# Patient Record
Sex: FEMALE | Race: WHITE | NOT HISPANIC OR LATINO | Employment: OTHER | URBAN - METROPOLITAN AREA
[De-identification: names, ages, dates, MRNs, and addresses within clinical notes are randomized per-mention and may not be internally consistent; named-entity substitution may affect disease eponyms.]

---

## 2018-12-08 ENCOUNTER — TRANSFERRED RECORDS (OUTPATIENT)
Dept: HEALTH INFORMATION MANAGEMENT | Facility: CLINIC | Age: 58
End: 2018-12-08

## 2019-06-21 LAB
ALT SERPL-CCNC: 23 U/L (ref 13–56)
AST SERPL-CCNC: 16 U/L (ref 15–37)
CREAT SERPL-MCNC: 0.82 MG/DL (ref 0.55–1.02)
GLUCOSE SERPL-MCNC: 73 MG/DL (ref 70–99)
POTASSIUM SERPL-SCNC: 3.7 MEQ/L (ref 3.6–5)

## 2019-10-25 LAB
ALT SERPL-CCNC: 23 U/L (ref 13–56)
AST SERPL-CCNC: 14 U/L (ref 15–37)
CREAT SERPL-MCNC: 0.72 MG/DL (ref 0.55–1.02)
GFR SERPL CREATININE-BSD FRML MDRD: >60 ML/MIN/1.73M2
GLUCOSE SERPL-MCNC: 86 MG/DL (ref 70–99)
POTASSIUM SERPL-SCNC: 4 MEQ/L (ref 3.6–5)

## 2019-12-16 ENCOUNTER — TRANSFERRED RECORDS (OUTPATIENT)
Dept: HEALTH INFORMATION MANAGEMENT | Facility: CLINIC | Age: 59
End: 2019-12-16

## 2019-12-16 LAB — EJECTION FRACTION: >70 %

## 2020-03-04 ENCOUNTER — TRANSFERRED RECORDS (OUTPATIENT)
Dept: HEALTH INFORMATION MANAGEMENT | Facility: CLINIC | Age: 60
End: 2020-03-04

## 2020-03-04 LAB
HPV ABSTRACT: ABNORMAL
PAP SMEAR - HIM PATIENT REPORTED: POSITIVE

## 2020-03-06 ENCOUNTER — TRANSFERRED RECORDS (OUTPATIENT)
Dept: HEALTH INFORMATION MANAGEMENT | Facility: CLINIC | Age: 60
End: 2020-03-06

## 2020-03-06 LAB
ALT SERPL-CCNC: 24 U/L (ref 13–56)
AST SERPL-CCNC: 16 U/L (ref 15–37)
CREAT SERPL-MCNC: 0.67 MG/DL (ref 0.55–1.02)
GFR SERPL CREATININE-BSD FRML MDRD: >60 ML/MIN/1.73M2
GLUCOSE SERPL-MCNC: 72 MG/DL (ref 70–99)
POTASSIUM SERPL-SCNC: 3.9 MEQ/L (ref 3.6–5)

## 2020-05-15 ENCOUNTER — TRANSFERRED RECORDS (OUTPATIENT)
Dept: HEALTH INFORMATION MANAGEMENT | Facility: CLINIC | Age: 60
End: 2020-05-15

## 2020-06-01 LAB
ALT SERPL-CCNC: 15 U/L (ref 6–29)
AST SERPL-CCNC: 18 U/L (ref 10–35)
CREAT SERPL-MCNC: 0.69 MG/DL (ref 0.5–0.99)
GFR SERPL CREATININE-BSD FRML MDRD: 95 ML/MIN/1.73M2
GLUCOSE SERPL-MCNC: 83 MG/DL (ref 65–99)
POTASSIUM SERPL-SCNC: 4 MMOL/L (ref 3.5–5.3)

## 2020-06-08 ENCOUNTER — TRANSFERRED RECORDS (OUTPATIENT)
Dept: HEALTH INFORMATION MANAGEMENT | Facility: CLINIC | Age: 60
End: 2020-06-08

## 2020-06-09 ENCOUNTER — TRANSCRIBE ORDERS (OUTPATIENT)
Dept: OTHER | Age: 60
End: 2020-06-09

## 2020-06-09 ENCOUNTER — TRANSFERRED RECORDS (OUTPATIENT)
Dept: HEALTH INFORMATION MANAGEMENT | Facility: CLINIC | Age: 60
End: 2020-06-09

## 2020-06-09 DIAGNOSIS — Z94.0 S/P KIDNEY TRANSPLANT: Primary | ICD-10-CM

## 2020-06-09 LAB
ALT SERPL-CCNC: 25 U/L (ref 13–56)
AST SERPL-CCNC: 15 U/L (ref 15–37)
CREAT SERPL-MCNC: 0.89 MG/DL (ref 0.55–1.02)
GFR SERPL CREATININE-BSD FRML MDRD: >60 ML/MIN/1.73M2
GLUCOSE SERPL-MCNC: 83 MG/DL (ref 70–99)
POTASSIUM SERPL-SCNC: 4 MEQ/L (ref 3.6–5)

## 2020-06-10 ENCOUNTER — TELEPHONE (OUTPATIENT)
Dept: NEPHROLOGY | Facility: CLINIC | Age: 60
End: 2020-06-10

## 2020-06-10 NOTE — TELEPHONE ENCOUNTER
[facility] Call [facility]    Phone Message    May a detailed message be left on voicemail: yes     Reason for Call: Appointment Intake    Referring Provider Name: Nicolas Thomas MD from [facility] referring -     Diagnosis and/or Symptoms: Would like to transfer post kidney transplant care to [place].  Pt. moving to [place]      Action Taken: Message routed to:  [facility] ([facility] Nephrology    Travel Screening: Not Applicable

## 2020-06-12 ENCOUNTER — TRANSFERRED RECORDS (OUTPATIENT)
Dept: HEALTH INFORMATION MANAGEMENT | Facility: CLINIC | Age: 60
End: 2020-06-12

## 2020-06-21 ENCOUNTER — TRANSFERRED RECORDS (OUTPATIENT)
Dept: HEALTH INFORMATION MANAGEMENT | Facility: CLINIC | Age: 60
End: 2020-06-21

## 2020-07-08 NOTE — TELEPHONE ENCOUNTER
Patient has moved to [place] and transferring her care to [facility]  Transplant History  Liver Kidney Transplant 10/10/2010 [facility] [place]  2nd Liver Transplant - 3/4/2011  [facility] [place]  2nd Kidney Transplant- 2/4/2016  [facility] [place]   Follows with Dr Jose Thomas from [facility]- - [place]- [phone]    Insurance- Medicare Primary  ID- [id number]  Part A- 2010 and Part D- 4/1/2012  [facility]-  Card  Oleg Sharma(8/31/1969)  [phone]

## 2020-07-10 ENCOUNTER — TELEPHONE (OUTPATIENT)
Dept: TRANSPLANT | Facility: CLINIC | Age: 60
End: 2020-07-10

## 2020-07-10 NOTE — TELEPHONE ENCOUNTER
Spoke with Belen on the phone regarding a transfer of care to [facility] since her move to [place] from [place] recently. Belen received a kidney/liver transplant in [place] 10/2010. 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 Second liver transplant 3/2011 (per pt) at [facility]. 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 She received her second kidney transplant 2/2016 at [facility]. Both organs are functioning well at this time.    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 She would like to see Oncology at [facility] as well.     Belen is also experiencing vaginal prolapse and was referred to Gynecology in [place] and will also need a referral to Gynecology at [facility].     Belen has an incisional hernia that she would like repaired. She currently wears a support band.     I recommended Belen establish with an internal medicine PCP as soon as possible and appt request sent today. Appt request also sent for hepatology and nephrology.

## 2020-07-13 ENCOUNTER — TELEPHONE (OUTPATIENT)
Dept: TRANSPLANT | Facility: CLINIC | Age: 60
End: 2020-07-13

## 2020-07-13 ENCOUNTER — VIRTUAL VISIT (OUTPATIENT)
Dept: GASTROENTEROLOGY | Facility: CLINIC | Age: 60
End: 2020-07-13
Attending: INTERNAL MEDICINE
Payer: MEDICARE

## 2020-07-13 DIAGNOSIS — Z90.81 S/P SPLENECTOMY: ICD-10-CM

## 2020-07-13 DIAGNOSIS — B97.7 HPV (HUMAN PAPILLOMA VIRUS) INFECTION: ICD-10-CM

## 2020-07-13 DIAGNOSIS — N80.9 ENDOMETRIOSIS: ICD-10-CM

## 2020-07-13 DIAGNOSIS — Z94.0 S/P KIDNEY TRANSPLANT: ICD-10-CM

## 2020-07-13 DIAGNOSIS — Q61.3 POLYCYSTIC KIDNEY DISEASE: ICD-10-CM

## 2020-07-13 DIAGNOSIS — D84.9 IMMUNOSUPPRESSION (H): ICD-10-CM

## 2020-07-13 DIAGNOSIS — N81.10 VAGINAL PROLAPSE: Primary | ICD-10-CM

## 2020-07-13 DIAGNOSIS — Z94.4 S/P LIVER TRANSPLANT (H): ICD-10-CM

## 2020-07-13 RX ORDER — SULFAMETHOXAZOLE AND TRIMETHOPRIM 400; 80 MG/1; MG/1
TABLET ORAL
COMMUNITY
Start: 2020-06-08 | End: 2020-12-15

## 2020-07-13 RX ORDER — PREDNISONE 5 MG/1
5 TABLET ORAL EVERY MORNING
COMMUNITY
Start: 2020-01-02 | End: 2021-06-08

## 2020-07-13 RX ORDER — NITROGLYCERIN 0.4 MG/1
0.4 TABLET SUBLINGUAL PRN
COMMUNITY
End: 2021-11-19

## 2020-07-13 RX ORDER — ACETAMINOPHEN 325 MG/1
325-650 TABLET ORAL PRN
COMMUNITY
End: 2020-09-23

## 2020-07-13 RX ORDER — TACROLIMUS 1 MG/1
1 CAPSULE ORAL 2 TIMES DAILY
COMMUNITY
Start: 2019-11-15 | End: 2020-11-27

## 2020-07-13 RX ORDER — LORATADINE 10 MG/1
10 TABLET ORAL EVERY EVENING
Status: ON HOLD | COMMUNITY
End: 2022-10-05

## 2020-07-13 RX ORDER — ASPIRIN 81 MG/1
81 TABLET, CHEWABLE ORAL EVERY EVENING
COMMUNITY

## 2020-07-13 RX ORDER — DOCUSATE SODIUM 100 MG/1
200 CAPSULE, LIQUID FILLED ORAL EVERY MORNING
COMMUNITY
End: 2022-04-28

## 2020-07-13 RX ORDER — ONDANSETRON 4 MG/1
TABLET, FILM COATED ORAL PRN
COMMUNITY
End: 2022-04-28

## 2020-07-13 RX ORDER — ALPRAZOLAM 0.25 MG
0.25 TABLET ORAL PRN
COMMUNITY
End: 2021-01-27

## 2020-07-13 RX ORDER — TEMAZEPAM 15 MG/1
15 CAPSULE ORAL PRN
COMMUNITY
End: 2021-01-27

## 2020-07-13 RX ORDER — BIOTIN 1 MG
3000 TABLET ORAL EVERY EVENING
COMMUNITY

## 2020-07-13 RX ORDER — MYCOPHENOLIC ACID 360 MG/1
TABLET, DELAYED RELEASE ORAL 2 TIMES DAILY
COMMUNITY
End: 2020-11-27

## 2020-07-13 ASSESSMENT — PAIN SCALES - GENERAL: PAINLEVEL: NO PAIN (0)

## 2020-07-13 NOTE — TELEPHONE ENCOUNTER
Patient Call: Voicemail  Date/Time: 7/10/2020  8075  Reason for call: Pt calling  Just talked with schedulers and unable to see Neph until Sept  Needs to be seen in July per pt with her results of her tests (elevated HPD)  That could easily turn into cancer

## 2020-07-13 NOTE — PROGRESS NOTES
"Belen Sharma is a 60 year old female who is being evaluated via a billable video visit.      The patient has been notified of following:     \"This video visit will be conducted via a call between you and your physician/provider. We have found that certain health care needs can be provided without the need for an in-person physical exam.  This service lets us provide the care you need with a video conversation.  If a prescription is necessary we can send it directly to your pharmacy.  If lab work is needed we can place an order for that and you can then stop by our lab to have the test done at a later time.    Video visits are billed at different rates depending on your insurance coverage.  Please reach out to your insurance provider with any questions.    If during the course of the call the physician/provider feels a video visit is not appropriate, you will not be charged for this service.\"    Patient has given verbal consent for Video visit? Yes    How would you like to obtain your AVS? MyChart    Will anyone else be joining your video visit? No    Date of Service: 2020     Subjective:          Belen Sharma is a 60 year old female presenting for follow up with history of liver transplantation    History of Present Illness   Belen Sharma is a 60 year old female with past medical history of congenital hepatic fibrosis and polycystic kidney disease who status post SLK in 2010.  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  Her kidney transplant was removed in 2012.  She underwent a splenectomy in 2013.  Ultimately she underwent retransplant with  donor kidney in 2016 after desensitization protocol.  She " recently moved to the [place] area and is establishing care.    Her liver transplant history is as above.  She has been following with hepatologist Dr. Zach Ray with the [facility] in [place].  She is not had any significant complications of her second liver transplant and appears to have excellent allograft function at this time.    In regard to her immunosuppression, she is currently on a regimen of tacrolimus 2 mg every morning 1 mg every afternoon, prednisone 7.5 mg daily,  mg every morning 3 and 60 mg every afternoon.  This immunosuppression has been felt necessary given her read transplant for her kidney and her history of significant antibody sensitization.    Other health issues that she is currently dealing with: She reports a longstanding history of HPV.  Prior to transferring him to the [facility] she was informed that she did need to establish care with a gynecologist to screen for malignancy.  She also has issues with vaginal prolapse and is very interested in seeing a specialist as far as treatment of this condition.      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 Course complicated by multiple severe infections (including CMV pneumonia). 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 Explanted . Re-transplant after de-sensitization 2016     S/P liver transplant (H)      10/9/2010 - HAT, ischemic biopathy. Re-transplant 3/3/2011     S/P splenectomy        Past Surgical History:  Past Surgical History:   Procedure Laterality Date     HERNIA REPAIR, INCISIONAL  2013     SPLENECTOMY       TRANSPLANT KIDNEY RECIPIENT  DONOR  2016    re-transplant after [facility]; 6 months de-sensitization prior AND 6 months after transplant     TRANSPLANT LIVER RECIPIENT  DONOR  2011     TRANSPLANT LIVER, KIDNEY RECIPIENT  DONOR, COMBINED  10/09/2010    HAT - re-Tx liver 3/3/2011; Kidney (left iliac) lost to [facility]/fibrosis - explant       Past Social History:  Social History     Tobacco Use     Smoking status: Never Smoker     Smokeless tobacco: Never Used   Substance Use Topics     Alcohol use: None     Drug use: Not Currently        Family History:  Family History   Problem Relation Age of Onset     Uterine Cancer Maternal Grandmother      Polycystic Kidney Diease Brother      Polycystic Kidney Diease Brother      Polycystic Kidney Diease Brother      Polycystic Kidney Diease Brother      Cervical Cancer Maternal Aunt        Review of Systems  A complete 10 point review of systems was asked and answered in the negative unless specifically commented upon in the HPI    Current Outpatient Medications   Medication     acetaminophen (TYLENOL) 325 MG tablet     ALPRAZolam (XANAX) 0.25 MG tablet     aspirin (ASA) 81 MG chewable tablet     biotin 1000 MCG TABS tablet     docusate sodium (COLACE) 100 MG capsule     loratadine (CLARITIN) 10 MG tablet     Multiple Vitamins-Minerals (WOMENS 50+ MULTI VITAMIN/MIN) TABS     MYFORTIC (BRAND) 360 MG EC tablet     nitroGLYcerin (NITROSTAT) 0.4 MG sublingual tablet     ondansetron (ZOFRAN) 4 MG tablet     predniSONE (DELTASONE) 5 MG tablet     Probiotic Product (PROBIOTIC PO)     Psyllium (METAMUCIL PO)     sulfamethoxazole-trimethoprim (BACTRIM) 400-80 MG tablet     tacrolimus (GENERIC EQUIVALENT) 1 MG capsule     temazepam (RESTORIL) 15 MG  capsule     No current facility-administered medications for this visit.        Objective:         There were no vitals filed for this visit.  There is no height or weight on file to calculate BMI.     Labs: reviewed from Care Everywhere    Imaging: reviewed from Care Everywhere    Assessment and Plan:    S/P Liver Transplantation:   - s/p SLK 10/2010.  Complicated by HAT with resultant ischemic biliopathy leading to graft failure  - Re-Transplant 3/3/2011  - No allograft dysfunction: no history of rejection nor biliary strictures  - Will follow labs per routine    Immunosuppression:   - Given she had a kidney after liver transplant, her immunosuppression will be directed by the kidney transplant service.  - The bare minimum she would need from a liver transplant perspective is tacrolimus BID with a goal trough of ~ 3; anything greater than this is for purposes of maintaining kidney function  - Will plan to check immunosuppression trough levels every 1-3 months to assess for adequate target dosing and will assess CBC and kidney function for toxicity of medications    Infectious Prophylaxis:   - on Bactrim; per transplant nephrology    Kidney Health:   - Referral in place to be seen by transplant nephrology    History of HPV, vaginal prolapse:   - Will place a referral to be seen by OB/Gyn at the [facility]    Nutrition/Weight:    It is very common for patients to put on significant weight after liver transplantation, as they become conditioned to eating as much as possible to maintain a healthy weight pre-transplant.  There is a very significant risk of developing fatty liver and non-alcoholic steatohepatitis in post-transplant patients, even in those who were not transplanted for TIWARI cirrhosis.  - Please work on eating a diet that is rice in fresh fruits and vegetables, moderate amounts of lean protein and dairy, and modest amounts of carbohydrates and fats.  - An exercise plan/regimen is an essential part of remaining  healthy in the post-transplant setting and we recommend 30-35 minutes of exercise at least 4 days a week    Routine Health Care:  - You need to establish and maintain care with a primary care physician to manage: hypertension, high cholesterol, abnormal blood sugars - as these are all potential complications of your health after liver transplantation and will need a local physician to manage these issues.    - We will place a primary care consult  - All patients with liver diseaes (even post transplant) are at an increased risk for osteoporosis.  We strongly recommend screening for Vitamin D deficiency at least twice yearly with aggressive supplementation/replacement as indicated.    - We also recommend a screening DEXA scan to evaluate for osteoporosis.  If present, should treat with calcium, Vitamin D supplementation, and recommend consideration of bisphosphonate therapy.  Also recommend follow up DEXA scans to evaluate for improvement of bone density on therapy.  - Recommend yearly skin exams with dermatology, and if skin cancers are found, recommend twice yearly exams  - Recommend you stay up to date for cancer screening: mammograms/PAP for women and prostate cancer screening for men, and colon cancer screening for all.  - Practice good hygiene with washing of hands and maintaining a clean living space as this will decrease your chances of developing an infection in the post-transplantation setting  - Eat a health diet: rich in fresh fruits and vegetables, lean proteins, and minimize carbohydrate and fat intake.       Thank you very much for the opportunity to participate in the care of this patient.  If you have any further questions, please don't hesitate to contact our office.    __________________________________  Thomas M. Leventhal, M.D.   of Medicine  Advanced & Transplant Hepatology  [facility]      Video-Visit Details    Phone Call Duration: 24 minutes    Approximately 55 minutes of non face-to-face time were spent in review of the patient's medical record to date.  This included review of previous: clinic visits, hospital records, lab results, imaging studies, and procedural documentation.  The findings from this review are summarized in the above note.

## 2020-07-13 NOTE — LETTER
"    7/13/2020         RE: Belen Sharma  [address] [address]        Dear Colleague,    Thank you for referring your patient, Belen Sharma, to the [facility] HEPATOLOGY. Please see a copy of my visit note below.    Belen Sharma is a 60 year old female who is being evaluated via a billable video visit.      The patient has been notified of following:     \"This video visit will be conducted via a call between you and your physician/provider. We have found that certain health care needs can be provided without the need for an in-person physical exam.  This service lets us provide the care you need with a video conversation.  If a prescription is necessary we can send it directly to your pharmacy.  If lab work is needed we can place an order for that and you can then stop by our lab to have the test done at a later time.    Video visits are billed at different rates depending on your insurance coverage.  Please reach out to your insurance provider with any questions.    If during the course of the call the physician/provider feels a video visit is not appropriate, you will not be charged for this service.\"    Patient has given verbal consent for Video visit? Yes    How would you like to obtain your AVS? MyChart    Will anyone else be joining your video visit? No    Date of Service: 7/13/2020     Subjective:          Belen Sharma is a 60 year old female presenting for follow up with history of liver transplantation    History of Present Illness   Belen Sharma is a 60 year old female with past medical history of congenital hepatic fibrosis and polycystic kidney disease who status post SLK in October 2010.  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  Her kidney transplant was removed in 2012.  She underwent a splenectomy in 2013.  Ultimately she underwent retransplant with  donor kidney in 2016 after desensitization protocol.  She recently moved to the [place] area and is establishing care.    Her liver transplant history is as above.  She has been following with hepatologist Dr. Zach Ray with the [facility] in [place].  She is not had any significant complications of her second liver transplant and appears to have excellent allograft function at this time.    In regard to her immunosuppression, she is currently on a regimen of tacrolimus 2 mg every morning 1 mg every afternoon, prednisone 7.5 mg daily,  mg every morning 3 and 60 mg every afternoon.  This immunosuppression has been felt necessary given her read transplant for her kidney and her history of significant antibody sensitization.    Other health issues that she is currently dealing with: She reports a longstanding history of HPV.  Prior to transferring him to the [facility] she was informed that she did need to establish care with a gynecologist to screen for malignancy.  She also has issues with vaginal prolapse and is very interested in seeing a specialist as far as treatment of this condition.      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 Course complicated by multiple severe infections (including CMV pneumonia). Re-listed for transplant    - DDLT (#2 liver): 3/3/2011  - Technique: orthotopic, duct to duct  - Complications: none    Past Medical History:  Past Medical History:   Diagnosis Date     BK viremia [id number]     CMV pneumonia (H)      Congenital hepatic fibrosis      Endometriosis      HPV (human papilloma  virus) infection      Immunosuppression (H)      Polycystic kidney disease      S/P kidney transplant     SLK 10/9/2010 - kidney failure 2/2 [facility]. Explanted . Re-transplant after de-sensitization      S/P liver transplant (H)     10/9/2010 - HAT, ischemic biopathy. Re-transplant 3/3/2011     S/P splenectomy        Past Surgical History:  Past Surgical History:   Procedure Laterality Date     HERNIA REPAIR, INCISIONAL  2013     SPLENECTOMY       TRANSPLANT KIDNEY RECIPIENT  DONOR  2016    re-transplant after [facility]; 6 months de-sensitization prior AND 6 months after transplant     TRANSPLANT LIVER RECIPIENT  DONOR  2011     TRANSPLANT LIVER, KIDNEY RECIPIENT  DONOR, COMBINED  10/09/2010    HAT - re-Tx liver 3/3/2011; Kidney (left iliac) lost to [facility]/fibrosis - explant       Past Social History:  Social History     Tobacco Use     Smoking status: Never Smoker     Smokeless tobacco: Never Used   Substance Use Topics     Alcohol use: None     Drug use: Not Currently        Family History:  Family History   Problem Relation Age of Onset     Uterine Cancer Maternal Grandmother      Polycystic Kidney Diease Brother      Polycystic Kidney Diease Brother      Polycystic Kidney Diease Brother      Polycystic Kidney Diease Brother      Cervical Cancer Maternal Aunt        Review of Systems  A complete 10 point review of systems was asked and answered in the negative unless specifically commented upon in the HPI    Current Outpatient Medications   Medication     acetaminophen (TYLENOL) 325 MG tablet     ALPRAZolam (XANAX) 0.25 MG tablet     aspirin (ASA) 81 MG chewable tablet     biotin 1000 MCG TABS tablet     docusate sodium (COLACE) 100 MG capsule     loratadine (CLARITIN) 10 MG tablet     Multiple Vitamins-Minerals (WOMENS 50+ MULTI VITAMIN/MIN) TABS     MYFORTIC (BRAND) 360 MG EC tablet     nitroGLYcerin (NITROSTAT) 0.4 MG sublingual tablet     ondansetron (ZOFRAN) 4 MG tablet      predniSONE (DELTASONE) 5 MG tablet     Probiotic Product (PROBIOTIC PO)     Psyllium (METAMUCIL PO)     sulfamethoxazole-trimethoprim (BACTRIM) 400-80 MG tablet     tacrolimus (GENERIC EQUIVALENT) 1 MG capsule     temazepam (RESTORIL) 15 MG capsule     No current facility-administered medications for this visit.        Objective:         There were no vitals filed for this visit.  There is no height or weight on file to calculate BMI.     Labs: reviewed from Care Everywhere    Imaging: reviewed from Care Everywhere    Assessment and Plan:    S/P Liver Transplantation:   - s/p SLK 10/2010.  Complicated by HAT with resultant ischemic biliopathy leading to graft failure  - Re-Transplant 3/3/2011  - No allograft dysfunction: no history of rejection nor biliary strictures  - Will follow labs per routine    Immunosuppression:   - Given she had a kidney after liver transplant, her immunosuppression will be directed by the kidney transplant service.  - The bare minimum she would need from a liver transplant perspective is tacrolimus BID with a goal trough of ~ 3; anything greater than this is for purposes of maintaining kidney function  - Will plan to check immunosuppression trough levels every 1-3 months to assess for adequate target dosing and will assess CBC and kidney function for toxicity of medications    Infectious Prophylaxis:   - on Bactrim; per transplant nephrology    Kidney Health:   - Referral in place to be seen by transplant nephrology    History of HPV, vaginal prolapse:   - Will place a referral to be seen by OB[facility]Gyn at the [facility]    Nutrition/Weight:    It is very common for patients to put on significant weight after liver transplantation, as they become conditioned to eating as much as possible to maintain a healthy weight pre-transplant.  There is a very significant risk of developing fatty liver and non-alcoholic steatohepatitis in post-transplant patients, even in those who were not transplanted for TIWARI  cirrhosis.  - Please work on eating a diet that is rice in fresh fruits and vegetables, moderate amounts of lean protein and dairy, and modest amounts of carbohydrates and fats.  - An exercise plan/regimen is an essential part of remaining healthy in the post-transplant setting and we recommend 30-35 minutes of exercise at least 4 days a week    Routine Health Care:  - You need to establish and maintain care with a primary care physician to manage: hypertension, high cholesterol, abnormal blood sugars - as these are all potential complications of your health after liver transplantation and will need a local physician to manage these issues.    - We will place a primary care consult  - All patients with liver diseaes (even post transplant) are at an increased risk for osteoporosis.  We strongly recommend screening for Vitamin D deficiency at least twice yearly with aggressive supplementation/replacement as indicated.    - We also recommend a screening DEXA scan to evaluate for osteoporosis.  If present, should treat with calcium, Vitamin D supplementation, and recommend consideration of bisphosphonate therapy.  Also recommend follow up DEXA scans to evaluate for improvement of bone density on therapy.  - Recommend yearly skin exams with dermatology, and if skin cancers are found, recommend twice yearly exams  - Recommend you stay up to date for cancer screening: mammograms/PAP for women and prostate cancer screening for men, and colon cancer screening for all.  - Practice good hygiene with washing of hands and maintaining a clean living space as this will decrease your chances of developing an infection in the post-transplantation setting  - Eat a health diet: rich in fresh fruits and vegetables, lean proteins, and minimize carbohydrate and fat intake.       Thank you very much for the opportunity to participate in the care of this patient.  If you have any further questions, please don't hesitate to contact our  office.    __________________________________  Thomas M. Leventhal, M.D.   of Medicine  Advanced & Transplant Hepatology  [facility]      Video-Visit Details    Phone Call Duration: 24 minutes   Approximately 55 minutes of non face-to-face time were spent in review of the patient's medical record to date.  This included review of previous: clinic visits, hospital records, lab results, imaging studies, and procedural documentation.  The findings from this review are summarized in the above note.          Again, thank you for allowing me to participate in the care of your patient.        Sincerely,        Thomas M. Leventhal, MD

## 2020-07-13 NOTE — PATIENT INSTRUCTIONS
- We have placed orders for a primary care provider close to [place]    - We have placed orders for an OB/Gyn visit    - We will get you enrolled in our lab protocol    - You have a visit with Transplant Nephrology in ~ 2 months

## 2020-07-13 NOTE — Clinical Note
I ordered PCP and ObGyn referrals.  She will need routine labs, whatever kidney wants.  Follow up 1 year

## 2020-07-13 NOTE — LETTER
Date:July 27, 2020      Patient was self referred, no letter generated. Do not send.        [facility] Health Information

## 2020-07-14 ENCOUNTER — TELEPHONE (OUTPATIENT)
Dept: TRANSPLANT | Facility: CLINIC | Age: 60
End: 2020-07-14

## 2020-07-14 DIAGNOSIS — Z94.4 LIVER REPLACED BY TRANSPLANT (H): ICD-10-CM

## 2020-07-14 DIAGNOSIS — Z13.220 LIPID SCREENING: ICD-10-CM

## 2020-07-15 ENCOUNTER — DOCUMENTATION ONLY (OUTPATIENT)
Dept: CARE COORDINATION | Facility: CLINIC | Age: 60
End: 2020-07-15

## 2020-07-22 ENCOUNTER — DOCUMENTATION ONLY (OUTPATIENT)
Dept: TRANSPLANT | Facility: CLINIC | Age: 60
End: 2020-07-22

## 2020-07-22 ENCOUNTER — TELEPHONE (OUTPATIENT)
Dept: TRANSPLANT | Facility: CLINIC | Age: 60
End: 2020-07-22

## 2020-07-22 ENCOUNTER — VIRTUAL VISIT (OUTPATIENT)
Dept: INTERNAL MEDICINE | Facility: CLINIC | Age: 60
End: 2020-07-22
Payer: MEDICARE

## 2020-07-22 DIAGNOSIS — R07.89 ATYPICAL CHEST PAIN: ICD-10-CM

## 2020-07-22 DIAGNOSIS — Z12.83 SKIN CANCER SCREENING: Primary | ICD-10-CM

## 2020-07-22 NOTE — NURSING NOTE
Chief Complaint   Patient presents with     Establish Care     Pt is looking for a new PCP.     Video Visit Technology for this patient: No video technology available to patient, please call patient over the phone     Larisa Márquez LPN at 12:42 PM on 7/22/2020.

## 2020-07-22 NOTE — PROGRESS NOTES
Telephone visit     Ms. Sharma agrees to telephone visit     Ms. Sharma has h/o polycystic renal disease and strong family history (two older brothers and uncle). She had a visit on 7/13/2020 with Dr. 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 She has just moved to the [facility] (7/1/2020). She previously had gotten care at [facility] in [place]. She also had a splenectomy 2013. Will have to update her vaccination data. She is interested in getting the Shingrex vaccination series. She states up to date on colonoscopy. She sates she gets mammograms every six months and would be due later in the fall for the next. She states she has some post transplant ventral hernias. She has a concerning (about 3 months but worse last month or so) vaginal prolapse. She states she was seen in [place] by both GYN and Oncology (because of her HPV status and her family h/o of endometrial cancer). There was a GYN referral placed on 713/2020. She also states she sees Dermatology  Yearly and placed a referral toay. Her last labs were stable. She has concerns regarding COVID. She has a son and daughter (that to the best of her knowledge) do not have polycystic renal disease. She has a renal transplant appt. Scheduled for 9/15/2020. She takes PRN Xanax for anxiety/panic attacks (rarely) and PRN Restoril for sleep. She rarely takes PRN nitroglycerine for chest pain; however, she has been evaluated by Cardiology in the past and no significant intervention. She would like to establish care with Cardiology here. Placed cardiology referral today.    I will try to see her in person at some future visit    YARELIS De La Garza MD    Total time today 22 minutes and 49 seconds

## 2020-07-23 ENCOUNTER — TELEPHONE (OUTPATIENT)
Dept: INTERNAL MEDICINE | Facility: CLINIC | Age: 60
End: 2020-07-23

## 2020-07-23 ENCOUNTER — TELEPHONE (OUTPATIENT)
Dept: TRANSPLANT | Facility: CLINIC | Age: 60
End: 2020-07-23

## 2020-07-23 NOTE — TELEPHONE ENCOUNTER
----- Message from Елена Dinh RN sent at 7/14/2020  1:21 PM CDT -----  Regarding: Lab orders  JeimyBelen amanda transferred care here from [place]. She was seen by Dr. Leventhal today via virtual visit. Can you please enter in routine transplant labs for her? I will find out who her kidney coordinator is and ask them to enter kidney labs. She is a liver and kidney transplant.    Just call me if you have questions. This is my first transfer of care patient so I'm kind of learning as I go with this one.     Thank you,   Елена

## 2020-07-23 NOTE — TELEPHONE ENCOUNTER
Spoke to pt and discussed clinic options for lab draws. Gave pt the [facility] clinic number to schedule labs in September. Informed pot that she has standing lab orders in her chart where any [facility] clinic can review. Let her know to call with any questions.

## 2020-07-30 ENCOUNTER — TELEPHONE (OUTPATIENT)
Dept: OBGYN | Facility: CLINIC | Age: 60
End: 2020-07-30

## 2020-07-30 ENCOUNTER — PRE VISIT (OUTPATIENT)
Dept: ONCOLOGY | Facility: CLINIC | Age: 60
End: 2020-07-30

## 2020-07-30 NOTE — TELEPHONE ENCOUNTER
Spoke with Belen today regarding her upcoming appointment. Patient has complicated history - transferring care to [place] from [place] - kidney and liver transplant patient (on chart review from recent visit to gastroenterology in July 2020. At that visit, referral to GYN was placed. It appears patient has longstanding history of HPV and also has problem of vaginal prolapse.     Patient tells Rn that she was also seen by Gyn/Onc in [place] but that she does not have cancer, was just seen for screening. She also states she would like to consult regarding hysterectomy.    Patient's records from [place] are available in [facility].     Routing to Dr. Krishnan for review and direction on appropriate clinic for scheduling this patient. If deemed appropriate for [facility], patient would like to reschedule her visit with an OB/Gyn versus resident.

## 2020-07-30 NOTE — TELEPHONE ENCOUNTER
ONCOLOGY INTAKE: Records Information      APPT INFORMATION:  Referring provider:  self referred  Referring provider s clinic:  [facility]  Reason for visit/diagnosis:  family history of cancer  Has patient been notified of appointment date and time?: Yes    RECORDS INFORMATION:  Were the records received with the referral (via [employer])? No    Has patient been seen for any external appt for this diagnosis? No    If yes, where? n/a    Has patient had any imaging or procedures outside of [place]  [facility] for this condition? no      If Yes, where? n/a    ADDITIONAL INFORMATION:  none

## 2020-07-31 ENCOUNTER — TELEPHONE (OUTPATIENT)
Dept: ONCOLOGY | Facility: CLINIC | Age: 60
End: 2020-07-31

## 2020-07-31 NOTE — TELEPHONE ENCOUNTER
Action    Action Taken 7/31/2020 2:33pm     I called pt Belen - we talked for about 20 mins and she provided me with her email address: [email or web address]  I sent her an email so she could send any additional records to [facility] before her appt.     RN Care Coordinator: Елена Dinh     I sent the following in-basket message:   Ranjit Huang and Елена,     I spoke to pt Belen for about 20mins this afternoon and she has a lot of questions about her upcoming genetics appointment.     It sounds like she has a lot of family cancer records and is at risk for some cancers. She would really appreciate receiving a call to discuss what is needed for her upcoming genetics appt on August 13th.     I gave her my email address and she will be sending over some additional records. I explained that we have quite a few records in Epic and  already.     Елена: Pt Belen mentioned that you would have some records from a [facility]. I already see the [facility] records in her chart. Please let me know if you have any additional records and if so, please fax them to HIM.     Thank you,   Kimberly     8/3/2020 8:40am   I received an ib-message from DIANNE Harris,     Thanks for the call. I just talked with Belen now and went over the appointment and what to expect as well as what I would need for the appointment. I gave her my number ([phone]) and explained that I will be on furlough next week so she can leave a voicemail if needed. I will be back on Monday, August 10.     Otherwise, we made plans to reconnect on the 13th for her appointment.     Thank you!   Reina

## 2020-07-31 NOTE — TELEPHONE ENCOUNTER
I spoke with Belen Sharma today regarding the upcoming appointment with me on August 13, 2020 because of a family history of several cancers.    We discussed today that at the appointment I will be reviewing her personal and family medical history, focusing on cancer. Belen had questions about cost for testing and we discussed that genetic testing may not be covered by Medicare if a person does not have a personal history of cancer, however, we can do a pre-verification if she has supplemental insurance so that she will have an estimate of the cost.     Belen also wanted to know about the process of testing, and I relayed that [employer], the company we use in [place], can send a saliva kit to her house to minimize exposure to COVID-19. Once [employer] receives a saliva sample, the process takes about 4-5 weeks. Belen verbalized understanding of this.     I encouraged Belen to contact me with any further questions at [phone] and that I look forward to talking with her on August 13 to go into more detail about genetic testing.     Reina Choi MS, [facility]  Licensed, certified genetic counselor  [phone]

## 2020-08-05 ENCOUNTER — OFFICE VISIT (OUTPATIENT)
Dept: UROLOGY | Facility: CLINIC | Age: 60
End: 2020-08-05
Attending: OBSTETRICS & GYNECOLOGY
Payer: MEDICARE

## 2020-08-05 VITALS
SYSTOLIC BLOOD PRESSURE: 135 MMHG | HEIGHT: 70 IN | WEIGHT: 180 LBS | DIASTOLIC BLOOD PRESSURE: 76 MMHG | HEART RATE: 97 BPM | BODY MASS INDEX: 25.77 KG/M2

## 2020-08-05 DIAGNOSIS — N81.6 RECTOCELE: ICD-10-CM

## 2020-08-05 DIAGNOSIS — N81.11 CYSTOCELE, MIDLINE: Primary | ICD-10-CM

## 2020-08-05 RX ORDER — LOPERAMIDE HYDROCHLORIDE 2 MG/1
1 TABLET ORAL PRN
COMMUNITY
End: 2021-06-08

## 2020-08-05 RX ORDER — PANTOPRAZOLE SODIUM 40 MG/1
1 TABLET, DELAYED RELEASE ORAL PRN
Status: ON HOLD | COMMUNITY
Start: 2020-02-04 | End: 2022-09-15

## 2020-08-05 RX ORDER — FAMOTIDINE 20 MG
1000 TABLET ORAL
COMMUNITY
End: 2022-02-15

## 2020-08-05 RX ORDER — DIPHENHYDRAMINE HCL 25 MG
25 TABLET ORAL EVERY 8 HOURS PRN
COMMUNITY

## 2020-08-05 ASSESSMENT — ENCOUNTER SYMPTOMS
SEIZURES: 0
FATIGUE: 0
LIGHT-HEADEDNESS: 0
FEVER: 0
HEARTBURN: 0
SNORES LOUDLY: 0
TREMORS: 0
JAUNDICE: 0
NUMBNESS: 0
DOUBLE VISION: 0
ABDOMINAL PAIN: 0
HALLUCINATIONS: 0
SINUS CONGESTION: 0
SLEEP DISTURBANCES DUE TO BREATHING: 0
INCREASED ENERGY: 0
WEIGHT LOSS: 0
DECREASED CONCENTRATION: 0
BLOOD IN STOOL: 0
DECREASED APPETITE: 0
LEG PAIN: 0
WEAKNESS: 0
LOSS OF CONSCIOUSNESS: 0
EYE PAIN: 0
HEMOPTYSIS: 0
NIGHT SWEATS: 0
EXERCISE INTOLERANCE: 0
SWOLLEN GLANDS: 0
DYSPNEA ON EXERTION: 0
SINUS PAIN: 0
EYE IRRITATION: 0
NERVOUS/ANXIOUS: 0
HEADACHES: 0
RESPIRATORY PAIN: 0
WHEEZING: 0
MYALGIAS: 0
MEMORY LOSS: 0
POLYPHAGIA: 0
BREAST MASS: 0
MUSCLE WEAKNESS: 0
POOR WOUND HEALING: 0
SHORTNESS OF BREATH: 0
BRUISES/BLEEDS EASILY: 1
POLYDIPSIA: 0
BLOATING: 0
DIZZINESS: 0
EYE WATERING: 0
TROUBLE SWALLOWING: 0
HOT FLASHES: 0
HYPOTENSION: 0
JOINT SWELLING: 0
RECTAL PAIN: 0
ALTERED TEMPERATURE REGULATION: 0
SYNCOPE: 0
SORE THROAT: 0
NECK MASS: 0
PANIC: 0
VOMITING: 0
TASTE DISTURBANCE: 0
ORTHOPNEA: 0
STIFFNESS: 0
SPUTUM PRODUCTION: 0
EYE REDNESS: 0
COUGH: 0
HOARSE VOICE: 0
TINGLING: 0
DISTURBANCES IN COORDINATION: 0
PARALYSIS: 0
DEPRESSION: 0
EXTREMITY NUMBNESS: 0
HEMATURIA: 0
INSOMNIA: 0
CHILLS: 0
BREAST PAIN: 0
WEIGHT GAIN: 0
NAUSEA: 0
DIFFICULTY URINATING: 1
DECREASED LIBIDO: 1
SPEECH CHANGE: 0
SMELL DISTURBANCE: 0
HYPERTENSION: 0
CONSTIPATION: 1
MUSCLE CRAMPS: 0
SKIN CHANGES: 0
COUGH DISTURBING SLEEP: 0
PALPITATIONS: 0
ARTHRALGIAS: 0
NAIL CHANGES: 0
BACK PAIN: 0
DYSURIA: 0
FLANK PAIN: 0
POSTURAL DYSPNEA: 0
NECK PAIN: 0
BOWEL INCONTINENCE: 0
DIARRHEA: 0

## 2020-08-05 ASSESSMENT — MIFFLIN-ST. JEOR: SCORE: 1466.72

## 2020-08-05 ASSESSMENT — PAIN SCALES - GENERAL: PAINLEVEL: NO PAIN (0)

## 2020-08-05 NOTE — PROGRESS NOTES
August 5, 2020    Referring Provider: Referred Self, MD  No address on file    Primary Care Provider: Vincent De La Garza    CC: prolapse    HPI:  Belen Sharma is a 60 year old female who presents for evaluation of her pelvic floor symptoms.  She has a h/o prolapse that she says is on the outside and will require her to push it back in at least 1-2/day. C/O pressure and bulge in the vagina and occ difficulty emptying her bladder. Pt also with h/o failed renal/liver transplant and HSIL, HPV pos on recent pap. Was seen by a Gyn Onc in [place] on 6/13 who did colpo that he said was c/w LSIL and asked her to f/u her in [place].  Prolapse:  Do you feel a vaginal bulge? yes                                      Pressure? yes   Do you have to place your fingers in the vagina or in the rectum to have a bowel movement? yes  Impact to quality of life? severe     Stress Incontinence:  Do you leak urine with cough, sneeze, exercise? no  How often do you leak with cough, sneeze, exercise?  -  How much do you usually leak? -   Do you wear a pad? - If so; -  Impact to quality of life? -    Urge Incontinence:  Do you often get sudden urges to urinate? no  How often do have urges? -  If so, do you leak with these urges? -  How much do you usually leak? -  Impact to quality of life? -    Voids/day:7  Nocturia: 3-4  Fluid intake: 10  Caffeine: 1    Urinating:  Difficulty starting urination or strain to void? yes  Weak or intermittent stream? yes  Incomplete emptying or dribbling? no  Pain or burning with urination? no  Any blood in your urine? no    GI:  Constipation? yes  Frequency stools daily    Straining for stools yes  Stool consistency vaires     Ever leak stool (Accidental Bowel Leakage)? no      If so, how often?               -      If so, do you leak?                   -      Soiling without sensation? -  History of irritable bowel or Crohn's? no    Sexual/Pain:  Are you currently having sex?. No due to prolapse  Pain with sex?   -   Sexual  Partner: -  Do any of these symptoms interfere with sex? -  Impact to quality of life? -    Prior therapy:  Ever done pelvic floor physical therapy? yes  Trial of medication? no  Have you ever tried a pessary? no    Medical History:  Do you have?   High Cholesterol? no     Diabetes? no  High Blood pressure? no     Recurrent UTIs? no  Sleep Apnea? no  Other medical problems: s/p liver and renal transplant X2    Surgical History:    Hysterectomy? no   Bladder Surgery? no   Other? See HPI     OB/Gyn History:  Pregnancies? 2  Deliveries? 2  Vaginal 2  Section -  Current birth control? -  Periods? NA  When was the first day of your last period? -  Last Pap smear?  Any abnormal? Yes, see HPI  Last mammogram? 2019  Last colonoscopy? 2019    Medications/Vitamins/Supplements: reviewed    Drug Allergies: reviewed    Latex Allergy: no  Iodine Allergy no    Family History: (list relationship and age at diagnosis)  Breast cancer? yes  Ovarian cancer? no   Colon cancer? no  Other? Uterine ca    Social History:  Marital status:   Do you/ have you ever smoke(d)  cigarettes? no  Drink more than 1 alcoholic beverage a day?  no  Occupation? -    In the past 3 months have you regularly experienced:  Chest pain w/ walking/exercise? no                   Unusual headaches? no  Leg pain w/ walking/exercise? no                       Easy bruising? no  Difficulty breathing w/ walking/exercise? no  Problems with vision? no  Dizziness, falls, or fainting? no  Excessive bleeding from cuts, gums, surgery? 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 Explanted . Re-transplant after de-sensitization 2016     S/P liver transplant (H)     10/9/2010 - HAT, ischemic biopathy. Re-transplant 3/3/2011     S/P splenectomy      Spider veins        Past Surgical History:   Procedure Laterality Date     bunectomy  2018    right foot     bunionectomy  2019    left     HERNIA REPAIR, INCISIONAL  2013     SPLENECTOMY       TRANSPLANT KIDNEY RECIPIENT  DONOR  2016    re-transplant after [facility]; 6 months de-sensitization prior AND 6 months after transplant     TRANSPLANT LIVER RECIPIENT  DONOR  2011     TRANSPLANT LIVER, KIDNEY RECIPIENT  DONOR, COMBINED  10/09/2010    HAT - re-Tx liver 3/3/2011; Kidney (left iliac) lost to [facility]/fibrosis - explant       Social History     Socioeconomic History     Marital status:      Spouse name: Not on file     Number of children: Not on file     Years of education: Not on file     Highest education level: Not on file   Occupational History     Not on file   Social Needs     Financial resource strain: Not on file     Food insecurity     Worry: Not on file     Inability: Not on file     Transportation needs     Medical: Not on file     Non-medical: Not on file   Tobacco Use     Smoking status: Never Smoker     Smokeless tobacco: Never Used   Substance and Sexual Activity     Alcohol use: Not Currently     Drug use: Not Currently     Sexual activity: Not Currently   Lifestyle     Physical activity     Days per week: Not on file     Minutes per session: Not on file     Stress: Not on file   Relationships     Social connections     Talks on phone: Not on file     Gets together: Not on file     Attends Worship service: Not on file     Active member of club or organization: Not on file     Attends meetings of clubs or organizations: Not on file     Relationship status: Not on file     Intimate partner violence      Fear of current or ex partner: Not on file     Emotionally abused: Not on file     Physically abused: Not on file     Forced sexual activity: Not on file   Other Topics Concern     Parent/sibling w/ CABG, MI or angioplasty before 65F 55M? Not Asked   Social History Narrative     Not on file       Family History   Problem Relation Age of Onset     Uterine Cancer Maternal Grandmother      Polycystic Kidney Diease Brother      Polycystic Kidney Diease Brother      Polycystic Kidney Diease Brother      Polycystic Kidney Diease Brother      Cervical Cancer Maternal Aunt        Review of Systems     Constitutional:  Negative for fever, chills, weight loss, weight gain, fatigue, decreased appetite, night sweats, recent stressors, height gain, height loss, post-operative complications, incisional pain, hallucinations, increased energy, hyperactivity and confused.   HENT:  Negative for ear pain, hearing loss, tinnitus, nosebleeds, trouble swallowing, hoarse voice, mouth sores, sore throat, ear discharge, tooth pain, gum tenderness, taste disturbance, smell disturbance, hearing aid, bleeding gums, dry mouth, sinus pain, sinus congestion and neck mass.    Eyes:  Negative for double vision, pain, redness, eye pain, decreased vision, eye watering, eye bulging, eye dryness, flashing lights, spots, floaters, strabismus, tunnel vision, jaundice and eye irritation.   Respiratory:   Negative for cough, hemoptysis, sputum production, shortness of breath, wheezing, sleep disturbances due to breathing, snores loudly, respiratory pain, dyspnea on exertion, cough disturbing sleep and postural dyspnea.    Cardiovascular:  Positive for chest pain and few scattered varicosities. Negative for dyspnea on exertion, palpitations, orthopnea, fingers/toes turn blue, hypertension, hypotension, syncope, history of heart murmur, pacemaker, leg pain, sleep disturbances due to breathing, light-headedness, exercise intolerance and edema.    Gastrointestinal:  Positive for constipation. Negative for heartburn, nausea, vomiting, abdominal pain, diarrhea, blood in stool, melena, rectal pain, bloating, bowel incontinence, jaundice, coffee ground emesis and change in stool.   Genitourinary:  Positive for bladder incontinence, urgency, vaginal discharge, difficulty urinating, decreased libido and vaginal dryness. Negative for dysuria, hematuria, flank pain, genital sores, dyspareunia, nocturia, voiding less frequently, arousal difficulty, abnormal vaginal bleeding, excessive menstruation, menstrual changes, hot flashes and postmenopausal bleeding.   Musculoskeletal:  Negative for myalgias, back pain, joint swelling, arthralgias, stiffness, muscle cramps, neck pain, bone pain, muscle weakness and fracture.   Skin:  Negative for nail changes, itching, poor wound healing, rash, hair changes, skin changes, acne, warts, poor wound healing, scarring, flaky skin, Raynaud's phenomenon, sensitivity to sunlight and skin thickening.   Neurological:  Negative for dizziness, tingling, tremors, speech change, seizures, loss of consciousness, weakness, light-headedness, numbness, headaches, disturbances in coordination, extremity numbness, memory loss, difficulty walking and paralysis.   Endo/Heme:  Positive for bruises/bleeds easily. Negative for anemia and swollen glands.   Psychiatric/Behavioral:  Negative for depression, hallucinations, memory loss, decreased concentration, mood swings and panic attacks.    Breast:  Negative for breast discharge, breast mass, breast pain and nipple retraction.   Endocrine:  Negative for altered temperature regulation, polyphagia, polydipsia, unwanted hair growth and change in facial hair.      Allergies   Allergen Reactions     Ibuprofen        Current Outpatient Medications   Medication     aspirin (ASA) 81 MG chewable tablet     biotin 1000 MCG TABS tablet     docusate sodium (COLACE) 100 MG capsule     loperamide (IMODIUM A-D) 2  "MG tablet     loratadine (CLARITIN) 10 MG tablet     Multiple Vitamins-Minerals (VITAMIN D3 COMPLETE PO)     MYFORTIC (BRAND) 360 MG EC tablet     predniSONE (DELTASONE) 5 MG tablet     Probiotic Product (PROBIOTIC PO)     sulfamethoxazole-trimethoprim (BACTRIM) 400-80 MG tablet     tacrolimus (GENERIC EQUIVALENT) 1 MG capsule     temazepam (RESTORIL) 15 MG capsule     Vitamin D, Cholecalciferol, 25 MCG (1000 UT) CAPS     acetaminophen (TYLENOL) 325 MG tablet     ALPRAZolam (XANAX) 0.25 MG tablet     diphenhydrAMINE (BENADRYL) 25 MG tablet     nitroGLYcerin (NITROSTAT) 0.4 MG sublingual tablet     ondansetron (ZOFRAN) 4 MG tablet     pantoprazole (PROTONIX) 40 MG EC tablet     Psyllium (METAMUCIL PO)     No current facility-administered medications for this visit.        /76   Pulse 97   Ht 1.778 m (5' 10\")   Wt 81.6 kg (180 lb)   BMI 25.83 kg/m   No LMP recorded. Body mass index is 25.83 kg/m .  Ms. Sharma is alert, comfortable in no acute distress, non-labored breathing.   Abdomen is soft, non-tender, non-distended, no CVAT.    Normal external female genitalia. The urethra was normal appearing without masses, NT.    She has a grade 1 cystocele and rectocele on supine strain. No change to the exam on standing or after having patient attempt to use bathroom  Speculum and bimanual exam are remarkable for normal appearing vagina and cervix.      3/5 kegels.    Rectal exam with normal tone, no masses or tenderness.    POPQ  Aa -2   Ba -2  C -8  D -9  GH 3  PB 3  TVL 10  Ap -2  Bp -2    A/P: Belen Sharma is a 60 year old F with prolapse, unable to demonstrate on exam today. Will refer for defecography to better delineate her prolapse. Will refer to Gyn Onc for f/u of her HSIL in the setting on immunosuppression.     Pt to f/u with me after defecography to review results and treatment options. If unable to get defecography, will obtain dynamic MRI of pelvis.    A total of 60 minutes were spent with the patient today, " > 50% in counseling and coordination of care    Zach Francis MD  Professor, OB/GYN  Urogynecologist  CC  Patient Care Team:  Vincent De La Garza MD as PCP - General (Internal Medicine)  Christy Mohamud MD as MD (Cardiology)  SELF, REFERRED

## 2020-08-05 NOTE — LETTER
8/5/2020       RE: Belen Sharma  [address] [facility] [address]     Dear Colleague,    Thank you for referring your patient, Belen Sharma, to the [facility] at [facility]. Please see a copy of my visit note below.    August 5, 2020    Referring Provider: Referred Self, MD  No address on file    Primary Care Provider: Vincent De La Garza    CC: prolapse    HPI:  Belen Sharma is a 60 year old female who presents for evaluation of her pelvic floor symptoms.  She has a h/o prolapse that she says is on the outside and will require her to push it back in at least 1-2/day. C/O pressure and bulge in the vagina and occ difficulty emptying her bladder. Pt also with h/o failed renal/liver transplant and HSIL, HPV pos on recent pap. Was seen by a Gyn Onc in [place] on 6/13 who did colpo that he said was c/w LSIL and asked her to f/u her in [place].  Prolapse:  Do you feel a vaginal bulge? yes                                      Pressure? yes   Do you have to place your fingers in the vagina or in the rectum to have a bowel movement? yes  Impact to quality of life? severe     Stress Incontinence:  Do you leak urine with cough, sneeze, exercise? no  How often do you leak with cough, sneeze, exercise?  -  How much do you usually leak? -   Do you wear a pad? - If so; -  Impact to quality of life? -    Urge Incontinence:  Do you often get sudden urges to urinate? no  How often do have urges? -  If so, do you leak with these urges? -  How much do you usually leak? -  Impact to quality of life? -    Voids/day:7  Nocturia: 3-4  Fluid intake: 10  Caffeine: 1    Urinating:  Difficulty starting urination or strain to void? yes  Weak or intermittent stream? yes  Incomplete emptying or dribbling? no  Pain or burning with urination? no  Any blood in your urine? no    GI:  Constipation? yes  Frequency stools daily    Straining for stools yes  Stool consistency vaires     Ever leak stool  (Accidental Bowel Leakage)? no      If so, how often?               -      If so, do you leak?                   -      Soiling without sensation? -  History of irritable bowel or Crohn's? no    Sexual/Pain:  Are you currently having sex?. No due to prolapse  Pain with sex?   -   Sexual Partner: -  Do any of these symptoms interfere with sex? -  Impact to quality of life? -    Prior therapy:  Ever done pelvic floor physical therapy? yes  Trial of medication? no  Have you ever tried a pessary? no    Medical History:  Do you have?   High Cholesterol? no     Diabetes? no  High Blood pressure? no     Recurrent UTIs? no  Sleep Apnea? no  Other medical problems: s/p liver and renal transplant X2    Surgical History:    Hysterectomy? no   Bladder Surgery? no   Other? See HPI     OB/Gyn History:  Pregnancies? 2  Deliveries? 2  Vaginal 2  Section -  Current birth control? -  Periods? NA  When was the first day of your last period? -  Last Pap smear? 2019 Any abnormal? Yes, see HPI  Last mammogram? 2019  Last colonoscopy? 2019    Medications/Vitamins/Supplements: reviewed    Drug Allergies: reviewed    Latex Allergy: no  Iodine Allergy no    Family History: (list relationship and age at diagnosis)  Breast cancer? yes  Ovarian cancer? no   Colon cancer? no  Other? Uterine ca    Social History:  Marital status:   Do you/ have you ever smoke(d)  cigarettes? no  Drink more than 1 alcoholic beverage a day?  no  Occupation? -    In the past 3 months have you regularly experienced:  Chest pain w/ walking/exercise? no                   Unusual headaches? no  Leg pain w/ walking/exercise? no                       Easy bruising? no  Difficulty breathing w/ walking/exercise? no  Problems with vision? no  Dizziness, falls, or fainting? no  Excessive bleeding from cuts, gums, surgery? 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 Explanted . Re-transplant after de-sensitization      S/P liver transplant (H)     10/9/2010 - [facility], ischemic biopathy. Re-transplant 3/3/2011     S/P splenectomy      Spider veins        Past Surgical History:   Procedure Laterality Date     bunectomy  2018    right foot     bunionectomy  2019    left     HERNIA REPAIR, INCISIONAL  2013     SPLENECTOMY       TRANSPLANT KIDNEY RECIPIENT  DONOR      re-transplant after [facility]; 6 months de-sensitization prior AND 6 months after transplant     TRANSPLANT LIVER RECIPIENT  DONOR  2011     TRANSPLANT LIVER, KIDNEY RECIPIENT  DONOR, COMBINED  10/09/2010    [facility] - re-Tx liver 3/3/2011; Kidney (left iliac) lost to [facility]/fibrosis - explant       Social History     Socioeconomic History     Marital status:      Spouse name: Not on file     Number of children: Not on file     Years of education: Not on file     Highest education level: Not on file   Occupational History     Not on file   Social Needs     Financial resource strain: Not on file     Food insecurity     Worry: Not on file     Inability: Not on file     Transportation needs     Medical: Not on file     Non-medical: Not on file   Tobacco Use     Smoking status: Never Smoker     Smokeless tobacco: Never Used   Substance and Sexual Activity     Alcohol use: Not Currently     Drug use: Not Currently     Sexual activity: Not Currently   Lifestyle     Physical activity     Days per week: Not on file     Minutes per session: Not on file     Stress: Not on file   Relationships     Social connections      Talks on phone: Not on file     Gets together: Not on file     Attends [employer] service: Not on file     Active member of club or organization: Not on file     Attends meetings of clubs or organizations: Not on file     Relationship status: Not on file     Intimate partner violence     Fear of current or ex partner: Not on file     Emotionally abused: Not on file     Physically abused: Not on file     Forced sexual activity: Not on file   Other Topics Concern     Parent/sibling w/ CABG, MI or angioplasty before 65F 55M? Not Asked   Social History Narrative     Not on file       Family History   Problem Relation Age of Onset     Uterine Cancer Maternal Grandmother      Polycystic Kidney Diease Brother      Polycystic Kidney Diease Brother      Polycystic Kidney Diease Brother      Polycystic Kidney Diease Brother      Cervical Cancer Maternal Aunt        Review of Systems     Constitutional:  Negative for fever, chills, weight loss, weight gain, fatigue, decreased appetite, night sweats, recent stressors, height gain, height loss, post-operative complications, incisional pain, hallucinations, increased energy, hyperactivity and confused.   HENT:  Negative for ear pain, hearing loss, tinnitus, nosebleeds, trouble swallowing, hoarse voice, mouth sores, sore throat, ear discharge, tooth pain, gum tenderness, taste disturbance, smell disturbance, hearing aid, bleeding gums, dry mouth, sinus pain, sinus congestion and neck mass.    Eyes:  Negative for double vision, pain, redness, eye pain, decreased vision, eye watering, eye bulging, eye dryness, flashing lights, spots, floaters, strabismus, tunnel vision, jaundice and eye irritation.   Respiratory:   Negative for cough, hemoptysis, sputum production, shortness of breath, wheezing, sleep disturbances due to breathing, snores loudly, respiratory pain, dyspnea on exertion, cough disturbing sleep and postural dyspnea.    Cardiovascular:  Positive for chest pain and few  scattered varicosities. Negative for dyspnea on exertion, palpitations, orthopnea, fingers/toes turn blue, hypertension, hypotension, syncope, history of heart murmur, pacemaker, leg pain, sleep disturbances due to breathing, light-headedness, exercise intolerance and edema.   Gastrointestinal:  Positive for constipation. Negative for heartburn, nausea, vomiting, abdominal pain, diarrhea, blood in stool, melena, rectal pain, bloating, bowel incontinence, jaundice, coffee ground emesis and change in stool.   Genitourinary:  Positive for bladder incontinence, urgency, vaginal discharge, difficulty urinating, decreased libido and vaginal dryness. Negative for dysuria, hematuria, flank pain, genital sores, dyspareunia, nocturia, voiding less frequently, arousal difficulty, abnormal vaginal bleeding, excessive menstruation, menstrual changes, hot flashes and postmenopausal bleeding.   Musculoskeletal:  Negative for myalgias, back pain, joint swelling, arthralgias, stiffness, muscle cramps, neck pain, bone pain, muscle weakness and fracture.   Skin:  Negative for nail changes, itching, poor wound healing, rash, hair changes, skin changes, acne, warts, poor wound healing, scarring, flaky skin, Raynaud's phenomenon, sensitivity to sunlight and skin thickening.   Neurological:  Negative for dizziness, tingling, tremors, speech change, seizures, loss of consciousness, weakness, light-headedness, numbness, headaches, disturbances in coordination, extremity numbness, memory loss, difficulty walking and paralysis.   Endo/Heme:  Positive for bruises/bleeds easily. Negative for anemia and swollen glands.   Psychiatric/Behavioral:  Negative for depression, hallucinations, memory loss, decreased concentration, mood swings and panic attacks.    Breast:  Negative for breast discharge, breast mass, breast pain and nipple retraction.   Endocrine:  Negative for altered temperature regulation, polyphagia, polydipsia, unwanted hair growth  "and change in facial hair.      Allergies   Allergen Reactions     Ibuprofen        Current Outpatient Medications   Medication     aspirin (ASA) 81 MG chewable tablet     biotin 1000 MCG TABS tablet     docusate sodium (COLACE) 100 MG capsule     loperamide (IMODIUM A-D) 2 MG tablet     loratadine (CLARITIN) 10 MG tablet     Multiple Vitamins-Minerals (VITAMIN D3 COMPLETE PO)     MYFORTIC (BRAND) 360 MG EC tablet     predniSONE (DELTASONE) 5 MG tablet     Probiotic Product (PROBIOTIC PO)     sulfamethoxazole-trimethoprim (BACTRIM) 400-80 MG tablet     tacrolimus (GENERIC EQUIVALENT) 1 MG capsule     temazepam (RESTORIL) 15 MG capsule     Vitamin D, Cholecalciferol, 25 MCG (1000 UT) CAPS     acetaminophen (TYLENOL) 325 MG tablet     ALPRAZolam (XANAX) 0.25 MG tablet     diphenhydrAMINE (BENADRYL) 25 MG tablet     nitroGLYcerin (NITROSTAT) 0.4 MG sublingual tablet     ondansetron (ZOFRAN) 4 MG tablet     pantoprazole (PROTONIX) 40 MG EC tablet     Psyllium (METAMUCIL PO)     No current facility-administered medications for this visit.        /76   Pulse 97   Ht 1.778 m (5' 10\")   Wt 81.6 kg (180 lb)   BMI 25.83 kg/m   No LMP recorded. Body mass index is 25.83 kg/m .  Ms. Sharma is alert, comfortable in no acute distress, non-labored breathing.   Abdomen is soft, non-tender, non-distended, no CVAT.    Normal external female genitalia. The urethra was normal appearing without masses, NT.    She has a grade 1 cystocele and rectocele on supine strain. No change to the exam on standing or after having patient attempt to use bathroom  Speculum and bimanual exam are remarkable for normal appearing vagina and cervix.      3/5 kegels.    Rectal exam with normal tone, no masses or tenderness.    POPQ  Aa -2   Ba -2  C -8  D -9  GH 3  PB 3  TVL 10  Ap -2  Bp -2    A/P: Belen Sharma is a 60 year old F with prolapse, unable to demonstrate on exam today. Will refer for defecography to better delineate her prolapse. Will refer " to Gyn Onc for f/u of her HSIL in the setting on immunosuppression.     Pt to f/u with me after defecography to review results and treatment options. If unable to get defecography, will obtain dynamic MRI of pelvis.    A total of 60 minutes were spent with the patient today, > 50% in counseling and coordination of care    Zach Francis MD  Professor, OB/GYN  Urogynecologist  CC  Patient Care Team:  Vincent De La Garza MD as PCP - General (Internal Medicine)  Christy Mohamud MD as MD (Cardiology)

## 2020-08-07 NOTE — TELEPHONE ENCOUNTER
RECORDS RECEIVED FROM: Internal   DATE RECEIVED: 8-26   NOTES STATUS DETAILS   OFFICE NOTE from referring provider    Internal Dr. De La Garza 7-22-20   OFFICE NOTE from other cardiologist    N/A    DISCHARGE SUMMARY from hospital    N/A    DISCHARGE REPORT from the ER   N/A    OPERATIVE REPORT    N/A    MEDICATION LIST   Internal    LABS     BMP   N/A    CBC   Care Everywhere 6-1-20   CMP   Care Everywhere 6-9-20   Lipids   N/A    TSH   N/A    DIAGNOSTIC PROCEDURES     EKG   N/A    Monitor Reports   N/A    IMAGING (DISC & REPORT)      Echo   N/A    Stress Tests   N/A    Cath   N/A    MRI/MRA   N/A    CT/CTA   N/A

## 2020-08-12 NOTE — PROGRESS NOTES
"8/13/2020    Referring Provider: Self-referred    Belen Sharma is a 60 year old female who is being evaluated via a telephone video visit.      The patient has been notified of following:   \"This telephone visit will be conducted via a call between you and your provider. We have found that certain health care needs can be provided without the need for an in-person physical exam.  This service lets us provide the care you need with a telephone conversation. If lab work is needed we can place an order for that and you can then stop by our lab to have the test done at a later time.    Telephone visits are billed at different rates depending on your insurance coverage.  Please reach out to your insurance provider with any questions. If during the course of the call the provider feels a telephone visit is not appropriate, you will not be charged for this service.\"    Patient has given verbal consent for telephone visit? YES  Will anyone else be joining your telephone visit? No    Telephone-Visit Details  Type of service:  Telephone Visit  Telephone Start Time: 11:15  Telephone End Time (time Telephone stopped): 12:20  Originating Location (pt. Location): Home  Distant Location (provider location):  [facility]  Mode of Communication:  Telephone     Presenting Information:   I spoke with Belen Sharma today for a telephone telehealth visit for genetic counseling to discuss her family history of prostate, breast and uterine cancer as well as a paraganglioma. We reviewed her personal and family history, discussed cancer screening recommendations, and went over available genetic testing options.    Personal History:  Belen is a 60 year old female. She does not have any personal history of cancer. 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 Her liver had a cyst that was being watched before its removal. She also has a " history of benign polyps removed from her throat in .       She had her first menstrual period at age 13, her first child at age 25, and is postmenopausal (since about ).  Belen has her ovaries, fallopian tubes and uterus in place, and she has not had ovarian cancer screening to date. She has a history of endometriosis as well as a history of one abnormal PAP smear and a history of HPV. She reports about 1-2 years of oral contraceptive use and she has not had hormone replacement therapy.      She has had clinical breast exams and mammograms; she reported her most recent mammogram on 20 was benign, her breast tissue was categorized as heterogeneously dense. She reported a history of benign breast calcifications that had been followed.     She reported her most recent colonoscopy in 2019 was normal and follow-up was recommended in about three years. She has a history of colon polyps (about 3-5). She does not regularly do any other cancer screening at this time.     Family History: (Please see scanned pedigree for detailed family history information)  Siblings    One sister  at birth due to prematurity and one brother  young due to reported birth defects. Belen has four living brothers:    One brother is 49 with no history of cancer.     Another brother is 57 and has no history of cancer but has PKD.     A third brother is 61 and has had skin cancer. He also has PKD and has had a kidney transplant. His daughter, Belen's niece, has PKD and her son has juvenile PKD.    Her oldest brother is 63 and has PKD. He has also had a kidney transplant. He has no history of cancer.   Paternal    Her father had no history of cancer and had PKD. He passed away at 63.     He had one brother who has PKD. This brother had prostate cancer at 59; he is currently 78. He recently had polyps removed from his throat. He also has a history of skin cancer.     One paternal aunt had PKD and passed away at 63.     Another  paternal aunt had breast cancer in her 70's. One of her daughters (Belen's first cousins) had a benign paraganglioma; she is currently 66. Another daughter had breast cancer in her late 30's which recurred multiple times; she passed away at 60.     Her paternal grandfather had PKD and passed away from an unknown cancer (possibly kidney) at 46.   Maternal    Her mother had lung cancer and passed away at 74. It was reported that she had smoking history. She also had a history of skin cancer.     Her maternal aunt had cervical cancer and passed away at 63.     Her maternal grandfather passed away from an unknown cancer at 62.     Her maternal great-grandmother through her maternal grandmother passed away from uterine cancer at 43.       Her maternal ethnicity is [place]/. Her paternal ethnicity is Nigerian, English and .  There is no known Ashkenazi Islam ancestry on either side of her family. There is no reported consanguinity.    Discussion:    We reviewed the features of sporadic, familial, and hereditary cancers. In looking at Belen's family history, it is possible that a cancer susceptibility gene is present due to the prostate cancer in her uncle, the paraganglioma in her paternal first cousin, the young breast cancer in her other paternal first cousin, possibly her paternal aunt's breast cancer and her maternal great-grandmother's uterine cancer.    We discussed that lung cancer, cervical cancer and skin cancer typically are sporadic and are usually influenced by environmental factors and less by single gene hereditary syndromes. We also reviewed that her and her uncle's throat polyps are not typically seen in a hereditary cancer syndrome--polyps in the colon and stomach can be more related to hereditary cancer syndromes.     Because of her cousin's early breast cancer, we reviewed the natural history and genetics of Hereditary Breast and Ovarian Cancer (HBOC), which is caused by a mutation in the  BRCA1 or BRCA2 gene.  HBOC typically presents with multiple family members diagnosed with breast cancer before age 50 and/or ovarian cancer. Other cancer risks associated with HBOC include male breast cancer, prostate cancer, pancreatic cancer, and melanoma. Based on her personal and family history, Belen meets current National Comprehensive Cancer Network (NCCN) criteria for genetic testing of BRCA1/2.      While her great-grandmother is a third-degree relative, her uterine cancer may have had a hereditary component. As such, we also discussed Wade syndrome, which can be caused by a mutation in one of five genes:  MLH1, MSH2, MSH6, PMS2, and EPCAM. A single mutation in one of the Wade Syndrome genes increases the risk for several cancers, such as colon, endometrial, ovarian, and stomach.  Other cancers that can be seen in some families with Wade Syndrome include pancreatic, bladder, biliary tract, urothelial, small bowel, prostate, breast and brain cancers.  Paragangliomas (PGCs) are endocrine tumors that arise from the nerves and tissues associated with the autonomic nervous system, which controls involuntary processes in our body such as heart rate, breathing, and digestion. PGLs arise in areas such as the head, neck, chest, and abdomen.   Paragangliomas are thought to have a hereditary component in at least one third of cases. One syndrome is Hereditary Paraganglioma and Pheochromocytoma syndrome (SDHA, SDHAF2, SDHB, SDHC, SDHD).        A detailed handout regarding HBOC, Wade syndrome, and the information we discussed will be provided to Belen in the mail and can be found in the after visit summary. Topics included: inheritance pattern, cancer risks, cancer screening recommendations, and also risks, benefits and limitations of testing.    We discussed that there are additional genes that could cause increased risk for breast, prostate, and uterine cancer as well as paragangliomas. As many of these genes  present with overlapping features in a family and accurate cancer risk cannot always be established based upon the pedigree analysis alone, it would be reasonable for Belen to consider panel genetic testing to analyze multiple genes at once. Belen expressed a desire to learn as much information as possible from genetic testing. She opted for an expanded panel.   The [facility] Multi-Cancer Panel analyzes 84 genes associated with an increased risk for cancer: AIP, ALK, APC, HAYLEY, AXIN2, BAP1, BARD1, BLM, BMPR1A, BRCA1, BRCA2, BRIP1, CASR,CDC73, CDH1, CDK4, CDKN1B, CDKN1C, CDKN2A, CEBPA, CHEK2, CTNNA1, DICER1,DIS3L2, EGFR, EPCAM, FH, FLCN, GATA2, GPC3, GREM1, HOXB13, HRAS, KIT, MAX,MEN1, MET, MITF, MLH1, MSH2, MSH3, MSH6, MUTYH, NBN, NF1, NF2, NTHL1,PALB2, PDGFRA, PHOX2B, PMS2, POLD1, POLE, POT1, TZBJT8V, PTCH1, PTEN, RAD50,RAD51C, RAD51D, RB1, RECQL4, RET, RUNX1, SDHA, SDHAF2, SDHB, SDHC, SDHD,SMAD4, SMARCA4, SMARCB1, SMARCE1, STK11, SUFU, TERC, TERT, SARE344, TP53,TSC1, TSC2, VHL, WRN, WT1.  This panel includes genes associated with hereditary cancers across multiple major organ systems, including:  breast and gynecologic (breast, ovarian, uterine)  gastrointestinal (colorectal, gastric, pancreatic)  endocrine (thyroid, paraganglioma/pheochromocytoma, parathyroid, pituitary)  genitourinary (renal/urinary tract, prostate)  skin (melanoma, basal cell carcinoma)  brain/nervous system   sarcoma  hematologic (myelodysplastic syndrome/leukemia)  Individuals who are found to carry a pathogenic variant in one of these genes have an increased risk of developing certain cancers/tumors.  Identifying those at elevated risk may guide implementation of additional screening, surveillance and interventions.    Belen gave verbal consent over the phone due to COVID-CRAiLAR restrictions for genetic testing via [facility]. Turnaround time: 4-6 weeks.    Due to COVID-19 restrictions, we now can send saliva kits from [facility] to patients. They will  receive a kit directly to their home with directions on how to collect a saliva sample. We discussed that the success of the testing depends on how well Belen follows the directions and that there is a small chance for sample failure. Belen verbalized understanding of this. Once the sample is collected, she will send it to [facility] using the return envelope and prepaid shipping label.     Medical Management: For Belen, we reviewed that the information from genetic testing may determine:    additional cancer screening for which Belen may qualify (i.e., mammogram and breast MRI, more frequent colonoscopies, more frequent dermatologic exams, etc.),    options for risk reducing surgeries Belen could consider (i.e., bilateral mastectomy, surgery to remove her ovaries and/or uterus, etc.),      and targeted chemotherapies if she were to develop certain cancers in the future (i.e. immunotherapy for individuals with Wade syndrome, PARP inhibitors, etc.).     These recommendations and possible targeted chemotherapies will be discussed in detail once genetic testing is completed.     Plan:  1) Today Belen elected to proceed with genetic testing using the Multi-Cancer panel offered by [facility].  2) A copy of the consent form and after visit summary will be sent in the mail.   3) Once the saliva sample is received by [facility], results should be available in 4-6 weeks.   4) We will discuss Belen's results by telephone call due to COVID-19 restrictions.  5) Of note, due to the strong family history of PKD, I recommended that Belen consider seeing a genetic counselor in general genetics to discuss the genetics and inheritance of PKD. If she would like to see a genetic counselor in general genetics, the scheduling number is [phone].    Time spent on the phone visit: 70 minutes    Reina Choi MS, [facility]  Licensed, certified genetic counselor  [phone]

## 2020-08-13 ENCOUNTER — VIRTUAL VISIT (OUTPATIENT)
Dept: ONCOLOGY | Facility: CLINIC | Age: 60
End: 2020-08-13
Attending: GENETIC COUNSELOR, MS
Payer: MEDICARE

## 2020-08-13 ENCOUNTER — PRE VISIT (OUTPATIENT)
Dept: ONCOLOGY | Facility: CLINIC | Age: 60
End: 2020-08-13

## 2020-08-13 ENCOUNTER — TELEPHONE (OUTPATIENT)
Dept: OBGYN | Facility: CLINIC | Age: 60
End: 2020-08-13

## 2020-08-13 DIAGNOSIS — Z80.3 FAMILY HISTORY OF MALIGNANT NEOPLASM OF BREAST: Primary | ICD-10-CM

## 2020-08-13 DIAGNOSIS — Z84.89 FAMILY HISTORY OF FAMILIAL PARAGANGLIOMA: ICD-10-CM

## 2020-08-13 DIAGNOSIS — Z80.49 FAMILY HISTORY OF UTERINE CANCER: ICD-10-CM

## 2020-08-13 DIAGNOSIS — Z80.42 FAMILY HISTORY OF PROSTATE CANCER: ICD-10-CM

## 2020-08-13 PROCEDURE — 96040 ZZH GENETIC COUNSELING, EACH 30 MINUTES: CPT | Mod: PN | Performed by: GENETIC COUNSELOR, MS

## 2020-08-13 NOTE — TELEPHONE ENCOUNTER
Called [place] [facility] and received colposcopy result-  Had Dr. Krishnan review all records.  Patient is to come into clinic for consult to discuss colposcopy result from [place]-  All records are in the media tab- and possible cone procedure.    Message left for patient to call and schedule this

## 2020-08-13 NOTE — LETTER
"    8/13/2020         RE: Belen Sharma  [address] [facility] [address]        Dear Colleague,    Thank you for referring your patient, Belen Sharma, to the CANCER RISK MANAGEMENT PROGRAM. Please see a copy of my visit note below.    8/13/2020    Referring Provider: Self-referred    Belen Sharma is a 60 year old female who is being evaluated via a telephone video visit.      The patient has been notified of following:   \"This telephone visit will be conducted via a call between you and your provider. We have found that certain health care needs can be provided without the need for an in-person physical exam.  This service lets us provide the care you need with a telephone conversation. If lab work is needed we can place an order for that and you can then stop by our lab to have the test done at a later time.    Telephone visits are billed at different rates depending on your insurance coverage.  Please reach out to your insurance provider with any questions. If during the course of the call the provider feels a telephone visit is not appropriate, you will not be charged for this service.\"    Patient has given verbal consent for telephone visit? YES  Will anyone else be joining your telephone visit? No    Telephone-Visit Details  Type of service:  Telephone Visit  Telephone Start Time: 11:15  Telephone End Time (time Telephone stopped): 12:20  Originating Location (pt. Location): Home  Distant Location (provider location):  [facility]  Mode of Communication:  Telephone     Presenting Information:   I spoke with Belen Sharma today for a telephone telehealth visit for genetic counseling to discuss her family history of prostate, breast and uterine cancer as well as a paraganglioma. We reviewed her personal and family history, discussed cancer screening recommendations, and went over available genetic testing options.    Personal History:  Belen is a 60 year old female. She does not have any " personal history of cancer. 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 Her liver had a cyst that was being watched before its removal. She also has a history of benign polyps removed from her throat in .       She had her first menstrual period at age 13, her first child at age 25, and is postmenopausal (since about ).  Belen has her ovaries, fallopian tubes and uterus in place, and she has not had ovarian cancer screening to date. She has a history of endometriosis as well as a history of one abnormal PAP smear and a history of HPV. She reports about 1-2 years of oral contraceptive use and she has not had hormone replacement therapy.      She has had clinical breast exams and mammograms; she reported her most recent mammogram on 20 was benign, her breast tissue was categorized as heterogeneously dense. She reported a history of benign breast calcifications that had been followed.     She reported her most recent colonoscopy in 2019 was normal and follow-up was recommended in about three years. She has a history of colon polyps (about 3-5). She does not regularly do any other cancer screening at this time.     Family History: (Please see scanned pedigree for detailed family history information)  Siblings    One sister  at birth due to prematurity and one brother  young due to reported birth defects. Belen has four living brothers:    One brother is 49 with no history of cancer.     Another brother is 57 and has no history of cancer but has PKD.     A third brother is 61 and has had skin cancer. He also has PKD and has had a kidney transplant. His daughter, Belen's niece, has PKD and her son has juvenile PKD.    Her oldest brother is 63 and has PKD. He has also had a kidney transplant. He has no history of cancer.   Paternal    Her father had no history of cancer and had PKD. He passed away at 63.     He had  one brother who has PKD. This brother had prostate cancer at 59; he is currently 78. He recently had polyps removed from his throat. He also has a history of skin cancer.     One paternal aunt had PKD and passed away at 63.     Another paternal aunt had breast cancer in her 70's. One of her daughters (Belen's first cousins) had a benign paraganglioma; she is currently 66. Another daughter had breast cancer in her late 30's which recurred multiple times; she passed away at 60.     Her paternal grandfather had PKD and passed away from an unknown cancer (possibly kidney) at 46.   Maternal    Her mother had lung cancer and passed away at 74. It was reported that she had smoking history. She also had a history of skin cancer.     Her maternal aunt had cervical cancer and passed away at 63.     Her maternal grandfather passed away from an unknown cancer at 62.     Her maternal great-grandmother through her maternal grandmother passed away from uterine cancer at 43.       Her maternal ethnicity is Romanian/. Her paternal ethnicity is Wolof, English and .  There is no known Ashkenazi Jew ancestry on either side of her family. There is no reported consanguinity.    Discussion:    We reviewed the features of sporadic, familial, and hereditary cancers. In looking at Belen's family history, it is possible that a cancer susceptibility gene is present due to the prostate cancer in her uncle, the paraganglioma in her paternal first cousin, the young breast cancer in her other paternal first cousin, possibly her paternal aunt's breast cancer and her maternal great-grandmother's uterine cancer.    We discussed that lung cancer, cervical cancer and skin cancer typically are sporadic and are usually influenced by environmental factors and less by single gene hereditary syndromes. We also reviewed that her and her uncle's throat polyps are not typically seen in a hereditary cancer syndrome--polyps in the colon and  stomach can be more related to hereditary cancer syndromes.     Because of her cousin's early breast cancer, we reviewed the natural history and genetics of Hereditary Breast and Ovarian Cancer (HBOC), which is caused by a mutation in the BRCA1 or BRCA2 gene.  HBOC typically presents with multiple family members diagnosed with breast cancer before age 50 and/or ovarian cancer. Other cancer risks associated with HBOC include male breast cancer, prostate cancer, pancreatic cancer, and melanoma. Based on her personal and family history, Belen meets current National Comprehensive Cancer Network (NCCN) criteria for genetic testing of BRCA1/2.      While her great-grandmother is a third-degree relative, her uterine cancer may have had a hereditary component. As such, we also discussed Wade syndrome, which can be caused by a mutation in one of five genes:  MLH1, MSH2, MSH6, PMS2, and EPCAM. A single mutation in one of the Wade Syndrome genes increases the risk for several cancers, such as colon, endometrial, ovarian, and stomach.  Other cancers that can be seen in some families with Wade Syndrome include pancreatic, bladder, biliary tract, urothelial, small bowel, prostate, breast and brain cancers.  Paragangliomas (PGCs) are endocrine tumors that arise from the nerves and tissues associated with the autonomic nervous system, which controls involuntary processes in our body such as heart rate, breathing, and digestion. PGLs arise in areas such as the head, neck, chest, and abdomen.   Paragangliomas are thought to have a hereditary component in at least one third of cases. One syndrome is Hereditary Paraganglioma and Pheochromocytoma syndrome (SDHA, SDHAF2, SDHB, SDHC, SDHD).        A detailed handout regarding HBOC, Wade syndrome, and the information we discussed will be provided to Belen in the mail and can be found in the after visit summary. Topics included: inheritance pattern, cancer risks, cancer screening  recommendations, and also risks, benefits and limitations of testing.    We discussed that there are additional genes that could cause increased risk for breast, prostate, and uterine cancer as well as paragangliomas. As many of these genes present with overlapping features in a family and accurate cancer risk cannot always be established based upon the pedigree analysis alone, it would be reasonable for Belen to consider panel genetic testing to analyze multiple genes at once. Belen expressed a desire to learn as much information as possible from genetic testing. She opted for an expanded panel.   The [facility] Multi-Cancer Panel analyzes 84 genes associated with an increased risk for cancer: AIP, ALK, APC, HAYLEY, AXIN2, BAP1, BARD1, BLM, BMPR1A, BRCA1, BRCA2, BRIP1, CASR,CDC73, CDH1, CDK4, CDKN1B, CDKN1C, CDKN2A, CEBPA, CHEK2, CTNNA1, DICER1,DIS3L2, EGFR, EPCAM, FH, FLCN, GATA2, GPC3, GREM1, HOXB13, HRAS, KIT, MAX,MEN1, MET, MITF, MLH1, MSH2, MSH3, MSH6, MUTYH, NBN, NF1, NF2, NTHL1,PALB2, PDGFRA, PHOX2B, PMS2, POLD1, POLE, POT1, DVZXU5U, PTCH1, PTEN, RAD50,RAD51C, RAD51D, RB1, RECQL4, RET, RUNX1, SDHA, SDHAF2, SDHB, SDHC, SDHD,SMAD4, SMARCA4, SMARCB1, SMARCE1, STK11, SUFU, TERC, TERT, WTVL780, TP53,TSC1, TSC2, VHL, WRN, WT1.  This panel includes genes associated with hereditary cancers across multiple major organ systems, including:  breast and gynecologic (breast, ovarian, uterine)  gastrointestinal (colorectal, gastric, pancreatic)  endocrine (thyroid, paraganglioma/pheochromocytoma, parathyroid, pituitary)  genitourinary (renal/urinary tract, prostate)  skin (melanoma, basal cell carcinoma)  brain/nervous system   sarcoma  hematologic (myelodysplastic syndrome/leukemia)  Individuals who are found to carry a pathogenic variant in one of these genes have an increased risk of developing certain cancers/tumors.  Identifying those at elevated risk may guide implementation of additional screening, surveillance and  interventions.    Belen gave verbal consent over the phone due to COVID-19 restrictions for genetic testing via [employer]. Turnaround time: 4-6 weeks.    Due to COVID-19 restrictions, we now can send saliva kits from [employer] to patients. They will receive a kit directly to their home with directions on how to collect a saliva sample. We discussed that the success of the testing depends on how well Belen follows the directions and that there is a small chance for sample failure. Belen verbalized understanding of this. Once the sample is collected, she will send it to [facility] using the return envelope and prepaid shipping label.     Medical Management: For Belen, we reviewed that the information from genetic testing may determine:    additional cancer screening for which Belen may qualify (i.e., mammogram and breast MRI, more frequent colonoscopies, more frequent dermatologic exams, etc.),    options for risk reducing surgeries Belen could consider (i.e., bilateral mastectomy, surgery to remove her ovaries and/or uterus, etc.),      and targeted chemotherapies if she were to develop certain cancers in the future (i.e. immunotherapy for individuals with Wade syndrome, PARP inhibitors, etc.).     These recommendations and possible targeted chemotherapies will be discussed in detail once genetic testing is completed.     Plan:  1) Today Belen elected to proceed with genetic testing using the Multi-Cancer panel offered by [facility].  2) A copy of the consent form and after visit summary will be sent in the mail.   3) Once the saliva sample is received by [employer], results should be available in 4-6 weeks.   4) We will discuss Belen's results by telephone call due to COVID-19 restrictions.  5) Of note, due to the strong family history of PKD, I recommended that Belen consider seeing a genetic counselor in general genetics to discuss the genetics and inheritance of PKD. If she would like to see a genetic counselor in general  genetics, the scheduling number is [phone].    Time spent on the phone visit: 70 minutes    Reina Choi MS, [facility] [place], [place]  Licensed, certified genetic counselor  [phone]        Again, thank you for allowing me to participate in the care of your patient.        Sincerely,        RIC Choi GC

## 2020-08-13 NOTE — LETTER
Cancer Risk Management  Program Locations    [facility]  [facility]  Mailing Address  Cancer Risk Management Program  [facility]  [address]  [place] [address]    New patient appointments  [phone]  August 13, 2020    Belen Sharma  [address] [address]      Dear Belen,    It was a pleasure speaking with you on the phone on 8-. Here is a copy of the progress note from our discussion. If you have any additional questions, please feel free to call.    Referring Provider: Self-referred    Telephone-Visit Details  Type of service:  Telephone Visit  Telephone Start Time: 11:15  Telephone End Time (time Telephone stopped): 12:20  Originating Location (pt. Location): Home  Distant Location (provider location):  [facility]  Mode of Communication:  Telephone     Presenting Information:   I spoke with Belen Sharma today for a telephone telehealth visit for genetic counseling to discuss her family history of prostate, breast and uterine cancer as well as a paraganglioma. We reviewed her personal and family history, discussed cancer screening recommendations, and went over available genetic testing options.    Personal History:  Belen is a 60 year old female. She does not have any personal history of cancer. 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 Her liver had a cyst that was being watched before its removal. She also has a history of benign polyps removed from her throat in 2010.       She had her first menstrual period at age 13, her first child at age 25, and is postmenopausal (since about 2000).  Belen has her ovaries, fallopian tubes and uterus in place, and she has not had  ovarian cancer screening to date. She has a history of endometriosis as well as a history of one abnormal PAP smear and a history of HPV. She reports about 1-2 years of oral contraceptive use and she has not had hormone replacement therapy.      She has had clinical breast exams and mammograms; she reported her most recent mammogram on 20 was benign, her breast tissue was categorized as heterogeneously dense. She reported a history of benign breast calcifications that had been followed.     She reported her most recent colonoscopy in 2019 was normal and follow-up was recommended in about three years. She has a history of colon polyps (about 3-5). She does not regularly do any other cancer screening at this time.     Family History: (Please see scanned pedigree for detailed family history information)  Siblings    One sister  at birth due to prematurity and one brother  young due to reported birth defects. Belen has four living brothers:    One brother is 49 with no history of cancer.     Another brother is 57 and has no history of cancer but has PKD.     A third brother is 61 and has had skin cancer. He also has PKD and has had a kidney transplant. His daughter, Belen's niece, has PKD and her son has juvenile PKD.    Her oldest brother is 63 and has PKD. He has also had a kidney transplant. He has no history of cancer.   Paternal    Her father had no history of cancer and had PKD. He passed away at 63.     He had one brother who has PKD. This brother had prostate cancer at 59; he is currently 78. He recently had polyps removed from his throat. He also has a history of skin cancer.     One paternal aunt had PKD and passed away at 63.     Another paternal aunt had breast cancer in her 70's. One of her daughters (Belen's first cousins) had a benign paraganglioma; she is currently 66. Another daughter had breast cancer in her late 30's which recurred multiple times; she passed away at 60.     Her  paternal grandfather had PKD and passed away from an unknown cancer (possibly kidney) at 46.   Maternal    Her mother had lung cancer and passed away at 74. It was reported that she had smoking history. She also had a history of skin cancer.     Her maternal aunt had cervical cancer and passed away at 63.     Her maternal grandfather passed away from an unknown cancer at 62.     Her maternal great-grandmother through her maternal grandmother passed away from uterine cancer at 43.       Her maternal ethnicity is [place]ian/. Her paternal ethnicity is German, English and .  There is no known Ashkenazi Worship ancestry on either side of her family. There is no reported consanguinity.    Discussion:    We reviewed the features of sporadic, familial, and hereditary cancers. In looking at Belen's family history, it is possible that a cancer susceptibility gene is present due to the prostate cancer in her uncle, the paraganglioma in her paternal first cousin, the young breast cancer in her other paternal first cousin, possibly her paternal aunt's breast cancer and her maternal great-grandmother's uterine cancer.    We discussed that lung cancer, cervical cancer and skin cancer typically are sporadic and are usually influenced by environmental factors and less by single gene hereditary syndromes. We also reviewed that her and her uncle's throat polyps are not typically seen in a hereditary cancer syndrome--polyps in the colon and stomach can be more related to hereditary cancer syndromes.     Because of her cousin's early breast cancer, we reviewed the natural history and genetics of Hereditary Breast and Ovarian Cancer (HBOC), which is caused by a mutation in the BRCA1 or BRCA2 gene.  HBOC typically presents with multiple family members diagnosed with breast cancer before age 50 and/or ovarian cancer. Other cancer risks associated with HBOC include male breast cancer, prostate cancer, pancreatic cancer, and  melanoma. Based on her personal and family history, Belen meets current National Comprehensive Cancer Network (NCCN) criteria for genetic testing of BRCA1/2.      While her great-grandmother is a third-degree relative, her uterine cancer may have had a hereditary component. As such, we also discussed Wade syndrome, which can be caused by a mutation in one of five genes:  MLH1, MSH2, MSH6, PMS2, and EPCAM. A single mutation in one of the Wade Syndrome genes increases the risk for several cancers, such as colon, endometrial, ovarian, and stomach.  Other cancers that can be seen in some families with Wade Syndrome include pancreatic, bladder, biliary tract, urothelial, small bowel, prostate, breast and brain cancers.  Paragangliomas (PGCs) are endocrine tumors that arise from the nerves and tissues associated with the autonomic nervous system, which controls involuntary processes in our body such as heart rate, breathing, and digestion. PGLs arise in areas such as the head, neck, chest, and abdomen.   Paragangliomas are thought to have a hereditary component in at least one third of cases. One syndrome is Hereditary Paraganglioma and Pheochromocytoma syndrome (SDHA, SDHAF2, SDHB, SDHC, SDHD).        A detailed handout regarding HBOC, Wade syndrome, and the information we discussed will be provided to Belen in the mail and can be found in the after visit summary. Topics included: inheritance pattern, cancer risks, cancer screening recommendations, and also risks, benefits and limitations of testing.    We discussed that there are additional genes that could cause increased risk for breast, prostate, and uterine cancer as well as paragangliomas. As many of these genes present with overlapping features in a family and accurate cancer risk cannot always be established based upon the pedigree analysis alone, it would be reasonable for Belen to consider panel genetic testing to analyze multiple genes at once. Belen  expressed a desire to learn as much information as possible from genetic testing. She opted for an expanded panel.   The [facility] Multi-Cancer Panel analyzes 84 genes associated with an increased risk for cancer: AIP, ALK, APC, HAYLEY, AXIN2, BAP1, BARD1, BLM, BMPR1A, BRCA1, BRCA2, BRIP1, CASR,CDC73, CDH1, CDK4, CDKN1B, CDKN1C, CDKN2A, CEBPA, CHEK2, CTNNA1, DICER1,DIS3L2, EGFR, EPCAM, FH, FLCN, GATA2, GPC3, GREM1, HOXB13, HRAS, KIT, MAX,MEN1, MET, MITF, MLH1, MSH2, MSH3, MSH6, MUTYH, NBN, NF1, NF2, NTHL1,PALB2, PDGFRA, PHOX2B, PMS2, POLD1, POLE, POT1, MXFMW2H, PTCH1, PTEN, RAD50,RAD51C, RAD51D, RB1, RECQL4, RET, RUNX1, SDHA, SDHAF2, SDHB, SDHC, SDHD,SMAD4, SMARCA4, SMARCB1, SMARCE1, STK11, SUFU, TERC, TERT, [id number], TP53,TSC1, TSC2, VHL, WRN, WT1.  This panel includes genes associated with hereditary cancers across multiple major organ systems, including:  breast and gynecologic (breast, ovarian, uterine)  gastrointestinal (colorectal, gastric, pancreatic)  endocrine (thyroid, paraganglioma/pheochromocytoma, parathyroid, pituitary)  genitourinary (renal/urinary tract, prostate)  skin (melanoma, basal cell carcinoma)  brain/nervous system   sarcoma  hematologic (myelodysplastic syndrome/leukemia)  Individuals who are found to carry a pathogenic variant in one of these genes have an increased risk of developing certain cancers/tumors.  Identifying those at elevated risk may guide implementation of additional screening, surveillance and interventions.    Belen gave verbal consent over the phone due to COVID-19 restrictions for genetic testing via [facility]. Turnaround time: 4-6 weeks.    Due to COVID-19 restrictions, we now can send saliva kits from [facility] to patients. They will receive a kit directly to their home with directions on how to collect a saliva sample. We discussed that the success of the testing depends on how well Belen follows the directions and that there is a small chance for sample failure. Belen  verbalized understanding of this. Once the sample is collected, she will send it to [facility] using the return envelope and prepaid shipping label.     Medical Management: For Belen, we reviewed that the information from genetic testing may determine:    additional cancer screening for which Belen may qualify (i.e., mammogram and breast MRI, more frequent colonoscopies, more frequent dermatologic exams, etc.),    options for risk reducing surgeries Belen could consider (i.e., bilateral mastectomy, surgery to remove her ovaries and/or uterus, etc.),      and targeted chemotherapies if she were to develop certain cancers in the future (i.e. immunotherapy for individuals with Wade syndrome, PARP inhibitors, etc.).     These recommendations and possible targeted chemotherapies will be discussed in detail once genetic testing is completed.     Plan:  1) Today Belen elected to proceed with genetic testing using the Multi-Cancer panel offered by [facility].  2) A copy of the consent form and after visit summary will be sent in the mail.   3) Once the saliva sample is received by [facility], results should be available in 4-6 weeks.   4) We will discuss Belen's results by telephone call due to COVID-[facility] labs restrictions.  5) Of note, due to the strong family history of PKD, I recommended that Belen consider seeing a genetic counselor in general genetics to discuss the genetics and inheritance of PKD. If she would like to see a genetic counselor in general genetics, the scheduling number is [phone].    Reina Choi MS, [facility]  Licensed, certified genetic counselor  [phone]                    Assessing Cancer Risk  Only about 5-10% of cancers are thought to be due to an inherited cancer susceptibility gene.    These families often have:    Several people with the same or related types of cancer    Cancers diagnosed at a young age (before age 50)    Individuals with more than one primary cancer    Multiple generations of the family  affected with cancer    Some people may be candidates for genetic testing of more than one gene.  For these families, genetic testing using a cancer panel may be offered.  These panels will test different genes known to increase the risk for breast, ovarian, uterine, and/or other cancers. All of the genes discussed below have published clinical management guidelines for individuals who are found to carry a mutation. The purpose of this handout is to serve as a brief summary of the genes analyzed by the panels used to inquire about hereditary breast and gynecologic cancer:  HAYLEY, BRCA1, BRCA2, BRIP1, CDH1, CHEK2, MLH1, MSH2, MSH6, PMS2, EPCAM, PTEN, PALB2, RAD51C, RAD51D, and TP53.  ______________________________________________________________________________  Hereditary Breast and Ovarian Cancer Syndrome   (BRCA1 and BRCA2)  A single mutation in one of the copies of BRCA1 or BRCA2 increases the risk for breast and ovarian cancer, among others.  The risk for pancreatic cancer and melanoma may also be slightly increased in some families.  The chart below shows the chance that someone with a BRCA mutation would develop cancer in his or her lifetime1,2,3,4.        A person s ethnic background is also important to consider, as individuals of Ashkenazi Orthodox ancestry have a higher chance of having a BRCA gene mutation.  There are three BRCA mutations that occur more frequently in this population.    Wade Syndrome   (MLH1, MSH2, MSH6, PMS2, and EPCAM)  Currently five genes are known to cause Wade Syndrome: MLH1, MSH2, MSH6, PMS2, and EPCAM.  A single mutation in one of the Wade Syndrome genes increases the risk for colon, endometrial, ovarian, and stomach cancers.  Other cancers that occur less commonly in Wade Syndrome include urinary tract, skin, and brain cancers.  The chart below shows the chance that a person with Wade syndrome would develop cancer in his or her lifetime5.      *Cancer risk varies depending on  Wade syndrome gene found    Cowden Syndrome   (PTEN)  Cowden syndrome is a hereditary condition that increases the risk for breast, thyroid, endometrial, colon, and kidney cancer.  Cowden syndrome is caused by a mutation in the PTEN gene.  A single mutation in one of the copies of PTEN causes Cowden syndrome and increases cancer risk.  The chart below shows the chance that someone with a PTEN mutation would develop cancer in their lifetime6,7.  Other benign features seen in some individuals with Cowden syndrome include benign skin lesions (facial papules, keratoses, lipomas), learning disability, autism, thyroid nodules, colon polyps, and larger head size.      *One recent study found breast cancer risk to be increased to 85%    Li-Fraumeni Syndrome   (TP53)  Li-Fraumeni Syndrome (LFS) is a cancer predisposition syndrome caused by a mutation in the TP53 gene. A single mutation in one of the copies of TP53 increases the risk for multiple cancers. Individuals with LFS are at an increased risk for developing cancer at a young age. The lifetime risk for development of a LFS-associated cancer is 50% by age 30 and 90% by age 60.   Core Cancers: Sarcomas, Breast, Brain, Lung, Leukemias/Lymphomas, Adrenocortical carcinomas  Other Cancers: Gastrointestinal, Thyroid, Skin, Genitourinary    Hereditary Diffuse Gastric Cancer   (CDH1)  Currently, one gene is known to cause hereditary diffuse gastric cancer (HDGC): CDH1.  Individuals with HDGC are at increased risk for diffuse gastric cancer and lobular breast cancer. Of people diagnosed with HDGC, 30-50% have a mutation in the CDH1 gene.  This suggests there are likely other genes that may cause HDGC that have not been identified yet.      Lifetime Cancer Risks    General Population HDGC    Diffuse Gastric  <1% ~80%   Breast 12% 39-52%         Additional Genes  HAYLEY  HAYLEY is a moderate-risk breast cancer gene. Women who have a mutation in HAYLEY can have between a 2-4 fold increased  risk for breast cancer compared to the general population8. HAYLEY mutations have also been associated with increased risk for pancreatic cancer, however an estimate of this cancer risk is not well understood9. Individuals who inherit two HAYLEY mutations have a condition called ataxia-telangiectasia (AT).  This rare autosomal recessive condition affects the nervous system and immune system, and is associated with progressive cerebellar ataxia beginning in childhood.  Individuals with ataxia-telangiectasia often have a weakened immune system and have an increased risk for childhood cancers.    PALB2  Mutations in PALB2 have been shown to increase the risk of breast cancer up to 33-58% in some families; where individuals fall within this risk range is dependent upon family [id number]. PALB2 mutations have also been associated with increased risk for pancreatic cancer, although this risk has not been quantified yet.  Individuals who inherit two PALB2 mutations--one from their mother and one from their father--have a condition called Fanconi Anemia.  This rare autosomal recessive condition is associated with short stature, developmental delay, bone marrow failure, and increased risk for childhood cancers.    CHEK2   CHEK2 is a moderate-risk breast cancer gene.  Women who have a mutation in CHEK2 have around a 2-fold increased risk for breast cancer compared to the general population, and this risk may be higher depending upon family history.11,12,13 Mutations in CHEK2 have also been shown to increase the risk of a number of other cancers, including colon and prostate, however these cancer risks are currently not well understood.    BRIP1, RAD51C and RAD51D  Mutations in BRIP1, RAD51C, and RAD51D have been shown to increase the risk of ovarian cancer and possibly female breast cancer as well14,15 .       Lifetime Cancer Risk    General Population BRIP1 RAD51C RAD51D   Ovarian 1-2% ~5-8% ~5-9% ~7-15%           Inheritance  All  of the cancer syndromes reviewed above are inherited in an autosomal dominant pattern.  This means that if a parent has a mutation, each of his or her children will have a 50% chance of inheriting that same mutation.  Therefore, each child--male or female--would have a 50% chance of being at increased risk for developing cancer.      Image obtained from Genetics Home Reference, 2013     Mutations in some genes can occur de allie, which means that a person s mutation occurred for the first time in them and was not inherited from a parent.  Now that they have the mutation, however, it can be passed on to future generations.    Genetic Testing  Genetic testing involves a blood test and will look at the genetic information in the HAYLEY, BRCA1, BRCA2, BRIP1, CDH1, CHEK2, MLH1, MSH2, MSH6, PMS2, EPCAM, PTEN, PALB2, RAD51C, RAD51D, and TP53 genes for any harmful mutations that are associated with increased cancer risk.  If possible, it is recommended that the person(s) who has had cancer be tested before other family members.  That person will give us the most useful information about whether or not a specific gene is associated with the cancer in the family.    Results  There are three possible results of genetic testing:    Positive--a harmful mutation was identified in one or more of the genes    Negative--no mutation was identified in any of the genes on this panel    Variant of unknown significance--a variation in one of the genes was identified, but it is unclear how this impacts cancer risk in the family    Advantages and Disadvantages   There are advantages and disadvantages to genetic testing.    Advantages    May clarify your cancer risk    Can help you make medical decisions    May explain the cancers in your family    May give useful information to your family members (if you share your results)    Disadvantages    Possible negative emotional impact of learning about inherited cancer risk    Uncertainty in  interpreting a negative test result in some situations    Possible genetic discrimination concerns (see below)    Genetic Information Nondiscrimination Act (ANGELIA)  ANGELIA is a federal law that protects individuals from health insurance or employment discrimination based on a genetic test result alone.  Although rare, there are currently no legal discrimination protections in terms of life insurance, long term care, or disability insurances.  Visit the [facility] [employer] [place] website to learn more.    Reducing Cancer Risk  All of the genes described above have nationally recognized cancer screening guidelines that would be recommended for individuals who test positive.  In addition to increased cancer screening, surgeries may be offered or recommended to reduce cancer risk.  Recommendations are based upon an individual s genetic test result as well as their personal and family history of cancer.    Questions to Think About Regarding Genetic Testing:    What effect will the test result have on me and my relationship with my family members if I have an inherited gene mutation?  If I don t have a gene mutation?    Should I share my test results, and how will my family react to this news, which may also affect them?    Are my children ready to learn new information that may one day affect their own health?    Hereditary Cancer Resources    FORCE: Facing Our Risk of Cancer Empowered facingourrisk.org   Bright Pink bebrightpink.org   Li-Fraumeni Syndrome Association lfsassociation.org   PTEN World [employer]kt[employer]wNeventum.[email or web address]   No stomach for cancer, Inc. nostomachforcancer.org   Stomach cancer relief network Scrnet.org   Collaborative Group of the Americas on Inherited Colorectal Cancer (CGA) cgaicc.com    Cancer Care cancercare.org   American Cancer Society (ACS) cancer.org   National Cancer [place] (NCI) cancer.gov     Please call us if you have any questions or concerns.   Cancer Risk Management  Program [phone]-[facility] ([phone])  ? Tj John, MS, [facility] [phone]  ? Laura Bynum, MS, [facility]  [phone]  ? Lexie Maddox, MS, [facility]  [phone]  ? Elizabeth Scott, MS, [facility] [phone]  ? Jeimy Benavides, MS, [facility] [phone]  ? Reina Choi, MS, [facility]  [phone]  ? Denisse Martines, MS, [facility]  [phone]    References  1. Cookie A, Smith PDP, Chyna S, Risch PEDRAZA, Niki JE, Porfirio JL, Akua N, Amilcar H, Jonathan O, Kamini A, Katherin B, Elizabet P, Manaissatou S, Sarthak DM, Krishna N, Toby E, Bette H, Fabiano E, Karthikeyan J, Katie J, Munira B, Liseth H, Thorlacius S, Eerola H, Nevvivianna H, Ryan K, Sunny OP. Average risks of breast and ovarian cancer associated with BRCA1 or BRCA2 mutations detected in case series unselected for family history: a combined analysis of 222 studies. Am J Hum Krystin. 2003;72:1117-30.  2. Hair N, Colleen M, Kwame G.  BRCA1 and BRCA2 Hereditary Breast and Ovarian Cancer. Gene Reviews online. 2013.  3. Abhijeet YC, Mariam S, Mellissa G, Gaitan S. Breast cancer risk among male BRCA1 and BRCA2 mutation carriers. J Natl Cancer Inst. 2007;99:1811-4.  4. Max DG, Ayden I, Jacob J, Saumya E, Pavel ER, Linda F. Risk of breast cancer in male BRCA2 carriers. J Med Krystin. 2010;47:710-1.  5. National Comprehensive Cancer Network. Clinical practice guidelines in oncology, colorectal cancer screening. Available online (registration required). 2015.  6. Tc MH, Jesi J, Carter J, Karen HERNANDES, Ling ASHRAF, Eng C. Lifetime cancer risks in individuals with germline PTEN mutations. Clin Cancer Res. 2012;18:400-7.  7. Caitlyn DE. Cowden Syndrome: A Critical Review of the Clinical Literature. J Krytsin . 2009:18:13-27.  8. Joel MCKEON, Joseph D, Regan S, Indu P, Kavon T, Davon M, Ryan B, Anika H, Teresa R, Vikki K, Alma L, Max DG, Sarthak D, Vicente DF, Lynn MR, The Breast Cancer Susceptibility Collaboration (UK) & Nargis ESCUDERO. HAYLEY mutations that cause  ataxia-telangiectasia are breast cancer susceptibility alleles. Nature Genetics. 2006;38:873-875  9. Jake N , Mini Y, April J, Maile L, Sugar GM , Fuentes ML, Hans S, Leung AG, Syngal S, Levi ML, Rayshawn J , Екатерина R, Katie SZ, Kathi JR, Stas VE, Ray M, Vogelstein B, Merly N, Belkys RH, Graham KW, and Adi AP. HAYLEY mutations in patients with hereditary pancreatic cancer. Cancer Discover. 2012;2:41-46  10. Cookie RAPHAEL, et al. Breast-Cancer Risk in Families with Mutations in PALB2. NEJM. 2014; 371(6):497-506.  11. CHEK2 Breast Cancer Case-Control Consortium. CHEK2*1100delC and susceptibility to breast cancer: A collaborative analysis involving 10,860 breast cancer cases and 9,065 controls from 10 studies. Am J Hum Krystin, 74 (2004), pp. 6100-6005  12. Roddy T, Charisse S, Nancy K, et al. Spectrum of Mutations in BRCA1, BRCA2, CHEK2, and TP53 in Families at High Risk of Breast Cancer. WIN. 2006;295(12):7069-2802.   13. Homer C, Jovani D, Tre MCKEON, et al. Risk of breast cancer in women with a CHEK2 mutation with and without a family history of breast cancer. J Clin Oncol. 2011;29:4406-6022.  14. Rex H, Yady E, Ram SJ, et al. Contribution of germline mutations in the RAD51B, RAD51C, and RAD51D genes to ovarian cancer in the population. J Clin Oncol. 2015;33(26):3009-5734. Doi:10.1200/JCO.2015.61.2408.  15. Yina MCFADDEN, Ilan DF, Christiano P, et al. Mutations in BRIP1 confer high risk of ovarian cancer. Leslye Krystin. 2011;43(11):5749-9436. doi:10.1038/ng.955.                                          Assessing Cancer Risk  Only about 5-10% of cancers are thought to be due to an inherited cancer susceptibility gene.    These families often have:    Several people with the same or related types of cancer    Cancers diagnosed at a young age (before age 50)    Individuals with more than one primary cancer    Multiple generations of the family affected with cancer    Genetic  Testing for Pheochromocytoma and Paragangliomas  Pheochromocytomas (PCC) and paragangliomas (PGCs) are tumors that arise from the nerves and tissues associated with the autonomic nervous system, which controls involuntary processes in our body such as heart rate, breathing, and digestion.  PCCs form on the adrenal gland, on top of the kidney. PGLs arise in other locations of the body, such as the head, neck, chest, and abdomen. PCCs typically secrete hormones, while PGLs usually do not.    There may be a genetic reason for someone to develop pheochromocytomas or paragangliomas. We each inherit two copies of every gene in our bodies: one from our mother, and one from our father.  Each gene has a specific job to do.  When a gene has a mistake or  mutation  in it, it does not work like it should.  Mutations in certain genes can lead to the development of PGLs and PCCs. Inheriting a mutation does not mean a person will develop a tumor, but it does significantly increase his or her risk above the general population risk.    This handout will review common hereditary syndromes associated with the development of pheochromocytomas and paragangliomas.  The genes that will be discussed in this handout are: SDHA, SDHAF2, SDHB, SDHC, SDHD, VHL, NF1, FH, RET, MAX, and FVYL020.      Hereditary PGL-PCC syndrome   Hereditary PGL-PCC syndrome is cause by a mutation in one of several genes: SDHA, SDHAF2, SDHB, SDHC and SDHD.  Individuals with hereditary PGL-PCC syndrome are at risk to develop cancerous and/or non-cancerous PGL/PCCs. Risk of tumor development varies by gene. For example, individuals with mutations in the SDHB gene are more likely to develop cancerous tumors.   Von Hippel-Lindau Disease (VHL)  A single mutation in the VHL gene increases the risk for kidney cysts and cancer, pheochromocytoma, and neuroendocrine tumors of the pancreas.  Renal cell carcinoma (RCC) of the kidneys is cancerous and occurs in 70% of people  with VHL by age 60. A main feature of VHL is hemangioblastomas--tumor-like knots of the blood vessels--in the brain, spine, and retina of the eye.  Men will often have benign cysts of the epididymis.    Neurofibromatosis type 1  Neurofibromatosis type 1 (NF1) is caused by mutation in the NF1 gene. This condition is characterized by café-au-lait spots (light brown spots on the skin), neurofibromas (benign tumors of the nerves), freckling of the underarms and/or groin, Lisch nodules (spots of the iris), and development of certain tumors and/or cancers.  Women with neurofibromatosis are also at an increased risk for breast cancer. Other features of NF1 can include large head size, skeletal differences, and learning disability. Some individuals with NF1 develop pheochromcytomas.    Hereditary leiomyomatosis and renal cell cancer (HLRCC)  HLRCC is caused by mutations in the FH gene. Individuals with HLRCC typically develop tumors of the smooth muscle and skin, called leiomyomas. About 76% of individuals with HLRCC with develop these skin findings. Women can also develop leiomyomas of the uterus. Mutations in the FH gene also lead to higher risk to develop kidney cancer, typically papillary renal cancer type 2. Some data suggests that FH gene mutations may also lead to risk for PGLs/PCCs.    Multiple endocrine neoplasia type 2  Multiple endocrine neoplasia type 2 (MEN2) is a genetic condition that affects the endocrine system, which made up of glands in the body that secrete hormones. Individuals with MEN2 tend to develop growths/tumors (either cancerous or non-cancerous) of these glands. MEN2 is split into subtypes: MEN2A, FMTC (familial medullary thyroid carcinoma), and MEN2B. Individuals with MEN2A are at high risk to develop medullary thyroid cancer. Features of MEN2B include neuromas of the lips and tongue and ganglioneruomatosis of the gastrointestinal tract. Individuals with MEN2B tend to have a distinctive  appearance. Individuals with MEN2 are at higher risk to develop PGLs/PCCs. These are often adrenal, and bilateral.     Additional genes  There are other genes that can lead to the development of PGLs/PCCs, such as the MAX and [id number] genes.    Genetic Testing  Genetic testing involves a simple blood test and will look at the genetic information in select genes for any harmful mutations that are associated with increased cancer risk.  If possible, it is recommended that the person(s) who has had cancer be tested before other family members.  That person will give us the most useful information about whether or not a specific gene is associated with the cancer in the family.     Results  There are three possible results of genetic testing:    Positive--a harmful mutation was identified    Negative--no mutation was identified    Variant of unknown significance--a variation in one of the genes was identified, but it is unclear how this impacts cancer risk in the family    Advantages and Disadvantages  There are advantages and disadvantages to genetic testing of these genes.    Advantages    May clarify your cancer risk    Can help you make medical decisions    May explain the cancers in your family    May give useful information to your family members (if you share your results)    Disadvantages    Possible negative emotional impact of learning about inherited cancer risk    Uncertainty in interpreting a negative test result in some situations    Possible genetic discrimination concerns (see below)    Inheritance   Mutations in most PGL/PCC risk genes are inherited in an autosomal dominant pattern.  This means that if a parent has a mutation, each of his or her children will have a 50% chance of inheriting that same mutation.  Therefore, each child--male or female--would have a 50% chance of being at increased risk for developing PGL/PCCs.                                              Image obtained from [employer]  Reference, 2018     Genetic Information Nondiscrimination Act (ANGELIA)  ANGELIA is a federal law that protects individuals from health insurance or employment discrimination based on a genetic test result alone.  Although rare, there are currently no legal protections in terms of life insurance, long term care, or disability insurances.  Visit the National [employer] [place] at Genome.gov/90545528 to learn more.    Reducing Cancer Risk  If a harmful mutation is found in a PGL/PCC risk gene, there may be certain screens or preventative surgeries that can be offered. For example, regular imaging with MRIs and/or ultrasounds, as well as frequent blood work, may be recommended. This information will be discussed after genetic testing is completed. If no mutations are found on genetic testing, screening is then recommended based on personal and/or family history of cancer.     Questions to Think About Regarding Genetic Testing    What effect will the test result have on me and my relationship with my family members if I have an inherited gene mutation?  If I don t have a gene mutation?    Should I share my test results, and how will my family react to this news, which may also affect them?    Are my children ready to learn new information that may one day affect their own health?    Resources  Pheo Para Troopers http://www.pheoparatroopers.org/   Genetics Home Reference Hereditary paraganglioma-pheochromocytoma   http://ghr.nlm.nih.gov/condition/ijwwkrlwja-rpktlangosfzq-zdgmosryujxvnpbm    Multiple endocrine neoplasia:   http://ghr.nlm.nih.gov/condition/multiple-endocrine-neoplasia    Von Hippel-Lindau syndrome:  http://ghr.nlm.nih.gov/condition/von-hippel-lindau-syndrome    Hereditary leiomyomatosis and renal cell cancer:  http://ghr.nlm.nih.gov/condition/hereditary-leiomyomatosis-and-renal-cell-cancer    Neurofibromatosis type 1  http://ghr.nlm.nih.gov/condition/neurofibromatosis-type-1     VHL Family [place]  "vhl.org   Kidney Cancer Association kidneycancer.org   American Cancer Society (ACS) cancer.org   National Cancer [place] (NCI) cancer.gov     Please call us if you have any questions or concerns.   Cancer Risk Management Program [phone]-CANCER ([phone])  ? Tj John, MS, [facility]  [phone]  ? Laura Bynum, MS, [facility]  [phone]  ? Lexie Maddox, MS, [facility]  [phone]  ? Elizabeth Scott, MS, [facility]  [phone]  ? Jeimy Benavides, MS, [facility]  [phone]  ? Reina Choi, MS, [facility]  [phone]  ? Denisse Martines, MS, [facility]  [phone]    References:  16. Nicolette C, Obinna TD, Links TP, et al. Von Hippel-Lindau Syndrome. 2000 May 17 [Updated 2015 Aug 6]. In: Saran ARAUJO, Brittny VAUGHN, Donnell RAMIREZ, et al., editors. [employer]  [Internet]. [place] ([place]): [facility]; 8937-7635. Available from: https://www.ncbi.nlm.nih.gov/books/BIM6817/  17. Regi CALHOUN. Neurofibromatosis 1. 1998 Oct 2 [Updated 2018 May 17]. In: Brittny Noonan MP, Donnell RAMIREZ, et al., editors. [employer]  [Internet]. [facility]): [facility]; 2874-4703. Available from: https://www.ncbi.nlm.nih.gov/books/WHL5035/  18. Dori A, Marilia DD, Lul F, Jessica M, Aaliyah H, Tiburcio EH, Romulo P, Charles G, Yuniel M, Katia AA, Jarrod AA, Danelle I. Coexistence of gastrointestinal stromal tumors (GISTs) and pheochromocytoma in three cases of neurofibromatosis type 1 (NF1) with a review of the literature. Intern Med. 2014;53:1783-9  19. Radames Blount JR. Hereditary Leiomyomatosis and Renal Cell Cancer. 2006 Jul 31 [Updated 2015 Aug 6]. In: Saran MP, Brittny HH, Donnell RA, et al., editors. [employer]  [Internet]. [place] ([place]): [facility] [place]; 1064-7333. Available from: https://www.ncbi.nlm.nih.gov/books/QLA3512/  20. Landon Carreno et al. \"Germline mutations in FH confer predisposition to malignant pheochromocytomas and paragangliomas.\" Human molecular genetics 23.9 (2013): " "1941-0859.  21. Nikolay Han. Multiple Endocrine Neoplasia Type 2. 1999 Sep 27 [Updated 2015 Jun 25]. In: Saran ARAUJO, Brittny VAUGHN, Donnell RAMIREZ, et al., editors. [employer]  [Internet]. [facility]): [facility]; 7800-1825. Available from: https://www.ncbi.nlm.nih.gov/books/VDD9143/  22. Ericka SBrando. Hereditary Paraganglioma-Pheochromocytoma Syndromes. 2008 May 21 [Updated 2014 Nov 6]. In: Brittny Noonan MP, Donnell RAMIREZ, et al., editors. [employer]  [Internet]. [place] ([place]): [facility]; 1587-3197. Available from: https://www.ncbi.nlm.nih.gov/books/NTN9900/  23. ERIC Allan, and BRISEYDA Schroeder. \"Pheochromocytoma and paraganglioma syndromes: genetics and management update.\" Current Oncology 21.1 (2014): e8.          "

## 2020-08-13 NOTE — LETTER
"    8/13/2020         RE: Belen Sharma  [address] [facility] [address]        Dear Colleague,    Thank you for referring your patient, Belen Sharma, to the CANCER RISK MANAGEMENT PROGRAM. Please see a copy of my visit note below.    8/13/2020    Referring Provider: Self-referred    Belen Sharma is a 60 year old female who is being evaluated via a telephone video visit.      The patient has been notified of following:   \"This telephone visit will be conducted via a call between you and your provider. We have found that certain health care needs can be provided without the need for an in-person physical exam.  This service lets us provide the care you need with a telephone conversation. If lab work is needed we can place an order for that and you can then stop by our lab to have the test done at a later time.    Telephone visits are billed at different rates depending on your insurance coverage.  Please reach out to your insurance provider with any questions. If during the course of the call the provider feels a telephone visit is not appropriate, you will not be charged for this service.\"    Patient has given verbal consent for telephone visit? YES  Will anyone else be joining your telephone visit? No    Telephone-Visit Details  Type of service:  Telephone Visit  Telephone Start Time: 11:15  Telephone End Time (time Telephone stopped): 12:20  Originating Location (pt. Location): Home  Distant Location (provider location):  [facility]  Mode of Communication:  Telephone     Presenting Information:   I spoke with Belen Sharma today for a telephone telehealth visit for genetic counseling to discuss her family history of prostate, breast and uterine cancer as well as a paraganglioma. We reviewed her personal and family history, discussed cancer screening recommendations, and went over available genetic testing options.    Personal History:  Belen is a 60 year old female. She does not have any " personal history of cancer. 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 Her liver had a cyst that was being watched before its removal. She also has a history of benign polyps removed from her throat in .       She had her first menstrual period at age 13, her first child at age 25, and is postmenopausal (since about ).  Belen has her ovaries, fallopian tubes and uterus in place, and she has not had ovarian cancer screening to date. She has a history of endometriosis as well as a history of one abnormal PAP smear and a history of HPV. She reports about 1-2 years of oral contraceptive use and she has not had hormone replacement therapy.      She has had clinical breast exams and mammograms; she reported her most recent mammogram on 20 was benign, her breast tissue was categorized as heterogeneously dense. She reported a history of benign breast calcifications that had been followed.     She reported her most recent colonoscopy in 2019 was normal and follow-up was recommended in about three years. She has a history of colon polyps (about 3-5). She does not regularly do any other cancer screening at this time.     Family History: (Please see scanned pedigree for detailed family history information)  Siblings    One sister  at birth due to prematurity and one brother  young due to reported birth defects. Belen has four living brothers:    One brother is 49 with no history of cancer.     Another brother is 57 and has no history of cancer but has PKD.     A third brother is 61 and has had skin cancer. He also has PKD and has had a kidney transplant. His daughter, Belen's niece, has PKD and her son has juvenile PKD.    Her oldest brother is 63 and has PKD. He has also had a kidney transplant. He has no history of cancer.   Paternal    Her father had no history of cancer and had PKD. He passed away at 63.     He had  one brother who has PKD. This brother had prostate cancer at 59; he is currently 78. He recently had polyps removed from his throat. He also has a history of skin cancer.     One paternal aunt had PKD and passed away at 63.     Another paternal aunt had breast cancer in her 70's. One of her daughters (Belen's first cousins) had a benign paraganglioma; she is currently 66. Another daughter had breast cancer in her late 30's which recurred multiple times; she passed away at 60.     Her paternal grandfather had PKD and passed away from an unknown cancer (possibly kidney) at 46.   Maternal    Her mother had lung cancer and passed away at 74. It was reported that she had smoking history. She also had a history of skin cancer.     Her maternal aunt had cervical cancer and passed away at 63.     Her maternal grandfather passed away from an unknown cancer at 62.     Her maternal great-grandmother through her maternal grandmother passed away from uterine cancer at 43.       Her maternal ethnicity is Upper sorbian/. Her paternal ethnicity is Macedonian, English and .  There is no known Ashkenazi Episcopalian ancestry on either side of her family. There is no reported consanguinity.    Discussion:    We reviewed the features of sporadic, familial, and hereditary cancers. In looking at Belen's family history, it is possible that a cancer susceptibility gene is present due to the prostate cancer in her uncle, the paraganglioma in her paternal first cousin, the young breast cancer in her other paternal first cousin, possibly her paternal aunt's breast cancer and her maternal great-grandmother's uterine cancer.    We discussed that lung cancer, cervical cancer and skin cancer typically are sporadic and are usually influenced by environmental factors and less by single gene hereditary syndromes. We also reviewed that her and her uncle's throat polyps are not typically seen in a hereditary cancer syndrome--polyps in the colon and  stomach can be more related to hereditary cancer syndromes.     Because of her cousin's early breast cancer, we reviewed the natural history and genetics of Hereditary Breast and Ovarian Cancer (HBOC), which is caused by a mutation in the BRCA1 or BRCA2 gene.  HBOC typically presents with multiple family members diagnosed with breast cancer before age 50 and/or ovarian cancer. Other cancer risks associated with HBOC include male breast cancer, prostate cancer, pancreatic cancer, and melanoma. Based on her personal and family history, Belen meets current National Comprehensive Cancer Network (NCCN) criteria for genetic testing of BRCA1/2.      While her great-grandmother is a third-degree relative, her uterine cancer may have had a hereditary component. As such, we also discussed Wade syndrome, which can be caused by a mutation in one of five genes:  MLH1, MSH2, MSH6, PMS2, and EPCAM. A single mutation in one of the Wade Syndrome genes increases the risk for several cancers, such as colon, endometrial, ovarian, and stomach.  Other cancers that can be seen in some families with Wade Syndrome include pancreatic, bladder, biliary tract, urothelial, small bowel, prostate, breast and brain cancers.  Paragangliomas (PGCs) are endocrine tumors that arise from the nerves and tissues associated with the autonomic nervous system, which controls involuntary processes in our body such as heart rate, breathing, and digestion. PGLs arise in areas such as the head, neck, chest, and abdomen.   Paragangliomas are thought to have a hereditary component in at least one third of cases. One syndrome is Hereditary Paraganglioma and Pheochromocytoma syndrome (SDHA, SDHAF2, SDHB, SDHC, SDHD).        A detailed handout regarding HBOC, Wade syndrome, and the information we discussed will be provided to Belen in the mail and can be found in the after visit summary. Topics included: inheritance pattern, cancer risks, cancer screening  recommendations, and also risks, benefits and limitations of testing.    We discussed that there are additional genes that could cause increased risk for breast, prostate, and uterine cancer as well as paragangliomas. As many of these genes present with overlapping features in a family and accurate cancer risk cannot always be established based upon the pedigree analysis alone, it would be reasonable for Belen to consider panel genetic testing to analyze multiple genes at once. Belen expressed a desire to learn as much information as possible from genetic testing. She opted for an expanded panel.   The [employer] Multi-Cancer Panel analyzes 84 genes associated with an increased risk for cancer: AIP, ALK, APC, HAYLEY, AXIN2, BAP1, BARD1, BLM, BMPR1A, BRCA1, BRCA2, BRIP1, CASR,CDC73, CDH1, CDK4, CDKN1B, CDKN1C, CDKN2A, CEBPA, CHEK2, CTNNA1, DICER1,DIS3L2, EGFR, EPCAM, FH, FLCN, GATA2, GPC3, GREM1, HOXB13, HRAS, KIT, MAX,MEN1, MET, MITF, MLH1, MSH2, MSH3, MSH6, MUTYH, NBN, NF1, NF2, NTHL1,PALB2, PDGFRA, PHOX2B, PMS2, POLD1, POLE, POT1, NVZMZ4S, PTCH1, PTEN, RAD50,RAD51C, RAD51D, RB1, RECQL4, RET, RUNX1, SDHA, SDHAF2, SDHB, SDHC, SDHD,SMAD4, SMARCA4, SMARCB1, SMARCE1, STK11, SUFU, TERC, TERT, JDGD742, TP53,TSC1, TSC2, VHL, WRN, WT1.  This panel includes genes associated with hereditary cancers across multiple major organ systems, including:  breast and gynecologic (breast, ovarian, uterine)  gastrointestinal (colorectal, gastric, pancreatic)  endocrine (thyroid, paraganglioma/pheochromocytoma, parathyroid, pituitary)  genitourinary (renal/urinary tract, prostate)  skin (melanoma, basal cell carcinoma)  brain/nervous system   sarcoma  hematologic (myelodysplastic syndrome/leukemia)  Individuals who are found to carry a pathogenic variant in one of these genes have an increased risk of developing certain cancers/tumors.  Identifying those at elevated risk may guide implementation of additional screening, surveillance and  interventions.    Belen gave verbal consent over the phone due to COVID-19 restrictions for genetic testing via [facility]. Turnaround time: 4-6 weeks.    Due to COVID-19 restrictions, we now can send saliva kits from [employer] to patients. They will receive a kit directly to their home with directions on how to collect a saliva sample. We discussed that the success of the testing depends on how well Belen follows the directions and that there is a small chance for sample failure. Belen verbalized understanding of this. Once the sample is collected, she will send it to [employer] using the return envelope and prepaid shipping label.     Medical Management: For Belen, we reviewed that the information from genetic testing may determine:    additional cancer screening for which Belen may qualify (i.e., mammogram and breast MRI, more frequent colonoscopies, more frequent dermatologic exams, etc.),    options for risk reducing surgeries Belen could consider (i.e., bilateral mastectomy, surgery to remove her ovaries and/or uterus, etc.),      and targeted chemotherapies if she were to develop certain cancers in the future (i.e. immunotherapy for individuals with Wade syndrome, PARP inhibitors, etc.).     These recommendations and possible targeted chemotherapies will be discussed in detail once genetic testing is completed.     Plan:  1) Today Belen elected to proceed with genetic testing using the Multi-Cancer panel offered by [employer].  2) A copy of the consent form and after visit summary will be sent in the mail.   3) Once the saliva sample is received by [employer], results should be available in 4-6 weeks.   4) We will discuss Belen's results by telephone call due to COVID-19 restrictions.  5) Of note, due to the strong family history of PKD, I recommended that Belen consider seeing a genetic counselor in general genetics to discuss the genetics and inheritance of PKD. If she would like to see a genetic counselor in general  genetics, the scheduling number is [phone].    Time spent on the phone visit: 70 minutes    Reina Choi MS, [facility]  Licensed, certified genetic counselor  [phone]        Again, thank you for allowing me to participate in the care of your patient.        Sincerely,        RIC Choi GC

## 2020-08-14 ENCOUNTER — TELEPHONE (OUTPATIENT)
Dept: OBGYN | Facility: CLINIC | Age: 60
End: 2020-08-14

## 2020-08-14 NOTE — TELEPHONE ENCOUNTER
Reviewed with Belen that Dr. Krishnan reviewed her notes sent by physician in [place] from colp/bx and she advises a consult to discuss follow up/next steps    Belen has an annual visit scheduled with Dr. Perez for 09/03/2020    Per Belen, due to health history she wishes to make her consult a telephone consult instead of in person visit.    She has anxiety due health hx  And covid she states and is worried about  what will be advised at this appointment. Reassured she is in good hands  For care and we will walk through recommendations at her consult .  She voices reassurance and will keep appts as scheduled    She has a phone consult with Dr. Perez scheduled for 09/03/2020 115 pm to talk about next steps from colp/bx [place]    [place] [facility]  are in [place] file drawer (noted on appt note)

## 2020-08-18 ENCOUNTER — TELEPHONE (OUTPATIENT)
Dept: TRANSPLANT | Facility: CLINIC | Age: 60
End: 2020-08-18

## 2020-08-18 ENCOUNTER — TELEPHONE (OUTPATIENT)
Dept: CARDIOLOGY | Facility: CLINIC | Age: 60
End: 2020-08-18

## 2020-08-18 ENCOUNTER — TELEPHONE (OUTPATIENT)
Dept: ONCOLOGY | Facility: CLINIC | Age: 60
End: 2020-08-18

## 2020-08-18 DIAGNOSIS — R07.89 ATYPICAL CHEST PAIN: Primary | ICD-10-CM

## 2020-08-18 DIAGNOSIS — Z94.0 KIDNEY TRANSPLANTED: Primary | ICD-10-CM

## 2020-08-18 NOTE — TELEPHONE ENCOUNTER
I received a voicemail from Belen stating that she is declining to schedule a colpo appt at this time. She will call back at a later date to schedule if needed.

## 2020-08-18 NOTE — TELEPHONE ENCOUNTER
Patient Call: Voicemail  Date/Time: 8/15/20 /   Reason for call: Patient is scheduled for a new patient appointment on Sept. 15 at 4:20 and has a questions about the appt., labs prior to the appt. And is this a virtual appt.?

## 2020-08-18 NOTE — TELEPHONE ENCOUNTER
[facility] Call Center    Phone Message    May a detailed message be left on voicemail: yes     Reason for Call: pt noticed that ECG and echo were scheduled for her on 8/31/20 and she did not know about these appts. Pt is requesting a call back to have a conversation about these two appts. Pt wants to understand why these were ordered and who ordered these tests. Thank you.    Action Taken: Message routed to:  [facility] ([facility]  Cardiology    Travel Screening: Not Applicable

## 2020-08-19 NOTE — TELEPHONE ENCOUNTER
[facility] returned call. Let her know appt will likely be virtual. Liver labs ordered. RN[facility] added urine protein, Belen v/u to complete liver and kidney labs before virtual appt. Belen also questioning medication for upcoming procedure - per [employer] message:     Dr Francis ordered a procedure for me, to check on prolapse of my rectum and uterine areas, using a barium cream with 60% sulfate to be used during the procedure, by a Dr María Fregoso, [facility].  Just for my own piece of mind, is it safe for my liver and kidney transplants to have that barium used, without risk of losing my transplanted organs?    Sent to pharmacy to review.

## 2020-08-20 NOTE — TELEPHONE ENCOUNTER
Romeo Hernandez, [facility]  Divya Chávez, RN    Caller: Unspecified (2 days ago,  3:09 PM)               I've tried to find information on Barium, there isn't a ton, but doesn't look like it mentions CKD/AREN anywhere. I would have patient discuss kidney issues with the provider who is using it. I am sure they have more practical knowledge.     [email or web address] message sent.

## 2020-08-26 ENCOUNTER — PRE VISIT (OUTPATIENT)
Dept: CARDIOLOGY | Facility: CLINIC | Age: 60
End: 2020-08-26

## 2020-08-26 ENCOUNTER — THERAPY VISIT (OUTPATIENT)
Dept: PHYSICAL THERAPY | Facility: CLINIC | Age: 60
End: 2020-08-26
Attending: OBSTETRICS & GYNECOLOGY
Payer: MEDICARE

## 2020-08-26 ENCOUNTER — VIRTUAL VISIT (OUTPATIENT)
Dept: CARDIOLOGY | Facility: CLINIC | Age: 60
End: 2020-08-26
Attending: INTERNAL MEDICINE
Payer: MEDICARE

## 2020-08-26 DIAGNOSIS — Z94.0 S/P KIDNEY TRANSPLANT: ICD-10-CM

## 2020-08-26 DIAGNOSIS — N81.11 CYSTOCELE, MIDLINE: ICD-10-CM

## 2020-08-26 DIAGNOSIS — I25.10 ATHEROSCLEROSIS OF NATIVE CORONARY ARTERY OF NATIVE HEART WITHOUT ANGINA PECTORIS: Primary | ICD-10-CM

## 2020-08-26 DIAGNOSIS — Z94.4 S/P LIVER TRANSPLANT (H): ICD-10-CM

## 2020-08-26 DIAGNOSIS — I20.89 STABLE ANGINA (H): ICD-10-CM

## 2020-08-26 PROCEDURE — 99442 ZZC PHYSICIAN TELEPHONE EVALUATION 11-20 MIN: CPT | Mod: 95 | Performed by: INTERNAL MEDICINE

## 2020-08-26 PROCEDURE — 98968 PH1 ASSMT&MGMT NQHP 21-30: CPT | Mod: 95 | Performed by: PHYSICAL THERAPIST

## 2020-08-26 NOTE — PROGRESS NOTES
"Belen Sharma is a 60 year old female who is being evaluated via a billable telephone visit.       The patient has been notified of following:      \"This telephone visit will be conducted via a call between you and your physician/provider. We have found that certain health care needs can be provided without the need for a physical exam.  This service lets us provide the care you need with a short phone conversation.  If a prescription is necessary we can send it directly to your pharmacy.  If lab work is needed we can place an order for that and you can then stop by our lab to have the test done at a later time.     Telephone visits are billed at different rates depending on your insurance coverage. During this emergency period, for some insurers they may be billed the same as an in-person visit.  Please reach out to your insurance provider with any questions.     If during the course of the call the physician/provider feels a telephone visit is not appropriate, you will not be charged for this service.\"     Patient has given verbal consent for Telephone visit?  Yes     What phone number would you like to be contacted at? [phone]     How would you like to obtain your AVS? MyChart    History:  60 year old female with microvascular angina.   New patient - wants to establish care, moved to  from [place], [place] to be with family    Kidney transplant x 2, 2010, 2014  Liver transplant x 2, 2010, 2011  Non obstructive CAD    No orthopnea or PND, chest pain at rest, syncope  She has stable angina, relieved with sl NTG 1 x month, reports dyspnea on exertion  No recent hospitalization for HF/MI/stroke      Current Outpatient Medications   Medication Sig Dispense Refill     acetaminophen (TYLENOL) 325 MG tablet Take 325-650 mg by mouth as needed for mild pain       ALPRAZolam (XANAX) 0.25 MG tablet Take 0.25 mg by mouth as needed for anxiety       aspirin (ASA) 81 MG chewable tablet Take 81 mg by mouth daily       biotin " 1000 MCG TABS tablet Take 3,000 mcg by mouth daily       diphenhydrAMINE (BENADRYL) 25 MG tablet Take 50 mg by mouth as needed       docusate sodium (COLACE) 100 MG capsule Take 200 mg by mouth daily       loperamide (IMODIUM A-D) 2 MG tablet Take 1 tablet by mouth as needed       loratadine (CLARITIN) 10 MG tablet Take 10 mg by mouth daily       Multiple Vitamins-Minerals (VITAMIN D3 COMPLETE PO) Take 2,000 Int'l Units by mouth daily       MYFORTIC (BRAND) 360 MG EC tablet Take by mouth 2 times daily       nitroGLYcerin (NITROSTAT) 0.4 MG sublingual tablet Place 0.4 mg under the tongue as needed for chest pain For chest pain place 1 tablet under the tongue every 5 minutes for 3 doses. If symptoms persist 5 minutes after 1st dose call 911.       ondansetron (ZOFRAN) 4 MG tablet Take by mouth as needed for nausea       pantoprazole (PROTONIX) 40 MG EC tablet Take 1 tablet by mouth as needed       predniSONE (DELTASONE) 5 MG tablet 7.5 mg daily       Probiotic Product (PROBIOTIC PO) Take 2 tablets by mouth daily       Psyllium (METAMUCIL PO) Take by mouth as needed       sulfamethoxazole-trimethoprim (BACTRIM) 400-80 MG tablet        tacrolimus (GENERIC EQUIVALENT) 1 MG capsule        temazepam (RESTORIL) 15 MG capsule Take 15 mg by mouth as needed for sleep       Vitamin D, Cholecalciferol, 25 MCG (1000 UT) CAPS Take 1,000 Units by mouth         Allergies - reviewed     Allergies   Allergen Reactions     Ibuprofen      Past history -reviewed  Active Ambulatory Problems     Diagnosis Date Noted     S/P splenectomy      Congenital hepatic fibrosis      S/P kidney transplant      Polycystic kidney disease      Immunosuppression (H)      S/P liver transplant (H)      Endometriosis      HPV (human papilloma virus) infection      Resolved Ambulatory Problems     Diagnosis Date Noted     No Resolved Ambulatory Problems     Past Medical History:   Diagnosis Date     Adolescent scoliosis      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     CMV  pneumonia (H) 2010     Dialysis patient (H)      Hernia, abdominal 05/2020     History of hypothyroidism      Spider veins 2019      Social history - reviewed  Social History     Socioeconomic History     Marital status:      Spouse name: Not on file     Number of children: Not on file     Years of education: Not on file     Highest education level: Not on file   Occupational History     Not on file   Social Needs     Financial resource strain: Not on file     Food insecurity     Worry: Not on file     Inability: Not on file     Transportation needs     Medical: Not on file     Non-medical: Not on file   Tobacco Use     Smoking status: Never Smoker     Smokeless tobacco: Never Used   Substance and Sexual Activity     Alcohol use: Not Currently     Drug use: Not Currently     Sexual activity: Not Currently   Lifestyle     Physical activity     Days per week: Not on file     Minutes per session: Not on file     Stress: Not on file   Relationships     Social connections     Talks on phone: Not on file     Gets together: Not on file     Attends [employer] service: Not on file     Active member of club or organization: Not on file     Attends meetings of clubs or organizations: Not on file     Relationship status: Not on file     Intimate partner violence     Fear of current or ex partner: Not on file     Emotionally abused: Not on file     Physically abused: Not on file     Forced sexual activity: Not on file   Other Topics Concern     Parent/sibling w/ CABG, MI or angioplasty before 65F 55M? Not Asked   Social History Narrative     Not on file     Family history -reviewed  Family History   Problem Relation Age of Onset     Uterine Cancer Maternal Grandmother      Polycystic Kidney Diease Brother      Polycystic Kidney Diease Brother      Polycystic Kidney Diease Brother      Polycystic Kidney Diease Brother      Cervical Cancer Maternal Aunt      ROS: non contributory on the 10-point review of system    Exam:    In general, the patient is in no apparent distress.    There were no vitals taken for this visit.  Neurologic: Alert and oriented to person/place/time, normal speech and affect    Data:  Recent cardiac investigations - reviewed    [facility]   CT coronary angiogram 1-3-2019  CALCIUM SCORE : 19. This placed the patient in the 70th percentile rank, meaning that approximately 30% of females at ages from 56-60 will have a higher calcium score that the patient.  CORONARY CT ANGIOGRAPHY: The examination is diagnostic in quality. There is right heart dominance.   LEFT MAIN CORONARY ARTERY: Widely patent  LEFT ANTERIOR DESCENDING; Mild focal calcified plaque in the proxima-mid LAD without significant stenosis. The remainder of the LAD is widely patent.  FIRST DIAGONAL: Widely patent  SECOND DIAGONAL: Widely patent  PROXIMAL LEFT CIRCUMFLEX: Widely patent  MID CIRCUMFLEX: Widely patent  DISTAL CIRCUMFLEX: Widely patent  Labs - reviewed    Chemistry panel:   Recent Labs   Lab Test 06/09/20 06/01/20   POTASSIUM 4.0 4.0   GLC 83 83   CR 0.89 0.69   GFRESTIMATED >60 95   AST 15 18   ALT 25 15       Assessment and Plan:  60 year old female with    Non obstructive CAD  Microvascular angina, stable  Post kidney and liver transplantation    Belen Sharma reports doing well. I have reviewed patient's previous CTA which showed non obstructive CAD in the proximal LAD for which she takes aspirin and NTG as needed for stable angina. She needs a lipid panel and likely statin therapy. She will undergo some baseline assessment with lipid, ECG and echo to assess cardiac function later this week.      Recommendations:   ECG  Echo  Lipid panel  Regular exercise  Healthy diet - low fat and low carb    Phone call duration: 15 minutes    Christy Mohamud MD, MS  Professor of Medicine  Cardiovascular division

## 2020-08-26 NOTE — PROGRESS NOTES
History:  60 year old female with microvascular angina.   New patient - wants to establish care      No orthopnea or PND, chest pain at rest, syncope  Stable dyspnea on exertion  No recent hospitalization for HF/MI/stroke      Current Outpatient Medications   Medication Sig Dispense Refill     acetaminophen (TYLENOL) 325 MG tablet Take 325-650 mg by mouth as needed for mild pain       ALPRAZolam (XANAX) 0.25 MG tablet Take 0.25 mg by mouth as needed for anxiety       aspirin (ASA) 81 MG chewable tablet Take 81 mg by mouth daily       biotin 1000 MCG TABS tablet Take 3,000 mcg by mouth daily       diphenhydrAMINE (BENADRYL) 25 MG tablet Take 50 mg by mouth as needed       docusate sodium (COLACE) 100 MG capsule Take 200 mg by mouth daily       loperamide (IMODIUM A-D) 2 MG tablet Take 1 tablet by mouth as needed       loratadine (CLARITIN) 10 MG tablet Take 10 mg by mouth daily       Multiple Vitamins-Minerals (VITAMIN D3 COMPLETE PO) Take 2,000 Int'l Units by mouth daily       MYFORTIC (BRAND) 360 MG EC tablet Take by mouth 2 times daily       nitroGLYcerin (NITROSTAT) 0.4 MG sublingual tablet Place 0.4 mg under the tongue as needed for chest pain For chest pain place 1 tablet under the tongue every 5 minutes for 3 doses. If symptoms persist 5 minutes after 1st dose call 911.       ondansetron (ZOFRAN) 4 MG tablet Take by mouth as needed for nausea       pantoprazole (PROTONIX) 40 MG EC tablet Take 1 tablet by mouth as needed       predniSONE (DELTASONE) 5 MG tablet 7.5 mg daily       Probiotic Product (PROBIOTIC PO) Take 2 tablets by mouth daily       Psyllium (METAMUCIL PO) Take by mouth as needed       sulfamethoxazole-trimethoprim (BACTRIM) 400-80 MG tablet        tacrolimus (GENERIC EQUIVALENT) 1 MG capsule        temazepam (RESTORIL) 15 MG capsule Take 15 mg by mouth as needed for sleep       Vitamin D, Cholecalciferol, 25 MCG (1000 UT) CAPS Take 1,000 Units by mouth         Allergies - reviewed     Allergies    Allergen Reactions     Ibuprofen      Past history -reviewed  Active Ambulatory Problems     Diagnosis Date Noted     S/P splenectomy      Congenital hepatic fibrosis      S/P kidney transplant      Polycystic kidney disease      Immunosuppression (H)      S/P liver transplant (H)      Endometriosis      HPV (human papilloma virus) infection      Resolved Ambulatory Problems     Diagnosis Date Noted     No Resolved Ambulatory Problems     Past Medical History:   Diagnosis Date     Adolescent scoliosis      BK viremia [id number]     CMV pneumonia (H) 2010     Dialysis patient (H)      Hernia, abdominal 05/2020     History of hypothyroidism      Spider veins 2019      Social history - reviewed  Social History     Socioeconomic History     Marital status:      Spouse name: Not on file     Number of children: Not on file     Years of education: Not on file     Highest education level: Not on file   Occupational History     Not on file   Social Needs     Financial resource strain: Not on file     Food insecurity     Worry: Not on file     Inability: Not on file     Transportation needs     Medical: Not on file     Non-medical: Not on file   Tobacco Use     Smoking status: Never Smoker     Smokeless tobacco: Never Used   Substance and Sexual Activity     Alcohol use: Not Currently     Drug use: Not Currently     Sexual activity: Not Currently   Lifestyle     Physical activity     Days per week: Not on file     Minutes per session: Not on file     Stress: Not on file   Relationships     Social connections     Talks on phone: Not on file     Gets together: Not on file     Attends Tenriism service: Not on file     Active member of club or organization: Not on file     Attends meetings of clubs or organizations: Not on file     Relationship status: Not on file     Intimate partner violence     Fear of current or ex partner: Not on file     Emotionally abused: Not on file     Physically abused: Not on file     Forced  sexual activity: Not on file   Other Topics Concern     Parent/sibling w/ CABG, MI or angioplasty before 65F 55M? Not Asked   Social History Narrative     Not on file     Family history -reviewed  Family History   Problem Relation Age of Onset     Uterine Cancer Maternal Grandmother      Polycystic Kidney Diease Brother      Polycystic Kidney Diease Brother      Polycystic Kidney Diease Brother      Polycystic Kidney Diease Brother      Cervical Cancer Maternal Aunt      ROS: non contributory on the 10-point review of system    Exam:   In general, the patient is in no apparent distress.    There were no vitals taken for this visit.  HEENT: NC/AT.  PERRLA.  EOMI.  Sclerae white, not injected.  Pharynx without erythema or exudate.  Dentition intact.    Neck: No jugular venous distension.    Extremities: No edema.   Neurologic: Alert and oriented to person/place/time, normal speech and affect  Skin: No rash.      Data:  Recent cardiac investigations - reviewed    [facility]   CT coronary angiogram 1-3-2019  CALCIUM SCORE : 19. This placed the patient in the 70th percentile rank, meaning that approximately 30% of females at ages from 56-60 will have a higher calcium score that the patient.  CORONARY CT ANGIOGRAPHY: The examination is diagnostic in quality. There is right heart dominance.   LEFT MAIN CORONARY ARTERY: Widely patent  LEFT ANTERIOR DESCENDING; Mild focal calcified plaque in the proxima-mid LAD without significant stenosis. The remainder of the LAD is widely patent.  FIRST DIAGONAL: Widely patent  SECOND DIAGONAL: Widely patent  PROXIMAL LEFT CIRCUMFLEX: Widely patent  MID CIRCUMFLEX: Widely patent  DISTAL CIRCUMFLEX: Widely patent  Labs - reviewed    Chemistry panel:   Recent Labs   Lab Test 06/09/20 06/01/20   POTASSIUM 4.0 4.0   GLC 83 83   CR 0.89 0.69   GFRESTIMATED >60 95   AST 15 18   ALT 25 15       CBC:   No results for input(s): WBC, RBC, HGB, HCT, MCV, MCH, MCHC, RDW, PLT in the last 90815  hours.    Lipid Panel:  No results for input(s): CHOL, HDL, LDL, TRIG, CHOLHDLRATIO in the last 16624 hours.    Thyroid:   No results found for: TSH  No results found for: T4  No results found for: A1C  No results found for: INR    Assessment and Plan:  60 year old female with    Microvascular angina, stable  Dyslipidemia  Essential hypertension  Paroxysmal atrial fibrillation  Normal biventricular function   Paroxysmal atrial fibrillation    Heart failure with preserved ejection fraction, Stage C HF  Heart failure with reduced ejection fraction, Stage C HF  AHA/ACC Stage A/B Heart failure  Post PBSCT for B-Cell ALL in 2018  Triple negative breast CA s/p R mastectomy and chemotherapy with TAC in 2016     Prolongation of the QT interval    Belen Sharma is doing well and is without any active cardiac issues. I have reviewed patient's recent cardiac diagnostics. Biventricular function is normal and there is no significant valvular disease or pulmonary hypertension. Patient is clinically stable and I do not recommend any changes to the cardiac medical regimen at this point. We discussed about continuing annual surveillance of lipids and cardiac function.     Recommendations:   Continue current cardiac medications  Encouraged to adhere to regular exercise (moderate intensity exercise of 150 minutes/week) and diet low in carbohydrates and saturated fat.  Advised compliance with medications     COVID 19 precautions reinforced  Follow up with me 1 year    Based on this telephone assessment, patient advised to see us as needed and may proceed with the remainder of the transplant workup.     Phone call and chart review duration: 30 minutes    Video-Visit Details    Type of service:  Video Visit    Video Start Time: 8.35 am  Video End Time: 8.50 am    Originating Location (pt. Location):Home    Distant Location (provider location):  [facility]     Platform used for Video Visit: Maddison Mohamud MD,  MS  Professor of Medicine  Cardiovascular division

## 2020-08-26 NOTE — LETTER
DEPARTMENT OF HEALTH AND HUMAN SERVICES  CENTERS FOR MEDICARE & MEDICAID SERVICES    PLAN/UPDATED PLAN OF PROGRESS FOR OUTPATIENT REHABILITATION@  Virtual Visit Reasoning: The virtual visit will provide the care the patient needs. We reviewed the patient's chart, and PTRx prescription to determine the following telemedicine visit is appropriate and effective for the patient's care.    PATIENTS NAME:  Belen Sharma     : 1960    PROVIDER NUMBER:    [id number]    HICN: [id number]     PROVIDER NAME: [facility]    MEDICAL RECORD NUMBER: [id number]     START OF CARE DATE:  SOC Date: 20   TYPE:  PT    PRIMARY/TREATMENT DIAGNOSIS: (Pertinent Medical Diagnosis)  Cystocele, midline    VISITS FROM START OF CARE:  Rxs Used: 1     [place] [facility] Initial Evaluation    History of current episode:    Onset date/MD order: 2020.    CC/Present symptoms: Prolapse with insidious onset May of this year.  DOes not recall any changes in lifting or straining.  Notes prolpase that will extend out of the vaginal opening within 10 minutes of walking and need to push it back in; difficulty with leakage and urination.   HPI/SMHx/Childbirth hx::2 vaginal deliveries without issues significant for co-morbidities: kidney, liver transplants  Pain rating (0-10): 0  Conditioning is improving/unchanging/worsening: worsening    Hx of or present sexually transmitted disease:no  Current occupation: disability  Current activity: caring for diabetic   Goals: reduce prolapse  Red flags:no    Urination:  Do you leak on the way to the bathroom or with a strong urge to void? yes   Do you leak with cough,sneeze, jumping, running? yes  Any other activities that cause leaking?  no  Do you have triggers that make you feel you can't wait to go to the bathroom?  What are they? no.  Type of pad and number used per day? 0  When you leak what is the amount? small  How long can you delay the need to  urinate? Not sure.   Zachary, Belen       Do you feel excessive pressure in pelvic floor:yes.  When? After standing or walking >10 min  Frequency of daytime urination:not sure  Frequency of nighttime urination:1  Can you stop the flow of urine when on the toilet? no  Is the volume of urine passed usually:  (8sec rule= 250ml with average bladder storing 400-600ml) avg  Do you strain to pass urine? yes  Do you have a slow or hesitant urinary stream? yes  Do you have difficulty initiating the urine stream? no  Is urination painful? no  How many bladder infections have you had in last 12 months?0  Fluid intake(one glass is 8oz or one cup) 4 glasses/day, 0caffinated glasses/day  0 alcohol glasses/day.  Bowel habits:  Frequency of bowel movements? 7 times a week  Consistancy of stool?  [place] Stool Scale 4  Do you ignore the urge to defecate? no  Do you strain to pass stool? no  Pelvic Pain:  Do you have any pelvic pain with intercourse, exams, use of tampons? Not asked  Is initial penetration during intercourse painful? Not asked  Is deeper penetration painful? Not asked  Do you use lubricant?  Not asked  Are you sexually active? Not asked  Have you ever been worried for your physical safety? no  Have you practiced the PF(kegel) exercises for 4 or more weeks?yes  Marinoff Scale:Level not asked  (Level 3: Abstinence from intercourse because of severe pain. Level 2: Painful intercourse which limites frequency of activity. Level 1: Painful intercourse not severe enough to prevent activity.)    Treatment/Education provided this session: please see flow sheet for details    ASSESSMENT/PLAN:    Patient is a 60 year old female with pelvic complaints.    Patient has the following significant findings with corresponding treatment plan.                Diagnosis 1:  Prolapse  Pain -  manual therapy, self management, education, directional preference exercise and home program  Decreased ROM/flexibility - manual therapy and therapeutic  "exercise  Decreased joint mobility - manual therapy and therapeutic exercise  Decreased strength - therapeutic exercise and therapeutic activities  Decreased proprioception - neuro re-education and therapeutic activities  Impaired gait - gait training  Impaired muscle performance - neuro re-education  Decreased function - therapeutic activities  Impaired posture - neuro re-education        Belen Sharma       Previous and current functional limitations:  (See Goal Flow Sheet for this information)    Short term and Long term goals: (See Goal Flow Sheet for this information)     Communication ability:  Patient appears to be able to clearly communicate and understand verbal and written communication and follow directions correctly.  Treatment Explanation - The following has been discussed with the patient:   RX ordered/plan of care  Anticipated outcomes  Possible risks and side effects  This patient would benefit from PT intervention to resume normal activities.   Rehab potential is good.    Frequency:  1X week, once daily every 3 weeks  Duration:  for 12 weeks  Discharge Plan:  Achieve all LTG.  Independent in home treatment program.  Reach maximal therapeutic benefit.    Please refer to the daily flowsheet for treatment today, total treatment time and time spent performing 1:1 timed codes.                     Caregiver Signature/Credentials _____________________________ Date ________       Treating Provider: Mita Hurtado, PT   I have reviewed and certified the need for these services and plan of treatment while under my care.        PHYSICIAN'S SIGNATURE:   ____________________________________  Date___________     Zach Francis MD    Certification period:  Beginning of Cert date period: 08/26/20 to  End of Cert period date: 11/23/20     Functional Level Progress Report: Please see attached \"Goal Flow sheet for Functional level.\"    ____X____ Continue Services or       ________ DC Services                Service " dates: From  SOC Date: 08/26/20 date to present

## 2020-08-26 NOTE — LETTER
8/26/2020      RE: Belen Sharma  [address] [address]       Dear Colleague,    Thank you for the opportunity to participate in the care of your patient, Belen Sharma, at the [facility] HEART [facility]. Please see a copy of my visit note below.      Belen Sharma is a 60 year old female who is being evaluated via a billable telephone visit.         History:  60 year old female with microvascular angina.   New patient - wants to establish care, moved to  from [place], [place] to be with family    Kidney transplant x 2, 2010, 2014  Liver transplant x 2, 2010, 2011  Non obstructive CAD    No orthopnea or PND, chest pain at rest, syncope  She has stable angina, relieved with sl NTG 1 x month, reports dyspnea on exertion  No recent hospitalization for HF/MI/stroke      Current Outpatient Medications   Medication Sig Dispense Refill     acetaminophen (TYLENOL) 325 MG tablet Take 325-650 mg by mouth as needed for mild pain       ALPRAZolam (XANAX) 0.25 MG tablet Take 0.25 mg by mouth as needed for anxiety       aspirin (ASA) 81 MG chewable tablet Take 81 mg by mouth daily       biotin 1000 MCG TABS tablet Take 3,000 mcg by mouth daily       diphenhydrAMINE (BENADRYL) 25 MG tablet Take 50 mg by mouth as needed       docusate sodium (COLACE) 100 MG capsule Take 200 mg by mouth daily       loperamide (IMODIUM A-D) 2 MG tablet Take 1 tablet by mouth as needed       loratadine (CLARITIN) 10 MG tablet Take 10 mg by mouth daily       Multiple Vitamins-Minerals (VITAMIN D3 COMPLETE PO) Take 2,000 Int'l Units by mouth daily       MYFORTIC (BRAND) 360 MG EC tablet Take by mouth 2 times daily       nitroGLYcerin (NITROSTAT) 0.4 MG sublingual tablet Place 0.4 mg under the tongue as needed for chest pain For chest pain place 1 tablet under the tongue every 5 minutes for 3 doses. If symptoms persist 5 minutes after 1st dose call 911.       ondansetron (ZOFRAN) 4 MG tablet Take by  mouth as needed for nausea       pantoprazole (PROTONIX) 40 MG EC tablet Take 1 tablet by mouth as needed       predniSONE (DELTASONE) 5 MG tablet 7.5 mg daily       Probiotic Product (PROBIOTIC PO) Take 2 tablets by mouth daily       Psyllium (METAMUCIL PO) Take by mouth as needed       sulfamethoxazole-trimethoprim (BACTRIM) 400-80 MG tablet        tacrolimus (GENERIC EQUIVALENT) 1 MG capsule        temazepam (RESTORIL) 15 MG capsule Take 15 mg by mouth as needed for sleep       Vitamin D, Cholecalciferol, 25 MCG (1000 UT) CAPS Take 1,000 Units by mouth         Allergies - reviewed     Allergies   Allergen Reactions     Ibuprofen      Past history -reviewed  Active Ambulatory Problems     Diagnosis Date Noted     S/P splenectomy      Congenital hepatic fibrosis      S/P kidney transplant      Polycystic kidney disease      Immunosuppression (H)      S/P liver transplant (H)      Endometriosis      HPV (human papilloma virus) infection      Resolved Ambulatory Problems     Diagnosis Date Noted     No Resolved Ambulatory Problems     Past Medical History:   Diagnosis Date     Adolescent scoliosis      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     CMV pneumonia (H) 2010     Dialysis patient (H)      Hernia, abdominal 05/2020     History of hypothyroidism      Spider veins 2019      Social history - reviewed  Social History     Socioeconomic History     Marital status:      Spouse name: Not on file     Number of children: Not on file     Years of education: Not on file     Highest education level: Not on file   Occupational History     Not on file   Social Needs     Financial resource strain: Not on file     Food insecurity     Worry: Not on file     Inability: Not on file     Transportation needs     Medical: Not on file     Non-medical: Not on file   Tobacco Use     Smoking status: Never Smoker     Smokeless tobacco: Never Used   Substance and Sexual Activity     Alcohol use: Not Currently     Drug use: Not Currently     Sexual  activity: Not Currently   Lifestyle     Physical activity     Days per week: Not on file     Minutes per session: Not on file     Stress: Not on file   Relationships     Social connections     Talks on phone: Not on file     Gets together: Not on file     Attends Sabianist service: Not on file     Active member of club or organization: Not on file     Attends meetings of clubs or organizations: Not on file     Relationship status: Not on file     Intimate partner violence     Fear of current or ex partner: Not on file     Emotionally abused: Not on file     Physically abused: Not on file     Forced sexual activity: Not on file   Other Topics Concern     Parent/sibling w/ CABG, MI or angioplasty before 65F 55M? Not Asked   Social History Narrative     Not on file     Family history -reviewed  Family History   Problem Relation Age of Onset     Uterine Cancer Maternal Grandmother      Polycystic Kidney Diease Brother      Polycystic Kidney Diease Brother      Polycystic Kidney Diease Brother      Polycystic Kidney Diease Brother      Cervical Cancer Maternal Aunt      ROS: non contributory on the 10-point review of system    Exam:   In general, the patient is in no apparent distress.    There were no vitals taken for this visit.  Neurologic: Alert and oriented to person/place/time, normal speech and affect    Data:  Recent cardiac investigations - reviewed    [facility]   CT coronary angiogram 1-3-2019  CALCIUM SCORE : 19. This placed the patient in the 70th percentile rank, meaning that approximately 30% of females at ages from 56-60 will have a higher calcium score that the patient.  CORONARY CT ANGIOGRAPHY: The examination is diagnostic in quality. There is right heart dominance.   LEFT MAIN CORONARY ARTERY: Widely patent  LEFT ANTERIOR DESCENDING; Mild focal calcified plaque in the proxima-mid LAD without significant stenosis. The remainder of the LAD is widely patent.  FIRST DIAGONAL: Widely patent  SECOND  DIAGONAL: Widely patent  PROXIMAL LEFT CIRCUMFLEX: Widely patent  MID CIRCUMFLEX: Widely patent  DISTAL CIRCUMFLEX: Widely patent  Labs - reviewed    Chemistry panel:   Recent Labs   Lab Test 06/09/20 06/01/20   POTASSIUM 4.0 4.0   GLC 83 83   CR 0.89 0.69   GFRESTIMATED >60 95   AST 15 18   ALT 25 15       Assessment and Plan:  60 year old female with    Non obstructive CAD  Microvascular angina, stable  Post kidney and liver transplantation    Belen Sharma reports doing well. I have reviewed patient's previous CTA which showed non obstructive CAD in the proximal LAD for which she takes aspirin and NTG as needed for stable angina. She needs a lipid panel and likely statin therapy. She will undergo some baseline assessment with lipid, ECG and echo to assess cardiac function later this week.      Recommendations:   ECG  Echo  Lipid panel  Regular exercise  Healthy diet - low fat and low carb    Phone call duration: 15 minutes    Christy Mohamud MD, MS  Professor of Medicine  Cardiovascular division    Please do not hesitate to contact me if you have any questions/concerns.     Sincerely,     Christy Mohamud MD

## 2020-08-26 NOTE — PROGRESS NOTES
[facility] Initial Evaluation  Subjective:  The history is provided by the patient. No  was used.                       Objective:  System    Physical Exam    General     ROS   History of current episode:    Onset date/MD order: 8/5/2020.    CC/Present symptoms: Prolapse with insidious onset May of this year.  DOes not recall any changes in lifting or straining.  Notes prolpase that will extend out of the vaginal opening within 10 minutes of walking and need to push it back in; difficulty with leakage and urination.   HPI/SMHx/Childbirth hx::2 vaginal deliveries without issues significant for co-morbidities: kidney, liver transplants  Pain rating (0-10): 0  Conditioning is improving/unchanging/worsening: worsening    Hx of or present sexually transmitted disease:no  Current occupation: disability  Current activity: caring for diabetic   Goals: reduce prolapse  Red flags:no    Urination:  Do you leak on the way to the bathroom or with a strong urge to void? yes   Do you leak with cough,sneeze, jumping, running? yes  Any other activities that cause leaking?  no  Do you have triggers that make you feel you can't wait to go to the bathroom?  What are they? no.  Type of pad and number used per day? 0  When you leak what is the amount? small  How long can you delay the need to urinate? Not sure.   Do you feel excessive pressure in pelvic floor:yes.  When? After standing or walking >10 min  Frequency of daytime urination:not sure  Frequency of nighttime urination:1  Can you stop the flow of urine when on the toilet? no  Is the volume of urine passed usually:  (8sec rule= 250ml with average bladder storing 400-600ml) avg  Do you strain to pass urine? yes  Do you have a slow or hesitant urinary stream? yes  Do you have difficulty initiating the urine stream? no  Is urination painful? no  How many bladder infections have you had in last 12 months?0  Fluid intake(one glass is 8oz  or one cup) 4 glasses/day, 0caffinated glasses/day  0 alcohol glasses/day.  Bowel habits:  Frequency of bowel movements? 7 times a week  Consistancy of stool?  [place] Stool Scale 4  Do you ignore the urge to defecate? no  Do you strain to pass stool? no  Pelvic Pain:  Do you have any pelvic pain with intercourse, exams, use of tampons? Not asked  Is initial penetration during intercourse painful? Not asked  Is deeper penetration painful? Not asked  Do you use lubricant?  Not asked  Are you sexually active? Not asked  Have you ever been worried for your physical safety? no  Have you practiced the PF(kegel) exercises for 4 or more weeks?yes  Marinoff Scale:Level not asked  (Level 3: Abstinence from intercourse because of severe pain. Level 2: Painful intercourse which limites frequency of activity. Level 1: Painful intercourse not severe enough to prevent activity.)    Treatment/Education provided this session: please see flow sheet for details    ASSESSMENT/PLAN:    Patient is a 60 year old female with pelvic complaints.    Patient has the following significant findings with corresponding treatment plan.                Diagnosis 1:  Prolapse  Pain -  manual therapy, self management, education, directional preference exercise and home program  Decreased ROM/flexibility - manual therapy and therapeutic exercise  Decreased joint mobility - manual therapy and therapeutic exercise  Decreased strength - therapeutic exercise and therapeutic activities  Decreased proprioception - neuro re-education and therapeutic activities  Impaired gait - gait training  Impaired muscle performance - neuro re-education  Decreased function - therapeutic activities  Impaired posture - neuro re-education    Previous and current functional limitations:  (See Goal Flow Sheet for this information)    Short term and Long term goals: (See Goal Flow Sheet for this information)     Communication ability:  Patient appears to be able to clearly  communicate and understand verbal and written communication and follow directions correctly.  Treatment Explanation - The following has been discussed with the patient:   RX ordered/plan of care  Anticipated outcomes  Possible risks and side effects  This patient would benefit from PT intervention to resume normal activities.   Rehab potential is good.    Frequency:  1X week, once daily every 3 weeks  Duration:  for 12 weeks  Discharge Plan:  Achieve all LTG.  Independent in home treatment program.  Reach maximal therapeutic benefit.    Please refer to the daily flowsheet for treatment today, total treatment time and time spent performing 1:1 timed codes.

## 2020-08-30 DIAGNOSIS — Z80.42 FAMILY HISTORY OF PROSTATE CANCER: ICD-10-CM

## 2020-08-30 DIAGNOSIS — Z80.3 FAMILY HISTORY OF MALIGNANT NEOPLASM OF BREAST: ICD-10-CM

## 2020-08-30 DIAGNOSIS — Z84.89 FAMILY HISTORY OF FAMILIAL PARAGANGLIOMA: ICD-10-CM

## 2020-08-30 DIAGNOSIS — Z80.49 FAMILY HISTORY OF UTERINE CANCER: ICD-10-CM

## 2020-08-31 ENCOUNTER — ANCILLARY PROCEDURE (OUTPATIENT)
Dept: CARDIOLOGY | Facility: CLINIC | Age: 60
End: 2020-08-31
Attending: INTERNAL MEDICINE
Payer: MEDICARE

## 2020-08-31 DIAGNOSIS — R07.89 ATYPICAL CHEST PAIN: ICD-10-CM

## 2020-09-01 LAB — INTERPRETATION ECG - MUSE: NORMAL

## 2020-09-03 ENCOUNTER — VIRTUAL VISIT (OUTPATIENT)
Dept: OBGYN | Facility: CLINIC | Age: 60
End: 2020-09-03
Attending: OBSTETRICS & GYNECOLOGY
Payer: MEDICARE

## 2020-09-03 DIAGNOSIS — R87.613 HIGH GRADE SQUAMOUS INTRAEPITHELIAL LESION OF CERVIX: Primary | ICD-10-CM

## 2020-09-03 NOTE — LETTER
"9/3/2020       RE: Belen Sharma  [address] [facility] [address]     Dear Colleague,    Thank you for referring your patient, Belen Sharma, to the [facility] at [facility]. Please see a copy of my visit note below.    Belen Sharma is a 60 year old female who is being evaluated via a billable telephone visit.      The patient has been notified of following:     \"This telephone visit will be conducted via a call between you and your physician/provider. We have found that certain health care needs can be provided without the need for a physical exam.  This service lets us provide the care you need with a short phone conversation.  If a prescription is necessary we can send it directly to your pharmacy.  If lab work is needed we can place an order for that and you can then stop by our lab to have the test done at a later time.    Telephone visits are billed at different rates depending on your insurance coverage. During this emergency period, for some insurers they may be billed the same as an in-person visit.  Please reach out to your insurance provider with any questions.    If during the course of the call the physician/provider feels a telephone visit is not appropriate, you will not be charged for this service.\"    Patient has given verbal consent for Telephone visit?  Yes    What phone number would you like to be contacted at?     How would you like to obtain your AVS? [email or web address]     Patient is a 60 year old  consult for treatment of cervical dysplasia. Has history significant for multiple transplants. No history of HPV or abnormal pap until recently. Had pap ASCUS +HR HPV followed by colposcopy with ECC concerning for likely HSIL per pathology report. Is immunosuppressed due to transplant history.    OBHx:  x2  GYNHx: Recent abnormal pap. None prior per her report. Postmenopausal, no bleeding.   Past Medical History:   Diagnosis Date     Adolescent " scoliosis      BK viremia [id number]     CMV pneumonia (H)      Congenital hepatic fibrosis      Dialysis patient (H)     port left side do not do blood pressure left side     Endometriosis      Hernia, abdominal 2020    incisional      History of hypothyroidism      HPV (human papilloma virus) infection      Immunosuppression (H)      Polycystic kidney disease      S/P kidney transplant     SLK 10/9/2010 - kidney failure 2/2 [facility]. Explanted . Re-transplant after de-sensitization      S/P liver transplant (H)     10/9/2010 - HAT, ischemic biopathy. Re-transplant 3/3/2011     S/P splenectomy      Spider veins      Past Surgical History:   Procedure Laterality Date     bunectomy  2018    right foot     bunionectomy  2019    left     HERNIA REPAIR, INCISIONAL  2013     SPLENECTOMY       TRANSPLANT KIDNEY RECIPIENT  DONOR      re-transplant after [facility]; 6 months de-sensitization prior AND 6 months after transplant     TRANSPLANT LIVER RECIPIENT  DONOR  2011     TRANSPLANT LIVER, KIDNEY RECIPIENT  DONOR, COMBINED  10/09/2010    HAT - re-Tx liver 3/3/2011; Kidney (left iliac) lost to [facility]/fibrosis - explant     Family History   Problem Relation Age of Onset     Uterine Cancer Maternal Grandmother      Polycystic Kidney Diease Brother      Polycystic Kidney Diease Brother      Polycystic Kidney Diease Brother      Polycystic Kidney Diease Brother      Cervical Cancer Maternal Aunt      Social History     Socioeconomic History     Marital status:      Spouse name: Not on file     Number of children: Not on file     Years of education: Not on file     Highest education level: Not on file   Occupational History     Not on file   Social Needs     Financial resource strain: Not on file     Food insecurity     Worry: Not on file     Inability: Not on file     Transportation needs     Medical: Not on file     Non-medical: Not on file   Tobacco Use     Smoking  status: Never Smoker     Smokeless tobacco: Never Used   Substance and Sexual Activity     Alcohol use: Not Currently     Drug use: Not Currently     Sexual activity: Not Currently     Reviewed pap smear and colposcopy findings. Due to concern about possible high grade dysplasia on ECC would recommend CKC for diagnostic and therapeutic purposes. Reviewed procedure in depth with her and further reviewed cervical dysplasia. All questions answered. Surgery orders placed. Recommend PAC visit for preop exam.          Phone call duration: 30 minutes    Daysi Perez MD

## 2020-09-08 NOTE — PROGRESS NOTES
"Belen Sharma is a 60 year old female who is being evaluated via a billable telephone visit.      The patient has been notified of following:     \"This telephone visit will be conducted via a call between you and your physician/provider. We have found that certain health care needs can be provided without the need for a physical exam.  This service lets us provide the care you need with a short phone conversation.  If a prescription is necessary we can send it directly to your pharmacy.  If lab work is needed we can place an order for that and you can then stop by our lab to have the test done at a later time.    Telephone visits are billed at different rates depending on your insurance coverage. During this emergency period, for some insurers they may be billed the same as an in-person visit.  Please reach out to your insurance provider with any questions.    If during the course of the call the physician/provider feels a telephone visit is not appropriate, you will not be charged for this service.\"    Patient has given verbal consent for Telephone visit?  Yes    What phone number would you like to be contacted at?     How would you like to obtain your AVS? Elena     Patient is a 60 year old  consult for treatment of cervical dysplasia. Has history significant for multiple transplants. No history of HPV or abnormal pap until recently. Had pap ASCUS +HR HPV followed by colposcopy with ECC concerning for likely HSIL per pathology report. Is immunosuppressed due to transplant history.    OBHx:  x2  GYNHx: Recent abnormal pap. None prior per her report. Postmenopausal, no bleeding.   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 Explanted . Re-transplant after de-sensitization      S/P liver transplant (H)     10/9/2010 - HAT, ischemic biopathy. Re-transplant 3/3/2011     S/P splenectomy      Spider veins      Past Surgical History:   Procedure Laterality Date     bunectomy  2018    right foot     bunionectomy  2019    left     HERNIA REPAIR, INCISIONAL  2013     SPLENECTOMY       TRANSPLANT KIDNEY RECIPIENT  DONOR  2016    re-transplant after [facility]; 6 months de-sensitization prior AND 6 months after transplant     TRANSPLANT LIVER RECIPIENT  DONOR  2011     TRANSPLANT LIVER, KIDNEY RECIPIENT  DONOR, COMBINED  10/09/2010    HAT - re-Tx liver 3/3/2011; Kidney (left iliac) lost to [facility]/fibrosis - explant     Family History   Problem Relation Age of Onset     Uterine Cancer Maternal Grandmother      Polycystic Kidney Diease Brother      Polycystic Kidney Diease Brother      Polycystic Kidney Diease Brother      Polycystic Kidney Diease Brother      Cervical Cancer Maternal Aunt      Social History     Socioeconomic History     Marital status:      Spouse name: Not on file     Number of children: Not on file     Years of education: Not on file     Highest education level: Not on file   Occupational History     Not on file   Social Needs     Financial resource strain: Not on file     Food insecurity     Worry: Not on file     Inability: Not on file     Transportation needs     Medical: Not on file     Non-medical: Not on file   Tobacco Use     Smoking status: Never Smoker     Smokeless tobacco: Never Used   Substance and Sexual Activity     Alcohol use: Not Currently     Drug use: Not Currently     Sexual activity: Not Currently     Reviewed pap smear and colposcopy findings. Due to concern about possible high grade dysplasia on  ECC would recommend CKC for diagnostic and therapeutic purposes. Reviewed procedure in depth with her and further reviewed cervical dysplasia. All questions answered. Surgery orders placed. Recommend PAC visit for preop exam.          Phone call duration: 30 minutes    Daysi Perez MD

## 2020-09-10 DIAGNOSIS — R07.89 ATYPICAL CHEST PAIN: ICD-10-CM

## 2020-09-10 DIAGNOSIS — Z94.4 LIVER REPLACED BY TRANSPLANT (H): ICD-10-CM

## 2020-09-10 DIAGNOSIS — Z94.0 KIDNEY TRANSPLANTED: ICD-10-CM

## 2020-09-10 DIAGNOSIS — Z13.220 LIPID SCREENING: ICD-10-CM

## 2020-09-10 LAB
ERYTHROCYTE [DISTWIDTH] IN BLOOD BY AUTOMATED COUNT: 14.9 % (ref 10–15)
HCT VFR BLD AUTO: 55 % (ref 35–47)
HGB BLD-MCNC: 17.8 G/DL (ref 11.7–15.7)
MCH RBC QN AUTO: 30.5 PG (ref 26.5–33)
MCHC RBC AUTO-ENTMCNC: 32.4 G/DL (ref 31.5–36.5)
MCV RBC AUTO: 94 FL (ref 78–100)
PLATELET # BLD AUTO: 192 10E9/L (ref 150–450)
PROT UR-MCNC: 0.05 G/L
PROT/CREAT 24H UR: 0.16 G/G CR (ref 0–0.2)
RBC # BLD AUTO: 5.83 10E12/L (ref 3.8–5.2)
WBC # BLD AUTO: 6.9 10E9/L (ref 4–11)

## 2020-09-10 PROCEDURE — 80076 HEPATIC FUNCTION PANEL: CPT | Performed by: INTERNAL MEDICINE

## 2020-09-10 PROCEDURE — 85027 COMPLETE CBC AUTOMATED: CPT | Performed by: INTERNAL MEDICINE

## 2020-09-10 PROCEDURE — 80048 BASIC METABOLIC PNL TOTAL CA: CPT | Performed by: INTERNAL MEDICINE

## 2020-09-10 PROCEDURE — 36415 COLL VENOUS BLD VENIPUNCTURE: CPT | Performed by: INTERNAL MEDICINE

## 2020-09-10 PROCEDURE — 80061 LIPID PANEL: CPT | Performed by: INTERNAL MEDICINE

## 2020-09-10 PROCEDURE — 80197 ASSAY OF TACROLIMUS: CPT | Performed by: INTERNAL MEDICINE

## 2020-09-10 PROCEDURE — 84156 ASSAY OF PROTEIN URINE: CPT | Performed by: INTERNAL MEDICINE

## 2020-09-11 ENCOUNTER — TELEPHONE (OUTPATIENT)
Dept: OBGYN | Facility: CLINIC | Age: 60
End: 2020-09-11

## 2020-09-11 PROBLEM — R87.613 HIGH GRADE SQUAMOUS INTRAEPITHELIAL LESION OF CERVIX: Status: ACTIVE | Noted: 2020-09-11

## 2020-09-11 LAB
ALBUMIN SERPL-MCNC: 3.5 G/DL (ref 3.4–5)
ALP SERPL-CCNC: 66 U/L (ref 40–150)
ALT SERPL W P-5'-P-CCNC: 21 U/L (ref 0–50)
ANION GAP SERPL CALCULATED.3IONS-SCNC: 6 MMOL/L (ref 3–14)
AST SERPL W P-5'-P-CCNC: 13 U/L (ref 0–45)
BILIRUB DIRECT SERPL-MCNC: 0.2 MG/DL (ref 0–0.2)
BILIRUB SERPL-MCNC: 0.6 MG/DL (ref 0.2–1.3)
BUN SERPL-MCNC: 13 MG/DL (ref 7–30)
CALCIUM SERPL-MCNC: 9.7 MG/DL (ref 8.5–10.1)
CHLORIDE SERPL-SCNC: 104 MMOL/L (ref 94–109)
CHOLEST SERPL-MCNC: 196 MG/DL
CO2 SERPL-SCNC: 26 MMOL/L (ref 20–32)
CREAT SERPL-MCNC: 0.84 MG/DL (ref 0.52–1.04)
GFR SERPL CREATININE-BSD FRML MDRD: 76 ML/MIN/{1.73_M2}
GLUCOSE SERPL-MCNC: 83 MG/DL (ref 70–99)
HDLC SERPL-MCNC: 53 MG/DL
LDLC SERPL CALC-MCNC: 117 MG/DL
NONHDLC SERPL-MCNC: 143 MG/DL
POTASSIUM SERPL-SCNC: 3.5 MMOL/L (ref 3.4–5.3)
PROT SERPL-MCNC: 7.2 G/DL (ref 6.8–8.8)
SODIUM SERPL-SCNC: 136 MMOL/L (ref 133–144)
TACROLIMUS BLD-MCNC: 5.8 UG/L (ref 5–15)
TME LAST DOSE: 2200 H
TRIGL SERPL-MCNC: 131 MG/DL

## 2020-09-12 DIAGNOSIS — Z11.59 ENCOUNTER FOR SCREENING FOR OTHER VIRAL DISEASES: Primary | ICD-10-CM

## 2020-09-14 ENCOUNTER — TELEPHONE (OUTPATIENT)
Dept: OBGYN | Facility: CLINIC | Age: 60
End: 2020-09-14

## 2020-09-14 ENCOUNTER — TELEPHONE (OUTPATIENT)
Dept: TRANSPLANT | Facility: CLINIC | Age: 60
End: 2020-09-14

## 2020-09-14 NOTE — TELEPHONE ENCOUNTER
FUTURE VISIT INFORMATION      SURGERY INFORMATION:    Date: 20    Location: ur or    Surgeon:  Daysi Perez MD     Anesthesia Type:  choice    Procedure: CONE BIOPSY, CERVIX     Consult: virtual visit 9/3    RECORDS REQUESTED FROM:       Primary Care Provider: Vincent De La Garza MD- [facility]    Most recent EKG+ Tracin20    Most recent ECHO: 20    Most recent Cardiac Stress Test: 2007- [facility]

## 2020-09-14 NOTE — TELEPHONE ENCOUNTER
ISSUE:   Tacrolimus IR level 5.8 on 9/10/2020, goal 3, dose 2 mg every AM and 1 mg every PM.      PLAN:   Please call patient and confirm this was an accurate 12-hour trough. Verify Tacrolimus IR dose 2 mg every AM and 1 mg every PM. Confirm no new medications or illness. Confirm no missed doses. If accurate trough and accurate dose, decrease Tacrolimus IR dose to 1 mg BID and repeat TAC level only in 2 weeks.     Елена Dinh RN      OUTCOME:   Belen is not comfortable decreasing her Tacrolimus dose and would like her goal clarified with provider. Message sent to Dr. Leventhal to clarify goal and at this time Belen will continue current dose of Tacrolimus which is 2 mg every morning and 1 mg every evening.

## 2020-09-14 NOTE — TELEPHONE ENCOUNTER
Confirmed surgery date, time and location, 9/30/20 with arrival time at 7:30a.m with nothing to eat eight hours before scheduled surgery time, clear liquids up to two hours before, COVID testing required 72 hours prior to surgery, PAC appointment scheduled for 9/23/20, map and letter will be mailed out.     to complete the following fields:            CHECKLIST     Google Calendar : Yes     Resident notified: Not Applicable     Clinic schedule blocked: Not Applicable    Patient notified:Yes      Pre op information sent: Yes     Given to patient over the phone.Yes    Comments:

## 2020-09-15 ENCOUNTER — VIRTUAL VISIT (OUTPATIENT)
Dept: NEPHROLOGY | Facility: CLINIC | Age: 60
End: 2020-09-15
Attending: INTERNAL MEDICINE
Payer: MEDICARE

## 2020-09-15 ENCOUNTER — TELEPHONE (OUTPATIENT)
Dept: TRANSPLANT | Facility: CLINIC | Age: 60
End: 2020-09-15

## 2020-09-15 VITALS — WEIGHT: 153 LBS | BODY MASS INDEX: 21.95 KG/M2

## 2020-09-15 DIAGNOSIS — Z94.0 S/P KIDNEY TRANSPLANT: Primary | ICD-10-CM

## 2020-09-15 DIAGNOSIS — D75.1 ERYTHROCYTOSIS: ICD-10-CM

## 2020-09-15 DIAGNOSIS — D84.9 IMMUNOSUPPRESSION (H): ICD-10-CM

## 2020-09-15 DIAGNOSIS — R87.613 HSIL (HIGH GRADE SQUAMOUS INTRAEPITHELIAL LESION) ON PAP SMEAR OF CERVIX: ICD-10-CM

## 2020-09-15 ASSESSMENT — PAIN SCALES - GENERAL: PAINLEVEL: NO PAIN (0)

## 2020-09-15 NOTE — LETTER
"9/15/2020       RE: Belen Sharma  [address] [facility] [address]     Dear Colleague,    Thank you for referring your patient, Belen Sharma, to the [facility] at [facility]. Please see a copy of my visit note below.    Belen Sharma is a 60 year old female who is being evaluated via a billable video visit.      The patient has been notified of following:     \"This video visit will be conducted via a call between you and your physician/provider. We have found that certain health care needs can be provided without the need for an in-person physical exam.  This service lets us provide the care you need with a video conversation.  If a prescription is necessary we can send it directly to your pharmacy.  If lab work is needed we can place an order for that and you can then stop by our lab to have the test done at a later time.    Video visits are billed at different rates depending on your insurance coverage.  Please reach out to your insurance provider with any questions.    If during the course of the call the physician/provider feels a video visit is not appropriate, you will not be charged for this service.\"    Patient has given verbal consent for Video visit? Yes  How would you like to obtain your AVS? Mail a copy  If you are dropped from the video visit, the video invite should be resent to: Send to e-mail at: [email or web address]  Will anyone else be joining your video visit? No        Video-Visit Details    Type of service:  Video Visit    Video Start Time: 4;37pm  Video End Time: 5:02 PM    Originating Location (pt. Location): Home    Distant Location (provider location):  [facility]     Platform used for Video Visit: Maddison Angulo MD      TRANSPLANT NEPHROLOGY CONSULT NOTE    Assessment & Plan   # DDKT: Stable   - Baseline Cr ~ 0.7-.0.9   - Proteinuria: Normal (<0.2 grams)   - Date DSA Last Checked: Not Known      Latest DSA: unknown. Will need to " obtain. History of ABMR of her first kidney transplant and then desensitization prior to second transplant   - BK Viremia: Not checked recently due to time from transplant. Check with next labs   - Kidney Tx Biopsy: No    # Liver transplant:   -Good allograft function. Follows with transplant hepatology who notes that her level of immunosuppression is higher than necessary for liver.    # Immunosuppression: Tacrolimus immediate release (goal 2mg in AM and 1mg in PM, unclear of goal), Mycophenolic acid (dose 720mg in AM and 360mg in PM) and Prednisone (dose 7.5 mg daily)   - Changes: Not at this time. I recommend goal tac 5-8, MPA level with next labs. Based on cone biopsy findings, cPRA, could consider decreasing prednisone to 5mg daily.     # Infection Prophylaxis:   - PJP: Sulfa/TMP (Bactrim)       # History of CMV viremia:   -Last checked in 2018. Recheck CMV quant with next labs      # Blood Pressure: Controlled;  Goal BP: < 130/80   - Volume status: Euvolemic    - Changes: Not at this time. Her BP is actually on the low end of normal so cannot start ACEi/ARB for post transplant erythrocytosis      # Post-Transplant Erythrocytosis: Hgb: Increased;  On ACEI/ARB: No   Imaging: No, need to obtain native renal U/S and kidney transplant U/S    -If hematocrit>56 needs therapeutic phlebotomy    # Mineral Bone Disorder:   - Secondary renal hyperparathyroidism; PTH level: Not checked recently        On treatment: None  - Calcium; level: Normal        On supplement: No  - Phosphorus; level: Not checked recently        On supplement: No    # HSIL: plan for cold knife biopsy on 9/30/20. Follow up results to help determine if change needs to be made to IS.     # Skin Cancer Risk:    - Discussed sun protection and recommend regular follow up with Dermatology.    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    Return visit: No follow-ups on file. 3 month follow up. Every 2 month labs    Saran Angulo MD      Reason for Consult   Belen Sharma was seen in consultation at the request of Dr. Leventhal for kidney transplant and immunosuppression management.    Chief Complaint    Ms. Sharma is a 60 year old here for kidney transplant, immunosuppression management and transfer of care.    History of Present Illness   Ms. 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  The patient saw Dr. Leventhal with transplant hepatology on 2020.    The patient is taking Myfortic 720 mg in the morning and 360 mg in the evening, tacrolimus 2 mg in the morning and 1 mg in the evening, prednisone 7.5 mg daily for immunosuppression.  The patient states that this is for her history of high sensitization.  I cannot find any PRA or DSA in her records.    The patient is a history of HSIL and she is planned  to have a cold knife cone biopsy on 9/30/2020.  The patient is very concerned about changing her immunosuppression and would only want to do it if she has a malignancy.    The patient also has rectal prolapse for which she has seen OB GYN who recommends getting her HSIL dealt with first.  The patient currently feels well.  She denies any chest pain, nausea, vomiting, diarrhea.  She states that this is actually the most stable that she has been from a kidney transplant perspective in the last few years.  Her labs that were performed on 9/10/2020 were notable for a creatinine of 0.84, normal LFTs, erythrocytosis with a hemoglobin of 17.8, tacrolimus level of 5.8, urine protein/creatinine of 0.16 g/g.      Recent Hospitalizations:  [x] No [] Yes    New Medical Issues: [] No [x] Yes HSIL, planned for cold knife cone biopsy on 9/30/20   Decreased energy: [x] No [] Yes    Chest pain or SOB with exertion:  [x] No [] Yes    Appetite change or weight change: [x] No [] Yes    Nausea, vomiting or diarrhea:  [x] No [] Yes    Fever, sweats or chills: [x] No [] Yes    Leg swelling: [x] No [] Yes      Home BP: 100-120/60-70    Review of Systems   A comprehensive review of systems was obtained and negative, except as noted in the HPI or PMH.    Problem List   Patient Active Problem List   Diagnosis     S/P splenectomy     Congenital hepatic fibrosis     S/P kidney transplant     Polycystic kidney disease     Immunosuppression (H)     S/P liver transplant (H)     Endometriosis     HPV (human papilloma virus) infection     High grade squamous intraepithelial lesion of cervix       Past Medical History    Past Medical History:   Diagnosis Date     Adolescent scoliosis      BK viremia [id number]     CMV pneumonia (H) 2010     Congenital hepatic fibrosis      Dialysis patient (H)     port left side do not do blood pressure left side     Endometriosis      Hernia, abdominal 05/2020    incisional      History of hypothyroidism      HPV  (human papilloma virus) infection      Immunosuppression (H)      Polycystic kidney disease      S/P kidney transplant     SLK 10/9/2010 - kidney failure 2/2 [facility]. Explanted . Re-transplant after de-sensitization      S/P liver transplant (H)     10/9/2010 - HAT, ischemic biopathy. Re-transplant 3/3/2011     S/P splenectomy      Spider veins        Past Surgical History   Past Surgical History:   Procedure Laterality Date     bunectomy  2018    right foot     bunionectomy  2019    left     HERNIA REPAIR, INCISIONAL  2013     SPLENECTOMY       TRANSPLANT KIDNEY RECIPIENT  DONOR  2016    re-transplant after [facility]; 6 months de-sensitization prior AND 6 months after transplant     TRANSPLANT LIVER RECIPIENT  DONOR  2011     TRANSPLANT LIVER, KIDNEY RECIPIENT  DONOR, COMBINED  10/09/2010    HAT - re-Tx liver 3/3/2011; Kidney (left iliac) lost to [facility]/fibrosis - explant       Family History   Family History   Problem Relation Age of Onset     Uterine Cancer Maternal Grandmother      Polycystic Kidney Diease Brother      Polycystic Kidney Diease Brother      Polycystic Kidney Diease Brother      Polycystic Kidney Diease Brother      Cervical Cancer Maternal Aunt        Social History   Social History     Tobacco Use     Smoking status: Never Smoker     Smokeless tobacco: Never Used   Substance Use Topics     Alcohol use: Not Currently     Drug use: Not Currently       Allergies   Allergies   Allergen Reactions     Ibuprofen        Medications   Current Outpatient Medications   Medication Sig     acetaminophen (TYLENOL) 325 MG tablet Take 325-650 mg by mouth as needed for mild pain     ALPRAZolam (XANAX) 0.25 MG tablet Take 0.25 mg by mouth as needed for anxiety     aspirin (ASA) 81 MG chewable tablet Take 81 mg by mouth daily     biotin 1000 MCG TABS tablet Take 3,000 mcg by mouth daily     diphenhydrAMINE (BENADRYL) 25 MG tablet Take 50 mg by mouth as needed     docusate  sodium (COLACE) 100 MG capsule Take 200 mg by mouth daily     loperamide (IMODIUM A-D) 2 MG tablet Take 1 tablet by mouth as needed     loratadine (CLARITIN) 10 MG tablet Take 10 mg by mouth daily     Multiple Vitamins-Minerals (VITAMIN D3 COMPLETE PO) Take 2,000 Int'l Units by mouth daily     MYFORTIC (BRAND) 360 MG EC tablet Take by mouth 2 times daily     nitroGLYcerin (NITROSTAT) 0.4 MG sublingual tablet Place 0.4 mg under the tongue as needed for chest pain For chest pain place 1 tablet under the tongue every 5 minutes for 3 doses. If symptoms persist 5 minutes after 1st dose call 911.     ondansetron (ZOFRAN) 4 MG tablet Take by mouth as needed for nausea     pantoprazole (PROTONIX) 40 MG EC tablet Take 1 tablet by mouth as needed     predniSONE (DELTASONE) 5 MG tablet 7.5 mg daily     Probiotic Product (PROBIOTIC PO) Take 2 tablets by mouth daily     Psyllium (METAMUCIL PO) Take by mouth as needed     sulfamethoxazole-trimethoprim (BACTRIM) 400-80 MG tablet      tacrolimus (GENERIC EQUIVALENT) 1 MG capsule      temazepam (RESTORIL) 15 MG capsule Take 15 mg by mouth as needed for sleep     Vitamin D, Cholecalciferol, 25 MCG (1000 UT) CAPS Take 1,000 Units by mouth     No current facility-administered medications for this visit.      There are no discontinued medications.    Physical Exam   Vital Signs: There were no vitals taken for this visit.    GENERAL APPEARANCE: alert and no distress  HENT: no obvious abnormalities on appearance  RESP: breathing appears unremarkable with normal rate, no audible wheezing or cough and no apparent shortness of breath with conversation  MS: extremities normal - no gross deformities noted  SKIN: no apparent rash and normal skin tone  NEURO: speech is clear with no obvious neurological deficits  PSYCH: mentation appears normal and affect normal      Data     Renal Latest Ref Rng & Units 9/10/2020 6/9/2020 6/1/2020   Na 133 - 144 mmol/L 136 - -   K 3.4 - 5.3 mmol/L 3.5 4.0 4.0    Cl 94 - 109 mmol/L 104 - -   CO2 20 - 32 mmol/L 26 - -   BUN 7 - 30 mg/dL 13 - -   Cr 0.52 - 1.04 mg/dL 0.84 0.89 0.69   Glucose 70 - 99 mg/dL 83 83 83   Ca  8.5 - 10.1 mg/dL 9.7 - -        Heme Latest Ref Rng & Units 9/10/2020   WBC 4.0 - 11.0 10e9/L 6.9   Hgb 11.7 - 15.7 g/dL 17.8(H)   Plt 150 - 450 10e9/L 192     Liver Latest Ref Rng & Units 9/10/2020 6/9/2020 6/1/2020   AP 40 - 150 U/L 66 - -   TBili 0.2 - 1.3 mg/dL 0.6 - -   DBili 0.0 - 0.2 mg/dL 0.2 - -   ALT 0 - 50 U/L 21 25 15   AST 0 - 45 U/L 13 15 18   Tot Protein 6.8 - 8.8 g/dL 7.2 - -   Albumin 3.4 - 5.0 g/dL 3.5 - -                 Recent Labs   Lab Test 09/10/20  1038   DOSTAC 2,200   TACROL 5.8             Again, thank you for allowing me to participate in the care of your patient.      Sincerely,    Kidney/Pancreas Recipient

## 2020-09-15 NOTE — PATIENT INSTRUCTIONS
Plan for kidney transplant and native kidney ultrasound  DSA/cPRA with next labs, PTH, phos with next labs, CMV PCR with next labs

## 2020-09-15 NOTE — PROGRESS NOTES
"Belen Sharma is a 60 year old female who is being evaluated via a billable video visit.      The patient has been notified of following:     \"This video visit will be conducted via a call between you and your physician/provider. We have found that certain health care needs can be provided without the need for an in-person physical exam.  This service lets us provide the care you need with a video conversation.  If a prescription is necessary we can send it directly to your pharmacy.  If lab work is needed we can place an order for that and you can then stop by our lab to have the test done at a later time.    Video visits are billed at different rates depending on your insurance coverage.  Please reach out to your insurance provider with any questions.    If during the course of the call the physician/provider feels a video visit is not appropriate, you will not be charged for this service.\"    Patient has given verbal consent for Video visit? Yes  How would you like to obtain your AVS? Mail a copy  If you are dropped from the video visit, the video invite should be resent to: Send to e-mail at: [email or web address]  Will anyone else be joining your video visit? No        Video-Visit Details    Type of service:  Video Visit    Video Start Time: 4;37pm  Video End Time: 5:02 PM    Originating Location (pt. Location): Home    Distant Location (provider location):  [facility]     Platform used for Video Visit: Maddison Angulo MD      TRANSPLANT NEPHROLOGY CONSULT NOTE    Assessment & Plan   # DDKT: Stable   - Baseline Cr ~ 0.7-.0.9   - Proteinuria: Normal (<0.2 grams)   - Date DSA Last Checked: Not Known      Latest DSA: unknown. Will need to obtain. History of ABMR of her first kidney transplant and then desensitization prior to second transplant   - BK Viremia: Not checked recently due to time from transplant. Check with next labs   - Kidney Tx Biopsy: No    # Liver transplant:   -Good allograft function. " Follows with transplant hepatology who notes that her level of immunosuppression is higher than necessary for liver.    # Immunosuppression: Tacrolimus immediate release (goal 2mg in AM and 1mg in PM, unclear of goal), Mycophenolic acid (dose 720mg in AM and 360mg in PM) and Prednisone (dose 7.5 mg daily)   - Changes: Not at this time. I recommend goal tac 5-8, MPA level with next labs. Based on cone biopsy findings, cPRA, could consider decreasing prednisone to 5mg daily.     # Infection Prophylaxis:   - PJP: Sulfa/TMP (Bactrim)       # History of CMV viremia:   -Last checked in 2018. Recheck CMV quant with next labs      # Blood Pressure: Controlled;  Goal BP: < 130/80   - Volume status: Euvolemic    - Changes: Not at this time. Her BP is actually on the low end of normal so cannot start ACEi/ARB for post transplant erythrocytosis      # Post-Transplant Erythrocytosis: Hgb: Increased;  On ACEI/ARB: No   Imaging: No, need to obtain native renal U/S and kidney transplant U/S    -If hematocrit>56 needs therapeutic phlebotomy    # Mineral Bone Disorder:   - Secondary renal hyperparathyroidism; PTH level: Not checked recently        On treatment: None  - Calcium; level: Normal        On supplement: No  - Phosphorus; level: Not checked recently        On supplement: No    # HSIL: plan for cold knife biopsy on 9/30/20. Follow up results to help determine if change needs to be made to IS.     # Skin Cancer Risk:    - Discussed sun protection and recommend regular follow up with Dermatology.    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    Return visit: No follow-ups on file. 3 month follow up. Every 2 month labs    Saran Angulo MD      Reason for Consult   Belen Sharma was seen in consultation at the request of Dr. Leventhal for kidney transplant and immunosuppression management.    Chief Complaint    Ms. Sharma is a 60 year old here for kidney transplant, immunosuppression management and transfer of care.    History of Present Illness   Ms. 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  The patient saw Dr. Leventhal with transplant hepatology on 2020.    The patient is taking Myfortic 720 mg in the morning and 360 mg in the evening, tacrolimus 2 mg in the morning and 1 mg in the evening, prednisone 7.5 mg daily for immunosuppression.  The patient states that this is for her history of high sensitization.  I cannot find any PRA or DSA in her records.    The patient is a history of HSIL and she is planned to have a cold knife cone biopsy on 2020.  The patient is very concerned about changing her immunosuppression and would only want to do it if she has a malignancy.    The patient also has rectal prolapse for which she has seen OB GYN who recommends getting her HSIL  dealt with first.  The patient currently feels well.  She denies any chest pain, nausea, vomiting, diarrhea.  She states that this is actually the most stable that she has been from a kidney transplant perspective in the last few years.  Her labs that were performed on 9/10/2020 were notable for a creatinine of 0.84, normal LFTs, erythrocytosis with a hemoglobin of 17.8, tacrolimus level of 5.8, urine protein/creatinine of 0.16 g/g.      Recent Hospitalizations:  [x] No [] Yes    New Medical Issues: [] No [x] Yes HSIL, planned for cold knife cone biopsy on 9/30/20   Decreased energy: [x] No [] Yes    Chest pain or SOB with exertion:  [x] No [] Yes    Appetite change or weight change: [x] No [] Yes    Nausea, vomiting or diarrhea:  [x] No [] Yes    Fever, sweats or chills: [x] No [] Yes    Leg swelling: [x] No [] Yes      Home BP: 100-120/60-70    Review of Systems   A comprehensive review of systems was obtained and negative, except as noted in the HPI or PMH.    Problem List   Patient Active Problem List   Diagnosis     S/P splenectomy     Congenital hepatic fibrosis     S/P kidney transplant     Polycystic kidney disease     Immunosuppression (H)     S/P liver transplant (H)     Endometriosis     HPV (human papilloma virus) infection     High grade squamous intraepithelial lesion of cervix       Past Medical History    Past Medical History:   Diagnosis Date     Adolescent scoliosis      BK viremia [id number]     CMV pneumonia (H) 2010     Congenital hepatic fibrosis      Dialysis patient (H)     port left side do not do blood pressure left side     Endometriosis      Hernia, abdominal 05/2020    incisional      History of hypothyroidism      HPV (human papilloma virus) infection      Immunosuppression (H)      Polycystic kidney disease      S/P kidney transplant     SLK 10/9/2010 - kidney failure 2/2 [facility]. Explanted 2012. Re-transplant after de-sensitization 2016     S/P liver transplant (H)     10/9/2010 - HAT,  ischemic biopathy. Re-transplant 3/3/2011     S/P splenectomy      Spider veins        Past Surgical History   Past Surgical History:   Procedure Laterality Date     bunectomy  2018    right foot     bunionectomy  2019    left     HERNIA REPAIR, INCISIONAL  2013     SPLENECTOMY       TRANSPLANT KIDNEY RECIPIENT  DONOR  2016    re-transplant after [facility]; 6 months de-sensitization prior AND 6 months after transplant     TRANSPLANT LIVER RECIPIENT  DONOR  2011     TRANSPLANT LIVER, KIDNEY RECIPIENT  DONOR, COMBINED  10/09/2010    HAT - re-Tx liver 3/3/2011; Kidney (left iliac) lost to [facility]/fibrosis - explant       Family History   Family History   Problem Relation Age of Onset     Uterine Cancer Maternal Grandmother      Polycystic Kidney Diease Brother      Polycystic Kidney Diease Brother      Polycystic Kidney Diease Brother      Polycystic Kidney Diease Brother      Cervical Cancer Maternal Aunt        Social History   Social History     Tobacco Use     Smoking status: Never Smoker     Smokeless tobacco: Never Used   Substance Use Topics     Alcohol use: Not Currently     Drug use: Not Currently       Allergies   Allergies   Allergen Reactions     Ibuprofen        Medications   Current Outpatient Medications   Medication Sig     acetaminophen (TYLENOL) 325 MG tablet Take 325-650 mg by mouth as needed for mild pain     ALPRAZolam (XANAX) 0.25 MG tablet Take 0.25 mg by mouth as needed for anxiety     aspirin (ASA) 81 MG chewable tablet Take 81 mg by mouth daily     biotin 1000 MCG TABS tablet Take 3,000 mcg by mouth daily     diphenhydrAMINE (BENADRYL) 25 MG tablet Take 50 mg by mouth as needed     docusate sodium (COLACE) 100 MG capsule Take 200 mg by mouth daily     loperamide (IMODIUM A-D) 2 MG tablet Take 1 tablet by mouth as needed     loratadine (CLARITIN) 10 MG tablet Take 10 mg by mouth daily     Multiple Vitamins-Minerals (VITAMIN D3 COMPLETE PO) Take 2,000 Int'l  Units by mouth daily     MYFORTIC (BRAND) 360 MG EC tablet Take by mouth 2 times daily     nitroGLYcerin (NITROSTAT) 0.4 MG sublingual tablet Place 0.4 mg under the tongue as needed for chest pain For chest pain place 1 tablet under the tongue every 5 minutes for 3 doses. If symptoms persist 5 minutes after 1st dose call 911.     ondansetron (ZOFRAN) 4 MG tablet Take by mouth as needed for nausea     pantoprazole (PROTONIX) 40 MG EC tablet Take 1 tablet by mouth as needed     predniSONE (DELTASONE) 5 MG tablet 7.5 mg daily     Probiotic Product (PROBIOTIC PO) Take 2 tablets by mouth daily     Psyllium (METAMUCIL PO) Take by mouth as needed     sulfamethoxazole-trimethoprim (BACTRIM) 400-80 MG tablet      tacrolimus (GENERIC EQUIVALENT) 1 MG capsule      temazepam (RESTORIL) 15 MG capsule Take 15 mg by mouth as needed for sleep     Vitamin D, Cholecalciferol, 25 MCG (1000 UT) CAPS Take 1,000 Units by mouth     No current facility-administered medications for this visit.      There are no discontinued medications.    Physical Exam   Vital Signs: There were no vitals taken for this visit.    GENERAL APPEARANCE: alert and no distress  HENT: no obvious abnormalities on appearance  RESP: breathing appears unremarkable with normal rate, no audible wheezing or cough and no apparent shortness of breath with conversation  MS: extremities normal - no gross deformities noted  SKIN: no apparent rash and normal skin tone  NEURO: speech is clear with no obvious neurological deficits  PSYCH: mentation appears normal and affect normal      Data     Renal Latest Ref Rng & Units 9/10/2020 6/9/2020 6/1/2020   Na 133 - 144 mmol/L 136 - -   K 3.4 - 5.3 mmol/L 3.5 4.0 4.0   Cl 94 - 109 mmol/L 104 - -   CO2 20 - 32 mmol/L 26 - -   BUN 7 - 30 mg/dL 13 - -   Cr 0.52 - 1.04 mg/dL 0.84 0.89 0.69   Glucose 70 - 99 mg/dL 83 83 83   Ca  8.5 - 10.1 mg/dL 9.7 - -        Heme Latest Ref Rng & Units 9/10/2020   WBC 4.0 - 11.0 10e9/L 6.9   Hgb 11.7 -  15.7 g/dL 17.8(H)   Plt 150 - 450 10e9/L 192     Liver Latest Ref Rng & Units 9/10/2020 6/9/2020 6/1/2020   AP 40 - 150 U/L 66 - -   TBili 0.2 - 1.3 mg/dL 0.6 - -   DBili 0.0 - 0.2 mg/dL 0.2 - -   ALT 0 - 50 U/L 21 25 15   AST 0 - 45 U/L 13 15 18   Tot Protein 6.8 - 8.8 g/dL 7.2 - -   Albumin 3.4 - 5.0 g/dL 3.5 - -                 Recent Labs   Lab Test 09/10/20  1038   DOSTAC 2,200   TACROL 5.8

## 2020-09-15 NOTE — TELEPHONE ENCOUNTER
Called Belen to discuss her recent TAC level and goal. Current TAC level is 5.8 and per Dr. Leventhal's most recent visit note, TAC goal is ~3.  Belen is not comfortable decreasing her Tacrolimus dose as she has had 2 liver and 2 kidney transplant and has had episodes of rejection in the past as well as antibody issues. Belen has a visit with nephrology today and will discuss goal with them as well. Message sent to Dr. Leventhal to clarify goal and at this time Belen will continue her current Tacrolimus dose of 2 mg every AM and 1 mg every PM.     Liver function tests and kidney function are stable at this time.     Belen is scheduled for cone biopsy of cervix 9/30/2020 and wants to verify there are no medication adjustments that need to be made and that she has clearance from her transplant team.     Encounter forwarded to Dr. Leventhal for his review.

## 2020-09-16 ENCOUNTER — TELEPHONE (OUTPATIENT)
Dept: TRANSPLANT | Facility: CLINIC | Age: 60
End: 2020-09-16

## 2020-09-16 DIAGNOSIS — D58.2 ELEVATED HEMOGLOBIN (H): ICD-10-CM

## 2020-09-16 DIAGNOSIS — Z48.298 AFTERCARE FOLLOWING ORGAN TRANSPLANT: ICD-10-CM

## 2020-09-16 DIAGNOSIS — Z94.0 KIDNEY TRANSPLANTED: Primary | ICD-10-CM

## 2020-09-16 NOTE — TELEPHONE ENCOUNTER
Call placed to howie Holguin v/u of follow up plan. US ordered, will try to schedule next week. Orders placed for bloodwork. Will need to call for DSA information.             ANNALISA Don ([facility]) - [phone]  ANNALISA Grande or Willy at [place] [facility] [place] [phone]

## 2020-09-16 NOTE — TELEPHONE ENCOUNTER
----- Message from Saran Angulo MD sent at 9/15/2020  5:28 PM CDT -----  For this patient. She was a transfer of care. Please obtain kidney transplant and native kidney ultrasound to screen for RCC due to erythrocytosis.   Please get DSA/cPRA with next labs, PTH, phos with next labs, CMV PCR with next labs, BK PCR.     Labs every 2 months per transplant hepatology. Return to clinic in 3 months.     Thanks,  Saran

## 2020-09-18 ENCOUNTER — TELEPHONE (OUTPATIENT)
Dept: TRANSPLANT | Facility: CLINIC | Age: 60
End: 2020-09-18

## 2020-09-18 NOTE — TELEPHONE ENCOUNTER
Attempted to call Belen per request of post txp coordinator, Divya. Phone kept ringing and no one picked up. No option to LVM. Will try again next week.

## 2020-09-21 ENCOUNTER — TELEPHONE (OUTPATIENT)
Dept: TRANSPLANT | Facility: CLINIC | Age: 60
End: 2020-09-21

## 2020-09-21 PROBLEM — M20.12 HALLUX VALGUS, ACQUIRED, BILATERAL: Status: ACTIVE | Noted: 2017-07-25

## 2020-09-21 PROBLEM — M20.41 ACQUIRED BILATERAL HAMMER TOES: Status: ACTIVE | Noted: 2017-07-25

## 2020-09-21 PROBLEM — Z12.39 ENCOUNTER FOR SCREENING FOR MALIGNANT NEOPLASM OF BREAST: Status: ACTIVE | Noted: 2020-09-21

## 2020-09-21 PROBLEM — N91.1 SECONDARY PHYSIOLOGIC AMENORRHEA: Status: ACTIVE | Noted: 2020-09-21

## 2020-09-21 PROBLEM — N25.0 RENAL BONE DISEASE: Status: ACTIVE | Noted: 2019-11-02

## 2020-09-21 PROBLEM — M20.42 ACQUIRED BILATERAL HAMMER TOES: Status: ACTIVE | Noted: 2017-07-25

## 2020-09-21 PROBLEM — R82.90 ABNORMAL URINE: Status: ACTIVE | Noted: 2020-06-08

## 2020-09-21 PROBLEM — N30.90 CYSTITIS: Status: ACTIVE | Noted: 2020-09-21

## 2020-09-21 PROBLEM — B35.1 DERMATOPHYTOSIS OF NAIL: Status: ACTIVE | Noted: 2017-07-25

## 2020-09-21 PROBLEM — Z76.0 ENCOUNTER FOR MEDICATION REFILL: Status: ACTIVE | Noted: 2020-09-21

## 2020-09-21 PROBLEM — R87.619 ABNORMAL PAP SMEAR OF CERVIX: Status: ACTIVE | Noted: 2020-09-21

## 2020-09-21 PROBLEM — Z94.9 INCISIONAL HERNIA FOLLOWING TRANSPLANT: Status: ACTIVE | Noted: 2020-05-15

## 2020-09-21 PROBLEM — N05.8 BK VIRUS NEPHROPATHY: Status: ACTIVE | Noted: 2019-11-02

## 2020-09-21 PROBLEM — J32.9 SINUSITIS: Status: ACTIVE | Noted: 2020-09-21

## 2020-09-21 PROBLEM — B37.31 CANDIDIASIS OF VAGINA: Status: ACTIVE | Noted: 2020-09-21

## 2020-09-21 PROBLEM — I20.89 MICROVASCULAR ANGINA (H): Status: ACTIVE | Noted: 2018-01-01

## 2020-09-21 PROBLEM — T86.11 KIDNEY TRANSPLANT REJECTION: Status: ACTIVE | Noted: 2020-09-21

## 2020-09-21 PROBLEM — Z71.9 ENCOUNTER FOR COUNSELING: Status: ACTIVE | Noted: 2020-09-21

## 2020-09-21 PROBLEM — I12.9 RENAL HYPERTENSION: Status: ACTIVE | Noted: 2019-11-02

## 2020-09-21 PROBLEM — Z76.82 PATIENT ON WAITING LIST FOR ORGAN TRANSPLANT: Status: ACTIVE | Noted: 2020-09-21

## 2020-09-21 PROBLEM — M20.11 HALLUX VALGUS, ACQUIRED, BILATERAL: Status: ACTIVE | Noted: 2017-07-25

## 2020-09-21 PROBLEM — Z71.82 EXERCISE COUNSELING: Status: ACTIVE | Noted: 2020-09-21

## 2020-09-21 PROBLEM — B33.8 BK VIRUS NEPHROPATHY: Status: ACTIVE | Noted: 2019-11-02

## 2020-09-21 PROBLEM — C50.919 MALIGNANT NEOPLASM OF BREAST (H): Status: ACTIVE | Noted: 2020-09-21

## 2020-09-21 PROBLEM — K43.2 INCISIONAL HERNIA FOLLOWING TRANSPLANT: Status: ACTIVE | Noted: 2020-05-15

## 2020-09-21 PROBLEM — R87.810 CERVICAL HIGH RISK HUMAN PAPILLOMAVIRUS (HPV) DNA TEST POSITIVE: Status: ACTIVE | Noted: 2020-09-21

## 2020-09-21 PROBLEM — M41.9 SCOLIOSIS DEFORMITY OF SPINE: Status: ACTIVE | Noted: 2020-09-21

## 2020-09-21 PROBLEM — N81.4 UTERINE PROLAPSE: Status: ACTIVE | Noted: 2020-05-15

## 2020-09-21 PROBLEM — M25.476 SWELLING OF FIRST METATARSOPHALANGEAL (MTP) JOINT: Status: ACTIVE | Noted: 2020-09-21

## 2020-09-21 PROBLEM — D75.1 POLYCYTHEMIA: Status: ACTIVE | Noted: 2020-06-09

## 2020-09-21 PROBLEM — E87.8 ELECTROLYTE ABNORMALITY: Status: ACTIVE | Noted: 2019-11-02

## 2020-09-21 PROBLEM — M25.569 KNEE PAIN: Status: ACTIVE | Noted: 2020-09-21

## 2020-09-21 NOTE — TELEPHONE ENCOUNTER
Called Belen. She has several questions about food safety and drinking teas after attending a seminar for transplant patients (unsure the details about this seminar). Answered patient's questions and emailed her the [employer] Food Safety booklet.

## 2020-09-23 ENCOUNTER — ANCILLARY PROCEDURE (OUTPATIENT)
Dept: ULTRASOUND IMAGING | Facility: CLINIC | Age: 60
End: 2020-09-23
Attending: INTERNAL MEDICINE
Payer: MEDICARE

## 2020-09-23 ENCOUNTER — ANESTHESIA EVENT (OUTPATIENT)
Dept: SURGERY | Facility: CLINIC | Age: 60
End: 2020-09-23
Payer: MEDICARE

## 2020-09-23 ENCOUNTER — OFFICE VISIT (OUTPATIENT)
Dept: SURGERY | Facility: CLINIC | Age: 60
End: 2020-09-23
Payer: MEDICARE

## 2020-09-23 ENCOUNTER — PRE VISIT (OUTPATIENT)
Dept: SURGERY | Facility: CLINIC | Age: 60
End: 2020-09-23

## 2020-09-23 VITALS
SYSTOLIC BLOOD PRESSURE: 104 MMHG | RESPIRATION RATE: 15 BRPM | HEIGHT: 70 IN | WEIGHT: 153 LBS | OXYGEN SATURATION: 97 % | BODY MASS INDEX: 21.9 KG/M2 | DIASTOLIC BLOOD PRESSURE: 65 MMHG | HEART RATE: 84 BPM | TEMPERATURE: 99.4 F

## 2020-09-23 DIAGNOSIS — Z01.818 PREOP EXAMINATION: Primary | ICD-10-CM

## 2020-09-23 DIAGNOSIS — Z94.0 KIDNEY TRANSPLANTED: ICD-10-CM

## 2020-09-23 DIAGNOSIS — Z48.298 AFTERCARE FOLLOWING ORGAN TRANSPLANT: ICD-10-CM

## 2020-09-23 DIAGNOSIS — D58.2 ELEVATED HEMOGLOBIN (H): ICD-10-CM

## 2020-09-23 LAB
PHOSPHATE SERPL-MCNC: 3.4 MG/DL (ref 2.5–4.5)
PTH-INTACT SERPL-MCNC: 45 PG/ML (ref 18–80)

## 2020-09-23 PROCEDURE — 87799 DETECT AGENT NOS DNA QUANT: CPT | Performed by: INTERNAL MEDICINE

## 2020-09-23 PROCEDURE — 83970 ASSAY OF PARATHORMONE: CPT | Performed by: INTERNAL MEDICINE

## 2020-09-23 ASSESSMENT — LIFESTYLE VARIABLES: TOBACCO_USE: 0

## 2020-09-23 ASSESSMENT — PAIN SCALES - GENERAL: PAINLEVEL: NO PAIN (1)

## 2020-09-23 ASSESSMENT — MIFFLIN-ST. JEOR: SCORE: 1344.25

## 2020-09-23 NOTE — PATIENT INSTRUCTIONS
Preparing for Your Surgery      Name:  Belen Sharma   MRN:  [id number]   :  1960   Today's Date:  2020       Arriving for surgery:  Surgery date:  20  Arrival time:  07:30 am    Restrictions due to COVID 19:  Patients are allowed one visitor in the pre-op period  All visitors must wear a mask  No visitors under 18  No ill visitors   parking is not available     Please come to:   [facility] [address]  [address] [place], [place] [address]. [place]  [address]    -Come in the front of [facility]. Park your car in the Green Lot.    -Proceed to the 3rd floor, check in at the Adult Surgery Waiting Lounge. [phone]    If an escort is needed stop at the Information Desk in the lobby. Inform the information person that you are here for surgery. An escort to the Adult Surgery Waiting Lounge will be provided.        What can I eat or drink?  -  You may eat and drink normally for up to 8 hours before your surgery.  -  You may have clear liquids until 2 hours before surgery.   Examples of clear liquids:  Water  Clear broth  Juices (apple, white grape, white cranberry  and cider) without pulp  Noncarbonated, powder based beverages  (lemonade and Pardeep-Aid)  Sodas (Sprite, 7-Up, ginger ale and seltzer)  Coffee or tea (without milk or cream)  Gatorade    -  No Alcohol for at least 24 hours before surgery     Which medicines can I take?    Hold Aspirin for 7 days before surgery.   Hold Multivitamins for 7 days before surgery.  Hold Supplements for 7 days before surgery.  Hold Ibuprofen (Advil, Motrin) for 1 day before surgery--unless otherwise directed by surgeon.  Hold Naproxen (Aleve) for 4 days before surgery.  -  PLEASE TAKE these medications the day of surgery:  Tylenol if needed; take all scheduled morning medications.    How do I prepare myself?  - Please take 2 showers before surgery using Scrubcare or Hibiclens soap.    Use this soap only from the neck to your toes.      Leave the soap on your skin for one minute--then rinse thoroughly.      You may use your own shampoo and conditioner; no other hair products.   - Please remove all jewelry and body piercings.  - No lotions, deodorants or fragrance.  - No makeup or fingernail polish.   - Bring your ID and insurance card.    - All patients are required to have a Covid-19 test within 4 days of surgery/procedure.      -Patients will be contacted by the [facility] scheduling team within 1 week of surgery to make an appointment.      - Patients may call the Scheduling team at [phone] if they have not been scheduled within 4 days of  surgery.      ALL PATIENTS GOING HOME THE SAME DAY OF SURGERY ARE REQUIRED TO HAVE A RESPONSIBLE ADULT TO DRIVE AND BE IN ATTENDANCE WITH THEM FOR 24 HOURS FOLLOWING SURGERY     Questions or Concerns:    - For any questions regarding the day of surgery or your hospital stay, please contact the Pre Admission Nursing Office at [phone].       - If you have health changes between today and your surgery please call your surgeon.       For questions after surgery please call your surgeons office.

## 2020-09-23 NOTE — H&P
Pre-Operative H & P     CC:  Preoperative exam to assess for increased cardiopulmonary risk while undergoing surgery and anesthesia.    Date of Encounter: 9/23/2020  Primary Care Physician:  Vincent De La Garza  Reason for visit: High grade squamous intraepithelial lesion of cervix [R87.613]    GERONIMO Sharma is a 60 year old female who presents for pre-operative H & P in preparation for CONE BIOPSY, CERVIX with Dr. Perez on 9/30/20 at [facility]. History is obtained from the patient and medical records.     Patient who has been recently evaluated by Dr. Perez after pap ASCUS +HR HPV followed by colposcopy with ECC concerning for likely HSIL per pathology report. She is immunosuppressed due to multiple transplant history (Kidney transplant x 2, 2010, 2014, liver transplant X 2010, 2011) due to congenital hepatic fibrosis and polycystic kidney disease (PCKD). She was counseled for above procedure.     She continues to be followed by the transplant teams, last by Nephrology 9/15/20, stable, and Hepatology, last 7/13/20 with good allograft function.     She was also recently evaluated by Dr. Christy Mohamud for her history of microvascular angina. She has stable angina, relieved with sl NTG 1 x month, and reports some dyspnea on exertion. She has non obstructive CAD. An echo was performed showing EF 60-65% and normal left and right ventricular function. EKG SR possible LAE. Risk factor management continues.     Today patient denies fever, cough, shortness of breath, chest pain, irregular HR, or ankle edema.     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 Explanted . Re-transplant after de-sensitization      S/P liver transplant (H)     10/9/2010 - HAT, ischemic biopathy. Re-transplant 3/3/2011     S/P splenectomy      Spider veins        Past Surgical History  Past Surgical History:   Procedure Laterality Date     bunectomy  2018    right foot     bunionectomy  2019    left     HERNIA REPAIR, INCISIONAL  2013     SPLENECTOMY       TRANSPLANT KIDNEY RECIPIENT  DONOR  2016    re-transplant after [facility]; 6 months de-sensitization prior AND 6 months after transplant     TRANSPLANT LIVER RECIPIENT  DONOR  2011     TRANSPLANT LIVER, KIDNEY RECIPIENT  DONOR, COMBINED  10/09/2010    HAT - re-Tx liver 3/3/2011; Kidney (left iliac) lost to [facility]/fibrosis - explant       Hx of Blood transfusions/reactions: Yes, no known reactions.     Hx of abnormal bleeding or anti-platelet use: ASA 81 mg daily.     Menstrual history: No LMP recorded. Patient is postmenopausal.    Steroid use in the last year: Chronic Prednisone use.     Personal or FH with difficulty with Anesthesia:  Denies.     Prior to Admission Medications  Current Outpatient Medications   Medication Sig Dispense Refill     aspirin (ASA) 81 MG chewable tablet Take 81 mg by mouth every evening        biotin 1000 MCG TABS tablet Take 3,000 mcg by mouth daily       docusate sodium (COLACE) 100 MG capsule Take 200 mg by mouth every morning        loratadine (CLARITIN) 10 MG tablet Take 10 mg by mouth every evening        Multiple Vitamins-Minerals (VITAMIN D3 COMPLETE PO) Take 2,000 Int'l Units by mouth every morning        MYFORTIC (BRAND) 360 MG EC tablet Take by mouth 2 times daily       predniSONE (DELTASONE) 5 MG tablet Take 7.5 mg by mouth every morning         Probiotic Product (PROBIOTIC PO) Take 2 tablets by mouth every morning        Psyllium (METAMUCIL PO) Take by mouth every morning        sulfamethoxazole-trimethoprim (BACTRIM) 400-80 MG tablet        tacrolimus (GENERIC EQUIVALENT) 1 MG capsule Take 1 mg by mouth 2 times daily        Vitamin D, Cholecalciferol, 25 MCG (1000 UT) CAPS Take 1,000 Units by mouth       ALPRAZolam (XANAX) 0.25 MG tablet Take 0.25 mg by mouth as needed for anxiety       diphenhydrAMINE (BENADRYL) 25 MG tablet Take 50 mg by mouth as needed       loperamide (IMODIUM A-D) 2 MG tablet Take 1 tablet by mouth as needed       nitroGLYcerin (NITROSTAT) 0.4 MG sublingual tablet Place 0.4 mg under the tongue as needed for chest pain For chest pain place 1 tablet under the tongue every 5 minutes for 3 doses. If symptoms persist 5 minutes after 1st dose call 911.       ondansetron (ZOFRAN) 4 MG tablet Take by mouth as needed for nausea       pantoprazole (PROTONIX) 40 MG EC tablet Take 1 tablet by mouth as needed       temazepam (RESTORIL) 15 MG capsule Take 15 mg by mouth as needed for sleep         Allergies  Allergies   Allergen Reactions     Ibuprofen        Social History  Social History     Socioeconomic History     Marital status:      Spouse name: Not on file     Number of children: Not on file     Years of education: Not on file     Highest education level: Not on file   Occupational History     Not on file   Social Needs     Financial resource strain: Not on file     Food insecurity     Worry: Not on file     Inability: Not on file     Transportation needs     Medical: Not on file     Non-medical: Not on file   Tobacco Use     Smoking status: Never Smoker     Smokeless tobacco: Never Used   Substance and Sexual Activity     Alcohol use: Not Currently     Drug use: Not Currently     Sexual activity: Not Currently   Lifestyle     Physical activity     Days per week: Not on file     Minutes per session: Not on file     Stress: Not on  file   Relationships     Social connections     Talks on phone: Not on file     Gets together: Not on file     Attends Yazidi service: Not on file     Active member of club or organization: Not on file     Attends meetings of clubs or organizations: Not on file     Relationship status: Not on file     Intimate partner violence     Fear of current or ex partner: Not on file     Emotionally abused: Not on file     Physically abused: Not on file     Forced sexual activity: Not on file   Other Topics Concern     Parent/sibling w/ CABG, MI or angioplasty before 65F 55M? Not Asked   Social History Narrative     Not on file       Family History  Family History   Problem Relation Age of Onset     Uterine Cancer Maternal Grandmother      Polycystic Kidney Diease Brother      Polycystic Kidney Diease Brother      Polycystic Kidney Diease Brother      Polycystic Kidney Diease Brother      Cervical Cancer Maternal Aunt        Personally reviewed      LABS:  CBC:   Lab Results   Component Value Date    WBC 6.9 09/10/2020    HGB 17.8 (H) 09/10/2020    HCT 55.0 (H) 09/10/2020     09/10/2020     BMP:   Lab Results   Component Value Date     09/10/2020    POTASSIUM 3.5 09/10/2020    POTASSIUM 4.0 06/09/2020    CHLORIDE 104 09/10/2020    CO2 26 09/10/2020    BUN 13 09/10/2020    CR 0.84 09/10/2020    CR 0.89 06/09/2020    GLC 83 09/10/2020    GLC 83 06/09/2020     COAGS: No results found for: PTT, INR, FIBR  POC: No results found for: BGM, HCG, HCGS  OTHER:   Lab Results   Component Value Date    EDSON 9.7 09/10/2020    ALBUMIN 3.5 09/10/2020    PROTTOTAL 7.2 09/10/2020    ALT 21 09/10/2020    AST 13 09/10/2020    ALKPHOS 66 09/10/2020    BILITOTAL 0.6 09/10/2020        Preop Vitals    BP Readings from Last 3 Encounters:   08/05/20 135/76    Pulse Readings from Last 3 Encounters:   08/05/20 97      Resp Readings from Last 3 Encounters:   No data found for Resp    SpO2 Readings from Last 3 Encounters:   No data found for  "SpO2      Temp Readings from Last 1 Encounters:   No data found for Temp    Ht Readings from Last 1 Encounters:   08/05/20 1.778 m (5' 10\")      Wt Readings from Last 1 Encounters:   09/15/20 69.4 kg (153 lb)    Estimated body mass index is 21.95 kg/m  as calculated from the following:    Height as of 8/5/20: 1.778 m (5' 10\").    Weight as of 9/15/20: 69.4 kg (153 lb).     ROS/MED HX  The complete review of systems is negative other than noted in the HPI or here.     ENT/Pulmonary:      (-) tobacco use   Neurologic:  - neg neurologic ROS     Cardiovascular:     (+) ----. Taking blood thinners Pt has received instructions: . . . :. . Previous cardiac testing date:results:date: results:ECG reviewed date:8/31/20 results:SR, poss LAE date: results:          METS/Exercise Tolerance:  4 - Raking leaves, gardening   Hematologic:     (+) History of Transfusion no previous transfusion reaction -      Musculoskeletal:  - neg musculoskeletal ROS       GI/Hepatic: Comment: History liver transplant for congenital hepatic fibrosis    (+) GERD Asymptomatic on medication, liver disease,       Renal/Genitourinary:     (+) chronic renal disease, type: CRI, Pt does not require dialysis, Pt has history of transplant, date: multiple, Other Renal/ Genitourinary, PCKD, HPV      Endo:     (+) thyroid problem hypothyroidism, Chronic steroid usage for Post Transplant Immunosuppression .      Psychiatric:     (+) psychiatric history anxiety      Infectious Disease:  - neg infectious disease ROS       Malignancy:   (+)   High grade squamous intraepithelial lesion of cervix         Other:    (+) C-spine cleared: N/A, no H/O Chronic Pain,no other significant disability        Temp: 99.4  F (37.4  C) Temp src: Oral BP: 104/65 Pulse: 84   Resp: 15 SpO2: 97 %         153 lbs 0 oz  5' 10\"   Body mass index is 21.95 kg/m .       Physical Exam  Constitutional: Awake, alert, cooperative, no apparent distress, and appears stated age. Thin.  Eyes: Pupils " equal, round and reactive to light, extra ocular muscles intact, sclera clear, conjunctiva normal.  HENT: Normocephalic, declined removing her mask for oral/airway exam. No goiter appreciated.   Respiratory: Clear to auscultation bilaterally, no crackles or wheezing. No cough or obvious dyspnea.  Cardiovascular: Regular rate and rhythm, normal S1 and S2, and no murmur noted.  Carotids +2, no bruits. No edema. Palpable pulses to radial  DP and PT arteries.   GI: Normal bowel sounds. Patient wears a support belt/binder due to scoliosis and her multiple abdominal incisions.   Lymph/Hematologic: No cervical lymphadenopathy and no supraclavicular lymphadenopathy.  Genitourinary: Deferred.  Skin: Warm and dry.  Left upper arm AVF, thrill present.  Musculoskeletal: Full ROM of neck. There is no redness, warmth, or swelling of the joints. Gross motor strength is normal. Scoliosis.  Neurologic: Awake, alert, oriented to name, place and time. Cranial nerves II-XII are grossly intact. Gait is normal.   Neuropsychiatric: Mildly anxious, cooperative. Normal affect.     EKG: Personally reviewed 8/31/20 Sinus rhythm, possible left atrial enlargement  Cardiac echo: 8/31/20   Interpretation Summary  Left ventricular function, chamber size, wall motion, and wall thickness are normal.The EF is 60-65%.  Right ventricular function, chamber size, wall motion, and thickness are normal.  IVC diameter <2.1 cm collapsing >50% with sniff suggests a normal RA pressure of 3 mmHg.  No pericardial effusion is present.  Stress test: 2007 DSE    Interpretation Summary    1. Dobutamine stress echocardiogram negative for inducible    ischemia. 2. Normal left ventricular size and function.  Left    ventricular ejection fraction 62%.    [facility]  CT coronary angiogram 1-3-2019  CALCIUM SCORE : 19. This placed the patient in the 70th percentile rank, meaning that approximately 30% of females at ages from 56-60 will have a higher calcium score  that the patient.  CORONARY CT ANGIOGRAPHY: The examination is diagnostic in quality. There is right heart dominance.  LEFT MAIN CORONARY ARTERY: Widely patent  LEFT ANTERIOR DESCENDING; Mild focal calcified plaque in the proxima-mid LAD without significant stenosis. The remainder of the LAD is widely patent.  FIRST DIAGONAL: Widely patent  SECOND DIAGONAL: Widely patent  PROXIMAL LEFT CIRCUMFLEX: Widely patent  MID CIRCUMFLEX: Widely patent  DISTAL CIRCUMFLEX: Widely patent    Cardiac testing reviewed by this provider    Outside records reviewed from: Care Everywhere    ASSESSMENT and PLAN  Belen Sharma is a 60 year old female scheduled to undergo  CONE BIOPSY, CERVIX with Dr. Perez on 9/30/20. She has the following specific operative considerations:   - RCRI : No serious cardiac risks.   - Anesthesia considerations:  Refer to PAC assessment in anesthesia records  - VTE risk: 0.5-3%  - KARINA # of risks 1/8 = Low risk  - Risk of PONV score = 3.  If > 2, anti-emetic intervention recommended. If 3 or > anti emetic intervention recommended with two or more meds    --High grade squamous intraepithelial lesion of cervix with above procedure planned.     --History of complex transplant history as above due to congenital hepatic fibrosis and polycystic kidney disease (PCKD) with immunosuppression. Will take Myfortic, Prednisone, tacrolimus and Bactrim on DOS. Transplants stable at this time. Cr 0.84.    --Chronic steroid use. Final decisions regarding stress dose steroids by Anesthesia on DOS.    --Followed by Cardiology for microvascular angina with some use of NTG. Nonobstructive CAD on angiogram. EF 60-65% on echocardiogram. No other cardiac history or symptoms. All work up above. ASA 81 mg daily with planned 7 day hold for surgery.     --Nonsmoker. Denies pulmonary symptoms.     --GERD. Protonix prn with rare use.    --Mildly anxious. PRN Xanax with rare use.     --Left arm cannot be used for BPs, or IVs due to  AVF.    Arrival time, NPO, shower and medication instructions provided by nursing staff today. Preparing For Your Surgery handout given.        Patient was discussed with Dr Carrasco.    ANA Naylor CNS  Preoperative Assessment Center  [facility]  Phone: [phone]  Fax: [phone]

## 2020-09-23 NOTE — ANESTHESIA PREPROCEDURE EVALUATION
"Anesthesia Pre-Procedure Evaluation    Patient: Belen Sharma   MRN:     [id number] Gender:   female   Age:    60 year old :      1960        Preoperative Diagnosis: High grade squamous intraepithelial lesion of cervix [R87.613]   Procedure(s):  CONE BIOPSY, CERVIX     LABS:  CBC:   Lab Results   Component Value Date    WBC 6.9 09/10/2020    HGB 17.8 (H) 09/10/2020    HCT 55.0 (H) 09/10/2020     09/10/2020     BMP:   Lab Results   Component Value Date     09/10/2020    POTASSIUM 3.5 09/10/2020    POTASSIUM 4.0 2020    CHLORIDE 104 09/10/2020    CO2 26 09/10/2020    BUN 13 09/10/2020    CR 0.84 09/10/2020    CR 0.89 2020    GLC 83 09/10/2020    GLC 83 2020     COAGS: No results found for: PTT, INR, FIBR  POC: No results found for: BGM, HCG, HCGS  OTHER:   Lab Results   Component Value Date    EDSON 9.7 09/10/2020    ALBUMIN 3.5 09/10/2020    PROTTOTAL 7.2 09/10/2020    ALT 21 09/10/2020    AST 13 09/10/2020    ALKPHOS 66 09/10/2020    BILITOTAL 0.6 09/10/2020        Preop Vitals    BP Readings from Last 3 Encounters:   20 135/76    Pulse Readings from Last 3 Encounters:   20 97      Resp Readings from Last 3 Encounters:   No data found for Resp    SpO2 Readings from Last 3 Encounters:   No data found for SpO2      Temp Readings from Last 1 Encounters:   No data found for Temp    Ht Readings from Last 1 Encounters:   20 1.778 m (5' 10\")      Wt Readings from Last 1 Encounters:   09/15/20 69.4 kg (153 lb)    Estimated body mass index is 21.95 kg/m  as calculated from the following:    Height as of 20: 1.778 m (5' 10\").    Weight as of 9/15/20: 69.4 kg (153 lb).     LDA:        Past Medical History:   Diagnosis Date     Adolescent scoliosis      BK viremia [id number]     CMV pneumonia (H)      Congenital hepatic fibrosis      Dialysis patient (H)     port left side do not do blood pressure left side     Endometriosis      Hernia, abdominal 2020    incisional  "     High grade squamous intraepithelial lesion of cervix 2020     History of hypothyroidism      HPV (human papilloma virus) infection      Immunosuppression (H)      Polycystic kidney disease      S/P kidney transplant     SLK 10/9/2010 - kidney failure 2/2 [facility]. Explanted . Re-transplant after de-sensitization 2016     S/P liver transplant (H)     10/9/2010 - HAT, ischemic biopathy. Re-transplant 3/3/2011     S/P splenectomy      Spider veins       Past Surgical History:   Procedure Laterality Date     bunectomy  2018    right foot     bunionectomy  2019    left     HERNIA REPAIR, INCISIONAL  2013     SPLENECTOMY       TRANSPLANT KIDNEY RECIPIENT  DONOR  2016    re-transplant after [facility]; 6 months de-sensitization prior AND 6 months after transplant     TRANSPLANT LIVER RECIPIENT  DONOR  2011     TRANSPLANT LIVER, KIDNEY RECIPIENT  DONOR, COMBINED  10/09/2010    HAT - re-Tx liver 3/3/2011; Kidney (left iliac) lost to [facility]/fibrosis - explant      Allergies   Allergen Reactions     Ibuprofen         Anesthesia Evaluation     . Pt has had prior anesthetic. Type: General and MAC    No history of anesthetic complications          ROS/MED HX    ENT/Pulmonary:      (-) tobacco use   Neurologic:  - neg neurologic ROS     Cardiovascular: Comment: Nonobstructive CAD. Microvascular angina with occ use of NTG, recently evaluated by Dr. Mohamud.     (+) --CAD, angina-at rest, -. Taking blood thinners Pt has received instructions: Instructions Given to patient: Planned 7 day hold for surgery. . . :. . Previous cardiac testing Echodate:20results:Stress Testdate: results:ECG reviewed date:20 results:SR, poss LAECath date:  results:          METS/Exercise Tolerance:  >4 METS   Hematologic:     (+) History of Transfusion no previous transfusion reaction -      Musculoskeletal:  - neg musculoskeletal ROS       GI/Hepatic: Comment: History liver transplant for  congenital hepatic fibrosis    (+) GERD Asymptomatic on medication, liver disease,       Renal/Genitourinary:     (+) chronic renal disease, type: CRI, Pt does not require dialysis, Pt has history of transplant, date: multiple, Other Renal/ Genitourinary, PCKD, HPV      Endo:     (+) thyroid problem hypothyroidism, Chronic steroid usage for Post Transplant Immunosuppression Date most recently used: 9/23/20,.      Psychiatric:     (+) psychiatric history anxiety      Infectious Disease:  - neg infectious disease ROS       Malignancy:   (+)   High grade squamous intraepithelial lesion of cervix         Other:    (+) C-spine cleared: N/A, no H/O Chronic Pain,no other significant disability                        PHYSICAL EXAM:   Mental Status/Neuro: A/A/O; Age Appropriate   Airway: Facies: Feasible  Mallampati: II  Mouth/Opening: Full  TM distance: > 6 cm  Neck ROM: Full   Respiratory: Auscultation: CTAB     Resp. Rate: Normal     Resp. Effort: Normal      CV: Rhythm: Regular  Rate: Age appropriate  Heart: Normal Sounds  Edema: None   Comments: Patient declined taking off mask for oral airway exam.     Dental: Normal Dentition                Assessment:   ASA SCORE: 3            Plan:   Anes. Type:  MAC   Pre-Medication: None   Induction:  IV (Standard)   Airway: ETT; Oral   Access/Monitoring: PIV   Maintenance: Balanced     Postop Plan:   Postop Pain: Opioids  Postop Sedation/Airway: Not planned     PONV Management:   Adult Risk Factors: Female, H/o PONV or Motion Sickness, Postop Opioids     CONSENT: Direct conversation   Plan and risks discussed with: Patient                   PAC Discussion and Assessment    ASA Classification: 3  Case is suitable for: [place]  Anesthetic techniques and relevant risks discussed: GA and MAC with GA as backup  Invasive monitoring and risk discussed: No  Types:   Possibility and Risk of blood transfusion discussed: No  NPO instructions given:   Additional anesthetic preparation and  risks discussed:   Needs early admission to pre-op area:   Other:     PAC Resident/NP Anesthesia Assessment:  Belen Sharma is a 60 year old female scheduled to undergo  CONE BIOPSY, CERVIX with Dr. Perez on 9/30/20. She has the following specific operative considerations:   - RCRI : No serious cardiac risks.   - VTE risk: 0.5-3%  - KARINA # of risks 1/8 = Low risk  - Risk of PONV score = 3.  If > 2, anti-emetic intervention recommended. If 3 or > anti emetic intervention recommended with two or more meds    --High grade squamous intraepithelial lesion of cervix with above procedure planned.     --History of complex transplant history as above due to congenital hepatic fibrosis and polycystic kidney disease (PCKD) with immunosuppression. Will take Myfortic, Prednisone, tacrolimus and Bactrim on DOS. Transplants stable at this time. Cr 0.84.    --Chronic steroid use. Final decisions regarding stress dose steroids by Anesthesia on DOS.    --Followed by Cardiology for microvascular angina with some use of NTG. Nonobstructive CAD on angiogram. EF 60-65% on echocardiogram. No other cardiac history or symptoms. All work up above. ASA 81 mg daily with planned 7 day hold for surgery.     --Nonsmoker. Denies pulmonary symptoms.     --GERD. Protonix prn with rare use.    --Mildly anxious. PRN Xanax with rare use.     --Left arm cannot be used for BPs, or IVs due to AVF.          Patient was discussed with Dr Carrasco.      Reviewed and Signed by PAC Mid-Level Provider/Resident  Mid-Level Provider/Resident: ANA Hernandez, [email or web address]  Date: 9/23/20  Time: 3:34pm    Attending Anesthesiologist Anesthesia Assessment:  I have reviewed the medical record and discussed the patient with the ALLY.  The patient is seen in the PAC for evaluation prior to cone biopsy  The patient has a history of 2 liver transplants for hepatic fibrosis.  She is followed closely by liver transplant team and is currently stable.  Patient has a history of renal  transplant.  Again she is followed closely by the transplant team and is currently stable.  Patient has a history of chest pain attributed to microvascular cardiovascular disease.  A CT angiogram from 2019 revealed no obstructive coronary artery disease.  She is followed closely by cardiology here and managed with optimal medical management.  Her symptoms are very stable she has not used nitroglycerin in over 2 months.  No further testing is necessary for this low risk procedure at this time.  Final plan per attending anesthesiologist the day of surgery.      Reviewed and Signed by PAC Anesthesiologist  Anesthesiologist: Santhosh Carrasco MD  Date: 9/23/2020  Time:   Pass/Fail:   Disposition:     PAC Pharmacist Assessment:        Pharmacist:   Date:   Time:    ANA Naylor CNS

## 2020-09-24 ENCOUNTER — TELEPHONE (OUTPATIENT)
Dept: OBGYN | Facility: CLINIC | Age: 60
End: 2020-09-24

## 2020-09-24 LAB
CMV DNA SPEC NAA+PROBE-ACNC: NORMAL [IU]/ML
CMV DNA SPEC NAA+PROBE-LOG#: NORMAL {LOG_IU}/ML
SPECIMEN SOURCE: NORMAL

## 2020-09-24 NOTE — TELEPHONE ENCOUNTER
Pt informed of new arrival time for 9/30/20, pt should arrive at 6:00a.m for 8:00a.m surgery, nothing to eat eight hours before scheduled surgery time.

## 2020-09-26 DIAGNOSIS — Z11.59 ENCOUNTER FOR SCREENING FOR OTHER VIRAL DISEASES: ICD-10-CM

## 2020-09-26 PROCEDURE — U0003 INFECTIOUS AGENT DETECTION BY NUCLEIC ACID (DNA OR RNA); SEVERE ACUTE RESPIRATORY SYNDROME CORONAVIRUS 2 (SARS-COV-2) (CORONAVIRUS DISEASE [COVID-19]), AMPLIFIED PROBE TECHNIQUE, MAKING USE OF HIGH THROUGHPUT TECHNOLOGIES AS DESCRIBED BY CMS-2020-01-R: HCPCS | Performed by: OBSTETRICS & GYNECOLOGY

## 2020-09-27 LAB
SARS-COV-2 RNA SPEC QL NAA+PROBE: NOT DETECTED
SPECIMEN SOURCE: NORMAL

## 2020-09-28 ENCOUNTER — TELEPHONE (OUTPATIENT)
Dept: OBGYN | Facility: CLINIC | Age: 60
End: 2020-09-28

## 2020-09-28 LAB
BKV DNA # SPEC NAA+PROBE: NORMAL COPIES/ML
BKV DNA SPEC NAA+PROBE-LOG#: NORMAL LOG COPIES/ML
SPECIMEN SOURCE: NORMAL

## 2020-09-28 NOTE — TELEPHONE ENCOUNTER
Pt calling as she has a cone biopsy (HSIL) scheduled for 09/30/2020 and concern with rectocele and cystocele  that this will be a problem with her surgery.      SHe is concerned about COVID with her transplant history     She is worried since she has never had an In clinic appt her appointment has been by telephone for consult    Sending now to MD to review and asks for a call back for reassurance.  Thank you!

## 2020-09-30 ENCOUNTER — HOSPITAL ENCOUNTER (OUTPATIENT)
Facility: CLINIC | Age: 60
Discharge: HOME | End: 2020-09-30
Attending: OBSTETRICS & GYNECOLOGY | Admitting: OBSTETRICS & GYNECOLOGY
Payer: MEDICARE

## 2020-09-30 ENCOUNTER — ANESTHESIA (OUTPATIENT)
Dept: SURGERY | Facility: CLINIC | Age: 60
End: 2020-09-30
Payer: MEDICARE

## 2020-09-30 VITALS
RESPIRATION RATE: 18 BRPM | WEIGHT: 152.12 LBS | HEIGHT: 70 IN | OXYGEN SATURATION: 96 % | DIASTOLIC BLOOD PRESSURE: 62 MMHG | HEART RATE: 73 BPM | BODY MASS INDEX: 21.78 KG/M2 | TEMPERATURE: 98.7 F | SYSTOLIC BLOOD PRESSURE: 105 MMHG

## 2020-09-30 DIAGNOSIS — R87.613 HIGH GRADE SQUAMOUS INTRAEPITHELIAL LESION OF CERVIX: ICD-10-CM

## 2020-09-30 DIAGNOSIS — Z98.890 HISTORY OF CONIZATION OF CERVIX: Primary | ICD-10-CM

## 2020-09-30 LAB
ANION GAP SERPL CALCULATED.3IONS-SCNC: 6 MMOL/L (ref 3–14)
BUN SERPL-MCNC: 14 MG/DL (ref 7–30)
CALCIUM SERPL-MCNC: 9.3 MG/DL (ref 8.5–10.1)
CHLORIDE SERPL-SCNC: 107 MMOL/L (ref 94–109)
CO2 SERPL-SCNC: 22 MMOL/L (ref 20–32)
CREAT SERPL-MCNC: 0.6 MG/DL (ref 0.52–1.04)
GFR SERPL CREATININE-BSD FRML MDRD: >90 ML/MIN/{1.73_M2}
GLUCOSE BLDC GLUCOMTR-MCNC: 102 MG/DL (ref 70–99)
GLUCOSE SERPL-MCNC: 95 MG/DL (ref 70–99)
HGB BLD-MCNC: 17.9 G/DL (ref 11.7–15.7)
POTASSIUM SERPL-SCNC: 3.9 MMOL/L (ref 3.4–5.3)
SODIUM SERPL-SCNC: 135 MMOL/L (ref 133–144)

## 2020-09-30 PROCEDURE — 37000009 ZZH ANESTHESIA TECHNICAL FEE, EACH ADDTL 15 MIN: Performed by: OBSTETRICS & GYNECOLOGY

## 2020-09-30 PROCEDURE — 80048 BASIC METABOLIC PNL TOTAL CA: CPT | Performed by: OBSTETRICS & GYNECOLOGY

## 2020-09-30 PROCEDURE — 88341 IMHCHEM/IMCYTCHM EA ADD ANTB: CPT | Performed by: OBSTETRICS & GYNECOLOGY

## 2020-09-30 PROCEDURE — 36415 COLL VENOUS BLD VENIPUNCTURE: CPT | Performed by: OBSTETRICS & GYNECOLOGY

## 2020-09-30 PROCEDURE — 37000008 ZZH ANESTHESIA TECHNICAL FEE, 1ST 30 MIN: Performed by: OBSTETRICS & GYNECOLOGY

## 2020-09-30 PROCEDURE — 88305 TISSUE EXAM BY PATHOLOGIST: CPT | Performed by: OBSTETRICS & GYNECOLOGY

## 2020-09-30 PROCEDURE — 36000053 ZZH SURGERY LEVEL 2 EA 15 ADDTL MIN - UMMC: Performed by: OBSTETRICS & GYNECOLOGY

## 2020-09-30 PROCEDURE — 88307 TISSUE EXAM BY PATHOLOGIST: CPT | Performed by: OBSTETRICS & GYNECOLOGY

## 2020-09-30 PROCEDURE — 25000125 ZZHC RX 250: Performed by: OBSTETRICS & GYNECOLOGY

## 2020-09-30 PROCEDURE — 40000171 ZZH STATISTIC PRE-PROCEDURE ASSESSMENT III: Performed by: OBSTETRICS & GYNECOLOGY

## 2020-09-30 PROCEDURE — 25800030 ZZH RX IP 258 OP 636: Performed by: REGISTERED NURSE

## 2020-09-30 PROCEDURE — 25000128 H RX IP 250 OP 636: Performed by: REGISTERED NURSE

## 2020-09-30 PROCEDURE — 36000051 ZZH SURGERY LEVEL 2 1ST 30 MIN - UMMC: Performed by: OBSTETRICS & GYNECOLOGY

## 2020-09-30 PROCEDURE — 71000027 ZZH RECOVERY PHASE 2 EACH 15 MINS: Performed by: OBSTETRICS & GYNECOLOGY

## 2020-09-30 PROCEDURE — 27210794 ZZH OR GENERAL SUPPLY STERILE: Performed by: OBSTETRICS & GYNECOLOGY

## 2020-09-30 PROCEDURE — 82962 GLUCOSE BLOOD TEST: CPT

## 2020-09-30 PROCEDURE — 85018 HEMOGLOBIN: CPT | Performed by: OBSTETRICS & GYNECOLOGY

## 2020-09-30 PROCEDURE — 88342 IMHCHEM/IMCYTCHM 1ST ANTB: CPT | Performed by: OBSTETRICS & GYNECOLOGY

## 2020-09-30 RX ORDER — ONDANSETRON 4 MG/1
4 TABLET, ORALLY DISINTEGRATING ORAL EVERY 30 MIN PRN
Status: DISCONTINUED | OUTPATIENT
Start: 2020-09-30 | End: 2020-09-30 | Stop reason: HOSPADM

## 2020-09-30 RX ORDER — ACETAMINOPHEN 325 MG/1
650 TABLET ORAL EVERY 4 HOURS PRN
Qty: 50 TABLET | Refills: 0 | Status: ON HOLD | OUTPATIENT
Start: 2020-09-30 | End: 2020-12-19

## 2020-09-30 RX ORDER — LIDOCAINE HYDROCHLORIDE AND EPINEPHRINE 10; 10 MG/ML; UG/ML
INJECTION, SOLUTION INFILTRATION; PERINEURAL PRN
Status: DISCONTINUED | OUTPATIENT
Start: 2020-09-30 | End: 2020-09-30 | Stop reason: HOSPADM

## 2020-09-30 RX ORDER — FERRIC SUBSULFATE 0.21 G/G
LIQUID TOPICAL PRN
Status: DISCONTINUED | OUTPATIENT
Start: 2020-09-30 | End: 2020-09-30 | Stop reason: HOSPADM

## 2020-09-30 RX ORDER — PROPOFOL 10 MG/ML
INJECTION, EMULSION INTRAVENOUS CONTINUOUS PRN
Status: DISCONTINUED | OUTPATIENT
Start: 2020-09-30 | End: 2020-09-30

## 2020-09-30 RX ORDER — METOPROLOL TARTRATE 1 MG/ML
1-2 INJECTION, SOLUTION INTRAVENOUS EVERY 5 MIN PRN
Status: DISCONTINUED | OUTPATIENT
Start: 2020-09-30 | End: 2020-09-30 | Stop reason: HOSPADM

## 2020-09-30 RX ORDER — SODIUM CHLORIDE, SODIUM LACTATE, POTASSIUM CHLORIDE, CALCIUM CHLORIDE 600; 310; 30; 20 MG/100ML; MG/100ML; MG/100ML; MG/100ML
INJECTION, SOLUTION INTRAVENOUS CONTINUOUS
Status: DISCONTINUED | OUTPATIENT
Start: 2020-09-30 | End: 2020-09-30 | Stop reason: HOSPADM

## 2020-09-30 RX ORDER — SODIUM CHLORIDE, SODIUM LACTATE, POTASSIUM CHLORIDE, CALCIUM CHLORIDE 600; 310; 30; 20 MG/100ML; MG/100ML; MG/100ML; MG/100ML
INJECTION, SOLUTION INTRAVENOUS CONTINUOUS PRN
Status: DISCONTINUED | OUTPATIENT
Start: 2020-09-30 | End: 2020-09-30

## 2020-09-30 RX ORDER — FENTANYL CITRATE 50 UG/ML
25-50 INJECTION, SOLUTION INTRAMUSCULAR; INTRAVENOUS
Status: DISCONTINUED | OUTPATIENT
Start: 2020-09-30 | End: 2020-09-30 | Stop reason: HOSPADM

## 2020-09-30 RX ORDER — FENTANYL CITRATE 50 UG/ML
INJECTION, SOLUTION INTRAMUSCULAR; INTRAVENOUS PRN
Status: DISCONTINUED | OUTPATIENT
Start: 2020-09-30 | End: 2020-09-30

## 2020-09-30 RX ORDER — PROPOFOL 10 MG/ML
INJECTION, EMULSION INTRAVENOUS PRN
Status: DISCONTINUED | OUTPATIENT
Start: 2020-09-30 | End: 2020-09-30

## 2020-09-30 RX ORDER — OXYCODONE HYDROCHLORIDE 5 MG/1
5 TABLET ORAL EVERY 6 HOURS PRN
Qty: 6 TABLET | Refills: 0 | Status: SHIPPED | OUTPATIENT
Start: 2020-09-30 | End: 2020-10-03

## 2020-09-30 RX ORDER — NALOXONE HYDROCHLORIDE 0.4 MG/ML
.1-.4 INJECTION, SOLUTION INTRAMUSCULAR; INTRAVENOUS; SUBCUTANEOUS
Status: DISCONTINUED | OUTPATIENT
Start: 2020-09-30 | End: 2020-09-30 | Stop reason: HOSPADM

## 2020-09-30 RX ORDER — ONDANSETRON 2 MG/ML
INJECTION INTRAMUSCULAR; INTRAVENOUS PRN
Status: DISCONTINUED | OUTPATIENT
Start: 2020-09-30 | End: 2020-09-30

## 2020-09-30 RX ORDER — ONDANSETRON 2 MG/ML
4 INJECTION INTRAMUSCULAR; INTRAVENOUS EVERY 30 MIN PRN
Status: DISCONTINUED | OUTPATIENT
Start: 2020-09-30 | End: 2020-09-30 | Stop reason: HOSPADM

## 2020-09-30 RX ORDER — IODINE AND POTASSIUM IODIDE 50; 100 MG/ML; MG/ML
LIQUID ORAL PRN
Status: DISCONTINUED | OUTPATIENT
Start: 2020-09-30 | End: 2020-09-30 | Stop reason: HOSPADM

## 2020-09-30 RX ADMIN — PROPOFOL 100 MCG/KG/MIN: 10 INJECTION, EMULSION INTRAVENOUS at 08:03

## 2020-09-30 RX ADMIN — PROPOFOL 20 MG: 10 INJECTION, EMULSION INTRAVENOUS at 08:16

## 2020-09-30 RX ADMIN — PROPOFOL 50 MG: 10 INJECTION, EMULSION INTRAVENOUS at 08:03

## 2020-09-30 RX ADMIN — ONDANSETRON 4 MG: 2 INJECTION INTRAMUSCULAR; INTRAVENOUS at 08:48

## 2020-09-30 RX ADMIN — HYDROMORPHONE HYDROCHLORIDE 0.25 MG: 1 INJECTION, SOLUTION INTRAMUSCULAR; INTRAVENOUS; SUBCUTANEOUS at 08:50

## 2020-09-30 RX ADMIN — FENTANYL CITRATE 25 MCG: 50 INJECTION, SOLUTION INTRAMUSCULAR; INTRAVENOUS at 08:11

## 2020-09-30 RX ADMIN — HYDROMORPHONE HYDROCHLORIDE 0.25 MG: 1 INJECTION, SOLUTION INTRAMUSCULAR; INTRAVENOUS; SUBCUTANEOUS at 08:36

## 2020-09-30 RX ADMIN — FENTANYL CITRATE 50 MCG: 50 INJECTION, SOLUTION INTRAMUSCULAR; INTRAVENOUS at 08:03

## 2020-09-30 RX ADMIN — MIDAZOLAM 2 MG: 1 INJECTION INTRAMUSCULAR; INTRAVENOUS at 07:57

## 2020-09-30 RX ADMIN — SODIUM CHLORIDE, POTASSIUM CHLORIDE, SODIUM LACTATE AND CALCIUM CHLORIDE: 600; 310; 30; 20 INJECTION, SOLUTION INTRAVENOUS at 07:57

## 2020-09-30 RX ADMIN — FENTANYL CITRATE 25 MCG: 50 INJECTION, SOLUTION INTRAMUSCULAR; INTRAVENOUS at 08:07

## 2020-09-30 ASSESSMENT — MIFFLIN-ST. JEOR: SCORE: 1340.25

## 2020-09-30 NOTE — BRIEF OP NOTE
Gynecology Brief Op Note    Belen Sharma  [id number]    Date of Surgery: 9/30/2020    Surgeon: Daysi Perez MD     Assistants: Genie Pineda MD PGY-1    Pre-operative Diagnoses: Cervical dysplasia (Pap w/ ASCUS, + HR HPV, ECC w/ HSIL), patient with immunocompromised status.     Post-operative Diagnoses: Same as above    Procedure: Cold-Knife Conization     Anesthesia: MAC with intracervical block    EBL: 15cc  IVF: 400cc  UOP: void prior to procdure    Complications: None apparent   Findings:   EUA revealed normal appearing external genitalia, rectocele noted with placement of weighted speculum. Visual examination of the cervix after placement of Lugol without lesions. Transformation zone clearly demarcated with use of aforementioned dye. 3 o'clock and 9 o'clock positions marked with see-suture using 0-vicryl. Examination of excised specimen revealed a conical shaped segment of the cervix - 12 o'clock position marked with suture. Conization site itself hemostatic after use of rollerball cautery, packing agent and topical Monsel's. Tenaculum and all other surgical sites hemostatic at end of case.     Specimens: Cervical cone - 12 o'clock marked with suture tie     Genie Pineda MD   Ob/Gyn PGY-1  09/30/20 7:36 AM      I was present and scrubbed for entire procedure.   Daysi Perez MD

## 2020-09-30 NOTE — ANESTHESIA CARE TRANSFER NOTE
Patient: Belen I [place]    Procedure(s):  CONE BIOPSY, CERVIX    Diagnosis: High grade squamous intraepithelial lesion of cervix [R87.613]  Diagnosis Additional Information: No value filed.    Anesthesia Type:   MAC     Note:  Airway :Room Air  Patient transferred to:Phase II  Handoff Report: Identifed the Patient, Identified the Reponsible Provider, Reviewed the pertinent medical history, Discussed the surgical course, Reviewed Intra-OP anesthesia mangement and issues during anesthesia, Set expectations for post-procedure period and Allowed opportunity for questions and acknowledgement of understanding      Vitals: (Last set prior to Anesthesia Care Transfer)    CRNA VITALS  9/30/2020 0822 - 9/30/2020 0901      9/30/2020             Pulse:  80    SpO2:  97 %                Electronically Signed By: ANA Chavez CRNA  September 30, 2020  9:01 AM

## 2020-09-30 NOTE — OP NOTE
[facility]  Operative Note    Date of service: 2020    Preoperative diagnosis:  Cervical dysplasia (Pap w/ ASCUS, + HR HPV, ECC w/ HSIL), patient with immunocompromised status.      Postoperative diagnosis:  Same    Procedure:  Cold Knife Conization    Surgeon:  Daysi Perez MD    Assistant:  Genie Pineda MD PGY-1    Anesthesia:  MAC with intracervical block  EBL:  15mL  IVF:  400mL  UOP:  Voided prior to procedure    Specimens: Cervical cone - 12 o'clock marked with suture tie     Complications: None apparent     Findings:    EUA revealed normal appearing external genitalia, rectocele noted with placement of weighted speculum. Visual examination of the cervix after placement of Lugol without lesions. Transformation zone clearly demarcated with use of aforementioned dye. 3 o'clock and 9 o'clock positions marked with see-suture using 0-vicryl. Examination of excised specimen revealed a conical shaped segment of the cervix - 12 o'clock position marked with suture. Conization site itself hemostatic after use of rollerball cautery, packing agent and topical Monsel's. Tenaculum and all other surgical sites hemostatic at end of case.     Indications:  Belen Sharma is a 60 year old  female here for surgical biopsy in the setting of known cervical dysplasia (Pap w/ ASCUS, + HR HPV, ECC w/ HSIL). 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 Risks, benefits, and alternatives to the procedure were discussed. Complications involving the uterine cavity (cervical lacerations, uterine perforation), damage to adjacent organs (bowel, bladder, vessels, nerves), excess bleeding and infection were discussed - as was the rarity with which these outcomes occur. The patient opted to proceed with the procedure as planned. Surgical procedure and blood transfusion consent forms were signed.     Procedure:   The patient was taken to the operating room where  she underwent MAC anesthesia without difficulty.  She was placed in a dorsal lithotomy position using stirrups. After initiation of anesthetic, the patient was positioned and draped in the usual sterile fashion. A weighted speculum was inserted into the vagina and a retractor placed anteriorly to move away the bladder. Intracervical block with a total of 10cc 1% lidocaine with epinephrine was injected into the cervix at each quadrant. Lugol's solution was applied to the cervix, which revealed no non-staining areas. The transformation zone was also clearly demarcated. At this time, two lateral stay sutures of 0-vicryl were placed at 3 and 9 o'clock.  The anterior lip of the cervix was then grasped with a single tooth tenaculum well outside the transformation zone, and moved in accordance to location of excision in a clockwise fashion. A long-handled scalpel with a #11 blade was used to circumferentially incise the cervix just lateral to the outer limit of the transformation zone. An Allis clamp was then used to gently grasp and manipulate the partially released specimen. The specimen was removed by using a scalpel at the remaining base. The remaining cervical defect was approximately 2cm wide and 1.5cm deep. A roller ball electrocautery was then used to cauterize the cervical bed. The residual endometrial canal was subsequently curetted. Further hemostasis was assured by applying pressure, with an additional layer of Monsel's topically. Oxidized cellulose packing was then carefully packed into the cone bed and secured by tying the two lateral cervical sutures across the midline over the surgical pack. All instruments were then removed.  The patient was repositioned to the supine position.  She tolerated the procedure well and was taken to the recovery room in stable condition.  Dr. Perez was scrubbed and present for the entire procedure.    Genie Pineda MD  OB/GYN PGY-1  09/30/20 7:48 AM    I was present and  scrubbed for entire procedure.   Daysi Perez MD

## 2020-09-30 NOTE — DISCHARGE INSTRUCTIONS
Same-Day Surgery   Adult Discharge Orders & Instructions     For 24 hours after surgery:  1. Get plenty of rest.  A responsible adult must stay with you for at least 24 hours after you leave the hospital.   2. Pain medication can slow your reflexes. Do not drive or use heavy equipment.  If you have weakness or tingling, don't drive or use heavy equipment until this feeling goes away.  3. Mixing alcohol and pain medication can cause dizziness and slow your breathing. It can even be fatal. Do not drink alcohol while taking pain medication.  4. Avoid strenuous or risky activities.  Ask for help when climbing stairs.   5. You may feel lightheaded.  If so, sit for a few minutes before standing.  Have someone help you get up.   6. If you have nausea (feel sick to your stomach), drink only clear liquids such as apple juice, ginger ale, broth or 7-Up.  Rest may also help.  Be sure to drink enough fluids.  Move to a regular diet as you feel able. Take pain medications with a small amount of solid food, such as toast or crackers, to avoid nausea.   7. A slight fever is normal. Call the doctor if your fever is over 100 F (37.7 C) (taken under the tongue) or lasts longer than 24 hours.  8. You may have a dry mouth, muscle aches, trouble sleeping or a sore throat.  These symptoms should go away after 24 hours.  9. Do not make important or legal decisions.   Pain Management:      1. Take pain medication (if prescribed) for pain as directed by your physician.        2. WARNING: If the pain medication you have been prescribed contains Tylenol  (acetaminophen), DO NOT take additional doses of Tylenol (acetaminophen).     Call your doctor for any of the followin.  Signs of infection (fever, growing tenderness at the surgery site, severe pain, a large amount of drainage or bleeding, foul-smelling drainage, redness, swelling).    2.  It has been over 8 to 10 hours since surgery and you are still not able to urinate (pee).    3.   Headache for over 24 hours.    4.  Numbness, tingling or weakness the day after surgery (if you had spinal anesthesia).  To contact a doctor, call _____________________________________ or:      [phone] and ask for the Resident On Call for:          ____________OBGYN________________________ (answered 24 hours a day)      Emergency Department:  [place] [place] [facility]: [phone]  [place] [facility]: [phone]               Rev. 10/2014

## 2020-09-30 NOTE — ANESTHESIA POSTPROCEDURE EVALUATION
Anesthesia POST Procedure Evaluation    Patient: Belen Sharma   MRN:     [id number] Gender:   female   Age:    60 year old :      1960        Preoperative Diagnosis: High grade squamous intraepithelial lesion of cervix [R87.613]   Procedure(s):  CONE BIOPSY, CERVIX   Postop Comments: No value filed.     Anesthesia Type: MAC          Postop Pain Control: Uneventful            Sign Out: Well controlled pain   PONV: No   Neuro/Psych: Uneventful            Sign Out: Acceptable/Baseline neuro status   Airway/Respiratory: Uneventful            Sign Out: Acceptable/Baseline resp. status   CV/Hemodynamics: Uneventful            Sign Out: Acceptable CV status   Other NRE: NONE   DID A NON-ROUTINE EVENT OCCUR? No         Last Anesthesia Record Vitals:  CRNA VITALS  2020 0822 - 2020 0922      2020             Pulse:  80    SpO2:  97 %          Last PACU Vitals:  Vitals Value Taken Time   BP 99/59 2020  8:55 AM   Temp     Pulse 78 2020  8:55 AM   Resp     SpO2 98 % 2020  8:56 AM   Temp src     NIBP 102/58 2020  8:53 AM   Pulse 80 2020  8:54 AM   SpO2 97 % 2020  8:54 AM   Resp     Temp 35.8  C (96.4  F) 2020  8:51 AM   Ht Rate 77 2020  8:53 AM   Temp 2     Vitals shown include unvalidated device data.      Electronically Signed By: Addy Norwood DO, 2020, 9:53 AM

## 2020-10-01 NOTE — PROGRESS NOTES
"10/5/2020    Referring Provider: Self-referred    Presenting Information:  I spoke to Belen by video today to discuss her genetic testing results. She submitted a saliva kit and the [facility] Multi-Cancer Panel test was ordered from [facility]. This testing was done because of Belen's family history of prostate, breast and uterine cancer as well as a paraganglioma and throat and colon polyps.    Genetic Testing Result: NEGATIVE  Belen is negative for mutations in the AIP, ALK, APC, HAYLEY, AXIN2, BAP1, BARD1, BLM, BMPR1A, BRCA1, BRCA2, BRIP1, CASR, CDC73, CDH1, CDK4, CDKN1B, CDKN1C, CDKN2A, CEBPA, CHEK2, CTNNA1, DICER1, DIS3L2, EGFR, EPCAM, FH, FLCN, GATA2, GPC3, GREM1, HOXB13, HRAS, KIT, MAX, MEN1, MET, MITF, MLH1, MSH2, MSH3, MSH6, MUTYH, NBN, NF1, NF2, NTHL1, PALB2, PDGFRA, PHOX2B, PMS2, POLD1, POLE, POT1, GMUGK8A, PTCH1, PTEN, RAD50, RAD51C, RAD51D, RB1, RECQL4, RET, RUNX1, SDHA, SDHAF2, SDHB, SDHC, SDHD, SMAD4, SMARCA4, SMARCB1, SMARCE1, STK11, SUFU, TERC, TERT, NRXB378, TP53, TSC1, TSC2, VHL, WRN, and WT1 genes.      No mutations were found in any of the 84 genes analyzed. This test involved sequencing and deletion/duplication analysis of all genes with the exceptions of EPCAM and GREM1 (deletions/duplications only), MITF (only the status of the c.952G>A (p.E318K) alteration is analyzed and reported), NTHL1 (sequencing only), EGFR (only the status of three alterations are analyzed and reported), HOXB13 (only the c.251G>A variant is reported).      A copy of the test report can be found in the Laboratory tab, dated 8/30/2020, and named \"SEND OUTS MISC TEST\". The report is scanned in as a linked document.    Interpretation:  We discussed several different interpretations of this negative test result.    1. One explanation may be that there is a different gene or combination of genes and environment that are associated with the cancers in this family.  2. It is possible that her paternal or maternal relatives " did have a mutation in a cancer susceptibility gene and she did not inherit it.  3. The cancers in her family could also have been random and/or sporadic.   4. There is also a small possibility that there is a mutation in one of these genes, and the testing laboratory could not find it with their current testing methods.       Screening:  Based on this negative test result, it is important for Belen and her relatives to refer back to the family history for appropriate cancer screening.      Based on her personal and family history, Belen has a 8.0% lifetime risk of developing breast cancer based on the THIEN 8 model.  Therefore, Belen does not meet current National Comprehensive Cancer Network (NCCN) guidelines for high risk breast screening, which is offered to women with a 20% lifetime risk or higher. However, it is still important for Belen to continue with routine breast screening under the care of her physicians. Breast cancer screening is generally recommended to begin approximately 10 years younger than the earliest age of breast cancer diagnosis in the family, or at age 40, whichever comes first.  In this family, screening may begin in the late 20's based on her paternal cousin who had breast cancer in her 30's.  Belen is encouraged to discuss breast screening with her physicians as the timing of the screening may be adjusted due to her cousin being a third-degree relative.     Continue care for PKD. As discussed in her clinic note, due to the strong family history of PKD, I recommended that Belen strongly consider seeing a genetic counselor in general genetics to discuss the genetics and inheritance of PKD. If she would like to see a genetic counselor in general genetics, the scheduling number is [phone].    Due to Belen's family history of uterine cancer, Belen and other close female relatives of the family member who had uterine cancer remain at slightly increased risk for uterine cancer. We discussed  available uterine cancer screening (pelvic exams, endometrial sampling, transvaginal ultrasounds) as well as the significant limitations of this screening. As such, this screening is not typically recommended. That being said, women in this family should discuss their family history, this screening, and the signs and symptoms of uterine cancer with their primary OB/GYN provider, as they may have individualized recommendations.    Other population cancer screening options, such as those recommended by the American Cancer Society and the National Comprehensive Cancer Network (NCCN), are also appropriate for Belen and her family. These screening recommendations may change if there are changes to Belen's personal and/or family history of cancer. Final screening recommendations should be made by each individual's managing physician.     Inheritance:  We reviewed the autosomal dominant inheritance of these 84 genes. We discussed that Belen cannot/did not pass on an identifiable mutation in these genes to her children based on this test result.  Mutations in these genes do not skip generations.      Additional Testing Considerations:  Although Belen's genetic testing result was negative, other relatives may still carry a gene mutation associated with prostate, breast and uterine cancer as well as a paraganglioma and colon and throat polyps. Genetic counseling is recommended for the paternal relatives or children of those relatives to discuss genetic testing options. If any of these relatives do pursue genetic testing, Belen is encouraged to contact me so that we may review the impact of their test results on her.    Summary:  We do not have an explanation for Belen's family history of cancer. While no genetic changes were identified, Belen may still be at risk for certain cancers due to family history, environmental factors, or other genetic causes not identified by this test.  Because of that, it is important that she  continue with cancer screening based on her personal and family history as discussed above.    Genetic testing is rapidly advancing, and new cancer susceptibility genes will most likely be identified in the future.  Therefore, I encouraged Belen to contact me annually or if there are changes in her personal or family history.  This may change how we assess her cancer risk, screening, and the testing we would offer.    Plan:  1. A copy of the test results will be mailed to Belen.  2. She plans to follow-up with her medical providers.  3. She should contact me annually, or sooner if her family history changes.  4.  If she would like to see a genetic counselor in general genetics for a PKD genetic testing discussion, the scheduling number is [phone].    If Belen has any further questions, I encouraged her to contact me at [phone].    Time spent on the video visit: 15 minutes.    Reina Choi MS, [facility]  Licensed, certified genetic counselor  [facility]  Cancer Risk Management Program  [phone]                    Negative Genetic Test Result    Genetic Testing  You had a blood test that looked at the genetic information in one or more genes associated with increased cancer risk.  The testing looked for any harmful changes that would stop this particular gene from working like it should. If an individual does not have any harmful changes or variants of unknown significance found from their blood test, their genetic test result is reported as negative.       Results  The genetic test did not identify any pathogenic (harmful) changes in the genes that were tested. There are several possible explanations for a negative test result. Without knowing the gene mutation in your family, the cause of the cancer in you or your relatives is still unknown. Your genetic counselor can help interpret the result for you and your relatives. In this case, there are several reasons that may explain the negative test  result:    There may be a gene mutation in the family that you did not inherit.     You may have a gene mutation in a different gene that was not included in the test, or has not yet been discovered.     The cancers in you or your family may be due to a combination of genetic factors and environment (multifactorial/familial).    The cancers in you or your family may be sporadic/random cancers.    There is very small chance that a mutation was not found by current testing methods.  As testing technology evolves over time, it may still be possible to identify a mutation in a gene that was not found on this test.    It is important to note which genes were included in your test. A list of these genes can be found on your test result.    Screening Recommendations  Due to this negative test result, cancer screening recommendations should be based on your personal and family history. This may include increased cancer screening for you and/or your family members. Your genetic counselor and health care provider can help make appropriate recommendations.      Please call us if you have any questions or concerns.   Cancer Risk Management Program [phone] ([phone])  ? Tj John, MS, [facility] [phone]  ? Laura Bynum, MS, [facility]  [phone]  ? Lexie Maddox, MS, [facility]  [phone]  ? Reina Choi, MS, [facility] [phone]  ? Elizabeth Scott, MS, [facility] [phone]  ? Jeimy Benavides, MS, [facility] [phone]  ? Denisse Martines, MS, [facility]  [phone]

## 2020-10-02 LAB — COPATH REPORT: NORMAL

## 2020-10-05 ENCOUNTER — VIRTUAL VISIT (OUTPATIENT)
Dept: ONCOLOGY | Facility: CLINIC | Age: 60
End: 2020-10-05
Attending: GENETIC COUNSELOR, MS
Payer: MEDICARE

## 2020-10-05 DIAGNOSIS — Z80.49 FAMILY HISTORY OF UTERINE CANCER: ICD-10-CM

## 2020-10-05 DIAGNOSIS — Z84.89 FAMILY HISTORY OF FAMILIAL PARAGANGLIOMA: ICD-10-CM

## 2020-10-05 DIAGNOSIS — Z80.3 FAMILY HISTORY OF MALIGNANT NEOPLASM OF BREAST: Primary | ICD-10-CM

## 2020-10-05 DIAGNOSIS — Z80.42 FAMILY HISTORY OF PROSTATE CANCER: ICD-10-CM

## 2020-10-05 LAB — LAB SCANNED RESULT: NORMAL

## 2020-10-05 NOTE — Clinical Note
Please send a copy of this letter to the patient and include a copy of their genetic testing results: Order: [id number].     Thank you,  Reina Choi MS, [facility] [place]  Licensed, certified genetic counselor

## 2020-10-05 NOTE — LETTER
Cancer Risk Management  Program [facility]  [facility]  [facility]  [facility]  Mailing Address  Cancer Risk Management Program  [facility]  [address]  [place] [address]    New patient appointments  [phone]  October 5, 2020    Belen Sharma  [address]  [place] [address]      Dear Belen,  It was a pleasure speaking with you via video visit on 10/05/2020. Here is a copy of the progress note from our discussion. If you have any additional questions, please feel free to call.    Referring Provider: Self-referred    Presenting Information:  I spoke to Belen by video today to discuss her genetic testing results. She submitted a saliva kit and the Invitae Multi-Cancer Panel test was ordered from [employer]. This testing was done because of Belen's family history of prostate, breast and uterine cancer as well as a paraganglioma and throat and colon polyps.    Genetic Testing Result: NEGATIVE  Belen is negative for mutations in the AIP, ALK, APC, HAYLEY, AXIN2, BAP1, BARD1, BLM, BMPR1A, BRCA1, BRCA2, BRIP1, CASR, CDC73, CDH1, CDK4, CDKN1B, CDKN1C, CDKN2A, CEBPA, CHEK2, CTNNA1, DICER1, DIS3L2, EGFR, EPCAM, FH, FLCN, GATA2, GPC3, GREM1, HOXB13, HRAS, KIT, MAX, MEN1, MET, MITF, MLH1, MSH2, MSH3, MSH6, MUTYH, NBN, NF1, NF2, NTHL1, PALB2, PDGFRA, PHOX2B, PMS2, POLD1, POLE, POT1, FCBCT5V, PTCH1, PTEN, RAD50, RAD51C, RAD51D, RB1, RECQL4, RET, RUNX1, SDHA, SDHAF2, SDHB, SDHC, SDHD, SMAD4, SMARCA4, SMARCB1, SMARCE1, STK11, SUFU, TERC, TERT, DLCL604, TP53, TSC1, TSC2, VHL, WRN, and WT1 genes.      No mutations were found in any of the 84 genes analyzed. This test involved sequencing and deletion/duplication analysis of all genes with the exceptions of EPCAM and GREM1 (deletions/duplications only), MITF (only the status of  the c.952G>A (p.E318K) alteration is analyzed and reported), NTHL1 (sequencing only), EGFR (only the status of three alterations are analyzed and reported), HOXB13 (only the c.251G>A variant is reported).      Interpretation:  We discussed several different interpretations of this negative test result.    1. One explanation may be that there is a different gene or combination of genes and environment that are associated with the cancers in this family.  2. It is possible that her paternal or maternal relatives did have a mutation in a cancer susceptibility gene and she did not inherit it.  3. The cancers in her family could also have been random and/or sporadic.   4. There is also a small possibility that there is a mutation in one of these genes, and the testing laboratory could not find it with their current testing methods.       Screening:  Based on this negative test result, it is important for Belen and her relatives to refer back to the family history for appropriate cancer screening.      Based on her personal and family history, Belen has a 8.0% lifetime risk of developing breast cancer based on the THIEN 8 model.  Therefore, Belen does not meet current National Comprehensive Cancer Network (NCCN) guidelines for high risk breast screening, which is offered to women with a 20% lifetime risk or higher. However, it is still important for Belen to continue with routine breast screening under the care of her physicians. Breast cancer screening is generally recommended to begin approximately 10 years younger than the earliest age of breast cancer diagnosis in the family, or at age 40, whichever comes first.  In this family, screening may begin in the late 20's based on her paternal cousin who had breast cancer in her 30's.  Belen is encouraged to discuss breast screening with her physicians as the timing of the screening may be adjusted due to her cousin being a third-degree relative.     Continue care for PKD. As  discussed in her clinic note, due to the strong family history of PKD, I recommended that Belen strongly consider seeing a genetic counselor in general genetics to discuss the genetics and inheritance of PKD. If she would like to see a genetic counselor in general genetics, the scheduling number is [phone].    Due to Belen's family history of uterine cancer, Belen and other close female relatives of the family member who had uterine cancer remain at slightly increased risk for uterine cancer. We discussed available uterine cancer screening (pelvic exams, endometrial sampling, transvaginal ultrasounds) as well as the significant limitations of this screening. As such, this screening is not typically recommended. That being said, women in this family should discuss their family history, this screening, and the signs and symptoms of uterine cancer with their primary OB/GYN provider, as they may have individualized recommendations.    Other population cancer screening options, such as those recommended by the American Cancer Society and the National Comprehensive Cancer Network (NCCN), are also appropriate for Belen and her family. These screening recommendations may change if there are changes to Belen's personal and/or family history of cancer. Final screening recommendations should be made by each individual's managing physician.     Inheritance:  We reviewed the autosomal dominant inheritance of these 84 genes. We discussed that Belen cannot/did not pass on an identifiable mutation in these genes to her children based on this test result.  Mutations in these genes do not skip generations.      Additional Testing Considerations:  Although Belen's genetic testing result was negative, other relatives may still carry a gene mutation associated with prostate, breast and uterine cancer as well as a paraganglioma and colon and throat polyps. Genetic counseling is recommended for the paternal relatives or children of  those relatives to discuss genetic testing options. If any of these relatives do pursue genetic testing, Belen is encouraged to contact me so that we may review the impact of their test results on her.    Summary:  We do not have an explanation for Belen's family history of cancer. While no genetic changes were identified, Belen may still be at risk for certain cancers due to family history, environmental factors, or other genetic causes not identified by this test.  Because of that, it is important that she continue with cancer screening based on her personal and family history as discussed above.    Genetic testing is rapidly advancing, and new cancer susceptibility genes will most likely be identified in the future.  Therefore, I encouraged Belen to contact me annually or if there are changes in her personal or family history.  This may change how we assess her cancer risk, screening, and the testing we would offer.    Plan:  1. A copy of the test results will be mailed to Belen.  2. She plans to follow-up with her medical providers.  3. She should contact me annually, or sooner if her family history changes.  4.  If she would like to see a genetic counselor in general genetics for a PKD genetic testing discussion, the scheduling number is [phone].    If Belen has any further questions, I encouraged her to contact me at [phone].    Reina Choi MS, [facility]  Licensed, certified genetic counselor  [facility]  Cancer Risk Management Program  [phone]                                                        Negative Genetic Test Result    Genetic Testing  You had a blood test that looked at the genetic information in one or more genes associated with increased cancer risk.  The testing looked for any harmful changes that would stop this particular gene from working like it should. If an individual does not have any harmful changes or variants of unknown significance found from their blood test, their  genetic test result is reported as negative.       Results  The genetic test did not identify any pathogenic (harmful) changes in the genes that were tested. There are several possible explanations for a negative test result. Without knowing the gene mutation in your family, the cause of the cancer in you or your relatives is still unknown. Your genetic counselor can help interpret the result for you and your relatives. In this case, there are several reasons that may explain the negative test result:    There may be a gene mutation in the family that you did not inherit.     You may have a gene mutation in a different gene that was not included in the test, or has not yet been discovered.     The cancers in you or your family may be due to a combination of genetic factors and environment (multifactorial/familial).    The cancers in you or your family may be sporadic/random cancers.    There is very small chance that a mutation was not found by current testing methods.  As testing technology evolves over time, it may still be possible to identify a mutation in a gene that was not found on this test.    It is important to note which genes were included in your test. A list of these genes can be found on your test result.    Screening Recommendations  Due to this negative test result, cancer screening recommendations should be based on your personal and family history. This may include increased cancer screening for you and/or your family members. Your genetic counselor and health care provider can help make appropriate recommendations.      Please call us if you have any questions or concerns.   Cancer Risk Management Program [phone] ([phone])  ? Tj John, MS, [facility] [phone]  ? Laura Bynum, MS, [facility]  [phone]  ? Lexie Maddox, MS, [facility]  [phone]  ? Reina Choi, MS, [facility] [phone]  ? Elizabeth Scott, MS, [facility] [phone]  ? Jeimy Benavides, MS, [facility] [phone]  ? Denisse Martines, MS,  [facility]  [phone]

## 2020-10-05 NOTE — LETTER
"    10/5/2020         RE: Belen Sharma  [address] [address]        Dear Colleague,    Thank you for referring your patient, Belen Sharma, to the CANCER RISK MANAGEMENT PROGRAM. Please see a copy of my visit note below.    10/5/2020    Referring Provider: Self-referred    Presenting Information:  I spoke to Belen by video today to discuss her genetic testing results. She submitted a saliva kit and the Invitae Multi-Cancer Panel test was ordered from [employer]. This testing was done because of Belen's family history of prostate, breast and uterine cancer as well as a paraganglioma and throat and colon polyps.    Genetic Testing Result: NEGATIVE  Belen is negative for mutations in the AIP, ALK, APC, HAYLEY, AXIN2, BAP1, BARD1, BLM, BMPR1A, BRCA1, BRCA2, BRIP1, CASR, CDC73, CDH1, CDK4, CDKN1B, CDKN1C, CDKN2A, CEBPA, CHEK2, CTNNA1, DICER1, DIS3L2, EGFR, EPCAM, FH, FLCN, GATA2, GPC3, GREM1, HOXB13, HRAS, KIT, MAX, MEN1, MET, MITF, MLH1, MSH2, MSH3, MSH6, MUTYH, NBN, NF1, NF2, NTHL1, PALB2, PDGFRA, PHOX2B, PMS2, POLD1, POLE, POT1, SHHAP0D, PTCH1, PTEN, RAD50, RAD51C, RAD51D, RB1, RECQL4, RET, RUNX1, SDHA, SDHAF2, SDHB, SDHC, SDHD, SMAD4, SMARCA4, SMARCB1, SMARCE1, STK11, SUFU, TERC, TERT, LFLX556, TP53, TSC1, TSC2, VHL, WRN, and WT1 genes.      No mutations were found in any of the 84 genes analyzed. This test involved sequencing and deletion/duplication analysis of all genes with the exceptions of EPCAM and GREM1 (deletions/duplications only), MITF (only the status of the c.952G>A (p.E318K) alteration is analyzed and reported), NTHL1 (sequencing only), EGFR (only the status of three alterations are analyzed and reported), HOXB13 (only the c.251G>A variant is reported).      A copy of the test report can be found in the Laboratory tab, dated 8/30/2020, and named \"SEND OUTS MISC TEST\". The report is scanned in as a linked document.    Interpretation:  We discussed several different interpretations of " this negative test result.    1. One explanation may be that there is a different gene or combination of genes and environment that are associated with the cancers in this family.  2. It is possible that her paternal or maternal relatives did have a mutation in a cancer susceptibility gene and she did not inherit it.  3. The cancers in her family could also have been random and/or sporadic.   4. There is also a small possibility that there is a mutation in one of these genes, and the testing laboratory could not find it with their current testing methods.       Screening:  Based on this negative test result, it is important for Belen and her relatives to refer back to the family history for appropriate cancer screening.      Based on her personal and family history, Belen has a 8.0% lifetime risk of developing breast cancer based on the THIEN 8 model.  Therefore, Belen does not meet current National Comprehensive Cancer Network (NCCN) guidelines for high risk breast screening, which is offered to women with a 20% lifetime risk or higher. However, it is still important for Belen to continue with routine breast screening under the care of her physicians. Breast cancer screening is generally recommended to begin approximately 10 years younger than the earliest age of breast cancer diagnosis in the family, or at age 40, whichever comes first.  In this family, screening may begin in the late 20's based on her paternal cousin who had breast cancer in her 30's.  Belen is encouraged to discuss breast screening with her physicians as the timing of the screening may be adjusted due to her cousin being a third-degree relative.     Continue care for PKD. As discussed in her clinic note, due to the strong family history of PKD, I recommended that Belen strongly consider seeing a genetic counselor in general genetics to discuss the genetics and inheritance of PKD. If she would like to see a genetic counselor in general genetics,  the scheduling number is [phone].    Due to Belen's family history of uterine cancer, Belen and other close female relatives of the family member who had uterine cancer remain at slightly increased risk for uterine cancer. We discussed available uterine cancer screening (pelvic exams, endometrial sampling, transvaginal ultrasounds) as well as the significant limitations of this screening. As such, this screening is not typically recommended. That being said, women in this family should discuss their family history, this screening, and the signs and symptoms of uterine cancer with their primary OB/GYN provider, as they may have individualized recommendations.    Other population cancer screening options, such as those recommended by the American Cancer Society and the National Comprehensive Cancer Network (NCCN), are also appropriate for Belen and her family. These screening recommendations may change if there are changes to Belen's personal and/or family history of cancer. Final screening recommendations should be made by each individual's managing physician.     Inheritance:  We reviewed the autosomal dominant inheritance of these 84 genes. We discussed that Belen cannot/did not pass on an identifiable mutation in these genes to her children based on this test result.  Mutations in these genes do not skip generations.      Additional Testing Considerations:  Although Belen's genetic testing result was negative, other relatives may still carry a gene mutation associated with prostate, breast and uterine cancer as well as a paraganglioma and colon and throat polyps. Genetic counseling is recommended for the paternal relatives or children of those relatives to discuss genetic testing options. If any of these relatives do pursue genetic testing, Belen is encouraged to contact me so that we may review the impact of their test results on her.    Summary:  We do not have an explanation for Belen's family history of  cancer. While no genetic changes were identified, Belen may still be at risk for certain cancers due to family history, environmental factors, or other genetic causes not identified by this test.  Because of that, it is important that she continue with cancer screening based on her personal and family history as discussed above.    Genetic testing is rapidly advancing, and new cancer susceptibility genes will most likely be identified in the future.  Therefore, I encouraged Belen to contact me annually or if there are changes in her personal or family history.  This may change how we assess her cancer risk, screening, and the testing we would offer.    Plan:  1. A copy of the test results will be mailed to Belen.  2. She plans to follow-up with her medical providers.  3. She should contact me annually, or sooner if her family history changes.  4.  If she would like to see a genetic counselor in general genetics for a PKD genetic testing discussion, the scheduling number is [phone].    If Belen has any further questions, I encouraged her to contact me at [phone].    Time spent on the video visit: 15 minutes.    Reina Choi MS, [facility]  Licensed, certified genetic counselor  [facility]  Cancer Risk Management Program  [phone]                    Negative Genetic Test Result    Genetic Testing  You had a blood test that looked at the genetic information in one or more genes associated with increased cancer risk.  The testing looked for any harmful changes that would stop this particular gene from working like it should. If an individual does not have any harmful changes or variants of unknown significance found from their blood test, their genetic test result is reported as negative.       Results  The genetic test did not identify any pathogenic (harmful) changes in the genes that were tested. There are several possible explanations for a negative test result. Without knowing the gene mutation in  your family, the cause of the cancer in you or your relatives is still unknown. Your genetic counselor can help interpret the result for you and your relatives. In this case, there are several reasons that may explain the negative test result:    There may be a gene mutation in the family that you did not inherit.     You may have a gene mutation in a different gene that was not included in the test, or has not yet been discovered.     The cancers in you or your family may be due to a combination of genetic factors and environment (multifactorial/familial).    The cancers in you or your family may be sporadic/random cancers.    There is very small chance that a mutation was not found by current testing methods.  As testing technology evolves over time, it may still be possible to identify a mutation in a gene that was not found on this test.    It is important to note which genes were included in your test. A list of these genes can be found on your test result.    Screening Recommendations  Due to this negative test result, cancer screening recommendations should be based on your personal and family history. This may include increased cancer screening for you and/or your family members. Your genetic counselor and health care provider can help make appropriate recommendations.      Please call us if you have any questions or concerns.   Cancer Risk Management Program [phone]-[phone] ([phone])  ? Tj John, MS, [facility] [phone]  ? Laura Bynum, MS, [facility]  [phone]  ? Lexie Maddox, MS, [facility]  [phone]  ? Reina Choi, MS, [facility] [phone]  ? Elizabeth Scott, MS, [facility] [phone]  ? Jeimy Benavides, MS, [facility] [phone]  ? Denisse Martines, MS, [facility]  [phone]              Again, thank you for allowing me to participate in the care of your patient.        Sincerely,        RIC Choi GC

## 2020-10-05 NOTE — LETTER
"    10/5/2020         RE: Belen Sharma  [address] [address]        Dear Colleague,    Thank you for referring your patient, Belen Sharma, to the CANCER RISK MANAGEMENT PROGRAM. Please see a copy of my visit note below.    10/5/2020    Referring Provider: Self-referred    Presenting Information:  I spoke to Belen by video today to discuss her genetic testing results. She submitted a saliva kit and the Inv[facility]ae Multi-Cancer Panel test was ordered from [employer]. This testing was done because of Belen's family history of prostate, breast and uterine cancer as well as a paraganglioma and throat and colon polyps.    Genetic Testing Result: NEGATIVE  Belen is negative for mutations in the AIP, ALK, APC, HAYLEY, AXIN2, BAP1, BARD1, BLM, BMPR1A, BRCA1, BRCA2, BRIP1, CASR, CDC73, CDH1, CDK4, CDKN1B, CDKN1C, CDKN2A, CEBPA, CHEK2, CTNNA1, DICER1, DIS3L2, EGFR, EPCAM, FH, FLCN, GATA2, GPC3, GREM1, HOXB13, HRAS, KIT, MAX, MEN1, MET, MITF, MLH1, MSH2, MSH3, MSH6, MUTYH, NBN, NF1, NF2, NTHL1, PALB2, PDGFRA, PHOX2B, PMS2, POLD1, POLE, POT1, YIKYA0Q, PTCH1, PTEN, RAD50, RAD51C, RAD51D, RB1, RECQL4, RET, RUNX1, SDHA, SDHAF2, SDHB, SDHC, SDHD, SMAD4, SMARCA4, SMARCB1, SMARCE1, STK11, SUFU, TERC, TERT, PFWV830, TP53, TSC1, TSC2, VHL, WRN, and WT1 genes.      No mutations were found in any of the 84 genes analyzed. This test involved sequencing and deletion/duplication analysis of all genes with the exceptions of EPCAM and GREM1 (deletions/duplications only), MITF (only the status of the c.952G>A (p.E318K) alteration is analyzed and reported), NTHL1 (sequencing only), EGFR (only the status of three alterations are analyzed and reported), HOXB13 (only the c.251G>A variant is reported).      A copy of the test report can be found in the Laboratory tab, dated 8/30/2020, and named \"SEND OUTS MISC TEST\". The report is scanned in as a linked document.    Interpretation:  We discussed several different interpretations of " this negative test result.    1. One explanation may be that there is a different gene or combination of genes and environment that are associated with the cancers in this family.  2. It is possible that her paternal or maternal relatives did have a mutation in a cancer susceptibility gene and she did not inherit it.  3. The cancers in her family could also have been random and/or sporadic.   4. There is also a small possibility that there is a mutation in one of these genes, and the testing laboratory could not find it with their current testing methods.       Screening:  Based on this negative test result, it is important for Belen and her relatives to refer back to the family history for appropriate cancer screening.      Based on her personal and family history, Belen has a 8.0% lifetime risk of developing breast cancer based on the THIEN 8 model.  Therefore, Belen does not meet current National Comprehensive Cancer Network (NCCN) guidelines for high risk breast screening, which is offered to women with a 20% lifetime risk or higher. However, it is still important for Belen to continue with routine breast screening under the care of her physicians. Breast cancer screening is generally recommended to begin approximately 10 years younger than the earliest age of breast cancer diagnosis in the family, or at age 40, whichever comes first.  In this family, screening may begin in the late 20's based on her paternal cousin who had breast cancer in her 30's.  Belen is encouraged to discuss breast screening with her physicians as the timing of the screening may be adjusted due to her cousin being a third-degree relative.     Continue care for PKD. As discussed in her clinic note, due to the strong family history of PKD, I recommended that Belen strongly consider seeing a genetic counselor in general genetics to discuss the genetics and inheritance of PKD. If she would like to see a genetic counselor in general genetics,  the scheduling number is [phone].    Due to Belen's family history of uterine cancer, Belen and other close female relatives of the family member who had uterine cancer remain at slightly increased risk for uterine cancer. We discussed available uterine cancer screening (pelvic exams, endometrial sampling, transvaginal ultrasounds) as well as the significant limitations of this screening. As such, this screening is not typically recommended. That being said, women in this family should discuss their family history, this screening, and the signs and symptoms of uterine cancer with their primary OB/GYN provider, as they may have individualized recommendations.    Other population cancer screening options, such as those recommended by the American Cancer Society and the National Comprehensive Cancer Network (NCCN), are also appropriate for Belen and her family. These screening recommendations may change if there are changes to Belen's personal and/or family history of cancer. Final screening recommendations should be made by each individual's managing physician.     Inheritance:  We reviewed the autosomal dominant inheritance of these 84 genes. We discussed that Belen cannot/did not pass on an identifiable mutation in these genes to her children based on this test result.  Mutations in these genes do not skip generations.      Additional Testing Considerations:  Although Belen's genetic testing result was negative, other relatives may still carry a gene mutation associated with prostate, breast and uterine cancer as well as a paraganglioma and colon and throat polyps. Genetic counseling is recommended for the paternal relatives or children of those relatives to discuss genetic testing options. If any of these relatives do pursue genetic testing, Belen is encouraged to contact me so that we may review the impact of their test results on her.    Summary:  We do not have an explanation for Belen's family history of  cancer. While no genetic changes were identified, Belen may still be at risk for certain cancers due to family history, environmental factors, or other genetic causes not identified by this test.  Because of that, it is important that she continue with cancer screening based on her personal and family history as discussed above.    Genetic testing is rapidly advancing, and new cancer susceptibility genes will most likely be identified in the future.  Therefore, I encouraged Belen to contact me annually or if there are changes in her personal or family history.  This may change how we assess her cancer risk, screening, and the testing we would offer.    Plan:  1. A copy of the test results will be mailed to Belen.  2. She plans to follow-up with her medical providers.  3. She should contact me annually, or sooner if her family history changes.  4.  If she would like to see a genetic counselor in general genetics for a PKD genetic testing discussion, the scheduling number is [phone].    If Belen has any further questions, I encouraged her to contact me at [phone].    Time spent on the video visit: 15 minutes.    Reina Choi MS, [facility]  Licensed, certified genetic counselor  [facility]  Cancer Risk Management Program  [phone]                    Negative Genetic Test Result    Genetic Testing  You had a blood test that looked at the genetic information in one or more genes associated with increased cancer risk.  The testing looked for any harmful changes that would stop this particular gene from working like it should. If an individual does not have any harmful changes or variants of unknown significance found from their blood test, their genetic test result is reported as negative.       Results  The genetic test did not identify any pathogenic (harmful) changes in the genes that were tested. There are several possible explanations for a negative test result. Without knowing the gene mutation in  your family, the cause of the cancer in you or your relatives is still unknown. Your genetic counselor can help interpret the result for you and your relatives. In this case, there are several reasons that may explain the negative test result:    There may be a gene mutation in the family that you did not inherit.     You may have a gene mutation in a different gene that was not included in the test, or has not yet been discovered.     The cancers in you or your family may be due to a combination of genetic factors and environment (multifactorial/familial).    The cancers in you or your family may be sporadic/random cancers.    There is very small chance that a mutation was not found by current testing methods.  As testing technology evolves over time, it may still be possible to identify a mutation in a gene that was not found on this test.    It is important to note which genes were included in your test. A list of these genes can be found on your test result.    Screening Recommendations  Due to this negative test result, cancer screening recommendations should be based on your personal and family history. This may include increased cancer screening for you and/or your family members. Your genetic counselor and health care provider can help make appropriate recommendations.      Please call us if you have any questions or concerns.   Cancer Risk Management Program [phone] ([phone])  ? Tj John, MS, [facility] [phone]  ? Laura Bynum, MS, [facility]  [phone]  ? Lexie Maddox, MS, [facility]  [phone]  ? Reina Choi, MS, [facility] [phone]  ? Elizabeth Scott, MS, [facility] [phone]  ? Jeimy Benavides, MS, [facility] [phone]  ? Denisse Martines, MS, [facility]  [phone]              Again, thank you for allowing me to participate in the care of your patient.        Sincerely,        RIC Choi GC

## 2020-10-08 ENCOUNTER — TELEPHONE (OUTPATIENT)
Dept: OBGYN | Facility: CLINIC | Age: 60
End: 2020-10-08

## 2020-10-08 DIAGNOSIS — T86.19 RENAL CYST OF KIDNEY TRANSPLANT: ICD-10-CM

## 2020-10-08 DIAGNOSIS — Z94.0 KIDNEY TRANSPLANTED: Primary | ICD-10-CM

## 2020-10-08 DIAGNOSIS — N28.1 RENAL CYST OF KIDNEY TRANSPLANT: ICD-10-CM

## 2020-10-08 NOTE — TELEPHONE ENCOUNTER
Belen calling after having Leep 09/30/2020 that she had brown bleeding noted after voiding 1 am and 4 am now no spotting or bleeding today.  She sent a [employer] message to discuss with Dr. Perez    She denies fever, no vaginal odor, no pain .  Reviewed if bleeding becomes bright red or bleeding where it increases should call and discuss with MD and if Reviewed if bleeding a pad (thick pad) and saturates a pad in one hour x 1-2 hours, OR pain OR large clots OR shortness of breath OR deferred pain OR urgent concerns all require immed eval in ER.      She is concerned since the bleeding she should now take an antibiotic due to history of being a transplant patient.  She will contact transplant team in regards to recommendations on antibiotics  now    She has changed appt to in person for 10/13/2020, given after hours number [phone] to call if questions     She is also taking 81 mg ASA daily since 10/02/2020- she is wondering  If  this is ok??  Advised to continue, will discuss with Dr. Perez

## 2020-10-08 NOTE — TELEPHONE ENCOUNTER
Ana, MD Marie Greco Theresa A, RN   Caller: Unspecified (Today, 11:31 AM)             She does not need an antibiotic and can continue ASA.   Daysi Perez MD      Reviewed with Neno Gabriel RN

## 2020-10-09 ENCOUNTER — TELEPHONE (OUTPATIENT)
Dept: OBGYN | Facility: CLINIC | Age: 60
End: 2020-10-09

## 2020-10-10 LAB — MISCELLANEOUS TEST: NORMAL

## 2020-10-10 NOTE — TELEPHONE ENCOUNTER
Telephone call.    Conization of cervix on 9/30/2020.  Noted bleeding today. Some bright red bleeding.  Soaked one pad and has had discharge on and off since when she uses the BR.  She has no fever. No pain.      Bleeding is not free flowing and she has not soaked more pads.      Advised if bleeding becomes heavy, (>2 pads per hr x 2 hrs) or if she notes free flowing into toilet, she should report to ED for evaluation.     Pt agreeable.    Awilda Lester MD, FACOG  (she/her/hers)    Department of Ob/Gyn/Women's Health  [facility]  [facility]  [address] [place] [address]. [place] [address]  [email or web address]  p. [phone]  f. [phone]  10/9/2020  9:20 PM

## 2020-10-12 ENCOUNTER — TELEPHONE (OUTPATIENT)
Dept: OBGYN | Facility: CLINIC | Age: 60
End: 2020-10-12

## 2020-10-12 NOTE — TELEPHONE ENCOUNTER
Pt called after she had another bleeding episode on Friday pooja after cone bx 09/30/2020    She reports now today she has no bleeding    She has a telephone follow up visit on 10/13/2020- wants to avoid any in clinic visits.  Explained if bleeding should come in for appt as will need in person evaluation   She voices understanding and will update if bleeding returns    Will also route to Dr. Perez for review

## 2020-10-13 ENCOUNTER — MYC MEDICAL ADVICE (OUTPATIENT)
Dept: OBGYN | Facility: CLINIC | Age: 60
End: 2020-10-13

## 2020-10-13 ENCOUNTER — VIRTUAL VISIT (OUTPATIENT)
Dept: OBGYN | Facility: CLINIC | Age: 60
End: 2020-10-13
Attending: OBSTETRICS & GYNECOLOGY
Payer: MEDICARE

## 2020-10-13 DIAGNOSIS — Z98.890 S/P CONE BIOPSY OF CERVIX: Primary | ICD-10-CM

## 2020-10-13 PROCEDURE — 99024 POSTOP FOLLOW-UP VISIT: CPT | Mod: 95 | Performed by: OBSTETRICS & GYNECOLOGY

## 2020-10-13 NOTE — LETTER
"10/13/2020       RE: Belen Sharma  [address] [address]     Dear Colleague,    Thank you for referring your patient, Belen Sharma, to the [facility] [place] at [facility]. Please see a copy of my visit note below.    Belen Sharma is a 60 year old female who is being evaluated via a billable telephone visit.      The patient has been notified of following:     \"This telephone visit will be conducted via a call between you and your physician/provider. We have found that certain health care needs can be provided without the need for a physical exam.  This service lets us provide the care you need with a short phone conversation.  If a prescription is necessary we can send it directly to your pharmacy.  If lab work is needed we can place an order for that and you can then stop by our lab to have the test done at a later time.    Telephone visits are billed at different rates depending on your insurance coverage. During this emergency period, for some insurers they may be billed the same as an in-person visit.  Please reach out to your insurance provider with any questions.    If during the course of the call the physician/provider feels a telephone visit is not appropriate, you will not be charged for this service.\"    Patient has given verbal consent for Telephone visit?  Yes    Calling patient for postop visit. Patient underwent CKC for JANNETTE 3 on 9/30. Has noted intermittent bleeding since procedure, first noted when replacing prolapse. No clots, currently no bleeding. Is anxious about possible complications as she has complex medical history and is at risk for infection due to transplant. Denies fever, chills or abnormal discharge. Previoiusly reviewed pathology- JANNETTE 3 with negative margins. Follow up per [facility]P is 12 months co-testing. She is anxious about waiting that long and prefers and exam at 4-6 months.     She has many questions " about her uterine prolapse. Is interfering with her daily activities. Previously saw Dr. Francis who recommended a defocography vs dynamic MRI. She didn't feel comfortable with the defecography so did not schedule. Reviewed that it would be reasonable to try a pessary to see if that helped her symptoms and if not, she could pursue additional evaluation. We talked at length about management of prolapse including expectant, pessary and surgical management. Due to her complex history and pelvic kidney, would recommend first conservative trial of pessary. Again reviewed that while prolapse is bothersome, it is not harmful. She will follow up in clinic for an exam to evaluate cervix bleeding, followed by pessary fitting. All questions answered.     Phone call duration: 18 minutes    Daysi Perez MD

## 2020-10-19 NOTE — PROGRESS NOTES
"Belen Sharma is a 60 year old female who is being evaluated via a billable telephone visit.      The patient has been notified of following:     \"This telephone visit will be conducted via a call between you and your physician/provider. We have found that certain health care needs can be provided without the need for a physical exam.  This service lets us provide the care you need with a short phone conversation.  If a prescription is necessary we can send it directly to your pharmacy.  If lab work is needed we can place an order for that and you can then stop by our lab to have the test done at a later time.    Telephone visits are billed at different rates depending on your insurance coverage. During this emergency period, for some insurers they may be billed the same as an in-person visit.  Please reach out to your insurance provider with any questions.    If during the course of the call the physician/provider feels a telephone visit is not appropriate, you will not be charged for this service.\"    Patient has given verbal consent for Telephone visit?  Yes    Calling patient for postop visit. Patient underwent CKC for JANNETTE 3 on 9/30. Has noted intermittent bleeding since procedure, first noted when replacing prolapse. No clots, currently no bleeding. Is anxious about possible complications as she has complex medical history and is at risk for infection due to transplant. Denies fever, chills or abnormal discharge. Previoiusly reviewed pathology- JANNETTE 3 with negative margins. Follow up per [facility]CCP is 12 months co-testing. She is anxious about waiting that long and prefers and exam at 4-6 months.     She has many questions about her uterine prolapse. Is interfering with her daily activities. Previously saw Dr. Francis who recommended a defocography vs dynamic MRI. She didn't feel comfortable with the defecography so did not schedule. Reviewed that it would be reasonable to try a pessary to see if that helped her " symptoms and if not, she could pursue additional evaluation. We talked at length about management of prolapse including expectant, pessary and surgical management. Due to her complex history and pelvic kidney, would recommend first conservative trial of pessary. Again reviewed that while prolapse is bothersome, it is not harmful. She will follow up in clinic for an exam to evaluate cervix bleeding, followed by pessary fitting. All questions answered.     Phone call duration: 18 minutes    Daysi Perez MD

## 2020-10-23 ENCOUNTER — TELEPHONE (OUTPATIENT)
Dept: ONCOLOGY | Facility: CLINIC | Age: 60
End: 2020-10-23

## 2020-10-23 NOTE — TELEPHONE ENCOUNTER
I returned a call Belen Sharma today regarding insurance updates. I had a virtual genetic counseling appointment with Belen on 10/05/2020 because of a family history of prostate, breast and uterine cancer as well as a paraganglioma and throat and colon polyps.    Belen had questions earlier in the week regarding cost for her genetic test. I sent her a [employer] message today that I had called [employer] and confirmed that the cost will be $0.     I left a voicemail today that the Medicare denial is common if a person doesn't have a personal history of cancer and is considered an estimate of benefits, not an invoice. As I had written in [employer] today, if Belen receives an invoice from [employer], she should contact me and I can help her resolve her billing. Otherwise, the cost should be $0.    I encouraged Belen to contact me with any further questions at [phone].    Reina Choi MS, [facility]  Licensed, certified genetic counselor  [phone]

## 2020-10-29 DIAGNOSIS — Z94.0 KIDNEY REPLACED BY TRANSPLANT: Primary | ICD-10-CM

## 2020-10-30 ENCOUNTER — MYC MEDICAL ADVICE (OUTPATIENT)
Dept: INTERNAL MEDICINE | Facility: CLINIC | Age: 60
End: 2020-10-30

## 2020-10-30 DIAGNOSIS — F32.89 OTHER DEPRESSION: Primary | ICD-10-CM

## 2020-11-06 ENCOUNTER — MYC MEDICAL ADVICE (OUTPATIENT)
Dept: INTERNAL MEDICINE | Facility: CLINIC | Age: 60
End: 2020-11-06

## 2020-11-06 DIAGNOSIS — F32.89 OTHER DEPRESSION: Primary | ICD-10-CM

## 2020-11-11 ENCOUNTER — OFFICE VISIT (OUTPATIENT)
Dept: UROLOGY | Facility: CLINIC | Age: 60
End: 2020-11-11
Attending: OBSTETRICS & GYNECOLOGY
Payer: MEDICARE

## 2020-11-11 ENCOUNTER — OFFICE VISIT (OUTPATIENT)
Dept: OBGYN | Facility: CLINIC | Age: 60
End: 2020-11-11
Attending: OBSTETRICS & GYNECOLOGY
Payer: MEDICARE

## 2020-11-11 VITALS — DIASTOLIC BLOOD PRESSURE: 58 MMHG | SYSTOLIC BLOOD PRESSURE: 117 MMHG | BODY MASS INDEX: 21.81 KG/M2 | WEIGHT: 152 LBS

## 2020-11-11 VITALS
WEIGHT: 152 LBS | DIASTOLIC BLOOD PRESSURE: 71 MMHG | BODY MASS INDEX: 21.81 KG/M2 | HEART RATE: 89 BPM | SYSTOLIC BLOOD PRESSURE: 118 MMHG

## 2020-11-11 DIAGNOSIS — N81.11 CYSTOCELE, MIDLINE: Primary | ICD-10-CM

## 2020-11-11 DIAGNOSIS — D06.9 CIN III (CERVICAL INTRAEPITHELIAL NEOPLASIA GRADE III) WITH SEVERE DYSPLASIA: Primary | ICD-10-CM

## 2020-11-11 PROCEDURE — G0463 HOSPITAL OUTPT CLINIC VISIT: HCPCS | Mod: 25

## 2020-11-11 PROCEDURE — G0463 HOSPITAL OUTPT CLINIC VISIT: HCPCS

## 2020-11-11 PROCEDURE — 57160 INSERT PESSARY/OTHER DEVICE: CPT | Performed by: OBSTETRICS & GYNECOLOGY

## 2020-11-11 PROCEDURE — 99024 POSTOP FOLLOW-UP VISIT: CPT | Performed by: OBSTETRICS & GYNECOLOGY

## 2020-11-11 ASSESSMENT — PAIN SCALES - GENERAL: PAINLEVEL: NO PAIN (0)

## 2020-11-11 NOTE — LETTER
11/11/2020       RE: Belen Sharma  [address] [address]     Dear Colleague,    Thank you for referring your patient, Belen Sharma, to the [facility]OMEN[facility] [place] at [facility]. Please see a copy of my visit note below.    November 11, 2020    Referring Provider: No referring provider defined for this encounter.    Primary Care Provider: Vincent De La Garza    Pt returns for pessary fitting. Found to have a cystocele on previous exam.    Pt fit with a # 4, 5 and then a 7 ring with support. The #7 was able to support her prolpase adequately. Demonstrated ability to insert and remove without difficulty. Pt counseled to insert and remove pessary and clean with warm water and soap on daily basis. F/U in 2 weeks for a pessary check or sooner for vaginal bleeding or discharge.    Zach Francis MD  Professor, OB/GYN  Urogynecologist  CC  Patient Care Team:  Vincent De La Garza MD as PCP - General (Internal Medicine)  Vincent De La Garza MD as Assigned PCP  Christy Mohamud MD as MD (Cardiology)  Divya Chávez RN as Registered Nurse (Transplant)  Елена Dinh RN as Registered Nurse  Daysi Perez MD as Assigned OBGYN Provider  Leventhal, Thomas Michael, MD as Assigned Surgical Provider  Christy Mohamud MD as Assigned Heart and Vascular Provider

## 2020-11-11 NOTE — LETTER
11/11/2020     RE: Belen Sharma  [address] [address]     Dear Colleague,    Thank you for referring your patient, Belen Sharma, to the [facility]OMEN[facility] CL[facility] [place] at [facility]. Please see a copy of my visit note below.    [facility] Visit    CC: Follow up cervical conization    S: 60 year old here to follow up recent cervical conization for JANNETTE 3 of endocervix. Had on and off bleeding following the procedure which has pretty much resolved. Is nervous about recurrence of cervical dyplasia in setting of being a transplant patient.     Is mostly concerned today about ongoing issues with prolapse. Does have appointment following this with Dr. Francis for pessary fitting.     O: /71   Pulse 89   Wt 68.9 kg (152 lb)   Breastfeeding No   BMI 21.81 kg/m    General: No distress  Abdomen: Soft, non-tender, non-distended, no masses  Pelvic Exam:  Vulva: No external lesions, normal hair distribution, normal architecture  Vagina: Atrophic, pink, no abnormal discharge, well rugated, no lesions  Cervix: smooth, pink, well healing area from conization with small amount of bleeding on exam- expected from healing    A:60 year old s/p CKC for JANNETTE 3    P: Reviewed pathology- all margins negative. She would like more conservative follow up due to transplant, so will plan repeat pap/HPV at 6 months from [facility].   Follow up with Dr. Francis as scheduled for prolapse    Daysi Perez MD FACOG

## 2020-11-11 NOTE — PROGRESS NOTES
November 11, 2020    Referring Provider: No referring provider defined for this encounter.    Primary Care Provider: Vincent De La Garza    Pt returns for pessary fitting. Found to have a cystocele on previous exam.    Pt fit with a # 4, 5 and then a 7 ring with support. The #7 was able to support her prolpase adequately. Demonstrated ability to insert and remove without difficulty. Pt counseled to insert and remove pessary and clean with warm water and soap on daily basis. F/U in 2 weeks for a pessary check or sooner for vaginal bleeding or discharge.    Zach Francis MD  Professor, OB/GYN  Urogynecologist  CC  Patient Care Team:  Vincent De La Garza MD as PCP - General (Internal Medicine)  Vincent De La Garza MD as Assigned PCP  Christy Mohamud MD as MD (Cardiology)  Divya Chávez, RN as Registered Nurse (Transplant)  Елена Dinh, RN as Registered Nurse  Daysi Perez MD as Assigned OBGYN Provider  Leventhal, Thomas Michael, MD as Assigned Surgical Provider  Christy Mohamud MD as Assigned Heart and Vascular Provider  Leventhal, Thomas Michael, MD as Assigned Gastroenterology Provider

## 2020-11-18 NOTE — PROGRESS NOTES
[facility] Visit    CC: Follow up cervical conization    S: 60 year old here to follow up recent cervical conization for JANNETTE 3 of endocervix. Had on and off bleeding following the procedure which has pretty much resolved. Is nervous about recurrence of cervical dyplasia in setting of being a transplant patient.     Is mostly concerned today about ongoing issues with prolapse. Does have appointment following this with Dr. Francis for pessary fitting.     O: /71   Pulse 89   Wt 68.9 kg (152 lb)   Breastfeeding No   BMI 21.81 kg/m    General: No distress  Abdomen: Soft, non-tender, non-distended, no masses  Pelvic Exam:  Vulva: No external lesions, normal hair distribution, normal architecture  Vagina: Atrophic, pink, no abnormal discharge, well rugated, no lesions  Cervix: smooth, pink, well healing area from conization with small amount of bleeding on exam- expected from healing      A:60 year old s/p CKC for JANNETTE 3    P: Reviewed pathology- all margins negative. She would like more conservative follow up due to transplant, so will plan repeat pap/HPV at 6 months from [facility].   Follow up with Dr. Francis as scheduled for prolapse      Daysi Perez MD FACOG

## 2020-11-27 DIAGNOSIS — Z94.0 KIDNEY TRANSPLANTED: Primary | ICD-10-CM

## 2020-11-30 DIAGNOSIS — Z94.0 KIDNEY TRANSPLANTED: ICD-10-CM

## 2020-11-30 RX ORDER — MYCOPHENOLIC ACID 360 MG/1
360 TABLET, DELAYED RELEASE ORAL
Qty: 12 EACH | Refills: 11 | Status: SHIPPED | OUTPATIENT
Start: 2020-11-30 | End: 2020-12-15

## 2020-11-30 RX ORDER — TACROLIMUS 1 MG/1
CAPSULE ORAL
Qty: 270 CAPSULE | Refills: 3 | Status: SHIPPED | OUTPATIENT
Start: 2020-11-30 | End: 2020-11-30

## 2020-11-30 RX ORDER — MYCOPHENOLIC ACID 360 MG/1
360 TABLET, DELAYED RELEASE ORAL
Qty: 12 EACH | Refills: 11 | Status: SHIPPED | OUTPATIENT
Start: 2020-11-30 | End: 2020-11-30

## 2020-11-30 RX ORDER — TACROLIMUS 1 MG/1
CAPSULE ORAL
Qty: 270 CAPSULE | Refills: 3 | Status: SHIPPED | OUTPATIENT
Start: 2020-11-30 | End: 2020-12-11

## 2020-12-02 ENCOUNTER — VIRTUAL VISIT (OUTPATIENT)
Dept: UROLOGY | Facility: CLINIC | Age: 60
End: 2020-12-02
Attending: OBSTETRICS & GYNECOLOGY
Payer: MEDICARE

## 2020-12-02 DIAGNOSIS — N81.11 CYSTOCELE, MIDLINE: ICD-10-CM

## 2020-12-02 DIAGNOSIS — N95.2 VAGINAL ATROPHY: Primary | ICD-10-CM

## 2020-12-02 PROCEDURE — 99442 PR PHYSICIAN TELEPHONE EVALUATION 11-20 MIN: CPT | Mod: 95 | Performed by: OBSTETRICS & GYNECOLOGY

## 2020-12-02 RX ORDER — ESTRADIOL 0.1 MG/G
1 CREAM VAGINAL
Qty: 42.5 G | Refills: 3 | Status: SHIPPED | OUTPATIENT
Start: 2020-12-03 | End: 2021-08-06

## 2020-12-02 NOTE — PROGRESS NOTES
"Belen Sharma is a 60 year old female who is being evaluated via a billable telephone visit.      The patient has been notified of following:     \"This telephone visit will be conducted via a call between you and your physician/provider. We have found that certain health care needs can be provided without the need for a physical exam.  This service lets us provide the care you need with a short phone conversation.  If a prescription is necessary we can send it directly to your pharmacy.  If lab work is needed we can place an order for that and you can then stop by our lab to have the test done at a later time.    Telephone visits are billed at different rates depending on your insurance coverage. During this emergency period, for some insurers they may be billed the same as an in-person visit.  Please reach out to your insurance provider with any questions.    If during the course of the call the physician/provider feels a telephone visit is not appropriate, you will not be charged for this service.\"    Patient has given verbal consent for Telephone visit?  Yes    What phone number would you like to be contacted at? [phone]     How would you like to obtain your AVS? Elena     CC: prolapse    Pt here for pessary check. Pt inserting and removing pessary daily. Pt happy with pessary. She denies bleeding, spotting or discharge.    There were no vitals taken for this visit. No LMP recorded. Patient is postmenopausal. There is no height or weight on file to calculate BMI.  Ms. Sharma is alert, comfortable in no acute distress, non-labored breathing.     A/P: Belen Sharma is a 60 year old F with a cystocele    Continue pessary. F/U in 6-12 months    A total of 15 minutes were spent with the patient today, > 50% in counseling and coordination of care    Zach Francis MD  Professor, OB/GYN  Urogynecologist  CC  Patient Care Team:  Vincent De La Garza MD as PCP - General (Internal Medicine)  Vincent De La Garza MD as " Assigned PCP  Christy Mohamud MD as MD (Cardiology)  Divya Chávez RN as Registered Nurse (Transplant)  Елена Dinh RN as Registered Nurse  Ana, Daysi Gaston MD as Assigned OBGYN Provider  Leventhal, Thomas Michael, MD as Assigned Surgical Provider  Leventhal, Thomas Michael, MD as Assigned Gastroenterology Provider  Christy Mohamud MD as Assigned Heart and Vascular Provider        Phone call duration: 15 minutes    Zach Francis MD

## 2020-12-02 NOTE — PROGRESS NOTES
"Belen Sharma is a 60 year old female who is being evaluated via a billable telephone visit.      The patient has been notified of following:     \"This telephone visit will be conducted via a call between you and your physician/provider. We have found that certain health care needs can be provided without the need for a physical exam.  This service lets us provide the care you need with a short phone conversation.  If a prescription is necessary we can send it directly to your pharmacy.  If lab work is needed we can place an order for that and you can then stop by our lab to have the test done at a later time.    Telephone visits are billed at different rates depending on your insurance coverage. During this emergency period, for some insurers they may be billed the same as an in-person visit.  Please reach out to your insurance provider with any questions.    If during the course of the call the physician/provider feels a telephone visit is not appropriate, you will not be charged for this service.\"    Patient has given verbal consent for Telephone visit?  Yes    What phone number would you like to be contacted at? [phone]     How would you like to obtain your AVS? Elena     CC: prolapse    Pt here for pessary check. Pt inserting and removing pessary daily. Pt happy with pessary. She denies bleeding, spotting or discharge.    There were no vitals taken for this visit. No LMP recorded. Patient is postmenopausal. There is no height or weight on file to calculate BMI.  Ms. Sharma is alert, comfortable in no acute distress, non-labored breathing.     A/P: Belen Sharma is a 60 year old F with cystocele    Continue pessary. F/U in 6-12 months    A total of 15 minutes were spent with the patient today, > 50% in counseling and coordination of care    Zach Francis MD  Professor, OB/GYN  Urogynecologist  CC  Patient Care Team:  Vincent De La Garza MD as PCP - General (Internal Medicine)  Vincent De La Garza MD as Assigned " PCP  Christy Mohamud MD as MD (Cardiology)  Paul Diaz, Divya MATA RN as Registered Nurse (Transplant)  Елена Dinh RN as Registered Nurse  Ana, Daysi Gaston MD as Assigned OBGYN Provider  Leventhal, Thomas Michael, MD as Assigned Surgical Provider  Leventhal, Thomas Michael, MD as Assigned Gastroenterology Provider  Christy Mohamud MD as Assigned Heart and Vascular Provider        Phone call duration: 15 minutes    Zach Francis MD

## 2020-12-02 NOTE — LETTER
"12/2/2020       RE: Belen Sharma  [address] [address]     Dear Colleague,    Thank you for referring your patient, Belen Sharma, to the [facility] [place] at [facility]. Please see a copy of my visit note below.    Belen Sharma is a 60 year old female who is being evaluated via a billable telephone visit.      The patient has been notified of following:     \"This telephone visit will be conducted via a call between you and your physician/provider. We have found that certain health care needs can be provided without the need for a physical exam.  This service lets us provide the care you need with a short phone conversation.  If a prescription is necessary we can send it directly to your pharmacy.  If lab work is needed we can place an order for that and you can then stop by our lab to have the test done at a later time.    Telephone visits are billed at different rates depending on your insurance coverage. During this emergency period, for some insurers they may be billed the same as an in-person visit.  Please reach out to your insurance provider with any questions.    If during the course of the call the physician/provider feels a telephone visit is not appropriate, you will not be charged for this service.\"    Patient has given verbal consent for Telephone visit?  Yes    What phone number would you like to be contacted at? [phone]     How would you like to obtain your AVS? Elena     CC: prolapse    Pt here for pessary check. Pt inserting and removing pessary daily. Pt happy with pessary. She denies bleeding, spotting or discharge.    There were no vitals taken for this visit. No LMP recorded. Patient is postmenopausal. There is no height or weight on file to calculate BMI.  Ms. Sharma is alert, comfortable in no acute distress, non-labored breathing.     A/P: Belen Sharma is a 60 year old F with a cystocele    Continue pessary. " "F/U in 6-12 months    A total of 15 minutes were spent with the patient today, > 50% in counseling and coordination of care    Zach Francis MD  Professor, OB/GYN  Urogynecologist  CC  Patient Care Team:  Vincent De La Garza MD as PCP - General (Internal Medicine)  Vincent De La Garza MD as Assigned PCP  Christy Mohamud MD as MD (Cardiology)  Divya Chávez RN as Registered Nurse (Transplant)  Елена Dinh RN as Registered Nurse  Daysi Perez MD as Assigned OBGYN Provider  Leventhal, Thomas Michael, MD as Assigned Surgical Provider  Leventhal, Thomas Michael, MD as Assigned Gastroenterology Provider  Christy Mohamud MD as Assigned Heart and Vascular Provider        Phone call duration: 15 minutes    Zach Francis MD        Belen Sharma is a 60 year old female who is being evaluated via a billable telephone visit.      The patient has been notified of following:     \"This telephone visit will be conducted via a call between you and your physician/provider. We have found that certain health care needs can be provided without the need for a physical exam.  This service lets us provide the care you need with a short phone conversation.  If a prescription is necessary we can send it directly to your pharmacy.  If lab work is needed we can place an order for that and you can then stop by our lab to have the test done at a later time.    Telephone visits are billed at different rates depending on your insurance coverage. During this emergency period, for some insurers they may be billed the same as an in-person visit.  Please reach out to your insurance provider with any questions.    If during the course of the call the physician/provider feels a telephone visit is not appropriate, you will not be charged for this service.\"    Patient has given verbal consent for Telephone visit?  Yes    What phone number would you like to be contacted at? [phone]     How would you like to obtain your " AVS? MyChart     CC: prolapse    Pt here for pessary check. Pt inserting and removing pessary daily. Pt happy with pessary. She denies bleeding, spotting or discharge.    There were no vitals taken for this visit. No LMP recorded. Patient is postmenopausal. There is no height or weight on file to calculate BMI.  Ms. Sharma is alert, comfortable in no acute distress, non-labored breathing.     A/P: Belenmichael Sharma is a 60 year old F with cystocele    Continue pessary. F/U in 6-12 months    A total of 15 minutes were spent with the patient today, > 50% in counseling and coordination of care    Zach Francis MD  Professor, OB/GYN  Urogynecologist  CC  Patient Care Team:  Vincent De La Garza MD as PCP - General (Internal Medicine)  Vincent De La Garza MD as Assigned PCP  Christy Mohamud MD as MD (Cardiology)  Divya Chávez RN as Registered Nurse (Transplant)  Елена Dinh RN as Registered Nurse  Daysi Perez MD as Assigned OBGYN Provider  Leventhal, Thomas Michael, MD as Assigned Surgical Provider  Leventhal, Thomas Michael, MD as Assigned Gastroenterology Provider  Christy Mohamud MD as Assigned Heart and Vascular Provider        Phone call duration: 15 minutes    Zach Francis MD

## 2020-12-03 ENCOUNTER — OFFICE VISIT (OUTPATIENT)
Dept: OBGYN | Facility: CLINIC | Age: 60
End: 2020-12-03
Attending: OBSTETRICS & GYNECOLOGY
Payer: MEDICARE

## 2020-12-03 ENCOUNTER — TELEPHONE (OUTPATIENT)
Dept: TRANSPLANT | Facility: CLINIC | Age: 60
End: 2020-12-03

## 2020-12-03 VITALS
BODY MASS INDEX: 22.2 KG/M2 | WEIGHT: 155.1 LBS | HEART RATE: 102 BPM | TEMPERATURE: 98.7 F | HEIGHT: 70 IN | SYSTOLIC BLOOD PRESSURE: 118 MMHG | DIASTOLIC BLOOD PRESSURE: 63 MMHG

## 2020-12-03 DIAGNOSIS — N30.00 ACUTE CYSTITIS WITHOUT HEMATURIA: ICD-10-CM

## 2020-12-03 DIAGNOSIS — R30.0 DYSURIA: Primary | ICD-10-CM

## 2020-12-03 LAB
ALBUMIN UR-MCNC: 100 MG/DL
ALBUMIN UR-MCNC: 30 MG/DL
APPEARANCE UR: ABNORMAL
APPEARANCE UR: ABNORMAL
BACTERIA #/AREA URNS HPF: ABNORMAL /HPF
BILIRUB UR QL STRIP: NEGATIVE
BILIRUB UR QL STRIP: NEGATIVE
COLOR UR AUTO: YELLOW
COLOR UR AUTO: YELLOW
GLUCOSE UR STRIP-MCNC: NEGATIVE MG/DL
GLUCOSE UR STRIP-MCNC: NEGATIVE MG/DL
HGB UR QL STRIP: ABNORMAL
HGB UR QL STRIP: ABNORMAL
KETONES UR STRIP-MCNC: NEGATIVE MG/DL
KETONES UR STRIP-MCNC: NEGATIVE MG/DL
LEUKOCYTE ESTERASE UR QL STRIP: ABNORMAL
LEUKOCYTE ESTERASE UR QL STRIP: ABNORMAL
MUCOUS THREADS #/AREA URNS LPF: PRESENT /LPF
NITRATE UR QL: NEGATIVE
NITRATE UR QL: POSITIVE
PH UR STRIP: 7 PH (ref 5–7)
PH UR STRIP: 7 PH (ref 5–7)
RBC #/AREA URNS AUTO: 72 /HPF (ref 0–2)
SOURCE: ABNORMAL
SP GR UR STRIP: 1.01 (ref 1–1.03)
SP GR UR STRIP: 1.02 (ref 1–1.03)
SQUAMOUS #/AREA URNS AUTO: 6 /HPF (ref 0–1)
TRANS CELLS #/AREA URNS HPF: 13 /HPF (ref 0–1)
UROBILINOGEN UR STRIP-ACNC: 0.2 EU/DL (ref 0.2–1)
UROBILINOGEN UR STRIP-MCNC: NORMAL MG/DL (ref 0–2)
WBC #/AREA URNS AUTO: >182 /HPF (ref 0–5)

## 2020-12-03 PROCEDURE — 87086 URINE CULTURE/COLONY COUNT: CPT | Performed by: OBSTETRICS & GYNECOLOGY

## 2020-12-03 PROCEDURE — 81001 URINALYSIS AUTO W/SCOPE: CPT | Performed by: OBSTETRICS & GYNECOLOGY

## 2020-12-03 PROCEDURE — 81003 URINALYSIS AUTO W/O SCOPE: CPT

## 2020-12-03 PROCEDURE — 99213 OFFICE O/P EST LOW 20 MIN: CPT | Performed by: OBSTETRICS & GYNECOLOGY

## 2020-12-03 PROCEDURE — G0463 HOSPITAL OUTPT CLINIC VISIT: HCPCS

## 2020-12-03 RX ORDER — NITROFURANTOIN 25; 75 MG/1; MG/1
100 CAPSULE ORAL 2 TIMES DAILY
Qty: 14 CAPSULE | Refills: 0 | Status: SHIPPED | OUTPATIENT
Start: 2020-12-03 | End: 2020-12-10

## 2020-12-03 ASSESSMENT — MIFFLIN-ST. JEOR: SCORE: 1353.78

## 2020-12-03 NOTE — LETTER
"12/3/2020       RE: Belen Sharma  [address] [address]     Dear Colleague,    Thank you for referring your patient, Belen Sharma, to the [facility] [place] at [facility]. Please see a copy of my visit note below.    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    Fever to 100.1 this am, no chills, headaches, chest pain, SOB.  No known Covid exposure, is being very careful with Covid precautions.  No flank pain or gross hematuria.     OBGYN Hx  LMP: Postmenopausal -      O: /63 (BP Location: Right arm, Patient Position: Chair)   Pulse 102   Temp 98.7  F (37.1  C) (Oral)   Ht 1.778 m (5' 10\")   Wt 70.4 kg (155 lb 1.6 oz)   BMI 22.25 kg/m      Exam:  Gen: Well appearing, no distress.     Color Urine  Yellow      Appearance Urine  Cloudy     Glucose Urine NEG^Negative mg/dL Negative     Bilirubin Urine NEG^Negative Negative     Ketones Urine NEG^Negative mg/dL Negative     Specific Gravity Urine 1.003 - 1.035 1.020     Blood Urine NEG^Negative ModerateAbnormal      pH Urine 5.0 - 7.0 pH 7.0     Protein Albumin Urine NEG^Negative mg/dL 100Abnormal      Urobilinogen Urine 0.2 - 1.0 EU/dL 0.2     Nitrite Urine NEG^Negative Negative     Leukocyte Esterase Urine NEG^Negative LargeAbnormal         Assessment/Plan: 60 year old year old  here for UTI type symptoms, UA concerning for UTI    UTI   - Per clinical symptoms, verified with UA performed today in clinic. Sample sent for ucx.   - Patient " given Macrobid 100mg BID for 7 days.  Will notify her of the results when available/modify abx if needed based on sensitivities.  Reviewed when to present to the ED for worsening symptoms.     S/p CKC (9/2020)   - No longer experiencing spotting vaginal bleeding.  - To follow up 4-6 months after procedure for repeat cytology.       Samantha Smalls MD, FACOG

## 2020-12-03 NOTE — TELEPHONE ENCOUNTER
Belen paged the triage line with concerns about a vaginal itching, mild bleeding and a low grade temp of 100.3. She has recently been using a pessary daily to support her vaginal prolapse. She denies any other COVID symptoms besides low grade fever. Does not think she has been exposed. This writer encouraged her to contact either an after hours line for her Urogynecologist or his clinic when normal hours open so she can get their recs and possibly leave a urine sample, etc. Pt verbalized understanding, all questions answered.

## 2020-12-03 NOTE — PROGRESS NOTES
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    Fever to 100.1 this am, no chills, headaches, chest pain, SOB.  No known Covid exposure, is being very careful with Covid precautions.  No flank pain or gross hematuria.     OBGYN Hx  LMP: Postmenopausal -      O: /63 (BP Location: Right arm, Patient Position: Chair)   Pulse 102   Temp 98.7  F (37.1  C) (Oral)   Ht 1.778 m (5' 10\")   Wt 70.4 kg (155 lb 1.6 oz)   BMI 22.25 kg/m      Exam:  Gen: Well appearing, no distress.     Color Urine  Yellow      Appearance Urine  Cloudy     Glucose Urine NEG^Negative mg/dL Negative     Bilirubin Urine NEG^Negative Negative     Ketones Urine NEG^Negative mg/dL Negative     Specific Gravity Urine 1.003 - 1.035 1.020     Blood Urine NEG^Negative ModerateAbnormal      pH Urine 5.0 - 7.0 pH 7.0     Protein Albumin Urine NEG^Negative mg/dL 100Abnormal      Urobilinogen Urine 0.2 - 1.0 EU/dL 0.2     Nitrite Urine NEG^Negative Negative     Leukocyte Esterase Urine NEG^Negative LargeAbnormal         Assessment/Plan: 60 year old year old  here for UTI type symptoms, UA concerning for UTI    UTI   - Per clinical symptoms, verified with UA performed today in clinic. Sample sent for ucx.   - Patient given Macrobid 100mg BID for 7 days.  Will notify her of the results when available/modify abx if needed based on sensitivities.  Reviewed when to present to the ED for worsening symptoms.     S/p CKC (2020)   - No longer experiencing spotting vaginal bleeding.  - To follow up 4-6 months after " procedure for repeat cytology.       Samantha Smalls MD, FACOG

## 2020-12-04 LAB
BACTERIA SPEC CULT: NORMAL
SPECIMEN SOURCE: NORMAL

## 2020-12-07 DIAGNOSIS — Z94.4 LIVER REPLACED BY TRANSPLANT (H): ICD-10-CM

## 2020-12-07 DIAGNOSIS — Z13.220 LIPID SCREENING: ICD-10-CM

## 2020-12-10 ENCOUNTER — TELEPHONE (OUTPATIENT)
Dept: TRANSPLANT | Facility: CLINIC | Age: 60
End: 2020-12-10

## 2020-12-10 DIAGNOSIS — Z48.298 AFTERCARE FOLLOWING ORGAN TRANSPLANT: ICD-10-CM

## 2020-12-10 DIAGNOSIS — Z13.220 LIPID SCREENING: ICD-10-CM

## 2020-12-10 DIAGNOSIS — Z94.4 LIVER REPLACED BY TRANSPLANT (H): ICD-10-CM

## 2020-12-10 DIAGNOSIS — Z94.0 KIDNEY REPLACED BY TRANSPLANT: Primary | ICD-10-CM

## 2020-12-10 DIAGNOSIS — Z79.899 ENCOUNTER FOR LONG-TERM CURRENT USE OF MEDICATION: ICD-10-CM

## 2020-12-10 LAB
ALBUMIN SERPL-MCNC: 3.8 G/DL (ref 3.4–5)
ALP SERPL-CCNC: 70 U/L (ref 40–150)
ALT SERPL W P-5'-P-CCNC: 31 U/L (ref 0–50)
ANION GAP SERPL CALCULATED.3IONS-SCNC: 5 MMOL/L (ref 3–14)
AST SERPL W P-5'-P-CCNC: 22 U/L (ref 0–45)
BILIRUB DIRECT SERPL-MCNC: 0.2 MG/DL (ref 0–0.2)
BILIRUB SERPL-MCNC: 0.6 MG/DL (ref 0.2–1.3)
BUN SERPL-MCNC: 16 MG/DL (ref 7–30)
CALCIUM SERPL-MCNC: 10.2 MG/DL (ref 8.5–10.1)
CHLORIDE SERPL-SCNC: 105 MMOL/L (ref 94–109)
CO2 SERPL-SCNC: 27 MMOL/L (ref 20–32)
CREAT SERPL-MCNC: 0.78 MG/DL (ref 0.52–1.04)
ERYTHROCYTE [DISTWIDTH] IN BLOOD BY AUTOMATED COUNT: 14.9 % (ref 10–15)
GFR SERPL CREATININE-BSD FRML MDRD: 83 ML/MIN/{1.73_M2}
GLUCOSE SERPL-MCNC: 92 MG/DL (ref 70–99)
HCT VFR BLD AUTO: 51.1 % (ref 35–47)
HGB BLD-MCNC: 16.8 G/DL (ref 11.7–15.7)
MCH RBC QN AUTO: 31.1 PG (ref 26.5–33)
MCHC RBC AUTO-ENTMCNC: 32.9 G/DL (ref 31.5–36.5)
MCV RBC AUTO: 95 FL (ref 78–100)
PLATELET # BLD AUTO: 249 10E9/L (ref 150–450)
POTASSIUM SERPL-SCNC: 3.3 MMOL/L (ref 3.4–5.3)
PROT SERPL-MCNC: 7.5 G/DL (ref 6.8–8.8)
RBC # BLD AUTO: 5.41 10E12/L (ref 3.8–5.2)
SODIUM SERPL-SCNC: 137 MMOL/L (ref 133–144)
TACROLIMUS BLD-MCNC: 4.4 UG/L (ref 5–15)
TME LAST DOSE: ABNORMAL H
WBC # BLD AUTO: 6.1 10E9/L (ref 4–11)

## 2020-12-10 PROCEDURE — 85027 COMPLETE CBC AUTOMATED: CPT | Performed by: INTERNAL MEDICINE

## 2020-12-10 PROCEDURE — 80197 ASSAY OF TACROLIMUS: CPT | Performed by: INTERNAL MEDICINE

## 2020-12-10 PROCEDURE — 80076 HEPATIC FUNCTION PANEL: CPT | Performed by: INTERNAL MEDICINE

## 2020-12-10 PROCEDURE — 36415 COLL VENOUS BLD VENIPUNCTURE: CPT | Performed by: INTERNAL MEDICINE

## 2020-12-10 PROCEDURE — 80048 BASIC METABOLIC PNL TOTAL CA: CPT | Performed by: INTERNAL MEDICINE

## 2020-12-10 NOTE — TELEPHONE ENCOUNTER
Patient Call: Transplant Lab/Orders  Route to LPN  Post Transplant Days: 3570 (Liver), 1773 (Kidney)  When patient is less than 60 days post-transplant, route high priority    Reason for Call: updated lab order in [employer] for [facility]   Callback needed? Patient would like a return call that orders were sent.  Urine has been collected.

## 2020-12-11 DIAGNOSIS — Z94.0 KIDNEY TRANSPLANTED: ICD-10-CM

## 2020-12-11 DIAGNOSIS — Z94.4 LIVER REPLACED BY TRANSPLANT (H): Primary | ICD-10-CM

## 2020-12-11 RX ORDER — TACROLIMUS 1 MG/1
2 CAPSULE ORAL EVERY 12 HOURS
Qty: 360 CAPSULE | Refills: 3 | Status: ON HOLD | OUTPATIENT
Start: 2020-12-11 | End: 2020-12-19

## 2020-12-11 NOTE — TELEPHONE ENCOUNTER
ISSUE:   Tacrolimus IR level 4.4 on 12/10/2020, goal 5-8, dose 2 mg every AM and 1 mg every PM.     PLAN:   Appears to be a 10 hour trough. Verify Tacrolimus IR dose 2 mg every AM and 1 mg every PM. Confirm no new medications or illness. Confirm no missed doses. If accurate trough and accurate dose, increase Tacrolimus IR dose to 2 mg BID and repeat TAC level only in 1-2 weeks to re assess dose.     Елена Dinh RN      OUTCOME:   Spoke with patient, they confirm accurate trough level and current dose 2 mg am, 1 mg. Patient confirmed dose change to 2 mg BID and to repeat labs in 1-2 weeks. Orders sent to preferred pharmacy for dose change and lab for repeat labs. Patient voiced understanding of plan.     Jeimy Lindquist LPN

## 2020-12-15 ENCOUNTER — VIRTUAL VISIT (OUTPATIENT)
Dept: NEPHROLOGY | Facility: CLINIC | Age: 60
End: 2020-12-15
Attending: INTERNAL MEDICINE
Payer: MEDICARE

## 2020-12-15 VITALS
BODY MASS INDEX: 21.67 KG/M2 | TEMPERATURE: 97.1 F | SYSTOLIC BLOOD PRESSURE: 123 MMHG | DIASTOLIC BLOOD PRESSURE: 58 MMHG | HEART RATE: 94 BPM | WEIGHT: 151 LBS

## 2020-12-15 DIAGNOSIS — Z94.0 KIDNEY TRANSPLANTED: ICD-10-CM

## 2020-12-15 PROCEDURE — 99214 OFFICE O/P EST MOD 30 MIN: CPT | Mod: 95

## 2020-12-15 RX ORDER — MYCOPHENOLIC ACID 180 MG/1
540 TABLET, DELAYED RELEASE ORAL 2 TIMES DAILY
Qty: 540 TABLET | Refills: 3 | Status: ON HOLD | OUTPATIENT
Start: 2020-12-15 | End: 2020-12-19

## 2020-12-15 RX ORDER — SULFAMETHOXAZOLE AND TRIMETHOPRIM 400; 80 MG/1; MG/1
1 TABLET ORAL
Qty: 45 TABLET | Refills: 3 | Status: SHIPPED | OUTPATIENT
Start: 2020-12-16 | End: 2021-06-08

## 2020-12-15 ASSESSMENT — PAIN SCALES - GENERAL: PAINLEVEL: NO PAIN (0)

## 2020-12-15 NOTE — PROGRESS NOTES
"Belen Sharma is a 60 year old female who is being evaluated via a billable video visit.      The patient has been notified of following:     \"This video visit will be conducted via a call between you and your physician/provider. We have found that certain health care needs can be provided without the need for an in-person physical exam.  This service lets us provide the care you need with a video conversation.  If a prescription is necessary we can send it directly to your pharmacy.  If lab work is needed we can place an order for that and you can then stop by our lab to have the test done at a later time.    Video visits are billed at different rates depending on your insurance coverage.  Please reach out to your insurance provider with any questions.    If during the course of the call the physician/provider feels a video visit is not appropriate, you will not be charged for this service.\"    Patient has given verbal consent for Video visit? Yes  How would you like to obtain your AVS? Mail a copy  If you are dropped from the video visit, the video invite should be resent to: Send to e-mail at: [email or web address]  Will anyone else be joining your video visit? No        Video-Visit Details    Type of service:  Video Visit    Originating Location (pt. Location): Home    Distant Location (provider location):  [facility] [place]     Platform used for Video Visit: Vikram Johnson MD        "

## 2020-12-15 NOTE — PROGRESS NOTES
"TRANSPLANT NEPHROLOGY TELEMEDICINE VISIT    Belen Sharma is a 60 year old female who is being evaluated via a billable telemedicine (video/telephone) visit on December 15, 2020.     The patient has been notified of following:     \"This telemedicine (video/telephone) visit will be conducted via a video/phone call between you and your physician. We have found that certain health care needs can be provided without the need for a physical exam.  This service lets us provide the care you need with a short video/phone conversation.  If a prescription is necessary, we can send it directly to your pharmacy.  If lab work is needed, we can place an order for that and you can then stop by our lab to have the test done at a later time.    If during the course of this video/phone call the physician feels a telemedicine (video/telephone) visit is not appropriate, you will not be charged for this service and an in clinic visit will be arranged at a later date.\"     Assessment & Plan   # DDKT: Stable   - Baseline Cr ~ 0.7-.0.9   - Proteinuria: Normal (<0.2 grams)   - Date DSA Last Checked: Not Known      Latest DSA: unknown. Will need to obtain. History of ABMR of her first kidney transplant and then desensitization prior to second transplant   - BK Viremia: Not checked recently due to time from transplant. Check with next labs   - Kidney Tx Biopsy: No    # Liver transplant:   -Good allograft function. Follows with transplant hepatology who notes that her level of immunosuppression is higher than necessary for liver.    # Immunosuppression: tacrolimus goal 4-6, MPA dose changed from 720/360 to 540 mg po bid. Prednisone 7.5 mg daily we discussed lowering to 5 mg daily.       # Recurrent UTI: will leave patient with as needed UA checks with reflex culture to help identify UTI in timely fashion until the underlying condition is resolved     # Infection Prophylaxis:   - PJP: Sulfa/TMP (Bactrim)       # History of CMV viremia:   -Last " checked in 2018. Recheck CMV quant with next labs      # Blood Pressure: Controlled;  Goal BP: < 130/80   - Volume status: Euvolemic    - Changes: Not at this time.      # Post-Transplant Erythrocytosis: Hgb: slightly increased  On ACEI/ARB: No, will monitor closely .     # Mineral Bone Disorder:   - Secondary renal hyperparathyroidism; PTH level: Not checked recently        On treatment: None  - Calcium; level: Normal        On supplement: No  - Phosphorus; level: Not checked recently        On supplement: No    # HSIL: plan for cold knife biopsy on 9/30/20. She will benefit from the lower end of acceptable Tac exposure level which is around 4 micrograms/L     # Skin Cancer Risk:    - Discussed sun protection and recommend regular follow up with Dermatology.    # Medical Compliance: Yes      # COVID-19 Virus Review: Discussed COVID-19 virus and the potential medical risks.  Reviewed preventative health recommendations, which includes washing hands for 20 seconds, avoid touching your face, and social distancing.  Asked patient to inform the transplant center if they are exposed or diagnosed with this virus.    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    Return visit: No follow-ups on file. 3 month follow up. Monthly (per pt request)      Belenmichael Sharma has a clinical telephone visit for immunosuppression management.    History of Present Illness   Patient feels ok, has been dealing with UTIs likely due to the pessary system and difficulties with bladder emptying.     Recent Hospitalizations:  [x] No [] Yes    New  Medical Issues: [x] No [] Yes Had possible UTI but was not identified by her PCP due to contamination    Decreased energy: [x] No [] Yes    Chest pain or SOB with exertion:  [x] No [] Yes    Appetite change or weight change: [x] No [] Yes    Nausea, vomiting or diarrhea:  [x] No [] Yes    Fever, sweats or chills: [x] No [] Yes    Leg swelling: [x] No [] Yes      Home BP: 123/58 HR 94     Review of Systems   A comprehensive review of systems was obtained and negative, except as noted in the HPI or PMH.    I have reviewed and updated the patient's Past Medical History, Social History, and Medication List.    Active Medical Problems  Patient Active Problem List   Diagnosis     S/P splenectomy     Congenital hepatic fibrosis     S/P kidney transplant     Polycystic kidney disease     Immunosuppression (H)     S/P liver transplant (H)     Endometriosis     HPV (human papilloma virus) infection     High grade squamous intraepithelial lesion of cervix     Abnormal Pap smear of cervix     Abnormal urine     Acquired bilateral hammer toes     Anemia     BK virus nephropathy     Candidiasis of vagina     Cervical high risk human papillomavirus (HPV) DNA test positive     Chicken pox     Chronic renal insufficiency     Congenital polycystic kidney     Cystitis     Depression     Depressive psychosis (H)     Electrolyte abnormality     Exercise counseling     Dermatophytosis of nail     Reason for consultation     Encounter for counseling     Encounter for screening for malignant neoplasm of breast     Encounter for medication refill     End stage renal disease (H)     Hallux valgus, acquired, bilateral     Hypertension     Hyperthyroidism     Incisional hernia following transplant     Kidney transplant rejection     Knee pain     Malignant neoplasm of breast (H)     Microvascular angina (H)     Patient on waiting list for organ transplant     Polycythemia     Postmenopausal state     Prophylactic antibiotic     Renal bone  disease     Renal hypertension     Scarlet fever     Scoliosis deformity of spine     Secondary physiologic amenorrhea     Sinusitis     Swelling of first metatarsophalangeal (MTP) joint     Thrombocytopenia (H)     Uterine prolapse     Xerosis of skin     Encounter for aftercare following kidney transplant     History of liver transplant (H)     History of kidney transplant     Allergies  Ibuprofen    Medications  Current Outpatient Medications   Medication Sig     acetaminophen (TYLENOL) 325 MG tablet Take 2 tablets (650 mg) by mouth every 4 hours as needed for mild pain     ALPRAZolam (XANAX) 0.25 MG tablet Take 0.25 mg by mouth as needed for anxiety     aspirin (ASA) 81 MG chewable tablet Take 81 mg by mouth every evening Stopped 7 days preop     biotin 1000 MCG TABS tablet Take 3,000 mcg by mouth daily     diphenhydrAMINE (BENADRYL) 25 MG tablet Take 50 mg by mouth as needed     docusate sodium (COLACE) 100 MG capsule Take 200 mg by mouth every morning      estradiol (ESTRACE) 0.1 MG/GM vaginal cream Place 1 g vaginally twice a week     loperamide (IMODIUM A-D) 2 MG tablet Take 1 tablet by mouth as needed     loratadine (CLARITIN) 10 MG tablet Take 10 mg by mouth every evening      Multiple Vitamins-Minerals (VITAMIN D3 COMPLETE PO) Take 2,000 Int'l Units by mouth every morning      MYFORTIC (BRAND) 360 MG EC tablet Take 1 tablet (360 mg) by mouth Every Mon, Wed, Fri Morning     nitroGLYcerin (NITROSTAT) 0.4 MG sublingual tablet Place 0.4 mg under the tongue as needed for chest pain (Hasn't used for a while) For chest pain place 1 tablet under the tongue every 5 minutes for 3 doses. If symptoms persist 5 minutes after 1st dose call 911.      ondansetron (ZOFRAN) 4 MG tablet Take by mouth as needed for nausea     pantoprazole (PROTONIX) 40 MG EC tablet Take 1 tablet by mouth as needed     predniSONE (DELTASONE) 5 MG tablet Take 7.5 mg by mouth every morning      Probiotic Product (PROBIOTIC PO) Take 2 tablets by  mouth every morning      Psyllium (METAMUCIL PO) Take by mouth every morning Hold 9-30-20     sulfamethoxazole-trimethoprim (BACTRIM) 400-80 MG tablet      tacrolimus (GENERIC EQUIVALENT) 1 MG capsule Take 2 capsules (2 mg) by mouth every 12 hours     temazepam (RESTORIL) 15 MG capsule Take 15 mg by mouth as needed for sleep (twice a year)      Vitamin D, Cholecalciferol, 25 MCG (1000 UT) CAPS Take 1,000 Units by mouth D3     No current facility-administered medications for this visit.        Video call contact time:  30 min     Dallas Johnson MD

## 2020-12-15 NOTE — LETTER
"12/15/2020       RE: Belen Sharma  [address] [facility] [address]     Dear Colleague,    Thank you for referring your patient, Belen Sharma, to the [facility] [place] [facility]. Please see a copy of my visit note below.    Belen Sharma is a 60 year old female who is being evaluated via a billable video visit.      The patient has been notified of following:     \"This video visit will be conducted via a call between you and your physician/provider. We have found that certain health care needs can be provided without the need for an in-person physical exam.  This service lets us provide the care you need with a video conversation.  If a prescription is necessary we can send it directly to your pharmacy.  If lab work is needed we can place an order for that and you can then stop by our lab to have the test done at a later time.    Video visits are billed at different rates depending on your insurance coverage.  Please reach out to your insurance provider with any questions.    If during the course of the call the physician/provider feels a video visit is not appropriate, you will not be charged for this service.\"    Patient has given verbal consent for Video visit? Yes  How would you like to obtain your AVS? Mail a copy  If you are dropped from the video visit, the video invite should be resent to: Send to e-mail at: [email or web address]  Will anyone else be joining your video visit? No        Video-Visit Details    Type of service:  Video Visit    Originating Location (pt. Location): Home    Distant Location (provider location):  [facility] [place]     Platform used for Video Visit: Vikram Johnson MD          TRANSPLANT NEPHROLOGY TELEMEDICINE VISIT    Belen Sharma is a 60 year old female who is being evaluated via a billable telemedicine (video/telephone) visit on December 15, 2020.     The patient has " "been notified of following:     \"This telemedicine (video/telephone) visit will be conducted via a video/phone call between you and your physician. We have found that certain health care needs can be provided without the need for a physical exam.  This service lets us provide the care you need with a short video/phone conversation.  If a prescription is necessary, we can send it directly to your pharmacy.  If lab work is needed, we can place an order for that and you can then stop by our lab to have the test done at a later time.    If during the course of this video/phone call the physician feels a telemedicine (video/telephone) visit is not appropriate, you will not be charged for this service and an in clinic visit will be arranged at a later date.\"     Assessment & Plan   # DDKT: Stable   - Baseline Cr ~ 0.7-.0.9   - Proteinuria: Normal (<0.2 grams)   - Date DSA Last Checked: Not Known      Latest DSA: unknown. Will need to obtain. History of ABMR of her first kidney transplant and then desensitization prior to second transplant   - BK Viremia: Not checked recently due to time from transplant. Check with next labs   - Kidney Tx Biopsy: No    # Liver transplant:   -Good allograft function. Follows with transplant hepatology who notes that her level of immunosuppression is higher than necessary for liver.    # Immunosuppression: tacrolimus goal 4-6, MPA dose changed from 720/360 to 540 mg po bid. Prednisone 7.5 mg daily we discussed lowering to 5 mg daily.       # Recurrent UTI: will leave patient with as needed UA checks with reflex culture to help identify UTI in timely fashion until the underlying condition is resolved     # Infection Prophylaxis:   - PJP: Sulfa/TMP (Bactrim)       # History of CMV viremia:   -Last checked in 2018. Recheck CMV quant with next labs      # Blood Pressure: Controlled;  Goal BP: < 130/80   - Volume status: Euvolemic    - Changes: Not at this time.      # Post-Transplant " Erythrocytosis: Hgb: slightly increased  On ACEI/ARB: No, will monitor closely .     # Mineral Bone Disorder:   - Secondary renal hyperparathyroidism; PTH level: Not checked recently        On treatment: None  - Calcium; level: Normal        On supplement: No  - Phosphorus; level: Not checked recently        On supplement: No    # HSIL: plan for cold knife biopsy on 9/30/20. She will benefit from the lower end of acceptable Tac exposure level which is around 4 micrograms/L     # Skin Cancer Risk:    - Discussed sun protection and recommend regular follow up with Dermatology.    # Medical Compliance: Yes      # COVID-19 Virus Review: Discussed COVID-19 virus and the potential medical risks.  Reviewed preventative health recommendations, which includes washing hands for 20 seconds, avoid touching your face, and social distancing.  Asked patient to inform the transplant center if they are exposed or diagnosed with this virus.    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    Return visit: No follow-ups on file. 3 month follow up. Monthly (per pt request)      Belen Sharma has a clinical telephone visit for immunosuppression management.    History of Present Illness   Patient feels ok, has been dealing with UTIs likely due to the pessary system and difficulties with bladder emptying.     Recent Hospitalizations:  [x] No [] Yes    New Medical Issues: [x] No [] Yes Had possible UTI but was not identified by her PCP due to contamination    Decreased energy: [x] No [] Yes    Chest pain or SOB with exertion:  [x] No [] Yes     Appetite change or weight change: [x] No [] Yes    Nausea, vomiting or diarrhea:  [x] No [] Yes    Fever, sweats or chills: [x] No [] Yes    Leg swelling: [x] No [] Yes      Home BP: 123/58 HR 94     Review of Systems   A comprehensive review of systems was obtained and negative, except as noted in the HPI or PMH.    I have reviewed and updated the patient's Past Medical History, Social History, and Medication List.    Active Medical Problems  Patient Active Problem List   Diagnosis     S/P splenectomy     Congenital hepatic fibrosis     S/P kidney transplant     Polycystic kidney disease     Immunosuppression (H)     S/P liver transplant (H)     Endometriosis     HPV (human papilloma virus) infection     High grade squamous intraepithelial lesion of cervix     Abnormal Pap smear of cervix     Abnormal urine     Acquired bilateral hammer toes     Anemia     BK virus nephropathy     Candidiasis of vagina     Cervical high risk human papillomavirus (HPV) DNA test positive     Chicken pox     Chronic renal insufficiency     Congenital polycystic kidney     Cystitis     Depression     Depressive psychosis (H)     Electrolyte abnormality     Exercise counseling     Dermatophytosis of nail     Reason for consultation     Encounter for counseling     Encounter for screening for malignant neoplasm of breast     Encounter for medication refill     End stage renal disease (H)     Hallux valgus, acquired, bilateral     Hypertension     Hyperthyroidism     Incisional hernia following transplant     Kidney transplant rejection     Knee pain     Malignant neoplasm of breast (H)     Microvascular angina (H)     Patient on waiting list for organ transplant     Polycythemia     Postmenopausal state     Prophylactic antibiotic     Renal bone disease     Renal hypertension     Scarlet fever     Scoliosis deformity of spine     Secondary physiologic amenorrhea     Sinusitis     Swelling of first metatarsophalangeal (MTP) joint      Thrombocytopenia (H)     Uterine prolapse     Xerosis of skin     Encounter for aftercare following kidney transplant     History of liver transplant (H)     History of kidney transplant     Allergies  Ibuprofen    Medications  Current Outpatient Medications   Medication Sig     acetaminophen (TYLENOL) 325 MG tablet Take 2 tablets (650 mg) by mouth every 4 hours as needed for mild pain     ALPRAZolam (XANAX) 0.25 MG tablet Take 0.25 mg by mouth as needed for anxiety     aspirin (ASA) 81 MG chewable tablet Take 81 mg by mouth every evening Stopped 7 days preop     biotin 1000 MCG TABS tablet Take 3,000 mcg by mouth daily     diphenhydrAMINE (BENADRYL) 25 MG tablet Take 50 mg by mouth as needed     docusate sodium (COLACE) 100 MG capsule Take 200 mg by mouth every morning      estradiol (ESTRACE) 0.1 MG/GM vaginal cream Place 1 g vaginally twice a week     loperamide (IMODIUM A-D) 2 MG tablet Take 1 tablet by mouth as needed     loratadine (CLARITIN) 10 MG tablet Take 10 mg by mouth every evening      Multiple Vitamins-Minerals (VITAMIN D3 COMPLETE PO) Take 2,000 Int'l Units by mouth every morning      MYFORTIC (BRAND) 360 MG EC tablet Take 1 tablet (360 mg) by mouth Every Mon, Wed, Fri Morning     nitroGLYcerin (NITROSTAT) 0.4 MG sublingual tablet Place 0.4 mg under the tongue as needed for chest pain (Hasn't used for a while) For chest pain place 1 tablet under the tongue every 5 minutes for 3 doses. If symptoms persist 5 minutes after 1st dose call 911.      ondansetron (ZOFRAN) 4 MG tablet Take by mouth as needed for nausea     pantoprazole (PROTONIX) 40 MG EC tablet Take 1 tablet by mouth as needed     predniSONE (DELTASONE) 5 MG tablet Take 7.5 mg by mouth every morning      Probiotic Product (PROBIOTIC PO) Take 2 tablets by mouth every morning      Psyllium (METAMUCIL PO) Take by mouth every morning Hold 9-30-20     sulfamethoxazole-trimethoprim (BACTRIM) 400-80 MG tablet      tacrolimus (GENERIC EQUIVALENT) 1  MG capsule Take 2 capsules (2 mg) by mouth every 12 hours     temazepam (RESTORIL) 15 MG capsule Take 15 mg by mouth as needed for sleep (twice a year)      Vitamin D, Cholecalciferol, 25 MCG (1000 UT) CAPS Take 1,000 Units by mouth D3     No current facility-administered medications for this visit.        Video call contact time:  30 min     Dallas Johnson MD         Again, thank you for allowing me to participate in the care of your patient.      Sincerely,    Kidney/Pancreas Recipient

## 2020-12-16 ENCOUNTER — HOSPITAL ENCOUNTER (INPATIENT)
Facility: CLINIC | Age: 60
LOS: 3 days | Discharge: HOME HEALTH | DRG: 872 | End: 2020-12-19
Attending: EMERGENCY MEDICINE | Admitting: STUDENT IN AN ORGANIZED HEALTH CARE EDUCATION/TRAINING PROGRAM
Payer: MEDICARE

## 2020-12-16 ENCOUNTER — TELEPHONE (OUTPATIENT)
Dept: TRANSPLANT | Facility: CLINIC | Age: 60
End: 2020-12-16

## 2020-12-16 DIAGNOSIS — Z94.0 KIDNEY REPLACED BY TRANSPLANT: ICD-10-CM

## 2020-12-16 DIAGNOSIS — Z94.4 S/P LIVER TRANSPLANT (H): ICD-10-CM

## 2020-12-16 DIAGNOSIS — Z16.12 ESBL (EXTENDED SPECTRUM BETA-LACTAMASE) PRODUCING BACTERIA INFECTION: Primary | ICD-10-CM

## 2020-12-16 DIAGNOSIS — M81.0 AGE-RELATED OSTEOPOROSIS WITHOUT CURRENT PATHOLOGICAL FRACTURE: ICD-10-CM

## 2020-12-16 DIAGNOSIS — Z20.828 EXPOSURE TO SARS-ASSOCIATED CORONAVIRUS: ICD-10-CM

## 2020-12-16 DIAGNOSIS — N39.0 COMPLICATED URINARY TRACT INFECTION: ICD-10-CM

## 2020-12-16 DIAGNOSIS — D84.9 IMMUNOSUPPRESSED STATUS (H): ICD-10-CM

## 2020-12-16 DIAGNOSIS — A49.9 ESBL (EXTENDED SPECTRUM BETA-LACTAMASE) PRODUCING BACTERIA INFECTION: Primary | ICD-10-CM

## 2020-12-16 DIAGNOSIS — D89.9 DISEASE OF IMMUNE SYSTEM (H): ICD-10-CM

## 2020-12-16 LAB
ALBUMIN SERPL-MCNC: 3.6 G/DL (ref 3.4–5)
ALBUMIN UR-MCNC: 10 MG/DL
ALP SERPL-CCNC: 77 U/L (ref 40–150)
ALT SERPL W P-5'-P-CCNC: 23 U/L (ref 0–50)
ANION GAP SERPL CALCULATED.3IONS-SCNC: 5 MMOL/L (ref 3–14)
APPEARANCE UR: ABNORMAL
AST SERPL W P-5'-P-CCNC: 15 U/L (ref 0–45)
BACTERIA #/AREA URNS HPF: ABNORMAL /HPF
BASOPHILS # BLD AUTO: 0 10E9/L (ref 0–0.2)
BASOPHILS NFR BLD AUTO: 0.2 %
BILIRUB SERPL-MCNC: 0.7 MG/DL (ref 0.2–1.3)
BILIRUB UR QL STRIP: NEGATIVE
BUN SERPL-MCNC: 17 MG/DL (ref 7–30)
CALCIUM SERPL-MCNC: 9.9 MG/DL (ref 8.5–10.1)
CHLORIDE SERPL-SCNC: 106 MMOL/L (ref 94–109)
CO2 SERPL-SCNC: 26 MMOL/L (ref 20–32)
COLOR UR AUTO: ABNORMAL
CREAT SERPL-MCNC: 0.87 MG/DL (ref 0.52–1.04)
DIFFERENTIAL METHOD BLD: ABNORMAL
EOSINOPHIL # BLD AUTO: 0 10E9/L (ref 0–0.7)
EOSINOPHIL NFR BLD AUTO: 0.1 %
ERYTHROCYTE [DISTWIDTH] IN BLOOD BY AUTOMATED COUNT: 14.6 % (ref 10–15)
FLUAV+FLUBV RNA SPEC QL NAA+PROBE: NEGATIVE
FLUAV+FLUBV RNA SPEC QL NAA+PROBE: NEGATIVE
GFR SERPL CREATININE-BSD FRML MDRD: 72 ML/MIN/{1.73_M2}
GLUCOSE SERPL-MCNC: 89 MG/DL (ref 70–99)
GLUCOSE UR STRIP-MCNC: NEGATIVE MG/DL
HCT VFR BLD AUTO: 54.5 % (ref 35–47)
HGB BLD-MCNC: 16.9 G/DL (ref 11.7–15.7)
HGB UR QL STRIP: ABNORMAL
IMM GRANULOCYTES # BLD: 0.1 10E9/L (ref 0–0.4)
IMM GRANULOCYTES NFR BLD: 0.4 %
KETONES UR STRIP-MCNC: NEGATIVE MG/DL
LABORATORY COMMENT REPORT: NORMAL
LACTATE BLD-SCNC: 0.8 MMOL/L (ref 0.7–2)
LEUKOCYTE ESTERASE UR QL STRIP: ABNORMAL
LYMPHOCYTES # BLD AUTO: 1 10E9/L (ref 0.8–5.3)
LYMPHOCYTES NFR BLD AUTO: 9 %
MCH RBC QN AUTO: 29.6 PG (ref 26.5–33)
MCHC RBC AUTO-ENTMCNC: 31 G/DL (ref 31.5–36.5)
MCV RBC AUTO: 96 FL (ref 78–100)
MONOCYTES # BLD AUTO: 1 10E9/L (ref 0–1.3)
MONOCYTES NFR BLD AUTO: 8.4 %
MUCOUS THREADS #/AREA URNS LPF: PRESENT /LPF
NEUTROPHILS # BLD AUTO: 9.3 10E9/L (ref 1.6–8.3)
NEUTROPHILS NFR BLD AUTO: 81.9 %
NITRATE UR QL: NEGATIVE
NRBC # BLD AUTO: 0 10*3/UL
NRBC BLD AUTO-RTO: 0 /100
PH UR STRIP: 7.5 PH (ref 5–7)
PLATELET # BLD AUTO: 260 10E9/L (ref 150–450)
POTASSIUM SERPL-SCNC: 4.2 MMOL/L (ref 3.4–5.3)
PROT SERPL-MCNC: 7.4 G/DL (ref 6.8–8.8)
RBC # BLD AUTO: 5.7 10E12/L (ref 3.8–5.2)
RBC #/AREA URNS AUTO: 2 /HPF (ref 0–2)
RSV RNA SPEC QL NAA+PROBE: NEGATIVE
SARS-COV-2 RNA SPEC QL NAA+PROBE: NEGATIVE
SODIUM SERPL-SCNC: 137 MMOL/L (ref 133–144)
SOURCE: ABNORMAL
SP GR UR STRIP: 1.01 (ref 1–1.03)
SPECIMEN SOURCE: NORMAL
SQUAMOUS #/AREA URNS AUTO: <1 /HPF (ref 0–1)
UROBILINOGEN UR STRIP-MCNC: NORMAL MG/DL (ref 0–2)
WBC # BLD AUTO: 11.4 10E9/L (ref 4–11)
WBC #/AREA URNS AUTO: 466 /HPF (ref 0–5)
WBC CLUMPS #/AREA URNS HPF: PRESENT /HPF

## 2020-12-16 PROCEDURE — 80053 COMPREHEN METABOLIC PANEL: CPT | Performed by: EMERGENCY MEDICINE

## 2020-12-16 PROCEDURE — 99285 EMERGENCY DEPT VISIT HI MDM: CPT | Mod: 25 | Performed by: EMERGENCY MEDICINE

## 2020-12-16 PROCEDURE — 87181 SC STD AGAR DILUTION PER AGT: CPT | Performed by: EMERGENCY MEDICINE

## 2020-12-16 PROCEDURE — 87186 SC STD MICRODIL/AGAR DIL: CPT | Performed by: EMERGENCY MEDICINE

## 2020-12-16 PROCEDURE — 96365 THER/PROPH/DIAG IV INF INIT: CPT | Performed by: EMERGENCY MEDICINE

## 2020-12-16 PROCEDURE — 87040 BLOOD CULTURE FOR BACTERIA: CPT | Performed by: EMERGENCY MEDICINE

## 2020-12-16 PROCEDURE — 87636 SARSCOV2 & INF A&B AMP PRB: CPT | Performed by: EMERGENCY MEDICINE

## 2020-12-16 PROCEDURE — C9803 HOPD COVID-19 SPEC COLLECT: HCPCS | Performed by: EMERGENCY MEDICINE

## 2020-12-16 PROCEDURE — 99285 EMERGENCY DEPT VISIT HI MDM: CPT | Performed by: EMERGENCY MEDICINE

## 2020-12-16 PROCEDURE — 83605 ASSAY OF LACTIC ACID: CPT | Performed by: EMERGENCY MEDICINE

## 2020-12-16 PROCEDURE — 87088 URINE BACTERIA CULTURE: CPT | Performed by: EMERGENCY MEDICINE

## 2020-12-16 PROCEDURE — 87086 URINE CULTURE/COLONY COUNT: CPT | Performed by: EMERGENCY MEDICINE

## 2020-12-16 PROCEDURE — 258N000003 HC RX IP 258 OP 636: Performed by: EMERGENCY MEDICINE

## 2020-12-16 PROCEDURE — 81001 URINALYSIS AUTO W/SCOPE: CPT | Performed by: EMERGENCY MEDICINE

## 2020-12-16 PROCEDURE — 120N000011 HC R&B TRANSPLANT UMMC

## 2020-12-16 PROCEDURE — 85025 COMPLETE CBC W/AUTO DIFF WBC: CPT | Performed by: EMERGENCY MEDICINE

## 2020-12-16 PROCEDURE — 87800 DETECT AGNT MULT DNA DIREC: CPT | Performed by: EMERGENCY MEDICINE

## 2020-12-16 PROCEDURE — 87077 CULTURE AEROBIC IDENTIFY: CPT | Performed by: EMERGENCY MEDICINE

## 2020-12-16 PROCEDURE — 250N000011 HC RX IP 250 OP 636: Performed by: EMERGENCY MEDICINE

## 2020-12-16 RX ORDER — CEFTRIAXONE 1 G/1
1 INJECTION, POWDER, FOR SOLUTION INTRAMUSCULAR; INTRAVENOUS ONCE
Status: COMPLETED | OUTPATIENT
Start: 2020-12-16 | End: 2020-12-17

## 2020-12-16 RX ADMIN — SODIUM CHLORIDE 500 ML: 9 INJECTION, SOLUTION INTRAVENOUS at 23:14

## 2020-12-16 RX ADMIN — CEFTRIAXONE 1 G: 1 INJECTION, POWDER, FOR SOLUTION INTRAMUSCULAR; INTRAVENOUS at 23:52

## 2020-12-16 ASSESSMENT — ENCOUNTER SYMPTOMS
HEADACHES: 1
VOMITING: 0
NAUSEA: 0
COUGH: 0
SHORTNESS OF BREATH: 0
DYSURIA: 1
DIARRHEA: 0
ABDOMINAL PAIN: 0
CHILLS: 1
MYALGIAS: 1
SORE THROAT: 0

## 2020-12-17 ENCOUNTER — TELEPHONE (OUTPATIENT)
Dept: TRANSPLANT | Facility: CLINIC | Age: 60
End: 2020-12-17

## 2020-12-17 ENCOUNTER — APPOINTMENT (OUTPATIENT)
Dept: CT IMAGING | Facility: CLINIC | Age: 60
DRG: 872 | End: 2020-12-17
Payer: MEDICARE

## 2020-12-17 ENCOUNTER — APPOINTMENT (OUTPATIENT)
Dept: ULTRASOUND IMAGING | Facility: CLINIC | Age: 60
DRG: 872 | End: 2020-12-17
Payer: MEDICARE

## 2020-12-17 DIAGNOSIS — Z94.4 S/P LIVER TRANSPLANT (H): ICD-10-CM

## 2020-12-17 DIAGNOSIS — Z94.0 KIDNEY TRANSPLANTED: Primary | ICD-10-CM

## 2020-12-17 LAB
LACTATE BLD-SCNC: 0.6 MMOL/L (ref 0.7–2)
TACROLIMUS BLD-MCNC: 4.4 UG/L (ref 5–15)
TME LAST DOSE: ABNORMAL H

## 2020-12-17 PROCEDURE — 250N000011 HC RX IP 250 OP 636: Performed by: STUDENT IN AN ORGANIZED HEALTH CARE EDUCATION/TRAINING PROGRAM

## 2020-12-17 PROCEDURE — 258N000003 HC RX IP 258 OP 636: Performed by: STUDENT IN AN ORGANIZED HEALTH CARE EDUCATION/TRAINING PROGRAM

## 2020-12-17 PROCEDURE — 76776 US EXAM K TRANSPL W/DOPPLER: CPT | Mod: 26 | Performed by: RADIOLOGY

## 2020-12-17 PROCEDURE — 120N000011 HC R&B TRANSPLANT UMMC

## 2020-12-17 PROCEDURE — 250N000012 HC RX MED GY IP 250 OP 636 PS 637: Performed by: STUDENT IN AN ORGANIZED HEALTH CARE EDUCATION/TRAINING PROGRAM

## 2020-12-17 PROCEDURE — 99221 1ST HOSP IP/OBS SF/LOW 40: CPT | Mod: AI | Performed by: STUDENT IN AN ORGANIZED HEALTH CARE EDUCATION/TRAINING PROGRAM

## 2020-12-17 PROCEDURE — 36415 COLL VENOUS BLD VENIPUNCTURE: CPT | Performed by: STUDENT IN AN ORGANIZED HEALTH CARE EDUCATION/TRAINING PROGRAM

## 2020-12-17 PROCEDURE — 80197 ASSAY OF TACROLIMUS: CPT | Performed by: STUDENT IN AN ORGANIZED HEALTH CARE EDUCATION/TRAINING PROGRAM

## 2020-12-17 PROCEDURE — 250N000013 HC RX MED GY IP 250 OP 250 PS 637: Performed by: STUDENT IN AN ORGANIZED HEALTH CARE EDUCATION/TRAINING PROGRAM

## 2020-12-17 PROCEDURE — 74177 CT ABD & PELVIS W/CONTRAST: CPT | Mod: 26 | Performed by: RADIOLOGY

## 2020-12-17 PROCEDURE — 87040 BLOOD CULTURE FOR BACTERIA: CPT | Performed by: STUDENT IN AN ORGANIZED HEALTH CARE EDUCATION/TRAINING PROGRAM

## 2020-12-17 PROCEDURE — 76776 US EXAM K TRANSPL W/DOPPLER: CPT

## 2020-12-17 PROCEDURE — 250N000011 HC RX IP 250 OP 636: Performed by: HOSPITALIST

## 2020-12-17 PROCEDURE — 83605 ASSAY OF LACTIC ACID: CPT | Performed by: STUDENT IN AN ORGANIZED HEALTH CARE EDUCATION/TRAINING PROGRAM

## 2020-12-17 PROCEDURE — 99223 1ST HOSP IP/OBS HIGH 75: CPT | Mod: GC | Performed by: INTERNAL MEDICINE

## 2020-12-17 PROCEDURE — 99207 PR DOWN CODE DUE TO INITIAL EXAM: CPT | Performed by: STUDENT IN AN ORGANIZED HEALTH CARE EDUCATION/TRAINING PROGRAM

## 2020-12-17 PROCEDURE — 96361 HYDRATE IV INFUSION ADD-ON: CPT | Performed by: EMERGENCY MEDICINE

## 2020-12-17 PROCEDURE — 74177 CT ABD & PELVIS W/CONTRAST: CPT

## 2020-12-17 PROCEDURE — 99221 1ST HOSP IP/OBS SF/LOW 40: CPT | Performed by: INTERNAL MEDICINE

## 2020-12-17 RX ORDER — TACROLIMUS 1 MG/1
2 CAPSULE ORAL EVERY 12 HOURS
Status: DISCONTINUED | OUTPATIENT
Start: 2020-12-17 | End: 2020-12-17

## 2020-12-17 RX ORDER — LIDOCAINE 40 MG/G
CREAM TOPICAL
Status: DISCONTINUED | OUTPATIENT
Start: 2020-12-17 | End: 2020-12-20 | Stop reason: HOSPADM

## 2020-12-17 RX ORDER — DOCUSATE SODIUM 100 MG/1
200 CAPSULE, LIQUID FILLED ORAL EVERY MORNING
Status: DISCONTINUED | OUTPATIENT
Start: 2020-12-17 | End: 2020-12-20 | Stop reason: HOSPADM

## 2020-12-17 RX ORDER — FAMOTIDINE 20 MG
1000 TABLET ORAL
Status: DISCONTINUED | OUTPATIENT
Start: 2020-12-17 | End: 2020-12-17 | Stop reason: CLARIF

## 2020-12-17 RX ORDER — MEROPENEM 1 G/1
1 INJECTION, POWDER, FOR SOLUTION INTRAVENOUS EVERY 8 HOURS
Status: DISCONTINUED | OUTPATIENT
Start: 2020-12-17 | End: 2020-12-18

## 2020-12-17 RX ORDER — TACROLIMUS 1 MG/1
2 CAPSULE ORAL 2 TIMES DAILY
Status: DISCONTINUED | OUTPATIENT
Start: 2020-12-17 | End: 2020-12-20 | Stop reason: HOSPADM

## 2020-12-17 RX ORDER — ONDANSETRON 2 MG/ML
4 INJECTION INTRAMUSCULAR; INTRAVENOUS ONCE
Status: COMPLETED | OUTPATIENT
Start: 2020-12-17 | End: 2020-12-17

## 2020-12-17 RX ORDER — ASPIRIN 81 MG/1
81 TABLET, CHEWABLE ORAL EVERY EVENING
Status: DISCONTINUED | OUTPATIENT
Start: 2020-12-17 | End: 2020-12-20 | Stop reason: HOSPADM

## 2020-12-17 RX ORDER — MYCOPHENOLIC ACID 360 MG/1
180 TABLET, DELAYED RELEASE ORAL EVERY EVENING
Status: ON HOLD | COMMUNITY
End: 2020-12-19

## 2020-12-17 RX ORDER — VITAMIN B COMPLEX
25 TABLET ORAL EVERY MORNING
Status: DISCONTINUED | OUTPATIENT
Start: 2020-12-17 | End: 2020-12-20 | Stop reason: HOSPADM

## 2020-12-17 RX ORDER — ACETAMINOPHEN 325 MG/1
325 TABLET ORAL EVERY 4 HOURS PRN
Status: DISCONTINUED | OUTPATIENT
Start: 2020-12-17 | End: 2020-12-20 | Stop reason: HOSPADM

## 2020-12-17 RX ORDER — MYCOPHENOLIC ACID 360 MG/1
720 TABLET, DELAYED RELEASE ORAL
COMMUNITY
End: 2020-12-20

## 2020-12-17 RX ORDER — IOPAMIDOL 755 MG/ML
92 INJECTION, SOLUTION INTRAVASCULAR ONCE
Status: COMPLETED | OUTPATIENT
Start: 2020-12-17 | End: 2020-12-17

## 2020-12-17 RX ORDER — BIOTIN 1 MG
3000 TABLET ORAL DAILY
Status: DISCONTINUED | OUTPATIENT
Start: 2020-12-17 | End: 2020-12-20 | Stop reason: HOSPADM

## 2020-12-17 RX ORDER — SULFAMETHOXAZOLE AND TRIMETHOPRIM 400; 80 MG/1; MG/1
1 TABLET ORAL
Status: DISCONTINUED | OUTPATIENT
Start: 2020-12-18 | End: 2020-12-20 | Stop reason: HOSPADM

## 2020-12-17 RX ORDER — MYCOPHENOLIC ACID 360 MG/1
720 TABLET, DELAYED RELEASE ORAL EVERY MORNING
Status: ON HOLD | COMMUNITY
End: 2020-12-17

## 2020-12-17 RX ORDER — MYCOPHENOLIC ACID 360 MG/1
360 TABLET, DELAYED RELEASE ORAL EVERY EVENING
Status: ON HOLD | COMMUNITY
End: 2020-12-17

## 2020-12-17 RX ADMIN — ASPIRIN 81 MG CHEWABLE TABLET 81 MG: 81 TABLET CHEWABLE at 20:51

## 2020-12-17 RX ADMIN — MEROPENEM 1 G: 1 INJECTION, POWDER, FOR SOLUTION INTRAVENOUS at 12:05

## 2020-12-17 RX ADMIN — Medication 3000 MCG: at 08:37

## 2020-12-17 RX ADMIN — PREDNISONE 7.5 MG: 2.5 TABLET ORAL at 08:37

## 2020-12-17 RX ADMIN — SODIUM CHLORIDE, POTASSIUM CHLORIDE, SODIUM LACTATE AND CALCIUM CHLORIDE 500 ML: 600; 310; 30; 20 INJECTION, SOLUTION INTRAVENOUS at 03:21

## 2020-12-17 RX ADMIN — TACROLIMUS 2 MG: 1 CAPSULE ORAL at 20:46

## 2020-12-17 RX ADMIN — Medication 25 MCG: at 08:37

## 2020-12-17 RX ADMIN — TACROLIMUS 2 MG: 1 CAPSULE ORAL at 08:38

## 2020-12-17 RX ADMIN — MEROPENEM 1 G: 1 INJECTION, POWDER, FOR SOLUTION INTRAVENOUS at 20:51

## 2020-12-17 RX ADMIN — ONDANSETRON 4 MG: 2 INJECTION INTRAMUSCULAR; INTRAVENOUS at 01:30

## 2020-12-17 RX ADMIN — ENOXAPARIN SODIUM 40 MG: 40 INJECTION SUBCUTANEOUS at 08:41

## 2020-12-17 RX ADMIN — IOPAMIDOL 92 ML: 755 INJECTION, SOLUTION INTRAVENOUS at 14:28

## 2020-12-17 RX ADMIN — SODIUM CHLORIDE, POTASSIUM CHLORIDE, SODIUM LACTATE AND CALCIUM CHLORIDE 500 ML: 600; 310; 30; 20 INJECTION, SOLUTION INTRAVENOUS at 01:30

## 2020-12-17 RX ADMIN — MYCOPHENOLIC ACID 540 MG: 360 TABLET, DELAYED RELEASE ORAL at 08:38

## 2020-12-17 RX ADMIN — ACETAMINOPHEN 325 MG: 325 TABLET, FILM COATED ORAL at 22:02

## 2020-12-17 RX ADMIN — DOCUSATE SODIUM 200 MG: 100 CAPSULE, LIQUID FILLED ORAL at 08:37

## 2020-12-17 RX ADMIN — MYCOPHENOLIC ACID 540 MG: 360 TABLET, DELAYED RELEASE ORAL at 20:51

## 2020-12-17 RX ADMIN — ACETAMINOPHEN 325 MG: 325 TABLET, FILM COATED ORAL at 01:51

## 2020-12-17 ASSESSMENT — ACTIVITIES OF DAILY LIVING (ADL)
CONCENTRATING,_REMEMBERING_OR_MAKING_DECISIONS_DIFFICULTY: NO
FALL_HISTORY_WITHIN_LAST_SIX_MONTHS: NO
WEAR_GLASSES_OR_BLIND: YES
ADLS_ACUITY_SCORE: 19
PATIENT_/_FAMILY_COMMUNICATION_STYLE: SPOKEN LANGUAGE (ENGLISH OR BILINGUAL)
DOING_ERRANDS_INDEPENDENTLY_DIFFICULTY: NO
HEARING_DIFFICULTY_OR_DEAF: NO
DRESSING/BATHING_DIFFICULTY: NO
TOILETING_ISSUES: NO
ADLS_ACUITY_SCORE: 19
DIFFICULTY_COMMUNICATING: NO
ADLS_ACUITY_SCORE: 19
VISION_MANAGEMENT: GLASSES
WALKING_OR_CLIMBING_STAIRS_DIFFICULTY: NO
DIFFICULTY_EATING/SWALLOWING: NO

## 2020-12-17 NOTE — TELEPHONE ENCOUNTER
----- Message from Dallas Johnson MD sent at 12/15/2020  4:22 PM CST -----  Please schedule labs q 2 months   Also leave UAs x 6 for prn use for symptoms   Check CMVIgG, EBVIgG  Please request DSA data and HLA type from the originating center

## 2020-12-17 NOTE — PROGRESS NOTES
Admitted/transferred from: ED  Time of arrival on unit 1000  2 RN full  skin assessment completed by Edie DE  Skin assessment finding: skin intact, no problems   Interventions/actions: other no intervention required     Will continue to monitor.

## 2020-12-17 NOTE — H&P
[facility]     History and Physical - [place] Service        Date of Admission:  12/16/2020    Assessment & Plan   Belen Sharma is a 60 year old female with history of congenital hepatic fibrosis s/p liver transplant (2010, 2011) and PCKD s/p renal transplant (2010, 2016) on chronic immunosuppression, uterine prolapse, and recurrent UTI who presents with fever, increased urinary frequency, and generalized body pain over the past month concerning for sepsis secondary to UTI.     Sepsis  Urinary Tract Infection  Patient presents with urinary frequency, chills, generalized body pain. Also found to have fever, tachycardia, leukocytosis on admission. Patient has had history of recurrent UTIs, recently empirically treated for UTI with nitrofurantoin (12/3-12/10). No flank or abdomen pain, less likely for pyelonephritis. Received  ml x2 and started on ceftriaxone in the ED. Patient notably has history of ESBL in 2018 and is immunosuppresed, will have low threshold to consult ID and consider broadening if she remains febrile after being on ceftriaxone >24 hours. Cr stable 0.87 on admission.  - continue ceftriaxone for now  - continued evaluation for need for IVF  - obtain renal US  - consult transplant nephrology  - follow up blood cultures    S/p DDKT  S/p SLK in 10/2010 and DDKT in 2/2016. Most recently evaluated by transplant nephrology 12/15. Cr at baseline 0.87 on admission (baseline 0.7-0.9).  - work up and transplant nephrology consult as above    S/p DDLT  S/p SLK in 10/2010 and DDLT in 3/2011. Most recently evaluated by transplant hepatology 7/13. Stable without acute concerns.    Immunosuppression  Infection Prophylaxis  Tacrolimus recently increased from 2mg/1mg qAM/qPM to 2mg BID.   - continue PTA tacrolimus 2 mg BID  - continue PTA mycophenolic acid 540 mg BID  - continue prednisone 7.5 qAM  - continue TMP--80 mg MWF  - check tacrolimus level  "prior to AM dose (goal 5-8)    Uterine Prolapse  Cystocele  Managed with pessary since 10/2020. Follows with ob/gyn outpatient, last seen via telemedicine visit 12/2.  - continue pessary     Diet: Regular Diet Adult  Fluids: 500 ml LR  DVT Prophylaxis: enoxaparin  [place] Catheter: not present  Code Status: Full Code     Disposition Plan   Expected discharge: 2 - 3 days, recommended to prior living arrangement once antibiotic plan established.  Entered: Jenny Meza MD 12/17/2020, 1:53 AM       The patient will be formally staffed in the AM.    Jenny Meza MD   PGY-1, Internal Medicine  [facility]   Contact information available via [facility] Paging/Directory  Please see sign in/sign out for up to date coverage information    ______________________________________________________________________    Chief Complaint   Increased urinary urge    History is obtained from the patient    History of Present Illness   Belen Sharma is a 60 year old female with history of congenital hepatic fibrosis s/p liver transplant (2010, 2011) and PCKD s/p renal transplant (2010, 2016) on chronic immunosuppression, uterine prolapse, and recurrent UTI who presents with fever, increased urinary frequency, and generalized body pain over the past month.     She reports she first developed increased urinary urgency with low grade fevers in the beginning of the month, and where she was empirically treated with nitrofurantoin x 7 days.     Her symptoms subsequently resolved for 3-4 days after the completion of her antibiotic course but she then again developed increased itching, increased urinary frequency and generalized body pain which she describes as \"achyness.\" No hematuria, dysuria, change in urine.    She started having low grade fevers 99-100F this morning, with temperatures increasing to 101.5-102.9 this evening, prompting her to present to the ED for assessment. She has " associated chills and HA. Otherwise denies cough, SOB, CP, abdominal pain, back or flank pain, diarrhea, skin changes/rash, open sores, or joint pain.     Patient reports she has had frequent UTIs. Previously followed by ID in 2018 for ESBL.     Review of Systems    The 10 point Review of Systems is negative other than noted in the HPI or here.     Past Medical History    I have reviewed this patient's medical history and updated it with pertinent information if needed.   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 Both, very large, native kidneys reamain.     PONV (postoperative nausea and vomiting)     Need to start with ice chips and apple juice, no soda     S/P kidney transplant     SLK 10/9/2010 - kidney failure 2/2 [facility]. Explanted . Re-transplant after de-sensitization      S/P liver transplant (H)     10/9/2010 - [facility], ischemic biopathy. Re-transplant 3/3/2011     S/P splenectomy      Spider veins      Thyroid disease     Hyperthyroid treated with single dose iodine        Past Surgical History   I have reviewed this patient's surgical history and updated it with pertinent information if needed.  Past Surgical History:   Procedure Laterality Date     bunectomy  2018    right foot     bunionectomy  2019    left     CHOLECYSTECTOMY       CONIZATION N/A 2020    Procedure: CONE BIOPSY, CERVIX;  Surgeon: Daysi Perez MD;  Location: [facility] OR     HERNIA REPAIR, INCISIONAL  2013     SPLENECTOMY      Splenectomy     TRANSPLANT KIDNEY RECIPIENT  DONOR  2016    re-transplant after [facility]; 6 months  de-sensitization prior AND 6 months after transplant     TRANSPLANT LIVER RECIPIENT  DONOR  2011     TRANSPLANT LIVER, KIDNEY RECIPIENT  DONOR, COMBINED  10/09/2010    HAT - re-Tx liver 3/3/2011; Kidney (left iliac) lost to [facility]/fibrosis - explant     VASCULAR SURGERY      Vascular access left UE. Not used for 4 years since 2nd kidney tx 2016 [place]        Social History   I have reviewed this patient's social history and updated it with pertinent information if needed. Belen Sharma  reports that she has never smoked. She has never used smokeless tobacco. She reports previous alcohol use. She reports previous drug use.    Family History   I have reviewed this patient's family history and updated it with pertinent information if needed.  Family History   Problem Relation Age of Onset     Uterine Cancer Maternal Grandmother      Polycystic Kidney Diease Brother      Polycystic Kidney Diease Brother      Polycystic Kidney Diease Brother      Polycystic Kidney Diease Brother      Cervical Cancer Maternal Aunt        Prior to Admission Medications   Prior to Admission Medications   Prescriptions Last Dose Informant Patient Reported? Taking?   ALPRAZolam (XANAX) 0.25 MG tablet More than a month at Unknown time  Yes No   Sig: Take 0.25 mg by mouth as needed for anxiety   MYFORTIC (BRAND) 180 MG EC tablet 12/15/2020 at Unknown time  No Yes   Sig: Take 3 tablets (540 mg) by mouth 2 times daily Take   Multiple Vitamins-Minerals (VITAMIN D3 COMPLETE PO) 2020 at Unknown time  Yes Yes   Sig: Take 2,000 Int'l Units by mouth every morning    Probiotic Product (PROBIOTIC PO) 2020 at Unknown time  Yes Yes   Sig: Take 2 tablets by mouth every morning    Psyllium (METAMUCIL PO) 2020 at Unknown time  Yes Yes   Sig: Take by mouth every morning Hold 9-30-20   Vitamin D, Cholecalciferol, 25 MCG (1000 UT) CAPS 2020 at Unknown time  Yes Yes   Sig: Take 1,000 Units by mouth D3    acetaminophen (TYLENOL) 325 MG tablet Past Week at Unknown time  No Yes   Sig: Take 2 tablets (650 mg) by mouth every 4 hours as needed for mild pain   aspirin (ASA) 81 MG chewable tablet 12/16/2020 at Unknown time  Yes Yes   Sig: Take 81 mg by mouth every evening Stopped 7 days preop   biotin 1000 MCG TABS tablet 12/16/2020 at Unknown time  Yes Yes   Sig: Take 3,000 mcg by mouth daily   diphenhydrAMINE (BENADRYL) 25 MG tablet Past Month at Unknown time  Yes Yes   Sig: Take 50 mg by mouth as needed   docusate sodium (COLACE) 100 MG capsule 12/16/2020 at Unknown time  Yes Yes   Sig: Take 200 mg by mouth every morning    estradiol (ESTRACE) 0.1 MG/GM vaginal cream   No No   Sig: Place 1 g vaginally twice a week   loperamide (IMODIUM A-D) 2 MG tablet   Yes No   Sig: Take 1 tablet by mouth as needed   loratadine (CLARITIN) 10 MG tablet 12/16/2020 at Unknown time  Yes Yes   Sig: Take 10 mg by mouth every evening    nitroGLYcerin (NITROSTAT) 0.4 MG sublingual tablet More than a month at Unknown time  Yes No   Sig: Place 0.4 mg under the tongue as needed for chest pain (Hasn't used for a while) For chest pain place 1 tablet under the tongue every 5 minutes for 3 doses. If symptoms persist 5 minutes after 1st dose call 911.    ondansetron (ZOFRAN) 4 MG tablet More than a month at Unknown time  Yes No   Sig: Take by mouth as needed for nausea   pantoprazole (PROTONIX) 40 MG EC tablet 12/16/2020 at Unknown time  Yes Yes   Sig: Take 1 tablet by mouth as needed   predniSONE (DELTASONE) 5 MG tablet 12/16/2020 at Unknown time  Yes Yes   Sig: Take 7.5 mg by mouth every morning    sulfamethoxazole-trimethoprim (BACTRIM) 400-80 MG tablet 12/16/2020 at Unknown time  No Yes   Sig: Take 1 tablet by mouth Every Mon, Wed, Fri Morning   tacrolimus (GENERIC EQUIVALENT) 1 MG capsule 12/16/2020 at Unknown time  No Yes   Sig: Take 2 capsules (2 mg) by mouth every 12 hours   temazepam (RESTORIL) 15 MG capsule More than a month at Unknown  time  Yes No   Sig: Take 15 mg by mouth as needed for sleep (twice a year)       Facility-Administered Medications: None     Allergies   Allergies   Allergen Reactions     Ibuprofen        Physical Exam   Vital Signs: Temp: 100.5  F (38.1  C) Temp src: Oral BP: 132/67 Pulse: 112   Resp: 20 SpO2: 97 % O2 Device: None (Room air)    Weight: 151 lbs 0 oz    Gen: well-appearing, sitting in bed, in NAD  CV: RRR, normal S1 and S2  Pulm: CTAB, no increased WOB on RA  GI: soft, NT, ND  : no suprapubic tenderness, no flank pain or pain over R transplant site  Neuro: alert, conversant, responds to questions appropriately  Ext: no peripheral extremity edema   Psych: normal affect    Data   Data reviewed today: I reviewed all medications, new labs and imaging results over the last 24 hours.     Recent Labs   Lab 12/16/20  2236 12/10/20  0839   WBC 11.4* 6.1   HGB 16.9* 16.8*   MCV 96 95    249    137   POTASSIUM 4.2 3.3*   CHLORIDE 106 105   CO2 26 27   BUN 17 16   CR 0.87 0.78   ANIONGAP 5 5   EDSON 9.9 10.2*   GLC 89 92   ALBUMIN 3.6 3.8   PROTTOTAL 7.4 7.5   BILITOTAL 0.7 0.6   ALKPHOS 77 70   ALT 23 31   AST 15 22

## 2020-12-17 NOTE — PROGRESS NOTES
[facility]  UU ED/ Transfer Triage Note    Date of call: 12/16/20  Time of call: 11:52 PM    Is pandemic COVID-19 a concern? No    Diagnosis: Fever, suspected UTI    Expected Time of Arrival for Transfer: 0-8 hours    Arrival Location:  [place] [address] Phone: [phone]    Recommendations for Management and Stabilization: Given    Additional Comments:    60F  PMH: Liver and Kidney Transplant    CC: Fever and UTI   12/3: Rx for UTI, Macrobid x 7days  UC: Negative    Now, dysuria returned    Temp 102F today  Afebrile in ED, s/p Tylenol at home  HR 120s, improved with IVFs  Mild WBC elevated 11.4, L shift.  Cx are pending.  LA normal  UA: active for UTI    COVID pending, Asymptomatic for admission.    Based on prior OSH UC results:  Abx: Ceftriaxone.    Med/Surg  Admission        Estuardo Moore MD

## 2020-12-17 NOTE — TELEPHONE ENCOUNTER
Burning when voiding, voiding frequently.  Tmax 102.5, currently 101.8 (after Tylenol), aches all over.    Instructed that she needs to be evaluated.  Will go to ED for evaluation.  Report called to Kimberly.

## 2020-12-17 NOTE — PROGRESS NOTES
Resident/Fellow Attestation   I, Terry Cohen, was present with the medical student who participated in the service and in the documentation of the note.  I have verified the history and personally performed the physical exam and medical decision making.  I agree with the assessment and plan of care as documented in the note.      Most notably - GN bacteremia with unknown source, CT A/P ordered, Abd US was unrevealing for source. BS Abx w/ meropenem. Transplant services consulted for assistance. Will require optimization of tacrolimus and mycophenolate, continuing previous doses at present.     Terry Cohen MD, PhD  PGY-1 Internal Medcine  [phone]    Date of Service (when I saw the patient): 12/17/20    [facility]     Progress Note - [place] 1 Service        Date of Admission:  12/16/2020    Assessment & Plan       Belen Sharma is a 60 year old female admitted on 12/16/2020. H/o congenital hepatic fibrosis s/p liver transplant (2010, 2011) and PCKD s/p renal transplant (2010, 2016) chronic immunosuppression. Also with h/o uterine prolapse, cone biopsy (9/2020) and recurrent UTI who presented to ED 12/17 with fever, increased urinary frequency, and generalized body aches. UA and history concerning for sepsis secondary to UTI.     # Sepsis secondary to ESBL bacteremia  Suspect urinary source. History of UTIs  (12/3-12/10). No flank or abdomen pain, less likely for pyelonephritis. US of transplanted kidney without pyelonephritis. Received  ml x2 and started on ceftriaxone in the ED. History of ESBL UTI on 5/3/2018 culture at [facility] (see CareEverywhere). BC grew GNR on 12/17 given ESBL history and immunocompromise transitioned abx to meropenem and consulted transplant ID. UC on admission without any growth. + BC and recent cone bx warrants concern for uterine or abdominal source of infection. Will get CT a/p.  - Start Meropenem  - CT abdomen/pelvis  -  Repeat Blood cultures  - Transplant nephrology consult  - Transplant ID consult    Uterine Prolapse  Cystocele  Recent Cone biopsy 9/2020 due to abnormal bleeding. Warrants concern for possible infectious source. Managed with pessary since 10/2020. Follows with ob/gyn outpatient, last seen via telemedicine visit 12/2.   - continue pessary   - CT abdomen/pelvis as above    S/p DDKT  S/p SLK in 10/2010 and DDKT in 2/2016. Most recently evaluated by transplant nephrology 12/15. Cr at baseline 0.87 on admission (baseline 0.7-0.9).  - Transplant nephrology consulted     S/p DDLT  S/p SLK in 10/2010 and DDLT in 3/2011. Most recently evaluated by transplant hepatology 7/13. Stable without acute concerns.     Immunosuppression  Infection Prophylaxis  Tacrolimus recently increased from 2mg/1mg qAM/qPM to 2mg BID. Admission tacro level 4.4, goal 5-8.  - continue PTA tacrolimus 2 mg BID  - continue PTA mycophenolic acid 540 mg BID  - continue prednisone 7.5 qAM  - continue TMP--80 mg MWF          Diet: Regular Diet Adult    Fluids: PO Fluid  Lines: pIV  DVT Prophylaxis: Enoxaparin (Lovenox) SQ  T[place] Catheter: not present  Code Status: Full Code           Disposition Plan   Expected discharge: 2 - 3 days, recommended to prior living arrangement once antibiotic plan established.  Entered: Steve Wade 12/17/2020, 2:07 PM       The patient's care was discussed with the Attending Physician, Dr. Santillan .    Steve Wade  Medical Student  [address]   Please see sign in/sign out for up to date coverage information  ______________________________________________________________________    Interval History   NAEO. Patient feeling better after receiving IVF and abx. Has had urinary frequency but no pain with urination. Denies fevers or chills. Denies any abdominal or flank pain.    Data reviewed today: I reviewed all medications, new labs and imaging results over  the last 24 hours.    Physical Exam   Vital Signs: Temp: 98.7  F (37.1  C) Temp src: Oral BP: 110/57 Pulse: 98   Resp: 20 SpO2: 100 % O2 Device: None (Room air)    Weight: 151 lbs 0 oz  Gen: well-appearing, sitting in bed, in NAD  CV: RRR, normal S1 and S2  Pulm: CTAB, no increased WOB on RA  GI: soft, NT, ND  : no suprapubic tenderness, no flank pain or pain over R transplant site  Neuro: alert, conversant, responds to questions appropriately  Ext: no peripheral extremity edema   Psych: normal affect    Data   Recent Labs   Lab 12/16/20  2236   WBC 11.4*   HGB 16.9*   MCV 96         POTASSIUM 4.2   CHLORIDE 106   CO2 26   BUN 17   CR 0.87   ANIONGAP 5   EDSON 9.9   GLC 89   ALBUMIN 3.6   PROTTOTAL 7.4   BILITOTAL 0.7   ALKPHOS 77   ALT 23   AST 15

## 2020-12-17 NOTE — ED NOTES
Bed: IN01  Expected date: 12/16/20  Expected time:   Means of arrival:   Comments:  Belen Sharma (MRN [id number])  Being referred by transplant coordinator to evaluation of fever (Tmax 102.6) and dysuria

## 2020-12-17 NOTE — PROVIDER NOTIFICATION
Provider notified of positive Blood cultures from 12/16 @ 6940 - gram negative rods from R arm. Will await new orders and continue with current plan of care.

## 2020-12-17 NOTE — PLAN OF CARE
Patient admitted to unit 7A from ED with recent history of fever, urinary frequency and generalized pain c/w UTI. Patient also has + Blood culture resulted today which was drawn 12/16 at 2316; received 1x Rocephin IV + now on Merrem Q8H. Has been afebrile since arrival to unit. Otherwise vitally stable on RA. C/o HA, but declined intervention. Has PRN Tylenol ordered but does not want to mask fever. Voiding adequate amounts. LBM 12/16. Abdominal/pelvic CT ordered this afternoon to assess for source of infection. Up independently in room. Will continue to monitor and treat per plan of care.

## 2020-12-17 NOTE — ED PROVIDER NOTES
[place] EMERGENCY DEPARTMENT ([facility])  12/16/20  History     Chief Complaint   Patient presents with     UTI     History was provided by the patient and medical records.          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  She is currently immunosuppressed on prednisone 7.5 mg p.o. daily, tacrolimus 2 mg p.o. twice daily, and Myfortic 540 mg p.o. twice daily.  Patient reports that she developed some dysuria at the beginning of this month, she had a visit with her gynecologist on December 3.  Of note, she does have a vaginal pessary due to cyctocele.  She's had a pessary for the past 6 months.  When she presented to the clinic on December 3 she was noticing low-grade fever, as well as some dysuria.  She was treated empirically for UTI with Macrobid twice daily x7 days.  Patient reports that initially her symptoms improved however, after she finished her 1 week of Macrobid, she started having increasing dysuria, as well as itching and fever tonight up to 102.4.  She has been taking Tylenol but still has fevers.  She is endorsing chills, myalgias, and headache.  She denies any nasal congestion, change in taste or smell, sore throat, cough, shortness of breath, chest pain, abdominal pain, nausea, vomiting, or diarrhea.      This part of the medical record was transcribed by Mikael Zarate Medical Scribe, from a dictation done by Genie John MD.     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 Both, very large, native kidneys reamain.     PONV (postoperative nausea and vomiting)     Need to start with ice chips and apple juice, no soda     S/P kidney transplant     SLK 10/9/2010 - kidney failure 2/2 [facility]. Explanted . Re-transplant after de-sensitization      S/P liver transplant (H)     10/9/2010 - [facility], ischemic biopathy. Re-transplant 3/3/2011     S/P splenectomy      Spider veins      Thyroid disease     Hyperthyroid treated with single dose iodine       Past Surgical History:   Procedure Laterality Date     bunectomy  2018    right foot     bunionectomy  2019    left     CHOLECYSTECTOMY       CONIZATION N/A 2020    Procedure: CONE BIOPSY, CERVIX;  Surgeon: Daysi Perez MD;  Location: [facility] OR     HERNIA REPAIR, INCISIONAL  2013     SPLENECTOMY      Splenectomy     TRANSPLANT KIDNEY RECIPIENT  DONOR      re-transplant after [facility]; 6 months de-sensitization prior AND 6 months after transplant     TRANSPLANT LIVER RECIPIENT  DONOR  2011     TRANSPLANT LIVER, KIDNEY RECIPIENT  DONOR, COMBINED  10/09/2010    HAT - re-Tx liver 3/3/2011; Kidney (left iliac) lost to [facility]/fibrosis - explant     VASCULAR SURGERY      Vascular access left UE. Not used for 4 years since 2nd kidney tx 2016 [place]       Family History   Problem Relation Age of Onset     Uterine Cancer Maternal Grandmother      Polycystic Kidney Diease Brother      Polycystic Kidney Diease Brother      Polycystic Kidney Diease Brother      Polycystic Kidney Diease Brother      Cervical Cancer Maternal Aunt        Social History     Tobacco Use     Smoking status: Never Smoker     Smokeless tobacco: Never Used   Substance Use Topics     Alcohol use: Not Currently     Current Facility-Administered Medications   Medication     0.9% sodium chloride BOLUS     cefTRIAXone (ROCEPHIN) 1 g vial to attach to  mL bag for ADULTS or NS 50  mL bag for PEDS     Current Outpatient Medications   Medication     acetaminophen (TYLENOL) 325 MG tablet     aspirin (ASA) 81 MG chewable tablet     biotin 1000 MCG TABS tablet     diphenhydrAMINE (BENADRYL) 25 MG tablet     docusate sodium (COLACE) 100 MG capsule     loratadine (CLARITIN) 10 MG tablet     Multiple Vitamins-Minerals (VITAMIN D3 COMPLETE PO)     MYFORTIC (BRAND) 180 MG EC tablet     pantoprazole (PROTONIX) 40 MG EC tablet     predniSONE (DELTASONE) 5 MG tablet     Probiotic Product (PROBIOTIC PO)     Psyllium (METAMUCIL PO)     sulfamethoxazole-trimethoprim (BACTRIM) 400-80 MG tablet     tacrolimus (GENERIC EQUIVALENT) 1 MG capsule     Vitamin D, Cholecalciferol, 25 MCG (1000 UT) CAPS     ALPRAZolam (XANAX) 0.25 MG tablet     estradiol (ESTRACE) 0.1 MG/GM vaginal cream     loperamide (IMODIUM A-D) 2 MG tablet     nitroGLYcerin (NITROSTAT) 0.4 MG sublingual tablet     ondansetron (ZOFRAN) 4 MG tablet     temazepam (RESTORIL) 15 MG capsule        Allergies   Allergen Reactions     Ibuprofen          Review of Systems   Constitutional: Positive for chills.   HENT: Negative for congestion and sore throat.         Negative for change in taste or smell   Respiratory: Negative for cough and shortness of breath.    Cardiovascular: Negative for chest pain.   Gastrointestinal: Negative for abdominal pain, diarrhea, nausea and vomiting.   Genitourinary: Positive for dysuria.        Positive for vaginal itching   Musculoskeletal: Positive for myalgias.   Neurological: Positive for headaches.     A complete review of systems was performed with pertinent positives and negatives noted in the HPI, and all other systems negative.    Physical Exam   BP: 104/53  Pulse: 124  Temp: 98.1  F (36.7  C)  Resp: 20  Weight: 68.5 kg (151 lb)  SpO2: 95 %  Physical Exam  Vitals signs and nursing note reviewed.   Constitutional:       General: She is not in acute distress.     Appearance: She is well-developed. She is not  diaphoretic.      Comments: Alert, cooperative, appears to feel unwell but is not toxic   HENT:      Head: Normocephalic and atraumatic.   Eyes:      Conjunctiva/sclera: Conjunctivae normal.      Pupils: Pupils are equal, round, and reactive to light.   Cardiovascular:      Rate and Rhythm: Regular rhythm. Tachycardia present.      Heart sounds: Normal heart sounds.   Pulmonary:      Effort: Pulmonary effort is normal. No respiratory distress.      Breath sounds: Normal breath sounds. No wheezing or rales.   Abdominal:      General: Bowel sounds are normal. There is no distension.      Palpations: Abdomen is soft.      Tenderness: There is no abdominal tenderness.      Comments: No TTP over transplant site in R low abdomen  Abd non TTP, no guarding or rebound   Skin:     General: Skin is warm and dry.      Comments: Hot to touch   Neurological:      Mental Status: She is alert and oriented to person, place, and time.          ED Course      Procedures                         Results for orders placed or performed during the hospital encounter of 12/16/20   CBC with platelets differential     Status: Abnormal   Result Value Ref Range    WBC 11.4 (H) 4.0 - 11.0 10e9/L    RBC Count 5.70 (H) 3.8 - 5.2 10e12/L    Hemoglobin 16.9 (H) 11.7 - 15.7 g/dL    Hematocrit 54.5 (H) 35.0 - 47.0 %    MCV 96 78 - 100 fl    MCH 29.6 26.5 - 33.0 pg    MCHC 31.0 (L) 31.5 - 36.5 g/dL    RDW 14.6 10.0 - 15.0 %    Platelet Count 260 150 - 450 10e9/L    Diff Method Automated Method     % Neutrophils 81.9 %    % Lymphocytes 9.0 %    % Monocytes 8.4 %    % Eosinophils 0.1 %    % Basophils 0.2 %    % Immature Granulocytes 0.4 %    Nucleated RBCs 0 0 /100    Absolute Neutrophil 9.3 (H) 1.6 - 8.3 10e9/L    Absolute Lymphocytes 1.0 0.8 - 5.3 10e9/L    Absolute Monocytes 1.0 0.0 - 1.3 10e9/L    Absolute Eosinophils 0.0 0.0 - 0.7 10e9/L    Absolute Basophils 0.0 0.0 - 0.2 10e9/L    Abs Immature Granulocytes 0.1 0 - 0.4 10e9/L    Absolute Nucleated  RBC 0.0    Comprehensive metabolic panel     Status: None   Result Value Ref Range    Sodium 137 133 - 144 mmol/L    Potassium 4.2 3.4 - 5.3 mmol/L    Chloride 106 94 - 109 mmol/L    Carbon Dioxide 26 20 - 32 mmol/L    Anion Gap 5 3 - 14 mmol/L    Glucose 89 70 - 99 mg/dL    Urea Nitrogen 17 7 - 30 mg/dL    Creatinine 0.87 0.52 - 1.04 mg/dL    GFR Estimate 72 >60 mL/min/[1.73_m2]    GFR Estimate If Black 83 >60 mL/min/[1.73_m2]    Calcium 9.9 8.5 - 10.1 mg/dL    Bilirubin Total 0.7 0.2 - 1.3 mg/dL    Albumin 3.6 3.4 - 5.0 g/dL    Protein Total 7.4 6.8 - 8.8 g/dL    Alkaline Phosphatase 77 40 - 150 U/L    ALT 23 0 - 50 U/L    AST 15 0 - 45 U/L   Lactic acid     Status: None   Result Value Ref Range    Lactic Acid 0.8 0.7 - 2.0 mmol/L   UA with Microscopic reflex to Culture     Status: Abnormal    Specimen: Urine clean catch; Midstream Urine   Result Value Ref Range    Color Urine Light Yellow     Appearance Urine Slightly Cloudy     Glucose Urine Negative NEG^Negative mg/dL    Bilirubin Urine Negative NEG^Negative    Ketones Urine Negative NEG^Negative mg/dL    Specific Gravity Urine 1.007 1.003 - 1.035    Blood Urine Small (A) NEG^Negative    pH Urine 7.5 (H) 5.0 - 7.0 pH    Protein Albumin Urine 10 (A) NEG^Negative mg/dL    Urobilinogen mg/dL Normal 0.0 - 2.0 mg/dL    Nitrite Urine Negative NEG^Negative    Leukocyte Esterase Urine Large (A) NEG^Negative    Source Midstream Urine     WBC Urine 466 (H) 0 - 5 /HPF    RBC Urine 2 0 - 2 /HPF    WBC Clumps Present (A) NEG^Negative /HPF    Bacteria Urine Few (A) NEG^Negative /HPF    Squamous Epithelial /HPF Urine <1 0 - 1 /HPF    Mucous Urine Present (A) NEG^Negative /LPF   Symptomatic Influenza A/B & SARS-CoV2 (COVID-19) Virus PCR Multiplex     Status: None    Specimen: Nasopharyngeal   Result Value Ref Range    Flu A/B & SARS-COV-2 PCR Source Nasopharyngeal     SARS-CoV-2 PCR Result NEGATIVE     Influenza A PCR Negative NEG^Negative    Influenza B PCR Negative  NEG^Negative    Respiratory Syncytial Virus PCR Negative NEG^Negative    Flu A/B & SARS-CoV-2 PCR Comment (Note)      Medications   0.9% sodium chloride BOLUS (500 mLs Intravenous New Bag 12/16/20 9999)   cefTRIAXone (ROCEPHIN) 1 g vial to attach to  mL bag for ADULTS or NS 50 mL bag for PEDS (1 g Intravenous New Bag 12/16/20 6515)        Assessments & Plan (with Medical Decision Making)   Patient presents for the above complaints.  On my evaluation she is alert, cooperative, does not appear to be in any acute distress.  Blood pressure 104/53, she is tachycardic with heart rate of 124, temperature 98.1, she did take Tylenol prior to arrival.  She did not have any tenderness to palpation in the right lower quadrant kidney transplant site.  Records were reviewed, she had a UA on December 3 which was suspicious for UTI, however urine culture grew out mixed urogenital keisha.  Still, given her symptoms, need to be concerned about possibility of urinary tract infection and developing sepsis in an immunosuppressive patient with transplanted kidney and liver.  We did establish IV access made to draw blood for laboratory analysis.  CBC with leukocytosis with WBC of 11.4 with L shift, hgb is 16.9, CMP WNL, lactate WNL.  Blood culture x2 was obtained.  UA shows e/o infection with large LE, 466 WBC, 2 RBC with WBC clumps.  This will be cultured.  SARS-CoV-2 PCR swab has been collected and is negative. Patient was given IV fluids here in the ED as well.    Record review through Care Everywhere shows a urine culture from [facility] from May 2018 which grew out E coli ESBL.  Discussed with ED pharmacist and at this point, given no subsequent cultures that show this, plan to just start with ceftriaxone and follow patient clinically and follow cultures, adjust antibiotics as indicated.  Patient is in agreement with plan. Discussed with triage hospitalist.      This part of the medical record was transcribed by Mikael Zarate  Medical Scribe, from a dictation done by Genie John MD.     I have reviewed the nursing notes. I have reviewed the findings, diagnosis, plan and need for follow up with the patient.    New Prescriptions    No medications on file       Final diagnoses:   Complicated urinary tract infection   Kidney replaced by transplant   Immunosuppressed status (H)     I, Mikael Zarate, am serving as a trained medical scribe to document services personally performed by Genie John MD, based on the provider's statements to me.      I, Genie John MD, was physically present and have reviewed and verified the accuracy of this note documented by Mikael Zarate.   --  Genie John MD  [facility] EMERGENCY DEPARTMENT  12/16/2020     Genie John MD  12/17/20 0006

## 2020-12-17 NOTE — ED TRIAGE NOTES
Pt reports to ED with c/o of UTI and fevers along with incontinence. Pt states she has had fevers of 102. Pt has hx of kidney and liver transplant.

## 2020-12-17 NOTE — TELEPHONE ENCOUNTER
ANNALISA Don ([facility]) - [phone]  ANNALISA Grande or Willy at [place] [facility] [phone]    Call placed to [facility] - left VM asking for call back.  Call placed to [facility] - spoke to Mal, they will review request and fax HLA information for Kidney tx, unavailable from liver tx.     Belen current inpatient, will follow up with Belen at discharge.

## 2020-12-17 NOTE — PHARMACY-ADMISSION MEDICATION HISTORY
Admission medication history interview status for the 12/16/2020 admission is complete. See Epic admission navigator for allergy information, pharmacy, prior to admission medications and immunization status.     Medication history interview sources:  patient, dispense report    Changes made to PTA medication list (reason)  Added: Myfortic 360 mg tabs, 2 tabs (720 mg) qAM, 1 tab (360 mg) qPM - see below  Deleted: none  Changed: none    Additional medication history information (including reliability of information, actions taken by pharmacist):  -pt was excellent historian of her meds, manages them herself, and was able to articulate recent changes to her regimen, as well as indications for most meds  -regarding Myfortic, pt appears to have recently established care with [facility] (previously was in [place]), who had recommended changing from the above Myfortic regimen to 540 mg PO BID (180 mg tabs x3 for each dose). Pt confirmed that she has not yet implemented this change, and is currently taking Myfortic 720 mg qAM and 360 mg qPM.  -Allergies were reviewed, pt has been instructed to avoid NSAIDs (excluding low-dose aspirin) due to liver transplanted status, so this information was added to the ibuprofen entry      Prior to Admission medications    Medication Sig Last Dose Taking? Auth Provider   acetaminophen (TYLENOL) 325 MG tablet Take 2 tablets (650 mg) by mouth every 4 hours as needed for mild pain Past Week at Unknown time Yes Daysi Perez MD   aspirin (ASA) 81 MG chewable tablet Take 81 mg by mouth every evening Stopped 7 days preop 12/16/2020 at Unknown time Yes Reported, Patient   biotin 1000 MCG TABS tablet Take 3,000 mcg by mouth daily 12/16/2020 at Unknown time Yes Reported, Patient   diphenhydrAMINE (BENADRYL) 25 MG tablet Take 50 mg by mouth as needed for allergies  Past Month at Unknown time Yes Reported, Patient   docusate sodium (COLACE) 100 MG capsule Take 200 mg by  mouth every morning  12/16/2020 at Unknown time Yes Reported, Patient   loperamide (IMODIUM A-D) 2 MG tablet Take 1 tablet by mouth as needed for diarrhea  Past Month at Unknown time Yes Reported, Patient   loratadine (CLARITIN) 10 MG tablet Take 10 mg by mouth every evening  12/16/2020 at Unknown time Yes Reported, Patient   Multiple Vitamins-Minerals (WOMENS DAILY FORMULA PO) Take 1 tablet by mouth daily 12/16/2020 at Unknown time Yes Unknown, Entered By History   MYFORTIC (BRAND) 360 MG EC tablet Take 720 mg by mouth every morning 12/16/2020 at AM Yes Unknown, Entered By History   MYFORTIC (BRAND) 360 MG EC tablet Take 360 mg by mouth every evening 12/16/2020 at PM Yes Unknown, Entered By History   predniSONE (DELTASONE) 5 MG tablet Take 7.5 mg by mouth every morning  12/16/2020 at Unknown time Yes Reported, Patient   Probiotic Product (PROBIOTIC PO) Take 2 tablets by mouth every morning  12/16/2020 at Unknown time Yes Reported, Patient   Psyllium (METAMUCIL PO) Take by mouth every morning Hold 9-30-20 12/16/2020 at Unknown time Yes Reported, Patient   sulfamethoxazole-trimethoprim (BACTRIM) 400-80 MG tablet Take 1 tablet by mouth Every Mon, Wed, Fri Morning 12/16/2020 at Unknown time Yes Dallas Johnson MD   tacrolimus (GENERIC EQUIVALENT) 1 MG capsule Take 2 capsules (2 mg) by mouth every 12 hours 12/16/2020 at Unknown time Yes Leventhal, Thomas Michael, MD   Vitamin D, Cholecalciferol, 25 MCG (1000 UT) CAPS Take 1,000 Units by mouth D3 12/16/2020 at Unknown time Yes Reported, Patient   ALPRAZolam (XANAX) 0.25 MG tablet Take 0.25 mg by mouth as needed for anxiety More than a month at Unknown time  Reported, Patient   estradiol (ESTRACE) 0.1 MG/GM vaginal cream Place 1 g vaginally twice a week  at not started  Zach Francis MD   MYFORTIC (BRAND) 180 MG EC tablet Take 3 tablets (540 mg) by mouth 2 times daily Take   Dallas Johnson MD   nitroGLYcerin (NITROSTAT) 0.4 MG sublingual tablet Place 0.4 mg under  the tongue as needed for chest pain (Hasn't used for a while) For chest pain place 1 tablet under the tongue every 5 minutes for 3 doses. If symptoms persist 5 minutes after 1st dose call 911.  More than a month at Unknown time  Reported, Patient   ondansetron (ZOFRAN) 4 MG tablet Take by mouth as needed for nausea More than a month at Unknown time  Reported, Patient   pantoprazole (PROTONIX) 40 MG EC tablet Take 1 tablet by mouth as needed (reflux)  More than a month at Unknown time  Reported, Patient   temazepam (RESTORIL) 15 MG capsule Take 15 mg by mouth as needed for sleep (twice a year)  More than a month at Unknown time  Reported, Patient       Medication history completed by:   Edgard Anne, JannD, BCPS

## 2020-12-17 NOTE — CONSULTS
[facility]   Transplant Nephrology Consult  Date of Admission:  12/16/2020  Today's Date: 12/17/2020  Requesting physician: Estuardo Moore MD    Recommendations:  - Continue PTA immunosuppression  - Agree with IV antibiotics    Assessment & Plan   # DDKT: Stable              - Baseline Cr ~ 0.7-.0.9              - Proteinuria: Normal (<0.2 grams)              - Date DSA Last Checked: Not Known      Latest DSA: unknown. Will need to obtain. History of ABMR of her first kidney transplant and then desensitization prior to second transplant              - BK Viremia: Not checked recently due to time from transplant. Check with next labs              - Kidney Tx Biopsy: No     # Liver transplant:              - Good allograft function. Follows with transplant hepatology who notes that her level of immunosuppression is higher than necessary for liver.    # Recurrent UTI: Last episode was in June of this year. Not on any prophylaxis PTA and does not think she has seen urology for this. Currently on ceftriaxone. Cultures pending    # Immunosuppression: Tacrolimus immediate release (goal 2mg in AM and 1mg in PM, goal 5-8), Mycophenolic acid (dose 720mg in AM and 360mg in PM) and Prednisone (dose 7.5 mg daily)              - Changes: No, tacrolimus recently increased to 2 mg BID per patient     # Infection Prophylaxis:   - PJP: Sulfa/TMP (Bactrim)       # History of CMV viremia:              - PCR neg 9/2020     # Blood Pressure: Controlled;          Goal BP: < 130/80              - Volume status: Euvolemic                  - Changes: Not at this time. Her BP is actually on the low end of normal so cannot start ACEi/ARB for post transplant erythrocytosis     # Post-Transplant Erythrocytosis: Hgb: Increased;           On ACEI/ARB: No             Imaging: No, need to obtain native renal U/S and kidney transplant U/S    - If hematocrit>56 would need therapeutic phlebotomy     #  Mineral Bone Disorder:   - Secondary renal hyperparathyroidism; PTH level: Not checked recently        On treatment: None  - Calcium; level: Normal        On supplement: No  - Phosphorus; level: Not checked recently        On supplement: No     # HSIL: per cone biopsy on 9/30/20. No evidence of dysplasia. 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    Seen and discussed with Dr. Berta Rodríguez MD   Pager: [phone]    Physician Attestation   I, Getachew Hampton MD, saw this patient with the resident and agree with the resident/fellow's findings and plan of care as documented in the note.      I personally reviewed vital signs, medications, labs and imaging.    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 UA +pyuria, UCx pending, CT a/p with abscess/pyelo. Continue IVF, empiric Abx, f/up urine Cx. Continue current immunosuppression FK (goal 4-6),  am/360 pm, pred 7.5. 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   Patient notes she first developed symptoms at the start of the month and saw her gynecologist who had prescribed macrobid for 7 days that she completed. Her symptoms subsequently returned and she developed worsening fatigue as well as recurrent fevers up to 102F. Denies abdominal pain or pain at transplant sites, no SOB, chest pain, n/v/d and urine output remains good. No hematuria.     Review of Systems    The 10 point Review of Systems is negative other than noted in the HPI    Past Medical History    I have reviewed this patient's medical history and updated it with pertinent information if needed.   Past Medical History:   Diagnosis Date     Adolescent scoliosis     protruding     BK viremia [id number]     CMV pneumonia (H)      Congenital hepatic fibrosis      Endometriosis      High grade squamous intraepithelial lesion of cervix 2020     History of angina      HPV (human papilloma virus) infection      Immunosuppression (H)      Polycystic kidney disease     Polycystic kidneys, tx kidney on the right. Both, very large, native kidneys reamain.     PONV (postoperative nausea and vomiting)     Need to start with ice chips and apple juice, no soda     S/P kidney transplant     SLK 10/9/2010 - kidney failure 2/2 AMR. Explanted . Re-transplant after de-sensitization      S/P liver transplant (H)     10/9/2010 - HAT, ischemic biopathy. Re-transplant 3/3/2011     S/P splenectomy      Spider veins      Thyroid disease     Hyperthyroid treated with single dose iodine       Past Surgical History   I have reviewed  this patient's surgical history and updated it with pertinent information if needed.  Past Surgical History:   Procedure Laterality Date     bunectomy  2018    right foot     bunionectomy  2019    left     CHOLECYSTECTOMY       CONIZATION N/A 2020    Procedure: CONE BIOPSY, CERVIX;  Surgeon: Daysi Perez MD;  Location: [facility]     HERNIA REPAIR, INCISIONAL  2013     SPLENECTOMY      Splenectomy     TRANSPLANT KIDNEY RECIPIENT  DONOR  2016    re-transplant after AMR; 6 months de-sensitization prior AND 6 months after transplant     TRANSPLANT LIVER RECIPIENT  DONOR  2011     TRANSPLANT LIVER, KIDNEY RECIPIENT  DONOR, COMBINED  10/09/2010    HAT - re-Tx liver 3/3/2011; Kidney (left iliac) lost to AMR/fibrosis - explant     VASCULAR SURGERY      Vascular access left UE. Not used for 4 years since 2nd kidney tx 2016 [place]       Family History   I have reviewed this patient's family history and updated it with pertinent information if needed.   Family History   Problem Relation Age of Onset     Uterine Cancer Maternal Grandmother      Polycystic Kidney Diease Brother      Polycystic Kidney Diease Brother      Polycystic Kidney Diease Brother      Polycystic Kidney Diease Brother      Cervical Cancer Maternal Aunt        Social History   I have reviewed this patient's social history and updated it with pertinent information if needed. eBlen Sharma  reports that she has never smoked. She has never used smokeless tobacco. She reports previous alcohol use. She reports previous drug use.    Allergies   Allergies   Allergen Reactions     Ibuprofen      Due to liver transplant     Prior to Admission Medications     aspirin  81 mg Oral QPM     biotin  3,000 mcg Oral Daily     docusate sodium  200 mg Oral QAM     enoxaparin ANTICOAGULANT  40 mg Subcutaneous Q24H     meropenem  1 g Intravenous Q8H     mycophenolic acid  540 mg Oral BID     predniSONE  7.5 mg Oral QAM      sodium chloride (PF)  3 mL Intracatheter Q8H     [START ON 2020] sulfamethoxazole-trimethoprim  1 tablet Oral Q  AM     tacrolimus  2 mg Oral BID     Vitamin D3  25 mcg Oral QAM       Physical Exam   Temp  Av.6  F (37  C)  Min: 97.1  F (36.2  C)  Max: 100.5  F (38.1  C)      Pulse  Av.3  Min: 83  Max: 124 Resp  Av  Min: 20  Max: 20  SpO2  Av.2 %  Min: 93 %  Max: 100 %     /57 (BP Location: Right arm)   Pulse 98   Temp 98.7  F (37.1  C) (Oral)   Resp 20   Wt 68.5 kg (151 lb)   SpO2 100%   BMI 21.67 kg/m     Date 20 - 20 0659   Shift 5081-8576 7368-7233 6871-1303 24 Hour Total   INTAKE   Shift Total(mL/kg)       OUTPUT   Urine 200   200   Shift Total(mL/kg) 200(2.92)   200(2.92)   Weight (kg) 68.49 68.49 68.49 68.49      Admit Weight: 68.5 kg (151 lb)     GENERAL APPEARANCE: alert and no distress  HENT: mouth without ulcers or lesions  RESP: lungs clear to auscultation - no rales, rhonchi or wheezes  CV: regular rhythm, normal rate, no rub, no murmur  EDEMA: no LE edema bilaterally  ABDOMEN: soft, nondistended, nontender, bowel sounds normal  MS: extremities normal - no gross deformities noted  SKIN: no rash on exposed surfaces    Data   CMP  Recent Labs   Lab 20      POTASSIUM 4.2   CHLORIDE 106   CO2 26   ANIONGAP 5   GLC 89   BUN 17   CR 0.87   GFRESTIMATED 72   GFRESTBLACK 83   EDSON 9.9   PROTTOTAL 7.4   ALBUMIN 3.6   BILITOTAL 0.7   ALKPHOS 77   AST 15   ALT 23     CBC  Recent Labs   Lab 20   HGB 16.9*   WBC 11.4*   RBC 5.70*   HCT 54.5*   MCV 96   MCH 29.6   MCHC 31.0*   RDW 14.6        INRNo lab results found in last 7 days.  ABGNo lab results found in last 7 days.   Urine Studies  Recent Labs   Lab Test 20/20  0927 20  0915   COLOR Light Yellow Yellow Yellow   APPEARANCE Slightly Cloudy Cloudy Slightly Cloudy   URINEGLC Negative Negative Negative   URINEBILI Negative Negative  Negative   URINEKETONE Negative Negative Negative   SG 1.007 1.020 1.015   UBLD Small* Moderate* Moderate*   URINEPH 7.5* 7.0 7.0   PROTEIN 10* 100* 30*   UROBILINOGEN  --  0.2  --    NITRITE Negative Negative Positive*   LEUKEST Large* Large* Large*   RBCU 2  --  72*   WBCU 466*  --  >182*     Recent Labs   Lab Test 09/10/20  1037   UTPG 0.16     PTH  Recent Labs   Lab Test 09/23/20  1653   PTHI 45     Iron Studies  No lab results found.    IMAGING:  All imaging studies reviewed by me.

## 2020-12-18 LAB
ANION GAP SERPL CALCULATED.3IONS-SCNC: 3 MMOL/L (ref 3–14)
BUN SERPL-MCNC: 12 MG/DL (ref 7–30)
CALCIUM SERPL-MCNC: 9.4 MG/DL (ref 8.5–10.1)
CHLORIDE SERPL-SCNC: 110 MMOL/L (ref 94–109)
CO2 SERPL-SCNC: 24 MMOL/L (ref 20–32)
CREAT SERPL-MCNC: 0.7 MG/DL (ref 0.52–1.04)
ERYTHROCYTE [DISTWIDTH] IN BLOOD BY AUTOMATED COUNT: 14.6 % (ref 10–15)
GFR SERPL CREATININE-BSD FRML MDRD: >90 ML/MIN/{1.73_M2}
GLUCOSE SERPL-MCNC: 98 MG/DL (ref 70–99)
HCT VFR BLD AUTO: 47.3 % (ref 35–47)
HGB BLD-MCNC: 14.9 G/DL (ref 11.7–15.7)
MCH RBC QN AUTO: 30.2 PG (ref 26.5–33)
MCHC RBC AUTO-ENTMCNC: 31.5 G/DL (ref 31.5–36.5)
MCV RBC AUTO: 96 FL (ref 78–100)
MYCOPHENOLATE SERPL LC/MS/MS-MCNC: 1.32 MG/L (ref 1–3.5)
MYCOPHENOLATE-G SERPL LC/MS/MS-MCNC: 23.7 MG/L (ref 30–95)
PLATELET # BLD AUTO: 222 10E9/L (ref 150–450)
POTASSIUM SERPL-SCNC: 3.8 MMOL/L (ref 3.4–5.3)
RBC # BLD AUTO: 4.94 10E12/L (ref 3.8–5.2)
SODIUM SERPL-SCNC: 138 MMOL/L (ref 133–144)
TME LAST DOSE: ABNORMAL H
WBC # BLD AUTO: 11.5 10E9/L (ref 4–11)

## 2020-12-18 PROCEDURE — 258N000003 HC RX IP 258 OP 636

## 2020-12-18 PROCEDURE — 99233 SBSQ HOSP IP/OBS HIGH 50: CPT | Mod: GC | Performed by: INTERNAL MEDICINE

## 2020-12-18 PROCEDURE — 250N000011 HC RX IP 250 OP 636: Performed by: STUDENT IN AN ORGANIZED HEALTH CARE EDUCATION/TRAINING PROGRAM

## 2020-12-18 PROCEDURE — 250N000012 HC RX MED GY IP 250 OP 636 PS 637: Performed by: STUDENT IN AN ORGANIZED HEALTH CARE EDUCATION/TRAINING PROGRAM

## 2020-12-18 PROCEDURE — 36415 COLL VENOUS BLD VENIPUNCTURE: CPT | Performed by: STUDENT IN AN ORGANIZED HEALTH CARE EDUCATION/TRAINING PROGRAM

## 2020-12-18 PROCEDURE — 99207 PR CDG-MDM COMPONENT: MEETS LOW - DOWN CODED: CPT | Performed by: STUDENT IN AN ORGANIZED HEALTH CARE EDUCATION/TRAINING PROGRAM

## 2020-12-18 PROCEDURE — 99232 SBSQ HOSP IP/OBS MODERATE 35: CPT | Mod: GC | Performed by: STUDENT IN AN ORGANIZED HEALTH CARE EDUCATION/TRAINING PROGRAM

## 2020-12-18 PROCEDURE — 250N000013 HC RX MED GY IP 250 OP 250 PS 637: Performed by: STUDENT IN AN ORGANIZED HEALTH CARE EDUCATION/TRAINING PROGRAM

## 2020-12-18 PROCEDURE — 80180 DRUG SCRN QUAN MYCOPHENOLATE: CPT | Performed by: INTERNAL MEDICINE

## 2020-12-18 PROCEDURE — 250N000012 HC RX MED GY IP 250 OP 636 PS 637: Performed by: INTERNAL MEDICINE

## 2020-12-18 PROCEDURE — 80048 BASIC METABOLIC PNL TOTAL CA: CPT | Performed by: STUDENT IN AN ORGANIZED HEALTH CARE EDUCATION/TRAINING PROGRAM

## 2020-12-18 PROCEDURE — 120N000011 HC R&B TRANSPLANT UMMC

## 2020-12-18 PROCEDURE — 36415 COLL VENOUS BLD VENIPUNCTURE: CPT | Performed by: INTERNAL MEDICINE

## 2020-12-18 PROCEDURE — 99231 SBSQ HOSP IP/OBS SF/LOW 25: CPT | Performed by: INTERNAL MEDICINE

## 2020-12-18 PROCEDURE — 85027 COMPLETE CBC AUTOMATED: CPT | Performed by: STUDENT IN AN ORGANIZED HEALTH CARE EDUCATION/TRAINING PROGRAM

## 2020-12-18 RX ORDER — LIDOCAINE 40 MG/G
CREAM TOPICAL
Status: DISCONTINUED | OUTPATIENT
Start: 2020-12-18 | End: 2020-12-20 | Stop reason: HOSPADM

## 2020-12-18 RX ORDER — HEPARIN SODIUM,PORCINE 10 UNIT/ML
2-5 VIAL (ML) INTRAVENOUS
Status: DISCONTINUED | OUTPATIENT
Start: 2020-12-18 | End: 2020-12-20 | Stop reason: HOSPADM

## 2020-12-18 RX ORDER — SODIUM CHLORIDE 9 MG/ML
INJECTION, SOLUTION INTRAVENOUS
Status: COMPLETED
Start: 2020-12-18 | End: 2020-12-18

## 2020-12-18 RX ORDER — MYCOPHENOLIC ACID 360 MG/1
360 TABLET, DELAYED RELEASE ORAL 2 TIMES DAILY
Status: DISCONTINUED | OUTPATIENT
Start: 2020-12-18 | End: 2020-12-19

## 2020-12-18 RX ORDER — ERTAPENEM 1 G/1
1 INJECTION, POWDER, LYOPHILIZED, FOR SOLUTION INTRAMUSCULAR; INTRAVENOUS EVERY 24 HOURS
Status: DISCONTINUED | OUTPATIENT
Start: 2020-12-18 | End: 2020-12-19

## 2020-12-18 RX ADMIN — ERTAPENEM SODIUM 1 G: 1 INJECTION, POWDER, LYOPHILIZED, FOR SOLUTION INTRAMUSCULAR; INTRAVENOUS at 21:03

## 2020-12-18 RX ADMIN — Medication 25 MCG: at 07:42

## 2020-12-18 RX ADMIN — MEROPENEM 1 G: 1 INJECTION, POWDER, FOR SOLUTION INTRAVENOUS at 11:51

## 2020-12-18 RX ADMIN — MYCOPHENOLIC ACID 360 MG: 360 TABLET, DELAYED RELEASE ORAL at 21:03

## 2020-12-18 RX ADMIN — DOCUSATE SODIUM 200 MG: 100 CAPSULE, LIQUID FILLED ORAL at 07:42

## 2020-12-18 RX ADMIN — Medication 3000 MCG: at 07:42

## 2020-12-18 RX ADMIN — SULFAMETHOXAZOLE AND TRIMETHOPRIM 1 TABLET: 400; 80 TABLET ORAL at 07:41

## 2020-12-18 RX ADMIN — PREDNISONE 7.5 MG: 2.5 TABLET ORAL at 07:41

## 2020-12-18 RX ADMIN — TACROLIMUS 2 MG: 1 CAPSULE ORAL at 07:41

## 2020-12-18 RX ADMIN — ENOXAPARIN SODIUM 40 MG: 40 INJECTION SUBCUTANEOUS at 07:41

## 2020-12-18 RX ADMIN — MEROPENEM 1 G: 1 INJECTION, POWDER, FOR SOLUTION INTRAVENOUS at 04:00

## 2020-12-18 RX ADMIN — ASPIRIN 81 MG CHEWABLE TABLET 81 MG: 81 TABLET CHEWABLE at 21:03

## 2020-12-18 RX ADMIN — SODIUM CHLORIDE: 9 INJECTION, SOLUTION INTRAVENOUS at 04:33

## 2020-12-18 RX ADMIN — MYCOPHENOLIC ACID 540 MG: 360 TABLET, DELAYED RELEASE ORAL at 07:42

## 2020-12-18 RX ADMIN — TACROLIMUS 2 MG: 1 CAPSULE ORAL at 21:03

## 2020-12-18 ASSESSMENT — ACTIVITIES OF DAILY LIVING (ADL)
ADLS_ACUITY_SCORE: 15

## 2020-12-18 NOTE — CONSULTS
[facility]    Transplant Infectious Diseases Inpatient Consultation      Belen Sharma MRN# [id number]   YOB: 1960 Age: 60 year old   Date of Admission and time: 12/16/2020  9:27 PM     Reason for consult: I was asked by Dr. Moore to evaluate this patient for bacteremia and UTI.             Recommendations:   1. Agree with meropenem pending susceptibilities.         Summary of Presentation:   Transplants:  2/2/2016 (Kidney), 3/3/2011 (Liver), 10/9/2010 (Kidney / Liver), Postoperative day:  3577 (Liver), 1780 (Kidney)     This patient is a 60 year old female with PCKD and congenital hepatic fibrosis s/p kidney/liver in 2010 both failed and required retransplant. Currently on TAC/mycophenolic acid/prednisone.   Admitted with fever and was found to have UTI and bacteremia.         Active Problems and Infectious Diseases Issues:   1. Sepsis.   2. UTI and probable pyelonephritis of the transplanted kidney.   3. Bacteremia due to 2 with ESBL E coli     It looks like the patient has  tract anatomical abnormality that would put her at higher risk for UTI including uterine prolapse and the use of pessary.   In the past she did not have frequent UTIs, approximately 1/year.     Agree with meropenem for now pending more data.         Old Problems and Infectious Diseases Issues:   1. UTI with E coli.  2. CDI    Other Infectious Disease issues include:  - PCP prophylaxis: bactrim  - Serostatus: ?       Thank you very much Dr. Moore for involving me in the care of Mrs. Belen Sharma. Please do not hesitate to call me for any question.     Attestation:  I interviewed the patient and obtained history from the patient, and by reviewing the patient's chart including outside records, microbiological data, and radiological data. All data are summarized in this notes.  Catrachita Gonzales MD  [facility]   Contact information available via [facility]  Paging/Directory    12/17/2020             History of Present Illness:   Transplants:  2/2/2016 (Kidney), 3/3/2011 (Liver), 10/9/2010 (Kidney / Liver), Postoperative day:  3577 (Liver), 1780 (Kidney)     This patient is a 60 year old female with history of congenital hepatic fibrosis and PCKD s/p liver and kidney transplant in [place] in 2010, they both failed, first the liver apparently due to anatomic abnormalities and required liver re-transplant within few months in 2011, then the kidney failed and needed HD through LUE AVF for 4 years before she received kidney re-transplant in [place] in 2016.   The patient stated she usually gets UTI once a year with symptoms of dysuria and sometimes fever.     She moved to [place] in 7/2020. Live with her  in [place].     She underwent colposcopic biopsy in 9/2020. She was also diagnosed with uterine prolapse and was treated with pessary ring.   Since these therapies the patient has been feeling well, but more recently in earyl 12/2020 she developed symptoms of dysuria and was treated with nitrofurantoin, her UA was c/w UTI but Ucx with only  keisha. She recovered but then had recurring symptoms associated with fever so she presented to ED.  Blood cx grew GNB, ID consulted.     The patient is anxious. No fever. No other complaints.               Review of Systems:      As mentioned in the HPI otherwise negative by reviewing constitutional symptoms, central and peripheral neurological systems, respiratory system, cardiac system, GI system,  system, musculoskeletal, skin, allergy, and lymphatics.                  Past Medical History:     Past Medical History:   Diagnosis Date     Adolescent scoliosis     protruding     BK viremia [id number]     CMV pneumonia (H) 2010     Congenital hepatic fibrosis      Endometriosis      High grade squamous intraepithelial lesion of cervix 09/11/2020     History of angina      HPV (human papilloma virus) infection      Immunosuppression (H)       Polycystic kidney disease     Polycystic kidneys, tx kidney on the right. Both, very large, native kidneys reamain.     PONV (postoperative nausea and vomiting)     Need to start with ice chips and apple juice, no soda     S/P kidney transplant     SLK 10/9/2010 - kidney failure 2/2 [facility]R. Explanted . Re-transplant after de-sensitization      S/P liver transplant (H)     10/9/2010 - HAT, ischemic biopathy. Re-transplant 3/3/2011     S/P splenectomy      Spider veins      Thyroid disease     Hyperthyroid treated with single dose iodine            Past Surgical History:     Past Surgical History:   Procedure Laterality Date     bunectomy  2018    right foot     bunionectomy  2019    left     CHOLECYSTECTOMY       CONIZATION N/A 2020    Procedure: CONE BIOPSY, CERVIX;  Surgeon: Daysi Perez MD;  [facility]     HERNIA REPAIR, INCISIONAL  2013     SPLENECTOMY      Splenectomy     TRANSPLANT KIDNEY RECIPIENT  DONOR      re-transplant after AMR; 6 months de-sensitization prior AND 6 months after transplant     TRANSPLANT LIVER RECIPIENT  DONOR  2011     TRANSPLANT LIVER, KIDNEY RECIPIENT  DONOR, COMBINED  10/09/2010    HAT - re-Tx liver 3/3/2011; Kidney (left iliac) lost to AMR/fibrosis - explant     VASCULAR SURGERY      Vascular access left UE. Not used for 4 years since 2nd kidney tx 2016 [place]            Social History:     Social History     Tobacco Use     Smoking status: Never Smoker     Smokeless tobacco: Never Used   Substance Use Topics     Alcohol use: Not Currently            Family History:   I have reviewed this patient's family history  Family History   Problem Relation Age of Onset     Uterine Cancer Maternal Grandmother      Polycystic Kidney Diease Brother      Polycystic Kidney Diease Brother      Polycystic Kidney Diease Brother      Polycystic Kidney Diease Brother      Cervical Cancer Maternal Aunt              Immunizations:     Immunization History   Administered Date(s) Administered     DT (PEDS <7y) 03/19/2001     FLU 6-35 months 11/15/2002, 11/18/2003, 10/26/2004, 10/24/2005, 11/16/2006     Flu, Unspecified 10/15/2020     HepB, Unspecified 06/28/2001     Influenza,INJ,MDCK,PF,Quad >4yrs 10/15/2020     Pneumococcal 23 valent 09/27/2002     Zoster vaccine recombinant adjuvanted (SHINGRIX) 10/15/2020            Allergies:     Allergies   Allergen Reactions     Ibuprofen      Due to liver transplant             Medications:   Medications that Require Transfusion:     Scheduled Medications:     aspirin  81 mg Oral QPM     biotin  3,000 mcg Oral Daily     docusate sodium  200 mg Oral QAM     enoxaparin ANTICOAGULANT  40 mg Subcutaneous Q24H     meropenem  1 g Intravenous Q8H     mycophenolic acid  540 mg Oral BID     predniSONE  7.5 mg Oral QAM     sodium chloride (PF)  3 mL Intracatheter Q8H     [START ON 12/18/2020] sulfamethoxazole-trimethoprim  1 tablet Oral Q Mon Wed Fri AM     tacrolimus  2 mg Oral BID     Vitamin D3  25 mcg Oral QAM               Physical Exam:   Temp: 98.1  F (36.7  C) Temp src: Oral BP: 104/56 Pulse: 89   Resp: 18 SpO2: 95 % O2 Device: None (Room air)      Wt Readings from Last 4 Encounters:   12/16/20 68.5 kg (151 lb)   12/15/20 68.5 kg (151 lb)   12/03/20 70.4 kg (155 lb 1.6 oz)   11/11/20 68.9 kg (152 lb)     Constitutional: awake, alert, cooperative, no apparent distress and appears at stated age, well nourished.   Head, ENT, Eyes, and Neck: Normocephalic, sinuses non-tender to palpation, external ears without lesions, moist buccal mucosa without oral thrush or ulcers, tonsils without swelling, erythema, or exudate, no tenderness palpating teeth, good dentition, gums without necrosis or abscesses.   PERRL, EOMI, pink conjunctivae, non-icteric sclera.   Neck supple without rigidity, no cervical/axillary/inguinal LA bilaterally.    Neurologic: Patient is moving all extremities without focal  deficit, no focal sensory loss.   Lungs: CTA bilaterally, no accessory muscle use, no dullness to percussion and no abnormal tactile fremitus.   CVS: RRR, normal S1/S2, no murmur, PMI was not displaced.   Abdomen: non-tender, non-distended, no masses, no bruit, no shifting dullness, normal BS.   Extremities: no pitting edema of bilateral lower extremities, no ulcers, normal ROM of all joints, no swelling or erythema of any of joints and no tenderness to palpation.   Skin: no induration, fluctuation or discharge at the AVF site, and no rash            Data:   No results found for: ACD4    Inflammatory Markers    No lab results found.    Immune Globulin Studies   No lab results found.    Metabolic Studies       Recent Labs   Lab Test 12/16/20  2236 12/10/20  0839 09/30/20  0721 09/23/20  1653 09/10/20  1037 06/09/20 06/01/20    137 135  --  136  --   --    POTASSIUM 4.2 3.3* 3.9  --  3.5 4.0 4.0   CHLORIDE 106 105 107  --  104  --   --    CO2 26 27 22  --  26  --   --    ANIONGAP 5 5 6  --  6  --   --    BUN 17 16 14  --  13  --   --    CR 0.87 0.78 0.60  --  0.84 0.89 0.69   GFRESTIMATED 72 83 >90  --  76 >60 95   GLC 89 92 95  --  83 83 83   EDSON 9.9 10.2* 9.3  --  9.7  --   --    PHOS  --   --   --  3.4  --   --   --    LACT 0.8  --   --   --   --   --   --        Hepatic Studies    Recent Labs   Lab Test 12/16/20  2236 12/10/20  0839 09/10/20  1037 06/09/20 06/01/20 03/06/20   BILITOTAL 0.7 0.6 0.6  --   --   --    ALKPHOS 77 70 66  --   --   --    ALBUMIN 3.6 3.8 3.5  --   --   --    AST 15 22 13 15 18 16   ALT 23 31 21 25 15 24       Pancreatitis testing    Recent Labs   Lab Test 09/10/20  1037   TRIG 131       Hematology Studies      Recent Labs   Lab Test 12/16/20 2236 12/10/20  0839 09/30/20  0721 09/10/20  1037   WBC 11.4* 6.1  --  6.9   ANEU 9.3*  --   --   --    ALYM 1.0  --   --   --    PABLO 1.0  --   --   --    AEOS 0.0  --   --   --    HGB 16.9* 16.8* 17.9* 17.8*   HCT 54.5* 51.1*  --  55.0*   PLT  260 249  --  192       Clotting Studies    No lab results found.    Arterial Blood Gas Testing  No lab results found.     Urine Studies     Recent Labs   Lab Test 12/16/20  2236 12/03/20  0927 12/03/20  0915   URINEPH 7.5* 7.0 7.0   NITRITE Negative Negative Positive*   LEUKEST Large* Large* Large*   WBCU 466*  --  >182*       Tacrolimus levels    Invalid input(s): TACROLIMUS, TAC, TACR  Transplant Immunosuppression Labs Latest Ref Rng & Units 12/17/2020 12/16/2020 12/10/2020 9/30/2020 9/10/2020   Tacro Level 5.0 - 15.0 ug/L 4.4(L) - 4.4(L) - 5.8   Tacro Level - Not Provided - [id number] 2200 - 2,200   Creat 0.52 - 1.04 mg/dL - 0.87 0.78 0.60 0.84   BUN 7 - 30 mg/dL - 17 16 14 13   WBC 4.0 - 11.0 10e9/L - 11.4(H) 6.1 - 6.9   Neutrophil % - 81.9 - - -   ANEU 1.6 - 8.3 10e9/L - 9.3(H) - - -       Microbiology:  Blood cx E coli, ESBL  Last check of C difficile  No results found for: CDBPCT    Virology:  CMV viral loads  No results found for: 21125, 58745, 19307, 81742, CMVQAL  CMV viral loads    Recent Labs   Lab Test 09/23/20  1653   CSPEC Plasma   CMVLOG Not Calculated       CMV viral loads    Log IU/mL of CMVQNT   Date Value Ref Range Status   09/23/2020 Not Calculated <2.1 [Log_IU]/mL Final       Imaging:  CT abdomen and pelvis 12/17/2020   IMPRESSION:  1. No definite source of infection is identified. Consider PET scan to  look for source of infection.  2. Pessary in place.  3. Right renal transplant urothelial enhancement in the transplant  ureter inflammation/infection. Small amount of mass effect by right  pelvic cyst on renal collecting system without hydronephrosis.  4. Urothelial enhancement of the bladder may suggest infection.  5. Surgical changes of the liver transplant, patent vasculature no  intrahepatic biliary dilatation.  5. Paraesophageal varices  6. Bilateral polycystic kidneys measure approximately 20 cm 8 cubic.  7. Status post splenectomy with large splenule remaining.      Catrachita Gonzales MD  M  [facility]   Contact information available via [facility] Paging/Directory     12/17/2020

## 2020-12-18 NOTE — PROGRESS NOTES
Care Management Initial Consult    General Information  Assessment completed with:  Sent [facility] a referral and asked them to check for home IVAB coverage.  Tx ID, needs more information to decide what the plan will be.         Primary Care Provider verified and updated as needed:     Readmission within the last 30 days:        Reason for Consult: other (see comments)(possible IVAB at discharge)  Advance Care Planning:            Communication Assessment  Patient's communication style: spoken language (English or Bilingual)    Hearing Difficulty or Deaf: no   Wear Glasses or Blind: yes    Cognitive  Cognitive/Neuro/Behavioral: WDL                      Living Environment:   People in home:       Current living Arrangements:        Able to return to prior arrangements:         Family/Social Support:  Care provided by:    Provides care for:                  Description of Support System:           Current Resources:   Skilled Home Care Services:    Community Resources:    Equipment currently used at home: none  Supplies currently used at home:      Employment/Financial:  Employment Status:          Financial Concerns:             Lifestyle & Psychosocial Needs:        Socioeconomic History     Marital status:      Spouse name: Not on file     Number of children: Not on file     Years of education: Not on file     Highest education level: Not on file     Tobacco Use     Smoking status: Never Smoker     Smokeless tobacco: Never Used   Substance and Sexual Activity     Alcohol use: Not Currently     Drug use: Not Currently     Sexual activity: Not Currently       Functional Status:  Prior to admission patient needed assistance:              Mental Health Status:          Chemical Dependency Status:                Values/Beliefs:  Spiritual, Cultural Beliefs, Holiness Practices, Values that affect care:                 Additional Information:  Pt has Medicare and ---wait for [facility] to determine if she has  coverage for home, IF she will need IVAB--will need to schedule PLC. And talk with pt.    Lyudmila Bowden RN

## 2020-12-18 NOTE — PROGRESS NOTES
Resident/Fellow Attestation   I, Margie Ma, was present with the medical student who participated in the service and in the documentation of the note.  I have verified the history and personally performed the physical exam and medical decision making.  I agree with the assessment and plan of care as documented in the note.        Margie Ma MD  PGY-2  [phone]  Date of Service (when I saw the patient): 12/18/20    [facility]     Progress Note - Maroon 1 Service        Date of Admission:  12/16/2020    Assessment & Plan       Belen Sharma is a 60 year old female admitted on 12/16/2020. H/o congenital hepatic fibrosis s/p liver transplant (2010, 2011) and PCKD s/p renal transplant (2010, 2016) chronic immunosuppression. Also with h/o uterine prolapse, cone biopsy (9/2020) and recurrent UTI who presented to ED 12/17 with fever, increased urinary frequency, and generalized body aches. UA and history concerning for sepsis secondary to UTI. UC and BC growing E.coli, sensitivities pending. Transplant nephrology and ID following. Pt started on meropenem 12/17.      # Sepsis secondary to ESBL bacteremia  Suspect urinary source. History of UTIs  (12/3-12/10). No flank or abdomen pain, less likely for pyelonephritis. US of transplanted kidney without pyelonephritis. Received  ml x2 and started on ceftriaxone in the ED. History of ESBL UTI on 5/3/2018 culture at [facility] (see CareEverywhere). BC grew Ecoli on 12/17 pt started on meropenem. CT a/p 12//17 with no definite sign of infection, but enhancement suggestive inflammation/infection in bladder and transplanted kidney. UC grew Ecoli as well. Transplant ID agreed with continued ESBL coverage until culture sensitivities return. Repeat BC with ngtd.  - Continue Meropenem, transitioned to ertapenem 1 g daily for 2 weeks per Transplant ID  - Transplant nephrology following      Uterine Prolapse  Cystocele  Recent [facility]  biopsy 9/2020 due to abnormal bleeding. Warrants concern for possible infectious source. CT a/p without sign of gynecologic infection. Managed with pessary since 10/2020. Follows with ob/gyn outpatient, last seen via telemedicine visit 12/2.   - continue pessary     S/p DDKT  Post-Transplant Erythrocytosis  S/p SLK in 10/2010 and DDKT in 2/2016. Most recently evaluated by transplant nephrology 12/15. Cr at baseline 0.87 on admission (baseline 0.7-0.9)                               - Transplant nephrology following  - If hematocrit>56 needs therapeutic phlebotomy     S/p DDLT  S/p SLK in 10/2010 and DDLT in 3/2011. Most recently evaluated by transplant hepatology 7/13. Stable without acute concerns.     Immunosuppression  Infection Prophylaxis  Tacrolimus recently increased from 2mg/1mg qAM/qPM to 2mg BID. Admission tacro level 4.4, goal 5-8.  - continue PTA tacrolimus 2 mg BID  - continue PTA mycophenolic acid 540 mg BID  - continue prednisone 7.5 qAM  - continue TMP--80 mg MWF        Diet: Regular Diet Adult    Fluids: PO Fluid  Lines: pIV  DVT Prophylaxis: Enoxaparin (Lovenox) SQ  [place] Catheter: not present  Code Status: Full Code           Disposition Plan   Expected discharge: 2 - 3 days, recommended to prior living arrangement once antibiotic plan established.  Entered: Steve Wade 12/18/2020, 12:03 PM       The patient's care was discussed with the Attending Physician, Dr. Santillan .    Steve Wade  Medical Student  [address]   Please see sign in/sign out for up to date coverage information  ______________________________________________________________________    Interval History   NAEO. Patient feeling well this morning, but a bit concerned about the overall plan of what abx to be on and what follow up will be like. She has had some urinary frequency still, but denies any pain, fever, chills, sweats, or N/V.    Data reviewed today: I  reviewed all medications, new labs and imaging results over the last 24 hours.    Physical Exam   Vital Signs: Temp: 99.8  F (37.7  C) Temp src: Oral BP: 100/53 Pulse: 84   Resp: 18 SpO2: 94 % O2 Device: None (Room air)    Weight: 151 lbs 0 oz  Gen: well-appearing, laying comfortably in bed, in NAD  CV: RRR, normal S1 and S2  Pulm: CTAB, no increased WOB on RA  GI: soft, NT, ND  : no suprapubic tenderness, no flank pain or pain over R transplant site  Neuro: alert, conversant, responds to questions appropriately  Ext: no peripheral extremity edema   Psych: normal affect    Data   Recent Labs   Lab 12/18/20  0611 12/16/20  2236   WBC 11.5* 11.4*   HGB 14.9 16.9*   MCV 96 96    260    137   POTASSIUM 3.8 4.2   CHLORIDE 110* 106   CO2 24 26   BUN 12 17   CR 0.70 0.87   ANIONGAP 3 5   EDSON 9.4 9.9   GLC 98 89   ALBUMIN  --  3.6   PROTTOTAL  --  7.4   BILITOTAL  --  0.7   ALKPHOS  --  77   ALT  --  23   AST  --  15

## 2020-12-18 NOTE — PLAN OF CARE
Vitals: Vitals stable, on room air, temp high 99.8 orally.  Endocrine: n/a  Labs: Stable labs, no replacements.  Pain: Patient denies pain.  PRN's: n/a  Diet: Regular diet, good appetite, denies nausea.  LDA: R PIV, new one placed, L arm graft.  GI: BM 12/19, Senna this morning.  : Cloudy urine, some urgency but improving.  Skin: Skin is intact.  Neuro: Pleasant, alert and oriented.  Mobility: Up ad marcela in the room.  Education: na/  Plan: Continue with IV antibiotics, sensitivities pending, unclear if discharging home on IV or po antibiotics.

## 2020-12-18 NOTE — PROGRESS NOTES
[facility]  Transplant Nephrology Progress Note  Date of Admission: 12/16/2020  Today's Date: 12/18/2020    Recommendations:  - Will reduce myfortic to 360 mg BID  - Agree with IV antibiotics  - CMV PCR ordered  - MPA level ordered    Assessment & Plan   # DDKT: Stable              - Baseline Cr ~ 0.7-0.9              - Proteinuria: Normal (<0.2 grams)              - Date DSA Last Checked: Not Known      Latest DSA: unknown. Will need to obtain. History of ABMR of her first kidney transplant and then desensitization prior to second transplant              - BK Viremia: Not checked recently due to time from transplant. Check with next labs              - Kidney Tx Biopsy: No    # Liver transplant:              - Good allograft function. Follows with transplant hepatology who notes that her level of immunosuppression is higher than necessary for liver.    # Recurrent UTI and E coli bacteremia: Last episode was in June of this year. Not on any prophylaxis PTA and does not think she has seen urology for this. Likely uterine prolapse and use of pessary contributing. Currently on meropenem. Sensitivities pending    # Immunosuppression: Tacrolimus immediate release (goal 2mg in AM and 1mg in PM, decrease goal to 4-6), Mycophenolic acid (dose 720mg in AM and 360mg in PM) and Prednisone (dose 7.5 mg daily)              - Changes: Yes, Decrease MPA to 360 mg BID as do not think patient needs higher PTA doses to begin with and given bacteremia currently    # Infection Prophylaxis:  - PJP: Sulfa/TMP (Bactrim)    # History of CMV viremia:              - PCR neg 9/2020   - Recheck ordered    # Blood Pressure: Controlled;          Goal BP: < 130/80              - Volume status: Euvolemic              - Changes: Not at this time. Her BP is actually on the low end of normal so cannot start ACEi/ARB for post transplant erythrocytosis    # Post-Transplant Erythrocytosis: Hgb: Increased;            On ACEI/ARB: No             Imaging: No, need to obtain native renal U/S and kidney transplant U/S               - If hematocrit>56 would need therapeutic phlebotomy    # Mineral Bone Disorder:  - Secondary renal hyperparathyroidism; PTH level: Not checked recently        On treatment: None  - Calcium; level: Normal        On supplement: No  - Phosphorus; level: Not checked recently        On supplement: No    # HSIL: per cone biopsy on 9/30/20. No evidence of dysplasia. 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    Seen and discussed with Dr. Berta Rodríguez MD  Pager: [phone]    Physician Attestation   I, Getachew Hampton MD, saw this patient with the resident and agree with the resident/fellow's findings and plan of care as documented in the note.      I personally reviewed vital signs, medications, labs and imaging.    Key findings: afebrile, feeling better, hemodynamically stable, blood and urine Cx Ecoli -sensitivities pending. Switched to meropenem, stable renal and hepatic allograft function. Reduce MPA to 360 mg po bid; continue tacrolimus (goal trough 4-6)& prednisone 7.5 mg qd. F/up CMV PCR. 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    Interval History   More energy today and feels better. Denies dysuria but notes some abdominal discomfort in RLQ. Denies any new concerns.    Review of Systems   4 point ROS was obtained and negative except as noted in the Interval History.    MEDICATIONS:    aspirin  81 mg Oral QPM     biotin  3,000 mcg Oral Daily     docusate sodium  200 mg Oral QAM     enoxaparin ANTICOAGULANT  40 mg Subcutaneous Q24H     meropenem  1 g Intravenous Q8H     mycophenolic acid  360 mg Oral BID     predniSONE  7.5 mg Oral QAM     sodium chloride (PF)  3 mL Intracatheter Q8H     sulfamethoxazole-trimethoprim  1 tablet Oral Q Mon Wed Fri AM     tacrolimus  2 mg Oral BID     Vitamin D3  25 mcg Oral QAM       Physical Exam   Temp  Av.8  F (37.1  C)  Min: 97.1  F (36.2  C)  Max: 101.6  F (38.7  C)  Pulse  Av.8  Min: 83  Max: 124 Resp  Av.9  Min: 18  Max: 20  SpO2  Av.5 %  Min: 93 %  Max: 100 %   /53 (BP Location: Right arm)   Pulse 84   Temp 99.8  F (37.7  C) (Oral)   Resp 18   Wt 68.5 kg (151 lb)   SpO2 94%   BMI 21.67 kg/m    Date 20 07 - 20 0659   Shift 0761-9452 7435-8589 3556-1083 24 Hour Total   INTAKE   P.O. 460   460   Shift Total(mL/kg) 460(6.72)   460(6.72)   OUTPUT   Urine 900   900   Shift Total(mL/kg) 900(13.14)   900(13.14)   Weight (kg) 68.49 68.49 68.49 68.49     Admit Weight: 68.5 kg (151 lb)    GENERAL APPEARANCE: alert and no distress  HENT: mouth without ulcers or lesions  RESP: breathing non-labored  CV: regular rhythm, normal rate  EDEMA: no LE edema bilaterally  ABDOMEN:  soft, nondistended, nontender, bowel sounds normal  MS: extremities normal - no gross deformities noted  SKIN: no rash on exposed surfaces    Data   All labs reviewed by me.  CMP  Recent Labs   Lab 12/18/20 0611 12/16/20 2236    137   POTASSIUM 3.8 4.2   CHLORIDE 110* 106   CO2 24 26   ANIONGAP 3 5   GLC 98 89   BUN 12 17   CR 0.70 0.87   GFRESTIMATED >90 72   GFRESTBLACK >90 83   EDSON 9.4 9.9   PROTTOTAL  --  7.4   ALBUMIN  --  3.6   BILITOTAL  --  0.7   ALKPHOS  --  77   AST  --  15   ALT  --  23     CBC  Recent Labs   Lab 12/18/20 0611 12/16/20 2236   HGB 14.9 16.9*   WBC 11.5* 11.4*   RBC 4.94 5.70*   HCT 47.3* 54.5*   MCV 96 96   MCH 30.2 29.6   MCHC 31.5 31.0*   RDW 14.6 14.6    260     INRNo lab results found in last 7 days.  ABGNo lab results found in last 7 days.   Urine Studies  Recent Labs   Lab Test 12/16/20 2236 12/03/20  0927 12/03/20  0915   COLOR Light Yellow Yellow Yellow   APPEARANCE Slightly Cloudy Cloudy Slightly Cloudy   URINEGLC Negative Negative Negative   URINEBILI Negative Negative Negative   URINEKETONE Negative Negative Negative   SG 1.007 1.020 1.015   UBLD Small* Moderate* Moderate*   URINEPH 7.5* 7.0 7.0   PROTEIN 10* 100* 30*   UROBILINOGEN  --  0.2  --    NITRITE Negative Negative Positive*   LEUKEST Large* Large* Large*   RBCU 2  --  72*   WBCU 466*  --  >182*     Recent Labs   Lab Test 09/10/20  1037   UTPG 0.16     PTH  Recent Labs   Lab Test 09/23/20  1653   PTHI 45     Iron Studies  No lab results found.    IMAGING:  All imaging studies reviewed by me.

## 2020-12-18 NOTE — PLAN OF CARE
Belen appears to be sleeping soundly between cares. BP on the softer side, but consistent with previous pressures. At start of shift she expressed that she was very tired and conveyed that she wanted to sleep. So far she has voided at least twice, voiding cloudy straw colored urine. When asked she denies discomfort. Antibiotics were rescheduled so as to allow her longer time to sleep. Will continue to monitor and report any significant changes.

## 2020-12-18 NOTE — PLAN OF CARE
VS: /53 (BP Location: Right arm)   Pulse 98   Temp 99.9  F (37.7  C)   Resp 18   Wt 68.5 kg (151 lb)   SpO2 95%   BMI 21.67 kg/m      [id number]    Current condition: Stable on RA. Tmax 101.6.  Neuro: WDL, anxious.   Cardio: WDL  Respiratory: WDL  GI/: Voiding spontaneously, with urgency. No BM during shift.   Skin: WDL  Diet:   Regular   Labs: Lactic 0.6  LDA:  PIV saline locked.   Mobility:  UAL, using commode due to urinary urgency.   Pain: Denies.   PRN medications: Tylenol as needed.    Changes: Pt triggered sepsis protocol lactic acid was 0.6  Plan of Care: Will continue to monitor and assess for any changes

## 2020-12-18 NOTE — PROGRESS NOTES
[facility]    Transplant Infectious Diseases Inpatient Progress Note      Belen Sharma MRN# [id number]   YOB: 1960 Age: 60 year old   Date of Admission and time: 12/16/2020  9:27 PM             Recommendations:   1. PICC line placement please.   2. Ertapenem 1 gram daily till 12/31/2020 (hard stop and remove the PICC line after last dose on 12/31/2020).   3. I added fosfomycin susceptibility to the ESBL E coli growing in the urine for future reference in the case of recurrence as an additional oral option to nitrofurantoin.   4. CMP weekly while on ertapenem. Please fax the results to Dr. Gonzales [phone]    Dr. Reyes is available over the weekend for questions. I will resume the patient's care on Monday if she's still inpatient. The patient maybe discharged from ID stand of point if she remains afebrile for 24 hr.         Summary of Presentation:   Transplants:  2/2/2016 (Kidney), 3/3/2011 (Liver), 10/9/2010 (Kidney / Liver), Postoperative day:  3578 (Liver), 1781 (Kidney)     This patient is a 60 year old female with PCKD and congenital hepatic fibrosis s/p kidney/liver in 2010 both failed and required retransplant. Currently on TAC/mycophenolic acid/prednisone.   Admitted with fever and was found to have UTI and bacteremia.         Active Problems and Infectious Diseases Issues:   1. Sepsis.   2. UTI and probable pyelonephritis of the transplanted kidney.   3. Bacteremia with ESBL E coli due to 2.     Will treat with ertapenem for total of 14 days.   Will need PICC line.     It looks like the patient has  tract anatomical abnormality that would put her at higher risk for UTI including uterine prolapse and the use of pessary.   In the past she did not have frequent UTIs, approximately 1/year.     I added fosfomycin susceptibility to the ESBL E coli growing in the urine for future reference in the case of recurrence as an additional oral option to nitrofurantoin.          Old Problems and Infectious Diseases Issues:   1. UTI with E coli.  2. CDI    Other Infectious Disease issues include:  - PCP prophylaxis: bactrim  - Serostatus: ?       Attestation:  I interviewed the patient and obtained history from the patient, and by reviewing the patient's chart including outside records, microbiological data, and radiological data. All data are summarized in this notes.  Catrachita Gonzales MD  [facility]   Contact information available via [facility] Paging/Directory    12/18/2020           Interim History:   Feels better today. Dysuria resolved. No other complaints.          History of Present Illness:   Transplants:  2/2/2016 (Kidney), 3/3/2011 (Liver), 10/9/2010 (Kidney / Liver), Postoperative day:  3578 (Liver), 1781 (Kidney)     This patient is a 60 year old female with history of congenital hepatic fibrosis and PCKD s/p liver and kidney transplant in [place] in 2010, they both failed, first the liver apparently due to anatomic abnormalities and required liver re-transplant within few months in 2011, then the kidney failed and needed HD through [facility] AV[facility] for 4 years before she received kidney re-transplant in [place] in 2016.   The patient stated she usually gets UTI once a year with symptoms of dysuria and sometimes fever.     She moved to [place] in 7/2020. Live with her  in [place].     She underwent colposcopic biopsy in 9/2020. She was also diagnosed with uterine prolapse and was treated with pessary ring.   Since these therapies the patient has been feeling well, but more recently in earyl 12/2020 she developed symptoms of dysuria and was treated with nitrofurantoin, her UA was c/w UTI but Ucx with only  keisha. She recovered but then had recurring symptoms associated with fever so she presented to ED.  Blood cx grew GNB, ID consulted.     The patient is anxious. No fever. No other complaints.               Review of Systems:      As mentioned in  the HPI otherwise negative by reviewing constitutional symptoms, central and peripheral neurological systems, respiratory system, cardiac system, GI system,  system, musculoskeletal, skin, allergy, and lymphatics.                    Allergies:     Allergies   Allergen Reactions     Ibuprofen      Due to liver transplant             Medications:   Medications that Require Transfusion:     Scheduled Medications:     aspirin  81 mg Oral QPM     biotin  3,000 mcg Oral Daily     docusate sodium  200 mg Oral QAM     enoxaparin ANTICOAGULANT  40 mg Subcutaneous Q24H     meropenem  1 g Intravenous Q8H     mycophenolic acid  360 mg Oral BID     predniSONE  7.5 mg Oral QAM     sodium chloride (PF)  3 mL Intracatheter Q8H     sulfamethoxazole-trimethoprim  1 tablet Oral Q Mon Wed Fri AM     tacrolimus  2 mg Oral BID     Vitamin D3  25 mcg Oral QAM            Physical Exam:   Temp: 99.3  F (37.4  C) Temp src: Oral BP: 96/53 Pulse: 86   Resp: 18 SpO2: 95 % O2 Device: None (Room air)      Wt Readings from Last 4 Encounters:   12/16/20 68.5 kg (151 lb)   12/15/20 68.5 kg (151 lb)   12/03/20 70.4 kg (155 lb 1.6 oz)   11/11/20 68.9 kg (152 lb)     Constitutional: awake, alert, cooperative, no apparent distress and appears at stated age, well nourished.            Data:   No results found for: ACD4    Inflammatory Markers    No lab results found.    Immune Globulin Studies   No lab results found.    Metabolic Studies       Recent Labs   Lab Test 12/18/20  0611 12/16/20  2236 12/10/20  0839 09/30/20  0721 09/23/20  1653 09/10/20  1037 06/09/20    137 137 135  --  136  --    POTASSIUM 3.8 4.2 3.3* 3.9  --  3.5 4.0   CHLORIDE 110* 106 105 107  --  104  --    CO2 24 26 27 22  --  26  --    ANIONGAP 3 5 5 6  --  6  --    BUN 12 17 16 14  --  13  --    CR 0.70 0.87 0.78 0.60  --  0.84 0.89   GFRESTIMATED >90 72 83 >90  --  76 >60   GLC 98 89 92 95  --  83 83   EDSON 9.4 9.9 10.2* 9.3  --  9.7  --    PHOS  --   --   --   --  3.4  --    --    LACT  --  0.8  --   --   --   --   --        Hepatic Studies    Recent Labs   Lab Test 12/16/20  2236 12/10/20  0839 09/10/20  1037 06/09/20 06/01/20 03/06/20   BILITOTAL 0.7 0.6 0.6  --   --   --    ALKPHOS 77 70 66  --   --   --    ALBUMIN 3.6 3.8 3.5  --   --   --    AST 15 22 13 15 18 16   ALT 23 31 21 25 15 24       Pancreatitis testing    Recent Labs   Lab Test 09/10/20  1037   TRIG 131       Hematology Studies      Recent Labs   Lab Test 12/18/20 0611 12/16/20  2236 12/10/20  0839 09/30/20  0721 09/10/20  1037   WBC 11.5* 11.4* 6.1  --  6.9   ANEU  --  9.3*  --   --   --    ALYM  --  1.0  --   --   --    PABLO  --  1.0  --   --   --    AEOS  --  0.0  --   --   --    HGB 14.9 16.9* 16.8* 17.9* 17.8*   HCT 47.3* 54.5* 51.1*  --  55.0*    260 249  --  192       Clotting Studies    No lab results found.    Arterial Blood Gas Testing  No lab results found.     Urine Studies     Recent Labs   Lab Test 12/16/20 2236 12/03/20  0927 12/03/20  0915   URINEPH 7.5* 7.0 7.0   NITRITE Negative Negative Positive*   LEUKEST Large* Large* Large*   WBCU 466*  --  >182*       Tacrolimus levels    Invalid input(s): TACROLIMUS, TAC, TACR  Transplant Immunosuppression Labs Latest Ref Rng & Units 12/18/2020 12/17/2020 12/16/2020 12/10/2020 9/30/2020   Tacro Level 5.0 - 15.0 ug/L - 4.4(L) - 4.4(L) -   Tacro Level - - Not Provided - [id number] 2200 -   Creat 0.52 - 1.04 mg/dL 0.70 - 0.87 0.78 0.60   BUN 7 - 30 mg/dL 12 - 17 16 14   WBC 4.0 - 11.0 10e9/L 11.5(H) - 11.4(H) 6.1 -   Neutrophil % - - 81.9 - -   ANEU 1.6 - 8.3 10e9/L - - 9.3(H) - -       Microbiology:  Blood cx E coli, ESBL  Last check of C difficile  No results found for: CDBPCT    Virology:  CMV viral loads  No results found for: 49152, 05098, 94800, 93364, CMVQAL  CMV viral loads    Recent Labs   Lab Test 09/23/20  1653   CSPEC Plasma   CMVLOG Not Calculated       CMV viral loads    Log IU/mL of CMVQNT   Date Value Ref Range Status   09/23/2020 Not  Calculated <2.1 [Log_IU]/mL Final       Imaging:  CT abdomen and pelvis 12/17/2020   IMPRESSION:  1. No definite source of infection is identified. Consider PET scan to  look for source of infection.  2. Pessary in place.  3. Right renal transplant urothelial enhancement in the transplant  ureter inflammation/infection. Small amount of mass effect by right  pelvic cyst on renal collecting system without hydronephrosis.  4. Urothelial enhancement of the bladder may suggest infection.  5. Surgical changes of the liver transplant, patent vasculature no  intrahepatic biliary dilatation.  5. Paraesophageal varices  6. Bilateral polycystic kidneys measure approximately 20 cm 8 cubic.  7. Status post splenectomy with large splenule remaining.      Catrachita Gonzales MD  [facility]   Contact information available via [facility] Paging/Directory     12/18/2020

## 2020-12-18 NOTE — PLAN OF CARE
/56 (BP Location: Right arm)   Pulse 89   Temp 98.1  F (36.7  C) (Oral)   Resp 18   Wt 68.5 kg (151 lb)   SpO2 95%   BMI 21.67 kg/m      Assumed care from 0972-1396. Patient with soft BPs,, AOVSS on RA, afebrile. Denies pain. Tolerating regular diet with good appetite. PIV-SL. BM yesterday. Voiding adequate amounts. Up ad marcela. IV abx tonight and antirejections meds.   Will continue with POC and notify MD with changes or concerns.

## 2020-12-19 ENCOUNTER — APPOINTMENT (OUTPATIENT)
Dept: INTERVENTIONAL RADIOLOGY/VASCULAR | Facility: CLINIC | Age: 60
DRG: 872 | End: 2020-12-19
Payer: MEDICARE

## 2020-12-19 ENCOUNTER — APPOINTMENT (OUTPATIENT)
Dept: GENERAL RADIOLOGY | Facility: CLINIC | Age: 60
DRG: 872 | End: 2020-12-19
Payer: MEDICARE

## 2020-12-19 ENCOUNTER — HOME INFUSION (PRE-WILLOW HOME INFUSION) (OUTPATIENT)
Dept: PHARMACY | Facility: CLINIC | Age: 60
End: 2020-12-19

## 2020-12-19 VITALS
DIASTOLIC BLOOD PRESSURE: 63 MMHG | SYSTOLIC BLOOD PRESSURE: 107 MMHG | BODY MASS INDEX: 21.67 KG/M2 | OXYGEN SATURATION: 92 % | WEIGHT: 151 LBS | HEART RATE: 81 BPM | RESPIRATION RATE: 25 BRPM | TEMPERATURE: 98.1 F

## 2020-12-19 LAB
ALBUMIN SERPL-MCNC: 3 G/DL (ref 3.4–5)
ALP SERPL-CCNC: 62 U/L (ref 40–150)
ALT SERPL W P-5'-P-CCNC: 17 U/L (ref 0–50)
ANION GAP SERPL CALCULATED.3IONS-SCNC: <1 MMOL/L (ref 3–14)
AST SERPL W P-5'-P-CCNC: 12 U/L (ref 0–45)
BACTERIA SPEC CULT: ABNORMAL
BILIRUB SERPL-MCNC: 0.5 MG/DL (ref 0.2–1.3)
BUN SERPL-MCNC: 14 MG/DL (ref 7–30)
CALCIUM SERPL-MCNC: 10.1 MG/DL (ref 8.5–10.1)
CHLORIDE SERPL-SCNC: 107 MMOL/L (ref 94–109)
CMV DNA SPEC NAA+PROBE-ACNC: NORMAL [IU]/ML
CMV DNA SPEC NAA+PROBE-LOG#: NORMAL {LOG_IU}/ML
CO2 SERPL-SCNC: 32 MMOL/L (ref 20–32)
CREAT SERPL-MCNC: 0.64 MG/DL (ref 0.52–1.04)
GFR SERPL CREATININE-BSD FRML MDRD: >90 ML/MIN/{1.73_M2}
GLUCOSE SERPL-MCNC: 86 MG/DL (ref 70–99)
POTASSIUM SERPL-SCNC: 4.1 MMOL/L (ref 3.4–5.3)
PROT SERPL-MCNC: 6.9 G/DL (ref 6.8–8.8)
SODIUM SERPL-SCNC: 140 MMOL/L (ref 133–144)
SPECIMEN SOURCE: ABNORMAL
SPECIMEN SOURCE: NORMAL
TACROLIMUS BLD-MCNC: 5.2 UG/L (ref 5–15)
TME LAST DOSE: NORMAL H

## 2020-12-19 PROCEDURE — 999N000044 HC STATISTIC CVC DRESSING CHANGE

## 2020-12-19 PROCEDURE — 99239 HOSP IP/OBS DSCHRG MGMT >30: CPT | Mod: GC | Performed by: STUDENT IN AN ORGANIZED HEALTH CARE EDUCATION/TRAINING PROGRAM

## 2020-12-19 PROCEDURE — 36569 INSJ PICC 5 YR+ W/O IMAGING: CPT | Mod: 52

## 2020-12-19 PROCEDURE — 250N000013 HC RX MED GY IP 250 OP 250 PS 637: Performed by: STUDENT IN AN ORGANIZED HEALTH CARE EDUCATION/TRAINING PROGRAM

## 2020-12-19 PROCEDURE — 999N000065 XR CHEST PORT 1 VW

## 2020-12-19 PROCEDURE — 36573 INSJ PICC RS&I 5 YR+: CPT | Performed by: RADIOLOGY

## 2020-12-19 PROCEDURE — 250N000009 HC RX 250: Performed by: STUDENT IN AN ORGANIZED HEALTH CARE EDUCATION/TRAINING PROGRAM

## 2020-12-19 PROCEDURE — 250N000009 HC RX 250: Performed by: RADIOLOGY

## 2020-12-19 PROCEDURE — 250N000011 HC RX IP 250 OP 636: Performed by: STUDENT IN AN ORGANIZED HEALTH CARE EDUCATION/TRAINING PROGRAM

## 2020-12-19 PROCEDURE — 36573 INSJ PICC RS&I 5 YR+: CPT

## 2020-12-19 PROCEDURE — 71045 X-RAY EXAM CHEST 1 VIEW: CPT | Mod: 26 | Performed by: RADIOLOGY

## 2020-12-19 PROCEDURE — C1769 GUIDE WIRE: HCPCS

## 2020-12-19 PROCEDURE — C1725 CATH, TRANSLUMIN NON-LASER: HCPCS

## 2020-12-19 PROCEDURE — 255N000002 HC RX 255 OP 636: Performed by: STUDENT IN AN ORGANIZED HEALTH CARE EDUCATION/TRAINING PROGRAM

## 2020-12-19 PROCEDURE — C1751 CATH, INF, PER/CENT/MIDLINE: HCPCS

## 2020-12-19 PROCEDURE — 36415 COLL VENOUS BLD VENIPUNCTURE: CPT | Performed by: INTERNAL MEDICINE

## 2020-12-19 PROCEDURE — 36907 BALO ANGIOP CTR DIALYSIS SEG: CPT | Performed by: RADIOLOGY

## 2020-12-19 PROCEDURE — 37248 TRLUML BALO ANGIOP 1ST VEIN: CPT

## 2020-12-19 PROCEDURE — 93010 ELECTROCARDIOGRAM REPORT: CPT | Performed by: INTERNAL MEDICINE

## 2020-12-19 PROCEDURE — 250N000012 HC RX MED GY IP 250 OP 636 PS 637: Performed by: STUDENT IN AN ORGANIZED HEALTH CARE EDUCATION/TRAINING PROGRAM

## 2020-12-19 PROCEDURE — 272N000504 HC NEEDLE CR4

## 2020-12-19 PROCEDURE — 80197 ASSAY OF TACROLIMUS: CPT | Performed by: INTERNAL MEDICINE

## 2020-12-19 PROCEDURE — 272N000302 HC DEVICE INFLATION CR5

## 2020-12-19 PROCEDURE — 93005 ELECTROCARDIOGRAM TRACING: CPT

## 2020-12-19 PROCEDURE — 272N000566 HC SHEATH CR3

## 2020-12-19 PROCEDURE — 80053 COMPREHEN METABOLIC PANEL: CPT | Performed by: STUDENT IN AN ORGANIZED HEALTH CARE EDUCATION/TRAINING PROGRAM

## 2020-12-19 PROCEDURE — 36415 COLL VENOUS BLD VENIPUNCTURE: CPT | Performed by: STUDENT IN AN ORGANIZED HEALTH CARE EDUCATION/TRAINING PROGRAM

## 2020-12-19 PROCEDURE — 250N000012 HC RX MED GY IP 250 OP 636 PS 637: Performed by: INTERNAL MEDICINE

## 2020-12-19 PROCEDURE — 250N000011 HC RX IP 250 OP 636: Performed by: RADIOLOGY

## 2020-12-19 RX ORDER — HEPARIN SODIUM,PORCINE 10 UNIT/ML
5 VIAL (ML) INTRAVENOUS
Status: COMPLETED | OUTPATIENT
Start: 2020-12-19 | End: 2020-12-19

## 2020-12-19 RX ORDER — POLYETHYLENE GLYCOL 3350 17 G/17G
17 POWDER, FOR SOLUTION ORAL DAILY PRN
Status: DISCONTINUED | OUTPATIENT
Start: 2020-12-19 | End: 2020-12-20 | Stop reason: HOSPADM

## 2020-12-19 RX ORDER — IODIXANOL 320 MG/ML
50 INJECTION, SOLUTION INTRAVASCULAR ONCE
Status: COMPLETED | OUTPATIENT
Start: 2020-12-19 | End: 2020-12-19

## 2020-12-19 RX ORDER — LIDOCAINE 40 MG/G
CREAM TOPICAL
Status: DISCONTINUED | OUTPATIENT
Start: 2020-12-19 | End: 2020-12-20 | Stop reason: HOSPADM

## 2020-12-19 RX ORDER — ERTAPENEM 1 G/1
1 INJECTION, POWDER, LYOPHILIZED, FOR SOLUTION INTRAMUSCULAR; INTRAVENOUS EVERY 24 HOURS
Status: DISCONTINUED | OUTPATIENT
Start: 2020-12-19 | End: 2020-12-19

## 2020-12-19 RX ORDER — ALPRAZOLAM 0.25 MG
0.25 TABLET ORAL
Status: COMPLETED | OUTPATIENT
Start: 2020-12-19 | End: 2020-12-19

## 2020-12-19 RX ORDER — MYCOPHENOLIC ACID 360 MG/1
360 TABLET, DELAYED RELEASE ORAL EVERY EVENING
Qty: 30 TABLET | Refills: 4 | Status: SHIPPED | OUTPATIENT
Start: 2020-12-19 | End: 2020-12-20

## 2020-12-19 RX ORDER — ACETAMINOPHEN 325 MG/1
325 TABLET ORAL EVERY 4 HOURS PRN
Qty: 120 TABLET | Refills: 1 | Status: SHIPPED | OUTPATIENT
Start: 2020-12-19

## 2020-12-19 RX ORDER — LIDOCAINE HYDROCHLORIDE 10 MG/ML
1-30 INJECTION, SOLUTION EPIDURAL; INFILTRATION; INTRACAUDAL; PERINEURAL
Status: COMPLETED | OUTPATIENT
Start: 2020-12-19 | End: 2020-12-19

## 2020-12-19 RX ORDER — NITROGLYCERIN 0.4 MG/1
0.4 TABLET SUBLINGUAL EVERY 5 MIN PRN
Status: DISCONTINUED | OUTPATIENT
Start: 2020-12-19 | End: 2020-12-20 | Stop reason: HOSPADM

## 2020-12-19 RX ORDER — ERTAPENEM 1 G/1
1 INJECTION, POWDER, LYOPHILIZED, FOR SOLUTION INTRAMUSCULAR; INTRAVENOUS EVERY 24 HOURS
Status: DISCONTINUED | OUTPATIENT
Start: 2020-12-19 | End: 2020-12-20 | Stop reason: HOSPADM

## 2020-12-19 RX ORDER — PREDNISONE 5 MG/1
5 TABLET ORAL EVERY MORNING
Status: DISCONTINUED | OUTPATIENT
Start: 2020-12-20 | End: 2020-12-20 | Stop reason: HOSPADM

## 2020-12-19 RX ORDER — VITAMIN B COMPLEX
25 TABLET ORAL EVERY MORNING
Qty: 30 TABLET | Refills: 3 | Status: SHIPPED | OUTPATIENT
Start: 2020-12-20 | End: 2021-06-08

## 2020-12-19 RX ORDER — ERTAPENEM 1 G/1
1 INJECTION, POWDER, LYOPHILIZED, FOR SOLUTION INTRAMUSCULAR; INTRAVENOUS EVERY 24 HOURS
Qty: 150 ML | Refills: 0 | Status: SHIPPED | OUTPATIENT
Start: 2020-12-19 | End: 2021-01-03

## 2020-12-19 RX ORDER — TACROLIMUS 1 MG/1
2 CAPSULE ORAL 2 TIMES DAILY
Qty: 60 CAPSULE | Refills: 3 | Status: SHIPPED | OUTPATIENT
Start: 2020-12-19 | End: 2020-12-28

## 2020-12-19 RX ORDER — MYCOPHENOLIC ACID 360 MG/1
360 TABLET, DELAYED RELEASE ORAL 2 TIMES DAILY
Qty: 60 TABLET | Refills: 3 | Status: SHIPPED | OUTPATIENT
Start: 2020-12-19 | End: 2020-12-20

## 2020-12-19 RX ORDER — HEPARIN SODIUM,PORCINE 10 UNIT/ML
2-5 VIAL (ML) INTRAVENOUS
Status: DISCONTINUED | OUTPATIENT
Start: 2020-12-19 | End: 2020-12-20 | Stop reason: HOSPADM

## 2020-12-19 RX ORDER — ALPRAZOLAM 0.25 MG
0.25 TABLET ORAL ONCE
Status: COMPLETED | OUTPATIENT
Start: 2020-12-19 | End: 2020-12-19

## 2020-12-19 RX ADMIN — LIDOCAINE HYDROCHLORIDE 2 ML: 10 INJECTION, SOLUTION EPIDURAL; INFILTRATION; INTRACAUDAL; PERINEURAL at 14:49

## 2020-12-19 RX ADMIN — IODIXANOL 15 ML: 320 INJECTION, SOLUTION INTRAVASCULAR at 18:00

## 2020-12-19 RX ADMIN — Medication 5 ML: at 17:52

## 2020-12-19 RX ADMIN — MYCOPHENOLIC ACID 540 MG: 360 TABLET, DELAYED RELEASE ORAL at 20:19

## 2020-12-19 RX ADMIN — ERTAPENEM SODIUM 1 G: 1 INJECTION, POWDER, LYOPHILIZED, FOR SOLUTION INTRAMUSCULAR; INTRAVENOUS at 19:32

## 2020-12-19 RX ADMIN — MYCOPHENOLIC ACID 360 MG: 360 TABLET, DELAYED RELEASE ORAL at 07:51

## 2020-12-19 RX ADMIN — LIDOCAINE HYDROCHLORIDE 2 ML: 10 INJECTION, SOLUTION EPIDURAL; INFILTRATION; INTRACAUDAL; PERINEURAL at 17:47

## 2020-12-19 RX ADMIN — TACROLIMUS 2 MG: 1 CAPSULE ORAL at 20:19

## 2020-12-19 RX ADMIN — DOCUSATE SODIUM 200 MG: 100 CAPSULE, LIQUID FILLED ORAL at 07:51

## 2020-12-19 RX ADMIN — ASPIRIN 81 MG CHEWABLE TABLET 81 MG: 81 TABLET CHEWABLE at 20:19

## 2020-12-19 RX ADMIN — Medication 25 MCG: at 07:51

## 2020-12-19 RX ADMIN — ALPRAZOLAM 0.25 MG: 0.25 TABLET ORAL at 16:29

## 2020-12-19 RX ADMIN — ENOXAPARIN SODIUM 40 MG: 40 INJECTION SUBCUTANEOUS at 07:52

## 2020-12-19 RX ADMIN — NITROGLYCERIN 0.4 MG: 0.4 TABLET SUBLINGUAL at 08:25

## 2020-12-19 RX ADMIN — ALPRAZOLAM 0.25 MG: 0.25 TABLET ORAL at 13:15

## 2020-12-19 RX ADMIN — PREDNISONE 7.5 MG: 2.5 TABLET ORAL at 07:51

## 2020-12-19 RX ADMIN — TACROLIMUS 2 MG: 1 CAPSULE ORAL at 07:51

## 2020-12-19 RX ADMIN — Medication 3000 MCG: at 07:51

## 2020-12-19 ASSESSMENT — ACTIVITIES OF DAILY LIVING (ADL)
ADLS_ACUITY_SCORE: 16
ADLS_ACUITY_SCORE: 15
ADLS_ACUITY_SCORE: 15
ADLS_ACUITY_SCORE: 16

## 2020-12-19 NOTE — CONSULTS
12/20-Patient was able to get PICC placed in IR and was able to discharge last evening. Spoke with [facility] and they confirmed they had everything coordinated, and delivery and teach would be completed at pt's home prior to evening dose.     Radha Traore, RN   Care Coordinator        Care Coordinator - Discharge Planning    Admission Date/Time:  12/16/2020  Attending MD:  Bhakti Santillan MD     Data  Date of initial CC assessment:  12/18  Chart reviewed, discussed with interdisciplinary team.   Patient was admitted for:   1. ESBL (extended spectrum beta-lactamase) producing bacteria infection    2. Complicated urinary tract infection    3. Kidney replaced by transplant    4. Immunosuppressed status (H)    5. Disease of immune system (H)    6. Exposure to SARS-associated coronavirus    7. Age-related osteoporosis without current pathological fracture    8. S/P liver transplant (H)         Assessment   Full assessment completed in previous note    Coordination of Care and Referrals: Provided patient/family with options for Home Infusion.    Received page that patient is ready for discharge today after PICC placement, and will go home with daily IV Ertapenem. Referral had been sent to [facility]; spoke with Loraine with [facility] and she informed that they are not able to provide nursing coverage for patient and they are having trouble finding a regional agency due to staffing/overload of home nursing requests.     I spoke with patient to discuss other options to prevent delay in discharge. Explained options of outpatient infusion clinic for daily infusions, patient verbalized she was not able to come to the [facility] as she will have to drive herself (her  is unavailable during the day), and she expressed concerns about parking/walking in the cold. She was open to [facility] infusion clinic. I spoke with scheduling there, and unfortunately they did not have daily openings.     Multiple  conversations took place with patient and bedside RN about other plans to try and safely educate patient and  for home infusion. [facility] ended up being able to find regional nursing agency to staff patient. Plan made for [facility] nurse to provide initial nurse visit and teach for patient at home on 12/20. This visit will be coordinated directly with patient, prior to evening ertapenem dose. Continued nursing visits for lab draws, dressing changes, and assessments will be completed by [facility].     Patient and physician updated of this plan. Patient safe to discharge 12/19 evening after ertapenem dose. Patient notified that supplies and antibiotic will be delivered to home on 12/19 and that medication will need to be refrigerated.     Orders update to reflect the above plan.     I am available for continued Care Coordination needs.     Update 1500:   Vascular access unable to place PICC or Midline. [facility] unable to take patient with peripheral IV for home infusion.     Plan for patient to remain admitted for the time being and to continue to work on access plan.       Plan  Anticipated Discharge Date:  12/19  Anticipated Discharge Plan:  Home with infusion needs through [facility]     Radha Traore, RN   Care Coordinator

## 2020-12-19 NOTE — CONSULTS
INTERVENTIONAL RADIOLOGY CONSULTATION    Name: Belen Sharma  Age: 60 year old   Referring Physician: Dr. 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  Multiple attempts to place a PICC line on the floor.  IR was consulted for image guided PICC line placement.    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 Both, very large, native kidneys reamain.     PONV (postoperative nausea and vomiting)     Need to start with ice chips and apple juice, no soda     S/P kidney transplant     SLK 10/9/2010 - kidney failure 2/2 [facility]. Explanted . Re-transplant after de-sensitization      S/P liver transplant (H)     10/9/2010 - HAT, ischemic biopathy. Re-transplant 3/3/2011     S/P splenectomy      Spider veins      Thyroid disease     Hyperthyroid treated with single dose iodine       PAST SURGICAL HISTORY:   Past Surgical History:   Procedure Laterality Date      bunectomy  2018    right foot     bunionectomy  2019    left     CHOLECYSTECTOMY       CONIZATION N/A 2020    Procedure: CONE BIOPSY, CERVIX;  Surgeon: Daysi Perez MD;  Location: [facility] OR     FAILED PICC - RIGHT ARM Right 2020    Has a LUE AV fistula.     HERNIA REPAIR, INCISIONAL  2013     SPLENECTOMY      Splenectomy     TRANSPLANT KIDNEY RECIPIENT  DONOR      re-transplant after [facility]; 6 months de-sensitization prior AND 6 months after transplant     TRANSPLANT LIVER RECIPIENT  DONOR  2011     TRANSPLANT LIVER, KIDNEY RECIPIENT  DONOR, COMBINED  10/09/2010    HAT - re-Tx liver 3/3/2011; Kidney (left iliac) lost to [facility]/fibrosis - explant     VASCULAR SURGERY      Vascular access left UE. Not used for 4 years since 2nd kidney tx 2016 [place]     Physical Examination:   VITALS:   /67 (BP Location: Right arm)   Pulse 79   Temp 98.1  F (36.7  C) (Oral)   Resp 18   Wt 68.5 kg (151 lb)   SpO2 97%   BMI 21.67 kg/m      Labs:    BMP RESULTS:  Lab Results   Component Value Date     2020    POTASSIUM 4.1 2020    CHLORIDE 107 2020    CO2 32 2020    ANIONGAP <1 (L) 2020    GLC 86 2020    BUN 14 2020    CR 0.64 2020    GFRESTIMATED >90 2020    GFRESTBLACK >90 2020    EDSON 10.1 2020        CBC RESULTS:  Lab Results   Component Value Date    WBC 11.5 (H) 2020    RBC 4.94 2020    HGB 14.9 2020    HCT 47.3 (H) 2020    MCV 96 2020    MCH 30.2 2020    MCHC 31.5 2020    RDW 14.6 2020     2020       INR/PTT:  No results found for: INR, PTT    Diagnostic studies:   No pertinent imaging.    Assessment/Plan:  Request for PICC line for antibiotics.  Multiple failed attempts to place PICC line on the floor.  IR will plan for image guided double-lumen 5 Italian PICC line placement.    CC  Patient Care Team:  Vincent De La Garza  MD JAREK as PCP - General (Internal Medicine)  Vincent De La Garza MD as Assigned PCP  Christy Mohamud MD as MD (Cardiology)  Divya Chávez RN as Registered Nurse (Transplant)  Елена Dinh RN as Registered Nurse  Ana, Daysi Gaston MD as Assigned OBGYN Provider  Leventhal, Kwesi Hoffmann MD as Assigned Surgical Provider  Leventhal, Kwesi Hoffmann MD as Assigned Gastroenterology Provider  Christy Mohamud MD as Assigned Heart and Vascular Provider    I, Dr Roland Ratliff, discussed the case with the resident and agree with the findings as documented in the assessment and plan.    Roland Ratliff MD    Vascular and Interventional Radiolgy  [facility]

## 2020-12-19 NOTE — PROGRESS NOTES
Patient very upset this evening with lack of communication from MDs.  Wants to take Prograf dose per home times.  Not understanding protocol here for 0800/1800 times.  Questioning why her Myfortic dose was decreased without her being told.  Not told of the need for PICC line.  PICC line deferred per patient request until tomorrow AM.  Will page on-call with update.

## 2020-12-19 NOTE — PROGRESS NOTES
Vascular Access Services Notes:    Unsuccessful PICC placement x2 in the right upper arm via basilic & lateral brachial vein. Catheter was unable to advance past the shoulder or subclavian vein in both attempts. Patient has a left upper extremity AV fistula & a history of failed right arm PICC attempt in 2018. Recommendation given to Dr. Margie Soto for Interventional Radiology placement of PICC. Noy (7A RN) aware.    Time spent with patient - 45 minutes          LANA Moore, RN [facility]

## 2020-12-19 NOTE — PROGRESS NOTES
[facility]  Transplant Nephrology Progress Note  Date of Admission: 12/16/2020  Today's Date: 12/19/2020    Recommendations:  - Will reduce prednisone to 5 mg daily.   - Agree with IV antibiotics      Assessment & Plan   # DDKT: Stable              - Baseline Cr ~ 0.7-0.9              - Proteinuria: Normal (<0.2 grams)              - Date DSA Last Checked: Not Known      Latest DSA: unknown. Will need to obtain. History of ABMR of her first kidney transplant and then desensitization prior to second transplant              - BK Viremia: Not checked recently due to time from transplant. Check with next labs              - Kidney Tx Biopsy: No    # Liver transplant:              - Good allograft function. Follows with transplant hepatology who notes that her level of immunosuppression is higher than necessary for liver.    # Recurrent UTI and E coli bacteremia: Last episode was in June of this year. Not on any prophylaxis PTA and does not think she has seen urology for this. Likely uterine prolapse and use of pessary contributing. Currently on meropenem. Sensitivities pending    # Immunosuppression: Tacrolimus immediate release (goal 2mg in AM and 1mg in PM, decrease goal to 4-6), Mycophenolic acid (dose 720mg in AM and 360mg in PM) and Prednisone (dose 7.5 mg daily)              - Changes: Yes, will decrease prednisone to 5 mg daily.     # Infection Prophylaxis:  - PJP: Sulfa/TMP (Bactrim)    # History of CMV viremia:              - PCR neg 9/2020   - Recheck ordered    # Blood Pressure: Controlled;          Goal BP: < 130/80              - Volume status: Euvolemic              - Changes: Not at this time. Her BP is actually on the low end of normal so cannot start ACEi/ARB for post transplant erythrocytosis    # Post-Transplant Erythrocytosis: Hgb: Increased;           On ACEI/ARB: No             Imaging: No, need to obtain native renal U/S and kidney transplant U/S        "        - If hematocrit>56 would need therapeutic phlebotomy    # Mineral Bone Disorder:  - Secondary renal hyperparathyroidism; PTH level: Not checked recently        On treatment: None  - Calcium; level: Normal        On supplement: No  - Phosphorus; level: Not checked recently        On supplement: No    # HSIL: per cone biopsy on 20. No evidence of dysplasia. 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    Discussed with Dr. 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    Interval History   She complains of new chest pain this morning that radiates to her upper back. No SOB and no chest pressure, like she has had before like when \"an elephant is sitting on her chest\" EKG pending. Denies dysuria but notes some abdominal discomfort in RLQ.     Review of Systems   4 point ROS was obtained and negative except as " noted in the Interval History.    MEDICATIONS:    aspirin  81 mg Oral QPM     biotin  3,000 mcg Oral Daily     docusate sodium  200 mg Oral QAM     enoxaparin ANTICOAGULANT  40 mg Subcutaneous Q24H     ertapenem (INVanz) IV  1 g Intravenous Q24H     mycophenolic acid  360 mg Oral BID     predniSONE  7.5 mg Oral QAM     sodium chloride (PF)  3 mL Intracatheter Q8H     sulfamethoxazole-trimethoprim  1 tablet Oral Q Mon Wed Fri AM     tacrolimus  2 mg Oral BID     Vitamin D3  25 mcg Oral QAM       Physical Exam   Temp  Av.8  F (37.1  C)  Min: 97.1  F (36.2  C)  Max: 101.6  F (38.7  C)  Pulse  Av.8  Min: 83  Max: 124 Resp  Av.9  Min: 18  Max: 20  SpO2  Av.5 %  Min: 93 %  Max: 100 %   /56   Pulse 73   Temp 98.4  F (36.9  C)   Resp 18   Wt 68.5 kg (151 lb)   SpO2 96%   BMI 21.67 kg/m    Date 20 07 - 20 0659   Shift 0880-1487 0687-8860 5043-5431 24 Hour Total   INTAKE   P.O. 460   460   Shift Total(mL/kg) 460(6.72)   460(6.72)   OUTPUT   Urine 900   900   Shift Total(mL/kg) 900(13.14)   900(13.14)   Weight (kg) 68.49 68.49 68.49 68.49     Admit Weight: 68.5 kg (151 lb)    GENERAL APPEARANCE: alert and no distress  HENT: mouth without ulcers or lesions  RESP: breathing non-labored  CV: regular rhythm, normal rate  EDEMA: no LE edema bilaterally  ABDOMEN: soft, nondistended, nontender, bowel sounds normal  MS: extremities normal - no gross deformities noted  SKIN: no rash on exposed surfaces    Data   All labs reviewed by me.  CMP  Recent Labs   Lab 20  0611 20  2236    137   POTASSIUM 3.8 4.2   CHLORIDE 110* 106   CO2 24 26   ANIONGAP 3 5   GLC 98 89   BUN 12 17   CR 0.70 0.87   GFRESTIMATED >90 72   GFRESTBLACK >90 83   EDSON 9.4 9.9   PROTTOTAL  --  7.4   ALBUMIN  --  3.6   BILITOTAL  --  0.7   ALKPHOS  --  77   AST  --  15   ALT  --  23     CBC  Recent Labs   Lab 20  0611 20  2236   HGB 14.9 16.9*   WBC 11.5* 11.4*   RBC 4.94 5.70*   HCT 47.3*  54.5*   MCV 96 96   MCH 30.2 29.6   MCHC 31.5 31.0*   RDW 14.6 14.6    260     INRNo lab results found in last 7 days.  ABGNo lab results found in last 7 days.   Urine Studies  Recent Labs   Lab Test 12/16/20  2236 12/03/20  0927 12/03/20  0915   COLOR Light Yellow Yellow Yellow   APPEARANCE Slightly Cloudy Cloudy Slightly Cloudy   URINEGLC Negative Negative Negative   URINEBILI Negative Negative Negative   URINEKETONE Negative Negative Negative   SG 1.007 1.020 1.015   UBLD Small* Moderate* Moderate*   URINEPH 7.5* 7.0 7.0   PROTEIN 10* 100* 30*   UROBILINOGEN  --  0.2  --    NITRITE Negative Negative Positive*   LEUKEST Large* Large* Large*   RBCU 2  --  72*   WBCU 466*  --  >182*     Recent Labs   Lab Test 09/10/20  1037   UTPG 0.16     PTH  Recent Labs   Lab Test 09/23/20  1653   PTHI 45     Iron Studies  No lab results found.    IMAGING:  All imaging studies reviewed by me.

## 2020-12-19 NOTE — IR NOTE
Patient Name: Belen Sharma  Medical Record Number: [id number]  Today's Date: 12/19/2020    Procedure: PICC placement  Proceduralist: Dr. Jeff Miller    Procedure Start: 1705  Procedure end: 1752  Sedation medications administered: NA    Report given to: Monica FANG  : RENATO    Other Notes: Pt arrived to IR room 4 from . Consent reviewed. Pt denies any questions or concerns regarding procedure. Pt positioned supine and monitored per protocol. Pt tolerated procedure without any noted complications. Pt transferred back to .

## 2020-12-19 NOTE — PROVIDER NOTIFICATION
Patient was anxious this morning, expressing frustration with plan of care, PICC line, medications, etc. She then reported chest pain, stating she often has to take Nitroglycerin or Xanax for similar episodes. Vitals 86/51, 105/56, HR 83, 99% on room air. Dr Ma notified, patient was assessed.   Nitroglycerin 0.4 SL given X1 with relief. Continue to monitor symptoms of chest pain/anxiety.

## 2020-12-19 NOTE — PLAN OF CARE
Vitals: Vitals stable, on room air, see provider notification note regarding chest pain.  Endocrine: n/a  Labs: Stable labs, no replacements given.  Pain: Mild chest pain this morning, resolved after Nitroglycerin.  PRN's: Nitroglycerin 0.4 mg SL X1.  Diet: Regular diet, good appetite.  LDA: R PIV saline locked.  GI: BM 12/19.   : Good urine output, cloudy.  Skin: Skin is intact, bruising from Lovenox.   Neuro: Oriented, anxious at times.   Mobility: Up ad marcela.  Education: n/a  Plan: PICC line placement this afternoon, Xanax dose given before procedure per patient request. Plan is to get IV antibiotic dose tonight here, home care to deliver supplies to her house tonight and a nurse will come tomorrow for education.

## 2020-12-19 NOTE — PLAN OF CARE
[id number]     Neuro: A&O x 4.  Vitals: Vitally stable on RA.   Cardio: AP regular. BP stable.   Respiratory: LS clear bilaterally. SpO2 stable on RA.   BG: N/A   Pain and Nausea: Denied pain and nausea this shift.   Outstanding Labs: Last WBC 11.5.   Diet: Regular.  LDA: R PIV Sl'd & L functional fistula.    Skin: Intact.   GI/: Voiding well, saves in hat. LBM 12/16, needs more laxative/ Stool softener.   Mobility: Independent in room.      Plan of Care: Possible PICC placement today. Follow POC and update team with changes.

## 2020-12-20 ENCOUNTER — PATIENT OUTREACH (OUTPATIENT)
Dept: CARE COORDINATION | Facility: CLINIC | Age: 60
End: 2020-12-20

## 2020-12-20 ENCOUNTER — HOME INFUSION (PRE-WILLOW HOME INFUSION) (OUTPATIENT)
Dept: PHARMACY | Facility: CLINIC | Age: 60
End: 2020-12-20

## 2020-12-20 RX ORDER — MYCOPHENOLIC ACID 180 MG/1
540 TABLET, DELAYED RELEASE ORAL 2 TIMES DAILY
Qty: 540 TABLET | Refills: 3 | Status: SHIPPED | OUTPATIENT
Start: 2020-12-20 | End: 2020-12-28

## 2020-12-20 RX ORDER — MYCOPHENOLIC ACID 180 MG/1
540 TABLET, DELAYED RELEASE ORAL 2 TIMES DAILY
Qty: 180 TABLET | Refills: 0 | Status: SHIPPED | OUTPATIENT
Start: 2020-12-20 | End: 2020-12-28

## 2020-12-20 NOTE — DISCHARGE SUMMARY
[facility]   Cardiology II Service / Advanced Heart Failure  Discharge Summary     Belen Sharma MRN# [id number]   YOB: 1960 Age: 60 year old     DATE OF ADMISSION: 12/16/2020  DATE OF DISCHARGE: 12/29/2020  ADMITTING PROVIDER: No admitting provider for patient encounter.  DISCHARGE PROVIDER: Bhakti Santillan MD  PRIMARY PROVIDER: Vincent De La Garza      DISCHARGE DIAGNOSES:   Sepsis secondary to ESBL bacteremia  Uterine Prolapse  Cystocele  ADPCKD S/p DDKT S/p DDLT      ITEMS FOR OUTPATIENT FOLLOW UP:   - Follow up with PCP on hospital stay in 7-10 days   - Weekly CMP for 2 weeks   - Follow up with Tx nephrology     HPI: Please see the detailed H & P from 12/16/2020. Briefly, Belen Sharma is a 60 year old female admitted on 12/16/2020. H/o congenital hepatic fibrosis s/p liver transplant (2010, 2011) and PCKD s/p renal transplant (2010, 2016) chronic immunosuppression. Also with h/o uterine prolapse, cone biopsy (9/2020) and recurrent UTI who presented to ED 12/17 with fever, increased urinary frequency, and generalized body aches. UA and history concerning for sepsis secondary to UTI. UC and BC growing E.coli, sensitivities pending. Transplant nephrology and ID following. Pt started on meropenem 12/17 then switched to Ertapenem 1 g daily for 2 weeks duration per Tx ID. Had a PICC line on 12/19 by IR    HOSPITAL COURSE BY PROBLEM:     Sepsis secondary to ESBL bacteremia  Suspect urinary source. History of UTIs  (12/3-12/10). No flank or abdomen pain, less likely for pyelonephritis. US of transplanted kidney without pyelonephritis. Received  ml x2 and started on ceftriaxone in the ED. History of ESBL UTI on 5/3/2018 culture at [facility] (see CareEverywhere). BC grew Ecoli ESBL on 12/17 pt started on meropenem. CT a/p 12//17 with no definite sign of infection, but enhancement suggestive inflammation/infection in bladder and transplanted kidney. UC grew Ecoli ESBL as well. Transplant ID  consulted. Repeat BC with ngtd.  - Continue Meropenem, transitioned to ertapenem 1 g daily for 2 weeks per Transplant ID (End date on 12/31)  - Had difficult anatomy, failed PICC line placement by PICC RN x2. IR was consulted for PICC placement on 12/19 with success   - PICC should be removed 12/31 after last dose of antibiotics  - CMP weekly while on ertapenem    Uterine Prolapse  Cystocele  Recent Cone biopsy 9/2020 due to abnormal bleeding. Warrants concern for possible infectious source. CT a/p without sign of gynecologic infection. Managed with pessary since 10/2020. Follows with ob/gyn outpatient, last seen via telemedicine visit 12/2.   - continue pessary     Immunosuppression  Tacrolimus recently increased from 2mg/1mg qAM/qPM to 2mg BID. Admission tacro level 4.4, goal 5-8. Of note, there have been different notes about the  Appropriate dose of mycophenolic acid. On 12/20, day after discharge, Dr. Dallas Johnson confirmed with medicine team that patient should be taking mycophenolic acid 540mg BID and will call the patient to confirm this dose as the Nephrology note on day of discharge and discharge orders did not reflect this.   - continue PTA tacrolimus 2 mg BID  - continue PTA mycophenolic acid 540 mg BID ( Confirmed with Dr. Johnson from Transplant Nephrology who will call patient 12/20 to ensure she knows this dose)  - decreased  prednisone 7.5 qAM to 5 mg qAM  - continue TMP--80 mg MWF    S/p DDKT  Post-Transplant Erythrocytosis  S/p SLK in 10/2010 and DDKT in 2/2016. Most recently evaluated by transplant nephrology 12/15. Cr at baseline 0.87 on admission (baseline 0.7-0.9)                               - Transplant nephrology following   - If hematocrit>56 needs therapeutic phlebotomy     S/p DDLT  S/p SLK in 10/2010 and DDLT in 3/2011. Most recently evaluated by transplant hepatology 7/13. Stable without acute concerns.       PHYSICAL EXAM:  Blood pressure 107/63, pulse 81, temperature 98.1  F (36.7   C), temperature source Oral, resp. rate 25, weight 68.5 kg (151 lb), SpO2 92 %, not currently breastfeeding.  GENERAL: alert and oriented times three.   HEENT: Eye symmetrical and EOMI. Mucous membranes moist and without lesions.  NECK: Supple and without lymphadenopathy. JVP normal.   CV: RRR, normal S1 and S2  Pulm: CTAB, no increased WOB on RA  GI: soft, NT, ND  : no suprapubic tenderness, no flank pain or pain over R transplant site  Neuro: alert, conversant, responds to questions appropriately, ambulating  Ext: no peripheral extremity edema   Psych: normal affect  SKIN: No jaundice. No rashes or lesions.     LABS:   Last CBC:   Recent Labs   Lab Test 12/18/20  0611   WBC 11.5*   RBC 4.94   HGB 14.9   HCT 47.3*   MCV 96   MCH 30.2   MCHC 31.5   RDW 14.6          Last CMP:  Recent Labs   Lab Test 12/19/20  1052      POTASSIUM 4.1   CHLORIDE 107   EDSON 10.1   CO2 32   BUN 14   CR 0.64   GLC 86   AST 12   ALT 17   BILITOTAL 0.5   ALBUMIN 3.0*   PROTTOTAL 6.9   ALKPHOS 62       IMAGING:  Results for orders placed or performed during the hospital encounter of 12/16/20   US Renal Transplant    Narrative    EXAM: US Renal Transplant,  12/17/2020 3:02 AM.    COMPARISON: Renal ultrasound 9/23/2020.    HISTORY:  60 yof with history of liver/kidney transplant with sepsis  2/2 UTI.     TECHNIQUE:  Grey-scale, color Doppler and spectral flow analysis.    FINDINGS:  The transplant kidney is located right lower quadrant, and measures  14.5 cm.Parenchyma is of normal thickness and echogenicity. Simple  anechoic cyst medial to the transplanted kidney measuring 4.4 x 3.8 x  4.5 cm, previously measuring 3.7 x 3.9 x 4.3 cm. Mild pelviectasis,  unchanged compared to prior exam 9/23/2020. No perinephric fluid  collection. Visualized adjacent cystic lesions arising from the native  kidney.    Renal artery flow:   377 cm/sec peak systolic at hilum.  383 peak systolic at anastomosis.  Arcuate artery resistive indices  (lower to upper): 0.78, 0.69, 0.71    Renal Vein Flow:  42 at hilum.   37 at anastomosis.    Iliac artery flow:  201 peak systolic above anastomosis.  185 peak systolic below anastomosis.    Iliac vein flow:  Patent above and below the anastomosis.    Bladder: Incompletely distended and normal in appearance.      Impression    IMPRESSION:   1. Elevated peak systolic velocity(PSV) measuring up to 383 cm/s  within the right lower quadrant renal allograft artery anastomosis and  at the hilum. Given that the Iliac artery: renal artery velocity ratio  is less than 2, and the intrarenal waveforms are normal, the increased  PSV is favored to be due to vessel tortuosity rather than stenosis.  Recommend follow up.  2. Stable, mild pelviectasis.   3. Grossly unchanged simple appearing cyst medial to the transplanted  kidney measuring up to 4.5 cm, previously 4.3 cm which could be a cyst  from the native polycystic right kidney.    I have personally reviewed the examination and initial interpretation  and I agree with the findings.    KAT ALBARRAN MD   CT Abdomen Pelvis w Contrast    Narrative    EXAMINATION: CT ABDOMEN PELVIS W CONTRAST, 12/17/2020 2:38 PM    TECHNIQUE:  Helical CT images from the lung bases through the  symphysis pubis were obtained  with IV contrast. Contrast dose: 92 cc  of isovue 370    COMPARISON: Ultrasound 12/17/2020    HISTORY: patient with ESBL E.Coli bacteremia and unclear source. Renal  vs GYN vs intraabdominal abscess    FINDINGS:  The lung bases are clear.    Abdomen/pelvis: Surgical changes of a liver transplant.   Prominence of the common bile duct. Surgical change along the  pancreatic tail. Splenectomy with large splenule remaining. Grossly  enlarged polycystic kidneys  measuring just under 20 cm bilaterally in  craniocaudal dimension. The adrenal glands are unremarkable. Portal  vein common hepatic artery, hepatic veins are patent. Celiac access,  SMA are patent. Paraesophageal varices.  There is no abdominal or  pelvic lymphadenopathy by size criteria. Surgical clips in left  nephrectomy pelvic site from previous renal transplant.  Right renal  transplant is without hydronephrosis and demonstrates widely patent  renal arteries and veins. The bladder is unremarkable. The colon is  nondistended with stool throughout. Small bowel is unremarkable.  Pessary present. A 3.9 cm cyst adjacent to the right ovary,  benign-appearing, likely a lymphocele. Less likely an ovarian cysts,  does not look like an abscess. It does have some mass effect on the  ureter. The ureter demonstrates enhancement suggesting inflammation or  infection.    Bones/soft tissues: Scoliotic curvature of the spine, apex left at L1.      Impression    IMPRESSION:  1. No definite source of infection is identified. Consider PET scan to  look for source of infection.  2. Pessary in place.  3. Right renal transplant urothelial enhancement in the transplant  ureter inflammation/infection. Small amount of mass effect by right  pelvic cyst on renal collecting system without hydronephrosis.  4. Urothelial enhancement of the bladder may suggest infection.  5. Surgical changes of the liver transplant, patent vasculature no  intrahepatic biliary dilatation.    5. Paraesophageal varices  6. Bilateral polycystic kidneys measure approximately 20 cm 8 cubic.  7. Status post splenectomy with large splenule remaining.    RACHAEL SANCHEZ MD   XR Chest Port 1 View    Narrative    EXAM: XR CHEST PORT 1 VW  12/19/2020 9:16 PM     HISTORY:  PICC LINE PLACEMENT       COMPARISON:  None    TECHNIQUE: Single frontal radiograph of the chest    FINDINGS:   Right upper extremity PICC tip projects in the low SVC.    Midline trachea. Well-defined cardiomediastinal silhouette. Distinct  pulmonary vasculature. No consolidation, pleural effusion or  appreciable pneumothorax.      Impression    IMPRESSION: Right upper extremity PICC tip projects in the low SVC.     I  have personally reviewed the examination and initial interpretation  and I agree with the findings.    YADIRA KIM, DO       PROCEDURES: PICC line     CONSULTATIONS: Transplant Nephrology, Transplant ID, IR    PENDING RESULTS: None   DISCHARGE MEDICATIONS:  Discharge Medication List as of 12/19/2020  8:08 PM      START taking these medications    Details   ertapenem (INVANZ) 1 GM vial Inject 1 g into the vein every 24 hours for 15 days, Disp-150 mL, R-0, E-Prescribe      Vitamin D3 (CHOLECALCIFEROL) 25 mcg (1000 units) tablet Take 1 tablet (25 mcg) by mouth every morning, Disp-30 tablet, R-3, E-Prescribe         CONTINUE these medications which have CHANGED    Details   acetaminophen (TYLENOL) 325 MG tablet Take 1 tablet (325 mg) by mouth every 4 hours as needed for mild pain or fever, Disp-120 tablet, R-1, E-Prescribe      tacrolimus (GENERIC EQUIVALENT) 1 MG capsule Take 2 capsules (2 mg) by mouth 2 times daily, Disp-60 capsule, R-3, E-Prescribe      !! mycophenolic acid (GENERIC EQUIVALENT) 360 MG EC tablet Take 1 tablet (360 mg) by mouth every evening, Disp-30 tablet, R-4, E-Prescribe      MYFORTIC (BRAND) 360 MG EC tablet Take 1 tablet (360 mg) by mouth 2 times daily, Disp-60 tablet, R-3, E-Prescribe       !! - Potential duplicate medications found. Please discuss with provider.      CONTINUE these medications which have NOT CHANGED    Details   ALPRAZolam (XANAX) 0.25 MG tablet Take 0.25 mg by mouth as needed for anxiety, Historical      aspirin (ASA) 81 MG chewable tablet Take 81 mg by mouth every evening Stopped 7 days preop, Historical      biotin 1000 MCG TABS tablet Take 3,000 mcg by mouth daily, Historical      diphenhydrAMINE (BENADRYL) 25 MG tablet Take 50 mg by mouth as needed for allergies , Historical      docusate sodium (COLACE) 100 MG capsule Take 200 mg by mouth every morning , Historical      estradiol (ESTRACE) 0.1 MG/GM vaginal cream Place 1 g vaginally twice a weekDisp-42.5 g, R-3Local  Print      loperamide (IMODIUM A-D) 2 MG tablet Take 1 tablet by mouth as needed for diarrhea , Historical      loratadine (CLARITIN) 10 MG tablet Take 10 mg by mouth every evening , Historical      Multiple Vitamins-Minerals (WOMENS DAILY FORMULA PO) Take 1 tablet by mouth daily, Historical      nitroGLYcerin (NITROSTAT) 0.4 MG sublingual tablet Place 0.4 mg under the tongue as needed for chest pain (Hasn't used for a while) For chest pain place 1 tablet under the tongue every 5 minutes for 3 doses. If symptoms persist 5 minutes after 1st dose call 911. , Historical      ondansetron (ZOFRAN) 4 MG tablet Take by mouth as needed for nausea, Historical      pantoprazole (PROTONIX) 40 MG EC tablet Take 1 tablet by mouth as needed (reflux) , Historical      predniSONE (DELTASONE) 5 MG tablet Take 7.5 mg by mouth every morning , Historical      Probiotic Product (PROBIOTIC PO) Take 2 tablets by mouth every morning , Historical      Psyllium (METAMUCIL PO) Take by mouth every morning Hold 9-30-20, Historical      sulfamethoxazole-trimethoprim (BACTRIM) 400-80 MG tablet Take 1 tablet by mouth Every Mon, Wed, Fri Morning, Disp-45 tablet, R-3, Local Print      temazepam (RESTORIL) 15 MG capsule Take 15 mg by mouth as needed for sleep (twice a year) , Historical      Vitamin D, Cholecalciferol, 25 MCG (1000 UT) CAPS Take 1,000 Units by mouth D3, Historical      !! mycophenolic acid (GENERIC EQUIVALENT) 360 MG EC tablet Take 720 mg by mouth daily before breakfast, Historical       !! - Potential duplicate medications found. Please discuss with provider.      STOP taking these medications       MYFORTIC (BRAND) 180 MG EC tablet Comments:   Reason for Stopping:               DISCHARGE DISPOSITION: Belen Sharma will discharge to home in stable condition.     DISCHARGE INSTRUCTIONS:  Discharge Procedure Orders   Comprehensive metabolic panel   Standing Status: Standing Number of Occurrences: 7 Standing Exp. Date: 12/19/21     Home  infusion referral   Referral Priority: Routine   Number of Visits Requested: 1     Reason for your hospital stay   Order Comments: Your were admitted to the hospital for a urinary tract infection and was found to have ESBL E.Coli bacteria in urine and blood as well. Transplant infectious disease team was consulted and recommended Ertapenem(antibiotics) IV once daily for 2 weeks with weekly blood test. Transplant nephrology team did reduce the dose of Myfortic and prednisone. Please follow up with them as outpatient.     Adult [facility] Follow-up and recommended labs and tests   Order Comments: Follow up with primary care provider, Vincent De La Garza, within 7 days for hospital follow- up.  The following labs/tests are recommended: CMP q 1 week for 2 weeks    Follow up with transplant nephrology  In 2-4 weeks .      Appointments on [place] and/or [facility] (with [facility] or [facility] provider or service). Call [phone] if you haven't heard regarding these appointments within 7 days of discharge.     Activity   Order Comments: Your activity upon discharge: activity as tolerated     Order Specific Question Answer Comments   Is discharge order? Yes      Full Code     Order Specific Question Answer Comments   Code status determined by: Discussion with patient/ legal decision maker      Diet   Order Comments: Follow this diet upon discharge: Orders Placed This Encounter      Regular Diet Adult     Order Specific Question Answer Comments   Is discharge order? Yes        60 minutes spent in discharge, including >50% in counseling and coordination of care, medication review and plan of care recommended on follow up. Questions were answered.     It was our pleasure to care for Belen Sharma during this hospitalization. Please do not hesitate to contact me should there be questions regarding the hospital course or discharge plan.      Patient was seen and discussed with Dr. Jacque Ma MD   PGY-2, Internal  [facility]  [phone]  12/20/2020    Physician Attestation   I, Bhakti Santillan, saw and evaluated this patient prior to discharge.  I discussed the patient with the resident/fellow and agree with plan of care as documented in the note.      I personally reviewed vital signs, medications and labs.    I personally spent 35 minutes on discharge activities.    Bhakti Santillan MD  Date of Service (when I saw the patient): 12/19/20

## 2020-12-20 NOTE — PLAN OF CARE
"  /63   Pulse 81   Temp 98.1  F (36.7  C) (Oral)   Resp 25   Wt 68.5 kg (151 lb)   SpO2 92%   BMI 21.67 kg/m      1500--2125    DISCHARGE:  D: Patient with orders to discharge to home with [facility] following; pt to have IV abx therapy x2 weeks via PICC. [facility] to send medication to pt's home tomorrow morning.    I: Discharge instructions, medications, & follow ups reviewed with Belen. Copy of discharge summary given to pt as well as reviewed PICC care and education with pt (PICC education paperwork also given to pt; pt has had PICC in the past and stated \"this is all a refresher\" and verbalized understanding of education). PIV removed. All belongings packed & sent with patient. Medications filled & down in discharge pharmacy, however pt declining to wait till tomorrow to  the meds; pt states she has all prescriptions at home already. Dari Ma paged to clarify Myfortic orders--pt discharging on 720mg in a.m. and 360mg in p.m. for now; pt to clarify dosage with transplant nephrology tomorrow. Paperwork faxed to [facility].     A: Patient in stable condition. AVSS. Cxray completed this evening and per radiology, PICC tip in low SVC; MD put in order for okay to use. Slight bleeding at PICC insertion site--quick clot biopatch placed and dressing reinforced by vascular access; pt instructed on PICC precautions/care and home care RN to reassess tomorrow. Belen had no further questions regarding discharge instructions and medications. Patient transferred out by wheelchair & left with her .     P: Plan for [facility] to meet pt tomorrow in home to complete PICC teaching and assist pt with abx infusions.       "

## 2020-12-21 ENCOUNTER — MEDICAL CORRESPONDENCE (OUTPATIENT)
Dept: HEALTH INFORMATION MANAGEMENT | Facility: CLINIC | Age: 60
End: 2020-12-21

## 2020-12-21 LAB
ALBUMIN SERPL-MCNC: 3.4 G/DL (ref 3.4–5)
ALP SERPL-CCNC: 68 U/L (ref 40–150)
ALT SERPL W P-5'-P-CCNC: 47 U/L (ref 0–50)
ANION GAP SERPL CALCULATED.3IONS-SCNC: 9 MMOL/L (ref 3–14)
AST SERPL W P-5'-P-CCNC: 53 U/L (ref 0–45)
BACTERIA SPEC CULT: ABNORMAL
BACTERIA SPEC CULT: ABNORMAL
BASOPHILS # BLD AUTO: 0 10E9/L (ref 0–0.2)
BASOPHILS NFR BLD AUTO: 0.3 %
BILIRUB SERPL-MCNC: 0.5 MG/DL (ref 0.2–1.3)
BUN SERPL-MCNC: 16 MG/DL (ref 7–30)
CALCIUM SERPL-MCNC: 9.9 MG/DL (ref 8.5–10.1)
CHLORIDE SERPL-SCNC: 103 MMOL/L (ref 94–109)
CO2 SERPL-SCNC: 24 MMOL/L (ref 20–32)
CREAT SERPL-MCNC: 0.7 MG/DL (ref 0.52–1.04)
CRP SERPL-MCNC: 18 MG/L (ref 0–8)
DIFFERENTIAL METHOD BLD: ABNORMAL
EOSINOPHIL # BLD AUTO: 0 10E9/L (ref 0–0.7)
EOSINOPHIL NFR BLD AUTO: 0.2 %
ERYTHROCYTE [DISTWIDTH] IN BLOOD BY AUTOMATED COUNT: 14 % (ref 10–15)
ERYTHROCYTE [SEDIMENTATION RATE] IN BLOOD BY WESTERGREN METHOD: 10 MM/H (ref 0–30)
GFR SERPL CREATININE-BSD FRML MDRD: >90 ML/MIN/{1.73_M2}
GLUCOSE SERPL-MCNC: 104 MG/DL (ref 70–99)
HCT VFR BLD AUTO: 49.2 % (ref 35–47)
HGB BLD-MCNC: 16.2 G/DL (ref 11.7–15.7)
IMM GRANULOCYTES # BLD: 0 10E9/L (ref 0–0.4)
IMM GRANULOCYTES NFR BLD: 0.2 %
INTERPRETATION ECG - MUSE: NORMAL
LYMPHOCYTES # BLD AUTO: 1.7 10E9/L (ref 0.8–5.3)
LYMPHOCYTES NFR BLD AUTO: 26.6 %
Lab: ABNORMAL
MCH RBC QN AUTO: 30.6 PG (ref 26.5–33)
MCHC RBC AUTO-ENTMCNC: 32.9 G/DL (ref 31.5–36.5)
MCV RBC AUTO: 93 FL (ref 78–100)
MONOCYTES # BLD AUTO: 0.6 10E9/L (ref 0–1.3)
MONOCYTES NFR BLD AUTO: 10.3 %
NEUTROPHILS # BLD AUTO: 3.9 10E9/L (ref 1.6–8.3)
NEUTROPHILS NFR BLD AUTO: 62.4 %
NRBC # BLD AUTO: 0 10*3/UL
NRBC BLD AUTO-RTO: 0 /100
PLATELET # BLD AUTO: 261 10E9/L (ref 150–450)
POTASSIUM SERPL-SCNC: 4.5 MMOL/L (ref 3.4–5.3)
PROT SERPL-MCNC: 7.4 G/DL (ref 6.8–8.8)
RBC # BLD AUTO: 5.3 10E12/L (ref 3.8–5.2)
SODIUM SERPL-SCNC: 136 MMOL/L (ref 133–144)
SPECIMEN SOURCE: ABNORMAL
WBC # BLD AUTO: 6.2 10E9/L (ref 4–11)

## 2020-12-21 PROCEDURE — 85652 RBC SED RATE AUTOMATED: CPT | Performed by: INTERNAL MEDICINE

## 2020-12-21 PROCEDURE — 86140 C-REACTIVE PROTEIN: CPT | Performed by: INTERNAL MEDICINE

## 2020-12-21 PROCEDURE — 80053 COMPREHEN METABOLIC PANEL: CPT | Performed by: INTERNAL MEDICINE

## 2020-12-21 PROCEDURE — 85025 COMPLETE CBC W/AUTO DIFF WBC: CPT | Performed by: INTERNAL MEDICINE

## 2020-12-21 NOTE — PROGRESS NOTES
This is a recent snapshot of the patient's [facility] medical record.  For current drug dose and complete information and questions, call [phone] or In Basket pool, fv home infusion ([id number])  CSN Number:  [id number]

## 2020-12-21 NOTE — PROGRESS NOTES
[facility]: Post-Discharge Note  SITUATION                                                      Admission:    Admission Date: 12/16/20   Reason for Admission: Sepsis secondary to ESBL bacteremia  Discharge:   Discharge Date: 12/19/20  Discharge Diagnosis: Sepsis secondary to ESBL bacteremia  Discharge Service: Cardiology    BACKGROUND                                                      Belen Sharma is a 60 year old female admitted on 12/16/2020. H/o congenital hepatic fibrosis s/p liver transplant (2010, 2011) and PCKD s/p renal transplant (2010, 2016) chronic immunosuppression. Also with h/o uterine prolapse, cone biopsy (9/2020) and recurrent UTI who presented to ED 12/17 with fever, increased urinary frequency, and generalized body aches. UA and history concerning for sepsis secondary to UTI. UC and BC growing E.coli, sensitivities pending. Transplant nephrology and ID following. Pt started on meropenem 12/17 then switched to Ertapenem 1 g daily for 2 weeks duration per Tx ID. Had a PICC line on 12/19 by IR    ASSESSMENT      Discharge Assessment  Patient reports symptoms are: Unchanged  Does the patient have all of their medications?: Yes  Does patient know what their new medications are for?: Yes  Does patient have a follow-up appointment scheduled?: Yes  Does patient have any other questions or concerns?: No    Post-op  Did the patient have surgery or a procedure: No  Fever: No  Chills: No  Eating & Drinking: eating and drinking without complaints/concerns  PO Intake: regular diet  Bowel Function: normal  Urinary Status: voiding without complaint/concerns    PLAN                                                      Outpatient Plan:      Follow up with primary care provider, Vincent De La Garza, within 7 days for hospital follow- up.  The following labs/tests are recommended: CMP q 1 week for 2 weeks.     Follow up with transplant nephrology  In 2-4 weeks.     Future Appointments   Date Time  Provider Department [place]   12/24/2020  9:00 AM CS LAB [facility] CS   12/29/2020  1:00 PM Lashaun Thapa APRN [facility]   1/27/2021  9:45 AM Nguyen Gallardo [facility]   1/27/2021 10:15 AM Anna Gamble MD [facility]   3/16/2021  2:05 PM 2,  Kidney/Pancreas Recipient [facility]           Lucia Brooke, [facility]

## 2020-12-21 NOTE — PHARMACY
[facility]  Parenteral ANtibiotic Review at Departure from Acute [facility]     Antimicrobial Stewardship Program - A joint venture between [facility] and  Physicians to optimize antibiotic management.  NOT a formal consult - Restricted Antimicrobial Review     Patient: Belen Sharma  MRN: [id number]  Allergies: Ibuprofen    Brief Summary: Belen Sharma is a 60 year old female with PMHx significant for congenital hepatic fibrosis and PCKD s/p liver and kidney transplant (in [place], 2010) c/b allograft failure requiring liver transplant (2011) and kidney transplant (in [place], 2016) who was admitted on 12/16/2020 with recurring dysuria and associated fever s/p recent treatment of UTI with nitrofurantoin in early December. She was found to have pyelonephritis of allograft complicated by ESBL E.coli bacteremia. She received a one-time ceftriaxone dose on 12/16, then was transitioned to meropenen. She was narrowed to ertapenem on 12/18. Plans in place to complete ertapenem therapy as an outpatient.     Antimicrobial Dose/Route/Frequency Duration/Indication Start Date End Date   Ertapenem 1 g/IV/every 24 hours 14 days/bacteremia, pyelonephritis(ESBL E.coli) 12/17/2020 (date of first negative BCx) 12/31/2020     Laboratory Tests: CMP   Lab Monitoring Frequency: 1x week   Therapeutic Drug Monitoring: none    Therapeutic Drug Monitoring Frequency: none     Miscellaneous Drug Monitoring: none      Line Type: PICC(Double lumen, placed 12/19/2020)    Reassess Line/Pull Line Date: 12/31/2020 after last ertapenem dose    First Dose Received in Controlled Setting: Yes    Designated Provider: Catrachita Gonzales MD    Follow-up: Patient does not have ID follow-up - not warranted at this time.    Recommendations/Additional Information:   Please fax laboratory values to [facility] ([phone]), attn: Dr. Christian Madrigal, PharmD, BCIDP  Pager:  [phone]    Vital Signs/Clinical Features:  Vitals         12/19 0700  -  12/20 0659 12/20 0700  -  12/21 0659 12/21 0700  -  12/21 1306   Most Recent    Temp ( F) 98.1 -  98.4             Pulse 73 -  82             Resp 16 -  27             BP 86/51 -  136/52             SpO2 (%) 92 -  100                     Labs  Estimated Creatinine Clearance: 101.1 mL/min (based on SCr of 0.64 mg/dL).  Recent Labs   Lab Test 09/10/20  1037 09/30/20  0721 12/10/20  0839 12/16/20 2236 12/18/20 0611 12/19/20  1052   CR 0.84 0.60 0.78 0.87 0.70 0.64       Recent Labs   Lab Test 09/10/20  1037 09/30/20  0721 12/10/20  0839 12/16/20 2236 12/18/20  0611   WBC 6.9  --  6.1 11.4* 11.5*   ANEU  --   --   --  9.3*  --    ALYM  --   --   --  1.0  --    PABLO  --   --   --  1.0  --    AEOS  --   --   --  0.0  --    HGB 17.8* 17.9* 16.8* 16.9* 14.9   HCT 55.0*  --  51.1* 54.5* 47.3*   MCV 94  --  95 96 96     --  249 260 222       Recent Labs   Lab Test 06/01/20 06/09/20 09/10/20  1037 12/10/20  0839 12/16/20 2236 12/19/20  1052   BILITOTAL  --   --  0.6 0.6 0.7 0.5   ALKPHOS  --   --  66 70 77 62   ALBUMIN  --   --  3.5 3.8 3.6 3.0*   AST 18 15 13 22 15 12   ALT 15 25 21 31 23 17       Recent Labs   Lab Test 12/16/20  2236   LACT 0.8       Recent Labs   Lab Test 12/18/20  0611 12/19/20  0707   TACROL  --  5.2   MPACID 1.32  --    MPAG 23.7*  --        Culture Results:  7-Day Micro Results       Procedure Component Value Units Date/Time    Blood culture [[id number]] Collected: 12/17/20 1151    Order Status: Completed Lab Status: Preliminary result Updated: 12/21/20 0233    Specimen: Blood      Specimen Description Blood     Culture Micro No growth after 4 days    Blood culture [[id number]]  (Abnormal)  (Susceptibility) Collected: 12/16/20 2316    Order Status: Completed Lab Status: Final result Updated: 12/19/20 0752    Specimen: Blood from Arm, Right      Specimen Description Blood Right Arm     Culture Micro Cultured on the 1st day  of incubation:  Escherichia coli ESBL  ESBL (extended beta lactamase) producing organisms require contact precautions.        Critical Value/Significant Value, preliminary result only, called to and read back by  RALPH BOLAÑOS RN 12.17.2020 1056 BC        (Note)  POSITIVE for E.COLI by Verigene multiplex nucleic acid test. Final  identification and antimicrobial susceptibility testing will be  verified by standard methods. Verigene test will not distinguish  E.coli from Shigella species including S.dysenteriae, S.flexneri,  S.boydii, and S.sonnei. Specimens containing Shigella species or  E.coli will be reported as Positive for E.coli.    POSITIVE for CTX-M Class A Extended Spectrum beta-lactamase (ESBL)  resistance marker by Verigene multiplex nucleic acid test. CTX-M  confers resistance to penicillins, cephalosporins and variable  resistance to beta-lactam beta-lactamase inhibitor combinations  (clavulanic acid and tazobactam). Best empiric antibiotic choice is  meropenem. Specific susceptibility testing will be performed.    Specimen tested with Verigene multiplex, gram-negative blood culture  nucleic acid test for the following targets: Acinetobacter sp.,  Citrobacter sp., Enterobacter sp., Proteus sp., E. coli, K.  pneumoniae/oxyto  ca, P. aeruginosa, and the following resistance  markers: CTXM, KPC, NDM, VIM, IMP and OXA.    Critical Value/Significant Value called to and read back by RALPH BOLAÑOS RN 12/17/20 1312 EH.        Susceptibility       Escherichia coli esbl (1)       Antibiotic Interpretation Sensitivity Method Status    AMPICILLIN Resistant >=32 ug/mL ZAHIRA Final    AMPICILLIN/SULBACTAM Sensitive 8 ug/mL ZAHIRA Final    CIPROFLOXACIN Resistant >=4 ug/mL ZAHIRA Final    GENTAMICIN Sensitive <=1 ug/mL ZAHIRA Final    LEVOFLOXACIN Resistant >=8 ug/mL ZAHIRA Final    Piperacillin/Tazo Sensitive <=4 ug/mL ZAHIRA Final    TOBRAMYCIN Sensitive <=1 ug/mL ZAHIRA Final    Trimethoprim/Sulfa Resistant >=16/304 ug/mL ZAHIRA Final     MEROPENEM Sensitive <=0.25 ug/mL ZAHIRA Final     Enterobacteriaceae that are susceptible to meropenem are usually susceptible   to ertapenem.    AMIKACIN [*]  Sensitive <=2.0 ug/mL ZAHIRA Final    CEFAZOLIN [*]  Resistant >64.0 ug/mL ZAHIRA Final    CEFEPIME Resistant   ZAHIRA Final    CEFTAZIDIME Resistant   ZAHIRA Final    CEFTRIAXONE Resistant >64.0 ug/mL ZAHIRA Final               [*]   Suppressed Antibiotic                   Blood culture [[id number]] Collected: 12/16/20 2236    Order Status: Completed Lab Status: Preliminary result Updated: 12/21/20 0233    Specimen: Blood from Arm, Right      Specimen Description Blood Right Arm     Culture Micro No growth after 5 days    Urine Culture [[id number]]  (Abnormal)  (Susceptibility) Collected: 12/16/20 2236    Order Status: Completed Lab Status: Edited Result - FINAL Updated: 12/21/20 0628    Specimen: Unspecified Urine from Urine clean catch      Specimen Description Unspecified Urine     Special Requests Specimen received in preservative     Culture Micro >100,000 colonies/mL  Escherichia coli ESBL  ESBL (extended beta lactamase) producing organisms require contact precautions.        Susceptibility testing requested by  Dr. Gonzales, Pager [phone]. Fosfomycin requested. @ [phone]. 12.18.20. BS.       Susceptibility       Escherichia coli esbl (1)       Antibiotic Interpretation Sensitivity Method Status    AMPICILLIN Resistant >=32 ug/mL ZAHIRA Preliminary    CEFAZOLIN Resistant >=64 ug/mL ZAHIRA Preliminary     Cefazolin ZAHIRA breakpoints are for the treatment of uncomplicated urinary tract   infections.  For the treatment of systemic infections, please contact the   laboratory for additional testing.    CEFOXITIN Sensitive 8 ug/mL ZAHIRA Preliminary    CIPROFLOXACIN Resistant >=4 ug/mL ZAHIRA Preliminary    GENTAMICIN Sensitive <=1 ug/mL ZAHIRA Preliminary    LEVOFLOXACIN Resistant >=8 ug/mL ZAHIRA Preliminary    NITROFURANTOIN Sensitive <=16 ug/mL ZAHIRA Preliminary    TOBRAMYCIN Sensitive <=1 ug/mL ZAHIRA  Preliminary    Trimethoprim/Sulfa Resistant >=16/304 ug/mL ZAHIRA Preliminary    AMPICILLIN/SULBACTAM Sensitive 8 ug/mL ZAHIRA Preliminary    Piperacillin/Tazo Sensitive <=4 ug/mL ZAHIRA Preliminary    MEROPENEM Sensitive <=0.25 ug/mL ZAHIRA Preliminary     Enterobacteriaceae that are susceptible to meropenem are usually susceptible   to ertapenem.    CEFTAZIDIME Resistant 16.0 ug/mL ZAHIRA Preliminary    CEFTRIAXONE Resistant >64.0 ug/mL ZAHIRA Preliminary    AMIKACIN [*]  Sensitive <=2.0 ug/mL ZAHIRA Preliminary    CEFEPIME Resistant   ZAHIRA Preliminary               [*]   Suppressed Antibiotic                           Recent Labs   Lab Test 12/03/20  0915 12/03/20  0927 12/16/20  2236   URINEPH 7.0 7.0 7.5*   NITRITE Positive* Negative Negative   LEUKEST Large* Large* Large*   WBCU >182*  --  466*                         Imaging: Us Renal Transplant    Result Date: 12/17/2020  EXAM: US Renal Transplant,  12/17/2020 3:02 AM. COMPARISON: Renal ultrasound 9/23/2020. HISTORY:  60 yof with history of liver/kidney transplant with sepsis 2/2 UTI. TECHNIQUE:  Grey-scale, color Doppler and spectral flow analysis. FINDINGS: The transplant kidney is located right lower quadrant, and measures 14.5 cm.Parenchyma is of normal thickness and echogenicity. Simple anechoic cyst medial to the transplanted kidney measuring 4.4 x 3.8 x 4.5 cm, previously measuring 3.7 x 3.9 x 4.3 cm. Mild pelviectasis, unchanged compared to prior exam 9/23/2020. No perinephric fluid collection. Visualized adjacent cystic lesions arising from the native kidney. Renal artery flow: 377 cm/sec peak systolic at hilum. 383 peak systolic at anastomosis. Arcuate artery resistive indices (lower to upper): 0.78, 0.69, 0.71 Renal Vein Flow: 42 at hilum. 37 at anastomosis. Iliac artery flow: 201 peak systolic above anastomosis. 185 peak systolic below anastomosis. Iliac vein flow: Patent above and below the anastomosis. Bladder: Incompletely distended and normal in appearance.      IMPRESSION: 1. Elevated peak systolic velocity(PSV) measuring up to 383 cm/s within the right lower quadrant renal allograft artery anastomosis and at the hilum. Given that the Iliac artery: renal artery velocity ratio is less than 2, and the intrarenal waveforms are normal, the increased PSV is favored to be due to vessel tortuosity rather than stenosis. Recommend follow up. 2. Stable, mild pelviectasis. 3. Grossly unchanged simple appearing cyst medial to the transplanted kidney measuring up to 4.5 cm, previously 4.3 cm which could be a cyst from the native polycystic right kidney. I have personally reviewed the examination and initial interpretation and I agree with the findings. KAT ALBARRAN MD    Xr Chest Port 1 View    Result Date: 12/19/2020  EXAM: XR CHEST PORT 1 VW  12/19/2020 9:16 PM HISTORY:  PICC LINE PLACEMENT   COMPARISON:  None TECHNIQUE: Single frontal radiograph of the chest FINDINGS: Right upper extremity PICC tip projects in the low SVC. Midline trachea. Well-defined cardiomediastinal silhouette. Distinct pulmonary vasculature. No consolidation, pleural effusion or appreciable pneumothorax.     IMPRESSION: Right upper extremity PICC tip projects in the low SVC. I have personally reviewed the examination and initial interpretation and I agree with the findings. YADIRA KIM, DO    Ct Abdomen Pelvis W Contrast    Result Date: 12/17/2020  EXAMINATION: CT ABDOMEN PELVIS W CONTRAST, 12/17/2020 2:38 PM TECHNIQUE:  Helical CT images from the lung bases through the symphysis pubis were obtained  with IV contrast. Contrast dose: 92 cc of isovue 370 COMPARISON: Ultrasound 12/17/2020 HISTORY: patient with ESBL E.Coli bacteremia and unclear source. Renal vs GYN vs intraabdominal abscess FINDINGS: The lung bases are clear. Abdomen/pelvis: Surgical changes of a liver transplant. Prominence of the common bile duct. Surgical change along the pancreatic tail. Splenectomy with large splenule remaining.  Grossly enlarged polycystic kidneys  measuring just under 20 cm bilaterally in craniocaudal dimension. The adrenal glands are unremarkable. Portal vein common hepatic artery, hepatic veins are patent. Celiac access, SMA are patent. Paraesophageal varices. There is no abdominal or pelvic lymphadenopathy by size criteria. Surgical clips in left nephrectomy pelvic site from previous renal transplant.  Right renal transplant is without hydronephrosis and demonstrates widely patent renal arteries and veins. The bladder is unremarkable. The colon is nondistended with stool throughout. Small bowel is unremarkable. Pessary present. A 3.9 cm cyst adjacent to the right ovary, benign-appearing, likely a lymphocele. Less likely an ovarian cysts, does not look like an abscess. It does have some mass effect on the ureter. The ureter demonstrates enhancement suggesting inflammation or infection. Bones/soft tissues: Scoliotic curvature of the spine, apex left at L1.     IMPRESSION: 1. No definite source of infection is identified. Consider PET scan to look for source of infection. 2. Pessary in place. 3. Right renal transplant urothelial enhancement in the transplant ureter inflammation/infection. Small amount of mass effect by right pelvic cyst on renal collecting system without hydronephrosis. 4. Urothelial enhancement of the bladder may suggest infection. 5. Surgical changes of the liver transplant, patent vasculature no intrahepatic biliary dilatation. 5. Paraesophageal varices 6. Bilateral polycystic kidneys measure approximately 20 cm 8 cubic. 7. Status post splenectomy with large splenule remaining. RACHAEL SANCHEZ MD

## 2020-12-21 NOTE — PROGRESS NOTES
This is a recent snapshot of the patient's [facility] medical record.  For current drug dose and complete information and questions, call [phone] or In Basket pool, fv home infusion ([id number])  CSN Number:  [id number]

## 2020-12-22 ENCOUNTER — TELEPHONE (OUTPATIENT)
Dept: TRANSPLANT | Facility: CLINIC | Age: 60
End: 2020-12-22

## 2020-12-22 ENCOUNTER — HOME INFUSION (PRE-WILLOW HOME INFUSION) (OUTPATIENT)
Dept: PHARMACY | Facility: CLINIC | Age: 60
End: 2020-12-22

## 2020-12-22 DIAGNOSIS — Z94.4 LIVER REPLACED BY TRANSPLANT (H): Primary | ICD-10-CM

## 2020-12-22 LAB
BACTERIA SPEC CULT: NO GROWTH
SPECIMEN SOURCE: NORMAL

## 2020-12-22 NOTE — TELEPHONE ENCOUNTER
Reviewed Belen's 12/21/2020 lab results. AST slightly up and Belen has been recently on antibiotics for a UTI. Will ask her to repeat her hepatic panel in 1-2 weeks to re assess.     Attempted to reach Belen by phone, no answer, left a message to call me back and main office number provided.

## 2020-12-23 ENCOUNTER — TELEPHONE (OUTPATIENT)
Dept: TRANSPLANT | Facility: CLINIC | Age: 60
End: 2020-12-23

## 2020-12-23 DIAGNOSIS — Z94.4 LIVER REPLACED BY TRANSPLANT (H): Primary | ICD-10-CM

## 2020-12-23 LAB
BACTERIA SPEC CULT: NO GROWTH
SPECIMEN SOURCE: NORMAL

## 2020-12-23 NOTE — TELEPHONE ENCOUNTER
Pt calls to state that she was just discharged from the hospital this past Wednesday and was scheduled for labs tomorrow. Pt does not feel comfortable going out in the blizzard, and the way that she feels right now and would like to know if the labs can be postponed until next week. Pt is receiving IV antibiotics while at home and will be done by next week. Please advise.

## 2020-12-28 ENCOUNTER — MEDICAL CORRESPONDENCE (OUTPATIENT)
Dept: HEALTH INFORMATION MANAGEMENT | Facility: CLINIC | Age: 60
End: 2020-12-28

## 2020-12-28 DIAGNOSIS — Z94.4 LIVER REPLACED BY TRANSPLANT (H): ICD-10-CM

## 2020-12-28 LAB
ALBUMIN SERPL-MCNC: 3.4 G/DL (ref 3.4–5)
ALBUMIN SERPL-MCNC: 3.5 G/DL (ref 3.4–5)
ALP SERPL-CCNC: 77 U/L (ref 40–150)
ALP SERPL-CCNC: 85 U/L (ref 40–150)
ALT SERPL W P-5'-P-CCNC: 34 U/L (ref 0–50)
ALT SERPL W P-5'-P-CCNC: 37 U/L (ref 0–50)
ANION GAP SERPL CALCULATED.3IONS-SCNC: 6 MMOL/L (ref 3–14)
ANION GAP SERPL CALCULATED.3IONS-SCNC: 7 MMOL/L (ref 3–14)
AST SERPL W P-5'-P-CCNC: 15 U/L (ref 0–45)
AST SERPL W P-5'-P-CCNC: 35 U/L (ref 0–45)
BASOPHILS # BLD AUTO: 0 10E9/L (ref 0–0.2)
BASOPHILS NFR BLD AUTO: 0.2 %
BILIRUB DIRECT SERPL-MCNC: 0.1 MG/DL (ref 0–0.2)
BILIRUB SERPL-MCNC: 0.6 MG/DL (ref 0.2–1.3)
BILIRUB SERPL-MCNC: 0.7 MG/DL (ref 0.2–1.3)
BUN SERPL-MCNC: 16 MG/DL (ref 7–30)
BUN SERPL-MCNC: 18 MG/DL (ref 7–30)
CALCIUM SERPL-MCNC: 10.1 MG/DL (ref 8.5–10.1)
CALCIUM SERPL-MCNC: 10.2 MG/DL (ref 8.5–10.1)
CHLORIDE SERPL-SCNC: 104 MMOL/L (ref 94–109)
CHLORIDE SERPL-SCNC: 107 MMOL/L (ref 94–109)
CO2 SERPL-SCNC: 25 MMOL/L (ref 20–32)
CO2 SERPL-SCNC: 27 MMOL/L (ref 20–32)
CREAT SERPL-MCNC: 0.59 MG/DL (ref 0.52–1.04)
CREAT SERPL-MCNC: 0.63 MG/DL (ref 0.52–1.04)
CRP SERPL-MCNC: 13 MG/L (ref 0–8)
DIFFERENTIAL METHOD BLD: ABNORMAL
EOSINOPHIL # BLD AUTO: 0 10E9/L (ref 0–0.7)
EOSINOPHIL NFR BLD AUTO: 0.2 %
ERYTHROCYTE [DISTWIDTH] IN BLOOD BY AUTOMATED COUNT: 14.1 % (ref 10–15)
ERYTHROCYTE [DISTWIDTH] IN BLOOD BY AUTOMATED COUNT: 15.4 % (ref 10–15)
ERYTHROCYTE [SEDIMENTATION RATE] IN BLOOD BY WESTERGREN METHOD: 8 MM/H (ref 0–30)
GFR SERPL CREATININE-BSD FRML MDRD: >90 ML/MIN/{1.73_M2}
GFR SERPL CREATININE-BSD FRML MDRD: >90 ML/MIN/{1.73_M2}
GLUCOSE SERPL-MCNC: 105 MG/DL (ref 70–99)
GLUCOSE SERPL-MCNC: 90 MG/DL (ref 70–99)
HCT VFR BLD AUTO: 50.8 % (ref 35–47)
HCT VFR BLD AUTO: 53.6 % (ref 35–47)
HGB BLD-MCNC: 16.6 G/DL (ref 11.7–15.7)
HGB BLD-MCNC: 17.4 G/DL (ref 11.7–15.7)
IMM GRANULOCYTES # BLD: 0 10E9/L (ref 0–0.4)
IMM GRANULOCYTES NFR BLD: 0.2 %
LYMPHOCYTES # BLD AUTO: 2.1 10E9/L (ref 0.8–5.3)
LYMPHOCYTES NFR BLD AUTO: 23 %
MCH RBC QN AUTO: 29.9 PG (ref 26.5–33)
MCH RBC QN AUTO: 30.5 PG (ref 26.5–33)
MCHC RBC AUTO-ENTMCNC: 32.5 G/DL (ref 31.5–36.5)
MCHC RBC AUTO-ENTMCNC: 32.7 G/DL (ref 31.5–36.5)
MCV RBC AUTO: 92 FL (ref 78–100)
MCV RBC AUTO: 94 FL (ref 78–100)
MONOCYTES # BLD AUTO: 0.6 10E9/L (ref 0–1.3)
MONOCYTES NFR BLD AUTO: 7 %
NEUTROPHILS # BLD AUTO: 6.4 10E9/L (ref 1.6–8.3)
NEUTROPHILS NFR BLD AUTO: 69.4 %
NRBC # BLD AUTO: 0 10*3/UL
NRBC BLD AUTO-RTO: 0 /100
PLATELET # BLD AUTO: 247 10E9/L (ref 150–450)
PLATELET # BLD AUTO: 308 10E9/L (ref 150–450)
POTASSIUM SERPL-SCNC: 4.2 MMOL/L (ref 3.4–5.3)
POTASSIUM SERPL-SCNC: 4.6 MMOL/L (ref 3.4–5.3)
PROT SERPL-MCNC: 7.4 G/DL (ref 6.8–8.8)
PROT SERPL-MCNC: 7.7 G/DL (ref 6.8–8.8)
RBC # BLD AUTO: 5.55 10E12/L (ref 3.8–5.2)
RBC # BLD AUTO: 5.71 10E12/L (ref 3.8–5.2)
SODIUM SERPL-SCNC: 137 MMOL/L (ref 133–144)
SODIUM SERPL-SCNC: 139 MMOL/L (ref 133–144)
TACROLIMUS BLD-MCNC: 4.8 UG/L (ref 5–15)
TME LAST DOSE: ABNORMAL H
WBC # BLD AUTO: 8.8 10E9/L (ref 4–11)
WBC # BLD AUTO: 9.2 10E9/L (ref 4–11)

## 2020-12-28 PROCEDURE — 85025 COMPLETE CBC W/AUTO DIFF WBC: CPT | Performed by: INTERNAL MEDICINE

## 2020-12-28 PROCEDURE — 80197 ASSAY OF TACROLIMUS: CPT | Performed by: INTERNAL MEDICINE

## 2020-12-28 PROCEDURE — 80076 HEPATIC FUNCTION PANEL: CPT | Performed by: INTERNAL MEDICINE

## 2020-12-28 PROCEDURE — 86140 C-REACTIVE PROTEIN: CPT | Performed by: INTERNAL MEDICINE

## 2020-12-28 PROCEDURE — 80048 BASIC METABOLIC PNL TOTAL CA: CPT | Performed by: INTERNAL MEDICINE

## 2020-12-28 PROCEDURE — 85652 RBC SED RATE AUTOMATED: CPT | Performed by: INTERNAL MEDICINE

## 2020-12-28 PROCEDURE — 36415 COLL VENOUS BLD VENIPUNCTURE: CPT | Performed by: INTERNAL MEDICINE

## 2020-12-28 PROCEDURE — 85027 COMPLETE CBC AUTOMATED: CPT | Performed by: INTERNAL MEDICINE

## 2020-12-28 PROCEDURE — 80053 COMPREHEN METABOLIC PANEL: CPT | Performed by: INTERNAL MEDICINE

## 2020-12-28 RX ORDER — TACROLIMUS 1 MG/1
2 CAPSULE ORAL 2 TIMES DAILY
Qty: 60 CAPSULE | Refills: 3 | Status: SHIPPED | OUTPATIENT
Start: 2020-12-28 | End: 2020-12-31

## 2020-12-28 RX ORDER — MYCOPHENOLIC ACID 180 MG/1
540 TABLET, DELAYED RELEASE ORAL 2 TIMES DAILY
Qty: 540 TABLET | Refills: 3 | Status: SHIPPED | OUTPATIENT
Start: 2020-12-28 | End: 2021-06-08

## 2020-12-28 NOTE — TELEPHONE ENCOUNTER
Call placed to follow with Belen Sharma on IS and lab recommendations after discharge. Reviewed current IS of tac and, pred 5 mg Myfortic 540 mg bid. Prescriptions updated to [facility].     LPN task:    UAs x 6 for prn use for symptoms   Check CMVIgG, EBVIgG

## 2020-12-29 ENCOUNTER — VIRTUAL VISIT (OUTPATIENT)
Dept: INTERNAL MEDICINE | Facility: CLINIC | Age: 60
End: 2020-12-29
Payer: MEDICARE

## 2020-12-29 ENCOUNTER — HOME INFUSION (PRE-WILLOW HOME INFUSION) (OUTPATIENT)
Dept: PHARMACY | Facility: CLINIC | Age: 60
End: 2020-12-29

## 2020-12-29 DIAGNOSIS — Z86.19 HX OF SEPSIS: Primary | ICD-10-CM

## 2020-12-29 DIAGNOSIS — N81.10 BLADDER PROLAPSE, FEMALE, ACQUIRED: ICD-10-CM

## 2020-12-29 PROCEDURE — 99214 OFFICE O/P EST MOD 30 MIN: CPT | Mod: 95 | Performed by: NURSE PRACTITIONER

## 2020-12-29 ASSESSMENT — PATIENT HEALTH QUESTIONNAIRE - PHQ9: SUM OF ALL RESPONSES TO PHQ QUESTIONS 1-9: 0

## 2020-12-29 NOTE — PROGRESS NOTES
"Belen Sharma is a 60 year old female who is being evaluated via a billable video visit.      The patient has been notified of following:     \"This video visit will be conducted via a call between you and your physician/provider. We have found that certain health care needs can be provided without the need for an in-person physical exam.  This service lets us provide the care you need with a video conversation.  If a prescription is necessary we can send it directly to your pharmacy.  If lab work is needed we can place an order for that and you can then stop by our lab to have the test done at a later time.    Video visits are billed at different rates depending on your insurance coverage.  Please reach out to your insurance provider with any questions.    If during the course of the call the physician/provider feels a video visit is not appropriate, you will not be charged for this service.\"    Patient has given verbal consent for Video visit? Yes  How would you like to obtain your AVS? MyChart  If you are dropped from the video visit, the video invite should be resent to: Send to e-mail at: [email or web address]  Will anyone else be joining your video visit? No      Subjective     Belen Sharma is a 60 year old female who presents today via video visit for the following health issues:    HPI   Pt was hospitalized 12/16/20 -12/19/20 for sepsis from UTI with E. Coli.  She was discharged on IV antibiotics and is due to have her PICC  line removed after her last IV antibiotic infusion 12/31/20.  She is very concerned that her pessary had some role in her UTI. She states she has dark mucus from the vagina.  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  In the past she was followed by Infectious Disease while in [place].  She moved to [place] in July 2020. She would like to have a post hospital follow-up visit with Dr. Gonzales who saw her while in hospital.  She was discharged on TMP 3 days a week. She is very concerned " about re-developing sepsis and having to be re-hospitalized.      On the evening of 12/26/20 she went to sit in a chair and misjudged the distance and ended up sitting on the floor and striking the back of her head on the floor.  She denies loss of consciousness, headache, blurred vu=ision, N or V.  Her neck is slightly still.  She is asking if she needs any further imaging as she has a family hx of aneurysms.      She had a CMP performed this morning. She has a f/u scheduled with transplant nephrology.    Current Outpatient Medications   Medication     acetaminophen (TYLENOL) 325 MG tablet     ALPRAZolam (XANAX) 0.25 MG tablet     aspirin (ASA) 81 MG chewable tablet     biotin 1000 MCG TABS tablet     diphenhydrAMINE (BENADRYL) 25 MG tablet     docusate sodium (COLACE) 100 MG capsule     ertapenem (INVANZ) 1 GM vial     estradiol (ESTRACE) 0.1 MG/GM vaginal cream     loperamide (IMODIUM A-D) 2 MG tablet     loratadine (CLARITIN) 10 MG tablet     Multiple Vitamins-Minerals (WOMENS DAILY FORMULA PO)     MYFORTIC (BRAND) 180 MG EC tablet     nitroGLYcerin (NITROSTAT) 0.4 MG sublingual tablet     ondansetron (ZOFRAN) 4 MG tablet     pantoprazole (PROTONIX) 40 MG EC tablet     predniSONE (DELTASONE) 5 MG tablet     Probiotic Product (PROBIOTIC PO)     Psyllium (METAMUCIL PO)     sulfamethoxazole-trimethoprim (BACTRIM) 400-80 MG tablet     tacrolimus (GENERIC EQUIVALENT) 1 MG capsule     temazepam (RESTORIL) 15 MG capsule     Vitamin D, Cholecalciferol, 25 MCG (1000 UT) CAPS     Vitamin D3 (CHOLECALCIFEROL) 25 mcg (1000 units) tablet     No current facility-administered medications for this visit.         Allergies   Allergen Reactions     Ibuprofen      Due to liver transplant       Video Start Time: 1:00      Review of Systems   Constitutional, HEENT, cardiovascular, pulmonary, gi and gu systems are negative, except as otherwise noted.      Objective       Vitals: No vitals were obtained today due to virtual  visit.    Physical Exam     GENERAL: Healthy, alert and no distress  EYES: Eyes grossly normal to inspection.  No discharge or erythema, or obvious scleral/conjunctival abnormalities.  RESP: No audible wheeze, cough, or visible cyanosis.  No visible retractions or increased work of breathing.    SKIN: Visible skin clear. No significant rash, abnormal pigmentation or lesions.  NEURO:  Mentation and speech appropriate for age.  PSYCH: Mentation appears normal, affect normal/bright, judgement and insight intact, normal speech and appearance well-groomed.    Results for PAGE MERINO (MRN [id number]) as of 12/30/2020 19:32   Ref. Range 12/28/2020 15:15   Sodium Latest Ref Range: 133 - 144 mmol/L 137   Potassium Latest Ref Range: 3.4 - 5.3 mmol/L 4.2   Chloride Latest Ref Range: 94 - 109 mmol/L 104   Carbon Dioxide Latest Ref Range: 20 - 32 mmol/L 27   Urea Nitrogen Latest Ref Range: 7 - 30 mg/dL 18   Creatinine Latest Ref Range: 0.52 - 1.04 mg/dL 0.59   GFR Estimate Latest Ref Range: >60 mL/min/1.73_m2 >90   GFR Estimate If Black Latest Ref Range: >60 mL/min/1.73_m2 >90   Calcium Latest Ref Range: 8.5 - 10.1 mg/dL 10.2 (H)   Anion Gap Latest Ref Range: 3 - 14 mmol/L 6   Albumin Latest Ref Range: 3.4 - 5.0 g/dL 3.4   Protein Total Latest Ref Range: 6.8 - 8.8 g/dL 7.4   Bilirubin Total Latest Ref Range: 0.2 - 1.3 mg/dL 0.6   Alkaline Phosphatase Latest Ref Range: 40 - 150 U/L 77   ALT Latest Ref Range: 0 - 50 U/L 34   AST Latest Ref Range: 0 - 45 U/L 15   CRP Inflammation Latest Ref Range: 0.0 - 8.0 mg/L 13.0 (H)   Glucose Latest Ref Range: 70 - 99 mg/dL 105 (H)   WBC Latest Ref Range: 4.0 - 11.0 10e9/L 9.2   Hemoglobin Latest Ref Range: 11.7 - 15.7 g/dL 16.6 (H)   Hematocrit Latest Ref Range: 35.0 - 47.0 % 50.8 (H)   Platelet Count Latest Ref Range: 150 - 450 10e9/L 308   RBC Count Latest Ref Range: 3.8 - 5.2 10e12/L 5.55 (H)   MCV Latest Ref Range: 78 - 100 fl 92   MCH Latest Ref Range: 26.5 - 33.0 pg 29.9   MCHC Latest  Ref Range: 31.5 - 36.5 g/dL 32.7   RDW Latest Ref Range: 10.0 - 15.0 % 14.1   Diff Method Unknown Automated Method   % Neutrophils Latest Units: % 69.4   % Lymphocytes Latest Units: % 23.0   % Monocytes Latest Units: % 7.0   % Eosinophils Latest Units: % 0.2   % Basophils Latest Units: % 0.2   % Immature Granulocytes Latest Units: % 0.2   Nucleated RBCs Latest Ref Range: 0 /100 0   Absolute Neutrophil Latest Ref Range: 1.6 - 8.3 10e9/L 6.4   Absolute Lymphocytes Latest Ref Range: 0.8 - 5.3 10e9/L 2.1   Absolute Monocytes Latest Ref Range: 0.0 - 1.3 10e9/L 0.6   Absolute Eosinophils Latest Ref Range: 0.0 - 0.7 10e9/L 0.0   Absolute Basophils Latest Ref Range: 0.0 - 0.2 10e9/L 0.0   Abs Immature Granulocytes Latest Ref Range: 0 - 0.4 10e9/L 0.0   Absolute Nucleated RBC Unknown 0.0   Sed Rate Latest Ref Range: 0 - 30 mm/h 8     Assessment  Hospital discharge follow-up.  She continues with dischatge instructions.  She would like to have a consult with Urology regarding the pessary.  She would like to avoid future surgery for prolapsed bladder if possible.     Mammo UTD: 6/20/20  Colonoscopy fall 2019      Video-Visit Details    Type of service:  Video Visit    Video End Time:1:46    Originating Location (pt. Location): Home    Distant Location (provider location):  [facility] INTERNAL MEDICINE [place]     Platform used for Video Visit: [employer]

## 2020-12-29 NOTE — NURSING NOTE
Chief Complaint   Patient presents with     Hospital F/U     pt would like to follow up form hospital visit on 12/16       Linda Mendez CMA, EMT at 11:50 AM on 12/29/2020.

## 2020-12-29 NOTE — PROGRESS NOTES
This is a recent snapshot of the patient's [facility] medical record.  For current drug dose and complete information and questions, call [phone] or In Basket pool, fv home infusion ([id number])  CSN Number:  [id number]

## 2020-12-30 ENCOUNTER — TELEPHONE (OUTPATIENT)
Dept: OBGYN | Facility: CLINIC | Age: 60
End: 2020-12-30

## 2020-12-30 NOTE — TELEPHONE ENCOUNTER
----- Message from Divya Hanson sent at 12/29/2020  4:27 PM CST -----  Regarding: Please Call Patient  Hello!   Patient was contacted off of referral from Lashaun Thapa NP in primary care clinic and scheduled with Dr. Francis on 1/6/21. Patient has seen Dr. Francis in the past and also requested to speak to a nurse.  Please call patient at [phone] to discuss symptoms.    Thanks!  [facility] Referral Department

## 2020-12-30 NOTE — TELEPHONE ENCOUNTER
Has appointment next week.  Had some brown discharge on her pessary.  Internal medicine advised her to leave it out for now.  She is wondering if she can still use estradiol cream.  She is advised ok to use, and instructed in how to insert and measure.

## 2020-12-31 ENCOUNTER — TELEPHONE (OUTPATIENT)
Dept: TRANSPLANT | Facility: CLINIC | Age: 60
End: 2020-12-31

## 2020-12-31 DIAGNOSIS — Z94.4 S/P LIVER TRANSPLANT (H): Primary | ICD-10-CM

## 2020-12-31 RX ORDER — TACROLIMUS 1 MG/1
2 CAPSULE ORAL 2 TIMES DAILY
Qty: 360 CAPSULE | Refills: 3 | Status: SHIPPED | OUTPATIENT
Start: 2020-12-31 | End: 2021-06-08

## 2020-12-31 NOTE — PROGRESS NOTES
This is a recent snapshot of the patient's [facility] medical record.  For current drug dose and complete information and questions, call [phone] or In Basket pool, fv home infusion ([id number])  CSN Number:  [id number]

## 2020-12-31 NOTE — TELEPHONE ENCOUNTER
Call placed to Belen Sharma to confirm she will get UA/UC next week. She completed abx today. She will get labs and UA/UC done Monday. Belen v/u she does not need nephrology appt now, will continue to monitor labs.     We still have received HLA information from [facility]. RNCC left  for their team asking for call back.     LPN task:  Please enter weekly UA/UC's x 6.    No encounters of telephone calls in chart, Dr. Cyndi Sen did not call the patient.

## 2020-12-31 NOTE — TELEPHONE ENCOUNTER
Dallas Johnson MD Lavelle Peterson, Meghan M RN             Only most recent kidney HLA is needed   No need for a sooner visit with me   Needs to follow up with uroGYN on pessary   UA needs to be checked after the abx course   She also need a few on demand UA and UC incase of symptoms

## 2021-01-01 ENCOUNTER — APPOINTMENT (OUTPATIENT)
Dept: LAB | Facility: CLINIC | Age: 61
End: 2021-01-01
Attending: INTERNAL MEDICINE
Payer: MEDICARE

## 2021-01-03 ENCOUNTER — HOME INFUSION (PRE-WILLOW HOME INFUSION) (OUTPATIENT)
Dept: PHARMACY | Facility: CLINIC | Age: 61
End: 2021-01-03

## 2021-01-04 ENCOUNTER — HOME INFUSION (PRE-WILLOW HOME INFUSION) (OUTPATIENT)
Dept: PHARMACY | Facility: CLINIC | Age: 61
End: 2021-01-04

## 2021-01-04 ENCOUNTER — HEALTH MAINTENANCE LETTER (OUTPATIENT)
Age: 61
End: 2021-01-04

## 2021-01-04 DIAGNOSIS — Z79.899 ENCOUNTER FOR LONG-TERM CURRENT USE OF MEDICATION: ICD-10-CM

## 2021-01-04 DIAGNOSIS — Z94.4 S/P LIVER TRANSPLANT (H): ICD-10-CM

## 2021-01-04 DIAGNOSIS — Z94.4 LIVER REPLACED BY TRANSPLANT (H): ICD-10-CM

## 2021-01-04 DIAGNOSIS — Z13.220 LIPID SCREENING: ICD-10-CM

## 2021-01-04 DIAGNOSIS — Z48.298 AFTERCARE FOLLOWING ORGAN TRANSPLANT: ICD-10-CM

## 2021-01-04 DIAGNOSIS — Z94.0 KIDNEY TRANSPLANTED: ICD-10-CM

## 2021-01-04 DIAGNOSIS — N39.0 COMPLICATED URINARY TRACT INFECTION: ICD-10-CM

## 2021-01-04 DIAGNOSIS — Z94.0 KIDNEY REPLACED BY TRANSPLANT: ICD-10-CM

## 2021-01-04 LAB
ALBUMIN SERPL-MCNC: 3.2 G/DL (ref 3.4–5)
ALBUMIN SERPL-MCNC: 3.3 G/DL (ref 3.4–5)
ALBUMIN UR-MCNC: NEGATIVE MG/DL
ALP SERPL-CCNC: 77 U/L (ref 40–150)
ALP SERPL-CCNC: 82 U/L (ref 40–150)
ALT SERPL W P-5'-P-CCNC: 24 U/L (ref 0–50)
ALT SERPL W P-5'-P-CCNC: 24 U/L (ref 0–50)
ANION GAP SERPL CALCULATED.3IONS-SCNC: 5 MMOL/L (ref 3–14)
APPEARANCE UR: CLEAR
AST SERPL W P-5'-P-CCNC: 20 U/L (ref 0–45)
AST SERPL W P-5'-P-CCNC: 23 U/L (ref 0–45)
BILIRUB DIRECT SERPL-MCNC: 0.2 MG/DL (ref 0–0.2)
BILIRUB SERPL-MCNC: 0.7 MG/DL (ref 0.2–1.3)
BILIRUB SERPL-MCNC: 0.7 MG/DL (ref 0.2–1.3)
BILIRUB UR QL STRIP: NEGATIVE
BUN SERPL-MCNC: 15 MG/DL (ref 7–30)
CALCIUM SERPL-MCNC: 9.6 MG/DL (ref 8.5–10.1)
CHLORIDE SERPL-SCNC: 106 MMOL/L (ref 94–109)
CO2 SERPL-SCNC: 27 MMOL/L (ref 20–32)
COLOR UR AUTO: YELLOW
CREAT SERPL-MCNC: 0.7 MG/DL (ref 0.52–1.04)
GFR SERPL CREATININE-BSD FRML MDRD: >90 ML/MIN/{1.73_M2}
GLUCOSE SERPL-MCNC: 79 MG/DL (ref 70–99)
GLUCOSE UR STRIP-MCNC: NEGATIVE MG/DL
HGB UR QL STRIP: NEGATIVE
KETONES UR STRIP-MCNC: NEGATIVE MG/DL
LEUKOCYTE ESTERASE UR QL STRIP: NEGATIVE
NITRATE UR QL: NEGATIVE
PH UR STRIP: 7 PH (ref 5–7)
POTASSIUM SERPL-SCNC: 3.5 MMOL/L (ref 3.4–5.3)
PROT SERPL-MCNC: 6.7 G/DL (ref 6.8–8.8)
PROT SERPL-MCNC: 6.8 G/DL (ref 6.8–8.8)
SODIUM SERPL-SCNC: 138 MMOL/L (ref 133–144)
SOURCE: NORMAL
SP GR UR STRIP: 1.01 (ref 1–1.03)
TACROLIMUS BLD-MCNC: 4.3 UG/L (ref 5–15)
TME LAST DOSE: 2100 H
UROBILINOGEN UR STRIP-ACNC: 0.2 EU/DL (ref 0.2–1)

## 2021-01-04 PROCEDURE — 86644 CMV ANTIBODY: CPT | Performed by: INTERNAL MEDICINE

## 2021-01-04 PROCEDURE — 81003 URINALYSIS AUTO W/O SCOPE: CPT | Performed by: INTERNAL MEDICINE

## 2021-01-04 PROCEDURE — 80197 ASSAY OF TACROLIMUS: CPT | Performed by: INTERNAL MEDICINE

## 2021-01-04 PROCEDURE — 36415 COLL VENOUS BLD VENIPUNCTURE: CPT | Performed by: INTERNAL MEDICINE

## 2021-01-04 PROCEDURE — 86665 EPSTEIN-BARR CAPSID VCA: CPT | Performed by: INTERNAL MEDICINE

## 2021-01-04 PROCEDURE — 80076 HEPATIC FUNCTION PANEL: CPT | Performed by: INTERNAL MEDICINE

## 2021-01-04 PROCEDURE — 80053 COMPREHEN METABOLIC PANEL: CPT | Performed by: STUDENT IN AN ORGANIZED HEALTH CARE EDUCATION/TRAINING PROGRAM

## 2021-01-05 ENCOUNTER — VIRTUAL VISIT (OUTPATIENT)
Dept: INFECTIOUS DISEASES | Facility: CLINIC | Age: 61
End: 2021-01-05
Attending: INTERNAL MEDICINE
Payer: MEDICARE

## 2021-01-05 DIAGNOSIS — Z71.9 ENCOUNTER FOR COUNSELING: ICD-10-CM

## 2021-01-05 DIAGNOSIS — Z71.89 COUNSELED ABOUT COVID-19 VIRUS INFECTION: ICD-10-CM

## 2021-01-05 DIAGNOSIS — N12 PYELONEPHRITIS OF TRANSPLANTED KIDNEY: ICD-10-CM

## 2021-01-05 DIAGNOSIS — Z94.4 LIVER REPLACED BY TRANSPLANT (H): ICD-10-CM

## 2021-01-05 DIAGNOSIS — T86.19 PYELONEPHRITIS OF TRANSPLANTED KIDNEY: ICD-10-CM

## 2021-01-05 DIAGNOSIS — N10 ACUTE PYELONEPHRITIS: Primary | ICD-10-CM

## 2021-01-05 LAB
CMV IGG SERPL QL IA: >8 AI (ref 0–0.8)
EBV VCA IGG SER QL IA: >8 AI (ref 0–0.8)

## 2021-01-05 PROCEDURE — 99215 OFFICE O/P EST HI 40 MIN: CPT | Mod: 95 | Performed by: INTERNAL MEDICINE

## 2021-01-05 ASSESSMENT — PAIN SCALES - GENERAL: PAINLEVEL: NO PAIN (0)

## 2021-01-05 NOTE — PROGRESS NOTES
[facility]    Transplant Infectious Diseases Outpatient Progress note      Patient:  Belen Sharma, Date of birth 1960, Medical record number [id number]  Date of Visit:  01/05/2021         Recommendations:   1. UA with reflex to Ucx when symptomatic.   2. Tdap in 8 years.   3. Get the second dose of the shingrix vaccine.   4. COVID-19 vaccine with either Moderna or Pfizer whenever either one is available.   4. Other questions answered to the best of my ability.     RTC: as needed.         Summary of Presentation:   Transplants:  2/2/2016 (Kidney), 3/3/2011 (Liver), 10/9/2010 (Kidney / Liver), Postoperative day:  3596 (Liver), 1799 (Kidney)     This patient is a 60 year old female PCKD and congenital hepatic fibrosis s/p kidney/liver in 2010 both failed and required retransplant. Currently on TAC/mycophenolic acid/prednisone.   Recent ESBL E coli BSI in 12/2020 and UTI/pyelonephritis of kidney. Finished total therapy of 14 days on 12/31/2020.         Active Problems and Infectious Diseases Issues:   1. Recent ESBL E coli BSI with UTI/pyelonephritis of allograft   Finished total of 2 weeks of ertapenem on 12/31/2020.   The ESBL E coli is also susceptible to fosfomycin.  At this point there is no indications for further antimicrobial therapy.   I will place a standing order in the chart for UA with reflex to Ucx when symptomatic.   The patient was counseled about her increased risk for UTI given gender, history of kidney transplant, Uterine prolapse, etc.... I recommended against suppressive ABx therapy at this point.     2. Counseling.   Answered questions about COVID-19 vaccines, vitamin D supplement and probiotics.         Old Problems and Infectious Diseases Issues:   1. UTI with E coli.  2. CDI     Other Infectious Disease issues include:  - PCP prophylaxis: bactrim  - Serostatus: ?   -  Immunization status: Tdap in 8 years. Get the second dose of the shingrix vaccine, and obtain  COVID-19 vaccine with either Moderna or Pfizer whenever either one is available.       Attestation:  I interviewed the patient and obtained history from the patient, and by reviewing the patient's chart including outside records, microbiological data, and radiological data. All data are summarized in this notes.  Catrachita Gonzales MD  Contact information available via [facility] Paging/Directory     01/05/2021         Interim History:   The patient finished IV ertapenem on 12/31/2020 as planned and the PICC line was removed.   So far no fever, dysuria, urgency, or abdominal pain, but it's too soon.   The patient had many questions regarding COVID-19 vaccines, recurrent UTI, vitamin D and probiotics.   No other complaints.         HPI:       Transplants:  2/2/2016 (Kidney), 3/3/2011 (Liver), 10/9/2010 (Kidney / Liver); Postoperative day:  3596 (Liver), 1799 (Kidney)    This patient is a 60 year old female with history of congenital hepatic fibrosis and PCKD s/p liver and kidney transplant in [place] in 2010, they both failed, first the liver apparently due to anatomic abnormalities and required liver re-transplant within few months in 2011, then the kidney failed and needed HD through LUE AVF for 4 years before she received kidney re-transplant in [place] in 2016.   The patient stated she usually gets UTI once a year with symptoms of dysuria and sometimes fever.      She moved to [place] in 7/2020. Lives with her  in [place], [place].      She underwent colposcopic biopsy in 9/2020. She was also diagnosed with uterine prolapse and was treated with pessary ring.   She was admitted 12/16/2020 with ESBL E coli bacteremia and UTI/pyelonephritis of the transplanted kidney. Treated with ertapenem for total of 14 days, last dose 12/31/2020.   The patient had been treated with nitrofurantoin with improvement of dysuria, one to two weeks prior to admission. Symptoms however, recurred few few days after last dose nitrofurantoin.               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 Both, very large, native kidneys reamain.     PONV (postoperative nausea and vomiting)     Need to start with ice chips and apple juice, no soda     S/P kidney transplant     SLK 10/9/2010 - kidney failure 2/2 [facility]. Explanted . Re-transplant after de-sensitization 2016     S/P liver transplant (H)     10/9/2010 - [facility], ischemic biopathy. Re-transplant 3/3/2011     S/P splenectomy      Spider veins      Thyroid disease     Hyperthyroid treated with single dose iodine             Past Surgical History:     Past Surgical History:   Procedure Laterality Date     bunectomy  2018    right foot     bunionectomy  2019    left     CHOLECYSTECTOMY       CONIZATION N/A 2020    Procedure: CONE BIOPSY, CERVIX;  Surgeon: Daysi Perez MD;  Location: [facility] OR     FAILED PICC - RIGHT ARM Right 2020    Has a LUE AV fistula.     HERNIA REPAIR, INCISIONAL  2013     IR DIALYSIS PTA CENTRAL SEG  2020     IR PICC PLACEMENT > 5 YRS OF AGE  2020     SPLENECTOMY      Splenectomy     TRANSPLANT KIDNEY RECIPIENT  DONOR      re-transplant after [facility]; 6 months de-sensitization prior AND 6 months after transplant     TRANSPLANT LIVER RECIPIENT  DONOR  2011     TRANSPLANT LIVER, KIDNEY RECIPIENT  DONOR, COMBINED  10/09/2010    [facility]AT - re-Tx liver 3/3/2011; Kidney (left iliac) lost to [facility]/fibrosis - explant     VASCULAR SURGERY      Vascular access left UE. Not used for 4 years since 2nd kidney tx - [facility] [place]                Social History:     Social History     Tobacco Use     Smoking status: Never Smoker     Smokeless tobacco: Never Used   Substance Use Topics     Alcohol use: Not Currently             Family History:     Family History   Problem Relation Age of Onset     Uterine Cancer Maternal Grandmother      Polycystic Kidney Diease Brother      Polycystic Kidney Diease Brother      Polycystic Kidney Diease Brother      Polycystic Kidney Diease Brother      Cervical Cancer Maternal Aunt              Immunizations:     Immunization History   Administered Date(s) Administered     DT (PEDS <7y) 03/19/2001     FLU 6-35 months 11/15/2002, 11/18/2003, 10/26/2004, 10/24/2005, 11/16/2006     Flu, Unspecified 10/15/2020     HepB, Unspecified 06/28/2001     Influenza,INJ,MDCK,PF,Quad >4yrs 10/15/2020     Pneumococcal 23 valent 09/27/2002     Zoster vaccine recombinant adjuvanted (SHINGRIX) 10/15/2020             Allergies:     Allergies   Allergen Reactions     Ibuprofen      Due to liver transplant             Medications:     Current Outpatient Medications   Medication Sig     acetaminophen (TYLENOL) 325 MG tablet Take 1 tablet (325 mg) by mouth every 4 hours as needed for mild pain or fever     ALPRAZolam (XANAX) 0.25 MG tablet Take 0.25 mg by mouth as needed for anxiety     aspirin (ASA) 81 MG chewable tablet Take 81 mg by mouth every evening Stopped 7 days preop     biotin 1000 MCG TABS tablet Take 3,000 mcg by mouth daily     diphenhydrAMINE (BENADRYL) 25 MG tablet Take 50 mg by mouth as needed for allergies      docusate sodium (COLACE) 100 MG capsule Take 200 mg by mouth every morning      estradiol (ESTRACE) 0.1 MG/GM vaginal cream Place 1 g vaginally twice a week     loperamide (IMODIUM A-D) 2 MG tablet Take 1 tablet by mouth as needed for diarrhea      loratadine (CLARITIN) 10 MG tablet Take 10 mg by mouth every evening      Multiple Vitamins-Minerals (WOMENS DAILY FORMULA PO) Take 1 tablet by mouth daily     MYFORTIC (BRAND)  180 MG EC tablet Take 3 tablets (540 mg) by mouth 2 times daily     nitroGLYcerin (NITROSTAT) 0.4 MG sublingual tablet Place 0.4 mg under the tongue as needed for chest pain (Hasn't used for a while) For chest pain place 1 tablet under the tongue every 5 minutes for 3 doses. If symptoms persist 5 minutes after 1st dose call 911.      ondansetron (ZOFRAN) 4 MG tablet Take by mouth as needed for nausea     pantoprazole (PROTONIX) 40 MG EC tablet Take 1 tablet by mouth as needed (reflux)      predniSONE (DELTASONE) 5 MG tablet Take 7.5 mg by mouth every morning      Probiotic Product (PROBIOTIC PO) Take 2 tablets by mouth every morning      Psyllium (METAMUCIL PO) Take by mouth every morning Hold 9-30-20     sulfamethoxazole-trimethoprim (BACTRIM) 400-80 MG tablet Take 1 tablet by mouth Every Mon, Wed, Fri Morning     tacrolimus (GENERIC EQUIVALENT) 1 MG capsule Take 2 capsules (2 mg) by mouth 2 times daily     temazepam (RESTORIL) 15 MG capsule Take 15 mg by mouth as needed for sleep (twice a year)      Vitamin D, Cholecalciferol, 25 MCG (1000 UT) CAPS Take 1,000 Units by mouth D3     Vitamin D3 (CHOLECALCIFEROL) 25 mcg (1000 units) tablet Take 1 tablet (25 mcg) by mouth every morning     No current facility-administered medications for this visit.             Review of Systems:     As mentioned in the interim history otherwise negative by reviewing constitutional symptoms, central and peripheral neurological systems, respiratory system, cardiac system, GI system,  system, musculoskeletal, skin, allergy, and lymphatics.                  Physical Exam:   No vitals are available during this virtual visit.   Constitutional: awake, alert, cooperative, no apparent distress and appears at stated age, well nourished. Did not look pail or short of breath, speaking in complete sentences.            Laboratory Data:     No results found for: ACD4    Inflammatory Markers    Recent Labs   Lab Test 12/28/20  1515 12/21/20  1548    SED 8 10   CRP 13.0* 18.0*       Immune Globulin Studies    No lab results found.    Metabolic Studies    Recent Labs   Lab Test 01/04/21  0941 12/28/20  1515 12/28/20  0855 12/21/20  1545 12/19/20  1052 12/18/20  0611 12/16/20 2236 09/23/20  1653 09/23/20  1653    137 139 136 140 138 137   < >  --    POTASSIUM 3.5 4.2 4.6 4.5 4.1 3.8 4.2   < >  --    CHLORIDE 106 104 107 103 107 110* 106   < >  --    CO2 27 27 25 24 32 24 26   < >  --    ANIONGAP 5 6 7 9 <1* 3 5   < >  --    BUN 15 18 16 16 14 12 17   < >  --    CR 0.70 0.59 0.63 0.70 0.64 0.70 0.87   < >  --    GFRESTIMATED >90 >90 >90 >90 >90 >90 72   < >  --    GLC 79 105* 90 104* 86 98 89   < >  --    EDSON 9.6 10.2* 10.1 9.9 10.1 9.4 9.9   < >  --    PHOS  --   --   --   --   --   --   --   --  3.4   LACT  --   --   --   --   --   --  0.8  --   --     < > = values in this interval not displayed.       Hepatic Studies    Recent Labs   Lab Test 01/04/21  0941 01/04/21  0932 12/28/20  1515 12/28/20  0855 12/21/20  1545 12/19/20  1052   BILITOTAL 0.7 0.7 0.6 0.7 0.5 0.5   ALKPHOS 77 82 77 85 68 62   PROTTOTAL 6.8 6.7* 7.4 7.7 7.4 6.9   ALBUMIN 3.3* 3.2* 3.4 3.5 3.4 3.0*   AST 20 23 15 35 53* 12   ALT 24 24 34 37 47 17       Hematology Studies     Recent Labs   Lab Test 12/28/20  1515 12/28/20  0855 12/21/20  1545 12/18/20  0611 12/16/20  2236 12/10/20  0839   WBC 9.2 8.8 6.2 11.5* 11.4* 6.1   ANEU 6.4  --  3.9  --  9.3*  --    ALYM 2.1  --  1.7  --  1.0  --    PABLO 0.6  --  0.6  --  1.0  --    AEOS 0.0  --  0.0  --  0.0  --    HGB 16.6* 17.4* 16.2* 14.9 16.9* 16.8*   HCT 50.8* 53.6* 49.2* 47.3* 54.5* 51.1*    247 261 222 260 249       Clotting Studies  No lab results found.    Urine Studies    Recent Labs   Lab Test 01/04/21  0932 12/16/20 2236 12/03/20  0927 12/03/20  0915   URINEPH 7.0 7.5* 7.0 7.0   NITRITE Negative Negative Negative Positive*   LEUKEST Negative Large* Large* Large*   WBCU  --  466*  --  >182*         Microbiology:  Last 6  Culture results with specimen source  Culture Micro   Date Value Ref Range Status   12/17/2020 No growth  Final   12/16/2020 (A)  Final    Cultured on the 1st day of incubation:  Escherichia coli ESBL  ESBL (extended beta lactamase) producing organisms require contact precautions.     12/16/2020   Final    Critical Value/Significant Value, preliminary result only, called to and read back by  RALPH BOLAÑOS RN 12.17.2020 1056 BC     12/16/2020   Final    (Note)  POSITIVE for E.COLI by Verigene multiplex nucleic acid test. Final  identification and antimicrobial susceptibility testing will be  verified by standard methods. Verigene test will not distinguish  E.coli from Shigella species including S.dysenteriae, S.flexneri,  S.boydii, and S.sonnei. Specimens containing Shigella species or  E.coli will be reported as Positive for E.coli.    POSITIVE for CTX-M Class A Extended Spectrum beta-lactamase (ESBL)  resistance marker by Verigene multiplex nucleic acid test. CTX-M  confers resistance to penicillins, cephalosporins and variable  resistance to beta-lactam beta-lactamase inhibitor combinations  (clavulanic acid and tazobactam). Best empiric antibiotic choice is  meropenem. Specific susceptibility testing will be performed.    Specimen tested with Verigene multiplex, gram-negative blood culture  nucleic acid test for the following targets: Acinetobacter sp.,  Citrobacter sp., Enterobacter sp., Proteus sp., E. coli, K.  pneumoniae/oxytoca, P. aeruginosa, and the following resistance  markers: CTXM, KPC, NDM, VIM, IMP and OXA.    Critical Value/Significant Value called to and read back by RALPH BOLAÑOS RN 12/17/20 1312 EH.       12/16/2020 No growth  Final   12/16/2020 (A)  Preliminary    >100,000 colonies/mL  Escherichia coli ESBL  ESBL (extended beta lactamase) producing organisms require contact precautions.     12/16/2020   Preliminary    Susceptibility testing requested by  Dr. Gonzales, Pager [phone]. Fosfomycin  requested. @ 1637. 12.18.20. BS.       Specimen Description   Date Value Ref Range Status   12/17/2020 Blood  Final   12/16/2020 Blood Right Arm  Final   12/16/2020 Blood Right Arm  Final   12/16/2020 Unspecified Urine  Final   12/03/2020 Midstream Urine  Final        Last check of C difficile  No results found for: CDBPCT      Virology:  CMV viral loads    CMV viral loads  No results found for: 43311, 58248, 96768, 14165, CMVQAL  CMV viral loads    Recent Labs   Lab Test 12/18/20  0611 09/23/20  1653   CSPEC Plasma Plasma   CMVLOG Not Calculated Not Calculated       CMV viral loads    Log IU/mL of CMVQNT   Date Value Ref Range Status   12/18/2020 Not Calculated <2.1 [Log_IU]/mL Final   09/23/2020 Not Calculated <2.1 [Log_IU]/mL Final       CMV resistance testing  No lab results found.  No results found for: CMVCID, CMVFOS, CMVGAN     EBV viral loads   No lab results found.  No results found for: EBVDN, EBRES, EBVDN, EBVSP, EBVPC, EBVPCR    Human Herpes Virus 6 viral loads    No results found for: H6RES No results found for: H6SPEC    CMV Antibody IgG   Date Value Ref Range Status   01/04/2021 >8.0 (H) 0.0 - 0.8 AI Final     Comment:     Positive  Antibody index (AI) values reflect qualitative changes in antibody   concentration that cannot be directly associated with clinical condition or   disease state.       No results found for: EBIG2, EBIGM, EBVIGG, EBIGG, EBVAGN, NW5322, TOXG    Imaging:  No new imaging.     Belen Sharma is a 60 year old female who is being evaluated via a billable video visit.      How would you like to obtain your AVS? Mail a copy  If the video visit is dropped, the invitation should be resent by: Text to cell phone: [phone]  Will anyone else be joining your video visit? No      Video Start Time: 3:00  Assessment & Plan   Problem List Items Addressed This Visit     None      Visit Diagnoses     Acute pyelonephritis    -  Primary    Relevant Orders    UA with Microscopic reflex to  Culture           Review of the result(s) of each unique test - UA and urine cultures    Independent interpretation of a test performed by another physician/other qualified health care professional (not separately reported) - UA.     Discussion of management or test interpretation with external physician/other qualified healthcare professional/appropriate source - communicated through this note.       60 minutes spent on the date of the encounter doing chart review, history and exam, documentation and further activities as noted above           See Patient Instructions    No follow-ups on file.    Catrachita Gonzales MD  [facility]  See separate note.         Video-Visit Details    Type of service:  Video Visit    Video End Time:3:29    Originating Location (pt. Location): Home    Distant Location (provider location):  [facility]     Platform used for Video Visit: Calm

## 2021-01-05 NOTE — LETTER
1/5/2021       RE: Belen Sharma  [address] [address]     Dear Colleague,    Thank you for referring your patient, Belen Sharma, to the [facility] INFECTIOUS DISEASE CLINIC [place] at [facility]. Please see a copy of my visit note below.    [facility]    Transplant Infectious Diseases Outpatient Progress note      Patient:  Belen Sharma, Date of birth 1960, Medical record number [id number]  Date of Visit:  01/05/2021         Recommendations:   1. UA with reflex to Ucx when symptomatic.   2. Tdap in 8 years.   3. Get the second dose of the shingrix vaccine.   4. COVID-19 vaccine with either Moderna or Pfizer whenever either one is available.   4. Other questions answered to the best of my ability.     RTC: as needed.         Summary of Presentation:   Transplants:  2/2/2016 (Kidney), 3/3/2011 (Liver), 10/9/2010 (Kidney / Liver), Postoperative day:  3596 (Liver), 1799 (Kidney)     This patient is a 60 year old female PCKD and congenital hepatic fibrosis s/p kidney/liver in 2010 both failed and required retransplant. Currently on TAC/mycophenolic acid/prednisone.   Recent ESBL E coli BSI in 12/2020 and UTI/pyelonephritis of kidney. Finished total therapy of 14 days on 12/31/2020.         Active Problems and Infectious Diseases Issues:   1. Recent ESBL E coli BSI with UTI/pyelonephritis of allograft   Finished total of 2 weeks of ertapenem on 12/31/2020.   The ESBL E coli is also susceptible to fosfomycin.  At this point there is no indications for further antimicrobial therapy.   I will place a standing order in the chart for UA with reflex to Ucx when symptomatic.   The patient was counseled about her increased risk for UTI given gender, history of kidney transplant, Uterine prolapse, etc.... I recommended against suppressive ABx therapy at this point.     2. Counseling.   Answered questions about COVID-19 vaccines,  vitamin D supplement and probiotics.         Old Problems and Infectious Diseases Issues:   1. UTI with E coli.  2. CDI     Other Infectious Disease issues include:  - PCP prophylaxis: bactrim  - Serostatus: ?   -  Immunization status: Tdap in 8 years. Get the second dose of the shingrix vaccine, and obtain COVID-19 vaccine with either Moderna or Pfizer whenever either one is available.       Attestation:  I interviewed the patient and obtained history from the patient, and by reviewing the patient's chart including outside records, microbiological data, and radiological data. All data are summarized in this notes.  Catrachita Gonzales MD  Contact information available via [facility] Paging/Directory     01/05/2021         Interim History:   The patient finished IV ertapenem on 12/31/2020 as planned and the PICC line was removed.   So far no fever, dysuria, urgency, or abdominal pain, but it's too soon.   The patient had many questions regarding COVID-19 vaccines, recurrent UTI, vitamin D and probiotics.   No other complaints.         HPI:       Transplants:  2/2/2016 (Kidney), 3/3/2011 (Liver), 10/9/2010 (Kidney / Liver); Postoperative day:  3596 (Liver), 1799 (Kidney)    This patient is a 60 year old female with history of congenital hepatic fibrosis and PCKD s/p liver and kidney transplant in [place] in 2010, they both failed, first the liver apparently due to anatomic abnormalities and required liver re-transplant within few months in 2011, then the kidney failed and needed HD through LUE AVF for 4 years before she received kidney re-transplant in [place] in 2016.   The patient stated she usually gets UTI once a year with symptoms of dysuria and sometimes fever.      She moved to [place] in 7/2020. Lives with her  in [place], [place].      She underwent colposcopic biopsy in 9/2020. She was also diagnosed with uterine prolapse and was treated with pessary ring.   She was admitted 12/16/2020 with ESBL E coli bacteremia and  UTI/pyelonephritis of the transplanted kidney. Treated with ertapenem for total of 14 days, last dose 2020.   The patient had been treated with nitrofurantoin with improvement of dysuria, one to two weeks prior to admission. Symptoms however, recurred few few days after last dose nitrofurantoin.              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 Both, very large, native kidneys reamain.     PONV (postoperative nausea and vomiting)     Need to start with ice chips and apple juice, no soda     S/P kidney transplant     SLK 10/9/2010 - kidney failure 2/2 [facility]. Explanted . Re-transplant after de-sensitization 2016     S/P liver transplant (H)     10/9/2010 - [facility], ischemic biopathy. Re-transplant 3/3/2011     S/P splenectomy      Spider veins      Thyroid disease     Hyperthyroid treated with single dose iodine             Past Surgical History:     Past Surgical History:   Procedure Laterality Date     bunectomy  2018    right foot     bunionectomy  2019    left     CHOLECYSTECTOMY       CONIZATION N/A 2020    Procedure: CONE BIOPSY, CERVIX;  Surgeon: Daysi Perez MD;  Location: [facility] OR     FAILED PICC - RIGHT ARM Right 2020    Has a LUE AV fistula.     HERNIA REPAIR, INCISIONAL  2013     IR DIALYSIS PTA CENTRAL SEG  2020     IR PICC PLACEMENT > 5 YRS OF AGE  2020     SPLENECTOMY      Splenectomy     TRANSPLANT KIDNEY RECIPIENT  DONOR      re-transplant after [facility]; 6 months de-sensitization prior AND 6 months after transplant     TRANSPLANT LIVER  RECIPIENT  DONOR  2011     TRANSPLANT LIVER, KIDNEY RECIPIENT  DONOR, COMBINED  10/09/2010    [facility] - re-Tx liver 3/3/2011; Kidney (left iliac) lost to [facility]/fibrosis - explant     VASCULAR SURGERY      Vascular access left UE. Not used for 4 years since 2nd kidney tx 2016 [place]               Social History:     Social History     Tobacco Use     Smoking status: Never Smoker     Smokeless tobacco: Never Used   Substance Use Topics     Alcohol use: Not Currently             Family History:     Family History   Problem Relation Age of Onset     Uterine Cancer Maternal Grandmother      Polycystic Kidney Diease Brother      Polycystic Kidney Diease Brother      Polycystic Kidney Diease Brother      Polycystic Kidney Diease Brother      Cervical Cancer Maternal Aunt              Immunizations:     Immunization History   Administered Date(s) Administered     DT (PEDS <7y) 2001     FLU 6-35 months 11/15/2002, 2003, 10/26/2004, 10/24/2005, 2006     Flu, Unspecified 10/15/2020     HepB, Unspecified 2001     Influenza,INJ,MDCK,PF,Quad >4yrs 10/15/2020     Pneumococcal 23 valent 2002     Zoster vaccine recombinant adjuvanted (SHINGRIX) 10/15/2020             Allergies:     Allergies   Allergen Reactions     Ibuprofen      Due to liver transplant             Medications:     Current Outpatient Medications   Medication Sig     acetaminophen (TYLENOL) 325 MG tablet Take 1 tablet (325 mg) by mouth every 4 hours as needed for mild pain or fever     ALPRAZolam (XANAX) 0.25 MG tablet Take 0.25 mg by mouth as needed for anxiety     aspirin (ASA) 81 MG chewable tablet Take 81 mg by mouth every evening Stopped 7 days preop     biotin 1000 MCG TABS tablet Take 3,000 mcg by mouth daily     diphenhydrAMINE (BENADRYL) 25 MG tablet Take 50 mg by mouth as needed for allergies      docusate sodium (COLACE) 100 MG capsule Take 200 mg by mouth every morning      estradiol (ESTRACE) 0.1  MG/GM vaginal cream Place 1 g vaginally twice a week     loperamide (IMODIUM A-D) 2 MG tablet Take 1 tablet by mouth as needed for diarrhea      loratadine (CLARITIN) 10 MG tablet Take 10 mg by mouth every evening      Multiple Vitamins-Minerals (WOMENS DAILY FORMULA PO) Take 1 tablet by mouth daily     MYFORTIC (BRAND) 180 MG EC tablet Take 3 tablets (540 mg) by mouth 2 times daily     nitroGLYcerin (NITROSTAT) 0.4 MG sublingual tablet Place 0.4 mg under the tongue as needed for chest pain (Hasn't used for a while) For chest pain place 1 tablet under the tongue every 5 minutes for 3 doses. If symptoms persist 5 minutes after 1st dose call 911.      ondansetron (ZOFRAN) 4 MG tablet Take by mouth as needed for nausea     pantoprazole (PROTONIX) 40 MG EC tablet Take 1 tablet by mouth as needed (reflux)      predniSONE (DELTASONE) 5 MG tablet Take 7.5 mg by mouth every morning      Probiotic Product (PROBIOTIC PO) Take 2 tablets by mouth every morning      Psyllium (METAMUCIL PO) Take by mouth every morning Hold 9-30-20     sulfamethoxazole-trimethoprim (BACTRIM) 400-80 MG tablet Take 1 tablet by mouth Every Mon, Wed, Fri Morning     tacrolimus (GENERIC EQUIVALENT) 1 MG capsule Take 2 capsules (2 mg) by mouth 2 times daily     temazepam (RESTORIL) 15 MG capsule Take 15 mg by mouth as needed for sleep (twice a year)      Vitamin D, Cholecalciferol, 25 MCG (1000 UT) CAPS Take 1,000 Units by mouth D3     Vitamin D3 (CHOLECALCIFEROL) 25 mcg (1000 units) tablet Take 1 tablet (25 mcg) by mouth every morning     No current facility-administered medications for this visit.             Review of Systems:     As mentioned in the interim history otherwise negative by reviewing constitutional symptoms, central and peripheral neurological systems, respiratory system, cardiac system, GI system,  system, musculoskeletal, skin, allergy, and lymphatics.                  Physical Exam:   No vitals are available during this virtual  visit.   Constitutional: awake, alert, cooperative, no apparent distress and appears at stated age, well nourished. Did not look pail or short of breath, speaking in complete sentences.            Laboratory Data:     No results found for: ACD4    Inflammatory Markers    Recent Labs   Lab Test 12/28/20  1515 12/21/20  1545   SED 8 10   CRP 13.0* 18.0*       Immune Globulin Studies    No lab results found.    Metabolic Studies    Recent Labs   Lab Test 01/04/21  0941 12/28/20  1515 12/28/20  0855 12/21/20  1545 12/19/20  1052 12/18/20  0611 12/16/20  2236 09/23/20  1653 09/23/20  1653    137 139 136 140 138 137   < >  --    POTASSIUM 3.5 4.2 4.6 4.5 4.1 3.8 4.2   < >  --    CHLORIDE 106 104 107 103 107 110* 106   < >  --    CO2 27 27 25 24 32 24 26   < >  --    ANIONGAP 5 6 7 9 <1* 3 5   < >  --    BUN 15 18 16 16 14 12 17   < >  --    CR 0.70 0.59 0.63 0.70 0.64 0.70 0.87   < >  --    GFRESTIMATED >90 >90 >90 >90 >90 >90 72   < >  --    GLC 79 105* 90 104* 86 98 89   < >  --    EDSON 9.6 10.2* 10.1 9.9 10.1 9.4 9.9   < >  --    PHOS  --   --   --   --   --   --   --   --  3.4   LACT  --   --   --   --   --   --  0.8  --   --     < > = values in this interval not displayed.       Hepatic Studies    Recent Labs   Lab Test 01/04/21  0941 01/04/21  0932 12/28/20  1515 12/28/20  0855 12/21/20  1545 12/19/20  1052   BILITOTAL 0.7 0.7 0.6 0.7 0.5 0.5   ALKPHOS 77 82 77 85 68 62   PROTTOTAL 6.8 6.7* 7.4 7.7 7.4 6.9   ALBUMIN 3.3* 3.2* 3.4 3.5 3.4 3.0*   AST 20 23 15 35 53* 12   ALT 24 24 34 37 47 17       Hematology Studies     Recent Labs   Lab Test 12/28/20  1515 12/28/20  0855 12/21/20  1545 12/18/20  0611 12/16/20  2236 12/10/20  0839   WBC 9.2 8.8 6.2 11.5* 11.4* 6.1   ANEU 6.4  --  3.9  --  9.3*  --    ALYM 2.1  --  1.7  --  1.0  --    PABLO 0.6  --  0.6  --  1.0  --    AEOS 0.0  --  0.0  --  0.0  --    HGB 16.6* 17.4* 16.2* 14.9 16.9* 16.8*   HCT 50.8* 53.6* 49.2* 47.3* 54.5* 51.1*    247 261 222 260 249        Clotting Studies  No lab results found.    Urine Studies    Recent Labs   Lab Test 01/04/21  0932 12/16/20  2236 12/03/20  0927 12/03/20  0915   URINEPH 7.0 7.5* 7.0 7.0   NITRITE Negative Negative Negative Positive*   LEUKEST Negative Large* Large* Large*   WBCU  --  466*  --  >182*         Microbiology:  Last 6 Culture results with specimen source  Culture Micro   Date Value Ref Range Status   12/17/2020 No growth  Final   12/16/2020 (A)  Final    Cultured on the 1st day of incubation:  Escherichia coli ESBL  ESBL (extended beta lactamase) producing organisms require contact precautions.     12/16/2020   Final    Critical Value/Significant Value, preliminary result only, called to and read back by  RALPH BOLAÑOS RN 12.17.2020 1056 BC     12/16/2020   Final    (Note)  POSITIVE for E.COLI by Verigene multiplex nucleic acid test. Final  identification and antimicrobial susceptibility testing will be  verified by standard methods. Verigene test will not distinguish  E.coli from Shigella species including S.dysenteriae, S.flexneri,  S.boydii, and S.sonnei. Specimens containing Shigella species or  E.coli will be reported as Positive for E.coli.    POSITIVE for CTX-M Class A Extended Spectrum beta-lactamase (ESBL)  resistance marker by Verigene multiplex nucleic acid test. CTX-M  confers resistance to penicillins, cephalosporins and variable  resistance to beta-lactam beta-lactamase inhibitor combinations  (clavulanic acid and tazobactam). Best empiric antibiotic choice is  meropenem. Specific susceptibility testing will be performed.    Specimen tested with Verigene multiplex, gram-negative blood culture  nucleic acid test for the following targets: Acinetobacter sp.,  Citrobacter sp., Enterobacter sp., Proteus sp., E. coli, K.  pneumoniae/oxytoca, P. aeruginosa, and the following resistance  markers: CTXM, KPC, NDM, VIM, IMP and OXA.    Critical Value/Significant Value called to and read back by RALPH BOLAÑOS RN  12/17/20 1312 EH.       12/16/2020 No growth  Final   12/16/2020 (A)  Preliminary    >100,000 colonies/mL  Escherichia coli ESBL  ESBL (extended beta lactamase) producing organisms require contact precautions.     12/16/2020   Preliminary    Susceptibility testing requested by  Dr. Gonzales, Pager [phone]. Fosfomycin requested. @ 1637. 12.18.20. BS.       Specimen Description   Date Value Ref Range Status   12/17/2020 Blood  Final   12/16/2020 Blood Right Arm  Final   12/16/2020 Blood Right Arm  Final   12/16/2020 Unspecified Urine  Final   12/03/2020 Midstream Urine  Final        Last check of C difficile  No results found for: CDBPCT      Virology:  CMV viral loads    CMV viral loads  No results found for: 01414, 54763, 06342, 13140, CMVQAL  CMV viral loads    Recent Labs   Lab Test 12/18/20  0611 09/23/20  1653   CSPEC Plasma Plasma   CMVLOG Not Calculated Not Calculated       CMV viral loads    Log IU/mL of CMVQNT   Date Value Ref Range Status   12/18/2020 Not Calculated <2.1 [Log_IU]/mL Final   09/23/2020 Not Calculated <2.1 [Log_IU]/mL Final       CMV resistance testing  No lab results found.  No results found for: CMVCID, CMVFOS, CMVGAN     EBV viral loads   No lab results found.  No results found for: EBVDN, EBRES, EBVDN, EBVSP, EBVPC, EBVPCR    Human Herpes Virus 6 viral loads    No results found for: H6RES No results found for: H6SPEC    CMV Antibody IgG   Date Value Ref Range Status   01/04/2021 >8.0 (H) 0.0 - 0.8 AI Final     Comment:     Positive  Antibody index (AI) values reflect qualitative changes in antibody   concentration that cannot be directly associated with clinical condition or   disease state.       No results found for: EBIG2, EBIGM, EBVIGG, EBIGG, EBVAGN, DW8853, TOXG    Imaging:  No new imaging.     Belen Sharma is a 60 year old female who is being evaluated via a billable video visit.      How would you like to obtain your AVS? Mail a copy  If the video visit is dropped, the invitation  should be resent by: Text to cell phone: [phone]  Will anyone else be joining your video visit? No      Video Start Time: 3:00  Assessment & Plan   Problem List Items Addressed This Visit     None      Visit Diagnoses     Acute pyelonephritis    -  Primary    Relevant Orders    UA with Microscopic reflex to Culture           Review of the result(s) of each unique test - UA and urine cultures    Independent interpretation of a test performed by another physician/other qualified health care professional (not separately reported) - UA.     Discussion of management or test interpretation with external physician/other qualified healthcare professional/appropriate source - communicated through this note.       60 minutes spent on the date of the encounter doing chart review, history and exam, documentation and further activities as noted above           See Patient Instructions    No follow-ups on file.    Catrachita Gonzales MD  [facility] INFECTIOUS DISEASE [facility]  See separate note.         Video-Visit Details    Type of service:  Video Visit    Video End Time:3:29    Originating Location (pt. Location): Home    Distant Location (provider location):  [facility] INFECTIOUS DISEASE [facility]     Platform used for Video Visit: Maddison      Again, thank you for allowing me to participate in the care of your patient.      Sincerely,    Catrachita Gonzales MD

## 2021-01-06 ENCOUNTER — OFFICE VISIT (OUTPATIENT)
Dept: UROLOGY | Facility: CLINIC | Age: 61
End: 2021-01-06
Attending: OBSTETRICS & GYNECOLOGY
Payer: MEDICARE

## 2021-01-06 VITALS
SYSTOLIC BLOOD PRESSURE: 114 MMHG | BODY MASS INDEX: 21.52 KG/M2 | DIASTOLIC BLOOD PRESSURE: 64 MMHG | WEIGHT: 150 LBS | HEART RATE: 89 BPM

## 2021-01-06 DIAGNOSIS — N95.2 VAGINAL ATROPHY: ICD-10-CM

## 2021-01-06 DIAGNOSIS — N81.6 RECTOCELE: ICD-10-CM

## 2021-01-06 DIAGNOSIS — Z13.220 LIPID SCREENING: ICD-10-CM

## 2021-01-06 DIAGNOSIS — N81.11 CYSTOCELE, MIDLINE: Primary | ICD-10-CM

## 2021-01-06 DIAGNOSIS — Z94.4 LIVER REPLACED BY TRANSPLANT (H): ICD-10-CM

## 2021-01-06 PROCEDURE — 99213 OFFICE O/P EST LOW 20 MIN: CPT | Performed by: OBSTETRICS & GYNECOLOGY

## 2021-01-06 ASSESSMENT — PAIN SCALES - GENERAL: PAINLEVEL: NO PAIN (0)

## 2021-01-06 NOTE — PROGRESS NOTES
This is a recent snapshot of the patient's [facility] medical record.  For current drug dose and complete information and questions, call [phone] or In Basket pool, fv home infusion ([id number])  CSN Number:  [id number]

## 2021-01-06 NOTE — PROGRESS NOTES
January 6, 2021    Return visit    Patient returns today for f/u of tannish vaginal discharge. She recently had E.coli sepsis. Oerall she feels that the pessary is working for her prolapse, but she is concerned about the discharge.     /64   Pulse 89   Wt 68 kg (150 lb)   BMI 21.52 kg/m    She is comfortable, in no distress, non-labored breathing.  Abdomen is soft, non-tender, non-distended.  Normal external female genitalia.  Speculum and bimanual exam are remarkable for normal appearing vaginal mucosa with minimal white discharge.    A/P: 60 year old F with cysotcele    Continue pessary with use of estrogen cream    A total of 20 minutes were spent with the patient today, > 50% in counseling and coordination of care    Zach Francis MD  Professor, OB/GYN  Urogynecologist    CC  Patient Care Team:  Vincent De La Garza MD as PCP - General (Internal Medicine)  Vincent De La Garza MD as Assigned PCP  Christy Mohamud MD as MD (Cardiology)  Divya Chávez RN as Registered Nurse (Transplant)  Елена Dinh RN as Registered Nurse  Leventhal, Thomas Michael, MD as Assigned Surgical Provider  Leventhal, Thomas Michael, MD as Assigned Gastroenterology Provider  Zach Francis MD as Assigned OBGYN Provider  ARISTIDES AMIN

## 2021-01-06 NOTE — LETTER
1/6/2021       RE: Belen Sharma  [address] [address]     Dear Colleague,    Thank you for referring your patient, Belen Sharma, to the [facility] WOMEN'S CLINIC [place] at [facility]. Please see a copy of my visit note below.    January 6, 2021    Return visit    Patient returns today for f/u of tannish vaginal discharge. She recently had E.coli sepsis. Oerall she feels that the pessary is working for her prolapse, but she is concerned about the discharge.     /64   Pulse 89   Wt 68 kg (150 lb)   BMI 21.52 kg/m    She is comfortable, in no distress, non-labored breathing.  Abdomen is soft, non-tender, non-distended.  Normal external female genitalia.  Speculum and bimanual exam are remarkable for normal appearing vaginal mucosa with minimal white discharge.    A/P: 60 year old F with cysotcele    Continue pessary with use of estrogen cream    A total of 20 minutes were spent with the patient today, > 50% in counseling and coordination of care    Zach Francis MD  Professor, OB/GYN  Urogynecologist    CC  Patient Care Team:  Vincent De La Garza MD as PCP - General (Internal Medicine)  Vincent De La Garza MD as Assigned PCP  Christy Mohamud MD as MD (Cardiology)  Divya Chávez, RN as Registered Nurse (Transplant)  Елена Dinh RN as Registered Nurse  Leventhal, Thomas Michael, MD as Assigned Surgical Provider  Leventhal, Thomas Michael, MD as Assigned Gastroenterology Provider  Zach Francis MD as Assigned OBGYN Provider  ARISTIDES AMIN

## 2021-01-06 NOTE — PROGRESS NOTES
This is a recent snapshot of the patient's [facility] medical record.  For current drug dose and complete information and questions, call [phone] or In Basket pool, fv home infusion ([id number])  CSN Number:  [id number]

## 2021-01-11 ENCOUNTER — MYC MEDICAL ADVICE (OUTPATIENT)
Dept: INTERNAL MEDICINE | Facility: CLINIC | Age: 61
End: 2021-01-11

## 2021-01-12 NOTE — PROGRESS NOTES
Discharge Note    Progress reporting period is from initial eval to Aug 26, 2020.     Belen failed to return for next follow up visit and current status is unknown.  Please see information below for last relevant information on current status.  Patient seen for 1 visits.  SUBJECTIVE  Subjective changes noted by patient:  See eval  .  Current pain level is 3/10.     Previous pain level was   .   Changes in function:  Yes (See Goal flowsheet attached for changes in current functional level)  Adverse reaction to treatment or activity: None    OBJECTIVE  Changes noted in objective findings: see eval     ASSESSMENT/PLAN      DIAGP:  The encounter diagnosis was Cystocele, midline.  Updated problem list and treatment plan:   Decreased function - HEP  STG/LTGs have been met or progress has been made towards goals:  Yes, please see goal flowsheet for most current information  Assessment of Progress: current status is unknown.    Last current status:     Self Management Plans:  HEP  I have re-evaluated this patient and find that the nature, scope, duration and intensity of the therapy is appropriate for the medical condition of the patient.  Belen continues to require the following intervention to meet STG and LTG's:  HEP.    Recommendations:  Discharge with current home program.  Patient to follow up with MD as needed.    Please refer to the daily flowsheet for treatment today, total treatment time and time spent performing 1:1 timed codes.

## 2021-01-21 ENCOUNTER — TELEPHONE (OUTPATIENT)
Dept: INTERNAL MEDICINE | Facility: CLINIC | Age: 61
End: 2021-01-21

## 2021-01-21 NOTE — TELEPHONE ENCOUNTER
[facility] Call Center    Phone Message    May a detailed message be left on voicemail: yes     Reason for Call: Other: Requesting Face to Face notes. Four request was faxed to the clinic but no response. First one was faxed on 12/31/2020 and the last one was faxed today. Please call Sunita if there's any questions. Fax: [phone]      Patient was discharged already but Home Care can't bill patient until they receive these notes.     Action Taken: Message routed to:  [facility] P[facility]    Travel Screening: Not Applicable

## 2021-01-22 NOTE — TELEPHONE ENCOUNTER
Home infusion referral order placed by resident Dr. Ma on 12/16/20 when pt was at the hospital.     Jose Eduardo Berg CMA ([facility]) at 9:53 AM on 1/22/2021

## 2021-01-22 NOTE — CONFIDENTIAL NOTE
Spoke to Sunita at Home care and advised her that Dr. De La Garza nor Lashaun Thapa have seen Belen for a face to face visit.  She was a hospital follow up with no mention of home care services at all.  Dr. De La Garza has not ordered any home care either, so we are not able to sign any face to face form from Home care until she is seen and then it would be only for future home care only, nothing in the past.    Suggested she try Nephrology, number given to Sunita.

## 2021-01-27 ENCOUNTER — VIRTUAL VISIT (OUTPATIENT)
Dept: PSYCHOLOGY | Facility: CLINIC | Age: 61
End: 2021-01-27
Payer: MEDICARE

## 2021-01-27 ENCOUNTER — VIRTUAL VISIT (OUTPATIENT)
Dept: PSYCHOLOGY | Facility: CLINIC | Age: 61
End: 2021-01-27
Attending: INTERNAL MEDICINE
Payer: MEDICARE

## 2021-01-27 DIAGNOSIS — F41.9 ANXIETY DISORDER, UNSPECIFIED TYPE: Primary | ICD-10-CM

## 2021-01-27 DIAGNOSIS — R45.89 ANXIETY ABOUT HEALTH: Primary | ICD-10-CM

## 2021-01-27 PROCEDURE — 90791 PSYCH DIAGNOSTIC EVALUATION: CPT | Mod: 52 | Performed by: SOCIAL WORKER

## 2021-01-27 PROCEDURE — 99207 PR NO BILLABLE SERVICE THIS VISIT: CPT | Mod: TEL | Performed by: PSYCHIATRY & NEUROLOGY

## 2021-01-27 RX ORDER — ALPRAZOLAM 0.25 MG
0.25 TABLET ORAL PRN
Qty: 10 TABLET | Refills: 0 | Status: SHIPPED | OUTPATIENT
Start: 2021-01-27 | End: 2022-04-28

## 2021-01-27 RX ORDER — TEMAZEPAM 15 MG/1
15 CAPSULE ORAL PRN
Qty: 15 CAPSULE | Refills: 0 | Status: SHIPPED | OUTPATIENT
Start: 2021-01-27 | End: 2024-05-01

## 2021-01-27 ASSESSMENT — ANXIETY QUESTIONNAIRES
2. NOT BEING ABLE TO STOP OR CONTROL WORRYING: SEVERAL DAYS
6. BECOMING EASILY ANNOYED OR IRRITABLE: NOT AT ALL
3. WORRYING TOO MUCH ABOUT DIFFERENT THINGS: SEVERAL DAYS
5. BEING SO RESTLESS THAT IT IS HARD TO SIT STILL: NOT AT ALL
7. FEELING AFRAID AS IF SOMETHING AWFUL MIGHT HAPPEN: SEVERAL DAYS
GAD7 TOTAL SCORE: 5
1. FEELING NERVOUS, ANXIOUS, OR ON EDGE: SEVERAL DAYS
IF YOU CHECKED OFF ANY PROBLEMS ON THIS QUESTIONNAIRE, HOW DIFFICULT HAVE THESE PROBLEMS MADE IT FOR YOU TO DO YOUR WORK, TAKE CARE OF THINGS AT HOME, OR GET ALONG WITH OTHER PEOPLE: SOMEWHAT DIFFICULT

## 2021-01-27 ASSESSMENT — PATIENT HEALTH QUESTIONNAIRE - PHQ9: 5. POOR APPETITE OR OVEREATING: SEVERAL DAYS

## 2021-01-27 NOTE — PROGRESS NOTES
"Belen is a 60 year old who is being evaluated via a billable telephone visit.      What phone number would you like to be contacted at? [phone]  How would you like to obtain your AVS? Elena                                                             Outpatient Psychiatric Evaluation- Standard  Adult    Name:  Belen Sharma  : 1960    Source of Referral:  Primary Care Provider: Vincent De La Garza MD   Current Psychotherapist: [place] [facility] [place]    Identifying Data:  Patient is a 60 year old  female  who presents for initial visit.  Patient attended the session alone. Patient prefers to be called Belen.    My Practice Policy was reviewed.     Chief Complaint:  \"I need prescriptions for alprazolam and temazepam.\"    HPI:Per DA by Nguyen Gallardo, [facility]:  Pt is new to [place] from [place] since July of last year.  Wanted to be close to daughter and son-in-law and other family that lives in the [place].  Pt has been on medication for anxiety and sleep PRN over the years.  Had a prescription for xanex but that  and for restoril for sleep as needed.  Mental health stems primarily from lots of medical issues over the years (kidney/liver transplants  and in 2016). Has had a foot surgery before leaving [place] and has dealt with a prolapse as well.  Has struggled with COVID in general due to her medical issues she cannot go out and do things.  Feels very isolated and worries often about getting COVID if she were to go out and with her  working out of the home.     works 2nd shift for the [employer].  One son lives with them as well and is helpful.     Does the client have any condition that is currently presenting as a potential to harm themselves or others (severe withdrawal, serious medical condition, severe emotional/behavioral problem)? No.  Proceed with assessment.     Review of Symptoms per patient report:  Depression:     No symptoms  Julieta:             No " Symptoms  Psychosis:       No Symptoms  Anxiety:           Excessive worry and not being able to go out and do anything.  Panic:              No symptoms  Post Traumatic Stress Disorder:  No Symptoms   Eating Disorder:          No Symptoms  ADD / ADHD:              No symptoms  Conduct Disorder:       No symptoms  Autism Spectrum Disorder:     No symptoms  Obsessive Compulsive Disorder:       No Symptoms     Sleep:  Varies with how she is doing medically and with concerns with COVID.     Caffeine:  Tobacco:none     Current alcohol use: none  Current drug use: none    Based on the information gathered by MELANY Walton, I do not feel Belen is in need of a psychiatric evaluation.  She reports that she has significant anxiety around medical situations, specifically Covid.  She moved here from [place] in July last year.  In [place], she was told that the psychiatrist needed to prescribe her alprazolam and temazepam.  She uses them very infrequently, she estimates that she uses less than 10 alprazolam per year, generally for medical procedures.  She also uses temazepam approximately once a month if she is having difficulty with sleep.    She denies having any history of psychiatric care.  She is interested in seeing a therapist due to her anxiety around Covid.    We did spend some time researching whether or not she is eligible for Covid vaccine at this point.  It does not appear that she is, but she will continue to monitor the [facility] website.    We agreed that she will return to her primary care provider for ongoing care.    She will not be charged for this visit.

## 2021-01-27 NOTE — Clinical Note
Vincent Church Cindy reports no significant psychiatric concerns.  She requested a referral, as her understanding was that psychiatrists were the only specialty able to prescribe benzodiazepines.  She is using very minimal amounts of alprazolam and temazepam, and seems to use them appropriately.  I would recommend continuing her current regimen.  She will be discharged from the [facility] Psychiatry service back to your care.    Please feel free to contact me with questions or concerns.  Thank you for the opportunity to be involved in the care of this patient.    Regards,  Mita Gamble MD  Collaborative Care Psychiatry  [facility]

## 2021-01-27 NOTE — PROGRESS NOTES
"PATIENT'S NAME: Belen Sharma  PREFERRED NAME: Belen  PREFERRED PRONOUNS:    MRN:   [id number]  :   1960   ACCT. NUMBER: [id number]  DATE OF SERVICE: 21  START TIME: 10:13a  END TIME: 10:41a    BRIEF ADULT DIAGNOSTIC ASSESSMENT    Belen Sharma is a 60 year old female who is being evaluated via a telephone visit.      The patient has been notified of the following:     \"We have found that certain health care needs can be provided without the need for a face to face visit.  This service lets us provide the care you need with a short phone conversation.      I will have full access to your [place] medical record during this entire phone call.   I will be taking notes for your medical record.     Since this is like an office visit, we will bill your insurance company for this service.  Please check with your medical insurance if this type of telephone visit/virtual care is covered.  You may be responsible for the cost of this service if insurance coverage is denied.      There are potential benefits and risks of telephone visits (e.g. limits to patient confidentiality) that differ from in-person visits.?  Confidentiality still applies for telephone services, and nobody will record the visit.  It is important to be in a quiet, private space that is free of distractions (including cell phone or other devices) during the visit.??     If during the course of the call I believe a telephone visit is not appropriate, you will not be charged for this service\"    Consent has been obtained for this service by care team member: yes.    Identifying Information:  Patient is a 60 year old, .  The pronoun use throughout this assessment reflects the patient's chosen pronoun.  Patient was referred for an assessment by primary care provider.  Patient attended the session alone.     Chief Complaint:   Pt is new to [place] from [place] since July of last year.  Wanted to be close to daughter and son-in-law and other [place]  Pt has been on medication for anxiety and sleep PRN over the years.  Had a prescription for xanex but that  and for restoril for sleep as needed.  Mental health stems primarily from lots of medical issues over the years (kidney/liver transplants  and in 2016). Has had a foot surgery before leaving [place] and has dealt with a prolapse as well.  Has struggled with COVID in general due to her medical issues she cannot go out and do things.  Feels very isolated and worries often about getting COVID if she were to go out and with her  working out of the home.     works 2nd shift for the [employer].  One son lives with them as well and is helpful.    Does the client have any condition that is currently presenting as a potential to harm themselves or others (severe withdrawal, serious medical condition, severe emotional/behavioral problem)? No.  Proceed with assessment.    Review of Symptoms per patient report:  Depression: No symptoms  Julieta:  No Symptoms  Psychosis: No Symptoms  Anxiety: Excessive worry and not being able to go out and do anything.  Panic:  No symptoms  Post Traumatic Stress Disorder:  No Symptoms   Eating Disorder: No Symptoms  ADD / ADHD:  No symptoms  Conduct Disorder: No symptoms  Autism Spectrum Disorder: No symptoms  Obsessive Compulsive Disorder: No Symptoms    Sleep:  Varies with how she is doing medically and with concerns with COVID.    Caffeine:  Tobacco:none    Current alcohol use: none  Current drug use: none    Rating Scales:  PHQ-9:   [facility] Follow-up to PHQ 2020   PHQ-9 9. Suicide Ideation past 2 weeks Not at all Not at all      GAD7:    YUE-7 SCORE 2021   Total Score 5     CGI:  First:  No data recorded  Most recent:  No data recorded    WHODAS:   WHODAS 2.0 Total Score 2021   Total Score 23   Total Score MyChart 23        CAGE:  No flowsheet data found.    CAGE-AID score  > 1 is a positive screen, suggesting further  discussion is needed to determine if evaluation for alcohol or substance abuse is appropriate.  A score > 2 is considered clinically significant, suggesting further evaluation of alcohol or substance-related problems is indicated.      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 Both, very large, native kidneys reamain.     PONV (postoperative nausea and vomiting)     Need to start with ice chips and apple juice, no soda     S/P kidney transplant     SLK 10/9/2010 - kidney failure 2/2 [facility]. Explanted 2012. Re-transplant after de-sensitization 2016     S/P liver transplant (H)     10/9/2010 - HAT, ischemic biopathy. Re-transplant 3/3/2011     S/P splenectomy      Spider veins 2019     Thyroid disease     Hyperthyroid treated with single dose iodine       Patient has received mental health services in the past: psychiatry with for transplants. .  Psychiatric Hospitalizations: None.  Patient denies a history of civil commitment. Currently, patient is not receiving other mental health services.  These include none.     Patient does not report a history of head injury / trauma / cognitive impairment / seizures.      Current Medications:  Current Outpatient Medications   Medication Sig Dispense Refill     acetaminophen (TYLENOL) 325 MG tablet Take 1 tablet (325 mg) by mouth every 4 hours as needed for mild pain or fever 120 tablet 1     ALPRAZolam (XANAX) 0.25 MG tablet Take 0.25 mg by mouth as needed for anxiety       aspirin (ASA) 81 MG chewable tablet Take 81 mg by mouth every evening Stopped 7 days preop       biotin 1000  MCG TABS tablet Take 3,000 mcg by mouth daily       diphenhydrAMINE (BENADRYL) 25 MG tablet Take 50 mg by mouth as needed for allergies        docusate sodium (COLACE) 100 MG capsule Take 200 mg by mouth every morning        estradiol (ESTRACE) 0.1 MG/GM vaginal cream Place 1 g vaginally twice a week 42.5 g 3     loperamide (IMODIUM A-D) 2 MG tablet Take 1 tablet by mouth as needed for diarrhea        loratadine (CLARITIN) 10 MG tablet Take 10 mg by mouth every evening        Multiple Vitamins-Minerals (WOMENS DAILY FORMULA PO) Take 1 tablet by mouth daily       MYFORTIC (BRAND) 180 MG EC tablet Take 3 tablets (540 mg) by mouth 2 times daily 540 tablet 3     nitroGLYcerin (NITROSTAT) 0.4 MG sublingual tablet Place 0.4 mg under the tongue as needed for chest pain (Hasn't used for a while) For chest pain place 1 tablet under the tongue every 5 minutes for 3 doses. If symptoms persist 5 minutes after 1st dose call 911.        ondansetron (ZOFRAN) 4 MG tablet Take by mouth as needed for nausea       pantoprazole (PROTONIX) 40 MG EC tablet Take 1 tablet by mouth as needed (reflux)        predniSONE (DELTASONE) 5 MG tablet Take 5 mg by mouth every morning        Probiotic Product (PROBIOTIC PO) Take 2 tablets by mouth every morning        Psyllium (METAMUCIL PO) Take by mouth every morning Hold 9-30-20       sulfamethoxazole-trimethoprim (BACTRIM) 400-80 MG tablet Take 1 tablet by mouth Every Mon, Wed, Fri Morning 45 tablet 3     tacrolimus (GENERIC EQUIVALENT) 1 MG capsule Take 2 capsules (2 mg) by mouth 2 times daily 360 capsule 3     temazepam (RESTORIL) 15 MG capsule Take 15 mg by mouth as needed for sleep (twice a year)        Vitamin D, Cholecalciferol, 25 MCG (1000 UT) CAPS Take 1,000 Units by mouth D3       Vitamin D3 (CHOLECALCIFEROL) 25 mcg (1000 units) tablet Take 1 tablet (25 mcg) by mouth every morning (Patient taking differently: Take 50 mcg by mouth every morning ) 30 tablet 3        Allergies:  Allergies  "  Allergen Reactions     Ibuprofen      Due to liver transplant       Family Psychiatric History:  Patient did report a family history of mental health concerns.     Family History     Problem (# of Occurrences) Relation (Name,Age of Onset)    Anxiety Disorder (2) Mother, Father    Cervical Cancer (1) Maternal Aunt    Depression (2) Mother, Father    Polycystic Kidney Diease (4) Brother, Brother, Brother, Brother    Uterine Cancer (1) Maternal Grandmother          Social/Family History:  Patient reported they grew up in [place], [place].  They were raised by biological parents. Patient reported that   childhood was \"good\".  Patient denies experiencing childhood abuse/neglect. Patient described their current relationships with family of origin as good.  Has 4 living siblings and gets along with them well.      The patient has been  2 times and has 2 children and  has 2 daughters from previous marriage.   described the relationship with   spouse as, \"good.\" Patient reported having few good friends.     Cultural influences and impact on patient's life structure, values, norms, and healthcare: denies. Patient identified their preferred language to be English. Patient reported they does not need the assistance of an  or other support involved in treatment.       Educational/Occupational History:  Patient reported   highest education level was some college. The patient did not serve in the .  Patient is currently disabled. Is on SSDI.  Doesn't get a whole lot due to not having a long work history.       Social History     Socioeconomic History     Marital status:      Spouse name: Not on file     Number of children: Not on file     Years of education: Not on file     Highest education level: Not on file   Occupational History     Not on file   Social Needs     Financial resource strain: Not on file     Food insecurity     Worry: Not on file     Inability: Not on file     Transportation " needs     Medical: Not on file     Non-medical: Not on file   Tobacco Use     Smoking status: Never Smoker     Smokeless tobacco: Never Used   Substance and Sexual Activity     Alcohol use: Not Currently     Drug use: Not Currently     Sexual activity: Not Currently   Lifestyle     Physical activity     Days per week: Not on file     Minutes per session: Not on file     Stress: Not on file   Relationships     Social connections     Talks on phone: Not on file     Gets together: Not on file     Attends Sikhism service: Not on file     Active member of club or organization: Not on file     Attends meetings of clubs or organizations: Not on file     Relationship status: Not on file     Intimate partner violence     Fear of current or ex partner: Not on file     Emotionally abused: Not on file     Physically abused: Not on file     Forced sexual activity: Not on file   Other Topics Concern     Parent/sibling w/ CABG, MI or angioplasty before 65F 55M? Not Asked   Social History Narrative     Not on file       Patient reported that they have not been involved with the legal system.   Patient denies being on probation / parole / under the jurisdiction of the court.    Current Mental Status Exam:   Appearance:   phone visit    Eye Contact:   phone visit   Psychomotor:   phone visit   Attitude / Demeanor:  Cooperative   Speech      Rate / Production:  Normal/ Responsive      Volume:   Normal  volume      Language:   intact  Mood:    Normal  Affect:    Appropriate    Thought Content:  Clear   Thought Process:  Coherent       Associations:  No loosening of associations  Insight:    Good   Judgment:   Intact   Orientation:   All  Attention/concentration: Good      Safety Assessment:   Current Safety Concerns:  [place] Suicide Severity Rating Scale (Short Version)  [place] Suicide Severity Rating (Short Version) 9/30/2020 12/16/2020 1/27/2021 1/27/2021   Over the past 2 weeks have you felt down, depressed, or hopeless? no  yes no yes   Over the past 2 weeks have you had thoughts of killing yourself? no no no no   Have you ever attempted to kill yourself? no no no no     Patient denies current homicidal ideation and behaviors.  Patient denies current self-injurious ideation and behaviors.    Patient denied risk behaviors associated with substance use.  Patient denies any high risk behaviors associated with mental health symptoms.  Patient reports the following current concerns for their personal safety: None.  Patient reports there no firearms in the house. n/a.     History of Safety Concerns:  Patient denied a history of homicidal ideation.     Patient denied a history of personal safety concerns.    Patient denied a history of assaultive behaviors.    Patient denied a history of sexual assault behaviors.     Patient denied a history of risk behaviors associated with substance use.  Patient denies any history of high risk behaviors associated with mental health symptoms.  Patient reports the following protective factors: positive relationships positive family connections, dedication to family/friends, safe and stable environment, adherence with prescribed medication and living with other people    Risk Plan:  See Preliminary Treatment Plan for Safety and Risk Management Plan    Diagnosis:  Diagnostic Criteria:   Mixed anxiety-depressive disorder: clinically significant symptoms of anxiety and depression, but the criteria are not met for either a specific Mood Disorder or a specific Anxiety Disorder.  Anxiety disorder is present, but at this time therapist is unable to determine whether it is primary.  Further assessment needed.  Client reports the following symptoms of anxiety:   - Excessive anxiety and worry about a number of events or activities (such as work or school performance).    - The person finds it difficult to control the worry.   - Sleep disturbance (difficulty falling or staying asleep, or restless unsatisfying sleep).     - The disturbance is not due to the direct physiological effects of a substance (e.g., a drug of abuse, a medication) or a general medical condition (e.g., hyperthyroidism) and does not occur exclusively during a Mood Disorder, a Psychotic Disorder, or a Pervasive Developmental Disorder.    (F41.9) Anxiety disorder, unspecified type  (primary encounter diagnosis)    Patient's Strengths and Limitations:  Patient identified the following strengths or resources that will help them succeed in treatment: commitment to health and well being, family support, insight and motivation. Things that may interfere with the patient's success in treatment include: physical health concerns.     Recommendations:     1. Plan for Safety and Risk Management:Recommended that patient call 911 or go to the local ED should there be a change in any of these risk factors..  Report to child / adult protection services was n/a.      2. Resources/Service Plan:       services are not indicated.     Modifications to assist communication are not indicated.     Additional disability accommodations are not indicated.      3. Collaboration:  Collaboration / coordination of treatment will be initiated with the following support professionals: psychiatry.      4.  Referrals:   The following referral(s) will be initiated: [facility].       Staff Name/Credentials:  Nguyen Gallardo on 1/27/2021 at 11:20 AM    January 27, 2021

## 2021-01-28 ASSESSMENT — ANXIETY QUESTIONNAIRES: GAD7 TOTAL SCORE: 5

## 2021-02-01 ENCOUNTER — TELEPHONE (OUTPATIENT)
Dept: TRANSPLANT | Facility: CLINIC | Age: 61
End: 2021-02-01

## 2021-02-01 NOTE — TELEPHONE ENCOUNTER
RN[facility] did receive VM from [facility] [place] coordinator, unable to local HLA information. RNCC called again today ([phone]), left VM for Alanis. RNCC asked for HLA information to be faxed to our clinic asked for call back to confirm message.

## 2021-02-16 ENCOUNTER — DOCUMENTATION ONLY (OUTPATIENT)
Dept: CARE COORDINATION | Facility: CLINIC | Age: 61
End: 2021-02-16

## 2021-03-11 DIAGNOSIS — Z94.4 LIVER REPLACED BY TRANSPLANT (H): ICD-10-CM

## 2021-03-11 DIAGNOSIS — N10 ACUTE PYELONEPHRITIS: ICD-10-CM

## 2021-03-11 DIAGNOSIS — Z13.220 LIPID SCREENING: ICD-10-CM

## 2021-03-11 LAB
ALBUMIN UR-MCNC: NEGATIVE MG/DL
APPEARANCE UR: CLEAR
BACTERIA #/AREA URNS HPF: ABNORMAL /HPF
BILIRUB UR QL STRIP: NEGATIVE
COLOR UR AUTO: YELLOW
ERYTHROCYTE [DISTWIDTH] IN BLOOD BY AUTOMATED COUNT: 15.8 % (ref 10–15)
GLUCOSE UR STRIP-MCNC: NEGATIVE MG/DL
HCT VFR BLD AUTO: 51.3 % (ref 35–47)
HGB BLD-MCNC: 16.9 G/DL (ref 11.7–15.7)
HGB UR QL STRIP: NEGATIVE
KETONES UR STRIP-MCNC: NEGATIVE MG/DL
LEUKOCYTE ESTERASE UR QL STRIP: NEGATIVE
MCH RBC QN AUTO: 30.4 PG (ref 26.5–33)
MCHC RBC AUTO-ENTMCNC: 32.9 G/DL (ref 31.5–36.5)
MCV RBC AUTO: 92 FL (ref 78–100)
NITRATE UR QL: NEGATIVE
NON-SQ EPI CELLS #/AREA URNS LPF: ABNORMAL /LPF
PH UR STRIP: 7 PH (ref 5–7)
PLATELET # BLD AUTO: 179 10E9/L (ref 150–450)
RBC # BLD AUTO: 5.56 10E12/L (ref 3.8–5.2)
RBC #/AREA URNS AUTO: ABNORMAL /HPF
SOURCE: ABNORMAL
SP GR UR STRIP: 1.01 (ref 1–1.03)
TACROLIMUS BLD-MCNC: 5.9 UG/L (ref 5–15)
TME LAST DOSE: NORMAL H
UROBILINOGEN UR STRIP-ACNC: 0.2 EU/DL (ref 0.2–1)
WBC # BLD AUTO: 6.7 10E9/L (ref 4–11)
WBC #/AREA URNS AUTO: ABNORMAL /HPF

## 2021-03-11 PROCEDURE — 80197 ASSAY OF TACROLIMUS: CPT | Performed by: INTERNAL MEDICINE

## 2021-03-11 PROCEDURE — 85027 COMPLETE CBC AUTOMATED: CPT | Performed by: INTERNAL MEDICINE

## 2021-03-11 PROCEDURE — 80076 HEPATIC FUNCTION PANEL: CPT | Performed by: INTERNAL MEDICINE

## 2021-03-11 PROCEDURE — 36415 COLL VENOUS BLD VENIPUNCTURE: CPT | Performed by: INTERNAL MEDICINE

## 2021-03-11 PROCEDURE — 81001 URINALYSIS AUTO W/SCOPE: CPT | Performed by: INTERNAL MEDICINE

## 2021-03-11 PROCEDURE — 80048 BASIC METABOLIC PNL TOTAL CA: CPT | Performed by: INTERNAL MEDICINE

## 2021-03-12 LAB
ALBUMIN SERPL-MCNC: 3.8 G/DL (ref 3.4–5)
ALP SERPL-CCNC: 72 U/L (ref 40–150)
ALT SERPL W P-5'-P-CCNC: 24 U/L (ref 0–50)
ANION GAP SERPL CALCULATED.3IONS-SCNC: <1 MMOL/L (ref 3–14)
AST SERPL W P-5'-P-CCNC: 15 U/L (ref 0–45)
BILIRUB DIRECT SERPL-MCNC: 0.2 MG/DL (ref 0–0.2)
BILIRUB SERPL-MCNC: 0.8 MG/DL (ref 0.2–1.3)
BUN SERPL-MCNC: 12 MG/DL (ref 7–30)
CALCIUM SERPL-MCNC: 9.9 MG/DL (ref 8.5–10.1)
CHLORIDE SERPL-SCNC: 107 MMOL/L (ref 94–109)
CO2 SERPL-SCNC: 29 MMOL/L (ref 20–32)
CREAT SERPL-MCNC: 0.74 MG/DL (ref 0.52–1.04)
GFR SERPL CREATININE-BSD FRML MDRD: 88 ML/MIN/{1.73_M2}
GLUCOSE SERPL-MCNC: 88 MG/DL (ref 70–99)
POTASSIUM SERPL-SCNC: 3.7 MMOL/L (ref 3.4–5.3)
PROT SERPL-MCNC: 7.2 G/DL (ref 6.8–8.8)
SODIUM SERPL-SCNC: 136 MMOL/L (ref 133–144)

## 2021-03-15 ENCOUNTER — TELEPHONE (OUTPATIENT)
Dept: TRANSPLANT | Facility: CLINIC | Age: 61
End: 2021-03-15

## 2021-03-15 DIAGNOSIS — Z94.0 KIDNEY TRANSPLANTED: Primary | ICD-10-CM

## 2021-03-15 NOTE — TELEPHONE ENCOUNTER
Spoke with the patient and confirmed neprology appointments on 6/8/21.  Informed patient an itinerary can be accessed on [address].

## 2021-03-15 NOTE — TELEPHONE ENCOUNTER
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 Would add lisinopril 2.5mg daily and have her check Bps to make sure it doesn't drop, repeat BMP 2 weeks after starting.     Call placed to Belen Sharma, she reports she occasional low BP and some dizziness. She would prefer to hold off on starting on lisinopril. Dr. Angulo updated.

## 2021-03-29 ENCOUNTER — IMMUNIZATION (OUTPATIENT)
Dept: NURSING | Facility: CLINIC | Age: 61
End: 2021-03-29
Payer: MEDICARE

## 2021-03-29 PROCEDURE — 0001A PR COVID VAC PFIZER DIL RECON 30 MCG/0.3 ML IM: CPT

## 2021-03-29 PROCEDURE — 91300 PR COVID VAC PFIZER DIL RECON 30 MCG/0.3 ML IM: CPT

## 2021-04-19 ENCOUNTER — IMMUNIZATION (OUTPATIENT)
Dept: NURSING | Facility: CLINIC | Age: 61
End: 2021-04-19
Attending: INTERNAL MEDICINE
Payer: MEDICARE

## 2021-04-19 PROCEDURE — 0002A PR COVID VAC PFIZER DIL RECON 30 MCG/0.3 ML IM: CPT

## 2021-04-19 PROCEDURE — 91300 PR COVID VAC PFIZER DIL RECON 30 MCG/0.3 ML IM: CPT

## 2021-04-21 ENCOUNTER — TELEPHONE (OUTPATIENT)
Dept: OBGYN | Facility: CLINIC | Age: 61
End: 2021-04-21

## 2021-04-21 NOTE — TELEPHONE ENCOUNTER
----- Message from Noemi Beal sent at 4/21/2021 10:01 AM CDT -----  Regarding: Requesting a call back from a nurse  José Miguel!    Belen calling to request a call back from the nurses. Please give her a call back when possible.     Thank you!  Noemi AREVALO    Please DO NOT send this message and/or reply back to sender. Call center representatives DO NOT respond to messages.

## 2021-04-21 NOTE — TELEPHONE ENCOUNTER
Pt called to make sure that her appointment was scheduled correctly and to see if she needs to wear a mask if she has had her vaccine.  This writer stated that the precautions are the same, mask and hand hygiene.  The appointment was also confirmed with the pt.

## 2021-04-28 ENCOUNTER — OFFICE VISIT (OUTPATIENT)
Dept: UROLOGY | Facility: CLINIC | Age: 61
End: 2021-04-28
Attending: OBSTETRICS & GYNECOLOGY
Payer: MEDICARE

## 2021-04-28 VITALS — DIASTOLIC BLOOD PRESSURE: 57 MMHG | HEART RATE: 89 BPM | SYSTOLIC BLOOD PRESSURE: 94 MMHG

## 2021-04-28 DIAGNOSIS — N81.11 CYSTOCELE, MIDLINE: Primary | ICD-10-CM

## 2021-04-28 DIAGNOSIS — N81.6 RECTOCELE: ICD-10-CM

## 2021-04-28 PROCEDURE — 57160 INSERT PESSARY/OTHER DEVICE: CPT | Performed by: OBSTETRICS & GYNECOLOGY

## 2021-04-28 PROCEDURE — G0463 HOSPITAL OUTPT CLINIC VISIT: HCPCS

## 2021-04-28 PROCEDURE — 99213 OFFICE O/P EST LOW 20 MIN: CPT | Mod: 25 | Performed by: OBSTETRICS & GYNECOLOGY

## 2021-04-28 ASSESSMENT — PAIN SCALES - GENERAL: PAINLEVEL: NO PAIN (0)

## 2021-04-28 NOTE — PROGRESS NOTES
CC: prolapse    Pt here for pessary check. Pt inserting and removing pessary daily. Pt happy with pessary, but would like to get a bigger pessary as this one will occ fall out.. Denies bleeding, spotting or discharge.    BP 94/57   Pulse 89  No LMP recorded. Patient is postmenopausal. There is no height or weight on file to calculate BMI.  Ms. Sharma is alert, comfortable in no acute distress, non-labored breathing.   Vagina: normal mucosa without discharge, bleeding or evidence of vaginal ulcer.    Fit with a #8 ring with support. Pt comfortable with new size    A/P: Belen Sharma is a 61 year old F with cystocele    Continue pessary use. F/U in 6 months or PRN,    Zach Francis MD  Professor, OB/GYN  Urogynecologist  CC  Patient Care Team:  Vincent De La Garza MD as PCP - General (Internal Medicine)  Chritsy Mohamud MD as MD (Cardiology)  Divya Chávez, RN as Registered Nurse (Transplant)  Елена Dinh RN as Registered Nurse  Christy Mohamud MD as Assigned Heart and Vascular Provider  Leventhal, Thomas Michael, MD as Assigned Gastroenterology Provider  Zach Francis MD as Assigned OBGYN Provider  Catrachita Gonzales MD as Assigned Infectious Disease Provider  Anna Gamble MD as Assigned Behavioral Health Provider  Vincent De La Garza MD as Assigned PCP  SELF, REFERRED

## 2021-04-28 NOTE — LETTER
4/28/2021       RE: Belen Sharma  [address] [address]     Dear Colleague,    Thank you for referring your patient, Belen Sharma, to the [facility] [place] at [facility]. Please see a copy of my visit note below.    CC: prolapse    Pt here for pessary check. Pt inserting and removing pessary daily. Pt happy with pessary, but would like to get a bigger pessary as this one will occ fall out.. Denies bleeding, spotting or discharge.    BP 94/57   Pulse 89  No LMP recorded. Patient is postmenopausal. There is no height or weight on file to calculate BMI.  Ms. Sharma is alert, comfortable in no acute distress, non-labored breathing.   Vagina: normal mucosa without discharge, bleeding or evidence of vaginal ulcer.    Fit with a #8 ring with support. Pt comfortable with new size    A/P: Belen Sharma is a 61 year old F with cystocele    Continue pessary use. F/U in 6 months or PRN,    Zach Francis MD  Professor, OB/GYN  Urogynecologist  CC  Patient Care Team:  Vincent De La Garza MD as PCP - General (Internal Medicine)  Christy Mohamud MD as MD (Cardiology)  Divya Chávez, RN as Registered Nurse (Transplant)  Елена Dinh, RN as Registered Nurse  Christy Mohamud MD as Assigned Heart and Vascular Provider  Leventhal, Thomas Michael, MD as Assigned Gastroenterology Provider  Zach Francis MD as Assigned OBGYN Provider  Catrachita Gonzales MD as Assigned Infectious Disease Provider  Anna Gamble MD as Assigned Behavioral Health Provider  Vincent De La Garza MD as Assigned PCP  SELF, REFERRED

## 2021-06-04 DIAGNOSIS — N39.0 COMPLICATED URINARY TRACT INFECTION: ICD-10-CM

## 2021-06-04 DIAGNOSIS — Z13.220 LIPID SCREENING: ICD-10-CM

## 2021-06-04 DIAGNOSIS — Z94.4 LIVER REPLACED BY TRANSPLANT (H): ICD-10-CM

## 2021-06-04 DIAGNOSIS — Z94.0 KIDNEY TRANSPLANTED: ICD-10-CM

## 2021-06-04 DIAGNOSIS — N10 ACUTE PYELONEPHRITIS: ICD-10-CM

## 2021-06-04 DIAGNOSIS — R82.90 NONSPECIFIC FINDING ON EXAMINATION OF URINE: Primary | ICD-10-CM

## 2021-06-04 LAB
ALBUMIN SERPL-MCNC: 3.4 G/DL (ref 3.4–5)
ALBUMIN UR-MCNC: NEGATIVE MG/DL
ALP SERPL-CCNC: 61 U/L (ref 40–150)
ALT SERPL W P-5'-P-CCNC: 22 U/L (ref 0–50)
ANION GAP SERPL CALCULATED.3IONS-SCNC: 5 MMOL/L (ref 3–14)
APPEARANCE UR: CLEAR
AST SERPL W P-5'-P-CCNC: 17 U/L (ref 0–45)
BACTERIA #/AREA URNS HPF: ABNORMAL /HPF
BILIRUB DIRECT SERPL-MCNC: 0.2 MG/DL (ref 0–0.2)
BILIRUB SERPL-MCNC: 1 MG/DL (ref 0.2–1.3)
BILIRUB UR QL STRIP: NEGATIVE
BUN SERPL-MCNC: 14 MG/DL (ref 7–30)
CALCIUM SERPL-MCNC: 9.2 MG/DL (ref 8.5–10.1)
CHLORIDE SERPL-SCNC: 103 MMOL/L (ref 94–109)
CO2 SERPL-SCNC: 26 MMOL/L (ref 20–32)
COLOR UR AUTO: YELLOW
CREAT SERPL-MCNC: 0.75 MG/DL (ref 0.52–1.04)
ERYTHROCYTE [DISTWIDTH] IN BLOOD BY AUTOMATED COUNT: 15.1 % (ref 10–15)
GFR SERPL CREATININE-BSD FRML MDRD: 86 ML/MIN/{1.73_M2}
GLUCOSE SERPL-MCNC: 79 MG/DL (ref 70–99)
GLUCOSE UR STRIP-MCNC: NEGATIVE MG/DL
HCT VFR BLD AUTO: 43.7 % (ref 35–47)
HGB BLD-MCNC: 14.5 G/DL (ref 11.7–15.7)
HGB UR QL STRIP: ABNORMAL
KETONES UR STRIP-MCNC: NEGATIVE MG/DL
LEUKOCYTE ESTERASE UR QL STRIP: ABNORMAL
MCH RBC QN AUTO: 30.9 PG (ref 26.5–33)
MCHC RBC AUTO-ENTMCNC: 33.2 G/DL (ref 31.5–36.5)
MCV RBC AUTO: 93 FL (ref 78–100)
NITRATE UR QL: NEGATIVE
PH UR STRIP: 6.5 PH (ref 5–7)
PLATELET # BLD AUTO: 190 10E9/L (ref 150–450)
POTASSIUM SERPL-SCNC: 3.6 MMOL/L (ref 3.4–5.3)
PROT SERPL-MCNC: 6.6 G/DL (ref 6.8–8.8)
PROT UR-MCNC: 0.09 G/L
PROT/CREAT 24H UR: 0.34 G/G CR (ref 0–0.2)
RBC # BLD AUTO: 4.69 10E12/L (ref 3.8–5.2)
RBC #/AREA URNS AUTO: ABNORMAL /HPF
SODIUM SERPL-SCNC: 134 MMOL/L (ref 133–144)
SOURCE: ABNORMAL
SP GR UR STRIP: 1.01 (ref 1–1.03)
TACROLIMUS BLD-MCNC: 4.6 UG/L (ref 5–15)
TME LAST DOSE: 2200 H
UROBILINOGEN UR STRIP-ACNC: 0.2 EU/DL (ref 0.2–1)
WBC # BLD AUTO: 6.2 10E9/L (ref 4–11)
WBC #/AREA URNS AUTO: ABNORMAL /HPF

## 2021-06-04 PROCEDURE — 87186 SC STD MICRODIL/AGAR DIL: CPT | Performed by: INTERNAL MEDICINE

## 2021-06-04 PROCEDURE — 80197 ASSAY OF TACROLIMUS: CPT | Performed by: INTERNAL MEDICINE

## 2021-06-04 PROCEDURE — 87086 URINE CULTURE/COLONY COUNT: CPT | Performed by: INTERNAL MEDICINE

## 2021-06-04 PROCEDURE — 80053 COMPREHEN METABOLIC PANEL: CPT | Performed by: INTERNAL MEDICINE

## 2021-06-04 PROCEDURE — 87088 URINE BACTERIA CULTURE: CPT | Performed by: INTERNAL MEDICINE

## 2021-06-04 PROCEDURE — 36415 COLL VENOUS BLD VENIPUNCTURE: CPT | Performed by: INTERNAL MEDICINE

## 2021-06-04 PROCEDURE — 81001 URINALYSIS AUTO W/SCOPE: CPT | Performed by: INTERNAL MEDICINE

## 2021-06-04 PROCEDURE — 82248 BILIRUBIN DIRECT: CPT | Performed by: INTERNAL MEDICINE

## 2021-06-04 PROCEDURE — 84156 ASSAY OF PROTEIN URINE: CPT | Performed by: INTERNAL MEDICINE

## 2021-06-04 PROCEDURE — 85027 COMPLETE CBC AUTOMATED: CPT | Performed by: INTERNAL MEDICINE

## 2021-06-06 LAB
BACTERIA SPEC CULT: ABNORMAL
Lab: ABNORMAL
SPECIMEN SOURCE: ABNORMAL

## 2021-06-07 DIAGNOSIS — N30.01 ACUTE CYSTITIS WITH HEMATURIA: Primary | ICD-10-CM

## 2021-06-07 RX ORDER — AMOXICILLIN 500 MG/1
500 CAPSULE ORAL 2 TIMES DAILY
Qty: 14 CAPSULE | Refills: 0 | Status: SHIPPED | OUTPATIENT
Start: 2021-06-07 | End: 2021-06-14

## 2021-06-08 ENCOUNTER — VIRTUAL VISIT (OUTPATIENT)
Dept: NEPHROLOGY | Facility: CLINIC | Age: 61
End: 2021-06-08
Attending: INTERNAL MEDICINE
Payer: MEDICARE

## 2021-06-08 DIAGNOSIS — M81.0 AGE-RELATED OSTEOPOROSIS WITHOUT CURRENT PATHOLOGICAL FRACTURE: ICD-10-CM

## 2021-06-08 DIAGNOSIS — B25.9 CYTOMEGALOVIRUS (CMV) VIREMIA (H): ICD-10-CM

## 2021-06-08 DIAGNOSIS — Z94.4 S/P LIVER TRANSPLANT (H): ICD-10-CM

## 2021-06-08 DIAGNOSIS — N39.0 RECURRENT UTI: ICD-10-CM

## 2021-06-08 DIAGNOSIS — Z94.0 KIDNEY TRANSPLANTED: Primary | ICD-10-CM

## 2021-06-08 DIAGNOSIS — D84.9 IMMUNOSUPPRESSION (H): ICD-10-CM

## 2021-06-08 PROCEDURE — 99215 OFFICE O/P EST HI 40 MIN: CPT | Mod: 95 | Performed by: INTERNAL MEDICINE

## 2021-06-08 RX ORDER — SULFAMETHOXAZOLE AND TRIMETHOPRIM 400; 80 MG/1; MG/1
1 TABLET ORAL
Qty: 45 TABLET | Refills: 3 | Status: CANCELLED | OUTPATIENT
Start: 2021-06-09

## 2021-06-08 RX ORDER — TACROLIMUS 1 MG/1
2 CAPSULE ORAL 2 TIMES DAILY
Qty: 360 CAPSULE | Refills: 3 | COMMUNITY
Start: 2021-06-08 | End: 2021-10-12

## 2021-06-08 RX ORDER — PREDNISONE 5 MG/1
5 TABLET ORAL EVERY MORNING
Qty: 90 TABLET | Refills: 3 | Status: SHIPPED | OUTPATIENT
Start: 2021-06-08 | End: 2021-09-06

## 2021-06-08 RX ORDER — PREDNISONE 5 MG/1
5 TABLET ORAL EVERY MORNING
Qty: 30 TABLET | Refills: 1 | Status: CANCELLED | OUTPATIENT
Start: 2021-06-08

## 2021-06-08 RX ORDER — MYCOPHENOLIC ACID 180 MG/1
540 TABLET, DELAYED RELEASE ORAL 2 TIMES DAILY
Qty: 540 TABLET | Refills: 3 | COMMUNITY
Start: 2021-06-08 | End: 2021-10-12

## 2021-06-08 RX ORDER — SULFAMETHOXAZOLE AND TRIMETHOPRIM 400; 80 MG/1; MG/1
1 TABLET ORAL
Qty: 45 TABLET | Refills: 3 | Status: SHIPPED | OUTPATIENT
Start: 2021-06-09 | End: 2022-03-29

## 2021-06-08 RX ORDER — VITAMIN B COMPLEX
50 TABLET ORAL EVERY MORNING
COMMUNITY
Start: 2021-06-08 | End: 2022-02-15

## 2021-06-08 NOTE — LETTER
6/8/2021       RE: Belen Sharma  [address] [facility] [address]     Dear Colleague,    Thank you for referring your patient, Belen Sharma, to the [facility] [place] at [facility]. Please see a copy of my visit note below.    Belen is a 61 year old who is being evaluated via a billable video visit.      How would you like to obtain your AVS? MyChart and mail a copy  If the video visit is dropped, the invitation should be resent by: Text to cell phone: [phone]  Will anyone else be joining your video visit? No      Video Start Time: 3:25 PM  Video-Visit Details    Type of service:  Video Visit    Video End Time:4:16 PM    Originating Location (pt. Location): Home    Distant Location (provider location):  [facility] [place]     Platform used for Video Visit: Maddison Angulo    CHRONIC TRANSPLANT NEPHROLOGY VISIT    Assessment & Plan      # DDKT: Stable              - Baseline Cr ~ 0.7-0.9              - Proteinuria: Normal (<0.2 grams)              - Date DSA Last Checked: Not Known      Latest DSA: unknown.  History of ABMR of her first kidney transplant and then desensitization prior to second transplant              - BK Viremia: Not checked recently due to time from transplant. Check with next labs              - Kidney Tx Biopsy: No     # Liver transplant:              - Good allograft function. Follows with transplant hepatology.     # Recurrent UTI : have had ESBL in June and Dec 2020 s/p meropenum therapy. Not on prophylaxis. Her urine culture on 6/4/2021 growing E. Fecalis. Pt is asymptomatic. Will check with ID if she needed to be treated. Found to be pansensitive. Per Dr. Gonzales she should receive 7d of treatment.     # Immunosuppression: Tacrolimus immediate release (goal is 4-6), Mycophenolic acid (dose 540 mg BID) and Prednisone (dose 5 mg daily)   -Continue with intensive  monitoring of immunosuppression for efficacy and toxicity              - Changes: No     # Cystocele:   -Has pessary. Follows with OB GYN     # Infection Prophylaxis:  - PJP: Sulfa/TMP (Bactrim)     # History of CMV viremia:              - PCR neg in dec 2020     # Blood Pressure: Controlled;          Goal BP: < 130/80              - Volume status: Euvolemic              - Changes: No     # Post-Transplant Erythrocytosis: Hb was around ~17 in dec, later it improved to ~14 and stayed stable as of now. Not on ACEi/ARB due to low BP. Imaging: Yes, negative 12/2020        # Mineral Bone Disorder:  - Secondary renal hyperparathyroidism; PTH level: Normal        On treatment: None  - Calcium; level: Normal        On supplement: No  - Phosphorus; level: Normal        On supplement: No     # HSIL: per cone biopsy on 9/30/20. No evidence of dysplasia. Follows with Ob/gyn and plan for repeat cytology in 4-6 months after procedure. Dr. 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    Return visit: Return in about 6 months (around 12/8/2021).    Anusha Vieira MD     Physician Attestation   I, Saran Angulo MD, personally examined and evaluated this patient.  I discussed the patient with the resident/fellow and care team, and agree with the assessment and plan of care as documented in the note on 06/08/21 .      I personally reviewed vital signs, medications, labs and imaging.    Saran Angulo MD  Date of Service (when I saw the patient): 06/08/21          Chief Complaint    Ms. Sharma is a 61 year old here for kidney transplant and immunosuppression management.    History of Present Illness     Pt endorsed doing well since her last hospitalization. No new symptoms today. Denied chest pain, SOB, fever/chills, nausea/vomting, diarrhea (chronically have 3 BM per day semi-formed), abdominal pain. Up on questioning denied dysuria, urgency or frequency of urination, also denied urinary obstructive symptoms.    She stated that her brother was recently diagnosed with a blood clot. She has never had a blood clot before.     Problem List   Patient Active Problem List   Diagnosis     S/P splenectomy     Congenital hepatic fibrosis     S/P kidney transplant     Polycystic kidney disease     Immunosuppression (H)     S/P liver transplant (H)     Endometriosis     HPV (human papilloma virus) infection     High grade squamous intraepithelial lesion of cervix     Abnormal Pap smear of cervix     Abnormal urine     Acquired bilateral hammer toes     Anemia     BK virus nephropathy     Candidiasis of vagina     Cervical high risk human papillomavirus (HPV) DNA test positive     Chicken pox     Chronic renal insufficiency     Congenital polycystic kidney     Cystitis     Depression     Depressive psychosis (H)     Electrolyte abnormality     Exercise counseling     Dermatophytosis of nail     Reason for consultation     Encounter for counseling     Encounter for screening for malignant neoplasm of breast     Encounter for medication refill     End stage renal disease (H)     Hallux valgus, acquired, bilateral     Hypertension     Hyperthyroidism     Incisional hernia following transplant     Kidney transplant rejection     Knee pain     Malignant neoplasm of breast (H)     Microvascular angina (H)     Patient on waiting list for organ transplant     Polycythemia     Postmenopausal state     Prophylactic antibiotic     Renal bone disease     Renal hypertension     Scarlet fever     Scoliosis deformity of  spine     Secondary physiologic amenorrhea     Sinusitis     Swelling of first metatarsophalangeal (MTP) joint     Thrombocytopenia (H)     Uterine prolapse     Xerosis of skin     Encounter for aftercare following kidney transplant     History of liver transplant (H)     History of kidney transplant     Kidney replaced by transplant     Complicated urinary tract infection     Immunosuppressed status (H)       Allergies   Allergies   Allergen Reactions     Ibuprofen      Due to liver transplant       Medications   Current Outpatient Medications   Medication Sig     acetaminophen (TYLENOL) 325 MG tablet Take 1 tablet (325 mg) by mouth every 4 hours as needed for mild pain or fever     ALPRAZolam (XANAX) 0.25 MG tablet Take 1 tablet (0.25 mg) by mouth as needed for anxiety     amoxicillin (AMOXIL) 500 MG capsule Take 1 capsule (500 mg) by mouth 2 times daily for 7 days     aspirin (ASA) 81 MG chewable tablet Take 81 mg by mouth every evening Stopped 7 days preop     biotin 1000 MCG TABS tablet Take 3,000 mcg by mouth daily     diphenhydrAMINE (BENADRYL) 25 MG tablet Take 50 mg by mouth as needed for allergies      docusate sodium (COLACE) 100 MG capsule Take 200 mg by mouth every morning      estradiol (ESTRACE) 0.1 MG/GM vaginal cream Place 1 g vaginally twice a week     loperamide (IMODIUM A-D) 2 MG tablet Take 1 tablet by mouth as needed for diarrhea      loratadine (CLARITIN) 10 MG tablet Take 10 mg by mouth every evening      Multiple Vitamins-Minerals (WOMENS DAILY FORMULA PO) Take 1 tablet by mouth daily     MYFORTIC (BRAND) 180 MG EC tablet Take 3 tablets (540 mg) by mouth 2 times daily (Patient taking differently: Take 360 mg by mouth 2 times daily Takes 1.5 tablets in am    And one tablet in pm)     nitroGLYcerin (NITROSTAT) 0.4 MG sublingual tablet Place 0.4 mg under the tongue as needed for chest pain (Hasn't used for a while) For chest pain place 1 tablet under the tongue every 5 minutes for 3 doses. If  symptoms persist 5 minutes after 1st dose call 911.      ondansetron (ZOFRAN) 4 MG tablet Take by mouth as needed for nausea     pantoprazole (PROTONIX) 40 MG EC tablet Take 1 tablet by mouth as needed (reflux)      predniSONE (DELTASONE) 5 MG tablet Take 5 mg by mouth every morning      Probiotic Product (PROBIOTIC PO) Take 2 tablets by mouth every morning      Psyllium (METAMUCIL PO) Take by mouth every morning Hold 9-30-20     sulfamethoxazole-trimethoprim (BACTRIM) 400-80 MG tablet Take 1 tablet by mouth Every Mon, Wed, Fri Morning     tacrolimus (GENERIC EQUIVALENT) 1 MG capsule Take 2 capsules (2 mg) by mouth 2 times daily (Patient taking differently: Take 2 mg by mouth 2 times daily One tablet in the am  Andtablet  one at night)     temazepam (RESTORIL) 15 MG capsule Take 1 capsule (15 mg) by mouth as needed for sleep (twice a year)     Vitamin D, Cholecalciferol, 25 MCG (1000 UT) CAPS Take 1,000 Units by mouth D3     Vitamin D3 (CHOLECALCIFEROL) 25 mcg (1000 units) tablet Take 1 tablet (25 mcg) by mouth every morning (Patient taking differently: Take 50 mcg by mouth every morning )     No current facility-administered medications for this visit.      There are no discontinued medications.    Physical Exam   Vital Signs: There were no vitals taken for this visit.    GENERAL APPEARANCE: alert and no distress  HENT: no obvious abnormalities on appearance  RESP: breathing appears unremarkable with normal rate, no audible wheezing or cough and no apparent shortness of breath with conversation  MS: extremities normal - no gross deformities noted  SKIN: no apparent rash and normal skin tone  NEURO: speech is clear with no obvious neurological deficits  PSYCH: mentation appears normal and affect normal    Data     Renal Latest Ref Rng & Units 6/4/2021 3/11/2021 1/4/2021   Na 133 - 144 mmol/L 134 136 138   K 3.4 - 5.3 mmol/L 3.6 3.7 3.5   Cl 94 - 109 mmol/L 103 107 106   CO2 20 - 32 mmol/L 26 29 27   BUN 7 - 30 mg/dL  14 12 15   Cr 0.52 - 1.04 mg/dL 0.75 0.74 0.70   Glucose 70 - 99 mg/dL 79 88 79   Ca  8.5 - 10.1 mg/dL 9.2 9.9 9.6     Bone Health Latest Ref Rng & Units 9/23/2020   Phos 2.5 - 4.5 mg/dL 3.4   PTHi 18 - 80 pg/mL 45     Heme Latest Ref Rng & Units 6/4/2021 3/11/2021 12/28/2020   WBC 4.0 - 11.0 10e9/L 6.2 6.7 9.2   Hgb 11.7 - 15.7 g/dL 14.5 16.9(H) 16.6(H)   Plt 150 - 450 10e9/L 190 179 308   ABSOLUTE NEUTROPHIL 1.6 - 8.3 10e9/L - - 6.4   ABSOLUTE LYMPHOCYTES 0.8 - 5.3 10e9/L - - 2.1   ABSOLUTE MONOCYTES 0.0 - 1.3 10e9/L - - 0.6   ABSOLUTE EOSINOPHILS 0.0 - 0.7 10e9/L - - 0.0   ABSOLUTE BASOPHILS 0.0 - 0.2 10e9/L - - 0.0   ABS IMMATURE GRANULOCYTES 0 - 0.4 10e9/L - - 0.0   ABSOLUTE NUCLEATED RBC - - - 0.0     Liver Latest Ref Rng & Units 6/4/2021 3/11/2021 1/4/2021   AP 40 - 150 U/L 61 72 77   TBili 0.2 - 1.3 mg/dL 1.0 0.8 0.7   DBili 0.0 - 0.2 mg/dL 0.2 0.2 -   ALT 0 - 50 U/L 22 24 24   AST 0 - 45 U/L 17 15 20   Tot Protein 6.8 - 8.8 g/dL 6.6(L) 7.2 6.8   Albumin 3.4 - 5.0 g/dL 3.4 3.8 3.3(L)           [facility] Txp Virology Latest Ref Rng & Units 1/4/2021 12/18/2020 9/23/2020   CVM DNA Quant - - Plasma Plasma   CMV QUANT IU/ML CMVND:CMV DNA Not Detected [IU]/mL - CMV DNA Not Detected CMV DNA Not Detected   LOG IU/ML OF CMVQNT <2.1 [Log:IU]/mL - Not Calculated Not Calculated   BK Spec - - - Plasma   BK Res BKNEG:BK Virus DNA Not Detected copies/mL - - BK Virus DNA Not Detected   BK Log <2.7 Log copies/mL - - Not Calculated   EBV CAPSID ANTIBODY IGG 0.0 - 0.8 AI >8.0(H) - -        Recent Labs   Lab Test 01/04/21  0933 03/11/21  1006 06/04/21  1018   DOSTAC 2,100 2200 [id number] 2,200   TACROL 4.3* 5.9 4.6*     Recent Labs   Lab Test 12/18/20  0611   DOSMPA 12/17 2051   MPACID 1.32   MPAG 23.7*       Again, thank you for allowing me to participate in the care of your patient.      Sincerely,    Saran Angulo MD

## 2021-06-08 NOTE — PROGRESS NOTES
Belen is a 61 year old who is being evaluated via a billable video visit.      How would you like to obtain your AVS? MyChart and mail a copy  If the video visit is dropped, the invitation should be resent by: Text to cell phone: [phone]  Will anyone else be joining your video visit? No      Video Start Time: 3:25 PM  Video-Visit Details    Type of service:  Video Visit    Video End Time:4:16 PM    Originating Location (pt. Location): Home    Distant Location (provider location):  [facility] [place]     Platform used for Video Visit: Maddison Agnulo

## 2021-06-08 NOTE — PROGRESS NOTES
CHRONIC TRANSPLANT NEPHROLOGY VISIT    Assessment & Plan      # DDKT: Stable              - Baseline Cr ~ 0.7-0.9              - Proteinuria: Normal (<0.2 grams)              - Date DSA Last Checked: Not Known      Latest DSA: unknown.  History of ABMR of her first kidney transplant and then desensitization prior to second transplant              - BK Viremia: Not checked recently due to time from transplant. Check with next labs              - Kidney Tx Biopsy: No     # Liver transplant:              - Good allograft function. Follows with transplant hepatology.     # Recurrent UTI : have had ESBL in June and Dec 2020 s/p meropenum therapy. Not on prophylaxis. Her urine culture on 6/4/2021 growing E. Fecalis. Pt is asymptomatic. Will check with ID if she needed to be treated. Found to be pansensitive. Per Dr. Gonzales she should receive 7d of treatment.     # Immunosuppression: Tacrolimus immediate release (goal is 4-6), Mycophenolic acid (dose 540 mg BID) and Prednisone (dose 5 mg daily)   -Continue with intensive monitoring of immunosuppression for efficacy and toxicity              - Changes: No     # Cystocele:   -Has pessary. Follows with OB GYN     # Infection Prophylaxis:  - PJP: Sulfa/TMP (Bactrim)     # History of CMV viremia:              - PCR neg in dec 2020     # Blood Pressure: Controlled;          Goal BP: < 130/80              - Volume status: Euvolemic              - Changes: No     # Post-Transplant Erythrocytosis: Hb was around ~17 in dec, later it improved to ~14 and stayed stable as of now. Not on ACEi/ARB due to low BP. Imaging: Yes, negative 12/2020        # Mineral Bone Disorder:  - Secondary renal hyperparathyroidism; PTH level: Normal        On treatment: None  - Calcium; level: Normal        On supplement: No  - Phosphorus; level: Normal        On supplement: No     # HSIL: per cone biopsy on 9/30/20. No evidence of dysplasia. Follows with Ob/gyn and plan for repeat cytology in 4-6 months  after procedure. Dr. 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    Return visit: Return in about 6 months (around 12/8/2021).    Anusha Vieira MD     Physician Attestation   I, Saran Angulo MD, personally examined and evaluated this patient.  I discussed the patient with the resident/fellow and care team, and agree with the assessment and plan of care as documented in the note on 06/08/21 .      I personally reviewed vital signs, medications, labs and imaging.    Saran Angulo MD  Date of Service (when I saw the patient): 06/08/21          Chief Complaint   Ms. Sharma is a 61 year old here for kidney transplant and immunosuppression management.    History of Present Illness     Pt endorsed doing well since her last hospitalization. No new symptoms today. Denied chest pain, SOB, fever/chills, nausea/vomting, diarrhea (chronically have 3 BM per day semi-formed), abdominal pain. Up on questioning denied dysuria, urgency or frequency of urination, also denied urinary obstructive symptoms.    She stated that her brother was recently diagnosed with a blood clot. She has never had a blood clot before.     Problem List   Patient Active Problem List   Diagnosis     S/P splenectomy     Congenital hepatic fibrosis     S/P kidney transplant     Polycystic kidney disease     Immunosuppression (H)     S/P liver transplant (H)     Endometriosis     HPV (human papilloma virus) infection     High grade squamous intraepithelial lesion of cervix     Abnormal Pap smear of cervix      Abnormal urine     Acquired bilateral hammer toes     Anemia     BK virus nephropathy     Candidiasis of vagina     Cervical high risk human papillomavirus (HPV) DNA test positive     Chicken pox     Chronic renal insufficiency     Congenital polycystic kidney     Cystitis     Depression     Depressive psychosis (H)     Electrolyte abnormality     Exercise counseling     Dermatophytosis of nail     Reason for consultation     Encounter for counseling     Encounter for screening for malignant neoplasm of breast     Encounter for medication refill     End stage renal disease (H)     Hallux valgus, acquired, bilateral     Hypertension     Hyperthyroidism     Incisional hernia following transplant     Kidney transplant rejection     Knee pain     Malignant neoplasm of breast (H)     Microvascular angina (H)     Patient on waiting list for organ transplant     Polycythemia     Postmenopausal state     Prophylactic antibiotic     Renal bone disease     Renal hypertension     Scarlet fever     Scoliosis deformity of spine     Secondary physiologic amenorrhea     Sinusitis     Swelling of first metatarsophalangeal (MTP) joint     Thrombocytopenia (H)     Uterine prolapse     Xerosis of skin     Encounter for aftercare following kidney transplant     History of liver transplant (H)     History of kidney transplant     Kidney replaced by transplant     Complicated urinary tract infection     Immunosuppressed status (H)       Allergies   Allergies   Allergen Reactions     Ibuprofen      Due to liver transplant       Medications   Current Outpatient Medications   Medication Sig     acetaminophen (TYLENOL) 325 MG tablet Take 1 tablet (325 mg) by mouth every 4 hours as needed for mild pain or fever     ALPRAZolam (XANAX) 0.25 MG tablet Take 1 tablet (0.25 mg) by mouth as needed for anxiety     amoxicillin (AMOXIL) 500 MG capsule Take 1 capsule (500 mg) by mouth 2 times daily for 7 days     aspirin (ASA) 81 MG chewable tablet Take  81 mg by mouth every evening Stopped 7 days preop     biotin 1000 MCG TABS tablet Take 3,000 mcg by mouth daily     diphenhydrAMINE (BENADRYL) 25 MG tablet Take 50 mg by mouth as needed for allergies      docusate sodium (COLACE) 100 MG capsule Take 200 mg by mouth every morning      estradiol (ESTRACE) 0.1 MG/GM vaginal cream Place 1 g vaginally twice a week     loperamide (IMODIUM A-D) 2 MG tablet Take 1 tablet by mouth as needed for diarrhea      loratadine (CLARITIN) 10 MG tablet Take 10 mg by mouth every evening      Multiple Vitamins-Minerals (WOMENS DAILY FORMULA PO) Take 1 tablet by mouth daily     MYFORTIC (BRAND) 180 MG EC tablet Take 3 tablets (540 mg) by mouth 2 times daily (Patient taking differently: Take 360 mg by mouth 2 times daily Takes 1.5 tablets in am    And one tablet in pm)     nitroGLYcerin (NITROSTAT) 0.4 MG sublingual tablet Place 0.4 mg under the tongue as needed for chest pain (Hasn't used for a while) For chest pain place 1 tablet under the tongue every 5 minutes for 3 doses. If symptoms persist 5 minutes after 1st dose call 911.      ondansetron (ZOFRAN) 4 MG tablet Take by mouth as needed for nausea     pantoprazole (PROTONIX) 40 MG EC tablet Take 1 tablet by mouth as needed (reflux)      predniSONE (DELTASONE) 5 MG tablet Take 5 mg by mouth every morning      Probiotic Product (PROBIOTIC PO) Take 2 tablets by mouth every morning      Psyllium (METAMUCIL PO) Take by mouth every morning Hold 9-30-20     sulfamethoxazole-trimethoprim (BACTRIM) 400-80 MG tablet Take 1 tablet by mouth Every Mon, Wed, Fri Morning     tacrolimus (GENERIC EQUIVALENT) 1 MG capsule Take 2 capsules (2 mg) by mouth 2 times daily (Patient taking differently: Take 2 mg by mouth 2 times daily One tablet in the am  Andtablet  one at night)     temazepam (RESTORIL) 15 MG capsule Take 1 capsule (15 mg) by mouth as needed for sleep (twice a year)     Vitamin D, Cholecalciferol, 25 MCG (1000 UT) CAPS Take 1,000 Units by  mouth D3     Vitamin D3 (CHOLECALCIFEROL) 25 mcg (1000 units) tablet Take 1 tablet (25 mcg) by mouth every morning (Patient taking differently: Take 50 mcg by mouth every morning )     No current facility-administered medications for this visit.      There are no discontinued medications.    Physical Exam   Vital Signs: There were no vitals taken for this visit.    GENERAL APPEARANCE: alert and no distress  HENT: no obvious abnormalities on appearance  RESP: breathing appears unremarkable with normal rate, no audible wheezing or cough and no apparent shortness of breath with conversation  MS: extremities normal - no gross deformities noted  SKIN: no apparent rash and normal skin tone  NEURO: speech is clear with no obvious neurological deficits  PSYCH: mentation appears normal and affect normal    Data     Renal Latest Ref Rng & Units 6/4/2021 3/11/2021 1/4/2021   Na 133 - 144 mmol/L 134 136 138   K 3.4 - 5.3 mmol/L 3.6 3.7 3.5   Cl 94 - 109 mmol/L 103 107 106   CO2 20 - 32 mmol/L 26 29 27   BUN 7 - 30 mg/dL 14 12 15   Cr 0.52 - 1.04 mg/dL 0.75 0.74 0.70   Glucose 70 - 99 mg/dL 79 88 79   Ca  8.5 - 10.1 mg/dL 9.2 9.9 9.6     Bone Health Latest Ref Rng & Units 9/23/2020   Phos 2.5 - 4.5 mg/dL 3.4   PTHi 18 - 80 pg/mL 45     Heme Latest Ref Rng & Units 6/4/2021 3/11/2021 12/28/2020   WBC 4.0 - 11.0 10e9/L 6.2 6.7 9.2   Hgb 11.7 - 15.7 g/dL 14.5 16.9(H) 16.6(H)   Plt 150 - 450 10e9/L 190 179 308   ABSOLUTE NEUTROPHIL 1.6 - 8.3 10e9/L - - 6.4   ABSOLUTE LYMPHOCYTES 0.8 - 5.3 10e9/L - - 2.1   ABSOLUTE MONOCYTES 0.0 - 1.3 10e9/L - - 0.6   ABSOLUTE EOSINOPHILS 0.0 - 0.7 10e9/L - - 0.0   ABSOLUTE BASOPHILS 0.0 - 0.2 10e9/L - - 0.0   ABS IMMATURE GRANULOCYTES 0 - 0.4 10e9/L - - 0.0   ABSOLUTE NUCLEATED RBC - - - 0.0     Liver Latest Ref Rng & Units 6/4/2021 3/11/2021 1/4/2021   AP 40 - 150 U/L 61 72 77   TBili 0.2 - 1.3 mg/dL 1.0 0.8 0.7   DBili 0.0 - 0.2 mg/dL 0.2 0.2 -   ALT 0 - 50 U/L 22 24 24   AST 0 - 45 U/L 17 15 20    Tot Protein 6.8 - 8.8 g/dL 6.6(L) 7.2 6.8   Albumin 3.4 - 5.0 g/dL 3.4 3.8 3.3(L)           [facility] Txp Virology Latest Ref Rng & Units 1/4/2021 12/18/2020 9/23/2020   CVM DNA Quant - - Plasma Plasma   CMV QUANT IU/ML CMVND:CMV DNA Not Detected [IU]/mL - CMV DNA Not Detected CMV DNA Not Detected   LOG IU/ML OF CMVQNT <2.1 [Log:IU]/mL - Not Calculated Not Calculated   BK Spec - - - Plasma   BK Res BKNEG:BK Virus DNA Not Detected copies/mL - - BK Virus DNA Not Detected   BK Log <2.7 Log copies/mL - - Not Calculated   EBV CAPSID ANTIBODY IGG 0.0 - 0.8 AI >8.0(H) - -        Recent Labs   Lab Test 01/04/21  0933 03/11/21  1006 06/04/21  1018   DOSTAC 2,100 2200 [id number] 2,200   TACROL 4.3* 5.9 4.6*     Recent Labs   Lab Test 12/18/20  0611   DOSMPA 12/17 2051   MPACID 1.32   MPAG 23.7*

## 2021-06-09 ENCOUNTER — TELEPHONE (OUTPATIENT)
Dept: TRANSPLANT | Facility: CLINIC | Age: 61
End: 2021-06-09

## 2021-06-09 NOTE — TELEPHONE ENCOUNTER
Received message from Dr Angulo that pt was not aware she should start amoxicillin for UTI. rx prescribed by Dr Gonzales    Spoke w/ pt. She reports she has sx of vaginal itching and also reports she started having diarrhea this morning. Denies urinary frequency, urgency or burning. Just picked up amoxicillin.     Will review w/ Dr Gonzales again.

## 2021-06-09 NOTE — TELEPHONE ENCOUNTER
Catrachita Gonzales MD Ylitalo, Kim Michelle, ANNALISA               If she's asymptomatic, there would be no need for ABx.      OUTCOME:   Call placed to pt. Let her know she does not need amoxicillin antibiotic since she is not having symptoms of UTI. Recommended she f/u w/ pcp regarding vaginal discomfort. Pt v/u.

## 2021-06-10 ENCOUNTER — TELEPHONE (OUTPATIENT)
Dept: NEPHROLOGY | Facility: CLINIC | Age: 61
End: 2021-06-10

## 2021-06-28 ENCOUNTER — TELEPHONE (OUTPATIENT)
Dept: OBGYN | Facility: CLINIC | Age: 61
End: 2021-06-28

## 2021-06-28 NOTE — TELEPHONE ENCOUNTER
Belen states that pessary has been slipping down, and she thinks she needs next size up.    Discussed with Jessica, and she will order next sizes today, and will call Belen when the new pessary arrives.  Until then, will keep appointment as scheduled, and cancel if needed    Pt indicated understanding and agreed with plan.

## 2021-06-28 NOTE — TELEPHONE ENCOUNTER
----- Message from Van Dallas sent at 6/28/2021  8:44 AM CDT -----  Regarding: Questions about upcoming appt  Good morning,   Pt calling because she reports that  was going to order her something that they said would take 2 weeks, and she has an upcoming appt on 7/7 and is wondering if that needs to be rescheduled so she wouldn't have to come in twice for appts. She would like a call back to discuss. Thanks!

## 2021-06-29 ENCOUNTER — TELEPHONE (OUTPATIENT)
Dept: CARDIOLOGY | Facility: CLINIC | Age: 61
End: 2021-06-29

## 2021-06-29 NOTE — TELEPHONE ENCOUNTER
[facility] Call Center    Phone Message    May a detailed message be left on voicemail: no     Reason for Call: Other: Pt, Belen calling to get ECG/ EKG/ Echo- she just moved from [place], now in [place] and would like to get these done- Call to schedule: [phone]     Action Taken: Message routed to:  [facility] ([facility] Cardiology    Travel Screening: Not Applicable

## 2021-06-30 NOTE — TELEPHONE ENCOUNTER
Spoke with pt via telephone. Pt stating she has been having some episodes of dizziness and lightheadedness. A few months ago she was having some lower blood pressures, the lowest BP she has documented was 93/51 on May 24, 2021.     She also states she was having some significant chest pain about 2 weeks ago while shopping at [employer]. Her chest pain was severe enough that she had to sit down and take a nitroglycerin tablet. She states her chest pain was relieved after that.     Overall pt has been feeling very stressed d/t her recent move. She has also been feeling stressed because her brother was recently hospitalized d/t clotting issues. During that hospitalization he developed PE's and went into cardiac arrest requiring CPR. She also wanted to note that her aunt has also had clotting issues resulting in an embolic stroke.     Currently she is only taking an ASA and she is hesitant to start another blood thinner. She is requesting her baseline annual testing she used to have done in [place]. She states she usually would get an EKG and an Echo every year in [place] and she would like that done here as well. Esspecially now that she has been having dizzy spells and her recent episode of chest pain.     She is scheduled for a F2F appt with Dr. Mohamud in November but wants testing done sooner. I will reach out to Dr. Mohamud with this information to determine is she wants any of this testing done.     I instructed pt to let us know if she has any more dizzy spells, low BP's or chest pain episodes. She reports understanding. I told her I would contact Dr. Mohamud and reach out again with her recommendations.     Mino Putnam RN   Cardiology Nurse Coordinator

## 2021-07-02 ENCOUNTER — TELEPHONE (OUTPATIENT)
Dept: OBGYN | Facility: CLINIC | Age: 61
End: 2021-07-02

## 2021-07-02 ENCOUNTER — TELEPHONE (OUTPATIENT)
Dept: INTERNAL MEDICINE | Facility: CLINIC | Age: 61
End: 2021-07-02

## 2021-07-02 ENCOUNTER — TELEPHONE (OUTPATIENT)
Dept: TRANSPLANT | Facility: CLINIC | Age: 61
End: 2021-07-02

## 2021-07-02 DIAGNOSIS — Z12.31 ENCOUNTER FOR SCREENING MAMMOGRAM FOR BREAST CANCER: Primary | ICD-10-CM

## 2021-07-02 NOTE — TELEPHONE ENCOUNTER
Patient thinking she may need a larger pessary  Ordered two other sized which came today.  Patient aware and will be ready for clinic visit 7-7-21

## 2021-07-02 NOTE — TELEPHONE ENCOUNTER
[facility] Call Center    Phone Message    May a detailed message be left on voicemail: yes     Reason for Call: Order(s): Other:   Reason for requested: Referral for 3D mammogram   Date needed: asap  Provider name: Frederic      Patient states she was due last month and needs referral for 3D mammogram asap. Thank you!     Action Taken: Message routed to:  [facility] pcc    Travel Screening: Not Applicable

## 2021-07-02 NOTE — TELEPHONE ENCOUNTER
July 2, 2021 2:35 PM -  AIVERSE1: [facility]

## 2021-07-05 NOTE — TELEPHONE ENCOUNTER
Called patient.  3D Mammogram screening order placed.  Transferred patient to breast center to schedule mammogram screening.      Jose Eduardo Berg CMA ([facility]) at 10:07 AM on 7/5/2021

## 2021-07-07 ENCOUNTER — DOCUMENTATION ONLY (OUTPATIENT)
Dept: CARDIOLOGY | Facility: CLINIC | Age: 61
End: 2021-07-07

## 2021-07-07 ENCOUNTER — OFFICE VISIT (OUTPATIENT)
Dept: UROLOGY | Facility: CLINIC | Age: 61
End: 2021-07-07
Attending: OBSTETRICS & GYNECOLOGY
Payer: MEDICARE

## 2021-07-07 VITALS
DIASTOLIC BLOOD PRESSURE: 65 MMHG | BODY MASS INDEX: 22.1 KG/M2 | SYSTOLIC BLOOD PRESSURE: 115 MMHG | HEART RATE: 80 BPM | WEIGHT: 154 LBS

## 2021-07-07 DIAGNOSIS — N81.6 RECTOCELE: ICD-10-CM

## 2021-07-07 DIAGNOSIS — N81.11 CYSTOCELE, MIDLINE: Primary | ICD-10-CM

## 2021-07-07 PROCEDURE — G0463 HOSPITAL OUTPT CLINIC VISIT: HCPCS

## 2021-07-07 PROCEDURE — 57160 INSERT PESSARY/OTHER DEVICE: CPT | Performed by: OBSTETRICS & GYNECOLOGY

## 2021-07-07 ASSESSMENT — PAIN SCALES - GENERAL: PAINLEVEL: NO PAIN (0)

## 2021-07-07 NOTE — LETTER
7/7/2021       RE: Belen Sharma  [address] [place] [address]     Dear Colleague,    Thank you for referring your patient, Belen Sharma, to the [facility] [place] at [facility]. Please see a copy of my visit note below.    July 7, 2021    Referring Provider: Referred Self, MD  No address on file    Primary Care Provider: Vincent De La Garza    Pt returns for pessary resizing and refitting. Found to have prolapse on previous exam.    Pt fit with a # 9 ring with support. Demonstrated ability to insert and remove without difficulty. Pt counseled to insert and remove pessary and clean with warm water and soap on daily basis. F/U in 2 weeks for a pessary check or sooner for vaginal bleeding or discharge.    Gris Amador, MS4 07/07/21 2:59 PM    I attest that I was present for the history and personally performed the physical exam, pessary fitting and medical decision making and that I agree with the findings and treatment plan outlined above.     Zach Francis MD  Professor, OB/GYN  Urogynecologist        CC  Patient Care Team:  Vincent De La Garza MD as PCP - General (Internal Medicine)  Christy Mohamud MD as MD (Cardiology)  Divya Chávez, RN as Registered Nurse (Transplant)  Елена Dinh, RN as Registered Nurse  Christy Mohamud MD as Assigned Heart and Vascular Provider  Leventhal, Thomas Michael, MD as Assigned Gastroenterology Provider  Zach Francis MD as Assigned OBGYN Provider  Catrachita Gonzales MD as Assigned Infectious Disease Provider  Anna Gamble MD as Assigned Behavioral Health Provider  Vincent De La Garza MD as Assigned PCP  Saran Angulo MD as Assigned Nephrology Provider  SELF, REFERRED

## 2021-07-07 NOTE — PROGRESS NOTES
July 7, 2021    Referring Provider: Referred Self, MD  No address on file    Primary Care Provider: Vincent De La Garza    Pt returns for pessary resizing and refitting. Found to have prolapse on previous exam.    Pt fit with a # 9 ring with support. Demonstrated ability to insert and remove without difficulty. Pt counseled to insert and remove pessary and clean with warm water and soap on daily basis. F/U in 2 weeks for a pessary check or sooner for vaginal bleeding or discharge.    Gris Amador, MS4 07/07/21 2:59 PM    I attest that I was present for the history and personally performed the physical exam, pessary fitting and medical decision making and that I agree with the findings and treatment plan outlined above.     Zach Francis MD  Professor, OB/GYN  Urogynecologist        CC  Patient Care Team:  Vincent De La Garza MD as PCP - General (Internal Medicine)  Christy Mohamud MD as MD (Cardiology)  Divya Chávez, RN as Registered Nurse (Transplant)  Елена Dinh RN as Registered Nurse  Christy Mohamud MD as Assigned Heart and Vascular Provider  Leventhal, Thomas Michael, MD as Assigned Gastroenterology Provider  Zach Francis MD as Assigned OBGYN Provider  Catrachita Gonzales MD as Assigned Infectious Disease Provider  Anna Gamble MD as Assigned Behavioral Health Provider  Vincent De La Garza MD as Assigned PCP  Saran Angulo MD as Assigned Nephrology Provider  SELF, REFERRED

## 2021-07-08 NOTE — PROGRESS NOTES
The 10-year ASCVD risk score (Jose MENJIVAR Jr., et al., 2013) is: 3%    Values used to calculate the score:      Age: 61 years      Sex: Female      Is Non- : No      Diabetic: No      Tobacco smoker: No      Systolic Blood Pressure: 115 mmHg      Is BP treated: No      HDL Cholesterol: 53 mg/dL      Total Cholesterol: 196 mg/dL    Normal echo 8/13/20    ECG 12/21/2020 - normal sinus rhythm    CAC in 2019 was 19 (70th percentile)    Plan -   No indication for statin therapy  Aspiring 81 mg/d  Lipids and ECG at followup in Sept 2021    Christy Mohamud MD

## 2021-07-12 DIAGNOSIS — I20.89 STABLE ANGINA (H): ICD-10-CM

## 2021-07-12 DIAGNOSIS — R07.89 ATYPICAL CHEST PAIN: Primary | ICD-10-CM

## 2021-07-12 NOTE — PROGRESS NOTES
Pt had sent [employer] message requesting cardiac testing in august. Reported this information to Dr. Mohamud. She is recommending pt come in for EKG and lipid panel but does not believe we need repeat echo at this time.     Will update pt via [facility] message.     Mino Putnam, RN   Cardiology Nurse Coordinator

## 2021-07-13 ENCOUNTER — OFFICE VISIT (OUTPATIENT)
Dept: OBGYN | Facility: CLINIC | Age: 61
End: 2021-07-13
Attending: OBSTETRICS & GYNECOLOGY
Payer: MEDICARE

## 2021-07-13 ENCOUNTER — TELEPHONE (OUTPATIENT)
Dept: TRANSPLANT | Facility: CLINIC | Age: 61
End: 2021-07-13

## 2021-07-13 VITALS
BODY MASS INDEX: 22.42 KG/M2 | HEIGHT: 70 IN | WEIGHT: 156.6 LBS | HEART RATE: 84 BPM | DIASTOLIC BLOOD PRESSURE: 58 MMHG | SYSTOLIC BLOOD PRESSURE: 103 MMHG

## 2021-07-13 DIAGNOSIS — D06.9 CIN III WITH SEVERE DYSPLASIA: Primary | ICD-10-CM

## 2021-07-13 PROCEDURE — 99213 OFFICE O/P EST LOW 20 MIN: CPT | Performed by: OBSTETRICS & GYNECOLOGY

## 2021-07-13 PROCEDURE — 88175 CYTOPATH C/V AUTO FLUID REDO: CPT | Performed by: OBSTETRICS & GYNECOLOGY

## 2021-07-13 PROCEDURE — 87624 HPV HI-RISK TYP POOLED RSLT: CPT | Performed by: OBSTETRICS & GYNECOLOGY

## 2021-07-13 PROCEDURE — G0463 HOSPITAL OUTPT CLINIC VISIT: HCPCS

## 2021-07-13 ASSESSMENT — MIFFLIN-ST. JEOR: SCORE: 1355.58

## 2021-07-13 ASSESSMENT — PATIENT HEALTH QUESTIONNAIRE - PHQ9
5. POOR APPETITE OR OVEREATING: NOT AT ALL
SUM OF ALL RESPONSES TO PHQ QUESTIONS 1-9: 0

## 2021-07-13 ASSESSMENT — ANXIETY QUESTIONNAIRES
6. BECOMING EASILY ANNOYED OR IRRITABLE: NOT AT ALL
3. WORRYING TOO MUCH ABOUT DIFFERENT THINGS: NOT AT ALL
2. NOT BEING ABLE TO STOP OR CONTROL WORRYING: NOT AT ALL
5. BEING SO RESTLESS THAT IT IS HARD TO SIT STILL: NOT AT ALL
7. FEELING AFRAID AS IF SOMETHING AWFUL MIGHT HAPPEN: NOT AT ALL
1. FEELING NERVOUS, ANXIOUS, OR ON EDGE: NOT AT ALL
GAD7 TOTAL SCORE: 0

## 2021-07-13 NOTE — PROGRESS NOTES
Progress Note    SUBJECTIVE:  Belen Sharma is an 61 year old  , who requests a pelvic exam. 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 She is here for repeat pap smear today. Has immunosuppression from renal transplant. She has no other concerns.    HISTORY:    Allergies   Allergen Reactions     Erythromycin      Ibuprofen      Due to liver transplant     Immunization History   Administered Date(s) Administered     COVID-19,PF,Pfizer 2021, 2021     DT (PEDS <7y) 2001     FLU 6-35 months 11/15/2002, 2003, 10/26/2004, 10/24/2005, 2006     K7l4-77 Novel Flu 2009     HepB, Unspecified 2001     Influenza Vaccine IM Ages 6-35 Months 4 Valent (PF) 10/07/2009, 2011     Influenza,INJ,MDCK,PF,Quad >4yrs 10/15/2020     Meningococcal (Menactra ) 2013     Pneumococcal 23 valent 2002, 2013     TDAP Vaccine (Boostrix) 2013     Zoster vaccine recombinant adjuvanted (SHINGRIX) 10/15/2020, 2021       OB History    Para Term  AB Living   2 0 0 0 0 2   SAB TAB Ectopic Multiple Live Births   0 0 0 0 0     Past Medical History:   Diagnosis Date     Adolescent scoliosis     protruding     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 Both, very large, native kidneys reamain.     PONV (postoperative nausea and vomiting)     Need to start with ice chips and apple juice, no soda     S/P kidney transplant     SLK 10/9/2010 - kidney failure 2/2 [facility]. Explanted . Re-transplant after de-sensitization      S/P liver transplant (H)     10/9/2010 - [facility], ischemic biopathy. Re-transplant 3/3/2011     S/P splenectomy       Spider veins      Thyroid disease     Hyperthyroid treated with single dose iodine     Past Surgical History:   Procedure Laterality Date     bunectomy  2018    right foot     bunionectomy  2019    left     CHOLECYSTECTOMY       CONIZATION N/A 2020    Procedure: CONE BIOPSY, CERVIX;  Surgeon: Daysi Perez MD;  [facility]: [facility]     FAILED PICC - RIGHT ARM Right 2020    Has a LUE AV fistula.     HERNIA REPAIR, INCISIONAL  2013     IR DIALYSIS PTA CENTRAL SEG  2020     IR PICC PLACEMENT > 5 YRS OF AGE  2020     SPLENECTOMY      Splenectomy     TRANSPLANT KIDNEY RECIPIENT  DONOR      re-transplant after [facility]; 6 months de-sensitization prior AND 6 months after transplant     TRANSPLANT LIVER RECIPIENT  DONOR  2011     TRANSPLANT LIVER, KIDNEY RECIPIENT  DONOR, COMBINED  10/09/2010    HAT - re-Tx liver 3/3/2011; Kidney (left iliac) lost to [facility]/fibrosis - explant     VASCULAR SURGERY      Vascular access left UE. Not used for 4 years since 2nd kidney tx 2016 [place]     Family History   Problem Relation Age of Onset     Uterine Cancer Maternal Grandmother      Polycystic Kidney Diease Brother      Polycystic Kidney Diease Brother      Polycystic Kidney Diease Brother      Polycystic Kidney Diease Brother      Cervical Cancer Maternal Aunt      Depression Mother      Anxiety Disorder Mother      Depression Father      Anxiety Disorder Father      Social History     Socioeconomic History     Marital status:      Spouse name: None     Number of children: None     Years of education: None     Highest education level: None   Occupational History     None   Tobacco Use     Smoking status: Never Smoker     Smokeless tobacco: Never Used   Substance and Sexual Activity     Alcohol use: Not Currently     Drug use: Not Currently     Sexual activity: Not Currently   Other Topics Concern     Parent/sibling w/ CABG, MI or angioplasty  "before 65F 55M? Not Asked          EXAM:  Blood pressure 103/58, pulse 84, height 1.778 m (5' 10\"), weight 71 kg (156 lb 9.6 oz), not currently breastfeeding. Body mass index is 22.47 kg/m .  General appearance: Pleasant female in no acute distress.     PELVIC EXAM:  EG/BUS: Normal genital architecture without lesions, erythema or abnormal secretions   Urethral meatus: normal  Urethra: no masses, tenderness, or scarring  Vagina: moist, pink, rugae with creamy, white and odorless secretions, cystocele present  Cervix: no lesions  Rectum:anus normal       ASSESSMENT:  Encounter Diagnosis   Name Primary?     JANNETTE III with severe dysplasia Yes      61 year old here for pelvic exam with pap smear for CIN3 s/p CKC.    PLAN:   Orders Placed This Encounter   Procedures     HPV High Risk Types DNA Cervical     - Plan pending pap smear result  - If normal with negative HPV, repeat pap in 1 year    Return in one year/PRN for preventive care or problems/concerns.     Verbalized understanding and agreement with visit plan.    Loraine Naik MD  OB/GYN Resident, PGY-1    Appreciate note by Dr. Naik. Patient has been seen and examined by me with the resident, agree with above note.     Daysi Perez MD        "

## 2021-07-13 NOTE — LETTER
2021       RE: Belen Sharma  [address] [place] [address]     Dear Colleague,    Thank you for referring your patient, Belen Sharma, to the [facility] [place] at [facility]. Please see a copy of my visit note below.    Progress Note    SUBJECTIVE:  Belen Sharma is an 61 year old  , who requests a pelvic exam. 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 She is here for repeat pap smear today. Has immunosuppression from renal transplant. She has no other concerns.    HISTORY:    Allergies   Allergen Reactions     Erythromycin      Ibuprofen      Due to liver transplant     Immunization History   Administered Date(s) Administered     COVID-19,PF,Pfizer 2021, 2021     DT (PEDS <7y) 2001     FLU 6-35 months 11/15/2002, 2003, 10/26/2004, 10/24/2005, 2006     E6v6-14 Novel Flu 2009     HepB, Unspecified 2001     Influenza Vaccine IM Ages 6-35 Months 4 Valent (PF) 10/07/2009, 2011     Influenza,INJ,MDCK,PF,Quad >4yrs 10/15/2020     Meningococcal (Menactra ) 2013     Pneumococcal 23 valent 2002, 2013     TDAP Vaccine (Boostrix) 2013     Zoster vaccine recombinant adjuvanted (SHINGRIX) 10/15/2020, 2021       OB History    Para Term  AB Living   2 0 0 0 0 2   SAB TAB Ectopic Multiple Live Births   0 0 0 0 0     Past Medical History:   Diagnosis Date     Adolescent scoliosis     protruding     BK viremia [id number]     CMV pneumonia (H) 2010     Congenital hepatic fibrosis      Endometriosis      High grade squamous intraepithelial lesion of cervix 2020     History of angina      HPV (human papilloma virus) infection      Immunosuppression (H)      Polycystic kidney disease     Polycystic kidneys, tx kidney on the right. Both, very large, native kidneys reamain.     PONV (postoperative nausea and vomiting)      Need to start with ice chips and apple juice, no soda     S/P kidney transplant     SLK 10/9/2010 - kidney failure 2/2 [facility]. Explanted . Re-transplant after de-sensitization      S/P liver transplant (H)     10/9/2010 - HAT, ischemic biopathy. Re-transplant 3/3/2011     S/P splenectomy      Spider veins      Thyroid disease     Hyperthyroid treated with single dose iodine     Past Surgical History:   Procedure Laterality Date     bunectomy  2018    right foot     bunionectomy  2019    left     CHOLECYSTECTOMY       CONIZATION N/A 2020    Procedure: CONE BIOPSY, CERVIX;  Surgeon: Daysi Perez MD;  [facility]     FAILED PICC - RIGHT ARM Right 2020    Has a LUE AV fistula.     HERNIA REPAIR, INCISIONAL  2013     IR DIALYSIS PTA CENTRAL SEG  2020     IR PICC PLACEMENT > 5 YRS OF AGE  2020     SPLENECTOMY      Splenectomy     TRANSPLANT KIDNEY RECIPIENT  DONOR      re-transplant after [facility]; 6 months de-sensitization prior AND 6 months after transplant     TRANSPLANT LIVER RECIPIENT  DONOR  2011     TRANSPLANT LIVER, KIDNEY RECIPIENT  DONOR, COMBINED  10/09/2010    HAT - re-Tx liver 3/3/2011; Kidney (left iliac) lost to [facility]/fibrosis - explant     VASCULAR SURGERY      Vascular access left UE. Not used for 4 years since 2nd kidney tx 2016 [place]     Family History   Problem Relation Age of Onset     Uterine Cancer Maternal Grandmother      Polycystic Kidney Diease Brother      Polycystic Kidney Diease Brother      Polycystic Kidney Diease Brother      Polycystic Kidney Diease Brother      Cervical Cancer Maternal Aunt      Depression Mother      Anxiety Disorder Mother      Depression Father      Anxiety Disorder Father      Social History     Socioeconomic History     Marital status:      Spouse name: None     Number of children: None     Years of education: None     Highest education level: None  "  Occupational History     None   Tobacco Use     Smoking status: Never Smoker     Smokeless tobacco: Never Used   Substance and Sexual Activity     Alcohol use: Not Currently     Drug use: Not Currently     Sexual activity: Not Currently   Other Topics Concern     Parent/sibling w/ CABG, MI or angioplasty before 65F 55M? Not Asked          EXAM:  Blood pressure 103/58, pulse 84, height 1.778 m (5' 10\"), weight 71 kg (156 lb 9.6 oz), not currently breastfeeding. Body mass index is 22.47 kg/m .  General appearance: Pleasant female in no acute distress.     PELVIC EXAM:  EG/BUS: Normal genital architecture without lesions, erythema or abnormal secretions   Urethral meatus: normal  Urethra: no masses, tenderness, or scarring  Vagina: moist, pink, rugae with creamy, white and odorless secretions, cystocele present  Cervix: no lesions  Rectum:anus normal       ASSESSMENT:  Encounter Diagnosis   Name Primary?     JANNETTE III with severe dysplasia Yes      61 year old here for pelvic exam with pap smear for CIN3 s/p CKC.    PLAN:   Orders Placed This Encounter   Procedures     HPV High Risk Types DNA Cervical     - Plan pending pap smear result  - If normal with negative HPV, repeat pap in 1 year    Return in one year/PRN for preventive care or problems/concerns.     Verbalized understanding and agreement with visit plan.    Loraine Naik MD  OB/GYN Resident, PGY-1    Appreciate note by Dr. Naik. Patient has been seen and examined by me with the resident, agree with above note.     Daysi Perez MD      "

## 2021-07-13 NOTE — TELEPHONE ENCOUNTER
Patient Call: General  Route to LPN    Reason for call: connect with pt regarding dental work     Call back needed? Yes    Return Call Needed  Same as documented in contacts section  When to return call?: Greater than one day: Route standard priority

## 2021-07-14 ASSESSMENT — ANXIETY QUESTIONNAIRES: GAD7 TOTAL SCORE: 0

## 2021-07-15 LAB
BKR LAB AP GYN ADEQUACY: NORMAL
BKR LAB AP GYN INTERPRETATION: NORMAL
BKR LAB AP HPV REFLEX: NORMAL
BKR LAB AP PREVIOUS ABNL DX: NORMAL
BKR LAB AP PREVIOUS ABNORMAL: NORMAL
PATH REPORT.COMMENTS IMP SPEC: NORMAL
PATH REPORT.RELEVANT HX SPEC: NORMAL

## 2021-07-16 ENCOUNTER — TELEPHONE (OUTPATIENT)
Dept: TRANSPLANT | Facility: CLINIC | Age: 61
End: 2021-07-16

## 2021-07-16 DIAGNOSIS — Z94.0 KIDNEY TRANSPLANTED: Primary | ICD-10-CM

## 2021-07-16 RX ORDER — AMOXICILLIN 500 MG/1
2000 CAPSULE ORAL ONCE
Qty: 4 CAPSULE | Refills: 0 | Status: SHIPPED | OUTPATIENT
Start: 2021-07-16 | End: 2021-07-16

## 2021-07-16 NOTE — TELEPHONE ENCOUNTER
Patient Call: General  Route to LPNp    Reason for call: pt had talked with Dr Angulo and he said OK to have Amoxicillin before dental procedures  She has been taking a pre med for the past 10 yrs since tx  Needs a call back to get Rx  Dental appointment on Wed     Call back needed? Yes    Return Call Needed  Same as documented in contacts section  When to return call?: Same day: Route High Priority

## 2021-07-19 LAB
HUMAN PAPILLOMA VIRUS 16 DNA: NEGATIVE
HUMAN PAPILLOMA VIRUS 18 DNA: NEGATIVE
HUMAN PAPILLOMA VIRUS FINAL DIAGNOSIS: NORMAL
HUMAN PAPILLOMA VIRUS OTHER HR: POSITIVE

## 2021-07-19 NOTE — TELEPHONE ENCOUNTER
Saran Angulo MD  You 3 days ago     I wrote for amoxicillin 2g x1 prior to dental work and sent to her [facility]    Message text      [employer] sent to Belen with update.

## 2021-07-23 ENCOUNTER — ANCILLARY PROCEDURE (OUTPATIENT)
Dept: MAMMOGRAPHY | Facility: CLINIC | Age: 61
End: 2021-07-23
Payer: MEDICARE

## 2021-07-23 DIAGNOSIS — Z12.31 VISIT FOR SCREENING MAMMOGRAM: ICD-10-CM

## 2021-07-23 DIAGNOSIS — R92.8 OTHER ABNORMAL AND INCONCLUSIVE FINDINGS ON DIAGNOSTIC IMAGING OF BREAST: ICD-10-CM

## 2021-07-23 DIAGNOSIS — M79.622 LEFT AXILLARY PAIN: ICD-10-CM

## 2021-07-23 PROCEDURE — 77067 SCR MAMMO BI INCL CAD: CPT | Performed by: RADIOLOGY

## 2021-07-23 PROCEDURE — 77063 BREAST TOMOSYNTHESIS BI: CPT | Performed by: RADIOLOGY

## 2021-07-26 ENCOUNTER — HOSPITAL ENCOUNTER (OUTPATIENT)
Dept: CARDIOLOGY | Facility: CLINIC | Age: 61
End: 2021-07-26
Attending: INTERNAL MEDICINE
Payer: MEDICARE

## 2021-07-26 DIAGNOSIS — R07.89 ATYPICAL CHEST PAIN: ICD-10-CM

## 2021-07-26 DIAGNOSIS — I20.89 STABLE ANGINA (H): ICD-10-CM

## 2021-07-26 LAB
ATRIAL RATE - MUSE: 72 BPM
DIASTOLIC BLOOD PRESSURE - MUSE: NORMAL MMHG
INTERPRETATION ECG - MUSE: NORMAL
P AXIS - MUSE: 76 DEGREES
PR INTERVAL - MUSE: 132 MS
QRS DURATION - MUSE: 96 MS
QT - MUSE: 374 MS
QTC - MUSE: 409 MS
R AXIS - MUSE: 69 DEGREES
SYSTOLIC BLOOD PRESSURE - MUSE: NORMAL MMHG
T AXIS - MUSE: 50 DEGREES
VENTRICULAR RATE- MUSE: 72 BPM

## 2021-07-26 PROCEDURE — 93010 ELECTROCARDIOGRAM REPORT: CPT | Mod: HOP | Performed by: GENERAL ACUTE CARE HOSPITAL

## 2021-07-27 ENCOUNTER — VIRTUAL VISIT (OUTPATIENT)
Dept: GASTROENTEROLOGY | Facility: CLINIC | Age: 61
End: 2021-07-27
Attending: INTERNAL MEDICINE
Payer: MEDICARE

## 2021-07-27 DIAGNOSIS — Z48.298 CARE AFTER ORGAN TRANSPLANT: ICD-10-CM

## 2021-07-27 DIAGNOSIS — Z94.0 S/P KIDNEY TRANSPLANT: ICD-10-CM

## 2021-07-27 DIAGNOSIS — Q61.3 POLYCYSTIC KIDNEY DISEASE: ICD-10-CM

## 2021-07-27 DIAGNOSIS — Z79.2 PROPHYLACTIC ANTIBIOTIC: ICD-10-CM

## 2021-07-27 DIAGNOSIS — D84.9 IMMUNOSUPPRESSION (H): Primary | ICD-10-CM

## 2021-07-27 DIAGNOSIS — Z94.4 HISTORY OF LIVER TRANSPLANT (H): ICD-10-CM

## 2021-07-27 PROBLEM — Z76.82 PATIENT ON WAITING LIST FOR ORGAN TRANSPLANT: Status: RESOLVED | Noted: 2020-09-21 | Resolved: 2021-07-27

## 2021-07-27 PROCEDURE — 99214 OFFICE O/P EST MOD 30 MIN: CPT | Mod: 95 | Performed by: INTERNAL MEDICINE

## 2021-07-27 RX ORDER — GUAIFENESIN 600 MG/1
1 TABLET, EXTENDED RELEASE ORAL 2 TIMES DAILY PRN
COMMUNITY

## 2021-07-27 ASSESSMENT — PAIN SCALES - GENERAL: PAINLEVEL: NO PAIN (0)

## 2021-07-27 NOTE — LETTER
2021         RE: Belen Sharma  [address] [place] [address]        Dear Colleague,    Thank you for referring your patient, Belen Sharma, to the [facility] HEPATOLOGY [facility]. Please see a copy of my visit note below.    Belen is a 61 year old who is being evaluated via a billable video visit.      How would you like to obtain your AVS? MyChart  If the video visit is dropped, the invitation should be resent by: Send to e-mail at: [email or web address]  Will anyone else be joining your video visit? No    Video Start Time: 820  Video-Visit Details    Type of service:  Video Visit    Video End Time:8:20 AM    Originating Location (pt. Location): Home    Distant Location (provider location):  [facility]PAT[facility]     Platform used for Video Visit: Quaam    Date of Service: 2021     Subjective:          Belen Sharma is a 61 year old female presenting for follow up with history of liver transplantation    History of Present Illness   Belen Sharma is a 61 year old female with past medical history of congenital hepatic fibrosis and polycystic kidney disease who status post SLK in 2010.  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  Her kidney transplant was removed in 2012.  She underwent a splenectomy in 2013.  Ultimately she underwent retransplant with  donor kidney in  after desensitization protocol. She established care with our clinic 2020    Since last seen she has done quite well.  She was admitted to the hospital in December with a urinary tract infection.  She has been seen and followed by transplant nephrology and recently had a  decrease in her daily prednisone dose.  She notes that she does have some anxiety which she questions may be related to the medications.  She notes that she continues to be very cautious in the setting of getting out of the home secondary to some debility from chronic back issues as well as concerns about the delta variant and risk for infection in the post transplant setting    She admits that she is eating well and trying to target a stable weight    She is not had any issues with taking her medications daily    She has questions regarding feasibility of removing polycystic kidneys as she is having progressive back pain    Her liver transplant history is as above.  She has been following with hepatologist Dr. Zach Ray with the [facility] in [place].  She is not had any significant complications of her second liver transplant and appears to have excellent allograft function at this time.    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 Course complicated by multiple severe infections (including CMV pneumonia). 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 Both, very large, native kidneys reamain.     PONV (postoperative nausea and vomiting)     Need to start with ice chips and apple juice, no soda      S/P kidney transplant     SLK 10/9/2010 - kidney failure 2/2 [facility]. Explanted . Re-transplant after de-sensitization      S/P liver transplant (H)     10/9/2010 - HAT, ischemic biopathy. Re-transplant 3/3/2011     S/P splenectomy      Spider veins      Thyroid disease     Hyperthyroid treated with single dose iodine       Past Surgical History:  Past Surgical History:   Procedure Laterality Date     bunectomy  2018    right foot     bunionectomy  2019    left     CHOLECYSTECTOMY       CONIZATION N/A 2020    Procedure: CONE BIOPSY, CERVIX;  Surgeon: Daysi Perez MD;  [facility]: [facility]     FAILED PICC - RIGHT ARM Right 2020    Has a LUE AV fistula.     HERNIA REPAIR, INCISIONAL  2013     IR DIALYSIS PTA CENTRAL SEG  2020     IR PICC PLACEMENT > 5 YRS OF AGE  2020     SPLENECTOMY      Splenectomy     TRANSPLANT KIDNEY RECIPIENT  DONOR      re-transplant after [facility]; 6 months de-sensitization prior AND 6 months after transplant     TRANSPLANT LIVER RECIPIENT  DONOR  2011     TRANSPLANT LIVER, KIDNEY RECIPIENT  DONOR, COMBINED  10/09/2010    HAT - re-Tx liver 3/3/2011; Kidney (left iliac) lost to [facility]/fibrosis - explant     VASCULAR SURGERY      Vascular access left UE. Not used for 4 years since 2nd kidney tx -- [facility] [place]       Past Social History:  Social History     Tobacco Use     Smoking status: Never Smoker     Smokeless tobacco: Never Used   Substance Use Topics     Alcohol use: Not Currently     Drug use: Not Currently        Family History:  Family History   Problem Relation Age of Onset     Uterine Cancer Maternal Grandmother      Polycystic Kidney Diease Brother      Polycystic Kidney Diease Brother      Polycystic Kidney Diease Brother      Polycystic Kidney Diease Brother      Cervical Cancer Maternal Aunt      Depression Mother      Anxiety Disorder Mother      Depression Father      Anxiety Disorder Father         Review of Systems  A complete 10 point review of systems was asked and answered in the negative unless specifically commented upon in the HPI    Current Outpatient Medications   Medication     acetaminophen (TYLENOL) 325 MG tablet     ALPRAZolam (XANAX) 0.25 MG tablet     aspirin (ASA) 81 MG chewable tablet     biotin 1000 MCG TABS tablet     diphenhydrAMINE (BENADRYL) 25 MG tablet     docusate sodium (COLACE) 100 MG capsule     estradiol (ESTRACE) 0.1 MG/GM vaginal cream     guaiFENesin (MUCINEX PO)     loratadine (CLARITIN) 10 MG tablet     Multiple Vitamins-Minerals (WOMENS DAILY FORMULA PO)     MYFORTIC (BRAND) 180 MG EC tablet     nitroGLYcerin (NITROSTAT) 0.4 MG sublingual tablet     ondansetron (ZOFRAN) 4 MG tablet     pantoprazole (PROTONIX) 40 MG EC tablet     predniSONE (DELTASONE) 5 MG tablet     Probiotic Product (PROBIOTIC PO)     Psyllium (METAMUCIL PO)     sulfamethoxazole-trimethoprim (BACTRIM) 400-80 MG tablet     tacrolimus (GENERIC EQUIVALENT) 1 MG capsule     temazepam (RESTORIL) 15 MG capsule     Vitamin D3 (CHOLECALCIFEROL) 25 mcg (1000 units) tablet     Vitamin D, Cholecalciferol, 25 MCG (1000 UT) CAPS     No current facility-administered medications for this visit.       Objective:         There were no vitals filed for this visit.  There is no height or weight on file to calculate BMI.     Labs: reviewed from Care Everywhere    Imaging: reviewed from Care Everywhere    Assessment and Plan:    S/P Liver Transplantation:   - s/p SLK 10/2010.  Complicated by HAT with resultant ischemic biliopathy leading to graft failure  - Re-Transplant 3/3/2011  - No allograft dysfunction: no history of rejection nor biliary strictures  - Will follow labs per routine    Immunosuppression:   - Given she had a kidney after liver transplant, her immunosuppression will be directed by the kidney transplant service.  - The bare minimum she would need from a liver transplant perspective is tacrolimus BID with  a goal trough of ~ 3  - Will plan to check immunosuppression trough levels per protocol to assess for adequate target dosing and will assess CBC and kidney function for toxicity of medications    Infectious Prophylaxis:   - on Bactrim; per transplant nephrology    Kidney Health:   - s/p DDKT x2, last in 2016  - follows with transplant nephrology    Nutrition/Weight:    It is very common for patients to put on significant weight after liver transplantation, as they become conditioned to eating as much as possible to maintain a healthy weight pre-transplant.  There is a very significant risk of developing fatty liver and non-alcoholic steatohepatitis in post-transplant patients, even in those who were not transplanted for TIWARI cirrhosis.  - Please work on eating a diet that is rice in fresh fruits and vegetables, moderate amounts of lean protein and dairy, and modest amounts of carbohydrates and fats.  - An exercise plan/regimen is an essential part of remaining healthy in the post-transplant setting and we recommend 30-35 minutes of exercise at least 4 days a week    Routine Health Care:  - You need to establish and maintain care with a primary care physician to manage: hypertension, high cholesterol, abnormal blood sugars - as these are all potential complications of your health after liver transplantation and will need a local physician to manage these issues.    - We will place a primary care consult  - All patients with liver diseaes (even post transplant) are at an increased risk for osteoporosis.  We strongly recommend screening for Vitamin D deficiency at least twice yearly with aggressive supplementation/replacement as indicated.    - We also recommend a screening DEXA scan to evaluate for osteoporosis.  If present, should treat with calcium, Vitamin D supplementation, and recommend consideration of bisphosphonate therapy.  Also recommend follow up DEXA scans to evaluate for improvement of bone density on  therapy.  - Recommend yearly skin exams with dermatology, and if skin cancers are found, recommend twice yearly exams  - Recommend you stay up to date for cancer screening: mammograms/PAP for women and prostate cancer screening for men, and colon cancer screening for all.  - Practice good hygiene with washing of hands and maintaining a clean living space as this will decrease your chances of developing an infection in the post-transplantation setting  - Eat a health diet: rich in fresh fruits and vegetables, lean proteins, and minimize carbohydrate and fat intake.       Thank you very much for the opportunity to participate in the care of this patient.  If you have any further questions, please don't hesitate to contact our office.    __________________________________  Thomas M. Leventhal, M.D.   of Medicine  Advanced & Transplant Hepatology  [facility]            Again, thank you for allowing me to participate in the care of your patient.        Sincerely,        Thomas M. Leventhal, MD

## 2021-07-27 NOTE — Clinical Note
She believes taht she is having worsening back pain and is concerned this is the PKD kidneys causing the problem (her brother had his resected and felt better). She would like to hear from the kidney team if this is a feasible option (I didn't suggest this was a good option given the morbidity of that surgery)  TL

## 2021-07-27 NOTE — PROGRESS NOTES
Belen is a 61 year old who is being evaluated via a billable video visit.      How would you like to obtain your AVS? MyChart  If the video visit is dropped, the invitation should be resent by: Send to e-mail at: [email or web address]  Will anyone else be joining your video visit? No    Video Start Time: 820  Video-Visit Details    Type of service:  Video Visit    Video End Time:8:20 AM    Originating Location (pt. Location): Home    Distant Location (provider location):  [facility] [place]     Platform used for Video Visit: Step-In    Date of Service: 2021     Subjective:          Belen Sharma is a 61 year old female presenting for follow up with history of liver transplantation    History of Present Illness   Belen Sharma is a 61 year old female with past medical history of congenital hepatic fibrosis and polycystic kidney disease who status post SLK in 2010.  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  Her kidney transplant was removed in 2012.  She underwent a splenectomy in 2013.  Ultimately she underwent retransplant with  donor kidney in  after desensitization protocol. She established care with our clinic 2020    Since last seen she has done quite well.  She was admitted to the hospital in December with a urinary tract infection.  She has been seen and followed by transplant nephrology and recently had a decrease in her daily prednisone dose.  She notes that she does have some anxiety which she questions may be related to the medications.  She notes that she continues to be very cautious in the setting of getting out of the home secondary to some debility  from chronic back issues as well as concerns about the delta variant and risk for infection in the post transplant setting    She admits that she is eating well and trying to target a stable weight    She is not had any issues with taking her medications daily    She has questions regarding feasibility of removing polycystic kidneys as she is having progressive back pain    Her liver transplant history is as above.  She has been following with hepatologist Dr. Zach Ray with the [facility] in [place].  She is not had any significant complications of her second liver transplant and appears to have excellent allograft function at this time.    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 Course complicated by multiple severe infections (including CMV pneumonia). Re-listed for transplant    - DDLT (#2 liver): 3/3/2011  - Technique: orthotopic, duct to duct  - Complications: none    Past Medical History:  Past Medical History:   Diagnosis Date     Adolescent scoliosis     protruding     BK viremia [id number]     CMV pneumonia (H)      Congenital hepatic fibrosis      Endometriosis      High grade squamous intraepithelial lesion of cervix 2020     History of angina      HPV (human papilloma virus) infection      Immunosuppression (H)      Polycystic kidney disease     Polycystic kidneys, tx kidney on the right. Both, very large, native kidneys reamain.     PONV (postoperative nausea and vomiting)     Need to start with ice chips and apple juice, no soda     S/P kidney transplant     SLK 10/9/2010 - kidney failure 2/2 [facility]. Explanted . Re-transplant after de-sensitization      S/P liver transplant (H)     10/9/2010 - HAT, ischemic biopathy. Re-transplant 3/3/2011     S/P splenectomy      Spider veins       Thyroid disease     Hyperthyroid treated with single dose iodine       Past Surgical History:  Past Surgical History:   Procedure Laterality Date     bunectomy  2018    right foot     bunionectomy  2019    left     CHOLECYSTECTOMY       CONIZATION N/A 2020    Procedure: CONE BIOPSY, CERVIX;  Surgeon: Daysi Perez MD;  [facility]: [facility]     FAILED PICC - RIGHT ARM Right 2020    Has a LUE AV fistula.     HERNIA REPAIR, INCISIONAL  2013     IR DIALYSIS PTA CENTRAL SEG  2020     IR PICC PLACEMENT > 5 YRS OF AGE  2020     SPLENECTOMY      Splenectomy     TRANSPLANT KIDNEY RECIPIENT  DONOR      re-transplant after [facility]; 6 months de-sensitization prior AND 6 months after transplant     TRANSPLANT LIVER RECIPIENT  DONOR  2011     TRANSPLANT LIVER, KIDNEY RECIPIENT  DONOR, COMBINED  10/09/2010    HAT - re-Tx liver 3/3/2011; Kidney (left iliac) lost to [facility]/fibrosis - explant     VASCULAR SURGERY      Vascular access left UE. Not used for 4 years since 2nd kidney tx 2016 [place]       Past Social History:  Social History     Tobacco Use     Smoking status: Never Smoker     Smokeless tobacco: Never Used   Substance Use Topics     Alcohol use: Not Currently     Drug use: Not Currently        Family History:  Family History   Problem Relation Age of Onset     Uterine Cancer Maternal Grandmother      Polycystic Kidney Diease Brother      Polycystic Kidney Diease Brother      Polycystic Kidney Diease Brother      Polycystic Kidney Diease Brother      Cervical Cancer Maternal Aunt      Depression Mother      Anxiety Disorder Mother      Depression Father      Anxiety Disorder Father        Review of Systems  A complete 10 point review of systems was asked and answered in the negative unless specifically commented upon in the HPI    Current Outpatient Medications   Medication     acetaminophen (TYLENOL) 325 MG tablet     ALPRAZolam (XANAX)  0.25 MG tablet     aspirin (ASA) 81 MG chewable tablet     biotin 1000 MCG TABS tablet     diphenhydrAMINE (BENADRYL) 25 MG tablet     docusate sodium (COLACE) 100 MG capsule     estradiol (ESTRACE) 0.1 MG/GM vaginal cream     guaiFENesin (MUCINEX PO)     loratadine (CLARITIN) 10 MG tablet     Multiple Vitamins-Minerals (WOMENS DAILY FORMULA PO)     MYFORTIC (BRAND) 180 MG EC tablet     nitroGLYcerin (NITROSTAT) 0.4 MG sublingual tablet     ondansetron (ZOFRAN) 4 MG tablet     pantoprazole (PROTONIX) 40 MG EC tablet     predniSONE (DELTASONE) 5 MG tablet     Probiotic Product (PROBIOTIC PO)     Psyllium (METAMUCIL PO)     sulfamethoxazole-trimethoprim (BACTRIM) 400-80 MG tablet     tacrolimus (GENERIC EQUIVALENT) 1 MG capsule     temazepam (RESTORIL) 15 MG capsule     Vitamin D3 (CHOLECALCIFEROL) 25 mcg (1000 units) tablet     Vitamin D, Cholecalciferol, 25 MCG (1000 UT) CAPS     No current facility-administered medications for this visit.       Objective:         There were no vitals filed for this visit.  There is no height or weight on file to calculate BMI.     Labs: reviewed from Care Everywhere    Imaging: reviewed from Care Everywhere    Assessment and Plan:    S/P Liver Transplantation:   - s/p SLK 10/2010.  Complicated by HAT with resultant ischemic biliopathy leading to graft failure  - Re-Transplant 3/3/2011  - No allograft dysfunction: no history of rejection nor biliary strictures  - Will follow labs per routine    Immunosuppression:   - Given she had a kidney after liver transplant, her immunosuppression will be directed by the kidney transplant service.  - The bare minimum she would need from a liver transplant perspective is tacrolimus BID with a goal trough of ~ 3  - Will plan to check immunosuppression trough levels per protocol to assess for adequate target dosing and will assess CBC and kidney function for toxicity of medications    Infectious Prophylaxis:   - on Bactrim; per transplant  nephrology    Kidney Health:   - s/p DDKT x2, last in 2016  - follows with transplant nephrology    Nutrition/Weight:    It is very common for patients to put on significant weight after liver transplantation, as they become conditioned to eating as much as possible to maintain a healthy weight pre-transplant.  There is a very significant risk of developing fatty liver and non-alcoholic steatohepatitis in post-transplant patients, even in those who were not transplanted for TIWARI cirrhosis.  - Please work on eating a diet that is rice in fresh fruits and vegetables, moderate amounts of lean protein and dairy, and modest amounts of carbohydrates and fats.  - An exercise plan/regimen is an essential part of remaining healthy in the post-transplant setting and we recommend 30-35 minutes of exercise at least 4 days a week    Routine Health Care:  - You need to establish and maintain care with a primary care physician to manage: hypertension, high cholesterol, abnormal blood sugars - as these are all potential complications of your health after liver transplantation and will need a local physician to manage these issues.    - We will place a primary care consult  - All patients with liver diseaes (even post transplant) are at an increased risk for osteoporosis.  We strongly recommend screening for Vitamin D deficiency at least twice yearly with aggressive supplementation/replacement as indicated.    - We also recommend a screening DEXA scan to evaluate for osteoporosis.  If present, should treat with calcium, Vitamin D supplementation, and recommend consideration of bisphosphonate therapy.  Also recommend follow up DEXA scans to evaluate for improvement of bone density on therapy.  - Recommend yearly skin exams with dermatology, and if skin cancers are found, recommend twice yearly exams  - Recommend you stay up to date for cancer screening: mammograms/PAP for women and prostate cancer screening for men, and colon cancer  screening for all.  - Practice good hygiene with washing of hands and maintaining a clean living space as this will decrease your chances of developing an infection in the post-transplantation setting  - Eat a health diet: rich in fresh fruits and vegetables, lean proteins, and minimize carbohydrate and fat intake.       Thank you very much for the opportunity to participate in the care of this patient.  If you have any further questions, please don't hesitate to contact our office.    __________________________________  Thomas M. Leventhal, M.D.   of Medicine  Advanced & Transplant Hepatology  [facility]

## 2021-07-28 ENCOUNTER — TELEPHONE (OUTPATIENT)
Dept: TRANSPLANT | Facility: CLINIC | Age: 61
End: 2021-07-28

## 2021-07-28 DIAGNOSIS — Z94.0 KIDNEY TRANSPLANTED: Primary | ICD-10-CM

## 2021-07-28 DIAGNOSIS — Q61.3 POLYCYSTIC KIDNEY: ICD-10-CM

## 2021-07-28 NOTE — TELEPHONE ENCOUNTER
Saran Angulo MD Lavelle Peterson, Meghan M, RN  Can you please obtain U/S native kidneys? After this is obtained I can recommend for or against having transplant surgery see her for consideration of native nephrectomy.           Previous Messages       ----- Message -----   From: Leventhal, Thomas Michael, MD   Sent: 7/27/2021   8:27 AM CDT   To: Saran Angulo MD     She believes taht she is having worsening back pain and is concerned this is the PKD kidneys causing the problem (her brother had his resected and felt better). She would like to hear from the kidney team if this is a feasible option (I didn't suggest this was a good option given the morbidity of that surgery)   TL       Order placed for renal US, Belen v/u.

## 2021-07-30 ENCOUNTER — TELEPHONE (OUTPATIENT)
Dept: INTERNAL MEDICINE | Facility: CLINIC | Age: 61
End: 2021-07-30

## 2021-07-30 ENCOUNTER — TELEPHONE (OUTPATIENT)
Dept: TRANSPLANT | Facility: CLINIC | Age: 61
End: 2021-07-30

## 2021-07-30 NOTE — TELEPHONE ENCOUNTER
Spoke to Joelle from [facility] accessing who reported incidental finding.    Left axillary vascular engorgement. Report is in Epic. Dang Leung LPN 7/30/2021 12:54 PM    Noted and sent to DR Frederic Castellano RN 12:35 PM on 8/2/2021.

## 2021-08-02 NOTE — TELEPHONE ENCOUNTER
If possible, could you add on Ms. Zachary this Friday to discuss this finding further    Thanks, YARELIS De La Garza

## 2021-08-03 ENCOUNTER — ANCILLARY PROCEDURE (OUTPATIENT)
Dept: ULTRASOUND IMAGING | Facility: CLINIC | Age: 61
End: 2021-08-03
Attending: INTERNAL MEDICINE
Payer: MEDICARE

## 2021-08-03 DIAGNOSIS — Q61.3 POLYCYSTIC KIDNEY: ICD-10-CM

## 2021-08-03 DIAGNOSIS — Z94.0 KIDNEY TRANSPLANTED: ICD-10-CM

## 2021-08-03 PROCEDURE — 76770 US EXAM ABDO BACK WALL COMP: CPT | Performed by: RADIOLOGY

## 2021-08-04 ENCOUNTER — TELEPHONE (OUTPATIENT)
Dept: TRANSPLANT | Facility: CLINIC | Age: 61
End: 2021-08-04

## 2021-08-04 DIAGNOSIS — N95.2 VAGINAL ATROPHY: ICD-10-CM

## 2021-08-04 NOTE — TELEPHONE ENCOUNTER
"Issue:  Renal US shows mass of native kidneys, hx of PKD.  Belen c/o increasing back pain.    Plan:  Saran Angulo MD Lavelle Peterson, Meghan M, RN  Ok to refer to txp surgery for consideration of native nephrectomy although I would caution her that it is a big surgery and I wouldn't \"rock the boat\" unless she is absolutely miserable     Call placed to Belen Sharma to review plan. She v/u and is agreeable to see transplant surgery. Message sent to scheduling.     "

## 2021-08-06 ENCOUNTER — OFFICE VISIT (OUTPATIENT)
Dept: INTERNAL MEDICINE | Facility: CLINIC | Age: 61
End: 2021-08-06
Payer: MEDICARE

## 2021-08-06 VITALS
SYSTOLIC BLOOD PRESSURE: 96 MMHG | HEART RATE: 94 BPM | BODY MASS INDEX: 22.33 KG/M2 | OXYGEN SATURATION: 94 % | DIASTOLIC BLOOD PRESSURE: 57 MMHG | WEIGHT: 156 LBS | HEIGHT: 70 IN

## 2021-08-06 DIAGNOSIS — M79.606 PAIN OF LOWER EXTREMITY, UNSPECIFIED LATERALITY: ICD-10-CM

## 2021-08-06 DIAGNOSIS — M41.00 INFANTILE IDIOPATHIC SCOLIOSIS, UNSPECIFIED SPINAL REGION: Primary | ICD-10-CM

## 2021-08-06 PROCEDURE — 99215 OFFICE O/P EST HI 40 MIN: CPT | Performed by: INTERNAL MEDICINE

## 2021-08-06 RX ORDER — ESTRADIOL 0.1 MG/G
1 CREAM VAGINAL
Qty: 42.5 G | Refills: 3 | Status: SHIPPED | OUTPATIENT
Start: 2021-08-09 | End: 2022-08-03

## 2021-08-06 ASSESSMENT — PAIN SCALES - GENERAL: PAINLEVEL: MILD PAIN (3)

## 2021-08-06 ASSESSMENT — MIFFLIN-ST. JEOR: SCORE: 1352.86

## 2021-08-06 NOTE — PATIENT INSTRUCTIONS
[facility] Medication Refill Request Information:  * Please contact your pharmacy regarding ANY request for medication refills.  ** [facility] Prescription Fax = [phone]  * Please allow 3 business days for routine medication refills.  * Please allow 5 business days for controlled substance medication refills.     [facility] Test Result notification information:  *You will be notified with in 7-10 days of your appointment day regarding the results of your test.  If you are on MyChart you will be notified as soon as the provider has reviewed the results and signed off on them.    [facility]: [phone]

## 2021-08-06 NOTE — PROGRESS NOTES
HPI:    Pt. With complicated h/o polycystic renal disease and liver fibrosis s/p both renal and liver transplant(s) come into establish care today. Overall stable. She states chronic B foot pain from previous surgeries and toenail changes. She has scoliosis and some minor lower back pain for a few months. No radicular sxs. She has L upper arm dialysis graft site and has some occ. Numbness. No L arm swelling. No other HEENT, cardiopulmonary, abdominal, , GYN, neurological, systemic, psychiatric, endocrine complaints.     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 Both, very large, native kidneys reamain.     PONV (postoperative nausea and vomiting)     Need to start with ice chips and apple juice, no soda     S/P kidney transplant     SLK 10/9/2010 - kidney failure 2/2 [facility]. Explanted 2012. Re-transplant after de-sensitization 2016     S/P liver transplant (H)     10/9/2010 - [facility], ischemic biopathy. Re-transplant 3/3/2011     S/P splenectomy      Spider veins 2019     Thyroid disease     Hyperthyroid treated with single dose iodine     Past Surgical History:   Procedure Laterality Date     bunectomy  08/01/2018    right foot     bunionectomy  01/2019    left     CHOLECYSTECTOMY       CONIZATION N/A 09/30/2020    Procedure: CONE BIOPSY, CERVIX;  Surgeon: Daysi Perez MD;  [facility]: [facility]     FAILED PICC - RIGHT ARM Right 12/19/2020    Has a LUE AV fistula.     HERNIA REPAIR, INCISIONAL  03/2013     IR DIALYSIS PTA CENTRAL SEG  12/19/2020     IR PICC PLACEMENT > 5 YRS OF AGE  12/19/2020     SPLENECTOMY      Splenectomy     TRANSPLANT  KIDNEY RECIPIENT  DONOR  2016    re-transplant after [facility]; 6 months de-sensitization prior AND 6 months after transplant     TRANSPLANT LIVER RECIPIENT  DONOR  2011     TRANSPLANT LIVER, KIDNEY RECIPIENT  DONOR, COMBINED  10/09/2010    HAT - re-Tx liver 3/3/2011; Kidney (left iliac) lost to [facility]/fibrosis - explant     VASCULAR SURGERY      Vascular access left UE. Not used for 4 years since 2nd kidney tx 2016 [place]     PE:    Vitals noted, gen, nad, cooperative, alert, neck supple nl rom, lungs with good air movement, L upper arm with large vascular dialysis access AV graft with some venous collaterals L upper chest wall. No tenderness and no swelling. RRR, S1, S2, no MRG, abdomen, no acute findings. Grossly normal neurological exam. B feet with dysmorphic toenails and several surgery scars. She has back scoliosis; no tenderness.     A/P:    1. Dermatology follow up 2021  2. Cardiology follow up with Dr. Mohamud 2021. Seen 2021 last visit   3. GYN appt. With Dr. Perez 2021. Last visit with Dr. Perez 2021   4. Seen in GI clinic Dr. Leventhal 2021 for liver transplant   5. Renal transplant followed in Nephrology clinic.  Last visit 2021   6. Mammogram 2021 with L axillary vascular engorgement   7. Placed podiatry referral for foot care and previous surgeries  8. Placed orthopedic spine referral to Dr. Martines for h/o scoliosis.   9. She states she should be seen in transplant surgery clinic to discuss native kidneys. She can also discuss her L upper arm dialysis graft sxs. Also can discuss mammogram findings of L axillary vascular congestion findings on mammogram from 2021. This probably is due to L upper arm dialysis graft.   10. Colonoscopy 2019 and repeat in 5  Years.     40 minutes spent on the date of the encounter doing chart review, history and exam, documentation and further activities as noted above

## 2021-08-06 NOTE — NURSING NOTE
Chief Complaint   Patient presents with     Pain     patient is here for pain discussion        Jennifer Carbajal, EMT at 2:58 PM on 8/6/2021.

## 2021-08-09 ENCOUNTER — TELEPHONE (OUTPATIENT)
Dept: TRANSPLANT | Facility: CLINIC | Age: 61
End: 2021-08-09

## 2021-08-09 DIAGNOSIS — M41.9 SCOLIOSIS: Primary | ICD-10-CM

## 2021-08-09 LAB — RADIOLOGIST FLAGS: NORMAL

## 2021-08-09 NOTE — TELEPHONE ENCOUNTER
DIAGNOSIS: liver/kidney transplant with scoliosis/no imaging/Vincent De La Garza MD in [facility]/orthocn   APPOINTMENT DATE:8.10.21    NOTES STATUS DETAILS   OFFICE NOTE from referring provider Internal 8.6.21 ADOLFO De La Garza [facility]   OFFICE NOTE from other specialist N/A    DISCHARGE SUMMARY from hospital N/A    DISCHARGE REPORT from the ER N/A    OPERATIVE REPORT N/A    MEDICATION LIST Internal    EMG (for Spine) N/A    IMPLANT RECORD/STICKER N/A    LABS     CBC/DIFF Care Everywhere    CULTURES N/A    INJECTIONS DONE IN RADIOLOGY N/A    MRI N/A    CT SCAN N/A    XRAYS (IMAGES & REPORTS) N/A    TUMOR     PATHOLOGY  Slides & report N/A

## 2021-08-10 ENCOUNTER — OFFICE VISIT (OUTPATIENT)
Dept: ORTHOPEDICS | Facility: CLINIC | Age: 61
End: 2021-08-10
Attending: INTERNAL MEDICINE
Payer: MEDICARE

## 2021-08-10 ENCOUNTER — ANCILLARY PROCEDURE (OUTPATIENT)
Dept: GENERAL RADIOLOGY | Facility: CLINIC | Age: 61
End: 2021-08-10
Attending: ORTHOPAEDIC SURGERY
Payer: MEDICARE

## 2021-08-10 ENCOUNTER — TELEPHONE (OUTPATIENT)
Dept: TRANSPLANT | Facility: CLINIC | Age: 61
End: 2021-08-10

## 2021-08-10 ENCOUNTER — PRE VISIT (OUTPATIENT)
Dept: ORTHOPEDICS | Facility: CLINIC | Age: 61
End: 2021-08-10

## 2021-08-10 VITALS — BODY MASS INDEX: 22.96 KG/M2 | WEIGHT: 155 LBS | HEIGHT: 69 IN

## 2021-08-10 DIAGNOSIS — M54.50 LUMBAR PAIN: Primary | ICD-10-CM

## 2021-08-10 DIAGNOSIS — M41.00 INFANTILE IDIOPATHIC SCOLIOSIS, UNSPECIFIED SPINAL REGION: ICD-10-CM

## 2021-08-10 DIAGNOSIS — M79.606 PAIN OF LOWER EXTREMITY, UNSPECIFIED LATERALITY: ICD-10-CM

## 2021-08-10 PROCEDURE — 72082 X-RAY EXAM ENTIRE SPI 2/3 VW: CPT | Performed by: STUDENT IN AN ORGANIZED HEALTH CARE EDUCATION/TRAINING PROGRAM

## 2021-08-10 PROCEDURE — 99204 OFFICE O/P NEW MOD 45 MIN: CPT | Mod: GC | Performed by: ORTHOPAEDIC SURGERY

## 2021-08-10 PROCEDURE — 77073 BONE LENGTH STUDIES: CPT | Performed by: STUDENT IN AN ORGANIZED HEALTH CARE EDUCATION/TRAINING PROGRAM

## 2021-08-10 ASSESSMENT — MIFFLIN-ST. JEOR: SCORE: 1324.52

## 2021-08-10 NOTE — PROGRESS NOTES
Spine Surgery Consultation    REFERRING PHYSICIAN: Vincent De La Garza   PRIMARY CARE PHYSICIAN: Vincent De La Garza           Chief Complaint:   Scoliosis of the Spine and Consult    History of Present Illness:  Symptom Profile Including: location of symptoms, onset, severity, exacerbating/alleviating factors, previous treatments:        Belen Sharma is a 61 year old female with PMH significant for polycystic kidney disease status post kidney and liver transplants for which she continues on tacrolimus and mycophenolate and prednisone.  She presents to clinic today for evaluation of her low back, specifically a scoliosis evaluation.  She has known about her diagnosis of scoliosis since she was young but cannot remember if she had this as an infant.  She is always put off having anything done with this that she has been dealing with her other major medical issues as above.  She reports that this is in a good spot now and so she is addressing some of her other issues including the back.  She reports very mild and positional dependent left-sided low back pain that has not necessarily been getting any better or worse over the past few years.  She does not have any radicular symptoms down either extremity and does not feel like she has any loss of strength.  She has not sought treatment up to this point for her back, including no prior physical therapy or injections.  She cannot take anti-inflammatory medication secondary to her transplants.  She has tried to get more comfortable footwear.  Her symptoms do not really affect her daily life at this point but she just wants to get everything checked out.         Past Medical History:     Past Medical History:   Diagnosis Date     Adolescent scoliosis     protruding     BK viremia [id number]     CMV pneumonia (H) 2010     Congenital hepatic fibrosis      Endometriosis      High grade squamous intraepithelial lesion of cervix 09/11/2020     History of angina      HPV (human  papilloma virus) infection      Immunosuppression (H)      Polycystic kidney disease     Polycystic kidneys, tx kidney on the right. Both, very large, native kidneys reamain.     PONV (postoperative nausea and vomiting)     Need to start with ice chips and apple juice, no soda     S/P kidney transplant     SLK 10/9/2010 - kidney failure 2/2 [facility]. Explanted . Re-transplant after de-sensitization 2016     S/P liver transplant (H)     10/9/2010 - HAT, ischemic biopathy. Re-transplant 3/3/2011     S/P splenectomy      Spider veins      Thyroid disease     Hyperthyroid treated with single dose iodine            Past Surgical History:     Past Surgical History:   Procedure Laterality Date     bunectomy  2018    right foot     bunionectomy  2019    left     CHOLECYSTECTOMY       CONIZATION N/A 2020    Procedure: CONE BIOPSY, CERVIX;  Surgeon: Daysi Perez MD;  [facility]     FAILED PICC - RIGHT ARM Right 2020    Has a LUE AV fistula.     HERNIA REPAIR, INCISIONAL  2013     IR DIALYSIS PTA CENTRAL SEG  2020     IR PICC PLACEMENT > 5 YRS OF AGE  2020     SPLENECTOMY      Splenectomy     TRANSPLANT KIDNEY RECIPIENT  DONOR      re-transplant after [facility]; 6 months de-sensitization prior AND 6 months after transplant     TRANSPLANT LIVER RECIPIENT  DONOR  2011     TRANSPLANT LIVER, KIDNEY RECIPIENT  DONOR, COMBINED  10/09/2010    HAT - re-Tx liver 3/3/2011; Kidney (left iliac) lost to [facility]/fibrosis - explant     VASCULAR SURGERY      Vascular access left UE. Not used for 4 years since 2nd kidney tx 2016 [facility] [place]          Social History:     Social History     Tobacco Use     Smoking status: Never Smoker     Smokeless tobacco: Never Used   Substance Use Topics     Alcohol use: Not Currently     Lives in [place]. Non-smoker. Enjoys spending time with family.         Family History:     Family History   Problem Relation Age of Onset  "    Uterine Cancer Maternal Grandmother      Polycystic Kidney Diease Brother      Polycystic Kidney Diease Brother      Polycystic Kidney Diease Brother      Polycystic Kidney Diease Brother      Cervical Cancer Maternal Aunt      Depression Mother      Anxiety Disorder Mother      Depression Father      Anxiety Disorder Father           Allergies:     Allergies   Allergen Reactions     Erythromycin      Ibuprofen      Due to liver transplant            Medications:     Current Outpatient Medications   Medication     acetaminophen (TYLENOL) 325 MG tablet     ALPRAZolam (XANAX) 0.25 MG tablet     aspirin (ASA) 81 MG chewable tablet     biotin 1000 MCG TABS tablet     diphenhydrAMINE (BENADRYL) 25 MG tablet     docusate sodium (COLACE) 100 MG capsule     estradiol (ESTRACE) 0.1 MG/GM vaginal cream     guaiFENesin (MUCINEX PO)     loratadine (CLARITIN) 10 MG tablet     Multiple Vitamins-Minerals (WOMENS DAILY FORMULA PO)     MYFORTIC (BRAND) 180 MG EC tablet     nitroGLYcerin (NITROSTAT) 0.4 MG sublingual tablet     ondansetron (ZOFRAN) 4 MG tablet     pantoprazole (PROTONIX) 40 MG EC tablet     predniSONE (DELTASONE) 5 MG tablet     Probiotic Product (PROBIOTIC PO)     Psyllium (METAMUCIL PO)     sulfamethoxazole-trimethoprim (BACTRIM) 400-80 MG tablet     tacrolimus (GENERIC EQUIVALENT) 1 MG capsule     temazepam (RESTORIL) 15 MG capsule     Vitamin D, Cholecalciferol, 25 MCG (1000 UT) CAPS     Vitamin D3 (CHOLECALCIFEROL) 25 mcg (1000 units) tablet     No current facility-administered medications for this visit.             Review of Systems:     A 10 point ROS was performed and reviewed. Specific responses to these questions are noted at the end of the document.         Physical Exam:   Vitals: Ht 1.74 m (5' 8.5\")   Wt 70.3 kg (155 lb)   BMI 23.22 kg/m    Constitutional: awake, alert, cooperative, no apparent distress, appears stated age.    Eyes: The sclera are white.  Ears, Nose, Throat: The trachea is " midline.  Psychiatric: The patient has a normal affect.  Respiratory: breathing non-labored  Cardiovascular: The extremities are warm and perfused.  Skin: no obvious rashes or lesions.  Musculoskeletal, Neurologic, and Spine:       Lumbar Spine:    Normal gait without assistive device.  No antalgia / imbalance.  Able to walk on toes and on heels with ease.  Left sided scoliotic curve grossly evident. Some TTP over the apex of the curve in the deep musculature.  Patient stands with a neutral standing sagittal balance.    Motor -     L2-3: Hip flexion R 5/5  And L 5/5 strength          L3/4:  Knee extension R 5/5 and L 5/5 strength         L4/5:  Foot dorsiflexion R 5/5 L 5/5 and       EHL dorsiflexion R 5/5 L 5/5 strength         S1:  Plantarflexion/Peroneal Muscles  R 5/5 and L 5/5 strength    Sensation: intact to light touch L3-S1 distribution BLE        Straight leg raise negative      Neurologic:      REFLEXES Right Left   Patella 2+ 2+   Ankle jerk 2+ 2+   Babinski No upgoing great toe No upgoing great toe   Clonus 0 beats 0 beats          Imaging:   We ordered and independently reviewed new radiographs at this clinic visit. The results were discussed with the patient. Findings include:    Scoliosis views of the lumbar spine demonstrate significant left convex curvature of the thoracolumbar spine with apex at L2 and degenerative spondylitic changes noted as well.            Assessment and Plan:   Assessment:  61 year old female with complex liver and kidney transplant history who also has significant left convex thoracolumbar scoliosis.    We reviewed the natural history of the patient's diagnosis as well as treatment options both operative and nonoperative.  Essentially at this point her symptoms are quite mild despite her significant radiographic findings.  Therefore, we would recommend continuing conservative treatment and that we could offer her a formal physical therapy referral, potential corticosteroid  injections, a corset compression brace for comfort, as well as possible gabapentin should her transplant doctors deem this appropriate given her medical status.  Ultimately the patient is in agreement with this plan and would like to avoid having any sort of aggressive intervention at this point in time.     Plan:  1. PT referral placed for low back pain in setting of scoliosis  2. Information for corset compression brace provided  3. Will touch base with her transplant team regarding gabapentin and they are welcome to manage this for her should it be appropriate  4. Follow up on an as needed basis    The patient was discussed and evaluated with Dr. Martines.    Attending MD (Dr. Eliu Martines) :  I reviewed and verified the history and physical exam of the patient and discussed the patient's management with the other clinical providers involved in this patient's care including any involved residents or physicians assistants. I reviewed the above note and agree with the documented findings and plan of care, which were communicated to the patient.      Eliu Martines MD    --  Francis Telles MD  Orthopedic Surgery PGY-1

## 2021-08-10 NOTE — NURSING NOTE
"Reason For Visit:   Chief Complaint   Patient presents with     Spine - Scoliosis     Consult       Primary MD: Vincent De La Garza  Ref. MD:     ?  No  Currently working? No.  Work status?  On disability. Due to PK[employer]   Date of injury: NA  Type of injury: NA.  Date of surgery: 2011 - Liver transplant right, 2016- right kidney transplant.  Smoker: No  Request smoking cessation information: No    Ht 1.74 m (5' 8.5\")   Wt 70.3 kg (155 lb)   BMI 23.22 kg/m      Pain Assessment  Patient Currently in Pain: Yes  0-10 Pain Scale: 3  Primary Pain Location: Back  Pain Descriptors: Sharp (intermittement radiculopathy)  Alleviating Factors: Stretching (sleeping)  Aggravating Factors: Other (comment) (not wearing correct footwear, lifting)              Kelli Diaz, ATC  "

## 2021-08-10 NOTE — LETTER
8/10/2021         RE: Belen Sharma  [address] [place] [address]        Dear Colleague,    Thank you for referring your patient, Belen Sharma, to the [facility] [place]. Please see a copy of my visit note below.    Spine Surgery Consultation    REFERRING PHYSICIAN: Vincent De La Garza   PRIMARY CARE PHYSICIAN: Vincent De La Garza           Chief Complaint:   Scoliosis of the Spine and Consult    History of Present Illness:  Symptom Profile Including: location of symptoms, onset, severity, exacerbating/alleviating factors, previous treatments:        Belen Sharma is a 61 year old female with PMH significant for polycystic kidney disease status post kidney and liver transplants for which she continues on tacrolimus and mycophenolate and prednisone.  She presents to clinic today for evaluation of her low back, specifically a scoliosis evaluation.  She has known about her diagnosis of scoliosis since she was young but cannot remember if she had this as an infant.  She is always put off having anything done with this that she has been dealing with her other major medical issues as above.  She reports that this is in a good spot now and so she is addressing some of her other issues including the back.  She reports very mild and positional dependent left-sided low back pain that has not necessarily been getting any better or worse over the past few years.  She does not have any radicular symptoms down either extremity and does not feel like she has any loss of strength.  She has not sought treatment up to this point for her back, including no prior physical therapy or injections.  She cannot take anti-inflammatory medication secondary to her transplants.  She has tried to get more comfortable footwear.  Her symptoms do not really affect her daily life at this point but she just wants to get everything checked out.         Past Medical History:     Past Medical History:   Diagnosis Date      Adolescent scoliosis     protruding     BK viremia [id number]     CMV pneumonia (H)      Congenital hepatic fibrosis      Endometriosis      High grade squamous intraepithelial lesion of cervix 2020     History of angina      HPV (human papilloma virus) infection      Immunosuppression (H)      Polycystic kidney disease     Polycystic kidneys, tx kidney on the right. Both, very large, native kidneys reamain.     PONV (postoperative nausea and vomiting)     Need to start with ice chips and apple juice, no soda     S/P kidney transplant     SLK 10/9/2010 - kidney failure 2/2 [facility]. Explanted . Re-transplant after de-sensitization 2016     S/P liver transplant (H)     10/9/2010 - HAT, ischemic biopathy. Re-transplant 3/3/2011     S/P splenectomy      Spider veins      Thyroid disease     Hyperthyroid treated with single dose iodine            Past Surgical History:     Past Surgical History:   Procedure Laterality Date     bunectomy  2018    right foot     bunionectomy  2019    left     CHOLECYSTECTOMY       CONIZATION N/A 2020    Procedure: CONE BIOPSY, CERVIX;  Surgeon: Daysi Perez MD;  [facility]     FAILED PICC - RIGHT ARM Right 2020    Has a LUE AV fistula.     HERNIA REPAIR, INCISIONAL  2013     IR DIALYSIS PTA CENTRAL SEG  2020     IR PICC PLACEMENT > 5 YRS OF AGE  2020     SPLENECTOMY      Splenectomy     TRANSPLANT KIDNEY RECIPIENT  DONOR      re-transplant after [facility]; 6 months de-sensitization prior AND 6 months after transplant     TRANSPLANT LIVER RECIPIENT  DONOR  2011     TRANSPLANT LIVER, KIDNEY RECIPIENT  DONOR, COMBINED  10/09/2010    HAT - re-Tx liver 3/3/2011; Kidney (left iliac) lost to [facility]/fibrosis - explant     VASCULAR SURGERY      Vascular access left UE. Not used for 4 years since 2nd kidney tx -- [place]          Social History:     Social History     Tobacco Use     Smoking status:  Never Smoker     Smokeless tobacco: Never Used   Substance Use Topics     Alcohol use: Not Currently     Lives in [place]. Non-smoker. Enjoys spending time with family.         Family History:     Family History   Problem Relation Age of Onset     Uterine Cancer Maternal Grandmother      Polycystic Kidney Diease Brother      Polycystic Kidney Diease Brother      Polycystic Kidney Diease Brother      Polycystic Kidney Diease Brother      Cervical Cancer Maternal Aunt      Depression Mother      Anxiety Disorder Mother      Depression Father      Anxiety Disorder Father           Allergies:     Allergies   Allergen Reactions     Erythromycin      Ibuprofen      Due to liver transplant            Medications:     Current Outpatient Medications   Medication     acetaminophen (TYLENOL) 325 MG tablet     ALPRAZolam (XANAX) 0.25 MG tablet     aspirin (ASA) 81 MG chewable tablet     biotin 1000 MCG TABS tablet     diphenhydrAMINE (BENADRYL) 25 MG tablet     docusate sodium (COLACE) 100 MG capsule     estradiol (ESTRACE) 0.1 MG/GM vaginal cream     guaiFENesin (MUCINEX PO)     loratadine (CLARITIN) 10 MG tablet     Multiple Vitamins-Minerals (WOMENS DAILY FORMULA PO)     MYFORTIC (BRAND) 180 MG EC tablet     nitroGLYcerin (NITROSTAT) 0.4 MG sublingual tablet     ondansetron (ZOFRAN) 4 MG tablet     pantoprazole (PROTONIX) 40 MG EC tablet     predniSONE (DELTASONE) 5 MG tablet     Probiotic Product (PROBIOTIC PO)     Psyllium (METAMUCIL PO)     sulfamethoxazole-trimethoprim (BACTRIM) 400-80 MG tablet     tacrolimus (GENERIC EQUIVALENT) 1 MG capsule     temazepam (RESTORIL) 15 MG capsule     Vitamin D, Cholecalciferol, 25 MCG (1000 UT) CAPS     Vitamin D3 (CHOLECALCIFEROL) 25 mcg (1000 units) tablet     No current facility-administered medications for this visit.             Review of Systems:     A 10 point ROS was performed and reviewed. Specific responses to these questions are noted at the end of the document.          "Physical Exam:   Vitals: Ht 1.74 m (5' 8.5\")   Wt 70.3 kg (155 lb)   BMI 23.22 kg/m    Constitutional: awake, alert, cooperative, no apparent distress, appears stated age.    Eyes: The sclera are white.  Ears, Nose, Throat: The trachea is midline.  Psychiatric: The patient has a normal affect.  Respiratory: breathing non-labored  Cardiovascular: The extremities are warm and perfused.  Skin: no obvious rashes or lesions.  Musculoskeletal, Neurologic, and Spine:       Lumbar Spine:    Normal gait without assistive device.  No antalgia / imbalance.  Able to walk on toes and on heels with ease.  Left sided scoliotic curve grossly evident. Some TTP over the apex of the curve in the deep musculature.  Patient stands with a neutral standing sagittal balance.    Motor -     L2-3: Hip flexion R 5/5  And L 5/5 strength          L3/4:  Knee extension R 5/5 and L 5/5 strength         L4/5:  Foot dorsiflexion R 5/5 L 5/5 and       EHL dorsiflexion R 5/5 L 5/5 strength         S1:  Plantarflexion/Peroneal Muscles  R 5/5 and L 5/5 strength    Sensation: intact to light touch L3-S1 distribution BLE        Straight leg raise negative      Neurologic:      REFLEXES Right Left   Patella 2+ 2+   Ankle jerk 2+ 2+   Babinski No upgoing great toe No upgoing great toe   Clonus 0 beats 0 beats          Imaging:   We ordered and independently reviewed new radiographs at this clinic visit. The results were discussed with the patient. Findings include:    Scoliosis views of the lumbar spine demonstrate significant left convex curvature of the thoracolumbar spine with apex at L2 and degenerative spondylitic changes noted as well.            Assessment and Plan:   Assessment:  61 year old female with complex liver and kidney transplant history who also has significant left convex thoracolumbar scoliosis.    We reviewed the natural history of the patient's diagnosis as well as treatment options both operative and nonoperative.  Essentially at " this point her symptoms are quite mild despite her significant radiographic findings.  Therefore, we would recommend continuing conservative treatment and that we could offer her a formal physical therapy referral, potential corticosteroid injections, a corset compression brace for comfort, as well as possible gabapentin should her transplant doctors deem this appropriate given her medical status.  Ultimately the patient is in agreement with this plan and would like to avoid having any sort of aggressive intervention at this point in time.     Plan:  1. PT referral placed for low back pain in setting of scoliosis  2. Information for corset compression brace provided  3. Will touch base with her transplant team regarding gabapentin and they are welcome to manage this for her should it be appropriate  4. Follow up on an as needed basis    The patient was discussed and evaluated with Dr. Martines.    Attending MD (Dr. Eliu Martines) :  I reviewed and verified the history and physical exam of the patient and discussed the patient's management with the other clinical providers involved in this patient's care including any involved residents or physicians assistants. I reviewed the above note and agree with the documented findings and plan of care, which were communicated to the patient.      Eliu Martines MD    --  Francis Telles MD  Orthopedic Surgery PGY-1

## 2021-08-12 ENCOUNTER — OFFICE VISIT (OUTPATIENT)
Dept: TRANSPLANT | Facility: CLINIC | Age: 61
End: 2021-08-12
Attending: TRANSPLANT SURGERY
Payer: MEDICARE

## 2021-08-12 VITALS
DIASTOLIC BLOOD PRESSURE: 63 MMHG | HEART RATE: 83 BPM | SYSTOLIC BLOOD PRESSURE: 102 MMHG | WEIGHT: 156.4 LBS | BODY MASS INDEX: 23.16 KG/M2 | OXYGEN SATURATION: 94 % | HEIGHT: 69 IN

## 2021-08-12 DIAGNOSIS — D84.9 IMMUNOSUPPRESSION (H): ICD-10-CM

## 2021-08-12 DIAGNOSIS — Z94.0 KIDNEY TRANSPLANTED: Primary | ICD-10-CM

## 2021-08-12 DIAGNOSIS — Z94.4 LIVER REPLACED BY TRANSPLANT (H): ICD-10-CM

## 2021-08-12 PROCEDURE — 99213 OFFICE O/P EST LOW 20 MIN: CPT | Performed by: TRANSPLANT SURGERY

## 2021-08-12 ASSESSMENT — MIFFLIN-ST. JEOR: SCORE: 1338.81

## 2021-08-12 ASSESSMENT — PAIN SCALES - GENERAL: PAINLEVEL: MILD PAIN (2)

## 2021-08-12 NOTE — LETTER
8/12/2021         RE: Belen Sharma  [address] [place] [address]        Dear Colleague,    Thank you for referring your patient, Belen Sharma, to the [facility]PL[facility]. Please see a copy of my visit note below.    Transplant Surgery Progress Note    Transplants:  2/2/2016 (Kidney), 3/3/2011 (Liver), 10/9/2010 (Kidney / Liver); Postoperative day:  3816 (Liver), 2019 (Kidney)  S: overall doing well, no major complaints but does have some back pain      Transplant History:    Transplant Type:  DDKT (SLK)  Donor Type:    Transplant Date:  2/2/2016 (Kidney), 3/3/2011 (Liver), 10/9/2010 (Kidney / Liver)       Present Maintenance Immunosuppression:  Tacrolimus, Mycophenolic acid and Prednisone      Transplant Coordinator: Divya Sultana     Transplant Office Phone Number: [phone]     Immunosuppressant Medications     Immunosuppressive Agents Disp Start End     MYFORTIC (BRAND) 180 MG EC tablet    540 tablet 6/8/2021     Sig - Route: Take 3 tablets (540 mg) by mouth 2 times daily - Oral    Class: Historical     tacrolimus (GENERIC EQUIVALENT) 1 MG capsule    360 capsule 6/8/2021     Sig - Route: Take 2 capsules (2 mg) by mouth 2 times daily - Oral    Class: Historical          Possible Immunosuppression-related side effects:   []             headache  []             vivid dreams  []             irritability  []             cognitive difficuties  []             fine tremor  []             nausea  []             diarrhea  []             neuropathy      []             edema  []             renal calcineurin toxicity  []             hyperkalemia  []             post-transplant diabetes  []             decreased appetite  []             increased appetite  []             other:  []             none    Prescription Medications as of 8/13/2021       Rx Number Disp Refills Start End Last Dispensed Date Next Fill Date Owning Pharmacy    acetaminophen (TYLENOL) 325 MG tablet   120 tablet 1 12/19/2020    [facility] - [place], [place] - [address] [facility]    Sig: Take 1 tablet (325 mg) by mouth every 4 hours as needed for mild pain or fever    Class: E-Prescribe    Route: Oral    ALPRAZolam (XANAX) 0.25 MG tablet  10 tablet 0 1/27/2021    [id number] - [place], [place] - [address] AT [facility] [address]    Sig: Take 1 tablet (0.25 mg) by mouth as needed for anxiety    Class: E-Prescribe    Route: Oral    aspirin (ASA) 81 MG chewable tablet            Sig: Take 81 mg by mouth every evening Stopped 7 days preop    Class: Historical    Route: Oral    biotin 1000 MCG TABS tablet            Sig: Take 3,000 mcg by mouth daily    Class: Historical    Route: Oral    diphenhydrAMINE (BENADRYL) 25 MG tablet            Sig: Take 50 mg by mouth as needed for allergies     Class: Historical    Route: Oral    docusate sodium (COLACE) 100 MG capsule            Sig: Take 200 mg by mouth every morning     Class: Historical    Route: Oral    estradiol (ESTRACE) 0.1 MG/GM vaginal cream  42.5 g 3 8/9/2021    [facility] [address]    Sig: Place 1 g vaginally twice a week    Class: E-Prescribe    Route: Vaginal    guaiFENesin (MUCINEX PO)        [id number] - [place], [place] - [address] AT [facility]    Sig: Take 1 tablet by mouth 2 times daily as needed    Class: Historical    Route: Oral    loratadine (CLARITIN) 10 MG tablet            Sig: Take 10 mg by mouth every evening     Class: Historical    Route: Oral    Multiple Vitamins-Minerals (WOMENS DAILY FORMULA PO)            Sig: Take 1 tablet by mouth daily    Class: Historical    Route: Oral    MYFORTIC (BRAND) 180 MG EC tablet  540 tablet 3 6/8/2021    [facility] [address]    Sig: Take 3 tablets (540 mg) by mouth 2 times daily    Class: Historical    Route: Oral    nitroGLYcerin  (NITROSTAT) 0.4 MG sublingual tablet            Sig: Place 0.4 mg under the tongue as needed for chest pain (Hasn't used for a while) For chest pain place 1 tablet under the tongue every 5 minutes for 3 doses. If symptoms persist 5 minutes after 1st dose call 911.     Class: Historical    Route: Sublingual    ondansetron (ZOFRAN) 4 MG tablet            Sig: Take by mouth as needed for nausea    Class: Historical    Route: Oral    pantoprazole (PROTONIX) 40 MG EC tablet    2/4/2020        Sig: Take 1 tablet by mouth as needed (reflux)     Class: Historical    Route: Oral    predniSONE (DELTASONE) 5 MG tablet  90 tablet 3 6/8/2021 9/6/2021   [facility] - [address]    Sig: Take 1 tablet (5 mg) by mouth every morning    Class: E-Prescribe    Route: Oral    Probiotic Product (PROBIOTIC PO)            Sig: Take 2 tablets by mouth every morning     Class: Historical    Route: Oral    Psyllium (METAMUCIL PO)            Sig: Take by mouth every morning Hold 9-30-20    Class: Historical    Route: Oral    sulfamethoxazole-trimethoprim (BACTRIM) 400-80 MG tablet  45 tablet 3 6/9/2021    [facility] [address]    Sig: Take 1 tablet by mouth Every Mon, Wed, Fri Morning    Class: E-Prescribe    Route: Oral    tacrolimus (GENERIC EQUIVALENT) 1 MG capsule  360 capsule 3 6/8/2021    [facility] [address]    Sig: Take 2 capsules (2 mg) by mouth 2 times daily    Class: Historical    Route: Oral    temazepam (RESTORIL) 15 MG capsule  15 capsule 0 1/27/2021    [facility] #[id number] - [place], [place] - [address] AT [facility] [address]    Sig: Take 1 capsule (15 mg) by mouth as needed for sleep (twice a year)    Class: E-Prescribe    Route: Oral    Vitamin D, Cholecalciferol, 25 MCG (1000 UT) CAPS        [facility][id number] - [place], [place] - [address] AT [facility] &  [address]    Sig: Take 1,000 Units by mouth D3    Class: Historical    Route: Oral    Vitamin D3 (CHOLECALCIFEROL) 25 mcg (1000 units) tablet    6/8/2021    [facility] - [place], [place] - [address] [place] [address]    Sig: Take 50 mcg by mouth every morning     Class: Historical    Route: Oral          O:   [unfilled]    Transplant Immunosuppression Labs Latest Ref Rng & Units 6/4/2021 3/11/2021 1/4/2021 12/28/2020 12/28/2020   Tacro Level 5.0 - 15.0 ug/L 4.6(L) 5.9 4.3(L) - -   Tacro Level - 2,200 2200 [id number] 2,100 - -   Creat 0.52 - 1.04 mg/dL 0.75 0.74 0.70 0.59 0.63   BUN 7 - 30 mg/dL 14 12 15 18 16   WBC 4.0 - 11.0 10e9/L 6.2 6.7 - 9.2 8.8   Neutrophil % - - - 69.4 -   ANEU 1.6 - 8.3 10e9/L - - - 6.4 -       Chemistries:   Recent Labs   Lab Test 06/04/21  1018   BUN 14   CR 0.75   GFRESTIMATED 86   GLC 79     No results found for: A1C, CPEPT  Recent Labs   Lab Test 06/04/21  1018   ALBUMIN 3.4   BILITOTAL 1.0   ALKPHOS 61   AST 17   ALT 22     Urine Studies:  Recent Labs   Lab Test 06/04/21  1130   COLOR Yellow   APPEARANCE Clear   URINEGLC Negative   URINEBILI Negative   URINEKETONE Negative   SG 1.010   UBLD Trace*   URINEPH 6.5   PROTEIN Negative   NITRITE Negative   LEUKEST Moderate*   RBCU 2-5*   WBCU 25-50*     Recent Labs   Lab Test 06/04/21  1130 09/10/20  1037   UTPG 0.34* 0.16     Hematology:   Recent Labs   Lab Test 06/04/21  1018 03/11/21  1007 12/28/20  1515   HGB 14.5 16.9* 16.6*    179 308   WBC 6.2 6.7 9.2     Coags:   No lab results found.  HLA antibodies:   No results found for: XU3XRZMON, VU7SMLLGTY, RQ1KXGDQG, OA0IQIORLB    Assessment: Belen Sharma is doing fairly well s/p DDKT (SLK):  Issues we addressed during her visit include:    Plan:    1. Graft function: overall graft function for liver and kidney doing well  2. Immunosuppression Management: No change  .  Complexity of management:Low.  Contributing factors: native polycystic kidneys  3. Native polycystic  kidneys:patient seen by Drs Leventhal and Adele who agree that risks do not outweigh benefits when considering native nephrectomy.  Had one UTI last Dec but otherwise no recent infections, maintaining weight BMI 23, recent US does not suggest any masses  Followup: Follow-up with nephrology and hepatology per protocol, continue surveillance of native kidneys, no need to follow-up with transplant surgery unless something changes            Mat Plata MD/PhD            Again, thank you for allowing me to participate in the care of your patient.        Sincerely,        Mat Plata MD

## 2021-08-12 NOTE — NURSING NOTE
"Chief Complaint   Patient presents with     RECHECK     PKD, Consult for possible native kidney removal     Blood pressure 102/63, pulse 83, height 1.753 m (5' 9\"), weight 70.9 kg (156 lb 6.4 oz), SpO2 94 %, not currently breastfeeding.    Rhianna Johnson, [facility]    "

## 2021-08-13 NOTE — PROGRESS NOTES
Transplant Surgery Progress Note    Transplants:  2/2/2016 (Kidney), 3/3/2011 (Liver), 10/9/2010 (Kidney / Liver); Postoperative day:  3816 (Liver), 2019 (Kidney)  S: overall doing well, no major complaints but does have some back pain      Transplant History:    Transplant Type:  DDKT (SLK)  Donor Type:    Transplant Date:  2/2/2016 (Kidney), 3/3/2011 (Liver), 10/9/2010 (Kidney / Liver)       Present Maintenance Immunosuppression:  Tacrolimus, Mycophenolic acid and Prednisone      Transplant Coordinator: Divya Sultana     Transplant Office Phone Number: [phone]     Immunosuppressant Medications     Immunosuppressive Agents Disp Start End     MYFORTIC (BRAND) 180 MG EC tablet    540 tablet 6/8/2021     Sig - Route: Take 3 tablets (540 mg) by mouth 2 times daily - Oral    Class: Historical     tacrolimus (GENERIC EQUIVALENT) 1 MG capsule    360 capsule 6/8/2021     Sig - Route: Take 2 capsules (2 mg) by mouth 2 times daily - Oral    Class: Historical          Possible Immunosuppression-related side effects:   []             headache  []             vivid dreams  []             irritability  []             cognitive difficuties  []             fine tremor  []             nausea  []             diarrhea  []             neuropathy      []             edema  []             renal calcineurin toxicity  []             hyperkalemia  []             post-transplant diabetes  []             decreased appetite  []             increased appetite  []             other:  []             none    Prescription Medications as of 8/13/2021       Rx Number Disp Refills Start End Last Dispensed Date Next Fill Date Owning Pharmacy    acetaminophen (TYLENOL) 325 MG tablet  120 tablet 1 12/19/2020    [facility] [place], [place] - [address] [place] [address]    Sig: Take 1 tablet (325 mg) by mouth every 4 hours as needed for mild pain or fever    Class: E-Prescribe    Route: Oral    ALPRAZolam  (XANAX) 0.25 MG tablet  10 tablet 0 1/27/2021    [id number] - [place], [place] - [address] AT [facility] [address]    Sig: Take 1 tablet (0.25 mg) by mouth as needed for anxiety    Class: E-Prescribe    Route: Oral    aspirin (ASA) 81 MG chewable tablet            Sig: Take 81 mg by mouth every evening Stopped 7 days preop    Class: Historical    Route: Oral    biotin 1000 MCG TABS tablet            Sig: Take 3,000 mcg by mouth daily    Class: Historical    Route: Oral    diphenhydrAMINE (BENADRYL) 25 MG tablet            Sig: Take 50 mg by mouth as needed for allergies     Class: Historical    Route: Oral    docusate sodium (COLACE) 100 MG capsule            Sig: Take 200 mg by mouth every morning     Class: Historical    Route: Oral    estradiol (ESTRACE) 0.1 MG/GM vaginal cream  42.5 g 3 8/9/2021    [facility] [address]    Sig: Place 1 g vaginally twice a week    Class: E-Prescribe    Route: Vaginal    guaiFENesin (MUCINEX PO)        [id number] - [place], [place] - [address] AT [facility]    Sig: Take 1 tablet by mouth 2 times daily as needed    Class: Historical    Route: Oral    loratadine (CLARITIN) 10 MG tablet            Sig: Take 10 mg by mouth every evening     Class: Historical    Route: Oral    Multiple Vitamins-Minerals (WOMENS DAILY FORMULA PO)            Sig: Take 1 tablet by mouth daily    Class: Historical    Route: Oral    MYFORTIC (BRAND) 180 MG EC tablet  540 tablet 3 6/8/2021    [facility] [address]    Sig: Take 3 tablets (540 mg) by mouth 2 times daily    Class: Historical    Route: Oral    nitroGLYcerin (NITROSTAT) 0.4 MG sublingual tablet            Sig: Place 0.4 mg under the tongue as needed for chest pain (Hasn't used for a while) For chest pain place 1 tablet under the tongue every 5 minutes for 3 doses. If symptoms persist 5 minutes  after 1st dose call 911.     Class: Historical    Route: Sublingual    ondansetron (ZOFRAN) 4 MG tablet            Sig: Take by mouth as needed for nausea    Class: Historical    Route: Oral    pantoprazole (PROTONIX) 40 MG EC tablet    2/4/2020        Sig: Take 1 tablet by mouth as needed (reflux)     Class: Historical    Route: Oral    predniSONE (DELTASONE) 5 MG tablet  90 tablet 3 6/8/2021 9/6/2021   [facility] - [address]    Sig: Take 1 tablet (5 mg) by mouth every morning    Class: E-Prescribe    Route: Oral    Probiotic Product (PROBIOTIC PO)            Sig: Take 2 tablets by mouth every morning     Class: Historical    Route: Oral    Psyllium (METAMUCIL PO)            Sig: Take by mouth every morning Hold 9-30-20    Class: Historical    Route: Oral    sulfamethoxazole-trimethoprim (BACTRIM) 400-80 MG tablet  45 tablet 3 6/9/2021    [facility] [address]    Sig: Take 1 tablet by mouth Every Mon, Wed, Fri Morning    Class: E-Prescribe    Route: Oral    tacrolimus (GENERIC EQUIVALENT) 1 MG capsule  360 capsule 3 6/8/2021    [facility] [address]    Sig: Take 2 capsules (2 mg) by mouth 2 times daily    Class: Historical    Route: Oral    temazepam (RESTORIL) 15 MG capsule  15 capsule 0 1/27/2021    [facility] #[id number] [place], [place] - [address] AT [address]    Sig: Take 1 capsule (15 mg) by mouth as needed for sleep (twice a year)    Class: E-Prescribe    Route: Oral    Vitamin D, Cholecalciferol, 25 MCG (1000 UT) CAPS        [facility] #[id number] [place], [place] - [address] AT [facility] [address]    Sig: Take 1,000 Units by mouth D3    Class: Historical    Route: Oral    Vitamin D3 (CHOLECALCIFEROL) 25 mcg (1000 units) tablet    6/8/2021    [facility] - [address]    Sig: Take 50 mcg  by mouth every morning     Class: Historical    Route: Oral          O:   [unfilled]    Transplant Immunosuppression Labs Latest Ref Rng & Units 6/4/2021 3/11/2021 1/4/2021 12/28/2020 12/28/2020   Tacro Level 5.0 - 15.0 ug/L 4.6(L) 5.9 4.3(L) - -   Tacro Level - 2,200 2200 [id number] 2,100 - -   Creat 0.52 - 1.04 mg/dL 0.75 0.74 0.70 0.59 0.63   BUN 7 - 30 mg/dL 14 12 15 18 16   WBC 4.0 - 11.0 10e9/L 6.2 6.7 - 9.2 8.8   Neutrophil % - - - 69.4 -   ANEU 1.6 - 8.3 10e9/L - - - 6.4 -       Chemistries:   Recent Labs   Lab Test 06/04/21  1018   BUN 14   CR 0.75   GFRESTIMATED 86   GLC 79     No results found for: A1C, CPEPT  Recent Labs   Lab Test 06/04/21  1018   ALBUMIN 3.4   BILITOTAL 1.0   ALKPHOS 61   AST 17   ALT 22     Urine Studies:  Recent Labs   Lab Test 06/04/21  1130   COLOR Yellow   APPEARANCE Clear   URINEGLC Negative   URINEBILI Negative   URINEKETONE Negative   SG 1.010   UBLD Trace*   URINEPH 6.5   PROTEIN Negative   NITRITE Negative   LEUKEST Moderate*   RBCU 2-5*   WBCU 25-50*     Recent Labs   Lab Test 06/04/21  1130 09/10/20  1037   UTPG 0.34* 0.16     Hematology:   Recent Labs   Lab Test 06/04/21  1018 03/11/21  1007 12/28/20  1515   HGB 14.5 16.9* 16.6*    179 308   WBC 6.2 6.7 9.2     Coags:   No lab results found.  HLA antibodies:   No results found for: FO5YIRZJQ, FG4YNAPUIL, IY7QLHSIC, YM3WOMINLN    Assessment: Belen Sharma is doing fairly well s/p DDKT (SLK):  Issues we addressed during her visit include:    Plan:    1. Graft function: overall graft function for liver and kidney doing well  2. Immunosuppression Management: No change  .  Complexity of management:Low.  Contributing factors: native polycystic kidneys  3. Native polycystic kidneys:patient seen by Drs Leventhal and Adele who agree that risks do not outweigh benefits when considering native nephrectomy.  Had one UTI last Dec but otherwise no recent infections, maintaining weight BMI 23, recent US does not suggest any  masses  Followup: Follow-up with nephrology and hepatology per protocol, continue surveillance of native kidneys, no need to follow-up with transplant surgery unless something changes            Mat Plata MD/PhD

## 2021-08-16 ENCOUNTER — TELEPHONE (OUTPATIENT)
Dept: OPHTHALMOLOGY | Facility: CLINIC | Age: 61
End: 2021-08-16

## 2021-08-16 NOTE — TELEPHONE ENCOUNTER
I spoke to the patient and explained that she may want to keep the first appointment if she wants contact lenses.  Dr. Loza will give her an idea at what point her cataracts are.  The patient is aware that Dr. Loza doesn't do cataract surgery.  She plans to keep her Dr. Marcano appointment in case she needs it.

## 2021-08-16 NOTE — TELEPHONE ENCOUNTER
[facility] Call Center    Phone Message    May a detailed message be left on voicemail: yes     Reason for Call: Other: Eye appointment questions    Called to schedule referral from Dr. De La Garza for eye exam. While speaking with patient she stated she has cataracts and also needs Contact Lens and a full eye exam. Since OD s do not see for Cataract, appointments for Contact Lens / Eye exam and Cataract evaluation were  between Dr. Loza and Dr. Marcano    Patient is requesting to know if she should have her CL and Eye exam prior to a cataract evaluation or vice versa. Also, questioning if she should not wear contacts 24 hours prior to her cataract eval. Can leave a detailed message when calling back. Currently she is scheduled as:    9/1/2021 - Contact Lens & Complete Eye Exam with Dr. Loza  9/7/2021 - Cataract evaluation with Dr. Marcano    Patient would also like to speak with financial counseling for prices as well.    Action Taken: Message routed to:  [facility] OPHTHALMOLOGY [facility]ULT [facility] [[id number]]    Travel Screening: Not Applicable

## 2021-08-20 ENCOUNTER — TELEPHONE (OUTPATIENT)
Dept: INFECTIOUS DISEASES | Facility: CLINIC | Age: 61
End: 2021-08-20

## 2021-08-20 ENCOUNTER — LAB (OUTPATIENT)
Dept: LAB | Facility: CLINIC | Age: 61
End: 2021-08-20
Payer: MEDICARE

## 2021-08-20 ENCOUNTER — TELEPHONE (OUTPATIENT)
Dept: TRANSPLANT | Facility: CLINIC | Age: 61
End: 2021-08-20

## 2021-08-20 DIAGNOSIS — N10 ACUTE PYELONEPHRITIS: ICD-10-CM

## 2021-08-20 DIAGNOSIS — R50.9 FEVER: Primary | ICD-10-CM

## 2021-08-20 DIAGNOSIS — Z13.220 LIPID SCREENING: ICD-10-CM

## 2021-08-20 DIAGNOSIS — N30.00 ACUTE CYSTITIS WITHOUT HEMATURIA: Primary | ICD-10-CM

## 2021-08-20 DIAGNOSIS — Z94.4 LIVER REPLACED BY TRANSPLANT (H): ICD-10-CM

## 2021-08-20 LAB
ALBUMIN UR-MCNC: NEGATIVE MG/DL
APPEARANCE UR: CLEAR
BACTERIA #/AREA URNS HPF: ABNORMAL /HPF
BILIRUB UR QL STRIP: NEGATIVE
COLOR UR AUTO: YELLOW
GLUCOSE UR STRIP-MCNC: NEGATIVE MG/DL
HGB UR QL STRIP: ABNORMAL
KETONES UR STRIP-MCNC: NEGATIVE MG/DL
LEUKOCYTE ESTERASE UR QL STRIP: ABNORMAL
NITRATE UR QL: NEGATIVE
PH UR STRIP: 6.5 [PH] (ref 5–8)
RBC #/AREA URNS AUTO: ABNORMAL /HPF
SP GR UR STRIP: 1.01 (ref 1–1.03)
SQUAMOUS #/AREA URNS AUTO: ABNORMAL /LPF
TRANS CELLS #/AREA URNS HPF: ABNORMAL /HPF
UROBILINOGEN UR STRIP-ACNC: 0.2 E.U./DL
WBC #/AREA URNS AUTO: ABNORMAL /HPF

## 2021-08-20 PROCEDURE — 87186 SC STD MICRODIL/AGAR DIL: CPT

## 2021-08-20 PROCEDURE — 87086 URINE CULTURE/COLONY COUNT: CPT

## 2021-08-20 PROCEDURE — 87088 URINE BACTERIA CULTURE: CPT

## 2021-08-20 PROCEDURE — 81001 URINALYSIS AUTO W/SCOPE: CPT

## 2021-08-20 RX ORDER — GRANULES FOR ORAL 3 G/1
3 POWDER ORAL EVERY OTHER DAY
Status: DISCONTINUED | OUTPATIENT
Start: 2021-08-20 | End: 2021-08-20 | Stop reason: CLARIF

## 2021-08-20 RX ORDER — GRANULES FOR ORAL 3 G/1
3 POWDER ORAL EVERY OTHER DAY
Qty: 3 PACKET | Refills: 0 | Status: SHIPPED | OUTPATIENT
Start: 2021-08-20 | End: 2021-08-23

## 2021-08-20 NOTE — TELEPHONE ENCOUNTER
Patient called states she has a possible UTI and will see if she can do a walk in at the [facility] lab for a UA/UC there is an order in epic. Patient would like a return call.

## 2021-08-20 NOTE — LETTER
PHYSICIAN ORDERS      DATE & TIME ISSUED: 2021 9:44 AM  PATIENT NAME: Belen Sharam   : 1960     [facility] MR# [if applicable]: [id number]     DIAGNOSIS:  Kidney transplanted, Fever  ICD-10 CODE: Z94.0, R50.9    Collect UA/UC today.    Any questions please call: [phone].      Catrachita Gonzales MD

## 2021-08-20 NOTE — TELEPHONE ENCOUNTER
[facility] Call Center    Phone Message    May a detailed message be left on voicemail: yes     Reason for Call: Other: Pt is calling stating Dr. Gonzales ordered labs for this pt and pt is calling wondering should she be on medications?  Pt is asking for a call back today before the weekend    Action Taken: Message routed to:  [facility] ([facility]): ID    Travel Screening: Not Applicable

## 2021-08-20 NOTE — TELEPHONE ENCOUNTER
No response yet from ID team. Dr. Gonzales paged, he will send in prescription for Fosfomycin to local pharmacy. Call placed to Belen Sharma to review fosfomycin dosing. Belen's temp now is 99. She was instructed to go to ED if fevers persist over the weekend. She may need IV abx. She was also instructed not to take more than 2000 mg of tylenol in 24 hours. Belen lu/u of plan.

## 2021-08-20 NOTE — TELEPHONE ENCOUNTER
Call returned to Belen Sharma. She reports fever onset yesterday evening, Tmax 100 although she has been using Tylenol. She also c/o frequent urination and generalized weakness. She has appt this morning to submit UA/UC under Dr. Gonzales. Message sent to ID team.

## 2021-08-20 NOTE — TELEPHONE ENCOUNTER
Belen paged at 0320 with complaints of a fever of 100.4, fatigue, frequent urination and itching. She feels just like she did when she had a UTI last winter. This RN gave her options of waiting until daytime hours to see if she could get a clinic appt or going in to be seen if she was very uncomfortable or if her fever gets above 101.5. She verbalized understanding however was not sure what she would decide at this time. She had already taken a couple tylenol. Encouraged to follow up with daytime coordinator regardless of what she decides.

## 2021-08-20 NOTE — LETTER
PHYSICIAN ORDERS      DATE & TIME ISSUED: 2021 9:44 AM  PATIENT NAME: Belen Sharma   : 1960     [facility] MR# [if applicable]: [id number]     DIAGNOSIS:  Kidney transplanted, Fever  ICD-10 CODE: Z94.0, R50.9    Collect UA/UC today.    Any questions please call: [phone].      Catrachita Gonzales MD

## 2021-08-22 LAB — BACTERIA UR CULT: ABNORMAL

## 2021-08-27 ENCOUNTER — APPOINTMENT (OUTPATIENT)
Dept: LAB | Facility: CLINIC | Age: 61
End: 2021-08-27
Payer: MEDICARE

## 2021-08-27 ENCOUNTER — MYC MEDICAL ADVICE (OUTPATIENT)
Dept: CARDIOLOGY | Facility: CLINIC | Age: 61
End: 2021-08-27

## 2021-08-27 DIAGNOSIS — I25.10 ATHEROSCLEROSIS OF NATIVE CORONARY ARTERY OF NATIVE HEART WITHOUT ANGINA PECTORIS: ICD-10-CM

## 2021-08-27 DIAGNOSIS — Z94.0 S/P KIDNEY TRANSPLANT: ICD-10-CM

## 2021-08-27 DIAGNOSIS — Z94.4 S/P LIVER TRANSPLANT (H): Primary | ICD-10-CM

## 2021-09-02 ENCOUNTER — TELEPHONE (OUTPATIENT)
Dept: TRANSPLANT | Facility: CLINIC | Age: 61
End: 2021-09-02

## 2021-09-02 ENCOUNTER — OFFICE VISIT (OUTPATIENT)
Dept: OBGYN | Facility: CLINIC | Age: 61
End: 2021-09-02
Attending: OBSTETRICS & GYNECOLOGY
Payer: MEDICARE

## 2021-09-02 ENCOUNTER — IMMUNIZATION (OUTPATIENT)
Dept: NURSING | Facility: CLINIC | Age: 61
End: 2021-09-02
Payer: MEDICARE

## 2021-09-02 VITALS
HEART RATE: 77 BPM | WEIGHT: 155 LBS | DIASTOLIC BLOOD PRESSURE: 57 MMHG | BODY MASS INDEX: 22.96 KG/M2 | SYSTOLIC BLOOD PRESSURE: 102 MMHG | HEIGHT: 69 IN

## 2021-09-02 DIAGNOSIS — R87.810 PAPANICOLAOU SMEAR OF CERVIX WITH POSITIVE HIGH RISK HUMAN PAPILLOMA VIRUS (HPV) TEST: Primary | ICD-10-CM

## 2021-09-02 PROCEDURE — 91300 PR COVID VAC PFIZER DIL RECON 30 MCG/0.3 ML IM: CPT

## 2021-09-02 PROCEDURE — 0003A PR COVID VAC PFIZER DIL RECON 30 MCG/0.3 ML IM: CPT

## 2021-09-02 PROCEDURE — 88305 TISSUE EXAM BY PATHOLOGIST: CPT | Mod: TC | Performed by: OBSTETRICS & GYNECOLOGY

## 2021-09-02 PROCEDURE — 57456 ENDOCERV CURETTAGE W/SCOPE: CPT | Performed by: OBSTETRICS & GYNECOLOGY

## 2021-09-02 ASSESSMENT — MIFFLIN-ST. JEOR: SCORE: 1324.52

## 2021-09-02 NOTE — LETTER
2021       RE: Belen Sharma  [address] [place] [address]     Dear Colleague,    Thank you for referring your patient, Belen Sharma, to the [facility] [place] at [facility]. Please see a copy of my visit note below.    [facility]  Gynecology Visit    Reason for Visit: colposcopy    HPI:    Belen Sharma is a 61 year old , here for colposcopy after pap smear NILM w/ HR HPV positive result. Prior to this, she had a cone biopsy that was JANNETTE 3 w/ negative margins. She has a history of JANNETTE 3 on colposcopy biopsies in [place].    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 Both, very large, native kidneys reamain.     PONV (postoperative nausea and vomiting)     Need to start with ice chips and apple juice, no soda     S/P kidney transplant     SLK 10/9/2010 - kidney failure 2/2 [facility]. Explanted . Re-transplant after de-sensitization      S/P liver transplant (H)     10/9/2010 - HAT, ischemic biopathy. Re-transplant 3/3/2011     S/P splenectomy      Spider veins      Thyroid disease     Hyperthyroid treated with single dose iodine       Past Surgical History:   Procedure Laterality Date     bunectomy  2018    right foot     bunionectomy  2019    left     CHOLECYSTECTOMY       CONIZATION N/A 2020     Procedure: CONE BIOPSY, CERVIX;  Surgeon: Daysi Perez MD;  [facility]     FAILED PICC - RIGHT ARM Right 2020    Has a LUE AV fistula.     HERNIA REPAIR, INCISIONAL  2013     IR DIALYSIS PTA CENTRAL SEG  2020     IR PICC PLACEMENT > 5 YRS OF AGE  2020     SPLENECTOMY      Splenectomy     TRANSPLANT KIDNEY RECIPIENT  DONOR      re-transplant after [facility]; 6 months de-sensitization prior AND 6 months after transplant     TRANSPLANT LIVER RECIPIENT  DONOR  2011     TRANSPLANT LIVER, KIDNEY RECIPIENT  DONOR, COMBINED  10/09/2010    HAT - re-Tx liver 3/3/2011; Kidney (left iliac) lost to [facility]/fibrosis - explant     VASCULAR SURGERY      Vascular access left UE. Not used for 4 years since 2nd kidney tx 2016 [place]         Current Outpatient Medications:      acetaminophen (TYLENOL) 325 MG tablet, Take 1 tablet (325 mg) by mouth every 4 hours as needed for mild pain or fever, Disp: 120 tablet, Rfl: 1     ALPRAZolam (XANAX) 0.25 MG tablet, Take 1 tablet (0.25 mg) by mouth as needed for anxiety (Patient not taking: Reported on 2021), Disp: 10 tablet, Rfl: 0     aspirin (ASA) 81 MG chewable tablet, Take 81 mg by mouth every evening Stopped 7 days preop, Disp: , Rfl:      biotin 1000 MCG TABS tablet, Take 3,000 mcg by mouth daily, Disp: , Rfl:      diphenhydrAMINE (BENADRYL) 25 MG tablet, Take 50 mg by mouth as needed for allergies , Disp: , Rfl:      docusate sodium (COLACE) 100 MG capsule, Take 200 mg by mouth every morning , Disp: , Rfl:      estradiol (ESTRACE) 0.1 MG/GM vaginal cream, Place 1 g vaginally twice a week, Disp: 42.5 g, Rfl: 3     guaiFENesin (MUCINEX PO), Take 1 tablet by mouth 2 times daily as needed, Disp: , Rfl:      loratadine (CLARITIN) 10 MG tablet, Take 10 mg by mouth every evening , Disp: , Rfl:      Multiple Vitamins-Minerals (WOMENS DAILY FORMULA PO), Take 1 tablet by mouth daily, Disp: , Rfl:      MYFORTIC (BRAND) 180  MG EC tablet, Take 3 tablets (540 mg) by mouth 2 times daily, Disp: 540 tablet, Rfl: 3     nitroGLYcerin (NITROSTAT) 0.4 MG sublingual tablet, Place 0.4 mg under the tongue as needed for chest pain (Hasn't used for a while) For chest pain place 1 tablet under the tongue every 5 minutes for 3 doses. If symptoms persist 5 minutes after 1st dose call 911. , Disp: , Rfl:      ondansetron (ZOFRAN) 4 MG tablet, Take by mouth as needed for nausea (Patient not taking: Reported on 8/12/2021), Disp: , Rfl:      pantoprazole (PROTONIX) 40 MG EC tablet, Take 1 tablet by mouth as needed (reflux)  (Patient not taking: Reported on 8/12/2021), Disp: , Rfl:      predniSONE (DELTASONE) 5 MG tablet, Take 1 tablet (5 mg) by mouth every morning, Disp: 90 tablet, Rfl: 3     Probiotic Product (PROBIOTIC PO), Take 2 tablets by mouth every morning , Disp: , Rfl:      Psyllium (METAMUCIL PO), Take by mouth every morning Hold 9-30-20, Disp: , Rfl:      sulfamethoxazole-trimethoprim (BACTRIM) 400-80 MG tablet, Take 1 tablet by mouth Every Mon, Wed, Fri Morning, Disp: 45 tablet, Rfl: 3     tacrolimus (GENERIC EQUIVALENT) 1 MG capsule, Take 2 capsules (2 mg) by mouth 2 times daily, Disp: 360 capsule, Rfl: 3     temazepam (RESTORIL) 15 MG capsule, Take 1 capsule (15 mg) by mouth as needed for sleep (twice a year) (Patient not taking: Reported on 8/12/2021), Disp: 15 capsule, Rfl: 0     Vitamin D, Cholecalciferol, 25 MCG (1000 UT) CAPS, Take 1,000 Units by mouth D3, Disp: , Rfl:      Vitamin D3 (CHOLECALCIFEROL) 25 mcg (1000 units) tablet, Take 50 mcg by mouth every morning , Disp: , Rfl:     Allergies   Allergen Reactions     Erythromycin      Ibuprofen      Due to liver transplant       Family History   Problem Relation Age of Onset     Uterine Cancer Maternal Grandmother      Polycystic Kidney Diease Brother      Polycystic Kidney Diease Brother      Polycystic Kidney Diease Brother      Polycystic Kidney Diease Brother      Cervical Cancer  "Maternal Aunt      Depression Mother      Anxiety Disorder Mother      Depression Father      Anxiety Disorder Father        Social History     Socioeconomic History     Marital status:      Spouse name: Not on file     Number of children: Not on file     Years of education: Not on file     Highest education level: Not on file   Occupational History     Not on file   Tobacco Use     Smoking status: Never Smoker     Smokeless tobacco: Never Used   Substance and Sexual Activity     Alcohol use: Not Currently     Drug use: Not Currently     Sexual activity: Not Currently   Other Topics Concern     Parent/sibling w/ CABG, MI or angioplasty before 65F 55M? Not Asked   Social History Narrative     Not on file     Social Determinants of Health     Financial Resource Strain:      Difficulty of Paying Living Expenses:    Food Insecurity:      Worried About Running Out of Food in the Last Year:      Ran Out of Food in the Last Year:    Transportation Needs:      Lack of Transportation (Medical):      Lack of Transportation (Non-Medical):    Physical Activity:      Days of Exercise per Week:      Minutes of Exercise per Session:    Stress:      Feeling of Stress :    Social Connections:      Frequency of Communication with Friends and Family:      Frequency of Social Gatherings with Friends and Family:      Attends [employer] Services:      Active Member of Clubs or Organizations:      Attends Club or Organization Meetings:      Marital Status:    Intimate Partner Violence:      Fear of Current or Ex-Partner:      Emotionally Abused:      Physically Abused:      Sexually Abused:        ROS: 10-Point ROS negative except as noted in HPI.    Physical Exam  /57   Pulse 77   Ht 1.74 m (5' 8.5\")   Wt 70.3 kg (155 lb)   BMI 23.22 kg/m    Gen: Well-appearing, NAD  HEENT: Normocephalic, atraumatic  Pulm: Breathing comfortably on room air  Ext: No LE edema, extremities warm and well perfused    Pelvic:  As below in " "procedure note    Colposcopy Visit/Procedure Note:    Belen Sharma is an 61 year old, , who comes in for diagnosis of abnormal pap screen.       Last   Lab Results   Component Value Date    HPV16 Negative 2021     Last   Lab Results   Component Value Date    HPV18 Negative 2021     Last   Lab Results   Component Value Date    HRHPV Positive 2021       Dates/results of previous cervical pathology:   Last pap 2021 NILM, HR HPV positive    Dates of previous cervical pathology treatment:  2020  SPECIMEN(S):   A: Cervical cone biopsy   B: Endocervical curettings     FINAL DIAGNOSIS:   A. CERVICAL CONE BIOPSY:   - High grade squamous intraepithelial lesion (cervical intraepithelial   neoplasia 3), 12:00-3:00 quadrant   - Both ecto-and endocervical margins are negative for dysplasia     B. ENDOCERVICAL CURETTINGS:   Tissue debris and rare unremarkable endocervical mucosal cells        History   Smoking Status     Never Smoker   Smokeless Tobacco     Never Used       Allergies as of 2021 - Reviewed 2021   Allergen Reaction Noted     Erythromycin  2021     Ibuprofen  2020        Colposcopy Procedure:    Consent:  Details of the colposcopic procedure were reviewed. Risks, benefits of treatment, and alternate forms of evaluation were discussed.  Patient's questions were elicited and answered.   Written consent was obtained and scanned into medical record.     Verification of Procedure  Just before the procedure begins, through verbal and active participation of team members, I verified:   Initials   Patient Name KJO   Patient  KJO   Procedure to be performed KJO       OBJECTIVE: /57   Pulse 77   Ht 1.74 m (5' 8.5\")   Wt 70.3 kg (155 lb)   BMI 23.22 kg/m      Pelvic Exam:  EG/BUS: Normal genital architecture without lesions, erythema or abnormal secretions. Bartholin's, Urethra, [place]'s glands are normal.   Vagina: moist, pink, rugae with creamy, white and " odorlesssecretions  Cervix: s/p cone biopsy with pinpoint cervical os, visible vasculature anteriorly  Rectum:anus normal    PROCEDURE:  Acetic acid and/or Lugol's solution applied to cervix.  Colposcopic exam of the cervix and apex of the vagina was conducted in the usual fashion.     Findings:  SCJ was not seen completely    There were no other visible lesions.    Biopsies were not obtained.    ECC: was obtained and placed in labeled Formalin Jar.    Assessment: Persistent HPV    Plan:   Biopsy sent to pathology.  Will contact patient with results and recommended follow-up plan.     Patient advised to contact clinic with heavy vaginal bleeding, fever over 101 degrees F, or any other concerns.    Advised that evaluation of sexual contacts is NOT warranted as she can not get the same virus again. Risk of exposure to a NEW virus is possible with partner change. Advised that she could use pessary as larger biopsies were not taken.    Verbalized understanding and agreement with plan.    David George MD, MPH  Ob/Gyn Resident, PGY-1  09/02/21 2:00 PM     Appreciate note by Dr. George. Patient has been seen and examined by me with the resident, agree with above note. I was present for procedure.     Daysi Perez MD

## 2021-09-02 NOTE — TELEPHONE ENCOUNTER
Returned Belen's call. She wanted us to know she received her COVID booster. It is already documented in her record. She is also scheduled for a uterine biopsy today and is asking if it's OK to do it the same day. I recommended she discuss with her Gyn provider today as I cannot answer that. I asked Belen to call us with any updates after her biopsy results are received. Belen verbalized understanding.

## 2021-09-02 NOTE — PROGRESS NOTES
[facility]  Gynecology Visit    Reason for Visit: colposcopy    HPI:    Belen Sharma is a 61 year old , here for colposcopy after pap smear NILM w/ HR HPV positive result. Prior to this, she had a cone biopsy that was JANNETTE 3 w/ negative margins. She has a history of JANNETTE 3 on colposcopy biopsies in [place].    OBHx  OB History    Para Term  AB Living   2 0 0 0 0 2   SAB TAB Ectopic Multiple Live Births   0 0 0 0 0      # Outcome Date GA Lbr Anshul/2nd Weight Sex Delivery Anes PTL Lv   2             1                 Past Medical History:   Diagnosis Date     Adolescent scoliosis     protruding     BK viremia [id number]     CMV pneumonia (H)      Congenital hepatic fibrosis      Endometriosis      High grade squamous intraepithelial lesion of cervix 2020     History of angina      HPV (human papilloma virus) infection      Immunosuppression (H)      Polycystic kidney disease     Polycystic kidneys, tx kidney on the right. Both, very large, native kidneys reamain.     PONV (postoperative nausea and vomiting)     Need to start with ice chips and apple juice, no soda     S/P kidney transplant     SLK 10/9/2010 - kidney failure 2/2 [facility]. Explanted . Re-transplant after de-sensitization      S/P liver transplant (H)     10/9/2010 - HAT, ischemic biopathy. Re-transplant 3/3/2011     S/P splenectomy      Spider veins      Thyroid disease     Hyperthyroid treated with single dose iodine       Past Surgical History:   Procedure Laterality Date     bunectomy  2018    right foot     bunionectomy  2019    left     CHOLECYSTECTOMY       CONIZATION N/A 2020    Procedure: CONE BIOPSY, CERVIX;  Surgeon: Daysi Perez MD;  [facility]     FAILED PICC - RIGHT ARM Right 2020    Has a LUE AV fistula.     HERNIA REPAIR, INCISIONAL  2013     IR DIALYSIS PTA CENTRAL SEG  2020     IR PICC PLACEMENT > 5 YRS OF AGE  2020     SPLENECTOMY       Splenectomy     TRANSPLANT KIDNEY RECIPIENT  DONOR  2016    re-transplant after [facility]; 6 months de-sensitization prior AND 6 months after transplant     TRANSPLANT LIVER RECIPIENT  DONOR  2011     TRANSPLANT LIVER, KIDNEY RECIPIENT  DONOR, COMBINED  10/09/2010    HAT - re-Tx liver 3/3/2011; Kidney (left iliac) lost to [facility]/fibrosis - explant     VASCULAR SURGERY      Vascular access left UE. Not used for 4 years since 2nd kidney tx 2016 [place]         Current Outpatient Medications:      acetaminophen (TYLENOL) 325 MG tablet, Take 1 tablet (325 mg) by mouth every 4 hours as needed for mild pain or fever, Disp: 120 tablet, Rfl: 1     ALPRAZolam (XANAX) 0.25 MG tablet, Take 1 tablet (0.25 mg) by mouth as needed for anxiety (Patient not taking: Reported on 2021), Disp: 10 tablet, Rfl: 0     aspirin (ASA) 81 MG chewable tablet, Take 81 mg by mouth every evening Stopped 7 days preop, Disp: , Rfl:      biotin 1000 MCG TABS tablet, Take 3,000 mcg by mouth daily, Disp: , Rfl:      diphenhydrAMINE (BENADRYL) 25 MG tablet, Take 50 mg by mouth as needed for allergies , Disp: , Rfl:      docusate sodium (COLACE) 100 MG capsule, Take 200 mg by mouth every morning , Disp: , Rfl:      estradiol (ESTRACE) 0.1 MG/GM vaginal cream, Place 1 g vaginally twice a week, Disp: 42.5 g, Rfl: 3     guaiFENesin (MUCINEX PO), Take 1 tablet by mouth 2 times daily as needed, Disp: , Rfl:      loratadine (CLARITIN) 10 MG tablet, Take 10 mg by mouth every evening , Disp: , Rfl:      Multiple Vitamins-Minerals (WOMENS DAILY FORMULA PO), Take 1 tablet by mouth daily, Disp: , Rfl:      MYFORTIC (BRAND) 180 MG EC tablet, Take 3 tablets (540 mg) by mouth 2 times daily, Disp: 540 tablet, Rfl: 3     nitroGLYcerin (NITROSTAT) 0.4 MG sublingual tablet, Place 0.4 mg under the tongue as needed for chest pain (Hasn't used for a while) For chest pain place 1 tablet under the tongue every 5 minutes for 3 doses. If  symptoms persist 5 minutes after 1st dose call 911. , Disp: , Rfl:      ondansetron (ZOFRAN) 4 MG tablet, Take by mouth as needed for nausea (Patient not taking: Reported on 8/12/2021), Disp: , Rfl:      pantoprazole (PROTONIX) 40 MG EC tablet, Take 1 tablet by mouth as needed (reflux)  (Patient not taking: Reported on 8/12/2021), Disp: , Rfl:      predniSONE (DELTASONE) 5 MG tablet, Take 1 tablet (5 mg) by mouth every morning, Disp: 90 tablet, Rfl: 3     Probiotic Product (PROBIOTIC PO), Take 2 tablets by mouth every morning , Disp: , Rfl:      Psyllium (METAMUCIL PO), Take by mouth every morning Hold 9-30-20, Disp: , Rfl:      sulfamethoxazole-trimethoprim (BACTRIM) 400-80 MG tablet, Take 1 tablet by mouth Every Mon, Wed, Fri Morning, Disp: 45 tablet, Rfl: 3     tacrolimus (GENERIC EQUIVALENT) 1 MG capsule, Take 2 capsules (2 mg) by mouth 2 times daily, Disp: 360 capsule, Rfl: 3     temazepam (RESTORIL) 15 MG capsule, Take 1 capsule (15 mg) by mouth as needed for sleep (twice a year) (Patient not taking: Reported on 8/12/2021), Disp: 15 capsule, Rfl: 0     Vitamin D, Cholecalciferol, 25 MCG (1000 UT) CAPS, Take 1,000 Units by mouth D3, Disp: , Rfl:      Vitamin D3 (CHOLECALCIFEROL) 25 mcg (1000 units) tablet, Take 50 mcg by mouth every morning , Disp: , Rfl:     Allergies   Allergen Reactions     Erythromycin      Ibuprofen      Due to liver transplant       Family History   Problem Relation Age of Onset     Uterine Cancer Maternal Grandmother      Polycystic Kidney Diease Brother      Polycystic Kidney Diease Brother      Polycystic Kidney Diease Brother      Polycystic Kidney Diease Brother      Cervical Cancer Maternal Aunt      Depression Mother      Anxiety Disorder Mother      Depression Father      Anxiety Disorder Father        Social History     Socioeconomic History     Marital status:      Spouse name: Not on file     Number of children: Not on file     Years of education: Not on file     Highest  "education level: Not on file   Occupational History     Not on file   Tobacco Use     Smoking status: Never Smoker     Smokeless tobacco: Never Used   Substance and Sexual Activity     Alcohol use: Not Currently     Drug use: Not Currently     Sexual activity: Not Currently   Other Topics Concern     Parent/sibling w/ CABG, MI or angioplasty before 65F 55M? Not Asked   Social History Narrative     Not on file     Social Determinants of Health     Financial Resource Strain:      Difficulty of Paying Living Expenses:    Food Insecurity:      Worried About Running Out of Food in the Last Year:      Ran Out of Food in the Last Year:    Transportation Needs:      Lack of Transportation (Medical):      Lack of Transportation (Non-Medical):    Physical Activity:      Days of Exercise per Week:      Minutes of Exercise per Session:    Stress:      Feeling of Stress :    Social Connections:      Frequency of Communication with Friends and Family:      Frequency of Social Gatherings with Friends and Family:      Attends [employer] Services:      Active Member of Clubs or Organizations:      Attends Club or Organization Meetings:      Marital Status:    Intimate Partner Violence:      Fear of Current or Ex-Partner:      Emotionally Abused:      Physically Abused:      Sexually Abused:        ROS: 10-Point ROS negative except as noted in HPI.    Physical Exam  /57   Pulse 77   Ht 1.74 m (5' 8.5\")   Wt 70.3 kg (155 lb)   BMI 23.22 kg/m    Gen: Well-appearing, NAD  HEENT: Normocephalic, atraumatic  Pulm: Breathing comfortably on room air  Ext: No LE edema, extremities warm and well perfused    Pelvic:  As below in procedure note    Colposcopy Visit/Procedure Note:    Belen Sharma is an 61 year old, , who comes in for diagnosis of abnormal pap screen.       Last   Lab Results   Component Value Date    HPV16 Negative 2021     Last   Lab Results   Component Value Date    HPV18 Negative 2021     Last   Lab " "Results   Component Value Date    HRHPV Positive 2021       Dates/results of previous cervical pathology:   Last pap 2021 NILM, HR HPV positive    Dates of previous cervical pathology treatment:  2020  SPECIMEN(S):   A: Cervical cone biopsy   B: Endocervical curettings     FINAL DIAGNOSIS:   A. CERVICAL CONE BIOPSY:   - High grade squamous intraepithelial lesion (cervical intraepithelial   neoplasia 3), 12:00-3:00 quadrant   - Both ecto-and endocervical margins are negative for dysplasia     B. ENDOCERVICAL CURETTINGS:   Tissue debris and rare unremarkable endocervical mucosal cells        History   Smoking Status     Never Smoker   Smokeless Tobacco     Never Used       Allergies as of 2021 - Reviewed 2021   Allergen Reaction Noted     Erythromycin  2021     Ibuprofen  2020        Colposcopy Procedure:    Consent:  Details of the colposcopic procedure were reviewed. Risks, benefits of treatment, and alternate forms of evaluation were discussed.  Patient's questions were elicited and answered.   Written consent was obtained and scanned into medical record.     Verification of Procedure  Just before the procedure begins, through verbal and active participation of team members, I verified:   Initials   Patient Name KJO   Patient  KJO   Procedure to be performed KJO       OBJECTIVE: /57   Pulse 77   Ht 1.74 m (5' 8.5\")   Wt 70.3 kg (155 lb)   BMI 23.22 kg/m      Pelvic Exam:  EG/BUS: Normal genital architecture without lesions, erythema or abnormal secretions. Bartholin's, Urethra, [place]'s glands are normal.   Vagina: moist, pink, rugae with creamy, white and odorlesssecretions  Cervix: s/p cone biopsy with pinpoint cervical os, visible vasculature anteriorly  Rectum:anus normal    PROCEDURE:  Acetic acid and/or Lugol's solution applied to cervix.  Colposcopic exam of the cervix and apex of the vagina was conducted in the usual fashion.     Findings:  [facility] was not " seen completely    There were no other visible lesions.    Biopsies were not obtained.    ECC: was obtained and placed in labeled Formalin Jar.    Assessment: Persistent HPV    Plan:   Biopsy sent to pathology.  Will contact patient with results and recommended follow-up plan.     Patient advised to contact clinic with heavy vaginal bleeding, fever over 101 degrees F, or any other concerns.    Advised that evaluation of sexual contacts is NOT warranted as she can not get the same virus again. Risk of exposure to a NEW virus is possible with partner change. Advised that she could use pessary as larger biopsies were not taken.    Verbalized understanding and agreement with plan.    David George MD, MPH  Ob/Gyn Resident, PGY-1  09/02/21 2:00 PM     Appreciate note by Dr. George. Patient has been seen and examined by me with the resident, agree with above note. I was present for procedure.     Daysi Perez MD

## 2021-09-02 NOTE — TELEPHONE ENCOUNTER
Patient Call: Wanted to discuss.(late on calling us)  She is having a biopsy done today at 1:15pm on her Uterus and she received her 3 covid vacc yesterday          Call back needed? Yes    Return Call Needed  Same as documented in contacts section  When to return call?: Same day: [facility]

## 2021-09-07 LAB
PATH REPORT.COMMENTS IMP SPEC: NORMAL
PATH REPORT.COMMENTS IMP SPEC: NORMAL
PATH REPORT.FINAL DX SPEC: NORMAL
PATH REPORT.GROSS SPEC: NORMAL
PATH REPORT.MICROSCOPIC SPEC OTHER STN: NORMAL
PATH REPORT.RELEVANT HX SPEC: NORMAL
PHOTO IMAGE: NORMAL

## 2021-09-07 PROCEDURE — 88305 TISSUE EXAM BY PATHOLOGIST: CPT | Mod: 26 | Performed by: PATHOLOGY

## 2021-09-08 ENCOUNTER — LAB (OUTPATIENT)
Dept: LAB | Facility: CLINIC | Age: 61
End: 2021-09-08
Payer: MEDICARE

## 2021-09-08 ENCOUNTER — MYC MEDICAL ADVICE (OUTPATIENT)
Dept: INTERNAL MEDICINE | Facility: CLINIC | Age: 61
End: 2021-09-08

## 2021-09-08 ENCOUNTER — TELEPHONE (OUTPATIENT)
Dept: TRANSPLANT | Facility: CLINIC | Age: 61
End: 2021-09-08

## 2021-09-08 DIAGNOSIS — N10 ACUTE PYELONEPHRITIS: Primary | ICD-10-CM

## 2021-09-08 LAB
ALBUMIN UR-MCNC: NEGATIVE MG/DL
APPEARANCE UR: CLEAR
BACTERIA #/AREA URNS HPF: ABNORMAL /HPF
BILIRUB UR QL STRIP: NEGATIVE
COLOR UR AUTO: YELLOW
GLUCOSE UR STRIP-MCNC: NEGATIVE MG/DL
HGB UR QL STRIP: ABNORMAL
KETONES UR STRIP-MCNC: NEGATIVE MG/DL
LEUKOCYTE ESTERASE UR QL STRIP: ABNORMAL
NITRATE UR QL: NEGATIVE
PH UR STRIP: 7 [PH] (ref 5–8)
RBC #/AREA URNS AUTO: ABNORMAL /HPF
SP GR UR STRIP: 1.01 (ref 1–1.03)
UROBILINOGEN UR STRIP-ACNC: 0.2 E.U./DL
WBC #/AREA URNS AUTO: ABNORMAL /HPF

## 2021-09-08 PROCEDURE — 87086 URINE CULTURE/COLONY COUNT: CPT

## 2021-09-08 PROCEDURE — 81001 URINALYSIS AUTO W/SCOPE: CPT

## 2021-09-08 NOTE — TELEPHONE ENCOUNTER
Called Belen to follow up on how she is feeling today, she had called the coordinator on call this morning. She had received her COVID booster the same day as her colposcopy last week (Thursday 9/2/2021) . Over the weekend she was running sporadic temps of up to 100.5 but each time temp would come down to normal on it's own, no Tylenol taken. She had a temp of 100.6 this morning around 5 am but it has steadily come down without the use of tylenol. She denies other symptoms of infection other than feeling sluggish. Temp recheck this morning is 97.6. I asked Belen to notify her Gyn of how she is feeling.     Belen said she has no follow up scheduled with Gyn and that provider would be calling her to follow up after procedure regarding results. She also wants to know if she should be taking antibiotics pre procedures. I informed her that we don't usually prescribe antibiotics prior to procedure but that she should talk to the provider who is performing the procedure to see if they think it would be helpful. Belen said she put her pessary in possibly too early after her colposcopy and wonders if that could be related to her fevers, I informed her that I do not have any recommendations about her pessary and she should contact her Gyn provider regarding specifics on what to do post procedure.     Belen is also followed by ID for multiple UTI's. She has a standing order for UA and advised to have that checked. Belen denies current s/s of infection. She denies UTI symptoms.     Message forwarded to Dr. Leventhal. Kidney coordinator notified as well and recommended she send UA per Dr. Gonzales's standing orders. I informed Belen and she will call me back if any further assistance needed from her transplant team.

## 2021-09-08 NOTE — TELEPHONE ENCOUNTER
Had COVID booster Thursday with coloscopy with GYN that same day.  Saturday developed Tmax 100.6.  Has been running  since.  Only other symptoms is irritation from cervical scraping and fatigue.  Will have RNCC connect with patient this AM.

## 2021-09-09 ENCOUNTER — TELEPHONE (OUTPATIENT)
Dept: TRANSPLANT | Facility: CLINIC | Age: 61
End: 2021-09-09

## 2021-09-09 LAB — BACTERIA UR CULT: NO GROWTH

## 2021-09-09 NOTE — TELEPHONE ENCOUNTER
Dr. Leventhal reviewed previous note and also feels that sporadic fevers are most likely related to Belen's COVID booster. Belen notified.     Dr. Gonzales reviewed Belen's UA and has sent her a message, I notified Belen and recommended she respond/follow up with Dr. Gonzales on urine results.     She also said that according to what she has read there is a possibility of fevers post procedure she had. I recommended again, she follow up with GYN.

## 2021-09-10 ENCOUNTER — ANCILLARY PROCEDURE (OUTPATIENT)
Dept: ULTRASOUND IMAGING | Facility: CLINIC | Age: 61
End: 2021-09-10
Attending: INTERNAL MEDICINE
Payer: MEDICARE

## 2021-09-10 ENCOUNTER — OFFICE VISIT (OUTPATIENT)
Dept: ORTHOPEDICS | Facility: CLINIC | Age: 61
End: 2021-09-10
Attending: INTERNAL MEDICINE
Payer: MEDICARE

## 2021-09-10 ENCOUNTER — ANCILLARY PROCEDURE (OUTPATIENT)
Dept: GENERAL RADIOLOGY | Facility: CLINIC | Age: 61
End: 2021-09-10
Attending: PODIATRIST
Payer: MEDICARE

## 2021-09-10 DIAGNOSIS — M20.31 VARUS DEFORMITY OF RIGHT GREAT TOE: Primary | ICD-10-CM

## 2021-09-10 DIAGNOSIS — Z94.0 KIDNEY TRANSPLANTED: ICD-10-CM

## 2021-09-10 DIAGNOSIS — N28.1 RENAL CYST OF KIDNEY TRANSPLANT: ICD-10-CM

## 2021-09-10 DIAGNOSIS — M20.41 HAMMER TOES OF BOTH FEET: ICD-10-CM

## 2021-09-10 DIAGNOSIS — Z98.890 S/P FOOT SURGERY: ICD-10-CM

## 2021-09-10 DIAGNOSIS — M20.42 HAMMER TOES OF BOTH FEET: ICD-10-CM

## 2021-09-10 DIAGNOSIS — M20.31 VARUS DEFORMITY OF RIGHT GREAT TOE: ICD-10-CM

## 2021-09-10 DIAGNOSIS — T86.19 RENAL CYST OF KIDNEY TRANSPLANT: ICD-10-CM

## 2021-09-10 PROCEDURE — 99205 OFFICE O/P NEW HI 60 MIN: CPT | Performed by: PODIATRIST

## 2021-09-10 PROCEDURE — 73630 X-RAY EXAM OF FOOT: CPT | Mod: RT | Performed by: RADIOLOGY

## 2021-09-10 PROCEDURE — 76776 US EXAM K TRANSPL W/DOPPLER: CPT | Performed by: RADIOLOGY

## 2021-09-10 ASSESSMENT — ENCOUNTER SYMPTOMS
POLYPHAGIA: 0
BLOOD IN STOOL: 0
EYE REDNESS: 0
POOR WOUND HEALING: 0
MUSCLE CRAMPS: 0
POLYDIPSIA: 0
MUSCLE WEAKNESS: 1
WEIGHT GAIN: 0
HALLUCINATIONS: 0
BACK PAIN: 1
SYNCOPE: 0
INSOMNIA: 0
MYALGIAS: 0
INCREASED ENERGY: 0
FATIGUE: 0
EYE PAIN: 0
HEMATURIA: 0
ABDOMINAL PAIN: 0
SKIN CHANGES: 0
DECREASED CONCENTRATION: 0
BOWEL INCONTINENCE: 0
LIGHT-HEADEDNESS: 0
RECTAL PAIN: 0
NERVOUS/ANXIOUS: 1
DIARRHEA: 0
CONSTIPATION: 1
JOINT SWELLING: 0
NAUSEA: 0
EXERCISE INTOLERANCE: 1
HYPERTENSION: 0
ORTHOPNEA: 0
HYPOTENSION: 0
WEIGHT LOSS: 0
SWOLLEN GLANDS: 0
SLEEP DISTURBANCES DUE TO BREATHING: 0
ALTERED TEMPERATURE REGULATION: 0
BRUISES/BLEEDS EASILY: 1
DIFFICULTY URINATING: 0
LEG PAIN: 0
PANIC: 1
DECREASED APPETITE: 0
EYE WATERING: 0
EYE IRRITATION: 0
NECK PAIN: 1
JAUNDICE: 0
DYSURIA: 1
CHILLS: 0
NAIL CHANGES: 0
NIGHT SWEATS: 0
FEVER: 0
DECREASED LIBIDO: 0
VOMITING: 0
HEARTBURN: 0
FLANK PAIN: 0
PALPITATIONS: 0
DEPRESSION: 0
DOUBLE VISION: 0
BLOATING: 0
ARTHRALGIAS: 1
HOT FLASHES: 0
STIFFNESS: 1

## 2021-09-10 NOTE — LETTER
9/10/2021         RE: Belen Sharma  [address] [place] [address]        Dear Colleague,    Thank you for referring your patient, Belen Sharma, to the [facility] [place]. Please see a copy of my visit note below.    Date of Service: 9/10/2021    Chief Complaint   Patient presents with     Consult     Bilateral foot.           HPI: Belen is a 61 year old female who presents today for another opinion on her foot.  She relates that in 2018/2019, she had bunionectomies done on both big toes.  She relates that she also had the hammertoes corrected on her lesser digits on bilateral feet.  This occurred in [place].  Since then, the right toe has started to go back into a bunion position.  On the right, the lesser toes are deviating laterally.  She relates that she initially had the surgery done because she was having pain in her right hip.  She would like to know what she can do to stop the progression of the toes moving laterally on the right and to prevent the bunion.  She does not necessarily have pain in her feet, but she does want to stop any hip pain from occurring.  She can walk normally.  She does have a hard time finding shoes because of the space between the first and second toes on the right side.  She is a transplant patient.      Review of Systems: Answers for HPI/ROS submitted by the patient on 9/10/2021  General Symptoms: Yes  Skin Symptoms: Yes  HENT Symptoms: No  EYE SYMPTOMS: Yes  HEART SYMPTOMS: Yes  LUNG SYMPTOMS: No  INTESTINAL SYMPTOMS: Yes  URINARY SYMPTOMS: Yes  GYNECOLOGIC SYMPTOMS: Yes  BREAST SYMPTOMS: No  SKELETAL SYMPTOMS: Yes  BLOOD SYMPTOMS: Yes  NERVOUS SYSTEM SYMPTOMS: No  MENTAL HEALTH SYMPTOMS: Yes  Fever: No  Loss of appetite: No  Weight loss: No  Weight gain: No  Fatigue: No  Night sweats: No  Chills: No  Increased stress: No  Excessive hunger: No  Excessive thirst: No  Feeling hot or cold when others believe the temperature is normal: No  Loss of  height: Yes  Post-operative complications: No  Surgical site pain: No  Hallucinations: No  Change in or Loss of Energy: No  Hyperactivity: No  Confusion: No  Changes in hair: No  Changes in moles/birth marks: No  Itching: Yes  Rashes: No  Changes in nails: No  Acne: No  Hair in places you don't want it: Yes  Change in facial hair: No  Warts: No  Non-healing sores: No  Scarring: Yes  Flaking of skin: Yes  Color changes of hands/feet in cold : Yes  Sun sensitivity: No  Skin thickening: No  Eye pain: No  Vision loss: Yes  Dry eyes: Yes  Watery eyes: No  Eye bulging: No  Double vision: No  Flashing of lights: No  Spots: No  Floaters: Yes  Redness: No  Crossed eyes: No  Tunnel Vision: No  Yellowing of eyes: No  Eye irritation: No  Chest pain or pressure: No  Fast or irregular heartbeat: No  Pain in legs with walking: No  Trouble breathing while lying down: No  Fingers or toes appear blue: No  High blood pressure: No  Low blood pressure: No  Fainting: No  Murmurs: No  Pacemaker: No  Varicose veins: Yes  Wake up at night with shortness of breath: No  Light-headedness: No  Exercise intolerance: Yes  Heart burn or indigestion: No  Nausea: No  Vomiting: No  Abdominal pain: No  Bloating: No  Constipation: Yes  Diarrhea: No  Blood in stool: No  Black stools: No  Rectal or Anal pain: No  Fecal incontinence: No  Yellowing of skin or eyes: No  Vomit with blood: No  Change in stools: No  Trouble holding urine or incontinence: Yes  Pain or burning: Yes  Trouble starting or stopping: Yes  Increased frequency of urination: Yes  Blood in urine: No  Decreased frequency of urination: Yes  Frequent nighttime urination: Yes  Flank pain: No  Difficulty emptying bladder: No  Bleeding or spotting between periods: No  Heavy or painful periods: No  Irregular periods: No  Vaginal discharge: Yes  Hot flashes: No  Vaginal dryness: Yes  Genital ulcers: No  Reduced libido: No  Painful intercourse: No  Difficulty with sexual arousal:  No  Post-menopausal bleeding: No  Back pain: Yes  Muscle aches: No  Neck pain: Yes  Swollen joints: No  Joint pain: Yes  Bone pain: No  Muscle cramps: No  Muscle weakness: Yes  Joint stiffness: Yes  Bone fracture: No  Edema or swelling: No  Anemia: No  Swollen glands: No  Easy bleeding or bruising: Yes  Nervous or Anxious: Yes  Depression: No  Trouble sleeping: No  Trouble thinking or concentrating: No  Mood changes: No  Panic attacks: Yes        PMH:   Past Medical History:   Diagnosis Date     Adolescent scoliosis     protruding     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 Both, very large, native kidneys reamain.     PONV (postoperative nausea and vomiting)     Need to start with ice chips and apple juice, no soda     S/P kidney transplant     [facility] 10/9/2010 - kidney failure 2/2 [facility]. Explanted . Re-transplant after de-sensitization      S/P liver transplant (H)     10/9/2010 - [facility], ischemic biopathy. Re-transplant 3/3/2011     S/P splenectomy      Spider veins      Thyroid disease     Hyperthyroid treated with single dose iodine       PSxH:   Past Surgical History:   Procedure Laterality Date     bunectomy  2018    right foot     bunionectomy  2019    left     CHOLECYSTECTOMY       CONIZATION N/A 2020    Procedure: CONE BIOPSY, CERVIX;  Surgeon: Daysi Perez MD;  Location: [facility]     FAILED PICC - RIGHT ARM Right 2020    Has a LUE AV fistula.     HERNIA REPAIR, INCISIONAL  2013     IR DIALYSIS PTA CENTRAL SEG  2020     IR PICC PLACEMENT > 5 YRS OF AGE  2020     SPLENECTOMY      Splenectomy     TRANSPLANT KIDNEY RECIPIENT  DONOR      re-transplant after  [facility]; 6 months de-sensitization prior AND 6 months after transplant     TRANSPLANT LIVER RECIPIENT  DONOR  2011     TRANSPLANT LIVER, KIDNEY RECIPIENT  DONOR, COMBINED  10/09/2010    [facility] - re-Tx liver 3/3/2011; Kidney (left iliac) lost to [facility]/fibrosis - explant     VASCULAR SURGERY      Vascular access left UE. Not used for 4 years since 2nd kidney tx 2016 [facility]       Allergies: Erythromycin and Ibuprofen    SH:   Social History     Socioeconomic History     Marital status:      Spouse name: Not on file     Number of children: Not on file     Years of education: Not on file     Highest education level: Not on file   Occupational History     Not on file   Tobacco Use     Smoking status: Never Smoker     Smokeless tobacco: Never Used   Substance and Sexual Activity     Alcohol use: Not Currently     Drug use: Not Currently     Sexual activity: Not Currently   Other Topics Concern     Parent/sibling w/ CABG, MI or angioplasty before 65F 55M? Not Asked   Social History Narrative     Not on file     Social Determinants of Health     Financial Resource Strain:      Difficulty of Paying Living Expenses:    Food Insecurity:      Worried About Running Out of Food in the Last Year:      Ran Out of Food in the Last Year:    Transportation Needs:      Lack of Transportation (Medical):      Lack of Transportation (Non-Medical):    Physical Activity:      Days of Exercise per Week:      Minutes of Exercise per Session:    Stress:      Feeling of Stress :    Social Connections:      Frequency of Communication with Friends and Family:      Frequency of Social Gatherings with Friends and Family:      Attends [employer] Services:      Active Member of Clubs or Organizations:      Attends Club or Organization Meetings:      Marital Status:    Intimate Partner Violence:      Fear of Current or Ex-Partner:      Emotionally Abused:      Physically Abused:      Sexually Abused:        FH:   Family  History   Problem Relation Age of Onset     Uterine Cancer Maternal Grandmother      Polycystic Kidney Diease Brother      Polycystic Kidney Diease Brother      Polycystic Kidney Diease Brother      Polycystic Kidney Diease Brother      Cervical Cancer Maternal Aunt      Depression Mother      Anxiety Disorder Mother      Depression Father      Anxiety Disorder Father        Objective:  Data Unavailable Data Unavailable Data Unavailable Data Unavailable Data Unavailable 0 lbs 0 oz    PT and DP pulses are 2/4 bilaterally. CRT is instant.  Positive pedal hair.   Gross sensation is intact bilaterally.  Protective sensation is intact as demonstrated with a 5.07G monofilament.  Equinus is noted bilaterally. No pain with active or passive ROM of the ankle, MTJ, 1st ray, or halluces bilaterally,.  There is significant space between the right hallux and second digit.  On the right side, the lesser digits are deviating laterally.  The hallux appears to be going into varus somewhat.  There appears to have been a fusion of the bilateral first MTPJ's.  No motion is available at these.  The PIPJ's of the lesser digits on bilateral feet appear to have been fused.  No pain noted today.  Manual muscle testing is 5/5 in all directions except for eversion bilaterally.  Right hallux at the IPJ is plantarflexed.  Nails dystrophic bilaterally. No open lesions are noted.     bilateral foot xrays indicated in 6 weightbearing views.    Bilateral plates with 4 screws and the homerun screws noted to the first MTPJ's.  Screws noted in the bilateral second and third metatarsal heads from Weil osteotomies.  Arthrodesis is stable noted across the first MTPJ's bilaterally and the second, third, and fourth PIPJ's.  No acute processes noted.      Assessment: Bleen is a 61-year-old who had previous first MTPJ arthrodesis and lesser hammertoe corrections.  She is concerned that if she continues doing her activity, she will have pain in the feet and  hips.  I related to her that I do not think  pair of orthotics are going to help to stabilize her forefoot.  The goal is stabilizing her foot, however I do not think will help her toes at all.  These are quite rigid.  I recommend that she see one of the prosthetists at [facility].  She relates that she will make an appointment with them.  I recommend she get shoes that are wide enough to accommodate her foot deformity.    Plan:  - Pt seen and evaluated.  - XRs taken and discussed with her.  - Would not recommend orthotics at this time.  - Recommend speaking to pedorthotist at [facility]. Info given to her.  - Activity as tolerated.  - See again PRN.   I spent 60 minutes today with patient care, documentation, chart review, image review, and care coordination.         Pepe Verduzco DPM

## 2021-09-10 NOTE — NURSING NOTE
Reason For Visit:   Chief Complaint   Patient presents with     Consult     Bilateral foot.        Pain Assessment  Patient Currently in Pain: Yes  Primary Pain Location: Foot  Pain Descriptors:  (loss of feeling)  Aggravating Factors: Walking        Allergies   Allergen Reactions     Erythromycin      Ibuprofen      Due to liver transplant           Olga Garibay LPN

## 2021-09-13 ENCOUNTER — LAB (OUTPATIENT)
Dept: LAB | Facility: CLINIC | Age: 61
End: 2021-09-13
Payer: MEDICARE

## 2021-09-13 DIAGNOSIS — I25.10 ATHEROSCLEROSIS OF NATIVE CORONARY ARTERY OF NATIVE HEART WITHOUT ANGINA PECTORIS: ICD-10-CM

## 2021-09-13 DIAGNOSIS — Z94.4 S/P LIVER TRANSPLANT (H): ICD-10-CM

## 2021-09-13 DIAGNOSIS — Z94.0 S/P KIDNEY TRANSPLANT: ICD-10-CM

## 2021-09-13 LAB
CHOLEST SERPL-MCNC: 172 MG/DL
FASTING STATUS PATIENT QL REPORTED: YES
HDLC SERPL-MCNC: 43 MG/DL
LDLC SERPL CALC-MCNC: 105 MG/DL
TRIGL SERPL-MCNC: 119 MG/DL

## 2021-09-13 PROCEDURE — 80061 LIPID PANEL: CPT

## 2021-09-13 PROCEDURE — 36415 COLL VENOUS BLD VENIPUNCTURE: CPT

## 2021-09-14 ENCOUNTER — TELEPHONE (OUTPATIENT)
Dept: TRANSPLANT | Facility: CLINIC | Age: 61
End: 2021-09-14

## 2021-09-14 ENCOUNTER — HOSPITAL ENCOUNTER (EMERGENCY)
Facility: CLINIC | Age: 61
Discharge: HOME | End: 2021-09-14
Attending: EMERGENCY MEDICINE | Admitting: EMERGENCY MEDICINE
Payer: MEDICARE

## 2021-09-14 VITALS
OXYGEN SATURATION: 97 % | DIASTOLIC BLOOD PRESSURE: 63 MMHG | HEIGHT: 69 IN | WEIGHT: 150 LBS | HEART RATE: 76 BPM | BODY MASS INDEX: 22.22 KG/M2 | TEMPERATURE: 98.5 F | RESPIRATION RATE: 16 BRPM | SYSTOLIC BLOOD PRESSURE: 105 MMHG

## 2021-09-14 DIAGNOSIS — R53.83 FATIGUE, UNSPECIFIED TYPE: ICD-10-CM

## 2021-09-14 DIAGNOSIS — R42 LIGHTHEADEDNESS: ICD-10-CM

## 2021-09-14 LAB
ALBUMIN UR-MCNC: NEGATIVE MG/DL
ANION GAP SERPL CALCULATED.3IONS-SCNC: 5 MMOL/L (ref 3–14)
APPEARANCE UR: CLEAR
BASOPHILS # BLD AUTO: 0 10E3/UL (ref 0–0.2)
BASOPHILS NFR BLD AUTO: 0 %
BILIRUB UR QL STRIP: NEGATIVE
BUN SERPL-MCNC: 18 MG/DL (ref 7–30)
CALCIUM SERPL-MCNC: 10.2 MG/DL (ref 8.5–10.1)
CHLORIDE BLD-SCNC: 104 MMOL/L (ref 94–109)
CO2 SERPL-SCNC: 26 MMOL/L (ref 20–32)
COLOR UR AUTO: ABNORMAL
CREAT SERPL-MCNC: 0.81 MG/DL (ref 0.52–1.04)
EOSINOPHIL # BLD AUTO: 0 10E3/UL (ref 0–0.7)
EOSINOPHIL NFR BLD AUTO: 0 %
ERYTHROCYTE [DISTWIDTH] IN BLOOD BY AUTOMATED COUNT: 13 % (ref 10–15)
GFR SERPL CREATININE-BSD FRML MDRD: 79 ML/MIN/1.73M2
GLUCOSE BLD-MCNC: 98 MG/DL (ref 70–99)
GLUCOSE UR STRIP-MCNC: NEGATIVE MG/DL
HCT VFR BLD AUTO: 48.9 % (ref 35–47)
HGB BLD-MCNC: 15.9 G/DL (ref 11.7–15.7)
HGB UR QL STRIP: NEGATIVE
HOLD SPECIMEN: NORMAL
IMM GRANULOCYTES # BLD: 0 10E3/UL
IMM GRANULOCYTES NFR BLD: 0 %
KETONES UR STRIP-MCNC: NEGATIVE MG/DL
LEUKOCYTE ESTERASE UR QL STRIP: ABNORMAL
LYMPHOCYTES # BLD AUTO: 1.5 10E3/UL (ref 0.8–5.3)
LYMPHOCYTES NFR BLD AUTO: 17 %
MCH RBC QN AUTO: 30.3 PG (ref 26.5–33)
MCHC RBC AUTO-ENTMCNC: 32.5 G/DL (ref 31.5–36.5)
MCV RBC AUTO: 93 FL (ref 78–100)
MONOCYTES # BLD AUTO: 0.7 10E3/UL (ref 0–1.3)
MONOCYTES NFR BLD AUTO: 8 %
NEUTROPHILS # BLD AUTO: 6.6 10E3/UL (ref 1.6–8.3)
NEUTROPHILS NFR BLD AUTO: 75 %
NITRATE UR QL: NEGATIVE
NRBC # BLD AUTO: 0 10E3/UL
NRBC BLD AUTO-RTO: 0 /100
PH UR STRIP: 7.5 [PH] (ref 5–7)
PLATELET # BLD AUTO: 301 10E3/UL (ref 150–450)
POTASSIUM BLD-SCNC: 4.2 MMOL/L (ref 3.4–5.3)
RBC # BLD AUTO: 5.24 10E6/UL (ref 3.8–5.2)
RBC URINE: <1 /HPF
SODIUM SERPL-SCNC: 135 MMOL/L (ref 133–144)
SP GR UR STRIP: 1 (ref 1–1.03)
UROBILINOGEN UR STRIP-MCNC: NORMAL MG/DL
WBC # BLD AUTO: 8.8 10E3/UL (ref 4–11)
WBC URINE: <1 /HPF

## 2021-09-14 PROCEDURE — 93010 ELECTROCARDIOGRAM REPORT: CPT | Performed by: EMERGENCY MEDICINE

## 2021-09-14 PROCEDURE — 80048 BASIC METABOLIC PNL TOTAL CA: CPT | Performed by: EMERGENCY MEDICINE

## 2021-09-14 PROCEDURE — 96360 HYDRATION IV INFUSION INIT: CPT | Performed by: EMERGENCY MEDICINE

## 2021-09-14 PROCEDURE — 96361 HYDRATE IV INFUSION ADD-ON: CPT | Performed by: EMERGENCY MEDICINE

## 2021-09-14 PROCEDURE — 85004 AUTOMATED DIFF WBC COUNT: CPT | Performed by: EMERGENCY MEDICINE

## 2021-09-14 PROCEDURE — 93005 ELECTROCARDIOGRAM TRACING: CPT | Performed by: EMERGENCY MEDICINE

## 2021-09-14 PROCEDURE — 258N000003 HC RX IP 258 OP 636: Performed by: EMERGENCY MEDICINE

## 2021-09-14 PROCEDURE — 81001 URINALYSIS AUTO W/SCOPE: CPT | Performed by: EMERGENCY MEDICINE

## 2021-09-14 PROCEDURE — 99285 EMERGENCY DEPT VISIT HI MDM: CPT | Mod: 25 | Performed by: EMERGENCY MEDICINE

## 2021-09-14 PROCEDURE — 85025 COMPLETE CBC W/AUTO DIFF WBC: CPT | Performed by: EMERGENCY MEDICINE

## 2021-09-14 PROCEDURE — 99284 EMERGENCY DEPT VISIT MOD MDM: CPT | Mod: 25 | Performed by: EMERGENCY MEDICINE

## 2021-09-14 PROCEDURE — 36415 COLL VENOUS BLD VENIPUNCTURE: CPT | Performed by: EMERGENCY MEDICINE

## 2021-09-14 RX ADMIN — SODIUM CHLORIDE 250 ML: 9 INJECTION, SOLUTION INTRAVENOUS at 16:38

## 2021-09-14 ASSESSMENT — ENCOUNTER SYMPTOMS
SHORTNESS OF BREATH: 0
FATIGUE: 1
POLYDIPSIA: 0
ABDOMINAL PAIN: 0
CHILLS: 0
DIFFICULTY URINATING: 0
LIGHT-HEADEDNESS: 1
FEVER: 0
COUGH: 0
NECK PAIN: 0
NAUSEA: 0
NECK STIFFNESS: 0
COLOR CHANGE: 0
BRUISES/BLEEDS EASILY: 0
CONFUSION: 0
DIARRHEA: 1
CHEST TIGHTNESS: 0
ARTHRALGIAS: 0
VOMITING: 0
ADENOPATHY: 0
EYE REDNESS: 0
HEADACHES: 0

## 2021-09-14 ASSESSMENT — MIFFLIN-ST. JEOR: SCORE: 1309.78

## 2021-09-14 NOTE — ED PROVIDER NOTES
ED Provider Note  [facility]      History     Chief Complaint   Patient presents with     Fatigue     malaise     Dizziness     exhaustion     Dehydration     The history is provided by the patient, medical records and a relative.     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  Patient is accompanied by her daughter.  She reports that she received her Pfizer COVID-19 vaccine booster on 9/2 and had a colposcopy that afternoon.  The 2 days following she had fevers with T-max of 100.6.  These resolved by 9/6, but she was still feeling fatigued, dehydrated, lightheaded, and generally unwell.  She called her her infectious disease doctor who ran a UA on 9/8.  She reports that he had no concerns, but advised that she be seen in ED if she continued to  feel unwell.  She had an ultrasound of her transplanted kidney on 9/10 and states her doctor had no concerns, however, she reports continued malaise, fatigue, decreased oral intake d/t decreased appetite and thirst and occasional lightheadedness. She denies dysuria, hematuria, urgency, frequency, chest pain, shortness of breath, vomiting, fever, cough, runny nose, congestion, rash, and pain over transplanted kidney.    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 Both, very large, native kidneys reamain.     PONV (postoperative nausea and vomiting)     Need to start with ice chips and apple juice, no soda     S/P kidney transplant     SLK 10/9/2010 - kidney failure 2/2 [facility]. Explanted . Re-transplant after de-sensitization 2016     S/P liver transplant (H)     10/9/2010 - [facility], ischemic biopathy. Re-transplant 3/3/2011     S/P splenectomy      Spider veins      Thyroid disease     Hyperthyroid treated with single dose iodine     Past Surgical History:   Procedure Laterality Date     bunectomy  2018    right foot     bunionectomy  2019    left     CHOLECYSTECTOMY       CONIZATION N/A 2020    Procedure: CONE BIOPSY, CERVIX;  Surgeon: Daysi Perez MD;  Location: [facility]     FAILED PICC - RIGHT ARM Right 2020    Has a LUE AV fistula.     HERNIA REPAIR, INCISIONAL  2013     IR DIALYSIS PTA CENTRAL SEG  2020     IR PICC PLACEMENT > 5 YRS OF AGE  2020     SPLENECTOMY      Splenectomy     TRANSPLANT KIDNEY RECIPIENT  DONOR      re-transplant after [facility]; 6 months de-sensitization prior AND 6 months after transplant     TRANSPLANT LIVER RECIPIENT  DONOR  2011     TRANSPLANT LIVER, KIDNEY RECIPIENT  DONOR, COMBINED  10/09/2010    [facility] - re-Tx liver 3/3/2011; Kidney (left iliac) lost to [facility]/fibrosis - explant     VASCULAR SURGERY      Vascular access left UE. Not used for 4 years since 2nd kidney tx 2016 [place]     acetaminophen (TYLENOL) 325 MG tablet  ALPRAZolam (XANAX) 0.25 MG tablet  aspirin (ASA) 81 MG chewable tablet  biotin 1000 MCG TABS tablet  diphenhydrAMINE (BENADRYL) 25 MG tablet  docusate sodium (COLACE) 100 MG capsule  estradiol (ESTRACE) 0.1 MG/GM vaginal cream  guaiFENesin (MUCINEX PO)  loratadine (CLARITIN) 10 MG  tablet  Multiple Vitamins-Minerals (WOMENS DAILY FORMULA PO)  MYFORTIC (BRAND) 180 MG EC tablet  nitroGLYcerin (NITROSTAT) 0.4 MG sublingual tablet  ondansetron (ZOFRAN) 4 MG tablet  pantoprazole (PROTONIX) 40 MG EC tablet  Probiotic Product (PROBIOTIC PO)  Psyllium (METAMUCIL PO)  sulfamethoxazole-trimethoprim (BACTRIM) 400-80 MG tablet  tacrolimus (GENERIC EQUIVALENT) 1 MG capsule  temazepam (RESTORIL) 15 MG capsule  Vitamin D, Cholecalciferol, 25 MCG (1000 UT) CAPS  Vitamin D3 (CHOLECALCIFEROL) 25 mcg (1000 units) tablet      Allergies   Allergen Reactions     Erythromycin      Ibuprofen      Due to liver transplant     Family History  Family History   Problem Relation Age of Onset     Uterine Cancer Maternal Grandmother      Polycystic Kidney Diease Brother      Polycystic Kidney Diease Brother      Polycystic Kidney Diease Brother      Polycystic Kidney Diease Brother      Cervical Cancer Maternal Aunt      Depression Mother      Anxiety Disorder Mother      Depression Father      Anxiety Disorder Father      Social History   Social History     Tobacco Use     Smoking status: Never Smoker     Smokeless tobacco: Never Used   Substance Use Topics     Alcohol use: Not Currently     Drug use: Not Currently      Past medical history, past surgical history, medications, allergies, family history, and social history were reviewed with the patient. No additional pertinent items.       Review of Systems   Constitutional: Positive for fatigue. Negative for chills and fever.   HENT: Negative for congestion.    Eyes: Negative for redness and visual disturbance.   Respiratory: Negative for cough, chest tightness and shortness of breath.    Cardiovascular: Negative for chest pain.   Gastrointestinal: Positive for diarrhea ( mild). Negative for abdominal pain, nausea and vomiting.   Endocrine: Negative for polydipsia and polyuria.   Genitourinary: Negative for difficulty urinating.   Musculoskeletal: Negative for arthralgias,  "neck pain and neck stiffness.   Skin: Negative for color change.   Allergic/Immunologic: Negative for immunocompromised state.   Neurological: Positive for light-headedness ( occasional). Negative for headaches.   Hematological: Negative for adenopathy. Does not bruise/bleed easily.   Psychiatric/Behavioral: Negative for confusion.   All other systems reviewed and are negative.    A complete review of systems was performed with pertinent positives and negatives noted in the HPI, and all other systems negative.    Physical Exam   BP: 117/65  Pulse: 103  Temp: 98.5  F (36.9  C)  Resp: 16  Height: 175.3 cm (5' 9\")  Weight: 68 kg (150 lb)  SpO2: 94 %  Physical Exam  Vitals and nursing note reviewed.   Constitutional:       General: She is not in acute distress.     Appearance: Normal appearance. She is not ill-appearing, toxic-appearing or diaphoretic.   HENT:      Head: Normocephalic and atraumatic.      Mouth/Throat:      Mouth: Mucous membranes are moist.      Pharynx: Oropharynx is clear. No oropharyngeal exudate.   Eyes:      General: No scleral icterus.     Extraocular Movements: Extraocular movements intact.      Conjunctiva/sclera: Conjunctivae normal.   Cardiovascular:      Rate and Rhythm: Tachycardia present.      Pulses: Normal pulses.      Heart sounds: Normal heart sounds. No murmur heard.     Pulmonary:      Effort: Pulmonary effort is normal. No respiratory distress.      Breath sounds: Normal breath sounds. No wheezing, rhonchi or rales.   Abdominal:      General: Abdomen is flat. There is no distension.      Palpations: Abdomen is soft.      Tenderness: There is no abdominal tenderness. There is no guarding or rebound.      Hernia: No hernia is present.      Comments: No tenderness in right lower quadrant over the transplanted kidney.   Musculoskeletal:         General: No tenderness. Normal range of motion.      Cervical back: Normal range of motion and neck supple. No tenderness.      Right lower " leg: Edema ( 1+) present.      Left lower leg: Edema ( 1+) present.   Lymphadenopathy:      Cervical: No cervical adenopathy.   Skin:     General: Skin is warm.      Findings: No rash.   Neurological:      General: No focal deficit present.      Mental Status: She is alert and oriented to person, place, and time.      GCS: GCS eye subscore is 4. GCS verbal subscore is 5. GCS motor subscore is 6.      Cranial Nerves: Cranial nerves are intact. No cranial nerve deficit.      Sensory: Sensation is intact.      Motor: Motor function is intact.      Coordination: Coordination is intact. Coordination normal.      Gait: Gait is intact.   Psychiatric:         Mood and Affect: Mood normal.         Behavior: Behavior normal.         Thought Content: Thought content normal.         Judgment: Judgment normal.         ED Course      Procedures            EKG Interpretation:      Interpreted by Kamlesh Liang MD  Time reviewed: 5:14 PM  Symptoms at time of EKG: Fatigue, lightheadedness  Rhythm: normal sinus   Rate: normal  Axis: normal  Ectopy: none  Conduction: normal  ST Segments/ T Waves: No ST-T wave changes  Q Waves: none  Comparison to prior: Unchanged from old EKG done on July 26, 2021    Clinical Impression: NSR with no acute ischemic changes             Results for orders placed or performed during the hospital encounter of 09/14/21   UA with Microscopic reflex to Culture     Status: Abnormal    Specimen: Urine, Midstream   Result Value Ref Range    Color Urine Straw Colorless, Straw, Light Yellow, Yellow    Appearance Urine Clear Clear    Glucose Urine Negative Negative mg/dL    Bilirubin Urine Negative Negative    Ketones Urine Negative Negative mg/dL    Specific Gravity Urine 1.004 1.003 - 1.035    Blood Urine Negative Negative    pH Urine 7.5 (H) 5.0 - 7.0    Protein Albumin Urine Negative Negative mg/dL    Urobilinogen Urine Normal Normal, 2.0 mg/dL    Nitrite Urine Negative Negative    Leukocyte Esterase Urine  Small (A) Negative    RBC Urine <1 <=2 /HPF    WBC Urine <1 <=5 /HPF    Narrative    Urine Culture not indicated   CBC with Platelets & Differential     Status: Abnormal    Narrative    The following orders were created for panel order CBC with Platelets & Differential.  Procedure                               Abnormality         Status                     ---------                               -----------         ------                     CBC with platelets and d...[[id number]]  Abnormal            Final result                 Please view results for these tests on the individual orders.   Basic metabolic panel     Status: Abnormal   Result Value Ref Range    Sodium 135 133 - 144 mmol/L    Potassium 4.2 3.4 - 5.3 mmol/L    Chloride 104 94 - 109 mmol/L    Carbon Dioxide (CO2) 26 20 - 32 mmol/L    Anion Gap 5 3 - 14 mmol/L    Urea Nitrogen 18 7 - 30 mg/dL    Creatinine 0.81 0.52 - 1.04 mg/dL    Calcium 10.2 (H) 8.5 - 10.1 mg/dL    Glucose 98 70 - 99 mg/dL    GFR Estimate 79 >60 mL/min/1.73m2   Extra Blue Top Tube     Status: None   Result Value Ref Range    Hold Specimen JIC    Extra Red Top Tube     Status: None   Result Value Ref Range    Hold Specimen JIC    Extra Green Top (Lithium Heparin) Tube     Status: None   Result Value Ref Range    Hold Specimen JIC    Extra Purple Top Tube     Status: None   Result Value Ref Range    Hold Specimen JIC    Extra Green Top (Lithium Heparin) ON ICE     Status: None   Result Value Ref Range    Hold Specimen JIC    CBC with platelets and differential     Status: Abnormal   Result Value Ref Range    WBC Count 8.8 4.0 - 11.0 10e3/uL    RBC Count 5.24 (H) 3.80 - 5.20 10e6/uL    Hemoglobin 15.9 (H) 11.7 - 15.7 g/dL    Hematocrit 48.9 (H) 35.0 - 47.0 %    MCV 93 78 - 100 fL    MCH 30.3 26.5 - 33.0 pg    MCHC 32.5 31.5 - 36.5 g/dL    RDW 13.0 10.0 - 15.0 %    Platelet Count 301 150 - 450 10e3/uL    % Neutrophils 75 %    % Lymphocytes 17 %    % Monocytes 8 %    % Eosinophils 0 %     % Basophils 0 %    % Immature Granulocytes 0 %    NRBCs per 100 WBC 0 <1 /100    Absolute Neutrophils 6.6 1.6 - 8.3 10e3/uL    Absolute Lymphocytes 1.5 0.8 - 5.3 10e3/uL    Absolute Monocytes 0.7 0.0 - 1.3 10e3/uL    Absolute Eosinophils 0.0 0.0 - 0.7 10e3/uL    Absolute Basophils 0.0 0.0 - 0.2 10e3/uL    Absolute Immature Granulocytes 0.0 <=0.0 10e3/uL    Absolute NRBCs 0.0 10e3/uL   EKG 12-lead, tracing only     Status: None (Preliminary result)   Result Value Ref Range    Systolic Blood Pressure  mmHg    Diastolic Blood Pressure  mmHg    Ventricular Rate 76 BPM    Atrial Rate 76 BPM    NE Interval 118 ms    QRS Duration 84 ms     ms    QTc 434 ms    P Axis 69 degrees    R AXIS 61 degrees    T Axis 49 degrees    Interpretation ECG Sinus rhythm  Normal ECG      [place] Draw     Status: None (In process)    Narrative    The following orders were created for panel order [place] Draw.  Procedure                               Abnormality         Status                     ---------                               -----------         ------                     Extra Blue Top Tube[[id number]]                              Final result               Extra Red Top Tube[[id number]]                               Final result               Extra Green Top (Lithium...[[id number]]                      Final result               Extra Purple Top Tube[[id number]]                            Final result               Extra Urine Collection[[id number]]                                                      Extra Green Top (Lithium...[[id number]]                      Final result                 Please view results for these tests on the individual orders.     Medications   0.9% sodium chloride BOLUS (0 mLs Intravenous Stopped 9/14/21 7524)        Assessments & Plan (with Medical Decision Making)   61-year-old woman with complex medical history including 2 kidney transplants and liver transplant presenting with a week and a half of  fatigue and lightheadedness that started after a COVID-19 booster vaccination.  Differential diagnosis: UTI, electrolyte abnormalities, viral illness, dehydration, unlikely ACS or pneumonia.    After thorough history and physical examination patient appears to be no acute distress.  She is slightly hypotensive on the examination and I will hydrate her with a bolus of intravenous normal saline.  I will obtain laboratory studies for further diagnostic evaluation.  She agrees with the plan.    Patient's laboratory studies returned without any evidence of leukocytosis, WBC is normal at 8800. There is no evidence of anemia, hemoglobin is elevated at 15.9.  Urinalysis shows no evidence of infection.  Electrolytes show no evidence of dehydration, creatinine is normal at 0.81.  Patient was hydrated in the emergency department and felt somewhat better.  I do not see any obvious signs of infection.  She is hemodynamically stable and stable for discharge with outpatient primary care follow-up for further evaluation of her symptoms.  She and her daughter agree with the plan and they also agreed to return if her symptoms worsen or she develops a fever.      I have reviewed the nursing notes. I have reviewed the findings, diagnosis, plan and need for follow up with the patient.    Discharge Medication List as of 9/14/2021  6:36 PM          Final diagnoses:   Fatigue, unspecified type   Lightheadedness   ITiara, am serving as a trained medical scribe to document services personally performed by Kamlesh Liang MD, MD, based on the provider's statements to me.     Azul FLORES Nikola, MD, MD, was physically present and have reviewed and verified the accuracy of this note documented by Tiara Gresham.      --  Kamlesh Liang MD  [facility] EMERGENCY DEPARTMENT  9/14/2021     Kamlesh Liang MD  09/14/21 [id number]

## 2021-09-14 NOTE — TELEPHONE ENCOUNTER
Patient called via triage line very upset. She has been experiencing cloudy, burning urine. + urgency. Patient is afebrile. She doesn't understand why she isn't on an antibiotic after her latest UA results.    Her UA from the 8th showed few bacteria, WBC, moderate leukocyte esterase and trace blood.     Patient is very worried about losing her kidney and wants this to be addressed first thing in the morning.     Forwarding to coordinator to call patient first thing. Patient will wait until 0830.

## 2021-09-14 NOTE — DISCHARGE INSTRUCTIONS
Please make an appointment to follow up with Your Primary Care Provider and Your Subspecialty Clinics in 1-2 days for further evaluation and recommendations.  If your symptoms worsen or you develop a fever 100.4  F or higher please come back to the emergency department.

## 2021-09-15 ENCOUNTER — PATIENT OUTREACH (OUTPATIENT)
Dept: CARE COORDINATION | Facility: CLINIC | Age: 61
End: 2021-09-15

## 2021-09-15 ENCOUNTER — OFFICE VISIT (OUTPATIENT)
Dept: OPTOMETRY | Facility: CLINIC | Age: 61
End: 2021-09-15
Payer: MEDICARE

## 2021-09-15 DIAGNOSIS — H52.203 MYOPIA OF BOTH EYES WITH ASTIGMATISM AND PRESBYOPIA: ICD-10-CM

## 2021-09-15 DIAGNOSIS — Z71.89 OTHER SPECIFIED COUNSELING: ICD-10-CM

## 2021-09-15 DIAGNOSIS — H52.4 MYOPIA OF BOTH EYES WITH ASTIGMATISM AND PRESBYOPIA: ICD-10-CM

## 2021-09-15 DIAGNOSIS — H25.13 NUCLEAR AGE-RELATED CATARACT, BOTH EYES: Primary | ICD-10-CM

## 2021-09-15 DIAGNOSIS — H52.13 MYOPIA OF BOTH EYES WITH ASTIGMATISM AND PRESBYOPIA: ICD-10-CM

## 2021-09-15 DIAGNOSIS — Z01.00 VISIT FOR EYE AND VISION EXAM: ICD-10-CM

## 2021-09-15 LAB
ATRIAL RATE - MUSE: 76 BPM
DIASTOLIC BLOOD PRESSURE - MUSE: NORMAL MMHG
INTERPRETATION ECG - MUSE: NORMAL
P AXIS - MUSE: 69 DEGREES
PR INTERVAL - MUSE: 118 MS
QRS DURATION - MUSE: 84 MS
QT - MUSE: 386 MS
QTC - MUSE: 434 MS
R AXIS - MUSE: 61 DEGREES
SYSTOLIC BLOOD PRESSURE - MUSE: NORMAL MMHG
T AXIS - MUSE: 49 DEGREES
VENTRICULAR RATE- MUSE: 76 BPM

## 2021-09-15 ASSESSMENT — REFRACTION_CURRENTRX
OS_BASECURVE: 8.5
OS_AXIS: 010
OS_CYLINDER: -1.25
OD_CYLINDER: -0.75
OD_BASECURVE: 8.5
OD_AXIS: 140
OS_SPHERE: -4.50
OS_DIAMETER: 14.5
OD_BRAND: AVAIRA TORIC
OD_SPHERE: -4.50
OD_DIAMETER: 14.5
OS_BRAND: AVAIRA TORIC

## 2021-09-15 ASSESSMENT — REFRACTION_MANIFEST
OD_SPHERE: -6.75
OD_CYLINDER: +1.75
OD_AXIS: 068
OD_SPHERE: -5.75
OS_AXIS: 093
OS_AXIS: 090
OD_AXIS: 060
OS_CYLINDER: +1.75
OS_SPHERE: -7.00
OS_SPHERE: -5.25
OS_CYLINDER: +1.00
OD_CYLINDER: +0.75

## 2021-09-15 ASSESSMENT — TONOMETRY
OD_IOP_MMHG: 19
OS_IOP_MMHG: 16
IOP_METHOD: ICARE

## 2021-09-15 ASSESSMENT — SLIT LAMP EXAM - LIDS
COMMENTS: NORMAL
COMMENTS: NORMAL

## 2021-09-15 ASSESSMENT — CONF VISUAL FIELD
OD_NORMAL: 1
OS_NORMAL: 1

## 2021-09-15 ASSESSMENT — VISUAL ACUITY
CORRECTION_TYPE: CONTACTS
METHOD: SNELLEN - LINEAR

## 2021-09-15 ASSESSMENT — CUP TO DISC RATIO
OD_RATIO: 0.40
OS_RATIO: 0.40

## 2021-09-15 NOTE — PROGRESS NOTES
Clinic Care Coordination Contact  [facility]: Post-Discharge Note  SITUATION                                                      Admission:    Admission Date: 09/14/21   Reason for Admission: Fatigue -malaise- Dizziness- exhaustion- Dehydration  Discharge:   Discharge Date: 09/14/21  Discharge Diagnosis: Fatigue -malaise- Dizziness- exhaustion- Dehydration    BACKGROUND                                                      Belen Sharma is a 61 year old female with PMH notable for polycystic renal disease and congenital hepatic fibrosis s/p kidney/liver transplant 2010 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  Patient is accompanied by her daughter.    ASSESSMENT      Enrollment  Primary Care Care Coordination Status: Not a Candidate    Discharge Assessment  How are you doing now that you are home?: better  How are your symptoms? (Red Flag symptoms escalate to triage hotline per guidelines): Improved  Do you feel your condition is stable enough to be safe at home until your provider visit?: Yes  Does the patient have their discharge instructions? : Yes  Does the patient have questions regarding their discharge instructions? : No  Were you started on any new medications or were there changes to any of your previous medications? : No  Does the patient have all of their medications?: Yes  Do you have questions regarding any of your medications? : No         Post-op (Clinicians Only)  Did the patient have surgery or a procedure: No  Fever: No  Chills: No  Eating & Drinking: eating and drinking without complaints/concerns  PO Intake: regular diet  Bowel Function: normal  Urinary Status: indwelling urinary  catheter        PLAN                                                      Outpatient Plan:     Please make an appointment to follow up with  Your Primary Care Provider and Your  Subspecialty Clinics in 1-2 days for further  evaluation and recommendations. If your  symptoms worsen or you develop a fever 100.4   F or higher please come back to the emergency  department.    Future Appointments   Date Time Provider Department Center   9/15/2021  2:00 PM Shannon Loza, OD EYECL [facility]   9/22/2021  1:30 PM Con Gimenez, PT IUCOP [facility]   9/28/2021  9:00 AM Sapna Marcano MD [facility]   9/28/2021  2:00 PM Vincent De La Garza MD [facility]   9/29/2021  2[facility]50 [facility]   11/22/2021  2:30 PM Christy Mohamud MD [facility]   12/14/2021  3:30 PM Anat Benitez MD [facility]   7/26/2022  1:00 PM [address]         For any urgent concerns, please contact our 24 hour nurse triage line: [phone] ([phone])         Olimpia Luque MA  Clinic Care Coordinator  [facility]  [phone]

## 2021-09-16 ASSESSMENT — REFRACTION_MANIFEST: METHOD_AUTOREFRACTION: 1

## 2021-09-16 NOTE — PROGRESS NOTES
A/P  1.) Cataracts each eye  -Visually significant, BCVA 20/25 both eyes  -Endorses blurry vision, does not feel safe driving day or night anymore  -Unable to improve vision further today. Would rec referral for cataract surgery eval at this time  -Pt is s/p liver/kidney transplant, would like CE/IOL at [facility]    2.) Myopia/Astig/Presbyopia each eye  -Currently in DVO CL's with readers over    Refer to general ophth for cataract surgery eval each eye

## 2021-09-17 ENCOUNTER — TELEPHONE (OUTPATIENT)
Dept: INTERNAL MEDICINE | Facility: CLINIC | Age: 61
End: 2021-09-17

## 2021-09-17 ENCOUNTER — TELEPHONE (OUTPATIENT)
Dept: INFECTIOUS DISEASES | Facility: CLINIC | Age: 61
End: 2021-09-17

## 2021-09-17 ENCOUNTER — TELEPHONE (OUTPATIENT)
Dept: OBGYN | Facility: CLINIC | Age: 61
End: 2021-09-17

## 2021-09-17 DIAGNOSIS — B37.31 YEAST INFECTION OF THE VAGINA: Primary | ICD-10-CM

## 2021-09-17 RX ORDER — TERCONAZOLE 8 MG/G
1 CREAM VAGINAL AT BEDTIME
Qty: 20 G | Refills: 0 | Status: SHIPPED | OUTPATIENT
Start: 2021-09-17 | End: 2022-04-28

## 2021-09-17 NOTE — TELEPHONE ENCOUNTER
Health Call Center    Phone Message    May a detailed message be left on voicemail: yes , Patient is wanting to get a call back at confidential line and left a detailed message if not answered.     Reason for Call: Symptoms or Concerns     If patient has red-flag symptoms, warm transfer to triage line    Current symptom or concern: Per Patient states she was in the ER on 9/14/2021 and had received an infusion of fluids. Patient states she has a low grade tempeture of 99.8. Patient states she feels sick and exhausted. Patient states she has a slight cough. Patient states at the ER they had ran tests and a test came back with slight blood in urine. Patient is wanting to know if she is able to get medication to help her. Patient states she did not get any medication at the ER and that she is a liver transplant Patient.  Please advise.     Symptoms have been present for:  2 week(s)    Has patient previously been seen for this? Yes    By ER    Date: 9/17/2021    Are there any new or worsening symptoms? Yes: Feels sick and Exhausted and running low tempeture.       Action Taken: Message routed to:  [facility] ID    Travel Screening: Not Applicable

## 2021-09-17 NOTE — TELEPHONE ENCOUNTER
Bedside and Verbal shift change report given to Annalisa Whitmore RN (oncoming nurse) by Crispin Gonsalez RN (offgoing nurse). Report included the following information SBAR, Kardex and MAR. Pt called with c/o white discharge on her pessary and pain vaginally.  Pt also is c/o urgency and low grade fever.  Pt had Covid-19 vaccine few days ago and just doesn't feel good.  This writer offered a Urogyn appt and yeast infection cream.  Pt agreed to both and verbalized understanding.

## 2021-09-17 NOTE — TELEPHONE ENCOUNTER
[facility] Call Center    Phone Message    May a detailed message be left on voicemail: yes     Reason for Call: Other: pt calling requesting a call back from her care team. High temp (98.5) and cough per pt. pls follow up to advise.    Action Taken: Message routed to:  [facility] pcc    Travel Screening: Not Applicable

## 2021-09-17 NOTE — TELEPHONE ENCOUNTER
ID has already followed up with the patient, emerson Carbajal, EMT at 4:21 PM on 9/17/2021.  [facility]  [facility]  [place]  [phone]

## 2021-09-17 NOTE — TELEPHONE ENCOUNTER
Called pt, no answer, left VM.     Of note, UA does not show blood in urine. No indication for culture.

## 2021-09-20 ENCOUNTER — OFFICE VISIT (OUTPATIENT)
Dept: UROLOGY | Facility: CLINIC | Age: 61
End: 2021-09-20
Attending: OBSTETRICS & GYNECOLOGY
Payer: MEDICARE

## 2021-09-20 VITALS — DIASTOLIC BLOOD PRESSURE: 57 MMHG | SYSTOLIC BLOOD PRESSURE: 93 MMHG | WEIGHT: 150 LBS | BODY MASS INDEX: 22.15 KG/M2

## 2021-09-20 DIAGNOSIS — B37.31 CANDIDAL VULVOVAGINITIS: Primary | ICD-10-CM

## 2021-09-20 PROCEDURE — 99214 OFFICE O/P EST MOD 30 MIN: CPT | Performed by: OBSTETRICS & GYNECOLOGY

## 2021-09-20 PROCEDURE — G0463 HOSPITAL OUTPT CLINIC VISIT: HCPCS

## 2021-09-20 ASSESSMENT — PAIN SCALES - GENERAL: PAINLEVEL: MILD PAIN (2)

## 2021-09-20 NOTE — LETTER
9/20/2021       RE: Belen Sharma  [address] [place] [address]     Dear Colleague,    Thank you for referring your patient, Belen Sharma, to the [facility] [place] at [facility]. Please see a copy of my visit note below.          September 20, 2021    Referring Provider: Felicita Banks MD  [address] [place] [address]    Primary Care Provider: Vincent De La Garza    CC: vaginal irritation/low grade fever    HPI:  Ms Sharma is a 61 year old female with med hx sig for polycystic kidney dz s/p failed kidney and liver transplant followed by successful kidney and liver reimplantation ( on immunosuppression) who is a patient of Dr Francis's here with concerns for low grade fever and some vaginal irritation since she got her booster COVID shot and colposcopy with cervical biopsy on the same day 2 weeks ago. She was evaluated for this on 9/14/21 in the ED with negative work up ( no leucocytosis, normal UA, normal Hgb, normal EKG). She says her temprature goes up to 99 at times and she has been taking tylenol. Also has some dry non productive cough occasionally. No nausea, vomiting. Her new kidney is in the right lower abdomen per her report. No abdominal pain. She has known prolapse and currently uses  #9 ring with support after smaller sizes did not stay in. In reviewing her chart, looks like her first evaluation with Dr Francis did not show physical finding of prolapse although patient reports protrusion. She says the pessary helps her prolapse and she can't do without it. Currently on vaginal yeast medication ( cream-unclear what) the last few days so not using pessary. She wants to make sure she doesn't have any vaginal infection.      Urinary Symptoms/Voiding function  Urinalysis on 9/14/21 showed small LE but no other sig finding.     Gastrointestinal Symptoms:  No diarrhea, constipation.     Sexual function/Pelvic floor pain/GYN:    Colposcopy done by Dr Perez on 21 after pap smear NILM w/ HR HPV positive result.  A. Endocervix, biopsy:  - Rare reactive cervical mucosal cells  - Negative for dysplasia       Prior to this, she had a cone biopsy that was JANNETTE 3 w/ negative margins in . She has a history of JANNETTE 3 on colposcopy biopsies in [place].      Surgical History:      Past Surgical History:   Procedure Laterality Date     bunectomy  2018    right foot     bunionectomy  2019    left     CHOLECYSTECTOMY       CONIZATION N/A 2020    Procedure: CONE BIOPSY, CERVIX;  Surgeon: Daysi Perez MD;  Location: [facility]     FAILED PICC - RIGHT ARM Right 2020    Has a LUE AV fistula.     HERNIA REPAIR, INCISIONAL  2013     IR DIALYSIS PTA CENTRAL SEG  2020     IR PICC PLACEMENT > 5 YRS OF AGE  2020     SPLENECTOMY      Splenectomy     TRANSPLANT KIDNEY RECIPIENT  DONOR      re-transplant after [facility]; 6 months de-sensitization prior AND 6 months after transplant     TRANSPLANT LIVER RECIPIENT  DONOR  2011     TRANSPLANT LIVER, KIDNEY RECIPIENT  DONOR, COMBINED  10/09/2010    [facility] - re-Tx liver 3/3/2011; Kidney (left iliac) lost to [facility]/fibrosis - explant     VASCULAR SURGERY      Vascular access left UE. Not used for 4 years since 2nd kidney tx 2016 [facility] [place]       OB/Gyn History:  OB History    Para Term  AB Living   2 0 0 0 0 2   SAB TAB Ectopic Multiple Live Births   0 0 0 0 0      # Outcome Date GA Lbr Anshul/2nd Weight Sex Delivery Anes PTL Lv   2             1                 Medications/Vitamins/Supplements:   Current Outpatient Medications   Medication     acetaminophen (TYLENOL) 325 MG tablet     ALPRAZolam (XANAX) 0.25 MG tablet     aspirin (ASA) 81 MG chewable tablet     biotin 1000 MCG TABS tablet     diphenhydrAMINE (BENADRYL) 25 MG tablet     docusate sodium (COLACE) 100 MG capsule     estradiol (ESTRACE) 0.1 MG/GM  vaginal cream     guaiFENesin (MUCINEX PO)     loratadine (CLARITIN) 10 MG tablet     Multiple Vitamins-Minerals (WOMENS DAILY FORMULA PO)     MYFORTIC (BRAND) 180 MG EC tablet     nitroGLYcerin (NITROSTAT) 0.4 MG sublingual tablet     ondansetron (ZOFRAN) 4 MG tablet     pantoprazole (PROTONIX) 40 MG EC tablet     Probiotic Product (PROBIOTIC PO)     Psyllium (METAMUCIL PO)     sulfamethoxazole-trimethoprim (BACTRIM) 400-80 MG tablet     tacrolimus (GENERIC EQUIVALENT) 1 MG capsule     temazepam (RESTORIL) 15 MG capsule     terconazole (TERAZOL 3) 0.8 % vaginal cream     Vitamin D, Cholecalciferol, 25 MCG (1000 UT) CAPS     Vitamin D3 (CHOLECALCIFEROL) 25 mcg (1000 units) tablet     No current facility-administered medications for this visit.         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 Both, very large, native kidneys reamain.     PONV (postoperative nausea and vomiting)     Need to start with ice chips and apple juice, no soda     S/P kidney transplant     [facility] 10/9/2010 - kidney failure 2/2 [facility]. Explanted 2012. Re-transplant after de-sensitization 2016     S/P liver transplant (H)     10/9/2010 - [facility], ischemic biopathy. Re-transplant 3/3/2011     S/P splenectomy      Spider veins 2019     Thyroid disease     Hyperthyroid treated with single dose iodine     ROS  Social History    Social History     Socioeconomic History     Marital status:      Spouse name: Not on file     Number of children: Not on file     Years of education: Not on file     Highest education level: Not on file   Occupational History      Not on file   Tobacco Use     Smoking status: Never Smoker     Smokeless tobacco: Never Used   Substance and Sexual Activity     Alcohol use: Not Currently     Drug use: Not Currently     Sexual activity: Not Currently   Other Topics Concern     Parent/sibling w/ CABG, MI or angioplasty before 65F 55M? Not Asked   Social History Narrative     Not on file     Social Determinants of Health     Financial Resource Strain:      Difficulty of Paying Living Expenses:    Food Insecurity:      Worried About Running Out of Food in the Last Year:      Ran Out of Food in the Last Year:    Transportation Needs:      Lack of Transportation (Medical):      Lack of Transportation (Non-Medical):    Physical Activity:      Days of Exercise per Week:      Minutes of Exercise per Session:    Stress:      Feeling of Stress :    Social Connections:      Frequency of Communication with Friends and Family:      Frequency of Social Gatherings with Friends and Family:      Attends Hindu Services:      Active Member of Clubs or Organizations:      Attends Club or Organization Meetings:      Marital Status:    Intimate Partner Violence:      Fear of Current or Ex-Partner:      Emotionally Abused:      Physically Abused:      Sexually Abused:        Family History  Family History   Problem Relation Age of Onset     Uterine Cancer Maternal Grandmother      Polycystic Kidney Diease Brother      Polycystic Kidney Diease Brother      Polycystic Kidney Diease Brother      Polycystic Kidney Diease Brother      Cervical Cancer Maternal Aunt      Depression Mother      Anxiety Disorder Mother      Depression Father      Anxiety Disorder Father        Allergy    Allergies   Allergen Reactions     Erythromycin      Ibuprofen      Due to liver transplant       Current Outpatient Medications   Medication     acetaminophen (TYLENOL) 325 MG tablet     ALPRAZolam (XANAX) 0.25 MG tablet     aspirin (ASA) 81 MG chewable tablet     biotin 1000 MCG TABS  tablet     diphenhydrAMINE (BENADRYL) 25 MG tablet     docusate sodium (COLACE) 100 MG capsule     estradiol (ESTRACE) 0.1 MG/GM vaginal cream     guaiFENesin (MUCINEX PO)     loratadine (CLARITIN) 10 MG tablet     Multiple Vitamins-Minerals (WOMENS DAILY FORMULA PO)     MYFORTIC (BRAND) 180 MG EC tablet     nitroGLYcerin (NITROSTAT) 0.4 MG sublingual tablet     ondansetron (ZOFRAN) 4 MG tablet     pantoprazole (PROTONIX) 40 MG EC tablet     Probiotic Product (PROBIOTIC PO)     Psyllium (METAMUCIL PO)     sulfamethoxazole-trimethoprim (BACTRIM) 400-80 MG tablet     tacrolimus (GENERIC EQUIVALENT) 1 MG capsule     temazepam (RESTORIL) 15 MG capsule     terconazole (TERAZOL 3) 0.8 % vaginal cream     Vitamin D, Cholecalciferol, 25 MCG (1000 UT) CAPS     Vitamin D3 (CHOLECALCIFEROL) 25 mcg (1000 units) tablet     No current facility-administered medications for this visit.       Physical Exam: BP 93/57   Wt 68 kg (150 lb)   BMI 22.15 kg/m    Temp 98.5    Gen:  is alert, comfortable in no acute distress,   Abdomen: Abdomen is soft, non-tender, non-distended,   Lungs: non-labored breathing.     Pelvic Exam:   Normal external female genitalia. The urethra was normal.    Vagina: Has white vaginal cream in the vagina from her yeast medication so unable to assess for persistent yeast infection. No abnormal or foul smelling discharge.   Uterus/cervix: normal, non tender  Ovaries: No palpable mass, non tender  Vulva: no erythema or lesions  Rectal: deferred      Labs:   Color Urine (no units)   Date Value   09/14/2021 Straw   06/04/2021 Yellow     Appearance Urine (no units)   Date Value   09/14/2021 Clear   06/04/2021 Clear     Glucose Urine (mg/dL)   Date Value   09/14/2021 Negative   06/04/2021 Negative     Bilirubin Urine (no units)   Date Value   09/14/2021 Negative   06/04/2021 Negative     Ketones Urine (mg/dL)   Date Value   09/14/2021 Negative   06/04/2021 Negative     Specific Gravity Urine (no units)   Date Value    09/14/2021 1.004   06/04/2021 1.010     pH Urine   Date Value   09/14/2021 7.5 (H)   06/04/2021 6.5 pH     Protein Albumin Urine (mg/dL)   Date Value   09/14/2021 Negative   06/04/2021 Negative     Urobilinogen Urine   Date Value   09/08/2021 0.2 E.U./dL   06/04/2021 0.2 EU/dL     Nitrite Urine (no units)   Date Value   09/14/2021 Negative   06/04/2021 Negative     Leukocyte Esterase Urine (no units)   Date Value   09/14/2021 Small (A)   06/04/2021 Moderate (A)     CBC RESULTS:   Recent Labs   Lab Test 09/14/21  1531   WBC 8.8   RBC 5.24*   HGB 15.9*   HCT 48.9*   MCV 93   MCH 30.3   MCHC 32.5   RDW 13.0            A/P: Belen Sharma is a 61 year old F with     Suspected   Encounter Diagnosis   Name Primary?     Candidal vulvovaginitis Yes     Continue current anti yeast vaginal cream -unable to do assess for yeast infection today due to copious vaginal antiyeast cream . Exam not worrisome for vaginal infection however  No fever on clinic test today but encouraged her to follow up with her PMD if she has persistent fevers or she develops other symptoms (N/V, productive cough etc). We discussed that given her immunocompromized state, good to be cautious. Fever may still be attributable to her booster shot and her slow immune system in light of other identifiable etiologies     I spent a total of 30 minutes with  Belen Sharma  on the date of the encounter in chart review, face to face patient visit, review of tests, documentation and/or discussion with other providers about the issues documented above.     Felicita Banks MD, MCR  , Department of OBGYN  Female Pelvic Medicine and Reconstructive Surgery ( Urogynecology)  CC  Patient Care Team:  Vincent De La Garza MD as PCP - General (Internal Medicine)  Christy Mohamud MD as MD (Cardiology)  Divya Chávez, RN as Registered Nurse (Transplant)  Елена Dinh RN as Registered Nurse  Christy Mohamud MD as Assigned Heart and Vascular  Provider  Leventhal, Thomas Michael, MD as Assigned Gastroenterology Provider  Zach Francis MD as Assigned OBGYN Provider  Catrachita Gonzales MD as Assigned Infectious Disease Provider  Anna Gamble MD as Assigned Behavioral Health Provider  Vincent De La Garza MD as Assigned PCP  Saran Angulo MD as Assigned Nephrology Provider  Daysi Perez MD as MD (OB/Gyn)  Shannon Loza OD (Optometry)  Sapna Marcano MD as MD (Ophthalmology)  Mat Plata MD as Assigned Surgical Provider  Eliu Martines MD as Assigned Musculoskeletal Provider  Anat Benitez MD as MD (Dermatology)  JAYJAY SHORE

## 2021-09-20 NOTE — NURSING NOTE
Follow up pessary check  Feels runs down and tired  Since brenda did at biopsy low grade  100.6 highest  Usually 99 degrees   Some pelvic pain   Taking tylenol every 6 hours   Taken 2 does of cream  For yeast infection     Temp today 98.6 at 1:55 pm   97.7 11; 46  8:13 took two tylenol   Then it was 99.0      Had booster for covid 9-2-21

## 2021-09-20 NOTE — PROGRESS NOTES
September 20, 2021    Referring Provider: Felicita Banks MD  [address] [place] [address]    Primary Care Provider: Vincent De La Garza    CC: vaginal irritation/low grade fever    HPI:  Ms Sharma is a 61 year old female with med hx sig for polycystic kidney dz s/p failed kidney and liver transplant followed by successful kidney and liver reimplantation ( on immunosuppression) who is a patient of Dr Francis's here with concerns for low grade fever and some vaginal irritation since she got her booster COVID shot and colposcopy with cervical biopsy on the same day 2 weeks ago. She was evaluated for this on 9/14/21 in the ED with negative work up ( no leucocytosis, normal UA, normal Hgb, normal EKG). She says her temprature goes up to 99 at times and she has been taking tylenol. Also has some dry non productive cough occasionally. No nausea, vomiting. Her new kidney is in the right lower abdomen per her report. No abdominal pain. She has known prolapse and currently uses  #9 ring with support after smaller sizes did not stay in. In reviewing her chart, looks like her first evaluation with Dr Francis did not show physical finding of prolapse although patient reports protrusion. She says the pessary helps her prolapse and she can't do without it. Currently on vaginal yeast medication ( cream-unclear what) the last few days so not using pessary. She wants to make sure she doesn't have any vaginal infection.      Urinary Symptoms/Voiding function  Urinalysis on 9/14/21 showed small LE but no other sig finding.     Gastrointestinal Symptoms:  No diarrhea, constipation.     Sexual function/Pelvic floor pain/GYN:   Colposcopy done by Dr Perez on 9/2/21 after pap smear NILM w/ HR HPV positive result.  A. Endocervix, biopsy:  - Rare reactive cervical mucosal cells  - Negative for dysplasia       Prior to this, she had a cone biopsy that was JANNETTE 3 w/ negative margins in 2020. She has a history of JANNETTE 3 on  colposcopy biopsies in [place].      Surgical History:      Past Surgical History:   Procedure Laterality Date     bunectomy  2018    right foot     bunionectomy  2019    left     CHOLECYSTECTOMY       CONIZATION N/A 2020    Procedure: CONE BIOPSY, CERVIX;  Surgeon: Daysi Perez MD;  Location: [facility]     FAILED PICC - RIGHT ARM Right 2020    Has a LUE AV fistula.     HERNIA REPAIR, INCISIONAL  2013     IR DIALYSIS PTA CENTRAL SEG  2020     IR PICC PLACEMENT > 5 YRS OF AGE  2020     SPLENECTOMY      Splenectomy     TRANSPLANT KIDNEY RECIPIENT  DONOR      re-transplant after [facility]; 6 months de-sensitization prior AND 6 months after transplant     TRANSPLANT LIVER RECIPIENT  DONOR  2011     TRANSPLANT LIVER, KIDNEY RECIPIENT  DONOR, COMBINED  10/09/2010    [facility] - re-Tx liver 3/3/2011; Kidney (left iliac) lost to [facility]/fibrosis - explant     VASCULAR SURGERY      Vascular access left UE. Not used for 4 years since 2nd kidney tx 2016 [place]       OB/Gyn History:  OB History    Para Term  AB Living   2 0 0 0 0 2   SAB TAB Ectopic Multiple Live Births   0 0 0 0 0      # Outcome Date GA Lbr Anshul/2nd Weight Sex Delivery Anes PTL Lv   2             1                 Medications/Vitamins/Supplements:   Current Outpatient Medications   Medication     acetaminophen (TYLENOL) 325 MG tablet     ALPRAZolam (XANAX) 0.25 MG tablet     aspirin (ASA) 81 MG chewable tablet     biotin 1000 MCG TABS tablet     diphenhydrAMINE (BENADRYL) 25 MG tablet     docusate sodium (COLACE) 100 MG capsule     estradiol (ESTRACE) 0.1 MG/GM vaginal cream     guaiFENesin (MUCINEX PO)     loratadine (CLARITIN) 10 MG tablet     Multiple Vitamins-Minerals (WOMENS DAILY FORMULA PO)     MYFORTIC (BRAND) 180 MG EC tablet     nitroGLYcerin (NITROSTAT) 0.4 MG sublingual tablet     ondansetron (ZOFRAN) 4 MG tablet     pantoprazole (PROTONIX) 40 MG  EC tablet     Probiotic Product (PROBIOTIC PO)     Psyllium (METAMUCIL PO)     sulfamethoxazole-trimethoprim (BACTRIM) 400-80 MG tablet     tacrolimus (GENERIC EQUIVALENT) 1 MG capsule     temazepam (RESTORIL) 15 MG capsule     terconazole (TERAZOL 3) 0.8 % vaginal cream     Vitamin D, Cholecalciferol, 25 MCG (1000 UT) CAPS     Vitamin D3 (CHOLECALCIFEROL) 25 mcg (1000 units) tablet     No current facility-administered medications for this visit.         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 Both, very large, native kidneys reamain.     PONV (postoperative nausea and vomiting)     Need to start with ice chips and apple juice, no soda     S/P kidney transplant     SLK 10/9/2010 - kidney failure 2/2 [facility]. Explanted 2012. Re-transplant after de-sensitization 2016     S/P liver transplant (H)     10/9/2010 - [facility], ischemic biopathy. Re-transplant 3/3/2011     S/P splenectomy      Spider veins 2019     Thyroid disease     Hyperthyroid treated with single dose iodine     ROS  Social History    Social History     Socioeconomic History     Marital status:      Spouse name: Not on file     Number of children: Not on file     Years of education: Not on file     Highest education level: Not on file   Occupational History     Not on file   Tobacco Use     Smoking status: Never Smoker     Smokeless tobacco: Never Used   Substance and Sexual Activity     Alcohol use: Not Currently     Drug use: Not Currently     Sexual activity: Not Currently   Other Topics Concern     Parent/sibling w/ CABG, MI or angioplasty before 65F 55M?  Not Asked   Social History Narrative     Not on file     Social Determinants of Health     Financial Resource Strain:      Difficulty of Paying Living Expenses:    Food Insecurity:      Worried About Running Out of Food in the Last Year:      Ran Out of Food in the Last Year:    Transportation Needs:      Lack of Transportation (Medical):      Lack of Transportation (Non-Medical):    Physical Activity:      Days of Exercise per Week:      Minutes of Exercise per Session:    Stress:      Feeling of Stress :    Social Connections:      Frequency of Communication with Friends and Family:      Frequency of Social Gatherings with Friends and Family:      Attends Christianity Services:      Active Member of Clubs or Organizations:      Attends Club or Organization Meetings:      Marital Status:    Intimate Partner Violence:      Fear of Current or Ex-Partner:      Emotionally Abused:      Physically Abused:      Sexually Abused:        Family History  Family History   Problem Relation Age of Onset     Uterine Cancer Maternal Grandmother      Polycystic Kidney Diease Brother      Polycystic Kidney Diease Brother      Polycystic Kidney Diease Brother      Polycystic Kidney Diease Brother      Cervical Cancer Maternal Aunt      Depression Mother      Anxiety Disorder Mother      Depression Father      Anxiety Disorder Father        Allergy    Allergies   Allergen Reactions     Erythromycin      Ibuprofen      Due to liver transplant       Current Outpatient Medications   Medication     acetaminophen (TYLENOL) 325 MG tablet     ALPRAZolam (XANAX) 0.25 MG tablet     aspirin (ASA) 81 MG chewable tablet     biotin 1000 MCG TABS tablet     diphenhydrAMINE (BENADRYL) 25 MG tablet     docusate sodium (COLACE) 100 MG capsule     estradiol (ESTRACE) 0.1 MG/GM vaginal cream     guaiFENesin (MUCINEX PO)     loratadine (CLARITIN) 10 MG tablet     Multiple Vitamins-Minerals (WOMENS DAILY FORMULA PO)     MYFORTIC (BRAND) 180 MG EC tablet      nitroGLYcerin (NITROSTAT) 0.4 MG sublingual tablet     ondansetron (ZOFRAN) 4 MG tablet     pantoprazole (PROTONIX) 40 MG EC tablet     Probiotic Product (PROBIOTIC PO)     Psyllium (METAMUCIL PO)     sulfamethoxazole-trimethoprim (BACTRIM) 400-80 MG tablet     tacrolimus (GENERIC EQUIVALENT) 1 MG capsule     temazepam (RESTORIL) 15 MG capsule     terconazole (TERAZOL 3) 0.8 % vaginal cream     Vitamin D, Cholecalciferol, 25 MCG (1000 UT) CAPS     Vitamin D3 (CHOLECALCIFEROL) 25 mcg (1000 units) tablet     No current facility-administered medications for this visit.       Physical Exam: BP 93/57   Wt 68 kg (150 lb)   BMI 22.15 kg/m    Temp 98.5    Gen:  is alert, comfortable in no acute distress,   Abdomen: Abdomen is soft, non-tender, non-distended,   Lungs: non-labored breathing.     Pelvic Exam:   Normal external female genitalia. The urethra was normal.    Vagina: Has white vaginal cream in the vagina from her yeast medication so unable to assess for persistent yeast infection. No abnormal or foul smelling discharge.   Uterus/cervix: normal, non tender  Ovaries: No palpable mass, non tender  Vulva: no erythema or lesions  Rectal: deferred      Labs:   Color Urine (no units)   Date Value   09/14/2021 Straw   06/04/2021 Yellow     Appearance Urine (no units)   Date Value   09/14/2021 Clear   06/04/2021 Clear     Glucose Urine (mg/dL)   Date Value   09/14/2021 Negative   06/04/2021 Negative     Bilirubin Urine (no units)   Date Value   09/14/2021 Negative   06/04/2021 Negative     Ketones Urine (mg/dL)   Date Value   09/14/2021 Negative   06/04/2021 Negative     Specific Gravity Urine (no units)   Date Value   09/14/2021 1.004   06/04/2021 1.010     pH Urine   Date Value   09/14/2021 7.5 (H)   06/04/2021 6.5 pH     Protein Albumin Urine (mg/dL)   Date Value   09/14/2021 Negative   06/04/2021 Negative     Urobilinogen Urine   Date Value   09/08/2021 0.2 E.U./dL   06/04/2021 0.2 EU/dL     Nitrite Urine (no units)    Date Value   09/14/2021 Negative   06/04/2021 Negative     Leukocyte Esterase Urine (no units)   Date Value   09/14/2021 Small (A)   06/04/2021 Moderate (A)     CBC RESULTS:   Recent Labs   Lab Test 09/14/21  1531   WBC 8.8   RBC 5.24*   HGB 15.9*   HCT 48.9*   MCV 93   MCH 30.3   MCHC 32.5   RDW 13.0            A/P: Belen Sharma is a 61 year old F with     Suspected   Encounter Diagnosis   Name Primary?     Candidal vulvovaginitis Yes     Continue current anti yeast vaginal cream -unable to do assess for yeast infection today due to copious vaginal antiyeast cream . Exam not worrisome for vaginal infection however  No fever on clinic test today but encouraged her to follow up with her PMD if she has persistent fevers or she develops other symptoms (N/V, productive cough etc). We discussed that given her immunocompromized state, good to be cautious. Fever may still be attributable to her booster shot and her slow immune system in light of other identifiable etiologies     I spent a total of 30 minutes with  Belen Sharma  on the date of the encounter in chart review, face to face patient visit, review of tests, documentation and/or discussion with other providers about the issues documented above.     Felicita Banks MD, [facility]  , Department of OBGYN  Female Pelvic Medicine and Reconstructive Surgery ( Urogynecology)  CC  Patient Care Team:  Vincent De La Garza MD as PCP - General (Internal Medicine)  Christy Mohamud MD as MD (Cardiology)  Divya Chávez RN as Registered Nurse (Transplant)  Елена Dinh RN as Registered Nurse  Christy Mohamud MD as Assigned Heart and Vascular Provider  Leventhal, Thomas Michael, MD as Assigned Gastroenterology Provider  Zach Francis MD as Assigned OBGYN Provider  Catrachita Gonzales MD as Assigned Infectious Disease Provider  Anna Gamble MD as Assigned Behavioral Health Provider  Vincent De La Garza MD as Assigned PCP  Saran Angulo  MD as Assigned Nephrology Provider  Daysi Perez MD as MD (OB/Gyn)  Shannon Loza, LEANDRA (Optometry)  Sapna Marcano MD as MD (Ophthalmology)  Mat Plata MD as Assigned Surgical Provider  Eliu Martines MD as Assigned Musculoskeletal Provider  Anat Benitez MD as MD (Dermatology)  JAYJAY SHORE

## 2021-09-22 ENCOUNTER — THERAPY VISIT (OUTPATIENT)
Dept: PHYSICAL THERAPY | Facility: CLINIC | Age: 61
End: 2021-09-22
Attending: ORTHOPAEDIC SURGERY
Payer: MEDICARE

## 2021-09-22 DIAGNOSIS — M54.50 LUMBAR PAIN: ICD-10-CM

## 2021-09-22 PROCEDURE — 97161 PT EVAL LOW COMPLEX 20 MIN: CPT | Mod: GP | Performed by: PHYSICAL THERAPIST

## 2021-09-22 PROCEDURE — 97110 THERAPEUTIC EXERCISES: CPT | Mod: GP | Performed by: PHYSICAL THERAPIST

## 2021-09-22 NOTE — LETTER
DEPARTMENT OF HEALTH AND HUMAN SERVICES  CENTERS FOR MEDICARE & MEDICAID SERVICES    PLAN/UPDATED PLAN OF PROGRESS FOR OUTPATIENT REHABILITATION          PATIENTS NAME:  Belen Sharma   : 1960  PROVIDER NUMBER:    [id number]    [id number]     PROVIDER NAME: [facility]  MEDICAL RECORD NUMBER: [id number]   START OF CARE DATE:  SOC Date: 21   TYPE:  PT  PRIMARY/TREATMENT DIAGNOSIS: (Pertinent Medical Diagnosis)  Lumbar pain  VISITS FROM START OF CARE:  Rxs Used: 1     Physical Therapy Initial Evaluation  2021     MD Instructions/Precautions/Restrictions: PT eval and treat.     Therapist Impression:   Findings consistent with LBP secondary to scoliosis, with related impairments limiting her ability to stand for prolonged periods of time, perform ADLs. Skilled PT services are necessary in order to reduce impairments and improve independent function.     Subjective:   HPI: Belen Sharma is a 61 year old female with chief complaint of left thoracolumbar back pain. Reports she has had scoliosis since at least the time she was a teenager. Was never progressive enough for surgical management as a youth. Has had a long and complex history of transplants to liver/kidneys, chronic dialysis, and revision procedures. Having gone through all of these and now being in relatively stable condition, she is devoting more attention to her back pain secondary to scoliosis.   Pertinent medical/surgical history: Refer to health history in EMR.   Imaging: Refer to EMR.   Prior treatment? none  Patient's goals are: decrease pain.   Return to MD:  PRN.   Objective:      PATIENTS NAME:  Belen Sharma   : 1960  LUMBAR EXAMINATION    Posture: Left convex thoracolumbar curvature noted in standing.     Active ROM ROM   Flexion Min loss, stretch, no pain   Extension Mod loss, provokes LBP    L R   Rotation Nil loss, no effect Min loss, no effect   Sidebend Mod loss, no  effect Min loss, no effect   Sideglide na na     (*Indicates patient s pain)  Myotomal Strength (MMT) L R   Resisted Hip Flexion (L2) 5 5   Resisted Knee Ext (L3) 5 5   Resisted Ankle DF (L4) 5 5   Resisted Great Toe Ext (L5) 5 5   Resisted Peroneals (S1) 5 5   Resisted Knee Flexion (S1-2) 5 5     Sensory/Reflex Tests: SILT and symmetrical for bilateral LE's. Reflexes 2+ and symmetrical for bilateral patellar and Achilles.     Dural Signs:   L R   SLR - -   Slump na na   Femoral nerve na na     Poor TA contraction, compensation via upper rectus abdom and diaphragm.     Assessment/Plan:    The patient is a 61 year old female with chief complaint of LBP.    The patient has the following significant findings with corresponding treatment plan.  Diagnosis 1:  LBP secondary to scoliosis    Pain -  hot/cold therapy, manual therapy, splint/taping/bracing/orthotics, self management and directional preference exercise  Decreased ROM/flexibility - manual therapy, therapeutic exercise and home program  Decreased joint mobility - manual therapy, therapeutic exercise and home program  Decreased strength - therapeutic exercise, therapeutic activities and home program  Impaired muscle performance - neuro re-education and home program  Decreased function - therapeutic activities and home program  Impaired posture - neuro re-education, therapeutic activities and home program              PATIENTS NAME:  Belen Sharma   : 1960    Therapy Evaluation Codes:   1) History comprised of:   Personal factors that impact the plan of care:      Please refer to health history in EMR.    Comorbidity factors that impact the plan of care are:      Please refer to health history in EMR.     Medications impacting care: None.  2) Examination of Body Systems comprised of:   Body structures and functions that impact the plan of care:      Lumbar spine.   Activity limitations that impact the plan of care are:      Standing, Walking, Sleeping and  Laying down.   Clinical presentation characteristics are:    Stable/Uncomplicated.  3) Presentation comprised of:   Presentation scored as Low complexity with uncomplicated characteristics..  4) Decision-Making    Low complexity using standardized patient assessment instrument and/or measureable assessment of functional outcome.  Cumulative Therapy Evaluation is: Low complexity.    Previous and current functional limitations:  (See Goal Flow Sheet for this information)    Short term and Long term goals: (See Goal Flow Sheet for this information)   Communication ability:  Patient appears to be able to clearly communicate and understand verbal and written communication and follow directions correctly.  Treatment Explanation - The following has been discussed with the patient: RX ordered/plan of care, anticipated outcomes, and possible risks and side effects.  This patient would benefit from PT intervention to resume normal activities.   Rehab potential is good.  Frequency:  1 X week, once daily  Duration:  for 2 weeks tapering to 1x/month for 2 months  Discharge Plan: Achieve all LTGs, be independent in home treatment program, and reach maximal therapeutic benefit.    Caregiver Signature/Credentials _____________________________ Date ________       Treating Provider: Con Gimenez, PT   I have reviewed and certified the need for these services and plan of treatment while under my care.        PHYSICIAN'S SIGNATURE:   _________________________________________  Date___________   Eliu MATA MD    Certification period:  Beginning of Cert date period: 09/22/21 to  End of Cert period date: 12/20/21     ____X____ Continue Services or       ________ DC Services                Service dates: From  SOC Date: 09/22/21 date to present

## 2021-09-22 NOTE — LETTER
DEPARTMENT OF HEALTH AND HUMAN SERVICES  CENTERS FOR MEDICARE & MEDICAID SERVICES    PLAN/UPDATED PLAN OF PROGRESS FOR OUTPATIENT REHABILITATION          PATIENTS NAME:  Belen Sharma   : 1960    PROVIDER NUMBER:    [id number]    [facility]  [id number]     PROVIDER NAME: [facility]    MEDICAL RECORD NUMBER: [id number]     START OF CARE DATE:  SOC Date: 21   TYPE:  PT    PRIMARY/TREATMENT DIAGNOSIS: (Pertinent Medical Diagnosis)  Lumbar pain    VISITS FROM START OF CARE:  Rxs Used: 1     Physical Therapy Initial Evaluation     MD Instructions/Precautions/Restrictions: PT eval and treat.     Therapist Impression:   Findings consistent with LBP secondary to scoliosis, with related impairments limiting her ability to stand for prolonged periods of time, perform ADLs. Skilled PT services are necessary in order to reduce impairments and improve independent function.     Subjective:   HPI: Belen Sharma is a 61 year old female with chief complaint of left thoracolumbar back pain. Reports she has had scoliosis since at least the time she was a teenager. Was never progressive enough for surgical management as a youth. Has had a long and complex history of transplants to liver/kidneys, chronic dialysis, and revision procedures. Having gone through all of these and now being in relatively stable condition, she is devoting more attention to her back pain secondary to scoliosis.   MD orders from 21.  Pertinent medical/surgical history: Refer to health history in EMR.   Imaging: Refer to EMR.   Prior treatment? none  Patient's goals are: decrease pain.   Return to MD:  PRN.     Objective:  PATIENTS NAME:  Belen Sharma   : 1960    LUMBAR EXAMINATION    Posture: Left convex thoracolumbar curvature noted in standing.     Active ROM ROM   Flexion Min loss, stretch, no pain   Extension Mod loss, provokes LBP    L R   Rotation Nil loss, no effect Min loss, no effect   Sidebend Mod  loss, no effect Min loss, no effect   Sideglide na na     (*Indicates patient s pain)  Myotomal Strength (MMT) L R   Resisted Hip Flexion (L2) 5 5   Resisted Knee Ext (L3) 5 5   Resisted Ankle DF (L4) 5 5   Resisted Great Toe Ext (L5) 5 5   Resisted Peroneals (S1) 5 5   Resisted Knee Flexion (S1-2) 5 5     Sensory/Reflex Tests: SILT and symmetrical for bilateral LE's. Reflexes 2+ and symmetrical for bilateral patellar and Achilles.     Dural Signs:   L R   SLR - -   Slump na na   Femoral nerve na na     Poor TA contraction, compensation via upper rectus abdom and diaphragm.     Assessment/Plan:    The patient is a 61 year old female with chief complaint of LBP.    The patient has the following significant findings with corresponding treatment plan.  Diagnosis 1:  LBP secondary to scoliosis    Pain -  hot/cold therapy, manual therapy, splint/taping/bracing/orthotics, self management and directional preference exercise  Decreased ROM/flexibility - manual therapy, therapeutic exercise and home program  Decreased joint mobility - manual therapy, therapeutic exercise and home program  Decreased strength - therapeutic exercise, therapeutic activities and home program  Impaired muscle performance - neuro re-education and home program  Decreased function - therapeutic activities and home program  Impaired posture - neuro re-education, therapeutic activities and home program    Therapy Evaluation Codes:   1) History comprised of:   Personal factors that impact the plan of care:      Please refer to health history in EMR.    Comorbidity factors that impact the plan of care are:      Please refer to health history in EMR.     Medications impacting care: None.  2) Examination of Body Systems comprised of:   Body structures and functions that impact the plan of care:      Lumbar spine.  PATIENTS NAME:  Belen Sharma   : 1960     Activity limitations that impact the plan of care are:      Standing, Walking, Sleeping and  "Laying down.   Clinical presentation characteristics are:    Stable/Uncomplicated.  3) Presentation comprised of:   Presentation scored as Low complexity with uncomplicated characteristics..  4) Decision-Making    Low complexity using standardized patient assessment instrument and/or measureable assessment of functional outcome.  Cumulative Therapy Evaluation is: Low complexity.    Previous and current functional limitations:  (See Goal Flow Sheet for this information)    Short term and Long term goals: (See Goal Flow Sheet for this information)     Communication ability:  Patient appears to be able to clearly communicate and understand verbal and written communication and follow directions correctly.  Treatment Explanation - The following has been discussed with the patient: RX ordered/plan of care, anticipated outcomes, and possible risks and side effects.  This patient would benefit from PT intervention to resume normal activities.   Rehab potential is good.    Frequency:  1 X week, once daily  Duration:  for 2 weeks tapering to 1x/month for 2 months  Discharge Plan: Achieve all LTGs, be independent in home treatment program, and reach maximal therapeutic benefit.    Please refer to the daily flowsheet for treatment today, total treatment time and time spent performing 1:1 timed codes.       Caregiver Signature/Credentials _____________________________ Date ________       Treating Provider: Con Gimenez PT   I have reviewed and certified the need for these services and plan of treatment while under my care.        PHYSICIAN'S SIGNATURE:   _________________________________________  Date___________   Eliu Martines MD    Certification period:  Beginning of Cert date period: 09/22/21 to  End of Cert period date: 12/20/21     Functional Level Progress Report: Please see attached \"Goal Flow sheet for Functional level.\"    ____X____ Continue Services or       ________ DC Services                Service " dates: From  SOC Date: 09/22/21 date to present

## 2021-09-23 NOTE — PROGRESS NOTES
Physical Therapy Initial Evaluation  September 22, 2021     MD Instructions/Precautions/Restrictions: PT eval and treat.     Therapist Impression:   Findings consistent with LBP secondary to scoliosis, with related impairments limiting her ability to stand for prolonged periods of time, perform ADLs. Skilled PT services are necessary in order to reduce impairments and improve independent function.     Subjective:   HPI: Belen Sharma is a 61 year old female with chief complaint of left thoracolumbar back pain. Reports she has had scoliosis since at least the time she was a teenager. Was never progressive enough for surgical management as a youth. Has had a long and complex history of transplants to liver/kidneys, chronic dialysis, and revision procedures. Having gone through all of these and now being in relatively stable condition, she is devoting more attention to her back pain secondary to scoliosis.   MD orders from 9/22/21.    Pertinent medical/surgical history: Refer to health history in EMR.   Imaging: Refer to EMR.   Prior treatment? none  Patient's goals are: decrease pain.   Return to MD:  PRN.       Objective:  LUMBAR EXAMINATION    Posture: Left convex thoracolumbar curvature noted in standing.     Active ROM ROM   Flexion Min loss, stretch, no pain   Extension Mod loss, provokes LBP    L R   Rotation Nil loss, no effect Min loss, no effect   Sidebend Mod loss, no effect Min loss, no effect   Sideglide na na     (*Indicates patient s pain)  Myotomal Strength (MMT) L R   Resisted Hip Flexion (L2) 5 5   Resisted Knee Ext (L3) 5 5   Resisted Ankle DF (L4) 5 5   Resisted Great Toe Ext (L5) 5 5   Resisted Peroneals (S1) 5 5   Resisted Knee Flexion (S1-2) 5 5     Sensory/Reflex Tests: SILT and symmetrical for bilateral LE's. Reflexes 2+ and symmetrical for bilateral patellar and Achilles.     Dural Signs:   L R   SLR - -   Slump na na   Femoral nerve na na     Poor TA contraction, compensation via upper rectus  abdom and diaphragm.     Assessment/Plan:    The patient is a 61 year old female with chief complaint of LBP.    The patient has the following significant findings with corresponding treatment plan.  Diagnosis 1:  LBP secondary to scoliosis    Pain -  hot/cold therapy, manual therapy, splint/taping/bracing/orthotics, self management and directional preference exercise  Decreased ROM/flexibility - manual therapy, therapeutic exercise and home program  Decreased joint mobility - manual therapy, therapeutic exercise and home program  Decreased strength - therapeutic exercise, therapeutic activities and home program  Impaired muscle performance - neuro re-education and home program  Decreased function - therapeutic activities and home program  Impaired posture - neuro re-education, therapeutic activities and home program      Therapy Evaluation Codes:   1) History comprised of:   Personal factors that impact the plan of care:      Please refer to health history in EMR.    Comorbidity factors that impact the plan of care are:      Please refer to health history in EMR.     Medications impacting care: None.  2) Examination of Body Systems comprised of:   Body structures and functions that impact the plan of care:      Lumbar spine.   Activity limitations that impact the plan of care are:      Standing, Walking, Sleeping and Laying down.   Clinical presentation characteristics are:    Stable/Uncomplicated.  3) Presentation comprised of:   Presentation scored as Low complexity with uncomplicated characteristics..  4) Decision-Making    Low complexity using standardized patient assessment instrument and/or measureable assessment of functional outcome.  Cumulative Therapy Evaluation is: Low complexity.    Previous and current functional limitations:  (See Goal Flow Sheet for this information)    Short term and Long term goals: (See Goal Flow Sheet for this information)     Communication ability:  Patient appears to be able to  clearly communicate and understand verbal and written communication and follow directions correctly.  Treatment Explanation - The following has been discussed with the patient: RX ordered/plan of care, anticipated outcomes, and possible risks and side effects.  This patient would benefit from PT intervention to resume normal activities.   Rehab potential is good.    Frequency:  1 X week, once daily  Duration:  for 2 weeks tapering to 1x/month for 2 months  Discharge Plan: Achieve all LTGs, be independent in home treatment program, and reach maximal therapeutic benefit.    Please refer to the daily flowsheet for treatment today, total treatment time and time spent performing 1:1 timed codes.

## 2021-10-10 ENCOUNTER — HEALTH MAINTENANCE LETTER (OUTPATIENT)
Age: 61
End: 2021-10-10

## 2021-10-12 DIAGNOSIS — Z94.4 S/P LIVER TRANSPLANT (H): ICD-10-CM

## 2021-10-12 DIAGNOSIS — Z94.0 KIDNEY TRANSPLANTED: Primary | ICD-10-CM

## 2021-10-12 RX ORDER — TACROLIMUS 1 MG/1
2 CAPSULE ORAL 2 TIMES DAILY
Qty: 360 CAPSULE | Refills: 3 | Status: SHIPPED | OUTPATIENT
Start: 2021-10-12 | End: 2022-07-01

## 2021-10-12 RX ORDER — MYCOPHENOLIC ACID 180 MG/1
540 TABLET, DELAYED RELEASE ORAL 2 TIMES DAILY
Qty: 540 TABLET | Refills: 3 | Status: ON HOLD | OUTPATIENT
Start: 2021-10-12 | End: 2022-09-16

## 2021-10-18 ENCOUNTER — IMMUNIZATION (OUTPATIENT)
Dept: FAMILY MEDICINE | Facility: CLINIC | Age: 61
End: 2021-10-18
Payer: MEDICARE

## 2021-10-18 PROCEDURE — 90682 RIV4 VACC RECOMBINANT DNA IM: CPT

## 2021-10-18 PROCEDURE — G0008 ADMIN INFLUENZA VIRUS VAC: HCPCS

## 2021-11-01 ENCOUNTER — TELEPHONE (OUTPATIENT)
Dept: TRANSPLANT | Facility: CLINIC | Age: 61
End: 2021-11-01

## 2021-11-01 NOTE — TELEPHONE ENCOUNTER
Annual chart review done and Belen is due for the following:    - Annual dermatology visit for generalized skin check.     Konyt message sent to Belen to ask if she has had done elsewhere and to remind her to have one done annually.

## 2021-11-03 ENCOUNTER — DOCUMENTATION ONLY (OUTPATIENT)
Dept: ORTHOPEDICS | Facility: CLINIC | Age: 61
End: 2021-11-03
Payer: MEDICARE

## 2021-11-03 PROBLEM — Z16.12 ESBL (EXTENDED SPECTRUM BETA-LACTAMASE) PRODUCING BACTERIA INFECTION: Status: ACTIVE | Noted: 2020-12-19

## 2021-11-03 PROBLEM — A49.9 ESBL (EXTENDED SPECTRUM BETA-LACTAMASE) PRODUCING BACTERIA INFECTION: Status: ACTIVE | Noted: 2020-12-19

## 2021-11-04 ENCOUNTER — ANCILLARY PROCEDURE (OUTPATIENT)
Dept: GENERAL RADIOLOGY | Facility: CLINIC | Age: 61
End: 2021-11-04
Attending: INTERNAL MEDICINE
Payer: MEDICARE

## 2021-11-04 ENCOUNTER — TELEPHONE (OUTPATIENT)
Dept: INTERNAL MEDICINE | Facility: CLINIC | Age: 61
End: 2021-11-04

## 2021-11-04 ENCOUNTER — TELEPHONE (OUTPATIENT)
Dept: TRANSPLANT | Facility: CLINIC | Age: 61
End: 2021-11-04

## 2021-11-04 ENCOUNTER — LAB (OUTPATIENT)
Dept: LAB | Facility: CLINIC | Age: 61
End: 2021-11-04
Payer: MEDICARE

## 2021-11-04 ENCOUNTER — OFFICE VISIT (OUTPATIENT)
Dept: INTERNAL MEDICINE | Facility: CLINIC | Age: 61
End: 2021-11-04
Payer: MEDICARE

## 2021-11-04 VITALS
DIASTOLIC BLOOD PRESSURE: 64 MMHG | SYSTOLIC BLOOD PRESSURE: 106 MMHG | HEART RATE: 96 BPM | WEIGHT: 154 LBS | OXYGEN SATURATION: 96 % | BODY MASS INDEX: 22.74 KG/M2 | TEMPERATURE: 99 F

## 2021-11-04 DIAGNOSIS — R50.9 FEVER, UNSPECIFIED FEVER CAUSE: ICD-10-CM

## 2021-11-04 DIAGNOSIS — R05.9 COUGH: ICD-10-CM

## 2021-11-04 DIAGNOSIS — R82.90 ABNORMAL URINE: Primary | ICD-10-CM

## 2021-11-04 DIAGNOSIS — R50.9 FEVER, UNSPECIFIED FEVER CAUSE: Primary | ICD-10-CM

## 2021-11-04 LAB
ALBUMIN SERPL-MCNC: 3.1 G/DL (ref 3.4–5)
ALBUMIN UR-MCNC: NEGATIVE MG/DL
ALP SERPL-CCNC: 81 U/L (ref 40–150)
ALT SERPL W P-5'-P-CCNC: 18 U/L (ref 0–50)
ANION GAP SERPL CALCULATED.3IONS-SCNC: 5 MMOL/L (ref 3–14)
APPEARANCE UR: ABNORMAL
AST SERPL W P-5'-P-CCNC: 12 U/L (ref 0–45)
BACTERIA #/AREA URNS HPF: ABNORMAL /HPF
BASOPHILS # BLD AUTO: 0 10E3/UL (ref 0–0.2)
BASOPHILS NFR BLD AUTO: 0 %
BILIRUB SERPL-MCNC: 0.6 MG/DL (ref 0.2–1.3)
BILIRUB UR QL STRIP: NEGATIVE
BUN SERPL-MCNC: 21 MG/DL (ref 7–30)
C PNEUM DNA SPEC QL NAA+PROBE: NOT DETECTED
CALCIUM SERPL-MCNC: 9.8 MG/DL (ref 8.5–10.1)
CHLORIDE BLD-SCNC: 107 MMOL/L (ref 94–109)
CO2 SERPL-SCNC: 24 MMOL/L (ref 20–32)
COLOR UR AUTO: YELLOW
CREAT SERPL-MCNC: 0.72 MG/DL (ref 0.52–1.04)
CRP SERPL-MCNC: 39.3 MG/L (ref 0–8)
EOSINOPHIL # BLD AUTO: 0 10E3/UL (ref 0–0.7)
EOSINOPHIL NFR BLD AUTO: 0 %
ERYTHROCYTE [DISTWIDTH] IN BLOOD BY AUTOMATED COUNT: 13.9 % (ref 10–15)
ERYTHROCYTE [SEDIMENTATION RATE] IN BLOOD BY WESTERGREN METHOD: 14 MM/HR (ref 0–30)
FLUAV H1 2009 PAND RNA SPEC QL NAA+PROBE: NOT DETECTED
FLUAV H1 RNA SPEC QL NAA+PROBE: NOT DETECTED
FLUAV H3 RNA SPEC QL NAA+PROBE: NOT DETECTED
FLUAV RNA SPEC QL NAA+PROBE: NOT DETECTED
FLUBV RNA SPEC QL NAA+PROBE: NOT DETECTED
GFR SERPL CREATININE-BSD FRML MDRD: >90 ML/MIN/1.73M2
GLUCOSE BLD-MCNC: 108 MG/DL (ref 70–99)
GLUCOSE UR STRIP-MCNC: NEGATIVE MG/DL
HADV DNA SPEC QL NAA+PROBE: NOT DETECTED
HCOV PNL SPEC NAA+PROBE: NOT DETECTED
HCT VFR BLD AUTO: 45.2 % (ref 35–47)
HGB BLD-MCNC: 14.5 G/DL (ref 11.7–15.7)
HGB UR QL STRIP: NEGATIVE
HMPV RNA SPEC QL NAA+PROBE: NOT DETECTED
HPIV1 RNA SPEC QL NAA+PROBE: NOT DETECTED
HPIV2 RNA SPEC QL NAA+PROBE: NOT DETECTED
HPIV3 RNA SPEC QL NAA+PROBE: NOT DETECTED
HPIV4 RNA SPEC QL NAA+PROBE: NOT DETECTED
IMM GRANULOCYTES # BLD: 0.1 10E3/UL
IMM GRANULOCYTES NFR BLD: 1 %
KETONES UR STRIP-MCNC: NEGATIVE MG/DL
LEUKOCYTE ESTERASE UR QL STRIP: ABNORMAL
LYMPHOCYTES # BLD AUTO: 1.6 10E3/UL (ref 0.8–5.3)
LYMPHOCYTES NFR BLD AUTO: 13 %
M PNEUMO DNA SPEC QL NAA+PROBE: NOT DETECTED
MCH RBC QN AUTO: 29.5 PG (ref 26.5–33)
MCHC RBC AUTO-ENTMCNC: 32.1 G/DL (ref 31.5–36.5)
MCV RBC AUTO: 92 FL (ref 78–100)
MONOCYTES # BLD AUTO: 1.1 10E3/UL (ref 0–1.3)
MONOCYTES NFR BLD AUTO: 9 %
MUCOUS THREADS #/AREA URNS LPF: PRESENT /LPF
NEUTROPHILS # BLD AUTO: 9.5 10E3/UL (ref 1.6–8.3)
NEUTROPHILS NFR BLD AUTO: 77 %
NITRATE UR QL: POSITIVE
NRBC # BLD AUTO: 0 10E3/UL
NRBC BLD AUTO-RTO: 0 /100
PH UR STRIP: 6 [PH] (ref 5–7)
PLATELET # BLD AUTO: 263 10E3/UL (ref 150–450)
POTASSIUM BLD-SCNC: 4.6 MMOL/L (ref 3.4–5.3)
PROT SERPL-MCNC: 7.3 G/DL (ref 6.8–8.8)
RBC # BLD AUTO: 4.91 10E6/UL (ref 3.8–5.2)
RBC URINE: 3 /HPF
RSV RNA SPEC QL NAA+PROBE: NOT DETECTED
RSV RNA SPEC QL NAA+PROBE: NOT DETECTED
RV+EV RNA SPEC QL NAA+PROBE: NOT DETECTED
SODIUM SERPL-SCNC: 136 MMOL/L (ref 133–144)
SP GR UR STRIP: 1.01 (ref 1–1.03)
TRANSITIONAL EPI: <1 /HPF
TSH SERPL DL<=0.005 MIU/L-ACNC: 0.86 MU/L (ref 0.4–4)
UROBILINOGEN UR STRIP-MCNC: NORMAL MG/DL
WBC # BLD AUTO: 12.3 10E3/UL (ref 4–11)
WBC URINE: 68 /HPF

## 2021-11-04 PROCEDURE — 87186 SC STD MICRODIL/AGAR DIL: CPT | Performed by: INTERNAL MEDICINE

## 2021-11-04 PROCEDURE — 85652 RBC SED RATE AUTOMATED: CPT | Performed by: PATHOLOGY

## 2021-11-04 PROCEDURE — 99215 OFFICE O/P EST HI 40 MIN: CPT | Performed by: INTERNAL MEDICINE

## 2021-11-04 PROCEDURE — 87086 URINE CULTURE/COLONY COUNT: CPT | Performed by: INTERNAL MEDICINE

## 2021-11-04 PROCEDURE — 71046 X-RAY EXAM CHEST 2 VIEWS: CPT | Mod: GC | Performed by: RADIOLOGY

## 2021-11-04 PROCEDURE — 86140 C-REACTIVE PROTEIN: CPT | Performed by: PATHOLOGY

## 2021-11-04 PROCEDURE — U0005 INFEC AGEN DETEC AMPLI PROBE: HCPCS | Performed by: INTERNAL MEDICINE

## 2021-11-04 PROCEDURE — 87040 BLOOD CULTURE FOR BACTERIA: CPT | Performed by: INTERNAL MEDICINE

## 2021-11-04 PROCEDURE — 87581 M.PNEUMON DNA AMP PROBE: CPT | Performed by: INTERNAL MEDICINE

## 2021-11-04 PROCEDURE — 81001 URINALYSIS AUTO W/SCOPE: CPT | Performed by: PATHOLOGY

## 2021-11-04 PROCEDURE — 85025 COMPLETE CBC W/AUTO DIFF WBC: CPT | Performed by: PATHOLOGY

## 2021-11-04 PROCEDURE — 80053 COMPREHEN METABOLIC PANEL: CPT | Performed by: PATHOLOGY

## 2021-11-04 PROCEDURE — 84443 ASSAY THYROID STIM HORMONE: CPT | Performed by: PATHOLOGY

## 2021-11-04 PROCEDURE — 36415 COLL VENOUS BLD VENIPUNCTURE: CPT | Performed by: PATHOLOGY

## 2021-11-04 RX ORDER — GRANULES FOR ORAL 3 G/1
3 POWDER ORAL ONCE
Qty: 1 PACKET | Refills: 0 | Status: SHIPPED | OUTPATIENT
Start: 2021-11-04 | End: 2021-11-04

## 2021-11-04 NOTE — TELEPHONE ENCOUNTER
Pt paged the triage line at 0315 with complaints of a fever up to 102.2, currently 101.6. She says the fever started last evening around 9pm. Her only other complaints are an infrequent slight cough and some fatigue. No pain or other signs of infection. Pt has received her booster and got the flu shot last week. This RNCC told her she needs to be seen for labs and evaluation and she could go to the ED, an urgent care, or wait until the clinics open to be seen outpatient. Pt states she will call at 8am for any open appts and then make a decision about where/when to be seen.

## 2021-11-04 NOTE — NURSING NOTE
Chief Complaint   Patient presents with     Recheck Medication     transplant pt     Fever     last night at 10 taking tylenoly every 6 hours (fever has been ranging from .2)     Cough       Christiane Krishnamurthy, EMT at 1:41 PM on 11/4/2021

## 2021-11-04 NOTE — PROGRESS NOTES
"HPI:    Pt. 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 Also h/o splenectomy 2013. Last visit with me 8/6/2021. She states last night around 2 AM with fever up to 102. She has been taking tylenol with to reduce her fever. She has mild headache. No visual sxs. She had some more elevated temperatures. She states some minor post nasal drip and feels she may have a viral illness. No chills. No sick contact at home. She has no know sick contacts. She has some \"stuffy ears\" No sore throat. No cervical/submandibular adenopathy. No SOB. No productive cough. No nasal drainage. No facial pain/pressure. No chest pain. No abdominal complaints. No loose stools. No urinary complaints. No dizziness. No open skin lesions. No GYN complaints. No vaginal drainage. No neurological sxs. No weakness. She does not smoke nor abuse EtOH.       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 Both, very large, native kidneys reamain.     PONV (postoperative nausea and vomiting)     Need to start with ice chips and apple juice, no soda     S/P kidney transplant     SL[facility] 10/9/2010 - kidney failure 2/2 [facility]. Explanted 2012. Re-transplant after de-sensitization 2016     S/P liver transplant (H)     10/9/2010 - [facility], ischemic biopathy. Re-transplant 3/3/2011     S/P splenectomy      Spider veins 2019     Thyroid disease     Hyperthyroid treated with single dose iodine     Past Surgical History:   Procedure Laterality Date     bunectomy  08/01/2018    right foot     bunionectomy  01/2019    left     CHOLECYSTECTOMY       CONIZATION " N/A 2020    Procedure: CONE BIOPSY, CERVIX;  Surgeon: Daysi Perez MD;  Location: [facility]     FAILED PICC - RIGHT ARM Right 2020    Has a LUE AV fistula.     HERNIA REPAIR, INCISIONAL  2013     IR DIALYSIS PTA CENTRAL SEG  2020     IR PICC PLACEMENT > 5 YRS OF AGE  2020     SPLENECTOMY      Splenectomy     TRANSPLANT KIDNEY RECIPIENT  DONOR  2016    re-transplant after [facility]; 6 months de-sensitization prior AND 6 months after transplant     TRANSPLANT LIVER RECIPIENT  DONOR  2011     TRANSPLANT LIVER, KIDNEY RECIPIENT  DONOR, COMBINED  10/09/2010    [facility] - re-Tx liver 3/3/2011; Kidney (left iliac) lost to [facility]/fibrosis - explant     VASCULAR SURGERY      Vascular access left UE. Not used for 4 years since 2nd kidney tx 2016 [place]     PE:    Vitals noted, gen, nad, cooperative, alert, neck supple nl rom, no cervical or submandibular adenopathy. Ears clear, no erythema, oropharynx clear no erythema, no exudate, no cervical spine tenderness to palpation. Lungs with good air movement, RRR, S1, S2, no MRG, abdomen, no acute findings, no rebound, grossly normal neurological exam. No open skin lesions, No skin rash.       A/P:    1. Immunizations; she has gotten 3 doses of Pfizer COVID vaccination. Influenza vaccine 10/18/2021  2. Mammogram scheduled for 2022; Mammogram 2021   3. Dermatology follow up 2021  4. Cardiology appt. With Dr. Mohamud 2021. Last seen 2020 for microvascular angina.   5. Seen GYN 2021  6. Transplant surgery note from 2021:  Transplant Type:  DDKT (SLK)    Transplant Date:  2016 (Kidney), 3/3/2011 (Liver), 10/9/2010 (Kidney / Liver)  7. Seen by Dr. Martines, orthospine 8/10/2021 regarding Scoliosis   8. Seen Nephrology 2021 and 9/15/2020  9. Fever w/o clear localizing source of infection possible early URI? Ordered labs,CXR, respiratory COVID testing and PCR respiratory viral panel.  Ordered 2 sets Blood Cultures.     40 minutes spent on the date of the encounter doing chart review, history and exam, documentation and further activities as noted above    UA came back abnormal; discussed with Belen and jimena in Rx. For Fosfomycin 3 g x 1. Will discuss with Dr. Gonzales, ID and may send in additional doses and will wait for UC results.    YARELIS De La Garza

## 2021-11-04 NOTE — TELEPHONE ENCOUNTER
[facility] Call Center    Phone Message    May a detailed message be left on voicemail: yes     Reason for Call: Other: Patient call regarding the results from the labs that were done today. Please call to discuss. Thank you.      Action Taken: Message routed to:  [facility]    Travel Screening: Not Applicable

## 2021-11-04 NOTE — TELEPHONE ENCOUNTER
[facility] Call Center    Phone Message    May a detailed message be left on voicemail: yes     Reason for Call: Symptoms or Concerns       Current symptom or concern: High fevers over 102, cough, tired, headaches, transplant patient and she is worried about the fever    Symptoms have been present for:  2 day(s)    Has patient previously been seen for this? No      Are there any new or worsening symptoms? Yes:  transplant patient and she is worried about the fever and wants to know what to do. She had been taking tylenol       Action Taken: Message routed to:  [facility]    Travel Screening: Not Applicable     Temp 102 at 2am-took Tylenol.  Temp 101.3 @ 2:30am  Temp 101.6 @3am  Temp 99.3 @5am               Pt states she has cough on and off. Denies SOB, dizziness, or being lightheaded. Called transplant and they told her to be see in PCP. Pt is very anxious about fever.  No vomiting or diarrhea.  Has had all Covid vaccines and booster

## 2021-11-04 NOTE — TELEPHONE ENCOUNTER
Patient Call: Transplant Illness  If the patient reports CHEST PAIN, SEVERE SHORTNESS OF BREATH, ONE SIDED WEAKNESS, or DIFFICULTY SPEAKING: CONTACT RN FACE-TO-FACE IMMEDIATELY    Duration of illness: 2 days  Transplanted organ? liver  Illness: Fever    Patient is wondering what test should she be going in for & where should she go. [place] or [facility] clinical, she has had a temp since last night. Spoke with triage & was told may be respiratory & need a blood panel drawn    99.9 last night  102.2 at 2 am  101.3 at 3 am    She is also experiencing a headache & slight cough. She has been taking tylenol.       Heart Lung Liver Kidney/Pancreas   Blood pressure Route Routine to RN Route Routine to RN Route Routine to RN Route Routine to RN   Diarrhea Route Routine to RN Route Routine to RN Route Routine to RN Route Routine to RN   Fever Route Routine to RN Route Routine to RN Route Routine to RN Route Routine to RN   Nausea Route Routine to RN Route Routine to RN Route Routine to RN Route Routine to RN   Pain Route Routine to RN Route Routine to RN Route Routine to RN Route Routine to RN   Swelling Route Routine to RN Route Routine to RN Route Routine to RN Route Routine to RN   Vomiting >24 hours Lync, then Page Lync, then Page Lync, then Page Lync, route High   Unable to take medication Lync, then Page Lync, then Page Lync, then Page Lync, route High   Fever > 100.5 Lync, then Page Lync, then Page Lync, then Page Lync, route High   Shortness of breath Lync, then Page Lync, then Page Lync, then Page Lync, route High   Productive cough Route Routine to RN Lync, then Page Route Routine to RN Route Routine to RN   Jaundice Route Routine to RN Route Routine to RN Lync, then Page Route Routine to RN   Unable to urinate Route Routine to RN Route Routine to RN Route Routine to RN Lync, route High   Other Route Routine to RN Route Routine to RN Route Routine to RN Route Routine to RN

## 2021-11-04 NOTE — TELEPHONE ENCOUNTER
Returned Belen's call. Belen has been experiencing increasing fatigue, slight cough, fevers, headaches for the past 24-48 hours. She had spoken to the triage RN last night as was instructed to either go to the ER, urgent care, or call in the AM for an appt if possible.     I recommended Belen call her PCP office to see if there is any availability or be seen in urgent care. Belen asked multiple times what I would recommend, I reinforced that since this did not seem to be related to her liver transplant I would recommend she discuss with her PCP as there be other labs/tests he/she would order to further assess. Belen verbalized understanding. I will check in with her tomorrow to ensure she was able to see someone. Note forwarded to Dr. Leventhal as SARMAD.     COVID booster was beginning of Sept, Flu vaccine was about a week ago.

## 2021-11-05 ENCOUNTER — TELEPHONE (OUTPATIENT)
Dept: TRANSPLANT | Facility: CLINIC | Age: 61
End: 2021-11-05

## 2021-11-05 LAB
BACTERIA UR CULT: ABNORMAL
SARS-COV-2 RNA RESP QL NAA+PROBE: NEGATIVE

## 2021-11-05 NOTE — TELEPHONE ENCOUNTER
[facility] Call Center    Phone Message    May a detailed message be left on voicemail: yes     Reason for Call: Symptoms or Concerns     If patient has red-flag symptoms, warm transfer to triage line    Current symptom or concern: recent blood work and urine test numbers worse than when the pt was in the hospital in December    Are there any new or worsening symptoms? Yes: Pt requesting call back to discuss, pt stated Dr. De La Garza called her earlier today, pt wanting to get another call back      Action Taken: Message routed to:  [facility]

## 2021-11-05 NOTE — TELEPHONE ENCOUNTER
"Called Belen to check in and see that she followed up with Dr. De La Garza. Belen said she was seen by Dr. De La Garza and he sent her a one time dose of Fosfomycin which she took last night. She is very worried about getting worse over the weekend and the infection \"getting into the blood\". She also said that Dr. De La Garza told her he would call her tomorrow as well. I reviewed Belen's labs with her and that her respiratory work up was negative, preliminary blood cultures show no growth at this time and that her urine culture is also preliminary. Belen is not running fevers and is feeling a little bit better.  I encouraged her to drink lots of fluids, keep an eye on her temp and try to relax over the weekend and not focus on her tests, that her provider will contact her if anything finalizes as abnormal. Belen felt much better and less anxious after our conversation. She was appreciative of my time and will call the on call service if needed over the weekend.     I also suggested that if Belen does not hear from her PCP provider this weekend but she is feeling OK, she call the clinic on Monday to follow up with him.   "

## 2021-11-06 ENCOUNTER — TELEPHONE (OUTPATIENT)
Dept: INTERNAL MEDICINE | Facility: CLINIC | Age: 61
End: 2021-11-06

## 2021-11-06 DIAGNOSIS — N39.41 URGE INCONTINENCE OF URINE: Primary | ICD-10-CM

## 2021-11-06 RX ORDER — LEVOFLOXACIN 250 MG/1
250 TABLET, FILM COATED ORAL DAILY
Qty: 7 TABLET | Refills: 0 | Status: SHIPPED | OUTPATIENT
Start: 2021-11-06 | End: 2022-04-28

## 2021-11-06 NOTE — TELEPHONE ENCOUNTER
"Called Belen today    Last \"fever\" 99 was Friday night. She still feels tired and not quite right. Reviewed all labs with her. Sent in 7 days of Levaquin for presumed not complete UTI treatment. I will call her tomorrow or Monday    YARELIS De La Garza  "

## 2021-11-09 LAB
BACTERIA BLD CULT: NO GROWTH
BACTERIA BLD CULT: NO GROWTH

## 2021-11-12 ENCOUNTER — TELEPHONE (OUTPATIENT)
Dept: OBGYN | Facility: CLINIC | Age: 61
End: 2021-11-12
Payer: MEDICARE

## 2021-11-12 DIAGNOSIS — N81.6 FEMALE PROCTOCELE WITHOUT UTERINE PROLAPSE: Primary | ICD-10-CM

## 2021-11-12 DIAGNOSIS — Z46.89 PESSARY MAINTENANCE: ICD-10-CM

## 2021-11-12 RX ORDER — OXYQUINOLINE SULFATE AND SODIUM LAURYL SULFATE .25; .1 MG/G; MG/G
1 JELLY VAGINAL DAILY PRN
Qty: 113.4 G | Refills: 3 | Status: CANCELLED | OUTPATIENT
Start: 2021-11-12

## 2021-11-12 NOTE — TELEPHONE ENCOUNTER
Pt called requesting refill for TRIMO-ARNOLD for pessary placement.  Discussed with Jessica BOUDREAUX, cream is handed out to pt's when they come into clinic.  This writer offered for pt to come into the clinic to  a box of vaginal cream with applicator.  Pt refused, stating that the parking situation is too expensive.  This writer offered to request a script from Dr Francis.  Pt agreed to plan.  Script entered and pended to Dr Clements for approval.

## 2021-11-19 ENCOUNTER — TELEPHONE (OUTPATIENT)
Dept: CARDIOLOGY | Facility: CLINIC | Age: 61
End: 2021-11-19
Payer: MEDICARE

## 2021-11-19 DIAGNOSIS — I20.89 MICROVASCULAR ANGINA (H): Primary | ICD-10-CM

## 2021-11-19 RX ORDER — NITROGLYCERIN 0.4 MG/1
0.4 TABLET SUBLINGUAL EVERY 5 MIN PRN
Qty: 30 TABLET | Refills: 1 | Status: SHIPPED | OUTPATIENT
Start: 2021-11-19 | End: 2022-12-30

## 2021-11-19 NOTE — TELEPHONE ENCOUNTER
[facility] Call Center    Phone Message    May a detailed message be left on voicemail: yes     Reason for Call: Other: Belen called wondering if her appointment on 11/22/21  can be either a video appointment or telephone appointment. Please advise. Thank you.      Action Taken: Message routed to:  [facility] Cardio    Travel Screening: Not Applicable

## 2021-11-19 NOTE — TELEPHONE ENCOUNTER
Reviewed with Dr Mohamud, she would prefer to have Belen during a virtual clinic.  Belen agreed to move her clinic time to 12/6.  Also requested that her SL nitroglycerin be refilled and sent to [facility] in [place].

## 2021-11-30 ENCOUNTER — TELEPHONE (OUTPATIENT)
Dept: TRANSPLANT | Facility: CLINIC | Age: 61
End: 2021-11-30
Payer: MEDICARE

## 2021-11-30 NOTE — TELEPHONE ENCOUNTER
November 30, 2021 2:39 PM - Curry Cassidy, CNA: pt cslled to [facility] video jose french 12/7 pt will do labs locally in [place]

## 2021-12-02 ENCOUNTER — DOCUMENTATION ONLY (OUTPATIENT)
Dept: ORTHOPEDICS | Facility: CLINIC | Age: 61
End: 2021-12-02
Payer: MEDICARE

## 2021-12-02 NOTE — PROGRESS NOTES
Order for lumbar-sacral orthosis, sagittal-coronal control received  Awaiting signature    Vivi Boone

## 2021-12-03 ENCOUNTER — LAB (OUTPATIENT)
Dept: LAB | Facility: CLINIC | Age: 61
End: 2021-12-03
Payer: MEDICARE

## 2021-12-03 DIAGNOSIS — Z94.0 KIDNEY REPLACED BY TRANSPLANT: ICD-10-CM

## 2021-12-03 LAB
ANION GAP SERPL CALCULATED.3IONS-SCNC: 10 MMOL/L (ref 5–18)
BASOPHILS # BLD AUTO: 0 10E3/UL (ref 0–0.2)
BASOPHILS NFR BLD AUTO: 0 %
BUN SERPL-MCNC: 15 MG/DL (ref 8–22)
CALCIUM SERPL-MCNC: 10.3 MG/DL (ref 8.5–10.5)
CHLORIDE BLD-SCNC: 107 MMOL/L (ref 98–107)
CO2 SERPL-SCNC: 24 MMOL/L (ref 22–31)
CREAT SERPL-MCNC: 0.76 MG/DL (ref 0.6–1.1)
EOSINOPHIL # BLD AUTO: 0.1 10E3/UL (ref 0–0.7)
EOSINOPHIL NFR BLD AUTO: 2 %
ERYTHROCYTE [DISTWIDTH] IN BLOOD BY AUTOMATED COUNT: 14.9 % (ref 10–15)
GFR SERPL CREATININE-BSD FRML MDRD: 85 ML/MIN/1.73M2
GLUCOSE BLD-MCNC: 81 MG/DL (ref 70–125)
HCT VFR BLD AUTO: 47.3 % (ref 35–47)
HGB BLD-MCNC: 15.2 G/DL (ref 11.7–15.7)
IMM GRANULOCYTES # BLD: 0 10E3/UL
IMM GRANULOCYTES NFR BLD: 0 %
LYMPHOCYTES # BLD AUTO: 2.5 10E3/UL (ref 0.8–5.3)
LYMPHOCYTES NFR BLD AUTO: 45 %
MCH RBC QN AUTO: 29.9 PG (ref 26.5–33)
MCHC RBC AUTO-ENTMCNC: 32.1 G/DL (ref 31.5–36.5)
MCV RBC AUTO: 93 FL (ref 78–100)
MONOCYTES # BLD AUTO: 0.9 10E3/UL (ref 0–1.3)
MONOCYTES NFR BLD AUTO: 16 %
NEUTROPHILS # BLD AUTO: 2.1 10E3/UL (ref 1.6–8.3)
NEUTROPHILS NFR BLD AUTO: 38 %
PLATELET # BLD AUTO: 210 10E3/UL (ref 150–450)
POTASSIUM BLD-SCNC: 3.9 MMOL/L (ref 3.5–5)
RBC # BLD AUTO: 5.09 10E6/UL (ref 3.8–5.2)
SODIUM SERPL-SCNC: 141 MMOL/L (ref 136–145)
WBC # BLD AUTO: 5.6 10E3/UL (ref 4–11)

## 2021-12-03 PROCEDURE — 85025 COMPLETE CBC W/AUTO DIFF WBC: CPT

## 2021-12-03 PROCEDURE — 36415 COLL VENOUS BLD VENIPUNCTURE: CPT

## 2021-12-03 PROCEDURE — 80197 ASSAY OF TACROLIMUS: CPT

## 2021-12-03 PROCEDURE — 80048 BASIC METABOLIC PNL TOTAL CA: CPT

## 2021-12-04 LAB
TACROLIMUS BLD-MCNC: 6.3 UG/L (ref 5–15)
TME LAST DOSE: NORMAL H
TME LAST DOSE: NORMAL H

## 2021-12-06 ENCOUNTER — VIRTUAL VISIT (OUTPATIENT)
Dept: CARDIOLOGY | Facility: CLINIC | Age: 61
End: 2021-12-06
Attending: INTERNAL MEDICINE
Payer: MEDICARE

## 2021-12-06 DIAGNOSIS — I25.118 ATHEROSCLEROSIS OF NATIVE CORONARY ARTERY OF NATIVE HEART WITH STABLE ANGINA PECTORIS (H): Primary | ICD-10-CM

## 2021-12-06 DIAGNOSIS — Z94.4 S/P LIVER TRANSPLANT (H): ICD-10-CM

## 2021-12-06 DIAGNOSIS — Z94.0 S/P KIDNEY TRANSPLANT: ICD-10-CM

## 2021-12-06 PROCEDURE — 99214 OFFICE O/P EST MOD 30 MIN: CPT | Mod: 95 | Performed by: INTERNAL MEDICINE

## 2021-12-06 NOTE — NURSING NOTE
No medication changes at this visit.     Follow up with Dr. Mohamud in 1 year with lipid panel prior.     Mino Guthrie, RN   Cardiology Nurse Coordinator

## 2021-12-06 NOTE — PROGRESS NOTES
History:  Ms. Sharma is a very pleasant 61 year old woman with microvascular angina and nonobstructive coronary disease.  She is a kidney and pancreas transplant recipient.  She is doing well without any problems with organ rejection.  She is on tacrolimus and Myfortic for her antirejection medications.    Kidney transplant x 2, 2010, 2014  Liver transplant x 2, 2010, 2011    She reports stable angina which is relieved with 1 sublingual nitroglycerin tablet, generally once a month.  She denies prolonged episodes of chest pain, orthopnea or PND or syncope. She has occasional lightheadedness with systolic blood pressure in the 90s.  She usually tries to counter this with that increase fluid intake.      No recent hospitalization for HF/MI/stroke      Current Outpatient Medications   Medication Sig Dispense Refill     acetaminophen (TYLENOL) 325 MG tablet Take 1 tablet (325 mg) by mouth every 4 hours as needed for mild pain or fever 120 tablet 1     ALPRAZolam (XANAX) 0.25 MG tablet Take 1 tablet (0.25 mg) by mouth as needed for anxiety (Patient not taking: Reported on 8/12/2021) 10 tablet 0     aspirin (ASA) 81 MG chewable tablet Take 81 mg by mouth every evening Stopped 7 days preop       biotin 1000 MCG TABS tablet Take 3,000 mcg by mouth daily       diphenhydrAMINE (BENADRYL) 25 MG tablet Take 50 mg by mouth as needed for allergies        docusate sodium (COLACE) 100 MG capsule Take 200 mg by mouth every morning        estradiol (ESTRACE) 0.1 MG/GM vaginal cream Place 1 g vaginally twice a week 42.5 g 3     guaiFENesin (MUCINEX PO) Take 1 tablet by mouth 2 times daily as needed       levofloxacin (LEVAQUIN) 250 MG tablet Take 1 tablet (250 mg) by mouth daily 7 tablet 0     loratadine (CLARITIN) 10 MG tablet Take 10 mg by mouth every evening        Multiple Vitamins-Minerals (WOMENS DAILY FORMULA PO) Take 1 tablet by mouth daily       MYFORTIC (BRAND) 180 MG EC tablet Take 3 tablets (540 mg) by mouth 2 times daily 540  tablet 3     nitroGLYcerin (NITROSTAT) 0.4 MG sublingual tablet Place 1 tablet (0.4 mg) under the tongue every 5 minutes as needed for chest pain For chest pain place 1 tablet under the tongue every 5 minutes for 3 doses. If symptoms persist 5 minutes after 1st dose call 911. 30 tablet 1     ondansetron (ZOFRAN) 4 MG tablet Take by mouth as needed for nausea (Patient not taking: Reported on 8/12/2021)       pantoprazole (PROTONIX) 40 MG EC tablet Take 1 tablet by mouth as needed (reflux)  (Patient not taking: Reported on 8/12/2021)       Probiotic Product (PROBIOTIC PO) Take 2 tablets by mouth every morning        Psyllium (METAMUCIL PO) Take by mouth every morning Hold 9-30-20       sulfamethoxazole-trimethoprim (BACTRIM) 400-80 MG tablet Take 1 tablet by mouth Every Mon, Wed, Fri Morning 45 tablet 3     tacrolimus (GENERIC EQUIVALENT) 1 MG capsule Take 2 capsules (2 mg) by mouth 2 times daily 360 capsule 3     temazepam (RESTORIL) 15 MG capsule Take 1 capsule (15 mg) by mouth as needed for sleep (twice a year) (Patient not taking: Reported on 8/12/2021) 15 capsule 0     terconazole (TERAZOL 3) 0.8 % vaginal cream Place 1 applicator (5 g) vaginally At Bedtime 20 g 0     Vitamin D, Cholecalciferol, 25 MCG (1000 UT) CAPS Take 1,000 Units by mouth D3       Vitamin D3 (CHOLECALCIFEROL) 25 mcg (1000 units) tablet Take 50 mcg by mouth every morning          Allergies - reviewed     Allergies   Allergen Reactions     Ibuprofen      Due to liver transplant     Past history -reviewed  Active Ambulatory Problems     Diagnosis Date Noted     S/P splenectomy      Congenital hepatic fibrosis      S/P kidney transplant      Polycystic kidney disease      Immunosuppression (H)      S/P liver transplant (H)      Endometriosis      HPV (human papilloma virus) infection      High grade squamous intraepithelial lesion of cervix 09/11/2020     HSIL (high grade squamous intraepithelial lesion) on Pap smear of cervix 09/21/2020      Abnormal urine 06/08/2020     Acquired bilateral hammer toes 07/25/2017     Anemia 08/09/2002     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     Candidiasis of vagina 09/21/2020     Cervical high risk human papillomavirus (HPV) DNA test positive 09/21/2020     Chicken pox 04/01/1968     Chronic renal insufficiency 01/05/2008     Congenital polycystic kidney 08/28/2003     Cystitis 09/21/2020     Depression 04/01/2003     Depressive psychosis (H) 09/21/2001     Electrolyte abnormality 11/02/2019     Exercise counseling 09/21/2020     Dermatophytosis of nail 07/25/2017     Encounter for counseling 09/21/2020     Encounter for screening for malignant neoplasm of breast 09/21/2020     Encounter for medication refill 09/21/2020     End stage renal disease (H) 12/19/2007     Hallux valgus, acquired, bilateral 07/25/2017     Hypertension 12/19/2007     Hyperthyroidism 08/09/2002     Incisional hernia following transplant 05/15/2020     Kidney transplant rejection 09/21/2020     Knee pain 09/21/2020     Malignant neoplasm of breast (H) 09/21/2020     Microvascular angina (H) 01/01/2018     Polycythemia 06/09/2020     Postmenopausal state 08/09/2002     Prophylactic antibiotic 11/02/2019     Renal bone disease 11/02/2019     Renal hypertension 11/02/2019     Scarlet fever 10/01/1973     Scoliosis deformity of spine 09/21/2020     Secondary physiologic amenorrhea 09/21/2020     Sinusitis 09/21/2020     Swelling of first metatarsophalangeal (MTP) joint 09/21/2020     Thrombocytopenia (H) 06/18/2008     Uterine prolapse 05/15/2020     Xerosis of skin 01/13/2005     Encounter for aftercare following kidney transplant 06/08/2020     History of liver transplant (H) 12/05/2016     History of kidney transplant 12/05/2016     Kidney replaced by transplant 12/16/2020     Complicated urinary tract infection 12/16/2020     Immunosuppressed status (H) 12/16/2020     Lumbar pain 09/22/2021     ESBL (extended spectrum beta-lactamase) producing  bacteria infection 12/19/2020     Resolved Ambulatory Problems     Diagnosis Date Noted     Reason for consultation 09/21/2020     Patient on waiting list for organ transplant 09/21/2020     Past Medical History:   Diagnosis Date     Adolescent scoliosis      BK viremia [id number]     CMV pneumonia (H) 2010     History of angina      PONV (postoperative nausea and vomiting)      Spider veins 2019     Thyroid disease       Social history - reviewed  Social History     Socioeconomic History     Marital status:      Spouse name: Not on file     Number of children: Not on file     Years of education: Not on file     Highest education level: Not on file   Occupational History     Not on file   Tobacco Use     Smoking status: Never Smoker     Smokeless tobacco: Never Used   Substance and Sexual Activity     Alcohol use: Not Currently     Drug use: Not Currently     Sexual activity: Not Currently   Other Topics Concern     Parent/sibling w/ CABG, MI or angioplasty before 65F 55M? Not Asked   Social History Narrative     Not on file     Social Determinants of Health     Financial Resource Strain: Not on file   Food Insecurity: Not on file   Transportation Needs: Not on file   Physical Activity: Not on file   Stress: Not on file   Social Connections: Not on file   Intimate Partner Violence: Not on file   Housing Stability: Not on file     Family history -reviewed  Family History   Problem Relation Age of Onset     Uterine Cancer Maternal Grandmother      Polycystic Kidney Diease Brother      Polycystic Kidney Diease Brother      Polycystic Kidney Diease Brother      Polycystic Kidney Diease Brother      Cervical Cancer Maternal Aunt      Depression Mother      Anxiety Disorder Mother      Depression Father      Anxiety Disorder Father      ROS: non contributory on the 10-point review of system    Exam:   In general, the patient is in no apparent distress.    BP today - 112/60 mmHg and HR 86  Breathing is unlabored.    HEENT: NC/AT.  CULLEN.  MARINA.  Sclerae white, not injected.    Neck: No jugular venous distension.    Extremities: No edema.   Neurologic: Alert and oriented to person/place/time, normal speech and affect  Skin: No rash on exposed skin.     Data:  Recent cardiac investigations - reviewed    EKG September 14, 2021 -sinus rhythm normal axis and intervals    Echocardiogram August 31, 2020  Normal biventricular size and function  No significant valvular abnormalities  Normal IVC  No pericardial effusion.    [facility]   CT coronary angiogram 1-3-2019  CALCIUM SCORE : 19. This placed the patient in the 70th percentile rank, meaning that approximately 30% of females at ages from 56-60 will have a higher calcium score that the patient.  CORONARY CT ANGIOGRAPHY: The examination is diagnostic in quality. There is right heart dominance.   LEFT MAIN CORONARY ARTERY: Widely patent  LEFT ANTERIOR DESCENDING; Mild focal calcified plaque in the proxima-mid LAD without significant stenosis. The remainder of the LAD is widely patent.  FIRST DIAGONAL: Widely patent  SECOND DIAGONAL: Widely patent  PROXIMAL LEFT CIRCUMFLEX: Widely patent  MID CIRCUMFLEX: Widely patent  DISTAL CIRCUMFLEX: Widely patent  Labs - reviewed    Chemistry panel: Recent Labs   Lab Test 12/03/21  0936 11/04/21  1457 09/14/21  1531 06/04/21  1018    136   < > 134   POTASSIUM 3.9 4.6   < > 3.6   CHLORIDE 107 107   < > 103   CO2 24 24   < > 26   ANIONGAP 10 5   < > 5   GLC 81 108*   < > 79   BUN 15 21   < > 14   CR 0.76 0.72   < > 0.75   EDSON 10.3 9.8   < > 9.2   GFRESTIMATED 85 >90   < > 86   AST  --  12  --  17   ALT  --  18  --  22    < > = values in this interval not displayed.       CBC:   Recent Labs   Lab Test 12/03/21  0936 11/04/21  1457   WBC 5.6 12.3*   RBC 5.09 4.91   HGB 15.2 14.5   HCT 47.3* 45.2   MCV 93 92   MCH 29.9 29.5   MCHC 32.1 32.1   RDW 14.9 13.9    263       Lipid Panel:  Recent Labs   Lab Test 09/13/21  0840  09/10/20  1037   CHOL 172 196   HDL 43* 53    117*   TRIG 119 131       Thyroid:   TSH   Date Value Ref Range Status   11/04/2021 0.86 0.40 - 4.00 mU/L Final       Assessment and Plan:  61 year old woman with    Non obstructive CAD  Microvascular angina, stable  Post kidney and liver transplantation    Belen Sharma reports doing well. She has non obstructive CAD in the proximal LAD for which she takes aspirin and NTG as needed for stable angina. I have reviewed her lipid panel and 10-year ASCVD risk score is less than 3%.  Ideally I would like to see her LDL less than 70 given the mild atherosclerosis based on her CT in 2019.  Patient is reluctant to initiate medical therapy and will address this with diet and regular exercise.  Her cardiac function is normal.  ECG in September 2021 was notable for sinus rhythm.  Her renal and liver function tests are normal.    Recommendations:   Regular exercise  Healthy diet - low fat and low carb  Lipid panel  in 1 year  Follow-up 1 year     Video Visit Details:    Type of service:  Video Visit    Video Start Time: 8.05 am  Video End Time: 8.30 am    Originating Location (pt. Location): Home    Distant Location (provider location):  [facility]     Platform used for Video Visit: K[employer]       In addition to visit time documented above, I spent an additional 15 minutes on data review and documentation.      Christy Mohamud MD, MS  Professor of Medicine  Cardiovascular division        The 10-year ASCVD risk score (Shoholamargarita MENJIVAR Jr., et al., 2013) is: 2.7%    Values used to calculate the score:      Age: 61 years      Sex: Female      Is Non- : No      Diabetic: No      Tobacco smoker: No      Systolic Blood Pressure: 106 mmHg      Is BP treated: No      HDL Cholesterol: 43 mg/dL      Total Cholesterol: 172 mg/dL

## 2021-12-06 NOTE — PROGRESS NOTES
Belen is a 61 year old who is being evaluated via a billable video visit.      How would you like to obtain your AVS? MyChart  If the video visit is dropped, the invitation should be resent by: Send to e-mail at: [email or web address]  Will anyone else be joining your video visit? No

## 2021-12-06 NOTE — PATIENT INSTRUCTIONS
Cardiology Providers you saw during your visit:  Dr. Mohamud    Medication changes: None    Follow up:     Appointment with Dr. Mohamud in 1 year with labs prior    Recommendations:  Regular exercise  Healthy diet - low fat and low carb     Follow the American Heart Association Diet and Lifestyle recommendations:  Limit saturated fat, trans fat, sodium, red meat, sweets and sugar-sweetened beverages. If you choose to eat red meat, compare labels and select the leanest cuts available.  Aim for at least 150 minutes of moderate physical activity or 75 minutes of vigorous physical activity - or an equal combination of both - each week.      If you have any questions, call  Mino Guthrie RN, at [phone].  Press Option #1 for the [facility], and then press Option #4  We are encouraging the use of "[employer] to communicate with your HealthCare Provider      After hours, weekends or holidays: On Call Cardiologist- [phone] option #4 and ask to speak to the on-call Cardiologist.

## 2021-12-06 NOTE — NURSING NOTE
Chief Complaint   Patient presents with     Follow Up     microvascular angina, she reports chest pains a couple of days ago that resolved within 30 minutes. She had recent lab work done on 12/3/21.

## 2021-12-06 NOTE — LETTER
12/6/2021      RE: Belen Sharma  [address] [place] [address]       Dear Colleague,    Thank you for the opportunity to participate in the care of your patient, Belen Sharma, at the [facility] [place] at [facility]. Please see a copy of my visit note below.    History:  Ms. Sharma is a very pleasant 61 year old woman with microvascular angina and nonobstructive coronary disease.  She is a kidney and pancreas transplant recipient.  She is doing well without any problems with organ rejection.  She is on tacrolimus and Myfortic for her antirejection medications.    Kidney transplant x 2, 2010, 2014  Liver transplant x 2, 2010, 2011    She reports stable angina which is relieved with 1 sublingual nitroglycerin tablet, generally once a month.  She denies prolonged episodes of chest pain, orthopnea or PND or syncope. She has occasional lightheadedness with systolic blood pressure in the 90s.  She usually tries to counter this with that increase fluid intake.      No recent hospitalization for HF/MI/stroke      Current Outpatient Medications   Medication Sig Dispense Refill     acetaminophen (TYLENOL) 325 MG tablet Take 1 tablet (325 mg) by mouth every 4 hours as needed for mild pain or fever 120 tablet 1     ALPRAZolam (XANAX) 0.25 MG tablet Take 1 tablet (0.25 mg) by mouth as needed for anxiety (Patient not taking: Reported on 8/12/2021) 10 tablet 0     aspirin (ASA) 81 MG chewable tablet Take 81 mg by mouth every evening Stopped 7 days preop       biotin 1000 MCG TABS tablet Take 3,000 mcg by mouth daily       diphenhydrAMINE (BENADRYL) 25 MG tablet Take 50 mg by mouth as needed for allergies        docusate sodium (COLACE) 100 MG capsule Take 200 mg by mouth every morning        estradiol (ESTRACE) 0.1 MG/GM vaginal cream Place 1 g vaginally twice a week 42.5 g 3     guaiFENesin (MUCINEX PO) Take 1 tablet by mouth 2 times daily as needed        levofloxacin (LEVAQUIN) 250 MG tablet Take 1 tablet (250 mg) by mouth daily 7 tablet 0     loratadine (CLARITIN) 10 MG tablet Take 10 mg by mouth every evening        Multiple Vitamins-Minerals (WOMENS DAILY FORMULA PO) Take 1 tablet by mouth daily       MYFORTIC (BRAND) 180 MG EC tablet Take 3 tablets (540 mg) by mouth 2 times daily 540 tablet 3     nitroGLYcerin (NITROSTAT) 0.4 MG sublingual tablet Place 1 tablet (0.4 mg) under the tongue every 5 minutes as needed for chest pain For chest pain place 1 tablet under the tongue every 5 minutes for 3 doses. If symptoms persist 5 minutes after 1st dose call 911. 30 tablet 1     ondansetron (ZOFRAN) 4 MG tablet Take by mouth as needed for nausea (Patient not taking: Reported on 8/12/2021)       pantoprazole (PROTONIX) 40 MG EC tablet Take 1 tablet by mouth as needed (reflux)  (Patient not taking: Reported on 8/12/2021)       Probiotic Product (PROBIOTIC PO) Take 2 tablets by mouth every morning        Psyllium (METAMUCIL PO) Take by mouth every morning Hold 9-30-20       sulfamethoxazole-trimethoprim (BACTRIM) 400-80 MG tablet Take 1 tablet by mouth Every Mon, Wed, Fri Morning 45 tablet 3     tacrolimus (GENERIC EQUIVALENT) 1 MG capsule Take 2 capsules (2 mg) by mouth 2 times daily 360 capsule 3     temazepam (RESTORIL) 15 MG capsule Take 1 capsule (15 mg) by mouth as needed for sleep (twice a year) (Patient not taking: Reported on 8/12/2021) 15 capsule 0     terconazole (TERAZOL 3) 0.8 % vaginal cream Place 1 applicator (5 g) vaginally At Bedtime 20 g 0     Vitamin D, Cholecalciferol, 25 MCG (1000 UT) CAPS Take 1,000 Units by mouth D3       Vitamin D3 (CHOLECALCIFEROL) 25 mcg (1000 units) tablet Take 50 mcg by mouth every morning          Allergies - reviewed     Allergies   Allergen Reactions     Ibuprofen      Due to liver transplant     Past history -reviewed  Active Ambulatory Problems     Diagnosis Date Noted     S/P splenectomy      Congenital hepatic fibrosis       S/P kidney transplant      Polycystic kidney disease      Immunosuppression (H)      S/P liver transplant (H)      Endometriosis      HPV (human papilloma virus) infection      High grade squamous intraepithelial lesion of cervix 09/11/2020     HSIL (high grade squamous intraepithelial lesion) on Pap smear of cervix 09/21/2020     Abnormal urine 06/08/2020     Acquired bilateral hammer toes 07/25/2017     Anemia 08/09/2002     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     Candidiasis of vagina 09/21/2020     Cervical high risk human papillomavirus (HPV) DNA test positive 09/21/2020     Chicken pox 04/01/1968     Chronic renal insufficiency 01/05/2008     Congenital polycystic kidney 08/28/2003     Cystitis 09/21/2020     Depression 04/01/2003     Depressive psychosis (H) 09/21/2001     Electrolyte abnormality 11/02/2019     Exercise counseling 09/21/2020     Dermatophytosis of nail 07/25/2017     Encounter for counseling 09/21/2020     Encounter for screening for malignant neoplasm of breast 09/21/2020     Encounter for medication refill 09/21/2020     End stage renal disease (H) 12/19/2007     Hallux valgus, acquired, bilateral 07/25/2017     Hypertension 12/19/2007     Hyperthyroidism 08/09/2002     Incisional hernia following transplant 05/15/2020     Kidney transplant rejection 09/21/2020     Knee pain 09/21/2020     Malignant neoplasm of breast (H) 09/21/2020     Microvascular angina (H) 01/01/2018     Polycythemia 06/09/2020     Postmenopausal state 08/09/2002     Prophylactic antibiotic 11/02/2019     Renal bone disease 11/02/2019     Renal hypertension 11/02/2019     Scarlet fever 10/01/1973     Scoliosis deformity of spine 09/21/2020     Secondary physiologic amenorrhea 09/21/2020     Sinusitis 09/21/2020     Swelling of first metatarsophalangeal (MTP) joint 09/21/2020     Thrombocytopenia (H) 06/18/2008     Uterine prolapse 05/15/2020     Xerosis of skin 01/13/2005     Encounter for aftercare following kidney  transplant 06/08/2020     History of liver transplant (H) 12/05/2016     History of kidney transplant 12/05/2016     Kidney replaced by transplant 12/16/2020     Complicated urinary tract infection 12/16/2020     Immunosuppressed status (H) 12/16/2020     Lumbar pain 09/22/2021     ESBL (extended spectrum beta-lactamase) producing bacteria infection 12/19/2020     Resolved Ambulatory Problems     Diagnosis Date Noted     Reason for consultation 09/21/2020     Patient on waiting list for organ transplant 09/21/2020     Past Medical History:   Diagnosis Date     Adolescent scoliosis      BK viremia [id number]     CMV pneumonia (H) 2010     History of angina      PONV (postoperative nausea and vomiting)      Spider veins 2019     Thyroid disease       Social history - reviewed  Social History     Socioeconomic History     Marital status:      Spouse name: Not on file     Number of children: Not on file     Years of education: Not on file     Highest education level: Not on file   Occupational History     Not on file   Tobacco Use     Smoking status: Never Smoker     Smokeless tobacco: Never Used   Substance and Sexual Activity     Alcohol use: Not Currently     Drug use: Not Currently     Sexual activity: Not Currently   Other Topics Concern     Parent/sibling w/ CABG, MI or angioplasty before 65F 55M? Not Asked   Social History Narrative     Not on file     Social Determinants of Health     Financial Resource Strain: Not on file   Food Insecurity: Not on file   Transportation Needs: Not on file   Physical Activity: Not on file   Stress: Not on file   Social Connections: Not on file   Intimate Partner Violence: Not on file   Housing Stability: Not on file     Family history -reviewed  Family History   Problem Relation Age of Onset     Uterine Cancer Maternal Grandmother      Polycystic Kidney Diease Brother      Polycystic Kidney Diease Brother      Polycystic Kidney Diease Brother      Polycystic Kidney Diease  Brother      Cervical Cancer Maternal Aunt      Depression Mother      Anxiety Disorder Mother      Depression Father      Anxiety Disorder Father      ROS: non contributory on the 10-point review of system    Exam:   In general, the patient is in no apparent distress.    BP today - 112/60 mmHg and HR 86  Breathing is unlabored.   HEENT: NC/AT.  PERRLA.  EOMI.  Sclerae white, not injected.    Neck: No jugular venous distension.    Extremities: No edema.   Neurologic: Alert and oriented to person/place/time, normal speech and affect  Skin: No rash on exposed skin.     Data:  Recent cardiac investigations - reviewed    EKG September 14, 2021 -sinus rhythm normal axis and intervals    Echocardiogram August 31, 2020  Normal biventricular size and function  No significant valvular abnormalities  Normal IVC  No pericardial effusion.    [facility]   CT coronary angiogram 1-3-2019  CALCIUM SCORE : 19. This placed the patient in the 70th percentile rank, meaning that approximately 30% of females at ages from 56-60 will have a higher calcium score that the patient.  CORONARY CT ANGIOGRAPHY: The examination is diagnostic in quality. There is right heart dominance.   LEFT MAIN CORONARY ARTERY: Widely patent  LEFT ANTERIOR DESCENDING; Mild focal calcified plaque in the proxima-mid LAD without significant stenosis. The remainder of the LAD is widely patent.  FIRST DIAGONAL: Widely patent  SECOND DIAGONAL: Widely patent  PROXIMAL LEFT CIRCUMFLEX: Widely patent  MID CIRCUMFLEX: Widely patent  DISTAL CIRCUMFLEX: Widely patent  Labs - reviewed    Chemistry panel: Recent Labs   Lab Test 12/03/21  0936 11/04/21  1457 09/14/21  1531 06/04/21  1018    136   < > 134   POTASSIUM 3.9 4.6   < > 3.6   CHLORIDE 107 107   < > 103   CO2 24 24   < > 26   ANIONGAP 10 5   < > 5   GLC 81 108*   < > 79   BUN 15 21   < > 14   CR 0.76 0.72   < > 0.75   EDSON 10.3 9.8   < > 9.2   GFRESTIMATED 85 >90   < > 86   AST  --  12  --  17   ALT  --   18  --  22    < > = values in this interval not displayed.       CBC:   Recent Labs   Lab Test 12/03/21  0936 11/04/21  1457   WBC 5.6 12.3*   RBC 5.09 4.91   HGB 15.2 14.5   HCT 47.3* 45.2   MCV 93 92   MCH 29.9 29.5   MCHC 32.1 32.1   RDW 14.9 13.9    263       Lipid Panel:  Recent Labs   Lab Test 09/13/21  0840 09/10/20  1037   CHOL 172 196   HDL 43* 53    117*   TRIG 119 131       Thyroid:   TSH   Date Value Ref Range Status   11/04/2021 0.86 0.40 - 4.00 mU/L Final       Assessment and Plan:  61 year old woman with    Non obstructive CAD  Microvascular angina, stable  Post kidney and liver transplantation    Belen Sharma reports doing well. She has non obstructive CAD in the proximal LAD for which she takes aspirin and NTG as needed for stable angina. I have reviewed her lipid panel and 10-year ASCVD risk score is less than 3%.  Ideally I would like to see her LDL less than 70 given the mild atherosclerosis based on her CT in 2019.  Patient is reluctant to initiate medical therapy and will address this with diet and regular exercise.  Her cardiac function is normal.  ECG in September 2021 was notable for sinus rhythm.  Her renal and liver function tests are normal.    Recommendations:   Regular exercise  Healthy diet - low fat and low carb  Lipid panel  in 1 year  Follow-up 1 year     Video Visit Details:    Type of service:  Video Visit    Video Start Time: 8.05 am  Video End Time: 8.30 am    Originating Location (pt. Location): Home    Distant Location (provider location):  [facility]     Platform used for Video Visit: [employer]       In addition to visit time documented above, I spent an additional 15 minutes on data review and documentation.      Christy Mohamud MD, MS  Professor of Medicine  Cardiovascular division        The 10-year ASCVD risk score (Josemargarita MENJIVAR Jr., et al., 2013) is: 2.7%    Values used to calculate the score:      Age: 61 years      Sex: Female      Is Non-   American: No      Diabetic: No      Tobacco smoker: No      Systolic Blood Pressure: 106 mmHg      Is BP treated: No      HDL Cholesterol: 43 mg/dL      Total Cholesterol: 172 mg/dL

## 2021-12-07 ENCOUNTER — VIRTUAL VISIT (OUTPATIENT)
Dept: NEPHROLOGY | Facility: CLINIC | Age: 61
End: 2021-12-07
Attending: INTERNAL MEDICINE
Payer: MEDICARE

## 2021-12-07 DIAGNOSIS — Z91.89 AT HIGH RISK FOR OSTEOPOROSIS: ICD-10-CM

## 2021-12-07 DIAGNOSIS — Z94.0 KIDNEY TRANSPLANTED: Primary | ICD-10-CM

## 2021-12-07 DIAGNOSIS — Z00.00 HEALTHCARE MAINTENANCE: ICD-10-CM

## 2021-12-07 DIAGNOSIS — Z13.820 OSTEOPOROSIS SCREENING: ICD-10-CM

## 2021-12-07 DIAGNOSIS — E83.52 HYPERCALCEMIA: ICD-10-CM

## 2021-12-07 DIAGNOSIS — Q61.3 PKD (POLYCYSTIC KIDNEY DISEASE): ICD-10-CM

## 2021-12-07 DIAGNOSIS — D84.9 IMMUNOSUPPRESSION (H): ICD-10-CM

## 2021-12-07 PROCEDURE — 99214 OFFICE O/P EST MOD 30 MIN: CPT | Mod: 95 | Performed by: INTERNAL MEDICINE

## 2021-12-07 PROCEDURE — G0463 HOSPITAL OUTPT CLINIC VISIT: HCPCS | Mod: PN,RTG | Performed by: INTERNAL MEDICINE

## 2021-12-07 ASSESSMENT — PAIN SCALES - GENERAL: PAINLEVEL: NO PAIN (0)

## 2021-12-07 NOTE — LETTER
12/7/2021     RE: Belen Sharma  [address] [place] [address]     Dear Colleague,    Thank you for referring your patient, Belen Sharma, to the [facility]ROLOG[facility] [place] at [facility]. Please see a copy of my visit note below.    TRANSPLANT NEPHROLOGY CHRONIC POST TRANSPLANT VISIT    Assessment & Plan   # DDKT: Stable   - Baseline Creatinine:  ~ 0.7-0.9   - Proteinuria: Minimal (0.2-0.5 grams)   - Date DSA Last Checked: Not Known      Latest DSA: Not checked recently due to time from transplant   - BK Viremia: No   - Kidney Tx Biopsy: No    # Liver Tx:    -Good allograft function. Follows with transplant hepatology.    # Immunosuppression: Tacrolimus immediate release (goal 4-6), Mycophenolic acid (dose 540 mg every 12 hours) and Prednisone (dose 5 mg daily)   - Continue with intensive monitoring of immunosuppression for efficacy and toxicity.   - Changes: Not at this time    # Infection Prophylaxis:   - PJP: Sulfa/TMP (Bactrim) MWF    # Recurrent UTI : have had ESBL in June and Dec 2020 s/p meropenum therapy. Not on prophylaxis. Plan to treat only if symptomatic per txp ID.    -Developed symptomatic UTI and fever 11/4/2021 w/ Klebsiella, treated with LVQ    # PKD:   -Seen by txp surgery. Risks outweigh benefits for native nephrectomy    # Hypertension: Controlled;  Goal BP: < 130/80   - Changes: Not at this time.     # Post-Transplant Erythrocytosis: Hgb: Stable;  On ACEI/ARB: No   Imaging: Not at this time    # Mineral Bone Disorder:   - Secondary renal hyperparathyroidism; PTH level: Normal (18-80 pg/ml)        On treatment: None  - Vitamin D; level: Not checked recently, but was normal last check        On supplement: Yes  - Calcium; level: High normal        On supplement: No  - Phosphorus; level: Normal        On supplement: No   -Repeat PTH, vitamin D level, ionized calcium with next labs  -DEXA scan    # Electrolytes:   - Potassium;  level: Normal        On supplement: No  - Magnesium; level: Not checked recently        On supplement: No  - Bicarbonate; level: Normal        On supplement: No    # HSIL:    -per cone biopsy on 9/30/20. No evidence of dysplasia. Had colposcopy 9/2021 with +HPV. Colpo negative    # Cystocele:              -Has pessary. Follows with OB GYN    # Skin Cancer Risk:    - Discussed sun protection and recommend regular follow up with Dermatology.    # Medical Compliance: Yes    # COVID-19 Virus Review: Discussed COVID-19 virus and the potential medical risks.  Reviewed preventative health recommendations, including wearing a mask where appropriate.  Recommended COVID vaccination should be up to date with either an initial vaccination or booster shot when appropriate.  Asked the patient to inform the transplant center if they are exposed or diagnosed with this virus.    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    Return visit: Return in about 6 months (around 6/7/2022).    Saran Angulo MD    Chief Complaint   Ms. Sharma is a 61 year old here for routine follow up, kidney transplant and immunosuppression management.    History of Present Illness   The patient generally feels well. She denies N/V/D, fever, chills, SOB. She does get occasional chest pain and takes NG. Her cardiologist, Dr. Mohamud, is aware of this. Dr. Mohamud recommended increasing fluid intake. She denies swelling in her legs, unintentional weight loss, adenopathy.     Home  BP: occasionally low in 90s systolic    Problem List   Patient Active Problem List   Diagnosis     S/P splenectomy     Congenital hepatic fibrosis     S/P kidney transplant     Polycystic kidney disease     Immunosuppression (H)     S/P liver transplant (H)     Endometriosis     HPV (human papilloma virus) infection     High grade squamous intraepithelial lesion of cervix     HSIL (high grade squamous intraepithelial lesion) on Pap smear of cervix     Abnormal urine     Acquired bilateral hammer toes     Anemia     BK virus nephropathy     Candidiasis of vagina     Cervical high risk human papillomavirus (HPV) DNA test positive     Chicken pox     Chronic renal insufficiency     Congenital polycystic kidney     Cystitis     Depression     Depressive psychosis (H)     Electrolyte abnormality     Exercise counseling     Dermatophytosis of nail     Encounter for counseling     Encounter for screening for malignant neoplasm of breast     Encounter for medication refill     End stage renal disease (H)     Hallux valgus, acquired, bilateral     Hypertension     Hyperthyroidism     Incisional hernia following transplant     Kidney transplant rejection     Knee pain     Malignant neoplasm of breast (H)     Microvascular angina (H)     Polycythemia     Postmenopausal state     Prophylactic antibiotic     Renal bone disease     Renal hypertension     Scarlet fever     Scoliosis deformity of spine     Secondary physiologic amenorrhea     Sinusitis     Swelling of first metatarsophalangeal (MTP) joint     Thrombocytopenia (H)     Uterine prolapse     Xerosis of skin     Encounter for aftercare following kidney transplant     History of liver transplant (H)     History of kidney transplant     Kidney replaced by transplant     Complicated urinary tract infection     Immunosuppressed status (H)     Lumbar pain     ESBL (extended spectrum beta-lactamase) producing bacteria infection       Allergies   Allergies   Allergen Reactions      Ibuprofen      Due to liver transplant       Medications   Current Outpatient Medications   Medication Sig     acetaminophen (TYLENOL) 325 MG tablet Take 1 tablet (325 mg) by mouth every 4 hours as needed for mild pain or fever     ALPRAZolam (XANAX) 0.25 MG tablet Take 1 tablet (0.25 mg) by mouth as needed for anxiety (Patient not taking: Reported on 8/12/2021)     aspirin (ASA) 81 MG chewable tablet Take 81 mg by mouth every evening Stopped 7 days preop     biotin 1000 MCG TABS tablet Take 3,000 mcg by mouth daily     diphenhydrAMINE (BENADRYL) 25 MG tablet Take 50 mg by mouth as needed for allergies      docusate sodium (COLACE) 100 MG capsule Take 200 mg by mouth every morning      estradiol (ESTRACE) 0.1 MG/GM vaginal cream Place 1 g vaginally twice a week     guaiFENesin (MUCINEX PO) Take 1 tablet by mouth 2 times daily as needed     levofloxacin (LEVAQUIN) 250 MG tablet Take 1 tablet (250 mg) by mouth daily     loratadine (CLARITIN) 10 MG tablet Take 10 mg by mouth every evening      Multiple Vitamins-Minerals (WOMENS DAILY FORMULA PO) Take 1 tablet by mouth daily     MYFORTIC (BRAND) 180 MG EC tablet Take 3 tablets (540 mg) by mouth 2 times daily     nitroGLYcerin (NITROSTAT) 0.4 MG sublingual tablet Place 1 tablet (0.4 mg) under the tongue every 5 minutes as needed for chest pain For chest pain place 1 tablet under the tongue every 5 minutes for 3 doses. If symptoms persist 5 minutes after 1st dose call 911.     ondansetron (ZOFRAN) 4 MG tablet Take by mouth as needed for nausea (Patient not taking: Reported on 8/12/2021)     pantoprazole (PROTONIX) 40 MG EC tablet Take 1 tablet by mouth as needed (reflux)  (Patient not taking: Reported on 8/12/2021)     Probiotic Product (PROBIOTIC PO) Take 2 tablets by mouth every morning      Psyllium (METAMUCIL PO) Take by mouth every morning Hold 9-30-20     sulfamethoxazole-trimethoprim (BACTRIM) 400-80 MG tablet Take 1 tablet by mouth Every Mon, Wed, Fri Morning      tacrolimus (GENERIC EQUIVALENT) 1 MG capsule Take 2 capsules (2 mg) by mouth 2 times daily     temazepam (RESTORIL) 15 MG capsule Take 1 capsule (15 mg) by mouth as needed for sleep (twice a year) (Patient not taking: Reported on 8/12/2021)     terconazole (TERAZOL 3) 0.8 % vaginal cream Place 1 applicator (5 g) vaginally At Bedtime     Vitamin D, Cholecalciferol, 25 MCG (1000 UT) CAPS Take 1,000 Units by mouth D3     Vitamin D3 (CHOLECALCIFEROL) 25 mcg (1000 units) tablet Take 50 mcg by mouth every morning      No current facility-administered medications for this visit.     There are no discontinued medications.    Physical Exam   Vital Signs: There were no vitals taken for this visit.    GENERAL APPEARANCE: alert and no distress  HENT: no obvious abnormalities on appearance  RESP: breathing appears unremarkable with normal rate, no audible wheezing or cough and no apparent shortness of breath with conversation  MS: extremities normal - no gross deformities noted  SKIN: no apparent rash and normal skin tone  NEURO: speech is clear with no obvious neurological deficits  PSYCH: mentation appears normal and affect normal      Data     Renal Latest Ref Rng & Units 12/3/2021 11/4/2021 9/14/2021   Na 136 - 145 mmol/L 141 136 135   K 3.5 - 5.0 mmol/L 3.9 4.6 4.2   Cl 98 - 107 mmol/L 107 107 104   CO2 22 - 31 mmol/L 24 24 26   BUN 8 - 22 mg/dL 15 21 18   Cr 0.60 - 1.10 mg/dL 0.76 0.72 0.81   Glucose 70 - 125 mg/dL 81 108(H) 98   Ca  8.5 - 10.5 mg/dL 10.3 9.8 10.2(H)     Bone Health Latest Ref Rng & Units 9/23/2020   Phos 2.5 - 4.5 mg/dL 3.4   PTHi 18 - 80 pg/mL 45     Heme Latest Ref Rng & Units 12/3/2021 11/4/2021 9/14/2021   WBC 4.0 - 11.0 10e3/uL 5.6 12.3(H) 8.8   Hgb 11.7 - 15.7 g/dL 15.2 14.5 15.9(H)   Plt 150 - 450 10e3/uL 210 263 301   ABSOLUTE NEUTROPHIL 1.6 - 8.3 10e9/L - - -   ABSOLUTE LYMPHOCYTES 0.8 - 5.3 10e9/L - - -   ABSOLUTE MONOCYTES 0.0 - 1.3 10e9/L - - -   ABSOLUTE EOSINOPHILS 0.0 - 0.7 10e9/L - - -    ABSOLUTE BASOPHILS 0.0 - 0.2 10e9/L - - -   ABS IMMATURE GRANULOCYTES 0 - 0.4 10e9/L - - -   ABSOLUTE NUCLEATED RBC - - - -     Liver Latest Ref Rng & Units 11/4/2021 6/4/2021 3/11/2021   AP 40 - 150 U/L 81 61 72   TBili 0.2 - 1.3 mg/dL 0.6 1.0 0.8   DBili 0.0 - 0.2 mg/dL - 0.2 0.2   ALT 0 - 50 U/L 18 22 24   AST 0 - 45 U/L 12 17 15   Tot Protein 6.8 - 8.8 g/dL 7.3 6.6(L) 7.2   Albumin 3.4 - 5.0 g/dL 3.1(L) 3.4 3.8           [facility] Txp Virology Latest Ref Rng & Units 1/4/2021 12/18/2020 9/23/2020   CVM DNA Quant - - Plasma Plasma   CMV QUANT IU/ML CMVND:CMV DNA Not Detected [IU]/mL - CMV DNA Not Detected CMV DNA Not Detected   LOG IU/ML OF CMVQNT <2.1 [Log:IU]/mL - Not Calculated Not Calculated   BK Spec - - - Plasma   BK Res BKNEG:BK Virus DNA Not Detected copies/mL - - BK Virus DNA Not Detected   BK Log <2.7 Log copies/mL - - Not Calculated   EBV CAPSID ANTIBODY IGG 0.0 - 0.8 AI >8.0(H) - -      Recent Labs   Lab Test 03/11/21  1006 06/04/21  1018 12/03/21  0936   DOSTAC 2200 [id number] 2,200 12/2/2021   TACROL 5.9 4.6* 6.3     Recent Labs   Lab Test 12/18/20  0611   DOSMPA 12/17 2051   MPACID 1.32   MPAG 23.7*     Belen is a 61 year old who is being evaluated via a billable video visit.      How would you like to obtain your AVS? MyChart  If the video visit is dropped, the invitation should be resent by: Send to e-mail at: [email or web address]  Will anyone else be joining your video visit? No    Video Start Time: 3:25 PM  Video-Visit Details  Type of service:  Video Visit  Video End Time:3:48 PM  Originating Location (pt. Location): Home  Distant Location (provider location):  [facility] NEPHROLOG[facility]   Platform used for Video Visit: [employer]    Again, thank you for allowing me to participate in the care of your patient.      Sincerely,    Saran Angulo MD

## 2021-12-07 NOTE — PROGRESS NOTES
Belen is a 61 year old who is being evaluated via a billable video visit.      How would you like to obtain your AVS? MyChart  If the video visit is dropped, the invitation should be resent by: Send to e-mail at: [email or web address]  Will anyone else be joining your video visit? No    Video Start Time: 3:25 PM  Video-Visit Details    Type of service:  Video Visit    Video End Time:3:48 PM    Originating Location (pt. Location): Home    Distant Location (provider location):  [facility] NEPHROLOGY [facility]     Platform used for Video Visit: Docebo

## 2021-12-07 NOTE — PROGRESS NOTES
TRANSPLANT NEPHROLOGY CHRONIC POST TRANSPLANT VISIT    Assessment & Plan   # DDKT: Stable   - Baseline Creatinine:  ~ 0.7-0.9   - Proteinuria: Minimal (0.2-0.5 grams)   - Date DSA Last Checked: Not Known      Latest DSA: Not checked recently due to time from transplant   - BK Viremia: No   - Kidney Tx Biopsy: No    # Liver Tx:    -Good allograft function. Follows with transplant hepatology.    # Immunosuppression: Tacrolimus immediate release (goal 4-6), Mycophenolic acid (dose 540 mg every 12 hours) and Prednisone (dose 5 mg daily)   - Continue with intensive monitoring of immunosuppression for efficacy and toxicity.   - Changes: Not at this time    # Infection Prophylaxis:   - PJP: Sulfa/TMP (Bactrim) MWF    # Recurrent UTI : have had ESBL in June and Dec 2020 s/p meropenum therapy. Not on prophylaxis. Plan to treat only if symptomatic per txp ID.    -Developed symptomatic UTI and fever 11/4/2021 w/ Klebsiella, treated with LVQ    # PKD:   -Seen by txp surgery. Risks outweigh benefits for native nephrectomy    # Hypertension: Controlled;  Goal BP: < 130/80   - Changes: Not at this time.     # Post-Transplant Erythrocytosis: Hgb: Stable;  On ACEI/ARB: No   Imaging: Not at this time    # Mineral Bone Disorder:   - Secondary renal hyperparathyroidism; PTH level: Normal (18-80 pg/ml)        On treatment: None  - Vitamin D; level: Not checked recently, but was normal last check        On supplement: Yes  - Calcium; level: High normal        On supplement: No  - Phosphorus; level: Normal        On supplement: No   -Repeat PTH, vitamin D level, ionized calcium with next labs  -DEXA scan    # Electrolytes:   - Potassium; level: Normal        On supplement: No  - Magnesium; level: Not checked recently        On supplement: No  - Bicarbonate; level: Normal        On supplement: No    # HSIL:    -per cone biopsy on 9/30/20. No evidence of dysplasia. Had colposcopy 9/2021 with +HPV. Colpo negative    # Cystocele:               -Has pessary. Follows with OB GYN    # Skin Cancer Risk:    - Discussed sun protection and recommend regular follow up with Dermatology.    # Medical Compliance: Yes    # COVID-19 Virus Review: Discussed COVID-19 virus and the potential medical risks.  Reviewed preventative health recommendations, including wearing a mask where appropriate.  Recommended COVID vaccination should be up to date with either an initial vaccination or booster shot when appropriate.  Asked the patient to inform the transplant center if they are exposed or diagnosed with this virus.    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    Return visit: Return in about 6 months (around 6/7/2022).    Saran Angulo MD    Chief Complaint   Ms. Sharma is a 61 year old here for routine follow up, kidney transplant and immunosuppression management.    History of Present Illness   The patient generally feels well. She denies N/V/D, fever, chills, SOB. She does get occasional chest pain and takes NG. Her cardiologist, Dr. Mohamud, is aware of this. Dr. Mohamud recommended increasing fluid intake. She denies swelling in her legs, unintentional weight loss, adenopathy.     Home BP: occasionally low in 90s systolic    Problem List   Patient Active Problem List   Diagnosis     S/P splenectomy     Congenital hepatic fibrosis     S/P kidney transplant     Polycystic kidney disease     Immunosuppression (H)     S/P liver transplant (H)     Endometriosis     HPV (human papilloma virus)  infection     High grade squamous intraepithelial lesion of cervix     HSIL (high grade squamous intraepithelial lesion) on Pap smear of cervix     Abnormal urine     Acquired bilateral hammer toes     Anemia     BK virus nephropathy     Candidiasis of vagina     Cervical high risk human papillomavirus (HPV) DNA test positive     Chicken pox     Chronic renal insufficiency     Congenital polycystic kidney     Cystitis     Depression     Depressive psychosis (H)     Electrolyte abnormality     Exercise counseling     Dermatophytosis of nail     Encounter for counseling     Encounter for screening for malignant neoplasm of breast     Encounter for medication refill     End stage renal disease (H)     Hallux valgus, acquired, bilateral     Hypertension     Hyperthyroidism     Incisional hernia following transplant     Kidney transplant rejection     Knee pain     Malignant neoplasm of breast (H)     Microvascular angina (H)     Polycythemia     Postmenopausal state     Prophylactic antibiotic     Renal bone disease     Renal hypertension     Scarlet fever     Scoliosis deformity of spine     Secondary physiologic amenorrhea     Sinusitis     Swelling of first metatarsophalangeal (MTP) joint     Thrombocytopenia (H)     Uterine prolapse     Xerosis of skin     Encounter for aftercare following kidney transplant     History of liver transplant (H)     History of kidney transplant     Kidney replaced by transplant     Complicated urinary tract infection     Immunosuppressed status (H)     Lumbar pain     ESBL (extended spectrum beta-lactamase) producing bacteria infection       Allergies   Allergies   Allergen Reactions     Ibuprofen      Due to liver transplant       Medications   Current Outpatient Medications   Medication Sig     acetaminophen (TYLENOL) 325 MG tablet Take 1 tablet (325 mg) by mouth every 4 hours as needed for mild pain or fever     ALPRAZolam (XANAX) 0.25 MG tablet Take 1 tablet (0.25 mg) by mouth as  needed for anxiety (Patient not taking: Reported on 8/12/2021)     aspirin (ASA) 81 MG chewable tablet Take 81 mg by mouth every evening Stopped 7 days preop     biotin 1000 MCG TABS tablet Take 3,000 mcg by mouth daily     diphenhydrAMINE (BENADRYL) 25 MG tablet Take 50 mg by mouth as needed for allergies      docusate sodium (COLACE) 100 MG capsule Take 200 mg by mouth every morning      estradiol (ESTRACE) 0.1 MG/GM vaginal cream Place 1 g vaginally twice a week     guaiFENesin (MUCINEX PO) Take 1 tablet by mouth 2 times daily as needed     levofloxacin (LEVAQUIN) 250 MG tablet Take 1 tablet (250 mg) by mouth daily     loratadine (CLARITIN) 10 MG tablet Take 10 mg by mouth every evening      Multiple Vitamins-Minerals (WOMENS DAILY FORMULA PO) Take 1 tablet by mouth daily     MYFORTIC (BRAND) 180 MG EC tablet Take 3 tablets (540 mg) by mouth 2 times daily     nitroGLYcerin (NITROSTAT) 0.4 MG sublingual tablet Place 1 tablet (0.4 mg) under the tongue every 5 minutes as needed for chest pain For chest pain place 1 tablet under the tongue every 5 minutes for 3 doses. If symptoms persist 5 minutes after 1st dose call 911.     ondansetron (ZOFRAN) 4 MG tablet Take by mouth as needed for nausea (Patient not taking: Reported on 8/12/2021)     pantoprazole (PROTONIX) 40 MG EC tablet Take 1 tablet by mouth as needed (reflux)  (Patient not taking: Reported on 8/12/2021)     Probiotic Product (PROBIOTIC PO) Take 2 tablets by mouth every morning      Psyllium (METAMUCIL PO) Take by mouth every morning Hold 9-30-20     sulfamethoxazole-trimethoprim (BACTRIM) 400-80 MG tablet Take 1 tablet by mouth Every Mon, Wed, Fri Morning     tacrolimus (GENERIC EQUIVALENT) 1 MG capsule Take 2 capsules (2 mg) by mouth 2 times daily     temazepam (RESTORIL) 15 MG capsule Take 1 capsule (15 mg) by mouth as needed for sleep (twice a year) (Patient not taking: Reported on 8/12/2021)     terconazole (TERAZOL 3) 0.8 % vaginal cream Place 1  applicator (5 g) vaginally At Bedtime     Vitamin D, Cholecalciferol, 25 MCG (1000 UT) CAPS Take 1,000 Units by mouth D3     Vitamin D3 (CHOLECALCIFEROL) 25 mcg (1000 units) tablet Take 50 mcg by mouth every morning      No current facility-administered medications for this visit.     There are no discontinued medications.    Physical Exam   Vital Signs: There were no vitals taken for this visit.    GENERAL APPEARANCE: alert and no distress  HENT: no obvious abnormalities on appearance  RESP: breathing appears unremarkable with normal rate, no audible wheezing or cough and no apparent shortness of breath with conversation  MS: extremities normal - no gross deformities noted  SKIN: no apparent rash and normal skin tone  NEURO: speech is clear with no obvious neurological deficits  PSYCH: mentation appears normal and affect normal      Data     Renal Latest Ref Rng & Units 12/3/2021 11/4/2021 9/14/2021   Na 136 - 145 mmol/L 141 136 135   K 3.5 - 5.0 mmol/L 3.9 4.6 4.2   Cl 98 - 107 mmol/L 107 107 104   CO2 22 - 31 mmol/L 24 24 26   BUN 8 - 22 mg/dL 15 21 18   Cr 0.60 - 1.10 mg/dL 0.76 0.72 0.81   Glucose 70 - 125 mg/dL 81 108(H) 98   Ca  8.5 - 10.5 mg/dL 10.3 9.8 10.2(H)     Bone Health Latest Ref Rng & Units 9/23/2020   Phos 2.5 - 4.5 mg/dL 3.4   PTHi 18 - 80 pg/mL 45     Heme Latest Ref Rng & Units 12/3/2021 11/4/2021 9/14/2021   WBC 4.0 - 11.0 10e3/uL 5.6 12.3(H) 8.8   Hgb 11.7 - 15.7 g/dL 15.2 14.5 15.9(H)   Plt 150 - 450 10e3/uL 210 263 301   ABSOLUTE NEUTROPHIL 1.6 - 8.3 10e9/L - - -   ABSOLUTE LYMPHOCYTES 0.8 - 5.3 10e9/L - - -   ABSOLUTE MONOCYTES 0.0 - 1.3 10e9/L - - -   ABSOLUTE EOSINOPHILS 0.0 - 0.7 10e9/L - - -   ABSOLUTE BASOPHILS 0.0 - 0.2 10e9/L - - -   ABS IMMATURE GRANULOCYTES 0 - 0.4 10e9/L - - -   ABSOLUTE NUCLEATED RBC - - - -     Liver Latest Ref Rng & Units 11/4/2021 6/4/2021 3/11/2021   AP 40 - 150 U/L 81 61 72   TBili 0.2 - 1.3 mg/dL 0.6 1.0 0.8   DBili 0.0 - 0.2 mg/dL - 0.2 0.2   ALT 0 - 50  U/L 18 22 24   AST 0 - 45 U/L 12 17 15   Tot Protein 6.8 - 8.8 g/dL 7.3 6.6(L) 7.2   Albumin 3.4 - 5.0 g/dL 3.1(L) 3.4 3.8           [facility] Txp Virology Latest Ref Rng & Units 1/4/2021 12/18/2020 9/23/2020   CVM DNA Quant - - Plasma Plasma   CMV QUANT IU/ML CMVND:CMV DNA Not Detected [IU]/mL - CMV DNA Not Detected CMV DNA Not Detected   LOG IU/ML OF CMVQNT <2.1 [Log:IU]/mL - Not Calculated Not Calculated   BK Spec - - - Plasma   BK Res BKNEG:BK Virus DNA Not Detected copies/mL - - BK Virus DNA Not Detected   BK Log <2.7 Log copies/mL - - Not Calculated   EBV CAPSID ANTIBODY IGG 0.0 - 0.8 AI >8.0(H) - -        Recent Labs   Lab Test 03/11/21  1006 06/04/21  1018 12/03/21  0936   DOSTAC 2200 [id number] 2,200 12/2/2021   TACROL 5.9 4.6* 6.3     Recent Labs   Lab Test 12/18/20  0611   DOSMPA 12/17 2051   MPACID 1.32   MPAG 23.7*

## 2021-12-08 ENCOUNTER — TELEPHONE (OUTPATIENT)
Dept: TRANSPLANT | Facility: CLINIC | Age: 61
End: 2021-12-08
Payer: MEDICARE

## 2021-12-08 DIAGNOSIS — Z94.0 KIDNEY TRANSPLANTED: Primary | ICD-10-CM

## 2021-12-08 DIAGNOSIS — N25.0 RENAL BONE DISEASE: ICD-10-CM

## 2021-12-08 DIAGNOSIS — M41.9 SCOLIOSIS DEFORMITY OF SPINE: ICD-10-CM

## 2021-12-08 DIAGNOSIS — Z79.818 LONG TERM (CURRENT) USE OF OTHER AGENTS AFFECTING ESTROGEN RECEPTORS AND ESTROGEN LEVELS: ICD-10-CM

## 2021-12-08 DIAGNOSIS — M54.50 LUMBAR PAIN: ICD-10-CM

## 2021-12-08 DIAGNOSIS — Z94.4 S/P LIVER TRANSPLANT (H): ICD-10-CM

## 2021-12-08 NOTE — TELEPHONE ENCOUNTER
Per Dr. Angulo, order entered for DEXA scan due to risk factors for Osteoporosis. Message sent to Belen to notify her.

## 2022-01-30 ENCOUNTER — HEALTH MAINTENANCE LETTER (OUTPATIENT)
Age: 62
End: 2022-01-30

## 2022-02-02 DIAGNOSIS — Z13.220 LIPID SCREENING: ICD-10-CM

## 2022-02-02 DIAGNOSIS — Z94.4 LIVER REPLACED BY TRANSPLANT (H): ICD-10-CM

## 2022-02-08 ENCOUNTER — IMMUNIZATION (OUTPATIENT)
Dept: NURSING | Facility: CLINIC | Age: 62
End: 2022-02-08
Payer: MEDICARE

## 2022-02-08 PROCEDURE — 0054A COVID-19,PF,PFIZER (12+ YRS): CPT

## 2022-02-08 PROCEDURE — 91305 COVID-19,PF,PFIZER (12+ YRS): CPT

## 2022-02-10 ENCOUNTER — LAB (OUTPATIENT)
Dept: LAB | Facility: CLINIC | Age: 62
End: 2022-02-10
Payer: MEDICARE

## 2022-02-10 DIAGNOSIS — Z94.4 LIVER REPLACED BY TRANSPLANT (H): ICD-10-CM

## 2022-02-10 DIAGNOSIS — E83.52 HYPERCALCEMIA: ICD-10-CM

## 2022-02-10 DIAGNOSIS — Z13.220 LIPID SCREENING: ICD-10-CM

## 2022-02-10 LAB
ALBUMIN SERPL-MCNC: 3.9 G/DL (ref 3.5–5)
ALP SERPL-CCNC: 60 U/L (ref 45–120)
ALT SERPL W P-5'-P-CCNC: 13 U/L (ref 0–45)
ANION GAP SERPL CALCULATED.3IONS-SCNC: 11 MMOL/L (ref 5–18)
AST SERPL W P-5'-P-CCNC: 17 U/L (ref 0–40)
BILIRUB DIRECT SERPL-MCNC: 0.2 MG/DL
BILIRUB SERPL-MCNC: 0.6 MG/DL (ref 0–1)
BUN SERPL-MCNC: 16 MG/DL (ref 8–22)
CALCIUM SERPL-MCNC: 10.2 MG/DL (ref 8.5–10.5)
CALCIUM, IONIZED MEASURED: 1.34 MMOL/L (ref 1.11–1.3)
CHLORIDE BLD-SCNC: 108 MMOL/L (ref 98–107)
CO2 SERPL-SCNC: 22 MMOL/L (ref 22–31)
CREAT SERPL-MCNC: 0.75 MG/DL (ref 0.6–1.1)
ERYTHROCYTE [DISTWIDTH] IN BLOOD BY AUTOMATED COUNT: 14 % (ref 10–15)
GFR SERPL CREATININE-BSD FRML MDRD: 90 ML/MIN/1.73M2
GLUCOSE BLD-MCNC: 87 MG/DL (ref 70–125)
HCT VFR BLD AUTO: 48.4 % (ref 35–47)
HGB BLD-MCNC: 16 G/DL (ref 11.7–15.7)
ION CA PH 7.4: 1.28 MMOL/L (ref 1.11–1.3)
MCH RBC QN AUTO: 30.2 PG (ref 26.5–33)
MCHC RBC AUTO-ENTMCNC: 33.1 G/DL (ref 31.5–36.5)
MCV RBC AUTO: 92 FL (ref 78–100)
PH: 7.32 (ref 7.35–7.45)
PLATELET # BLD AUTO: 204 10E3/UL (ref 150–450)
POTASSIUM BLD-SCNC: 3.8 MMOL/L (ref 3.5–5)
PROT SERPL-MCNC: 6.5 G/DL (ref 6–8)
PTH-INTACT SERPL-MCNC: 66 PG/ML (ref 10–86)
RBC # BLD AUTO: 5.29 10E6/UL (ref 3.8–5.2)
SODIUM SERPL-SCNC: 141 MMOL/L (ref 136–145)
WBC # BLD AUTO: 5.9 10E3/UL (ref 4–11)

## 2022-02-10 PROCEDURE — 82330 ASSAY OF CALCIUM: CPT

## 2022-02-10 PROCEDURE — 85027 COMPLETE CBC AUTOMATED: CPT

## 2022-02-10 PROCEDURE — 36415 COLL VENOUS BLD VENIPUNCTURE: CPT

## 2022-02-10 PROCEDURE — 82306 VITAMIN D 25 HYDROXY: CPT

## 2022-02-10 PROCEDURE — 83970 ASSAY OF PARATHORMONE: CPT

## 2022-02-10 PROCEDURE — 80197 ASSAY OF TACROLIMUS: CPT

## 2022-02-10 PROCEDURE — 80053 COMPREHEN METABOLIC PANEL: CPT

## 2022-02-10 PROCEDURE — 82248 BILIRUBIN DIRECT: CPT

## 2022-02-11 LAB
DEPRECATED CALCIDIOL+CALCIFEROL SERPL-MC: 57 UG/L (ref 30–80)
TACROLIMUS BLD-MCNC: 7.1 UG/L (ref 5–15)
TME LAST DOSE: NORMAL H
TME LAST DOSE: NORMAL H

## 2022-02-15 ENCOUNTER — TELEPHONE (OUTPATIENT)
Dept: TRANSPLANT | Facility: CLINIC | Age: 62
End: 2022-02-15
Payer: MEDICARE

## 2022-02-15 NOTE — LETTER
OUTPATIENT OR TRANSITIONAL CARE  LABORATORY TEST ORDER  Patient copy     Patient Name:    Belen Sharma                                            Transplant Date:   3/3/2011   YOB: 1960                                             Issue Date:            02/02/2022   [facility] MR#:    [id number]                                           Expiration Date:   02/02/2023         Diagnoses:            [x]??      Liver Transplant (ICD-10 Z94.4)                                 [x]??      Long term use of medications (ICD-10 Z79.899)      Please fax results to [phone]     Frequency: every 2-3 months and as needed        [x]??         CBC with Platelets   [x]??         Basic Metabolic Panel (Sodium, Potassium, Chloride, CO2, Creatinine, Urea Nitrogen, Glucose, Calcium)  [x]??         Hepatic panel (Albumin, Alk Phos, ALT, AST, Direct and Total Bili)  [x]??         Tacrolimus drug level - 12 hour trough, please document time of last dose         Other Frequency: annually due September 2022  [x]??         Fasting lipid panel  [x]??         Urine protein/creatinine ratio  [x]??         Urinalysis with reflex to micro    If you have questions, please call [phone] or [phone].      Thomas M. Leventhal, M.D.   of Medicine  Advanced & Transplant Hepatology  [facility]

## 2022-02-15 NOTE — TELEPHONE ENCOUNTER
ISSUE:      ----- Message -----   From: Divya Chávez RN   Sent: 2/14/2022   3:39 PM CST   To: Saran Angulo MD     Any recs on ionized calcium?     PLAN:  Message  Received: Yesterday  Saran Angulo MD Lavelle Peterson, Meghan M, RN  Please stop vitamin D supplement      OUTCOME:  RNCC spoke with Belen. Instructed to discontinue VIT D3 supplement and removed from her medication list. Belen requested to review her tacrolimus level. Reviewed comments on lab result from Liver Txp Coordinator. To stay on same dose Prograf/ no dose change. Belen requested hard copy of lab letter to be sent; copy from 2/2/22 sent via mail. Reminded of nephrology appt June 2022 with Dr. Angulo. No further questions at this time.    Alona Davidson RN, BSN  Solid Organ Transplant, Post Kidney and Pancreas  Transplant Care Coordinator  [phone]

## 2022-02-17 PROBLEM — Z48.22 ENCOUNTER FOR AFTERCARE FOLLOWING KIDNEY TRANSPLANT: Status: ACTIVE | Noted: 2020-06-08

## 2022-03-29 DIAGNOSIS — Z94.0 KIDNEY TRANSPLANTED: Primary | ICD-10-CM

## 2022-03-30 RX ORDER — SULFAMETHOXAZOLE AND TRIMETHOPRIM 400; 80 MG/1; MG/1
1 TABLET ORAL
Qty: 45 TABLET | Refills: 3 | Status: SHIPPED | OUTPATIENT
Start: 2022-03-30 | End: 2022-08-31

## 2022-03-30 RX ORDER — PREDNISONE 5 MG/1
5 TABLET ORAL DAILY
Qty: 90 TABLET | Refills: 3 | Status: SHIPPED | OUTPATIENT
Start: 2022-03-30 | End: 2023-04-30

## 2022-04-05 NOTE — PROGRESS NOTES
[facility] Dermatology Note  Encounter Date: Apr 6, 2022  Office Visit     Dermatology Problem List:  # PMH kidney and liver transplant 2010, liver transplant 2011, kidney transplant 2016  #. Onychomycosis  - ciclopirox  # DELL, right lateral thigh, s/p shave bx 4/6/2022   ____________________________________________    Assessment & Plan:    # NUB, right lateral thigh, biopsy ddx ISK vs SCC  - Shave biopsy today, see procedure below     # Onychomycosis  Discussed that PO antifungals are more effective but not an option due to transplant history. She has previously used ciclopirox with some benefit.  - Resume ciclopirox solution to affected nails daily.    # Varicosities, lower legs  - Recommended daily compression stockings    # Xerosis cutis  - Advised daily moisturization with bland emollient.   - Gentle skin care recommendations provided.    # Skin tags  # Seborrheic keratoses  - Discussed the natural history and benign nature of this lesion. Reassurance provided that no additional treatment is necessary.      # Multiple benign nevi  # Solar lentigines   - No concerning lesions today  - Monitor for ABCDEs of melanoma   - Continue sun protection - recommend SPF 30 or higher with frequent application   - Return sooner if noticing changing or symptomatic lesions     # History of organ transplant  - Patient is aware of elevated risk of skin cancer.  - Continue photoprotection and regular skin exams.    Procedures Performed:   - Shave biopsy procedure note, location(s): see above. After discussion of benefits and risks including but not limited to bleeding, infection, scar, incomplete removal, recurrence, and non-diagnostic biopsy, written consent and photographs were obtained. The area was cleaned with isopropyl alcohol. 0.5mL of 1% lidocaine with epinephrine was injected to obtain adequate anesthesia of lesion(s). Shave biopsy at site(s) performed. Hemostasis was achieved with aluminium chloride.  Petrolatum ointment and a sterile dressing were applied. The patient tolerated the procedure and no complications were noted. The patient was provided with verbal and written post care instructions.     Follow-up: 1 year(s) in-person, or earlier for new or changing lesions    Staff and Scribe:     Scribe Disclosure:  I, Riaz Keshiakenyattas, am serving as a scribe to document services personally performed by Anat Benitez MD based on data collection and the provider's statements to me.     Provider Disclosure:   The documentation recorded by the scribe accurately reflects the services I personally performed and the decisions made by me.    Anat Benitez MD    Department of Dermatology  [facility] [place]: Phone: [phone], Fax: [phone]  4/7/2022     ____________________________________________    CC: Skin Check (states that she has a spot on the right thigh that has been dry and irritated, also has a spot on the back, has skin tags under the arms)    HPI:  Ms. Belen Sharma is a(n) 62 year old female who presents today as a new patient for FBSE. Patient reports a spot on the right thigh that has been dry and irritated as well as a spot on the back and skin tags in the axilla that she would like examined. Reports family history (mother) of skin cancer. Additionally, she has used ciclopirox for toenail fungus in the past.    Patient is otherwise feeling well, without additional skin concerns.    Labs Reviewed:  N/A    Physical Exam:  Vitals: There were no vitals taken for this visit.  SKIN: Total skin excluding the undergarment areas was performed. The exam included the head/face, neck, both arms, chest, back, abdomen, both legs, digits and/or nails.   - Right lateral thigh, pink scaly thin plaque.  - Multiple regular brown pigmented macules and papules are identified on the trunk and extremities.   - Scattered brown  macules on sun exposed areas.  - There are waxy stuck on tan to brown papules on the trunk and extremities.   - There is(are) skin colored pedunculated papules on the bilateral axilla.  - Right third, fourth, and fifth toenails with thickening and white discoloration.  - Varicosities on lower legs.  - No other lesions of concern on areas examined.     Medications:  Current Outpatient Medications   Medication     acetaminophen (TYLENOL) 325 MG tablet     ALPRAZolam (XANAX) 0.25 MG tablet     aspirin (ASA) 81 MG chewable tablet     biotin 1000 MCG TABS tablet     diphenhydrAMINE (BENADRYL) 25 MG tablet     docusate sodium (COLACE) 100 MG capsule     estradiol (ESTRACE) 0.1 MG/GM vaginal cream     guaiFENesin (MUCINEX PO)     levofloxacin (LEVAQUIN) 250 MG tablet     loratadine (CLARITIN) 10 MG tablet     Multiple Vitamins-Minerals (WOMENS DAILY FORMULA PO)     MYFORTIC (BRAND) 180 MG EC tablet     nitroGLYcerin (NITROSTAT) 0.4 MG sublingual tablet     ondansetron (ZOFRAN) 4 MG tablet     pantoprazole (PROTONIX) 40 MG EC tablet     predniSONE (DELTASONE) 5 MG tablet     Probiotic Product (PROBIOTIC PO)     Psyllium (METAMUCIL PO)     sulfamethoxazole-trimethoprim (BACTRIM) 400-80 MG tablet     tacrolimus (GENERIC EQUIVALENT) 1 MG capsule     temazepam (RESTORIL) 15 MG capsule     terconazole (TERAZOL 3) 0.8 % vaginal cream     No current facility-administered medications for this visit.      Past Medical History:   Patient Active Problem List   Diagnosis     S/P splenectomy     Congenital hepatic fibrosis     S/P kidney transplant     Polycystic kidney disease     Immunosuppression (H)     S/P liver transplant (H)     Endometriosis     HPV (human papilloma virus) infection     High grade squamous intraepithelial lesion of cervix     HSIL (high grade squamous intraepithelial lesion) on Pap smear of cervix     Abnormal urine     Acquired bilateral hammer toes     Anemia     BK virus nephropathy     Candidiasis of vagina      Cervical high risk human papillomavirus (HPV) DNA test positive     Chicken pox     Chronic renal insufficiency     Congenital polycystic kidney     Cystitis     Depression     Depressive psychosis (H)     Electrolyte abnormality     Exercise counseling     Dermatophytosis of nail     Encounter for counseling     Encounter for screening for malignant neoplasm of breast     Encounter for medication refill     End stage renal disease (H)     Hallux valgus, acquired, bilateral     Hypertension     Hyperthyroidism     Incisional hernia following transplant     Kidney transplant rejection     Knee pain     Malignant neoplasm of breast (H)     Microvascular angina (H)     Polycythemia     Postmenopausal state     Prophylactic antibiotic     Renal bone disease     Renal hypertension     Scarlet fever     Scoliosis deformity of spine     Secondary physiologic amenorrhea     Sinusitis     Swelling of first metatarsophalangeal (MTP) joint     Thrombocytopenia (H)     Uterine prolapse     Xerosis of skin     Encounter for aftercare following kidney transplant     History of liver transplant (H)     History of kidney transplant     Kidney replaced by transplant     Complicated urinary tract infection     Immunosuppressed status (H)     Lumbar pain     ESBL (extended spectrum beta-lactamase) producing bacteria infection     Past Medical History:   Diagnosis Date     Adolescent scoliosis     protruding     BK viremia [id number]     CMV pneumonia (H) 2010     Congenital hepatic fibrosis      Endometriosis      High grade squamous intraepithelial lesion of cervix 09/11/2020     History of angina      HPV (human papilloma virus) infection      Immunosuppression (H)      Polycystic kidney disease     Polycystic kidneys, tx kidney on the right. Both, very large, native kidneys reamain.     PONV (postoperative nausea and vomiting)     Need to start with ice chips and apple juice, no soda     S/P kidney transplant     SLK 10/9/2010 -  kidney failure 2/2 [facility]. Explanted 2012. Re-transplant after de-sensitization 2016     S/P liver transplant (H)     10/9/2010 - HAT, ischemic biopathy. Re-transplant 3/3/2011     S/P splenectomy      Spider veins 2019     Thyroid disease     Hyperthyroid treated with single dose iodine        CC Referred Self, MD  No address on file on close of this encounter.

## 2022-04-06 ENCOUNTER — OFFICE VISIT (OUTPATIENT)
Dept: DERMATOLOGY | Facility: CLINIC | Age: 62
End: 2022-04-06
Payer: MEDICARE

## 2022-04-06 DIAGNOSIS — D84.9 IMMUNOSUPPRESSION (H): ICD-10-CM

## 2022-04-06 DIAGNOSIS — B35.1 ONYCHOMYCOSIS: ICD-10-CM

## 2022-04-06 DIAGNOSIS — L81.4 SOLAR LENTIGO: ICD-10-CM

## 2022-04-06 DIAGNOSIS — L91.8 SKIN TAG: ICD-10-CM

## 2022-04-06 DIAGNOSIS — L82.1 SEBORRHEIC KERATOSIS: ICD-10-CM

## 2022-04-06 DIAGNOSIS — I83.93 VARICOSE VEINS OF BOTH LOWER EXTREMITIES, UNSPECIFIED WHETHER COMPLICATED: ICD-10-CM

## 2022-04-06 DIAGNOSIS — D22.9 MULTIPLE BENIGN NEVI: ICD-10-CM

## 2022-04-06 DIAGNOSIS — L85.3 XEROSIS CUTIS: ICD-10-CM

## 2022-04-06 DIAGNOSIS — D48.9 NEOPLASM OF UNCERTAIN BEHAVIOR: Primary | ICD-10-CM

## 2022-04-06 PROCEDURE — 88305 TISSUE EXAM BY PATHOLOGIST: CPT | Mod: 26 | Performed by: DERMATOLOGY

## 2022-04-06 PROCEDURE — 99203 OFFICE O/P NEW LOW 30 MIN: CPT | Mod: 25 | Performed by: DERMATOLOGY

## 2022-04-06 PROCEDURE — 11102 TANGNTL BX SKIN SINGLE LES: CPT | Performed by: DERMATOLOGY

## 2022-04-06 PROCEDURE — 88305 TISSUE EXAM BY PATHOLOGIST: CPT | Mod: TC | Performed by: DERMATOLOGY

## 2022-04-06 RX ORDER — CICLOPIROX 80 MG/ML
SOLUTION TOPICAL
Qty: 6 ML | Refills: 3 | Status: SHIPPED | OUTPATIENT
Start: 2022-04-06 | End: 2023-09-06

## 2022-04-06 ASSESSMENT — PAIN SCALES - GENERAL: PAINLEVEL: NO PAIN (0)

## 2022-04-06 NOTE — PATIENT INSTRUCTIONS
Gentle Skin Care    Below is a list of products our providers recommend for gentle skin care.  Moisturizers:    Lighter; Exederm Intensive Moisture Cream, Cetaphil Cream, CeraVe, Aveeno Positively radiant and Vanicream Light     Thicker; Aquaphor Ointment, Vaseline, Petroleum Jelly, Eucerin Original Healing Cream and Vanicream, CeraVe Healing Ointment, Aquaphor Body Spray    Avoid Lotions (too thin)  Mild Cleansers:    Dove- Fragrance Free bar or wash    CeraVe     Vanicream Cleansing bar    Cetaphil Cleanser     Aquaphor 2 in1 Gentle Wash and Shampoo    Dove Baby wash    Exederm Body wash       Laundry Products:      All Free and Clear    Cheer Free    Generic Brands are okay as long as they are  Fragrance Free      Avoid fabric softeners  and dryer sheets   Sunscreens: SPF 30 or greater       Sunscreens that contain Zinc Oxide and/or Titanium Dioxide should be applied, these are physical blockers. One or both of these should be listed in the  Active Ingredients     Any other listed ingredients under the active ingredients would be a chemically based sunscreen which might be irritating.    Spray sunscreens should be avoided because these are typically chemical sunscreens.      Shampoo and Conditioners:    Free and Clear by Vanicream    Aquaphor 2 in 1 Gentle Wash and Shampoo   Oils:    Mineral Oil     Emu Oil     For some patients: Coconut (raw, unrefined, organic) and Sunflower seed oil              Generic Products are an okay substitute, but make sure they are fragrance free.  *Reading the product ingredients list is very important  *Avoid product that have fragrance added to them.   *Organic does not mean  fragrance free.  In fact patients with sensitive skin can become quite irritated by some organic products.     1. Daily bathing is recommended. Make sure you are applying a good moisturizer after bathing every time.  2. Use Moisturizing creams at least twice daily to the whole body. Your provider may  recommend a lighter or heavier moisturizer based on your child s severity and that time of year it is.  3. Creams are more moisturizing than lotions.       Care Plan:  1. Keep bathing and showering short, less than 15 minutes   2. Always use lukewarm warm when possible. AVOID HOT or COLD water  3. DO NOT use bubble bath  4. Limit the use of soaps. Focus on the skin folds, face, armpits, groin and feet towards the end of the bath  5. Do NOT vigorously scrub when you cleanse the skin  6. After bathing, PAT your skin lightly with a towel. DO NOT rub or scrub when drying  7. ALWAYS apply a moisturizer immediately after bathing. This helps to  lock in  the moisture. * IF YOU WERE PRESCRIBED A TOPICAL MEDICATION, APPLY YOUR MEDICATION FIRST THEN COVER WITH YOUR DAILY MOISTURIZER  8. Reapply moisturizing agents at least twice daily to your whole body    Other helpful tips:    Do not use products such as powders, perfumes, or colognes on your skin    Diffusers can be harsh on sensitive skin, use with caution if you or your child has sensitive skin     Avoid saunas and steam baths. This temperature is too HOT    Avoid tight or  scratchy  clothing such as wool    Always wash new clothing before wearing them for the first time    Sometimes a humidifier or vaporizer can be used at night can help the dry skin. Remember to keep these items clean to avoid mold growth.    Patient Education     Checking for Skin Cancer  You can find cancer early by checking your skin each month. There are 3 kinds of skin cancer. They are melanoma, basal cell carcinoma, and squamous cell carcinoma. Doing monthly skin checks is the best way to find new marks or skin changes. Follow the instructions below for checking your skin.   The ABCDEs of checking moles for melanoma   Check your moles or growths for signs of melanoma using ABCDE:     Asymmetry: the sides of the mole or growth don t match    Border: the edges are ragged, notched, or  blurred    Color: the color within the mole or growth varies    Diameter: the mole or growth is larger than 6 mm (size of a pencil eraser)    Evolving: the size, shape, or color of the mole or growth is changing (evolving is not shown in the images below)    Checking for other types of skin cancer  Basal cell carcinoma or squamous cell carcinoma have symptoms such as:       A spot or mole that looks different from all other marks on your skin    Changes in how an area feels, such as itching, tenderness, or pain    Changes in the skin's surface, such as oozing, bleeding, or scaliness    A sore that does not heal    New swelling or redness beyond the border of a mole    Who s at risk?  Anyone can get skin cancer. But you are at greater risk if you have:     Fair skin, light-colored hair, or light-colored eyes    Many moles or abnormal moles on your skin    A history of sunburns from sunlight or tanning beds    A family history of skin cancer    A history of exposure to radiation or chemicals    A weakened immune system  If you have had skin cancer in the past, you are at risk for recurring skin cancer.   How to check your skin  Do your monthly skin checkups in front of a full-length mirror. Check all parts of your body, including your:     Head (ears, face, neck, and scalp)    Torso (front, back, and sides)    Arms (tops, undersides, upper, and lower armpits)    Hands (palms, backs, and fingers, including under the nails)    Buttocks and genitals    Legs (front, back, and sides)    Feet (tops, soles, toes, including under the nails, and between toes)  If you have a lot of moles, take digital photos of them each month. Make sure to take photos both up close and from a distance. These can help you see if any moles change over time.   Most skin changes are not cancer. But if you see any changes in your skin, call your doctor right away. Only he or she can diagnose a problem. If you have skin cancer, seeing your doctor  can be the first step toward getting the treatment that could save your life.   [employer] last reviewed this educational content on 4/1/2019 2000-2020 [employer]. [address] [place] [address], [place] [address]. All rights reserved. This information is not intended as a substitute for professional medical care. Always follow your healthcare professional's instructions.       When should I call my doctor?    If you are worsening or not improving, please, contact us or seek urgent care as noted below.     Who should I call with questions (adults)?    [facility] (adult and pediatric): [phone]    [facility] (adult): [phone]    For urgent needs outside of business hours call the [facility] at [phone] and ask for the dermatology resident on call to be paged    If this is a medical emergency and you are unable to reach an ER, Call [phone]    Who should I call with questions (pediatric)?  [facility]- Pediatric Dermatology  Dr. Cheryl Jack, Dr. Real Zaidi, Dr. Chrissie Coronado, SHIRLEY Patel, Dr. Renetta Roman, Dr. Alicia Cortez & Dr. Vincent Pierce  Non-urgent nurse triage line; [phone]- Lindsay and Lazara FANG Care Coordinators   mAy (/Complex ) [phone]    If you need a prescription refill, please contact your pharmacy. Refills are approved or denied by our Physicians during normal business hours, Monday through Fridays  Per office policy, refills will not be granted if you have not been seen within the past year (or sooner depending on your child's condition)    Scheduling Information:  Pediatric Appointment Scheduling and Call Center [phone]  Radiology Scheduling- [phone]  Sedation Unit Scheduling- [phone]  [facility] Scheduling- General [phone]; Pediatric Dermatology [phone]  Main  Services: [phone]  Egyptian:  [phone]  Tunisian: [phone]  Hmong/Upper sorbian/Colin: [phone]  Preadmission Nursing Department Fax Number: [phone] (Fax all pre-operative paperwork to this number)    For urgent matters arising during evenings, weekends, or holidays that cannot wait for normal business hours please call [phone] and ask for the dermatology resident on call to be paged.    Wound Care After a Biopsy    What is a skin biopsy?  A skin biopsy allows the doctor to examine a very small piece of tissue under the microscope to determine the diagnosis and the best treatment for the skin condition. A local anesthetic (numbing medicine)  is injected with a very small needle into the skin area to be tested. A small piece of skin is taken from the area. Sometimes a suture (stitch) is used.     What are the risks of a skin biopsy?  I will experience scar, bleeding, swelling, pain, crusting and redness. I may experience incomplete removal or recurrence. Risks of this procedure are excessive bleeding, bruising, infection, nerve damage, numbness, thick (hypertrophic or keloidal) scar and non-diagnostic biopsy.    How should I care for my wound for the first 24 hours?    Keep the wound dry and covered for 24 hours    If it bleeds, hold direct pressure on the area for 15 minutes. If bleeding does not stop then go to the emergency room    Avoid strenuous exercise the first 1-2 days or as your doctor instructs you    How should I care for the wound after 24 hours?    After 24 hours, remove the bandage    You may bathe or shower as normal    If you had a scalp biopsy, you can shampoo as usual and can use shower water to clean the biopsy site daily    Clean the wound twice a day with gentle soap and water    Do not scrub, be gentle    Apply white petroleum/Vaseline after cleaning the wound with a cotton swab or a clean finger, and keep the site covered with a Bandaid /bandage. Bandages are not necessary with a scalp biopsy    If you are  unable to cover the site with a Bandaid /bandage, re-apply ointment 2-3 times a day to keep the site moist. Moisture will help with healing    Avoid strenuous activity for first 1-2 days    Avoid lakes, rivers, pools, and oceans until the stitches are removed or the site is healed    How do I clean my wound?    Wash hands thoroughly with soap or use hand  before all wound care    Clean the wound with gentle soap and water    Apply white petroleum/Vaseline  to wound after it is clean    Replace the Bandaid /bandage to keep the wound covered for the first few days or as instructed by your doctor    If you had a scalp biopsy, warm shower water to the area on a daily basis should suffice    What should I use to clean my wound?     Cotton-tipped applicators (Qtips )    White petroleum jelly (Vaseline ). Use a clean new container and use Q-tips to apply.    Bandaids   as needed    Gentle soap     How should I care for my wound long term?    Do not get your wound dirty    Keep up with wound care for one week or until the area is healed.    A small scab will form and fall off by itself when the area is completely healed. The area will be red and will become pink in color as it heals. Sun protection is very important for how your scar will turn out. Sunscreen with an SPF 30 or greater is recommended once the area is healed.    You should have some soreness but it should be mild and slowly go away over several days. Talk to your doctor about using tylenol for pain,    When should I call my doctor?  If you have increased:     Pain or swelling    Pus or drainage (clear or slightly yellow drainage is ok)    Temperature over 100F    Spreading redness or warmth around wound    When will I hear about my results?  The biopsy results can take 2 weeks to come back.  Your results will automatically release to [employer] before your provider has even reviewed them.  The clinic will call you with the results, send you a [employer]  message, or have you schedule a follow-up clinic or phone time to discuss the results.  Contact our clinics if you do not hear from us in 2 weeks.    Who should I call with questions?    [facility]: [phone]    [facility]: [phone]    For urgent needs outside of business hours call the [facility] at [phone] and ask for the dermatology resident on call

## 2022-04-06 NOTE — LETTER
4/6/2022       RE: Belen Sharma  [address] [place] [address]     Dear Colleague,    Thank you for referring your patient, Belen Sharma, to the [facility] [place] at [facility]. Please see a copy of my visit note below.    [facility] Dermatology Note  Encounter Date: Apr 6, 2022  Office Visit     Dermatology Problem List:  # PMH kidney and liver transplant 2010, liver transplant 2011, kidney transplant 2016  #. Onychomycosis  - ciclopirox  # NUB, right lateral thigh, s/p shave bx 4/6/2022   ____________________________________________    Assessment & Plan:    # NUB, right lateral thigh, biopsy ddx ISK vs SCC  - Shave biopsy today, see procedure below     # Onychomycosis  Discussed that PO antifungals are more effective but not an option due to transplant history. She has previously used ciclopirox with some benefit.  - Resume ciclopirox solution to affected nails daily.    # Varicosities, lower legs  - Recommended daily compression stockings    # Xerosis cutis  - Advised daily moisturization with bland emollient.   - Gentle skin care recommendations provided.    # Skin tags  # Seborrheic keratoses  - Discussed the natural history and benign nature of this lesion. Reassurance provided that no additional treatment is necessary.      # Multiple benign nevi  # Solar lentigines   - No concerning lesions today  - Monitor for ABCDEs of melanoma   - Continue sun protection - recommend SPF 30 or higher with frequent application   - Return sooner if noticing changing or symptomatic lesions     # History of organ transplant  - Patient is aware of elevated risk of skin cancer.  - Continue photoprotection and regular skin exams.    Procedures Performed:   - Shave biopsy procedure note, location(s): see above. After discussion of benefits and risks including but not limited to bleeding, infection, scar, incomplete removal,  recurrence, and non-diagnostic biopsy, written consent and photographs were obtained. The area was cleaned with isopropyl alcohol. 0.5mL of 1% lidocaine with epinephrine was injected to obtain adequate anesthesia of lesion(s). Shave biopsy at site(s) performed. Hemostasis was achieved with aluminium chloride. Petrolatum ointment and a sterile dressing were applied. The patient tolerated the procedure and no complications were noted. The patient was provided with verbal and written post care instructions.     Follow-up: 1 year(s) in-person, or earlier for new or changing lesions    Staff and Scribe:     Scribe Disclosure:  IRiaz, am serving as a scribe to document services personally performed by Anat Benitez MD based on data collection and the provider's statements to me.     Provider Disclosure:   The documentation recorded by the scribe accurately reflects the services I personally performed and the decisions made by me.    Anat Benitez MD    Department of Dermatology  [facility]: Phone: [phone], Fax: [phone]  4/7/2022     ____________________________________________    CC: Skin Check (states that she has a spot on the right thigh that has been dry and irritated, also has a spot on the back, has skin tags under the arms)    HPI:  Ms. Belen Sharma is a(n) 62 year old female who presents today as a new patient for FBSE. Patient reports a spot on the right thigh that has been dry and irritated as well as a spot on the back and skin tags in the axilla that she would like examined. Reports family history (mother) of skin cancer. Additionally, she has used ciclopirox for toenail fungus in the past.    Patient is otherwise feeling well, without additional skin concerns.    Labs Reviewed:  N/A    Physical Exam:  Vitals: There were no vitals taken for this visit.  SKIN: Total skin excluding the  undergarment areas was performed. The exam included the head/face, neck, both arms, chest, back, abdomen, both legs, digits and/or nails.   - Right lateral thigh, pink scaly thin plaque.  - Multiple regular brown pigmented macules and papules are identified on the trunk and extremities.   - Scattered brown macules on sun exposed areas.  - There are waxy stuck on tan to brown papules on the trunk and extremities.   - There is(are) skin colored pedunculated papules on the bilateral axilla.  - Right third, fourth, and fifth toenails with thickening and white discoloration.  - Varicosities on lower legs.  - No other lesions of concern on areas examined.     Medications:  Current Outpatient Medications   Medication     acetaminophen (TYLENOL) 325 MG tablet     ALPRAZolam (XANAX) 0.25 MG tablet     aspirin (ASA) 81 MG chewable tablet     biotin 1000 MCG TABS tablet     diphenhydrAMINE (BENADRYL) 25 MG tablet     docusate sodium (COLACE) 100 MG capsule     estradiol (ESTRACE) 0.1 MG/GM vaginal cream     guaiFENesin (MUCINEX PO)     levofloxacin (LEVAQUIN) 250 MG tablet     loratadine (CLARITIN) 10 MG tablet     Multiple Vitamins-Minerals (WOMENS DAILY FORMULA PO)     MYFORTIC (BRAND) 180 MG EC tablet     nitroGLYcerin (NITROSTAT) 0.4 MG sublingual tablet     ondansetron (ZOFRAN) 4 MG tablet     pantoprazole (PROTONIX) 40 MG EC tablet     predniSONE (DELTASONE) 5 MG tablet     Probiotic Product (PROBIOTIC PO)     Psyllium (METAMUCIL PO)     sulfamethoxazole-trimethoprim (BACTRIM) 400-80 MG tablet     tacrolimus (GENERIC EQUIVALENT) 1 MG capsule     temazepam (RESTORIL) 15 MG capsule     terconazole (TERAZOL 3) 0.8 % vaginal cream     No current facility-administered medications for this visit.      Past Medical History:   Patient Active Problem List   Diagnosis     S/P splenectomy     Congenital hepatic fibrosis     S/P kidney transplant     Polycystic kidney disease     Immunosuppression (H)     S/P liver transplant (H)      Endometriosis     HPV (human papilloma virus) infection     High grade squamous intraepithelial lesion of cervix     HSIL (high grade squamous intraepithelial lesion) on Pap smear of cervix     Abnormal urine     Acquired bilateral hammer toes     Anemia     BK virus nephropathy     Candidiasis of vagina     Cervical high risk human papillomavirus (HPV) DNA test positive     Chicken pox     Chronic renal insufficiency     Congenital polycystic kidney     Cystitis     Depression     Depressive psychosis (H)     Electrolyte abnormality     Exercise counseling     Dermatophytosis of nail     Encounter for counseling     Encounter for screening for malignant neoplasm of breast     Encounter for medication refill     End stage renal disease (H)     Hallux valgus, acquired, bilateral     Hypertension     Hyperthyroidism     Incisional hernia following transplant     Kidney transplant rejection     Knee pain     Malignant neoplasm of breast (H)     Microvascular angina (H)     Polycythemia     Postmenopausal state     Prophylactic antibiotic     Renal bone disease     Renal hypertension     Scarlet fever     Scoliosis deformity of spine     Secondary physiologic amenorrhea     Sinusitis     Swelling of first metatarsophalangeal (MTP) joint     Thrombocytopenia (H)     Uterine prolapse     Xerosis of skin     Encounter for aftercare following kidney transplant     History of liver transplant (H)     History of kidney transplant     Kidney replaced by transplant     Complicated urinary tract infection     Immunosuppressed status (H)     Lumbar pain     ESBL (extended spectrum beta-lactamase) producing bacteria infection     Past Medical History:   Diagnosis Date     Adolescent scoliosis     protruding     BK viremia [id number]     CMV pneumonia (H) 2010     Congenital hepatic fibrosis      Endometriosis      High grade squamous intraepithelial lesion of cervix 09/11/2020     History of angina      HPV (human papilloma  virus) infection      Immunosuppression (H)      Polycystic kidney disease     Polycystic kidneys, tx kidney on the right. Both, very large, native kidneys reamain.     PONV (postoperative nausea and vomiting)     Need to start with ice chips and apple juice, no soda     S/P kidney transplant     SLK 10/9/2010 - kidney failure 2/2 [facility]. Explanted 2012. Re-transplant after de-sensitization 2016     S/P liver transplant (H)     10/9/2010 - HAT, ischemic biopathy. Re-transplant 3/3/2011     S/P splenectomy      Spider veins 2019     Thyroid disease     Hyperthyroid treated with single dose iodine        CC Referred Self, MD  No address on file on close of this encounter.

## 2022-04-06 NOTE — NURSING NOTE
Lidocaine-epinephrine 1-1:589585 % injection   1mL once for one use, starting 4/6/2022 ending 4/6/2022,  2mL disp, R-0, injection  Injected by Becky Domínguez CMA

## 2022-04-06 NOTE — NURSING NOTE
Dermatology Rooming Note    Belen Sharma's goals for this visit include:   Chief Complaint   Patient presents with     Skin Check     states that she has a spot on the right thigh that has been dry and irritated, also has a spot on the back, has skin tags under the arms     Becky Domínguez CMA on 4/6/2022 at 1:04 PM

## 2022-04-07 LAB
PATH REPORT.COMMENTS IMP SPEC: NORMAL
PATH REPORT.COMMENTS IMP SPEC: NORMAL
PATH REPORT.FINAL DX SPEC: NORMAL
PATH REPORT.GROSS SPEC: NORMAL
PATH REPORT.MICROSCOPIC SPEC OTHER STN: NORMAL
PATH REPORT.RELEVANT HX SPEC: NORMAL

## 2022-04-11 ENCOUNTER — TELEPHONE (OUTPATIENT)
Dept: OBGYN | Facility: CLINIC | Age: 62
End: 2022-04-11
Payer: MEDICARE

## 2022-04-11 NOTE — TELEPHONE ENCOUNTER
Patient calling RN line - states she has an appt tomorrow with  and would like to  pessary at this time. She states its not a medication so she cant get it refilled, but  will usually put one out front for her to . Routing to MD for review.

## 2022-04-11 NOTE — TELEPHONE ENCOUNTER
She needs a #9 ring with support.  Per . will look for stock and have ready tomorrow at appointment .

## 2022-04-11 NOTE — TELEPHONE ENCOUNTER
Called patient to verify size of pessary - patient reports she doesn't need a pessary she wants the lubrication ointment. Box at the front for patient when she is here tomorrow.

## 2022-04-12 ENCOUNTER — OFFICE VISIT (OUTPATIENT)
Dept: OBGYN | Facility: CLINIC | Age: 62
End: 2022-04-12
Attending: OBSTETRICS & GYNECOLOGY
Payer: MEDICARE

## 2022-04-12 ENCOUNTER — LAB (OUTPATIENT)
Dept: LAB | Facility: CLINIC | Age: 62
End: 2022-04-12
Payer: MEDICARE

## 2022-04-12 VITALS
HEART RATE: 80 BPM | HEIGHT: 69 IN | WEIGHT: 155 LBS | DIASTOLIC BLOOD PRESSURE: 59 MMHG | SYSTOLIC BLOOD PRESSURE: 98 MMHG | BODY MASS INDEX: 22.96 KG/M2

## 2022-04-12 DIAGNOSIS — Z94.4 LIVER REPLACED BY TRANSPLANT (H): ICD-10-CM

## 2022-04-12 DIAGNOSIS — Z86.19 HISTORY OF HPV INFECTION: Primary | ICD-10-CM

## 2022-04-12 DIAGNOSIS — Z13.220 LIPID SCREENING: ICD-10-CM

## 2022-04-12 LAB
ALBUMIN SERPL-MCNC: 3.8 G/DL (ref 3.5–5)
ALP SERPL-CCNC: 56 U/L (ref 45–120)
ALT SERPL W P-5'-P-CCNC: 15 U/L (ref 0–45)
ANION GAP SERPL CALCULATED.3IONS-SCNC: 12 MMOL/L (ref 5–18)
AST SERPL W P-5'-P-CCNC: 16 U/L (ref 0–40)
BILIRUB DIRECT SERPL-MCNC: 0.3 MG/DL
BILIRUB SERPL-MCNC: 1.1 MG/DL (ref 0–1)
BUN SERPL-MCNC: 16 MG/DL (ref 8–22)
CALCIUM SERPL-MCNC: 10.1 MG/DL (ref 8.5–10.5)
CHLORIDE BLD-SCNC: 107 MMOL/L (ref 98–107)
CO2 SERPL-SCNC: 24 MMOL/L (ref 22–31)
CREAT SERPL-MCNC: 0.76 MG/DL (ref 0.6–1.3)
ERYTHROCYTE [DISTWIDTH] IN BLOOD BY AUTOMATED COUNT: 14.4 % (ref 10–15)
GFR SERPL CREATININE-BSD FRML MDRD: 88 ML/MIN/1.73M2
GLUCOSE BLD-MCNC: 83 MG/DL (ref 70–125)
HCT VFR BLD AUTO: 48.6 % (ref 35–53)
HGB BLD-MCNC: 15.8 G/DL (ref 11.7–17.7)
MCH RBC QN AUTO: 30.2 PG (ref 26.5–33)
MCHC RBC AUTO-ENTMCNC: 32.5 G/DL (ref 31.5–36.5)
MCV RBC AUTO: 93 FL (ref 78–100)
PLATELET # BLD AUTO: 200 10E3/UL (ref 150–450)
POTASSIUM BLD-SCNC: 4 MMOL/L (ref 3.5–5)
PROT SERPL-MCNC: 6.5 G/DL (ref 6–8)
RBC # BLD AUTO: 5.24 10E6/UL (ref 3.8–5.9)
SODIUM SERPL-SCNC: 143 MMOL/L (ref 136–145)
TACROLIMUS BLD-MCNC: 6 UG/L (ref 5–15)
TME LAST DOSE: NORMAL H
TME LAST DOSE: NORMAL H
WBC # BLD AUTO: 5.6 10E3/UL (ref 4–11)

## 2022-04-12 PROCEDURE — 80053 COMPREHEN METABOLIC PANEL: CPT

## 2022-04-12 PROCEDURE — 88175 CYTOPATH C/V AUTO FLUID REDO: CPT | Performed by: OBSTETRICS & GYNECOLOGY

## 2022-04-12 PROCEDURE — G0463 HOSPITAL OUTPT CLINIC VISIT: HCPCS

## 2022-04-12 PROCEDURE — 87624 HPV HI-RISK TYP POOLED RSLT: CPT | Performed by: OBSTETRICS & GYNECOLOGY

## 2022-04-12 PROCEDURE — 80197 ASSAY OF TACROLIMUS: CPT

## 2022-04-12 PROCEDURE — 99213 OFFICE O/P EST LOW 20 MIN: CPT | Performed by: OBSTETRICS & GYNECOLOGY

## 2022-04-12 PROCEDURE — 36415 COLL VENOUS BLD VENIPUNCTURE: CPT

## 2022-04-12 PROCEDURE — 82248 BILIRUBIN DIRECT: CPT

## 2022-04-12 PROCEDURE — 85027 COMPLETE CBC AUTOMATED: CPT

## 2022-04-12 NOTE — LETTER
"4/12/2022       RE: Belen Sharma  [address] [place] [address]     Dear Colleague,    Thank you for referring your patient, Belen Sharma, to the [facility] [place] at [facility]. Please see a copy of my visit note below.    [facility] Visit    CC: Pap smear    S: 62 year old here for repeat pap smear. Has history of JANNETTE 3 s/p CKC. Last pap NIL with +HPV, colposcopy with normal biopsies. Doing well. Continues to struggle with prolapse, using pessary.     O: BP 98/59   Pulse 80   Ht 1.753 m (5' 9\")   Wt 70.3 kg (155 lb)   BMI 22.89 kg/m    General: No distress  Pelvic Exam:  Vulva: No external lesions, normal hair distribution, normal architecture, enlarged genital hiatus  Vagina: Moist, pink, no abnormal discharge, no lesions  Cervix: smooth, pink, no visible lesions  Uterus: Normal size, anteverted, non-tender, mobile  Adnexa: Non-tender, no masses    A:62 year old with history of JANNETTE 3    P: Repeat pap and HPV today  Will mychart with results      Daysi Perez MD FACOG    "

## 2022-04-14 LAB
BKR LAB AP GYN ADEQUACY: NORMAL
BKR LAB AP GYN INTERPRETATION: NORMAL
BKR LAB AP HPV REFLEX: NORMAL
BKR LAB AP PREVIOUS ABNL DX: NORMAL
BKR LAB AP PREVIOUS ABNORMAL: NORMAL
PATH REPORT.COMMENTS IMP SPEC: NORMAL
PATH REPORT.COMMENTS IMP SPEC: NORMAL
PATH REPORT.RELEVANT HX SPEC: NORMAL

## 2022-04-14 NOTE — PROGRESS NOTES
"[facility] Visit    CC: Pap smear    S: 62 year old here for repeat pap smear. Has history of JANNETTE 3 s/p CKC. Last pap NIL with +HPV, colposcopy with normal biopsies. Doing well. Continues to struggle with prolapse, using pessary.     O: BP 98/59   Pulse 80   Ht 1.753 m (5' 9\")   Wt 70.3 kg (155 lb)   BMI 22.89 kg/m    General: No distress  Pelvic Exam:  Vulva: No external lesions, normal hair distribution, normal architecture, enlarged genital hiatus  Vagina: Moist, pink, no abnormal discharge, no lesions  Cervix: smooth, pink, no visible lesions  Uterus: Normal size, anteverted, non-tender, mobile  Adnexa: Non-tender, no masses    A:62 year old with history of JANNETTE 3    P: Repeat pap and HPV today  Will mychart with results      Daysi Perez MD FACOG  "

## 2022-04-15 ENCOUNTER — ANCILLARY PROCEDURE (OUTPATIENT)
Dept: BONE DENSITY | Facility: CLINIC | Age: 62
End: 2022-04-15
Attending: INTERNAL MEDICINE
Payer: MEDICARE

## 2022-04-15 DIAGNOSIS — M54.50 LUMBAR PAIN: ICD-10-CM

## 2022-04-15 DIAGNOSIS — Z94.0 KIDNEY TRANSPLANTED: ICD-10-CM

## 2022-04-15 DIAGNOSIS — Z79.818 LONG TERM (CURRENT) USE OF OTHER AGENTS AFFECTING ESTROGEN RECEPTORS AND ESTROGEN LEVELS: ICD-10-CM

## 2022-04-15 DIAGNOSIS — M41.9 SCOLIOSIS DEFORMITY OF SPINE: ICD-10-CM

## 2022-04-15 DIAGNOSIS — Z94.4 S/P LIVER TRANSPLANT (H): ICD-10-CM

## 2022-04-15 DIAGNOSIS — N25.0 RENAL BONE DISEASE: ICD-10-CM

## 2022-04-18 LAB
HUMAN PAPILLOMA VIRUS 16 DNA: NEGATIVE
HUMAN PAPILLOMA VIRUS 18 DNA: NEGATIVE
HUMAN PAPILLOMA VIRUS FINAL DIAGNOSIS: ABNORMAL
HUMAN PAPILLOMA VIRUS OTHER HR: POSITIVE

## 2022-04-20 ENCOUNTER — TELEPHONE (OUTPATIENT)
Dept: TRANSPLANT | Facility: CLINIC | Age: 62
End: 2022-04-20
Payer: MEDICARE

## 2022-04-20 DIAGNOSIS — Z94.0 KIDNEY TRANSPLANTED: Primary | ICD-10-CM

## 2022-04-20 NOTE — TELEPHONE ENCOUNTER
Saran Angulo MD Lavelle Peterson, Meghan M, RN  She has osteopenia. Follow up with PCP     Call placed to follow up on DEXA scan. Belen does have several questions about plan for calcium, vit D and how possible meds she would need to treat osteopenia.    Dr. Angulo requesting a virtual visit for follow up. Orders placed. MyChart sent to Belen.

## 2022-04-27 NOTE — PROGRESS NOTES
Belen is a 62 year old who is being evaluated via a billable video visit.      How would you like to obtain your AVS? Mail a copy  If the video visit is dropped, the invitation should be resent by: Send to e-mail at: [email or web address]  Will anyone else be joining your video visit? No    Video Start Time: 3:16 PM  Video-Visit Details    Type of service:  Video Visit    Video End Time:3:51 PM    Originating Location (pt. Location): Home    Distant Location (provider location):  [facility] [place]     Platform used for Video Visit: [employer]        TRANSPLANT NEPHROLOGY CHRONIC POST TRANSPLANT VISIT    Assessment & Plan   # DDKT: Stable   - Baseline Creatinine:  ~ 0.7-0.9   - Proteinuria: Minimal (0.2-0.5 grams)   - Date DSA Last Checked: Not Known      Latest DSA: Not checked recently due to time from transplant   - BK Viremia: No   - Kidney Tx Biopsy: No    # Liver Tx:    -Good allograft function. Follows with transplant hepatology. Elevated TBili to 1.1 on recent labs. I wrote a message to Dr. Leventhal.     # Immunosuppression: Tacrolimus immediate release (goal 4-6), Mycophenolic acid (dose 540 mg every 12 hours) and Prednisone (dose 5 mg daily)   - Continue with intensive monitoring of immunosuppression for efficacy and toxicity.   - Changes: Not at this time    # Infection Prophylaxis:   - PJP: Sulfa/TMP (Bactrim) MWF    # Recurrent UTI : have had ESBL in June and Dec 2020 s/p meropenam therapy. Not on prophylaxis. Plan to treat only if symptomatic per txp ID.    -Developed symptomatic UTI and fever 11/4/2021 w/ Klebsiella, treated with LVQ    # PKD:   -Seen by txp surgery. Risks outweigh benefits for native nephrectomy    # Hypertension: Controlled;  Goal BP: < 130/80   - Changes: Not at this time.     # Post-Transplant Erythrocytosis: Hgb: Stable;  On ACEI/ARB: No   Imaging: Not at this time    # Mineral Bone Disorder:   - Secondary renal hyperparathyroidism; PTH level: Normal (18-80  pg/ml)        On treatment: None  - Vitamin D; level: High        On supplement: No  - Calcium; level: High normal        On supplement: No  - Phosphorus; level: Normal        On supplement: No     # Electrolytes:   - Potassium; level: Normal        On supplement: No  - Magnesium; level: Not checked recently        On supplement: No  - Bicarbonate; level: Normal        On supplement: No    # HSIL:    -per cone biopsy on 9/30/20. No evidence of dysplasia. Had colposcopy 9/2021 with +HPV. Colpo negative. Last PAP 4/12/22 negative but with positive HPV, per notes plan for colposcopy    # Microvascular angina:   -Follows with cardiology, Dr. Mohamud.    -She refuses statin   -Uses SL angina a few times per year    # Fatigue:   -She has felt more tired int he past few months. She denies adenopathy, unintentional weight loss, night sweats.    -She states that she snores   -She states that she is under a lot of stress and I said to her that I would recommend seeing a counselor and a psychiatrist     # Cystocele:              -Has pessary. Follows with OB GYN    # Skin Cancer Risk:    - Discussed sun protection and recommend regular follow up with Dermatology.    # Medical Compliance: Yes    # COVID-19 Virus Review: Discussed COVID-19 virus and the potential medical risks.  Reviewed preventative health recommendations, including wearing a mask where appropriate.  Recommended COVID vaccination should be up to date with either an initial vaccination or booster shot when appropriate.  Asked the patient to inform the transplant center if they are exposed or diagnosed with this virus.    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    Return visit: Return in about 6 months (around 10/28/2022).    Saran Angulo MD    Chief Complaint   Ms. Sharma is a 62 year old here for routine follow up, kidney transplant and immunosuppression management.    History of Present Illness   The patient overall feels well. Belen denies any recent hospitalizations. Belen denies nausea, vomiting, diarrhea, fever, chills, shortness of breath, chest pain, LE edema, unintentional weight loss, nights sweats, dysuria, hematuria.       Home BP: occasionally low in 90s systolic    Problem List   Patient Active Problem List   Diagnosis     S/P splenectomy     Congenital hepatic fibrosis     S/P kidney transplant     Polycystic kidney disease     Immunosuppression (H)     S/P liver transplant (H)     Endometriosis     HPV (human papilloma virus) infection     High grade squamous intraepithelial lesion of cervix     HSIL (high grade squamous intraepithelial lesion) on Pap smear of cervix     Abnormal urine     Acquired bilateral hammer toes     Anemia     BK virus nephropathy     Candidiasis of vagina     Cervical high risk human papillomavirus (HPV) DNA test positive     Chicken pox     Chronic renal insufficiency     Congenital polycystic kidney     Cystitis     Depression     Depressive psychosis (H)     Electrolyte abnormality     Exercise counseling     Dermatophytosis of nail     Encounter for counseling     Encounter for screening for malignant neoplasm of breast     Encounter for medication refill     End stage renal disease (H)     Hallux valgus, acquired, bilateral     Hypertension     Hyperthyroidism     Incisional hernia following transplant     Kidney transplant rejection     Knee pain     Malignant neoplasm of breast (H)     Microvascular angina (H)     Polycythemia      Postmenopausal state     Prophylactic antibiotic     Renal bone disease     Renal hypertension     Scarlet fever     Scoliosis deformity of spine     Secondary physiologic amenorrhea     Sinusitis     Swelling of first metatarsophalangeal (MTP) joint     Thrombocytopenia (H)     Uterine prolapse     Xerosis of skin     Encounter for aftercare following kidney transplant     History of liver transplant (H)     History of kidney transplant     Kidney replaced by transplant     Complicated urinary tract infection     Immunosuppressed status (H)     Lumbar pain     ESBL (extended spectrum beta-lactamase) producing bacteria infection       Allergies   Allergies   Allergen Reactions     Ibuprofen      Due to liver transplant       Medications   Current Outpatient Medications   Medication Sig     acetaminophen (TYLENOL) 325 MG tablet Take 1 tablet (325 mg) by mouth every 4 hours as needed for mild pain or fever     ALPRAZolam (XANAX) 0.25 MG tablet Take 1 tablet (0.25 mg) by mouth as needed for anxiety     aspirin (ASA) 81 MG chewable tablet Take 81 mg by mouth every evening Stopped 7 days preop     biotin 1000 MCG TABS tablet Take 3,000 mcg by mouth daily     ciclopirox (PENLAC) 8 % external solution Apply to adjacent skin and affected nails daily.  Remove with alcohol every 7 days, then repeat.     diphenhydrAMINE (BENADRYL) 25 MG tablet Take 50 mg by mouth as needed for allergies      docusate sodium (COLACE) 100 MG capsule Take 200 mg by mouth every morning      estradiol (ESTRACE) 0.1 MG/GM vaginal cream Place 1 g vaginally twice a week     guaiFENesin (MUCINEX PO) Take 1 tablet by mouth 2 times daily as needed     levofloxacin (LEVAQUIN) 250 MG tablet Take 1 tablet (250 mg) by mouth daily     loratadine (CLARITIN) 10 MG tablet Take 10 mg by mouth every evening      Multiple Vitamins-Minerals (WOMENS DAILY FORMULA PO) Take 1 tablet by mouth daily     MYFORTIC (BRAND) 180 MG EC tablet Take 3 tablets (540 mg) by mouth 2  times daily     nitroGLYcerin (NITROSTAT) 0.4 MG sublingual tablet Place 1 tablet (0.4 mg) under the tongue every 5 minutes as needed for chest pain For chest pain place 1 tablet under the tongue every 5 minutes for 3 doses. If symptoms persist 5 minutes after 1st dose call 911.     ondansetron (ZOFRAN) 4 MG tablet Take by mouth as needed for nausea      pantoprazole (PROTONIX) 40 MG EC tablet Take 1 tablet by mouth as needed (reflux)      predniSONE (DELTASONE) 5 MG tablet Take 1 tablet (5 mg) by mouth daily     Probiotic Product (PROBIOTIC PO) Take 2 tablets by mouth every morning      Psyllium (METAMUCIL PO) Take by mouth every morning Hold 9-30-20     sulfamethoxazole-trimethoprim (BACTRIM) 400-80 MG tablet Take 1 tablet by mouth Every Mon, Wed, Fri Morning     tacrolimus (GENERIC EQUIVALENT) 1 MG capsule Take 2 capsules (2 mg) by mouth 2 times daily     temazepam (RESTORIL) 15 MG capsule Take 1 capsule (15 mg) by mouth as needed for sleep (twice a year)     terconazole (TERAZOL 3) 0.8 % vaginal cream Place 1 applicator (5 g) vaginally At Bedtime     No current facility-administered medications for this visit.     There are no discontinued medications.    Physical Exam   Vital Signs: There were no vitals taken for this visit.    GENERAL APPEARANCE: alert and no distress  HENT: no obvious abnormalities on appearance  RESP: breathing appears unremarkable with normal rate, no audible wheezing or cough and no apparent shortness of breath with conversation  MS: extremities normal - no gross deformities noted  SKIN: no apparent rash and normal skin tone  NEURO: speech is clear with no obvious neurological deficits  PSYCH: mentation appears normal and affect normal      Data     Renal Latest Ref Rng & Units 4/12/2022 2/10/2022 12/3/2021   Na 136 - 145 mmol/L 143 141 141   K 3.5 - 5.0 mmol/L 4.0 3.8 3.9   Cl 98 - 107 mmol/L 107 108(H) 107   CO2 22 - 31 mmol/L 24 22 24   BUN 8 - 22 mg/dL 16 16 15   Cr 0.60 - 1.30 mg/dL  0.76 0.75 0.76   Glucose 70 - 125 mg/dL 83 87 81   Ca  8.5 - 10.5 mg/dL 10.1 10.2 10.3     Bone Health Latest Ref Rng & Units 2/10/2022 9/23/2020   Phos 2.5 - 4.5 mg/dL - 3.4   PTHi 10 - 86 pg/mL 66 45   Vit D Def 30 - 80 ug/L 57 -     Heme Latest Ref Rng & Units 4/12/2022 2/10/2022 12/3/2021   WBC 4.0 - 11.0 10e3/uL 5.6 5.9 5.6   Hgb 11.7 - 17.7 g/dL 15.8 16.0(H) 15.2   Plt 150 - 450 10e3/uL 200 204 210   ABSOLUTE NEUTROPHIL 1.6 - 8.3 10e9/L - - -   ABSOLUTE LYMPHOCYTES 0.8 - 5.3 10e9/L - - -   ABSOLUTE MONOCYTES 0.0 - 1.3 10e9/L - - -   ABSOLUTE EOSINOPHILS 0.0 - 0.7 10e9/L - - -   ABSOLUTE BASOPHILS 0.0 - 0.2 10e9/L - - -   ABS IMMATURE GRANULOCYTES 0 - 0.4 10e9/L - - -   ABSOLUTE NUCLEATED RBC - - - -     Liver Latest Ref Rng & Units 4/12/2022 2/10/2022 11/4/2021   AP 45 - 120 U/L 56 60 81   TBili 0.0 - 1.0 mg/dL 1.1(H) 0.6 0.6   DBili <=0.5 mg/dL 0.3 0.2 -   ALT 0 - 45 U/L 15 13 18   AST 0 - 40 U/L 16 17 12   Tot Protein 6.0 - 8.0 g/dL 6.5 6.5 7.3   Albumin 3.5 - 5.0 g/dL 3.8 3.9 3.1(L)           [facility] Txp Virology Latest Ref Rng & Units 1/4/2021 12/18/2020 9/23/2020   CVM DNA Quant - - Plasma Plasma   CMV QUANT IU/ML CMVND:CMV DNA Not Detected [IU]/mL - CMV DNA Not Detected CMV DNA Not Detected   LOG IU/ML OF CMVQNT <2.1 [Log:IU]/mL - Not Calculated Not Calculated   BK Spec - - - Plasma   BK Res BKNEG:BK Virus DNA Not Detected copies/mL - - BK Virus DNA Not Detected   BK Log <2.7 Log copies/mL - - Not Calculated   EBV CAPSID ANTIBODY IGG 0.0 - 0.8 AI >8.0(H) - -        Recent Labs   Lab Test 12/03/21  0936 02/10/22  0802 04/12/22  0828   DOSTAC 12/2/2021 2/9/2022 4/11/2022   TACROL 6.3 7.1 6.0     Recent Labs   Lab Test 12/18/20  0611   DOSMPA 12/17 2051   MPACID 1.32   MPAG 23.7*

## 2022-04-28 ENCOUNTER — TELEPHONE (OUTPATIENT)
Dept: CARDIOLOGY | Facility: CLINIC | Age: 62
End: 2022-04-28

## 2022-04-28 ENCOUNTER — VIRTUAL VISIT (OUTPATIENT)
Dept: NEPHROLOGY | Facility: CLINIC | Age: 62
End: 2022-04-28
Attending: INTERNAL MEDICINE
Payer: MEDICARE

## 2022-04-28 DIAGNOSIS — D84.9 IMMUNOSUPPRESSION (H): ICD-10-CM

## 2022-04-28 DIAGNOSIS — N39.0 RECURRENT UTI: ICD-10-CM

## 2022-04-28 DIAGNOSIS — Z94.0 KIDNEY REPLACED BY TRANSPLANT: Primary | ICD-10-CM

## 2022-04-28 DIAGNOSIS — Q61.3 PKD (POLYCYSTIC KIDNEY DISEASE): ICD-10-CM

## 2022-04-28 DIAGNOSIS — M85.89 OSTEOPENIA OF MULTIPLE SITES: ICD-10-CM

## 2022-04-28 PROBLEM — N91.1 SECONDARY PHYSIOLOGIC AMENORRHEA: Status: RESOLVED | Noted: 2020-09-21 | Resolved: 2022-04-28

## 2022-04-28 PROBLEM — T86.11 KIDNEY TRANSPLANT REJECTION: Status: RESOLVED | Noted: 2020-09-21 | Resolved: 2022-04-28

## 2022-04-28 PROBLEM — Z71.9 ENCOUNTER FOR COUNSELING: Status: RESOLVED | Noted: 2020-09-21 | Resolved: 2022-04-28

## 2022-04-28 PROBLEM — Z79.2 PROPHYLACTIC ANTIBIOTIC: Status: RESOLVED | Noted: 2019-11-02 | Resolved: 2022-04-28

## 2022-04-28 PROBLEM — Z12.39 ENCOUNTER FOR SCREENING FOR MALIGNANT NEOPLASM OF BREAST: Status: RESOLVED | Noted: 2020-09-21 | Resolved: 2022-04-28

## 2022-04-28 PROBLEM — Z76.0 ENCOUNTER FOR MEDICATION REFILL: Status: RESOLVED | Noted: 2020-09-21 | Resolved: 2022-04-28

## 2022-04-28 PROBLEM — R87.613 HIGH GRADE SQUAMOUS INTRAEPITHELIAL LESION OF CERVIX: Status: RESOLVED | Noted: 2020-09-11 | Resolved: 2022-04-28

## 2022-04-28 PROBLEM — B33.8 BK VIRUS NEPHROPATHY: Status: RESOLVED | Noted: 2019-11-02 | Resolved: 2022-04-28

## 2022-04-28 PROBLEM — D75.1 POLYCYTHEMIA: Status: RESOLVED | Noted: 2020-06-09 | Resolved: 2022-04-28

## 2022-04-28 PROBLEM — Z48.22 ENCOUNTER FOR AFTERCARE FOLLOWING KIDNEY TRANSPLANT: Status: RESOLVED | Noted: 2020-06-08 | Resolved: 2022-04-28

## 2022-04-28 PROBLEM — I12.9 RENAL HYPERTENSION: Status: RESOLVED | Noted: 2019-11-02 | Resolved: 2022-04-28

## 2022-04-28 PROBLEM — R82.90 ABNORMAL URINE: Status: RESOLVED | Noted: 2020-06-08 | Resolved: 2022-04-28

## 2022-04-28 PROBLEM — E87.8 ELECTROLYTE ABNORMALITY: Status: RESOLVED | Noted: 2019-11-02 | Resolved: 2022-04-28

## 2022-04-28 PROBLEM — N25.81 SECONDARY HYPERPARATHYROIDISM (H): Status: ACTIVE | Noted: 2019-11-02

## 2022-04-28 PROBLEM — N05.8 BK VIRUS NEPHROPATHY: Status: RESOLVED | Noted: 2019-11-02 | Resolved: 2022-04-28

## 2022-04-28 PROCEDURE — 99214 OFFICE O/P EST MOD 30 MIN: CPT | Mod: 95 | Performed by: INTERNAL MEDICINE

## 2022-04-28 PROCEDURE — G0463 HOSPITAL OUTPT CLINIC VISIT: HCPCS | Mod: PN,RTG | Performed by: INTERNAL MEDICINE

## 2022-04-28 NOTE — LETTER
4/28/2022       RE: Belen Sharma  [address] [place] [address]     Dear Colleague,    Thank you for referring your patient, Belen Sharma, to the [facility] [place] at [facility]. Please see a copy of my visit note below.    Belen is a 62 year old who is being evaluated via a billable video visit.      How would you like to obtain your AVS? Mail a copy  If the video visit is dropped, the invitation should be resent by: Send to e-mail at: [email or web address]  Will anyone else be joining your video visit? No    Video Start Time: 3:16 PM  Video-Visit Details    Type of service:  Video Visit    Video End Time:3:51 PM    Originating Location (pt. Location): Home    Distant Location (provider location):  [facility] [place]     Platform used for Video Visit: [employer]        TRANSPLANT NEPHROLOGY CHRONIC POST TRANSPLANT VISIT    Assessment & Plan   # DDKT: Stable   - Baseline Creatinine:  ~ 0.7-0.9   - Proteinuria: Minimal (0.2-0.5 grams)   - Date DSA Last Checked: Not Known      Latest DSA: Not checked recently due to time from transplant   - BK Viremia: No   - Kidney Tx Biopsy: No    # Liver Tx:    -Good allograft function. Follows with transplant hepatology. Elevated TBili to 1.1 on recent labs. I wrote a message to Dr. Leventhal.     # Immunosuppression: Tacrolimus immediate release (goal 4-6), Mycophenolic acid (dose 540 mg every 12 hours) and Prednisone (dose 5 mg daily)   - Continue with intensive monitoring of immunosuppression for efficacy and toxicity.   - Changes: Not at this time    # Infection Prophylaxis:   - PJP: Sulfa/TMP (Bactrim) MWF    # Recurrent UTI : have had ESBL in June and Dec 2020 s/p meropenam therapy. Not on prophylaxis. Plan to treat only if symptomatic per txp ID.    -Developed symptomatic UTI and fever 11/4/2021 w/ Klebsiella, treated with LVQ    # PKD:   -Seen by txp surgery. Risks outweigh  benefits for native nephrectomy    # Hypertension: Controlled;  Goal BP: < 130/80   - Changes: Not at this time.     # Post-Transplant Erythrocytosis: Hgb: Stable;  On ACEI/ARB: No   Imaging: Not at this time    # Mineral Bone Disorder:   - Secondary renal hyperparathyroidism; PTH level: Normal (18-80 pg/ml)        On treatment: None  - Vitamin D; level: High        On supplement: No  - Calcium; level: High normal        On supplement: No  - Phosphorus; level: Normal        On supplement: No     # Electrolytes:   - Potassium; level: Normal        On supplement: No  - Magnesium; level: Not checked recently        On supplement: No  - Bicarbonate; level: Normal        On supplement: No    # HSIL:    -per cone biopsy on 9/30/20. No evidence of dysplasia. Had colposcopy 9/2021 with +HPV. Colpo negative. Last PAP 4/12/22 negative but with positive HPV, per notes plan for colposcopy    # Microvascular angina:   -Follows with cardiology, Dr. Mohamud.    -She refuses statin   -Uses SL angina a few times per year    # Fatigue:   -She has felt more tired int he past few months. She denies adenopathy, unintentional weight loss, night sweats.    -She states that she snores   -She states that she is under a lot of stress and I said to her that I would recommend seeing a counselor and a psychiatrist     # Cystocele:              -Has pessary. Follows with OB GYN    # Skin Cancer Risk:    - Discussed sun protection and recommend regular follow up with Dermatology.    # Medical Compliance: Yes    # COVID-19 Virus Review: Discussed COVID-19 virus and the potential medical risks.  Reviewed preventative health recommendations, including wearing a mask where appropriate.  Recommended COVID vaccination should be up to date with either an initial vaccination or booster shot when appropriate.  Asked the patient to inform the transplant center if they are exposed or diagnosed with this virus.    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    Return visit: Return in about 6 months (around 10/28/2022).    Saran Angulo MD    Chief Complaint   Ms. Sharma is a 62 year old here for routine follow up, kidney transplant and immunosuppression management.    History of Present Illness   The patient overall feels well. Belen denies any recent hospitalizations. Belen denies nausea, vomiting, diarrhea, fever, chills, shortness of breath, chest pain, LE edema, unintentional weight loss, nights sweats, dysuria, hematuria.       Home BP: occasionally low in 90s systolic    Problem List   Patient Active Problem List   Diagnosis     S/P splenectomy     Congenital hepatic fibrosis     S/P kidney transplant     Polycystic kidney disease     Immunosuppression (H)     S/P liver transplant (H)     Endometriosis     HPV (human papilloma virus) infection     High grade squamous intraepithelial lesion of cervix     HSIL (high grade squamous intraepithelial lesion) on Pap smear of cervix     Abnormal urine     Acquired bilateral hammer toes     Anemia     BK virus nephropathy     Candidiasis of vagina     Cervical high risk human papillomavirus (HPV) DNA test positive     Chicken pox     Chronic renal insufficiency     Congenital polycystic kidney     Cystitis     Depression     Depressive psychosis (H)     Electrolyte abnormality     Exercise counseling     Dermatophytosis of nail     Encounter for counseling     Encounter for screening for malignant neoplasm of breast     Encounter for medication  refill     End stage renal disease (H)     Hallux valgus, acquired, bilateral     Hypertension     Hyperthyroidism     Incisional hernia following transplant     Kidney transplant rejection     Knee pain     Malignant neoplasm of breast (H)     Microvascular angina (H)     Polycythemia     Postmenopausal state     Prophylactic antibiotic     Renal bone disease     Renal hypertension     Scarlet fever     Scoliosis deformity of spine     Secondary physiologic amenorrhea     Sinusitis     Swelling of first metatarsophalangeal (MTP) joint     Thrombocytopenia (H)     Uterine prolapse     Xerosis of skin     Encounter for aftercare following kidney transplant     History of liver transplant (H)     History of kidney transplant     Kidney replaced by transplant     Complicated urinary tract infection     Immunosuppressed status (H)     Lumbar pain     ESBL (extended spectrum beta-lactamase) producing bacteria infection       Allergies   Allergies   Allergen Reactions     Ibuprofen      Due to liver transplant       Medications   Current Outpatient Medications   Medication Sig     acetaminophen (TYLENOL) 325 MG tablet Take 1 tablet (325 mg) by mouth every 4 hours as needed for mild pain or fever     ALPRAZolam (XANAX) 0.25 MG tablet Take 1 tablet (0.25 mg) by mouth as needed for anxiety     aspirin (ASA) 81 MG chewable tablet Take 81 mg by mouth every evening Stopped 7 days preop     biotin 1000 MCG TABS tablet Take 3,000 mcg by mouth daily     ciclopirox (PENLAC) 8 % external solution Apply to adjacent skin and affected nails daily.  Remove with alcohol every 7 days, then repeat.     diphenhydrAMINE (BENADRYL) 25 MG tablet Take 50 mg by mouth as needed for allergies      docusate sodium (COLACE) 100 MG capsule Take 200 mg by mouth every morning      estradiol (ESTRACE) 0.1 MG/GM vaginal cream Place 1 g vaginally twice a week     guaiFENesin (MUCINEX PO) Take 1 tablet by mouth 2 times daily as needed     levofloxacin  (LEVAQUIN) 250 MG tablet Take 1 tablet (250 mg) by mouth daily     loratadine (CLARITIN) 10 MG tablet Take 10 mg by mouth every evening      Multiple Vitamins-Minerals (WOMENS DAILY FORMULA PO) Take 1 tablet by mouth daily     MYFORTIC (BRAND) 180 MG EC tablet Take 3 tablets (540 mg) by mouth 2 times daily     nitroGLYcerin (NITROSTAT) 0.4 MG sublingual tablet Place 1 tablet (0.4 mg) under the tongue every 5 minutes as needed for chest pain For chest pain place 1 tablet under the tongue every 5 minutes for 3 doses. If symptoms persist 5 minutes after 1st dose call 911.     ondansetron (ZOFRAN) 4 MG tablet Take by mouth as needed for nausea      pantoprazole (PROTONIX) 40 MG EC tablet Take 1 tablet by mouth as needed (reflux)      predniSONE (DELTASONE) 5 MG tablet Take 1 tablet (5 mg) by mouth daily     Probiotic Product (PROBIOTIC PO) Take 2 tablets by mouth every morning      Psyllium (METAMUCIL PO) Take by mouth every morning Hold 9-30-20     sulfamethoxazole-trimethoprim (BACTRIM) 400-80 MG tablet Take 1 tablet by mouth Every Mon, Wed, Fri Morning     tacrolimus (GENERIC EQUIVALENT) 1 MG capsule Take 2 capsules (2 mg) by mouth 2 times daily     temazepam (RESTORIL) 15 MG capsule Take 1 capsule (15 mg) by mouth as needed for sleep (twice a year)     terconazole (TERAZOL 3) 0.8 % vaginal cream Place 1 applicator (5 g) vaginally At Bedtime     No current facility-administered medications for this visit.     There are no discontinued medications.    Physical Exam   Vital Signs: There were no vitals taken for this visit.    GENERAL APPEARANCE: alert and no distress  HENT: no obvious abnormalities on appearance  RESP: breathing appears unremarkable with normal rate, no audible wheezing or cough and no apparent shortness of breath with conversation  MS: extremities normal - no gross deformities noted  SKIN: no apparent rash and normal skin tone  NEURO: speech is clear with no obvious neurological deficits  PSYCH:  mentation appears normal and affect normal      Data     Renal Latest Ref Rng & Units 4/12/2022 2/10/2022 12/3/2021   Na 136 - 145 mmol/L 143 141 141   K 3.5 - 5.0 mmol/L 4.0 3.8 3.9   Cl 98 - 107 mmol/L 107 108(H) 107   CO2 22 - 31 mmol/L 24 22 24   BUN 8 - 22 mg/dL 16 16 15   Cr 0.60 - 1.30 mg/dL 0.76 0.75 0.76   Glucose 70 - 125 mg/dL 83 87 81   Ca  8.5 - 10.5 mg/dL 10.1 10.2 10.3     Bone Health Latest Ref Rng & Units 2/10/2022 9/23/2020   Phos 2.5 - 4.5 mg/dL - 3.4   PTHi 10 - 86 pg/mL 66 45   Vit D Def 30 - 80 ug/L 57 -     Heme Latest Ref Rng & Units 4/12/2022 2/10/2022 12/3/2021   WBC 4.0 - 11.0 10e3/uL 5.6 5.9 5.6   Hgb 11.7 - 17.7 g/dL 15.8 16.0(H) 15.2   Plt 150 - 450 10e3/uL 200 204 210   ABSOLUTE NEUTROPHIL 1.6 - 8.3 10e9/L - - -   ABSOLUTE LYMPHOCYTES 0.8 - 5.3 10e9/L - - -   ABSOLUTE MONOCYTES 0.0 - 1.3 10e9/L - - -   ABSOLUTE EOSINOPHILS 0.0 - 0.7 10e9/L - - -   ABSOLUTE BASOPHILS 0.0 - 0.2 10e9/L - - -   ABS IMMATURE GRANULOCYTES 0 - 0.4 10e9/L - - -   ABSOLUTE NUCLEATED RBC - - - -     Liver Latest Ref Rng & Units 4/12/2022 2/10/2022 11/4/2021   AP 45 - 120 U/L 56 60 81   TBili 0.0 - 1.0 mg/dL 1.1(H) 0.6 0.6   DBili <=0.5 mg/dL 0.3 0.2 -   ALT 0 - 45 U/L 15 13 18   AST 0 - 40 U/L 16 17 12   Tot Protein 6.0 - 8.0 g/dL 6.5 6.5 7.3   Albumin 3.5 - 5.0 g/dL 3.8 3.9 3.1(L)           [facility] Txp Virology Latest Ref Rng & Units 1/4/2021 12/18/2020 9/23/2020   CVM DNA Quant - - Plasma Plasma   CMV QUANT IU/ML CMVND:CMV DNA Not Detected [IU]/mL - CMV DNA Not Detected CMV DNA Not Detected   LOG IU/ML OF CMVQNT <2.1 [Log:IU]/mL - Not Calculated Not Calculated   BK Spec - - - Plasma   BK Res BKNEG:BK Virus DNA Not Detected copies/mL - - BK Virus DNA Not Detected   BK Log <2.7 Log copies/mL - - Not Calculated   EBV CAPSID ANTIBODY IGG 0.0 - 0.8 AI >8.0(H) - -        Recent Labs   Lab Test 12/03/21  0936 02/10/22  0802 04/12/22  0828   DOSTAC 12/2/2021 2/9/2022 4/11/2022   TACROL 6.3 7.1 6.0     Recent Labs   Lab  Test 12/18/20  0611   DOSMPA 12/17 2051   MPACID 1.32   MPAG 23.7*       Again, thank you for allowing me to participate in the care of your patient.      Sincerely,    Saran Angulo MD

## 2022-04-28 NOTE — TELEPHONE ENCOUNTER
[facility] Call Center    Phone Message    May a detailed message be left on voicemail: yes     Reason for Call: Other: Belen called wanting Dr. Mohamud's nurse to call her back to discuss her medications. Pt was hesitant to give more information. Please advise. Thank you.      Action Taken: Message routed to:  [facility] Cardio    Travel Screening: Not Applicable

## 2022-04-29 NOTE — TELEPHONE ENCOUNTER
Pt states she overall has not been feeling well. She states that her blood pressures have been higher then normal and that she has been having some headaches. Her BP is 120/70 and she states normally she is around 98/60's. I did reassure pt that this blood pressure is normal but she still has concerns as this is high for her.     Pt is requesting to see Dr. Mohamud sometime in June. This would be early for her follow up but she just feels that she needs to be seen.     I will get pt scheduled to see Dr. Mohamud on Monday June 6. We will get her labs prior to this as well. Pt reports understanding and denies further questions.     Mino Guthrie RN   Cardiology Nurse Coordinator

## 2022-05-02 ENCOUNTER — NURSE TRIAGE (OUTPATIENT)
Dept: CARDIOLOGY | Facility: CLINIC | Age: 62
End: 2022-05-02
Payer: MEDICARE

## 2022-05-02 DIAGNOSIS — I25.118 ATHEROSCLEROSIS OF NATIVE CORONARY ARTERY OF NATIVE HEART WITH STABLE ANGINA PECTORIS (H): Primary | ICD-10-CM

## 2022-05-02 NOTE — TELEPHONE ENCOUNTER
"Received a call from the call center to discuss increased blood pressure.  Patient has history of microvascular angina, HTN, kidney and liver transplant.  Spoke to Belen who says yesterday she started noticing high blood pressure. She says she also had an episode of chest pressure in the middle chest and a backache that lasted 3-5 minutes, while standing in her kitchen. She rated the pain #4. She denies any sweating or shortness of breath with this. She did not need to find her nitroglycerin because it had resolved. She says she was also experiencing some nausea, diarrhea, and a headache yesterday. Blood pressure yesterday was 142/73 HR 80; 118/60 HR 78.  Today she says she woke up and felt lightheaded and \"like she might fall over\", lasting a couple of minutes then resolved. Blood pressures today 134/70 HR 83; 138/66 HR 90. Blood pressure while on the call was 150/69 HR 87. She says she does have a headache currently and is going to take two tylenol. She denies any chest pain, back pain, lightheadedness, shortness of breath, or nausea at this time. She says she thinks she may be dehydrated so is drinking a lot of water. She mentioned eating salty foods lately.  She wants to discuss getting an appointment to be seen.  Advised a message will be sent to Dr. Mohamud's team for follow up.  Also advised, if blood pressure continues to rise or chest pain returns, it is recommended to go to ED for an evaluation. She verbalized agreement and understanding.  1. BLOOD PRESSURE: \"What is the blood pressure?\" \"Did you take at least two measurements 5 minutes apart?\" See above  2. ONSET: \"When did you take your blood pressure?\" Last BP on the call  3. HOW: \"How did you obtain the blood pressure?\" (e.g., visiting nurse, automatic home BP monitor) Husbands blood pressure monitor  4. HISTORY: \"Do you have a history of high blood pressure?\" Yes  5. MEDICATIONS: \"Are you taking any medications for blood pressure?\" \"Have you missed any " "doses recently?\" Non on file  6. OTHER SYMPTOMS: \"Do you have any symptoms?\" (e.g., headache, chest pain, blurred vision, difficulty breathing, weakness) headache, one episode of chest pain yesterday      Additional Information    Negative: Systolic BP >= 160 OR Diastolic >= 100, and any cardiac or neurologic symptoms (e.g., chest pain, difficulty breathing, unsteady gait, blurred vision)    Protocols used: HIGH BLOOD PRESSURE-A-OH      "

## 2022-05-03 ENCOUNTER — OFFICE VISIT (OUTPATIENT)
Dept: OBGYN | Facility: CLINIC | Age: 62
End: 2022-05-03
Attending: OBSTETRICS & GYNECOLOGY
Payer: MEDICARE

## 2022-05-03 VITALS
BODY MASS INDEX: 22.56 KG/M2 | HEIGHT: 69 IN | HEART RATE: 87 BPM | WEIGHT: 152.3 LBS | SYSTOLIC BLOOD PRESSURE: 112 MMHG | DIASTOLIC BLOOD PRESSURE: 67 MMHG

## 2022-05-03 DIAGNOSIS — R87.810 CERVICAL HIGH RISK HUMAN PAPILLOMAVIRUS (HPV) DNA TEST POSITIVE: Primary | ICD-10-CM

## 2022-05-03 PROCEDURE — 57456 ENDOCERV CURETTAGE W/SCOPE: CPT | Performed by: OBSTETRICS & GYNECOLOGY

## 2022-05-03 PROCEDURE — 88305 TISSUE EXAM BY PATHOLOGIST: CPT | Mod: TC | Performed by: OBSTETRICS & GYNECOLOGY

## 2022-05-03 PROCEDURE — G0463 HOSPITAL OUTPT CLINIC VISIT: HCPCS

## 2022-05-03 PROCEDURE — 57454 BX/CURETT OF CERVIX W/SCOPE: CPT | Performed by: OBSTETRICS & GYNECOLOGY

## 2022-05-03 NOTE — LETTER
"5/3/2022       RE: Belen Sharma  [address] [place] [address]     Dear Colleague,    Thank you for referring your patient, Belen Sharma, to the [facility] [place] at [facility]. Please see a copy of my visit note below.      Colposcopy Visit/Procedure Note:    Belen Sharma is an 62 year old, , who comes in for diagnosis of +HPV with history of JANNETTE 3 and immunosuppression.       Last   Lab Results   Component Value Date    HPV16 Negative 2022     Last   Lab Results   Component Value Date    HPV18 Negative 2022     Last   Lab Results   Component Value Date    HRHPV Positive 2022       Dates/results of previous cervical pathology:   2021- negative ECC  2020-  [facility]:  JANNETTE 3 negative margins    Dates of previous cervical pathology treatment: 2020 [facility]    History   Smoking Status     Never Smoker   Smokeless Tobacco     Never Used       Allergies as of 2022 - Reviewed 2022   Allergen Reaction Noted     Ibuprofen  2020        Colposcopy Procedure:    Consent:  Details of the colposcopic procedure were reviewed. Risks, benefits of treatment, and alternate forms of evaluation were discussed.  Patient's questions were elicited and answered.   Written consent was obtained and scanned into medical record.     Verification of Procedure  Just before the procedure begins, through verbal and active participation of team members, I verified:   Initials   Patient Name [facility]   Patient  [facility]   Procedure to be performed [facility]       OBJECTIVE: /67   Pulse 87   Ht 1.753 m (5' 9\")   Wt 69.1 kg (152 lb 4.8 oz)   BMI 22.49 kg/m      Pelvic Exam:  EG/BUS: Normal genital architecture without lesions, erythema or abnormal secretions. Bartholin's, Urethra, [place]'s glands are normal.   Vagina: moist, pink, rugae with creamy, white and odorlesssecretions  Cervix: no lesions and evidence of previous scarring, pinpoint " cervical os  Rectum:anus normal    PROCEDURE:      Acetic acid and/or Lugol's solution applied to cervix.  Colposcopic exam of the cervix and apex of the vagina was conducted in the usual fashion.     Findings:  SCJ was not seen entirely and the exam unsatisfactory.    There were no visible lesions    ECC: was obtained and placed in labeled Formalin Jar.    Assessment: Normal exam without visible pathology    Plan:     Biopsies sent to pathology.  Will contact patient with results via mychart and letter.   Plan for pap/colpo together in 6 months as HPV unlikely to clear.     Patient advised to contact clinic with heavy vaginal bleeding, fever over 101 degrees F, or any other concerns.    Verbalized understanding and agreement with plan.      Daysi Perez MD

## 2022-05-03 NOTE — PROGRESS NOTES
"  Colposcopy Visit/Procedure Note:    Belen Sharma is an 62 year old, , who comes in for diagnosis of +HPV with history of JANNETTE 3 and immunosuppression.       Last   Lab Results   Component Value Date    HPV16 Negative 2022     Last   Lab Results   Component Value Date    HPV18 Negative 2022     Last   Lab Results   Component Value Date    HRHPV Positive 2022       Dates/results of previous cervical pathology:   2021- negative ECC  2020-  [facility]:  JANNETTE 3 negative margins    Dates of previous cervical pathology treatment: 2020 [facility]    History   Smoking Status     Never Smoker   Smokeless Tobacco     Never Used       Allergies as of 2022 - Reviewed 2022   Allergen Reaction Noted     Ibuprofen  2020        Colposcopy Procedure:    Consent:  Details of the colposcopic procedure were reviewed. Risks, benefits of treatment, and alternate forms of evaluation were discussed.  Patient's questions were elicited and answered.   Written consent was obtained and scanned into medical record.     Verification of Procedure  Just before the procedure begins, through verbal and active participation of team members, I verified:   Initials   Patient Name [facility]   Patient  [facility]   Procedure to be performed [facility]       OBJECTIVE: /67   Pulse 87   Ht 1.753 m (5' 9\")   Wt 69.1 kg (152 lb 4.8 oz)   BMI 22.49 kg/m      Pelvic Exam:  EG/BUS: Normal genital architecture without lesions, erythema or abnormal secretions. Bartholin's, Urethra, [place]'s glands are normal.   Vagina: moist, pink, rugae with creamy, white and odorlesssecretions  Cervix: no lesions and evidence of previous scarring, pinpoint cervical os  Rectum:anus normal    PROCEDURE:      Acetic acid and/or Lugol's solution applied to cervix.  Colposcopic exam of the cervix and apex of the vagina was conducted in the usual fashion.     Findings:  SCJ was not seen entirely and the exam unsatisfactory.    There were no visible " lesions    ECC: was obtained and placed in labeled Formalin Jar.    Assessment: Normal exam without visible pathology    Plan:     Biopsies sent to pathology.  Will contact patient with results via mychart and letter.   Plan for pap/colpo together in 6 months as HPV unlikely to clear.     Patient advised to contact clinic with heavy vaginal bleeding, fever over 101 degrees F, or any other concerns.    Verbalized understanding and agreement with plan.      Dasyi Perez MD

## 2022-05-12 ENCOUNTER — NURSE TRIAGE (OUTPATIENT)
Dept: NURSING | Facility: CLINIC | Age: 62
End: 2022-05-12
Payer: MEDICARE

## 2022-05-12 NOTE — TELEPHONE ENCOUNTER
Transplant patient.  Wanting to coordinate getting her third covid 19 booster with her next anti-rejection infusions later this month.  Asking about spacing the booster with the infusions and the booster with her last booster 2/8/22.    I wasn't able to find the answer in CDC's site.  I made two suggestions; 1) call her transplant team and/or 2) call a pharmacy who is doing the covid boosters and speak to a pharmacist about the covid booster spacing.  I suggested too that she ask the pharmacist about how to space the infusions, she said there are two, with her booster for covid.  Caller stated understanding and agreement.    Ita FREITAS RN [facility]

## 2022-05-13 PROCEDURE — 88305 TISSUE EXAM BY PATHOLOGIST: CPT | Mod: 26 | Performed by: PATHOLOGY

## 2022-05-16 ENCOUNTER — INFUSION THERAPY VISIT (OUTPATIENT)
Dept: NURSING | Facility: CLINIC | Age: 62
End: 2022-05-16
Payer: MEDICARE

## 2022-05-16 DIAGNOSIS — Z29.89 PROPHYLACTIC IMMUNOTHERAPY: Primary | ICD-10-CM

## 2022-05-16 PROCEDURE — M0220 PR INJECTION TIXAGEVIMAB & CILGAVIMAB (EVUSHELD): HCPCS | Performed by: INTERNAL MEDICINE

## 2022-05-16 NOTE — PROGRESS NOTES
Evusheld Consent   Confirmed patient received the Emergency Use Authorization Fact Sheet for Patients, Parents and Caregivers prior to receiving medication. We discussed the following risks and benefits of receiving EVUSHELD, as well as alternative treatments and the patient wished to proceed with EVUSHELD.     Providers believe the benefits of Evusheld outweigh the risks for all moderately to severely immunocompromised patients.      Benefits:   Evusheld is a synthetic antibody that provides protection against COVID-19 for 6 months and is recommended for patients that have a weakened immune system that may not be able to make antibodies themselves.     Risks:    There is a very small risk of allergic reactions and you should notify us if you have any symptoms of an allergic reaction after the injection. There were also some extremely rare cardiac events reported in the initial trials in people that already had heart disease, but experts do not think these were caused by the medication. We will observe you for any chest pain or trouble breathing after the injections and you can reach out to your provider if any of these symptoms develop.     Alternatives:   Vaccines to prevent COVID-19 are approved or available under Emergency Use Authorization. Use of EVUSHELD does not replace vaccination against COVID-19. You can continue to mask and isolate to avoid infections. It is your choice to receive or not receive EVUSHELD. Should you decide not to receive EVUSHELD, it will not change your standard medical care.     Patient does consent to the injection.    EVUSHELD Administration Note:  Belen Sharma presents today for EVUSHELD.   present during visit today: Not Applicable.    Consent:   Informed Consent confirmed in chart, obtained on this date: 5/16/22    Post Injection Assessment:   Patient tolerated injection without incident.      Patient was observed in the room for a minimum of 10 minutes after injection  per standard, then remained in the buidling for a total 60 minute observation period.         Discharge Plan:    Patient discharged in stable condition accompanied by: self.     Zach Teran RN

## 2022-05-20 ENCOUNTER — TELEPHONE (OUTPATIENT)
Dept: TRANSPLANT | Facility: CLINIC | Age: 62
End: 2022-05-20
Payer: MEDICARE

## 2022-05-20 ENCOUNTER — TELEPHONE (OUTPATIENT)
Dept: INTERNAL MEDICINE | Facility: CLINIC | Age: 62
End: 2022-05-20
Payer: MEDICARE

## 2022-05-20 NOTE — TELEPHONE ENCOUNTER
Patient Call: General  Route to LPN    Reason for call: Patient called stated she started taking Medication for Covid on Monday 5/16/22 which was causing temps 101.2 she now has noticed the color of her urine has changed and would like a call back.    Call back needed? Yes    Return Call Needed  Same as documented in contacts section  When to return call?: Greater than one day: Route standard priority

## 2022-05-20 NOTE — TELEPHONE ENCOUNTER
Call returned to pt. States she is in [place] visiting family. Went in to ED there for UTI. Was given one time dose of abx, has f/u appt with PCP on Monday. No further questions at this time.

## 2022-05-20 NOTE — TELEPHONE ENCOUNTER
Called patient.  Was able to schedule patient with Dr. De La Garza on MOnday at 10:00 AM.   CareEverywhere updated today.  Patient was prescribed Alfuzosin.         Jose Eduardo Berg CMA ([facility]) at 4:19 PM on 5/20/2022

## 2022-05-20 NOTE — TELEPHONE ENCOUNTER
[facility] Call Center    Phone Message    May a detailed message be left on voicemail: yes     Reason for Call: Other: Patient calling stating she was in the ER today in [facility] and requesting an appointment on 5/23/2022 for a UTI, Patient states she is a transplant patient who needs to be seen as soon as possible. Writer offered next available but that was not soon enough. Please call to discuss thank you.  Patient is requesting an urgent message be sent to DR. De La Garza.     Action Taken: Message routed to:  [facility] pcc    Travel Screening: Not Applicable

## 2022-05-20 NOTE — TELEPHONE ENCOUNTER
[facility] Call Center    Phone Message    May a detailed message be left on voicemail: yes     Reason for Call: Other: The patient is being seen in a [facility] emergency in [place], [place] for UTI, this is a FYI please call to address any questions or concerns thank you.      Action Taken: Message routed to:  [facility] pcc    Travel Screening: Not Applicable

## 2022-05-21 NOTE — PROGRESS NOTES
HPI:    Last visit with us 11/4/2021. She is in [place] and had positive UA 5/20/2022, WBC 12.4 with ANC 10.2. Normal renal function (Cr 0.70) and normal liver tests. Note in Care Everywhere from 5/20/2022 [facility] [place] from UTI sxs. She had a negative Respiratory viral PCR panel 5/20/2022. CXR from 5/21/2022 no acute air-space disease (in Care Everywhere). She was given Fosfomycin for one dose. Overall she feels better. Less urinary sxs. No urgency, no fevers, no back or flank pain. She states UTI sxs about 2 times/year. She is drinking plenty of water. She is taking her other medications. No other HEENT, cardiopulmonary, abdominal, , GYN, neurological, systemic, psychiatric, lymphatic, endocrine, vascular complaints. She has a minor resolving cough.     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 Both, very large, native kidneys reamain.     PONV (postoperative nausea and vomiting)     Need to start with ice chips and apple juice, no soda     S/P kidney transplant     [facility] 10/9/2010 - kidney failure 2/2 [facility]. Explanted 2012. Re-transplant after de-sensitization 2016     S/P liver transplant (H)     10/9/2010 - [facility], ischemic biopathy. Re-transplant 3/3/2011     S/P splenectomy      Spider veins 2019     Thyroid disease     Hyperthyroid treated with single dose iodine     Past Surgical History:   Procedure Laterality Date     bunectomy  08/01/2018    right foot     bunionectomy  01/2019    left     CHOLECYSTECTOMY       CONIZATION N/A 09/30/2020    Procedure: CONE BIOPSY, CERVIX;  Surgeon: Daysi Perez MD;   Location: [facility]     FAILED PICC - RIGHT ARM Right 2020    Has a LUE AV fistula.     H STATISTIC PICC LINE INSERTION >5YR, FAILED Bilateral 2020    Left fistula, Right failed x 2 Occlussion     HERNIA REPAIR, INCISIONAL  2013     IR DIALYSIS PTA CENTRAL SEG  2020     IR PICC PLACEMENT > 5 YRS OF AGE  2020     SPLENECTOMY      Splenectomy     TRANSPLANT KIDNEY RECIPIENT  DONOR  2016    re-transplant after [facility]; 6 months de-sensitization prior AND 6 months after transplant     TRANSPLANT LIVER RECIPIENT  DONOR  2011     TRANSPLANT LIVER, KIDNEY RECIPIENT  DONOR, COMBINED  10/09/2010    [facility] - re-Tx liver 3/3/2011; Kidney (left iliac) lost to [facility]/fibrosis - explant     VASCULAR SURGERY      Vascular access left UE. Not used for 4 years since 2nd kidney tx 2016 [place]     PE:    Vitals noted, gen, nad, cooperative, alert, neck supple nl rom, lungs with good air movement, RRR, S1, S2, no MRG, abdomen, no acute findings. No back tenderness, no flank tenderness to palpation, Grossly normal neurological exam.     A/P:    1. Immunizations; COVID Pfizer vaccine x 4, Pneumococcal 23 done 2013. Tdap 2013. She has completed the Zoster Shingrix vaccine series.  Evusheld infusion 2022  2. Cardiology follow up with Dr. Mohamud 2022. Last visit 2021 for microvascular angina and non-obstructive CAD. She takes infrequent NTG  3. Mammogram scheduled for 2022  4. GI follow up with Dr. Leventhal 2022 for liver transplant for polycystic renal disease and congenital hepatic fibrosis.   5. GYN appt. With Dr. Perez 10/11/2022, Last visit 5/3/2022  6. Nephrology appt. With Dr. Angulo 2022; last visit for renal transplant 2022  7. Dermatology visit with Dr. Benitez 2022  8. She was treated with Fosfomycin for UTI and clinically doing better. She states in the past she does not routinely check her UA's rechecked after UTI. No culture  "done on recent UA? In [place].     30 minutes spent on the date of the encounter doing chart review, history and exam, documentation and further activities as noted above \"exclusive of procedures and other billable interpretations  "

## 2022-05-23 ENCOUNTER — OFFICE VISIT (OUTPATIENT)
Dept: INTERNAL MEDICINE | Facility: CLINIC | Age: 62
End: 2022-05-23
Payer: MEDICARE

## 2022-05-23 VITALS
SYSTOLIC BLOOD PRESSURE: 92 MMHG | BODY MASS INDEX: 22.45 KG/M2 | HEART RATE: 92 BPM | DIASTOLIC BLOOD PRESSURE: 63 MMHG | OXYGEN SATURATION: 96 % | WEIGHT: 152 LBS

## 2022-05-23 DIAGNOSIS — R82.90 ABNORMAL URINE: Primary | ICD-10-CM

## 2022-05-23 PROCEDURE — 99214 OFFICE O/P EST MOD 30 MIN: CPT | Performed by: INTERNAL MEDICINE

## 2022-06-04 NOTE — PROGRESS NOTES
Belen is a 62 year old who is being evaluated via a billable video visit.      How would you like to obtain your AVS? MyChart  If the video visit is dropped, the invitation should be resent by: Send to e-mail at: [email or web address]  Will anyone else be joining your video visit? No    -----------    I had the pleasure of participating in coordination of clinical cardiovascular care for patient, Belen Sharma, via  [employer]'s virtual visit protocol during the COVID-19 Pandemic.    History:  Ms. Sharma is a very pleasant 62 year old woman with microvascular angina and nonobstructive coronary disease.  She is a kidney and pancreas transplant recipient.  She is doing well without any problems with organ rejection.  She is on tacrolimus and Myfortic for her antirejection medications.    Kidney transplant x 2, 2010, 2014  Liver transplant x 2, 2010, 2011    She has had a few BP readings at home in the 150 mmHg range in the setting of COVID booster vaccination and then with cystitis. This seems to have normalized now. She denies prolonged episodes of chest pain, orthopnea or PND or syncope.     No recent hospitalization for HF/MI/stroke      Current Outpatient Medications   Medication Sig Dispense Refill     acetaminophen (TYLENOL) 325 MG tablet Take 1 tablet (325 mg) by mouth every 4 hours as needed for mild pain or fever 120 tablet 1     aspirin (ASA) 81 MG chewable tablet Take 81 mg by mouth every evening Stopped 7 days preop       biotin 1000 MCG TABS tablet Take 3,000 mcg by mouth daily       ciclopirox (PENLAC) 8 % external solution Apply to adjacent skin and affected nails daily.  Remove with alcohol every 7 days, then repeat. 6 mL 3     diphenhydrAMINE (BENADRYL) 25 MG tablet Take 50 mg by mouth as needed for allergies        estradiol (ESTRACE) 0.1 MG/GM vaginal cream Place 1 g vaginally twice a week 42.5 g 3     guaiFENesin (MUCINEX PO) Take 1 tablet by mouth 2 times daily as needed       loratadine (CLARITIN) 10 MG  tablet Take 10 mg by mouth every evening        Multiple Vitamins-Minerals (WOMENS DAILY FORMULA PO) Take 1 tablet by mouth daily       MYFORTIC (BRAND) 180 MG EC tablet Take 3 tablets (540 mg) by mouth 2 times daily 540 tablet 3     nitroGLYcerin (NITROSTAT) 0.4 MG sublingual tablet Place 1 tablet (0.4 mg) under the tongue every 5 minutes as needed for chest pain For chest pain place 1 tablet under the tongue every 5 minutes for 3 doses. If symptoms persist 5 minutes after 1st dose call 911. 30 tablet 1     pantoprazole (PROTONIX) 40 MG EC tablet Take 1 tablet by mouth as needed (reflux)        predniSONE (DELTASONE) 5 MG tablet Take 1 tablet (5 mg) by mouth daily 90 tablet 3     Probiotic Product (PROBIOTIC PO) Take 2 tablets by mouth every morning        sulfamethoxazole-trimethoprim (BACTRIM) 400-80 MG tablet Take 1 tablet by mouth Every Mon, Wed, Fri Morning 45 tablet 3     tacrolimus (GENERIC EQUIVALENT) 1 MG capsule Take 2 capsules (2 mg) by mouth 2 times daily 360 capsule 3     temazepam (RESTORIL) 15 MG capsule Take 1 capsule (15 mg) by mouth as needed for sleep (twice a year) 15 capsule 0       Allergies - reviewed     Allergies   Allergen Reactions     Ibuprofen      Due to liver transplant     Past history -reviewed  Active Ambulatory Problems     Diagnosis Date Noted     S/P splenectomy      Congenital hepatic fibrosis      Polycystic kidney disease      Immunosuppression (H)      Liver replaced by transplant (H)      Endometriosis      HPV (human papilloma virus) infection      HSIL (high grade squamous intraepithelial lesion) on Pap smear of cervix 09/21/2020     Acquired bilateral hammer toes 07/25/2017     Candidiasis of vagina 09/21/2020     Cervical high risk human papillomavirus (HPV) DNA test positive 09/21/2020     Chicken pox 04/01/1968     Cystitis 09/21/2020     Depression 04/01/2003     Depressive psychosis (H) 09/21/2001     Exercise counseling 09/21/2020     Dermatophytosis of nail  07/25/2017     Hallux valgus, acquired, bilateral 07/25/2017     Incisional hernia following transplant 05/15/2020     Knee pain 09/21/2020     Malignant neoplasm of breast (H) 09/21/2020     Microvascular angina (H) 01/01/2018     Postmenopausal state 08/09/2002     Secondary hyperparathyroidism (H) 11/02/2019     Scoliosis deformity of spine 09/21/2020     Sinusitis 09/21/2020     Swelling of first metatarsophalangeal (MTP) joint 09/21/2020     Uterine prolapse 05/15/2020     Xerosis of skin 01/13/2005     Kidney replaced by transplant 12/16/2020     Lumbar pain 09/22/2021     ESBL (extended spectrum beta-lactamase) producing bacteria infection 12/19/2020     Osteopenia of multiple sites 04/28/2022     Resolved Ambulatory Problems     Diagnosis Date Noted     S/P kidney transplant      High grade squamous intraepithelial lesion of cervix 09/11/2020     Abnormal urine 06/08/2020     Anemia 08/09/2002     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     Chronic renal insufficiency 01/05/2008     Congenital polycystic kidney 08/28/2003     Electrolyte abnormality 11/02/2019     Reason for consultation 09/21/2020     Encounter for counseling 09/21/2020     Encounter for screening for malignant neoplasm of breast 09/21/2020     Encounter for medication refill 09/21/2020     End stage renal disease (H) 12/19/2007     Hypertension 12/19/2007     Hyperthyroidism 08/09/2002     Kidney transplant rejection 09/21/2020     Patient on waiting list for organ transplant 09/21/2020     Polycythemia 06/09/2020     Prophylactic antibiotic 11/02/2019     Renal hypertension 11/02/2019     Scarlet fever 10/01/1973     Secondary physiologic amenorrhea 09/21/2020     Thrombocytopenia (H) 06/18/2008     Encounter for aftercare following kidney transplant 06/08/2020     History of liver transplant (H) 12/05/2016     History of kidney transplant 12/05/2016     Complicated urinary tract infection 12/16/2020     Immunosuppressed status (H) 12/16/2020      Past Medical History:   Diagnosis Date     Adolescent scoliosis      BK viremia [id number]     CMV pneumonia (H) 2010     History of angina      PONV (postoperative nausea and vomiting)      S/P liver transplant (H)      Spider veins 2019     Thyroid disease       Social history - reviewed  Social History     Socioeconomic History     Marital status:      Spouse name: Not on file     Number of children: Not on file     Years of education: Not on file     Highest education level: Not on file   Occupational History     Not on file   Tobacco Use     Smoking status: Never Smoker     Smokeless tobacco: Never Used   Substance and Sexual Activity     Alcohol use: Not Currently     Drug use: Not Currently     Sexual activity: Not Currently   Other Topics Concern     Parent/sibling w/ CABG, MI or angioplasty before 65F 55M? Not Asked   Social History Narrative     Not on file     Social Determinants of Health     Financial Resource Strain: Not on file   Food Insecurity: Not on file   Transportation Needs: Not on file   Physical Activity: Not on file   Stress: Not on file   Social Connections: Not on file   Intimate Partner Violence: Not on file   Housing Stability: Not on file     Family history -reviewed  Family History   Problem Relation Age of Onset     Uterine Cancer Maternal Grandmother      Polycystic Kidney Diease Brother      Polycystic Kidney Diease Brother      Polycystic Kidney Diease Brother      Polycystic Kidney Diease Brother      Cervical Cancer Maternal Aunt      Depression Mother      Anxiety Disorder Mother      Depression Father      Anxiety Disorder Father      ROS: non contributory on the 10-point review of system    Exam:   In general, the patient is in no apparent distress.    BP today - 134/61 mmHg and HR 86 Wt 146.4 lbs  Breathing is unlabored.   HEENT: NC/AT.  PERRLA.  EOMI.  Sclerae white, not injected.    Neck: No jugular venous distension.    Extremities: No edema.   Neurologic: Alert  and oriented to person/place/time, normal speech and affect  Skin: No rash on exposed skin.     Data:  Recent cardiac investigations - reviewed    EKG 6/6/2022 -sinus rhythm normal axis and intervals    Echocardiogram August 31, 2020  Normal biventricular size and function  No significant valvular abnormalities  Normal IVC  No pericardial effusion.    [facility]   CT coronary angiogram 1-3-2019  CALCIUM SCORE : 19. This placed the patient in the 70th percentile rank, meaning that approximately 30% of females at ages from 56-60 will have a higher calcium score that the patient.  CORONARY CT ANGIOGRAPHY: The examination is diagnostic in quality. There is right heart dominance.   LEFT MAIN CORONARY ARTERY: Widely patent  LEFT ANTERIOR DESCENDING; Mild focal calcified plaque in the proxima-mid LAD without significant stenosis. The remainder of the LAD is widely patent.  FIRST DIAGONAL: Widely patent  SECOND DIAGONAL: Widely patent  PROXIMAL LEFT CIRCUMFLEX: Widely patent  MID CIRCUMFLEX: Widely patent  DISTAL CIRCUMFLEX: Widely patent    Labs - reviewed  Chemistry panel: Recent Labs   Lab Test 04/12/22  0828 02/10/22  0802    141   POTASSIUM 4.0 3.8   CHLORIDE 107 108*   CO2 24 22   ANIONGAP 12 11   GLC 83 87   BUN 16 16   CR 0.76 0.75   EDSON 10.1 10.2   GFRESTIMATED 88 90   AST 16 17   ALT 15 13       CBC:   Recent Labs   Lab Test 04/12/22  0828 02/10/22  0802   WBC 5.6 5.9   RBC 5.24 5.29*   HGB 15.8 16.0*   HCT 48.6 48.4*   MCV 93 92   MCH 30.2 30.2   MCHC 32.5 33.1   RDW 14.4 14.0    204       Lipid Panel:  Recent Labs   Lab Test 06/06/22  0828 09/13/21  0840   CHOL 201* 172   HDL 52 43*    105   TRIG 126 119       Thyroid:   TSH   Date Value Ref Range Status   11/04/2021 0.86 0.40 - 4.00 mU/L Final     No results found for: T4  No results found for: A1C  No results found for: INR      Assessment and Plan:  62 year old woman with    Non obstructive CAD  Microvascular angina, stable  Post  kidney and liver transplantation    Belen Sharma reports doing well. She has non obstructive CAD in the proximal LAD for which she takes aspirin and NTG as needed for stable angina. Her cardiac function is normal.  ECG done today is notable for sinus rhythm.  Her renal and liver function tests are normal.    I have reviewed her lipid panel and 10-year ASCVD risk score is less than 3%.  Ideally I would like to see her LDL less than 70 given the mild atherosclerosis based on her CT in 2019.  Patient would prefer to address this with diet and regular exercise.  We discussed reducing pizza and baked good consumption as those are potential intervenable targets. She would like a referral to a nutritional counselor and a physical therapist to initiate a home exercise regimen.      Recommendations:   Physical therapy   Nutritional consult  Healthy diet - low fat and low carb   Lipid panel  in 6 months  Follow-up 6 mo    Video Visit Details:    Type of service:  Video Visit    Video Start Time: 10.35 am  Video End Time: 10.48 am    Originating Location (pt. Location): Home    Distant Location (provider location):  [facility]     Platform used for Video Visit: [employer]     In addition to visit time documented above, I spent an additional 15 minutes on data review and documentation.    Christy Mohamud MD, MS  Professor of Medicine  Cardiovascular division        The 10-year ASCVD risk score (Holbrook DC Jr., et al., 2013) is: 2.2%    Values used to calculate the score:      Age: 62 years      Sex: Female      Is Non- : No      Diabetic: No      Tobacco smoker: No      Systolic Blood Pressure: 92 mmHg      Is BP treated: No      HDL Cholesterol: 52 mg/dL      Total Cholesterol: 201 mg/dL

## 2022-06-06 ENCOUNTER — LAB (OUTPATIENT)
Dept: CARDIOLOGY | Facility: CLINIC | Age: 62
End: 2022-06-06
Payer: MEDICARE

## 2022-06-06 ENCOUNTER — HOSPITAL ENCOUNTER (OUTPATIENT)
Dept: CARDIOLOGY | Facility: CLINIC | Age: 62
Discharge: HOME | End: 2022-06-06
Attending: INTERNAL MEDICINE | Admitting: INTERNAL MEDICINE
Payer: MEDICARE

## 2022-06-06 ENCOUNTER — VIRTUAL VISIT (OUTPATIENT)
Dept: CARDIOLOGY | Facility: CLINIC | Age: 62
End: 2022-06-06
Attending: INTERNAL MEDICINE
Payer: MEDICARE

## 2022-06-06 DIAGNOSIS — Z48.22 ENCOUNTER FOR AFTERCARE FOLLOWING KIDNEY TRANSPLANT: ICD-10-CM

## 2022-06-06 DIAGNOSIS — E78.5 DYSLIPIDEMIA: ICD-10-CM

## 2022-06-06 DIAGNOSIS — I25.118 ATHEROSCLEROSIS OF NATIVE CORONARY ARTERY OF NATIVE HEART WITH STABLE ANGINA PECTORIS (H): ICD-10-CM

## 2022-06-06 DIAGNOSIS — Z94.0 S/P KIDNEY TRANSPLANT: ICD-10-CM

## 2022-06-06 DIAGNOSIS — I20.89 MICROVASCULAR ANGINA (H): ICD-10-CM

## 2022-06-06 DIAGNOSIS — Z94.4 S/P LIVER TRANSPLANT (H): ICD-10-CM

## 2022-06-06 DIAGNOSIS — I25.118 ATHEROSCLEROSIS OF NATIVE CORONARY ARTERY OF NATIVE HEART WITH STABLE ANGINA PECTORIS (H): Primary | ICD-10-CM

## 2022-06-06 LAB
ATRIAL RATE - MUSE: 77 BPM
CHOLEST SERPL-MCNC: 201 MG/DL
DIASTOLIC BLOOD PRESSURE - MUSE: NORMAL MMHG
FASTING STATUS PATIENT QL REPORTED: YES
HDLC SERPL-MCNC: 52 MG/DL
INTERPRETATION ECG - MUSE: NORMAL
LDLC SERPL CALC-MCNC: 124 MG/DL
P AXIS - MUSE: 77 DEGREES
PR INTERVAL - MUSE: 124 MS
QRS DURATION - MUSE: 94 MS
QT - MUSE: 384 MS
QTC - MUSE: 434 MS
R AXIS - MUSE: 84 DEGREES
SYSTOLIC BLOOD PRESSURE - MUSE: NORMAL MMHG
T AXIS - MUSE: 39 DEGREES
TRIGL SERPL-MCNC: 126 MG/DL
VENTRICULAR RATE- MUSE: 77 BPM

## 2022-06-06 PROCEDURE — 93010 ELECTROCARDIOGRAM REPORT: CPT | Mod: HOP | Performed by: INTERNAL MEDICINE

## 2022-06-06 PROCEDURE — 93005 ELECTROCARDIOGRAM TRACING: CPT

## 2022-06-06 PROCEDURE — 99214 OFFICE O/P EST MOD 30 MIN: CPT | Mod: 95 | Performed by: INTERNAL MEDICINE

## 2022-06-06 PROCEDURE — 80061 LIPID PANEL: CPT

## 2022-06-06 PROCEDURE — 36415 COLL VENOUS BLD VENIPUNCTURE: CPT

## 2022-06-06 NOTE — NURSING NOTE
Chief Complaint   Patient presents with     Follow Up     Annual f/u but pushed up to June. Pt state Has been dealing with high BP levels, had labs recently done. Has been dealing wit chest pains, back pain as well in March, also back in November with the chest pains. Pt states has chest pains about 2-3 per year, along with eating better.     Brandon Stevenson, Visit Facilitator/MA.

## 2022-06-06 NOTE — PATIENT INSTRUCTIONS
"Cardiology Providers you saw during your visit:  Dr. Mohamud    Medication changes: None    Follow up:   Referrals placed for Nutrition and physical therapy   Follow up with Dr. Mohamud in 6 months with cholesterol labs prior      Follow the American Heart Association Diet and Lifestyle recommendations:  Limit saturated fat, trans fat, sodium, red meat, sweets and sugar-sweetened beverages. If you choose to eat red meat, compare labels and select the leanest cuts available.  Aim for at least 150 minutes of moderate physical activity or 75 minutes of vigorous physical activity - or an equal combination of both - each week.    If you have any questions, contact  Mino Guthrie RN. We are encouraging the use of [employer] to communicate with your HealthCare Provider     To contact the [facility], please call, [phone]  To schedule an appointment or to leave a message for your Care Team Press #1  If you are a physician calling for another physician Press #2  For Billing Press #3  For Medical Records Press #4\"    "

## 2022-06-06 NOTE — NURSING NOTE
No medication changes made at this appointment.     Nutrition and physical therapy referrals placed per pt request.     Follow up with Dr. Mohamud in 6 months with lipid panel prior.     Mino Guthrie, RN   Cardiology Nurse Coordinator

## 2022-06-06 NOTE — LETTER
6/6/2022      RE: Belen Sharma  [address] [place] [address]       Dear Colleague,    Thank you for the opportunity to participate in the care of your patient, Belen Sharma, at the [facility] [place] at [facility]. Please see a copy of my visit note below.    Belen is a 62 year old who is being evaluated via a billable video visit.      How would you like to obtain your AVS? MyChart  If the video visit is dropped, the invitation should be resent by: Send to e-mail at: [email or web address]  Will anyone else be joining your video visit? No    -----------    I had the pleasure of participating in coordination of clinical cardiovascular care for patient, Belen Sharma, via [facility]'s virtual visit protocol during the COVID-19 Pandemic.    History:  Ms. Sharma is a very pleasant 62 year old woman with microvascular angina and nonobstructive coronary disease.  She is a kidney and pancreas transplant recipient.  She is doing well without any problems with organ rejection.  She is on tacrolimus and Myfortic for her antirejection medications.    Kidney transplant x 2, 2010, 2014  Liver transplant x 2, 2010, 2011    She has had a few BP readings at home in the 150 mmHg range in the setting of COVID booster vaccination and then with cystitis. This seems to have normalized now. She denies prolonged episodes of chest pain, orthopnea or PND or syncope.     No recent hospitalization for HF/MI/stroke      Current Outpatient Medications   Medication Sig Dispense Refill     acetaminophen (TYLENOL) 325 MG tablet Take 1 tablet (325 mg) by mouth every 4 hours as needed for mild pain or fever 120 tablet 1     aspirin (ASA) 81 MG chewable tablet Take 81 mg by mouth every evening Stopped 7 days preop       biotin 1000 MCG TABS tablet Take 3,000 mcg by mouth daily       ciclopirox (PENLAC) 8 % external solution Apply to adjacent skin and affected nails daily.  Remove with alcohol  every 7 days, then repeat. 6 mL 3     diphenhydrAMINE (BENADRYL) 25 MG tablet Take 50 mg by mouth as needed for allergies        estradiol (ESTRACE) 0.1 MG/GM vaginal cream Place 1 g vaginally twice a week 42.5 g 3     guaiFENesin (MUCINEX PO) Take 1 tablet by mouth 2 times daily as needed       loratadine (CLARITIN) 10 MG tablet Take 10 mg by mouth every evening        Multiple Vitamins-Minerals (WOMENS DAILY FORMULA PO) Take 1 tablet by mouth daily       MYFORTIC (BRAND) 180 MG EC tablet Take 3 tablets (540 mg) by mouth 2 times daily 540 tablet 3     nitroGLYcerin (NITROSTAT) 0.4 MG sublingual tablet Place 1 tablet (0.4 mg) under the tongue every 5 minutes as needed for chest pain For chest pain place 1 tablet under the tongue every 5 minutes for 3 doses. If symptoms persist 5 minutes after 1st dose call 911. 30 tablet 1     pantoprazole (PROTONIX) 40 MG EC tablet Take 1 tablet by mouth as needed (reflux)        predniSONE (DELTASONE) 5 MG tablet Take 1 tablet (5 mg) by mouth daily 90 tablet 3     Probiotic Product (PROBIOTIC PO) Take 2 tablets by mouth every morning        sulfamethoxazole-trimethoprim (BACTRIM) 400-80 MG tablet Take 1 tablet by mouth Every Mon, Wed, Fri Morning 45 tablet 3     tacrolimus (GENERIC EQUIVALENT) 1 MG capsule Take 2 capsules (2 mg) by mouth 2 times daily 360 capsule 3     temazepam (RESTORIL) 15 MG capsule Take 1 capsule (15 mg) by mouth as needed for sleep (twice a year) 15 capsule 0       Allergies - reviewed     Allergies   Allergen Reactions     Ibuprofen      Due to liver transplant     Past history -reviewed  Active Ambulatory Problems     Diagnosis Date Noted     S/P splenectomy      Congenital hepatic fibrosis      Polycystic kidney disease      Immunosuppression (H)      Liver replaced by transplant (H)      Endometriosis      HPV (human papilloma virus) infection      HSIL (high grade squamous intraepithelial lesion) on Pap smear of cervix 09/21/2020     Acquired bilateral  hammer toes 07/25/2017     Candidiasis of vagina 09/21/2020     Cervical high risk human papillomavirus (HPV) DNA test positive 09/21/2020     Chicken pox 04/01/1968     Cystitis 09/21/2020     Depression 04/01/2003     Depressive psychosis (H) 09/21/2001     Exercise counseling 09/21/2020     Dermatophytosis of nail 07/25/2017     Hallux valgus, acquired, bilateral 07/25/2017     Incisional hernia following transplant 05/15/2020     Knee pain 09/21/2020     Malignant neoplasm of breast (H) 09/21/2020     Microvascular angina (H) 01/01/2018     Postmenopausal state 08/09/2002     Secondary hyperparathyroidism (H) 11/02/2019     Scoliosis deformity of spine 09/21/2020     Sinusitis 09/21/2020     Swelling of first metatarsophalangeal (MTP) joint 09/21/2020     Uterine prolapse 05/15/2020     Xerosis of skin 01/13/2005     Kidney replaced by transplant 12/16/2020     Lumbar pain 09/22/2021     ESBL (extended spectrum beta-lactamase) producing bacteria infection 12/19/2020     Osteopenia of multiple sites 04/28/2022     Resolved Ambulatory Problems     Diagnosis Date Noted     S/P kidney transplant      High grade squamous intraepithelial lesion of cervix 09/11/2020     Abnormal urine 06/08/2020     Anemia 08/09/2002     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     Chronic renal insufficiency 01/05/2008     Congenital polycystic kidney 08/28/2003     Electrolyte abnormality 11/02/2019     Reason for consultation 09/21/2020     Encounter for counseling 09/21/2020     Encounter for screening for malignant neoplasm of breast 09/21/2020     Encounter for medication refill 09/21/2020     End stage renal disease (H) 12/19/2007     Hypertension 12/19/2007     Hyperthyroidism 08/09/2002     Kidney transplant rejection 09/21/2020     Patient on waiting list for organ transplant 09/21/2020     Polycythemia 06/09/2020     Prophylactic antibiotic 11/02/2019     Renal hypertension 11/02/2019     Scarlet fever 10/01/1973     Secondary  physiologic amenorrhea 09/21/2020     Thrombocytopenia (H) 06/18/2008     Encounter for aftercare following kidney transplant 06/08/2020     History of liver transplant (H) 12/05/2016     History of kidney transplant 12/05/2016     Complicated urinary tract infection 12/16/2020     Immunosuppressed status (H) 12/16/2020     Past Medical History:   Diagnosis Date     Adolescent scoliosis      BK viremia 5610-0559     CMV pneumonia (H) 2010     History of angina      PONV (postoperative nausea and vomiting)      S/P liver transplant (H)      Spider veins 2019     Thyroid disease       Social history - reviewed  Social History     Socioeconomic History     Marital status:      Spouse name: Not on file     Number of children: Not on file     Years of education: Not on file     Highest education level: Not on file   Occupational History     Not on file   Tobacco Use     Smoking status: Never Smoker     Smokeless tobacco: Never Used   Substance and Sexual Activity     Alcohol use: Not Currently     Drug use: Not Currently     Sexual activity: Not Currently   Other Topics Concern     Parent/sibling w/ CABG, MI or angioplasty before 65F 55M? Not Asked   Social History Narrative     Not on file     Social Determinants of Health     Financial Resource Strain: Not on file   Food Insecurity: Not on file   Transportation Needs: Not on file   Physical Activity: Not on file   Stress: Not on file   Social Connections: Not on file   Intimate Partner Violence: Not on file   Housing Stability: Not on file     Family history -reviewed  Family History   Problem Relation Age of Onset     Uterine Cancer Maternal Grandmother      Polycystic Kidney Diease Brother      Polycystic Kidney Diease Brother      Polycystic Kidney Diease Brother      Polycystic Kidney Diease Brother      Cervical Cancer Maternal Aunt      Depression Mother      Anxiety Disorder Mother      Depression Father      Anxiety Disorder Father      ROS: non  contributory on the 10-point review of system    Exam:   In general, the patient is in no apparent distress.    BP today - 134/61 mmHg and HR 86 Wt 146.4 lbs  Breathing is unlabored.   HEENT: NC/AT.  PERRLA.  EOMI.  Sclerae white, not injected.    Neck: No jugular venous distension.    Extremities: No edema.   Neurologic: Alert and oriented to person/place/time, normal speech and affect  Skin: No rash on exposed skin.     Data:  Recent cardiac investigations - reviewed    EKG 6/6/2022 -sinus rhythm normal axis and intervals    Echocardiogram August 31, 2020  Normal biventricular size and function  No significant valvular abnormalities  Normal IVC  No pericardial effusion.    [facility]   CT coronary angiogram 1-3-2019  CALCIUM SCORE : 19. This placed the patient in the 70th percentile rank, meaning that approximately 30% of females at ages from 56-60 will have a higher calcium score that the patient.  CORONARY CT ANGIOGRAPHY: The examination is diagnostic in quality. There is right heart dominance.   LEFT MAIN CORONARY ARTERY: Widely patent  LEFT ANTERIOR DESCENDING; Mild focal calcified plaque in the proxima-mid LAD without significant stenosis. The remainder of the LAD is widely patent.  FIRST DIAGONAL: Widely patent  SECOND DIAGONAL: Widely patent  PROXIMAL LEFT CIRCUMFLEX: Widely patent  MID CIRCUMFLEX: Widely patent  DISTAL CIRCUMFLEX: Widely patent    Labs - reviewed  Chemistry panel: Recent Labs   Lab Test 04/12/22  0828 02/10/22  0802    141   POTASSIUM 4.0 3.8   CHLORIDE 107 108*   CO2 24 22   ANIONGAP 12 11   GLC 83 87   BUN 16 16   CR 0.76 0.75   EDSON 10.1 10.2   GFRESTIMATED 88 90   AST 16 17   ALT 15 13       CBC:   Recent Labs   Lab Test 04/12/22  0828 02/10/22  0802   WBC 5.6 5.9   RBC 5.24 5.29*   HGB 15.8 16.0*   HCT 48.6 48.4*   MCV 93 92   MCH 30.2 30.2   MCHC 32.5 33.1   RDW 14.4 14.0    204       Lipid Panel:  Recent Labs   Lab Test 06/06/22  0828 09/13/21  0840   CHOL 201*  172   HDL 52 43*    105   TRIG 126 119       Thyroid:   TSH   Date Value Ref Range Status   11/04/2021 0.86 0.40 - 4.00 mU/L Final     No results found for: T4  No results found for: A1C  No results found for: INR      Assessment and Plan:  62 year old woman with    Non obstructive CAD  Microvascular angina, stable  Post kidney and liver transplantation    Belen Sharma reports doing well. She has non obstructive CAD in the proximal LAD for which she takes aspirin and NTG as needed for stable angina. Her cardiac function is normal.  ECG done today is notable for sinus rhythm.  Her renal and liver function tests are normal.    I have reviewed her lipid panel and 10-year ASCVD risk score is less than 3%.  Ideally I would like to see her LDL less than 70 given the mild atherosclerosis based on her CT in 2019.  Patient would prefer to address this with diet and regular exercise.  We discussed reducing pizza and baked good consumption as those are potential intervenable targets. She would like a referral to a nutritional counselor and a physical therapist to initiate a home exercise regimen.      Recommendations:   Physical therapy   Nutritional consult  Healthy diet - low fat and low carb   Lipid panel  in 6 months  Follow-up 6 mo    Video Visit Details:    Type of service:  Video Visit    Video Start Time: 10.35 am  Video End Time: 10.48 am    Originating Location (pt. Location): Home    Distant Location (provider location):  [facility]     Platform used for Video Visit: [employer]     In addition to visit time documented above, I spent an additional 15 minutes on data review and documentation.    Christy Mohamud MD, MS  Professor of Medicine  Cardiovascular division        The 10-year ASCVD risk score ([place] TIARA Jr., et al., 2013) is: 2.2%    Values used to calculate the score:      Age: 62 years      Sex: Female      Is Non- : No      Diabetic: No      Tobacco smoker: No      Systolic  Blood Pressure: 92 mmHg      Is BP treated: No      HDL Cholesterol: 52 mg/dL      Total Cholesterol: 201 mg/dL        Please do not hesitate to contact me if you have any questions/concerns.     Sincerely,     Christy Mohamud MD

## 2022-06-22 ENCOUNTER — TELEPHONE (OUTPATIENT)
Dept: LAB | Facility: CLINIC | Age: 62
End: 2022-06-22

## 2022-06-22 NOTE — PROGRESS NOTES
Left a message for patient to call back to confirm provider that her labs are for. Appt notes say DR. Angulo but the only orders that are in her chart are for DR. Leventhal.

## 2022-06-29 ENCOUNTER — LAB (OUTPATIENT)
Dept: LAB | Facility: CLINIC | Age: 62
End: 2022-06-29
Payer: MEDICARE

## 2022-06-29 DIAGNOSIS — Z94.4 LIVER REPLACED BY TRANSPLANT (H): ICD-10-CM

## 2022-06-29 DIAGNOSIS — Z13.220 LIPID SCREENING: ICD-10-CM

## 2022-06-29 LAB
ALBUMIN SERPL BCG-MCNC: 4 G/DL (ref 3.5–5.2)
ALP SERPL-CCNC: 58 U/L (ref 35–129)
ALT SERPL W P-5'-P-CCNC: 14 U/L (ref 10–50)
ANION GAP SERPL CALCULATED.3IONS-SCNC: 13 MMOL/L (ref 7–15)
AST SERPL W P-5'-P-CCNC: 20 U/L (ref 10–50)
BILIRUB DIRECT SERPL-MCNC: <0.2 MG/DL (ref 0–0.3)
BILIRUB SERPL-MCNC: 0.8 MG/DL
BUN SERPL-MCNC: 13.3 MG/DL (ref 8–23)
CALCIUM SERPL-MCNC: 10 MG/DL (ref 8.8–10.2)
CHLORIDE SERPL-SCNC: 104 MMOL/L (ref 98–107)
CREAT SERPL-MCNC: 0.67 MG/DL (ref 0.51–1.17)
DEPRECATED HCO3 PLAS-SCNC: 24 MMOL/L (ref 22–29)
ERYTHROCYTE [DISTWIDTH] IN BLOOD BY AUTOMATED COUNT: 14.1 % (ref 10–15)
GFR SERPL CREATININE-BSD FRML MDRD: >90 ML/MIN/1.73M2
GLUCOSE SERPL-MCNC: 83 MG/DL (ref 70–99)
HCT VFR BLD AUTO: 49.2 % (ref 35–53)
HGB BLD-MCNC: 16.3 G/DL (ref 11.7–17.7)
MCH RBC QN AUTO: 30.5 PG (ref 26.5–33)
MCHC RBC AUTO-ENTMCNC: 33.1 G/DL (ref 31.5–36.5)
MCV RBC AUTO: 92 FL (ref 78–100)
PLATELET # BLD AUTO: 239 10E3/UL (ref 150–450)
POTASSIUM SERPL-SCNC: 4.1 MMOL/L (ref 3.4–5.3)
PROT SERPL-MCNC: 6.5 G/DL (ref 6.4–8.3)
RBC # BLD AUTO: 5.34 10E6/UL (ref 3.8–5.9)
SODIUM SERPL-SCNC: 141 MMOL/L (ref 136–145)
TACROLIMUS BLD-MCNC: 6.7 UG/L (ref 5–15)
TME LAST DOSE: NORMAL H
TME LAST DOSE: NORMAL H
WBC # BLD AUTO: 6 10E3/UL (ref 4–11)

## 2022-06-29 PROCEDURE — 36415 COLL VENOUS BLD VENIPUNCTURE: CPT

## 2022-06-29 PROCEDURE — 85027 COMPLETE CBC AUTOMATED: CPT

## 2022-06-29 PROCEDURE — 80053 COMPREHEN METABOLIC PANEL: CPT

## 2022-06-29 PROCEDURE — 80197 ASSAY OF TACROLIMUS: CPT

## 2022-06-29 PROCEDURE — 82248 BILIRUBIN DIRECT: CPT

## 2022-07-01 ENCOUNTER — TELEPHONE (OUTPATIENT)
Dept: TRANSPLANT | Facility: CLINIC | Age: 62
End: 2022-07-01

## 2022-07-01 DIAGNOSIS — Z94.4 S/P LIVER TRANSPLANT (H): Primary | ICD-10-CM

## 2022-07-01 DIAGNOSIS — Z94.0 KIDNEY TRANSPLANTED: ICD-10-CM

## 2022-07-01 RX ORDER — TACROLIMUS 1 MG/1
CAPSULE ORAL
Qty: 270 CAPSULE | Refills: 3 | Status: SHIPPED | OUTPATIENT
Start: 2022-07-01 | End: 2022-07-21

## 2022-07-01 NOTE — TELEPHONE ENCOUNTER
ISSUE:   Tacrolimus IR level 6.7 on 6/29/22, goal 4-6, dose 2 mg BID.    PLAN:   Please call patient and confirm this was an accurate 12-hour trough. Verify Tacrolimus IR dose 2 mg BID. Confirm no new medications or illness. Confirm no missed doses. If accurate trough and accurate dose, decrease Tacrolimus IR dose to 2 mg every AM and 1 mg every PM and repeat TAC level in a couple weeks.      OUTCOME:   Spoke with patient, they confirm accurate trough level and current dose 2 mg BID. Patient confirmed dose change to 2 mg every AM and 1 mg every PM and repeat TAC level only in a couple weeks. Other labs can be drawn at standing lab interval.  Orders sent to preferred pharmacy for dose change and lab for repeat labs. Patient voiced understanding of plan.     Belen also asked if she should receive another COVID booster. I told her we are in agreement with the CDC recommendations for immunosuppressed individuals so she should refer to their recommendations. She verbalized understanding and will call me with any further questions.

## 2022-07-20 ENCOUNTER — LAB (OUTPATIENT)
Dept: LAB | Facility: CLINIC | Age: 62
End: 2022-07-20
Payer: MEDICARE

## 2022-07-20 DIAGNOSIS — Z94.4 S/P LIVER TRANSPLANT (H): ICD-10-CM

## 2022-07-20 DIAGNOSIS — Z94.0 KIDNEY TRANSPLANTED: ICD-10-CM

## 2022-07-20 LAB
TACROLIMUS BLD-MCNC: 6.6 UG/L (ref 5–15)
TME LAST DOSE: NORMAL H
TME LAST DOSE: NORMAL H

## 2022-07-20 PROCEDURE — 80197 ASSAY OF TACROLIMUS: CPT

## 2022-07-20 PROCEDURE — 36415 COLL VENOUS BLD VENIPUNCTURE: CPT

## 2022-07-21 ENCOUNTER — TELEPHONE (OUTPATIENT)
Dept: TRANSPLANT | Facility: CLINIC | Age: 62
End: 2022-07-21

## 2022-07-21 DIAGNOSIS — Z94.4 S/P LIVER TRANSPLANT (H): ICD-10-CM

## 2022-07-21 DIAGNOSIS — Z94.0 KIDNEY TRANSPLANTED: ICD-10-CM

## 2022-07-21 DIAGNOSIS — Z94.4 LIVER REPLACED BY TRANSPLANT (H): Primary | ICD-10-CM

## 2022-07-21 NOTE — TELEPHONE ENCOUNTER
Left a message to change tacro to 1mg BID, recheck tacro level in 1-2 weeks and confirm dose change.

## 2022-07-21 NOTE — TELEPHONE ENCOUNTER
ISSUE:   Tacrolimus IR level 6.6 on 7/20/2022, goal 4-6, dose 2 mg every AM and 1 mg every PM.      PLAN:   Please call patient and confirm this was an accurate 12-hour trough. Verify Tacrolimus IR dose 2 mg every AM and 1 mg every PM.   Confirm no new medications or illness. Confirm no missed doses. If accurate trough and accurate dose, decrease Tacrolimus IR dose to 1 mg BID and repeat TAC level in a week or 2.     Елена Dinh RN      OUTCOME:   Pt sent confirmed message through [employer],  they confirm accurate trough level and current dose 2 mg am, 1 mg pm . Patient confirmed dose change to 1 mg BID and to repeat tacro in 1-2  weeks. Orders sent to preferred pharmacy for dose change and lab for repeat labs. Patient voiced understanding of plan.    Jeimy Lindquist LPN

## 2022-07-22 ENCOUNTER — TELEPHONE (OUTPATIENT)
Dept: OBGYN | Facility: CLINIC | Age: 62
End: 2022-07-22

## 2022-07-22 RX ORDER — TACROLIMUS 1 MG/1
1 CAPSULE ORAL EVERY 12 HOURS
Qty: 180 CAPSULE | Refills: 3 | Status: SHIPPED | OUTPATIENT
Start: 2022-07-22 | End: 2022-08-10

## 2022-07-22 NOTE — TELEPHONE ENCOUNTER
Patient called the clinic and wanted to know if she can schedule an appointment with Dr. Francis for a pessary fitting. She has not been seen in a year and stated that she needs to go up in size.    Patient is scheduled for 8-3-22 at 8:00am.    Patient requested that a note be left for Jessica because, the patient was told that if she needs to go up in size that it would need to be ordered. Patient would like us to leave a note for Jessica to call her.    Will send Jessica a message to call the patient.    Patient verbalized understanding and all questions were answered.

## 2022-07-25 ENCOUNTER — TELEPHONE (OUTPATIENT)
Dept: TRANSPLANT | Facility: CLINIC | Age: 62
End: 2022-07-25

## 2022-07-25 DIAGNOSIS — Z94.0 KIDNEY TRANSPLANTED: Primary | ICD-10-CM

## 2022-07-26 ENCOUNTER — ANCILLARY PROCEDURE (OUTPATIENT)
Dept: MAMMOGRAPHY | Facility: CLINIC | Age: 62
End: 2022-07-26
Payer: MEDICARE

## 2022-07-26 DIAGNOSIS — Z12.31 VISIT FOR SCREENING MAMMOGRAM: ICD-10-CM

## 2022-07-26 PROCEDURE — 77063 BREAST TOMOSYNTHESIS BI: CPT | Performed by: STUDENT IN AN ORGANIZED HEALTH CARE EDUCATION/TRAINING PROGRAM

## 2022-07-26 PROCEDURE — 77067 SCR MAMMO BI INCL CAD: CPT | Performed by: STUDENT IN AN ORGANIZED HEALTH CARE EDUCATION/TRAINING PROGRAM

## 2022-07-27 NOTE — TELEPHONE ENCOUNTER
Talked to patient   We do have the next size up in pessary if needed- talked abut pessary not expiring every wear as long as in good condition.  Will give her two tubes of lubrication at visit.            ----- Message from Nery Sloan RN sent at 7/22/2022  1:58 PM CDT -----  Ranjit Lopez,   This patient called on Friday and needed to schedule a pessary fitting. She was last seen a year ago and I scheduled her for 8-3 at 8:00am. She stated that the last time she was in that she was told that if she needed to go up in size that you would need to order it. Can you reach out to her.

## 2022-07-29 NOTE — PROGRESS NOTES
Date of Service: 2022    Subjective:          Belen Sharma is a 61 year old female presenting for follow up with history of liver transplantation    History of Present Illness  63 y/o M with PMHx of congenital hepatic fibrosis and polycystic kidney disease who status post SLK in 2010 and DDLT 3/2011.  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  Her kidney transplant was removed in 2012.  She underwent a splenectomy in 2013.  Ultimately she underwent retransplant with  donor kidney in  after desensitization protocol.    - No acute complaints or concerns regarding her liver  - Mother in law moved in to their home in March, has been causing significant stress to the patient and her family. In addition, her son lives in the basement   - Recent labs with tacrolimus of 6.6 -> reduced to 1mg tacrolimus BID  - Recent foot surgery, some pain with tight shoes  - Received elizabeth in May 2022  - 10 point ROS otherwise negative    Previously followed by: Dr. 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 Course complicated by multiple severe infections (including CMV pneumonia). 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 Both, very large, native kidneys reamain.     PONV (postoperative nausea and vomiting)     Need to start with ice chips and apple juice, no soda     S/P kidney transplant     SLK 10/9/2010 - kidney failure 2/2 [facility]. Explanted . Re-transplant after de-sensitization 2016     S/P liver transplant (H)     10/9/2010 - HAT, ischemic biopathy. Re-transplant 3/3/2011     S/P splenectomy      Spider veins      Thyroid disease     Hyperthyroid treated with single dose iodine     Past Surgical History:  Past Surgical History:   Procedure Laterality Date     bunectomy  2018    right foot     bunionectomy  2019    left     CHOLECYSTECTOMY       CONIZATION N/A 2020    Procedure: CONE BIOPSY, CERVIX;  Surgeon: Daysi Perez MD;  [facility]: [facility]     FAILED PICC - RIGHT ARM Right 2020    Has a LUE AV fistula.     H STATISTIC PICC LINE INSERTION >5YR, FAILED Bilateral 2020    Left fistula, Right failed x 2 Occlussion     HERNIA REPAIR, INCISIONAL  2013     IR DIALYSIS PTA CENTRAL SEG  2020     IR PICC PLACEMENT > 5 YRS OF AGE  2020     SPLENECTOMY      Splenectomy     TRANSPLANT KIDNEY RECIPIENT  DONOR      re-transplant after [facility]; 6 months de-sensitization prior AND 6 months after transplant     TRANSPLANT LIVER RECIPIENT  DONOR  2011     TRANSPLANT LIVER, KIDNEY RECIPIENT  DONOR, COMBINED  10/09/2010    HAT - re-Tx liver 3/3/2011; Kidney (left iliac) lost to [facility]/fibrosis - explant     VASCULAR SURGERY      Vascular access left UE. Not used for 4 years since 2nd kidney tx 2-- [place]     Past Social History:  Social History  "    Tobacco Use     Smoking status: Never Smoker     Smokeless tobacco: Never Used   Substance Use Topics     Alcohol use: Not Currently     Drug use: Not Currently      Family History:  Family History   Problem Relation Age of Onset     Uterine Cancer Maternal Grandmother      Polycystic Kidney Diease Brother      Polycystic Kidney Diease Brother      Polycystic Kidney Diease Brother      Polycystic Kidney Diease Brother      Cervical Cancer Maternal Aunt      Depression Mother      Anxiety Disorder Mother      Depression Father      Anxiety Disorder Father      Review of Systems  A complete 10 point review of systems was asked and answered in the negative unless specifically commented upon in the HPI    Current Outpatient Medications   Medication     acetaminophen (TYLENOL) 325 MG tablet     aspirin (ASA) 81 MG chewable tablet     biotin 1000 MCG TABS tablet     ciclopirox (PENLAC) 8 % external solution     diphenhydrAMINE (BENADRYL) 25 MG tablet     estradiol (ESTRACE) 0.1 MG/GM vaginal cream     guaiFENesin (MUCINEX PO)     loratadine (CLARITIN) 10 MG tablet     Multiple Vitamins-Minerals (WOMENS DAILY FORMULA PO)     MYFORTIC (BRAND) 180 MG EC tablet     nitroGLYcerin (NITROSTAT) 0.4 MG sublingual tablet     pantoprazole (PROTONIX) 40 MG EC tablet     predniSONE (DELTASONE) 5 MG tablet     Probiotic Product (PROBIOTIC PO)     sulfamethoxazole-trimethoprim (BACTRIM) 400-80 MG tablet     tacrolimus (GENERIC EQUIVALENT) 1 MG capsule     temazepam (RESTORIL) 15 MG capsule     No current facility-administered medications for this visit.     Objective:         Vitals:    08/01/22 0947   BP: 99/58   Pulse: 88   SpO2: 95%   Weight: 68 kg (150 lb)   Height: 1.753 m (5' 9\")     Body mass index is 22.15 kg/m .     BP 99/58   Pulse 88   Ht 1.753 m (5' 9\")   Wt 68 kg (150 lb)   SpO2 95%   BMI 22.15 kg/m      Constitutional: cooperative, pleasant, no acute distress  Eyes: Conjunctiva anicteric  HEENT: mucus membranes " moist.   CV: No [place] edema  Respiratory: Breathing comfortably on ambient  air  Abd Nondistended, nontender, no rebound or guarding. Surgical scars well healed  Skin: no jaundice  Ext: left upper arms with dilated veins  Neuro: AAO x 3    Labs:   BMP  Recent Labs   Lab Test 06/29/22  0803 04/12/22  0828 02/10/22  0802 12/03/21  0936    143 141 141   POTASSIUM 4.1 4.0 3.8 3.9   CHLORIDE 104 107 108* 107   EDSON 10.0 10.1 10.2 10.3   CO2 24 24 22 24   BUN 13.3 16 16 15   CR 0.67 0.76 0.75 0.76   GLC 83 83 87 81     CBC  Recent Labs   Lab Test 06/29/22  0803 04/12/22  0828 02/10/22  0802 12/03/21  0936   WBC 6.0 5.6 5.9 5.6   RBC 5.34 5.24 5.29* 5.09   HGB 16.3 15.8 16.0* 15.2   HCT 49.2 48.6 48.4* 47.3*   MCV 92 93 92 93   MCH 30.5 30.2 30.2 29.9   MCHC 33.1 32.5 33.1 32.1   RDW 14.1 14.4 14.0 14.9    200 204 210     Liver Enzymes   Recent Labs   Lab Test 06/29/22  0803   PROTTOTAL 6.5   ALBUMIN 4.0   BILITOTAL 0.8   ALKPHOS 58   AST 20   ALT 14      Imaging:   DEXA 4/2022: The most negative and valid T-score of -1.9 at the level of the left femoral neck, left total hip and wrist corresponds with low bone density according to WHO criteria for postmenopausal females and men age 50 and over. The risk of osteoporotic fracture increases approximately 2-fold for each 1.0 SD decrease in T-score    Wrist DEXA 4/2022: Wrist DXA is reported along with axial DXA from the same date.  The T-score of the 33% radius is -1.9.      Mammogram 7/2022: ACR BI-RADS Category 1: Negative    Assessment and Plan:    S/P Liver Transplantation:   - s/p SLK 10/2010.  Complicated by HAT with resultant ischemic biliopathy leading to graft failure  - Re-liver transplant 3/3/2011  - No allograft dysfunction: no history of rejection nor biliary strictures  - Follow labs per routine    Immunosuppression:   - Given she had a kidney after liver transplant, her immunosuppression will be directed by the kidney transplant service.  - The bare  minimum she would need from a liver transplant perspective is tacrolimus BID with a goal trough of ~ 3  - Will plan to check immunosuppression trough levels per protocol to assess for adequate target dosing and will assess CBC and kidney function for toxicity of medications    Infectious Prophylaxis:   - on Bactrim; per transplant nephrology    Kidney Health:   - s/p DDKT x2, last in 2016  - follows with transplant nephrology    Nutrition/Weight:    It is very common for patients to put on significant weight after liver transplantation, as they become conditioned to eating as much as possible to maintain a healthy weight pre-transplant.  There is a very significant risk of developing fatty liver and non-alcoholic steatohepatitis in post-transplant patients, even in those who were not transplanted for TIWARI cirrhosis.  - Please work on eating a diet that is rice in fresh fruits and vegetables, moderate amounts of lean protein and dairy, and modest amounts of carbohydrates and fats.  - An exercise plan/regimen is an essential part of remaining healthy in the post-transplant setting and we recommend 30-35 minutes of exercise at least 4 days a week    Routine Health Care:  - You need to establish and maintain care with a primary care physician to manage: hypertension, high cholesterol, abnormal blood sugars, foot care - as these are all potential complications of your health after liver transplantation and will need a local physician to manage these issues.  - Recommend establishing care with a mental health provider; consult placed  - All patients with liver diseases (even post transplant) are at an increased risk for osteoporosis.  We strongly recommend screening for Vitamin D deficiency at least twice yearly with aggressive supplementation/replacement as indicated.    -  DEXA 2022 with evidence of low bone density; recommend vitamin D and calcium supplementation; recommend repeat DEXA in 2 year  - Recommend yearly  skin exams with dermatology, and if skin cancers are found, recommend twice yearly exams  - Recommend you stay up to date for cancer screening: mammograms/PAP for women and prostate cancer screening for men, and colon cancer screening for all.   * Repeat colon cancer screening due 9/2024  - Recommend dental care every 6 months  - Practice good hygiene with washing of hands and maintaining a clean living space as this will decrease your chances of developing an infection in the post-transplantation setting  - Eat a health diet: rich in fresh fruits and vegetables, lean proteins, and minimize carbohydrate and fat intake.    Thank you very much for the opportunity to participate in the care of this patient.  If you have any further questions, please don't hesitate to contact our office.    Discussed with hepatology attending, Dr. Leventhal Elizabeth Aby, MD  Hepatology Fellow

## 2022-08-01 ENCOUNTER — TELEPHONE (OUTPATIENT)
Dept: TRANSPLANT | Facility: CLINIC | Age: 62
End: 2022-08-01

## 2022-08-01 ENCOUNTER — OFFICE VISIT (OUTPATIENT)
Dept: GASTROENTEROLOGY | Facility: CLINIC | Age: 62
End: 2022-08-01
Attending: INTERNAL MEDICINE
Payer: MEDICARE

## 2022-08-01 VITALS
HEIGHT: 69 IN | SYSTOLIC BLOOD PRESSURE: 99 MMHG | WEIGHT: 150 LBS | DIASTOLIC BLOOD PRESSURE: 58 MMHG | BODY MASS INDEX: 22.22 KG/M2 | OXYGEN SATURATION: 95 % | HEART RATE: 88 BPM

## 2022-08-01 DIAGNOSIS — D84.9 IMMUNOSUPPRESSION (H): ICD-10-CM

## 2022-08-01 DIAGNOSIS — Z94.4 LIVER REPLACED BY TRANSPLANT (H): ICD-10-CM

## 2022-08-01 DIAGNOSIS — Z48.298 CARE AFTER ORGAN TRANSPLANT: Primary | ICD-10-CM

## 2022-08-01 DIAGNOSIS — Z94.0 KIDNEY REPLACED BY TRANSPLANT: ICD-10-CM

## 2022-08-01 DIAGNOSIS — F32.A DEPRESSION, UNSPECIFIED DEPRESSION TYPE: ICD-10-CM

## 2022-08-01 PROCEDURE — 99214 OFFICE O/P EST MOD 30 MIN: CPT | Mod: GC | Performed by: INTERNAL MEDICINE

## 2022-08-01 PROCEDURE — G0463 HOSPITAL OUTPT CLINIC VISIT: HCPCS

## 2022-08-01 ASSESSMENT — PAIN SCALES - GENERAL: PAINLEVEL: NO PAIN (0)

## 2022-08-01 NOTE — TELEPHONE ENCOUNTER
Received the following message from Dr. Leventhal after Belen's hepatology visit today,     - Needs 4th dose COVID, pneumonia   - Can you please make sure a hepatitis A IgG is added on to next labs     [employer] message sent to Belen to ask if she needs any information on where to receive her 4th COVID vaccine and pneumovax. Hep A lab already entered by MD. Will check in August to ensure it was drawn.

## 2022-08-01 NOTE — Clinical Note
I think needs some guidance on vaccines - Needs 4th dose COVID, pneumonia - Can you please make sure a hepatitis A IgG is added on to next labs  TL

## 2022-08-01 NOTE — LETTER
2022         RE: Belen Sharma  [address] [place] [address]        Dear Colleague,    Thank you for referring your patient, Belen Sharma, to the [facility] [place]. Please see a copy of my visit note below.    Date of Service: 2022    Subjective:          Belen Sharma is a 61 year old female presenting for follow up with history of liver transplantation    History of Present Illness  63 y/o M with PMHx of congenital hepatic fibrosis and polycystic kidney disease who status post SLK in 2010 and DDLT 3/2011.  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  Her kidney transplant was removed in 2012.  She underwent a splenectomy in 2013.  Ultimately she underwent retransplant with  donor kidney in  after desensitization protocol.    - No acute complaints or concerns regarding her liver  - Mother in law moved in to their home in March, has been causing significant stress to the patient and her family. In addition, her son lives in the basement   - Recent labs with tacrolimus of 6.6 -> reduced to 1mg tacrolimus BID  - Recent foot surgery, some pain with tight shoes  - Received elizabeth in May 2022  - 10 point ROS otherwise negative    Previously followed by: Dr. 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 Course complicated by multiple severe infections (including CMV pneumonia). Re-listed for transplant    - DDLT (#2  liver): 3/3/2011  - Technique: orthotopic, duct to duct  - Complications: none    - DDRT (#2 kidney): 2016    Past Medical History:  Past Medical History:   Diagnosis Date     Adolescent scoliosis     protruding     BK viremia [id number]     CMV pneumonia (H)      Congenital hepatic fibrosis      Endometriosis      High grade squamous intraepithelial lesion of cervix 2020     History of angina      HPV (human papilloma virus) infection      Immunosuppression (H)      Polycystic kidney disease     Polycystic kidneys, tx kidney on the right. Both, very large, native kidneys reamain.     PONV (postoperative nausea and vomiting)     Need to start with ice chips and apple juice, no soda     S/P kidney transplant     SLK 10/9/2010 - kidney failure 2/2 [facility]. Explanted . Re-transplant after de-sensitization      S/P liver transplant (H)     10/9/2010 - HAT, ischemic biopathy. Re-transplant 3/3/2011     S/P splenectomy      Spider veins      Thyroid disease     Hyperthyroid treated with single dose iodine     Past Surgical History:  Past Surgical History:   Procedure Laterality Date     bunectomy  2018    right foot     bunionectomy  2019    left     CHOLECYSTECTOMY       CONIZATION N/A 2020    Procedure: CONE BIOPSY, CERVIX;  Surgeon: Daysi Perez MD;  Location: [facility]     FAILED PICC - RIGHT ARM Right 2020    Has a LUE AV fistula.     H STATISTIC PICC LINE INSERTION >5YR, FAILED Bilateral 2020    Left fistula, Right failed x 2 Occlussion     HERNIA REPAIR, INCISIONAL  2013     IR DIALYSIS PTA CENTRAL SEG  2020     IR PICC PLACEMENT > 5 YRS OF AGE  2020     SPLENECTOMY      Splenectomy     TRANSPLANT KIDNEY RECIPIENT  DONOR      re-transplant after [facility]; 6 months de-sensitization prior AND 6 months after transplant     TRANSPLANT LIVER RECIPIENT  DONOR  2011     TRANSPLANT LIVER, KIDNEY RECIPIENT  DONOR, COMBINED   "10/09/2010    HAT - re-Tx liver 3/3/2011; Kidney (left iliac) lost to [facility]/fibrosis - explant     VASCULAR SURGERY      Vascular access left UE. Not used for 4 years since 2nd kidney tx 2-4-2016 [facility]     Past Social History:  Social History     Tobacco Use     Smoking status: Never Smoker     Smokeless tobacco: Never Used   Substance Use Topics     Alcohol use: Not Currently     Drug use: Not Currently      Family History:  Family History   Problem Relation Age of Onset     Uterine Cancer Maternal Grandmother      Polycystic Kidney Diease Brother      Polycystic Kidney Diease Brother      Polycystic Kidney Diease Brother      Polycystic Kidney Diease Brother      Cervical Cancer Maternal Aunt      Depression Mother      Anxiety Disorder Mother      Depression Father      Anxiety Disorder Father      Review of Systems  A complete 10 point review of systems was asked and answered in the negative unless specifically commented upon in the HPI    Current Outpatient Medications   Medication     acetaminophen (TYLENOL) 325 MG tablet     aspirin (ASA) 81 MG chewable tablet     biotin 1000 MCG TABS tablet     ciclopirox (PENLAC) 8 % external solution     diphenhydrAMINE (BENADRYL) 25 MG tablet     estradiol (ESTRACE) 0.1 MG/GM vaginal cream     guaiFENesin (MUCINEX PO)     loratadine (CLARITIN) 10 MG tablet     Multiple Vitamins-Minerals (WOMENS DAILY FORMULA PO)     MYFORTIC (BRAND) 180 MG EC tablet     nitroGLYcerin (NITROSTAT) 0.4 MG sublingual tablet     pantoprazole (PROTONIX) 40 MG EC tablet     predniSONE (DELTASONE) 5 MG tablet     Probiotic Product (PROBIOTIC PO)     sulfamethoxazole-trimethoprim (BACTRIM) 400-80 MG tablet     tacrolimus (GENERIC EQUIVALENT) 1 MG capsule     temazepam (RESTORIL) 15 MG capsule     No current facility-administered medications for this visit.     Objective:         Vitals:    08/01/22 0947   BP: 99/58   Pulse: 88   SpO2: 95%   Weight: 68 kg (150 lb)   Height: 1.753 m (5' 9\") " "    Body mass index is 22.15 kg/m .     BP 99/58   Pulse 88   Ht 1.753 m (5' 9\")   Wt 68 kg (150 lb)   SpO2 95%   BMI 22.15 kg/m      Constitutional: cooperative, pleasant, no acute distress  Eyes: Conjunctiva anicteric  HEENT: mucus membranes moist.   CV: No [place] edema  Respiratory: Breathing comfortably on ambient  air  Abd Nondistended, nontender, no rebound or guarding. Surgical scars well healed  Skin: no jaundice  Ext: left upper arms with dilated veins  Neuro: AAO x 3    Labs:   BMP  Recent Labs   Lab Test 06/29/22  0803 04/12/22  0828 02/10/22  0802 12/03/21  0936    143 141 141   POTASSIUM 4.1 4.0 3.8 3.9   CHLORIDE 104 107 108* 107   EDSON 10.0 10.1 10.2 10.3   CO2 24 24 22 24   BUN 13.3 16 16 15   CR 0.67 0.76 0.75 0.76   GLC 83 83 87 81     CBC  Recent Labs   Lab Test 06/29/22  0803 04/12/22  0828 02/10/22  0802 12/03/21  0936   WBC 6.0 5.6 5.9 5.6   RBC 5.34 5.24 5.29* 5.09   HGB 16.3 15.8 16.0* 15.2   HCT 49.2 48.6 48.4* 47.3*   MCV 92 93 92 93   MCH 30.5 30.2 30.2 29.9   MCHC 33.1 32.5 33.1 32.1   RDW 14.1 14.4 14.0 14.9    200 204 210     Liver Enzymes   Recent Labs   Lab Test 06/29/22  0803   PROTTOTAL 6.5   ALBUMIN 4.0   BILITOTAL 0.8   ALKPHOS 58   AST 20   ALT 14      Imaging:   DEXA 4/2022: The most negative and valid T-score of -1.9 at the level of the left femoral neck, left total hip and wrist corresponds with low bone density according to WHO criteria for postmenopausal females and men age 50 and over. The risk of osteoporotic fracture increases approximately 2-fold for each 1.0 SD decrease in T-score    Wrist DEXA 4/2022: Wrist DXA is reported along with axial DXA from the same date.  The T-score of the 33% radius is -1.9.      Mammogram 7/2022: ACR BI-RADS Category 1: Negative    Assessment and Plan:    S/P Liver Transplantation:   - s/p SLK 10/2010.  Complicated by HAT with resultant ischemic biliopathy leading to graft failure  - Re-liver transplant 3/3/2011  - No " allograft dysfunction: no history of rejection nor biliary strictures  - Follow labs per routine    Immunosuppression:   - Given she had a kidney after liver transplant, her immunosuppression will be directed by the kidney transplant service.  - The bare minimum she would need from a liver transplant perspective is tacrolimus BID with a goal trough of ~ 3  - Will plan to check immunosuppression trough levels per protocol to assess for adequate target dosing and will assess CBC and kidney function for toxicity of medications    Infectious Prophylaxis:   - on Bactrim; per transplant nephrology    Kidney Health:   - s/p DDKT x2, last in 2016  - follows with transplant nephrology    Nutrition/Weight:    It is very common for patients to put on significant weight after liver transplantation, as they become conditioned to eating as much as possible to maintain a healthy weight pre-transplant.  There is a very significant risk of developing fatty liver and non-alcoholic steatohepatitis in post-transplant patients, even in those who were not transplanted for TIWARI cirrhosis.  - Please work on eating a diet that is rice in fresh fruits and vegetables, moderate amounts of lean protein and dairy, and modest amounts of carbohydrates and fats.  - An exercise plan/regimen is an essential part of remaining healthy in the post-transplant setting and we recommend 30-35 minutes of exercise at least 4 days a week    Routine Health Care:  - You need to establish and maintain care with a primary care physician to manage: hypertension, high cholesterol, abnormal blood sugars, foot care - as these are all potential complications of your health after liver transplantation and will need a local physician to manage these issues.  - Recommend establishing care with a mental health provider; consult placed  - All patients with liver diseases (even post transplant) are at an increased risk for osteoporosis.  We strongly recommend screening for  Vitamin D deficiency at least twice yearly with aggressive supplementation/replacement as indicated.    -  DEXA  with evidence of low bone density; recommend vitamin D and calcium supplementation; recommend repeat DEXA in 2 year  - Recommend yearly skin exams with dermatology, and if skin cancers are found, recommend twice yearly exams  - Recommend you stay up to date for cancer screening: mammograms/PAP for women and prostate cancer screening for men, and colon cancer screening for all.   * Repeat colon cancer screening due 2024  - Recommend dental care every 6 months  - Practice good hygiene with washing of hands and maintaining a clean living space as this will decrease your chances of developing an infection in the post-transplantation setting  - Eat a health diet: rich in fresh fruits and vegetables, lean proteins, and minimize carbohydrate and fat intake.    Thank you very much for the opportunity to participate in the care of this patient.  If you have any further questions, please don't hesitate to contact our office.    Discussed with hepatology attending, Dr. Leventhal Elizabeth Aby, MD  Hepatology Fellow        Attestation signed by Leventhal, Thomas Michael, MD at 2022 12:44 PM (Updated):  Staff Physician Attestation  I, Thomas M. Leventhal, M.D., discussed and examined this patient with the fellow and agree with the fellow s findings and plan of care as documented in the fellow s note.  I personally reviewed vital signs, medications, labs, and imaging.    Is a 62-year-old female past medical history of congenital hepatic fibrosis and polycystic kidney disease status post SLK in 2010 by HAT with ischemic injury necessitating repeat liver transplant in 2011.  Transplanted kidney was removed in , status post splenectomy in , status post repeat  donor kidney transplant .  -Noted major issues are surrounding mental health with changes and life at home  environments that are leading to significant stress that are impacting her overall health  -Had COVID in May 2022 and received Evusheld; will await fourth dose of vaccine in September 2022  -Recent saw cardiology for known coronary artery disease    Assessment and plan:  -No contraindications to the use of statins from hepatology perspective at this time  -Defer immunosuppression management to the transplant nephrology service given more recent kidney transplantation  -She will be due for colonoscopy in 2024  -She is safe to undergo the Pneumovax vaccine  - Referral for mental health    RTC 1 year     Thomas M. Leventhal, M.D.   of Medicine  Advanced & Transplant Hepatology  [facility]  Date of Service: 08/01/22

## 2022-08-01 NOTE — NURSING NOTE
"Chief Complaint   Patient presents with     RECHECK     Liver TX     BP 99/58   Pulse 88   Ht 1.753 m (5' 9\")   Wt 68 kg (150 lb)   SpO2 95%   BMI 22.15 kg/m      Joe Perdomo MA  "

## 2022-08-01 NOTE — LETTER
2022         RE: Belen Sharma  [address] [place] [address]      Date of Service: 2022    Subjective:          Belen Sharma is a 61 year old female presenting for follow up with history of liver transplantation    History of Present Illness  63 y/o M with PMHx of congenital hepatic fibrosis and polycystic kidney disease who status post SLK in 2010 and DDLT 3/2011.  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  Her kidney transplant was removed in 2012.  She underwent a splenectomy in 2013.  Ultimately she underwent retransplant with  donor kidney in  after desensitization protocol.    - No acute complaints or concerns regarding her liver  - Mother in law moved in to their home in March, has been causing significant stress to the patient and her family. In addition, her son lives in the basement   - Recent labs with tacrolimus of 6.6 -> reduced to 1mg tacrolimus BID  - Recent foot surgery, some pain with tight shoes  - Received evusheld in May 2022  - 10 point ROS otherwise negative    Previously followed by: Dr. 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 Course complicated by multiple severe infections (including CMV pneumonia). 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 Both, very large, native kidneys reamain.     PONV (postoperative nausea and vomiting)     Need to start with ice chips and apple juice, no soda     S/P kidney transplant     SLK 10/9/2010 - kidney failure 2/2 [facility]. Explanted . Re-transplant after de-sensitization 2016     S/P liver transplant (H)     10/9/2010 - HAT, ischemic biopathy. Re-transplant 3/3/2011     S/P splenectomy      Spider veins      Thyroid disease     Hyperthyroid treated with single dose iodine     Past Surgical History:  Past Surgical History:   Procedure Laterality Date     bunectomy  2018    right foot     bunionectomy  2019    left     CHOLECYSTECTOMY       CONIZATION N/A 2020    Procedure: CONE BIOPSY, CERVIX;  Surgeon: Daysi Perez MD;  Location: [facility]     FAILED PICC - RIGHT ARM Right 2020    Has a LUE AV fistula.     H STATISTIC PICC LINE INSERTION >5YR, FAILED Bilateral 2020    Left fistula, Right failed x 2 Occlussion     HERNIA REPAIR, INCISIONAL  2013     IR DIALYSIS PTA CENTRAL SEG  2020     IR PICC PLACEMENT > 5 YRS OF AGE  2020     SPLENECTOMY      Splenectomy     TRANSPLANT KIDNEY RECIPIENT  DONOR      re-transplant after [facility]; 6 months de-sensitization prior AND 6 months after transplant     TRANSPLANT LIVER RECIPIENT  DONOR  2011     TRANSPLANT LIVER, KIDNEY RECIPIENT  DONOR, COMBINED  10/09/2010    HAT - re-Tx liver 3/3/2011; Kidney (left iliac) lost to [facility]/fibrosis - explant     VASCULAR SURGERY      Vascular access left UE. Not used for 4 years since 2nd  "kidney tx 2-4-2016 [facility]     Past Social History:  Social History     Tobacco Use     Smoking status: Never Smoker     Smokeless tobacco: Never Used   Substance Use Topics     Alcohol use: Not Currently     Drug use: Not Currently      Family History:  Family History   Problem Relation Age of Onset     Uterine Cancer Maternal Grandmother      Polycystic Kidney Diease Brother      Polycystic Kidney Diease Brother      Polycystic Kidney Diease Brother      Polycystic Kidney Diease Brother      Cervical Cancer Maternal Aunt      Depression Mother      Anxiety Disorder Mother      Depression Father      Anxiety Disorder Father      Review of Systems  A complete 10 point review of systems was asked and answered in the negative unless specifically commented upon in the HPI    Current Outpatient Medications   Medication     acetaminophen (TYLENOL) 325 MG tablet     aspirin (ASA) 81 MG chewable tablet     biotin 1000 MCG TABS tablet     ciclopirox (PENLAC) 8 % external solution     diphenhydrAMINE (BENADRYL) 25 MG tablet     estradiol (ESTRACE) 0.1 MG/GM vaginal cream     guaiFENesin (MUCINEX PO)     loratadine (CLARITIN) 10 MG tablet     Multiple Vitamins-Minerals (WOMENS DAILY FORMULA PO)     MYFORTIC (BRAND) 180 MG EC tablet     nitroGLYcerin (NITROSTAT) 0.4 MG sublingual tablet     pantoprazole (PROTONIX) 40 MG EC tablet     predniSONE (DELTASONE) 5 MG tablet     Probiotic Product (PROBIOTIC PO)     sulfamethoxazole-trimethoprim (BACTRIM) 400-80 MG tablet     tacrolimus (GENERIC EQUIVALENT) 1 MG capsule     temazepam (RESTORIL) 15 MG capsule     No current facility-administered medications for this visit.     Objective:         Vitals:    08/01/22 0947   BP: 99/58   Pulse: 88   SpO2: 95%   Weight: 68 kg (150 lb)   Height: 1.753 m (5' 9\")     Body mass index is 22.15 kg/m .     BP 99/58   Pulse 88   Ht 1.753 m (5' 9\")   Wt 68 kg (150 lb)   SpO2 95%   BMI 22.15 kg/m      Constitutional: cooperative, pleasant, " no acute distress  Eyes: Conjunctiva anicteric  HEENT: mucus membranes moist.   CV: No [place] edema  Respiratory: Breathing comfortably on ambient  air  Abd Nondistended, nontender, no rebound or guarding. Surgical scars well healed  Skin: no jaundice  Ext: left upper arms with dilated veins  Neuro: AAO x 3    Labs:   BMP  Recent Labs   Lab Test 06/29/22  0803 04/12/22  0828 02/10/22  0802 12/03/21  0936    143 141 141   POTASSIUM 4.1 4.0 3.8 3.9   CHLORIDE 104 107 108* 107   EDSON 10.0 10.1 10.2 10.3   CO2 24 24 22 24   BUN 13.3 16 16 15   CR 0.67 0.76 0.75 0.76   GLC 83 83 87 81     CBC  Recent Labs   Lab Test 06/29/22  0803 04/12/22  0828 02/10/22  0802 12/03/21  0936   WBC 6.0 5.6 5.9 5.6   RBC 5.34 5.24 5.29* 5.09   HGB 16.3 15.8 16.0* 15.2   HCT 49.2 48.6 48.4* 47.3*   MCV 92 93 92 93   MCH 30.5 30.2 30.2 29.9   MCHC 33.1 32.5 33.1 32.1   RDW 14.1 14.4 14.0 14.9    200 204 210     Liver Enzymes   Recent Labs   Lab Test 06/29/22  0803   PROTTOTAL 6.5   ALBUMIN 4.0   BILITOTAL 0.8   ALKPHOS 58   AST 20   ALT 14      Imaging:   DEXA 4/2022: The most negative and valid T-score of -1.9 at the level of the left femoral neck, left total hip and wrist corresponds with low bone density according to WHO criteria for postmenopausal females and men age 50 and over. The risk of osteoporotic fracture increases approximately 2-fold for each 1.0 SD decrease in T-score    Wrist DEXA 4/2022: Wrist DXA is reported along with axial DXA from the same date.  The T-score of the 33% radius is -1.9.      Mammogram 7/2022: ACR BI-RADS Category 1: Negative    Assessment and Plan:    S/P Liver Transplantation:   - s/p SLK 10/2010.  Complicated by HAT with resultant ischemic biliopathy leading to graft failure  - Re-liver transplant 3/3/2011  - No allograft dysfunction: no history of rejection nor biliary strictures  - Follow labs per routine    Immunosuppression:   - Given she had a kidney after liver transplant, her  immunosuppression will be directed by the kidney transplant service.  - The bare minimum she would need from a liver transplant perspective is tacrolimus BID with a goal trough of ~ 3  - Will plan to check immunosuppression trough levels per protocol to assess for adequate target dosing and will assess CBC and kidney function for toxicity of medications    Infectious Prophylaxis:   - on Bactrim; per transplant nephrology    Kidney [facility]:   - s/p DDKT x2, last in 2016  - follows with transplant nephrology    Nutrition/Weight:    It is very common for patients to put on significant weight after liver transplantation, as they become conditioned to eating as much as possible to maintain a healthy weight pre-transplant.  There is a very significant risk of developing fatty liver and non-alcoholic steatohepatitis in post-transplant patients, even in those who were not transplanted for TIWARI cirrhosis.  - Please work on eating a diet that is rice in fresh fruits and vegetables, moderate amounts of lean protein and dairy, and modest amounts of carbohydrates and fats.  - An exercise plan/regimen is an essential part of remaining healthy in the post-transplant setting and we recommend 30-35 minutes of exercise at least 4 days a week    Routine Health Care:  - You need to establish and maintain care with a primary care physician to manage: hypertension, high cholesterol, abnormal blood sugars, foot care - as these are all potential complications of your health after liver transplantation and will need a local physician to manage these issues.  - Recommend establishing care with a mental health provider; consult placed  - All patients with liver diseases (even post transplant) are at an increased risk for osteoporosis.  We strongly recommend screening for Vitamin D deficiency at least twice yearly with aggressive supplementation/replacement as indicated.    -  DEXA 2022 with evidence of low bone density; recommend vitamin D and  calcium supplementation; recommend repeat DEXA in 2 year  - Recommend yearly skin exams with dermatology, and if skin cancers are found, recommend twice yearly exams  - Recommend you stay up to date for cancer screening: mammograms/PAP for women and prostate cancer screening for men, and colon cancer screening for all.   * Repeat colon cancer screening due 2024  - Recommend dental care every 6 months  - Practice good hygiene with washing of hands and maintaining a clean living space as this will decrease your chances of developing an infection in the post-transplantation setting  - Eat a health diet: rich in fresh fruits and vegetables, lean proteins, and minimize carbohydrate and fat intake.    Thank you very much for the opportunity to participate in the care of this patient.  If you have any further questions, please don't hesitate to contact our office.    Discussed with hepatology attending, Dr. Leventhal Elizabeth Aby, MD  Hepatology Fellow        Attestation signed by Leventhal, Thomas Michael, MD at 2022 12:44 PM (Updated):  Staff Physician Attestation  I, Thomas M. Leventhal, M.D., discussed and examined this patient with the fellow and agree with the fellow s findings and plan of care as documented in the fellow s note.  I personally reviewed vital signs, medications, labs, and imaging.    Is a 62-year-old female past medical history of congenital hepatic fibrosis and polycystic kidney disease status post SLK in 2010 by HAT with ischemic injury necessitating repeat liver transplant in 2011.  Transplanted kidney was removed in , status post splenectomy in , status post repeat  donor kidney transplant .  -Noted major issues are surrounding mental health with changes and life at home environments that are leading to significant stress that are impacting her overall health  -Had COVID in May 2022 and received Evusheld; will await fourth dose of vaccine in September  2022  -Recent saw cardiology for known coronary artery disease    Assessment and plan:  -No contraindications to the use of statins from hepatology perspective at this time  -Defer immunosuppression management to the transplant nephrology service given more recent kidney transplantation  -She will be due for colonoscopy in 2024  -She is safe to undergo the Pneumovax vaccine  - Referral for mental health    RTC 1 year     Thomas M. Leventhal, M.D.   of Medicine  Advanced & Transplant Hepatology  [facility]  Date of Service: 08/01/22    Thomas M. Leventhal, MD

## 2022-08-01 NOTE — PATIENT INSTRUCTIONS
- Continue: tacrolimus and steroids per nephrology  - Plan for mental health referral  - Dental care every 6 months  - Colonoscopy: Repeat due 9/2024  - Continue mammograms, repeat due 6/2023  - Bone health: Continue vitamin D + calcium, repeat DEXA due in 2024  - Dermatology: yearly

## 2022-08-03 ENCOUNTER — OFFICE VISIT (OUTPATIENT)
Dept: UROLOGY | Facility: CLINIC | Age: 62
End: 2022-08-03
Attending: OBSTETRICS & GYNECOLOGY
Payer: MEDICARE

## 2022-08-03 VITALS
HEART RATE: 94 BPM | WEIGHT: 150 LBS | DIASTOLIC BLOOD PRESSURE: 52 MMHG | SYSTOLIC BLOOD PRESSURE: 88 MMHG | BODY MASS INDEX: 22.15 KG/M2

## 2022-08-03 DIAGNOSIS — N81.11 CYSTOCELE, MIDLINE: ICD-10-CM

## 2022-08-03 DIAGNOSIS — N95.2 VAGINAL ATROPHY: ICD-10-CM

## 2022-08-03 DIAGNOSIS — N81.6 RECTOCELE: Primary | ICD-10-CM

## 2022-08-03 PROCEDURE — G0463 HOSPITAL OUTPT CLINIC VISIT: HCPCS

## 2022-08-03 PROCEDURE — 99214 OFFICE O/P EST MOD 30 MIN: CPT | Performed by: OBSTETRICS & GYNECOLOGY

## 2022-08-03 RX ORDER — ESTRADIOL 0.1 MG/G
1 CREAM VAGINAL
Qty: 42.5 G | Refills: 3 | Status: SHIPPED | OUTPATIENT
Start: 2022-08-04 | End: 2022-11-23

## 2022-08-03 ASSESSMENT — PAIN SCALES - GENERAL: PAINLEVEL: NO PAIN (0)

## 2022-08-03 NOTE — PROGRESS NOTES
CC: prolapse    Pt here for pessary check. Pt inserting and removing pessary daily. Pt happy with pessary overall. No bleeding, spotting or discharge. She is concerned that it is falling out and that she needs a bigger size. Currently has a #9 ring with support.    BP (!) 88/52   Pulse 94   Wt 68 kg (150 lb)   BMI 22.15 kg/m   No LMP recorded. Patient is postmenopausal. Body mass index is 22.15 kg/m .  Ms. Sharma is alert, comfortable in no acute distress, non-labored breathing.   Vagina: no evidence of erosion, discharge or bleeding. Exam with the pessary in place showed that it occupies the vaginal space.    Had patient bear down with the pessary in place and without the pessary. No evidence of prolapse either time.    A/P: Belen Sharma is a 62 year old F with rectocele and cystocele    Do not feel that going to a bigger pessary would help as I am concerned that it would lead to erosions. Offered to examine pt at the end of the day with the pessary out. Otherwise f/u in 6 months or sooner PRN.    I spent a total of 30 minutes with  Ms. Sharma  on the date of the encounter in chart review, face to face patient visit, review of tests, documentation and/or discussion with other providers about the issues documented above.    Zach Francis MD  Professor, OB/GYN  Urogynecologist  CC  Patient Care Team:  Vincent De La Garza MD as PCP - General (Internal Medicine)  Christy Mohamud MD as MD (Cardiology)  Divya Chávez RN as Registered Nurse (Transplant)  Елена Dinh RN as Registered Nurse  Christy Mohamud MD as Assigned Heart and Vascular Provider  Leventhal, Thomas Michael, MD as Assigned Gastroenterology Provider  Vincent De La Garza MD as Assigned PCP  Saran Angulo MD as Assigned Nephrology Provider  Daysi Perez MD as MD (OB/Gyn)  Shannon Loza OD (Optometry)  Sapna Marcano MD as MD (Ophthalmology)  Anat Benitez MD as MD (Dermatology)  Pepe Verduzco DPM as  Assigned Musculoskeletal Provider  Ida Putnam, RN as Specialty Care Coordinator  Anat Benitez MD as Assigned Surgical Provider  Daysi Perez MD as Assigned OBGYN Provider

## 2022-08-03 NOTE — LETTER
8/3/2022       RE: Belen Sharma  [address] [place] [address]     Dear Colleague,    Thank you for referring your patient, Belen Sharma, to the [facility] [place] at [facility]. Please see a copy of my visit note below.    CC: prolapse    Pt here for pessary check. Pt inserting and removing pessary daily. Pt happy with pessary overall. No bleeding, spotting or discharge. She is concerned that it is falling out and that she needs a bigger size. Currently has a #9 ring with support.    BP (!) 88/52   Pulse 94   Wt 68 kg (150 lb)   BMI 22.15 kg/m   No LMP recorded. Patient is postmenopausal. Body mass index is 22.15 kg/m .  Ms. Sharma is alert, comfortable in no acute distress, non-labored breathing.   Vagina: no evidence of erosion, discharge or bleeding. Exam with the pessary in place showed that it occupies the vaginal space.    Had patient bear down with the pessary in place and without the pessary. No evidence of prolapse either time.    A/P: Belen Sharma is a 62 year old F with rectocele and cystocele    Do not feel that going to a bigger pessary would help as I am concerned that it would lead to erosions. Offered to examine pt at the end of the day with the pessary out. Otherwise f/u in 6 months or sooner PRN.    I spent a total of 30 minutes with  Ms. Sharma  on the date of the encounter in chart review, face to face patient visit, review of tests, documentation and/or discussion with other providers about the issues documented above.    Zach Francis MD  Professor, OB/GYN  Urogynecologist  CC  Patient Care Team:  Vincent De La Garza MD as PCP - General (Internal Medicine)  Christy Mohamud MD as MD (Cardiology)  Divya Chávez, RN as Registered Nurse (Transplant)  Елена Dinh RN as Registered Nurse  Christy Mohamud MD as Assigned Heart and Vascular Provider  Leventhal, Thomas Michael, MD as Assigned Gastroenterology  Provider  Vincent De La Garza MD as Assigned PCP  Saran Angulo MD as Assigned Nephrology Provider  Daysi Perez MD as MD (OB/Gyn)  Shannon Loza OD (Optometry)  Sapna Marcano MD as MD (Ophthalmology)  Anat Benitez MD as MD (Dermatology)  Pepe Verduzco, CARLOS as Assigned Musculoskeletal Provider  Ida Putnam RN as Specialty Care Coordinator  Anat Benitez MD as Assigned Surgical Provider  Daysi Perez MD as Assigned OBGYN Provider

## 2022-08-04 ENCOUNTER — LAB (OUTPATIENT)
Dept: LAB | Facility: CLINIC | Age: 62
End: 2022-08-04
Payer: MEDICARE

## 2022-08-04 DIAGNOSIS — Z94.4 LIVER REPLACED BY TRANSPLANT (H): ICD-10-CM

## 2022-08-04 DIAGNOSIS — Z94.0 KIDNEY TRANSPLANTED: ICD-10-CM

## 2022-08-04 DIAGNOSIS — Z48.298 CARE AFTER ORGAN TRANSPLANT: ICD-10-CM

## 2022-08-04 LAB
ANION GAP SERPL CALCULATED.3IONS-SCNC: 10 MMOL/L (ref 7–15)
BUN SERPL-MCNC: 16.5 MG/DL (ref 8–23)
CALCIUM SERPL-MCNC: 10.1 MG/DL (ref 8.8–10.2)
CHLORIDE SERPL-SCNC: 100 MMOL/L (ref 98–107)
CREAT SERPL-MCNC: 0.81 MG/DL (ref 0.51–1.17)
DEPRECATED HCO3 PLAS-SCNC: 24 MMOL/L (ref 22–29)
ERYTHROCYTE [DISTWIDTH] IN BLOOD BY AUTOMATED COUNT: 13.6 % (ref 10–15)
GFR SERPL CREATININE-BSD FRML MDRD: 82 ML/MIN/1.73M2
GLUCOSE SERPL-MCNC: 75 MG/DL (ref 70–99)
HCT VFR BLD AUTO: 46 % (ref 35–53)
HGB BLD-MCNC: 15.2 G/DL (ref 11.7–17.7)
MCH RBC QN AUTO: 30.2 PG (ref 26.5–33)
MCHC RBC AUTO-ENTMCNC: 33 G/DL (ref 31.5–36.5)
MCV RBC AUTO: 92 FL (ref 78–100)
PLATELET # BLD AUTO: 242 10E3/UL (ref 150–450)
POTASSIUM SERPL-SCNC: 3.6 MMOL/L (ref 3.4–5.3)
RBC # BLD AUTO: 5.03 10E6/UL (ref 3.8–5.9)
SODIUM SERPL-SCNC: 134 MMOL/L (ref 136–145)
TACROLIMUS BLD-MCNC: 3.8 UG/L (ref 5–15)
TME LAST DOSE: ABNORMAL H
TME LAST DOSE: ABNORMAL H
WBC # BLD AUTO: 7.1 10E3/UL (ref 4–11)

## 2022-08-04 PROCEDURE — 80048 BASIC METABOLIC PNL TOTAL CA: CPT

## 2022-08-04 PROCEDURE — 36415 COLL VENOUS BLD VENIPUNCTURE: CPT

## 2022-08-04 PROCEDURE — 86708 HEPATITIS A ANTIBODY: CPT

## 2022-08-04 PROCEDURE — 85027 COMPLETE CBC AUTOMATED: CPT

## 2022-08-04 PROCEDURE — 80197 ASSAY OF TACROLIMUS: CPT

## 2022-08-05 LAB — HAV IGG SER QL IA: NONREACTIVE

## 2022-08-10 DIAGNOSIS — Z94.0 KIDNEY TRANSPLANTED: Primary | ICD-10-CM

## 2022-08-10 DIAGNOSIS — Z94.4 S/P LIVER TRANSPLANT (H): ICD-10-CM

## 2022-08-10 RX ORDER — TACROLIMUS 1 MG/1
1 CAPSULE ORAL 2 TIMES DAILY
Qty: 180 CAPSULE | Refills: 3 | Status: SHIPPED | OUTPATIENT
Start: 2022-08-10 | End: 2023-03-15

## 2022-08-10 RX ORDER — TACROLIMUS 0.5 MG/1
0.5 CAPSULE ORAL 2 TIMES DAILY
Qty: 180 CAPSULE | Refills: 3 | Status: SHIPPED | OUTPATIENT
Start: 2022-08-10 | End: 2023-04-30

## 2022-08-18 ENCOUNTER — LAB (OUTPATIENT)
Dept: LAB | Facility: CLINIC | Age: 62
End: 2022-08-18
Payer: MEDICARE

## 2022-08-18 DIAGNOSIS — Z94.0 KIDNEY TRANSPLANTED: ICD-10-CM

## 2022-08-18 LAB
ANION GAP SERPL CALCULATED.3IONS-SCNC: 10 MMOL/L (ref 7–15)
BUN SERPL-MCNC: 16 MG/DL (ref 8–23)
CALCIUM SERPL-MCNC: 9.7 MG/DL (ref 8.8–10.2)
CHLORIDE SERPL-SCNC: 107 MMOL/L (ref 98–107)
CREAT SERPL-MCNC: 0.84 MG/DL (ref 0.51–1.17)
DEPRECATED HCO3 PLAS-SCNC: 24 MMOL/L (ref 22–29)
ERYTHROCYTE [DISTWIDTH] IN BLOOD BY AUTOMATED COUNT: 14.3 % (ref 10–15)
GFR SERPL CREATININE-BSD FRML MDRD: 78 ML/MIN/1.73M2
GLUCOSE SERPL-MCNC: 83 MG/DL (ref 70–99)
HCT VFR BLD AUTO: 45.7 % (ref 35–53)
HGB BLD-MCNC: 14.9 G/DL (ref 11.7–17.7)
MCH RBC QN AUTO: 30.2 PG (ref 26.5–33)
MCHC RBC AUTO-ENTMCNC: 32.6 G/DL (ref 31.5–36.5)
MCV RBC AUTO: 93 FL (ref 78–100)
PLATELET # BLD AUTO: 258 10E3/UL (ref 150–450)
POTASSIUM SERPL-SCNC: 4 MMOL/L (ref 3.4–5.3)
RBC # BLD AUTO: 4.94 10E6/UL (ref 3.8–5.9)
SODIUM SERPL-SCNC: 141 MMOL/L (ref 136–145)
TACROLIMUS BLD-MCNC: 5.8 UG/L (ref 5–15)
TME LAST DOSE: NORMAL H
TME LAST DOSE: NORMAL H
WBC # BLD AUTO: 7.6 10E3/UL (ref 4–11)

## 2022-08-18 PROCEDURE — 85027 COMPLETE CBC AUTOMATED: CPT

## 2022-08-18 PROCEDURE — 36415 COLL VENOUS BLD VENIPUNCTURE: CPT

## 2022-08-18 PROCEDURE — 80048 BASIC METABOLIC PNL TOTAL CA: CPT

## 2022-08-18 PROCEDURE — 80197 ASSAY OF TACROLIMUS: CPT

## 2022-08-22 ENCOUNTER — NURSE TRIAGE (OUTPATIENT)
Dept: NURSING | Facility: CLINIC | Age: 62
End: 2022-08-22

## 2022-08-22 NOTE — TELEPHONE ENCOUNTER
Pt called scheduling and was transferred to this RN regarding pt wanting to see if she has standing UA order.    Pt states she had liver and kidney transplant , and since yesterday has been having vaginal irritation, dryness and on and off urinary frequency. Pt denied fever, flank and back pain. Pt stated the burning is from the hanging staff from the prolapse.       Pt reported since she had liver and kidney transplant done, the infectiosus disease provider was fallowing her and should have standing order for UA lab order, and if there is no order from the infection diseases provider,  PCP can place the order for UTI.       Pt was told there is an order for UA from Dr Leventhal thomas on 2/2/2022, and this is okay for her to use for [facility]'s UA lab appointment. Pt stated okay.      Per raúl pt was advised to be seen within 24 hours and to call back if she develops fever, unable to urinate and balder feels full or becomes worse. Pt stated okay.       Pt was told message will be sent to Vincent Sharif for future UA standing orders. Pt stated okay.           Severo Perdomo,ANNALISA  [facility]                Reason for Disposition    Urinating more frequently than usual (i.e., frequency)    Additional Information    Negative: Shock suspected (e.g., cold/pale/clammy skin, too weak to stand, low BP, rapid pulse)    Negative: Sounds like a life-threatening emergency to the triager    Negative: Followed a female genital area injury (e.g., vagina, vulva)    Negative: Followed a male genital area injury (e.g., penis, scrotum)    Negative: Vaginal discharge    Negative: Pus (white, yellow) or bloody discharge from end of penis    Negative: [1] Taking antibiotic for urinary tract infection (UTI) AND [2] female    Negative: [1] Taking antibiotic for urinary tract infection (UTI) AND [2] male    Negative: [1] Pain or burning with passing urine (urination) AND [2] pregnant    Negative: [1] Pain or  burning with passing urine (urination) AND [2] postpartum (from 0 to 6 weeks after delivery)    Negative: [1] Pain or burning with passing urine (urination) AND [2] female    Negative: [1] Pain or burning with passing urine (urination) AND [2] male    Negative: Pain or itching in the vulvar area    Negative: Pain in scrotum is main symptom    Negative: Blood in the urine is main symptom    Negative: Symptoms arising from use of a urinary catheter (e.g., coude, [place])    Negative: [1] Unable to urinate (or only a few drops) > 4 hours AND [2] bladder feels very full (e.g., palpable bladder or strong urge to urinate)    Negative: [1] Decreased urination and [2] drinking very little AND [2] dehydration suspected (e.g., dark urine, no urine > 12 hours, very dry mouth, very lightheaded)     Lithlighheaded.    Negative: Patient sounds very sick or weak to the triager    Negative: Fever > 100.4 F (38.0 C)    Negative: Side (flank) or lower back pain present    Negative: [1] Can't control passage of urine (i.e., urinary incontinence) AND [2] new-onset (< 2 weeks) or worsening    Protocols used: URINARY SYMPTOMS-A-AH

## 2022-08-23 ENCOUNTER — LAB (OUTPATIENT)
Dept: LAB | Facility: CLINIC | Age: 62
End: 2022-08-23
Payer: MEDICARE

## 2022-08-23 ENCOUNTER — DOCUMENTATION ONLY (OUTPATIENT)
Dept: LAB | Facility: CLINIC | Age: 62
End: 2022-08-23

## 2022-08-23 DIAGNOSIS — R82.90 ABNORMAL URINE: ICD-10-CM

## 2022-08-23 DIAGNOSIS — R82.90 ABNORMAL URINE: Primary | ICD-10-CM

## 2022-08-23 LAB
ALBUMIN UR-MCNC: 30 MG/DL
APPEARANCE UR: ABNORMAL
BACTERIA #/AREA URNS HPF: ABNORMAL /HPF
BILIRUB UR QL STRIP: NEGATIVE
COLOR UR AUTO: YELLOW
GLUCOSE UR STRIP-MCNC: NEGATIVE MG/DL
HGB UR QL STRIP: ABNORMAL
KETONES UR STRIP-MCNC: NEGATIVE MG/DL
LEUKOCYTE ESTERASE UR QL STRIP: ABNORMAL
NITRATE UR QL: NEGATIVE
PH UR STRIP: 6 [PH] (ref 5–8)
RBC #/AREA URNS AUTO: ABNORMAL /HPF
SP GR UR STRIP: 1.01 (ref 1–1.03)
SQUAMOUS #/AREA URNS AUTO: ABNORMAL /LPF
UROBILINOGEN UR STRIP-ACNC: 0.2 E.U./DL
WBC #/AREA URNS AUTO: >100 /HPF
WBC CLUMPS #/AREA URNS HPF: PRESENT /HPF

## 2022-08-23 PROCEDURE — 87086 URINE CULTURE/COLONY COUNT: CPT

## 2022-08-23 PROCEDURE — 81001 URINALYSIS AUTO W/SCOPE: CPT

## 2022-08-23 NOTE — PROGRESS NOTES
Placed UA order    YARELIS De La Garza    Belen Sharma has a lab appointment today.        Future Appointments   Date Time Provider Department Center   8/23/2022  1:00 PM [facility]   8/29/2022  8:00 AM [facility] LAB TMLABR [facility] WB   8/29/2022  2:30 PM Vivi Redmond RD UCTXO [facility]   8/31/2022 10:00 AM Zach Francis MD [facility] CLIN     The appointment note says: standing order for UTI check from Dr. Gonzales    There is no Lab order available.  Please review and place future orders as appropriate.  If no Lab order will be placed please advise patient.        Thanks,    Monica Richards

## 2022-08-24 DIAGNOSIS — Z94.4 S/P LIVER TRANSPLANT (H): Primary | ICD-10-CM

## 2022-08-24 DIAGNOSIS — Z94.0 KIDNEY TRANSPLANTED: ICD-10-CM

## 2022-08-25 LAB — BACTERIA UR CULT: NORMAL

## 2022-08-27 ENCOUNTER — NURSE TRIAGE (OUTPATIENT)
Dept: NURSING | Facility: CLINIC | Age: 62
End: 2022-08-27

## 2022-08-28 ENCOUNTER — HOSPITAL ENCOUNTER (EMERGENCY)
Facility: CLINIC | Age: 62
Discharge: HOME | End: 2022-08-29
Attending: EMERGENCY MEDICINE | Admitting: EMERGENCY MEDICINE
Payer: MEDICARE

## 2022-08-28 DIAGNOSIS — N39.0 URINARY TRACT INFECTION WITHOUT HEMATURIA, SITE UNSPECIFIED: ICD-10-CM

## 2022-08-28 LAB
ALBUMIN UR-MCNC: 200 MG/DL
APPEARANCE UR: ABNORMAL
BACTERIA #/AREA URNS HPF: ABNORMAL /HPF
BILIRUB UR QL STRIP: NEGATIVE
COLOR UR AUTO: YELLOW
GLUCOSE UR STRIP-MCNC: NEGATIVE MG/DL
HGB UR QL STRIP: ABNORMAL
KETONES UR STRIP-MCNC: NEGATIVE MG/DL
LEUKOCYTE ESTERASE UR QL STRIP: ABNORMAL
NITRATE UR QL: NEGATIVE
PH UR STRIP: 6 [PH] (ref 5–7)
RBC URINE: 20 /HPF
SP GR UR STRIP: 1.01 (ref 1–1.03)
UROBILINOGEN UR STRIP-MCNC: NORMAL MG/DL
WBC CLUMPS #/AREA URNS HPF: PRESENT /HPF
WBC URINE: >182 /HPF

## 2022-08-28 PROCEDURE — 81001 URINALYSIS AUTO W/SCOPE: CPT | Performed by: EMERGENCY MEDICINE

## 2022-08-28 PROCEDURE — 99284 EMERGENCY DEPT VISIT MOD MDM: CPT | Mod: 25 | Performed by: EMERGENCY MEDICINE

## 2022-08-28 PROCEDURE — 96365 THER/PROPH/DIAG IV INF INIT: CPT | Performed by: EMERGENCY MEDICINE

## 2022-08-28 PROCEDURE — 99284 EMERGENCY DEPT VISIT MOD MDM: CPT | Performed by: EMERGENCY MEDICINE

## 2022-08-28 PROCEDURE — 87086 URINE CULTURE/COLONY COUNT: CPT | Performed by: EMERGENCY MEDICINE

## 2022-08-28 ASSESSMENT — ACTIVITIES OF DAILY LIVING (ADL): ADLS_ACUITY_SCORE: 35

## 2022-08-28 NOTE — TELEPHONE ENCOUNTER
Concerns about side effects of medication fosfomycin,  Has vaginal prolapse and is dealing with vaginal irritation, burning and itchiness.   Has a pessary and not using, took it out yesterday, prolapse is causing irritation. Does have vaginal dryness, trying to increase fluid,   Denies pain, fever, burning and peeing has improved. Still urinating frequently  Had a slight discharge earlier from using pessary, no noticeable discharge or odor    Triage to be seen within 24 hours, advise to go to  in the morning or ED if symptoms worsen. Care advice given per protocol. Call back if symptoms worsen.      Gissell Rodriguez, RN, BSN  8/27/2022 at 7:34 PM  [place] [facility]            Reason for Disposition    MODERATE-SEVERE itching (i.e., interferes with school, work, or sleep)    Additional Information    Negative: Sounds like a life-threatening emergency to the triager    Negative: Followed a genital area injury (e.g., vagina, vulva)    Negative: Foreign body in vagina (e.g., tampon)    Negative: Vaginal bleeding is main symptom    Negative: Vaginal discharge is main symptom    Negative: Pain or burning with passing urine (urination) is main symptom    Negative: Menstrual cramps is main symptom    Negative: Abdomen pain is main symptom    Negative: Pubic lice suspected    Negative: Itching or rash of external female genital area (vulva)    Negative: Labor suspected    Negative: Patient sounds very sick or weak to the triager    Negative: [1] SEVERE pain AND [2] not improved 2 hours after pain medicine    Negative: [1] Genital area looks infected (e.g., draining sore, spreading redness) AND [2] fever    Negative: [1] Something is hanging out of the vagina AND [2] can't easily be pushed back inside    Protocols used: VAGINAL SYMPTOMS-A-AH

## 2022-08-29 ENCOUNTER — LAB (OUTPATIENT)
Dept: LAB | Facility: CLINIC | Age: 62
End: 2022-08-29
Payer: MEDICARE

## 2022-08-29 ENCOUNTER — TELEPHONE (OUTPATIENT)
Dept: TRANSPLANT | Facility: CLINIC | Age: 62
End: 2022-08-29

## 2022-08-29 VITALS
RESPIRATION RATE: 16 BRPM | HEART RATE: 81 BPM | OXYGEN SATURATION: 95 % | TEMPERATURE: 98.7 F | DIASTOLIC BLOOD PRESSURE: 68 MMHG | SYSTOLIC BLOOD PRESSURE: 104 MMHG

## 2022-08-29 DIAGNOSIS — Z94.0 KIDNEY TRANSPLANTED: ICD-10-CM

## 2022-08-29 DIAGNOSIS — Z94.4 LIVER REPLACED BY TRANSPLANT (H): ICD-10-CM

## 2022-08-29 DIAGNOSIS — Z13.220 LIPID SCREENING: ICD-10-CM

## 2022-08-29 LAB
ALBUMIN SERPL BCG-MCNC: 3.8 G/DL (ref 3.5–5.2)
ALBUMIN SERPL BCG-MCNC: 3.9 G/DL (ref 3.5–5.2)
ALP SERPL-CCNC: 63 U/L (ref 35–129)
ALP SERPL-CCNC: 66 U/L (ref 35–129)
ALT SERPL W P-5'-P-CCNC: 12 U/L (ref 10–50)
ALT SERPL W P-5'-P-CCNC: 7 U/L (ref 10–50)
ANION GAP SERPL CALCULATED.3IONS-SCNC: 12 MMOL/L (ref 7–15)
ANION GAP SERPL CALCULATED.3IONS-SCNC: 8 MMOL/L (ref 7–15)
AST SERPL W P-5'-P-CCNC: 16 U/L (ref 10–50)
AST SERPL W P-5'-P-CCNC: 23 U/L (ref 10–50)
BASOPHILS # BLD AUTO: 0 10E3/UL (ref 0–0.2)
BASOPHILS NFR BLD AUTO: 0 %
BILIRUB DIRECT SERPL-MCNC: <0.2 MG/DL (ref 0–0.3)
BILIRUB SERPL-MCNC: 0.3 MG/DL
BILIRUB SERPL-MCNC: 0.4 MG/DL
BUN SERPL-MCNC: 15.5 MG/DL (ref 8–23)
BUN SERPL-MCNC: 22.7 MG/DL (ref 8–23)
CALCIUM SERPL-MCNC: 9.6 MG/DL (ref 8.8–10.2)
CALCIUM SERPL-MCNC: 9.9 MG/DL (ref 8.8–10.2)
CHLORIDE SERPL-SCNC: 106 MMOL/L (ref 98–107)
CHLORIDE SERPL-SCNC: 107 MMOL/L (ref 98–107)
CHOLEST SERPL-MCNC: 181 MG/DL
CREAT SERPL-MCNC: 0.65 MG/DL (ref 0.51–1.17)
CREAT SERPL-MCNC: 0.72 MG/DL (ref 0.51–1.17)
DEPRECATED HCO3 PLAS-SCNC: 20 MMOL/L (ref 22–29)
DEPRECATED HCO3 PLAS-SCNC: 25 MMOL/L (ref 22–29)
EOSINOPHIL # BLD AUTO: 0.1 10E3/UL (ref 0–0.7)
EOSINOPHIL NFR BLD AUTO: 2 %
ERYTHROCYTE [DISTWIDTH] IN BLOOD BY AUTOMATED COUNT: 14.1 % (ref 10–15)
ERYTHROCYTE [DISTWIDTH] IN BLOOD BY AUTOMATED COUNT: 14.4 % (ref 10–15)
GFR SERPL CREATININE-BSD FRML MDRD: >90 ML/MIN/1.73M2
GFR SERPL CREATININE-BSD FRML MDRD: >90 ML/MIN/1.73M2
GLUCOSE SERPL-MCNC: 88 MG/DL (ref 70–99)
GLUCOSE SERPL-MCNC: 88 MG/DL (ref 70–99)
HAV IGG SER QL IA: NONREACTIVE
HCT VFR BLD AUTO: 44.7 % (ref 35–53)
HCT VFR BLD AUTO: 45.3 % (ref 35–53)
HDLC SERPL-MCNC: 44 MG/DL
HGB BLD-MCNC: 14.2 G/DL (ref 11.7–17.7)
HGB BLD-MCNC: 15.1 G/DL (ref 11.7–17.7)
IMM GRANULOCYTES # BLD: 0 10E3/UL
IMM GRANULOCYTES NFR BLD: 0 %
LDLC SERPL CALC-MCNC: 112 MG/DL
LYMPHOCYTES # BLD AUTO: 2.2 10E3/UL (ref 0.8–5.3)
LYMPHOCYTES NFR BLD AUTO: 29 %
MCH RBC QN AUTO: 29.8 PG (ref 26.5–33)
MCH RBC QN AUTO: 30 PG (ref 26.5–33)
MCHC RBC AUTO-ENTMCNC: 31.8 G/DL (ref 31.5–36.5)
MCHC RBC AUTO-ENTMCNC: 33.3 G/DL (ref 31.5–36.5)
MCV RBC AUTO: 90 FL (ref 78–100)
MCV RBC AUTO: 94 FL (ref 78–100)
MONOCYTES # BLD AUTO: 1.2 10E3/UL (ref 0–1.3)
MONOCYTES NFR BLD AUTO: 16 %
NEUTROPHILS # BLD AUTO: 3.8 10E3/UL (ref 1.6–8.3)
NEUTROPHILS NFR BLD AUTO: 53 %
NONHDLC SERPL-MCNC: 137 MG/DL
NRBC # BLD AUTO: 0 10E3/UL
NRBC BLD AUTO-RTO: 0 /100
PLATELET # BLD AUTO: 233 10E3/UL (ref 150–450)
PLATELET # BLD AUTO: 241 10E3/UL (ref 150–450)
POTASSIUM SERPL-SCNC: 3.8 MMOL/L (ref 3.4–5.3)
POTASSIUM SERPL-SCNC: 4 MMOL/L (ref 3.4–5.3)
PROT SERPL-MCNC: 6.2 G/DL (ref 6.4–8.3)
PROT SERPL-MCNC: 6.5 G/DL (ref 6.4–8.3)
RBC # BLD AUTO: 4.77 10E6/UL (ref 3.8–5.9)
RBC # BLD AUTO: 5.04 10E6/UL (ref 3.8–5.9)
SODIUM SERPL-SCNC: 139 MMOL/L (ref 136–145)
SODIUM SERPL-SCNC: 139 MMOL/L (ref 136–145)
TRIGL SERPL-MCNC: 123 MG/DL
WBC # BLD AUTO: 6.7 10E3/UL (ref 4–11)
WBC # BLD AUTO: 7.4 10E3/UL (ref 4–11)

## 2022-08-29 PROCEDURE — 80197 ASSAY OF TACROLIMUS: CPT

## 2022-08-29 PROCEDURE — 84155 ASSAY OF PROTEIN SERUM: CPT

## 2022-08-29 PROCEDURE — 36415 COLL VENOUS BLD VENIPUNCTURE: CPT

## 2022-08-29 PROCEDURE — 84075 ASSAY ALKALINE PHOSPHATASE: CPT

## 2022-08-29 PROCEDURE — 82248 BILIRUBIN DIRECT: CPT | Performed by: EMERGENCY MEDICINE

## 2022-08-29 PROCEDURE — 80061 LIPID PANEL: CPT

## 2022-08-29 PROCEDURE — 250N000011 HC RX IP 250 OP 636: Performed by: EMERGENCY MEDICINE

## 2022-08-29 PROCEDURE — 84460 ALANINE AMINO (ALT) (SGPT): CPT

## 2022-08-29 PROCEDURE — 36415 COLL VENOUS BLD VENIPUNCTURE: CPT | Performed by: EMERGENCY MEDICINE

## 2022-08-29 PROCEDURE — 82247 BILIRUBIN TOTAL: CPT

## 2022-08-29 PROCEDURE — 85025 COMPLETE CBC W/AUTO DIFF WBC: CPT | Performed by: EMERGENCY MEDICINE

## 2022-08-29 PROCEDURE — 82248 BILIRUBIN DIRECT: CPT

## 2022-08-29 PROCEDURE — 86708 HEPATITIS A ANTIBODY: CPT | Performed by: EMERGENCY MEDICINE

## 2022-08-29 PROCEDURE — 84450 TRANSFERASE (AST) (SGOT): CPT

## 2022-08-29 PROCEDURE — 85027 COMPLETE CBC AUTOMATED: CPT

## 2022-08-29 RX ORDER — CEFDINIR 300 MG/1
300 CAPSULE ORAL 2 TIMES DAILY
Qty: 14 CAPSULE | Refills: 0 | Status: SHIPPED | OUTPATIENT
Start: 2022-08-29 | End: 2022-09-05

## 2022-08-29 RX ORDER — CEFTRIAXONE 1 G/1
1 INJECTION, POWDER, FOR SOLUTION INTRAMUSCULAR; INTRAVENOUS ONCE
Status: COMPLETED | OUTPATIENT
Start: 2022-08-29 | End: 2022-08-29

## 2022-08-29 RX ADMIN — CEFTRIAXONE SODIUM 1 G: 1 INJECTION, POWDER, FOR SOLUTION INTRAMUSCULAR; INTRAVENOUS at 03:07

## 2022-08-29 ASSESSMENT — ACTIVITIES OF DAILY LIVING (ADL)
ADLS_ACUITY_SCORE: 35
ADLS_ACUITY_SCORE: 35

## 2022-08-29 NOTE — ED PROVIDER NOTES
[place] EMERGENCY DEPARTMENT ([facility])  August 28, 2022     History     Chief Complaint   Patient presents with     Urinary Frequency     HPI  Belen Sharma is a 62 year old adult with a past medical history including UTI (currently has UTI, took fosfomycin 3 mg 1 dose 5 days ago), s/p kidney transplant, s/p liver transplant, secondary hyperparathyroidism, malignant neoplasm of breast who presents to the Emergency Department for evaluation of UTI symptoms. The patient reports that she finished a 3-day course of fosfomyycin that she started on Tuesday (5 days ago). She thought her symptoms of increased urinary urgency, and sharp burning pains with urination  were improving until yesterday, when they worsened.  She denies fever chills or systemic symptoms.  No nausea or vomiting.  No pain over her transplanted kidney.  She states that she is here because she presented to nurse triage yesterday, and was told to present within 24 hours to the UC or ED if her symptoms worsened.    The patient states that she has a pessary but per chart review she took it out 2 days ago. She has a vaginal prolapse, which is causing irritation. She states she is trying to increase fluid intake and that she is using different antibiotics to find one that works. Patient denies nausea, vomiting, fever, or chills.         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 Both, very large, native kidneys reamain.     PONV (postoperative nausea and vomiting)     Need to start with ice chips and apple juice, no soda     S/P kidney transplant      SLK 10/9/2010 - kidney failure 2/2 [facility]. Explanted . Re-transplant after de-sensitization 2016     S/P liver transplant (H)     10/9/2010 - [facility], ischemic biopathy. Re-transplant 3/3/2011     S/P splenectomy      Spider veins      Thyroid disease     Hyperthyroid treated with single dose iodine     Past Surgical History:   Procedure Laterality Date     bunectomy  2018    right foot     bunionectomy  2019    left     CHOLECYSTECTOMY       CONIZATION N/A 2020    Procedure: CONE BIOPSY, CERVIX;  Surgeon: Daysi Perez MD;  Location: [facility]     FAILED PICC - RIGHT ARM Right 2020    Has a LUE AV fistula.     H STATISTIC PICC LINE INSERTION >5YR, FAILED Bilateral 2020    Left fistula, Right failed x 2 Occlussion     HERNIA REPAIR, INCISIONAL  2013     IR DIALYSIS PTA CENTRAL SEG  2020     IR PICC PLACEMENT > 5 YRS OF AGE  2020     SPLENECTOMY      Splenectomy     TRANSPLANT KIDNEY RECIPIENT  DONOR      re-transplant after [facility]; 6 months de-sensitization prior AND 6 months after transplant     TRANSPLANT LIVER RECIPIENT  DONOR  2011     TRANSPLANT LIVER, KIDNEY RECIPIENT  DONOR, COMBINED  10/09/2010    [facility] - re-Tx liver 3/3/2011; Kidney (left iliac) lost to [facility]/fibrosis - explant     VASCULAR SURGERY      Vascular access left UE. Not used for 4 years since 2nd kidney tx -- [place]     tacrolimus (GENERIC EQUIVALENT) 0.5 MG capsule  tacrolimus (GENERIC EQUIVALENT) 1 MG capsule  acetaminophen (TYLENOL) 325 MG tablet  aspirin (ASA) 81 MG chewable tablet  biotin 1000 MCG TABS tablet  ciclopirox (PENLAC) 8 % external solution  diphenhydrAMINE (BENADRYL) 25 MG tablet  estradiol (ESTRACE) 0.1 MG/GM vaginal cream  guaiFENesin (MUCINEX PO)  loratadine (CLARITIN) 10 MG tablet  Multiple Vitamins-Minerals (WOMENS DAILY FORMULA PO)  MYFORTIC (BRAND) 180 MG EC tablet  nitroGLYcerin (NITROSTAT) 0.4 MG sublingual tablet  pantoprazole  (PROTONIX) 40 MG EC tablet  predniSONE (DELTASONE) 5 MG tablet  Probiotic Product (PROBIOTIC PO)  sulfamethoxazole-trimethoprim (BACTRIM) 400-80 MG tablet  temazepam (RESTORIL) 15 MG capsule      Allergies   Allergen Reactions     Ibuprofen      Due to liver transplant     Family History  Family History   Problem Relation Age of Onset     Uterine Cancer Maternal Grandmother      Polycystic Kidney Diease Brother      Polycystic Kidney Diease Brother      Polycystic Kidney Diease Brother      Polycystic Kidney Diease Brother      Cervical Cancer Maternal Aunt      Depression Mother      Anxiety Disorder Mother      Depression Father      Anxiety Disorder Father      Social History   Social History     Tobacco Use     Smoking status: Never Smoker     Smokeless tobacco: Never Used   Substance Use Topics     Alcohol use: Not Currently     Drug use: Not Currently      Past medical history, past surgical history, medications, allergies, family history, and social history were reviewed with the patient. No additional pertinent items.       Review of Systems  A complete review of systems was performed with pertinent positives and negatives noted in the HPI, and all other systems negative.    Physical Exam   BP: 110/64  Pulse: 81  Temp: 98.7  F (37.1  C)  Resp: 16  SpO2: 96 %  Physical Exam  Constitutional:       General: Belen Sharma is not in acute distress.     Appearance: Belen Sharma is well-developed. Belen Sharma is not diaphoretic.   HENT:      Head: Normocephalic and atraumatic.      Mouth/Throat:      Pharynx: No oropharyngeal exudate.   Eyes:      General: No scleral icterus.        Right eye: No discharge.         Left eye: No discharge.      Pupils: Pupils are equal, round, and reactive to light.   Cardiovascular:      Rate and Rhythm: Normal rate and regular rhythm.      Heart sounds: Normal heart sounds. No murmur heard.    No friction rub. No gallop.   Pulmonary:      Effort: Pulmonary effort is normal. No  respiratory distress.      Breath sounds: Normal breath sounds. No wheezing.   Chest:      Chest wall: No tenderness.   Abdominal:      General: Bowel sounds are normal. There is no distension.      Palpations: Abdomen is soft.      Tenderness: There is no abdominal tenderness.   Musculoskeletal:         General: No tenderness or deformity. Normal range of motion.      Cervical back: Normal range of motion and neck supple.   Skin:     General: Skin is warm and dry.      Coloration: Skin is not pale.      Findings: No erythema or rash.   Neurological:      Mental Status: Belen Sharma is alert and oriented to person, place, and time.      Cranial Nerves: No cranial nerve deficit.         ED Course     10:46 PM  The patient was seen and examined by Mat Arzate DO in Room ED07.    Procedures                     Results for orders placed or performed during the hospital encounter of 08/28/22   UA with Microscopic reflex to Culture     Status: Abnormal    Specimen: Urine, Clean Catch   Result Value Ref Range    Color Urine Yellow Colorless, Straw, Light Yellow, Yellow    Appearance Urine Cloudy (A) Clear    Glucose Urine Negative Negative mg/dL    Bilirubin Urine Negative Negative    Ketones Urine Negative Negative mg/dL    Specific Gravity Urine 1.015 1.003 - 1.035    Blood Urine Moderate (A) Negative    pH Urine 6.0 5.0 - 7.0    Protein Albumin Urine 200  (A) Negative mg/dL    Urobilinogen Urine Normal Normal, 2.0 mg/dL    Nitrite Urine Negative Negative    Leukocyte Esterase Urine Large (A) Negative    Bacteria Urine Few (A) None Seen /HPF    WBC Clumps Urine Present (A) None Seen /HPF    RBC Urine 20 (H) <=2 /HPF    WBC Urine >182 (H) <=5 /HPF    Narrative    Urine Culture ordered based on laboratory criteria     Medications - No data to display     Assessments & Plan (with Medical Decision Making)   This is a 62-year-old female with UTI symptoms who presents with continued symptoms after receiving a dose of  fosfomycin.  She denies fever chills or systemic symptoms.  Lab work shows no acute abnormalities.  UA shows likely UTI.  Urine culture is pending at this time.  Previous urine cultures did not show any growth however there does appear to be UTI.  After discussion with Pharmacy was decided to give patient a dose of ceftriaxone and start patient on cefdinir.  Patient's transplant physician plan to order hepatitis A antibody IgG and this was added to patient's labs.  Patient will be discharged with return precautions.    I have reviewed the nursing notes. I have reviewed the findings, diagnosis, plan and need for follow up with the patient.    New Prescriptions    No medications on file       Final diagnoses:   None     IEmma, am serving as a trained medical scribe to document services personally performed by Mat Arzate DO, based on the provider's statements to me.   Mat FLORES DO, was physically present and have reviewed and verified the accuracy of this note documented by Emma Sanford.   --  Mat Arzate DO  [facility] EMERGENCY DEPARTMENT  8/28/2022     Mat Arzate DO  08/29/22 [id number]

## 2022-08-29 NOTE — TELEPHONE ENCOUNTER
Belen calling to cancel Nutrition phone appointment with Vivi lai at 230 pm. Patient was in the ER all night

## 2022-08-29 NOTE — ED TRIAGE NOTES
Pt currently has UTI, took fosfomycin 3 mg 1 time dose Tuesday (5 days ago) and feels like symptoms are worse. C/o urinary burning, frequent urination, vaginal spotting and discharge/odor (has pessary)       Hx kidney and liver transplant

## 2022-08-30 ENCOUNTER — TELEPHONE (OUTPATIENT)
Dept: TRANSPLANT | Facility: CLINIC | Age: 62
End: 2022-08-30

## 2022-08-30 LAB
BACTERIA UR CULT: NORMAL
TACROLIMUS BLD-MCNC: 5.3 UG/L (ref 5–15)
TME LAST DOSE: NORMAL H
TME LAST DOSE: NORMAL H

## 2022-08-30 NOTE — TELEPHONE ENCOUNTER
Left message for patient to call in to reschedule nutrition appointments.  Asked patient to call back to schedule.

## 2022-08-31 ENCOUNTER — OFFICE VISIT (OUTPATIENT)
Dept: UROLOGY | Facility: CLINIC | Age: 62
End: 2022-08-31
Attending: OBSTETRICS & GYNECOLOGY
Payer: MEDICARE

## 2022-08-31 VITALS
WEIGHT: 150 LBS | DIASTOLIC BLOOD PRESSURE: 61 MMHG | BODY MASS INDEX: 22.15 KG/M2 | SYSTOLIC BLOOD PRESSURE: 103 MMHG | HEART RATE: 91 BPM

## 2022-08-31 DIAGNOSIS — R31.29 OTHER MICROSCOPIC HEMATURIA: ICD-10-CM

## 2022-08-31 DIAGNOSIS — N81.11 CYSTOCELE, MIDLINE: ICD-10-CM

## 2022-08-31 DIAGNOSIS — N81.6 RECTOCELE: Primary | ICD-10-CM

## 2022-08-31 PROCEDURE — 99213 OFFICE O/P EST LOW 20 MIN: CPT | Performed by: OBSTETRICS & GYNECOLOGY

## 2022-08-31 PROCEDURE — G0463 HOSPITAL OUTPT CLINIC VISIT: HCPCS

## 2022-08-31 ASSESSMENT — PAIN SCALES - GENERAL: PAINLEVEL: NO PAIN (1)

## 2022-08-31 NOTE — LETTER
8/31/2022       RE: Belen Sharma  [address] [place] [address]     Dear Colleague,    Thank you for referring your patient, Belen Sharma, to the [facility] [place] at [facility]. Please see a copy of my visit note below.    August 31, 2022    Return visit    Patient returns today for f/u for UTIs and hematuria. Since her last visit she has done well with the pessary. She developed sharp urethral/bladder pain that led to UA C&S. Her cultures were negative, but she was noted to have hematuria. She is also here for a pessary check    /61   Pulse 91   Wt 68 kg (150 lb)   BMI 22.15 kg/m    She is comfortable, in no distress, non-labored breathing.  Abdomen is soft, non-tender, non-distended.  Normal external female genitalia.  Speculum and bimanual exam are remarkable for normal appearing vaginal epithelium.    A/P: 62 year old F with hematuria in the face of renal transplant    Pt doing well with pessary. Continue 6-12 month f/u  Advised pt to see urology for her hematuria with a h/o renal transplant.    I spent a total of 20 minutes with  Ms. Sharma  on the date of the encounter in chart review, face to face patient visit, review of tests, documentation and/or discussion with other providers about the issues documented above.    Zach Francis MD  Professor, OB/GYN  Urogynecologist    CC  Patient Care Team:  Vincent De La Garza MD as PCP - General (Internal Medicine)  Christy Mohamud MD as MD (Cardiology)  Divya Chávez RN as Registered Nurse (Transplant)  Елена Dinh RN as Registered Nurse  Christy Mohamud MD as Assigned Heart and Vascular Provider  Leventhal, Thomas Michael, MD as Assigned Gastroenterology Provider  Vincent De La Garza MD as Assigned PCP  Saran Angulo MD as Assigned Nephrology Provider  Daysi Perez MD as MD (OB/Gyn)  Shannon Loza OD (Optometry)  Sapna Marcano MD as MD  (Ophthalmology)  Anat Benitez MD as MD (Dermatology)  Pepe Verduzco DPM as Assigned Musculoskeletal Provider  Ida Putnam RN as Specialty Care Coordinator  Anat Benitez MD as Assigned Surgical Provider  Daysi Perez MD as Assigned OBGYN Provider

## 2022-08-31 NOTE — PROGRESS NOTES
August 31, 2022    Return visit    Patient returns today for f/u for UTIs and hematuria. Since her last visit she has done well with the pessary. She developed sharp urethral/bladder pain that led to UA C&S. Her cultures were negative, but she was noted to have hematuria. She is also here for a pessary check    /61   Pulse 91   Wt 68 kg (150 lb)   BMI 22.15 kg/m    She is comfortable, in no distress, non-labored breathing.  Abdomen is soft, non-tender, non-distended.  Normal external female genitalia.  Speculum and bimanual exam are remarkable for normal appearing vaginal epithelium.    A/P: 62 year old F with hematuria in the face of renal transplant    Pt doing well with pessary. Continue 6-12 month f/u  Advised pt to see urology for her hematuria with a h/o renal transplant.    I spent a total of 20 minutes with  Ms. Sharma  on the date of the encounter in chart review, face to face patient visit, review of tests, documentation and/or discussion with other providers about the issues documented above.    Zach Francis MD  Professor, OB/GYN  Urogynecologist    CC  Patient Care Team:  Vincent De La Garza MD as PCP - General (Internal Medicine)  Christy Mohamud MD as MD (Cardiology)  Divya Chávez RN as Registered Nurse (Transplant)  Елена Dinh RN as Registered Nurse  Christy Mohamud MD as Assigned Heart and Vascular Provider  Leventhal, Thomas Michael, MD as Assigned Gastroenterology Provider  Vincent De La Garza MD as Assigned PCP  Saran Angulo MD as Assigned Nephrology Provider  Daysi Perez MD as MD (OB/Gyn)  Shannon Loza OD (Optometry)  Sapna Marcano MD as MD (Ophthalmology)  Anat Benitez MD as MD (Dermatology)  Pepe Verduzco DPM as Assigned Musculoskeletal Provider  Ida Putnam RN as Specialty Care Coordinator  Anat Benitez MD as Assigned Surgical Provider  Daysi Perez MD as Assigned OBGYN Provider

## 2022-09-01 ENCOUNTER — VIRTUAL VISIT (OUTPATIENT)
Dept: TRANSPLANT | Facility: CLINIC | Age: 62
End: 2022-09-01
Payer: MEDICARE

## 2022-09-01 DIAGNOSIS — Z13.220 LIPID SCREENING: ICD-10-CM

## 2022-09-01 DIAGNOSIS — Z94.4 S/P LIVER TRANSPLANT (H): Primary | ICD-10-CM

## 2022-09-01 DIAGNOSIS — Z48.298 AFTERCARE FOLLOWING ORGAN TRANSPLANT: ICD-10-CM

## 2022-09-01 DIAGNOSIS — Z94.0 KIDNEY TRANSPLANTED: ICD-10-CM

## 2022-09-01 PROCEDURE — 97802 MEDICAL NUTRITION INDIV IN: CPT | Mod: TEL | Performed by: DIETITIAN, REGISTERED

## 2022-09-01 NOTE — PROGRESS NOTES
"Pt evaluated via billable telephone visit d/t COVID-19 restrictions. Time spent: 30 min    [facility]  Outpatient MNT     Time Spent: 30 minutes  Visit Type: Initial  Referring Physician: Christy Mohamud  Reason for RD Visit: dyslipidemia   Pt accompanied by: self     Nutrition Assessment  Kidney liver txp 2010  Liver #2 2011  Kidney #2 2016     Pt reports she has been given contradictory advice on whether or not she should take calcium and/or vit D supplements. Per pt, hepatologist recommended taking vit D and calcium d/t low bone density (found on testing). However, her cardiologist (not Dr Mohamud, could not recall the name) said to significantly limit dietary calcium intake to prevent vascular calcification.     Vitamins, Supplements, Pertinent Meds: probiotic  Herbal Medicines/Supplements: not asked     Weight hx:   Wt Readings from Last 10 Encounters:   08/31/22 68 kg (150 lb)   08/03/22 68 kg (150 lb)   08/01/22 68 kg (150 lb)   05/23/22 68.9 kg (152 lb)   05/03/22 69.1 kg (152 lb 4.8 oz)   04/12/22 70.3 kg (155 lb)   11/04/21 69.9 kg (154 lb)   09/20/21 68 kg (150 lb)   09/14/21 68 kg (150 lb)   09/02/21 70.3 kg (155 lb)     Diet Recall  Breakfast Oats w/ 4 oz milk, banana + Greek yogurt    Lunch Healthy Choice meals or scrambled eggs and veggie sausage + berries; occasional pancakes (trying to eat less sweets)   Dinner Goulash over noodles (lean ground beef, tomato juice, seasoning) + corn/mixed veggies and/or baby carrots (tries to have veggie most nights of the week); meatloaf (every 2-3 weeks) + brown or wild rice + broccoli; chicken breast w/ BBQ sauce made into s/w or by itself w/ a salad    Snacks Trying to avoid ice cream; 10 almonds, peanuts, wheat crackers and 100 calorie popcorn, baby carrots (8-10) \"should eat more apples\", some celery, tried hummus / avocado, rice cakes    Beverages Coffee w/ creamer, small glass of OJ, water (carbonated), at most 1-2 soda/week    Alcohol None    Dining out " "Went out this week for birthday celebration- still tries to \"watch it\"; burger on bun at [employer] + salad with ranch on the side + root beer float (not typical)  [facility] 1x/month- sirloin w/ sweet potato and side salad      Nutrition Diagnosis  No nutrition diagnosis identified at this time     Nutrition Intervention  Reviewed no special diets are needed for liver/kidney health at this time due well functioning appearing organs from labs. She reports limiting potatoes somewhat due to potassium content. Reviewed monitoring/restricting K+ is no longer needed post txp unless levels are elevated.     We discussed following a general healthy diet, as recommended for the general population. Encouraged lean proteins, reduced processed/highly refined foods, getting enough fiber through fruits and veggies, making veggies a larger portion on your plate, etc.     I will reach out to her doctors to get a consensus on calcium/vit D supplementation. Per my research, dietary calcium intake does not directly impact calcification in the body. Her vit D level was wnl (57) while on a supplement (unsure on dose) earlier this spring - and was told to stop supplementation.     Patient Understanding: Pt verbalized understanding of education provided.  Expected Engagement: Good  Follow-Up Plans: PRN     Nutrition Goals  No nutrition goals identified at this time    Kimberly Redmond, RD, LD, CCTD                                  "

## 2022-09-02 ENCOUNTER — TELEPHONE (OUTPATIENT)
Dept: TRANSPLANT | Facility: CLINIC | Age: 62
End: 2022-09-02

## 2022-09-02 ENCOUNTER — MYC MEDICAL ADVICE (OUTPATIENT)
Dept: TRANSPLANT | Facility: CLINIC | Age: 62
End: 2022-09-02

## 2022-09-02 NOTE — TELEPHONE ENCOUNTER
Annual chart review completed and Belen is up to date on all recommended post transplant visits/labs.

## 2022-09-02 NOTE — TELEPHONE ENCOUNTER
Belen was see by dietician and had questions about Vit D/Calcium supplements and different advise from providers. Reviewed by nephrology and Dr. Leventhal and at this time Belen's calcium level is WNL and per Dr. Judd, no Vit D or calcium supplement needed at this time. Nephrology will continue to follow. Dr. Leventhal in agreement. Belen notified via [employer] message.

## 2022-09-12 ENCOUNTER — LAB (OUTPATIENT)
Dept: LAB | Facility: CLINIC | Age: 62
End: 2022-09-12
Payer: MEDICARE

## 2022-09-12 ENCOUNTER — ALLIED HEALTH/NURSE VISIT (OUTPATIENT)
Dept: FAMILY MEDICINE | Facility: CLINIC | Age: 62
End: 2022-09-12
Payer: MEDICARE

## 2022-09-12 DIAGNOSIS — Z13.220 LIPID SCREENING: ICD-10-CM

## 2022-09-12 DIAGNOSIS — Z94.4 LIVER REPLACED BY TRANSPLANT (H): ICD-10-CM

## 2022-09-12 DIAGNOSIS — Z23 NEED FOR PNEUMOCOCCAL VACCINATION: Primary | ICD-10-CM

## 2022-09-12 LAB
ALBUMIN SERPL BCG-MCNC: 3.9 G/DL (ref 3.5–5.2)
ALP SERPL-CCNC: 71 U/L (ref 35–129)
ALT SERPL W P-5'-P-CCNC: 18 U/L (ref 10–50)
ANION GAP SERPL CALCULATED.3IONS-SCNC: 12 MMOL/L (ref 7–15)
AST SERPL W P-5'-P-CCNC: 21 U/L (ref 10–50)
BILIRUB DIRECT SERPL-MCNC: <0.2 MG/DL (ref 0–0.3)
BILIRUB SERPL-MCNC: 0.7 MG/DL
BUN SERPL-MCNC: 15.1 MG/DL (ref 8–23)
CALCIUM SERPL-MCNC: 10 MG/DL (ref 8.8–10.2)
CHLORIDE SERPL-SCNC: 106 MMOL/L (ref 98–107)
CREAT SERPL-MCNC: 0.84 MG/DL (ref 0.51–1.17)
DEPRECATED HCO3 PLAS-SCNC: 25 MMOL/L (ref 22–29)
ERYTHROCYTE [DISTWIDTH] IN BLOOD BY AUTOMATED COUNT: 14.4 % (ref 10–15)
GFR SERPL CREATININE-BSD FRML MDRD: 78 ML/MIN/1.73M2
GLUCOSE SERPL-MCNC: 88 MG/DL (ref 70–99)
HCT VFR BLD AUTO: 48 % (ref 35–53)
HGB BLD-MCNC: 15.5 G/DL (ref 11.7–17.7)
MCH RBC QN AUTO: 29.9 PG (ref 26.5–33)
MCHC RBC AUTO-ENTMCNC: 32.3 G/DL (ref 31.5–36.5)
MCV RBC AUTO: 93 FL (ref 78–100)
PLATELET # BLD AUTO: 232 10E3/UL (ref 150–450)
POTASSIUM SERPL-SCNC: 4.3 MMOL/L (ref 3.4–5.3)
PROT SERPL-MCNC: 6.2 G/DL (ref 6.4–8.3)
RBC # BLD AUTO: 5.19 10E6/UL (ref 3.8–5.9)
SODIUM SERPL-SCNC: 143 MMOL/L (ref 136–145)
TACROLIMUS BLD-MCNC: 6.6 UG/L (ref 5–15)
TME LAST DOSE: NORMAL H
TME LAST DOSE: NORMAL H
WBC # BLD AUTO: 9.3 10E3/UL (ref 4–11)

## 2022-09-12 PROCEDURE — 80053 COMPREHEN METABOLIC PANEL: CPT

## 2022-09-12 PROCEDURE — 90677 PCV20 VACCINE IM: CPT

## 2022-09-12 PROCEDURE — 82248 BILIRUBIN DIRECT: CPT

## 2022-09-12 PROCEDURE — 36415 COLL VENOUS BLD VENIPUNCTURE: CPT

## 2022-09-12 PROCEDURE — G0008 ADMIN INFLUENZA VIRUS VAC: HCPCS

## 2022-09-12 PROCEDURE — 99207 PR NO CHARGE NURSE ONLY: CPT

## 2022-09-12 PROCEDURE — 90686 IIV4 VACC NO PRSV 0.5 ML IM: CPT

## 2022-09-12 PROCEDURE — G0009 ADMIN PNEUMOCOCCAL VACCINE: HCPCS

## 2022-09-12 PROCEDURE — 85027 COMPLETE CBC AUTOMATED: CPT

## 2022-09-12 PROCEDURE — 80197 ASSAY OF TACROLIMUS: CPT

## 2022-09-13 NOTE — TELEPHONE ENCOUNTER
MEDICAL RECORDS REQUEST   [facility]  Urology Clinic  [address] [place] [address]  PHONE: [phone]  Fax: [phone]        FUTURE VISIT INFORMATION                                                   Belen I Zachary, : 1960 scheduled for future visit at [facility]    APPOINTMENT INFORMATION:    Date: 10/07/2022    Provider:  Rocio Brewer CNP    Reason for Visit/Diagnosis: hematuria with a h/o renal transplant    RECORDS REQUESTED FOR VISIT                                                     NOTES  STATUS/DETAILS   OFFICE NOTE from other specialist  yes, 2022, 2022 -- Zach Francis MD @ [facility]    2022 -- Vincent De La Garza MD @ [facility] Internal Medicine     DISCHARGE REPORT from the ER  yes, 2022 -- Mat Arzate DO @ [facility]  2022 -- Tj Solares MD @ [facility]   MEDICATION LIST  yes   LABS     URINALYSIS (UA)  yes     PRE-VISIT CHECKLIST      Record collection complete Yes   Appointment appropriately scheduled           (right time/right provider) Yes   Joint diagnostic appointment coordinated correctly          (ensure right order & amount of time) Yes   MyChart activation Yes   Questionnaire complete If no, please explain pending

## 2022-09-14 ENCOUNTER — HOSPITAL ENCOUNTER (INPATIENT)
Facility: CLINIC | Age: 62
LOS: 1 days | Discharge: HOME | DRG: 872 | End: 2022-09-16
Attending: EMERGENCY MEDICINE | Admitting: HOSPITALIST
Payer: MEDICARE

## 2022-09-14 ENCOUNTER — APPOINTMENT (OUTPATIENT)
Dept: GENERAL RADIOLOGY | Facility: CLINIC | Age: 62
DRG: 872 | End: 2022-09-14
Attending: EMERGENCY MEDICINE
Payer: MEDICARE

## 2022-09-14 ENCOUNTER — NURSE TRIAGE (OUTPATIENT)
Dept: NURSING | Facility: CLINIC | Age: 62
End: 2022-09-14

## 2022-09-14 DIAGNOSIS — Z11.52 ENCOUNTER FOR SCREENING LABORATORY TESTING FOR SEVERE ACUTE RESPIRATORY SYNDROME CORONAVIRUS 2 (SARS-COV-2): ICD-10-CM

## 2022-09-14 DIAGNOSIS — Z94.0 KIDNEY REPLACED BY TRANSPLANT: ICD-10-CM

## 2022-09-14 DIAGNOSIS — N39.0 URINARY TRACT INFECTION WITHOUT HEMATURIA, SITE UNSPECIFIED: ICD-10-CM

## 2022-09-14 DIAGNOSIS — N39.0 RECURRENT UTI: Primary | ICD-10-CM

## 2022-09-14 LAB
ALBUMIN SERPL BCG-MCNC: 4.1 G/DL (ref 3.5–5.2)
ALBUMIN UR-MCNC: 70 MG/DL
ALP SERPL-CCNC: 72 U/L (ref 35–129)
ALT SERPL W P-5'-P-CCNC: 12 U/L (ref 10–50)
ANION GAP SERPL CALCULATED.3IONS-SCNC: 11 MMOL/L (ref 7–15)
APPEARANCE UR: ABNORMAL
AST SERPL W P-5'-P-CCNC: 21 U/L (ref 10–50)
BACTERIA #/AREA URNS HPF: ABNORMAL /HPF
BASOPHILS # BLD AUTO: 0 10E3/UL (ref 0–0.2)
BASOPHILS NFR BLD AUTO: 0 %
BILIRUB SERPL-MCNC: 0.6 MG/DL
BILIRUB UR QL STRIP: NEGATIVE
BUN SERPL-MCNC: 25.3 MG/DL (ref 8–23)
CALCIUM SERPL-MCNC: 10 MG/DL (ref 8.8–10.2)
CHLORIDE SERPL-SCNC: 104 MMOL/L (ref 98–107)
COLOR UR AUTO: YELLOW
CREAT SERPL-MCNC: 1.05 MG/DL (ref 0.51–1.17)
DEPRECATED HCO3 PLAS-SCNC: 22 MMOL/L (ref 22–29)
EOSINOPHIL # BLD AUTO: 0 10E3/UL (ref 0–0.7)
EOSINOPHIL NFR BLD AUTO: 0 %
ERYTHROCYTE [DISTWIDTH] IN BLOOD BY AUTOMATED COUNT: 14.6 % (ref 10–15)
GFR SERPL CREATININE-BSD FRML MDRD: 60 ML/MIN/1.73M2
GLUCOSE SERPL-MCNC: 111 MG/DL (ref 70–99)
GLUCOSE UR STRIP-MCNC: NEGATIVE MG/DL
HCO3 BLDV-SCNC: 22 MMOL/L (ref 21–28)
HCT VFR BLD AUTO: 48.4 % (ref 35–53)
HGB BLD-MCNC: 16 G/DL (ref 11.7–17.7)
HGB UR QL STRIP: ABNORMAL
IMM GRANULOCYTES # BLD: 0.1 10E3/UL
IMM GRANULOCYTES NFR BLD: 0 %
KETONES UR STRIP-MCNC: ABNORMAL MG/DL
LACTATE BLD-SCNC: 0.9 MMOL/L
LEUKOCYTE ESTERASE UR QL STRIP: ABNORMAL
LYMPHOCYTES # BLD AUTO: 1.3 10E3/UL (ref 0.8–5.3)
LYMPHOCYTES NFR BLD AUTO: 10 %
MCH RBC QN AUTO: 30.1 PG (ref 26.5–33)
MCHC RBC AUTO-ENTMCNC: 33.1 G/DL (ref 31.5–36.5)
MCV RBC AUTO: 91 FL (ref 78–100)
MONOCYTES # BLD AUTO: 1.4 10E3/UL (ref 0–1.3)
MONOCYTES NFR BLD AUTO: 10 %
MUCOUS THREADS #/AREA URNS LPF: PRESENT /LPF
NEUTROPHILS # BLD AUTO: 10.9 10E3/UL (ref 1.6–8.3)
NEUTROPHILS NFR BLD AUTO: 80 %
NITRATE UR QL: NEGATIVE
NRBC # BLD AUTO: 0 10E3/UL
NRBC BLD AUTO-RTO: 0 /100
PCO2 BLDV: 32 MM HG (ref 40–50)
PH BLDV: 7.44 [PH] (ref 7.32–7.43)
PH UR STRIP: 6 [PH] (ref 5–7)
PLATELET # BLD AUTO: 246 10E3/UL (ref 150–450)
PO2 BLDV: 40 MM HG (ref 25–47)
POTASSIUM SERPL-SCNC: 4.2 MMOL/L (ref 3.4–5.3)
PROT SERPL-MCNC: 7 G/DL (ref 6.4–8.3)
RBC # BLD AUTO: 5.31 10E6/UL (ref 3.8–5.9)
RBC URINE: 8 /HPF
SAO2 % BLDV: 78 % (ref 94–100)
SODIUM SERPL-SCNC: 137 MMOL/L (ref 136–145)
SP GR UR STRIP: 1.02 (ref 1–1.03)
SQUAMOUS EPITHELIAL: 1 /HPF
TRANSITIONAL EPI: 2 /HPF
UROBILINOGEN UR STRIP-MCNC: NORMAL MG/DL
WBC # BLD AUTO: 13.7 10E3/UL (ref 4–11)
WBC CLUMPS #/AREA URNS HPF: PRESENT /HPF
WBC URINE: >182 /HPF

## 2022-09-14 PROCEDURE — 71046 X-RAY EXAM CHEST 2 VIEWS: CPT | Mod: 26 | Performed by: RADIOLOGY

## 2022-09-14 PROCEDURE — 82803 BLOOD GASES ANY COMBINATION: CPT

## 2022-09-14 PROCEDURE — 85025 COMPLETE CBC W/AUTO DIFF WBC: CPT | Performed by: EMERGENCY MEDICINE

## 2022-09-14 PROCEDURE — 99285 EMERGENCY DEPT VISIT HI MDM: CPT | Mod: 25 | Performed by: EMERGENCY MEDICINE

## 2022-09-14 PROCEDURE — 83605 ASSAY OF LACTIC ACID: CPT

## 2022-09-14 PROCEDURE — 86140 C-REACTIVE PROTEIN: CPT | Performed by: EMERGENCY MEDICINE

## 2022-09-14 PROCEDURE — 81001 URINALYSIS AUTO W/SCOPE: CPT | Performed by: EMERGENCY MEDICINE

## 2022-09-14 PROCEDURE — 87040 BLOOD CULTURE FOR BACTERIA: CPT | Performed by: EMERGENCY MEDICINE

## 2022-09-14 PROCEDURE — 85652 RBC SED RATE AUTOMATED: CPT | Performed by: EMERGENCY MEDICINE

## 2022-09-14 PROCEDURE — 87086 URINE CULTURE/COLONY COUNT: CPT | Performed by: EMERGENCY MEDICINE

## 2022-09-14 PROCEDURE — 71046 X-RAY EXAM CHEST 2 VIEWS: CPT

## 2022-09-14 PROCEDURE — 80053 COMPREHEN METABOLIC PANEL: CPT | Performed by: EMERGENCY MEDICINE

## 2022-09-14 PROCEDURE — 99285 EMERGENCY DEPT VISIT HI MDM: CPT | Performed by: EMERGENCY MEDICINE

## 2022-09-14 PROCEDURE — 36415 COLL VENOUS BLD VENIPUNCTURE: CPT | Performed by: EMERGENCY MEDICINE

## 2022-09-14 ASSESSMENT — ACTIVITIES OF DAILY LIVING (ADL): ADLS_ACUITY_SCORE: 33

## 2022-09-15 ENCOUNTER — HOME INFUSION (PRE-WILLOW HOME INFUSION) (OUTPATIENT)
Dept: PHARMACY | Facility: CLINIC | Age: 62
End: 2022-09-15

## 2022-09-15 PROBLEM — N39.0 URINARY TRACT INFECTION WITHOUT HEMATURIA, SITE UNSPECIFIED: Status: ACTIVE | Noted: 2022-09-15

## 2022-09-15 LAB
ANION GAP SERPL CALCULATED.3IONS-SCNC: 8 MMOL/L (ref 7–15)
BUN SERPL-MCNC: 16.5 MG/DL (ref 8–23)
CALCIUM SERPL-MCNC: 8.9 MG/DL (ref 8.8–10.2)
CHLORIDE SERPL-SCNC: 112 MMOL/L (ref 98–107)
CREAT SERPL-MCNC: 0.73 MG/DL (ref 0.51–1.17)
CRP SERPL-MCNC: 21.6 MG/L
DEPRECATED HCO3 PLAS-SCNC: 20 MMOL/L (ref 22–29)
ERYTHROCYTE [DISTWIDTH] IN BLOOD BY AUTOMATED COUNT: 14.6 % (ref 10–15)
ERYTHROCYTE [SEDIMENTATION RATE] IN BLOOD BY WESTERGREN METHOD: 8 MM/HR (ref 0–30)
GFR SERPL CREATININE-BSD FRML MDRD: >90 ML/MIN/1.73M2
GLUCOSE SERPL-MCNC: 100 MG/DL (ref 70–99)
HCT VFR BLD AUTO: 42.4 % (ref 35–53)
HGB BLD-MCNC: 13.8 G/DL (ref 11.7–17.7)
HOLD SPECIMEN: NORMAL
HOLD SPECIMEN: NORMAL
MCH RBC QN AUTO: 30.1 PG (ref 26.5–33)
MCHC RBC AUTO-ENTMCNC: 32.5 G/DL (ref 31.5–36.5)
MCV RBC AUTO: 92 FL (ref 78–100)
PLATELET # BLD AUTO: 195 10E3/UL (ref 150–450)
POTASSIUM SERPL-SCNC: 4.3 MMOL/L (ref 3.4–5.3)
RBC # BLD AUTO: 4.59 10E6/UL (ref 3.8–5.9)
SARS-COV-2 RNA RESP QL NAA+PROBE: NEGATIVE
SODIUM SERPL-SCNC: 140 MMOL/L (ref 136–145)
WBC # BLD AUTO: 9.8 10E3/UL (ref 4–11)

## 2022-09-15 PROCEDURE — 250N000011 HC RX IP 250 OP 636: Performed by: PHYSICIAN ASSISTANT

## 2022-09-15 PROCEDURE — 87040 BLOOD CULTURE FOR BACTERIA: CPT | Performed by: PHYSICIAN ASSISTANT

## 2022-09-15 PROCEDURE — 36415 COLL VENOUS BLD VENIPUNCTURE: CPT

## 2022-09-15 PROCEDURE — 258N000003 HC RX IP 258 OP 636: Performed by: PHYSICIAN ASSISTANT

## 2022-09-15 PROCEDURE — 85027 COMPLETE CBC AUTOMATED: CPT

## 2022-09-15 PROCEDURE — C9803 HOPD COVID-19 SPEC COLLECT: HCPCS | Performed by: EMERGENCY MEDICINE

## 2022-09-15 PROCEDURE — U0005 INFEC AGEN DETEC AMPLI PROBE: HCPCS | Performed by: EMERGENCY MEDICINE

## 2022-09-15 PROCEDURE — 250N000011 HC RX IP 250 OP 636: Performed by: EMERGENCY MEDICINE

## 2022-09-15 PROCEDURE — 250N000013 HC RX MED GY IP 250 OP 250 PS 637

## 2022-09-15 PROCEDURE — 250N000012 HC RX MED GY IP 250 OP 636 PS 637: Performed by: HOSPITALIST

## 2022-09-15 PROCEDURE — 80048 BASIC METABOLIC PNL TOTAL CA: CPT

## 2022-09-15 PROCEDURE — 258N000003 HC RX IP 258 OP 636: Performed by: EMERGENCY MEDICINE

## 2022-09-15 PROCEDURE — 96366 THER/PROPH/DIAG IV INF ADDON: CPT | Performed by: EMERGENCY MEDICINE

## 2022-09-15 PROCEDURE — 120N000002 HC R&B MED SURG/OB UMMC

## 2022-09-15 PROCEDURE — 99223 1ST HOSP IP/OBS HIGH 75: CPT | Performed by: INTERNAL MEDICINE

## 2022-09-15 PROCEDURE — 258N000003 HC RX IP 258 OP 636

## 2022-09-15 PROCEDURE — 250N000011 HC RX IP 250 OP 636

## 2022-09-15 PROCEDURE — 96365 THER/PROPH/DIAG IV INF INIT: CPT | Performed by: EMERGENCY MEDICINE

## 2022-09-15 PROCEDURE — 99222 1ST HOSP IP/OBS MODERATE 55: CPT | Mod: FS | Performed by: PHYSICIAN ASSISTANT

## 2022-09-15 PROCEDURE — 36415 COLL VENOUS BLD VENIPUNCTURE: CPT | Performed by: PHYSICIAN ASSISTANT

## 2022-09-15 PROCEDURE — 250N000012 HC RX MED GY IP 250 OP 636 PS 637

## 2022-09-15 PROCEDURE — 250N000013 HC RX MED GY IP 250 OP 250 PS 637: Performed by: EMERGENCY MEDICINE

## 2022-09-15 RX ORDER — CEFTRIAXONE 1 G/1
1 INJECTION, POWDER, FOR SOLUTION INTRAMUSCULAR; INTRAVENOUS ONCE
Status: COMPLETED | OUTPATIENT
Start: 2022-09-15 | End: 2022-09-15

## 2022-09-15 RX ORDER — LIDOCAINE 40 MG/G
CREAM TOPICAL
Status: DISCONTINUED | OUTPATIENT
Start: 2022-09-15 | End: 2022-09-16 | Stop reason: HOSPADM

## 2022-09-15 RX ORDER — TACROLIMUS 1 MG/1
1 CAPSULE ORAL 2 TIMES DAILY
Status: DISCONTINUED | OUTPATIENT
Start: 2022-09-15 | End: 2022-09-15

## 2022-09-15 RX ORDER — ENOXAPARIN SODIUM 100 MG/ML
40 INJECTION SUBCUTANEOUS EVERY 24 HOURS
Status: DISCONTINUED | OUTPATIENT
Start: 2022-09-15 | End: 2022-09-16 | Stop reason: HOSPADM

## 2022-09-15 RX ORDER — ASPIRIN 81 MG/1
81 TABLET, CHEWABLE ORAL EVERY EVENING
Status: DISCONTINUED | OUTPATIENT
Start: 2022-09-15 | End: 2022-09-16 | Stop reason: HOSPADM

## 2022-09-15 RX ORDER — SODIUM CHLORIDE 9 MG/ML
INJECTION, SOLUTION INTRAVENOUS CONTINUOUS
Status: DISCONTINUED | OUTPATIENT
Start: 2022-09-15 | End: 2022-09-15

## 2022-09-15 RX ORDER — TACROLIMUS 0.5 MG/1
0.5 CAPSULE ORAL 2 TIMES DAILY
Status: DISCONTINUED | OUTPATIENT
Start: 2022-09-15 | End: 2022-09-15

## 2022-09-15 RX ORDER — PREDNISONE 5 MG/1
5 TABLET ORAL DAILY
Status: DISCONTINUED | OUTPATIENT
Start: 2022-09-15 | End: 2022-09-16 | Stop reason: HOSPADM

## 2022-09-15 RX ORDER — ACETAMINOPHEN 325 MG/10.15ML
975 LIQUID ORAL ONCE
Status: COMPLETED | OUTPATIENT
Start: 2022-09-15 | End: 2022-09-15

## 2022-09-15 RX ORDER — ESTRADIOL 0.1 MG/G
1 CREAM VAGINAL
Status: DISCONTINUED | OUTPATIENT
Start: 2022-09-15 | End: 2022-09-15

## 2022-09-15 RX ORDER — CEFTRIAXONE 1 G/1
1 INJECTION, POWDER, FOR SOLUTION INTRAMUSCULAR; INTRAVENOUS EVERY 24 HOURS
Status: DISCONTINUED | OUTPATIENT
Start: 2022-09-16 | End: 2022-09-15

## 2022-09-15 RX ORDER — LORATADINE 10 MG/1
10 TABLET ORAL EVERY EVENING
Status: DISCONTINUED | OUTPATIENT
Start: 2022-09-15 | End: 2022-09-16 | Stop reason: HOSPADM

## 2022-09-15 RX ORDER — CEFTRIAXONE 2 G/1
2 INJECTION, POWDER, FOR SOLUTION INTRAMUSCULAR; INTRAVENOUS EVERY 24 HOURS
Status: DISCONTINUED | OUTPATIENT
Start: 2022-09-16 | End: 2022-09-16

## 2022-09-15 RX ORDER — SODIUM CHLORIDE, SODIUM LACTATE, POTASSIUM CHLORIDE, CALCIUM CHLORIDE 600; 310; 30; 20 MG/100ML; MG/100ML; MG/100ML; MG/100ML
INJECTION, SOLUTION INTRAVENOUS CONTINUOUS
Status: DISCONTINUED | OUTPATIENT
Start: 2022-09-15 | End: 2022-09-16 | Stop reason: HOSPADM

## 2022-09-15 RX ORDER — BIOTIN 1 MG
3000 TABLET ORAL DAILY
Status: DISCONTINUED | OUTPATIENT
Start: 2022-09-15 | End: 2022-09-16 | Stop reason: HOSPADM

## 2022-09-15 RX ORDER — SULFAMETHOXAZOLE AND TRIMETHOPRIM 400; 80 MG/1; MG/1
1 TABLET ORAL
Status: DISCONTINUED | OUTPATIENT
Start: 2022-09-16 | End: 2022-09-16 | Stop reason: HOSPADM

## 2022-09-15 RX ORDER — SULFAMETHOXAZOLE AND TRIMETHOPRIM 400; 80 MG/1; MG/1
1 TABLET ORAL
COMMUNITY
End: 2023-04-30

## 2022-09-15 RX ORDER — MULTIPLE VITAMINS W/ MINERALS TAB 9MG-400MCG
1 TAB ORAL DAILY
Status: DISCONTINUED | OUTPATIENT
Start: 2022-09-15 | End: 2022-09-16 | Stop reason: HOSPADM

## 2022-09-15 RX ORDER — ACETAMINOPHEN 325 MG/1
975 TABLET ORAL ONCE
Status: DISCONTINUED | OUTPATIENT
Start: 2022-09-15 | End: 2022-09-15

## 2022-09-15 RX ADMIN — SODIUM CHLORIDE: 9 INJECTION, SOLUTION INTRAVENOUS at 01:49

## 2022-09-15 RX ADMIN — SODIUM CHLORIDE: 9 INJECTION, SOLUTION INTRAVENOUS at 10:55

## 2022-09-15 RX ADMIN — ENOXAPARIN SODIUM 40 MG: 40 INJECTION SUBCUTANEOUS at 08:04

## 2022-09-15 RX ADMIN — CEFTRIAXONE SODIUM 1 G: 1 INJECTION, POWDER, FOR SOLUTION INTRAMUSCULAR; INTRAVENOUS at 08:54

## 2022-09-15 RX ADMIN — SODIUM CHLORIDE, POTASSIUM CHLORIDE, SODIUM LACTATE AND CALCIUM CHLORIDE: 600; 310; 30; 20 INJECTION, SOLUTION INTRAVENOUS at 13:44

## 2022-09-15 RX ADMIN — TACROLIMUS 1.5 MG: 1 CAPSULE ORAL at 18:53

## 2022-09-15 RX ADMIN — Medication 1 TABLET: at 13:09

## 2022-09-15 RX ADMIN — SODIUM CHLORIDE 1000 ML: 9 INJECTION, SOLUTION INTRAVENOUS at 00:15

## 2022-09-15 RX ADMIN — MYCOPHENOLIC ACID 540 MG: 360 TABLET, DELAYED RELEASE ORAL at 20:44

## 2022-09-15 RX ADMIN — ACETAMINOPHEN 975 MG: 325 SOLUTION ORAL at 00:27

## 2022-09-15 RX ADMIN — PREDNISONE 5 MG: 5 TABLET ORAL at 08:05

## 2022-09-15 RX ADMIN — Medication 3000 MCG: at 08:04

## 2022-09-15 RX ADMIN — LORATADINE 10 MG: 10 TABLET ORAL at 20:37

## 2022-09-15 RX ADMIN — SODIUM CHLORIDE: 9 INJECTION, SOLUTION INTRAVENOUS at 02:24

## 2022-09-15 RX ADMIN — MYCOPHENOLIC ACID 540 MG: 360 TABLET, DELAYED RELEASE ORAL at 08:05

## 2022-09-15 RX ADMIN — ASPIRIN 81 MG CHEWABLE TABLET 81 MG: 81 TABLET CHEWABLE at 20:37

## 2022-09-15 RX ADMIN — TACROLIMUS 1.5 MG: 1 CAPSULE ORAL at 08:06

## 2022-09-15 RX ADMIN — CEFTRIAXONE SODIUM 1 G: 1 INJECTION, POWDER, FOR SOLUTION INTRAMUSCULAR; INTRAVENOUS at 00:15

## 2022-09-15 ASSESSMENT — ACTIVITIES OF DAILY LIVING (ADL)
ADLS_ACUITY_SCORE: 20
ADLS_ACUITY_SCORE: 20
CONCENTRATING,_REMEMBERING_OR_MAKING_DECISIONS_DIFFICULTY: NO
ADLS_ACUITY_SCORE: 20
CHANGE_IN_FUNCTIONAL_STATUS_SINCE_ONSET_OF_CURRENT_ILLNESS/INJURY: NO
DIFFICULTY_COMMUNICATING: NO
ADLS_ACUITY_SCORE: 35
DOING_ERRANDS_INDEPENDENTLY_DIFFICULTY: NO
ADLS_ACUITY_SCORE: 35
ADLS_ACUITY_SCORE: 20
WALKING_OR_CLIMBING_STAIRS_DIFFICULTY: NO
DIFFICULTY_EATING/SWALLOWING: NO
NUMBER_OF_TIMES_PATIENT_HAS_FALLEN_WITHIN_LAST_SIX_MONTHS: 0
HEARING_DIFFICULTY_OR_DEAF: NO
ADLS_ACUITY_SCORE: 20
WEAR_GLASSES_OR_BLIND: YES
DRESSING/BATHING_DIFFICULTY: NO
ADLS_ACUITY_SCORE: 35
ADLS_ACUITY_SCORE: 20
PATIENT_/_FAMILY_COMMUNICATION_STYLE: SPOKEN LANGUAGE (ENGLISH OR BILINGUAL)
FALL_HISTORY_WITHIN_LAST_SIX_MONTHS: YES
ADLS_ACUITY_SCORE: 20
TOILETING_ISSUES: NO
VISION_MANAGEMENT: PRESCRIPTION GLASSES

## 2022-09-15 ASSESSMENT — ENCOUNTER SYMPTOMS
CHILLS: 1
FATIGUE: 1
FEVER: 1
COUGH: 0
SHORTNESS OF BREATH: 0
ABDOMINAL PAIN: 0
DYSURIA: 1

## 2022-09-15 NOTE — ED PROVIDER NOTES
ED Provider Note  [facility]      History     Chief Complaint   Patient presents with     Fever     Generalized Body Aches     Dysuria     HPI  Belen Sharma is a 62 year old adult with a history of a kidney and liver transplant more than 10 years prior who presents to the ER due to acute onset of fevers body aches this evening.  Patient says that she had a recent UTI about 1 month prior that she took antibiotics for.  Patient said that she had been feeling better until this evening when she got severe chills and body aches.  Patient is that she had a fever 101 at home.  Patient says that she has been feeling like she is having some more burning with urination.  She denies any shortness of breath.  Denies any nausea or vomiting.  Says that she recently had dental cleaning done today and took 4 amoxicillin before the procedure.  Patient also got her flu vaccine and her Pneumovax 2 days prior.  Patient denies any abdominal pain or vomiting.  Patient notes history of recurrent UTIs in the past.    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 Both, very large, native kidneys reamain.     PONV (postoperative nausea and vomiting)     Need to start with ice chips and apple juice, no soda     S/P kidney transplant     SLK 10/9/2010 - kidney failure 2/2 [facility]. Explanted 2012. Re-transplant after de-sensitization 2016     S/P liver transplant (H)     10/9/2010 - [facility], ischemic biopathy. Re-transplant 3/3/2011     S/P splenectomy      Spider veins 2019     Thyroid disease     Hyperthyroid treated with single dose  iodine     Past Surgical History:   Procedure Laterality Date     bunectomy  2018    right foot     bunionectomy  2019    left     CHOLECYSTECTOMY       CONIZATION N/A 2020    Procedure: CONE BIOPSY, CERVIX;  Surgeon: Daysi Perez MD;  Location: [facility]     FAILED PICC - RIGHT ARM Right 2020    Has a LUE AV fistula.     H STATISTIC PICC LINE INSERTION >5YR, FAILED Bilateral 2020    Left fistula, Right failed x 2 Occlussion     HERNIA REPAIR, INCISIONAL  2013     IR DIALYSIS PTA CENTRAL SEG  2020     IR PICC PLACEMENT > 5 YRS OF AGE  2020     SPLENECTOMY      Splenectomy     TRANSPLANT KIDNEY RECIPIENT  DONOR      re-transplant after [facility]; 6 months de-sensitization prior AND 6 months after transplant     TRANSPLANT LIVER RECIPIENT  DONOR  2011     TRANSPLANT LIVER, KIDNEY RECIPIENT  DONOR, COMBINED  10/09/2010    HAT - re-Tx liver 3/3/2011; Kidney (left iliac) lost to [facility]/fibrosis - explant     VASCULAR SURGERY      Vascular access left UE. Not used for 4 years since 2nd kidney tx 2016 [place]     acetaminophen (TYLENOL) 325 MG tablet  aspirin (ASA) 81 MG chewable tablet  biotin 1000 MCG TABS tablet  ciclopirox (PENLAC) 8 % external solution  diphenhydrAMINE (BENADRYL) 25 MG tablet  estradiol (ESTRACE) 0.1 MG/GM vaginal cream  guaiFENesin (MUCINEX PO)  loratadine (CLARITIN) 10 MG tablet  Multiple Vitamins-Minerals (WOMENS DAILY FORMULA PO)  MYFORTIC (BRAND) 180 MG EC tablet  nitroGLYcerin (NITROSTAT) 0.4 MG sublingual tablet  pantoprazole (PROTONIX) 40 MG EC tablet  predniSONE (DELTASONE) 5 MG tablet  Probiotic Product (PROBIOTIC PO)  tacrolimus (GENERIC EQUIVALENT) 0.5 MG capsule  tacrolimus (GENERIC EQUIVALENT) 1 MG capsule  temazepam (RESTORIL) 15 MG capsule      Allergies   Allergen Reactions     Ibuprofen      Due to liver transplant     Family History  Family History   Problem Relation Age of Onset     Uterine Cancer  "Maternal Grandmother      Polycystic Kidney Diease Brother      Polycystic Kidney Diease Brother      Polycystic Kidney Diease Brother      Polycystic Kidney Diease Brother      Cervical Cancer Maternal Aunt      Depression Mother      Anxiety Disorder Mother      Depression Father      Anxiety Disorder Father      Social History   Social History     Tobacco Use     Smoking status: Never Smoker     Smokeless tobacco: Never Used   Substance Use Topics     Alcohol use: Not Currently     Drug use: Not Currently      Past medical history, past surgical history, medications, allergies, family history, and social history were reviewed with the patient. No additional pertinent items.       Review of Systems   Constitutional: Positive for chills, fatigue and fever.   Respiratory: Negative for cough and shortness of breath.    Cardiovascular: Negative for chest pain.   Gastrointestinal: Negative for abdominal pain.   Genitourinary: Positive for dysuria.   All other systems reviewed and are negative.    A complete review of systems was performed with pertinent positives and negatives noted in the HPI, and all other systems negative.    Physical Exam   BP: 124/60  Pulse: (!) 130  Temp: (!) 103.4  F (39.7  C)  Resp: 18  Height: 177.2 cm (5' 9.75\")  Weight: 65.3 kg (144 lb)  SpO2: 96 %  Physical Exam  Constitutional:       General: Belen Sharma is not in acute distress.     Appearance: Belen Sharma is well-developed. Belen Sharma is not ill-appearing, toxic-appearing or diaphoretic.   HENT:      Head: Normocephalic and atraumatic.   Cardiovascular:      Rate and Rhythm: Normal rate and regular rhythm.      Heart sounds: Normal heart sounds.      Comments: Left arm AV fistula with a good thrill.  Pulmonary:      Effort: Pulmonary effort is normal. No respiratory distress.      Breath sounds: Normal breath sounds. No stridor.   Abdominal:      General: There is no distension.      Palpations: Abdomen is soft.      Tenderness: There " is no abdominal tenderness. There is no rebound.   Musculoskeletal:         General: No tenderness.      Cervical back: Normal range of motion.   Skin:     General: Skin is warm and dry.   Neurological:      General: No focal deficit present.      Mental Status: Belen Sharma is alert and oriented to person, place, and time.      Cranial Nerves: No cranial nerve deficit.      Sensory: No sensory deficit.      Motor: No weakness.   Psychiatric:         Behavior: Behavior normal.         Thought Content: Thought content normal.         ED Course      Procedures       The medical record was reviewed and interpreted.  Current labs reviewed and interpreted.  Previous labs reviewed and interpreted.              Results for orders placed or performed during the hospital encounter of 09/14/22   XR Chest 2 Views     Status: None    Narrative    EXAM: XR CHEST 2 VIEWS  LOCATION: [facility]  DATE/TIME: 9/14/2022 11:38 PM    INDICATION: fever  COMPARISON: 11/04/2021.      Impression    IMPRESSION: Negative chest.   Comprehensive metabolic panel     Status: Abnormal   Result Value Ref Range    Sodium 137 136 - 145 mmol/L    Potassium 4.2 3.4 - 5.3 mmol/L    Chloride 104 98 - 107 mmol/L    Carbon Dioxide (CO2) 22 22 - 29 mmol/L    Anion Gap 11 7 - 15 mmol/L    Urea Nitrogen 25.3 (H) 8.0 - 23.0 mg/dL    Creatinine 1.05 0.51 - 1.17 mg/dL    Calcium 10.0 8.8 - 10.2 mg/dL    Glucose 111 (H) 70 - 99 mg/dL    Alkaline Phosphatase 72 35 - 129 U/L    AST 21 10 - 50 U/L    ALT 12 10 - 50 U/L    Protein Total 7.0 6.4 - 8.3 g/dL    Albumin 4.1 3.5 - 5.2 g/dL    Bilirubin Total 0.6 <=1.2 mg/dL    GFR Estimate 60 (L) >60 mL/min/1.73m2    Narrative    The sex of this patient cannot be reliably determined based on discrepancies in demographics (legal sex, sex assigned at birth, gender identity).  Both male and female reference ranges are provided where applicable.  Careful evaluation of the patient s  results as compared to the gender specific reference intervals is required in this setting.    UA with Microscopic reflex to Culture     Status: Abnormal    Specimen: Urine, Midstream   Result Value Ref Range    Color Urine Yellow Colorless, Straw, Light Yellow, Yellow    Appearance Urine Cloudy (A) Clear    Glucose Urine Negative Negative mg/dL    Bilirubin Urine Negative Negative    Ketones Urine Trace (A) Negative mg/dL    Specific Gravity Urine 1.018 1.003 - 1.035    Blood Urine Small (A) Negative    pH Urine 6.0 5.0 - 7.0    Protein Albumin Urine 70  (A) Negative mg/dL    Urobilinogen Urine Normal Normal, 2.0 mg/dL    Nitrite Urine Negative Negative    Leukocyte Esterase Urine Large (A) Negative    Bacteria Urine Moderate (A) None Seen /HPF    WBC Clumps Urine Present (A) None Seen /HPF    Mucus Urine Present (A) None Seen /LPF    RBC Urine 8 (H) <=2 /HPF    WBC Urine >182 (H) <=5 /HPF    Squamous Epithelials Urine 1 <=1 /HPF    Transitional Epithelials Urine 2 (H) <=1 /HPF    Narrative    Urine Culture ordered based on laboratory criteria   [place] Draw     Status: None    Narrative    The following orders were created for panel order [place] Draw.  Procedure                               Abnormality         Status                     ---------                               -----------         ------                     Extra Blue Top Tube[[id number]]                              Final result               Extra Red Top Tube[[id number]]                               Final result                 Please view results for these tests on the individual orders.   CBC with platelets and differential     Status: Abnormal   Result Value Ref Range    WBC Count 13.7 (H) 4.0 - 11.0 10e3/uL    RBC Count 5.31 3.80 - 5.90 10e6/uL    Hemoglobin 16.0 11.7 - 17.7 g/dL    Hematocrit 48.4 35.0 - 53.0 %    MCV 91 78 - 100 fL    MCH 30.1 26.5 - 33.0 pg    MCHC 33.1 31.5 - 36.5 g/dL    RDW 14.6 10.0 - 15.0 %    Platelet Count 246 150  - 450 10e3/uL    % Neutrophils 80 %    % Lymphocytes 10 %    % Monocytes 10 %    % Eosinophils 0 %    % Basophils 0 %    % Immature Granulocytes 0 %    NRBCs per 100 WBC 0 <1 /100    Absolute Neutrophils 10.9 (H) 1.6 - 8.3 10e3/uL    Absolute Lymphocytes 1.3 0.8 - 5.3 10e3/uL    Absolute Monocytes 1.4 (H) 0.0 - 1.3 10e3/uL    Absolute Eosinophils 0.0 0.0 - 0.7 10e3/uL    Absolute Basophils 0.0 0.0 - 0.2 10e3/uL    Absolute Immature Granulocytes 0.1 <=0.4 10e3/uL    Absolute NRBCs 0.0 10e3/uL    Narrative    The sex of this patient cannot be reliably determined based on discrepancies in demographics (legal sex, sex assigned at birth, gender identity).  Both male and female reference ranges are provided where applicable.  Careful evaluation of the patient s results as compared to the gender specific reference intervals is required in this setting.    Extra Blue Top Tube     Status: None   Result Value Ref Range    Hold Specimen JIC    Extra Red Top Tube     Status: None   Result Value Ref Range    Hold Specimen JIC    iStat Gases (lactate) venous, POCT     Status: Abnormal   Result Value Ref Range    Lactic Acid POCT 0.9 <=2.0 mmol/L    Bicarbonate Venous POCT 22 21 - 28 mmol/L    O2 Sat, Venous POCT 78 (L) 94 - 100 %    pCO2V Venous POCT 32 (L) 40 - 50 mm Hg    pH Venous POCT 7.44 (H) 7.32 - 7.43    pO2 Venous POCT 40 25 - 47 mm Hg   CBC with platelets differential     Status: Abnormal    Narrative    The following orders were created for panel order CBC with platelets differential.  Procedure                               Abnormality         Status                     ---------                               -----------         ------                     CBC with platelets and d...[[id number]]  Abnormal            Final result                 Please view results for these tests on the individual orders.     Medications   sodium chloride (PF) 0.9% PF flush 3 mL (has no administration in time range)   sodium chloride  (PF) 0.9% PF flush 3 mL (3 mLs Intravenous Given 9/15/22 0014)   0.9% sodium chloride BOLUS (1,000 mLs Intravenous New Bag 9/15/22 0015)     Followed by   sodium chloride 0.9% infusion (has no administration in time range)   cefTRIAXone (ROCEPHIN) 1 g vial to attach to  mL bag for ADULTS or NS 50 mL bag for PEDS (1 g Intravenous New Bag 9/15/22 0015)   acetaminophen (TYLENOL) solution 975 mg (975 mg Oral Given 9/15/22 0027)        Assessments & Plan (with Medical Decision Making)   Patient is a 62-year-old female with a history of a kidney liver transplant presents to the ER due to significant fever 103 Fahrenheit.  Patient also has a leukocytosis.  Patient's urine shows concern for urinary tract infection with greater than 100 WBCs.  Patient was started on IV antibiotics.  Due to recurrent infections and sepsis I recommend admission to the hospital for further care.  Due to her immunocompromise state she does meet criteria for inpatient bed.  Patient report will be given to the triage doctor for admission.    I have reviewed the nursing notes. I have reviewed the findings, diagnosis, plan and need for follow up with the patient.    New Prescriptions    No medications on file       Final diagnoses:   Urinary tract infection without hematuria, site unspecified   Kidney replaced by transplant       --  Margie Carlin  [facility] EMERGENCY DEPARTMENT  9/14/2022     Margie Carlin MD  09/15/22 0039

## 2022-09-15 NOTE — H&P
[facility]    History and Physical - Medicine Service, ARYAN TEAM        Date of Admission:  9/14/2022    Assessment & Plan      Belen Sharma is a 62 year old adult admitted on 9/14/2022. Belen Sharma has a history of kidney/liver transplant, secondary hyperparathyroidism, frequent UTI  and is admitted for fever/chills c/f UTI.    AREN and complicated UTI in patient with transplanted kidney  H/o ESBL  Patient presents with dysuria, fever, leukocytosis and elevated CRP with UA c/w UTI. Also found to have mild AREN and possibly some hypovolemia with elevated BUN and soft BP. No tenderness over transplanted kidney. History of ESBL 2020, started on Ceftriaxone in the ED but low threshold to start carbapenem if not improving.  - trend CBC, BMP, CRP  - Ceftriaxone 1g q24h  - mIVF 125 ml/hr  - f/u UCx, BCx    S/p kidney (2016) transplant and liver (2011) transplant   - transplant nephrology consult due to AREN and UTI in transplanted kidney    Immunosuppression:   - PTA ASA, mycophenolate, prednisone, tacrolimus    Chronic problems  Vaginal prolapse- PTA topical estrogen  Allergies- PTA zyrtec       Diet: Combination Diet Regular Diet Adult  DVT Prophylaxis: Enoxaparin (Lovenox) SQ  [place] Catheter: Not present  Fluids: mIVF 125 ml/hr  Central Lines: None  Cardiac Monitoring: None  Code Status: Full Code    Clinically Significant Risk Factors Present on Admission                          Disposition Plan      Expected Discharge Date: 09/17/2022                The patient's care will be formally staffed in the     Deandra Ga MD  Medicine Service, ARYAN TEAM   [facility]  Securely message with the Vocera Web Console (learn more here)  Text page via [facility] Paging/Directory   Please see signed in provider for up to date coverage information    ______________________________________________________________________    Chief  Complaint   Fevers/chills, dysuria, fatigue    History is obtained from the patient    History of Present Illness   Belen Sharma is a 62 year old adult admitted on 9/14/2022. Belen Sharma has a history of kidney/liver transplant, secondary hyperparathyroidism, frequent UTI  and is admitted for fever/chills c/f UTI in patient with transplanted kidney.    Patient presents to the ED due to chills/fatigue at home as well as 1 day of dysuria. Febrile on admission. Denies abdominal pain, decreased PO intake, N/V/D, pain around her transplanted kidney.    Patient has frequent UTIs which she is concerned could be related to the pessary she wears for vaginal prolapse. Was seen in the ED 8/23 with UTI symptoms and started on fosfomycin but her symptoms did not improve. Cultures from that time were negative growing 10,000-50,000 urogenital keisha. She re-presented to the ED 8/28 and was started on cefdinir and was feeling ok until today.      Review of Systems    The 10 point Review of Systems is negative other than noted in the HPI or here.     Past Medical History    I have reviewed this patient's medical history and updated it with pertinent information if needed.   Past Medical History:   Diagnosis Date     Adolescent scoliosis     protruding     BK viremia [id number]     CMV pneumonia (H) 2010     Congenital hepatic fibrosis      Endometriosis      High grade squamous intraepithelial lesion of cervix 09/11/2020     History of angina      HPV (human papilloma virus) infection      Immunosuppression (H)      Polycystic kidney disease     Polycystic kidneys, tx kidney on the right. Both, very large, native kidneys reamain.     PONV (postoperative nausea and vomiting)     Need to start with ice chips and apple juice, no soda     S/P kidney transplant     SLK 10/9/2010 - kidney failure 2/2 [facility]. Explanted 2012. Re-transplant after de-sensitization 2016     S/P liver transplant (H)     10/9/2010 - HAT, ischemic biopathy.  Re-transplant 3/3/2011     S/P splenectomy      Spider veins      Thyroid disease     Hyperthyroid treated with single dose iodine       Past Surgical History   I have reviewed this patient's surgical history and updated it with pertinent information if needed.  Past Surgical History:   Procedure Laterality Date     bunectomy  2018    right foot     bunionectomy  2019    left     CHOLECYSTECTOMY       CONIZATION N/A 2020    Procedure: CONE BIOPSY, CERVIX;  Surgeon: Daysi Perez MD;  Location: [facility] OR     FAILED PICC - RIGHT ARM Right 2020    Has a LUE AV fistula.     H STATISTIC PICC LINE INSERTION >5YR, FAILED Bilateral 2020    Left fistula, Right failed x 2 Occlussion     HERNIA REPAIR, INCISIONAL  2013     IR DIALYSIS PTA CENTRAL SEG  2020     IR PICC PLACEMENT > 5 YRS OF AGE  2020     SPLENECTOMY      Splenectomy     TRANSPLANT KIDNEY RECIPIENT  DONOR      re-transplant after [facility]; 6 months de-sensitization prior AND 6 months after transplant     TRANSPLANT LIVER RECIPIENT  DONOR  2011     TRANSPLANT LIVER, KIDNEY RECIPIENT  DONOR, COMBINED  10/09/2010    HAT - re-Tx liver 3/3/2011; Kidney (left iliac) lost to [facility]/fibrosis - explant     VASCULAR SURGERY      Vascular access left UE. Not used for 4 years since 2nd kidney tx -- [place] [place]        Social History   I have reviewed this patient's social history and updated it with pertinent information if needed. Belen Sharma  reports that Belen Sharma has never smoked. Belen Sharma has never used smokeless tobacco. Belen ICathi Sharma reports previous alcohol use. Belen FLORESCathi Sharma reports previous drug use.    Family History   I have reviewed this patient's family history and updated it with pertinent information if needed.  Family History   Problem Relation Age of Onset     Uterine Cancer Maternal Grandmother      Polycystic Kidney Diease Brother      Polycystic Kidney Diease Brother       Polycystic Kidney Diease Brother      Polycystic Kidney Diease Brother      Cervical Cancer Maternal Aunt      Depression Mother      Anxiety Disorder Mother      Depression Father      Anxiety Disorder Father        Prior to Admission Medications   Prior to Admission Medications   Prescriptions Last Dose Informant Patient Reported? Taking?   MYFORTIC (BRAND) 180 MG EC tablet   No No   Sig: Take 3 tablets (540 mg) by mouth 2 times daily   Multiple Vitamins-Minerals (WOMENS DAILY FORMULA PO)   Yes No   Sig: Take 1 tablet by mouth daily   Probiotic Product (PROBIOTIC PO)   Yes No   Sig: Take 2 tablets by mouth every morning    acetaminophen (TYLENOL) 325 MG tablet   No No   Sig: Take 1 tablet (325 mg) by mouth every 4 hours as needed for mild pain or fever   aspirin (ASA) 81 MG chewable tablet   Yes No   Sig: Take 81 mg by mouth every evening Stopped 7 days preop   biotin 1000 MCG TABS tablet   Yes No   Sig: Take 3,000 mcg by mouth daily   ciclopirox (PENLAC) 8 % external solution   No No   Sig: Apply to adjacent skin and affected nails daily.  Remove with alcohol every 7 days, then repeat.   diphenhydrAMINE (BENADRYL) 25 MG tablet   Yes No   Sig: Take 50 mg by mouth as needed for allergies    estradiol (ESTRACE) 0.1 MG/GM vaginal cream   No No   Sig: Place 1 g vaginally twice a week   guaiFENesin (MUCINEX PO)   Yes No   Sig: Take 1 tablet by mouth 2 times daily as needed   Patient not taking: No sig reported   loratadine (CLARITIN) 10 MG tablet   Yes No   Sig: Take 10 mg by mouth every evening    nitroGLYcerin (NITROSTAT) 0.4 MG sublingual tablet   No No   Sig: Place 1 tablet (0.4 mg) under the tongue every 5 minutes as needed for chest pain For chest pain place 1 tablet under the tongue every 5 minutes for 3 doses. If symptoms persist 5 minutes after 1st dose call 911.   Patient not taking: No sig reported   pantoprazole (PROTONIX) 40 MG EC tablet   Yes No   Sig: Take 1 tablet by mouth as needed (reflux)     predniSONE (DELTASONE) 5 MG tablet   No No   Sig: Take 1 tablet (5 mg) by mouth daily   tacrolimus (GENERIC EQUIVALENT) 0.5 MG capsule   No No   Sig: Take 1 capsule (0.5 mg) by mouth 2 times daily Total dose = 1.5 mg BID   tacrolimus (GENERIC EQUIVALENT) 1 MG capsule   No No   Sig: Take 1 capsule (1 mg) by mouth 2 times daily Total dose = 1.5 mg BID   temazepam (RESTORIL) 15 MG capsule   No No   Sig: Take 1 capsule (15 mg) by mouth as needed for sleep (twice a year)      Facility-Administered Medications: None     Allergies   Allergies   Allergen Reactions     Ibuprofen      Due to liver transplant       Physical Exam   Vital Signs: Temp: 98.7  F (37.1  C) Temp src: Oral BP: 103/47 Pulse: 95   Resp: 20 SpO2: 96 % O2 Device: None (Room air)    Weight: 144 lbs 0 oz    Constitutional: awake, alert, cooperative, no apparent distress, and appears stated age  Eyes: Lids and lashes normal, pupils equal, round and reactive to light, extra ocular muscles intact, sclera clear, conjunctiva normal  ENT: Normocephalic, without obvious abnormality, atraumatic, oral pharynx with moist mucous membranes, tonsils without erythema or exudates, gums normal and good dentition.  Hematologic / Lymphatic: no cervical lymphadenopathy and no supraclavicular lymphadenopathy  Respiratory: No increased work of breathing, good air exchange, clear to auscultation bilaterally, no crackles or wheezing  Cardiovascular: Normal apical impulse, regular rate and rhythm, normal S1 and S2, no S3 or S4, and no murmur noted. No JOAQUIM.  GI: Normal bowel sounds, soft, non-distended, non-tender, no masses palpated, no pain over transplanted kidney  Skin: scattered bruises on arms, no redness, warmth, or swelling and no rashes  Musculoskeletal: There is no redness, warmth, or swelling of the joints. Tone is normal.  Neurologic: Awake, alert, oriented to name, place and time.  Cranial nerves II-XII are grossly intact.        Data   Data reviewed today: I  reviewed all medications, new labs and imaging results over the last 24 hours. I personally reviewed the chest x-ray image(s) showing clear lungs.    Recent Labs   Lab 09/14/22 2220 09/12/22  0806   WBC 13.7* 9.3   HGB 16.0 15.5   MCV 91 93    232    143   POTASSIUM 4.2 4.3   CHLORIDE 104 106   CO2 22 25   BUN 25.3* 15.1   CR 1.05 0.84   ANIONGAP 11 12   EDSON 10.0 10.0   * 88   ALBUMIN 4.1 3.9   PROTTOTAL 7.0 6.2*   BILITOTAL 0.6 0.7   ALKPHOS 72 71   ALT 12 18   AST 21 21     Most Recent 3 CBC's:  Recent Labs   Lab Test 09/14/22 2220 09/12/22  0806 08/29/22  0756   WBC 13.7* 9.3 6.7   HGB 16.0 15.5 15.1   MCV 91 93 90    232 241     Most Recent 3 BMP's:  Recent Labs   Lab Test 09/14/22 2220 09/12/22  0806 08/29/22  0756    143 139   POTASSIUM 4.2 4.3 4.0   CHLORIDE 104 106 107   CO2 22 25 20*   BUN 25.3* 15.1 15.5   CR 1.05 0.84 0.65   ANIONGAP 11 12 12   EDSON 10.0 10.0 9.9   * 88 88

## 2022-09-15 NOTE — ED TRIAGE NOTES
Pt presents to ER with C/O fever to 100.1 this evening. Body aches, shaking chills, dysuria starting yesterday morning.    Triage Assessment     Row Name 09/14/22 7860       Triage Assessment (Adult)    Airway WDL WDL       Respiratory WDL    Respiratory WDL WDL       Skin Circulation/Temperature WDL    Skin Circulation/Temperature WDL WDL       Cardiac WDL    Cardiac WDL WDL       Peripheral/Neurovascular WDL    Peripheral Neurovascular WDL WDL       Cognitive/Neuro/Behavioral WDL    Cognitive/Neuro/Behavioral WDL WDL

## 2022-09-15 NOTE — PROGRESS NOTES
Therapy: IV ABX  Insurance: Medicare/    Patient has coverage for IV ABX with their  secondary plan.    Ded: $150  Met: $150  Co-Insurance: 75/25  Max Out of Pocket: $3000  Met: $524    [facility] in reference to admission date 09/14/2022 to check IV ABX coverage.    Please contact Intake with any questions, [phone] or In Basket pool, [facility] ([phone]).

## 2022-09-15 NOTE — PHARMACY-ADMISSION MEDICATION HISTORY
Admission Medication History Completed by Pharmacy    See [facility] Admission Navigator for allergy information, preferred outpatient pharmacy, prior to admission medications and immunization status.     Medication History Sources:     Patient over the phone    SureScripts    Changes made to PTA medication list (reason):    Added: sulfamethoxazole/trimethoprim (Bactrim) 400 mg/80 mg tablet    Deleted: pantoprazole (patient reports no longer needing/taking)    Changed:   o Diphenhydramine 50 mg as needed -> 25 mg q8h prn (takes mostly at night but also sometimes during the day for allergies in addition to Claritin)    Additional Information:    Per SureScript fill history and patient, she does not take brand name Myfortic, but instead takes generic mycophenolic acid 180 mg tablets.    Patient asking about preferred probiotics for UTI prevention. Discussed the fact that studies conflict about the efficacy of probiotics in preventing recurrent UTIs and that different species/strains may be more effective than others. Recommended she consider intravaginal probiotic (currently taking oral) with Lactobacillus crispatus, Lactobacillus rhamnosus, and/or Lactobacillus reuteri as these species and intravaginal route of administration may be more effective at preventing UTIs based on a meta-analysis performed by Ng et al., 2018 (doi: 10.1016/j.mehy.2018.03.001)    Prior to Admission medications    Medication Sig Last Dose Taking? Auth Provider Long Term End Date   acetaminophen (TYLENOL) 325 MG tablet Take 1 tablet (325 mg) by mouth every 4 hours as needed for mild pain or fever Past Week at Unknown time Yes Margie Ma MD     aspirin (ASA) 81 MG chewable tablet Take 81 mg by mouth every evening Stopped 7 days preop 9/14/2022 at PM Yes Reported, Patient     biotin 1000 MCG TABS tablet Take 3,000 mcg by mouth every evening 9/14/2022 at PM Yes Reported, Patient     ciclopirox (PENLAC) 8 % external solution Apply to adjacent  skin and affected nails daily.  Remove with alcohol every 7 days, then repeat. More than a month at Unknown time Yes Anat Benitez MD     diphenhydrAMINE (BENADRYL) 25 MG tablet Take 25 mg by mouth every 8 hours as needed for allergies Past Month at Unknown time Yes Reported, Patient     estradiol (ESTRACE) 0.1 MG/GM vaginal cream Place 1 g vaginally twice a week 9/13/2022 at Unknown time Yes Zach Francis MD     guaiFENesin (MUCINEX PO) Take 1 tablet by mouth 2 times daily as needed More than a month at Unknown time Yes Reported, Patient     loratadine (CLARITIN) 10 MG tablet Take 10 mg by mouth every evening  9/14/2022 at PM Yes Reported, Patient     Multiple Vitamins-Minerals (WOMENS DAILY FORMULA PO) Take 1 tablet by mouth daily 9/14/2022 at AM Yes Unknown, Entered By History     MYFORTIC (BRAND) 180 MG EC tablet Take 3 tablets (540 mg) by mouth 2 times daily 9/14/2022 at PM Yes Saran Angulo MD     nitroGLYcerin (NITROSTAT) 0.4 MG sublingual tablet Place 1 tablet (0.4 mg) under the tongue every 5 minutes as needed for chest pain For chest pain place 1 tablet under the tongue every 5 minutes for 3 doses. If symptoms persist 5 minutes after 1st dose call 911. More than a month at Unknown time Yes Christy Mohamud MD Yes    predniSONE (DELTASONE) 5 MG tablet Take 1 tablet (5 mg) by mouth daily 9/14/2022 at AM Yes Saran Angulo MD     Probiotic Product (PROBIOTIC PO) Take 2 tablets by mouth every morning ([facility] Women's Vaginal Probiotic) 9/14/2022 at AM Yes Reported, Patient     sulfamethoxazole-trimethoprim (BACTRIM) 400-80 MG tablet Take 1 tablet by mouth three times a week on Monday, Wednesday, Friday 9/14/2022 at AM Yes Unknown, Entered By History     tacrolimus (GENERIC EQUIVALENT) 0.5 MG capsule Take 1 capsule (0.5 mg) by mouth 2 times daily Total dose = 1.5 mg BID 9/14/2022 at PM Yes Saran Angulo MD     tacrolimus (GENERIC EQUIVALENT) 1 MG capsule Take 1 capsule (1 mg) by mouth 2 times daily  Total dose = 1.5 mg BID 9/14/2022 at PM Yes Saran Angulo MD     temazepam (RESTORIL) 15 MG capsule Take 1 capsule (15 mg) by mouth as needed for sleep (twice a year) More than a month at Unknown time Yes Anna Gamble MD         Date completed: 09/15/22    Medication history completed by: Elie Laird [facility]

## 2022-09-15 NOTE — PLAN OF CARE
"Goal Outcome Evaluation:  Neuro: Alert and oriented, neuros wnl  Cardiac:WNL   Respiratory: Denies SOB  GI/:Hx of kidney transplant  Diet/appetite:Regular  Activity: Independent with ambulation  Pain:Denies  Skin:WNL  Lines:PIV, L ARM fistular.    /49 (BP Location: Right arm)   Pulse 72   Temp 97.6  F (36.4  C) (Oral)   Resp 16   Ht 1.772 m (5' 9.76\")   Wt 67.4 kg (148 lb 8 oz)   SpO2 98%   BMI 21.45 kg/m                       "

## 2022-09-15 NOTE — CONSULTS
SOLID ORGAN INFECTIOUS DISEASES CONSULTATION     Patient:  Belen Sharma   YOB: 1960  Date of Visit:  09/15/2022  Date of Admission: 9/14/2022  Consult Requester:Pankaj Toscano MD          Assessment and Recommendations:   Recommendations:  1. Continue ceftriaxone 2g IV q24hrs  - discharge abx regimen and duration TBD  2. Awaiting culture data  3. Will request ID follow up appointment, message sent to schedulers today    Thank you for the consult. Transplant ID will continue to follow with you.     Vivi Dorantes PA-C  Infectious Disease  Pager # [phone]  09/15/2022    Assessment:  Belen Sharma is a 62 year old adult with history of PCKD and congenital hepatic fibrosis s/p kidney and liver transplant (2010; [place]) c/b hepatic artery thrombosis, recurrent infections, kidney [facility]R and ultimately both organs failed, second liver transplant (3/3/2011), and second kidney trasnplant (2/2/16), s/p splenectomy and distal pancreatectomy (2013), previous BK viremia, recurrent UTIs, and isolation of ESBL E.coli (12/2020) who presented to ED on 914/22 with one day of dysuria and fever. Febrile to 103.4 F on arrival with tachycardia; WBC 13.7, CRP 21.6. Started on ceftriaxone, fever resolved and vitals normalized. UA consistent with UTI- large leukocyte esterase, >182 WBC, WBC clumps, moderate bacteria; negative nitrite. Hx ESBL E.coli in 2020, Enterococcus faecalis (pan-S; 6/2021), most recent organism has been K.pnemoniae (R: bactrim; last cultured 11/4/21). Cultures that were sent with onset of sx in August both with mixed urogenital keisha (8/23, 8/28). Improvement of symptoms while on fosfomycin, then cefdinir suggests susceptible organism. Appears to be responding well to ceftriaxone at this time. It is possible that she needed longer duration of antibiotics given immunosuppression and post-transplant anatomy vs recurrent infection as she is at high risk. Discussed urologic hygiene, she has been following  instructions as provided by gynecology and nephrology in the past. Recommend continuing ceftriaxone while awaiting urine culture from 9/14 and previously collected blood cultures. Will determine regimen based on culture data when available.         Previous ID Issues:  - ESBL E.coli UTI and blood stream infection (12/2020)  - Enterococcus faecalis UTI, pan-susceptible (6/2021)  - K.pneumoniae UTI (cultured 8/20/21, 11/4/21)    - Hardware: pins from bunion surgery      Other ID issues:  - QTc interval:  434msec (6/6/22)  - Bacterial prophylaxis:  None  - Pneumocystis prophylaxis:  None  - Viral serostatus & prophylaxis:  CMV +, EBV +, HSV ?, VZV +  - Fungal prophylaxis:  None  - Immunization status:  Flu up to date (9/12/22), Pneumo (9/12/22), due for COVID-19 booster (2/2022)  - Gamma globulin status:  Unknown  - Isolation status: Contact (hx ESBL E.coli UTI and BSI 2020)           History of Present Illness:   Transplants:  2/2/2016 (Kidney), 3/3/2011 (Liver), 10/9/2010 (Kidney / Liver), Postoperative day:  4214 (Liver), 2417 (Kidney)     Belen Sharma is a 62 year old adult with history of PCKD and congenital hepatic fibrosis s/p kidney and liver transplant (2010; [place]) c/b hepatic artery thrombosis, recurrent infections, kidney [facility] and ultimately both organs failed, second liver transplant (3/3/2011), and second kidney trasnplant (2/2/16), s/p splenectomy and distal pancreatectomy (2013), previous BK viremia, recurrent UTIs, and isolation of ESBL E.coli (12/2020) who presented to ED on 914/22 with one day of dysuria and fever.     Recently developed urinary urgency and frequency, treated with fosfomycin x3 days by PCP (8/23). Then had worsening symptoms around 8/27, went to ED, was prescribed cefdinir x7 days (8/29-9/5). Symptoms again improved on cefdinir. Now presents with fever to 101 at home and 103.4F in ED. Associated with shaking chills, body aches and dysuria. Feels frustrated with recurrent UTIs.  Attributes increased frequency of symptoms to pessary use. She has been using good hand and urologic hygiene as advised by providers in an effort to prevent recurrent infections.     Recent dental cleaning, took amoxicillin. Got pneumovax and flu vaccine on 9/12/22.       Antimicrobials  Current:  - ceftriaxone (9/15)    Prior:  - cefdinir (8/29-9/5)  - fosfomycin (8/23-8/25)         Review of Systems:   10 point review of systems was negative except for noted as above in HPI.     CONSTITUTIONAL:  +fever, chills, rigors, fatigue  EYES: negative for icterus  ENT:  negative for congestion, sore throat  RESPIRATORY:  negative for cough with sputum and dyspnea  CARDIOVASCULAR:  negative for chest pain  GASTROINTESTINAL: +incisional hernia. negative for nausea, vomiting, diarrhea and constipation  GENITOURINARY:  +dysuria, discomfort with lower abdominal pressure. No pain over native kidneys or transplant kidney, dheeraj hematuria  MUSCULOSKELETAL:  No joint pain or swelling  INTEGUMENT:  negative for rash and pruritus  NEURO:  Negative for headache         Past Medical History:     Past Medical History:   Diagnosis Date     Adolescent scoliosis     protruding     BK viremia [id number]     CMV pneumonia (H) 2010     Congenital hepatic fibrosis      Endometriosis      High grade squamous intraepithelial lesion of cervix 09/11/2020     History of angina      HPV (human papilloma virus) infection      Immunosuppression (H)      Polycystic kidney disease     Polycystic kidneys, tx kidney on the right. Both, very large, native kidneys reamain.     PONV (postoperative nausea and vomiting)     Need to start with ice chips and apple juice, no soda     S/P kidney transplant     SLK 10/9/2010 - kidney failure 2/2 [facility]. Explanted 2012. Re-transplant after de-sensitization 2016     S/P liver transplant (H)     10/9/2010 - [facility], ischemic biopathy. Re-transplant 3/3/2011     S/P splenectomy      Spider veins 2019     Thyroid disease      Hyperthyroid treated with single dose iodine            Past Surgical History:     Past Surgical History:   Procedure Laterality Date     bunectomy  2018    right foot     bunionectomy  2019    left     CHOLECYSTECTOMY       CONIZATION N/A 2020    Procedure: CONE BIOPSY, CERVIX;  Surgeon: Daysi Perez MD;  Location: [facility] OR     FAILED PICC - RIGHT ARM Right 2020    Has a LUE AV fistula.     H STATISTIC PICC LINE INSERTION >5YR, FAILED Bilateral 2020    Left fistula, Right failed x 2 Occlussion     HERNIA REPAIR, INCISIONAL  2013     IR DIALYSIS PTA CENTRAL SEG  2020     IR PICC PLACEMENT > 5 YRS OF AGE  2020     SPLENECTOMY      Splenectomy     TRANSPLANT KIDNEY RECIPIENT  DONOR      re-transplant after [facility]R; 6 months de-sensitization prior AND 6 months after transplant     TRANSPLANT LIVER RECIPIENT  DONOR  2011     TRANSPLANT LIVER, KIDNEY RECIPIENT  DONOR, COMBINED  10/09/2010    [facility] - re-Tx liver 3/3/2011; Kidney (left iliac) lost to [facility]R/fibrosis - explant     VASCULAR SURGERY      Vascular access left UE. Not used for 4 years since 2nd kidney tx -- [place]            Family History:   Reviewed and non-contributory.   Family History   Problem Relation Age of Onset     Uterine Cancer Maternal Grandmother      Polycystic Kidney Diease Brother      Polycystic Kidney Diease Brother      Polycystic Kidney Diease Brother      Polycystic Kidney Diease Brother      Cervical Cancer Maternal Aunt      Depression Mother      Anxiety Disorder Mother      Depression Father      Anxiety Disorder Father             Social History:     Social History     Tobacco Use     Smoking status: Never Smoker     Smokeless tobacco: Never Used   Substance Use Topics     Alcohol use: Not Currently     History   Sexual Activity     Sexual activity: Not Currently            Current Medications:       aspirin  81 mg Oral QPM     biotin  3,000 mcg Oral  Daily     [START ON 9/16/2022] cefTRIAXone  2 g Intravenous Q24H     enoxaparin ANTICOAGULANT  40 mg Subcutaneous Q24H     loratadine  10 mg Oral QPM     multivitamin w/minerals  1 tablet Oral Daily     mycophenolic acid  540 mg Oral BID     predniSONE  5 mg Oral Daily     sodium chloride (PF)  3 mL Intracatheter Q8H     sodium chloride (PF)  3 mL Intravenous Q8H     tacrolimus  1.5 mg Oral BID IS            Allergies:     Allergies   Allergen Reactions     Ibuprofen      Due to liver transplant            Physical Exam:   Vitals were reviewed  Temp:  [97.6  F (36.4  C)-103.4  F (39.7  C)] 97.6  F (36.4  C)  Pulse:  [] 72  Resp:  [16-30] 16  BP: (101-124)/(47-60) 104/49  SpO2:  [94 %-100 %] 98 %    Vitals:    09/14/22 2211 09/15/22 0654   Weight: 65.3 kg (144 lb) 67.4 kg (148 lb 8 oz)       Constitutional: Pleasant and cooperative adult seen sitting up in ED bed, in NAD. Awake, alert, interactive.  HEENT: NC/AT, EOMI, sclera clear, conjunctiva normal, OP with MMM  Respiratory: No increased work of breathing, CTAB, no crackles or wheezing.  Cardiovascular: RRR, no murmur noted. No peripheral edema.  GI: soft, nondistended, +bowel sounds. +mild tenderness to palpation lower abd. No flank pain or tenderness over RLQ allograft kidney.  Skin: Warm, dry, well-perfused. No rashes or open lesions on visible surfaces. +bruising on shins.   Musculoskeletal: Extremities grossly normal, non-tender, no edema. Good strength and ROM in bed.   Neurologic: A&O. Answers questions appropriately, speech normal. Moves all extremities spontaneously.  Neuropsychiatric: Calm. Affect appropriate to situation.  Vascular access:  PIV on RUE CDI, non-tender, no surrounding erythema.           Laboratory Data:     Microbiology:  Culture   Date Value Ref Range Status   08/28/2022 <10,000 CFU/mL Urogenital keisha  Final   08/23/2022 10,000-50,000 CFU/mL Urogenital keisha  Final   11/04/2021 >100,000 CFU/mL Klebsiella pneumoniae (A)  Final    11/04/2021 No Growth  Final   11/04/2021 No Growth  Final   09/08/2021 No Growth  Final   08/20/2021 >100,000 CFU/mL Klebsiella pneumoniae (A)  Final       Inflammatory Markers    Recent Labs   Lab Test 09/14/22 2220 11/04/21  1457 12/28/20  1515 12/21/20  1545   SED 8 14 8 10   CRP 21.60* 39.3* 13.0* 18.0*       Fungal Studies  No lab results found.    Invalid input(s): HIFUN, FUNGL    Metabolic Studies       Recent Labs   Lab Test 09/15/22  0607 09/14/22 2230 09/14/22 2220 09/12/22  0806 08/29/22  0756 08/29/22  0023 12/18/20  0611 12/16/20 2236 09/30/20  0721 09/23/20  1653     --  137 143 139 139   < > 137   < >  --    POTASSIUM 4.3  --  4.2 4.3 4.0 3.8   < > 4.2   < >  --    CHLORIDE 112*  --  104 106 107 106   < > 106   < >  --    CO2 20*  --  22 25 20* 25   < > 26   < >  --    ANIONGAP 8  --  11 12 12 8   < > 5   < >  --    BUN 16.5  --  25.3* 15.1 15.5 22.7   < > 17   < >  --    CR 0.73  --  1.05 0.84 0.65 0.72   < > 0.87   < >  --    GFRESTIMATED >90  --  60* 78 >90 >90   < > 72   < >  --    *  --  111* 88 88 88   < > 89   < >  --    EDSON 8.9  --  10.0 10.0 9.9 9.6   < > 9.9   < >  --    PHOS  --   --   --   --   --   --   --   --   --  3.4   LACT  --  0.9  --   --   --   --   --  0.8  --   --     < > = values in this interval not displayed.       Hepatic Studies    Recent Labs   Lab Test 09/14/22 2220 09/12/22  0806 08/29/22  0810 08/29/22  0023   BILITOTAL 0.6 0.7 0.4 0.3   ALKPHOS 72 71 66 63   ALBUMIN 4.1 3.9 3.9 3.8   AST 21 21 23 16   ALT 12 18 12 7*       Pancreatitis testing    Recent Labs   Lab Test 08/29/22  0810 06/06/22  0828 09/13/21  0840 09/10/20  1037   TRIG 123 126 119 131       Hematology Studies      Recent Labs   Lab Test 09/15/22  0607 09/14/22  2220 09/12/22  0806 08/29/22  0756 08/29/22  0023 03/11/21  1007 12/28/20  1515 12/28/20  0855 12/21/20  1545 12/18/20  0611 12/16/20  2236   WBC 9.8 13.7* 9.3 6.7 7.4   < > 9.2   < > 6.2   < > 11.4*   ANEU  --   --   --   --    --   --  6.4  --  3.9  --  9.3*   ALYM  --   --   --   --   --   --  2.1  --  1.7  --  1.0   PABLO  --   --   --   --   --   --  0.6  --  0.6  --  1.0   AEOS  --   --   --   --   --   --  0.0  --  0.0  --  0.0   HGB 13.8 16.0 15.5 15.1 14.2   < > 16.6*   < > 16.2*   < > 16.9*   HCT 42.4 48.4 48.0 45.3 44.7   < > 50.8*   < > 49.2*   < > 54.5*    246 232 241 233   < > 308   < > 261   < > 260    < > = values in this interval not displayed.       Arterial Blood Gas Testing    Recent Labs   Lab Test 09/14/22  2230 02/10/22  0802   PH 7.44* 7.32*        Urine Studies     Recent Labs   Lab Test 09/14/22  2218 08/28/22  2104 08/23/22  1257   URINEPH 6.0 6.0 6.0   NITRITE Negative Negative Negative   LEUKEST Large* Large* Moderate*   WBCU >182* >182* >100*       Stool Studies  No lab results found.    Drug Level Monitoring  Recent Labs   Lab Test 09/12/22  0806 12/19/20  0707 12/18/20  0611   TACROL 6.6   < >  --    MPACID  --   --  1.32   MPAG  --   --  23.7*    < > = values in this interval not displayed.       CSF testing   No lab results found.    Hepatitis B Testing No lab results found.    Hepatitis C Testing   No results found for: HCVAB, HQTG, HCGENO, HCPCR, HQTRNA, HEPRNA    Respiratory Virus Testing    No results found for: RS, FLUAG    Last check of C difficile  No results found for: CDBPCT    COVID-19 Testing  Recent Labs   Lab Test 09/26/20  1010   [id number] Nasopharyngeal       Respiratory Virus Testing    Recent Labs   Lab Test 11/04/21  1408 12/16/20  2237   IFLUA Not Detected  --    INFZA  --  Negative   FLUAH1 Not Detected  --    DA2325 Not Detected  --    FLUAH3 Not Detected  --    IFLUB Not Detected  --    INFZB  --  Negative   PIV1 Not Detected  --    PIV2 Not Detected  --    PIV3 Not Detected  --    PIV4 Not Detected  --    RSVA Not Detected  --    RSVB Not Detected  --    HMPV Not Detected  --    ADENOV Not Detected  --    CORONA Not Detected  --        CMV DNA quant  Log IU/mL of CMVQNT    Date Value Ref Range Status   12/18/2020 Not Calculated <2.1 [Log_IU]/mL Final   09/23/2020 Not Calculated <2.1 [Log_IU]/mL Final       EBV DNA quant  No results found for: EBRES    BK virus  BK Virus Result   Date Value Ref Range Status   09/23/2020 BK Virus DNA Not Detected BKNEG^BK Virus DNA Not Detected copies/mL Final       Parvovirus  No lab results found.    Invalid input(s): PRVRES    Adenovirus  No lab results found.    Invalid input(s): ADENAB, ADENOVIRUS, ADQT         Imaging:     CXR (9/14/22)  IMPRESSION: Negative chest.

## 2022-09-15 NOTE — UTILIZATION REVIEW
Admission Status; Secondary Review Determination    Under the authority of the Utilization Management Committee, the utilization review process indicated a secondary review on the above patient. The review outcome is based on review of the medical records, discussions with staff, and applying clinical experience noted on the date of the review.    (x) Inpatient Status Appropriate - This patient's medical care is consistent with medical management for inpatient care and reasonable inpatient medical practice.    RATIONALE FOR DETERMINATION: 62-year-old female status post kidney and liver transplant with history of frequent UTIs now presents with significant signs of UTI with sepsis.  Patient has a fever of 103.5 degrees, leukocytosis, tachycardia and marked pyuria with UTI symptoms.  Patient at increased risk due to prior history of ESBL as well as immunosuppression posttransplant medications.  Inpatient care appropriate.    At the time of admission with the information available to the attending physician more than 2 nights Hospital complex care was anticipated, based on patient risk of adverse outcome if treated as outpatient and complex care required. Inpatient admission is appropriate based on the Medicare guidelines.    This document was produced using voice recognition software    The information on this document is developed by the utilization review team in order for the business office to ensure compliance. This only denotes the appropriateness of proper admission status and does not reflect the quality of care rendered.    The definitions of Inpatient Status and Observation Status used in making the determination above are those provided in the CMS Coverage Manual, Chapter 1 and Chapter 6, section 70.4.    Sincerely,    Rick Luna MD  Utilization Review  Physician Advisor  [facility].

## 2022-09-15 NOTE — PROGRESS NOTES
[facility]  Medicine Progress Note - Hospitalist Service, GOLD TEAM 5  Date of Admission: 9/14/2022    Assessment & Plan        Belen Sharma is a 62 year old adult with history of polycystic kidney disease s/p DDKT x2, congenital hepatic fibrosis s/p liver transplant x2, recurrent UTIs, non-obstructive CAD, microvascular angina, allergies, splenectomy, and depression who was admitted to [facility] 9/14/2022 with sepsis 2/2 UTI    Sepsis 2/2 recurrent UTI: Admitted with fever and urinary symptoms. CXR no acute findings. H/o ESBL UTI 6/2022 and 12/2020. Also with h/o klebsiella, enterococcus UTIs. Treated with Meropenem and fosfomycin in the past. WBC 9.8 (13.7). UA large LE, nitrite negative, moderate bacteria, WBC clumps, mucous, 8 RBC, >182 WBC, 2 transitional epi cells. 1 BCx obtained, NGTD. Tmax 103.4, now afebrile  - Transplant ID consult  - Will continue ceftriaxone for now given WBC normalization, fever curve. Increase dose to 2 grams. Low threshold to start meropenem if any clinical changes  - Obtain a second blood culture  - IVF  ml/hr   - Follow cultures     Polycystic kidney disease and congential hepatic fibrosis s/p kidney and liver transplants x2: SLK 10/2010 with ischemic injury. Repeat liver transplant 3/2011. Renal transplant removed 2012. S/p DDKT 2016. Follows OP with Dr. Leventhal, last seen 8/2022. Follows OP with Dr. Angulo, last seen 12/2021. PTA on tacro (goal 4-6), MMF, and prednisone. BL Cr 0.7-0.9. Cr elevated to 1.05 on admission, now at BL  - Transplant nephrology consult  - Continue PTA immunosuppression  - Resume PTA bactrim PJP  - Tacro level   - Pharmacy consult as PTA med rec unclear  - Follow up with GI 8/2023      Other Medical Issues:  Severe vaginal prolapse: Reports worsening symptoms despite pessary use. Concerned pessary use could contribute to recurrent UTIs. Plans to follow up OP with gynecology for ongoing care. Resume topical  estradiol on discharge  Allergies: Continue PTA Zyrtec   Non-obstructive CAD, microvascular angina: Follows OP with Dr. Mohamud, last seen 6/6/22. No acute concerns, monitor. Continue ASA  H/o splenectomy  H/o depression: No PTA meds. No concerns expressed  GERD: PPI not ordered on admission. Hold for now      Diet: Combination Diet Regular Diet Adult    DVT Prophylaxis: Enoxaparin (Lovenox) SQ  [place] Catheter: Not present  Central Lines: None  Cardiac Monitoring: None  Code Status: Full Code      Disposition Plan      Expected Discharge Date: 09/17/2022                The patient's care was discussed with the patient, CC, and attending physician, Dr. Dorcas Aragon PA-C  Hospitalist Service, GOLD TEAM [address] [place] [address]  Securely message with the Vocera Web Console (learn more here)  Text page via [facility] Paging/Directory   Please see signed in provider for up to date coverage information      Clinically Significant Risk Factors Present on Admission                          ______________________________________________________________________    Interval History   Feeling better. Urinary symptoms improved. Fever curve broke. Appetite low. No chest pain, dyspnea. Denies pain over transplanted kidney site. Reports significant issues with vaginal prolapse to the point that she can nearly walk without issue    Data reviewed today: I reviewed all medications, new labs and imaging results over the last 24 hours    Physical Exam   Vital Signs: Temp: 97.6  F (36.4  C) Temp src: Oral BP: 104/49 Pulse: 72   Resp: 16 SpO2: 98 % O2 Device: None (Room air)    Weight: 148 lbs 8 oz  General Appearance: Alert and oriented x3, pleasant  HEENT: Anicteric sclera, MMM   Respiratory: Breathing comfortably on room air, lungs CTAB without wheezing or crackles   Cardiovascular: RRR, S1, S2. No murmur noted  GI: Abdomen soft, non-tender with active bowel sounds. No guarding or rebound.  No tenderness over transplanted kidney site in the RLQ. Prior surgical scars noted  Lymph/Hematologic: No peripheral edema, distal pulses palpable   Skin: No rash or jaundice   Musculoskeletal: Moves all extremities   Neurologic: No focal deficits, CN II-XII grossly intact      Data   Recent Labs   Lab 09/15/22  0607 09/14/22  2220 09/12/22  0806   WBC 9.8 13.7* 9.3   HGB 13.8 16.0 15.5   MCV 92 91 93    246 232    137 143   POTASSIUM 4.3 4.2 4.3   CHLORIDE 112* 104 106   CO2 20* 22 25   BUN 16.5 25.3* 15.1   CR 0.73 1.05 0.84   ANIONGAP 8 11 12   EDSON 8.9 10.0 10.0   * 111* 88   ALBUMIN  --  4.1 3.9   PROTTOTAL  --  7.0 6.2*   BILITOTAL  --  0.6 0.7   ALKPHOS  --  72 71   ALT  --  12 18   AST  --  21 21     Recent Results (from the past 24 hour(s))   XR Chest 2 Views    Narrative    EXAM: XR CHEST 2 VIEWS  LOCATION: [facility]  DATE/TIME: 9/14/2022 11:38 PM    INDICATION: fever  COMPARISON: 11/04/2021.      Impression    IMPRESSION: Negative chest.

## 2022-09-15 NOTE — CONSULTS
[facility]  Transplant Nephrology Consult  Date of Admission:  9/14/2022  Today's Date: 09/15/2022  Requesting physician: Pankaj Toscano MD    Recommendations:  - IVF  -empiric Abx with low threshold to broaden coverage if clinical worsening given ESBL hx  - appreciate tx ID input  - no changes to immunosuppression, check 12hr FK trough       Assessment & Plan   # DDKT: downtrend with IVF back to baseline              - Baseline Creatinine:  ~ 0.7-0.9              - Proteinuria: Minimal (0.2-0.5 grams)              - Date DSA Last Checked: Not Known      Latest DSA: Not checked recently due to time from transplant              - BK Viremia: No              - Kidney Tx Biopsy: No     # Liver Tx: congenital hepatic fibrosis              -stable LFT. Follows with transplant hepatology.     # Immunosuppression: Tacrolimus immediate release (goal 4-6), Mycophenolic acid (dose 540 mg every 12 hours) and Prednisone (dose 5 mg daily)              - Continue with intensive monitoring of immunosuppression for efficacy and toxicity.              - Changes: Not at this time     # Infection Prophylaxis:   - PJP: Sulfa/TMP (Bactrim) MWF     # Recurrent UTI/transplant pyelonephritis:    - awaiting urine Cx, s/p ceftriaxone, low threshold to broaden coverage given ESBL hx   - appreciate Tx ID input   - pt reports recent UTI just about a month ago, treated with fosfomycin x 3 d 8/23 then had worseninh sx's and was    prescribed 1 week of cefdinir     # PKD:              -Seen by txp surgery. Risks outweigh benefits for native nephrectomy     # Hypertension: Controlled;   Goal BP: < 130/80              - Changes: Not at this time.      # Post-Transplant Erythrocytosis: Hgb: Stable;    On ACEI/ARB: No                   Imaging: Not at this time     # Mineral Bone Disorder:   - Secondary renal hyperparathyroidism; PTH level: Normal (18-80 pg/ml)        On treatment: None  - Vitamin D;  level: High        On supplement: No  - Calcium; level: High normal        On supplement: No  - Phosphorus; level: Normal        On supplement: No      # Electrolytes:   - Potassium; level: Normal        On supplement: No  - Magnesium; level: Not checked recently        On supplement: No  - Bicarbonate; level: Normal        On supplement: No     # HSIL:               -per cone biopsy on 9/30/20. No evidence of dysplasia. Had colposcopy 9/2021 with +HPV. Colpo negative. Last PAP 4/12/22 negative but with positive HPV, per notes plan for colposcopy     # Microvascular angina:              -Follows with cardiology, Dr. Mohamud.               -Uses SL angina a few times per year     # Cystocele:              -Has pessary. Follows with OB GYN     # Transplant History:  Etiology of Kidney Failure: ADPKD (native), Chronic rejection  Tx: DDKT, Liver Tx and Liver Tx (SLK)  Transplant: 2/2/2016 (Kidney), 3/3/2011 (Liver), 10/9/2010 (Kidney / Liver)  Significant changes in immunosuppression: None  Significant transplant-related complications: CMV Viremia    Recommendations were communicated to the primary team via this note.        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 Patient notes a recent Abx course for UTI few weeks ago, urine Cx showed mixed urogenital keisha. She received a course of fosfomycin x 3 d but had clinical worsening so was prescribed a 1 week course of cefdinir (8/29-9/5)  In the ED, she was started on IVF and empiric ceftriaxone pending urine Cx  results    Review of Systems    The 10 point Review of Systems is negative other than noted in the HPI or here.     Past Medical History    I have reviewed this patient's medical history and updated it with pertinent information if needed.   Past Medical History:   Diagnosis Date     Adolescent scoliosis     protruding     BK viremia [id number]     CMV pneumonia (H)      Congenital hepatic fibrosis      Endometriosis      High grade squamous intraepithelial lesion of cervix 2020     History of angina      HPV (human papilloma virus) infection      Immunosuppression (H)      Polycystic kidney disease     Polycystic kidneys, tx kidney on the right. Both, very large, native kidneys reamain.     PONV (postoperative nausea and vomiting)     Need to start with ice chips and apple juice, no soda     S/P kidney transplant     SLK 10/9/2010 - kidney failure 2/2 [facility]. Explanted . Re-transplant after de-sensitization      S/P liver transplant (H)     10/9/2010 - [facility]AT, ischemic biopathy. Re-transplant 3/3/2011     S/P splenectomy      Spider veins      Thyroid disease     Hyperthyroid treated with single dose iodine       Past Surgical History   I have reviewed this patient's surgical history and updated it with pertinent information if needed.  Past Surgical History:   Procedure Laterality Date     bunectomy  2018    right foot     bunionectomy  2019    left     CHOLECYSTECTOMY       CONIZATION N/A 2020    Procedure: CONE BIOPSY, CERVIX;  Surgeon: Daysi Perez MD;  Location: [facility] OR     FAILED PICC - RIGHT ARM Right 2020    Has a LUE AV fistula.     H STATISTIC PICC LINE INSERTION >5YR, FAILED Bilateral 2020    Left fistula, Right failed x 2 Occlussion     HERNIA REPAIR, INCISIONAL  2013     IR DIALYSIS PTA CENTRAL SEG  2020     IR PICC PLACEMENT > 5 YRS OF AGE  2020     SPLENECTOMY      Splenectomy     TRANSPLANT KIDNEY RECIPIENT  DONOR       re-transplant after [facility]; 6 months de-sensitization prior AND 6 months after transplant     TRANSPLANT LIVER RECIPIENT  DONOR  2011     TRANSPLANT LIVER, KIDNEY RECIPIENT  DONOR, COMBINED  10/09/2010    HAT - re-Tx liver 3/3/2011; Kidney (left iliac) lost to [facility]/fibrosis - explant     VASCULAR SURGERY      Vascular access left UE. Not used for 4 years since 2nd kidney tx 2016 [employer] [place]       Family History   I have reviewed this patient's family history and updated it with pertinent information if needed.   Family History   Problem Relation Age of Onset     Uterine Cancer Maternal Grandmother      Polycystic Kidney Diease Brother      Polycystic Kidney Diease Brother      Polycystic Kidney Diease Brother      Polycystic Kidney Diease Brother      Cervical Cancer Maternal Aunt      Depression Mother      Anxiety Disorder Mother      Depression Father      Anxiety Disorder Father        Social History   I have reviewed this patient's social history and updated it with pertinent information if needed. Belen Sharma  reports that Belen Sharma has never smoked. Belen Sharma has never used smokeless tobacco. Belen Sharma reports previous alcohol use. Belen Sharma reports previous drug use.    Allergies   Allergies   Allergen Reactions     Ibuprofen      Due to liver transplant     Prior to Admission Medications     aspirin  81 mg Oral QPM     biotin  3,000 mcg Oral Daily     [START ON 2022] cefTRIAXone  2 g Intravenous Q24H     enoxaparin ANTICOAGULANT  40 mg Subcutaneous Q24H     loratadine  10 mg Oral QPM     multivitamin w/minerals  1 tablet Oral Daily     mycophenolic acid  540 mg Oral BID     predniSONE  5 mg Oral Daily     sodium chloride (PF)  3 mL Intracatheter Q8H     sodium chloride (PF)  3 mL Intravenous Q8H     tacrolimus  1.5 mg Oral BID IS       sodium chloride 125 mL/hr at 09/15/22 1055       Physical Exam   Temp  Av.2  F (37.3  C)  Min: 97.6  F (36.4  C)  Max: 103.4  F  "(39.7  C)      Pulse  Av.1  Min: 72  Max: 130 Resp  Av  Min: 16  Max: 30  SpO2  Av.1 %  Min: 94 %  Max: 100 %     /49 (BP Location: Right arm)   Pulse 72   Temp 97.6  F (36.4  C) (Oral)   Resp 16   Ht 1.772 m (5' 9.76\")   Wt 67.4 kg (148 lb 8 oz)   SpO2 98%   BMI 21.45 kg/m      Admit Weight: 65.3 kg (144 lb)     GENERAL APPEARANCE: alert and no distress  HENT: mouth without ulcers or lesions  LYMPHATICS: no cervical or supraclavicular nodes  RESP: lungs clear to auscultation - no rales, rhonchi or wheezes  CV: regular rhythm, normal rate, no rub, no murmur  EDEMA: no LE edema bilaterally  ABDOMEN: soft, nondistended, nontender, bowel sounds normal  MS: extremities normal - no gross deformities noted, no evidence of inflammation in joints, no muscle tenderness  SKIN: no rash    Data   CMP  Recent Labs   Lab 09/15/22  0622  0806    137 143   POTASSIUM 4.3 4.2 4.3   CHLORIDE 112* 104 106   CO2 20* 22 25   ANIONGAP 8 11 12   * 111* 88   BUN 16.5 25.3* 15.1   CR 0.73 1.05 0.84   GFRESTIMATED >90 60* 78   EDSON 8.9 10.0 10.0   PROTTOTAL  --  7.0 6.2*   ALBUMIN  --  4.1 3.9   BILITOTAL  --  0.6 0.7   ALKPHOS  --  72 71   AST  --  21 21   ALT  --  12 18     CBC  Recent Labs   Lab 09/15/22  0622  0806   HGB 13.8 16.0 15.5   WBC 9.8 13.7* 9.3   RBC 4.59 5.31 5.19   HCT 42.4 48.4 48.0   MCV 92 91 93   MCH 30.1 30.1 29.9   MCHC 32.5 33.1 32.3   RDW 14.6 14.6 14.4    246 232     INRNo lab results found in last 7 days.  ABG  Recent Labs   Lab 22  2230   PH 7.44*      Urine Studies  Recent Labs   Lab Test 22  2218 22  2104 22  1257 21  1515 21  1539 21  1341 21  1050 21  1050 21  1130   COLOR Yellow Yellow Yellow Yellow   < > Yellow   < > Yellow Yellow   APPEARANCE Cloudy* Cloudy* Cloudy* Slightly Cloudy*   < > Clear   < > Clear Clear   URINEGLC Negative Negative Negative " Negative   < > Negative   < > Negative Negative   URINEBILI Negative Negative Negative Negative   < > Negative   < > Negative Negative   URINEKETONE Trace* Negative Negative Negative   < > Negative   < > Negative Negative   SG 1.018 1.015 1.010 1.013   < > 1.010   < > 1.015 1.010   UBLD Small* Moderate* Moderate* Negative   < > Trace*   < > Small* Trace*   URINEPH 6.0 6.0 6.0 6.0   < > 7.0   < > 6.5 6.5   PROTEIN 70 * 200 * 30 * Negative   < > Negative   < > Negative Negative   UROBILINOGEN  --   --  0.2  --   --  0.2  --  0.2 0.2   NITRITE Negative Negative Negative Positive*   < > Negative   < > Negative Negative   LEUKEST Large* Large* Moderate* Moderate*   < > Moderate*   < > Large* Moderate*   RBCU 8* 20* 5-10* 3*   < > 0-2   < > 0-2 2-5*   WBCU >182* >182* >100* 68*   < > 5-10*   < > 5-10* 25-50*    < > = values in this interval not displayed.     Recent Labs   Lab Test 06/04/21  1130 09/10/20  1037   UTPG 0.34* 0.16     PTH  Recent Labs   Lab Test 02/10/22  0802 09/23/20  1653   PTHI 66 45     Iron Studies  No lab results found.    IMAGING:  All imaging studies reviewed by me.

## 2022-09-15 NOTE — PROGRESS NOTES
Care Management Follow Up    Length of Stay (days): 0    Expected Discharge Date: 09/17/2022     Concerns to be Addressed:       Patient plan of care discussed at interdisciplinary rounds: Yes    Anticipated Discharge Disposition:  home     Anticipated Discharge Services:    Anticipated Discharge DME:      Patient/family educated on Medicare website which has current facility and service quality ratings:  no  Education Provided on the Discharge Plan:  yes  Patient/Family in Agreement with the Plan:  yes    Referrals Placed by CM/SW:  [facility]  Private pay costs discussed: benefits check through [facility] for IV abx    Additional Information:  RNCC discussed discharge with providers, charge nurse, SW. Per team, pt still awaiting UC results, culture/sensitivity. Patient on IV antibiotics, and so RNCC has placed orders for PLC for IV abx via PICC as appointments are filling fast, referral to [facility] for benefits check. RNCC continues to follow.    [facility]  Phone # [phone]  Fax # [phone]     [facility]  Ph: [phone]      LANA Reyes, BA, RN, CMSRN, RNCC  Covering Units 6D/OBS  Pager: [phone]  Phone: [phone]  6th floor Weekend/Holiday Pager: [phone]  Observation weekend/after hours: [phone]

## 2022-09-15 NOTE — TELEPHONE ENCOUNTER
Patient was seen about a month ago and was started on a antibiotic for a UTI. She states she then got a second dose of antibiotic because it wasn't working.  Patient was seen by infectious diseases and they told her with her Liver transplant if she has any UTI symptoms and fever she needs to be seen in ED.    Patient currently has a fever of 100.1, painful and bringin urination and frequency.      Since patient states she needs to be seen in ED by infectious disease with UTI and fever she wants to go there.  Suggested patient goes to [facility], but patient states they dont take transplant patients.  I said I am sure they would be able to assist with her symptoms but she states she wants to go to [facility] to be seen.    Patient also wants her primary and specialists to know.  Assured her I would route a message to them.    Alanis Craig RN   09/14/22 8:47 PM  [facility] Nurse Advisor    Reason for Disposition    Urinating more frequently than usual (i.e., frequency)    Additional Information    Negative: Shock suspected (e.g., cold/pale/clammy skin, too weak to stand, low BP, rapid pulse)    Negative: Sounds like a life-threatening emergency to the triager    Negative: Followed a female genital area injury (e.g., vagina, vulva)    Negative: Followed a male genital area injury (e.g., penis, scrotum)    Negative: Vaginal discharge    Negative: Pus (white, yellow) or bloody discharge from end of penis    Negative: [1] Taking antibiotic for urinary tract infection (UTI) AND [2] female    Negative: [1] Taking antibiotic for urinary tract infection (UTI) AND [2] male    Negative: [1] Pain or burning with passing urine (urination) AND [2] pregnant    Negative: [1] Pain or burning with passing urine (urination) AND [2] postpartum (from 0 to 6 weeks after delivery)    Negative: [1] Pain or burning with passing urine (urination) AND [2] female    Negative: [1] Pain or burning with passing urine (urination) AND [2]  male    Negative: Pain or itching in the vulvar area    Negative: Pain in scrotum is main symptom    Negative: Blood in the urine is main symptom    Negative: Symptoms arising from use of a urinary catheter (e.g., coude, [place])    Negative: [1] Unable to urinate (or only a few drops) > 4 hours AND [2] bladder feels very full (e.g., palpable bladder or strong urge to urinate)    Negative: [1] Decreased urination and [2] drinking very little AND [2] dehydration suspected (e.g., dark urine, no urine > 12 hours, very dry mouth, very lightheaded)    Negative: Patient sounds very sick or weak to the triager    Negative: Fever > 100.4 F (38.0 C)    Negative: Side (flank) or lower back pain present    Negative: [1] Can't control passage of urine (i.e., urinary incontinence) AND [2] new-onset (< 2 weeks) or worsening    Protocols used: URINARY SYMPTOMS-A-AH

## 2022-09-16 VITALS
DIASTOLIC BLOOD PRESSURE: 61 MMHG | BODY MASS INDEX: 21.13 KG/M2 | HEIGHT: 70 IN | WEIGHT: 147.6 LBS | SYSTOLIC BLOOD PRESSURE: 111 MMHG | HEART RATE: 78 BPM | OXYGEN SATURATION: 96 % | TEMPERATURE: 98.2 F | RESPIRATION RATE: 16 BRPM

## 2022-09-16 LAB
ALBUMIN SERPL BCG-MCNC: 3.2 G/DL (ref 3.5–5.2)
ALP SERPL-CCNC: 57 U/L (ref 35–129)
ALT SERPL W P-5'-P-CCNC: 8 U/L (ref 10–50)
ANION GAP SERPL CALCULATED.3IONS-SCNC: 8 MMOL/L (ref 7–15)
AST SERPL W P-5'-P-CCNC: 16 U/L (ref 10–50)
BACTERIA UR CULT: NORMAL
BILIRUB SERPL-MCNC: 0.3 MG/DL
BUN SERPL-MCNC: 12.2 MG/DL (ref 8–23)
CALCIUM SERPL-MCNC: 9.7 MG/DL (ref 8.8–10.2)
CHLORIDE SERPL-SCNC: 107 MMOL/L (ref 98–107)
CREAT SERPL-MCNC: 0.57 MG/DL (ref 0.51–1.17)
DEPRECATED HCO3 PLAS-SCNC: 24 MMOL/L (ref 22–29)
ERYTHROCYTE [DISTWIDTH] IN BLOOD BY AUTOMATED COUNT: 14.6 % (ref 10–15)
GFR SERPL CREATININE-BSD FRML MDRD: >90 ML/MIN/1.73M2
GLUCOSE SERPL-MCNC: 98 MG/DL (ref 70–99)
HCT VFR BLD AUTO: 45.4 % (ref 35–53)
HGB BLD-MCNC: 14.4 G/DL (ref 11.7–17.7)
MCH RBC QN AUTO: 29.6 PG (ref 26.5–33)
MCHC RBC AUTO-ENTMCNC: 31.7 G/DL (ref 31.5–36.5)
MCV RBC AUTO: 93 FL (ref 78–100)
PLATELET # BLD AUTO: 205 10E3/UL (ref 150–450)
POTASSIUM SERPL-SCNC: 4 MMOL/L (ref 3.4–5.3)
PROT SERPL-MCNC: 5.7 G/DL (ref 6.4–8.3)
RBC # BLD AUTO: 4.86 10E6/UL (ref 3.8–5.9)
SODIUM SERPL-SCNC: 139 MMOL/L (ref 136–145)
TACROLIMUS BLD-MCNC: 6.5 UG/L (ref 5–15)
TME LAST DOSE: NORMAL H
TME LAST DOSE: NORMAL H
WBC # BLD AUTO: 8.1 10E3/UL (ref 4–11)

## 2022-09-16 PROCEDURE — 85027 COMPLETE CBC AUTOMATED: CPT | Performed by: PHYSICIAN ASSISTANT

## 2022-09-16 PROCEDURE — 99233 SBSQ HOSP IP/OBS HIGH 50: CPT | Mod: FS | Performed by: PHYSICIAN ASSISTANT

## 2022-09-16 PROCEDURE — 250N000013 HC RX MED GY IP 250 OP 250 PS 637

## 2022-09-16 PROCEDURE — 250N000013 HC RX MED GY IP 250 OP 250 PS 637: Performed by: PHYSICIAN ASSISTANT

## 2022-09-16 PROCEDURE — 80197 ASSAY OF TACROLIMUS: CPT | Performed by: PHYSICIAN ASSISTANT

## 2022-09-16 PROCEDURE — 99239 HOSP IP/OBS DSCHRG MGMT >30: CPT | Performed by: PHYSICIAN ASSISTANT

## 2022-09-16 PROCEDURE — 82374 ASSAY BLOOD CARBON DIOXIDE: CPT | Performed by: PHYSICIAN ASSISTANT

## 2022-09-16 PROCEDURE — 258N000003 HC RX IP 258 OP 636: Performed by: PHYSICIAN ASSISTANT

## 2022-09-16 PROCEDURE — 99233 SBSQ HOSP IP/OBS HIGH 50: CPT | Performed by: INTERNAL MEDICINE

## 2022-09-16 PROCEDURE — 250N000011 HC RX IP 250 OP 636: Performed by: PHYSICIAN ASSISTANT

## 2022-09-16 PROCEDURE — 82947 ASSAY GLUCOSE BLOOD QUANT: CPT | Performed by: PHYSICIAN ASSISTANT

## 2022-09-16 PROCEDURE — 250N000012 HC RX MED GY IP 250 OP 636 PS 637

## 2022-09-16 PROCEDURE — 250N000011 HC RX IP 250 OP 636

## 2022-09-16 PROCEDURE — 36415 COLL VENOUS BLD VENIPUNCTURE: CPT | Performed by: PHYSICIAN ASSISTANT

## 2022-09-16 PROCEDURE — 250N000012 HC RX MED GY IP 250 OP 636 PS 637: Performed by: HOSPITALIST

## 2022-09-16 RX ORDER — CEFDINIR 300 MG/1
300 CAPSULE ORAL EVERY 12 HOURS
Qty: 28 CAPSULE | Refills: 0 | Status: SHIPPED | OUTPATIENT
Start: 2022-09-16 | End: 2022-10-02

## 2022-09-16 RX ORDER — CEFDINIR 300 MG/1
300 CAPSULE ORAL EVERY 12 HOURS SCHEDULED
Status: DISCONTINUED | OUTPATIENT
Start: 2022-09-16 | End: 2022-09-16 | Stop reason: HOSPADM

## 2022-09-16 RX ORDER — ACETAMINOPHEN 325 MG/1
650 TABLET ORAL EVERY 4 HOURS PRN
Status: DISCONTINUED | OUTPATIENT
Start: 2022-09-16 | End: 2022-09-16 | Stop reason: HOSPADM

## 2022-09-16 RX ORDER — MYCOPHENOLIC ACID 180 MG/1
540 TABLET, DELAYED RELEASE ORAL 2 TIMES DAILY
COMMUNITY
Start: 2022-09-16 | End: 2022-11-14

## 2022-09-16 RX ADMIN — SULFAMETHOXAZOLE AND TRIMETHOPRIM 1 TABLET: 400; 80 TABLET ORAL at 08:43

## 2022-09-16 RX ADMIN — TACROLIMUS 1.5 MG: 1 CAPSULE ORAL at 09:15

## 2022-09-16 RX ADMIN — Medication 1 TABLET: at 12:06

## 2022-09-16 RX ADMIN — CEFTRIAXONE SODIUM 2 G: 2 INJECTION, POWDER, FOR SOLUTION INTRAMUSCULAR; INTRAVENOUS at 00:42

## 2022-09-16 RX ADMIN — ENOXAPARIN SODIUM 40 MG: 40 INJECTION SUBCUTANEOUS at 08:43

## 2022-09-16 RX ADMIN — PREDNISONE 5 MG: 5 TABLET ORAL at 08:43

## 2022-09-16 RX ADMIN — MYCOPHENOLIC ACID 540 MG: 360 TABLET, DELAYED RELEASE ORAL at 08:43

## 2022-09-16 RX ADMIN — Medication 3000 MCG: at 08:42

## 2022-09-16 RX ADMIN — SODIUM CHLORIDE, POTASSIUM CHLORIDE, SODIUM LACTATE AND CALCIUM CHLORIDE 500 ML: 600; 310; 30; 20 INJECTION, SOLUTION INTRAVENOUS at 12:06

## 2022-09-16 RX ADMIN — SODIUM CHLORIDE, POTASSIUM CHLORIDE, SODIUM LACTATE AND CALCIUM CHLORIDE: 600; 310; 30; 20 INJECTION, SOLUTION INTRAVENOUS at 12:07

## 2022-09-16 RX ADMIN — SODIUM CHLORIDE, POTASSIUM CHLORIDE, SODIUM LACTATE AND CALCIUM CHLORIDE: 600; 310; 30; 20 INJECTION, SOLUTION INTRAVENOUS at 01:30

## 2022-09-16 ASSESSMENT — ACTIVITIES OF DAILY LIVING (ADL)
ADLS_ACUITY_SCORE: 20

## 2022-09-16 NOTE — PROVIDER NOTIFICATION
Patient requesting something for a headache. Also wondering if she is supposed to have a PICC line because of a text she received. Page out to MD.

## 2022-09-16 NOTE — PROGRESS NOTES
Transplant Infectious Disease Progress Note     Patient:  Belen Sharma   Date of birth 1960, Medical record number [id number]  Date of Visit:  09/16/2022  Date of Admission: 9/14/2022  Consult Requester:Daniel Benz,*          Assessment and Plan:   Recommendations:  1. Transition to cefdinir 300mg PO q12hrs for at least 14 days    - may extend to 21 days or consider discussing risk vs benefit of suppression at ID follow up appointment  2. Follow up with Dr. Reyes in virtual ID clinic on 9/27/22  3. Follow up with urology and gynecology as scheduled  4. Continue to use good hygiene practices as she has been  5. ID will sign off at this time, please don't hesitate to contact the [facility] ID team should further questions arise.      Assessment:  1. UTI  2. Recurrent UTIs; previous cx with ESBL E.coli, E.faecalis, and K.pneumoniae  3. 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 Febrile to 103.4 F on arrival with tachycardia; WBC 13.7, CRP 21.6. Started on ceftriaxone, fever resolved and vitals normalized. UA consistent with UTI- large leukocyte esterase, >182 WBC, WBC clumps, moderate bacteria; negative nitrite. Hx ESBL E.coli in 2020, Enterococcus faecalis (pan-S; 6/2021), most recent organism  has been K.pnemoniae (R: bactrim; last cultured 11/4/21). Cultures that were sent with onset of sx in August both with mixed urogenital keisha (8/23, 8/28). Improvement of symptoms while on fosfomycin, then cefdinir suggests susceptible organism. Appears to be responding well to ceftriaxone at this time with resolution of fever and urinary symptoms. It is possible that she needed longer duration of antibiotics given immunosuppression and post-transplant anatomy vs recurrent infection as she is at high risk. Agree with follow up with urology scheduled for 10/7/22 and with gynecology. Recommend transition to PO cefdinir with plan to continue treatment for at least 14 days. Has follow up scheduled with outpatient ID (Dr. Reyes) on 9/27/22.       Previous ID Issues:  - ESBL E.coli UTI and blood stream infection (12/2020)  - Enterococcus faecalis UTI, pan-susceptible (6/2021)  - K.pneumoniae UTI (cultured 8/20/21, 11/4/21)  - C.diff (?2016 in [place])  - Hardware: pins from bunion surgery      Other ID issues:  - QTc interval:  434msec (6/6/22)  - Bacterial prophylaxis:  None  - Pneumocystis prophylaxis:  None  - Viral serostatus & prophylaxis:  CMV +, EBV +, HSV ?, VZV +  - Fungal prophylaxis:  None  - Immunization status:  Flu up to date (9/12/22), Pneumo (9/12/22), due for COVID-19 booster (2/2022)  - Gamma globulin status:  Unknown  - Isolation status: Contact (hx ESBL E.coli UTI and BSI 2020)       Vivi Dorantes PA-C  Infectious Disease  Pager # [phone]  09/16/2022         Interim History and Events:   Urinary symptoms have resolved. No fevers, rigors, chills, sweats, nausea, diarrhea, or new symptoms from overnight.    Expresses frustration with recurrent UTIs as she has been following previously recommended hygiene practices- avoiding submerging in baths, fresh water, hot tubs, whirpools, urinating and showering after sex, practicing good hand hygiene and following instructions for pessary. Discussed that she has  some unmodifiable risk factors including anatomy and immunosuppression. Explained role of urology, gynecology, and infectious disease to address the factors that contribute to recurrent UTIs as best as we can. Expresses concern regarding sepsis in setting of UTI- discussed that in her case, this refers to her body's response to infection, which has normalized with treatment- no longer has tachycardia, fever, leukocytosis and urinary symptoms have resolved. This indicates that the current antibiotic is working and we will use an oral antibiotic with similar bacterial coverage to continue to treat this infection. Discussed that she should return to ED if she develops recurrent fevers or chills with teeth chattering.          HPI:   Transplants:  2/2/2016 (Kidney), 3/3/2011 (Liver), 10/9/2010 (Kidney / Liver), Postoperative day:  4215 (Liver), 2418 (Kidney)     Belen Sharma is a 62 year old adult with history of PCKD and congenital hepatic fibrosis s/p kidney and liver transplant (2010; [place]) c/b hepatic artery thrombosis, recurrent infections, kidney AMR and ultimately both organs failed, second liver transplant (3/3/2011), and second kidney trasnplant (2/2/16), s/p splenectomy and distal pancreatectomy (2013), previous BK viremia, recurrent UTIs, and isolation of ESBL E.coli (12/2020) who presented to ED on 914/22 with one day of dysuria and fever.      Recently developed urinary urgency and frequency, treated with fosfomycin x3 days by PCP (8/23). Then had worsening symptoms around 8/27, went to ED, was prescribed cefdinir x7 days (8/29-9/5). Symptoms again improved on cefdinir. Now presents with fever to 101 at home and 103.4F in ED. Associated with shaking chills, body aches and dysuria. Feels frustrated with recurrent UTIs. Attributes increased frequency of symptoms to pessary use. She has been using good hand and urologic hygiene as advised by providers in an effort to prevent recurrent infections.       Recent dental cleaning, took amoxicillin. Got pneumovax and flu vaccine on 9/12/22. Lives in [place] with . They moved to [place] from [place] in 2020.         Antimicrobials  Current:  - ceftriaxone (9/15-present)     Prior:  - cefdinir (8/29-9/5)  - fosfomycin (8/23-8/25)      ROS:  10 point review of systems was completed, pertinent positives and negatives are outlined above             Physical Examination:  Temp: 97.9  F (36.6  C) Temp src: Oral BP: 110/64     Resp: 16 SpO2: 100 % O2 Device: None (Room air)      Vitals:    09/14/22 2211 09/15/22 0654 09/16/22 0537   Weight: 65.3 kg (144 lb) 67.4 kg (148 lb 8 oz) 67 kg (147 lb 9.6 oz)       Constitutional: Awake, alert, and oriented. Pleasant and cooperative with exam.  HEENT: NC/AT, EOMI, anicteric sclera, conjunctiva non-injected  Respiratory: No increased work of breathing  GI/: soft, nondistended, nontender to palpation. No CVA tenderness or tenderness over transplant kidney.  Skin: Warm, dry, well-perfused. + ecchymosis on shins. No rashes or open wounds.   Musculoskeletal: Extremities grossly normal, non-tender, no edema. Good strength and ROM in bed.   Neurologic: A&O. Answers questions appropriately, speech normal. Moves all extremities spontaneously.  Neuropsychiatric: Calm. Affect appropriate to situation.  Vascular access:  PIV on RUE CDI, non-tender, no surrounding erythema.    Medications:    aspirin  81 mg Oral QPM     biotin  3,000 mcg Oral Daily     cefTRIAXone  2 g Intravenous Q24H     enoxaparin ANTICOAGULANT  40 mg Subcutaneous Q24H     lactated ringers  500 mL Intravenous Once     loratadine  10 mg Oral QPM     multivitamin w/minerals  1 tablet Oral Daily     mycophenolic acid  540 mg Oral BID     predniSONE  5 mg Oral Daily     sodium chloride (PF)  3 mL Intracatheter Q8H     sodium chloride (PF)  3 mL Intravenous Q8H     sulfamethoxazole-trimethoprim  1 tablet Oral Q Mon Wed Fri AM     tacrolimus  1.5 mg Oral BID IS        Infusions/Drips:    lactated ringers 100 mL/hr at 09/16/22 1207       Laboratory Data:   No results found for: ACD4    Inflammatory Markers    Recent Labs   Lab Test 09/14/22 2220 11/04/21  1457 12/28/20  1515 12/21/20  1545   SED 8 14 8 10   CRP 21.60* 39.3* 13.0* 18.0*       Metabolic Studies       Recent Labs   Lab Test 09/16/22  1015 09/15/22  0607 09/14/22 2220 09/12/22  0806 08/29/22  0756 08/29/22  0023 09/30/20  0721 09/23/20  1653    140 137 143 139 139   < >  --    POTASSIUM 4.0 4.3 4.2 4.3 4.0 3.8   < >  --    CHLORIDE 107 112* 104 106 107 106   < >  --    CO2 24 20* 22 25 20* 25   < >  --    ANIONGAP 8 8 11 12 12 8   < >  --    BUN 12.2 16.5 25.3* 15.1 15.5 22.7   < >  --    CR 0.57 0.73 1.05 0.84 0.65 0.72   < >  --    GFRESTIMATED >90 >90 60* 78 >90 >90   < >  --    GLC 98 100* 111* 88 88 88   < >  --    EDSON 9.7 8.9 10.0 10.0 9.9 9.6   < >  --    PHOS  --   --   --   --   --   --   --  3.4    < > = values in this interval not displayed.     Recent Labs   Lab Test 09/14/22 2230 12/16/20  2236   LACT 0.9 0.8     No lab results found.    Invalid input(s): LD  No lab results found.    Hepatic Studies    Recent Labs   Lab Test 09/16/22  1015 09/14/22 2220 09/12/22  0806 08/29/22  0810   BILITOTAL 0.3 0.6 0.7 0.4   ALKPHOS 57 72 71 66   ALBUMIN 3.2* 4.1 3.9 3.9   AST 16 21 21 23   ALT 8* 12 18 12       Pancreatitis testing    Recent Labs   Lab Test 08/29/22  0810 06/06/22  0828 09/13/21  0840   TRIG 123 126 119       Hematology Studies      Recent Labs   Lab Test 09/16/22  1015 09/15/22  0607 09/14/22  2220 09/12/22  0806 08/29/22  0756   WBC 8.1 9.8 13.7* 9.3 6.7   HGB 14.4 13.8 16.0 15.5 15.1   HCT 45.4 42.4 48.4 48.0 45.3    195 246 232 241     Recent Labs   Lab Test 12/28/20  1515 12/21/20  1545 12/16/20  2236   ANEU 6.4 3.9 9.3*   ALYM 2.1 1.7 1.0   PABLO 0.6 0.6 1.0   AEOS 0.0 0.0 0.0       Arterial Blood Gas Testing    Recent Labs   Lab Test 09/14/22  2230 02/10/22  0802   PH  7.44* 7.32*        Urine Studies     Recent Labs   Lab Test 09/14/22  2218 08/28/22  2104 08/23/22  1257   URINEPH 6.0 6.0 6.0   NITRITE Negative Negative Negative   LEUKEST Large* Large* Moderate*   WBCU >182* >182* >100*       Vancomycin Levels     No lab results found.    Invalid input(s): VANCO    Tobramycin levels     No lab results found.    Gentamicin levels    No lab results found.    CSF testing   No lab results found.      Microbiology:  Culture Micro   Date Value Ref Range Status   06/04/2021 (A)  Final    10,000 to 50,000 colonies/mL  Enterococcus faecalis     12/17/2020 No growth  Final   12/16/2020 (A)  Final    Cultured on the 1st day of incubation:  Escherichia coli ESBL  ESBL (extended beta lactamase) producing organisms require contact precautions.     12/16/2020   Final    Critical Value/Significant Value, preliminary result only, called to and read back by  RALPH BOLAÑOS RN 12.17.2020 1056 BC     12/16/2020   Final    (Note)  POSITIVE for E.COLI by Verigene multiplex nucleic acid test. Final  identification and antimicrobial susceptibility testing will be  verified by standard methods. Verigene test will not distinguish  E.coli from Shigella species including S.dysenteriae, S.flexneri,  S.boydii, and S.sonnei. Specimens containing Shigella species or  E.coli will be reported as Positive for E.coli.    POSITIVE for CTX-M Class A Extended Spectrum beta-lactamase (ESBL)  resistance marker by Verigene multiplex nucleic acid test. CTX-M  confers resistance to penicillins, cephalosporins and variable  resistance to beta-lactam beta-lactamase inhibitor combinations  (clavulanic acid and tazobactam). Best empiric antibiotic choice is  meropenem. Specific susceptibility testing will be performed.    Specimen tested with Verigene multiplex, gram-negative blood culture  nucleic acid test for the following targets: Acinetobacter sp.,  Citrobacter sp., Enterobacter sp., Proteus sp., E. coli,  K.  pneumoniae/oxytoca, P. aeruginosa, and the following resistance  markers: CTXM, KPC, NDM, VIM, IMP and OXA.    Critical Value/Significant Value called to and read back by RALPH BOLAÑOS RN 12/17/20 1312 .       12/16/2020 No growth  Final   12/16/2020 (A)  Preliminary    >100,000 colonies/mL  Escherichia coli ESBL  ESBL (extended beta lactamase) producing organisms require contact precautions.     12/16/2020   Preliminary    Susceptibility testing requested by  Dr. Gonzales, Pager [phone]. Fosfomycin requested. @ 1637. 12.18.20. BS.      12/03/2020   Final    <10,000 colonies/mL  mixed urogenital keisha  Susceptibility testing not routinely done         Last check of C difficile  No results found for: CDBPCT    Imaging:     CXR (9/14/22)  IMPRESSION: Negative chest.

## 2022-09-16 NOTE — PLAN OF CARE
Discharge instructions given and explained to the patient. The patient verbalized understanding. The peripheral IV was removed. The patient is waiting for her transport to come at 6pm.

## 2022-09-16 NOTE — PROGRESS NOTES
[facility]  Transplant Nephrology Consult  Date of Admission:  9/14/2022  Today's Date: 09/16/2022  Requesting physician: Pankaj Toscano MD    Recommendations:  - agree with switch to Abx po to complete a 2 week course, appreciate tx ID input  - no changes to immunosuppression, check 12hr FK trough       Assessment & Plan   # DDKT: downtrend with IVF back to baseline              - Baseline Creatinine:  ~ 0.7-0.9              - Proteinuria: Minimal (0.2-0.5 grams)              - Date DSA Last Checked: Not Known      Latest DSA: Not checked recently due to time from transplant              - BK Viremia: No              - Kidney Tx Biopsy: No     # Liver Tx: congenital hepatic fibrosis              -stable LFT. Follows with transplant hepatology.     # Immunosuppression: Tacrolimus immediate release (goal 4-6), Mycophenolic acid (dose 540 mg every 12 hours) and Prednisone (dose 5 mg daily)              - Continue with intensive monitoring of immunosuppression for efficacy and toxicity.              - Changes: Not at this time     # Infection Prophylaxis:   - PJP: Sulfa/TMP (Bactrim) MWF     # Recurrent UTI/transplant pyelonephritis:    - awaiting urine Cx, s/p ceftriaxone, low threshold to broaden coverage given ESBL hx   - appreciate Tx ID input   - pt reports recent UTI just about a month ago, treated with fosfomycin x 3 d 8/23 then had worseninh sx's and was    prescribed 1 week of cefdinir     # PKD:              -Seen by txp surgery. Risks outweigh benefits for native nephrectomy     # Hypertension: Controlled;   Goal BP: < 130/80              - Changes: Not at this time.      # Post-Transplant Erythrocytosis: Hgb: Stable;    On ACEI/ARB: No                   Imaging: Not at this time     # Mineral Bone Disorder:   - Secondary renal hyperparathyroidism; PTH level: Normal (18-80 pg/ml)        On treatment: None  - Vitamin D; level: High        On supplement: No  -  Calcium; level: High normal        On supplement: No  - Phosphorus; level: Normal        On supplement: No      # Electrolytes:   - Potassium; level: Normal        On supplement: No  - Magnesium; level: Not checked recently        On supplement: No  - Bicarbonate; level: Normal        On supplement: No     # HSIL:               -per cone biopsy on 9/30/20. No evidence of dysplasia. Had colposcopy 9/2021 with +HPV. Colpo negative. Last PAP 4/12/22 negative but with positive HPV, per notes plan for colposcopy     # Microvascular angina:              -Follows with cardiology, Dr. Mohamud.               -Uses SL angina a few times per year     # Cystocele:              -Has pessary. Follows with OB GYN     # Transplant History:  Etiology of Kidney Failure: ADPKD (native), Chronic rejection  Tx: DDKT, Liver Tx and Liver Tx (SLK)  Transplant: 2/2/2016 (Kidney), 3/3/2011 (Liver), 10/9/2010 (Kidney / Liver)  Significant changes in immunosuppression: None  Significant transplant-related complications: CMV Viremia    Recommendations were communicated to the primary team via this note.        Getachew Hampton MD  Pager: [phone]    Interval Hx     Feels better, afebrile today, pain has improved    Review of Systems    The 10 point Review of Systems is negative other than noted in the HPI or here.     Past Medical History    I have reviewed this patient's medical history and updated it with pertinent information if needed.   Past Medical History:   Diagnosis Date     Adolescent scoliosis     protruding     BK viremia [id number]     CMV pneumonia (H) 2010     Congenital hepatic fibrosis      Endometriosis      High grade squamous intraepithelial lesion of cervix 09/11/2020     History of angina      HPV (human papilloma virus) infection      Immunosuppression (H)      Polycystic kidney disease     Polycystic kidneys, tx kidney on the right. Both, very large, native kidneys reamain.     PONV (postoperative nausea and vomiting)      Need to start with ice chips and apple juice, no soda     S/P kidney transplant     SLK 10/9/2010 - kidney failure 2/2 AMR. Explanted . Re-transplant after de-sensitization      S/P liver transplant (H)     10/9/2010 - [facility]AT, ischemic biopathy. Re-transplant 3/3/2011     S/P splenectomy      Spider veins      Thyroid disease     Hyperthyroid treated with single dose iodine       Past Surgical History   I have reviewed this patient's surgical history and updated it with pertinent information if needed.  Past Surgical History:   Procedure Laterality Date     bunectomy  2018    right foot     bunionectomy  2019    left     CHOLECYSTECTOMY       CONIZATION N/A 2020    Procedure: CONE BIOPSY, CERVIX;  Surgeon: Daysi Perez MD;  Location: [facility] OR     FAILED PICC - RIGHT ARM Right 2020    Has a LUE AV fistula.     H STATISTIC PICC LINE INSERTION >5YR, FAILED Bilateral 2020    Left fistula, Right failed x 2 Occlussion     HERNIA REPAIR, INCISIONAL  2013     IR DIALYSIS PTA CENTRAL SEG  2020     IR PICC PLACEMENT > 5 YRS OF AGE  2020     SPLENECTOMY      Splenectomy     TRANSPLANT KIDNEY RECIPIENT  DONOR  2016    re-transplant after AMR; 6 months de-sensitization prior AND 6 months after transplant     TRANSPLANT LIVER RECIPIENT  DONOR  2011     TRANSPLANT LIVER, KIDNEY RECIPIENT  DONOR, COMBINED  10/09/2010    HAT - re-Tx liver 3/3/2011; Kidney (left iliac) lost to AMR/fibrosis - explant     VASCULAR SURGERY      Vascular access left UE. Not used for 4 years since 2nd kidney tx 2016 [place]       Family History   I have reviewed this patient's family history and updated it with pertinent information if needed.   Family History   Problem Relation Age of Onset     Uterine Cancer Maternal Grandmother      Polycystic Kidney Diease Brother      Polycystic Kidney Diease Brother      Polycystic Kidney Diease Brother      Polycystic  "Kidney Diease Brother      Cervical Cancer Maternal Aunt      Depression Mother      Anxiety Disorder Mother      Depression Father      Anxiety Disorder Father        Social History   I have reviewed this patient's social history and updated it with pertinent information if needed. Belen Sharma  reports that Belen Sharma has never smoked. Belen Sharma has never used smokeless tobacco. Belen Sharma reports previous alcohol use. Belen Sharma reports previous drug use.    Allergies   Allergies   Allergen Reactions     Ibuprofen      Due to liver transplant     Prior to Admission Medications     aspirin  81 mg Oral QPM     biotin  3,000 mcg Oral Daily     cefdinir  300 mg Oral Q12H [facility] ()     enoxaparin ANTICOAGULANT  40 mg Subcutaneous Q24H     loratadine  10 mg Oral QPM     multivitamin w/minerals  1 tablet Oral Daily     mycophenolic acid  540 mg Oral BID     predniSONE  5 mg Oral Daily     sodium chloride (PF)  3 mL Intracatheter Q8H     sodium chloride (PF)  3 mL Intravenous Q8H     sulfamethoxazole-trimethoprim  1 tablet Oral Q Mon Wed Fri AM     tacrolimus  1.5 mg Oral BID IS       lactated ringers 100 mL/hr at 22 1207       Physical Exam   Temp  Av.2  F (37.3  C)  Min: 97.6  F (36.4  C)  Max: 103.4  F (39.7  C)      Pulse  Av.1  Min: 72  Max: 130 Resp  Av  Min: 16  Max: 30  SpO2  Av.1 %  Min: 94 %  Max: 100 %     /64 (BP Location: Right arm)   Pulse 72   Temp 97.9  F (36.6  C) (Oral)   Resp 16   Ht 1.772 m (5' 9.76\")   Wt 67 kg (147 lb 9.6 oz)   SpO2 100%   BMI 21.32 kg/m      Admit Weight: 65.3 kg (144 lb)     GENERAL APPEARANCE: alert and no distress  HENT: mouth without ulcers or lesions  LYMPHATICS: no cervical or supraclavicular nodes  RESP: lungs clear to auscultation - no rales, rhonchi or wheezes  CV: regular rhythm, normal rate, no rub, no murmur  EDEMA: no LE edema bilaterally  ABDOMEN: soft, nondistended, nontender, bowel sounds normal  MS: extremities " normal - no gross deformities noted, no evidence of inflammation in joints, no muscle tenderness  SKIN: no rash    Data   CMP  Recent Labs   Lab 09/16/22  1015 09/15/22  0607 09/14/22  2220 09/12/22  0806    140 137 143   POTASSIUM 4.0 4.3 4.2 4.3   CHLORIDE 107 112* 104 106   CO2 24 20* 22 25   ANIONGAP 8 8 11 12   GLC 98 100* 111* 88   BUN 12.2 16.5 25.3* 15.1   CR 0.57 0.73 1.05 0.84   GFRESTIMATED >90 >90 60* 78   EDSON 9.7 8.9 10.0 10.0   PROTTOTAL 5.7*  --  7.0 6.2*   ALBUMIN 3.2*  --  4.1 3.9   BILITOTAL 0.3  --  0.6 0.7   ALKPHOS 57  --  72 71   AST 16  --  21 21   ALT 8*  --  12 18     CBC  Recent Labs   Lab 09/16/22  1015 09/15/22  0607 09/14/22  2220 09/12/22  0806   HGB 14.4 13.8 16.0 15.5   WBC 8.1 9.8 13.7* 9.3   RBC 4.86 4.59 5.31 5.19   HCT 45.4 42.4 48.4 48.0   MCV 93 92 91 93   MCH 29.6 30.1 30.1 29.9   MCHC 31.7 32.5 33.1 32.3   RDW 14.6 14.6 14.6 14.4    195 246 232     INRNo lab results found in last 7 days.  ABG  Recent Labs   Lab 09/14/22  2230   PH 7.44*      Urine Studies  Recent Labs   Lab Test 09/14/22  2218 08/28/22  2104 08/23/22  1257 11/04/21  1515 09/14/21  1539 09/08/21  1341 08/20/21  1050 08/20/21  1050 06/04/21  1130   COLOR Yellow Yellow Yellow Yellow   < > Yellow   < > Yellow Yellow   APPEARANCE Cloudy* Cloudy* Cloudy* Slightly Cloudy*   < > Clear   < > Clear Clear   URINEGLC Negative Negative Negative Negative   < > Negative   < > Negative Negative   URINEBILI Negative Negative Negative Negative   < > Negative   < > Negative Negative   URINEKETONE Trace* Negative Negative Negative   < > Negative   < > Negative Negative   SG 1.018 1.015 1.010 1.013   < > 1.010   < > 1.015 1.010   UBLD Small* Moderate* Moderate* Negative   < > Trace*   < > Small* Trace*   URINEPH 6.0 6.0 6.0 6.0   < > 7.0   < > 6.5 6.5   PROTEIN 70 * 200 * 30 * Negative   < > Negative   < > Negative Negative   UROBILINOGEN  --   --  0.2  --   --  0.2  --  0.2 0.2   NITRITE Negative Negative Negative  Positive*   < > Negative   < > Negative Negative   LEUKEST Large* Large* Moderate* Moderate*   < > Moderate*   < > Large* Moderate*   RBCU 8* 20* 5-10* 3*   < > 0-2   < > 0-2 2-5*   WBCU >182* >182* >100* 68*   < > 5-10*   < > 5-10* 25-50*    < > = values in this interval not displayed.     Recent Labs   Lab Test 06/04/21  1130 09/10/20  1037   UTPG 0.34* 0.16     PTH  Recent Labs   Lab Test 02/10/22  0802 09/23/20  1653   PTHI 66 45     Iron Studies  No lab results found.    IMAGING:  All imaging studies reviewed by me.

## 2022-09-16 NOTE — PLAN OF CARE
Goal Outcome Evaluation:    Plan of Care Reviewed With: patient     Overall Patient Progress: improving    Outcome Evaluation: Patient alert and oriented, afebrile    Neuro: Alert and oriented, neuros wnl  Cardiac:WNL   Respiratory: Denies SOB  GI/:Hx of kidney transplant  Diet/appetite:Regular  Activity: Independent with ambulation  Pain:Denies  Skin:WNL  Lines:PIV, L ARM fistular.

## 2022-09-16 NOTE — DISCHARGE SUMMARY
[facility]  Hospitalist Discharge Summary    Date of Admission: 9/14/2022  Date of Discharge: 9/16/2022  Discharging Provider: Josette Aragon PA-C/Dr. 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 Reyes of transplant ID 9/27/2022  - Follow up with Dr. Brewer of urology 10/7/22  - Follow up with Dr. Perez of ob/gyn 10/11/22  - Follow up with Dr. Robertson of ophthalmology 10/12/22  - Follow up with Dr. Angulo of transplant nephrology 11/14/22  - Follow up with Dr. Mohamud of cariology 12/5/22  - Follow up with Dr. Leventhal of hepatology 8/2023 as planned    Unresulted Labs Ordered in the Past 30 Days of this Admission     Date and Time Order Name Status Description    9/15/2022  1:14 PM Blood Culture Hand, Right Preliminary     9/14/2022 10:14 PM Blood Culture Peripheral Blood Preliminary       These results will be followed up by GOLD in box    Discharge Disposition   Discharged to home  Condition at discharge: Stable    Hospital Course   Belen Sharma is a 62 year old adult with history of polycystic kidney disease s/p DDKT x2, congenital hepatic fibrosis s/p liver transplant x2, recurrent UTIs, non-obstructive CAD, microvascular angina, allergies, splenectomy, and depression who was admitted to [facility] 9/14/2022 with sepsis 2/2 UTI     Sepsis 2/2 recurrent UTI: Sepsis resolved. Admitted with fever and urinary symptoms. CXR no acute findings. H/o ESBL UTI 6/2022 and 12/2020. Also with h/o klebsiella, enterococcus UTIs. Treated with Meropenem, fosfomycin, and cefdinir in the past.  Temp 103.4, WBC 13.7 on admission. UC +for only urogenital keisha, but likely that symptoms were due to ongoing smoldering UTI that started 8/2022. Afebrile, WBC normalized as of 9/15. Treated with Ceftriaxone 9/15 to 9/16, transitioned to Cefdinir on discharge  - Per ID, patient will continue discharge on 14 days of Cefdinir and follow up with Dr. 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 Repeat liver transplant 3/2011. Renal transplant removed 2012. S/p DDKT 2016. Follows OP with Dr. Leventhal, last seen 8/2022. Follows OP with Dr. Angulo, last seen 12/2021. PTA on tacro (goal 4-6), MMF, and prednisone. BL Cr 0.7-0.9. Cr elevated to 1.05 on admission, at BL prior to discharge. Tacro level obtained at 1015 9/16 so not accurate trough  - Continue PTA immunosuppression and bactrim PJP ppx on discharge  - Follow up with Dr. Angulo of transplant nephrology 11/14/22 as planned  - Follow up with GI 8/2023        Other Medical Issues:  Severe vaginal prolapse: Worsening symptoms despite pessary use. Concerned pessary use could contribute to recurrent UTIs. Resume topical estradiol on discharge  Allergies: Continued PTA Zyrtec. Follow up with ob/gyn as above  Non-obstructive CAD, microvascular angina: Follows OP with Dr. Mohamud, last seen 6/6/22. No acute concerns, monitor. Continued ASA this admission. Follow up with cardiology as above  H/o splenectomy  H/o depression: No PTA meds. No concerns expressed this admission. Follow up with PCP for ongoing care  GERD: No PTA meds, no concerns this admission    Consultations This Hospital Stay   Transplant nephrology, transplant ID, pharmacy     Code Status   Full Code    Time Spent on this Encounter   Josette FLORES PA-C, personally saw the patient today and spent greater than 30  minutes discharging this patient.       SONDRA Eid [facility] [address] [facility]  [address] [place] [id number]  Phone: [phone]  Fax: [phone]  ______________________________________________________________________    Physical Exam   Vital Signs: Temp: 97.9  F (36.6  C) Temp src: Oral BP: 110/64     Resp: 16 SpO2: 100 % O2 Device: None (Room air)    Weight: 147 lbs 9.6 oz  General Appearance: Alert and oriented x3, appears comfortable  HEENT: Anicteric sclera, MMM   Respiratory: Breathing comfortably on room air, lungs CTAB without wheezing or crackles   Cardiovascular: RRR, S1, S2. No murmur noted  GI: Abdomen soft, non-tender with active bowel sounds. No guarding or rebound. No tenderness over transplanted kidney site in the RLQ. Prior surgical scars noted  Lymph/Hematologic: No peripheral edema, distal pulses palpable   Skin: No rash or jaundice   Musculoskeletal: Moves all extremities   Neurologic: No focal deficits, CN II-XII grossly intact       Primary Care Physician   Vincent De La Garza    Discharge Orders       Significant Results and Procedures   Most Recent 3 CBC's:  Recent Labs   Lab Test 09/16/22  1015 09/15/22  0607 09/14/22  2220   WBC 8.1 9.8 13.7*   HGB 14.4 13.8 16.0   MCV 93 92 91    195 246     Most Recent 3 BMP's:  Recent Labs   Lab Test 09/16/22  1015 09/15/22  0607 09/14/22  2220    140 137   POTASSIUM 4.0 4.3 4.2   CHLORIDE 107 112* 104   CO2 24 20* 22   BUN 12.2 16.5 25.3*   CR 0.57 0.73 1.05   ANIONGAP 8 8 11   EDSON 9.7 8.9 10.0   GLC 98 100* 111*     Most Recent 2 LFT's:  Recent Labs   Lab Test 09/16/22  1015 09/14/22  2220   AST 16 21   ALT 8* 12   ALKPHOS 57 72   BILITOTAL 0.3 0.6       Discharge Medications   Current Discharge Medication List      START taking these medications    Details   cefdinir (OMNICEF) 300 MG capsule Take 1 capsule (300 mg) by mouth every 12 hours  Qty: 28 capsule, Refills: 0    Associated  Diagnoses: Recurrent UTI      mycophenolic acid (GENERIC EQUIVALENT) 180 MG EC tablet Take 3 tablets (540 mg) by mouth 2 times daily         CONTINUE these medications which have NOT CHANGED    Details   acetaminophen (TYLENOL) 325 MG tablet Take 1 tablet (325 mg) by mouth every 4 hours as needed for mild pain or fever  Qty: 120 tablet, Refills: 1    Associated Diagnoses: S/P liver transplant (H)      aspirin (ASA) 81 MG chewable tablet Take 81 mg by mouth every evening Stopped 7 days preop      biotin 1000 MCG TABS tablet Take 3,000 mcg by mouth every evening      ciclopirox (PENLAC) 8 % external solution Apply to adjacent skin and affected nails daily.  Remove with alcohol every 7 days, then repeat.  Qty: 6 mL, Refills: 3    Associated Diagnoses: Onychomycosis      diphenhydrAMINE (BENADRYL) 25 MG tablet Take 25 mg by mouth every 8 hours as needed for allergies      estradiol (ESTRACE) 0.1 MG/GM vaginal cream Place 1 g vaginally twice a week  Qty: 42.5 g, Refills: 3    Associated Diagnoses: Vaginal atrophy      guaiFENesin (MUCINEX PO) Take 1 tablet by mouth 2 times daily as needed      loratadine (CLARITIN) 10 MG tablet Take 10 mg by mouth every evening       Multiple Vitamins-Minerals (WOMENS DAILY FORMULA PO) Take 1 tablet by mouth daily      nitroGLYcerin (NITROSTAT) 0.4 MG sublingual tablet Place 1 tablet (0.4 mg) under the tongue every 5 minutes as needed for chest pain For chest pain place 1 tablet under the tongue every 5 minutes for 3 doses. If symptoms persist 5 minutes after 1st dose call 911.  Qty: 30 tablet, Refills: 1    Associated Diagnoses: Microvascular angina (H)      predniSONE (DELTASONE) 5 MG tablet Take 1 tablet (5 mg) by mouth daily  Qty: 90 tablet, Refills: 3    Associated Diagnoses: Kidney transplanted      Probiotic Product (PROBIOTIC PO) Take 2 tablets by mouth every morning ([facility] Women's Vaginal Probiotic)      sulfamethoxazole-trimethoprim (BACTRIM) 400-80 MG tablet Take 1 tablet  by mouth three times a week on Monday, Wednesday, Friday      tacrolimus (GENERIC EQUIVALENT) 0.5 MG capsule Take 1 capsule (0.5 mg) by mouth 2 times daily Total dose = 1.5 mg BID  Qty: 180 capsule, Refills: 3    Associated Diagnoses: S/P liver transplant (H); Kidney transplanted      tacrolimus (GENERIC EQUIVALENT) 1 MG capsule Take 1 capsule (1 mg) by mouth 2 times daily Total dose = 1.5 mg BID  Qty: 180 capsule, Refills: 3    Associated Diagnoses: S/P liver transplant (H); Kidney transplanted      temazepam (RESTORIL) 15 MG capsule Take 1 capsule (15 mg) by mouth as needed for sleep (twice a year)  Qty: 15 capsule, Refills: 0    Associated Diagnoses: Anxiety about health         STOP taking these medications       MYFORTIC (BRAND) 180 MG EC tablet Comments:   Reason for Stopping:             Allergies   Allergies   Allergen Reactions     Ibuprofen      Due to liver transplant

## 2022-09-16 NOTE — PLAN OF CARE
"Goal Outcome Evaluation:/64 (BP Location: Right arm)   Pulse 72   Temp 97.9  F (36.6  C) (Oral)   Resp 16   Ht 1.772 m (5' 9.76\")   Wt 67 kg (147 lb 9.6 oz)   SpO2 100%   BMI 21.32 kg/m        Reason for admission: Sepsis 2/2 UTI  Vitals: WNL  Activity:SBA  Pain: Denies pain   Neuro:  Alert and oriented x4  Cardiac: Denies chest pain  Respiratory: Oxygen sats >95%;RA.  GI/: Voiding spontaneously without difficulties;BS present  in all quadrant ;last BM 9/15.   Diet:Tolerating reg diet  Lines: PIV RAC infusing LR @100ml/hr.   Plan :Insert PICC and send home with IV infusion.                     "

## 2022-09-16 NOTE — PROGRESS NOTES
Care Management Follow Up    Length of Stay (days): 1    Expected Discharge Date: 09/16/2022     Concerns to be Addressed:       Patient plan of care discussed at interdisciplinary rounds: Yes    Anticipated Discharge Disposition:       Anticipated Discharge Services:    Anticipated Discharge DME:      Patient/family educated on Medicare website which has current facility and service quality ratings:    Education Provided on the Discharge Plan:    Patient/Family in Agreement with the Plan:      Referrals Placed by CM/SW:    Private pay costs discussed: Not applicable    Additional Information:  RNCC called patient, went over IMM form, verbal approval received for signature and [facility] ([phone]). Patient does not endorse any discharge planning needs. RNCC/SW available for any further needs.    DENISA ReyesN, BA, RN, CMSRN, RNCC  Covering Units 6D/OBS  Pager: [phone]  Phone: [phone]  6th floor Weekend/Holiday Pager: [phone]  Observation weekend/after hours: [phone]

## 2022-09-19 ENCOUNTER — TELEPHONE (OUTPATIENT)
Dept: INTERNAL MEDICINE | Facility: CLINIC | Age: 62
End: 2022-09-19

## 2022-09-19 NOTE — TELEPHONE ENCOUNTER
Called patient- symptoms have not worsened but still has fatigue after discharge. Able to keep solid food down. Rn said it is ok to take Ensures with meals to supplement protein. Also okay to take antibiotic with food to help minimize GI upset. Patient will keep appt tomorrow.     Gian Martines RN on 9/19/2022 at 10:42 AM

## 2022-09-19 NOTE — TELEPHONE ENCOUNTER
Health Call Center    Phone Message    May a detailed message be left on voicemail: yes     Reason for Call: Symptoms or Concerns     If patient has red-flag symptoms, warm transfer to triage line    Current symptom or concern: Lightheadedness, fatigue    Symptoms have been present for:  5 day(s)    Has patient previously been seen for this? No    By .: .    Date: .    Are there any new or worsening symptoms? Yes: Per call from patient, scheduled for tomorrow at 9:50am with Dr Peres but would like a call back today from the team to go over the above symptoms.      Action Taken: Message routed to:  [facility] PCC    Travel Screening: Not Applicable

## 2022-09-20 ENCOUNTER — OFFICE VISIT (OUTPATIENT)
Dept: INTERNAL MEDICINE | Facility: CLINIC | Age: 62
End: 2022-09-20
Payer: MEDICARE

## 2022-09-20 VITALS
WEIGHT: 146.7 LBS | HEART RATE: 96 BPM | DIASTOLIC BLOOD PRESSURE: 63 MMHG | OXYGEN SATURATION: 95 % | HEIGHT: 70 IN | BODY MASS INDEX: 21 KG/M2 | TEMPERATURE: 98.1 F | SYSTOLIC BLOOD PRESSURE: 99 MMHG

## 2022-09-20 DIAGNOSIS — N30.01 ACUTE CYSTITIS WITH HEMATURIA: Primary | ICD-10-CM

## 2022-09-20 LAB
BACTERIA BLD CULT: NO GROWTH
BACTERIA BLD CULT: NO GROWTH

## 2022-09-20 PROCEDURE — 99213 OFFICE O/P EST LOW 20 MIN: CPT | Mod: GE | Performed by: STUDENT IN AN ORGANIZED HEALTH CARE EDUCATION/TRAINING PROGRAM

## 2022-09-20 ASSESSMENT — ANXIETY QUESTIONNAIRES
GAD7 TOTAL SCORE: 2
2. NOT BEING ABLE TO STOP OR CONTROL WORRYING: NOT AT ALL
6. BECOMING EASILY ANNOYED OR IRRITABLE: NOT AT ALL
GAD7 TOTAL SCORE: 2
1. FEELING NERVOUS, ANXIOUS, OR ON EDGE: SEVERAL DAYS
IF YOU CHECKED OFF ANY PROBLEMS ON THIS QUESTIONNAIRE, HOW DIFFICULT HAVE THESE PROBLEMS MADE IT FOR YOU TO DO YOUR WORK, TAKE CARE OF THINGS AT HOME, OR GET ALONG WITH OTHER PEOPLE: NOT DIFFICULT AT ALL
3. WORRYING TOO MUCH ABOUT DIFFERENT THINGS: NOT AT ALL
7. FEELING AFRAID AS IF SOMETHING AWFUL MIGHT HAPPEN: SEVERAL DAYS
5. BEING SO RESTLESS THAT IT IS HARD TO SIT STILL: NOT AT ALL

## 2022-09-20 ASSESSMENT — PATIENT HEALTH QUESTIONNAIRE - PHQ9
SUM OF ALL RESPONSES TO PHQ QUESTIONS 1-9: 1
5. POOR APPETITE OR OVEREATING: NOT AT ALL

## 2022-09-20 NOTE — NURSING NOTE
Belen Sharma is a 62 year old female that presents in clinic today for the following:     Chief Complaint   Patient presents with     UTI     Hospital F/U     Pt here for follow up from a hospital visit. Pt was admitted for sepsis and a UTI.        The patient's allergies and medications were reviewed. The patient's vitals were obtained without incident. The patient does not have any other questions for the provider.     Taylor Cabrera, EMT at 9:46 AM on 9/20/2022.  Primary Care Clinic: [phone]

## 2022-09-20 NOTE — PROGRESS NOTES
PRIMARY CARE CENTER         HPI:       Belen Sharma is a 62 year old adult with PMHx of polycystic kidney disease s/p DDKT x2, congenital hepatic fibrosis s/p liver transplant x2, recurrent UTIs, non-obstructive CAD, microvascular angina, allergies, splenectomy, and depression who is coming for hospital follow-up.    She was admitted 09/14-09/16 at [facility] for sepsis due to UTI. Got cftx 09/15-09/16 then discharged on cefdinir to complete 14 days of treatment. No bugs grew in urine or blood but she was febrile. She has follow-ups with transplant ID (next week) and nephrology. She is also following-up with urology and gynecology and GI.    Since discharge she has been doing much better. Denies dysuria, polyuria, burning on urination, fevers or chills. Still a bit fatigued but overall doing well.    Current Outpatient Medications   Medication Sig Dispense Refill     acetaminophen (TYLENOL) 325 MG tablet Take 1 tablet (325 mg) by mouth every 4 hours as needed for mild pain or fever 120 tablet 1     aspirin (ASA) 81 MG chewable tablet Take 81 mg by mouth every evening Stopped 7 days preop       biotin 1000 MCG TABS tablet Take 3,000 mcg by mouth every evening       cefdinir (OMNICEF) 300 MG capsule Take 1 capsule (300 mg) by mouth every 12 hours 28 capsule 0     ciclopirox (PENLAC) 8 % external solution Apply to adjacent skin and affected nails daily.  Remove with alcohol every 7 days, then repeat. 6 mL 3     diphenhydrAMINE (BENADRYL) 25 MG tablet Take 25 mg by mouth every 8 hours as needed for allergies       estradiol (ESTRACE) 0.1 MG/GM vaginal cream Place 1 g vaginally twice a week 42.5 g 3     guaiFENesin (MUCINEX PO) Take 1 tablet by mouth 2 times daily as needed       loratadine (CLARITIN) 10 MG tablet Take 10 mg by mouth every evening        Multiple Vitamins-Minerals (WOMENS DAILY FORMULA PO) Take 1 tablet by mouth daily       mycophenolic acid (GENERIC EQUIVALENT) 180 MG EC tablet Take 3 tablets (540 mg) by  mouth 2 times daily       nitroGLYcerin (NITROSTAT) 0.4 MG sublingual tablet Place 1 tablet (0.4 mg) under the tongue every 5 minutes as needed for chest pain For chest pain place 1 tablet under the tongue every 5 minutes for 3 doses. If symptoms persist 5 minutes after 1st dose call 911. 30 tablet 1     predniSONE (DELTASONE) 5 MG tablet Take 1 tablet (5 mg) by mouth daily 90 tablet 3     Probiotic Product (PROBIOTIC PO) Take 2 tablets by mouth every morning ([facility] Women's Vaginal Probiotic)       sulfamethoxazole-trimethoprim (BACTRIM) 400-80 MG tablet Take 1 tablet by mouth three times a week on Monday, Wednesday, Friday       tacrolimus (GENERIC EQUIVALENT) 0.5 MG capsule Take 1 capsule (0.5 mg) by mouth 2 times daily Total dose = 1.5 mg  capsule 3     tacrolimus (GENERIC EQUIVALENT) 1 MG capsule Take 1 capsule (1 mg) by mouth 2 times daily Total dose = 1.5 mg  capsule 3     temazepam (RESTORIL) 15 MG capsule Take 1 capsule (15 mg) by mouth as needed for sleep (twice a year) 15 capsule 0          Allergies   Allergen Reactions     Ibuprofen      Due to liver transplant            Review of Systems:     ROS  I have personally reviewed and updated the complete ROS on the day of the visit.             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 Both, very large, native kidneys reamain.     PONV (postoperative nausea and vomiting)     Need to start with ice chips and apple juice, no soda     S/P kidney transplant     SLK 10/9/2010 - kidney failure 2/2 [facility]. Explanted 2012. Re-transplant after  de-sensitization 2016     S/P liver transplant (H)     10/9/2010 - [facility], ischemic biopathy. Re-transplant 3/3/2011     S/P splenectomy      Spider veins      Thyroid disease     Hyperthyroid treated with single dose iodine            Past Surgical History:     Past Surgical History:   Procedure Laterality Date     bunectomy  2018    right foot     bunionectomy  2019    left     CHOLECYSTECTOMY       CONIZATION N/A 2020    Procedure: CONE BIOPSY, CERVIX;  Surgeon: Daysi Perez MD;  Location: [facility] OR     FAILED PICC - RIGHT ARM Right 2020    Has a LUE AV fistula.     H STATISTIC PICC LINE INSERTION >5YR, FAILED Bilateral 2020    Left fistula, Right failed x 2 Occlussion     HERNIA REPAIR, INCISIONAL  2013     IR DIALYSIS PTA CENTRAL SEG  2020     IR PICC PLACEMENT > 5 YRS OF AGE  2020     SPLENECTOMY      Splenectomy     TRANSPLANT KIDNEY RECIPIENT  DONOR      re-transplant after [facility]; 6 months de-sensitization prior AND 6 months after transplant     TRANSPLANT LIVER RECIPIENT  DONOR  2011     TRANSPLANT LIVER, KIDNEY RECIPIENT  DONOR, COMBINED  10/09/2010    HAT - re-Tx liver 3/3/2011; Kidney (left iliac) lost to [facility]/fibrosis - explant     VASCULAR SURGERY      Vascular access left UE. Not used for 4 years since 2nd kidney tx 2016 [place]            Family History:     Family History   Problem Relation Age of Onset     Uterine Cancer Maternal Grandmother      Polycystic Kidney Diease Brother      Polycystic Kidney Diease Brother      Polycystic Kidney Diease Brother      Polycystic Kidney Diease Brother      Cervical Cancer Maternal Aunt      Depression Mother      Anxiety Disorder Mother      Depression Father      Anxiety Disorder Father             Social History:     Social History     Socioeconomic History     Marital status:      Spouse name: Not on file     Number of children: Not on file     Years of  "education: Not on file     Highest education level: Not on file   Occupational History     Not on file   Tobacco Use     Smoking status: Never Smoker     Smokeless tobacco: Never Used   Substance and Sexual Activity     Alcohol use: Not Currently     Drug use: Not Currently     Sexual activity: Not Currently   Other Topics Concern     Parent/sibling w/ CABG, MI or angioplasty before 65F 55M? Not Asked   Social History Narrative     Not on file     Social Determinants of Health     Financial Resource Strain: Not on file   Food Insecurity: Not on file   Transportation Needs: Not on file   Physical Activity: Not on file   Stress: Not on file   Social Connections: Not on file   Intimate Partner Violence: Not on file   Housing Stability: Not on file            Physical Exam:   BP 99/63 (BP Location: Right arm, Patient Position: Sitting, Cuff Size: Adult Regular)   Pulse 96   Temp 98.1  F (36.7  C)   Ht 1.778 m (5' 10\")   Wt 66.5 kg (146 lb 11.2 oz)   SpO2 95%   BMI 21.05 kg/m    Body mass index is 21.05 kg/m .  Vitals were reviewed       GENERAL APPEARANCE: healthy, alert and no distress     EYES: EOMI, PERRL     RESP: lungs clear to auscultation - no rales, rhonchi or wheezes     CV: regular rates and rhythm, normal S1 S2, no S3 or S4 and no murmur, click or rub     ABDOMEN:  soft, nontender, no HSM or masses and bowel sounds normal     MS: extremities normal- no gross deformities noted, no evidence of inflammation in joints, FROM in all extremities.     SKIN: no suspicious lesions or rashes     NEURO: Normal strength and tone, sensory exam grossly normal, mentation intact and speech normal     PSYCH: mentation appears normal. and affect normal/bright    Assessment and Plan     Belen was seen today for uti and hospital f/u.  Diagnoses and all orders for this visit:    Acute cystitis with hematuria  Patient doing well since being discharged on cefdinir. Has about 4 days to complete cefdinir prescription. While " still fatigued, she says she is improving. She will be following with transplant ID given her h/o transplants but I told her that until then/after then, she can message me or return to clinic if she has any concerns or symptoms seem to worsen. Following up with urology due to the hematuria.    Options for treatment and follow-up care were reviewed with the patient. Belen Sharma engaged in the decision making process and verbalized understanding of the options discussed and agreed with the final plan.    Urmila Peres MD  PGY-3 internal medicine  Sep 20, 2022     RTC PRN.  Pt was seen and plan of care discussed with Dr Jean.    While the patient was in clinic, I reviewed the pertinent medical history and results.  I discussed the current findings on physical examination, as well as the patient s diagnosis and treatment plan with the resident and agree with the information as documented with the following exceptions: none.  Pankaj Jean MD

## 2022-09-22 ENCOUNTER — MYC MEDICAL ADVICE (OUTPATIENT)
Dept: INTERNAL MEDICINE | Facility: CLINIC | Age: 62
End: 2022-09-22

## 2022-09-26 ENCOUNTER — PRE VISIT (OUTPATIENT)
Dept: UROLOGY | Facility: CLINIC | Age: 62
End: 2022-09-26

## 2022-09-26 ENCOUNTER — LAB (OUTPATIENT)
Dept: LAB | Facility: CLINIC | Age: 62
End: 2022-09-26
Payer: MEDICARE

## 2022-09-26 DIAGNOSIS — Z94.4 LIVER REPLACED BY TRANSPLANT (H): ICD-10-CM

## 2022-09-26 DIAGNOSIS — Z13.220 LIPID SCREENING: ICD-10-CM

## 2022-09-26 LAB
ALBUMIN SERPL BCG-MCNC: 3.9 G/DL (ref 3.5–5.2)
ALP SERPL-CCNC: 69 U/L (ref 35–129)
ALT SERPL W P-5'-P-CCNC: 13 U/L (ref 10–50)
ANION GAP SERPL CALCULATED.3IONS-SCNC: 12 MMOL/L (ref 7–15)
AST SERPL W P-5'-P-CCNC: 18 U/L (ref 10–50)
BILIRUB DIRECT SERPL-MCNC: <0.2 MG/DL (ref 0–0.3)
BILIRUB SERPL-MCNC: 0.6 MG/DL
BUN SERPL-MCNC: 15.4 MG/DL (ref 8–23)
CALCIUM SERPL-MCNC: 10 MG/DL (ref 8.8–10.2)
CHLORIDE SERPL-SCNC: 102 MMOL/L (ref 98–107)
CREAT SERPL-MCNC: 0.72 MG/DL (ref 0.51–1.17)
DEPRECATED HCO3 PLAS-SCNC: 26 MMOL/L (ref 22–29)
ERYTHROCYTE [DISTWIDTH] IN BLOOD BY AUTOMATED COUNT: 13.8 % (ref 10–15)
GFR SERPL CREATININE-BSD FRML MDRD: >90 ML/MIN/1.73M2
GLUCOSE SERPL-MCNC: 93 MG/DL (ref 70–99)
HCT VFR BLD AUTO: 45.8 % (ref 35–53)
HGB BLD-MCNC: 15.1 G/DL (ref 11.7–17.7)
MCH RBC QN AUTO: 30.1 PG (ref 26.5–33)
MCHC RBC AUTO-ENTMCNC: 33 G/DL (ref 31.5–36.5)
MCV RBC AUTO: 91 FL (ref 78–100)
PLATELET # BLD AUTO: 267 10E3/UL (ref 150–450)
POTASSIUM SERPL-SCNC: 4.2 MMOL/L (ref 3.4–5.3)
PROT SERPL-MCNC: 6.5 G/DL (ref 6.4–8.3)
RBC # BLD AUTO: 5.02 10E6/UL (ref 3.8–5.9)
SODIUM SERPL-SCNC: 140 MMOL/L (ref 136–145)
WBC # BLD AUTO: 8.2 10E3/UL (ref 4–11)

## 2022-09-26 PROCEDURE — 80053 COMPREHEN METABOLIC PANEL: CPT

## 2022-09-26 PROCEDURE — 85027 COMPLETE CBC AUTOMATED: CPT

## 2022-09-26 PROCEDURE — 36415 COLL VENOUS BLD VENIPUNCTURE: CPT

## 2022-09-26 PROCEDURE — 82248 BILIRUBIN DIRECT: CPT

## 2022-09-26 PROCEDURE — 80197 ASSAY OF TACROLIMUS: CPT

## 2022-09-26 NOTE — TELEPHONE ENCOUNTER
Reason for visit: consult      Dx/Hx/Sx: hematuria, hx renal transplant     Records/imaging/labs/orders: in epic    At Rooming: video visit

## 2022-09-27 ENCOUNTER — TELEPHONE (OUTPATIENT)
Dept: TRANSPLANT | Facility: CLINIC | Age: 62
End: 2022-09-27

## 2022-09-27 ENCOUNTER — VIRTUAL VISIT (OUTPATIENT)
Dept: INFECTIOUS DISEASES | Facility: CLINIC | Age: 62
End: 2022-09-27
Attending: INTERNAL MEDICINE
Payer: MEDICARE

## 2022-09-27 DIAGNOSIS — N83.201 CYST OF RIGHT OVARY: ICD-10-CM

## 2022-09-27 DIAGNOSIS — Z94.0 KIDNEY TRANSPLANT RECIPIENT: ICD-10-CM

## 2022-09-27 DIAGNOSIS — N13.5: ICD-10-CM

## 2022-09-27 DIAGNOSIS — N28.1 KIDNEY CYSTS: ICD-10-CM

## 2022-09-27 DIAGNOSIS — N39.0 RECURRENT UTI: Primary | ICD-10-CM

## 2022-09-27 LAB
TACROLIMUS BLD-MCNC: 4.3 UG/L (ref 5–15)
TME LAST DOSE: ABNORMAL H
TME LAST DOSE: ABNORMAL H

## 2022-09-27 PROCEDURE — 99215 OFFICE O/P EST HI 40 MIN: CPT | Mod: 95 | Performed by: INTERNAL MEDICINE

## 2022-09-27 PROCEDURE — G0463 HOSPITAL OUTPT CLINIC VISIT: HCPCS | Mod: PN,RTG | Performed by: INTERNAL MEDICINE

## 2022-09-27 NOTE — TELEPHONE ENCOUNTER
Called back Belen Sharma. No answer.  Left VM, standing lab every 2 months (Per Liver Renee Dinh)

## 2022-09-27 NOTE — PROGRESS NOTES
[facility]  Infectious Disease Outpatient Follow up Note     Patient:  Belen Sharma, Date of birth 1960  Medical record number [id number]  Date of Visit:  09/27/2022    Recommendations   1. Obtain CT A/P w/ and w/o contrast (normal creatinine)  2. Obtain UA with reflex to culture at the end of cefdinir treatment 9/30/22   3. Urology follow up planned 10/7/22  4. Follow up with me in 2-3 months    53 minutes spent on the date of the encounter doing chart review, review of test results, interpretation of tests, patient visit, documentation and discussion with other provider(s)     Radha Reyes D.O.   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 9/14 UA w/ large leukocyte esterase, >182 WBC, WBC clumps, moderate bacteria; negative nitrite but urine culture <10K urogential keisha. Hx ESBL E.coli in 2020, Enterococcus faecalis (pan-S; 6/2021), and K.pnemoniae 11/4/21 (R: bactrim).  Previously had symptom improvement with previously prescribed fosfomycin, then cefdinir in August. Currently finishing 14 days of cefdinir to be complete on 9/30.      It appears that the frequency of UTIs and episodes of dysuria/cystitis + pyuria w/o a positive urine cultures has increased over the past year. Potential considerations include urologic structural abnormalities such as ruptured renal cysts, nephrolithiasis, bladder stone or ureter compression from ovarian cyst. She demonstrates good hygiene. She is post menopausal so atrophic vagitis is a  consideration. 12/17/20 CT A/P did demonstrate a 3.9 cm cyst adjacent to the right ovary that was benign appearing with mass effect on the ureter. The ureter also had enhancement and inflammation. She does have a pessary in place but was seen on 8/31/22 by gynecology and noted a normal vaginal exam.  Also has a cysts medial to the transplanted kidney (3.4 x 4.6 cm). Follow up planned with urology 10/7/22. Will start by obtaining a CT to better assess for structural issues.     Previous ID Issues:  - ESBL E.coli UTI and blood stream infection (12/2020)  - Enterococcus faecalis UTI, pan-susceptible (6/2021)  - K.pneumoniae UTI (cultured 8/20/21, 11/4/21)  - C.diff (?2016 in [place])  - Hardware: pins from bunion surgery     Other ID issues:  - QTc interval:  434msec (6/6/22)  - Bacterial prophylaxis: none  - Pneumocystis prophylaxis:  tmp/smx M,W,F   - Viral serostatus & prophylaxis:  CMV +, EBV +, HSV ?, VZV +  - Fungal prophylaxis:  None  - Immunization status:  Flu up to date (9/12/22), Pneumo (9/12/22), due for COVID-19 booster (last 2/2022)  - Gamma globulin status:  Unknown  - Isolation status: Contact (hx ESBL E.coli UTI and BSI 2020)        Interval History:   Last seen by transplant ID on 9/16/22. She was prescribed cefdinir x 14 days. Urine culture <10K normal keisha. She has doses through  9/30. Over the weekend she continued to have subjective low grade temperatures. Also continues to have some dysuria. The malaise and abdominal pain have resolved. Has also had some nasal congestion. Prior to this past admission and when UA was obtained she had take 4 amoxicillin pills in anticipation of a dental cleaning.   She has a pessary for uterine prolapsed. No bladder surgeries. Watching for ovarian and cervical cancer with frequent OB/GYN exam.Has not had a US.    Has not previous course of antibiotic  suppression. Typically gets ~ 2 UTIs per year. She feels something different is occurring now though. Move to [facility]  years ago to be close to family. Moved from [place] and [place]. She saw ID at this health care facilities as well. Retired. Sexually active with her  but has been limited due to UTIs. Does not develop symptoms after intercourse.     Patient Active Problem List   Diagnosis     S/P splenectomy     Congenital hepatic fibrosis     Polycystic kidney disease     Immunosuppression (H)     Liver replaced by transplant (H)     Endometriosis     HPV (human papilloma virus) infection     HSIL (high grade squamous intraepithelial lesion) on Pap smear of cervix     Acquired bilateral hammer toes     Candidiasis of vagina     Cervical high risk human papillomavirus (HPV) DNA test positive     Chicken pox     Cystitis     Depression     Depressive psychosis (H)     Exercise counseling     Dermatophytosis of nail     Hallux valgus, acquired, bilateral     Incisional hernia following transplant     Knee pain     Malignant neoplasm of breast (H)     Microvascular angina (H)     Postmenopausal state     Secondary hyperparathyroidism (H)     Scoliosis deformity of spine     Sinusitis     Swelling of first metatarsophalangeal (MTP) joint     Uterine prolapse     Xerosis of skin     Kidney replaced by transplant     Lumbar pain     ESBL (extended spectrum beta-lactamase) producing bacteria infection     Osteopenia of multiple sites     Urinary tract infection without hematuria, site unspecified            Medications & Allergies:     Current Outpatient Medications   Medication     acetaminophen (TYLENOL) 325 MG tablet     aspirin (ASA) 81 MG chewable tablet     biotin 1000 MCG TABS tablet     cefdinir (OMNICEF) 300 MG capsule     ciclopirox (PENLAC) 8 % external solution     diphenhydrAMINE (BENADRYL) 25 MG tablet     estradiol (ESTRACE) 0.1 MG/GM vaginal cream     guaiFENesin (MUCINEX PO)     loratadine (CLARITIN) 10 MG tablet     Multiple Vitamins-Minerals (WOMENS DAILY FORMULA PO)     mycophenolic acid (GENERIC EQUIVALENT) 180  MG EC tablet     nitroGLYcerin (NITROSTAT) 0.4 MG sublingual tablet     predniSONE (DELTASONE) 5 MG tablet     Probiotic Product (PROBIOTIC PO)     sulfamethoxazole-trimethoprim (BACTRIM) 400-80 MG tablet     tacrolimus (GENERIC EQUIVALENT) 0.5 MG capsule     tacrolimus (GENERIC EQUIVALENT) 1 MG capsule     temazepam (RESTORIL) 15 MG capsule     No current facility-administered medications for this visit.     Allergies   Allergen Reactions     Ibuprofen      Due to liver transplant            Physical Exam:        There were no vitals filed for this visit.-video visit    GENERAL: Healthy, alert and no distress  EYES: Eyes grossly normal to inspection.  No discharge or erythema, or obvious scleral/conjunctival abnormalities.  RESP: No audible wheeze, cough  SKIN: Visible skin clear  NEURO: Cranial nerves grossly intact.  Mentation and speech appropriate for age.  Microbiology Data     Culture   Date Value Ref Range Status   09/15/2022 No Growth  Final   09/14/2022 No Growth  Final   09/14/2022 <10,000 CFU/mL Urogenital keisha  Final   08/28/2022 <10,000 CFU/mL Urogenital keisha  Final   08/23/2022 10,000-50,000 CFU/mL Urogenital keisha  Final   11/04/2021 >100,000 CFU/mL Klebsiella pneumoniae (A)  Final   11/04/2021 No Growth  Final   11/04/2021 No Growth  Final   09/08/2021 No Growth  Final   08/20/2021 >100,000 CFU/mL Klebsiella pneumoniae (A)  Final     Urine Studies     Recent Labs   Lab Test 09/14/22  2218 08/28/22  2104 08/23/22  1257 11/04/21  1515 09/14/21  1539 01/04/21  0932 12/16/20  2236   URINEPH 6.0 6.0 6.0 6.0 7.5*   < > 7.5*   NITRITE Negative Negative Negative Positive* Negative   < > Negative   LEUKEST Large* Large* Moderate* Moderate* Small*   < > Large*   WBCU >182* >182* >100* 68* <1   < > 466*   UWBCLM  --   --   --   --   --   --  Present*    < > = values in this interval not displayed.     Laboratory Data:   Metabolic Studies       Recent Labs   Lab Test 09/26/22  0813 09/16/22  1015  09/15/22  0607 09/14/22 2230 12/18/20  0611 12/16/20 2236 09/30/20  0721 09/23/20  1653    139   < >  --    < > 137   < >  --    POTASSIUM 4.2 4.0   < >  --    < > 4.2   < >  --    CHLORIDE 102 107   < >  --    < > 106   < >  --    CO2 26 24   < >  --    < > 26   < >  --    ANIONGAP 12 8   < >  --    < > 5   < >  --    BUN 15.4 12.2   < >  --    < > 17   < >  --    CR 0.72 0.57   < >  --    < > 0.87   < >  --    GFRESTIMATED >90 >90   < >  --    < > 72   < >  --    GLC 93 98   < >  --    < > 89   < >  --    EDSON 10.0 9.7   < >  --    < > 9.9   < >  --    PHOS  --   --   --   --   --   --   --  3.4   LACT  --   --   --  0.9  --  0.8  --   --     < > = values in this interval not displayed.       Hepatic Studies    Recent Labs   Lab Test 09/26/22  0813 09/16/22 1015 09/14/22 2220   BILITOTAL 0.6 0.3 0.6   DBIL <0.20  --   --    ALKPHOS 69 57 72   PROTTOTAL 6.5 5.7* 7.0   ALBUMIN 3.9 3.2* 4.1   AST 18 16 21   ALT 13 8* 12       Hematology Studies      Recent Labs   Lab Test 09/26/22  0813 09/16/22  1015 09/15/22  0607 09/14/22  2220 09/12/22  0806 08/29/22  0756 03/11/21  1007 12/28/20  1515 12/28/20  0855 12/21/20  1545   WBC 8.2 8.1 9.8 13.7* 9.3 6.7   < > 9.2   < > 6.2   ANEU  --   --   --   --   --   --   --  6.4  --  3.9   ALYM  --   --   --   --   --   --   --  2.1  --  1.7   PABLO  --   --   --   --   --   --   --  0.6  --  0.6   AEOS  --   --   --   --   --   --   --  0.0  --  0.0   HGB 15.1 14.4 13.8 16.0 15.5 15.1   < > 16.6*   < > 16.2*   HCT 45.8 45.4 42.4 48.4 48.0 45.3   < > 50.8*   < > 49.2*    205 195 246 232 241   < > 308   < > 261    < > = values in this interval not displayed.       Medication levels    Recent Labs   Lab Test 09/26/22  0813 12/19/20  0707 12/18/20  0611   TACROL 4.3*   < >  --    MPACID  --   --  1.32   MPAG  --   --  23.7*    < > = values in this interval not displayed.       Inflammatory Markers    Recent Labs   Lab Test 09/14/22  2220 11/04/21  1457 12/28/20  9565  12/21/20  1545   SED 8 14 8 10   CRP 21.60* 39.3* 13.0* 18.0*     Imaging:  Results for orders placed or performed during the hospital encounter of 09/14/22   XR Chest 2 Views    Narrative    EXAM: XR CHEST 2 VIEWS  LOCATION: [facility]  DATE/TIME: 9/14/2022 11:38 PM    INDICATION: fever  COMPARISON: 11/04/2021.      Impression    IMPRESSION: Negative chest.

## 2022-09-27 NOTE — LETTER
9/27/2022       RE: Belen Sharma  [address] [place] [address]     Dear Colleague,    Thank you for referring your patient, Belen Sharma, to the [facility] INFECTIOUS DISEASE CLINIC [place] [facility]. Please see a copy of my visit note below.    [facility]  Infectious Disease Outpatient Follow up Note     Patient:  Belen Sharma, Date of birth 1960  Medical record number [id number]  Date of Visit:  09/27/2022    Recommendations   1. Obtain CT A/P w/ and w/o contrast (normal creatinine)  2. Obtain UA with reflex to culture at the end of cefdinir treatment 9/30/22   3. Urology follow up planned 10/7/22  4. Follow up with me in 2-3 months    53 minutes spent on the date of the encounter doing chart review, review of test results, interpretation of tests, patient visit, documentation and discussion with other provider(s)     Radha Reyes D.O.   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 9/14 UA w/ large leukocyte esterase, >182 WBC, WBC clumps, moderate bacteria; negative nitrite but urine culture <10K urogential keisha. Hx ESBL E.coli in 2020, Enterococcus faecalis (pan-S; 6/2021), and K.pnemoniae 11/4/21 (R: bactrim).  Previously had symptom improvement with previously prescribed fosfomycin, then cefdinir in August. Currently finishing 14 days of cefdinir to be complete on 9/30.      It appears that the frequency of UTIs and episodes of dysuria/cystitis + pyuria w/o  a positive urine cultures has increased over the past year. Potential considerations include urologic structural abnormalities such as ruptured renal cysts, nephrolithiasis, bladder stone or ureter compression from ovarian cyst. She demonstrates good hygiene. She is post menopausal so atrophic vagitis is a consideration. 12/17/20 CT A/P did demonstrate a 3.9 cm cyst adjacent to the right ovary that was benign appearing with mass effect on the ureter. The ureter also had enhancement and inflammation. She does have a pessary in place but was seen on 8/31/22 by gynecology and noted a normal vaginal exam.  Also has a cysts medial to the transplanted kidney (3.4 x 4.6 cm). Follow up planned with urology 10/7/22. Will start by obtaining a CT to better assess for structural issues.     Previous ID Issues:  - ESBL E.coli UTI and blood stream infection (12/2020)  - Enterococcus faecalis UTI, pan-susceptible (6/2021)  - K.pneumoniae UTI (cultured 8/20/21, 11/4/21)  - C.diff (?2016 in [place])  - Hardware: pins from bunion surgery     Other ID issues:  - QTc interval:  434msec (6/6/22)  - Bacterial prophylaxis: none  - Pneumocystis prophylaxis:  tmp/smx M,W,F   - Viral serostatus & prophylaxis:  CMV +, EBV +, HSV ?, VZV +  - Fungal prophylaxis:  None  - Immunization status:  Flu up to date (9/12/22), Pneumo (9/12/22), due for COVID-19 booster (last 2/2022)  - Gamma globulin status:  Unknown  - Isolation status: Contact (hx ESBL E.coli UTI and BSI 2020)        Interval History:   Last seen by transplant ID on 9/16/22. She was prescribed cefdinir x 14 days. Urine culture <10K normal keisha. She has doses through  9/30. Over the weekend she continued to have subjective low grade temperatures. Also continues to have some dysuria. The malaise and abdominal pain have resolved. Has also had some nasal congestion. Prior to this past admission and when UA was obtained she had take 4 amoxicillin pills in anticipation of a dental cleaning.    She has a pessary for uterine prolapsed. No bladder surgeries. Watching for ovarian and cervical cancer with frequent OB/GYN exam.Has not had a US.    Has not previous course of antibiotic  suppression. Typically gets ~ 2 UTIs per year. She feels something different is occurring now though. Move to [place] 2 years ago to be close to family. Moved from [place] and [place]. She saw ID at this health care facilities as well. Retired. Sexually active with her  but has been limited due to UTIs. Does not develop symptoms after intercourse.     Patient Active Problem List   Diagnosis     S/P splenectomy     Congenital hepatic fibrosis     Polycystic kidney disease     Immunosuppression (H)     Liver replaced by transplant (H)     Endometriosis     HPV (human papilloma virus) infection     HSIL (high grade squamous intraepithelial lesion) on Pap smear of cervix     Acquired bilateral hammer toes     Candidiasis of vagina     Cervical high risk human papillomavirus (HPV) DNA test positive     Chicken pox     Cystitis     Depression     Depressive psychosis (H)     Exercise counseling     Dermatophytosis of nail     Hallux valgus, acquired, bilateral     Incisional hernia following transplant     Knee pain     Malignant neoplasm of breast (H)     Microvascular angina (H)     Postmenopausal state     Secondary hyperparathyroidism (H)     Scoliosis deformity of spine     Sinusitis     Swelling of first metatarsophalangeal (MTP) joint     Uterine prolapse     Xerosis of skin     Kidney replaced by transplant     Lumbar pain     ESBL (extended spectrum beta-lactamase) producing bacteria infection     Osteopenia of multiple sites     Urinary tract infection without hematuria, site unspecified            Medications & Allergies:     Current Outpatient Medications   Medication     acetaminophen (TYLENOL) 325 MG tablet     aspirin (ASA) 81 MG chewable tablet     biotin 1000 MCG TABS tablet     cefdinir (OMNICEF) 300 MG capsule      ciclopirox (PENLAC) 8 % external solution     diphenhydrAMINE (BENADRYL) 25 MG tablet     estradiol (ESTRACE) 0.1 MG/GM vaginal cream     guaiFENesin (MUCINEX PO)     loratadine (CLARITIN) 10 MG tablet     Multiple Vitamins-Minerals (WOMENS DAILY FORMULA PO)     mycophenolic acid (GENERIC EQUIVALENT) 180 MG EC tablet     nitroGLYcerin (NITROSTAT) 0.4 MG sublingual tablet     predniSONE (DELTASONE) 5 MG tablet     Probiotic Product (PROBIOTIC PO)     sulfamethoxazole-trimethoprim (BACTRIM) 400-80 MG tablet     tacrolimus (GENERIC EQUIVALENT) 0.5 MG capsule     tacrolimus (GENERIC EQUIVALENT) 1 MG capsule     temazepam (RESTORIL) 15 MG capsule     No current facility-administered medications for this visit.     Allergies   Allergen Reactions     Ibuprofen      Due to liver transplant            Physical Exam:        There were no vitals filed for this visit.-video visit    GENERAL: Healthy, alert and no distress  EYES: Eyes grossly normal to inspection.  No discharge or erythema, or obvious scleral/conjunctival abnormalities.  RESP: No audible wheeze, cough  SKIN: Visible skin clear  NEURO: Cranial nerves grossly intact.  Mentation and speech appropriate for age.  Microbiology Data     Culture   Date Value Ref Range Status   09/15/2022 No Growth  Final   09/14/2022 No Growth  Final   09/14/2022 <10,000 CFU/mL Urogenital keisha  Final   08/28/2022 <10,000 CFU/mL Urogenital keisha  Final   08/23/2022 10,000-50,000 CFU/mL Urogenital keisha  Final   11/04/2021 >100,000 CFU/mL Klebsiella pneumoniae (A)  Final   11/04/2021 No Growth  Final   11/04/2021 No Growth  Final   09/08/2021 No Growth  Final   08/20/2021 >100,000 CFU/mL Klebsiella pneumoniae (A)  Final     Urine Studies     Recent Labs   Lab Test 09/14/22  2218 08/28/22  2104 08/23/22  1257 11/04/21  1515 09/14/21  1539 01/04/21  0932 12/16/20  2236   URINEPH 6.0 6.0 6.0 6.0 7.5*   < > 7.5*   NITRITE Negative Negative Negative Positive* Negative   < > Negative   LEUKEST  Large* Large* Moderate* Moderate* Small*   < > Large*   WBCU >182* >182* >100* 68* <1   < > 466*   UWBCLM  --   --   --   --   --   --  Present*    < > = values in this interval not displayed.     Laboratory Data:   Metabolic Studies       Recent Labs   Lab Test 09/26/22  0813 09/16/22  1015 09/15/22  0607 09/14/22  2230 12/18/20  0611 12/16/20  2236 09/30/20  0721 09/23/20  1653    139   < >  --    < > 137   < >  --    POTASSIUM 4.2 4.0   < >  --    < > 4.2   < >  --    CHLORIDE 102 107   < >  --    < > 106   < >  --    CO2 26 24   < >  --    < > 26   < >  --    ANIONGAP 12 8   < >  --    < > 5   < >  --    BUN 15.4 12.2   < >  --    < > 17   < >  --    CR 0.72 0.57   < >  --    < > 0.87   < >  --    GFRESTIMATED >90 >90   < >  --    < > 72   < >  --    GLC 93 98   < >  --    < > 89   < >  --    EDSON 10.0 9.7   < >  --    < > 9.9   < >  --    PHOS  --   --   --   --   --   --   --  3.4   LACT  --   --   --  0.9  --  0.8  --   --     < > = values in this interval not displayed.       Hepatic Studies    Recent Labs   Lab Test 09/26/22 0813 09/16/22 1015 09/14/22 2220   BILITOTAL 0.6 0.3 0.6   DBIL <0.20  --   --    ALKPHOS 69 57 72   PROTTOTAL 6.5 5.7* 7.0   ALBUMIN 3.9 3.2* 4.1   AST 18 16 21   ALT 13 8* 12       Hematology Studies      Recent Labs   Lab Test 09/26/22  0813 09/16/22  1015 09/15/22  0607 09/14/22 2220 09/12/22  0806 08/29/22  0756 03/11/21  1007 12/28/20  1515 12/28/20  0855 12/21/20  1545   WBC 8.2 8.1 9.8 13.7* 9.3 6.7   < > 9.2   < > 6.2   ANEU  --   --   --   --   --   --   --  6.4  --  3.9   ALYM  --   --   --   --   --   --   --  2.1  --  1.7   PABLO  --   --   --   --   --   --   --  0.6  --  0.6   AEOS  --   --   --   --   --   --   --  0.0  --  0.0   HGB 15.1 14.4 13.8 16.0 15.5 15.1   < > 16.6*   < > 16.2*   HCT 45.8 45.4 42.4 48.4 48.0 45.3   < > 50.8*   < > 49.2*    205 195 246 232 241   < > 308   < > 261    < > = values in this interval not displayed.       Medication  levels    Recent Labs   Lab Test 09/26/22  0813 12/19/20  0707 12/18/20  0611   TACROL 4.3*   < >  --    MPACID  --   --  1.32   MPAG  --   --  23.7*    < > = values in this interval not displayed.       Inflammatory Markers    Recent Labs   Lab Test 09/14/22  2220 11/04/21  1457 12/28/20  1515 12/21/20  1545   SED 8 14 8 10   CRP 21.60* 39.3* 13.0* 18.0*     Imaging:  Results for orders placed or performed during the hospital encounter of 09/14/22   XR Chest 2 Views    Narrative    EXAM: XR CHEST 2 VIEWS  LOCATION: [facility]  DATE/TIME: 9/14/2022 11:38 PM    INDICATION: fever  COMPARISON: 11/04/2021.      Impression    IMPRESSION: Negative chest.         Belen is a 62 year old who is being evaluated via a billable video visit.      How would you like to obtain your AVS? MyChart  If the video visit is dropped, the invitation should be resent by: Text to cell phone: [phone]  Will anyone else be joining your video visit? No        Video-Visit Details  Type of service:  Video Visit  Video Start Time: 3:07 PM  Video End Time:3:42 pm  Originating Location (pt. Location): Home  Distant Location (provider location):  [facility] INFECTIOUS DISEASE CLINIC [place]   Platform used for Video Visit: [employer]

## 2022-09-27 NOTE — PROGRESS NOTES
Belen is a 62 year old who is being evaluated via a billable video visit.      How would you like to obtain your AVS? MyChart  If the video visit is dropped, the invitation should be resent by: Text to cell phone: [phone]  Will anyone else be joining your video visit? No        Video-Visit Details  Type of service:  Video Visit  Video Start Time: 3:07 PM  Video End Time:3:42 pm  Originating Location (pt. Location): Home  Distant Location (provider location):  [facility] INFECTIOUS DISEASE CLINIC [place]   Platform used for Video Visit: [employer]

## 2022-09-27 NOTE — TELEPHONE ENCOUNTER
Voicemail    Date/Time: 09/26/22 9:04 pm    Reason for call: Belen called to confirm is she should be doing labs every 2 weeks and not every 2 months.     She also reports she has been having a low grade temp on and off. Belen will be checking with her primary care but wanted to touch base with coordinator.

## 2022-09-27 NOTE — TELEPHONE ENCOUNTER
She has a video infectious disease appt. With Dr. Reyes tomorrow 9/27/2022 and she can discuss her sxs. With her.    YARELIS De La Garza

## 2022-09-28 ENCOUNTER — TELEPHONE (OUTPATIENT)
Dept: TRANSPLANT | Facility: CLINIC | Age: 62
End: 2022-09-28

## 2022-09-28 NOTE — PATIENT INSTRUCTIONS
Obtain CT A/P w/ and w/o contrast (normal creatinine)  Obtain UA with reflex to culture at the end of cefdinir treatment 9/30/22   Urology follow up planned 10/7/22

## 2022-09-28 NOTE — TELEPHONE ENCOUNTER
Belen has questions about when she should get labs prior to her visit with Dr. Angulo. She is currently scheduled for an appt with Dr. Angulo 11/14/2022 and labs 11/1/2022. I informed her that should be fine.

## 2022-09-29 ENCOUNTER — ANCILLARY PROCEDURE (OUTPATIENT)
Dept: CT IMAGING | Facility: CLINIC | Age: 62
End: 2022-09-29
Attending: INTERNAL MEDICINE
Payer: MEDICARE

## 2022-09-29 ENCOUNTER — INFUSION THERAPY VISIT (OUTPATIENT)
Dept: INFUSION THERAPY | Facility: CLINIC | Age: 62
DRG: 698 | End: 2022-09-29
Attending: INTERNAL MEDICINE
Payer: MEDICARE

## 2022-09-29 ENCOUNTER — TELEPHONE (OUTPATIENT)
Dept: TRANSPLANT | Facility: CLINIC | Age: 62
End: 2022-09-29

## 2022-09-29 VITALS
TEMPERATURE: 98.5 F | SYSTOLIC BLOOD PRESSURE: 93 MMHG | DIASTOLIC BLOOD PRESSURE: 56 MMHG | RESPIRATION RATE: 16 BRPM | HEART RATE: 87 BPM | OXYGEN SATURATION: 96 %

## 2022-09-29 DIAGNOSIS — N83.201 CYST OF RIGHT OVARY: ICD-10-CM

## 2022-09-29 DIAGNOSIS — N39.0 RECURRENT UTI: ICD-10-CM

## 2022-09-29 DIAGNOSIS — D84.9 IMMUNOSUPPRESSION (H): Primary | ICD-10-CM

## 2022-09-29 DIAGNOSIS — N28.1 KIDNEY CYSTS: ICD-10-CM

## 2022-09-29 DIAGNOSIS — Z94.0 KIDNEY REPLACED BY TRANSPLANT: Primary | ICD-10-CM

## 2022-09-29 DIAGNOSIS — N13.5: ICD-10-CM

## 2022-09-29 DIAGNOSIS — Z94.0 KIDNEY TRANSPLANT RECIPIENT: ICD-10-CM

## 2022-09-29 PROCEDURE — 74177 CT ABD & PELVIS W/CONTRAST: CPT | Mod: MG | Performed by: RADIOLOGY

## 2022-09-29 PROCEDURE — G1010 CDSM STANSON: HCPCS | Mod: GC | Performed by: RADIOLOGY

## 2022-09-29 PROCEDURE — 258N000003 HC RX IP 258 OP 636: Performed by: INTERNAL MEDICINE

## 2022-09-29 RX ORDER — EPINEPHRINE 1 MG/ML
0.3 INJECTION, SOLUTION, CONCENTRATE INTRAVENOUS EVERY 5 MIN PRN
Status: CANCELLED | OUTPATIENT
Start: 2022-09-29

## 2022-09-29 RX ORDER — ALBUTEROL SULFATE 0.83 MG/ML
2.5 SOLUTION RESPIRATORY (INHALATION)
Status: CANCELLED | OUTPATIENT
Start: 2022-09-29

## 2022-09-29 RX ORDER — HEPARIN SODIUM (PORCINE) LOCK FLUSH IV SOLN 100 UNIT/ML 100 UNIT/ML
5 SOLUTION INTRAVENOUS
Status: CANCELLED | OUTPATIENT
Start: 2022-09-29

## 2022-09-29 RX ORDER — DIPHENHYDRAMINE HYDROCHLORIDE 50 MG/ML
50 INJECTION INTRAMUSCULAR; INTRAVENOUS
Status: CANCELLED
Start: 2022-09-29

## 2022-09-29 RX ORDER — IOPAMIDOL 755 MG/ML
89 INJECTION, SOLUTION INTRAVASCULAR ONCE
Status: COMPLETED | OUTPATIENT
Start: 2022-09-29 | End: 2022-09-29

## 2022-09-29 RX ORDER — MEPERIDINE HYDROCHLORIDE 25 MG/ML
25 INJECTION INTRAMUSCULAR; INTRAVENOUS; SUBCUTANEOUS EVERY 30 MIN PRN
Status: CANCELLED | OUTPATIENT
Start: 2022-09-29

## 2022-09-29 RX ORDER — HEPARIN SODIUM,PORCINE 10 UNIT/ML
5 VIAL (ML) INTRAVENOUS
Status: CANCELLED | OUTPATIENT
Start: 2022-09-29

## 2022-09-29 RX ORDER — METHYLPREDNISOLONE SODIUM SUCCINATE 125 MG/2ML
125 INJECTION, POWDER, LYOPHILIZED, FOR SOLUTION INTRAMUSCULAR; INTRAVENOUS
Status: CANCELLED
Start: 2022-09-29

## 2022-09-29 RX ORDER — ALBUTEROL SULFATE 90 UG/1
1-2 AEROSOL, METERED RESPIRATORY (INHALATION)
Status: CANCELLED
Start: 2022-09-29

## 2022-09-29 RX ORDER — EPINEPHRINE 1 MG/ML
0.3 INJECTION, SOLUTION INTRAMUSCULAR; SUBCUTANEOUS EVERY 5 MIN PRN
Status: CANCELLED | OUTPATIENT
Start: 2022-09-29

## 2022-09-29 RX ADMIN — SODIUM CHLORIDE 500 ML: 9 INJECTION, SOLUTION INTRAVENOUS at 15:34

## 2022-09-29 RX ADMIN — IOPAMIDOL 89 ML: 755 INJECTION, SOLUTION INTRAVASCULAR at 16:26

## 2022-09-29 NOTE — LETTER
"    9/29/2022         RE: Belen Sharma  [address] [place] [address]        Dear Colleague,    Thank you for referring your patient, Belen Sharma, to the [facility]. Please see a copy of my visit note below.    Chief Complaint   Patient presents with     Infusion     IV fluids     Infusion Nursing Note:  Belen Sharma presents today for IV fluids prior to CT.    Patient seen by provider today: No   present during visit today: Not Applicable.    Note: fluids given over 30 minutes.    Intravenous Access:  Peripheral IV placed. Saline locked per MD for CT.     Treatment Conditions:  Not Applicable.    Post Infusion Assessment:  Patient tolerated infusion without incident.  Blood return noted pre and post infusion.  Site patent and intact, free from redness, edema or discomfort.     Discharge Plan:   AVS to patient via MYCHART.   Patient discharged in stable condition accompanied by: son to Imaging for CT.   Departure Mode: Ambulatory.    Administrations This Visit     0.9% sodium chloride BOLUS     Admin Date  09/29/2022 Action  New Bag Dose  500 mL Route  Intravenous Administered By  Daniel Hall RN              Vital signs:  Temp: 98.5  F (36.9  C) Temp src: Oral BP: 93/56 Pulse: 87   Resp: 16 SpO2: 96 % O2 Device: None (Room air)        Estimated body mass index is 21.05 kg/m  as calculated from the following:    Height as of 9/20/22: 1.778 m (5' 10\").    Weight as of 9/20/22: 66.5 kg (146 lb 11.2 oz).                              Again, thank you for allowing me to participate in the care of your patient.        Sincerely,        [facility]    "

## 2022-09-29 NOTE — PROGRESS NOTES
"Chief Complaint   Patient presents with     Infusion     IV fluids     Infusion Nursing Note:  Belen Sharma presents today for IV fluids prior to CT.    Patient seen by provider today: No   present during visit today: Not Applicable.    Note: fluids given over 30 minutes.    Intravenous Access:  Peripheral IV placed. Saline locked per MD for CT.     Treatment Conditions:  Not Applicable.    Post Infusion Assessment:  Patient tolerated infusion without incident.  Blood return noted pre and post infusion.  Site patent and intact, free from redness, edema or discomfort.     Discharge Plan:   AVS to patient via MYCHART.   Patient discharged in stable condition accompanied by: son to Imaging for CT.   Departure Mode: Ambulatory.    Administrations This Visit     0.9% sodium chloride BOLUS     Admin Date  09/29/2022 Action  New Bag Dose  500 mL Route  Intravenous Administered By  Daniel Hall RN              Vital signs:  Temp: 98.5  F (36.9  C) Temp src: Oral BP: 93/56 Pulse: 87   Resp: 16 SpO2: 96 % O2 Device: None (Room air)        Estimated body mass index is 21.05 kg/m  as calculated from the following:    Height as of 9/20/22: 1.778 m (5' 10\").    Weight as of 9/20/22: 66.5 kg (146 lb 11.2 oz).                          "

## 2022-09-29 NOTE — TELEPHONE ENCOUNTER
ISSUE:  CT per ID w/ contrast.    PLAN:  Reviewed with Dr. Angulo - give 500mL NS prior to CT.  CT scheduled for 9/29/2022 at 4:20p.    Orders placed for infusion.  Scheduled for [facility] at 3:30p.  Patient notified, she is aware and in agreement.

## 2022-09-30 ENCOUNTER — LAB (OUTPATIENT)
Dept: LAB | Facility: CLINIC | Age: 62
End: 2022-09-30
Payer: MEDICARE

## 2022-09-30 DIAGNOSIS — N39.0 RECURRENT UTI: ICD-10-CM

## 2022-09-30 PROCEDURE — 87086 URINE CULTURE/COLONY COUNT: CPT

## 2022-09-30 PROCEDURE — 81001 URINALYSIS AUTO W/SCOPE: CPT

## 2022-09-30 PROCEDURE — 87186 SC STD MICRODIL/AGAR DIL: CPT

## 2022-09-30 PROCEDURE — 87181 SC STD AGAR DILUTION PER AGT: CPT

## 2022-10-02 ENCOUNTER — APPOINTMENT (OUTPATIENT)
Dept: GENERAL RADIOLOGY | Facility: CLINIC | Age: 62
DRG: 698 | End: 2022-10-02
Attending: EMERGENCY MEDICINE
Payer: MEDICARE

## 2022-10-02 ENCOUNTER — HOSPITAL ENCOUNTER (INPATIENT)
Facility: CLINIC | Age: 62
LOS: 3 days | Discharge: HOME | DRG: 698 | End: 2022-10-05
Attending: EMERGENCY MEDICINE | Admitting: STUDENT IN AN ORGANIZED HEALTH CARE EDUCATION/TRAINING PROGRAM
Payer: MEDICARE

## 2022-10-02 DIAGNOSIS — N39.0 URINARY TRACT INFECTION WITHOUT HEMATURIA, SITE UNSPECIFIED: ICD-10-CM

## 2022-10-02 DIAGNOSIS — A49.9 ESBL (EXTENDED SPECTRUM BETA-LACTAMASE) PRODUCING BACTERIA INFECTION: Primary | ICD-10-CM

## 2022-10-02 DIAGNOSIS — D84.9 IMMUNOSUPPRESSION (H): ICD-10-CM

## 2022-10-02 DIAGNOSIS — A41.9 SEPSIS, DUE TO UNSPECIFIED ORGANISM, UNSPECIFIED WHETHER ACUTE ORGAN DYSFUNCTION PRESENT (H): ICD-10-CM

## 2022-10-02 DIAGNOSIS — Z20.822 LAB TEST NEGATIVE FOR COVID-19 VIRUS: ICD-10-CM

## 2022-10-02 DIAGNOSIS — Z16.12 ESBL (EXTENDED SPECTRUM BETA-LACTAMASE) PRODUCING BACTERIA INFECTION: Primary | ICD-10-CM

## 2022-10-02 LAB
ALBUMIN SERPL BCG-MCNC: 3.8 G/DL (ref 3.5–5.2)
ALBUMIN UR-MCNC: 10 MG/DL
ALP SERPL-CCNC: 69 U/L (ref 35–129)
ALT SERPL W P-5'-P-CCNC: 8 U/L (ref 10–50)
ANION GAP SERPL CALCULATED.3IONS-SCNC: 9 MMOL/L (ref 7–15)
APPEARANCE UR: ABNORMAL
AST SERPL W P-5'-P-CCNC: 23 U/L (ref 10–50)
BASOPHILS # BLD AUTO: 0 10E3/UL (ref 0–0.2)
BASOPHILS NFR BLD AUTO: 0 %
BILIRUB SERPL-MCNC: 0.7 MG/DL
BILIRUB UR QL STRIP: NEGATIVE
BUN SERPL-MCNC: 17.8 MG/DL (ref 8–23)
CALCIUM SERPL-MCNC: 9.5 MG/DL (ref 8.8–10.2)
CHLORIDE SERPL-SCNC: 103 MMOL/L (ref 98–107)
COLOR UR AUTO: ABNORMAL
CREAT SERPL-MCNC: 0.78 MG/DL (ref 0.51–1.17)
DEPRECATED HCO3 PLAS-SCNC: 23 MMOL/L (ref 22–29)
EOSINOPHIL # BLD AUTO: 0 10E3/UL (ref 0–0.7)
EOSINOPHIL NFR BLD AUTO: 0 %
ERYTHROCYTE [DISTWIDTH] IN BLOOD BY AUTOMATED COUNT: 13.9 % (ref 10–15)
GFR SERPL CREATININE-BSD FRML MDRD: 85 ML/MIN/1.73M2
GLUCOSE SERPL-MCNC: 92 MG/DL (ref 70–99)
GLUCOSE UR STRIP-MCNC: NEGATIVE MG/DL
HCT VFR BLD AUTO: 46.3 % (ref 35–53)
HGB BLD-MCNC: 15.1 G/DL (ref 11.7–17.7)
HGB UR QL STRIP: ABNORMAL
IMM GRANULOCYTES # BLD: 0 10E3/UL
IMM GRANULOCYTES NFR BLD: 0 %
KETONES UR STRIP-MCNC: NEGATIVE MG/DL
LACTATE SERPL-SCNC: 0.8 MMOL/L (ref 0.7–2)
LEUKOCYTE ESTERASE UR QL STRIP: ABNORMAL
LYMPHOCYTES # BLD AUTO: 1.7 10E3/UL (ref 0.8–5.3)
LYMPHOCYTES NFR BLD AUTO: 14 %
MCH RBC QN AUTO: 29.5 PG (ref 26.5–33)
MCHC RBC AUTO-ENTMCNC: 32.6 G/DL (ref 31.5–36.5)
MCV RBC AUTO: 91 FL (ref 78–100)
MONOCYTES # BLD AUTO: 1.4 10E3/UL (ref 0–1.3)
MONOCYTES NFR BLD AUTO: 11 %
NEUTROPHILS # BLD AUTO: 9.1 10E3/UL (ref 1.6–8.3)
NEUTROPHILS NFR BLD AUTO: 75 %
NITRATE UR QL: POSITIVE
NRBC # BLD AUTO: 0 10E3/UL
NRBC BLD AUTO-RTO: 0 /100
PH UR STRIP: 6.5 [PH] (ref 5–7)
PLATELET # BLD AUTO: 287 10E3/UL (ref 150–450)
POTASSIUM SERPL-SCNC: 3.9 MMOL/L (ref 3.4–5.3)
PROT SERPL-MCNC: 6.6 G/DL (ref 6.4–8.3)
RBC # BLD AUTO: 5.11 10E6/UL (ref 3.8–5.9)
RBC URINE: 13 /HPF
SARS-COV-2 RNA RESP QL NAA+PROBE: NEGATIVE
SODIUM SERPL-SCNC: 135 MMOL/L (ref 136–145)
SP GR UR STRIP: 1.01 (ref 1–1.03)
TACROLIMUS BLD-MCNC: 4 UG/L (ref 5–15)
TME LAST DOSE: ABNORMAL H
TME LAST DOSE: ABNORMAL H
UROBILINOGEN UR STRIP-MCNC: NORMAL MG/DL
WBC # BLD AUTO: 12.3 10E3/UL (ref 4–11)
WBC CLUMPS #/AREA URNS HPF: PRESENT /HPF
WBC URINE: >182 /HPF

## 2022-10-02 PROCEDURE — U0003 INFECTIOUS AGENT DETECTION BY NUCLEIC ACID (DNA OR RNA); SEVERE ACUTE RESPIRATORY SYNDROME CORONAVIRUS 2 (SARS-COV-2) (CORONAVIRUS DISEASE [COVID-19]), AMPLIFIED PROBE TECHNIQUE, MAKING USE OF HIGH THROUGHPUT TECHNOLOGIES AS DESCRIBED BY CMS-2020-01-R: HCPCS | Performed by: EMERGENCY MEDICINE

## 2022-10-02 PROCEDURE — 96366 THER/PROPH/DIAG IV INF ADDON: CPT | Performed by: EMERGENCY MEDICINE

## 2022-10-02 PROCEDURE — C9803 HOPD COVID-19 SPEC COLLECT: HCPCS | Performed by: EMERGENCY MEDICINE

## 2022-10-02 PROCEDURE — 80197 ASSAY OF TACROLIMUS: CPT | Performed by: STUDENT IN AN ORGANIZED HEALTH CARE EDUCATION/TRAINING PROGRAM

## 2022-10-02 PROCEDURE — 99222 1ST HOSP IP/OBS MODERATE 55: CPT | Performed by: INTERNAL MEDICINE

## 2022-10-02 PROCEDURE — 96361 HYDRATE IV INFUSION ADD-ON: CPT | Performed by: EMERGENCY MEDICINE

## 2022-10-02 PROCEDURE — 99285 EMERGENCY DEPT VISIT HI MDM: CPT | Performed by: EMERGENCY MEDICINE

## 2022-10-02 PROCEDURE — 250N000011 HC RX IP 250 OP 636: Performed by: EMERGENCY MEDICINE

## 2022-10-02 PROCEDURE — 85025 COMPLETE CBC W/AUTO DIFF WBC: CPT | Performed by: EMERGENCY MEDICINE

## 2022-10-02 PROCEDURE — 87086 URINE CULTURE/COLONY COUNT: CPT | Performed by: EMERGENCY MEDICINE

## 2022-10-02 PROCEDURE — 96365 THER/PROPH/DIAG IV INF INIT: CPT | Performed by: EMERGENCY MEDICINE

## 2022-10-02 PROCEDURE — 250N000011 HC RX IP 250 OP 636: Performed by: STUDENT IN AN ORGANIZED HEALTH CARE EDUCATION/TRAINING PROGRAM

## 2022-10-02 PROCEDURE — 250N000012 HC RX MED GY IP 250 OP 636 PS 637: Performed by: STUDENT IN AN ORGANIZED HEALTH CARE EDUCATION/TRAINING PROGRAM

## 2022-10-02 PROCEDURE — 71045 X-RAY EXAM CHEST 1 VIEW: CPT | Mod: 26 | Performed by: RADIOLOGY

## 2022-10-02 PROCEDURE — 250N000013 HC RX MED GY IP 250 OP 250 PS 637: Performed by: EMERGENCY MEDICINE

## 2022-10-02 PROCEDURE — 83605 ASSAY OF LACTIC ACID: CPT | Performed by: EMERGENCY MEDICINE

## 2022-10-02 PROCEDURE — 120N000002 HC R&B MED SURG/OB UMMC

## 2022-10-02 PROCEDURE — 81001 URINALYSIS AUTO W/SCOPE: CPT | Performed by: EMERGENCY MEDICINE

## 2022-10-02 PROCEDURE — 36415 COLL VENOUS BLD VENIPUNCTURE: CPT | Performed by: EMERGENCY MEDICINE

## 2022-10-02 PROCEDURE — 99223 1ST HOSP IP/OBS HIGH 75: CPT | Performed by: INTERNAL MEDICINE

## 2022-10-02 PROCEDURE — 250N000013 HC RX MED GY IP 250 OP 250 PS 637: Performed by: STUDENT IN AN ORGANIZED HEALTH CARE EDUCATION/TRAINING PROGRAM

## 2022-10-02 PROCEDURE — 99285 EMERGENCY DEPT VISIT HI MDM: CPT | Mod: 25 | Performed by: EMERGENCY MEDICINE

## 2022-10-02 PROCEDURE — 258N000003 HC RX IP 258 OP 636: Performed by: EMERGENCY MEDICINE

## 2022-10-02 PROCEDURE — 71045 X-RAY EXAM CHEST 1 VIEW: CPT

## 2022-10-02 PROCEDURE — 87040 BLOOD CULTURE FOR BACTERIA: CPT | Performed by: EMERGENCY MEDICINE

## 2022-10-02 PROCEDURE — 99223 1ST HOSP IP/OBS HIGH 75: CPT | Mod: AI | Performed by: STUDENT IN AN ORGANIZED HEALTH CARE EDUCATION/TRAINING PROGRAM

## 2022-10-02 PROCEDURE — 82040 ASSAY OF SERUM ALBUMIN: CPT | Performed by: EMERGENCY MEDICINE

## 2022-10-02 PROCEDURE — 80053 COMPREHEN METABOLIC PANEL: CPT | Performed by: EMERGENCY MEDICINE

## 2022-10-02 RX ORDER — LANOLIN ALCOHOL/MO/W.PET/CERES
3 CREAM (GRAM) TOPICAL
Status: DISCONTINUED | OUTPATIENT
Start: 2022-10-02 | End: 2022-10-05 | Stop reason: HOSPADM

## 2022-10-02 RX ORDER — ACETAMINOPHEN 500 MG
1000 TABLET ORAL ONCE
Status: COMPLETED | OUTPATIENT
Start: 2022-10-02 | End: 2022-10-02

## 2022-10-02 RX ORDER — ASPIRIN 81 MG/1
81 TABLET, CHEWABLE ORAL DAILY
Status: DISCONTINUED | OUTPATIENT
Start: 2022-10-02 | End: 2022-10-05 | Stop reason: HOSPADM

## 2022-10-02 RX ORDER — LIDOCAINE 40 MG/G
CREAM TOPICAL
Status: DISCONTINUED | OUTPATIENT
Start: 2022-10-02 | End: 2022-10-05 | Stop reason: HOSPADM

## 2022-10-02 RX ORDER — PROCHLORPERAZINE 25 MG
25 SUPPOSITORY, RECTAL RECTAL EVERY 12 HOURS PRN
Status: DISCONTINUED | OUTPATIENT
Start: 2022-10-02 | End: 2022-10-04

## 2022-10-02 RX ORDER — ACETAMINOPHEN 325 MG/1
650 TABLET ORAL EVERY 6 HOURS PRN
Status: DISCONTINUED | OUTPATIENT
Start: 2022-10-02 | End: 2022-10-05 | Stop reason: HOSPADM

## 2022-10-02 RX ORDER — PROCHLORPERAZINE MALEATE 5 MG
10 TABLET ORAL EVERY 6 HOURS PRN
Status: DISCONTINUED | OUTPATIENT
Start: 2022-10-02 | End: 2022-10-04

## 2022-10-02 RX ORDER — SULFAMETHOXAZOLE AND TRIMETHOPRIM 400; 80 MG/1; MG/1
1 TABLET ORAL
Status: DISCONTINUED | OUTPATIENT
Start: 2022-10-03 | End: 2022-10-05 | Stop reason: HOSPADM

## 2022-10-02 RX ORDER — MEROPENEM 1 G/1
1 INJECTION, POWDER, FOR SOLUTION INTRAVENOUS EVERY 8 HOURS
Status: DISCONTINUED | OUTPATIENT
Start: 2022-10-02 | End: 2022-10-03

## 2022-10-02 RX ORDER — ACETAMINOPHEN 325 MG/1
325 TABLET ORAL EVERY 4 HOURS PRN
Status: DISCONTINUED | OUTPATIENT
Start: 2022-10-02 | End: 2022-10-02

## 2022-10-02 RX ORDER — PIPERACILLIN SODIUM, TAZOBACTAM SODIUM 3; .375 G/15ML; G/15ML
3.38 INJECTION, POWDER, LYOPHILIZED, FOR SOLUTION INTRAVENOUS ONCE
Status: COMPLETED | OUTPATIENT
Start: 2022-10-02 | End: 2022-10-02

## 2022-10-02 RX ORDER — SODIUM CHLORIDE 9 MG/ML
INJECTION, SOLUTION INTRAVENOUS CONTINUOUS
Status: DISCONTINUED | OUTPATIENT
Start: 2022-10-02 | End: 2022-10-03

## 2022-10-02 RX ORDER — ONDANSETRON 2 MG/ML
4 INJECTION INTRAMUSCULAR; INTRAVENOUS EVERY 6 HOURS PRN
Status: DISCONTINUED | OUTPATIENT
Start: 2022-10-02 | End: 2022-10-05 | Stop reason: HOSPADM

## 2022-10-02 RX ORDER — ONDANSETRON 4 MG/1
4 TABLET, ORALLY DISINTEGRATING ORAL EVERY 6 HOURS PRN
Status: DISCONTINUED | OUTPATIENT
Start: 2022-10-02 | End: 2022-10-05 | Stop reason: HOSPADM

## 2022-10-02 RX ORDER — PREDNISONE 5 MG/1
5 TABLET ORAL DAILY
Status: DISCONTINUED | OUTPATIENT
Start: 2022-10-03 | End: 2022-10-05 | Stop reason: HOSPADM

## 2022-10-02 RX ADMIN — SODIUM CHLORIDE 1000 ML: 9 INJECTION, SOLUTION INTRAVENOUS at 06:33

## 2022-10-02 RX ADMIN — TACROLIMUS 1.5 MG: 1 CAPSULE, GELATIN COATED ORAL at 20:17

## 2022-10-02 RX ADMIN — PIPERACILLIN AND TAZOBACTAM 3.38 G: 3; .375 INJECTION, POWDER, LYOPHILIZED, FOR SOLUTION INTRAVENOUS at 07:09

## 2022-10-02 RX ADMIN — MEROPENEM 1 G: 1 INJECTION, POWDER, FOR SOLUTION INTRAVENOUS at 19:21

## 2022-10-02 RX ADMIN — MEROPENEM 1 G: 1 INJECTION, POWDER, FOR SOLUTION INTRAVENOUS at 10:21

## 2022-10-02 RX ADMIN — SODIUM CHLORIDE: 9 INJECTION, SOLUTION INTRAVENOUS at 08:23

## 2022-10-02 RX ADMIN — ACETAMINOPHEN 500 MG: 500 TABLET ORAL at 07:08

## 2022-10-02 RX ADMIN — ASPIRIN 81 MG CHEWABLE TABLET 81 MG: 81 TABLET CHEWABLE at 20:15

## 2022-10-02 RX ADMIN — ACETAMINOPHEN 650 MG: 325 TABLET, FILM COATED ORAL at 22:48

## 2022-10-02 RX ADMIN — MYCOPHENOLIC ACID 540 MG: 360 TABLET, DELAYED RELEASE ORAL at 20:15

## 2022-10-02 RX ADMIN — ACETAMINOPHEN 650 MG: 325 TABLET, FILM COATED ORAL at 16:57

## 2022-10-02 ASSESSMENT — ACTIVITIES OF DAILY LIVING (ADL)
ADLS_ACUITY_SCORE: 35
ADLS_ACUITY_SCORE: 20
ADLS_ACUITY_SCORE: 35
ADLS_ACUITY_SCORE: 20
ADLS_ACUITY_SCORE: 35
ADLS_ACUITY_SCORE: 35
ADLS_ACUITY_SCORE: 20
ADLS_ACUITY_SCORE: 35
ADLS_ACUITY_SCORE: 35

## 2022-10-02 NOTE — H&P
[facility]    History and Physical - Hospitalist Service, GOLD TEAM 3       Date of Admission:  10/2/2022    Assessment & Plan      Belen Sharma is a 62 year old adult with past medical history of polycystic kidney disease s/p DDKT x 2, congenital hepatic fibrosis s/p liver transplant, recurrent UTIs with recent admission for the same with discharge on 9/16 admitted on 10/2/2022 for management of sepsis 2/2 pyelonephritis of transplant kidney.    # Sepsis 2/2 pyelonephritis of transplanted kidney  # ESBL UTI  Met 3 out of 4 SIRS criteria on admission (temp, HR, WBC count). Patient notes progressive urinary symptoms over past two days following completion of PO antibiotic course with cefdinir on 9/30. UA obtained on same day of last antibiotic dose by outpatient ID provider with growth of ESBL E. Coli susceptible to zosyn and Merrem. Treatment initiated with zosyn in ED.  - Follow blood cultures  - s/p zosyn, will transition to meropenem (ordered, not yet given) pending ID input  - 1 L IVF in process, will likely need additional IVF to reach guideline directed volume  - Transplant ID consulted, appreciate recommendations  - Renal transplant consulted, appreciate recommendations  - CBC daily    # Polycystic kidney disease s/p renal transplant  # Congential hepatic fibrosis s/p liver transplant  CMP largely within normal limits with Cr at baseline.  - Renal transplant consulted  - [place]ro trough ordered for 10/2  - Continue PTA mycophenolic acid, tacroliumus, prednisone  - Continue PTA bactrim for PJP prophylaxis    # Nonobstructive CAD  - Continue PTA ASA    # Need for COVID screen  - COVID swab pending     Diet:   Regular  DVT Prophylaxis: Low Risk/Ambulatory with no VTE prophylaxis indicated  [place] Catheter: Not present  Central Lines: None  Cardiac Monitoring: None  Code Status:   Full    Clinically Significant Risk Factors Present on Admission                           Disposition Plan      Expected Discharge Date: 10/04/2022                The patient's care was discussed with the Patient.    Emerita Lewis MD  Hospitalist Service, GOLD TEAM [address] [place] [address]  Securely message with the Vocera Web Console (learn more here)  Text page via [facility] Paging/Directory         ______________________________________________________________________    Chief Complaint   Fever, urinary symptoms    History is obtained from the patient    History of Present Illness   Belen Sharma is a 62 year old adult with past medical history of polycystic kidney disease s/p DDKT x 2, congenital hepatic fibrosis s/p liver transplant, recurrent UTIs with recent admission for the same with discharge on 9/16 admitted on 10/2/2022 for management of sepsis 2/2 pyelonephritis of transplant kidney.    Patient recently admitted to [facility] for management of urinary tract infection with discharge on cefdinir to complete 14 day total course of therapy. Had ID follow-up with urine culture obtained on final day of antibiotics, 9/30, that grew ESBL E. Coli. FOllowing completion of antibiotics has had recurrent UTI symptoms including urinary urgency and frequency with some small volume incontinence. Endorses measured fevers at home. No kidney site pain or flank pain. She further endorses headache, myalgias, general malaise. No nausea or vomiting and feels that PO intake as been adequate.    Review of Systems    The 10 point Review of Systems is negative other than noted in the HPI or here.     Past Medical History    I have reviewed this patient's medical history and updated it with pertinent information if needed.   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 Both, very large, native kidneys reamain.     PONV (postoperative nausea and vomiting)     Need to start with ice chips and apple juice, no soda     S/P kidney transplant     SLK 10/9/2010 - kidney failure 2/2 [facility]. Explanted . Re-transplant after de-sensitization 2016     S/P liver transplant (H)     10/9/2010 - [facility], ischemic biopathy. Re-transplant 3/3/2011     S/P splenectomy      Spider veins      Thyroid disease     Hyperthyroid treated with single dose iodine       Past Surgical History   I have reviewed this patient's surgical history and updated it with pertinent information if needed.  Past Surgical History:   Procedure Laterality Date     bunectomy  2018    right foot     bunionectomy  2019    left     CHOLECYSTECTOMY       CONIZATION N/A 2020    Procedure: CONE BIOPSY, CERVIX;  Surgeon: Daysi Perez MD;  Location: [facility] OR     FAILED PICC - RIGHT ARM Right 2020    Has a LUE AV fistula.     H STATISTIC PICC LINE INSERTION >5YR, FAILED Bilateral 2020    Left fistula, Right failed x 2 Occlussion     HERNIA REPAIR, INCISIONAL  2013     IR DIALYSIS PTA CENTRAL SEG  2020     IR PICC PLACEMENT > 5 YRS OF AGE  2020     SPLENECTOMY      Splenectomy     TRANSPLANT KIDNEY RECIPIENT  DONOR      re-transplant after [facility]; 6 months de-sensitization prior AND 6 months after transplant     TRANSPLANT LIVER RECIPIENT  DONOR  2011     TRANSPLANT LIVER, KIDNEY RECIPIENT  DONOR, COMBINED  10/09/2010    [facility] - re-Tx liver 3/3/2011; Kidney (left iliac) lost to [facility]/fibrosis - explant     VASCULAR SURGERY      Vascular access left UE. Not used for 4 years since 2nd kidney tx - [place]       Social History   I have reviewed this patient's social history and updated it  with pertinent information if needed.  Social History     Tobacco Use     Smoking status: Never Smoker     Smokeless tobacco: Never Used   Substance Use Topics     Alcohol use: Not Currently     Drug use: Not Currently       Family History   I have reviewed this patient's family history and updated it with pertinent information if needed.  Family History   Problem Relation Age of Onset     Uterine Cancer Maternal Grandmother      Polycystic Kidney Diease Brother      Polycystic Kidney Diease Brother      Polycystic Kidney Diease Brother      Polycystic Kidney Diease Brother      Cervical Cancer Maternal Aunt      Depression Mother      Anxiety Disorder Mother      Depression Father      Anxiety Disorder Father        Prior to Admission Medications   Prior to Admission Medications   Prescriptions Last Dose Informant Patient Reported? Taking?   Multiple Vitamins-Minerals (WOMENS DAILY FORMULA PO)   Yes No   Sig: Take 1 tablet by mouth daily   Probiotic Product (PROBIOTIC PO)   Yes No   Sig: Take 2 tablets by mouth every morning ([employer] Women's Vaginal Probiotic)   acetaminophen (TYLENOL) 325 MG tablet   No No   Sig: Take 1 tablet (325 mg) by mouth every 4 hours as needed for mild pain or fever   aspirin (ASA) 81 MG chewable tablet   Yes No   Sig: Take 81 mg by mouth every evening Stopped 7 days preop   biotin 1000 MCG TABS tablet   Yes No   Sig: Take 3,000 mcg by mouth every evening   cefdinir (OMNICEF) 300 MG capsule   No No   Sig: Take 1 capsule (300 mg) by mouth every 12 hours   ciclopirox (PENLAC) 8 % external solution   No No   Sig: Apply to adjacent skin and affected nails daily.  Remove with alcohol every 7 days, then repeat.   diphenhydrAMINE (BENADRYL) 25 MG tablet   Yes No   Sig: Take 25 mg by mouth every 8 hours as needed for allergies   estradiol (ESTRACE) 0.1 MG/GM vaginal cream   No No   Sig: Place 1 g vaginally twice a week   guaiFENesin (MUCINEX PO)   Yes No   Sig: Take 1 tablet by mouth 2 times  daily as needed   loratadine (CLARITIN) 10 MG tablet   Yes No   Sig: Take 10 mg by mouth every evening    mycophenolic acid (GENERIC EQUIVALENT) 180 MG EC tablet   Yes No   Sig: Take 3 tablets (540 mg) by mouth 2 times daily   nitroGLYcerin (NITROSTAT) 0.4 MG sublingual tablet   No No   Sig: Place 1 tablet (0.4 mg) under the tongue every 5 minutes as needed for chest pain For chest pain place 1 tablet under the tongue every 5 minutes for 3 doses. If symptoms persist 5 minutes after 1st dose call 911.   predniSONE (DELTASONE) 5 MG tablet   No No   Sig: Take 1 tablet (5 mg) by mouth daily   sulfamethoxazole-trimethoprim (BACTRIM) 400-80 MG tablet   Yes No   Sig: Take 1 tablet by mouth three times a week on Monday, Wednesday, Friday   tacrolimus (GENERIC EQUIVALENT) 0.5 MG capsule   No No   Sig: Take 1 capsule (0.5 mg) by mouth 2 times daily Total dose = 1.5 mg BID   tacrolimus (GENERIC EQUIVALENT) 1 MG capsule   No No   Sig: Take 1 capsule (1 mg) by mouth 2 times daily Total dose = 1.5 mg BID   temazepam (RESTORIL) 15 MG capsule   No No   Sig: Take 1 capsule (15 mg) by mouth as needed for sleep (twice a year)      Facility-Administered Medications: None     Allergies   Allergies   Allergen Reactions     Ibuprofen      Due to liver transplant       Physical Exam   Vital Signs: Temp: (!) 101.9  F (38.8  C) Temp src: Tympanic BP: 102/64 Pulse: 111   Resp: 12 SpO2: 94 %      Weight: 148 lbs 0 oz    Constitutional: awake, alert, cooperative, no apparent distress, and appears stated age  Eyes: Lids and lashes normal, pupils equal, round and reactive to light, extra ocular muscles intact, sclera clear, conjunctiva normal  ENT: normocepalic, without obvious abnormality  Respiratory: No increased work of breathing, good air exchange, clear to auscultation bilaterally, no crackles or wheezing  Cardiovascular: Normal apical impulse, regular rate and rhythm, normal S1 and S2, no S3 or S4, and no murmur noted  GI: Abdomen soft,  NTND, BS present  Musculoskeletal: no lower extremity pitting edema present  Neuropsychiatric: General: normal, calm and normal eye contact  Level of consciousness: alert / normal  Affect: normal    Data   Data reviewed today: I reviewed all medications, new labs and imaging results over the last 24 hours. I personally reviewed no images or EKG's today.    Recent Labs   Lab 10/02/22  0628 09/26/22  0813   WBC 12.3* 8.2   HGB 15.1 15.1   MCV 91 91    267   * 140   POTASSIUM 3.9 4.2   CHLORIDE 103 102   CO2 23 26   BUN 17.8 15.4   CR 0.78 0.72   ANIONGAP 9 12   EDSON 9.5 10.0   GLC 92 93   ALBUMIN 3.8 3.9   PROTTOTAL 6.6 6.5   BILITOTAL 0.7 0.6   ALKPHOS 69 69   ALT 8* 13   AST 23 18     Recent Results (from the past 24 hour(s))   XR Chest Port 1 View    Narrative    EXAM: XR CHEST PORT 1 VIEW  LOCATION: [facility]  DATE/TIME: 10/2/2022 6:56 AM    INDICATION: fever  COMPARISON: 9/14/2022      Impression    IMPRESSION: The heart is normal in size and contour. There is mild central pulmonary congestion. Subtle increased interstitial opacities are seen in the right lung base, a component of this may reflect pulmonary edema a superimposed atypical infectious   process such as viral pneumonia cannot be excluded.

## 2022-10-02 NOTE — PHARMACY-ADMISSION MEDICATION HISTORY
Admission Medication History Completed by Pharmacy    See [facility] Admission Navigator for allergy information, preferred outpatient pharmacy, prior to admission medications and immunization status.     Medication History Sources:     Patient    Prescription adjudication database    Chart review    Changes made to PTA medication list (reason):    Added: None    Deleted: cefdinir as completed therapy    Changed: guaifenesin to reflect current strength    Additional Information:    Patient primarily fills her medications through [facility]    For short-term medications, patient uses [facility] on [address] ([place], [place]) but expressed desiring to fill through [facility]    Prior to Admission medications    Medication Sig Last Dose Taking? Auth Provider Long Term End Date   acetaminophen (TYLENOL) 325 MG tablet Take 1 tablet (325 mg) by mouth every 4 hours as needed for mild pain or fever  Yes Margie Ma MD     aspirin (ASA) 81 MG chewable tablet Take 81 mg by mouth every evening Stopped 7 days preop 10/1/2022 at Unknown time Yes Reported, Patient     biotin 1000 MCG TABS tablet Take 3,000 mcg by mouth every evening 10/1/2022 at Unknown time Yes Reported, Patient     ciclopirox (PENLAC) 8 % external solution Apply to adjacent skin and affected nails daily.  Remove with alcohol every 7 days, then repeat.  Yes Anat Benitez MD     diphenhydrAMINE (BENADRYL) 25 MG tablet Take 25 mg by mouth every 8 hours as needed for allergies  Yes Reported, Patient     estradiol (ESTRACE) 0.1 MG/GM vaginal cream Place 1 g vaginally twice a week Past Week at Unknown time Yes Zach Francis MD     guaiFENesin (MUCINEX) 600 MG 12 hr tablet Take 1 tablet by mouth 2 times daily as needed  Yes Reported, Patient     loratadine (CLARITIN) 10 MG tablet Take 10 mg by mouth every evening  10/1/2022 at Unknown time Yes Reported, Patient     Multiple Vitamins-Minerals (WOMENS DAILY FORMULA  PO) Take 1 tablet by mouth daily 10/1/2022 at Unknown time Yes Unknown, Entered By History     nitroGLYcerin (NITROSTAT) 0.4 MG sublingual tablet Place 1 tablet (0.4 mg) under the tongue every 5 minutes as needed for chest pain For chest pain place 1 tablet under the tongue every 5 minutes for 3 doses. If symptoms persist 5 minutes after 1st dose call 911.  Yes Christy Mohamud MD Yes    predniSONE (DELTASONE) 5 MG tablet Take 1 tablet (5 mg) by mouth daily 10/2/2022 at Unknown time Yes Saran Angulo MD     Probiotic Product (PROBIOTIC PO) Take 2 tablets by mouth every morning ([facility] Women's Vaginal Probiotic) 10/1/2022 at Unknown time Yes Reported, Patient     sulfamethoxazole-trimethoprim (BACTRIM) 400-80 MG tablet Take 1 tablet by mouth three times a week on Monday, Wednesday, Friday Past Week at Unknown time Yes Unknown, Entered By History     tacrolimus (GENERIC EQUIVALENT) 0.5 MG capsule Take 1 capsule (0.5 mg) by mouth 2 times daily Total dose = 1.5 mg BID 10/2/2022 at Unknown time Yes Saran Angulo MD     tacrolimus (GENERIC EQUIVALENT) 1 MG capsule Take 1 capsule (1 mg) by mouth 2 times daily Total dose = 1.5 mg BID 10/2/2022 at Unknown time Yes Saran Angulo MD     mycophenolic acid (GENERIC EQUIVALENT) 180 MG EC tablet Take 3 tablets (540 mg) by mouth 2 times daily   Josette Aragon PA-C     temazepam (RESTORIL) 15 MG capsule Take 1 capsule (15 mg) by mouth as needed for sleep (twice a year)   Anna Gamble MD         Date completed: 10/02/22    Medication history completed by: Shun Barajas, PharmD

## 2022-10-02 NOTE — ED PROVIDER NOTES
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  The patient was in the hospital September 15 of last month with urinary tract infection.  She is treated with cefdinir.  The patient stopped the antibiotics 2 days ago, she now had a fever just before coming in and headache.  Denies neck pain or stiffness.  No vomiting or diarrhea.  Has had increased frequency.  Denies any cough, chest pain or shortness of breath, no sick contacts.  He has no abdominal discomfort or flank pain.  No dysuria or hematuria.  No rashes.    Past Medical History  Past Medical History:   Diagnosis Date     Adolescent scoliosis     protruding     BK viremia [id number]     CMV pneumonia (H) 2010     Congenital hepatic fibrosis      Endometriosis      High grade squamous intraepithelial lesion of cervix 09/11/2020     History of angina      HPV (human papilloma virus) infection      Immunosuppression (H)      Polycystic kidney disease     Polycystic kidneys, tx kidney on the right. Both, very large, native kidneys reamain.     PONV (postoperative nausea and vomiting)     Need to start with ice chips and apple juice, no soda     S/P kidney transplant     SLK 10/9/2010 - kidney failure 2/2 [facility]. Explanted 2012. Re-transplant after de-sensitization 2016     S/P liver transplant (H)     10/9/2010 - [facility], ischemic biopathy. Re-transplant 3/3/2011     S/P splenectomy      Spider veins 2019     Thyroid disease     Hyperthyroid treated with single dose iodine     Past Surgical History:   Procedure Laterality Date     bunectomy  08/01/2018    right foot     bunionectomy  01/2019    left     CHOLECYSTECTOMY       CONIZATION N/A 09/30/2020    Procedure: CONE BIOPSY, CERVIX;  Surgeon:  Daysi Perez MD;  Location: [facility]     FAILED PICC - RIGHT ARM Right 2020    Has a LUE AV fistula.     H STATISTIC PICC LINE INSERTION >5YR, FAILED Bilateral 2020    Left fistula, Right failed x 2 Occlussion     HERNIA REPAIR, INCISIONAL  2013     IR DIALYSIS PTA CENTRAL SEG  2020     IR PICC PLACEMENT > 5 YRS OF AGE  2020     SPLENECTOMY      Splenectomy     TRANSPLANT KIDNEY RECIPIENT  DONOR  2016    re-transplant after [facility]; 6 months de-sensitization prior AND 6 months after transplant     TRANSPLANT LIVER RECIPIENT  DONOR  2011     TRANSPLANT LIVER, KIDNEY RECIPIENT  DONOR, COMBINED  10/09/2010    [facility]AT - re-Tx liver 3/3/2011; Kidney (left iliac) lost to [facility]/fibrosis - explant     VASCULAR SURGERY      Vascular access left UE. Not used for 4 years since 2nd kidney tx -- [place]     acetaminophen (TYLENOL) 325 MG tablet  aspirin (ASA) 81 MG chewable tablet  biotin 1000 MCG TABS tablet  cefdinir (OMNICEF) 300 MG capsule  ciclopirox (PENLAC) 8 % external solution  diphenhydrAMINE (BENADRYL) 25 MG tablet  estradiol (ESTRACE) 0.1 MG/GM vaginal cream  guaiFENesin (MUCINEX PO)  loratadine (CLARITIN) 10 MG tablet  Multiple Vitamins-Minerals (WOMENS DAILY FORMULA PO)  mycophenolic acid (GENERIC EQUIVALENT) 180 MG EC tablet  nitroGLYcerin (NITROSTAT) 0.4 MG sublingual tablet  predniSONE (DELTASONE) 5 MG tablet  Probiotic Product (PROBIOTIC PO)  sulfamethoxazole-trimethoprim (BACTRIM) 400-80 MG tablet  tacrolimus (GENERIC EQUIVALENT) 0.5 MG capsule  tacrolimus (GENERIC EQUIVALENT) 1 MG capsule  temazepam (RESTORIL) 15 MG capsule      Allergies   Allergen Reactions     Ibuprofen      Due to liver transplant     Family History  Family History   Problem Relation Age of Onset     Uterine Cancer Maternal Grandmother      Polycystic Kidney Diease Brother      Polycystic Kidney Diease Brother      Polycystic Kidney Diease Brother      Polycystic Kidney  Diease Brother      Cervical Cancer Maternal Aunt      Depression Mother      Anxiety Disorder Mother      Depression Father      Anxiety Disorder Father      Social History   Social History     Tobacco Use     Smoking status: Never Smoker     Smokeless tobacco: Never Used   Substance Use Topics     Alcohol use: Not Currently     Drug use: Not Currently      Past medical history, past surgical history, medications, allergies, family history, and social history were reviewed with the patient. No additional pertinent items.       Review of Systems  A complete review of systems was performed with pertinent positives and negatives noted in the HPI, and all other systems negative.    Physical Exam   BP: 102/64  Pulse: 111  Temp: (!) 101.9  F (38.8  C)  Resp: 12  Weight: 67.1 kg (148 lb)  SpO2: 94 %  Physical Exam  Physical Exam   Constitutional: oriented to person, place, and time. appears well-developed and well-nourished.   HENT:   Head: Normocephalic and atraumatic.   Neck: Normal range of motion.   Pulmonary/Chest: Effort normal. No respiratory distress.   Cardiac: No murmurs, rubs, gallops. RRR.  Abdominal: Abdomen soft, nontender, nondistended. No rebound tenderness.  No CVA tenderness.  No tenderness over the transplanted kidney site.  MSK: Long bones without deformity or evidence of trauma  Neurological: alert and oriented to person, place, and time.   Skin: Skin is warm and dry.   Psychiatric:  normal mood and affect.  behavior is normal. Thought content normal.     ED Course      Procedures          Results for orders placed or performed during the hospital encounter of 10/02/22   CBC with platelets differential     Status: None ()    Narrative    The following orders were created for panel order CBC with platelets differential.  Procedure                               Abnormality         Status                     ---------                               -----------         ------                     CBC with  platelets and d...[[id number]]                                                   Please view results for these tests on the individual orders.     Medications   0.9% sodium chloride BOLUS (has no administration in time range)     Followed by   sodium chloride 0.9% infusion (has no administration in time range)   piperacillin-tazobactam (ZOSYN) 3.375 g vial to attach to  mL bag (has no administration in time range)   pharmacy alert - intermittent dosing (has no administration in time range)   acetaminophen (TYLENOL) tablet 1,000 mg (has no administration in time range)        Assessments & Plan (with Medical Decision Making)   MDM   patient presenting with fever.  Recently off of her cefdinir and is now spiking fever.  She has been treated for recurrent urinary tract infections in the past.  On review of her chart she just gave a urine sample the day that her antibiotics stopped.  Unfortunate appears that the antibiotic she was on was resistant to this strain of ESBL.  Zosyn given which appears to have susceptibility with this organism.  Blood pressure stable.  She is febrile mildly tachycardic.  She will be admitted for IV antibiotics, infectious disease consult for further care.    I have reviewed the nursing notes. I have reviewed the findings, diagnosis, plan and need for follow up with the patient.    New Prescriptions    No medications on file       Final diagnoses:   Sepsis, due to unspecified organism, unspecified whether acute organ dysfunction present (H)   Urinary tract infection without hematuria, site unspecified       --  Estuardo Shaikh  [facility] EMERGENCY DEPARTMENT  10/2/2022     Estuardo Shaikh MD  10/02/22 0632

## 2022-10-02 NOTE — ED TRIAGE NOTES
Patient is feeling lightheaded, headache, and fevers. Patient finished 14 day course of antibiotics for UTI. Fever 101.9. history of Liver, kidney transplant

## 2022-10-02 NOTE — CONSULTS
[facility]    Transplant Infectious Diseases Inpatient Consultation      Belen Sharma MRN# [id number]   YOB: 1960 Age: 62 year old   Date of Admission and time: 10/2/2022  6:13 AM     Reason for consult: I was asked by Dr. Patel to evaluate this patient for recurrent UTI in renal transplant patient with growth of ESBL E coli.             Recommendations:   1. Continue meropenem pending Ucx and blood cx.         Summary of Presentation:   Transplants:  2/2/2016 (Kidney), 3/3/2011 (Liver), 10/9/2010 (Kidney / Liver), Postoperative day:  4231 (Liver), 2434 (Kidney)     This patient is a 62 year old adult with history of congenital hepatic fibrosis and PCKD s/p liver and kidney transplant in [place] in 2010, they both failed and required liver re-transplant in 2011 and kidney re-transplant in 2016.   Currently on TAC/MMF/prednisone.   Admitted with recurrent UTI.          Active Problems and Infectious Diseases Issues:   1. Recurrent UTI.   With different pathogens including ESBL E coli, K pneumonia, E faecalis and sometimes with negative urine and blood cx.   Recent CT a/p without evidence of structural or anatomical abnormality.   We can check in the future for functional abnormality such as vesicoureteral reflux or inability to fully empty the bladder by urodynamics.   The patient has multiple risk factors for recurrent UTI, almost all of them can not be reversed, including: female gender, immunocompromised, kidney transplant recipient, the need to manipulate the  tract daily to change pessary.   In addition the patient is not consistent using topical estrogen which may also contribute to UTI.         Old Problems and Infectious Diseases Issues:   1. Recurrent UTI with E coli, E faecalis and K pneumonia.   2. CDI.     Other Infectious Disease issues include:  - QTc: 384 as of 6/6/2022. .   - PCP prophylaxis: bactrim.  - Serostatus: not available.   - Immunization status: This  patient received the fourth dose (first booster) of the COVID-19 vaccine on 2/8/2022. received evusheld on 5/16/2022. Otherwise due for the seasonal influenza vaccine, and the bivalent COVID-19 vaccine.       Thank you very much Dr. Patel for involving me in the care of Mrs. Belen Sharma. Please do not hesitate to call me for any question.     Attestation:  I interviewed the patient and obtained history from the patient, and by reviewing the patient's chart including outside records, microbiological data, and radiological data. All data are summarized in this notes.  Catrachita Gonzales MD  [facility]  Contact information available via [facility] Paging/Directory    10/02/2022             History of Present Illness:   Transplants:  2/2/2016 (Kidney), 3/3/2011 (Liver), 10/9/2010 (Kidney / Liver), Postoperative day:  4231 (Liver), 2434 (Kidney)     This patient is a 62 year old adult with history of congenital hepatic fibrosis and PCKD s/p liver and kidney transplant in [place] in 2010, they both failed, first the liver apparently due to anatomic abnormalities and required liver re-transplant within few months in 2011, then the kidney failed and needed HD through LUE AVF for 4 years before she received kidney re-transplant in [place] in 2016.     The patient has history of recurrent UTI. In 2022, the symptoms of UTI with dysuria, vaginal itching, increase in frequency increased and Ucx on multiple occasions grew K pneumonia, E faecalis, and ESBL E coli.     The patient was treated in 8/2022 for UTI with fosfomycin, Ucx at that time were without growth but the patient was symptomatic with positive UA.   She was treated in 9/2022 with a single dose of fosfomycin but symptoms prompted admission with fever and urinary symptoms, UA was abnormal but Ucx were negative. She was treated with ceftriaxone then cefdinir with resolution of symptoms.   She was evaluated by [facility] (Dr. Reyes) who ordered CT  a/p with contrast which showed inflamed transplanted ureter but not evidence of nephrolithiasis, abscesses or other correctable abnormalities. Dr. Reyes also ordered EOT (9/30/20220 UA and Ucx which were abnormal with Ucx growing ESBL E coli.   The patient developed fever, within 24 hr of stopping cefdinir and presented to ED.   She was started on meropenem.     Denying new meds or new -related behavior. Denied association of UTI and sexual activities.   She was prescribed estrogen local therapy but not always applies it.             Review of Systems:      As mentioned in the HPI otherwise negative by reviewing constitutional symptoms, central and peripheral neurological systems, respiratory system, cardiac system, GI system,  system, musculoskeletal, skin, allergy, and lymphatics.                  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 Both, very large, native kidneys reamain.     PONV (postoperative nausea and vomiting)     Need to start with ice chips and apple juice, no soda     S/P kidney transplant     SLK 10/9/2010 - kidney failure 2/2 [facility]. Explanted 2012. Re-transplant after de-sensitization 2016     S/P liver transplant (H)     10/9/2010 - [facility], ischemic biopathy. Re-transplant 3/3/2011     S/P splenectomy      Spider veins 2019     Thyroid disease     Hyperthyroid treated with single dose iodine            Past Surgical History:     Past Surgical History:   Procedure Laterality Date     bunectomy  08/01/2018    right foot     bunionectomy  01/2019    left     CHOLECYSTECTOMY       CONIZATION N/A  2020    Procedure: CONE BIOPSY, CERVIX;  Surgeon: Daysi Perez MD;  Location: [facility] OR     FAILED PICC - RIGHT ARM Right 2020    Has a LUE AV fistula.     H STATISTIC PICC LINE INSERTION >5YR, FAILED Bilateral 2020    Left fistula, Right failed x 2 Occlussion     HERNIA REPAIR, INCISIONAL  2013     IR DIALYSIS PTA CENTRAL SEG  2020     IR PICC PLACEMENT > 5 YRS OF AGE  2020     SPLENECTOMY      Splenectomy     TRANSPLANT KIDNEY RECIPIENT  DONOR      re-transplant after [facility]; 6 months de-sensitization prior AND 6 months after transplant     TRANSPLANT LIVER RECIPIENT  DONOR  2011     TRANSPLANT LIVER, KIDNEY RECIPIENT  DONOR, COMBINED  10/09/2010    [facility] - re-Tx liver 3/3/2011; Kidney (left iliac) lost to [facility]/fibrosis - explant     VASCULAR SURGERY      Vascular access left UE. Not used for 4 years since 2nd kidney tx 2016 [place]            Social History:     Social History     Tobacco Use     Smoking status: Never Smoker     Smokeless tobacco: Never Used   Substance Use Topics     Alcohol use: Not Currently            Family History:   I have reviewed this patient's family history  Family History   Problem Relation Age of Onset     Uterine Cancer Maternal Grandmother      Polycystic Kidney Diease Brother      Polycystic Kidney Diease Brother      Polycystic Kidney Diease Brother      Polycystic Kidney Diease Brother      Cervical Cancer Maternal Aunt      Depression Mother      Anxiety Disorder Mother      Depression Father      Anxiety Disorder Father             Immunizations:     Immunization History   Administered Date(s) Administered     COVID-19,PF,Pfizer (12+ Yrs) 2021, 2021, 2021     COVID-19,PF,Pfizer 12+ Yrs ( and After) 2022     DT (PEDS <7y) 2001     FLU 6-35 months 11/15/2002, 2003, 10/26/2004, 10/24/2005, 2006, 2011     [id number] Novel Flu 2009     HepB, Unspecified  06/28/2001     Influenza Quad, Recombinant, pf(RIV4) (Flublok) 10/18/2021     Influenza Vaccine IM > 6 months Valent IIV4 (Alfuria,Fluzone) 09/12/2022     Influenza Vaccine IM Ages 6-35 Months 4 Valent (PF) 11/28/2007, 10/07/2009, 09/20/2011     Influenza,INJ,MDCK,PF,Quad >6mo(Flucelvax-RMG) 10/15/2020     Meningococcal (Menactra ) 01/25/2013     Pneumococcal 20 valent Conjugate (Prevnar 20) 09/12/2022     Pneumococcal 23 valent 09/27/2002, 01/25/2013     TDAP Vaccine (Boostrix) 01/25/2013     Zoster vaccine recombinant adjuvanted (SHINGRIX) 10/15/2020, 01/08/2021            Allergies:     Allergies   Allergen Reactions     Ibuprofen      Due to liver transplant             Medications:   Medications that Require Transfusion:     sodium chloride 125 mL/hr at 10/02/22 0823     Scheduled Medications:     aspirin  81 mg Oral Daily     meropenem  1 g Intravenous Q8H     mycophenolic acid  540 mg Oral BID     [START ON 10/3/2022] predniSONE  5 mg Oral Daily     sodium chloride (PF)  3 mL Intracatheter Q8H     [START ON 10/3/2022] sulfamethoxazole-trimethoprim  1 tablet Oral Once per day on Mon Wed Fri     tacrolimus  1.5 mg Oral BID IS               Physical Exam:   Temp: 98  F (36.7  C) Temp src: Oral BP: 108/50 Pulse: 82   Resp: 15 SpO2: 95 % O2 Device: None (Room air)      Wt Readings from Last 4 Encounters:   10/02/22 67.1 kg (148 lb)   09/20/22 66.5 kg (146 lb 11.2 oz)   09/16/22 67 kg (147 lb 9.6 oz)   08/31/22 68 kg (150 lb)     Constitutional: awake, alert, cooperative, no apparent distress and appears at stated age, well nourished.   Head, ENT, Eyes, and Neck: Normocephalic, moist buccal mucosa without oral thrush or ulcers, tonsils without swelling, erythema, or exudate, no tenderness palpating teeth, good dentition, gums without necrosis or abscesses.   PERRL, EOMI, pink conjunctivae, non-icteric sclera. The right eye is protruding which is chronic with no pain moving eyeball.   Neck supple without rigidity,  no cervical/axillary/inguinal LA bilaterally.    Neurologic: Patient is moving all extremities without focal deficit, no focal sensory loss.   Lungs: CTA bilaterally, no accessory muscle use, no dullness to percussion and no abnormal tactile fremitus.   CVS: RRR, normal S1/S2, no murmur, PMI was not displaced.   Abdomen: non-tender, non-distended, no masses, no bruit, no shifting dullness, normal BS.   Extremities: no pitting edema of bilateral lower extremities, no ulcers, normal ROM of all joints, no swelling or erythema of any of joints and no tenderness to palpation.   Skin: no induration, fluctuation or discharge, and no rash            Data:   No results found for: ACD4    Inflammatory Markers    Recent Labs   Lab Test 09/14/22  2220 11/04/21  1457 12/28/20  1515 12/21/20  1545   SED 8 14 8 10   CRP 21.60* 39.3* 13.0* 18.0*       Immune Globulin Studies   No lab results found.    Metabolic Studies       Recent Labs   Lab Test 10/02/22  0628 09/26/22  0813 09/16/22  1015 09/15/22  0607 09/14/22  2230 09/14/22  2220 09/12/22  0806 09/30/20  0721 09/23/20  1653   * 140 139 140  --  137 143   < >  --    POTASSIUM 3.9 4.2 4.0 4.3  --  4.2 4.3   < >  --    CHLORIDE 103 102 107 112*  --  104 106   < >  --    CO2 23 26 24 20*  --  22 25   < >  --    ANIONGAP 9 12 8 8  --  11 12   < >  --    BUN 17.8 15.4 12.2 16.5  --  25.3* 15.1   < >  --    CR 0.78 0.72 0.57 0.73  --  1.05 0.84   < >  --    GFRESTIMATED 85 >90 >90 >90  --  60* 78   < >  --    GLC 92 93 98 100*  --  111* 88   < >  --    EDSON 9.5 10.0 9.7 8.9  --  10.0 10.0   < >  --    PHOS  --   --   --   --   --   --   --   --  3.4   LACT 0.8  --   --   --  0.9  --   --    < >  --     < > = values in this interval not displayed.       Hepatic Studies    Recent Labs   Lab Test 10/02/22  0628 09/26/22  0813 09/16/22  1015 09/14/22  2220 09/12/22  0806 08/29/22  0810   BILITOTAL 0.7 0.6 0.3 0.6 0.7 0.4   ALKPHOS 69 69 57 72 71 66   ALBUMIN 3.8 3.9 3.2* 4.1 3.9  3.9   AST 23 18 16 21 21 23   ALT 8* 13 8* 12 18 12       Pancreatitis testing    Recent Labs   Lab Test 08/29/22  0810 06/06/22  0828 09/13/21  0840 09/10/20  1037   TRIG 123 126 119 131       Hematology Studies      Recent Labs   Lab Test 10/02/22  0628 09/26/22  0813 09/16/22  1015 09/15/22  0607 09/14/22  2220 09/12/22  0806 03/11/21  1007 12/28/20  1515 12/28/20  0855 12/21/20  1545 12/18/20  0611 12/16/20  2236   WBC 12.3* 8.2 8.1 9.8 13.7* 9.3   < > 9.2   < > 6.2   < > 11.4*   ANEU  --   --   --   --   --   --   --  6.4  --  3.9  --  9.3*   ALYM  --   --   --   --   --   --   --  2.1  --  1.7  --  1.0   PABLO  --   --   --   --   --   --   --  0.6  --  0.6  --  1.0   AEOS  --   --   --   --   --   --   --  0.0  --  0.0  --  0.0   HGB 15.1 15.1 14.4 13.8 16.0 15.5   < > 16.6*   < > 16.2*   < > 16.9*   HCT 46.3 45.8 45.4 42.4 48.4 48.0   < > 50.8*   < > 49.2*   < > 54.5*    267 205 195 246 232   < > 308   < > 261   < > 260    < > = values in this interval not displayed.       Clotting Studies    No lab results found.    Arterial Blood Gas Testing    Recent Labs   Lab Test 09/14/22  2230 02/10/22  0802   PH 7.44* 7.32*        Urine Studies     Recent Labs   Lab Test 10/02/22  0632 09/30/22  1324 09/14/22  2218 08/28/22  2104 08/23/22  1257   URINEPH 6.5 7.0 6.0 6.0 6.0   NITRITE Positive* Negative Negative Negative Negative   LEUKEST Large* Moderate* Large* Large* Moderate*   WBCU >182* 25-50* >182* >182* >100*       Vancomycin Levels     No lab results found.    Invalid input(s): VANCO    Tobramycin levels     No lab results found.    Gentamicin levels    No lab results found.    Tacrolimus levels    Invalid input(s): TACROLIMUS, TAC, TACR  Transplant Immunosuppression Labs Latest Ref Rng & Units 10/2/2022 9/26/2022 9/16/2022 9/15/2022 9/14/2022   Tacro Level 5.0 - 15.0 ug/L - - - - -   Tacro Level - - - - - -   Creat 0.51 - 1.17 mg/dL 0.78 0.72 0.57 0.73 1.05   BUN 8.0 - 23.0 mg/dL 17.8 15.4 12.2 16.5  25.3(H)   WBC 4.0 - 11.0 10e3/uL 12.3(H) 8.2 8.1 9.8 13.7(H)   Neutrophil % 75 - - - 80   ANEU 1.6 - 8.3 10e9/L - - - - -       Cyclosporine levels    Invalid input(s): CYCLOSPORINE, CYC    Mycophenolate levels    Invalid input(s): MYPA, MYP    Sirolimus levels    Invalid input(s): SIROLIMUS, SIR, RAPA    CSF testing   No lab results found.      Microbiology:  Blood cx and Ucx pending.   Last check of C difficile  No results found for: CDBPCT    Virology:  CMV viral loads    CMV Quant IU/mL   Date Value Ref Range Status   12/18/2020 CMV DNA Not Detected CMVND^CMV DNA Not Detected [IU]/mL Final     Comment:     Mutations within the highly conserved regions of the viral genome covered by   the RHONDA AmpliPrep/RHONDA TaqMan CMV Test primers and/or probes have been   identified and may result in under-quantitation of or failure to detect the   virus.  Supplemental testing methods should be used for testing when this is   suspected.  The RHONDA AmpliPrep/RHONDA TaqMan CMV Test is an FDA-approved in vitro nucleic   acid amplification test for the quantitation of cytomegalovirus DNA in human   plasma (EDTA plasma) using the RHONDA AmpliPrep Instrument for automated viral   nucleic acid extraction and the RHONDA TaqMan Analyzer or [employer] TaqMan for   automated Real Time amplification and detection of the viral nucleic acid   target.  Titer results are reported in International Units/mL (IU/mL using 1st WHO   International standard for Human Cytomegalovirus for Nucleic Acid   Amplification based assays. The conversion factor between CMV DNA copis/mL (as   defined by the Roche RHONDA TaqMan CMV test) and International Units is the   CMV DNA concentration in IU/mL x 1.1 copies/IU = CMV DNA in copies/mL.  This assay has received FDA approval for the testing of human plasma only. The   Infectious Disease Diagnostic Laboratory at the [facility], [place], has validated the performance characteristics of  the   Roche CMV assay for plasma, bronchial alveolar lavage/wash and urine.       CMV viral loads    Recent Labs   Lab Test 12/18/20  0611   CMVQNT CMV DNA Not Detected   CSPEC Plasma   CMVLOG Not Calculated       CMV viral loads    CMV Quant IU/mL   Date Value Ref Range Status   12/18/2020 CMV DNA Not Detected CMVND^CMV DNA Not Detected [IU]/mL Final     Comment:     Mutations within the highly conserved regions of the viral genome covered by   the RHONDA AmpliPrep/RHONDA TaqMan CMV Test primers and/or probes have been   identified and may result in under-quantitation of or failure to detect the   virus.  Supplemental testing methods should be used for testing when this is   suspected.  The RHONDA AmpliPrep/RHONDA TaqMan CMV Test is an FDA-approved in vitro nucleic   acid amplification test for the quantitation of cytomegalovirus DNA in human   plasma (EDTA plasma) using the [employer] AmpliPrep Instrument for automated viral   nucleic acid extraction and the [employer] TaqMan Analyzer or [employer] TaqMan for   automated Real Time amplification and detection of the viral nucleic acid   target.  Titer results are reported in International Units/mL (IU/mL using 1st WHO   International standard for Human Cytomegalovirus for Nucleic Acid   Amplification based assays. The conversion factor between CMV DNA copis/mL (as   defined by the Roche RHONDA TaqMan CMV test) and International Units is the   CMV DNA concentration in IU/mL x 1.1 copies/IU = CMV DNA in copies/mL.  This assay has received FDA approval for the testing of human plasma only. The   Infectious Disease Diagnostic Laboratory at the [facility], [place], has validated the performance characteristics of the   Roche CMV assay for plasma, bronchial alveolar lavage/wash and urine.     09/23/2020 CMV DNA Not Detected CMVND^CMV DNA Not Detected [IU]/mL Final     Comment:     Mutations within the highly conserved regions of the viral genome covered by    the RHONDA AmpliPrep/RHONDA TaqMan CMV Test primers and/or probes have been   identified and may result in under-quantitation of or failure to detect the   virus.  Supplemental testing methods should be used for testing when this is   suspected.  The RHONDA AmpliPrep/RHONDA TaqMan CMV Test is an FDA-approved in vitro nucleic   acid amplification test for the quantitation of cytomegalovirus DNA in human   plasma (EDTA plasma) using the RHONDA AmpliPrep Instrument for automated viral   nucleic acid extraction and the RHONDA TaqMan Analyzer or RHONDA TaqMan for   automated Real Time amplification and detection of the viral nucleic acid   target.  Titer results are reported in International Units/mL (IU/mL using 1st WHO   International standard for Human Cytomegalovirus for Nucleic Acid   Amplification based assays. The conversion factor between CMV DNA copis/mL (as   defined by the Roche RHONDA TaqMan CMV test) and International Units is the   CMV DNA concentration in IU/mL x 1.1 copies/IU = CMV DNA in copies/mL.  This assay has received FDA approval for the testing of human plasma only. The   Infectious Disease Diagnostic Laboratory at the [facility], [place], has validated the performance characteristics of the   Roche CMV assay for plasma, bronchial alveolar lavage/wash and urine.       Log IU/mL of CMVQNT   Date Value Ref Range Status   12/18/2020 Not Calculated <2.1 [Log_IU]/mL Final   09/23/2020 Not Calculated <2.1 [Log_IU]/mL Final       CMV resistance testing  No lab results found.  No results found for: CMVCID, CMVFOS, CMVGAN     No results found for: H6RES    No results found for: EBVDN, EBRES, EBVDN, EBVSP, EBVPC, EBVPCR    CMV Antibody IgG   Date Value Ref Range Status   01/04/2021 >8.0 (H) 0.0 - 0.8 AI Final     Comment:     Positive  Antibody index (AI) values reflect qualitative changes in antibody   concentration that cannot be directly associated with clinical condition or    disease state.         No results found for: EBIG2, EBIGM, TOXG      Imaging:  CT a/p W 9/29/2022  IMPRESSION:   1. Urothelial thickening and mild ectasia of the distal right renal  transplant ureter extending to the ureterovesical anastomosis,  slightly increased compared to previous CT on 12/17/2020. Findings may  represent acute or chronic sequela of inflammation/infection. No  hydronephrosis or evidence of obstruction.  2. Simple right adnexal cyst is not significantly changed compared to  previous without internal complexity on comparison ultrasound, likely  benign. No dedicated imaging follow-up recommended.  3. Stable postoperative changes of liver transplant.  4. Stable enlarged polycystic kidneys.      Catrachita Gonzales MD  [facility]  Contact information available via [facility] Paging/Directory     10/02/2022

## 2022-10-03 ENCOUNTER — TELEPHONE (OUTPATIENT)
Dept: OPHTHALMOLOGY | Facility: CLINIC | Age: 62
End: 2022-10-03

## 2022-10-03 LAB
ANION GAP SERPL CALCULATED.3IONS-SCNC: 10 MMOL/L (ref 7–15)
BACTERIA UR CULT: ABNORMAL
BUN SERPL-MCNC: 9.8 MG/DL (ref 8–23)
CALCIUM SERPL-MCNC: 9.2 MG/DL (ref 8.8–10.2)
CHLORIDE SERPL-SCNC: 107 MMOL/L (ref 98–107)
CREAT SERPL-MCNC: 0.66 MG/DL (ref 0.51–1.17)
DEPRECATED HCO3 PLAS-SCNC: 20 MMOL/L (ref 22–29)
ERYTHROCYTE [DISTWIDTH] IN BLOOD BY AUTOMATED COUNT: 14.2 % (ref 10–15)
GFR SERPL CREATININE-BSD FRML MDRD: >90 ML/MIN/1.73M2
GLUCOSE SERPL-MCNC: 99 MG/DL (ref 70–99)
HCT VFR BLD AUTO: 42.3 % (ref 35–53)
HGB BLD-MCNC: 13.4 G/DL (ref 11.7–17.7)
MCH RBC QN AUTO: 29.1 PG (ref 26.5–33)
MCHC RBC AUTO-ENTMCNC: 31.7 G/DL (ref 31.5–36.5)
MCV RBC AUTO: 92 FL (ref 78–100)
PLATELET # BLD AUTO: 241 10E3/UL (ref 150–450)
POTASSIUM SERPL-SCNC: 4 MMOL/L (ref 3.4–5.3)
RBC # BLD AUTO: 4.61 10E6/UL (ref 3.8–5.9)
SODIUM SERPL-SCNC: 137 MMOL/L (ref 136–145)
WBC # BLD AUTO: 11 10E3/UL (ref 4–11)

## 2022-10-03 PROCEDURE — 80048 BASIC METABOLIC PNL TOTAL CA: CPT | Performed by: STUDENT IN AN ORGANIZED HEALTH CARE EDUCATION/TRAINING PROGRAM

## 2022-10-03 PROCEDURE — 99233 SBSQ HOSP IP/OBS HIGH 50: CPT | Performed by: INTERNAL MEDICINE

## 2022-10-03 PROCEDURE — 250N000011 HC RX IP 250 OP 636: Performed by: STUDENT IN AN ORGANIZED HEALTH CARE EDUCATION/TRAINING PROGRAM

## 2022-10-03 PROCEDURE — 99232 SBSQ HOSP IP/OBS MODERATE 35: CPT | Performed by: PHYSICIAN ASSISTANT

## 2022-10-03 PROCEDURE — 250N000013 HC RX MED GY IP 250 OP 250 PS 637: Performed by: STUDENT IN AN ORGANIZED HEALTH CARE EDUCATION/TRAINING PROGRAM

## 2022-10-03 PROCEDURE — 36415 COLL VENOUS BLD VENIPUNCTURE: CPT | Performed by: STUDENT IN AN ORGANIZED HEALTH CARE EDUCATION/TRAINING PROGRAM

## 2022-10-03 PROCEDURE — 99233 SBSQ HOSP IP/OBS HIGH 50: CPT | Performed by: STUDENT IN AN ORGANIZED HEALTH CARE EDUCATION/TRAINING PROGRAM

## 2022-10-03 PROCEDURE — 85018 HEMOGLOBIN: CPT | Performed by: STUDENT IN AN ORGANIZED HEALTH CARE EDUCATION/TRAINING PROGRAM

## 2022-10-03 PROCEDURE — 85041 AUTOMATED RBC COUNT: CPT | Performed by: STUDENT IN AN ORGANIZED HEALTH CARE EDUCATION/TRAINING PROGRAM

## 2022-10-03 PROCEDURE — 250N000012 HC RX MED GY IP 250 OP 636 PS 637: Performed by: STUDENT IN AN ORGANIZED HEALTH CARE EDUCATION/TRAINING PROGRAM

## 2022-10-03 PROCEDURE — 120N000002 HC R&B MED SURG/OB UMMC

## 2022-10-03 RX ORDER — HEPARIN SODIUM 5000 [USP'U]/.5ML
5000 INJECTION, SOLUTION INTRAVENOUS; SUBCUTANEOUS EVERY 12 HOURS
Status: DISCONTINUED | OUTPATIENT
Start: 2022-10-03 | End: 2022-10-05 | Stop reason: HOSPADM

## 2022-10-03 RX ORDER — SODIUM BICARBONATE 650 MG/1
650 TABLET ORAL 2 TIMES DAILY
Status: DISCONTINUED | OUTPATIENT
Start: 2022-10-03 | End: 2022-10-05 | Stop reason: HOSPADM

## 2022-10-03 RX ORDER — ERTAPENEM 1 G/1
1 INJECTION, POWDER, LYOPHILIZED, FOR SOLUTION INTRAMUSCULAR; INTRAVENOUS EVERY 24 HOURS
Status: DISCONTINUED | OUTPATIENT
Start: 2022-10-03 | End: 2022-10-05 | Stop reason: HOSPADM

## 2022-10-03 RX ORDER — SODIUM CHLORIDE 9 MG/ML
INJECTION, SOLUTION INTRAVENOUS CONTINUOUS
Status: DISCONTINUED | OUTPATIENT
Start: 2022-10-03 | End: 2022-10-04

## 2022-10-03 RX ADMIN — ACETAMINOPHEN 650 MG: 325 TABLET, FILM COATED ORAL at 07:43

## 2022-10-03 RX ADMIN — TACROLIMUS 1.5 MG: 1 CAPSULE ORAL at 20:18

## 2022-10-03 RX ADMIN — PREDNISONE 5 MG: 5 TABLET ORAL at 07:43

## 2022-10-03 RX ADMIN — MYCOPHENOLIC ACID 540 MG: 360 TABLET, DELAYED RELEASE ORAL at 07:44

## 2022-10-03 RX ADMIN — MEROPENEM 1 G: 1 INJECTION, POWDER, FOR SOLUTION INTRAVENOUS at 09:49

## 2022-10-03 RX ADMIN — SULFAMETHOXAZOLE AND TRIMETHOPRIM 1 TABLET: 400; 80 TABLET ORAL at 07:55

## 2022-10-03 RX ADMIN — TACROLIMUS 1.5 MG: 1 CAPSULE, GELATIN COATED ORAL at 07:45

## 2022-10-03 RX ADMIN — ERTAPENEM SODIUM 1 G: 1 INJECTION, POWDER, LYOPHILIZED, FOR SOLUTION INTRAMUSCULAR; INTRAVENOUS at 17:30

## 2022-10-03 RX ADMIN — MEROPENEM 1 G: 1 INJECTION, POWDER, FOR SOLUTION INTRAVENOUS at 01:34

## 2022-10-03 RX ADMIN — ASPIRIN 81 MG CHEWABLE TABLET 81 MG: 81 TABLET CHEWABLE at 20:17

## 2022-10-03 RX ADMIN — MYCOPHENOLIC ACID 540 MG: 360 TABLET, DELAYED RELEASE ORAL at 20:19

## 2022-10-03 ASSESSMENT — ACTIVITIES OF DAILY LIVING (ADL)
ADLS_ACUITY_SCORE: 20

## 2022-10-03 NOTE — PROGRESS NOTES
[facility]    Transplant Infectious Diseases Inpatient Progress Note      Belen Sharma MRN# [id number]   YOB: 1960 Age: 62 year old   Date of Admission and time: 10/2/2022  6:13 AM             Recommendations:   1. Please change meropenem to ertapenem 1 gram daily.   - till 10/23/2022.   2. If blood cx from 10/2/22 are negative by 10/4/22, may place PICC line.   3. Requested susceptibility to fosfomycin.   4. Encourage patient to use estrogen regularly.   5. Refer patient to urology as OP for urodynamic studies given history of rectocele and evaluation for recurrent UTI.   6. Follow up with either Dr. Reyes or Dr. Gonzales in one month.   7. CBC with diff, CMP weekly while on ertapnem.   - Please fax results to Dr. Gonzales, fax number [phone].  8. Due for the seasonal influenza vaccine, and the bivalent COVID-19 vaccine.         Summary of Presentation:   Transplants:  2/2/2016 (Kidney), 3/3/2011 (Liver), 10/9/2010 (Kidney / Liver), Postoperative day:  4232 (Liver), 2435 (Kidney)     This patient is a 62 year old adult with history of congenital hepatic fibrosis and PCKD s/p liver and kidney transplant in [place] in 2010, they both failed and required liver re-transplant in 2011 and kidney re-transplant in 2016.   Currently on TAC/MMF/prednisone.   Admitted with recurrent UTI.          Active Problems and Infectious Diseases Issues:   1. Recurrent UTI.   2. Sepsis due to 1.   With different pathogens including ESBL E coli, K pneumonia, E faecalis and sometimes with negative urine and blood cx.   Currently due to ESBL E coli.   Recent CT a/p without evidence of structural or anatomical abnormality.   We can check in the future for functional abnormality such as vesicoureteral reflux or inability to fully empty the bladder by urodynamics.   The patient has multiple risk factors for recurrent UTI, almost all of them can not be reversed, including: female gender, immunocompromised,  kidney transplant recipient, the need to manipulate the  tract daily to change pessary.   In addition the patient is not consistent using topical estrogen which may also contribute to UTI.     Will treat with 2-3 weeks of ABx.         Old Problems and Infectious Diseases Issues:   1. Recurrent UTI with E coli, E faecalis and K pneumonia.   2. CDI.     Other Infectious Disease issues include:  - QTc: 384 as of 6/6/2022. .   - PCP prophylaxis: bactrim.  - Serostatus: not available.   - Immunization status: This patient received the fourth dose (first booster) of the COVID-19 vaccine on 2/8/2022. received evusheld on 5/16/2022. Otherwise due for the seasonal influenza vaccine, and the bivalent COVID-19 vaccine.       Attestation:  I interviewed the patient and obtained history from the patient, and by reviewing the patient's chart including outside records, microbiological data, and radiological data. All data are summarized in this notes.  Catrachita Gonzales MD  [facility]  Contact information available via [facility] Paging/Directory    10/03/2022           Interim History:   With subjective fever, fatigue and tiredness.   Frustrated with recurrent UTI.   No diarrhea, no other complaints.          History of Present Illness:   Transplants:  2/2/2016 (Kidney), 3/3/2011 (Liver), 10/9/2010 (Kidney / Liver), Postoperative day:  4232 (Liver), 2435 (Kidney)     This patient is a 62 year old adult with history of congenital hepatic fibrosis and PCKD s/p liver and kidney transplant in [place] in 2010, they both failed, first the liver apparently due to anatomic abnormalities and required liver re-transplant within few months in 2011, then the kidney failed and needed HD through LUE AVF for 4 years before she received kidney re-transplant in [place] in 2016.     The patient has history of recurrent UTI. In 2022, the symptoms of UTI with dysuria, vaginal itching, increase in frequency increased  and Ucx on multiple occasions grew K pneumonia, E faecalis, and ESBL E coli.     The patient was treated in 8/2022 for UTI with fosfomycin, Ucx at that time were without growth but the patient was symptomatic with positive UA.   She was treated in 9/2022 with a single dose of fosfomycin but symptoms prompted admission with fever and urinary symptoms, UA was abnormal but Ucx were negative. She was treated with ceftriaxone then cefdinir with resolution of symptoms.   She was evaluated by [facility] (Dr. Reyes) who ordered CT a/p with contrast which showed inflamed transplanted ureter but not evidence of nephrolithiasis, abscesses or other correctable abnormalities. Dr. Reyes also ordered EOT (9/30/20220 UA and Ucx which were abnormal with Ucx growing ESBL E coli.   The patient developed fever, within 24 hr of stopping cefdinir and presented to ED.   She was started on meropenem.     Denying new meds or new -related behavior. Denied association of UTI and sexual activities.   She was prescribed estrogen local therapy but not always applies it.             Review of Systems:      As mentioned in the HPI otherwise negative by reviewing constitutional symptoms, central and peripheral neurological systems, respiratory system, cardiac system, GI system,  system, musculoskeletal, skin, allergy, and lymphatics.                Immunizations:     Immunization History   Administered Date(s) Administered     COVID-19,PF,Pfizer (12+ Yrs) 03/29/2021, 04/19/2021, 09/02/2021     COVID-19,PF,Pfizer 12+ Yrs (2022 and After) 02/08/2022     DT (PEDS <7y) 03/19/2001     FLU 6-35 months 11/15/2002, 11/18/2003, 10/26/2004, 10/24/2005, 11/16/2006, 09/20/2011     [id number] Novel Flu 11/19/2009     HepB, Unspecified 06/28/2001     Influenza Quad, Recombinant, pf(RIV4) (Flublok) 10/18/2021     Influenza Vaccine IM > 6 months Valent IIV4 (Alfuria,Fluzone) 09/12/2022     Influenza Vaccine IM Ages 6-35 Months 4 Valent (PF) 11/28/2007, 10/07/2009,  09/20/2011     Influenza,INJ,MDCK,PF,Quad >6mo(Flucelvax-RMG) 10/15/2020     Meningococcal (Menactra ) 01/25/2013     Pneumococcal 20 valent Conjugate (Prevnar 20) 09/12/2022     Pneumococcal 23 valent 09/27/2002, 01/25/2013     TDAP Vaccine (Boostrix) 01/25/2013     Zoster vaccine recombinant adjuvanted (SHINGRIX) 10/15/2020, 01/08/2021            Allergies:     Allergies   Allergen Reactions     Ibuprofen      Due to liver transplant             Medications:   Medications that Require Transfusion:     sodium chloride 75 mL/hr at 10/03/22 0902     Scheduled Medications:     aspirin  81 mg Oral Daily     meropenem  1 g Intravenous Q8H     mycophenolic acid  540 mg Oral BID     predniSONE  5 mg Oral Daily     sodium chloride (PF)  3 mL Intracatheter Q8H     sulfamethoxazole-trimethoprim  1 tablet Oral Once per day on Mon Wed Fri     tacrolimus  1.5 mg Oral BID IS            Physical Exam:   Temp: 99  F (37.2  C) Temp src: Oral BP: 105/50 Pulse: 80   Resp: 18 SpO2: 95 % O2 Device: None (Room air)      Wt Readings from Last 4 Encounters:   10/02/22 67.1 kg (148 lb)   09/20/22 66.5 kg (146 lb 11.2 oz)   09/16/22 67 kg (147 lb 9.6 oz)   08/31/22 68 kg (150 lb)     Constitutional: awake, alert, cooperative, no apparent distress and appears at stated age, well nourished.   Neurologic: Patient is moving all extremities without focal deficit, no focal sensory loss.   Lungs: CTA bilaterally, no accessory muscle use, no dullness to percussion and no abnormal tactile fremitus.   CVS: RRR, normal S1/S2, no murmur, PMI was not displaced.   Abdomen: non-tender, non-distended, no masses, no bruit, no shifting dullness, normal BS.   Skin: no induration, fluctuation or discharge at the LUE AVG with positive thrill, and no rash            Data:   No results found for: ACD4    Inflammatory Markers    Recent Labs   Lab Test 09/14/22  2220 11/04/21  1457 12/28/20  1515 12/21/20  1545   SED 8 14 8 10   CRP 21.60* 39.3* 13.0* 18.0*        Immune Globulin Studies   No lab results found.    Metabolic Studies       Recent Labs   Lab Test 10/02/22  0628 09/26/22  0813 09/16/22  1015 09/15/22  0607 09/14/22 2230 09/14/22 2220 09/12/22  0806 09/30/20  0721 09/23/20  1653   * 140 139 140  --  137 143   < >  --    POTASSIUM 3.9 4.2 4.0 4.3  --  4.2 4.3   < >  --    CHLORIDE 103 102 107 112*  --  104 106   < >  --    CO2 23 26 24 20*  --  22 25   < >  --    ANIONGAP 9 12 8 8  --  11 12   < >  --    BUN 17.8 15.4 12.2 16.5  --  25.3* 15.1   < >  --    CR 0.78 0.72 0.57 0.73  --  1.05 0.84   < >  --    GFRESTIMATED 85 >90 >90 >90  --  60* 78   < >  --    GLC 92 93 98 100*  --  111* 88   < >  --    EDSON 9.5 10.0 9.7 8.9  --  10.0 10.0   < >  --    PHOS  --   --   --   --   --   --   --   --  3.4   LACT 0.8  --   --   --  0.9  --   --    < >  --     < > = values in this interval not displayed.       Hepatic Studies    Recent Labs   Lab Test 10/02/22  0628 09/26/22  0813 09/16/22  1015 09/14/22  2220 09/12/22  0806 08/29/22  0810   BILITOTAL 0.7 0.6 0.3 0.6 0.7 0.4   ALKPHOS 69 69 57 72 71 66   ALBUMIN 3.8 3.9 3.2* 4.1 3.9 3.9   AST 23 18 16 21 21 23   ALT 8* 13 8* 12 18 12       Pancreatitis testing    Recent Labs   Lab Test 08/29/22  0810 06/06/22  0828 09/13/21  0840 09/10/20  1037   TRIG 123 126 119 131       Hematology Studies      Recent Labs   Lab Test 10/02/22  0628 09/26/22  0813 09/16/22  1015 09/15/22  0607 09/14/22  2220 09/12/22  0806 03/11/21  1007 12/28/20  1515 12/28/20  0855 12/21/20  1545 12/18/20  0611 12/16/20  2236   WBC 12.3* 8.2 8.1 9.8 13.7* 9.3   < > 9.2   < > 6.2   < > 11.4*   ANEU  --   --   --   --   --   --   --  6.4  --  3.9  --  9.3*   ALYM  --   --   --   --   --   --   --  2.1  --  1.7  --  1.0   PABLO  --   --   --   --   --   --   --  0.6  --  0.6  --  1.0   AEOS  --   --   --   --   --   --   --  0.0  --  0.0  --  0.0   HGB 15.1 15.1 14.4 13.8 16.0 15.5   < > 16.6*   < > 16.2*   < > 16.9*   HCT 46.3 45.8 45.4 42.4  48.4 48.0   < > 50.8*   < > 49.2*   < > 54.5*    267 205 195 246 232   < > 308   < > 261   < > 260    < > = values in this interval not displayed.       Clotting Studies    No lab results found.    Arterial Blood Gas Testing    Recent Labs   Lab Test 09/14/22  2230 02/10/22  0802   PH 7.44* 7.32*        Urine Studies     Recent Labs   Lab Test 10/02/22  0632 09/30/22  1324 09/14/22  2218 08/28/22  2104 08/23/22  1257   URINEPH 6.5 7.0 6.0 6.0 6.0   NITRITE Positive* Negative Negative Negative Negative   LEUKEST Large* Moderate* Large* Large* Moderate*   WBCU >182* 25-50* >182* >182* >100*       Vancomycin Levels     No lab results found.    Invalid input(s): VANCO    Tobramycin levels     No lab results found.    Gentamicin levels    No lab results found.    Tacrolimus levels    Invalid input(s): TACROLIMUS, TAC, TACR  Transplant Immunosuppression Labs Latest Ref Rng & Units 10/2/2022 9/26/2022 9/16/2022 9/15/2022 9/14/2022   Tacro Level 5.0 - 15.0 ug/L - - - - -   Tacro Level - - - - - -   Creat 0.51 - 1.17 mg/dL 0.78 0.72 0.57 0.73 1.05   BUN 8.0 - 23.0 mg/dL 17.8 15.4 12.2 16.5 25.3(H)   WBC 4.0 - 11.0 10e3/uL 12.3(H) 8.2 8.1 9.8 13.7(H)   Neutrophil % 75 - - - 80   ANEU 1.6 - 8.3 10e9/L - - - - -       Cyclosporine levels    Invalid input(s): CYCLOSPORINE, CYC    Mycophenolate levels    Invalid input(s): MYPA, MYP    Sirolimus levels    Invalid input(s): SIROLIMUS, SIR, RAPA    CSF testing   No lab results found.      Microbiology:  Blood cx and Ucx pending.   Last check of C difficile  No results found for: CDBPCT    Virology:  CMV viral loads    CMV Quant IU/mL   Date Value Ref Range Status   12/18/2020 CMV DNA Not Detected CMVND^CMV DNA Not Detected [IU]/mL Final     Comment:     Mutations within the highly conserved regions of the viral genome covered by   the RHONDA AmpliPrep/RHONDA TaqMan CMV Test primers and/or probes have been   identified and may result in under-quantitation of or failure to detect  the   virus.  Supplemental testing methods should be used for testing when this is   suspected.  The RHONDA AmpliPrep/RHONDA TaqMan CMV Test is an FDA-approved in vitro nucleic   acid amplification test for the quantitation of cytomegalovirus DNA in human   plasma (EDTA plasma) using the RHONDA AmpliPrep Instrument for automated viral   nucleic acid extraction and the RHONDA TaqMan Analyzer or RHONDA TaqMan for   automated Real Time amplification and detection of the viral nucleic acid   target.  Titer results are reported in International Units/mL (IU/mL using 1st WHO   International standard for Human Cytomegalovirus for Nucleic Acid   Amplification based assays. The conversion factor between CMV DNA copis/mL (as   defined by the Roche RHONDA TaqMan CMV test) and International Units is the   CMV DNA concentration in IU/mL x 1.1 copies/IU = CMV DNA in copies/mL.  This assay has received FDA approval for the testing of human plasma only. The   Infectious Disease Diagnostic Laboratory at the [facility], [place], has validated the performance characteristics of the   Roche CMV assay for plasma, bronchial alveolar lavage/wash and urine.       CMV viral loads    Recent Labs   Lab Test 12/18/20  0611   CMVQNT CMV DNA Not Detected   CSPEC Plasma   CMVLOG Not Calculated       CMV viral loads    CMV Quant IU/mL   Date Value Ref Range Status   12/18/2020 CMV DNA Not Detected CMVND^CMV DNA Not Detected [IU]/mL Final     Comment:     Mutations within the highly conserved regions of the viral genome covered by   the RHONDA AmpliPrep/RHONDA TaqMan CMV Test primers and/or probes have been   identified and may result in under-quantitation of or failure to detect the   virus.  Supplemental testing methods should be used for testing when this is   suspected.  The RHONDA AmpliPrep/RHONDA TaqMan CMV Test is an FDA-approved in vitro nucleic   acid amplification test for the quantitation of cytomegalovirus DNA in  human   plasma (EDTA plasma) using the RHONDA AmpliPrep Instrument for automated viral   nucleic acid extraction and the RHONDA TaqMan Analyzer or [place] TaqMan for   automated Real Time amplification and detection of the viral nucleic acid   target.  Titer results are reported in International Units/mL (IU/mL using 1st WHO   International standard for Human Cytomegalovirus for Nucleic Acid   Amplification based assays. The conversion factor between CMV DNA copis/mL (as   defined by the Roche RHONDA TaqMan CMV test) and International Units is the   CMV DNA concentration in IU/mL x 1.1 copies/IU = CMV DNA in copies/mL.  This assay has received FDA approval for the testing of human plasma only. The   Infectious Disease Diagnostic Laboratory at the [facility], [place], has validated the performance characteristics of the   Roche CMV assay for plasma, bronchial alveolar lavage/wash and urine.     09/23/2020 CMV DNA Not Detected CMVND^CMV DNA Not Detected [IU]/mL Final     Comment:     Mutations within the highly conserved regions of the viral genome covered by   the RHONDA AmpliPrep/RHONDA TaqMan CMV Test primers and/or probes have been   identified and may result in under-quantitation of or failure to detect the   virus.  Supplemental testing methods should be used for testing when this is   suspected.  The RHONDA AmpliPrep/RHONDA TaqMan CMV Test is an FDA-approved in vitro nucleic   acid amplification test for the quantitation of cytomegalovirus DNA in human   plasma (EDTA plasma) using the RHONDA AmpliPrep Instrument for automated viral   nucleic acid extraction and the RHONDA TaqMan Analyzer or RHONDA TaqMan for   automated Real Time amplification and detection of the viral nucleic acid   target.  Titer results are reported in International Units/mL (IU/mL using 1st WHO   International standard for Human Cytomegalovirus for Nucleic Acid   Amplification based assays. The conversion factor between  CMV DNA copis/mL (as   defined by the Roche RHONDA TaqMan CMV test) and International Units is the   CMV DNA concentration in IU/mL x 1.1 copies/IU = CMV DNA in copies/mL.  This assay has received FDA approval for the testing of human plasma only. The   Infectious Disease Diagnostic Laboratory at the [facility], [place], has validated the performance characteristics of the   Roche CMV assay for plasma, bronchial alveolar lavage/wash and urine.       Log IU/mL of CMVQNT   Date Value Ref Range Status   12/18/2020 Not Calculated <2.1 [Log_IU]/mL Final   09/23/2020 Not Calculated <2.1 [Log_IU]/mL Final       CMV resistance testing  No lab results found.  No results found for: CMVCID, CMVFOS, CMVGAN     No results found for: H6RES    No results found for: EBVDN, EBRES, EBVDN, EBVSP, EBVPC, EBVPCR    CMV Antibody IgG   Date Value Ref Range Status   01/04/2021 >8.0 (H) 0.0 - 0.8 AI Final     Comment:     Positive  Antibody index (AI) values reflect qualitative changes in antibody   concentration that cannot be directly associated with clinical condition or   disease state.         No results found for: EBIG2, EBIGM, TOXG      Imaging:  CT a/p W 9/29/2022  IMPRESSION:   1. Urothelial thickening and mild ectasia of the distal right renal  transplant ureter extending to the ureterovesical anastomosis,  slightly increased compared to previous CT on 12/17/2020. Findings may  represent acute or chronic sequela of inflammation/infection. No  hydronephrosis or evidence of obstruction.  2. Simple right adnexal cyst is not significantly changed compared to  previous without internal complexity on comparison ultrasound, likely  benign. No dedicated imaging follow-up recommended.  3. Stable postoperative changes of liver transplant.  4. Stable enlarged polycystic kidneys.      Catrachita Gonzales MD  [facility]  Contact information available via [facility]  Paging/Directory     10/03/2022

## 2022-10-03 NOTE — PROGRESS NOTES
Patient's temp is stable now and after given tylenol in the morning-98.2 and 97.2. Up ad marcela, denies pain and no nausea. Voids in the BR, rested in bed, NS runs at 75 ml. Appetite is good. Urine culture is pending. . Continue with POC.   Voicemail left to call back to schedule.     Kevin

## 2022-10-03 NOTE — PROGRESS NOTES
Tmax 100.4, OVSS on room air. Up ad marcela. Denies pain or nausea. PIV infusing antibiotics. Voids spontaneously. Sleeping between cares. Continue with POC.

## 2022-10-03 NOTE — CONSULTS
[facility]  Transplant Nephrology Consult  Date of Admission:  10/2/2022  Today's Date: 10/02/2022  Requesting physician: Geno Patel MD    Recommendations:  - No new recommendations at this time.   - Appreciate Transplant ID input.    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  Patient underwent a second liver transplant 3/3/11.  She appears to be doing well with normal LFTs.  Followed by Hepatology.    # Immunosuppression: Tacrolimus immediate release (goal 4-6), Mycophenolic acid (dose 540 mg every 12 hours) and Prednisone (dose 5 mg daily)   - Changes: No     # Infection Prophylaxis:   - PJP: Sulfa/TMP (Bactrim)  - Asplenia: None    # Hypertension: Controlled;  Goal BP: < 140/90 (Hospitalization goal)   - Volume status: Euvolemic   - Changes: No    # Mineral Bone Disorder:   - Vitamin D; level: Normal        On supplement: No  - Calcium; level: Normal        On supplement: No    # Recurrent UTis: Patient has had 4-5 UTIs in the last year or so.  She is followed by Transplant ID outpatient.  Patient has had somewhat ongoing symptoms over the last 6 weeks with one culture only growing mixed urogenital keisha and then was admitted 2 weeks ago for another UTI with urine culture also growing normal keisha.  However, now with ongoing symptoms and recent urine culture positive for E. Coli with significant resistance pattern.  Patient was started on meropenem.  Followed by  Transplant ID.   - Appreciate Transplant ID input.   - Recommend Urology evaluation, which can be done as outpatient.    # CAD with Microvascular Angina: Asymptomatic at present.  Coronary CT angiography 1/2019 with minimal, non flow-limiting coronary disease with only a mild focal calcified plaque in the proximal LAD.  Normal LVEF ~ 60-65% on last cardiac echo 8/2020.  Followed by Cardiology.    # PKD: Some back pain at times, unclear if secondary to polycystic kidneys.  Has recurrent UTIs.  Previously seen by Transplant Surgery to discuss bilateral native nephrectomy, but at that time the risks seem to outweigh the benefit.    # CIN3: Follows with Gynecology.    # Severe Vaginal Prolapse: Follows with Gynecology.    # Transplant History:  Etiology of Kidney Failure: Polycystic kidney disease (PKD)  Tx: DDKT and Liver Tx  Transplant: 2/2/2016 (Kidney), 3/3/2011 (Liver), 10/9/2010 (Kidney / Liver)  Significant changes in immunosuppression: None  Significant transplant-related complications: BK Viremia, CMV Viremia and Recurrent UTIs    Recommendations were communicated to the primary team via this note.    Oleg Judd MD  Pager: [phone]    REASON FOR CONSULT   DDKT    History of Present Illness   Belen Sharma is a 62 year old adult with ESKD from polycystic kidney disease (PKD), s/p DDKT with baseline Cr ~ 0.7-0.9 on Tacrolimus immediate release, Mycophenolic acid and Prednisone.    Significant Post Tx Issues: BK Viremia, CMV Viremia and Recurrent UTIs    Major PMH: Microvasular angina    Admission Presentation: Patient has a h/o recurrent UTIs with at least 4-5 in the the last year, although not all with positive urine cultures.  She had symptoms about 6 weeks ago and received antibiotic treatment, but urine culture only showed normal urogenital keisha.  Patient was than admitted for UTI with presentation of fever, dysuria and leukocytosis.  Patient was started on antibiotics, but urine culture again grew  normal urogenital keisha.  She was still thought to have a UTI that had been masked by previous antibiotics.  She initially received IV ceftriaxone, followed by a course of cefdinir x 14 day antibiotic course on discharge.    Patient had follow up with Transplant ID last week and repeat urine culture at the end of her antibiotic course grew >100K  E. Coli.  Patient now presents with recurrent symptoms of fever up to 101.4, urinary urgency and generally fatigued and not feeling well.  No specific pain or burning with urination.  No pain over her kidney allograft.  No chest pain or shortness of breath.  No cough.  No nausea, vomiting or diarrhea.  No leg swelling.  She was seen in the ER and found to have a fever to 101.9 with pyuria and WBC clumps on urinalysis.  Patient is being admitted for urosepsis.    Review of Systems    The 10 point Review of Systems is negative other than noted in the HPI or here.    Past Medical History    I have reviewed this patient's medical history and updated it with pertinent information if needed.   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 Both, very large, native kidneys reamain.     PONV (postoperative nausea and vomiting)     Need to start with ice chips and apple juice, no soda     S/P kidney transplant     SLK 10/9/2010 - kidney failure 2/2 [facility]. Explanted 2012. Re-transplant after de-sensitization 2016     S/P liver transplant (H)     10/9/2010 - [facility], ischemic biopathy. Re-transplant 3/3/2011     S/P splenectomy      Spider veins 2019     Thyroid disease     Hyperthyroid treated with single dose iodine        Past Surgical History   I have reviewed this patient's surgical history and updated it with pertinent information if needed.  Past Surgical History:   Procedure Laterality Date     bunectomy  2018    right foot     bunionectomy  2019    left     CHOLECYSTECTOMY       CONIZATION N/A 2020    Procedure: CONE BIOPSY, CERVIX;  Surgeon: Daysi Perez MD;  Location: [facility] OR     FAILED PICC - RIGHT ARM Right 2020    Has a LUE AV fistula.     H STATISTIC PICC LINE INSERTION >5YR, FAILED Bilateral 2020    Left fistula, Right failed x 2 Occlussion     HERNIA REPAIR, INCISIONAL  2013     IR DIALYSIS PTA CENTRAL SEG  2020     IR PICC PLACEMENT > 5 YRS OF AGE  2020     SPLENECTOMY      Splenectomy     TRANSPLANT KIDNEY RECIPIENT  DONOR      re-transplant after [facility]; 6 months de-sensitization prior AND 6 months after transplant     TRANSPLANT LIVER RECIPIENT  DONOR  2011     TRANSPLANT LIVER, KIDNEY RECIPIENT  DONOR, COMBINED  10/09/2010    [facility] - re-Tx liver 3/3/2011; Kidney (left iliac) lost to [facility]/fibrosis - explant     VASCULAR SURGERY      Vascular access left UE. Not used for 4 years since 2nd kidney tx 2016 [place]       Family History   I have reviewed this patient's family history and updated it with pertinent information if needed.   Family History   Problem Relation Age of Onset     Uterine Cancer Maternal Grandmother      Polycystic Kidney Diease Brother      Polycystic Kidney Diease Brother      Polycystic Kidney Diease Brother      Polycystic Kidney Diease Brother      Cervical Cancer Maternal Aunt      Depression Mother      Anxiety Disorder Mother      Depression Father      Anxiety Disorder Father        Social History   I have reviewed this patient's social history and updated it with pertinent information if needed. Belen Sharma  reports that Belen Sharma has never smoked. Belen Sharma has never used smokeless tobacco.  Belen Sharma reports previous alcohol use. Belen Sharma reports previous drug use.    Allergies   Allergies   Allergen Reactions     Ibuprofen      Due to liver transplant     Prior to Admission Medications     aspirin  81 mg Oral Daily     meropenem  1 g Intravenous Q8H     mycophenolic acid  540 mg Oral BID     [START ON 10/3/2022] predniSONE  5 mg Oral Daily     sodium chloride (PF)  3 mL Intracatheter Q8H     [START ON 10/3/2022] sulfamethoxazole-trimethoprim  1 tablet Oral Once per day on      tacrolimus  1.5 mg Oral BID IS       sodium chloride 75 mL/hr at 10/02/22 2021       Physical Exam   Temp  Av  F (37.2  C)  Min: 97.4  F (36.3  C)  Max: 103.4  F (39.7  C)      Pulse  Av  Min: 72  Max: 130 Resp  Av.5  Min: 12  Max: 30  SpO2  Av.9 %  Min: 93 %  Max: 100 %     /54 (BP Location: Right arm)   Pulse 91   Temp 99  F (37.2  C) (Oral)   Resp 14   Wt 67.1 kg (148 lb)   SpO2 93%   BMI 21.24 kg/m     Date 10/02/22 0700 - 10/03/22 0659   Shift 2598-9785 4635-8740 3106-9569 24 Hour Total   INTAKE   P.O.  120  120   Shift Total(mL/kg)  120(1.79)  120(1.79)   OUTPUT   Shift Total(mL/kg)       Weight (kg) 67.13 67.13 67.13 67.13      Admit Weight: 67.1 kg (148 lb)     GENERAL APPEARANCE: alert and no distress  HENT: mouth without ulcers or lesions  LYMPHATICS: no cervical or supraclavicular nodes  RESP: lungs clear to auscultation - no rales, rhonchi or wheezes  CV: regular rhythm, normal rate, no rub, no murmur  EDEMA: no LE edema bilaterally  ABDOMEN: soft, nondistended, nontender, bowel sounds normal  MS: extremities normal - no gross deformities noted, no evidence of inflammation in joints, no muscle tenderness  SKIN: no rash  TX KIDNEY: normal  DIALYSIS ACCESS:  LUE AV fistula with good thrill, mildly aneurysmal    Data   CMP  Recent Labs   Lab 10/02/22  0628 22  0813   * 140   POTASSIUM 3.9 4.2   CHLORIDE 103 102   CO2 23 26   ANIONGAP 9 12   GLC 92 93   BUN  17.8 15.4   CR 0.78 0.72   GFRESTIMATED 85 >90   EDSON 9.5 10.0   PROTTOTAL 6.6 6.5   ALBUMIN 3.8 3.9   BILITOTAL 0.7 0.6   ALKPHOS 69 69   AST 23 18   ALT 8* 13     CBC  Recent Labs   Lab 10/02/22  0628 09/26/22  0813   HGB 15.1 15.1   WBC 12.3* 8.2   RBC 5.11 5.02   HCT 46.3 45.8   MCV 91 91   MCH 29.5 30.1   MCHC 32.6 33.0   RDW 13.9 13.8    267     INRNo lab results found in last 7 days.  ABGNo lab results found in last 7 days.   Urine Studies  Recent Labs   Lab Test 10/02/22  0632 09/30/22  1324 09/14/22  2218 08/28/22  2104 08/23/22  1257 09/14/21  1539 09/08/21  1341 08/20/21  1050   COLOR Light Yellow Yellow Yellow Yellow Yellow   < > Yellow Yellow   APPEARANCE Slightly Cloudy* Clear Cloudy* Cloudy* Cloudy*   < > Clear Clear   URINEGLC Negative Negative Negative Negative Negative   < > Negative Negative   URINEBILI Negative Negative Negative Negative Negative   < > Negative Negative   URINEKETONE Negative Negative Trace* Negative Negative   < > Negative Negative   SG 1.010 1.010 1.018 1.015 1.010   < > 1.010 1.015   UBLD Trace* Trace* Small* Moderate* Moderate*   < > Trace* Small*   URINEPH 6.5 7.0 6.0 6.0 6.0   < > 7.0 6.5   PROTEIN 10 * Negative 70 * 200 * 30 *   < > Negative Negative   UROBILINOGEN  --  0.2  --   --  0.2  --  0.2 0.2   NITRITE Positive* Negative Negative Negative Negative   < > Negative Negative   LEUKEST Large* Moderate* Large* Large* Moderate*   < > Moderate* Large*   RBCU 13* 2-5* 8* 20* 5-10*   < > 0-2 0-2   WBCU >182* 25-50* >182* >182* >100*   < > 5-10* 5-10*    < > = values in this interval not displayed.     Recent Labs   Lab Test 06/04/21  1130 09/10/20  1037   UTPG 0.34* 0.16     PTH  Recent Labs   Lab Test 02/10/22  0802 09/23/20  1653   PTHI 66 45     Iron Studies  No lab results found.    IMAGING:  All imaging studies reviewed by me.

## 2022-10-03 NOTE — PROGRESS NOTES
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  Patient underwent a second liver transplant 3/3/11.  She appears to be doing well with normal LFTs.  Followed by Hepatology.    # Immunosuppression: Tacrolimus immediate release (goal 4-6), Mycophenolic acid (dose 540 mg every 12 hours) and Prednisone (dose 5 mg daily)   - Changes: No    # Infection Prophylaxis:   - PJP: Sulfa/TMP (Bactrim)  - Asplenia: None    # Hypertension: Controlled;  Goal BP: < 140/90 (Hospitalization goal)   - Volume status: Euvolemic     - Changes: No    # Mineral Bone Disorder:   - Vitamin D; level: Normal        On supplement: No  - Calcium; level: Normal        On supplement: No    # Electrolytes:   - Potassium; level: Normal        On supplement: No  - Bicarbonate; level: Low        On supplement: No, but recommend starting replacement      # Recurrent UTis: Patient has had 4-5 UTIs in the last year or so.  She is followed by Transplant ID outpatient.  Patient has had somewhat ongoing symptoms over the last 6 weeks with one culture only growing mixed urogenital keisha and then was admitted 2 weeks ago for another UTI with urine culture also growing normal keisha.  However, now with ongoing symptoms and recent urine culture positive for E. Coli  with significant resistance pattern.  Patient was started on meropenem.  Followed by Transplant ID.              - Appreciate Transplant ID input.              - Recommend Urology evaluation, which can be done as outpatient.     # CAD with Microvascular Angina: Asymptomatic at present.  Coronary CT angiography 1/2019 with minimal, non flow-limiting coronary disease with only a mild focal calcified plaque in the proximal LAD.  Normal LVEF ~ 60-65% on last cardiac echo 8/2020.  Followed by Cardiology.     # PKD: Some back pain at times, unclear if secondary to polycystic kidneys.  Has recurrent UTIs.  Previously seen by Transplant Surgery to discuss bilateral native nephrectomy, but at that time the risks seem to outweigh the benefit.     # CIN3: Follows with Gynecology.     # Severe Vaginal Prolapse: Follows with Gynecology.    # Transplant History:  Etiology of Kidney Failure: Polycystic kidney disease (PKD)  Tx: DDKT and Liver Tx  Transplant: 2/2/2016 (Kidney), 3/3/2011 (Liver), 10/9/2010 (Kidney / Liver)  Significant changes in immunosuppression: None  Significant transplant-related complications: BK Viremia, CMV Viremia and Recurrent UTIs    Recommendations were communicated to the primary team via this note.    Discussed with Dr. Binta Lemos PA-C  Transplant Nephrology   Pager [phone]    Interval History    Zachary's creatinine is 0.78 (10/02 0628); Stable.  Good urine output.  Other significant labs/tests/vitals: blood cultures NGTD  Tmax 100.4F overnight.  No chest pain or shortness of breath.  No leg swelling.  No nausea and vomiting.  Bowel movements are formed.  No fever, sweats or chills.      Review of Systems   4 point ROS was obtained and negative except as noted in the Interval History.    MEDICATIONS:    aspirin  81 mg Oral Daily     meropenem  1 g Intravenous Q8H     mycophenolic acid  540 mg Oral BID     predniSONE  5 mg Oral Daily     sodium chloride (PF)  3 mL Intracatheter Q8H      sulfamethoxazole-trimethoprim  1 tablet Oral Once per day on      tacrolimus  1.5 mg Oral BID IS       sodium chloride 75 mL/hr at 10/03/22 0902       Physical Exam   Temp  Av  F (37.2  C)  Min: 97.4  F (36.3  C)  Max: 103.4  F (39.7  C)      Pulse  Av.4  Min: 72  Max: 130 Resp  Av.2  Min: 12  Max: 30  SpO2  Av.9 %  Min: 93 %  Max: 100 %     /50 (BP Location: Right arm)   Pulse 80   Temp 99  F (37.2  C) (Oral)   Resp 18   Wt 67.1 kg (148 lb)   SpO2 95%   BMI 21.24 kg/m     Date 10/03/22 0700 - 10/04/22 0659   Shift 4269-4235 0779-8187 2050-4853 24 Hour Total   INTAKE   P.O. 240   240   Shift Total(mL/kg) 240(3.58)   240(3.58)   OUTPUT   Shift Total(mL/kg)       Weight (kg) 67.13 67.13 67.13 67.13      Admit Weight: 67.1 kg (148 lb)     GENERAL APPEARANCE: alert and no distress  HENT: mouth without ulcers or lesions  LYMPHATICS: no cervical or supraclavicular nodes  RESP: lungs clear to auscultation - no rales, rhonchi or wheezes  CV: regular rhythm, normal rate, no rub, no murmur  EDEMA: no LE edema bilaterally  ABDOMEN: soft, nondistended, nontender, bowel sounds normal  MS: extremities normal - no gross deformities noted, no evidence of inflammation in joints, no muscle tenderness  SKIN: no rash    Data   All labs reviewed by me.  CMP  Recent Labs   Lab 10/02/22  0628   *   POTASSIUM 3.9   CHLORIDE 103   CO2 23   ANIONGAP 9   GLC 92   BUN 17.8   CR 0.78   GFRESTIMATED 85   EDSON 9.5   PROTTOTAL 6.6   ALBUMIN 3.8   BILITOTAL 0.7   ALKPHOS 69   AST 23   ALT 8*     CBC  Recent Labs   Lab 10/03/22  0935 10/02/22  0628   HGB 13.4 15.1   WBC 11.0 12.3*   RBC 4.61 5.11   HCT 42.3 46.3   MCV 92 91   MCH 29.1 29.5   MCHC 31.7 32.6   RDW 14.2 13.9    287     INRNo lab results found in last 7 days.  ABGNo lab results found in last 7 days.   Urine Studies  Recent Labs   Lab Test 10/02/22  0632 22  1324 22  2218 22  2104 22  1257 21  1539  09/08/21  1341 08/20/21  1050   COLOR Light Yellow Yellow Yellow Yellow Yellow   < > Yellow Yellow   APPEARANCE Slightly Cloudy* Clear Cloudy* Cloudy* Cloudy*   < > Clear Clear   URINEGLC Negative Negative Negative Negative Negative   < > Negative Negative   URINEBILI Negative Negative Negative Negative Negative   < > Negative Negative   URINEKETONE Negative Negative Trace* Negative Negative   < > Negative Negative   SG 1.010 1.010 1.018 1.015 1.010   < > 1.010 1.015   UBLD Trace* Trace* Small* Moderate* Moderate*   < > Trace* Small*   URINEPH 6.5 7.0 6.0 6.0 6.0   < > 7.0 6.5   PROTEIN 10 * Negative 70 * 200 * 30 *   < > Negative Negative   UROBILINOGEN  --  0.2  --   --  0.2  --  0.2 0.2   NITRITE Positive* Negative Negative Negative Negative   < > Negative Negative   LEUKEST Large* Moderate* Large* Large* Moderate*   < > Moderate* Large*   RBCU 13* 2-5* 8* 20* 5-10*   < > 0-2 0-2   WBCU >182* 25-50* >182* >182* >100*   < > 5-10* 5-10*    < > = values in this interval not displayed.     Recent Labs   Lab Test 06/04/21  1130 09/10/20  1037   UTPG 0.34* 0.16     PTH  Recent Labs   Lab Test 02/10/22  0802 09/23/20  1653   PTHI 66 45     Iron Studies  No lab results found.    IMAGING:  All imaging studies reviewed by me.

## 2022-10-03 NOTE — PROGRESS NOTES
[facility]    Medicine Progress Note - Hospitalist Service, GOLD TEAM 3    Date of Admission:  10/2/2022    Assessment & Plan    Belen Sharma is a 62 year old adult with past medical history of polycystic kidney disease s/p DDKT x 2, congenital hepatic fibrosis s/p liver transplant, recurrent UTIs with recent admission for the same with discharge on 9/16 admitted on 10/2/2022 for management of sepsis 2/2 pyelonephritis of transplant kidney.    # Sepsis 2/2 pyelonephritis of transplanted kidney  # ESBL UTI  Met 3 out of 4 SIRS criteria on admission (temp, HR, WBC count). Patient notes progressive urinary symptoms over past two days following completion of PO antibiotic course with cefdinir on 9/30. UA obtained on same day of last antibiotic dose by outpatient ID provider with growth of ESBL E. Coli susceptible to zosyn and meropenem. Treatment initiated with zosyn in ED-transitioned to meropenem on admission.  - Follow blood and urine cultures  - Change meropenem to ertapenem 1 gram daily per ID until 10/23/2022  - If blood cultures negative on 10/4, can place PICC  - Discharge:  Urology referral for urodynamic studies at discharge, Follow up with either Dr. Reyes or Dr. Gonzales in one month. CBC with diff, CMP weekly while on ertapnem. Please fax results to Dr. Gonzales, fax number [phone].  - Transplant ID consulted, appreciate recommendations  - Renal transplant consulted, appreciate recommendations    # Polycystic kidney disease s/p renal transplant  # Congential hepatic fibrosis s/p liver transplant  CMP within normal limits with Cr at baseline.  - Renal transplant consulted  - Continue PTA mycophenolic acid, tacroliumus, prednisone  - Continue PTA bactrim for PJP prophylaxis  - Start sodium bicarb 650mg BID per nephrology    # Nonobstructive CAD  - Continue PTA ASA     Diet: Combination Diet Regular Diet Adult    DVT Prophylaxis: Heparin SQ  [place] Catheter: Not  present  Central Lines: None  Cardiac Monitoring: None  Code Status: Full Code      Disposition Plan      Expected Discharge Date: 10/04/2022      Destination: home          The patient's care was discussed with the Bedside Nurse, Patient and Transplant ID and Transplant Nephrology Consultant.    Geno Patel MD  Hospitalist Service, GOLD TEAM 3  [facility]  Securely message with the Vocera Web Console (learn more here)  Text page via [facility] Paging/Directory   Please see signed in provider for up to date coverage information      Clinically Significant Risk Factors Present on Admission     ______________________________________________________________________    Interval History   No acute events overnight. Belen is feeling better today, though did have some low grade fevers overnight. She denied having much of an appetite this morning. Denies nausea, vomiting. Denies any abdominal or flank pain.    Data reviewed today: I reviewed all medications, new labs and imaging results over the last 24 hours. I personally reviewed no images or EKG's today.    Physical Exam   Vital Signs: Temp: 98.6  F (37  C) Temp src: Oral BP: 120/54 Pulse: 88   Resp: 15 SpO2: 96 % O2 Device: None (Room air)    Weight: 148 lbs 0 oz  General Appearance: Alert, pleasant, NAD.  Respiratory: CTAB. No wheezing or crackles.   Cardiovascular: RRR. Normal S1/S2. No murmur noted.  GI: Soft, non-tender, non-distended. Normoactive bowel sounds.   Skin: No rash or lesions.  Other: No peripheral edema.     Data   Recent Labs   Lab 10/03/22  0935 10/02/22  0628   WBC 11.0 12.3*   HGB 13.4 15.1   MCV 92 91    287    135*   POTASSIUM 4.0 3.9   CHLORIDE 107 103   CO2 20* 23   BUN 9.8 17.8   CR 0.66 0.78   ANIONGAP 10 9   EDSON 9.2 9.5   GLC 99 92   ALBUMIN  --  3.8   PROTTOTAL  --  6.6   BILITOTAL  --  0.7   ALKPHOS  --  69   ALT  --  8*   AST  --  23

## 2022-10-03 NOTE — PLAN OF CARE
Goal Outcome Evaluation: Ongoing   Pt arrived from ED @ 1800. Alert and oriented, on room air. Able to make needs known. Temp 100.0. C/o headache. Had tylenol in ED before transferring on unit. PIV NS running @ 75 mL/hr. Admission questions completed. Pt refused two person skin check. Continue to monitor.

## 2022-10-03 NOTE — PLAN OF CARE
Goal Outcome Evaluation:    Patient transferred from ED around 1845. A/O x4, independent. PIV infusing NS 75 ml/hr. Reports headache 8/10 , PRN Tylenol 65 mg given at 2248. Voiding spontaneously. Temperature 100.4 - given ice pack. Waiting for urine culture.     Plan of Care Reviewed With: patient     Overall Patient Progress: no change

## 2022-10-03 NOTE — CONSULTS
Care Management Initial Consult    General Information  Assessment completed with: Care Team Member, NATIVIDAD-chart review,    Type of CM/SW Visit: Initial Assessment    Primary Care Provider verified and updated as needed: Yes   Readmission within the last 30 days:        Reason for Consult: discharge planning  Advance Care Planning:     None needed st this time per report.     Communication Assessment:  Patient's communication style: spoken language (English or Bilingual)    Hearing Difficulty or Deaf: no   Wear Glasses or Blind: yes    Cognitive  Cognitive/Neuro/Behavioral: WDL                      Living Environment:   People in home: spouse     Current living Arrangements:        Able to return to prior arrangements:       Family/Social Support:  Care provided by: self  Provides care for:    Marital Status:              Description of Support System:    supportive     Current Resources:   Patient receiving home care services: No     Community Resources:    Equipment currently used at home: none  Supplies currently used at home:        Lifestyle & Psychosocial Needs:     (From Chart Dereje Sheet)  Social Determinants of Health     Tobacco Use: Low Risk      Smoking Tobacco Use: Never Smoker     Smokeless Tobacco Use: Never Used   Alcohol Use: Not on file   Financial Resource Strain: Not on file   Food Insecurity: Not on file   Transportation Needs: Not on file   Physical Activity: Not on file   Stress: Not on file   Social Connections: Not on file   Intimate Partner Violence: Not on file   Depression: Not at risk     PHQ-2 Score: 0   Housing Stability: Not on file     Functional Status:  Prior to admission patient needed assistance:  independent     Mental Health Status:  Mental Health Status: No Current Concerns       Chemical Dependency Status: no concerns/see above flow sheet            Values/Beliefs:  Spiritual, Cultural Beliefs, Christian Practices, Values that affect care: no               Additional  Information:  Chart Review indicating that patient is being followed by the MD ID Team to determine antibiotic plan.  Secondary to the above, an insurance inquiry is pending for home IV antibiotic coverage in the event this type of intervention is planned.  [facility] contacted and insurance authorization is pending at this time.    Inpatient cares continue per MD team plans of care and the inpatient Care Management Team will continue to follow for transition needs.      HANNAH Vargas., R.N., P.H.N..  Care Coordinator     Pager: [phone]/Phone: [phone]  [facility]

## 2022-10-04 LAB
ANION GAP SERPL CALCULATED.3IONS-SCNC: 6 MMOL/L (ref 7–15)
BACTERIA UR CULT: ABNORMAL
BUN SERPL-MCNC: 10.8 MG/DL (ref 8–23)
CALCIUM SERPL-MCNC: 9.5 MG/DL (ref 8.8–10.2)
CHLORIDE SERPL-SCNC: 105 MMOL/L (ref 98–107)
CREAT SERPL-MCNC: 0.57 MG/DL (ref 0.51–1.17)
CRP SERPL-MCNC: 39.7 MG/L
DEPRECATED HCO3 PLAS-SCNC: 23 MMOL/L (ref 22–29)
ERYTHROCYTE [DISTWIDTH] IN BLOOD BY AUTOMATED COUNT: 14.1 % (ref 10–15)
GFR SERPL CREATININE-BSD FRML MDRD: >90 ML/MIN/1.73M2
GLUCOSE SERPL-MCNC: 97 MG/DL (ref 70–99)
HCT VFR BLD AUTO: 43.8 % (ref 35–53)
HGB BLD-MCNC: 14.2 G/DL (ref 11.7–17.7)
MCH RBC QN AUTO: 29.3 PG (ref 26.5–33)
MCHC RBC AUTO-ENTMCNC: 32.4 G/DL (ref 31.5–36.5)
MCV RBC AUTO: 91 FL (ref 78–100)
PLATELET # BLD AUTO: 248 10E3/UL (ref 150–450)
POTASSIUM SERPL-SCNC: 4 MMOL/L (ref 3.4–5.3)
PROCALCITONIN SERPL IA-MCNC: 0.06 NG/ML
RBC # BLD AUTO: 4.84 10E6/UL (ref 3.8–5.9)
SODIUM SERPL-SCNC: 134 MMOL/L (ref 136–145)
WBC # BLD AUTO: 8.1 10E3/UL (ref 4–11)

## 2022-10-04 PROCEDURE — 99232 SBSQ HOSP IP/OBS MODERATE 35: CPT | Performed by: INTERNAL MEDICINE

## 2022-10-04 PROCEDURE — 84145 PROCALCITONIN (PCT): CPT | Performed by: INTERNAL MEDICINE

## 2022-10-04 PROCEDURE — 258N000003 HC RX IP 258 OP 636: Performed by: STUDENT IN AN ORGANIZED HEALTH CARE EDUCATION/TRAINING PROGRAM

## 2022-10-04 PROCEDURE — 36415 COLL VENOUS BLD VENIPUNCTURE: CPT | Performed by: STUDENT IN AN ORGANIZED HEALTH CARE EDUCATION/TRAINING PROGRAM

## 2022-10-04 PROCEDURE — 250N000013 HC RX MED GY IP 250 OP 250 PS 637: Performed by: STUDENT IN AN ORGANIZED HEALTH CARE EDUCATION/TRAINING PROGRAM

## 2022-10-04 PROCEDURE — 99232 SBSQ HOSP IP/OBS MODERATE 35: CPT | Performed by: PHYSICIAN ASSISTANT

## 2022-10-04 PROCEDURE — 86140 C-REACTIVE PROTEIN: CPT | Performed by: INTERNAL MEDICINE

## 2022-10-04 PROCEDURE — 250N000012 HC RX MED GY IP 250 OP 636 PS 637: Performed by: STUDENT IN AN ORGANIZED HEALTH CARE EDUCATION/TRAINING PROGRAM

## 2022-10-04 PROCEDURE — 85027 COMPLETE CBC AUTOMATED: CPT | Performed by: STUDENT IN AN ORGANIZED HEALTH CARE EDUCATION/TRAINING PROGRAM

## 2022-10-04 PROCEDURE — 82310 ASSAY OF CALCIUM: CPT | Performed by: STUDENT IN AN ORGANIZED HEALTH CARE EDUCATION/TRAINING PROGRAM

## 2022-10-04 PROCEDURE — 120N000002 HC R&B MED SURG/OB UMMC

## 2022-10-04 PROCEDURE — 999N000128 HC STATISTIC PERIPHERAL IV START W/O US GUIDANCE

## 2022-10-04 PROCEDURE — 250N000011 HC RX IP 250 OP 636: Performed by: STUDENT IN AN ORGANIZED HEALTH CARE EDUCATION/TRAINING PROGRAM

## 2022-10-04 RX ORDER — LIDOCAINE 40 MG/G
CREAM TOPICAL
Status: DISCONTINUED | OUTPATIENT
Start: 2022-10-04 | End: 2022-10-05 | Stop reason: HOSPADM

## 2022-10-04 RX ADMIN — ASPIRIN 81 MG CHEWABLE TABLET 81 MG: 81 TABLET CHEWABLE at 20:08

## 2022-10-04 RX ADMIN — PREDNISONE 5 MG: 5 TABLET ORAL at 08:18

## 2022-10-04 RX ADMIN — TACROLIMUS 1.5 MG: 1 CAPSULE ORAL at 08:40

## 2022-10-04 RX ADMIN — ERTAPENEM SODIUM 1 G: 1 INJECTION, POWDER, LYOPHILIZED, FOR SOLUTION INTRAMUSCULAR; INTRAVENOUS at 18:04

## 2022-10-04 RX ADMIN — TACROLIMUS 1.5 MG: 1 CAPSULE ORAL at 20:08

## 2022-10-04 RX ADMIN — MYCOPHENOLIC ACID 540 MG: 360 TABLET, DELAYED RELEASE ORAL at 08:23

## 2022-10-04 RX ADMIN — MYCOPHENOLIC ACID 540 MG: 360 TABLET, DELAYED RELEASE ORAL at 20:08

## 2022-10-04 RX ADMIN — SODIUM CHLORIDE: 9 INJECTION, SOLUTION INTRAVENOUS at 00:54

## 2022-10-04 ASSESSMENT — ACTIVITIES OF DAILY LIVING (ADL)
ADLS_ACUITY_SCORE: 20

## 2022-10-04 NOTE — PROGRESS NOTES
[facility]    Transplant Infectious Diseases Inpatient Progress Note      Belen Sharma MRN# [id number]   YOB: 1960 Age: 62 year old   Date of Admission and time: 10/2/2022  6:13 AM             Recommendations:   1. Continue ertapenem 1 gram daily for a total of 3 weeks until 10/23/2022.   2. May place PICC line.   - PICC line to be removed after last dose ertapenem.   3. Encourage patient to use estrogen regularly.   4. Refer patient to urology as OP for possible urodynamic studies given history of rectocele and evaluation for recurrent UTI and possibly need for cystoscopy.   5. Follow up with either Dr. Reyes or Dr. Gonzales in one month.   6. CBC with diff, CMP weekly while on ertapnem.   - Please fax results to Dr. Gonzales, fax number [phone].  7. Due for the seasonal influenza vaccine, and the bivalent COVID-19 vaccine.    ID will sign off, please call for questions.          Summary of Presentation:   Transplants:  2/2/2016 (Kidney), 3/3/2011 (Liver), 10/9/2010 (Kidney / Liver), Postoperative day:  4233 (Liver), 2436 (Kidney)     This patient is a 62 year old adult with history of congenital hepatic fibrosis and PCKD s/p liver and kidney transplant in [place] in 2010, they both failed and required liver re-transplant in 2011 and kidney re-transplant in 2016.   Currently on TAC/MMF/prednisone.   Admitted with recurrent UTI.          Active Problems and Infectious Diseases Issues:   1. Recurrent UTI.   2. Sepsis due to 1.   With different pathogens including ESBL E coli, K pneumonia, E faecalis and sometimes with negative urine and blood cx.   Currently due to ESBL E coli.   Recent CT a/p without evidence of structural or anatomical abnormality.   We can check in the future for functional abnormality such as vesicoureteral reflux or inability to fully empty the bladder by urodynamics.   The patient has multiple risk factors for recurrent UTI, almost all of them can not be  reversed, including: female gender, immunocompromised, kidney transplant recipient, the need to manipulate the  tract daily to change pessary.   In addition the patient is not consistent using topical estrogen which may also contribute to UTI.     Will treat with 3 weeks of ABx.         Old Problems and Infectious Diseases Issues:   1. Recurrent UTI with E coli, E faecalis and K pneumonia.   2. CDI.     Other Infectious Disease issues include:  - QTc: 384 as of 6/6/2022. .   - PCP prophylaxis: bactrim.  - Serostatus: not available.   - Immunization status: This patient received the fourth dose (first booster) of the COVID-19 vaccine on 2/8/2022. received evusheld on 5/16/2022. Otherwise due for the seasonal influenza vaccine, and the bivalent COVID-19 vaccine.       Attestation:  I interviewed the patient and obtained history from the patient, and by reviewing the patient's chart including outside records, microbiological data, and radiological data. All data are summarized in this notes.  Catrachita Gonzales MD  [facility]  Contact information available via [facility] Paging/Directory    10/04/2022           Interim History:   Feels better today.   Afebrile.   No diarrhea, no other complaints.          History of Present Illness:   Transplants:  2/2/2016 (Kidney), 3/3/2011 (Liver), 10/9/2010 (Kidney / Liver), Postoperative day:  4233 (Liver), 2436 (Kidney)     This patient is a 62 year old adult with history of congenital hepatic fibrosis and PCKD s/p liver and kidney transplant in [place] in 2010, they both failed, first the liver apparently due to anatomic abnormalities and required liver re-transplant within few months in 2011, then the kidney failed and needed HD through [facility] for 4 years before she received kidney re-transplant in [place] in 2016.     The patient has history of recurrent UTI. In 2022, the symptoms of UTI with dysuria, vaginal itching, increase in frequency  increased and Ucx on multiple occasions grew K pneumonia, E faecalis, and ESBL E coli.     The patient was treated in 8/2022 for UTI with fosfomycin, Ucx at that time were without growth but the patient was symptomatic with positive UA.   She was treated in 9/2022 with a single dose of fosfomycin but symptoms prompted admission with fever and urinary symptoms, UA was abnormal but Ucx were negative. She was treated with ceftriaxone then cefdinir with resolution of symptoms.   She was evaluated by [facility] (Dr. Reyes) who ordered CT a/p with contrast which showed inflamed transplanted ureter but not evidence of nephrolithiasis, abscesses or other correctable abnormalities. Dr. Reyes also ordered EOT (9/30/20220 UA and Ucx which were abnormal with Ucx growing ESBL E coli.   The patient developed fever, within 24 hr of stopping cefdinir and presented to ED.   She was started on meropenem.     Denying new meds or new -related behavior. Denied association of UTI and sexual activities.   She was prescribed estrogen local therapy but not always applies it.             Review of Systems:      As mentioned in the HPI otherwise negative by reviewing constitutional symptoms, central and peripheral neurological systems, respiratory system, cardiac system, GI system,  system, musculoskeletal, skin, allergy, and lymphatics.                Immunizations:     Immunization History   Administered Date(s) Administered     COVID-19,PF,Pfizer (12+ Yrs) 03/29/2021, 04/19/2021, 09/02/2021     COVID-19,PF,Pfizer 12+ Yrs (2022 and After) 02/08/2022     DT (PEDS <7y) 03/19/2001     FLU 6-35 months 11/15/2002, 11/18/2003, 10/26/2004, 10/24/2005, 11/16/2006, 09/20/2011     C3d7-57 Novel Flu 11/19/2009     HepB, Unspecified 06/28/2001     Influenza Quad, Recombinant, pf(RIV4) (Flublok) 10/18/2021     Influenza Vaccine IM > 6 months Valent IIV4 (Alfuria,Fluzone) 09/12/2022     Influenza Vaccine IM Ages 6-35 Months 4 Valent (PF) 11/28/2007,  10/07/2009, 09/20/2011     Influenza,INJ,MDCK,PF,Quad >6mo(Flucelvax-RMG) 10/15/2020     Meningococcal (Menactra ) 01/25/2013     Pneumococcal 20 valent Conjugate (Prevnar 20) 09/12/2022     Pneumococcal 23 valent 09/27/2002, 01/25/2013     TDAP Vaccine (Boostrix) 01/25/2013     Zoster vaccine recombinant adjuvanted (SHINGRIX) 10/15/2020, 01/08/2021            Allergies:     Allergies   Allergen Reactions     Ibuprofen      Due to liver transplant             Medications:   Medications that Require Transfusion:     Scheduled Medications:     aspirin  81 mg Oral Daily     ertapenem  1 g Intravenous Q24H     heparin ANTICOAGULANT  5,000 Units Subcutaneous Q12H     mycophenolic acid  540 mg Oral BID     predniSONE  5 mg Oral Daily     sodium bicarbonate  650 mg Oral BID     sodium chloride (PF)  3 mL Intracatheter Q8H     sulfamethoxazole-trimethoprim  1 tablet Oral Once per day on Mon Wed Fri     tacrolimus  1.5 mg Oral BID IS            Physical Exam:   Temp: 97.6  F (36.4  C) Temp src: Oral BP: 101/52 Pulse: 78   Resp: 16 SpO2: 98 % O2 Device: None (Room air)      Wt Readings from Last 4 Encounters:   10/02/22 67.1 kg (148 lb)   09/20/22 66.5 kg (146 lb 11.2 oz)   09/16/22 67 kg (147 lb 9.6 oz)   08/31/22 68 kg (150 lb)     Constitutional: awake, alert, cooperative, no apparent distress and appears at stated age, well nourished.            Data:   No results found for: ACD4    Inflammatory Markers    Recent Labs   Lab Test 09/14/22  2220 11/04/21  1457 12/28/20  1515 12/21/20  1545   SED 8 14 8 10   CRP 21.60* 39.3* 13.0* 18.0*       Immune Globulin Studies   No lab results found.    Metabolic Studies       Recent Labs   Lab Test 10/04/22  0615 10/03/22  0935 10/02/22  0628 09/26/22  0813 09/16/22  1015 09/15/22  0607 09/14/22  2230 09/30/20  0721 09/23/20  1653   * 137 135* 140 139 140  --    < >  --    POTASSIUM 4.0 4.0 3.9 4.2 4.0 4.3  --    < >  --    CHLORIDE 105 107 103 102 107 112*  --    < >  --    CO2  23 20* 23 26 24 20*  --    < >  --    ANIONGAP 6* 10 9 12 8 8  --    < >  --    BUN 10.8 9.8 17.8 15.4 12.2 16.5  --    < >  --    CR 0.57 0.66 0.78 0.72 0.57 0.73  --    < >  --    GFRESTIMATED >90 >90 85 >90 >90 >90  --    < >  --    GLC 97 99 92 93 98 100*  --    < >  --    EDSON 9.5 9.2 9.5 10.0 9.7 8.9  --    < >  --    PHOS  --   --   --   --   --   --   --   --  3.4   LACT  --   --  0.8  --   --   --  0.9   < >  --     < > = values in this interval not displayed.       Hepatic Studies    Recent Labs   Lab Test 10/02/22  0628 09/26/22  0813 09/16/22  1015 09/14/22  2220 09/12/22  0806 08/29/22  0810   BILITOTAL 0.7 0.6 0.3 0.6 0.7 0.4   ALKPHOS 69 69 57 72 71 66   ALBUMIN 3.8 3.9 3.2* 4.1 3.9 3.9   AST 23 18 16 21 21 23   ALT 8* 13 8* 12 18 12       Pancreatitis testing    Recent Labs   Lab Test 08/29/22  0810 06/06/22  0828 09/13/21  0840 09/10/20  1037   TRIG 123 126 119 131       Hematology Studies      Recent Labs   Lab Test 10/04/22  0615 10/03/22  0935 10/02/22  0628 09/26/22  0813 09/16/22  1015 09/15/22  0607 03/11/21  1007 12/28/20  1515 12/28/20  0855 12/21/20  1545 12/18/20  0611 12/16/20  2236   WBC 8.1 11.0 12.3* 8.2 8.1 9.8   < > 9.2   < > 6.2   < > 11.4*   ANEU  --   --   --   --   --   --   --  6.4  --  3.9  --  9.3*   ALYM  --   --   --   --   --   --   --  2.1  --  1.7  --  1.0   PABLO  --   --   --   --   --   --   --  0.6  --  0.6  --  1.0   AEOS  --   --   --   --   --   --   --  0.0  --  0.0  --  0.0   HGB 14.2 13.4 15.1 15.1 14.4 13.8   < > 16.6*   < > 16.2*   < > 16.9*   HCT 43.8 42.3 46.3 45.8 45.4 42.4   < > 50.8*   < > 49.2*   < > 54.5*    241 287 267 205 195   < > 308   < > 261   < > 260    < > = values in this interval not displayed.       Clotting Studies    No lab results found.    Arterial Blood Gas Testing    Recent Labs   Lab Test 09/14/22  2230 02/10/22  0802   PH 7.44* 7.32*        Urine Studies     Recent Labs   Lab Test 10/02/22  0632 09/30/22  1324 09/14/22  6420  08/28/22 2104 08/23/22  1257   URINEPH 6.5 7.0 6.0 6.0 6.0   NITRITE Positive* Negative Negative Negative Negative   LEUKEST Large* Moderate* Large* Large* Moderate*   WBCU >182* 25-50* >182* >182* >100*       Vancomycin Levels     No lab results found.    Invalid input(s): VANCO    Tobramycin levels     No lab results found.    Gentamicin levels    No lab results found.    Tacrolimus levels    Invalid input(s): TACROLIMUS, TAC, TACR  Transplant Immunosuppression Labs Latest Ref Rng & Units 10/4/2022 10/3/2022 10/2/2022 9/26/2022 9/16/2022   Tacro Level 5.0 - 15.0 ug/L - - - - -   Tacro Level - - - - - -   Creat 0.51 - 1.17 mg/dL 0.57 0.66 0.78 0.72 0.57   BUN 8.0 - 23.0 mg/dL 10.8 9.8 17.8 15.4 12.2   WBC 4.0 - 11.0 10e3/uL 8.1 11.0 12.3(H) 8.2 8.1   Neutrophil % - - 75 - -   ANEU 1.6 - 8.3 10e9/L - - - - -       Cyclosporine levels    Invalid input(s): CYCLOSPORINE, CYC    Mycophenolate levels    Invalid input(s): MYPA, MYP    Sirolimus levels    Invalid input(s): SIROLIMUS, SIR, RAPA    CSF testing   No lab results found.      Microbiology:  Blood cx and Ucx pending.   Last check of C difficile  No results found for: CDBPCT    Virology:  CMV viral loads    CMV Quant IU/mL   Date Value Ref Range Status   12/18/2020 CMV DNA Not Detected CMVND^CMV DNA Not Detected [IU]/mL Final     Comment:     Mutations within the highly conserved regions of the viral genome covered by   the RHONDA AmpliPrep/RHONDA TaqMan CMV Test primers and/or probes have been   identified and may result in under-quantitation of or failure to detect the   virus.  Supplemental testing methods should be used for testing when this is   suspected.  The RHONDA AmpliPrep/RHONDA TaqMan CMV Test is an FDA-approved in vitro nucleic   acid amplification test for the quantitation of cytomegalovirus DNA in human   plasma (EDTA plasma) using the [employer] AmpliPrep Instrument for automated viral   nucleic acid extraction and the RHONDA TaqMan Analyzer or RHONDA TaqMan  for   automated Real Time amplification and detection of the viral nucleic acid   target.  Titer results are reported in International Units/mL (IU/mL using 1st WHO   International standard for Human Cytomegalovirus for Nucleic Acid   Amplification based assays. The conversion factor between CMV DNA copis/mL (as   defined by the Roche RHONDA TaqMan CMV test) and International Units is the   CMV DNA concentration in IU/mL x 1.1 copies/IU = CMV DNA in copies/mL.  This assay has received FDA approval for the testing of human plasma only. The   Infectious Disease Diagnostic Laboratory at the [facility], [place], has validated the performance characteristics of the   Roche CMV assay for plasma, bronchial alveolar lavage/wash and urine.       CMV viral loads    Recent Labs   Lab Test 12/18/20  0611   CMVQNT CMV DNA Not Detected   CSPEC Plasma   CMVLOG Not Calculated       CMV viral loads    CMV Quant IU/mL   Date Value Ref Range Status   12/18/2020 CMV DNA Not Detected CMVND^CMV DNA Not Detected [IU]/mL Final     Comment:     Mutations within the highly conserved regions of the viral genome covered by   the RHONDA AmpliPrep/RHONDA TaqMan CMV Test primers and/or probes have been   identified and may result in under-quantitation of or failure to detect the   virus.  Supplemental testing methods should be used for testing when this is   suspected.  The RHONDA AmpliPrep/RHONDA TaqMan CMV Test is an FDA-approved in vitro nucleic   acid amplification test for the quantitation of cytomegalovirus DNA in human   plasma (EDTA plasma) using the RHONDA AmpliPrep Instrument for automated viral   nucleic acid extraction and the [employer] TaqMan Analyzer or [employer] TaqMan for   automated Real Time amplification and detection of the viral nucleic acid   target.  Titer results are reported in International Units/mL (IU/mL using 1st WHO   International standard for Human Cytomegalovirus for Nucleic Acid   Amplification  based assays. The conversion factor between CMV DNA copis/mL (as   defined by the Roche RHONDA TaqMan CMV test) and International Units is the   CMV DNA concentration in IU/mL x 1.1 copies/IU = CMV DNA in copies/mL.  This assay has received FDA approval for the testing of human plasma only. The   Infectious Disease Diagnostic Laboratory at the [facility], [place], has validated the performance characteristics of the   Roche CMV assay for plasma, bronchial alveolar lavage/wash and urine.     09/23/2020 CMV DNA Not Detected CMVND^CMV DNA Not Detected [IU]/mL Final     Comment:     Mutations within the highly conserved regions of the viral genome covered by   the RHONDA AmpliPrep/RHONDA TaqMan CMV Test primers and/or probes have been   identified and may result in under-quantitation of or failure to detect the   virus.  Supplemental testing methods should be used for testing when this is   suspected.  The RHONDA AmpliPrep/RHONDA TaqMan CMV Test is an FDA-approved in vitro nucleic   acid amplification test for the quantitation of cytomegalovirus DNA in human   plasma (EDTA plasma) using the RHONDA AmpliPrep Instrument for automated viral   nucleic acid extraction and the RHONDA TaqMan Analyzer or [employer] TaqMan for   automated Real Time amplification and detection of the viral nucleic acid   target.  Titer results are reported in International Units/mL (IU/mL using 1st WHO   International standard for Human Cytomegalovirus for Nucleic Acid   Amplification based assays. The conversion factor between CMV DNA copis/mL (as   defined by the Roche RHONDA TaqMan CMV test) and International Units is the   CMV DNA concentration in IU/mL x 1.1 copies/IU = CMV DNA in copies/mL.  This assay has received FDA approval for the testing of human plasma only. The   Infectious Disease Diagnostic Laboratory at the [facility], has validated the performance characteristics of the    Roche CMV assay for plasma, bronchial alveolar lavage/wash and urine.       Log IU/mL of CMVQNT   Date Value Ref Range Status   12/18/2020 Not Calculated <2.1 [Log_IU]/mL Final   09/23/2020 Not Calculated <2.1 [Log_IU]/mL Final       CMV resistance testing  No lab results found.  No results found for: CMVCID, CMVFOS, CMVGAN     No results found for: H6RES    No results found for: EBVDN, EBRES, EBVDN, EBVSP, EBVPC, EBVPCR    CMV Antibody IgG   Date Value Ref Range Status   01/04/2021 >8.0 (H) 0.0 - 0.8 AI Final     Comment:     Positive  Antibody index (AI) values reflect qualitative changes in antibody   concentration that cannot be directly associated with clinical condition or   disease state.         No results found for: EBIG2, EBIGM, TOXG      Imaging:  CT a/p W 9/29/2022  IMPRESSION:   1. Urothelial thickening and mild ectasia of the distal right renal  transplant ureter extending to the ureterovesical anastomosis,  slightly increased compared to previous CT on 12/17/2020. Findings may  represent acute or chronic sequela of inflammation/infection. No  hydronephrosis or evidence of obstruction.  2. Simple right adnexal cyst is not significantly changed compared to  previous without internal complexity on comparison ultrasound, likely  benign. No dedicated imaging follow-up recommended.  3. Stable postoperative changes of liver transplant.  4. Stable enlarged polycystic kidneys.      Catrachita Gonzales MD  [facility]  Contact information available via [facility] Paging/Directory     10/04/2022

## 2022-10-04 NOTE — PROGRESS NOTES
[facility]    Medicine Progress Note - Hospitalist Service, GOLD TEAM 3    Date of Admission:  10/2/2022    Assessment & Plan    Belen Sharma is a 62 year old adult with past medical history of polycystic kidney disease s/p DDKT x 2, congenital hepatic fibrosis s/p liver transplant, recurrent UTIs with recent admission for the same with discharge on 9/16 admitted on 10/2/2022 for management of sepsis 2/2 pyelonephritis of transplant kidney.    # Sepsis 2/2 pyelonephritis of transplanted kidney  # ESBL UTI  Met 3 out of 4 SIRS criteria on admission (temp, HR, WBC count). Patient notes progressive urinary symptoms over past two days following completion of PO antibiotic course with cefdinir on 9/30. UA obtained on same day of last antibiotic dose by outpatient ID provider with growth of ESBL E. Coli susceptible to zosyn and meropenem. Treatment initiated with zosyn in ED-transitioned to meropenem on admission.  - Follow final blood cultures  - Continiue ertapenem 1 gram daily per ID until 10/23/2022  - Ordered PICC line and consultation with case management for consideration of IV infusions of antibiotics at home  - Weekly CBC and CMP to be ordered on discharged and faxed to transplant infectious disease. Please fax results to Dr. Gonzales, fax number [phone].  - I requested Urology referral for urodynamic studies at discharge, Follow up with either Dr. Reyes or Dr. Gonzales in one month.- Transplant ID consulted, appreciate recommendations  - Renal transplant consulted, appreciate recommendations    # Polycystic kidney disease s/p renal transplant  # Congential hepatic fibrosis s/p liver transplant  CMP within normal limits with Cr at baseline.  - Renal transplant consulted  - Continue PTA mycophenolic acid, tacroliumus, prednisone  - Continue PTA bactrim for PJP prophylaxis  - Start sodium bicarb 650mg BID per nephrology    # Nonobstructive CAD  - Continue PTA ASA      Diet: Combination Diet Regular Diet Adult    DVT Prophylaxis: Heparin SQ  [place] Catheter: Not present  Central Lines: None  Cardiac Monitoring: None  Code Status: Full Code      Disposition Plan      Expected Discharge Date: 10/05/2022      Destination: home  Discharge Comments: ON IV meds.  ID Consulting. Will need home IV   [facility] FOLLOWING.        The patient's care was discussed with the Bedside Nurse, Patient and Transplant ID and Transplant Nephrology Consultant.    Massiel Christopher MD  Hospitalist Service, GOLD TEAM 3  [facility]  Securely message with the Vocera Web Console (learn more here)  Text page via [facility] Paging/Directory   Please see signed in provider for up to date coverage information      Clinically Significant Risk Factors Present on Admission     ______________________________________________________________________    Interval History    No reported dysuria, abdominal pain or subjective fever  4 point ROS are otherwise negative    Data reviewed today: I reviewed all medications, new labs and imaging results over the last 24 hours. I personally reviewed no images or EKG's today.    Physical Exam   Vital Signs: Temp: 98  F (36.7  C) Temp src: Oral BP: 131/69 Pulse: 79   Resp: 18 SpO2: 97 % O2 Device: None (Room air)    Weight: 148 lbs 0 oz  General Appearance: Alert, pleasant, NAD.  Respiratory: CTAB. No wheezing or crackles.   Cardiovascular: RRR. Normal S1/S2. No murmur noted.  GI: Soft, non-tender, non-distended. Normoactive bowel sounds.   Skin: No rash or lesions.  Other: No peripheral edema.     Data   Recent Labs   Lab 10/04/22  0615 10/03/22  0935 10/02/22  0628   WBC 8.1 11.0 12.3*   HGB 14.2 13.4 15.1   MCV 91 92 91    241 287   * 137 135*   POTASSIUM 4.0 4.0 3.9   CHLORIDE 105 107 103   CO2 23 20* 23   BUN 10.8 9.8 17.8   CR 0.57 0.66 0.78   ANIONGAP 6* 10 9   EDSON 9.5 9.2 9.5   GLC 97 99 92   ALBUMIN  --   --  3.8    PROTTOTAL  --   --  6.6   BILITOTAL  --   --  0.7   ALKPHOS  --   --  69   ALT  --   --  8*   AST  --   --  23

## 2022-10-04 NOTE — PLAN OF CARE
Goal Outcome Evaluation: HD2 admittedwith sepsis 2/2 polynephritis of txp kidney. A&Ox4, VSS on room air. Denies pain. Up independently. Voiding not saving. Plan is for PICC placement today if blood and urine cultures are negative. PICC ordered, Waiting for placement. PLC came to see pt. They will round back after the PICC is placed.

## 2022-10-04 NOTE — PROGRESS NOTES
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  Patient underwent a second liver transplant 3/3/11.  She appears to be doing well with normal LFTs.  Followed by Hepatology.    # Immunosuppression: Tacrolimus immediate release (goal 4-6), Mycophenolic acid (dose 540 mg every 12 hours) and Prednisone (dose 5 mg daily)   - Changes: No    # Infection Prophylaxis:   - PJP: Sulfa/TMP (Bactrim)  - Asplenia: None    # Hypertension: Controlled;  Goal BP: < 140/90 (Hospitalization goal)   - Volume status: Euvolemic     - Changes: No    # Mineral Bone Disorder:   - Vitamin D; level: Normal        On supplement: No  - Calcium; level: Normal        On supplement: No    # Electrolytes:   - Potassium; level: Normal        On supplement: No  - Bicarbonate; level: Normal        On supplement: No      # Recurrent UTis: Patient has had 4-5 UTIs in the last year or so.  She is followed by Transplant ID outpatient.  Patient has had somewhat ongoing symptoms over the last 6 weeks with one culture only growing mixed urogenital keisha and then was admitted 2 weeks ago for another UTI with urine culture also growing normal keisha.  However, now with ongoing symptoms and recent urine culture positive for E. Coli with significant resistance pattern.  Patient was  started on meropenem, now transitioned to ertapenem. Awaiting blood culture results. PICC line is being considered.  Followed by Transplant ID.              - Appreciate Transplant ID input.              - Recommend Urology evaluation, which can be done as outpatient.     # CAD with Microvascular Angina: Asymptomatic at present.  Coronary CT angiography 1/2019 with minimal, non flow-limiting coronary disease with only a mild focal calcified plaque in the proximal LAD.  Normal LVEF ~ 60-65% on last cardiac echo 8/2020.  Followed by Cardiology.     # PKD: Some back pain at times, unclear if secondary to polycystic kidneys.  Has recurrent UTIs.  Previously seen by Transplant Surgery to discuss bilateral native nephrectomy, but at that time the risks seem to outweigh the benefit.     # CIN3: Follows with Gynecology.     # Severe Vaginal Prolapse: Follows with Gynecology.    # Transplant History:  Etiology of Kidney Failure: Polycystic kidney disease (PKD)  Tx: DDKT and Liver Tx  Transplant: 2/2/2016 (Kidney), 3/3/2011 (Liver), 10/9/2010 (Kidney / Liver)  Significant changes in immunosuppression: None  Significant transplant-related complications: BK Viremia, CMV Viremia and Recurrent UTIs    Recommendations were communicated to the primary team via this note.    Discussed with Dr. Binta Lemos PA-C  Transplant Nephrology   Pager [phone]    Interval History    Darlington's creatinine is 0.57 (10/04 0615); Stable.  Good urine output.  Switched to ertapenem. Awaiting blood culture results.  No events overnight.  No chest pain or shortness of breath.  No leg swelling.  No nausea and vomiting.  Bowel movements are formed.  No fever, sweats or chills.      Review of Systems   4 point ROS was obtained and negative except as noted in the Interval History.    MEDICATIONS:    aspirin  81 mg Oral Daily     ertapenem  1 g Intravenous Q24H     heparin ANTICOAGULANT  5,000 Units Subcutaneous Q12H     mycophenolic acid  540 mg  Oral BID     predniSONE  5 mg Oral Daily     sodium bicarbonate  650 mg Oral BID     sodium chloride (PF)  3 mL Intracatheter Q8H     sulfamethoxazole-trimethoprim  1 tablet Oral Once per day on      tacrolimus  1.5 mg Oral BID IS         Physical Exam   Temp  Av  F (37.2  C)  Min: 97.4  F (36.3  C)  Max: 103.4  F (39.7  C)      Pulse  Av.4  Min: 72  Max: 130 Resp  Av.2  Min: 12  Max: 30  SpO2  Av.9 %  Min: 93 %  Max: 100 %     /52   Pulse 78   Temp 97.6  F (36.4  C) (Oral)   Resp 16   Wt 67.1 kg (148 lb)   SpO2 98%   BMI 21.24 kg/m     Date 10/03/22 07 - 10/04/22 0659   Shift 7618-1800 9810-4964 2560-2925 24 Hour Total   INTAKE   P.O. 240   240   Shift Total(mL/kg) 240(3.58)   240(3.58)   OUTPUT   Shift Total(mL/kg)       Weight (kg) 67.13 67.13 67.13 67.13      Admit Weight: 67.1 kg (148 lb)     GENERAL APPEARANCE: alert and no distress  HENT: mouth without ulcers or lesions  LYMPHATICS: no cervical or supraclavicular nodes  RESP: lungs clear to auscultation - no rales, rhonchi or wheezes  CV: regular rhythm, normal rate, no rub, no murmur  EDEMA: no LE edema bilaterally  ABDOMEN: soft, nondistended, nontender, bowel sounds normal  MS: extremities normal - no gross deformities noted, no evidence of inflammation in joints, no muscle tenderness  SKIN: no rash    Data   All labs reviewed by me.  CMP  Recent Labs   Lab 10/04/22  0615 10/03/22  0935 10/02/22  0628   * 137 135*   POTASSIUM 4.0 4.0 3.9   CHLORIDE 105 107 103   CO2 23 20* 23   ANIONGAP 6* 10 9   GLC 97 99 92   BUN 10.8 9.8 17.8   CR 0.57 0.66 0.78   GFRESTIMATED >90 >90 85   EDSON 9.5 9.2 9.5   PROTTOTAL  --   --  6.6   ALBUMIN  --   --  3.8   BILITOTAL  --   --  0.7   ALKPHOS  --   --  69   AST  --   --  23   ALT  --   --  8*     CBC  Recent Labs   Lab 10/04/22  0615 10/03/22  0935 10/02/22  06   HGB 14.2 13.4 15.1   WBC 8.1 11.0 12.3*   RBC 4.84 4.61 5.11   HCT 43.8 42.3 46.3   MCV 91 92 91   MCH 29.3  29.1 29.5   MCHC 32.4 31.7 32.6   RDW 14.1 14.2 13.9    241 287     INRNo lab results found in last 7 days.  ABGNo lab results found in last 7 days.   Urine Studies  Recent Labs   Lab Test 10/02/22  0632 09/30/22  1324 09/14/22  2218 08/28/22  2104 08/23/22  1257 09/14/21  1539 09/08/21  1341 08/20/21  1050   COLOR Light Yellow Yellow Yellow Yellow Yellow   < > Yellow Yellow   APPEARANCE Slightly Cloudy* Clear Cloudy* Cloudy* Cloudy*   < > Clear Clear   URINEGLC Negative Negative Negative Negative Negative   < > Negative Negative   URINEBILI Negative Negative Negative Negative Negative   < > Negative Negative   URINEKETONE Negative Negative Trace* Negative Negative   < > Negative Negative   SG 1.010 1.010 1.018 1.015 1.010   < > 1.010 1.015   UBLD Trace* Trace* Small* Moderate* Moderate*   < > Trace* Small*   URINEPH 6.5 7.0 6.0 6.0 6.0   < > 7.0 6.5   PROTEIN 10 * Negative 70 * 200 * 30 *   < > Negative Negative   UROBILINOGEN  --  0.2  --   --  0.2  --  0.2 0.2   NITRITE Positive* Negative Negative Negative Negative   < > Negative Negative   LEUKEST Large* Moderate* Large* Large* Moderate*   < > Moderate* Large*   RBCU 13* 2-5* 8* 20* 5-10*   < > 0-2 0-2   WBCU >182* 25-50* >182* >182* >100*   < > 5-10* 5-10*    < > = values in this interval not displayed.     Recent Labs   Lab Test 06/04/21  1130 09/10/20  1037   UTPG 0.34* 0.16     PTH  Recent Labs   Lab Test 02/10/22  0802 09/23/20  1653   PTHI 66 45     Iron Studies  No lab results found.    IMAGING:  All imaging studies reviewed by me.

## 2022-10-04 NOTE — PROGRESS NOTES
Patient A/O x4, independent. PIV infusing NS 75 ml. Temp 98.8. Denies pain or nausea. Walking in room, voiding spontaneously. Started new IV antibiotic Ertapenem this evening. Ate dinner. Will  Continue with POC.

## 2022-10-04 NOTE — PLAN OF CARE
Goal Outcome Evaluation:    VSS.  Denies pain.  Passing flatus and having bowel movements.  UAL.  Voiding, not saving.  Denies urinary symptoms.  Plan is for PICC line placement today (no order yet).  Has had PICC line in the past for IV antibiotics.  Will just need a refresher on cares.  Anticipate discharge tomorrow to home with home care to follow.

## 2022-10-05 ENCOUNTER — APPOINTMENT (OUTPATIENT)
Dept: EDUCATION SERVICES | Facility: CLINIC | Age: 62
DRG: 698 | End: 2022-10-05
Attending: STUDENT IN AN ORGANIZED HEALTH CARE EDUCATION/TRAINING PROGRAM
Payer: MEDICARE

## 2022-10-05 ENCOUNTER — APPOINTMENT (OUTPATIENT)
Dept: INTERVENTIONAL RADIOLOGY/VASCULAR | Facility: CLINIC | Age: 62
DRG: 698 | End: 2022-10-05
Attending: PHYSICIAN ASSISTANT
Payer: MEDICARE

## 2022-10-05 VITALS
SYSTOLIC BLOOD PRESSURE: 126 MMHG | TEMPERATURE: 98.2 F | RESPIRATION RATE: 22 BRPM | DIASTOLIC BLOOD PRESSURE: 53 MMHG | HEART RATE: 79 BPM | BODY MASS INDEX: 21.24 KG/M2 | WEIGHT: 148 LBS | OXYGEN SATURATION: 95 %

## 2022-10-05 LAB
ALBUMIN SERPL BCG-MCNC: 3.4 G/DL (ref 3.5–5.2)
ALP SERPL-CCNC: 66 U/L (ref 35–129)
ALT SERPL W P-5'-P-CCNC: <5 U/L (ref 10–50)
ANION GAP SERPL CALCULATED.3IONS-SCNC: 9 MMOL/L (ref 7–15)
AST SERPL W P-5'-P-CCNC: 15 U/L (ref 10–50)
BASOPHILS # BLD AUTO: 0 10E3/UL (ref 0–0.2)
BASOPHILS NFR BLD AUTO: 0 %
BILIRUB SERPL-MCNC: 0.4 MG/DL
BUN SERPL-MCNC: 13.7 MG/DL (ref 8–23)
CALCIUM SERPL-MCNC: 9.7 MG/DL (ref 8.8–10.2)
CHLORIDE SERPL-SCNC: 104 MMOL/L (ref 98–107)
CREAT SERPL-MCNC: 0.6 MG/DL (ref 0.51–1.17)
CRP SERPL-MCNC: 20.6 MG/L
DEPRECATED HCO3 PLAS-SCNC: 23 MMOL/L (ref 22–29)
EOSINOPHIL # BLD AUTO: 0.2 10E3/UL (ref 0–0.7)
EOSINOPHIL NFR BLD AUTO: 2 %
ERYTHROCYTE [DISTWIDTH] IN BLOOD BY AUTOMATED COUNT: 14 % (ref 10–15)
GFR SERPL CREATININE-BSD FRML MDRD: >90 ML/MIN/1.73M2
GLUCOSE SERPL-MCNC: 91 MG/DL (ref 70–99)
HCT VFR BLD AUTO: 46.1 % (ref 35–53)
HGB BLD-MCNC: 14.6 G/DL (ref 11.7–17.7)
IMM GRANULOCYTES # BLD: 0 10E3/UL
IMM GRANULOCYTES NFR BLD: 0 %
LYMPHOCYTES # BLD AUTO: 2.3 10E3/UL (ref 0.8–5.3)
LYMPHOCYTES NFR BLD AUTO: 31 %
MCH RBC QN AUTO: 29.1 PG (ref 26.5–33)
MCHC RBC AUTO-ENTMCNC: 31.7 G/DL (ref 31.5–36.5)
MCV RBC AUTO: 92 FL (ref 78–100)
MONOCYTES # BLD AUTO: 1.1 10E3/UL (ref 0–1.3)
MONOCYTES NFR BLD AUTO: 15 %
NEUTROPHILS # BLD AUTO: 3.7 10E3/UL (ref 1.6–8.3)
NEUTROPHILS NFR BLD AUTO: 52 %
NRBC # BLD AUTO: 0 10E3/UL
NRBC BLD AUTO-RTO: 0 /100
PLATELET # BLD AUTO: 271 10E3/UL (ref 150–450)
POTASSIUM SERPL-SCNC: 4.2 MMOL/L (ref 3.4–5.3)
PROT SERPL-MCNC: 6.2 G/DL (ref 6.4–8.3)
RBC # BLD AUTO: 5.02 10E6/UL (ref 3.8–5.9)
SODIUM SERPL-SCNC: 136 MMOL/L (ref 136–145)
WBC # BLD AUTO: 7.3 10E3/UL (ref 4–11)

## 2022-10-05 PROCEDURE — 02H633Z INSERTION OF INFUSION DEVICE INTO RIGHT ATRIUM, PERCUTANEOUS APPROACH: ICD-10-PCS | Performed by: PHYSICIAN ASSISTANT

## 2022-10-05 PROCEDURE — 250N000011 HC RX IP 250 OP 636: Performed by: STUDENT IN AN ORGANIZED HEALTH CARE EDUCATION/TRAINING PROGRAM

## 2022-10-05 PROCEDURE — 36573 INSJ PICC RS&I 5 YR+: CPT

## 2022-10-05 PROCEDURE — 82040 ASSAY OF SERUM ALBUMIN: CPT | Performed by: INTERNAL MEDICINE

## 2022-10-05 PROCEDURE — 36573 INSJ PICC RS&I 5 YR+: CPT | Mod: RT | Performed by: PHYSICIAN ASSISTANT

## 2022-10-05 PROCEDURE — 250N000013 HC RX MED GY IP 250 OP 250 PS 637: Performed by: STUDENT IN AN ORGANIZED HEALTH CARE EDUCATION/TRAINING PROGRAM

## 2022-10-05 PROCEDURE — 250N000012 HC RX MED GY IP 250 OP 636 PS 637: Performed by: STUDENT IN AN ORGANIZED HEALTH CARE EDUCATION/TRAINING PROGRAM

## 2022-10-05 PROCEDURE — 80053 COMPREHEN METABOLIC PANEL: CPT | Performed by: INTERNAL MEDICINE

## 2022-10-05 PROCEDURE — 85025 COMPLETE CBC W/AUTO DIFF WBC: CPT | Performed by: INTERNAL MEDICINE

## 2022-10-05 PROCEDURE — 999N000147 HC STATISTIC PT IP EVAL DEFER

## 2022-10-05 PROCEDURE — 258N000003 HC RX IP 258 OP 636: Performed by: INTERNAL MEDICINE

## 2022-10-05 PROCEDURE — 99239 HOSP IP/OBS DSCHRG MGMT >30: CPT | Performed by: INTERNAL MEDICINE

## 2022-10-05 PROCEDURE — 36415 COLL VENOUS BLD VENIPUNCTURE: CPT | Performed by: INTERNAL MEDICINE

## 2022-10-05 PROCEDURE — C1751 CATH, INF, PER/CENT/MIDLINE: HCPCS

## 2022-10-05 PROCEDURE — 99232 SBSQ HOSP IP/OBS MODERATE 35: CPT | Mod: GC | Performed by: INTERNAL MEDICINE

## 2022-10-05 PROCEDURE — 86140 C-REACTIVE PROTEIN: CPT | Performed by: INTERNAL MEDICINE

## 2022-10-05 RX ORDER — MEPERIDINE HYDROCHLORIDE 25 MG/ML
25 INJECTION INTRAMUSCULAR; INTRAVENOUS; SUBCUTANEOUS EVERY 30 MIN PRN
Status: CANCELLED | OUTPATIENT
Start: 2022-10-06

## 2022-10-05 RX ORDER — ALBUTEROL SULFATE 0.83 MG/ML
2.5 SOLUTION RESPIRATORY (INHALATION)
Status: CANCELLED | OUTPATIENT
Start: 2022-10-06

## 2022-10-05 RX ORDER — SODIUM BICARBONATE 650 MG/1
650 TABLET ORAL 2 TIMES DAILY
Qty: 60 TABLET | Refills: 0 | Status: SHIPPED | OUTPATIENT
Start: 2022-10-05 | End: 2022-11-14

## 2022-10-05 RX ORDER — DIPHENHYDRAMINE HYDROCHLORIDE 50 MG/ML
50 INJECTION INTRAMUSCULAR; INTRAVENOUS
Status: CANCELLED
Start: 2022-10-06

## 2022-10-05 RX ORDER — HEPARIN SODIUM,PORCINE 10 UNIT/ML
5 VIAL (ML) INTRAVENOUS
Status: CANCELLED | OUTPATIENT
Start: 2022-10-06

## 2022-10-05 RX ORDER — ERTAPENEM 1 G/1
1 INJECTION, POWDER, LYOPHILIZED, FOR SOLUTION INTRAMUSCULAR; INTRAVENOUS EVERY 24 HOURS
DISCHARGE
Start: 2022-10-05 | End: 2022-11-14

## 2022-10-05 RX ORDER — HEPARIN SODIUM (PORCINE) LOCK FLUSH IV SOLN 100 UNIT/ML 100 UNIT/ML
5 SOLUTION INTRAVENOUS
Status: CANCELLED | OUTPATIENT
Start: 2022-10-06

## 2022-10-05 RX ORDER — ERTAPENEM 1 G/1
1 INJECTION, POWDER, LYOPHILIZED, FOR SOLUTION INTRAMUSCULAR; INTRAVENOUS ONCE
Status: CANCELLED | OUTPATIENT
Start: 2022-10-07 | End: 2022-10-07

## 2022-10-05 RX ADMIN — TACROLIMUS 1.5 MG: 1 CAPSULE ORAL at 08:11

## 2022-10-05 RX ADMIN — ACETAMINOPHEN 650 MG: 325 TABLET, FILM COATED ORAL at 08:45

## 2022-10-05 RX ADMIN — ERTAPENEM SODIUM 1 G: 1 INJECTION, POWDER, LYOPHILIZED, FOR SOLUTION INTRAMUSCULAR; INTRAVENOUS at 17:11

## 2022-10-05 RX ADMIN — SODIUM CHLORIDE, POTASSIUM CHLORIDE, SODIUM LACTATE AND CALCIUM CHLORIDE 1000 ML: 600; 310; 30; 20 INJECTION, SOLUTION INTRAVENOUS at 16:34

## 2022-10-05 RX ADMIN — MYCOPHENOLIC ACID 540 MG: 360 TABLET, DELAYED RELEASE ORAL at 08:11

## 2022-10-05 RX ADMIN — SULFAMETHOXAZOLE AND TRIMETHOPRIM 1 TABLET: 400; 80 TABLET ORAL at 08:10

## 2022-10-05 RX ADMIN — PREDNISONE 5 MG: 5 TABLET ORAL at 08:11

## 2022-10-05 ASSESSMENT — ACTIVITIES OF DAILY LIVING (ADL)
ADLS_ACUITY_SCORE: 20
ADLS_ACUITY_SCORE: 24
ADLS_ACUITY_SCORE: 20
ADLS_ACUITY_SCORE: 20
ADLS_ACUITY_SCORE: 24

## 2022-10-05 NOTE — PROGRESS NOTES
Care Management Discharge Note    Discharge Date: 10/05/2022     Discharge Disposition:  Home and IV antibiotic as prescribed.    Discharge Services:  See below-  Outpatient Infusion Plan      Additional Information:  Outpatient Infusion Plan:    *Outpatient Infusion Center for Prescribed IV Antibiotic    Place:  [facility], 2nd [facility].    Address:  [address] [place], [place] [id number]    Date of first appt:  Thursday October 6, 2022-All consecutive appointments will be given to you at your first appointment.    Time:  2:30 p.m.    Please arrive 15 minutes prior to the above time, wear a mask and bring your insurance information and [place] Identification.  Thank you.  __________________    Inpatient cares continue through discharge later today.  Patient will follow up in clinic as designated.    MICKEY Vargas.S.N., R.N., P.H.N..  Care Coordinator     Pager: [phone]/Phone: [phone]  [facility]

## 2022-10-05 NOTE — PLAN OF CARE
[id number] Alert and oriented x4. PIV saline locked between IV abx. Denies pain and nausea. On regular diet. Up ad marcela. Afebrile. Soft BP, does not meet parameters to notify team. OVSS on room air. Plans for PICC placement and PLC appointment today for PICC and IV abx teaching.

## 2022-10-05 NOTE — PHARMACY
[facility], [facility]  Parenteral ANtibiotic Review at Departure from Acute Care [facility]     Antimicrobial Stewardship Program - A joint venture between [facility] and  Physicians to optimize antibiotic management.  NOT a formal consult - Restricted Antimicrobial Review     Patient: Belen Sharma  MRN: [id number]  Allergies: Ibuprofen    Brief Summary: Belen Sharma is a 62 year old adult with history of PCKD and congenital hepatic fibrosis s/p kidney and liver transplant in [place] (2010) c/b hepatic artery thrombosis, recurrent infection, kidney [facility], and, unfortunately, both organs failed, second liver transplant (3/3/2011) and second kidney transplant (2/2/2016) s/p splenectomy and distal pancreatectomy (2013), previous BK viremia, and recurrent UTIs (has isolated Kleb pneumoniae, E.faecalis, and ESBL E.coli from urine) who was admitted on 10/2/2022 with sepsis secondary to pyelonephritis of kidney graft.  Most recently, patient completed a course with cefdinir (ended 9/30) and subsequently developed progressive urinary symptoms. A urine culture obtained on 9/30 isolated >10k ESBL E.coli (UA was inflammatory). 9/29 CT a/p showed urothelial thickening and mild ectasia of the R distal renal transplanted ureter extending to the ureterovesical anastomosis slightly increased compared to prior. Patient was transitioned to ertapenem monotherapy with plans in place to complete an extended course as an outpatient. She remains on Bactrim prophylaxis.      Antimicrobial Dose/Route/Frequency Duration/Indication Start Date End Date   Ertapenem 1 g/IV/every 24 hours 3 weeks/pyelonephritis (ESBL E.coli) 10/2/2022 10/23/2022     Laboratory Tests: CBC with Diff, CMP   Lab Monitoring Frequency: 1x week   Therapeutic Drug Monitoring: none     Therapeutic Drug Monitoring Frequency: none     Miscellaneous Drug Monitoring: none       Line Type: PICC (Single lumen, placed  10/5/2022)    Reassess Line/Pull Line Date: PICC may be pulled after last ertapenem dose    First Dose Received in Controlled Setting: Yes    Designated Provider: Dr. Catrachita Gonzales will follow labs at discharge until outpatient follow-up    Follow-up: Patient does  have outpatient ID follow-up. Appointment date/time: 11/1/2022 @ 5:30 PM.    Recommendations/Additional Information:   Please fax laboratory results to [facility] ([phone]), attn: Maikol Stewart and Dr. Christian Madrigal, PharmD, BCIDP  Pager: [phone]    Vital Signs/Clinical Features:  Vitals  Report        10/03 0700  10/04 0659 10/04 0700  10/05 0659 10/05 0700  10/05 1454   Most Recent      Temp ( F) 97.4 -  99    97.6 -  98       97.8 (36.6) 10/04 2242    Pulse 80 -  82    75 -  79    73 -  96     76 10/05 1345    Resp   18    16 -  18    9 - 30     26 10/05 1345    /52 -  106/46    97/48 -  131/69    85/53 -  134/63     134/63 10/05 1345    SpO2 (%) 94 -  95    96 -  98    95 -  96     95 10/05 1345            Labs  Estimated Creatinine Clearance (based on SCr of 0.6 mg/dL)  Female: 103 mL/min  Male: 121.2 mL/min  Recent Labs   Lab Test 09/16/22  1015 09/26/22  0813 10/02/22  0628 10/03/22  0935 10/04/22  0615 10/05/22  0648   CR 0.57 0.72 0.78 0.66 0.57 0.60       Recent Labs   Lab Test 12/16/20  2236 12/18/20  0611 12/21/20  1545 12/28/20  0855 12/28/20  1515 03/11/21  1007 09/16/22  1015 09/26/22  0813 10/02/22  0628 10/03/22  0935 10/04/22  0615 10/05/22  0648   WBC 11.4*   < > 6.2   < > 9.2   < > 8.1 8.2 12.3* 11.0 8.1 7.3   ANEU 9.3*  --  3.9  --  6.4  --   --   --   --   --   --   --    ALYM 1.0  --  1.7  --  2.1  --   --   --   --   --   --   --    PABLO 1.0  --  0.6  --  0.6  --   --   --   --   --   --   --    AEOS 0.0  --  0.0  --  0.0  --   --   --   --   --   --   --    HGB 16.9*   < > 16.2*   < > 16.6*   < > 14.4 15.1 15.1 13.4 14.2 14.6   HCT 54.5*   < > 49.2*   < > 50.8*   < > 45.4 45.8 46.3 42.3 43.8 46.1    MCV 96   < > 93   < > 92   < > 93 91 91 92 91 92      < > 261   < > 308   < > 205 267 287 241 248 271    < > = values in this interval not displayed.       Recent Labs   Lab Test 09/12/22  0806 09/14/22 2220 09/16/22  1015 09/26/22  0813 10/02/22  0628 10/05/22  0648   BILITOTAL 0.7 0.6 0.3 0.6 0.7 0.4   ALKPHOS 71 72 57 69 69 66   ALBUMIN 3.9 4.1 3.2* 3.9 3.8 3.4*   AST 21 21 16 18 23 15   ALT 18 12 8* 13 8* <5*       Recent Labs   Lab Test 12/16/20 2236 12/21/20  1545 12/28/20  1515 11/04/21  1457 09/14/22 2220 09/14/22  2230 10/02/22  0628 10/04/22  0615 10/05/22  0648   PCAL  --   --   --   --   --   --   --  0.06*  --    LACT 0.8  --   --   --   --  0.9 0.8  --   --    CRP  --  18.0* 13.0* 39.3* 21.60*  --   --  39.70* 20.60*   SED  --  10 8 14 8  --   --   --   --        Recent Labs   Lab Test 12/18/20  0611 12/19/20  0707 10/02/22  0628   TACROL  --    < > 4.0*   MPACID 1.32  --   --    MPAG 23.7*  --   --     < > = values in this interval not displayed.       Culture Results:  7-Day Micro Results       Procedure Component Value Units Date/Time    Respiratory Aerobic Bacterial Culture     Order Status: Sent Lab Status: No result     Specimen: Sputum from Expectorate     Urine Culture [[id number]]  (Abnormal) Collected: 10/02/22 0632    Order Status: Completed Lab Status: Final result Updated: 10/03/22 0755    Specimen: Urine, Midstream      Culture >100,000 CFU/mL Escherichia coli     Comment: Susceptibilities done on previous cultures       Blood Culture Peripheral Blood [[id number]]  (Normal) Collected: 10/02/22 0628    Order Status: Completed Lab Status: Preliminary result Updated: 10/05/22 0804    Specimen: Peripheral Blood      Culture No growth after 3 days    Blood Culture Peripheral Blood [[id number]]  (Normal) Collected: 10/02/22 0628    Order Status: Completed Lab Status: Preliminary result Updated: 10/05/22 0804    Specimen: Peripheral Blood      Culture No growth after 3 days     Urine Culture [[id number]]  (Abnormal)  (Susceptibility) Collected: 09/30/22 1324    Order Status: Completed Lab Status: Edited Result - FINAL Updated: 10/04/22 0913    Specimen: Urine, Clean Catch      Culture >100,000 CFU/mL Escherichia coli ESBL    Narrative:      Susceptibility testing requested by Dr Gonzales pager [phone] requesting the additional testing of Fosfomycin.     Susceptibility       Escherichia coli ESBL (1)       Antibiotic Interpretation Sensitivity   Method Status      Ampicillin Resistant >=32 ug/mL ZAHIRA Final     Ampicillin/ Sulbactam  [*]  Susceptible 4 ug/mL ZAHIRA Final     Piperacillin/Tazobactam Susceptible <=4 ug/mL ZAHIRA Final     Cefazolin Resistant >=64 ug/mL ZAHIRA Final      Cefazolin ZAHIRA breakpoints are for the treatment of uncomplicated urinary tract infections. For the treatment of systemic infections, please contact the laboratory for additional testing.        Cefoxitin Susceptible <=4 ug/mL ZAHIRA Final     Ceftazidime Resistant   ZAHIRA Final     Ceftriaxone Resistant   ZAHIRA Final     Cefepime Resistant   ZAHIRA Final     Extended Spectrum Beta-Lactamase  [*]  ESBL Positive Positive ug/mL ZAHIRA Final     Meropenem Susceptible <=0.25 ug/mL ZAHIRA Final      Enterobacterales that are susceptible to meropenem are usually susceptible to ertapenem.        Amikacin  [*]  Susceptible <=2 ug/mL ZAHIRA Final     Gentamicin Susceptible <=1 ug/mL ZAHIRA Final     Tobramycin Susceptible <=1 ug/mL ZAHIRA Final     Ciprofloxacin Resistant >=4 ug/mL ZAHIRA Final     Levofloxacin Resistant >=8 ug/mL ZAHIRA Final     Nitrofurantoin Susceptible <=16 ug/mL ZAHIRA Final     Trimethoprim/Sulfamethoxazole Resistant >16/304 ug/mL ZAHIRA Final     Cefotaxime  [*]  Resistant   ZAHIRA Final     Fosfomycin Susceptible 1.0 ug/mL ZAHIRA Final     Susceptibility Comments       ESBL (extended spectrum beta lactamase) producing organisms require contact precautions.  ESBL (extended spectrum beta lactamase) producing organisms require contact precautions.                        [*]  Suppressed Antibiotic                           Recent Labs   Lab Test 08/23/22  1257 08/28/22  2104 09/14/22  2218 09/30/22  1324 10/02/22  0632   URINEPH 6.0 6.0 6.0 7.0 6.5   NITRITE Negative Negative Negative Negative Positive*   LEUKEST Moderate* Large* Large* Moderate* Large*   WBCU >100* >182* >182* 25-50* >182*             Recent Labs   Lab Test 11/04/21  1408   IFLUA Not Detected   FLUAH1 Not Detected   FLUAH3 Not Detected   [id number] Not Detected   IFLUB Not Detected   RSVA Not Detected   RSVB Not Detected   PIV1 Not Detected   PIV2 Not Detected   PIV3 Not Detected   HMPV Not Detected             Imaging: XR Chest Port 1 View    Result Date: 10/2/2022  EXAM: XR CHEST PORT 1 VIEW LOCATION: [facility] DATE/TIME: 10/2/2022 6:56 AM INDICATION: fever COMPARISON: 9/14/2022     IMPRESSION: The heart is normal in size and contour. There is mild central pulmonary congestion. Subtle increased interstitial opacities are seen in the right lung base, a component of this may reflect pulmonary edema a superimposed atypical infectious process such as viral pneumonia cannot be excluded.    CT Abdomen Pelvis w Contrast    Result Date: 9/30/2022  EXAMINATION: CT ABDOMEN PELVIS W CONTRAST  9/29/2022 4:36 PM  CLINICAL HISTORY: History of kidney transplant w/ cysts, mass next to ovary compressing ureter. recurrent pyuria and UTIs.; Recurrent UTI; Cyst of right ovary; Occlusion of ureter by external compression; Kidney cysts; Kidney transplant recipient COMPARISON: Ultrasound 9/10/2021, CT 12/17/2020 PROCEDURE COMMENTS: CT of the abdomen was performed with Isovue 370 89cc intravenous and oral contrast. Coronal and sagittal reformatted images were obtained. FINDINGS: Lower thorax: No suspicious findings. Abdomen and pelvis: Postoperative changes of liver transplant. Prominence of common bile duct. No significant intrahepatic biliary dilation. Postoperative  changes of partial pancreatectomy. The spleen is surgically absent with a large splenule remaining. The adrenal glands are normal. Enlarged polycystic kidneys similar to previous. Right lower quadrant renal transplant, no hydronephrosis. Mild hydroureter and for urothelial thickening and increased enhancement of the distal ureter. The urinary bladder is moderately distended, unremarkable. Pessary noted. There is a 4.1 cm fluid attenuating right adnexal cyst similar to previous. Normal caliber small and large bowel. Moderate colonic stool burden. Colonic diverticulosis without adjacent inflammatory change. No intra-abdominal free air or free fluid. No abnormally enlarged lymph nodes. The major intra-abdominal vasculature is patent. Dilated esophageal varices. Osseous structures: No acute or suspicious osseous lesions.     IMPRESSION: 1. Urothelial thickening and mild ectasia of the distal right renal transplant ureter extending to the ureterovesical anastomosis, slightly increased compared to previous CT on 12/17/2020. Findings may represent acute or chronic sequela of inflammation/infection. No hydronephrosis or evidence of obstruction. 2. Simple right adnexal cyst is not significantly changed compared to previous without internal complexity on comparison ultrasound, likely benign. No dedicated imaging follow-up recommended. 3. Stable postoperative changes of liver transplant. 4. Stable enlarged polycystic kidneys. I have personally reviewed the examination and initial interpretation and I agree with the findings. YADIRA KIM,    SYSTEM ID:  [id number]

## 2022-10-05 NOTE — PROGRESS NOTES
Patient Name: Belen Sharma  Medical Record Number: [id number]  Today's Date: 10/5/2022    Procedure: image guided peripherally inserted central catheter palcement  Proceduralist: Antony GARCIA  Pathology present: NA    Procedure Start: 1345  Procedure end: 1355  Sedation medications administered: None     Report given to: 7C   : RENATO    Other Notes: Pt arrived to IR room 4 from [facility]. Consent reviewed. Pt denies any questions or concerns regarding procedure. Pt positioned Supine and monitored per protocol. Pt tolerated procedure without any noted complications. Pt transferred back to [facility].

## 2022-10-05 NOTE — PROCEDURES
Interventional Radiology Brief Post Procedure Note    Procedure: IR PICC PLACEMENT > 5 YRS OF AGE    Proceduralist: Antony Manuel PA-C    Assistant: None    Time Out: Prior to the start of the procedure and with procedural staff participation, I verbally confirmed the patient s identity using two indicators, relevant allergies, that the procedure was appropriate and matched the consent or emergent situation, and that the correct equipment/implants were available. Immediately prior to starting the procedure I conducted the Time Out with the procedural staff and re-confirmed the patient s name, procedure, and site/side. (The Joint Commission universal protocol was followed.)  Yes    Medications   Medication Event Details Admin User Admin Time       Sedation: None. Local Anesthestic used    Findings: Completed image-guided placement of 4 Singaporean, 39.5 cm single lumen peripherally inserted central venous catheter via right basilic. Aspirates and flushes freely, heparin locked and ready for immediate use. Tip in high right atrium.    Estimated Blood Loss: Minimal    Fluoroscopy Time: 0.3 minute(s)    SPECIMENS: None    Complications: 1. None     Condition: Stable    Plan: Follow-up per primary team.    Comments: See dictated procedure note for full details.    Antony Manuel PA-C

## 2022-10-05 NOTE — PROGRESS NOTES
Vascular Access Services Notes:    PICC insertion to be deferred to IR due to history unsuccessful attempts by VAS & complicated insertion by IR (12/19/2020). Pt has a LUE AV fistula & occlusion of the right subclavian vein per IR. Ordering provider & 7C primary RN informed.        Siva Alves, DENISAN, RN VA-BC

## 2022-10-05 NOTE — PLAN OF CARE
Goal Outcome Evaluation: Adequate for discharge.   NEURO: Alert and oriented, Able to make needs known.   RESPIRATORY: on room air. Denies shortness of breath.   CARDIAC: Continue to have soft BP.   GI/: Voiding spontaneously, no BM reported.    DIET: Regular diet, tolerating well.   PAIN/NAUSEA: Headache 7/10. Tylenol given x1.   INCISION/DRAINS: None   IV ACCESS: PICC line. PIV removed upon discharge.   ACTIVITY: up ad marcela.   LAB: Reviewed.   PLAN: atrapenum abx give. Pt discharge home. AVS reviewed and signed. Pt brought down via wheelchair by staff.

## 2022-10-05 NOTE — DISCHARGE INSTRUCTIONS
Outpatient Infusion Center for Prescribed IV Antibiotic    Place:  [facility], 2nd Floor.    Address:  [address] [place] [address]    Phone:  [phone]    Date of first appt:  Thursday October 6, 2022-All consecutive appointments will be given to you at your first appointment.    Time:  2:30 p.m.    Please arrive 15 minutes prior to the above time, wear a mask and bring your insurance information and [place] Identification.  Thank you.

## 2022-10-05 NOTE — CONSULTS
Met with patient at the bedside for PICC and IV med administration education. I demonstrated on a model flushing and administration of IV antibiotic using the IV push method. Belen began to return demonstrate on the model but we were unable to finish due to IR coming for the patient to take her for PICC placement. Care coordinator updated that we were unable to finish education.    Literature given: Handwashing and Skin Care, Getting Ready for Your PICC, Caring for Your PICC at Home, Flushing the Line with Heparin, Saline or Citrate.

## 2022-10-05 NOTE — PROGRESS NOTES
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  Patient underwent a second liver transplant 3/3/11.  She appears to be doing well with normal LFTs.  Followed by Hepatology.    # Immunosuppression: Tacrolimus immediate release (goal 4-6), Mycophenolic acid (dose 540 mg every 12 hours) and Prednisone (dose 5 mg daily)   - Changes: No    # Infection Prophylaxis:   - PJP: Sulfa/TMP (Bactrim)  - Asplenia: None    # Hypertension: Controlled;  Goal BP: < 140/90 (Hospitalization goal)   - Volume status: Euvolemic     - Changes: No    # Mineral Bone Disorder:   - Vitamin D; level: Normal        On supplement: No  - Calcium; level: Normal        On supplement: No    # Electrolytes:   - Potassium; level: Normal        On supplement: No  - Bicarbonate; level: Normal        On supplement: No    # Recurrent UTis: Patient has had 4-5 UTIs in the last year or so.  She is followed by Transplant ID outpatient.  Patient has had somewhat ongoing symptoms over the last 6 weeks with one culture only growing mixed urogenital keisha and then was admitted 2 weeks ago for another UTI with urine culture also growing normal keisha.  However, now with ongoing symptoms and recent urine culture positive for E. Coli with significant resistance pattern.  Patient was  started on meropenem, now transitioned to ertapenem. Blood cultures no growth so far. PICC line is being placed.  Followed by Transplant ID.              - Appreciate Transplant ID input.              - Recommend Urology evaluation, which can be done as outpatient.     # CAD with Microvascular Angina: Asymptomatic at present.  Coronary CT angiography 1/2019 with minimal, non flow-limiting coronary disease with only a mild focal calcified plaque in the proximal LAD.  Normal LVEF ~ 60-65% on last cardiac echo 8/2020.  Followed by Cardiology.     # PKD: Some back pain at times, unclear if secondary to polycystic kidneys.  Has recurrent UTIs.  Previously seen by Transplant Surgery to discuss bilateral native nephrectomy, but at that time the risks seem to outweigh the benefit.     # CIN3: Follows with Gynecology.     # Severe Vaginal Prolapse: Follows with Gynecology.    # Transplant History:  Etiology of Kidney Failure: Polycystic kidney disease (PKD)  Tx: DDKT and Liver Tx  Transplant: 2/2/2016 (Kidney), 3/3/2011 (Liver), 10/9/2010 (Kidney / Liver)  Significant changes in immunosuppression: None  Significant transplant-related complications: BK Viremia, CMV Viremia and Recurrent UTIs    Recommendations were communicated to the primary team via this note.    Seen and discussed with Dr. Binta Sellers  Transplant Nephrology   Pager [phone]    Attestation:  This patient has been seen and evaluated by me, Oleg Judd MD.  I have reviewed the note and agree with plan of care as documented by the fellow.       Interval History    Zachary's creatinine is 0.6 (10/05); Stable.  Good urine output.  On ertapenem, plans to undergo PICC line placement today  No events overnight.  No chest pain or shortness of breath.  No leg swelling.  No nausea and vomiting.  Bowel movements are formed.  No fever, sweats or chills.    Review of Systems   4 point ROS was obtained and negative except as noted in the Interval  History.    MEDICATIONS:    aspirin  81 mg Oral Daily     ertapenem  1 g Intravenous Q24H     [Held by provider] heparin ANTICOAGULANT  5,000 Units Subcutaneous Q12H     lactated ringers  1,000 mL Intravenous Once     mycophenolic acid  540 mg Oral BID     predniSONE  5 mg Oral Daily     sodium bicarbonate  650 mg Oral BID     sodium chloride (PF)  3 mL Intracatheter Q8H     sulfamethoxazole-trimethoprim  1 tablet Oral Once per day on      tacrolimus  1.5 mg Oral BID IS         Physical Exam   Temp  Av  F (37.2  C)  Min: 97.4  F (36.3  C)  Max: 103.4  F (39.7  C)      Pulse  Av.4  Min: 72  Max: 130 Resp  Av.2  Min: 12  Max: 30  SpO2  Av.9 %  Min: 93 %  Max: 100 %     /63   Pulse 76   Temp 97.8  F (36.6  C) (Oral)   Resp 26   Wt 67.1 kg (148 lb)   SpO2 95%   BMI 21.24 kg/m     Date 10/03/22 0700 - 10/04/22 0659   Shift 6191-9206 3244-2341 8555-3474 24 Hour Total   INTAKE   P.O. 240   240   Shift Total(mL/kg) 240(3.58)   240(3.58)   OUTPUT   Shift Total(mL/kg)       Weight (kg) 67.13 67.13 67.13 67.13      Admit Weight: 67.1 kg (148 lb)     GENERAL APPEARANCE: alert and no distress  HENT: mouth without ulcers or lesions  LYMPHATICS: no cervical or supraclavicular nodes  RESP: lungs clear to auscultation - no rales, rhonchi or wheezes  CV: regular rhythm, normal rate, no rub, no murmur  EDEMA: no LE edema bilaterally  ABDOMEN: soft, nondistended, nontender, bowel sounds normal  MS: extremities normal - no gross deformities noted, no evidence of inflammation in joints, no muscle tenderness  SKIN: no rash    Data   All labs reviewed by me.  CMP  Recent Labs   Lab 10/05/22  0648 10/04/22  0615 10/03/22  0935 10/02/22  0628    134* 137 135*   POTASSIUM 4.2 4.0 4.0 3.9   CHLORIDE 104 105 107 103   CO2 23 23 20* 23   ANIONGAP 9 6* 10 9   GLC 91 97 99 92   BUN 13.7 10.8 9.8 17.8   CR 0.60 0.57 0.66 0.78   GFRESTIMATED >90 >90 >90 85   EDSON 9.7 9.5 9.2 9.5   PROTTOTAL 6.2*  --    --  6.6   ALBUMIN 3.4*  --   --  3.8   BILITOTAL 0.4  --   --  0.7   ALKPHOS 66  --   --  69   AST 15  --   --  23   ALT <5*  --   --  8*     CBC  Recent Labs   Lab 10/05/22  0648 10/04/22  0615 10/03/22  0935 10/02/22  0628   HGB 14.6 14.2 13.4 15.1   WBC 7.3 8.1 11.0 12.3*   RBC 5.02 4.84 4.61 5.11   HCT 46.1 43.8 42.3 46.3   MCV 92 91 92 91   MCH 29.1 29.3 29.1 29.5   MCHC 31.7 32.4 31.7 32.6   RDW 14.0 14.1 14.2 13.9    248 241 287     INRNo lab results found in last 7 days.  ABGNo lab results found in last 7 days.   Urine Studies  Recent Labs   Lab Test 10/02/22  0632 09/30/22  1324 09/14/22  2218 08/28/22  2104 08/23/22  1257 09/14/21  1539 09/08/21  1341 08/20/21  1050   COLOR Light Yellow Yellow Yellow Yellow Yellow   < > Yellow Yellow   APPEARANCE Slightly Cloudy* Clear Cloudy* Cloudy* Cloudy*   < > Clear Clear   URINEGLC Negative Negative Negative Negative Negative   < > Negative Negative   URINEBILI Negative Negative Negative Negative Negative   < > Negative Negative   URINEKETONE Negative Negative Trace* Negative Negative   < > Negative Negative   SG 1.010 1.010 1.018 1.015 1.010   < > 1.010 1.015   UBLD Trace* Trace* Small* Moderate* Moderate*   < > Trace* Small*   URINEPH 6.5 7.0 6.0 6.0 6.0   < > 7.0 6.5   PROTEIN 10 * Negative 70 * 200 * 30 *   < > Negative Negative   UROBILINOGEN  --  0.2  --   --  0.2  --  0.2 0.2   NITRITE Positive* Negative Negative Negative Negative   < > Negative Negative   LEUKEST Large* Moderate* Large* Large* Moderate*   < > Moderate* Large*   RBCU 13* 2-5* 8* 20* 5-10*   < > 0-2 0-2   WBCU >182* 25-50* >182* >182* >100*   < > 5-10* 5-10*    < > = values in this interval not displayed.     Recent Labs   Lab Test 06/04/21  1130 09/10/20  1037   UTPG 0.34* 0.16     PTH  Recent Labs   Lab Test 02/10/22  0802 09/23/20  1653   PTHI 66 45     Iron Studies  No lab results found.    IMAGING:  All imaging studies reviewed by me.

## 2022-10-05 NOTE — CONSULTS
Interventional Radiology Consult Service Note  10/05/22     Patient is on IR schedule 10/05/22 for a IR PICC line placement single lumen-right side local only  Labs WNL for procedure.   No NPO required.  Medications to be held include: None   Consent will be done prior to procedure.     Please contact the IR [place] RN at [phone] for estimated time of procedure.     Case discussed with IR attending Dr. Baird .     This is a 62 year old female with history of polycystic kidney disease s/p DDKT x 2 (last 2016), congenital hepatic fibrosis s/p liver transplant, recurrent UTIs with recent admission for the same with discharge on 9/16 admitted on 10/2/2022 for management of sepsis 2/2 pyelonephritis of transplant kidney requested for PICC line placement-single lumen.      Deferred from vascular access team due to last IR PICC placed on 12/19/2022 via right basilic vein that required right UE venogram with subclavian venoplasty.  Done without sedation.    Also has a left UE had a fistula so not a good extremity for PICC placement.      Spoke with Massiel Christopher MD and if failed PICC line placement can consider tunneled johnson.    Spoke with patient on phone and patient ate multiple bites of eggs at 9:10A this morning and is willing to proceed with PICC placement with local only.      Pertinent Imaging:        Expected date of discharge: 10/5/2022    Vitals:   BP 97/48 (BP Location: Right arm)   Pulse 75   Temp 97.8  F (36.6  C) (Oral)   Resp 18   Wt 67.1 kg (148 lb)   SpO2 96%   BMI 21.24 kg/m      Pertinent Labs:     Lab Results   Component Value Date    WBC 7.3 10/05/2022    WBC 8.1 10/04/2022    WBC 11.0 10/03/2022    WBC 6.2 06/04/2021    WBC 6.7 03/11/2021    WBC 9.2 12/28/2020     Lab Results   Component Value Date    HGB 14.6 10/05/2022    HGB 14.2 10/04/2022    HGB 13.4 10/03/2022    HGB 14.5 06/04/2021    HGB 16.9 03/11/2021    HGB 16.6 12/28/2020     Lab Results   Component Value Date      10/05/2022     10/04/2022     10/03/2022     06/04/2021     03/11/2021     12/28/2020     No results found for: INR, PTT    COVID-19 Antibody Results, Testing for Immunity    COVID-19 Antibody Results, Testing for Immunity   No data to display.         COVID-19 PCR Results    COVID-19 PCR Results 9/26/20 12/16/20 11/4/21 9/15/22 10/2/22   COVID-19 Virus PCR to [facility] - Result Not Detected       COVID-19 Virus PCR to [facility] - Source Nasopharyngeal       Flu A/B & SARS-COV-2 PCR Source  Nasopharyngeal      SARS-CoV-2 PCR Result  NEGATIVE      SARS CoV2 PCR   Negative Negative Negative      Comments are available for some flowsheets but are not being displayed.           Odalys Gonzales PA-C  Interventional Radiology  Pager: [phone]

## 2022-10-06 ENCOUNTER — INFUSION THERAPY VISIT (OUTPATIENT)
Dept: INFUSION THERAPY | Facility: CLINIC | Age: 62
End: 2022-10-06
Attending: INTERNAL MEDICINE
Payer: MEDICARE

## 2022-10-06 VITALS
RESPIRATION RATE: 18 BRPM | HEART RATE: 85 BPM | DIASTOLIC BLOOD PRESSURE: 86 MMHG | TEMPERATURE: 97.9 F | SYSTOLIC BLOOD PRESSURE: 151 MMHG | OXYGEN SATURATION: 97 %

## 2022-10-06 DIAGNOSIS — D84.9 IMMUNOSUPPRESSION (H): Primary | ICD-10-CM

## 2022-10-06 DIAGNOSIS — N39.0 URINARY TRACT INFECTION WITHOUT HEMATURIA, SITE UNSPECIFIED: ICD-10-CM

## 2022-10-06 PROCEDURE — 250N000009 HC RX 250: Performed by: INTERNAL MEDICINE

## 2022-10-06 PROCEDURE — 250N000011 HC RX IP 250 OP 636: Performed by: INTERNAL MEDICINE

## 2022-10-06 PROCEDURE — 96374 THER/PROPH/DIAG INJ IV PUSH: CPT

## 2022-10-06 RX ORDER — ALBUTEROL SULFATE 0.83 MG/ML
2.5 SOLUTION RESPIRATORY (INHALATION)
Status: CANCELLED | OUTPATIENT
Start: 2022-10-07

## 2022-10-06 RX ORDER — MEPERIDINE HYDROCHLORIDE 25 MG/ML
25 INJECTION INTRAMUSCULAR; INTRAVENOUS; SUBCUTANEOUS EVERY 30 MIN PRN
Status: CANCELLED | OUTPATIENT
Start: 2022-10-07

## 2022-10-06 RX ORDER — HEPARIN SODIUM,PORCINE 10 UNIT/ML
5 VIAL (ML) INTRAVENOUS
Status: CANCELLED | OUTPATIENT
Start: 2022-10-07

## 2022-10-06 RX ORDER — DIPHENHYDRAMINE HYDROCHLORIDE 50 MG/ML
50 INJECTION INTRAMUSCULAR; INTRAVENOUS
Status: CANCELLED
Start: 2022-10-07

## 2022-10-06 RX ORDER — HEPARIN SODIUM,PORCINE 10 UNIT/ML
5 VIAL (ML) INTRAVENOUS
Status: DISCONTINUED | OUTPATIENT
Start: 2022-10-06 | End: 2022-10-06 | Stop reason: HOSPADM

## 2022-10-06 RX ORDER — HEPARIN SODIUM (PORCINE) LOCK FLUSH IV SOLN 100 UNIT/ML 100 UNIT/ML
5 SOLUTION INTRAVENOUS
Status: CANCELLED | OUTPATIENT
Start: 2022-10-07

## 2022-10-06 RX ADMIN — ERTAPENEM SODIUM 1 G: 1 INJECTION, POWDER, LYOPHILIZED, FOR SOLUTION INTRAMUSCULAR; INTRAVENOUS at 14:38

## 2022-10-06 RX ADMIN — Medication 5 ML: at 14:43

## 2022-10-06 NOTE — LETTER
Date:October 7, 2022      Provider requested that no letter be sent. Do not send.       [facility]

## 2022-10-06 NOTE — LETTER
10/6/2022         RE: Belen Sharma  [address] [place] [address]        Dear Colleague,    Thank you for referring your patient, Belen Sharma, to the [facility] T[facility]. Please see a copy of my visit note below.    Nursing Note  Belen Sh[facility]:   Chief Complaint   Patient presents with     Infusion     Ertapenem     During today's [facility] appointment, orders from Dr. Christopher were completed.  Frequency: daily    Progress note:  Patient identification verified by name and date of birth.  Assessment completed.  Vitals recorded in Doc Flowsheets.  Patient was provided with education regarding medication/procedure and possible side effects.  Patient verbalized understanding.     present during visit today: Not Applicable.    Treatment Conditions: Non-applicable.    Infusion length and rate:  infusion given over approximately 5 minutes    Labs: were not ordered for this appointment.    Vascular access: PICC accessed today.    Is the next appt scheduled? yes  Asymptomatic COVID test completed? no    Post Infusion Assessment:  Patient tolerated infusion without incident.     Discharge Plan:   Follow up plan of care with: ongoing infusions at [facility].  Discharge instructions were reviewed with patient.  Patient/representative verbalized understanding of discharge instructions and all questions answered.  Patient discharged from [facility] in stable condition.    Loraine Foy RN    Administrations This Visit     ertapenem (INVanz) 1 g in 10 mL SWFI for IVP     Admin Date  10/06/2022 Action  Given Dose  1 g Route  Intravenous Administered By  Loraine Foy RN          heparin lock flush 10 UNIT/ML injection 5 mL     Admin Date  10/06/2022 Action  Given Dose  5 mL Route  Intracatheter Administered By  Danii  Loraine CEJA RN                BP (!) 151/86 (BP Location: Other (Comment))   Pulse 85   Temp 97.9  F (36.6  C) (Oral)   Resp 18   SpO2 97%         Again, thank you for allowing me to participate in the care of your patient.        Sincerely,        [facility]

## 2022-10-06 NOTE — PROGRESS NOTES
Nursing Note  Belen Sharma [facility] for:   Chief Complaint   Patient presents with     Infusion     Ertapenem     During [facility]'s [facility] appointment, orders from Dr. Christopher were completed.  Frequency: daily    Progress note:  Patient identification verified by name and date of birth.  Assessment completed.  Vitals recorded in Doc Flowsheets.  Patient was provided with education regarding medication/procedure and possible side effects.  Patient verbalized understanding.     present during visit today: Not Applicable.    Treatment Conditions: Non-applicable.    Infusion length and rate:  infusion given over approximately 5 minutes    Labs: were not ordered for this appointment.    Vascular access: PICC accessed today.    Is the next appt scheduled? yes  Asymptomatic COVID test completed? no    Post Infusion Assessment:  Patient tolerated infusion without incident.     Discharge Plan:   Follow up plan of care with: ongoing infusions at [facility].  Discharge instructions were reviewed with patient.  Patient/representative verbalized understanding of discharge instructions and all questions answered.  Patient discharged [facility] in stable condition.    Loraine Foy RN    Administrations This Visit     ertapenem (INVanz) 1 g in 10 mL SWFI for IVP     Admin Date  10/06/2022 Action  Given Dose  1 g Route  Intravenous Administered By  Loraine Foy RN          heparin lock flush 10 UNIT/ML injection 5 mL     Admin Date  10/06/2022 Action  Given Dose  5 mL Route  Intracatheter Administered By  Loraine Foy RN                BP (!) 151/86 (BP Location: Other (Comment))   Pulse 85   Temp 97.9  F (36.6  C) (Oral)   Resp 18   SpO2 97%

## 2022-10-07 ENCOUNTER — INFUSION THERAPY VISIT (OUTPATIENT)
Dept: INFUSION THERAPY | Facility: CLINIC | Age: 62
End: 2022-10-07
Attending: INTERNAL MEDICINE
Payer: MEDICARE

## 2022-10-07 ENCOUNTER — TELEPHONE (OUTPATIENT)
Dept: OBGYN | Facility: CLINIC | Age: 62
End: 2022-10-07

## 2022-10-07 ENCOUNTER — VIRTUAL VISIT (OUTPATIENT)
Dept: UROLOGY | Facility: CLINIC | Age: 62
End: 2022-10-07
Payer: MEDICARE

## 2022-10-07 ENCOUNTER — MYC MEDICAL ADVICE (OUTPATIENT)
Dept: UROLOGY | Facility: CLINIC | Age: 62
End: 2022-10-07

## 2022-10-07 ENCOUNTER — PRE VISIT (OUTPATIENT)
Dept: UROLOGY | Facility: CLINIC | Age: 62
End: 2022-10-07

## 2022-10-07 VITALS
HEART RATE: 85 BPM | RESPIRATION RATE: 16 BRPM | OXYGEN SATURATION: 96 % | SYSTOLIC BLOOD PRESSURE: 109 MMHG | TEMPERATURE: 97.5 F | DIASTOLIC BLOOD PRESSURE: 72 MMHG

## 2022-10-07 DIAGNOSIS — D84.9 IMMUNOSUPPRESSION (H): Primary | ICD-10-CM

## 2022-10-07 DIAGNOSIS — A49.9 ESBL (EXTENDED SPECTRUM BETA-LACTAMASE) PRODUCING BACTERIA INFECTION: ICD-10-CM

## 2022-10-07 DIAGNOSIS — Z16.12 ESBL (EXTENDED SPECTRUM BETA-LACTAMASE) PRODUCING BACTERIA INFECTION: ICD-10-CM

## 2022-10-07 DIAGNOSIS — N39.0 URINARY TRACT INFECTION WITHOUT HEMATURIA, SITE UNSPECIFIED: ICD-10-CM

## 2022-10-07 LAB
BACTERIA BLD CULT: NO GROWTH
BACTERIA BLD CULT: NO GROWTH

## 2022-10-07 PROCEDURE — 99213 OFFICE O/P EST LOW 20 MIN: CPT | Mod: 95 | Performed by: NURSE PRACTITIONER

## 2022-10-07 PROCEDURE — 96374 THER/PROPH/DIAG INJ IV PUSH: CPT

## 2022-10-07 PROCEDURE — 250N000009 HC RX 250: Performed by: INTERNAL MEDICINE

## 2022-10-07 PROCEDURE — 250N000011 HC RX IP 250 OP 636: Performed by: INTERNAL MEDICINE

## 2022-10-07 RX ORDER — HEPARIN SODIUM,PORCINE 10 UNIT/ML
5 VIAL (ML) INTRAVENOUS
Status: DISCONTINUED | OUTPATIENT
Start: 2022-10-07 | End: 2022-10-07 | Stop reason: HOSPADM

## 2022-10-07 RX ORDER — MEPERIDINE HYDROCHLORIDE 25 MG/ML
25 INJECTION INTRAMUSCULAR; INTRAVENOUS; SUBCUTANEOUS EVERY 30 MIN PRN
Status: CANCELLED | OUTPATIENT
Start: 2022-10-08

## 2022-10-07 RX ORDER — HEPARIN SODIUM (PORCINE) LOCK FLUSH IV SOLN 100 UNIT/ML 100 UNIT/ML
5 SOLUTION INTRAVENOUS
Status: CANCELLED | OUTPATIENT
Start: 2022-10-08

## 2022-10-07 RX ORDER — HEPARIN SODIUM,PORCINE 10 UNIT/ML
5 VIAL (ML) INTRAVENOUS
Status: CANCELLED | OUTPATIENT
Start: 2022-10-08

## 2022-10-07 RX ORDER — ALBUTEROL SULFATE 0.83 MG/ML
2.5 SOLUTION RESPIRATORY (INHALATION)
Status: CANCELLED | OUTPATIENT
Start: 2022-10-08

## 2022-10-07 RX ORDER — DIPHENHYDRAMINE HYDROCHLORIDE 50 MG/ML
50 INJECTION INTRAMUSCULAR; INTRAVENOUS
Status: CANCELLED
Start: 2022-10-08

## 2022-10-07 RX ADMIN — Medication 5 ML: at 09:35

## 2022-10-07 RX ADMIN — ERTAPENEM SODIUM 1 G: 1 INJECTION, POWDER, LYOPHILIZED, FOR SOLUTION INTRAMUSCULAR; INTRAVENOUS at 09:34

## 2022-10-07 ASSESSMENT — PAIN SCALES - GENERAL: PAINLEVEL: NO PAIN (0)

## 2022-10-07 NOTE — PATIENT INSTRUCTIONS
UROLOGY CLINIC VISIT PATIENT INSTRUCTIONS    -Schedule urodynamics on a Thursday, with cystoscopy to follow by Dr. Ferrara.     If you have any issues, questions or concerns in the meantime, do not hesitate to contact us at [phone] or via [employer].     Rocio Brewer, CNP  Department of Urology

## 2022-10-07 NOTE — PATIENT INSTRUCTIONS
Dear Belen Sharma    Thank you for choosing [facility] ([facility]) for your infusion.  The following information is a summary of our appointment as well as important reminders.      We look forward in seeing you on your next appointment here at [facility] ([facility]).  Please don t hesitate to call us at [phone] to reschedule any of your appointments or to speak with one of the [facility] registered nurses.  It was a pleasure taking care of you today.    Sincerely,    [facility]  [facility]  [address] [place] [address]  [address]  Phone:  [phone]

## 2022-10-07 NOTE — LETTER
10/7/2022       RE: Belen Sharma  [address] [place] [address]     Dear Colleague,    Thank you for referring your patient, Belen Sharma, to the [facility]OLOG[facility] [place] at [facility]. Please see a copy of my visit note below.    Belen is a 62 year old who is being evaluated via a billable video visit.      How would you like to obtain your AVS? MyChart  If the video visit is dropped, the invitation should be resent by: Send to e-mail at: [email or web address]  Will anyone else be joining your video visit? No    Video-Visit Details    Video Start Time: 1:53 PM    Type of service:  Video Visit    Video End Time: 2:26 PM    Originating Location (pt. Location): Home    Distant Location (provider location):  [facility]OLOG[facility] [place]     Platform used for Video Visit: Maddison       Belen Sharma complains of   Chief Complaint   Patient presents with     Hematuria       Assessment/Plan:  62 year old female with complex PMH, including PCKD s/p DDKT x 2, congenital hepatic fibrosis s/p liver transplant, and recurrent UTIs and microscopic hematuria. Prolapse managed with pessary. She does have a number of UTI risk factors, including immunosuppression. Would be reasonable to move forward with UDS for functional eval, followed by cystoscopy by urogyn provider. We discussed these two tests in detail today and she agrees with this plan. She has concerns about the administration of contrast during UDS, considering her history of renal transplant. We discussed the difference between IV and intravesical contrast, but she requests that this be cleared by her nephrologist. Will reach out to him.   -Schedule next-available UDS, followed by cystoscopy by urogyn. She prefers these to be done on the same day, so will aim for a Wednesday or Thursday, as Dr. Francis, who she has followed with before, is not available those days.     Rocio Brewer,  CNP  Department of Urology      Subjective:   62 year old female with PMH of PCKD s/p DDKT x 2, congenital hepatic fibrosis s/p liver transplant, and recurrent UTIs who presents for a virtual visit regarding UTIs. She has previously [facility]. She has known rectocele and cystocele with prolapse and has been managed with a pessary. She has had UTI symptoms with microscopic hematuria on UA, though with negative culture. However, she was admitted earlier this week with sepsis 2/2 pyelonephritis of her transplanted kidney. UC grew ESBL E.coli. Evaluated by transplant ID while inpatient. Noted that she has multiple UTI risk factors that are non-reversible, including female gender, immunocompromised, kidney transplant recipient, the need to manipulate the  tract daily to change pessary. She has also not been consistent with topical estrogen use, which may also contribute to UTIs. Urodynamics has been considered to rule out bladder pathology in contributing to her infections. Cystoscopy for hematuria has also been recommended.      Objective:     Exam:   Patient is a 62 year old adult   No vital signs obtained due to virtual visit.  General Appearance: Well groomed, hygenic  Respiratory: No cough, no respiratory distress or labored breathing  Musculoskeletal: Grossly normal with no gross deficits  Skin: No discoloration or apparent rashes  Neurologic: No tremors  Psychiatric: Alert and oriented  Further examination is deferred due to the nature of our visit.

## 2022-10-07 NOTE — TELEPHONE ENCOUNTER
Continue same glasses   Call in October 2018 for an appointment in February 2019 for Complete Exam    Dr. Jennings [phone]   Six month colposcopy tracking  Appointment was made with Dr. Perez 10-11-22

## 2022-10-07 NOTE — LETTER
10/7/2022         RE: Belen Sharma  [address] [place] [address]        Dear Colleague,    Thank you for referring your patient, Belen Sharma, to the [facility]. Please see a copy of my visit note below.    Infusion Nursing Note:  Belen Sharma presents today for ertapenem.    Patient seen by provider today: No   present during visit today: Not Applicable.    Note: N/A.    Intravenous Access:  PICC.    Treatment Conditions:  Not Applicable.    Post Infusion Assessment:  Patient tolerated infusion without incident.  Blood return noted pre and post infusion.  Site patent and intact, free from redness, edema or discomfort.     Discharge Plan:   Discharge instructions reviewed with: Patient.  Copy of AVS reviewed with patient and/or family.  Patient will return tomorrow for next appointment.  Patient discharged in stable condition accompanied by: self.    Administrations This Visit       ertapenem (INVanz) 1 g in 10 mL SWFI for IVP       Admin Date  10/07/2022 Action  Given Dose  1 g Route  Intravenous Administered By  Lexie Sánchez RN              heparin lock flush 10 UNIT/ML injection 5 mL       Admin Date  10/07/2022 Action  Given Dose  5 mL Route  Intracatheter Administered By  Lexie Sánchez RN              sodium chloride (PF) 0.9% PF flush 3-20 mL       Admin Date  10/07/2022 Action  Given Dose  20 mL Route  Intracatheter Administered By  Lexie Sánchez RN Alyssa Marie Sakhitab-Kerestes, RN                        Again, thank you for allowing me to participate in the care of your patient.        Sincerely,        [facility]

## 2022-10-07 NOTE — LETTER
Date:October 7, 2022      Provider requested that no letter be sent. Do not send.       [facility]

## 2022-10-07 NOTE — PROGRESS NOTES
Belen is a 62 year old who is being evaluated via a billable video visit.      How would you like to obtain your AVS? MyChart  If the video visit is dropped, the invitation should be resent by: Send to e-mail at: [email or web address]  Will anyone else be joining your video visit? No    Video-Visit Details    Video Start Time: 1:53 PM    Type of service:  Video Visit    Video End Time: 2:26 PM    Originating Location (pt. Location): Home    Distant Location (provider location):  [facility] [place]     Platform used for Video Visit: DeeRoberto       Belen Sharma complains of   Chief Complaint   Patient presents with     Hematuria       Assessment/Plan:  62 year old female with complex PMH, including PCKD s/p DDKT x 2, congenital hepatic fibrosis s/p liver transplant, and recurrent UTIs and microscopic hematuria. Prolapse managed with pessary. She does have a number of UTI risk factors, including immunosuppression. Would be reasonable to move forward with UDS for functional eval, followed by cystoscopy by urogyn provider. We discussed these two tests in detail today and she agrees with this plan. She has concerns about the administration of contrast during UDS, considering her history of renal transplant. We discussed the difference between IV and intravesical contrast, but she requests that this be cleared by her nephrologist. Will reach out to him.   -Schedule next-available UDS, followed by cystoscopy by urogyn. She prefers these to be done on the same day, so will aim for a Wednesday or Thursday, as Dr. Francis, who she has followed with before, is not available those days.     Rocio Brewer, CNP  Department of Urology      Subjective:   62 year old female with PMH of PCKD s/p DDKT x 2, congenital hepatic fibrosis s/p liver transplant, and recurrent UTIs who presents for a virtual visit regarding UTIs. She has previously followed with Dr. Francis [facility] urogyn [facility]. She has  known rectocele and cystocele with prolapse and has been managed with a pessary. She has had UTI symptoms with microscopic hematuria on UA, though with negative culture. However, she was admitted earlier this week with sepsis 2/2 pyelonephritis of her transplanted kidney. UC grew ESBL E.coli. Evaluated by transplant ID while inpatient. Noted that she has multiple UTI risk factors that are non-reversible, including female gender, immunocompromised, kidney transplant recipient, the need to manipulate the  tract daily to change pessary. She has also not been consistent with topical estrogen use, which may also contribute to UTIs. Urodynamics has been considered to rule out bladder pathology in contributing to her infections. Cystoscopy for hematuria has also been recommended.      Objective:     Exam:   Patient is a 62 year old adult   No vital signs obtained due to virtual visit.  General Appearance: Well groomed, hygenic  Respiratory: No cough, no respiratory distress or labored breathing  Musculoskeletal: Grossly normal with no gross deficits  Skin: No discoloration or apparent rashes  Neurologic: No tremors  Psychiatric: Alert and oriented  Further examination is deferred due to the nature of our visit.

## 2022-10-07 NOTE — PROGRESS NOTES
Infusion Nursing Note:  Belen Sharma presents today for ertapenem.    Patient seen by provider today: No   present during visit today: Not Applicable.    Note: N/A.    Intravenous Access:  PICC.    Treatment Conditions:  Not Applicable.    Post Infusion Assessment:  Patient tolerated infusion without incident.  Blood return noted pre and post infusion.  Site patent and intact, free from redness, edema or discomfort.     Discharge Plan:   Discharge instructions reviewed with: Patient.  Copy of AVS reviewed with patient and/or family.  Patient will return tomorrow for next appointment.  Patient discharged in stable condition accompanied by: self.    Administrations This Visit       ertapenem (INVanz) 1 g in 10 mL SWFI for IVP       Admin Date  10/07/2022 Action  Given Dose  1 g Route  Intravenous Administered By  Lexie Sánchez RN              heparin lock flush 10 UNIT/ML injection 5 mL       Admin Date  10/07/2022 Action  Given Dose  5 mL Route  Intracatheter Administered By  Lexie Sánchez RN              sodium chloride (PF) 0.9% PF flush 3-20 mL       Admin Date  10/07/2022 Action  Given Dose  20 mL Route  Intracatheter Administered By  Lexie Sánchez RN Alyssa Marie Sakhitab-Kerestes, RN

## 2022-10-08 ENCOUNTER — INFUSION THERAPY VISIT (OUTPATIENT)
Dept: INFUSION THERAPY | Facility: CLINIC | Age: 62
End: 2022-10-08
Attending: INTERNAL MEDICINE
Payer: MEDICARE

## 2022-10-08 VITALS
SYSTOLIC BLOOD PRESSURE: 151 MMHG | OXYGEN SATURATION: 95 % | RESPIRATION RATE: 16 BRPM | DIASTOLIC BLOOD PRESSURE: 54 MMHG | HEART RATE: 73 BPM | TEMPERATURE: 97.9 F

## 2022-10-08 DIAGNOSIS — D84.9 IMMUNOSUPPRESSION (H): Primary | ICD-10-CM

## 2022-10-08 DIAGNOSIS — N39.0 URINARY TRACT INFECTION WITHOUT HEMATURIA, SITE UNSPECIFIED: ICD-10-CM

## 2022-10-08 PROCEDURE — 250N000011 HC RX IP 250 OP 636: Performed by: INTERNAL MEDICINE

## 2022-10-08 PROCEDURE — 250N000009 HC RX 250: Performed by: INTERNAL MEDICINE

## 2022-10-08 PROCEDURE — 96374 THER/PROPH/DIAG INJ IV PUSH: CPT

## 2022-10-08 RX ORDER — ALBUTEROL SULFATE 0.83 MG/ML
2.5 SOLUTION RESPIRATORY (INHALATION)
Status: CANCELLED | OUTPATIENT
Start: 2022-10-09

## 2022-10-08 RX ORDER — DIPHENHYDRAMINE HYDROCHLORIDE 50 MG/ML
50 INJECTION INTRAMUSCULAR; INTRAVENOUS
Status: CANCELLED
Start: 2022-10-09

## 2022-10-08 RX ORDER — HEPARIN SODIUM (PORCINE) LOCK FLUSH IV SOLN 100 UNIT/ML 100 UNIT/ML
5 SOLUTION INTRAVENOUS
Status: CANCELLED | OUTPATIENT
Start: 2022-10-09

## 2022-10-08 RX ORDER — HEPARIN SODIUM,PORCINE 10 UNIT/ML
5 VIAL (ML) INTRAVENOUS
Status: CANCELLED | OUTPATIENT
Start: 2022-10-09

## 2022-10-08 RX ORDER — HEPARIN SODIUM,PORCINE 10 UNIT/ML
5 VIAL (ML) INTRAVENOUS
Status: DISCONTINUED | OUTPATIENT
Start: 2022-10-08 | End: 2022-10-08 | Stop reason: HOSPADM

## 2022-10-08 RX ORDER — MEPERIDINE HYDROCHLORIDE 25 MG/ML
25 INJECTION INTRAMUSCULAR; INTRAVENOUS; SUBCUTANEOUS EVERY 30 MIN PRN
Status: CANCELLED | OUTPATIENT
Start: 2022-10-09

## 2022-10-08 RX ADMIN — ERTAPENEM SODIUM 1 G: 1 INJECTION, POWDER, LYOPHILIZED, FOR SOLUTION INTRAMUSCULAR; INTRAVENOUS at 11:18

## 2022-10-08 RX ADMIN — Medication 5 ML: at 11:19

## 2022-10-08 NOTE — LETTER
Date:October 8, 2022      Provider requested that no letter be sent. Do not send.       [facility]

## 2022-10-08 NOTE — LETTER
10/8/2022         RE: Belen Sharma  [address] [place] [address]        Dear Colleague,    Thank you for referring your patient, Belen Sharma, to the [facility]. Please see a copy of my visit note below.    Infusion Nursing Note:  Belen Sharma presents today for Invanz.    Patient seen by provider today: No   present during visit today: Not Applicable.    Note:   -Invanz given IVP over ~5 min    Intravenous Access:  PICC.    Treatment Conditions:  Not Applicable.    Post Infusion Assessment:  Patient tolerated infusion without incident.  Blood return noted pre and post infusion.  Site patent and intact, free from redness, edema or discomfort.  No evidence of extravasations.     Discharge Plan:   AVS to patient via MYCHART.  Patient will return 10/9/22 for next appointment.   Patient discharged in stable condition accompanied by: self.  Departure Mode: Ambulatory.    Araceli White RN    BP (!) 151/54 (BP Location: Left arm)   Pulse 73   Temp 97.9  F (36.6  C) (Oral)   Resp 16   SpO2 95%     Administrations This Visit     ertapenem (INVanz) 1 g in 10 mL SWFI for IVP     Admin Date  10/08/2022 Action  Given Dose  1 g Route  Intravenous Administered By  Araceli hWite, RN          heparin lock flush 10 UNIT/ML injection 5 mL     Admin Date  10/08/2022 Action  Given Dose  5 mL Route  Intracatheter Administered By  Araceli White RN                                  Again, thank you for allowing me to participate in the care of your patient.        Sincerely,        [facility]

## 2022-10-08 NOTE — PATIENT INSTRUCTIONS
Dear Belen Sharma    Thank you for choosing [facility] ([facility]) for your infusion.  The following information is a summary of our appointment as well as important reminders.      We look forward in seeing you on your next appointment here at [facility] ([facility]).  Please don t hesitate to call us at [phone] to reschedule any of your appointments or to speak with one of the [facility] registered nurses.  It was a pleasure taking care of you today.    Sincerely,    [facility]  [facility]  [address] [place] [address]  [id number]  Phone:  [phone]

## 2022-10-08 NOTE — PROGRESS NOTES
Infusion Nursing Note:  Belen Sharma presents today for Invanz.    Patient seen by provider today: No   present during visit today: Not Applicable.    Note:   -Invanz given IVP over ~5 min    Intravenous Access:  PICC.    Treatment Conditions:  Not Applicable.    Post Infusion Assessment:  Patient tolerated infusion without incident.  Blood return noted pre and post infusion.  Site patent and intact, free from redness, edema or discomfort.  No evidence of extravasations.     Discharge Plan:   AVS to patient via MYCHART.  Patient will return 10/9/22 for next appointment.   Patient discharged in stable condition accompanied by: self.  Departure Mode: Ambulatory.    Araceli White RN    BP (!) 151/54 (BP Location: Left arm)   Pulse 73   Temp 97.9  F (36.6  C) (Oral)   Resp 16   SpO2 95%     Administrations This Visit     ertapenem (INVanz) 1 g in 10 mL SWFI for IVP     Admin Date  10/08/2022 Action  Given Dose  1 g Route  Intravenous Administered By  Araceli White, RN          heparin lock flush 10 UNIT/ML injection 5 mL     Admin Date  10/08/2022 Action  Given Dose  5 mL Route  Intracatheter Administered By  Araceli White, RN

## 2022-10-09 ENCOUNTER — INFUSION THERAPY VISIT (OUTPATIENT)
Dept: INFUSION THERAPY | Facility: CLINIC | Age: 62
End: 2022-10-09
Attending: INTERNAL MEDICINE
Payer: MEDICARE

## 2022-10-09 VITALS
HEART RATE: 77 BPM | SYSTOLIC BLOOD PRESSURE: 166 MMHG | RESPIRATION RATE: 16 BRPM | TEMPERATURE: 97.7 F | OXYGEN SATURATION: 96 % | DIASTOLIC BLOOD PRESSURE: 61 MMHG

## 2022-10-09 DIAGNOSIS — N39.0 URINARY TRACT INFECTION WITHOUT HEMATURIA, SITE UNSPECIFIED: ICD-10-CM

## 2022-10-09 DIAGNOSIS — D84.9 IMMUNOSUPPRESSION (H): Primary | ICD-10-CM

## 2022-10-09 PROCEDURE — 250N000009 HC RX 250: Performed by: INTERNAL MEDICINE

## 2022-10-09 PROCEDURE — 250N000011 HC RX IP 250 OP 636: Performed by: INTERNAL MEDICINE

## 2022-10-09 PROCEDURE — 96374 THER/PROPH/DIAG INJ IV PUSH: CPT

## 2022-10-09 RX ORDER — ALBUTEROL SULFATE 0.83 MG/ML
2.5 SOLUTION RESPIRATORY (INHALATION)
Status: CANCELLED | OUTPATIENT
Start: 2022-10-10

## 2022-10-09 RX ORDER — HEPARIN SODIUM,PORCINE 10 UNIT/ML
5 VIAL (ML) INTRAVENOUS
Status: CANCELLED | OUTPATIENT
Start: 2022-10-10

## 2022-10-09 RX ORDER — DIPHENHYDRAMINE HYDROCHLORIDE 50 MG/ML
50 INJECTION INTRAMUSCULAR; INTRAVENOUS
Status: CANCELLED
Start: 2022-10-10

## 2022-10-09 RX ORDER — HEPARIN SODIUM (PORCINE) LOCK FLUSH IV SOLN 100 UNIT/ML 100 UNIT/ML
5 SOLUTION INTRAVENOUS
Status: CANCELLED | OUTPATIENT
Start: 2022-10-10

## 2022-10-09 RX ORDER — HEPARIN SODIUM,PORCINE 10 UNIT/ML
5 VIAL (ML) INTRAVENOUS
Status: DISCONTINUED | OUTPATIENT
Start: 2022-10-09 | End: 2022-10-09 | Stop reason: HOSPADM

## 2022-10-09 RX ORDER — MEPERIDINE HYDROCHLORIDE 25 MG/ML
25 INJECTION INTRAMUSCULAR; INTRAVENOUS; SUBCUTANEOUS EVERY 30 MIN PRN
Status: CANCELLED | OUTPATIENT
Start: 2022-10-10

## 2022-10-09 RX ADMIN — ERTAPENEM SODIUM 1 G: 1 INJECTION, POWDER, LYOPHILIZED, FOR SOLUTION INTRAMUSCULAR; INTRAVENOUS at 14:00

## 2022-10-09 RX ADMIN — Medication 5 ML: at 14:08

## 2022-10-09 NOTE — LETTER
10/9/2022         RE: Belen Sharma  [address] [place] [address]        Dear Colleague,    Thank you for referring your patient, Belen Sharma, to the [facility] T[facility]. Please see a copy of my visit note below.    Nursing Note  Belen Sh[facility]:   Chief Complaint   Patient presents with     Infusion     IV Ertapenem     During today's [facility] appointment, orders from Dr JAKE Christopher were completed.  Frequency: daily    Progress note:  Patient identification verified by name and date of birth.  Assessment completed.  Vitals recorded in Doc Flowsheets.  Patient was provided with education regarding medication/procedure and possible side effects.  Patient verbalized understanding.     present during visit today: Not Applicable.    Treatment Conditions: Non-applicable.    Premedications: were not ordered.    Drug Waste Record: No    Infusion length and rate:  infusion given over approximately 5 minutes    Labs: were not ordered for this appointment.    Vascular access: PICC accessed today.    Is the next appt scheduled? Yes  Asymptomatic COVID test completed? No    Post Infusion Assessment:  Patient tolerated infusion without incident.  Blood return noted pre and post infusion.  Site patent and intact, free from redness, edema or discomfort.  No evidence of extravasations.     Discharge Plan:   Follow up plan of care with: ongoing infusions at [facility].  Discharge instructions were reviewed with patient.  Patient/representative verbalized understanding of discharge instructions and all questions answered.  Patient discharged from [facility] in stable condition.    Sravani Benitez RN    Administrations This Visit     ertapenem (INVanz) 1 g in 10 mL SWFI for IVP     Admin Date  10/09/2022 Action  Given Dose  1 g  Route  Intravenous Administered By  Sravani Benitez RN          heparin lock flush 10 UNIT/ML injection 5 mL     Admin Date  10/09/2022 Action  Given Dose  5 mL Route  Intracatheter Administered By  Sravani Benitez RN                BP (!) 166/61 (BP Location: Left leg, Patient Position: Sitting, Cuff Size: Adult Regular)   Pulse 77   Temp 97.7  F (36.5  C) (Oral)   Resp 16   SpO2 96%           Again, thank you for allowing me to participate in the care of your patient.        Sincerely,        No name on file

## 2022-10-09 NOTE — PROGRESS NOTES
Nursing Note  Belen Sharma [facility] for:   Chief Complaint   Patient presents with     Infusion     IV Ertapenem     During today's [facility] appointment, orders from Dr JAKE Christopher were completed.  Frequency: daily    Progress note:  Patient identification verified by name and date of birth.  Assessment completed.  Vitals recorded in Doc Flowsheets.  Patient was provided with education regarding medication/procedure and possible side effects.  Patient verbalized understanding.     present during visit today: Not Applicable.    Treatment Conditions: Non-applicable.    Premedications: were not ordered.    Drug Waste Record: No    Infusion length and rate:  infusion given over approximately 5 minutes    Labs: were not ordered for this appointment.    Vascular access: PICC accessed today.    Is the next appt scheduled? Yes  Asymptomatic COVID test completed? No    Post Infusion Assessment:  Patient tolerated infusion without incident.  Blood return noted pre and post infusion.  Site patent and intact, free from redness, edema or discomfort.  No evidence of extravasations.     Discharge Plan:   Follow up plan of care with: ongoing infusions [facility].  Discharge instructions were reviewed with patient.  Patient/representative verbalized understanding of discharge instructions and all questions answered.  Patient discharged from [facility] in stable condition.    Sravani Benitez RN    Administrations This Visit     ertapenem (INVanz) 1 g in 10 mL SWFI for IVP     Admin Date  10/09/2022 Action  Given Dose  1 g Route  Intravenous Administered By  Sravani Benitez RN          heparin lock flush 10 UNIT/ML injection 5 mL     Admin Date  10/09/2022 Action  Given Dose  5 mL Route  Intracatheter Administered By  Sravani Benitez RN                BP (!) 166/61 (BP  Location: Left leg, Patient Position: Sitting, Cuff Size: Adult Regular)   Pulse 77   Temp 97.7  F (36.5  C) (Oral)   Resp 16   SpO2 96%

## 2022-10-10 ENCOUNTER — INFUSION THERAPY VISIT (OUTPATIENT)
Dept: INFUSION THERAPY | Facility: CLINIC | Age: 62
End: 2022-10-10
Attending: INTERNAL MEDICINE
Payer: MEDICARE

## 2022-10-10 ENCOUNTER — OFFICE VISIT (OUTPATIENT)
Dept: INTERNAL MEDICINE | Facility: CLINIC | Age: 62
End: 2022-10-10
Payer: MEDICARE

## 2022-10-10 ENCOUNTER — LAB (OUTPATIENT)
Dept: LAB | Facility: CLINIC | Age: 62
End: 2022-10-10
Payer: MEDICARE

## 2022-10-10 VITALS
SYSTOLIC BLOOD PRESSURE: 145 MMHG | TEMPERATURE: 98.3 F | OXYGEN SATURATION: 95 % | BODY MASS INDEX: 20.76 KG/M2 | HEIGHT: 70 IN | WEIGHT: 145 LBS | HEART RATE: 84 BPM | DIASTOLIC BLOOD PRESSURE: 82 MMHG

## 2022-10-10 VITALS — SYSTOLIC BLOOD PRESSURE: 145 MMHG | DIASTOLIC BLOOD PRESSURE: 86 MMHG | RESPIRATION RATE: 16 BRPM

## 2022-10-10 DIAGNOSIS — N12 PYELONEPHRITIS: Primary | ICD-10-CM

## 2022-10-10 DIAGNOSIS — D84.9 IMMUNOSUPPRESSION (H): ICD-10-CM

## 2022-10-10 DIAGNOSIS — N39.0 URINARY TRACT INFECTION WITHOUT HEMATURIA, SITE UNSPECIFIED: ICD-10-CM

## 2022-10-10 LAB
ALBUMIN SERPL BCG-MCNC: 3.9 G/DL (ref 3.5–5.2)
ALP SERPL-CCNC: 72 U/L (ref 35–129)
ALT SERPL W P-5'-P-CCNC: 10 U/L (ref 10–50)
ANION GAP SERPL CALCULATED.3IONS-SCNC: 10 MMOL/L (ref 7–15)
AST SERPL W P-5'-P-CCNC: 19 U/L (ref 10–50)
BILIRUB SERPL-MCNC: 0.6 MG/DL
BUN SERPL-MCNC: 19.8 MG/DL (ref 8–23)
CALCIUM SERPL-MCNC: 10.3 MG/DL (ref 8.8–10.2)
CHLORIDE SERPL-SCNC: 104 MMOL/L (ref 98–107)
CREAT SERPL-MCNC: 0.62 MG/DL (ref 0.51–1.17)
DEPRECATED HCO3 PLAS-SCNC: 24 MMOL/L (ref 22–29)
ERYTHROCYTE [DISTWIDTH] IN BLOOD BY AUTOMATED COUNT: 13.8 % (ref 10–15)
GFR SERPL CREATININE-BSD FRML MDRD: >90 ML/MIN/1.73M2
GLUCOSE SERPL-MCNC: 97 MG/DL (ref 70–99)
HCT VFR BLD AUTO: 46.6 % (ref 35–53)
HGB BLD-MCNC: 15.1 G/DL (ref 11.7–17.7)
MCH RBC QN AUTO: 29.5 PG (ref 26.5–33)
MCHC RBC AUTO-ENTMCNC: 32.4 G/DL (ref 31.5–36.5)
MCV RBC AUTO: 91 FL (ref 78–100)
PLATELET # BLD AUTO: 287 10E3/UL (ref 150–450)
POTASSIUM SERPL-SCNC: 4.2 MMOL/L (ref 3.4–5.3)
PROT SERPL-MCNC: 6.8 G/DL (ref 6.4–8.3)
RBC # BLD AUTO: 5.11 10E6/UL (ref 3.8–5.9)
SODIUM SERPL-SCNC: 138 MMOL/L (ref 136–145)
WBC # BLD AUTO: 14.4 10E3/UL (ref 4–11)

## 2022-10-10 PROCEDURE — 99213 OFFICE O/P EST LOW 20 MIN: CPT | Performed by: HOSPITALIST

## 2022-10-10 PROCEDURE — 250N000011 HC RX IP 250 OP 636: Performed by: INTERNAL MEDICINE

## 2022-10-10 PROCEDURE — 85027 COMPLETE CBC AUTOMATED: CPT | Performed by: PATHOLOGY

## 2022-10-10 PROCEDURE — 250N000009 HC RX 250: Performed by: INTERNAL MEDICINE

## 2022-10-10 PROCEDURE — 96374 THER/PROPH/DIAG INJ IV PUSH: CPT

## 2022-10-10 PROCEDURE — 36415 COLL VENOUS BLD VENIPUNCTURE: CPT | Performed by: PATHOLOGY

## 2022-10-10 PROCEDURE — 80053 COMPREHEN METABOLIC PANEL: CPT | Performed by: PATHOLOGY

## 2022-10-10 RX ORDER — ALBUTEROL SULFATE 0.83 MG/ML
2.5 SOLUTION RESPIRATORY (INHALATION)
Status: CANCELLED | OUTPATIENT
Start: 2022-10-11

## 2022-10-10 RX ORDER — HEPARIN SODIUM,PORCINE 10 UNIT/ML
5 VIAL (ML) INTRAVENOUS
Status: CANCELLED | OUTPATIENT
Start: 2022-10-11

## 2022-10-10 RX ORDER — HEPARIN SODIUM (PORCINE) LOCK FLUSH IV SOLN 100 UNIT/ML 100 UNIT/ML
5 SOLUTION INTRAVENOUS
Status: CANCELLED | OUTPATIENT
Start: 2022-10-11

## 2022-10-10 RX ORDER — HEPARIN SODIUM,PORCINE 10 UNIT/ML
5 VIAL (ML) INTRAVENOUS
Status: DISCONTINUED | OUTPATIENT
Start: 2022-10-10 | End: 2022-10-10 | Stop reason: HOSPADM

## 2022-10-10 RX ORDER — MEPERIDINE HYDROCHLORIDE 25 MG/ML
25 INJECTION INTRAMUSCULAR; INTRAVENOUS; SUBCUTANEOUS EVERY 30 MIN PRN
Status: CANCELLED | OUTPATIENT
Start: 2022-10-11

## 2022-10-10 RX ORDER — DIPHENHYDRAMINE HYDROCHLORIDE 50 MG/ML
50 INJECTION INTRAMUSCULAR; INTRAVENOUS
Status: CANCELLED
Start: 2022-10-11

## 2022-10-10 RX ADMIN — ERTAPENEM SODIUM 1 G: 1 INJECTION, POWDER, LYOPHILIZED, FOR SOLUTION INTRAMUSCULAR; INTRAVENOUS at 10:48

## 2022-10-10 RX ADMIN — Medication 5 ML: at 10:48

## 2022-10-10 ASSESSMENT — ENCOUNTER SYMPTOMS
ABDOMINAL DISTENTION: 0
CHILLS: 0
DYSURIA: 0
CONSTIPATION: 0
SHORTNESS OF BREATH: 0
DIARRHEA: 0
BACK PAIN: 0
ABDOMINAL PAIN: 0
FEVER: 0

## 2022-10-10 ASSESSMENT — PAIN SCALES - GENERAL: PAINLEVEL: NO PAIN (0)

## 2022-10-10 NOTE — LETTER
10/10/2022         RE: Belen Sharma  [address] [place] [address]        Dear Colleague,    Thank you for referring your patient, Belen Sharma, to the [facility]. Please see a copy of my visit note below.    Infusion Nursing Note:  Belen Sharma presents today for Invanz.    Patient seen by provider today: No   present during visit today: Not Applicable.    Note: NA.    Intravenous Access:  PICC.    Treatment Conditions:  Not Applicable.    Post Infusion Assessment:  Patient tolerated infusion without incident.  Blood return noted pre and post infusion.  Site patent and intact, free from redness, edema or discomfort.     Discharge Plan:   Patient and/or family verbalized understanding of discharge instructions and all questions answered.  AVS to patient via [employer].  Patient will return tomorrow for next appointment at 10 AM per charge nurse.    Administrations This Visit     ertapenem (INVanz) 1 g in 10 mL SWFI for IVP     Admin Date  10/10/2022 Action  Given Dose  1 g Route  Intravenous Administered By  Lexie Sánchez RN          heparin lock flush 10 UNIT/ML injection 5 mL     Admin Date  10/10/2022 Action  Given Dose  5 mL Route  Intracatheter Administered By  Lexie Sánchez RN Alyssa Marie Sakhitab-Kerestes, RN                        Again, thank you for allowing me to participate in the care of your patient.        Sincerely,        No name on file

## 2022-10-10 NOTE — PATIENT INSTRUCTIONS
- Consider discussing with Dr. Gonzales (infectious disease) a medication called Methenamine (Hiprex) for prophylaxis.     - Labs weekly for CBC and CMP while on Ertapenem.   - Ertapenem infusions to be done by 10/23.   - COVID19 Bivalent vaccine after treatment of UTI.     Follow up again with Dr. De La Garza in about 1 month.

## 2022-10-10 NOTE — PROGRESS NOTES
Assessment/Plan  Problem List Items Addressed This Visit        Urinary    Pyelonephritis - Primary     Admitted on 10/2 - 10/5 for pyelonephritis, sepsis without blood cultures positive. Urine culture with growth of E. Coli ESBL. Patient with hx of liver and kidney transplants on immunosuppression.     - Consider discussing with Dr. Goznales (infectious disease) a medication called Methenamine (Hiprex) for prophylaxis.   - Labs weekly for CBC and CMP while on Ertapenem.   - Ertapenem infusions to be done by 10/23.   - COVID19 Bivalent vaccine after treatment of infection.     Follow up again with Dr. De La Garza in about 1 month.          Relevant Orders    CBC with platelets    Comprehensive metabolic panel       No results found for any visits on 10/10/22.    Health Maintenance Due   Topic Date Due     ADVANCE CARE PLANNING  Never done     DEPRESSION ACTION PLAN  Never done     COLORECTAL CANCER SCREENING  Never done     HIV SCREENING  Never done     MEDICARE ANNUAL WELLNESS VISIT  Never done     HEPATITIS C SCREENING  Never done     HEPATITIS B IMMUNIZATION (2 of 3 - 3-dose series) 07/26/2001     MENINGITIS IMMUNIZATION (2 - Risk 2-dose series) 03/22/2013     COVID-19 Vaccine (5 - Booster for Pfizer series) 04/05/2022     PAP FOLLOW-UP  11/03/2022     HPV FOLLOW-UP  11/03/2022     COLPOSCOPY  11/03/2022       Hospital Follow-up Visit:    Hospital/Nursing Home/IP Rehab Facility: [facility]  Date of Admission: 10/2/22  Date of Discharge: 10/5/22  Reason(s) for Admission: Sepsis secondary to pyelonephritis with ESBL    Was your hospitalization related to COVID-19? No   Problems taking medications regularly:  None  Medication changes since discharge: None  Problems adhering to non-medication therapy:  None    Summary of hospitalization:  [facility] discharge summary not available for visit.  Diagnostic Tests/Treatments reviewed.  Follow up needed: PCP, weekly  labs, follow up with Infectious disease  Other Healthcare Providers Involved in Patient s Care:         None  Update since discharge: improved.     Post Medication Reconciliation Status:        Plan of care communicated with patient        Subjective  Patient is here for post hospital visit for sepsis secondary to pylenephritis with E. Coli ESBL while on immunosuppression with hx of liver and kidney transplant history.     Patient mentions she has been treated several times but had a persistent urine infection since September. Prior to this her last urine infection was in May. Mentions she does have prolapse and uses a pessary. Pains having improved with having daily infusions of Ertapenem currently. Antibiotic therapy is to be on until 10/23/22, per note by infectious disease inpatient. No diarrhea since discharge. Patient is frustrated having to deal with treatments.       Review of Systems   Constitutional: Negative for chills and fever.   Respiratory: Negative for shortness of breath.    Cardiovascular: Negative for chest pain.   Gastrointestinal: Negative for abdominal distention, abdominal pain, constipation and diarrhea.   Genitourinary: Negative for dysuria.   Musculoskeletal: Negative for back pain.   Skin: Negative for rash.       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 Both, very large, native kidneys reamain.     PONV (postoperative nausea and vomiting)     Need to start with ice chips and apple juice, no soda     S/P kidney transplant     SLK 10/9/2010 - kidney failure 2/2 [facility]. Explanted 2012. Re-transplant after  de-sensitization 2016     S/P liver transplant (H)     10/9/2010 - [facility], ischemic biopathy. Re-transplant 3/3/2011     S/P splenectomy      Spider veins      Thyroid disease     Hyperthyroid treated with single dose iodine       Past Surgical History:   Procedure Laterality Date     bunectomy  2018    right foot     bunionectomy  2019    left     CHOLECYSTECTOMY       CONIZATION N/A 2020    Procedure: CONE BIOPSY, CERVIX;  Surgeon: Daysi Perez MD;  Location: [facility] OR     FAILED PICC - RIGHT ARM Right 2020    Has a LUE AV fistula.     H STATISTIC PICC LINE INSERTION >5YR, FAILED Bilateral 2020    Left fistula, Right failed x 2 Occlussion     HERNIA REPAIR, INCISIONAL  2013     IR DIALYSIS PTA CENTRAL SEG  2020     IR PICC PLACEMENT > 5 YRS OF AGE  2020     IR PICC PLACEMENT > 5 YRS OF AGE  10/5/2022     SPLENECTOMY      Splenectomy     TRANSPLANT KIDNEY RECIPIENT  DONOR      re-transplant after [facility]; 6 months de-sensitization prior AND 6 months after transplant     TRANSPLANT LIVER RECIPIENT  DONOR  2011     TRANSPLANT LIVER, KIDNEY RECIPIENT  DONOR, COMBINED  10/09/2010    HAT - re-Tx liver 3/3/2011; Kidney (left iliac) lost to [facility]/fibrosis - explant     VASCULAR SURGERY      Vascular access left UE. Not used for 4 years since 2nd kidney tx 2016 [facility] [place]       Family History   Problem Relation Age of Onset     Uterine Cancer Maternal Grandmother      Polycystic Kidney Diease Brother      Polycystic Kidney Diease Brother      Polycystic Kidney Diease Brother      Polycystic Kidney Diease Brother      Cervical Cancer Maternal Aunt      Depression Mother      Anxiety Disorder Mother      Depression Father      Anxiety Disorder Father        Social History     Tobacco Use     Smoking status: Never     Smokeless tobacco: Never   Substance Use Topics     Alcohol use: Not Currently        Objective  BP (!) 145/82 (BP Location:  "Left leg, Patient Position: Sitting, Cuff Size: Adult Large)   Pulse 84   Temp 98.3  F (36.8  C)   Ht 1.778 m (5' 10\")   Wt 65.8 kg (145 lb)   SpO2 95%   BMI 20.81 kg/m    Vitals taken by Ariel Guevara MD    Physical Exam  Constitutional:       General: Belen Sharma is not in acute distress.     Appearance: Belen Sharma is not ill-appearing or toxic-appearing.   HENT:      Head: Normocephalic.   Eyes:      Conjunctiva/sclera: Conjunctivae normal.   Cardiovascular:      Rate and Rhythm: Regular rhythm.      Heart sounds: Normal heart sounds. No murmur heard.    No friction rub. No gallop.   Pulmonary:      Effort: Pulmonary effort is normal. No respiratory distress.      Breath sounds: No wheezing, rhonchi or rales.   Abdominal:      General: Abdomen is flat. Bowel sounds are normal. There is no distension.      Palpations: Abdomen is soft.      Tenderness: There is no abdominal tenderness.   Musculoskeletal:      Right lower leg: No edema.      Left lower leg: No edema.   Skin:     General: Skin is warm and dry.   Neurological:      Mental Status: Belen Sharma is alert.   Psychiatric:         Mood and Affect: Mood normal.         Thought Content: Thought content normal.         I spent greater than 50% of the 25 minutes in the visit coordinating care regarding patient's recommendations towards post hospital visit    Return in about 1 month (around 11/10/2022).    Ariel Guevara MD  [facility] INTERNAL MEDICINE [place]    "

## 2022-10-10 NOTE — NURSING NOTE
"Belen Sharma is a 62 year old adult patient that presents today in clinic for the following:    Chief Complaint   Patient presents with     Follow Up     [facility] follow up      The patient's allergies and medications were reviewed as noted. A set of vitals were recorded as noted without incident: BP (!) 145/82 (BP Location: Left leg, Patient Position: Sitting, Cuff Size: Adult Large)   Pulse 84   Temp 98.3  F (36.8  C)   Ht 1.778 m (5' 10\")   Wt 65.8 kg (145 lb)   SpO2 95%   BMI 20.81 kg/m  . The patient does not have any other questions for the provider.    Aurelia Myrick, EMT at 9:17 AM on 10/10/2022    "

## 2022-10-10 NOTE — ASSESSMENT & PLAN NOTE
Admitted on 10/2 - 10/5 for pyelonephritis, sepsis without blood cultures positive. Urine culture with growth of E. Coli ESBL. Patient with hx of liver and kidney transplants on immunosuppression.     - Consider discussing with Dr. Gonzales (infectious disease) a medication called Methenamine (Hiprex) for prophylaxis.   - Labs weekly for CBC and CMP while on Ertapenem.   - Ertapenem infusions to be done by 10/23.   - COVID19 Bivalent vaccine after treatment of infection.     Follow up again with Dr. De La Garza in about 1 month.

## 2022-10-10 NOTE — PROGRESS NOTES
Infusion Nursing Note:  Belen Sharma presents today for Invanz.    Patient seen by provider today: No   present during visit today: Not Applicable.    Note: NA.    Intravenous Access:  PICC.    Treatment Conditions:  Not Applicable.    Post Infusion Assessment:  Patient tolerated infusion without incident.  Blood return noted pre and post infusion.  Site patent and intact, free from redness, edema or discomfort.     Discharge Plan:   Patient and/or family verbalized understanding of discharge instructions and all questions answered.  AVS to patient via [email or web address].  Patient will return tomorrow for next appointment at 10 AM per charge nurse.    Administrations This Visit     ertapenem (INVanz) 1 g in 10 mL SWFI for IVP     Admin Date  10/10/2022 Action  Given Dose  1 g Route  Intravenous Administered By  Lexie Sánchez RN          heparin lock flush 10 UNIT/ML injection 5 mL     Admin Date  10/10/2022 Action  Given Dose  5 mL Route  Intracatheter Administered By  Lexie Sánchez RN Alyssa Marie Sakhitab-Kerestes, RN

## 2022-10-11 ENCOUNTER — INFUSION THERAPY VISIT (OUTPATIENT)
Dept: INFUSION THERAPY | Facility: CLINIC | Age: 62
End: 2022-10-11
Attending: INTERNAL MEDICINE
Payer: MEDICARE

## 2022-10-11 ENCOUNTER — OFFICE VISIT (OUTPATIENT)
Dept: OBGYN | Facility: CLINIC | Age: 62
End: 2022-10-11
Attending: OBSTETRICS & GYNECOLOGY
Payer: MEDICARE

## 2022-10-11 VITALS
OXYGEN SATURATION: 97 % | HEART RATE: 86 BPM | SYSTOLIC BLOOD PRESSURE: 143 MMHG | DIASTOLIC BLOOD PRESSURE: 93 MMHG | TEMPERATURE: 98.6 F

## 2022-10-11 VITALS
SYSTOLIC BLOOD PRESSURE: 143 MMHG | BODY MASS INDEX: 21.26 KG/M2 | WEIGHT: 148.2 LBS | DIASTOLIC BLOOD PRESSURE: 93 MMHG | HEART RATE: 86 BPM

## 2022-10-11 DIAGNOSIS — N12 PYELONEPHRITIS: Primary | ICD-10-CM

## 2022-10-11 DIAGNOSIS — D84.9 IMMUNOSUPPRESSION (H): ICD-10-CM

## 2022-10-11 DIAGNOSIS — R87.810 CERVICAL HIGH RISK HUMAN PAPILLOMAVIRUS (HPV) DNA TEST POSITIVE: Primary | ICD-10-CM

## 2022-10-11 PROCEDURE — 57454 BX/CURETT OF CERVIX W/SCOPE: CPT | Performed by: OBSTETRICS & GYNECOLOGY

## 2022-10-11 PROCEDURE — 87624 HPV HI-RISK TYP POOLED RSLT: CPT | Performed by: OBSTETRICS & GYNECOLOGY

## 2022-10-11 PROCEDURE — 88305 TISSUE EXAM BY PATHOLOGIST: CPT | Mod: TC | Performed by: OBSTETRICS & GYNECOLOGY

## 2022-10-11 PROCEDURE — 88175 CYTOPATH C/V AUTO FLUID REDO: CPT | Performed by: OBSTETRICS & GYNECOLOGY

## 2022-10-11 PROCEDURE — 57456 ENDOCERV CURETTAGE W/SCOPE: CPT | Performed by: OBSTETRICS & GYNECOLOGY

## 2022-10-11 PROCEDURE — G0463 HOSPITAL OUTPT CLINIC VISIT: HCPCS

## 2022-10-11 PROCEDURE — 250N000011 HC RX IP 250 OP 636: Performed by: INTERNAL MEDICINE

## 2022-10-11 PROCEDURE — 96374 THER/PROPH/DIAG INJ IV PUSH: CPT

## 2022-10-11 PROCEDURE — 250N000009 HC RX 250: Performed by: INTERNAL MEDICINE

## 2022-10-11 RX ORDER — MEPERIDINE HYDROCHLORIDE 25 MG/ML
25 INJECTION INTRAMUSCULAR; INTRAVENOUS; SUBCUTANEOUS EVERY 30 MIN PRN
Status: CANCELLED | OUTPATIENT
Start: 2022-10-12

## 2022-10-11 RX ORDER — HEPARIN SODIUM (PORCINE) LOCK FLUSH IV SOLN 100 UNIT/ML 100 UNIT/ML
5 SOLUTION INTRAVENOUS
Status: CANCELLED | OUTPATIENT
Start: 2022-10-12

## 2022-10-11 RX ORDER — HEPARIN SODIUM,PORCINE 10 UNIT/ML
5 VIAL (ML) INTRAVENOUS
Status: DISCONTINUED | OUTPATIENT
Start: 2022-10-11 | End: 2022-10-11 | Stop reason: HOSPADM

## 2022-10-11 RX ORDER — ALBUTEROL SULFATE 0.83 MG/ML
2.5 SOLUTION RESPIRATORY (INHALATION)
Status: CANCELLED | OUTPATIENT
Start: 2022-10-12

## 2022-10-11 RX ORDER — DIPHENHYDRAMINE HYDROCHLORIDE 50 MG/ML
50 INJECTION INTRAMUSCULAR; INTRAVENOUS
Status: CANCELLED
Start: 2022-10-12

## 2022-10-11 RX ORDER — HEPARIN SODIUM,PORCINE 10 UNIT/ML
5 VIAL (ML) INTRAVENOUS
Status: CANCELLED | OUTPATIENT
Start: 2022-10-12

## 2022-10-11 RX ADMIN — Medication 5 ML: at 10:24

## 2022-10-11 RX ADMIN — ERTAPENEM SODIUM 1 G: 1 INJECTION, POWDER, LYOPHILIZED, FOR SOLUTION INTRAMUSCULAR; INTRAVENOUS at 10:24

## 2022-10-11 ASSESSMENT — PAIN SCALES - GENERAL: PAINLEVEL: NO PAIN (0)

## 2022-10-11 NOTE — PROGRESS NOTES
Infusion Nursing Note:  Belen Sharma presents today for   Chief Complaint   Patient presents with     Infusion     ertapenem (INVanz)       Patient seen by provider today: No   present during visit today: Not Applicable.    Note:   -Orders from Massiel Christopher MD completed. Frequency: daily through 10/23/22.  -Invanz given IV push over 5min.    Intravenous Access:  PICC.    Treatment Conditions:  Not Applicable.    Post Infusion Assessment:  Patient tolerated infusion without incident.  Blood return noted pre and post infusion.  Site patent and intact, free from redness, edema or discomfort.  No evidence of extravasations.     Discharge Plan:   Discharge instructions reviewed with: Patient.  Patient and/or family verbalized understanding of discharge instructions and all questions answered.  AVS to patient via [employer].  Patient will return 10/12/22 for next appointment.   Patient discharged in stable condition accompanied by: self.  Departure Mode: Ambulatory.      Mita Awan RN    BP (!) 143/93 (BP Location: Left leg, Patient Position: Sitting, Cuff Size: Adult Regular)   Pulse 86   Temp 98.6  F (37  C)   SpO2 97%     Administrations This Visit     ertapenem (INVanz) 1 g in 10 mL SWFI for IVP     Admin Date  10/11/2022 Action  Given Dose  1 g Route  Intravenous Administered By  Mita Awan, ANNALISA          heparin lock flush 10 UNIT/ML injection 5 mL     Admin Date  10/11/2022 Action  Given Dose  5 mL Route  Intracatheter Administered By  Mita Awan, ANNALISA

## 2022-10-11 NOTE — LETTER
10/11/2022       RE: Belen Sharma  [address] [place] [address]     Dear Colleague,    Thank you for referring your patient, Belen Sharma, to the [facility] [place] [facility]. Please see a copy of my visit note below.      Colposcopy Visit/Procedure Note:    Belen Sharma is an 62 year old, , who comes in for colposcopy and pap smear.   Has history of JANNETTE 3, s/p CKC with negative margins in 2020.   Follow up pap all NIL with +HPV other, colpos with negative biopsies.   Patient strongly prefers Q6 month follow up. Reviewed ASCCP guidelines for solid organ transplant patients. Due to persistent HPV will do pap/colpo together.     Last   Lab Results   Component Value Date    HPV16 Negative 2022     Last   Lab Results   Component Value Date    HPV18 Negative 2022     Last   Lab Results   Component Value Date    HRHPV Positive 2022       Dates/results of previous cervical pathology:   5/3/22- Negative ECC  21- Negative ECC      Dates of previous cervical pathology treatment: 2020- CKC JANNETTE 3, negative margins    History   Smoking Status     Never   Smokeless Tobacco     Never       Allergies as of 10/11/2022 - Reviewed 10/11/2022   Allergen Reaction Noted     Ibuprofen  2020        Colposcopy Procedure:    Consent:  Details of the colposcopic procedure were reviewed. Risks, benefits of treatment, and alternate forms of evaluation were discussed.  Patient's questions were elicited and answered.   Written consent was obtained and scanned into medical record.     Verification of Procedure  Just before the procedure begins, through verbal and active participation of team members, I verified:   Initials   Patient Name [facility]   Patient  H   Procedure to be performed [facility]       OBJECTIVE: BP (!) 143/93   Pulse 86   Wt 67.2 kg (148 lb 3.2 oz)   BMI 21.26 kg/m      Pelvic Exam:  EG/BUS: Normal genital architecture  without lesions, erythema or abnormal secretions. Bartholin's, Urethra, [place]'s glands are normal.   Vagina: moist, pink, rugae with creamy, white and odorless secretions, prolapse noted  Cervix: no lesions and scarring consistent with previous conization  Rectum:anus normal    PROCEDURE:    Repeat Pap collected?  Diagnostic pap collected.    Acetic acid and/or Lugol's solution applied to cervix.  Colposcopic exam of the cervix and apex of the vagina was conducted in the usual fashion.     Findings:  SCJ was not seen entirely and the exam unsatisfactory.    There were no visible lesions    ECC: was obtained and placed in labeled Formalin Jar.    Assessment: Normal cervix s/p conization    Plan:   Pap/HPV performed  Biopsies sent to pathology. [facility] for results  Prefers continuing monitoring Q6months    Patient advised to contact clinic with heavy vaginal bleeding, fever over 101 degrees F, or any other concerns.    Advised that evaluation of sexual contacts is NOT warranted as she can not get the same virus again. Risk of exposure to a NEW virus is possible with partner change.    Verbalized understanding and agreement with plan.    MD ching Mccray

## 2022-10-11 NOTE — PROGRESS NOTES
Colposcopy Visit/Procedure Note:    Belen Sharma is an 62 year old, , who comes in for colposcopy and pap smear.   Has history of JANNETTE 3, s/p CKC with negative margins in 2020.   Follow up pap all NIL with +HPV other, colpos with negative biopsies.   Patient strongly prefers Q6 month follow up. Reviewed ASCCP guidelines for solid organ transplant patients. Due to persistent HPV will do pap/colpo together.     Last   Lab Results   Component Value Date    HPV16 Negative 2022     Last   Lab Results   Component Value Date    HPV18 Negative 2022     Last   Lab Results   Component Value Date    HRHPV Positive 2022       Dates/results of previous cervical pathology:   5/3/22- Negative ECC  21- Negative ECC      Dates of previous cervical pathology treatment: 2020- CKC JANNETTE 3, negative margins    History   Smoking Status     Never   Smokeless Tobacco     Never       Allergies as of 10/11/2022 - Reviewed 10/11/2022   Allergen Reaction Noted     Ibuprofen  2020        Colposcopy Procedure:    Consent:  Details of the colposcopic procedure were reviewed. Risks, benefits of treatment, and alternate forms of evaluation were discussed.  Patient's questions were elicited and answered.   Written consent was obtained and scanned into medical record.     Verification of Procedure  Just before the procedure begins, through verbal and active participation of team members, I verified:   Initials   Patient Name [facility]   Patient  H   Procedure to be performed [facility]       OBJECTIVE: BP (!) 143/93   Pulse 86   Wt 67.2 kg (148 lb 3.2 oz)   BMI 21.26 kg/m      Pelvic Exam:  EG/BUS: Normal genital architecture without lesions, erythema or abnormal secretions. Bartholin's, Urethra, [place]'s glands are normal.   Vagina: moist, pink, rugae with creamy, white and odorless secretions, prolapse noted  Cervix: no lesions and scarring consistent with previous conization  Rectum:anus normal    PROCEDURE:    Repeat  Pap collected?  Diagnostic pap collected.    Acetic acid and/or Lugol's solution applied to cervix.  Colposcopic exam of the cervix and apex of the vagina was conducted in the usual fashion.     Findings:  SCJ was not seen entirely and the exam unsatisfactory.    There were no visible lesions    ECC: was obtained and placed in labeled Formalin Jar.    Assessment: Normal cervix s/p conization    Plan:   Pap/HPV performed  Biopsies sent to pathology. [facility] for results  Prefers continuing monitoring Q6months    Patient advised to contact clinic with heavy vaginal bleeding, fever over 101 degrees F, or any other concerns.    Advised that evaluation of sexual contacts is NOT warranted as she can not get the same virus again. Risk of exposure to a NEW virus is possible with partner change.    Verbalized understanding and agreement with plan.    Daysi Perez MD  c

## 2022-10-11 NOTE — PATIENT INSTRUCTIONS
Dear Belen Sharma    Thank you for choosing [facility] ([facility]) for your Invanz infusion.  The following information is a summary of our appointment as well as important reminders.      We look forward to seeing you on 10/12/22 for your next appointment here at [facility]).  Please don t hesitate to call us at [phone] to reschedule any of your appointments or to speak with one of the [facility] registered nurses.  It was a pleasure taking care of you today.    Sincerely,    [facility]  [address] [place] [address]  [address]  Phone:  [phone]

## 2022-10-11 NOTE — LETTER
10/11/2022         RE: Belen Sharma  [address] [place] [address]        Dear Colleague,    Thank you for referring your patient, Belen Sharma, to the [facility]. Please see a copy of my visit note below.    Infusion Nursing Note:  Belen Sharma presents today for   Chief Complaint   Patient presents with     Infusion     ertapenem (INVanz)       Patient seen by provider today: No   present during visit today: Not Applicable.    Note:   -Orders from Massiel Christopher MD completed. Frequency: daily through 10/23/22.  -Invanz given IV push over 5min.    Intravenous Access:  PICC.    Treatment Conditions:  Not Applicable.    Post Infusion Assessment:  Patient tolerated infusion without incident.  Blood return noted pre and post infusion.  Site patent and intact, free from redness, edema or discomfort.  No evidence of extravasations.     Discharge Plan:   Discharge instructions reviewed with: Patient.  Patient and/or family verbalized understanding of discharge instructions and all questions answered.  AVS to patient via [employer].  Patient will return 10/12/22 for next appointment.   Patient discharged in stable condition accompanied by: self.  Departure Mode: Ambulatory.      Mita Awan RN    BP (!) 143/93 (BP Location: Left leg, Patient Position: Sitting, Cuff Size: Adult Regular)   Pulse 86   Temp 98.6  F (37  C)   SpO2 97%     Administrations This Visit     ertapenem (INVanz) 1 g in 10 mL SWFI for IVP     Admin Date  10/11/2022 Action  Given Dose  1 g Route  Intravenous Administered By  Mita Awan, ANNALISA          heparin lock flush 10 UNIT/ML injection 5 mL     Admin Date  10/11/2022 Action  Given Dose  5 mL Route  Intracatheter Administered By  Mita Awan RN                                  Again, thank you for allowing me to participate in the care of your patient.        Sincerely,        No name on file

## 2022-10-12 ENCOUNTER — INFUSION THERAPY VISIT (OUTPATIENT)
Dept: INFUSION THERAPY | Facility: CLINIC | Age: 62
End: 2022-10-12
Attending: INTERNAL MEDICINE
Payer: MEDICARE

## 2022-10-12 VITALS
DIASTOLIC BLOOD PRESSURE: 76 MMHG | HEART RATE: 78 BPM | SYSTOLIC BLOOD PRESSURE: 144 MMHG | RESPIRATION RATE: 16 BRPM | OXYGEN SATURATION: 97 % | TEMPERATURE: 97.8 F

## 2022-10-12 DIAGNOSIS — N12 PYELONEPHRITIS: ICD-10-CM

## 2022-10-12 DIAGNOSIS — D84.9 IMMUNOSUPPRESSION (H): Primary | ICD-10-CM

## 2022-10-12 PROCEDURE — 250N000009 HC RX 250: Performed by: INTERNAL MEDICINE

## 2022-10-12 PROCEDURE — 250N000011 HC RX IP 250 OP 636: Performed by: INTERNAL MEDICINE

## 2022-10-12 PROCEDURE — 96374 THER/PROPH/DIAG INJ IV PUSH: CPT

## 2022-10-12 RX ORDER — HEPARIN SODIUM (PORCINE) LOCK FLUSH IV SOLN 100 UNIT/ML 100 UNIT/ML
5 SOLUTION INTRAVENOUS
Status: CANCELLED | OUTPATIENT
Start: 2022-10-13

## 2022-10-12 RX ORDER — MEPERIDINE HYDROCHLORIDE 25 MG/ML
25 INJECTION INTRAMUSCULAR; INTRAVENOUS; SUBCUTANEOUS EVERY 30 MIN PRN
Status: CANCELLED | OUTPATIENT
Start: 2022-10-13

## 2022-10-12 RX ORDER — HEPARIN SODIUM,PORCINE 10 UNIT/ML
5 VIAL (ML) INTRAVENOUS
Status: DISCONTINUED | OUTPATIENT
Start: 2022-10-12 | End: 2022-10-12 | Stop reason: HOSPADM

## 2022-10-12 RX ORDER — DIPHENHYDRAMINE HYDROCHLORIDE 50 MG/ML
50 INJECTION INTRAMUSCULAR; INTRAVENOUS
Status: CANCELLED
Start: 2022-10-13

## 2022-10-12 RX ORDER — HEPARIN SODIUM,PORCINE 10 UNIT/ML
5 VIAL (ML) INTRAVENOUS
Status: CANCELLED | OUTPATIENT
Start: 2022-10-13

## 2022-10-12 RX ORDER — ALBUTEROL SULFATE 0.83 MG/ML
2.5 SOLUTION RESPIRATORY (INHALATION)
Status: CANCELLED | OUTPATIENT
Start: 2022-10-13

## 2022-10-12 RX ADMIN — ERTAPENEM SODIUM 1 G: 1 INJECTION, POWDER, LYOPHILIZED, FOR SOLUTION INTRAMUSCULAR; INTRAVENOUS at 08:38

## 2022-10-12 RX ADMIN — Medication 5 ML: at 08:39

## 2022-10-12 NOTE — DISCHARGE SUMMARY
[facility]  Hospitalist Discharge Summary      Date of Admission:  10/2/2022  Date of Discharge:  10/5/2022  6:35 PM  Discharging Provider: Massiel Christopher MD  Discharge Service: Hospitalist Service, GOLD TEAM 3    Discharge Diagnoses   1.  Sepsis secondary to ESBL urinary tract infection with a history of kidney transplant, all are present  2.  S/p  donor kidney transplant on 2016, all are present on     Follow-ups Needed After Discharge   Follow-up Appointments     Adult [facility] Follow-up and recommended labs and tests      Follow up with primary care provider, Vincent De La Garza, within 7 days   for hospital follow- up.  The following labs/tests are recommended: none  Follow up with  or  with a Weekly CBC and CMP to be   ordered on discharged and faxed to transplant infectious disease. Please   fax results to Dr. Gonzales, fax number [phone].    Appointments on [place] and/or [facility] (with [facility] or [facility]   provider or service). Call [phone] if you haven't heard regarding   these appointments within 7 days of discharge.                 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    1.  Sepsis secondary to ESBL urinary tract infection with a history of kidney transplant, all are present  This is the  main problem that led to this admission.  Sepsis criteria include temperature of 38.8 and white blood cell count of 12.3.  The patient noted urinary frequency for 2 days following completion of oral antibiotics with cefdinir on 2022.  The patient had 4 urinary tract infections within the last 12 months.  The patient's risk factor for recurrent urinary tract infection include female gender, being immunosuppressed post kidney transplant, and the need to manipulate genitourinary tract to change pessary in the setting of vaginal prolapse.  Urinalysis with reflex urine culture grew extended spectrum beta-lactam E. coli that was susceptible to meropenem.  Blood cultures finalized without any evidence of growth.  CT abdomen pelvis did not show evidence of structural or anatomical abnormality.  The patient received Zosyn in the emergency department and transition to meropenem upon admission.  The patient received PICC line without any immediate complications during this admission.  The patient was scheduled for infusion center appointments in order to receive ertapenem at 1 g daily until 10/23/2022 in order to complete a total of 3 weeks of antibiotic therapy.  The patient will need weekly CBC and complete metabolic panel that were ordered and need to be faxed to transplant infectious disease [ fax number [phone]].  I have made a urology referral for consideration of urodynamic studies at discharge given concerns for recent ureteral reflux or inability to fully empty the bladder.  The patient was seen in collaboration with transplant infectious disease and will follow up with  or Dr. Gonzales in one month following discharge.    2.  S/p  donor kidney transplant on 2016, all are present on admission  The patient was continued on 3 drug immunosuppression with tacrolimus immediate release [goal 4-6], MMF dose 540 mg every 12 hours and prednisone 5 mg daily.  The patient continues to receive  Bactrim for PJP prophylaxis.  The patient was deemed to be euvolemic during this admission.  The patient was started on sodium bicarbonate during this admission.  The patient was seen in consultation with transplant nephrology and will follow up with them as outpatient.    Of note, the patient was deemed to be due for decisional influenza vaccine and a vitamin COVID-19 vaccine.  This needs to be addressed as outpatient.    Consultations This Hospital Stay   NEPHROLOGY KIDNEY/PANCREAS TRANSPLANT ADULT IP CONSULT  INFECTIOUS DISEASE TRANSPLANT SOT ADULT IP CONSULT  CARE MANAGEMENT / SOCIAL WORK IP CONSULT  PATIENT LEARNING CENTER IP CONSULT  VASCULAR ACCESS FOR PICC PLACEMENT ADULT IP CONSULT  CARE MANAGEMENT / SOCIAL WORK IP CONSULT  NURSING TO CONSULT FOR VASCULAR ACCESS CARE IP CONSULT  INTERVENTIONAL RADIOLOGY ADULT/PEDS IP CONSULT  PHYSICAL THERAPY ADULT IP CONSULT    Code Status   Prior    Time Spent on this Encounter   IMassiel MD, personally saw the patient today and spent greater than 30 minutes discharging this patient.       Massiel Christopher MD  [facility] [address] [address]  Phone: [phone]  ______________________________________________________________________    Physical Exam   Vital Signs:                    Weight: 148 lbs 0 oz  Constitutional: Awake, alert, cooperative, no apparent distress.  Eyes: Conjunctiva and pupils examined and normal.  HEENT: Moist mucous membranes, normal dentition.  Respiratory: Clear to auscultation bilaterally, no crackles or wheezing.  Cardiovascular: Regular rate and rhythm, normal S1 and S2, and no murmur noted.  GI: Soft, non-distended, non-tender, normal bowel sounds.  Lymph/Hematologic: No anterior cervical or supraclavicular adenopathy.  Skin: No rashes, no cyanosis, no edema.  Musculoskeletal: No joint swelling, erythema or tenderness.  Neurologic: Cranial nerves 2-12 intact, normal strength and  sensation.  Psychiatric: Alert, oriented to person, place and time, no obvious anxiety or depression.       Primary Care Physician   Vincent De La Garza    Discharge Orders      Comprehensive metabolic panel     Adult Urology  Referral      Home Care Referral      Home Infusion Referral      Reason for your hospital stay    You have been admitted to the hospital for further management of ESBL urinary tract infection.  You have done extremely well on intravenous antibiotics.  Please follow-up with urology for consideration of urodynamic study versus cystoscopy.  Thank you so much for entrusting us with your care.     Activity    Your activity upon discharge: activity as tolerated     Adult [facility] Follow-up and recommended labs and tests    Follow up with primary care provider, Vincent De La Garza, within 7 days for hospital follow- up.  The following labs/tests are recommended: none  Follow up with  or  with a Weekly CBC and CMP to be ordered on discharged and faxed to transplant infectious disease. Please fax results to Dr. Gonzales, fax number [phone].    Appointments on [place] and/or [facility] (with [facility] or [facility] provider or service). Call [phone] if you haven't heard regarding these appointments within 7 days of discharge.     Diet    Follow this diet upon discharge: Orders Placed This Encounter      Combination Diet Regular Diet Adult     CBC with platelets and differential       Significant Results and Procedures   Most Recent 3 CBC's:Recent Labs   Lab Test 10/10/22  1017 10/05/22  0648 10/04/22  0615   WBC 14.4* 7.3 8.1   HGB 15.1 14.6 14.2   MCV 91 92 91    271 248     Most Recent 3 BMP's:Recent Labs   Lab Test 10/10/22  1017 10/05/22  0648 10/04/22  0615    136 134*   POTASSIUM 4.2 4.2 4.0   CHLORIDE 104 104 105   CO2 24 23 23   BUN 19.8 13.7 10.8   CR 0.62 0.60 0.57   ANIONGAP 10 9 6*   EDSON 10.3* 9.7 9.5   GLC 97 91 97   ,   Results for orders placed or  performed during the hospital encounter of 10/02/22   XR Chest Port 1 View    Narrative    EXAM: XR CHEST PORT 1 VIEW  LOCATION: [facility]  DATE/TIME: 10/2/2022 6:56 AM    INDICATION: fever  COMPARISON: 9/14/2022      Impression    IMPRESSION: The heart is normal in size and contour. There is mild central pulmonary congestion. Subtle increased interstitial opacities are seen in the right lung base, a component of this may reflect pulmonary edema a superimposed atypical infectious   process such as viral pneumonia cannot be excluded.   IR PICC Placement > 5 Yrs of Age    Narrative    PRE-PROCEDURE DIAGNOSIS:  ESBL bacterial infection    POST-PROCEDURE DIAGNOSIS:  Same    PROCEDURE: IR PICC Placement    Impression    IMPRESSION: Completed placement of 4 [place] 39.5 cm single lumen,  PowerPICC catheter via right basilic vein. Ready for immediate use.    ----------    CLINICAL HISTORY: Patient requiring venous access; PICC line  requested.      PERFORMED BY: Antony Manuel PA-C    CONSENT: Written informed consent was obtained and is documented in  the patient record.    MEDICATIONS:  1% lidocaine was used for local anesthesia. The catheter  was heparin locked upon completion of placement.    NURSING: The patient was placed on continuous vital signs monitoring.  Vital signs were monitored by nursing staff.      FLUOROSCOPY TIME:  0.3 minutes    DESCRIPTION: The right upper extremity was prepped and draped in the  usual sterile fashion.      Ultrasound revealed a patent basilic vein, and the overlying tissue  was anesthetized. Under ultrasound guidance, the basilic vein was  identified and the overlying skin was anesthetized, skin dermatotomy  was made.   Under ultrasound guidance, basilic venipuncture was made  with a 25-gauge needle/catheter, and catheter was advanced into the  vein with blood return.  A permanent copy of the image documenting a  patent vein was entered is  in the patient record.    Under fluoroscopic guidance, an 0.018 wire was advanced to the right  atrium, and a measurement was obtained.  The introducer catheter was  removed over the wire and a 4.5 French peel-away sheath with inner  dilator were advanced.  The wire and dilator was removed and catheter  was advanced through the peel-away sheath under fluoroscopic guidance,  with the catheter tip placed in the right atrium.  The catheter  aspirated and flushed freely and was locked with heparin.  A StatLock  retaining device was applied    COMPLICATIONS:  No immediate concerns; the patient remained stable  throughout the procedure and tolerated it well.    ESTIMATED BLOOD LOSS:  Minimal    SPECIMENS:  None    DRAKE SEYMOUR PA-C         SYSTEM ID:  [id number]       Discharge Medications   Discharge Medication List as of 10/5/2022  5:30 PM      START taking these medications    Details   ertapenem (INVANZ) 1 GM vial Inject 1 g into the vein every 24 hours, TransitionalPlease continiue ertapenem 1 gram daily until 10/23 Please obtain Weekly CBC and CMP to be ordered on discharged and faxed to transplant infectious disease. Please fax results to Dr. Gonzales, fax number  [phone] Please discontinue PICC line after administering the last dose of ertapenem      sodium bicarbonate 650 MG tablet Take 1 tablet (650 mg) by mouth 2 times daily, Disp-60 tablet, R-0, E-Prescribe         CONTINUE these medications which have NOT CHANGED    Details   acetaminophen (TYLENOL) 325 MG tablet Take 1 tablet (325 mg) by mouth every 4 hours as needed for mild pain or fever, Disp-120 tablet, R-1, E-Prescribe      aspirin (ASA) 81 MG chewable tablet Take 81 mg by mouth every evening Stopped 7 days preop, Historical      biotin 1000 MCG TABS tablet Take 3,000 mcg by mouth every evening, Historical      ciclopirox (PENLAC) 8 % external solution Apply to adjacent skin and affected nails daily.  Remove with alcohol every 7 days, then  repeat.Disp-6 mL, [id number]      diphenhydrAMINE (BENADRYL) 25 MG tablet Take 25 mg by mouth every 8 hours as needed for allergies, Historical      estradiol (ESTRACE) 0.1 MG/GM vaginal cream Place 1 g vaginally twice a weekDisp-42.5 g, [id number]      guaiFENesin (MUCINEX) 600 MG 12 hr tablet Take 1 tablet by mouth 2 times daily as needed, Historical      Multiple Vitamins-Minerals (WOMENS DAILY FORMULA PO) Take 1 tablet by mouth daily, Historical      nitroGLYcerin (NITROSTAT) 0.4 MG sublingual tablet Place 1 tablet (0.4 mg) under the tongue every 5 minutes as needed for chest pain For chest pain place 1 tablet under the tongue every 5 minutes for 3 doses. If symptoms persist 5 minutes after 1st dose call 911., Disp-30 tablet, R-1, E-Prescribe      predniSONE (DELTASONE) 5 MG tablet Take 1 tablet (5 mg) by mouth daily, Disp-90 tablet, R-3, E-Prescribe      Probiotic Product (PROBIOTIC PO) Take 2 tablets by mouth every morning ([facility] Women's Vaginal Probiotic), Historical      sulfamethoxazole-trimethoprim (BACTRIM) 400-80 MG tablet Take 1 tablet by mouth three times a week on Monday, Wednesday, Friday, Historical      tacrolimus (GENERIC EQUIVALENT) 0.5 MG capsule Take 1 capsule (0.5 mg) by mouth 2 times daily Total dose = 1.5 mg BID, Disp-180 capsule, R-3, E-Prescribe      tacrolimus (GENERIC EQUIVALENT) 1 MG capsule Take 1 capsule (1 mg) by mouth 2 times daily Total dose = 1.5 mg BID, Disp-180 capsule, R-3, E-Prescribe      mycophenolic acid (GENERIC EQUIVALENT) 180 MG EC tablet Take 3 tablets (540 mg) by mouth 2 times daily, Historical      temazepam (RESTORIL) 15 MG capsule Take 1 capsule (15 mg) by mouth as needed for sleep (twice a year), Disp-15 capsule, R-0, E-Prescribe         STOP taking these medications       loratadine (CLARITIN) 10 MG tablet Comments:   Reason for Stopping:             Allergies   Allergies   Allergen Reactions     Ibuprofen      Due to liver transplant

## 2022-10-12 NOTE — LETTER
10/12/2022         RE: Belen Sharma  [address] [place] [address]        Dear Colleague,    Thank you for referring your patient, Belen Sharma, to the [facility] TREATMENT [facility]. Please see a copy of my visit note below.    Nursing Note  Belen Sh[facility]:   Chief Complaint   Patient presents with     Infusion     Ertapenem (invanz)IVP      During [facility]'s [facility] appointment, orders from Dr. Christopher were completed.  Frequency: daily    Progress note:  Patient identification verified by name and date of birth.  Assessment completed.  Vitals recorded in Doc Flowsheets.  Patient was provided with education regarding medication/procedure and possible side effects.  Patient verbalized understanding.     present during visit today: Not Applicable.    Treatment Conditions: Non-applicable.  Premedications: were not ordered.  Drug Waste Record: No  Infusion length and rate:  IVP over 5 minutes  Labs: were not ordered for this appointment.  Vascular access: PICC accessed today, and PICC dressing changed.    Is the next appt scheduled? yes  Asymptomatic COVID test completed? no    Post Infusion Assessment:  Patient tolerated infusion without incident.  Blood return noted pre and post infusion.  Site patent and intact, free from redness, edema or discomfort.  No evidence of extravasations.  Access discontinued per protocol.     Discharge Plan:   Follow up plan of care with: ongoing infusions at [facility].  Discharge instructions were reviewed with patient.  Patient/representative verbalized understanding of discharge instructions and all questions answered.  Patient discharged from [facility] in stable condition.    Mita Perdomo RN    Administrations This Visit     ertapenem (INVanz) 1 g in 10 mL SWFI for IVP     Admin  Date  10/12/2022 Action  Given Dose  1 g Route  Intravenous Administered By  Mita Perdomo, RN          heparin lock flush 10 UNIT/ML injection 5 mL     Admin Date  10/12/2022 Action  Given Dose  5 mL Route  Intracatheter Administered By  Mita Perdomo RN          sodium chloride (PF) 0.9% PF flush 3-20 mL     Admin Date  10/12/2022 Action  Given Dose  20 mL Route  Intracatheter Administered By  Mita Perdomo, RN                BP (!) 144/76 (BP Location: Left arm)   Pulse 78   Temp 97.8  F (36.6  C) (Oral)   Resp 16   SpO2 97%         Again, thank you for allowing me to participate in the care of your patient.        Sincerely,        No name on file

## 2022-10-12 NOTE — PROGRESS NOTES
Nursing Note  Belen Sharma [facility] for:   Chief Complaint   Patient presents with     Infusion     Ertapenem (invanz)IVP      During [facility]'s [facility] appointment, orders from Dr. Christopher were completed.  Frequency: daily    Progress note:  Patient identification verified by name and date of birth.  Assessment completed.  Vitals recorded in Doc Flowsheets.  Patient was provided with education regarding medication/procedure and possible side effects.  Patient verbalized understanding.     present during visit today: Not Applicable.    Treatment Conditions: Non-applicable.  Premedications: were not ordered.  Drug Waste Record: No  Infusion length and rate:  IVP over 5 minutes  Labs: were not ordered for this appointment.  Vascular access: PICC accessed today, and PICC dressing changed.    Is the next appt scheduled? yes  Asymptomatic COVID test completed? no    Post Infusion Assessment:  Patient tolerated infusion without incident.  Blood return noted pre and post infusion.  Site patent and intact, free from redness, edema or discomfort.  No evidence of extravasations.  Access discontinued per protocol.     Discharge Plan:   Follow up plan of care with: ongoing infusions [facility].  Discharge instructions were reviewed with patient.  Patient/representative verbalized understanding of discharge instructions and all questions answered.  [facility] in stable condition.    Mita Perdomo RN    Administrations This Visit     ertapenem (INVanz) 1 g in 10 mL SWFI for IVP     Admin Date  10/12/2022 Action  Given Dose  1 g Route  Intravenous Administered By  Mita Perdomo RN          heparin lock flush 10 UNIT/ML injection 5 mL     Admin Date  10/12/2022 Action  Given Dose  5 mL Route  Intracatheter Administered By  Mita Perdomo, ANNALISA           sodium chloride (PF) 0.9% PF flush 3-20 mL     Admin Date  10/12/2022 Action  Given Dose  20 mL Route  Intracatheter Administered By  Mita Perdomo RN                BP (!) 144/76 (BP Location: Left arm)   Pulse 78   Temp 97.8  F (36.6  C) (Oral)   Resp 16   SpO2 97%

## 2022-10-13 ENCOUNTER — TELEPHONE (OUTPATIENT)
Dept: TRANSPLANT | Facility: CLINIC | Age: 62
End: 2022-10-13

## 2022-10-13 ENCOUNTER — INFUSION THERAPY VISIT (OUTPATIENT)
Dept: INFUSION THERAPY | Facility: CLINIC | Age: 62
End: 2022-10-13
Attending: INTERNAL MEDICINE
Payer: MEDICARE

## 2022-10-13 VITALS
RESPIRATION RATE: 18 BRPM | HEART RATE: 83 BPM | SYSTOLIC BLOOD PRESSURE: 146 MMHG | TEMPERATURE: 98.7 F | DIASTOLIC BLOOD PRESSURE: 90 MMHG

## 2022-10-13 DIAGNOSIS — D84.9 IMMUNOSUPPRESSION (H): ICD-10-CM

## 2022-10-13 DIAGNOSIS — N12 PYELONEPHRITIS: Primary | ICD-10-CM

## 2022-10-13 LAB
BKR LAB AP GYN ADEQUACY: NORMAL
BKR LAB AP GYN INTERPRETATION: NORMAL
BKR LAB AP HPV REFLEX: NORMAL
BKR LAB AP PREVIOUS ABNL DX: NORMAL
BKR LAB AP PREVIOUS ABNORMAL: NORMAL
PATH REPORT.COMMENTS IMP SPEC: NORMAL
PATH REPORT.FINAL DX SPEC: NORMAL
PATH REPORT.GROSS SPEC: NORMAL
PATH REPORT.MICROSCOPIC SPEC OTHER STN: NORMAL
PATH REPORT.RELEVANT HX SPEC: NORMAL
PATH REPORT.RELEVANT HX SPEC: NORMAL
PHOTO IMAGE: NORMAL

## 2022-10-13 PROCEDURE — 96374 THER/PROPH/DIAG INJ IV PUSH: CPT

## 2022-10-13 PROCEDURE — 250N000009 HC RX 250: Performed by: INTERNAL MEDICINE

## 2022-10-13 PROCEDURE — 88305 TISSUE EXAM BY PATHOLOGIST: CPT | Mod: 26 | Performed by: PATHOLOGY

## 2022-10-13 PROCEDURE — 250N000011 HC RX IP 250 OP 636: Performed by: INTERNAL MEDICINE

## 2022-10-13 RX ORDER — HEPARIN SODIUM (PORCINE) LOCK FLUSH IV SOLN 100 UNIT/ML 100 UNIT/ML
5 SOLUTION INTRAVENOUS
Status: CANCELLED | OUTPATIENT
Start: 2022-10-14

## 2022-10-13 RX ORDER — HEPARIN SODIUM,PORCINE 10 UNIT/ML
5 VIAL (ML) INTRAVENOUS
Status: DISCONTINUED | OUTPATIENT
Start: 2022-10-13 | End: 2022-10-13 | Stop reason: HOSPADM

## 2022-10-13 RX ORDER — ALBUTEROL SULFATE 0.83 MG/ML
2.5 SOLUTION RESPIRATORY (INHALATION)
Status: CANCELLED | OUTPATIENT
Start: 2022-10-14

## 2022-10-13 RX ORDER — HEPARIN SODIUM,PORCINE 10 UNIT/ML
5 VIAL (ML) INTRAVENOUS
Status: CANCELLED | OUTPATIENT
Start: 2022-10-14

## 2022-10-13 RX ORDER — MEPERIDINE HYDROCHLORIDE 25 MG/ML
25 INJECTION INTRAMUSCULAR; INTRAVENOUS; SUBCUTANEOUS EVERY 30 MIN PRN
Status: CANCELLED | OUTPATIENT
Start: 2022-10-14

## 2022-10-13 RX ORDER — DIPHENHYDRAMINE HYDROCHLORIDE 50 MG/ML
50 INJECTION INTRAMUSCULAR; INTRAVENOUS
Status: CANCELLED
Start: 2022-10-14

## 2022-10-13 RX ADMIN — WATER 1 G: 1 INJECTION INTRAMUSCULAR; INTRAVENOUS; SUBCUTANEOUS at 10:32

## 2022-10-13 RX ADMIN — Medication 5 ML: at 10:41

## 2022-10-13 NOTE — PROGRESS NOTES
Infusion Nursing Note:  Belen Sharma presents today for an antibiotic infusion daily due to pyelonephritis.    Patient seen by provider today: No   present during visit today: Not Applicable.    Note: N/A.    Intravenous Access:  PICC.      Post Infusion Assessment:  Patient tolerated infusion without incident.     Discharge Plan:   Patient and/or family verbalized understanding of discharge instructions and all questions answered.      Silvia Lui RN    Administrations This Visit     ertapenem (INVanz) 1 g in 10 mL SWFI for IVP     Admin Date  10/13/2022 Action  Given Dose  1 g Route  Intravenous Administered By  Silvia Lui RN          heparin lock flush 10 UNIT/ML injection 5 mL     Admin Date  10/13/2022 Action  Given Dose  5 mL Route  Intracatheter Administered By  Silvia Lui RN

## 2022-10-13 NOTE — LETTER
10/13/2022         RE: Belen Sharma  [address] [place] [address]        Dear Colleague,    Thank you for referring your patient, Belen Sharma, to the [facility]. Please see a copy of my visit note below.    Infusion Nursing Note:  Belen Sharma presents today for an antibiotic infusion daily due to pyelonephritis.    Patient seen by provider today: No   present during visit today: Not Applicable.    Note: N/A.    Intravenous Access:  PICC.      Post Infusion Assessment:  Patient tolerated infusion without incident.     Discharge Plan:   Patient and/or family verbalized understanding of discharge instructions and all questions answered.      Silvia Lui RN    Administrations This Visit     ertapenem (INVanz) 1 g in 10 mL SWFI for IVP     Admin Date  10/13/2022 Action  Given Dose  1 g Route  Intravenous Administered By  Silvia Lui RN          heparin lock flush 10 UNIT/ML injection 5 mL     Admin Date  10/13/2022 Action  Given Dose  5 mL Route  Intracatheter Administered By  Silvia Lui RN                                        Again, thank you for allowing me to participate in the care of your patient.        Sincerely,        No name on file

## 2022-10-14 ENCOUNTER — INFUSION THERAPY VISIT (OUTPATIENT)
Dept: INFUSION THERAPY | Facility: CLINIC | Age: 62
End: 2022-10-14
Attending: INTERNAL MEDICINE
Payer: MEDICARE

## 2022-10-14 VITALS
SYSTOLIC BLOOD PRESSURE: 151 MMHG | DIASTOLIC BLOOD PRESSURE: 89 MMHG | RESPIRATION RATE: 16 BRPM | TEMPERATURE: 98.1 F | OXYGEN SATURATION: 94 % | HEART RATE: 82 BPM

## 2022-10-14 DIAGNOSIS — D84.9 IMMUNOSUPPRESSION (H): ICD-10-CM

## 2022-10-14 DIAGNOSIS — N12 PYELONEPHRITIS: Primary | ICD-10-CM

## 2022-10-14 PROCEDURE — 96374 THER/PROPH/DIAG INJ IV PUSH: CPT

## 2022-10-14 PROCEDURE — 250N000009 HC RX 250: Performed by: INTERNAL MEDICINE

## 2022-10-14 PROCEDURE — 250N000011 HC RX IP 250 OP 636: Performed by: INTERNAL MEDICINE

## 2022-10-14 RX ORDER — MEPERIDINE HYDROCHLORIDE 25 MG/ML
25 INJECTION INTRAMUSCULAR; INTRAVENOUS; SUBCUTANEOUS EVERY 30 MIN PRN
Status: CANCELLED | OUTPATIENT
Start: 2022-10-15

## 2022-10-14 RX ORDER — HEPARIN SODIUM,PORCINE 10 UNIT/ML
5 VIAL (ML) INTRAVENOUS
Status: DISCONTINUED | OUTPATIENT
Start: 2022-10-14 | End: 2022-10-14 | Stop reason: HOSPADM

## 2022-10-14 RX ORDER — HEPARIN SODIUM,PORCINE 10 UNIT/ML
5 VIAL (ML) INTRAVENOUS
Status: CANCELLED | OUTPATIENT
Start: 2022-10-15

## 2022-10-14 RX ORDER — DIPHENHYDRAMINE HYDROCHLORIDE 50 MG/ML
50 INJECTION INTRAMUSCULAR; INTRAVENOUS
Status: CANCELLED
Start: 2022-10-15

## 2022-10-14 RX ORDER — HEPARIN SODIUM (PORCINE) LOCK FLUSH IV SOLN 100 UNIT/ML 100 UNIT/ML
5 SOLUTION INTRAVENOUS
Status: CANCELLED | OUTPATIENT
Start: 2022-10-15

## 2022-10-14 RX ORDER — ALBUTEROL SULFATE 0.83 MG/ML
2.5 SOLUTION RESPIRATORY (INHALATION)
Status: CANCELLED | OUTPATIENT
Start: 2022-10-15

## 2022-10-14 RX ADMIN — Medication 5 ML: at 10:32

## 2022-10-14 RX ADMIN — ERTAPENEM SODIUM 1 G: 1 INJECTION, POWDER, LYOPHILIZED, FOR SOLUTION INTRAMUSCULAR; INTRAVENOUS at 10:24

## 2022-10-14 NOTE — LETTER
10/14/2022         RE: Belen Sharma  [address] [place] [address]        Dear Colleague,    Thank you for referring your patient, Belen Sharma, to the [facility] TREATMENT [facility]. Please see a copy of my visit note below.    Nursing Note  Belen Sharma [facility]:   Chief Complaint   Patient presents with     Infusion     IV Ertapenem     During [facility]'s [facility] appointment, orders from Dr Christopher were completed.  Frequency: daily    Progress note:  Patient identification verified by name and date of birth.  Assessment completed.  Vitals recorded in Doc Flowsheets.  Patient was provided with education regarding medication/procedure and possible side effects.  Patient verbalized understanding.     present during visit today: Not Applicable.    Treatment Conditions: Non-applicable.    Premedications: were not ordered.    Drug Waste Record: No    Infusion length and rate:  infusion given over approximately 5 minutes    Labs: were not ordered for this appointment.    Vascular access: PICC accessed today.    Is the next appt scheduled? Yes  Asymptomatic COVID test completed? No    Post Infusion Assessment:  Patient tolerated infusion without incident.  Blood return noted pre and post infusion.  Site patent and intact, free from redness, edema or discomfort.  No evidence of extravasations.     Discharge Plan:   Follow up plan of care with: ongoing infusions at [facility].  Discharge instructions were reviewed with patient.  Patient/representative verbalized understanding of discharge instructions and all questions answered.  Patient discharged from [facility] in stable condition.    Sravani Benitez RN    Administrations This Visit     ertapenem (INVanz) 1 g in 10 mL SWFI for IVP     Admin Date  10/14/2022 Action  Given Dose  1 g  Route  Intravenous Administered By  Sravani Benitez RN          heparin lock flush 10 UNIT/ML injection 5 mL     Admin Date  10/14/2022 Action  Given Dose  5 mL Route  Intracatheter Administered By  Sravani Benitez RN                BP (!) 151/89 (BP Location: Left leg, Patient Position: Sitting, Cuff Size: Adult Regular)   Pulse 82   Temp 98.1  F (36.7  C) (Oral)   Resp 16   SpO2 94%           Again, thank you for allowing me to participate in the care of your patient.        Sincerely,        No name on file

## 2022-10-14 NOTE — PROGRESS NOTES
Nursing Note  Belen Sharma [facility] for:   Chief Complaint   Patient presents with     Infusion     IV Ertapenem     During [facility]'s [facility] appointment, orders from Dr Christopher were completed.  Frequency: daily    Progress note:  Patient identification verified by name and date of birth.  Assessment completed.  Vitals recorded in Doc Flowsheets.  Patient was provided with education regarding medication/procedure and possible side effects.  Patient verbalized understanding.     present during visit today: Not Applicable.    Treatment Conditions: Non-applicable.    Premedications: were not ordered.    Drug Waste Record: No    Infusion length and rate:  infusion given over approximately 5 minutes    Labs: were not ordered for this appointment.    Vascular access: PICC accessed today.    Is the next appt scheduled? Yes  Asymptomatic COVID test completed? No    Post Infusion Assessment:  Patient tolerated infusion without incident.  Blood return noted pre and post infusion.  Site patent and intact, free from redness, edema or discomfort.  No evidence of extravasations.     Discharge Plan:   Follow up plan of care with: ongoing infusions at [facility].  Discharge instructions were reviewed with patient.  Patient/representative verbalized understanding of discharge instructions and all questions answered.  Patient discharged from [facility] in stable condition.    Sravani Benitez RN    Administrations This Visit     ertapenem (INVanz) 1 g in 10 mL SWFI for IVP     Admin Date  10/14/2022 Action  Given Dose  1 g Route  Intravenous Administered By  Sravani Benitez RN          heparin lock flush 10 UNIT/ML injection 5 mL     Admin Date  10/14/2022 Action  Given Dose  5 mL Route  Intracatheter Administered By  Sravani Benitez RN                BP (!) 151/89 (BP Location:  Left leg, Patient Position: Sitting, Cuff Size: Adult Regular)   Pulse 82   Temp 98.1  F (36.7  C) (Oral)   Resp 16   SpO2 94%

## 2022-10-14 NOTE — LETTER
Date:October 15, 2022      Provider requested that no letter be sent. Do not send.       [facility]

## 2022-10-15 ENCOUNTER — INFUSION THERAPY VISIT (OUTPATIENT)
Dept: INFUSION THERAPY | Facility: CLINIC | Age: 62
End: 2022-10-15
Attending: INTERNAL MEDICINE
Payer: MEDICARE

## 2022-10-15 VITALS — OXYGEN SATURATION: 100 % | SYSTOLIC BLOOD PRESSURE: 166 MMHG | DIASTOLIC BLOOD PRESSURE: 87 MMHG | HEART RATE: 84 BPM

## 2022-10-15 DIAGNOSIS — D84.9 IMMUNOSUPPRESSION (H): ICD-10-CM

## 2022-10-15 DIAGNOSIS — N12 PYELONEPHRITIS: Primary | ICD-10-CM

## 2022-10-15 PROCEDURE — 96374 THER/PROPH/DIAG INJ IV PUSH: CPT

## 2022-10-15 PROCEDURE — 250N000009 HC RX 250: Performed by: INTERNAL MEDICINE

## 2022-10-15 PROCEDURE — 250N000011 HC RX IP 250 OP 636: Performed by: INTERNAL MEDICINE

## 2022-10-15 RX ORDER — MEPERIDINE HYDROCHLORIDE 25 MG/ML
25 INJECTION INTRAMUSCULAR; INTRAVENOUS; SUBCUTANEOUS EVERY 30 MIN PRN
Status: CANCELLED | OUTPATIENT
Start: 2022-10-16

## 2022-10-15 RX ORDER — HEPARIN SODIUM (PORCINE) LOCK FLUSH IV SOLN 100 UNIT/ML 100 UNIT/ML
5 SOLUTION INTRAVENOUS
Status: CANCELLED | OUTPATIENT
Start: 2022-10-16

## 2022-10-15 RX ORDER — ALBUTEROL SULFATE 0.83 MG/ML
2.5 SOLUTION RESPIRATORY (INHALATION)
Status: CANCELLED | OUTPATIENT
Start: 2022-10-16

## 2022-10-15 RX ORDER — DIPHENHYDRAMINE HYDROCHLORIDE 50 MG/ML
50 INJECTION INTRAMUSCULAR; INTRAVENOUS
Status: CANCELLED
Start: 2022-10-16

## 2022-10-15 RX ORDER — HEPARIN SODIUM,PORCINE 10 UNIT/ML
5 VIAL (ML) INTRAVENOUS
Status: CANCELLED | OUTPATIENT
Start: 2022-10-16

## 2022-10-15 RX ORDER — HEPARIN SODIUM,PORCINE 10 UNIT/ML
5 VIAL (ML) INTRAVENOUS
Status: DISCONTINUED | OUTPATIENT
Start: 2022-10-15 | End: 2022-10-15 | Stop reason: HOSPADM

## 2022-10-15 RX ADMIN — ERTAPENEM SODIUM 1 G: 1 INJECTION, POWDER, LYOPHILIZED, FOR SOLUTION INTRAMUSCULAR; INTRAVENOUS at 09:16

## 2022-10-15 RX ADMIN — Medication 5 ML: at 09:25

## 2022-10-15 NOTE — LETTER
Date:October 15, 2022      Provider requested that no letter be sent. Do not send.       [facility]

## 2022-10-15 NOTE — PROGRESS NOTES
Nursing Note  Belen Sharma presents [facility] for:   Chief Complaint   Patient presents with     Infusion     During [facility]'s [facility] appointment, orders from Dr Christopher were completed.  Frequency: daily    Progress note:  Patient identification verified by name and date of birth.  Assessment completed.  Vitals recorded in Doc Flowsheets.  Patient was provided with education regarding medication/procedure and possible side effects.  Patient verbalized understanding.     present during visit today: Not Applicable.    Treatment Conditions: Non-applicable.    Premedications: were not ordered.    Drug Waste Record: No    Infusion length and rate:  infusion given over approximately 5 minutes    Labs: were not ordered for this appointment.    Vascular access: PICC accessed today.    Is the next appt scheduled? Yes  Asymptomatic COVID test completed? No    Post Infusion Assessment:  Patient tolerated infusion without incident.  Blood return noted pre and post infusion.  Site patent and intact, free from redness, edema or discomfort.  No evidence of extravasations.     Discharge Plan:   Follow up plan of care with: ongoing infusions at [facility].  Discharge instructions were reviewed with patient.  Patient/representative verbalized understanding of discharge instructions and all questions answered.  Patient discharged [facility] in stable condition.    Maribel Judge RN    Administrations This Visit     ertapenem (INVanz) 1 g in 10 mL SWFI for IVP     Admin Date  10/15/2022 Action  Given Dose  1 g Route  Intravenous Administered By  Maribel Judge RN          heparin lock flush 10 UNIT/ML injection 5 mL     Admin Date  10/15/2022 Action  Given Dose  5 mL Route  Intracatheter Administered By  Maribel Judge RN                BP (!) 166/87 (BP Location: Left leg, Patient Position:  Sitting, Cuff Size: Adult Regular)   Pulse 84   SpO2 100%

## 2022-10-15 NOTE — LETTER
10/15/2022         RE: Belen Sharma  [address] [place] [address]        Dear Colleague,    Thank you for referring your patient, Belen Sharma, to the [facility]. Please see a copy of my visit note below.    Nursing Note  Belen Sharma [facility] for:   Chief Complaint   Patient presents with     Infusion     During [facility]'s [facility] appointment, orders from Dr Christopher were completed.  Frequency: daily    Progress note:  Patient identification verified by name and date of birth.  Assessment completed.  Vitals recorded in Doc Flowsheets.  Patient was provided with education regarding medication/procedure and possible side effects.  Patient verbalized understanding.     present during visit today: Not Applicable.    Treatment Conditions: Non-applicable.    Premedications: were not ordered.    Drug Waste Record: No    Infusion length and rate:  infusion given over approximately 5 minutes    Labs: were not ordered for this appointment.    Vascular access: PICC accessed today.    Is the next appt scheduled? Yes  Asymptomatic COVID test completed? No    Post Infusion Assessment:  Patient tolerated infusion without incident.  Blood return noted pre and post infusion.  Site patent and intact, free from redness, edema or discomfort.  No evidence of extravasations.     Discharge Plan:   Follow up plan of care with: ongoing infusions at [facility].  Discharge instructions were reviewed with patient.  Patient/representative verbalized understanding of discharge instructions and all questions answered.  Patient discharged from [facility] in stable condition.    Maribel Judge RN    Administrations This Visit     ertapenem (INVanz) 1 g in 10 mL SWFI for IVP     Admin Date  10/15/2022 Action  Given Dose  1 g Route  Intravenous  Administered By  Maribel Judge RN          heparin lock flush 10 UNIT/ML injection 5 mL     Admin Date  10/15/2022 Action  Given Dose  5 mL Route  Intracatheter Administered By  Maribel Judge RN                BP (!) 166/87 (BP Location: Left leg, Patient Position: Sitting, Cuff Size: Adult Regular)   Pulse 84   SpO2 100%         Again, thank you for allowing me to participate in the care of your patient.        Sincerely,        No name on file

## 2022-10-15 NOTE — PATIENT INSTRUCTIONS
Dear Belen Sharma    Thank you for choosing [facility] ([facility]) for your infusion.  The following information is a summary of our appointment as well as important reminders.        We look forward in seeing you on your next appointment here at [facility] ([facility]).  Please don t hesitate to call us at [phone] to reschedule any of your appointments or to speak with one of the [facility] registered nurses.  It was a pleasure taking care of you today.    Sincerely,    [facility]  [facility]  [address] [place] [address]  [id number]  Phone:  [phone]

## 2022-10-16 ENCOUNTER — INFUSION THERAPY VISIT (OUTPATIENT)
Dept: INFUSION THERAPY | Facility: CLINIC | Age: 62
End: 2022-10-16
Attending: FAMILY MEDICINE
Payer: MEDICARE

## 2022-10-16 VITALS
SYSTOLIC BLOOD PRESSURE: 135 MMHG | HEART RATE: 82 BPM | DIASTOLIC BLOOD PRESSURE: 62 MMHG | TEMPERATURE: 98 F | RESPIRATION RATE: 18 BRPM | OXYGEN SATURATION: 96 %

## 2022-10-16 DIAGNOSIS — D84.9 IMMUNOSUPPRESSION (H): ICD-10-CM

## 2022-10-16 DIAGNOSIS — N12 PYELONEPHRITIS: Primary | ICD-10-CM

## 2022-10-16 PROCEDURE — 250N000009 HC RX 250: Performed by: INTERNAL MEDICINE

## 2022-10-16 PROCEDURE — 96374 THER/PROPH/DIAG INJ IV PUSH: CPT

## 2022-10-16 PROCEDURE — 250N000011 HC RX IP 250 OP 636: Performed by: INTERNAL MEDICINE

## 2022-10-16 RX ORDER — ALBUTEROL SULFATE 0.83 MG/ML
2.5 SOLUTION RESPIRATORY (INHALATION)
Status: CANCELLED | OUTPATIENT
Start: 2022-10-17

## 2022-10-16 RX ORDER — MEPERIDINE HYDROCHLORIDE 25 MG/ML
25 INJECTION INTRAMUSCULAR; INTRAVENOUS; SUBCUTANEOUS EVERY 30 MIN PRN
Status: CANCELLED | OUTPATIENT
Start: 2022-10-17

## 2022-10-16 RX ORDER — HEPARIN SODIUM (PORCINE) LOCK FLUSH IV SOLN 100 UNIT/ML 100 UNIT/ML
5 SOLUTION INTRAVENOUS
Status: CANCELLED | OUTPATIENT
Start: 2022-10-17

## 2022-10-16 RX ORDER — HEPARIN SODIUM,PORCINE 10 UNIT/ML
5 VIAL (ML) INTRAVENOUS
Status: CANCELLED | OUTPATIENT
Start: 2022-10-17

## 2022-10-16 RX ORDER — HEPARIN SODIUM,PORCINE 10 UNIT/ML
5 VIAL (ML) INTRAVENOUS
Status: DISCONTINUED | OUTPATIENT
Start: 2022-10-16 | End: 2022-10-16 | Stop reason: HOSPADM

## 2022-10-16 RX ORDER — DIPHENHYDRAMINE HYDROCHLORIDE 50 MG/ML
50 INJECTION INTRAMUSCULAR; INTRAVENOUS
Status: CANCELLED
Start: 2022-10-17

## 2022-10-16 RX ADMIN — ERTAPENEM SODIUM 1 G: 1 INJECTION, POWDER, LYOPHILIZED, FOR SOLUTION INTRAMUSCULAR; INTRAVENOUS at 09:35

## 2022-10-16 RX ADMIN — Medication 5 ML: at 09:40

## 2022-10-16 NOTE — LETTER
10/16/2022         RE: Belen Sharma  [address] [place] [address]        Dear Colleague,    Thank you for referring your patient, Belen Sharma, to the [facility]. Please see a copy of my visit note below.    Nursing Note  Belen Sharma presents [facility] for:   Chief Complaint   Patient presents with     Infusion     Ertapenem       During [facility]'s [facility] appointment, orders from Dr. Christopher were completed.  Frequency: daily until 10/23    Progress note:  Patient identification verified by name and date of birth.  Assessment completed.  Vitals recorded in Doc Flowsheets.  Patient was provided with education regarding medication/procedure and possible side effects.  Patient verbalized understanding.     present during visit today: Not Applicable.    Treatment Conditions: Non-applicable.    Infusion length and rate:  infusion given over approximately 5 minutes    Labs: were not ordered for this appointment.    Vascular access: PICC accessed today.    Is the next appt scheduled? yes  Asymptomatic COVID test completed? no    Post Infusion Assessment:  Patient tolerated infusion without incident.     Discharge Plan:   Follow up plan of care with: ongoing infusions at [facility].  Discharge instructions were reviewed with patient.  Patient/representative verbalized understanding of discharge instructions and all questions answered.  [facility] discharged [facility] in stable condition.    Loraine Foy RN    Administrations This Visit     ertapenem (INVanz) 1 g in 10 mL SWFI for IVP     Admin Date  10/16/2022 Action  Given Dose  1 g Route  Intravenous Administered By  Loraine Foy RN          heparin lock flush 10 UNIT/ML injection 5 mL     Admin Date  10/16/2022 Action  Given Dose  5 mL Route  Intracatheter Administered  By  Loraine Foy RN                /62 (BP Location: Left leg)   Pulse 82   Temp 98  F (36.7  C) (Oral)   Resp 18   SpO2 96%         Again, thank you for allowing me to participate in the care of your patient.        Sincerely,        No name on file

## 2022-10-16 NOTE — PROGRESS NOTES
Nursing Note  Belen Sharma presents [facility] for:   Chief Complaint   Patient presents with     Infusion     Ertapenem       During [facility]'s [facility] appointment, orders from Dr. Christopher were completed.  Frequency: daily until 10/23    Progress note:  Patient identification verified by name and date of birth.  Assessment completed.  Vitals recorded in Doc Flowsheets.  Patient was provided with education regarding medication/procedure and possible side effects.  Patient verbalized understanding.     present during visit today: Not Applicable.    Treatment Conditions: Non-applicable.    Infusion length and rate:  infusion given over approximately 5 minutes    Labs: were not ordered for this appointment.    Vascular access: PICC accessed today.    Is the next appt scheduled? yes  Asymptomatic COVID test completed? no    Post Infusion Assessment:  Patient tolerated infusion without incident.     Discharge Plan:   Follow up plan of care with: ongoing infusions at [facility].  Discharge instructions were reviewed with patient.  Patient/representative verbalized understanding of discharge instructions and all questions answered.  [facility] discharged [facility] in stable condition.    Loraine Foy RN    Administrations This Visit     ertapenem (INVanz) 1 g in 10 mL SWFI for IVP     Admin Date  10/16/2022 Action  Given Dose  1 g Route  Intravenous Administered By  Loraine Foy RN          heparin lock flush 10 UNIT/ML injection 5 mL     Admin Date  10/16/2022 Action  Given Dose  5 mL Route  Intracatheter Administered By  Loraine Foy RN                /62 (BP Location: Left leg)   Pulse 82   Temp 98  F (36.7  C) (Oral)   Resp 18   SpO2 96%

## 2022-10-17 ENCOUNTER — INFUSION THERAPY VISIT (OUTPATIENT)
Dept: INFUSION THERAPY | Facility: CLINIC | Age: 62
End: 2022-10-17
Attending: INTERNAL MEDICINE
Payer: MEDICARE

## 2022-10-17 VITALS
DIASTOLIC BLOOD PRESSURE: 81 MMHG | TEMPERATURE: 98.1 F | RESPIRATION RATE: 18 BRPM | SYSTOLIC BLOOD PRESSURE: 135 MMHG | HEART RATE: 78 BPM

## 2022-10-17 DIAGNOSIS — D84.9 IMMUNOSUPPRESSION (H): ICD-10-CM

## 2022-10-17 DIAGNOSIS — N12 PYELONEPHRITIS: Primary | ICD-10-CM

## 2022-10-17 DIAGNOSIS — N39.0 URINARY TRACT INFECTION WITHOUT HEMATURIA, SITE UNSPECIFIED: ICD-10-CM

## 2022-10-17 LAB
ALBUMIN SERPL BCG-MCNC: 3.6 G/DL (ref 3.5–5.2)
ALP SERPL-CCNC: 66 U/L (ref 35–129)
ALT SERPL W P-5'-P-CCNC: 11 U/L (ref 10–50)
ANION GAP SERPL CALCULATED.3IONS-SCNC: 9 MMOL/L (ref 7–15)
AST SERPL W P-5'-P-CCNC: 21 U/L (ref 10–50)
BILIRUB SERPL-MCNC: 0.5 MG/DL
BUN SERPL-MCNC: 17.3 MG/DL (ref 8–23)
CALCIUM SERPL-MCNC: 9.9 MG/DL (ref 8.8–10.2)
CHLORIDE SERPL-SCNC: 105 MMOL/L (ref 98–107)
CREAT SERPL-MCNC: 0.6 MG/DL (ref 0.51–1.17)
DEPRECATED HCO3 PLAS-SCNC: 23 MMOL/L (ref 22–29)
ERYTHROCYTE [DISTWIDTH] IN BLOOD BY AUTOMATED COUNT: 14 % (ref 10–15)
GFR SERPL CREATININE-BSD FRML MDRD: >90 ML/MIN/1.73M2
GLUCOSE SERPL-MCNC: 101 MG/DL (ref 70–99)
HCT VFR BLD AUTO: 44.1 % (ref 35–53)
HGB BLD-MCNC: 14 G/DL (ref 11.7–17.7)
HUMAN PAPILLOMA VIRUS 16 DNA: NEGATIVE
HUMAN PAPILLOMA VIRUS 18 DNA: NEGATIVE
HUMAN PAPILLOMA VIRUS FINAL DIAGNOSIS: ABNORMAL
HUMAN PAPILLOMA VIRUS OTHER HR: POSITIVE
MCH RBC QN AUTO: 28.8 PG (ref 26.5–33)
MCHC RBC AUTO-ENTMCNC: 31.7 G/DL (ref 31.5–36.5)
MCV RBC AUTO: 91 FL (ref 78–100)
PLATELET # BLD AUTO: 265 10E3/UL (ref 150–450)
POTASSIUM SERPL-SCNC: 4.3 MMOL/L (ref 3.4–5.3)
PROT SERPL-MCNC: 6.4 G/DL (ref 6.4–8.3)
RBC # BLD AUTO: 4.86 10E6/UL (ref 3.8–5.9)
SODIUM SERPL-SCNC: 137 MMOL/L (ref 136–145)
WBC # BLD AUTO: 6.7 10E3/UL (ref 4–11)

## 2022-10-17 PROCEDURE — 96374 THER/PROPH/DIAG INJ IV PUSH: CPT

## 2022-10-17 PROCEDURE — 250N000009 HC RX 250: Performed by: INTERNAL MEDICINE

## 2022-10-17 PROCEDURE — 36592 COLLECT BLOOD FROM PICC: CPT

## 2022-10-17 PROCEDURE — 250N000011 HC RX IP 250 OP 636: Performed by: INTERNAL MEDICINE

## 2022-10-17 PROCEDURE — 85027 COMPLETE CBC AUTOMATED: CPT

## 2022-10-17 PROCEDURE — 80053 COMPREHEN METABOLIC PANEL: CPT

## 2022-10-17 RX ORDER — HEPARIN SODIUM,PORCINE 10 UNIT/ML
5 VIAL (ML) INTRAVENOUS
Status: CANCELLED | OUTPATIENT
Start: 2022-10-18

## 2022-10-17 RX ORDER — ALBUTEROL SULFATE 0.83 MG/ML
2.5 SOLUTION RESPIRATORY (INHALATION)
Status: CANCELLED | OUTPATIENT
Start: 2022-10-18

## 2022-10-17 RX ORDER — HEPARIN SODIUM,PORCINE 10 UNIT/ML
5 VIAL (ML) INTRAVENOUS
Status: DISCONTINUED | OUTPATIENT
Start: 2022-10-17 | End: 2022-10-17 | Stop reason: HOSPADM

## 2022-10-17 RX ORDER — HEPARIN SODIUM (PORCINE) LOCK FLUSH IV SOLN 100 UNIT/ML 100 UNIT/ML
5 SOLUTION INTRAVENOUS
Status: CANCELLED | OUTPATIENT
Start: 2022-10-18

## 2022-10-17 RX ORDER — MEPERIDINE HYDROCHLORIDE 25 MG/ML
25 INJECTION INTRAMUSCULAR; INTRAVENOUS; SUBCUTANEOUS EVERY 30 MIN PRN
Status: CANCELLED | OUTPATIENT
Start: 2022-10-18

## 2022-10-17 RX ORDER — DIPHENHYDRAMINE HYDROCHLORIDE 50 MG/ML
50 INJECTION INTRAMUSCULAR; INTRAVENOUS
Status: CANCELLED
Start: 2022-10-18

## 2022-10-17 RX ADMIN — ERTAPENEM SODIUM 1 G: 1 INJECTION, POWDER, LYOPHILIZED, FOR SOLUTION INTRAMUSCULAR; INTRAVENOUS at 10:28

## 2022-10-17 RX ADMIN — Medication 5 ML: at 10:29

## 2022-10-17 NOTE — LETTER
"    10/17/2022         RE: Belen Sharma  [address] [place] [address]        Dear Colleague,    Thank you for referring your patient, Belen Sharma, to the [facility]. Please see a copy of my visit note below.    Chief Complaint   Patient presents with     Infusion     IV ertapenem     Infusion Nursing Note:  Belen Sharma presents today for IV ertapenem.    Patient seen by provider today: No   present during visit today: Not Applicable.    Note: IVP over 5 minutes.    Intravenous Access:  PICC.  Standing CBC and CMP drawn per orders.     Treatment Conditions:  Not Applicable.    Post Infusion Assessment:  Patient tolerated infusion without incident.  Blood return noted pre and post infusion.  Site patent and intact, free from redness, edema or discomfort.  No evidence of extravasations.     Discharge Plan:   AVS to patient via MYCHART.  Patient will return tomorrow for next appointment.   Patient discharged in stable condition accompanied by: self.  Departure Mode: Ambulatory.      Administrations This Visit     ertapenem (INVanz) 1 g in 10 mL SWFI for IVP     Admin Date  10/17/2022 Action  Given Dose  1 g Route  Intravenous Administered By  Daniel Hall RN          heparin lock flush 10 UNIT/ML injection 5 mL     Admin Date  10/17/2022 Action  Given Dose  5 mL Route  Intracatheter Administered By  Daniel Hall RN              Vital signs:  Temp: 98.1  F (36.7  C) Temp src: Oral BP: 135/81 Pulse: 78   Resp: 18            Estimated body mass index is 21.26 kg/m  as calculated from the following:    Height as of 10/10/22: 1.778 m (5' 10\").    Weight as of 10/11/22: 67.2 kg (148 lb 3.2 oz).                            Again, thank you for allowing me to participate in the care of your patient.        Sincerely,        No name on file    "

## 2022-10-17 NOTE — PROGRESS NOTES
"Chief Complaint   Patient presents with     Infusion     IV ertapenem     Infusion Nursing Note:  Belen Sharma presents today for IV ertapenem.    Patient seen by provider today: No   present during visit today: Not Applicable.    Note: IVP over 5 minutes.    Intravenous Access:  PICC.  Standing CBC and CMP drawn per orders.     Treatment Conditions:  Not Applicable.    Post Infusion Assessment:  Patient tolerated infusion without incident.  Blood return noted pre and post infusion.  Site patent and intact, free from redness, edema or discomfort.  No evidence of extravasations.     Discharge Plan:   AVS to patient via MYCHART.  Patient will return tomorrow for next appointment.   Patient discharged in stable condition accompanied by: self.  Departure Mode: Ambulatory.      Administrations This Visit     ertapenem (INVanz) 1 g in 10 mL SWFI for IVP     Admin Date  10/17/2022 Action  Given Dose  1 g Route  Intravenous Administered By  Daniel Hall, RN          heparin lock flush 10 UNIT/ML injection 5 mL     Admin Date  10/17/2022 Action  Given Dose  5 mL Route  Intracatheter Administered By  Daniel Hall RN              Vital signs:  Temp: 98.1  F (36.7  C) Temp src: Oral BP: 135/81 Pulse: 78   Resp: 18            Estimated body mass index is 21.26 kg/m  as calculated from the following:    Height as of 10/10/22: 1.778 m (5' 10\").    Weight as of 10/11/22: 67.2 kg (148 lb 3.2 oz).                        "

## 2022-10-17 NOTE — PATIENT INSTRUCTIONS
Dear Belen Sharma    Thank you for choosing [facility] ([facility]) for your infusion.  The following information is a summary of our appointment as well as important reminders.          We look forward in seeing you on your next appointment here at [facility] ([facility]).  Please don t hesitate to call us at [phone] to reschedule any of your appointments or to speak with one of the [facility] registered nurses.  It was a pleasure taking care of you today.    Sincerely,    [facility]  [facility]  [address] [place] [address]  [address]  Phone:  [phone]

## 2022-10-18 ENCOUNTER — INFUSION THERAPY VISIT (OUTPATIENT)
Dept: INFUSION THERAPY | Facility: CLINIC | Age: 62
End: 2022-10-18
Attending: FAMILY MEDICINE
Payer: MEDICARE

## 2022-10-18 VITALS
SYSTOLIC BLOOD PRESSURE: 138 MMHG | RESPIRATION RATE: 16 BRPM | HEART RATE: 78 BPM | TEMPERATURE: 98.8 F | OXYGEN SATURATION: 96 % | DIASTOLIC BLOOD PRESSURE: 75 MMHG

## 2022-10-18 DIAGNOSIS — N12 PYELONEPHRITIS: Primary | ICD-10-CM

## 2022-10-18 DIAGNOSIS — D84.9 IMMUNOSUPPRESSION (H): ICD-10-CM

## 2022-10-18 PROCEDURE — 250N000009 HC RX 250: Performed by: INTERNAL MEDICINE

## 2022-10-18 PROCEDURE — 96374 THER/PROPH/DIAG INJ IV PUSH: CPT

## 2022-10-18 PROCEDURE — 250N000011 HC RX IP 250 OP 636: Performed by: INTERNAL MEDICINE

## 2022-10-18 RX ORDER — HEPARIN SODIUM,PORCINE 10 UNIT/ML
5 VIAL (ML) INTRAVENOUS
Status: DISCONTINUED | OUTPATIENT
Start: 2022-10-18 | End: 2022-10-18 | Stop reason: HOSPADM

## 2022-10-18 RX ORDER — ALBUTEROL SULFATE 0.83 MG/ML
2.5 SOLUTION RESPIRATORY (INHALATION)
Status: CANCELLED | OUTPATIENT
Start: 2022-10-19

## 2022-10-18 RX ORDER — HEPARIN SODIUM (PORCINE) LOCK FLUSH IV SOLN 100 UNIT/ML 100 UNIT/ML
5 SOLUTION INTRAVENOUS
Status: CANCELLED | OUTPATIENT
Start: 2022-10-19

## 2022-10-18 RX ORDER — DIPHENHYDRAMINE HYDROCHLORIDE 50 MG/ML
50 INJECTION INTRAMUSCULAR; INTRAVENOUS
Status: CANCELLED
Start: 2022-10-19

## 2022-10-18 RX ORDER — HEPARIN SODIUM,PORCINE 10 UNIT/ML
5 VIAL (ML) INTRAVENOUS
Status: CANCELLED | OUTPATIENT
Start: 2022-10-19

## 2022-10-18 RX ORDER — MEPERIDINE HYDROCHLORIDE 25 MG/ML
25 INJECTION INTRAMUSCULAR; INTRAVENOUS; SUBCUTANEOUS EVERY 30 MIN PRN
Status: CANCELLED | OUTPATIENT
Start: 2022-10-19

## 2022-10-18 RX ADMIN — ERTAPENEM SODIUM 1 G: 1 INJECTION, POWDER, LYOPHILIZED, FOR SOLUTION INTRAMUSCULAR; INTRAVENOUS at 11:16

## 2022-10-18 RX ADMIN — Medication 5 ML: at 11:16

## 2022-10-18 ASSESSMENT — PAIN SCALES - GENERAL: PAINLEVEL: NO PAIN (0)

## 2022-10-18 NOTE — LETTER
10/18/2022         RE: Belen Sharma  [address] [place] [address]        Dear Colleague,    Thank you for referring your patient, Belen Sharma, to the [facility]. Please see a copy of my visit note below.    Infusion Nursing Note:  Belen Sharma presents today for   Chief Complaint   Patient presents with     Infusion     ertapenem (INVanz)       Patient seen by provider today: No   present during visit today: Not Applicable.    Note:   -Orders from Massiel Christopher MD completed. Frequency: daily through 10/23/22.  -Invanz given IV push over ~5min.    Intravenous Access:  PICC.    Treatment Conditions:  Not Applicable.    Post Infusion Assessment:  Patient tolerated infusion without incident.  Blood return noted pre and post infusion.  Site patent and intact, free from redness, edema or discomfort.  No evidence of extravasations.     Discharge Plan:   Discharge instructions reviewed with: Patient.  Patient and/or family verbalized understanding of discharge instructions and all questions answered.  AVS to patient via Plan [employer] FundingHART.  Patient will return 10/19/22 for next appointment.   Patient discharged in stable condition accompanied by: son.  Departure Mode: Ambulatory.      Mita Awan RN    /75 (BP Location: Left leg, Patient Position: Sitting, Cuff Size: Adult Large)   Pulse 78   Temp 98.8  F (37.1  C)   Resp 16   SpO2 96%     Administrations This Visit     ertapenem (INVanz) 1 g in 10 mL SWFI for IVP     Admin Date  10/18/2022 Action  Given Dose  1 g Route  Intravenous Administered By  Mita Awan, ANNALISA          heparin lock flush 10 UNIT/ML injection 5 mL     Admin Date  10/18/2022 Action  Given Dose  5 mL Route  Intracatheter Administered By  Mita Awan, ANNALISA                                Again, thank you for allowing me to participate in the care of your patient.        Sincerely,        No name on file

## 2022-10-18 NOTE — PROGRESS NOTES
Infusion Nursing Note:  Belen Sharma presents today for   Chief Complaint   Patient presents with     Infusion     ertapenem (INVanz)       Patient seen by provider today: No   present during visit today: Not Applicable.    Note:   -Orders from Massiel Christopher MD completed. Frequency: daily through 10/23/22.  -Invanz given IV push over ~5min.    Intravenous Access:  PICC.    Treatment Conditions:  Not Applicable.    Post Infusion Assessment:  Patient tolerated infusion without incident.  Blood return noted pre and post infusion.  Site patent and intact, free from redness, edema or discomfort.  No evidence of extravasations.     Discharge Plan:   Discharge instructions reviewed with: Patient.  Patient and/or family verbalized understanding of discharge instructions and all questions answered.  AVS to patient via AqwiseT.  Patient will return 10/19/22 for next appointment.   Patient discharged in stable condition accompanied by: son.  Departure Mode: Ambulatory.      Mita Awan RN    /75 (BP Location: Left leg, Patient Position: Sitting, Cuff Size: Adult Large)   Pulse 78   Temp 98.8  F (37.1  C)   Resp 16   SpO2 96%     Administrations This Visit     ertapenem (INVanz) 1 g in 10 mL SWFI for IVP     Admin Date  10/18/2022 Action  Given Dose  1 g Route  Intravenous Administered By  Mita Awan RN          heparin lock flush 10 UNIT/ML injection 5 mL     Admin Date  10/18/2022 Action  Given Dose  5 mL Route  Intracatheter Administered By  Mita Awan, ANNALSIA

## 2022-10-19 ENCOUNTER — TELEPHONE (OUTPATIENT)
Dept: INFECTIOUS DISEASES | Facility: CLINIC | Age: 62
End: 2022-10-19

## 2022-10-19 ENCOUNTER — INFUSION THERAPY VISIT (OUTPATIENT)
Dept: INFUSION THERAPY | Facility: CLINIC | Age: 62
End: 2022-10-19
Attending: INTERNAL MEDICINE
Payer: MEDICARE

## 2022-10-19 ENCOUNTER — PATIENT OUTREACH (OUTPATIENT)
Dept: OBGYN | Facility: CLINIC | Age: 62
End: 2022-10-19

## 2022-10-19 VITALS — RESPIRATION RATE: 16 BRPM | TEMPERATURE: 98.1 F

## 2022-10-19 DIAGNOSIS — D84.9 IMMUNOSUPPRESSION (H): Primary | ICD-10-CM

## 2022-10-19 DIAGNOSIS — N12 PYELONEPHRITIS: ICD-10-CM

## 2022-10-19 PROBLEM — R87.613 HSIL (HIGH GRADE SQUAMOUS INTRAEPITHELIAL LESION) ON PAP SMEAR OF CERVIX: Status: ACTIVE | Noted: 2020-09-21

## 2022-10-19 PROCEDURE — 250N000009 HC RX 250: Performed by: INTERNAL MEDICINE

## 2022-10-19 PROCEDURE — 96374 THER/PROPH/DIAG INJ IV PUSH: CPT

## 2022-10-19 PROCEDURE — 250N000011 HC RX IP 250 OP 636: Performed by: INTERNAL MEDICINE

## 2022-10-19 RX ORDER — HEPARIN SODIUM,PORCINE 10 UNIT/ML
5 VIAL (ML) INTRAVENOUS
Status: CANCELLED | OUTPATIENT
Start: 2022-10-20

## 2022-10-19 RX ORDER — HEPARIN SODIUM (PORCINE) LOCK FLUSH IV SOLN 100 UNIT/ML 100 UNIT/ML
5 SOLUTION INTRAVENOUS
Status: CANCELLED | OUTPATIENT
Start: 2022-10-20

## 2022-10-19 RX ORDER — DIPHENHYDRAMINE HYDROCHLORIDE 50 MG/ML
50 INJECTION INTRAMUSCULAR; INTRAVENOUS
Status: CANCELLED
Start: 2022-10-20

## 2022-10-19 RX ORDER — ALBUTEROL SULFATE 0.83 MG/ML
2.5 SOLUTION RESPIRATORY (INHALATION)
Status: CANCELLED | OUTPATIENT
Start: 2022-10-20

## 2022-10-19 RX ORDER — MEPERIDINE HYDROCHLORIDE 25 MG/ML
25 INJECTION INTRAMUSCULAR; INTRAVENOUS; SUBCUTANEOUS EVERY 30 MIN PRN
Status: CANCELLED | OUTPATIENT
Start: 2022-10-20

## 2022-10-19 RX ORDER — HEPARIN SODIUM,PORCINE 10 UNIT/ML
5 VIAL (ML) INTRAVENOUS
Status: DISCONTINUED | OUTPATIENT
Start: 2022-10-19 | End: 2022-10-19 | Stop reason: HOSPADM

## 2022-10-19 RX ADMIN — Medication 5 ML: at 08:20

## 2022-10-19 RX ADMIN — WATER 1 G: 1 INJECTION INTRAMUSCULAR; INTRAVENOUS; SUBCUTANEOUS at 08:19

## 2022-10-19 NOTE — PROGRESS NOTES
Nursing Note  Belen Sharma [facility] for:   Chief Complaint   Patient presents with     Infusion     IVP invanz, dressing change     During [facility]'s [facility] appointment, orders from Dr. Christopher were completed.  Frequency: daily    Progress note:  Patient identification verified by name and date of birth.  Assessment completed.  Vitals recorded in Doc Flowsheets.  Patient was provided with education regarding medication/procedure and possible side effects.  Patient verbalized understanding.     present during visit today: Not Applicable.    Treatment Conditions: Non-applicable.  Premedications: were not ordered.  Drug Waste Record: No  Infusion length and rate:  IVP over 5 minutes  Labs: were not ordered for this appointment.  Vascular access: PICC accessed today and PICC dressing changed.    Is the next appt scheduled? yes  Asymptomatic COVID test completed? no    Post Infusion Assessment:  Patient tolerated infusion without incident.  Blood return noted pre and post infusion.  Site patent and intact, free from redness, edema or discomfort.  No evidence of extravasations.  Access discontinued per protocol.     Discharge Plan:   Follow up plan of care with: ongoing infusions at [facility].  Discharge instructions were reviewed with patient.  Patient/representative verbalized understanding of discharge instructions and all questions answered.  [facility] discharged [facility] in stable condition.    Mita Perdomo RN    Administrations This Visit     ertapenem (INVanz) 1 g in 10 mL SWFI for IVP     Admin Date  10/19/2022 Action  Given Dose  1 g Route  Intravenous Administered By  Mita Perdomo RN          heparin lock flush 10 UNIT/ML injection 5 mL     Admin Date  10/19/2022 Action  Given Dose  5 mL Route  Intracatheter Administered By  Mita Perdomo, ANNALISA           sodium chloride (PF) 0.9% PF flush 3-20 mL     Admin Date  10/19/2022 Action  Given Dose  20 mL Route  Intracatheter Administered By  Mita Perdomo RN                Temp 98.1  F (36.7  C) (Oral)   Resp 16

## 2022-10-19 NOTE — TELEPHONE ENCOUNTER
----- Message from Catrachita Gonzales MD sent at 10/19/2022  9:59 AM CDT -----  Regarding: RE: follow up re: PICC line  Hello,   Yes please remove the picc line after the last dose on 10/23/2022.   Thank you,   KO   ----- Message -----  From: Mita Perdomo RN  Sent: 10/19/2022   9:29 AM CDT  To: Catrachita Gonzales MD, Massiel Christopher MD, #  Subject: follow up re: PICC line                          Good morning,    Belen has been getting daily antibiotics in our infusion center since 10/6/22 and is scheduled to complete this Cristian 10/23/22. Can I have an order to remove PICC or are we waiting until she follows up with provider?    Thank you,  Mita Perdomo RN  [facility]  Phone [phone]

## 2022-10-19 NOTE — TELEPHONE ENCOUNTER
10/11/22 ECC- Negative for dysplasia. NIL pap, + HR HPV (not 16 or 18). Plan colp and cotest in 6 month per OV note.

## 2022-10-19 NOTE — LETTER
10/19/2022         RE: Belen Sharma  [address] [place] [address]        Dear Colleague,    Thank you for referring your patient, Belen Sharma, to the [facility] TREATMENT [facility]. Please see a copy of my visit note below.    Nursing Note  Belen Sh[facility] for:   Chief Complaint   Patient presents with     Infusion     IVP invanz, dressing change     During [facility]'s [facility] appointment, orders from Dr. Christopher were completed.  Frequency: daily    Progress note:  Patient identification verified by name and date of birth.  Assessment completed.  Vitals recorded in Doc Flowsheets.  Patient was provided with education regarding medication/procedure and possible side effects.  Patient verbalized understanding.     present during visit today: Not Applicable.    Treatment Conditions: Non-applicable.  Premedications: were not ordered.  Drug Waste Record: No  Infusion length and rate:  IVP over 5 minutes  Labs: were not ordered for this appointment.  Vascular access: PICC accessed today and PICC dressing changed.    Is the next appt scheduled? yes  Asymptomatic COVID test completed? no    Post Infusion Assessment:  Patient tolerated infusion without incident.  Blood return noted pre and post infusion.  Site patent and intact, free from redness, edema or discomfort.  No evidence of extravasations.  Access discontinued per protocol.     Discharge Plan:   Follow up plan of care with: ongoing infusions at  [facility].  Discharge instructions were reviewed with patient.  Patient/representative verbalized understanding of discharge instructions and all questions answered.  Patient discharged from  [facility] in stable condition.    Mita Perdomo RN    Administrations This Visit     ertapenem (INVanz) 1 g in 10 mL SWFI for IVP     Admin  Date  10/19/2022 Action  Given Dose  1 g Route  Intravenous Administered By  Mita Perdomo, RN          heparin lock flush 10 UNIT/ML injection 5 mL     Admin Date  10/19/2022 Action  Given Dose  5 mL Route  Intracatheter Administered By  Mita Perdomo RN          sodium chloride (PF) 0.9% PF flush 3-20 mL     Admin Date  10/19/2022 Action  Given Dose  20 mL Route  Intracatheter Administered By  Mita Perdomo RN                Temp 98.1  F (36.7  C) (Oral)   Resp 16         Again, thank you for allowing me to participate in the care of your patient.        Sincerely,        No name on file

## 2022-10-20 ENCOUNTER — INFUSION THERAPY VISIT (OUTPATIENT)
Dept: INFUSION THERAPY | Facility: CLINIC | Age: 62
End: 2022-10-20
Attending: INTERNAL MEDICINE
Payer: MEDICARE

## 2022-10-20 ENCOUNTER — TELEPHONE (OUTPATIENT)
Facility: CLINIC | Age: 62
End: 2022-10-20

## 2022-10-20 VITALS
OXYGEN SATURATION: 96 % | TEMPERATURE: 98.7 F | HEART RATE: 84 BPM | DIASTOLIC BLOOD PRESSURE: 84 MMHG | SYSTOLIC BLOOD PRESSURE: 142 MMHG | RESPIRATION RATE: 14 BRPM

## 2022-10-20 DIAGNOSIS — D84.9 IMMUNOSUPPRESSION (H): ICD-10-CM

## 2022-10-20 DIAGNOSIS — N12 PYELONEPHRITIS: Primary | ICD-10-CM

## 2022-10-20 PROCEDURE — 250N000011 HC RX IP 250 OP 636: Performed by: INTERNAL MEDICINE

## 2022-10-20 PROCEDURE — 250N000009 HC RX 250: Performed by: INTERNAL MEDICINE

## 2022-10-20 PROCEDURE — 96374 THER/PROPH/DIAG INJ IV PUSH: CPT

## 2022-10-20 RX ORDER — MEPERIDINE HYDROCHLORIDE 25 MG/ML
25 INJECTION INTRAMUSCULAR; INTRAVENOUS; SUBCUTANEOUS EVERY 30 MIN PRN
Status: CANCELLED | OUTPATIENT
Start: 2022-10-21

## 2022-10-20 RX ORDER — ALBUTEROL SULFATE 0.83 MG/ML
2.5 SOLUTION RESPIRATORY (INHALATION)
Status: CANCELLED | OUTPATIENT
Start: 2022-10-21

## 2022-10-20 RX ORDER — HEPARIN SODIUM,PORCINE 10 UNIT/ML
5 VIAL (ML) INTRAVENOUS
Status: DISCONTINUED | OUTPATIENT
Start: 2022-10-20 | End: 2022-10-20 | Stop reason: HOSPADM

## 2022-10-20 RX ORDER — HEPARIN SODIUM,PORCINE 10 UNIT/ML
5 VIAL (ML) INTRAVENOUS
Status: CANCELLED | OUTPATIENT
Start: 2022-10-21

## 2022-10-20 RX ORDER — HEPARIN SODIUM (PORCINE) LOCK FLUSH IV SOLN 100 UNIT/ML 100 UNIT/ML
5 SOLUTION INTRAVENOUS
Status: CANCELLED | OUTPATIENT
Start: 2022-10-21

## 2022-10-20 RX ORDER — DIPHENHYDRAMINE HYDROCHLORIDE 50 MG/ML
50 INJECTION INTRAMUSCULAR; INTRAVENOUS
Status: CANCELLED
Start: 2022-10-21

## 2022-10-20 RX ADMIN — Medication 5 ML: at 10:21

## 2022-10-20 RX ADMIN — ERTAPENEM SODIUM 1 G: 1 INJECTION, POWDER, LYOPHILIZED, FOR SOLUTION INTRAMUSCULAR; INTRAVENOUS at 10:15

## 2022-10-20 NOTE — TELEPHONE ENCOUNTER
----- Message from Massiel Christopher MD sent at 10/19/2022  5:17 PM CDT -----  Regarding: RE: follow up re: PICC line  Hi  Yes, please remove the PICC line and also this is ordered in the discharge order set and written in the medication reconciliation and the discharge summery. Thanks  Massiel  ----- Message -----  From: Catrachita Gonzales MD  Sent: 10/19/2022  10:00 AM CDT  To: Mita Perdomo RN, Massiel Christopher MD, #  Subject: RE: follow up re: PICC line                      Hello,   Yes please remove the picc line after the last dose on 10/23/2022.   Thank you,   KO   ----- Message -----  From: Mita Perdomo RN  Sent: 10/19/2022   9:29 AM CDT  To: Catrachita Gonzales MD, Massiel Christopher MD, #  Subject: follow up re: PICC line                          Good morning,    Belen has been getting daily antibiotics in our infusion center since 10/6/22 and is scheduled to complete this Cristian 10/23/22. Can I have an order to remove PICC or are we waiting until she follows up with provider?    Thank you,  Mita Perdomo RN  [facility]  Phone [phone]

## 2022-10-20 NOTE — PATIENT INSTRUCTIONS
Dear Belen Sharma    Thank you for choosing [facility] ([facility]) for your infusion.  The following information is a summary of our appointment as well as important reminders.      We look forward in seeing you on your next appointment here at [facility] ([facility]).  Please don t hesitate to call us at [phone] to reschedule any of your appointments or to speak with one of the [facility] registered nurses.  It was a pleasure taking care of you today.    Sincerely,    [facility]  [facility]  [address] [place] [address]  [address]  Phone:  [phone]

## 2022-10-20 NOTE — PROGRESS NOTES
Infusion Nursing Note:  Belen Sharma presents today for daily ertapenem.    Patient seen by provider today: No   present during visit today: Not Applicable.    Note:   Ertapenem given IVP over 5 minutes.    Intravenous Access:  PICC.    Treatment Conditions:  Not Applicable.    Administrations This Visit     ertapenem (INVanz) 1 g in 10 mL SWFI for IVP     Admin Date  10/20/2022 Action  Given Dose  1 g Route  Intravenous Administered By  Rocio Gaines RN          heparin lock flush 10 UNIT/ML injection 5 mL     Admin Date  10/20/2022 Action  Given Dose  5 mL Route  Intracatheter Administered By  Rocio Gaines RN              BP (!) 142/84 (BP Location: Left leg, Patient Position: Sitting, Cuff Size: Adult Large)   Pulse 84   Temp 98.7  F (37.1  C) (Oral)   Resp 14   SpO2 96%     Post Infusion Assessment:  Patient tolerated injection without incident.  Blood return noted pre and post infusion.  Site patent and intact, free from redness, edema or discomfort.  No evidence of extravasations.     Discharge Plan:   Discharge instructions reviewed with: Patient.  Patient and/or family verbalized understanding of discharge instructions and all questions answered.  AVS to patient via [employer].  Patient will return 10/21 for next appointment.   Patient discharged in stable condition accompanied by: self.  Departure Mode: Ambulatory.    Rocio Gaines RN

## 2022-10-20 NOTE — LETTER
10/20/2022         RE: Belen Sharma  [address] [place] [address]        Dear Colleague,    Thank you for referring your patient, Belen Sharma, to the [facility]. Please see a copy of my visit note below.    Infusion Nursing Note:  Belen Sharma presents today for daily ertapenem.    Patient seen by provider today: No   present during visit today: Not Applicable.    Note:   Ertapenem given IVP over 5 minutes.    Intravenous Access:  PICC.    Treatment Conditions:  Not Applicable.    Administrations This Visit     ertapenem (INVanz) 1 g in 10 mL SWFI for IVP     Admin Date  10/20/2022 Action  Given Dose  1 g Route  Intravenous Administered By  Rocio Gaines RN          heparin lock flush 10 UNIT/ML injection 5 mL     Admin Date  10/20/2022 Action  Given Dose  5 mL Route  Intracatheter Administered By  Rocio Gaines, ANNALISA              BP (!) 142/84 (BP Location: Left leg, Patient Position: Sitting, Cuff Size: Adult Large)   Pulse 84   Temp 98.7  F (37.1  C) (Oral)   Resp 14   SpO2 96%     Post Infusion Assessment:  Patient tolerated injection without incident.  Blood return noted pre and post infusion.  Site patent and intact, free from redness, edema or discomfort.  No evidence of extravasations.     Discharge Plan:   Discharge instructions reviewed with: Patient.  Patient and/or family verbalized understanding of discharge instructions and all questions answered.  AVS to patient via [employer].  Patient will return 10/21 for next appointment.   Patient discharged in stable condition accompanied by: self.  Departure Mode: Ambulatory.    Rocio Gaines RN                        Again, thank you for allowing me to participate in the care of your patient.        Sincerely,        No name on file

## 2022-10-21 ENCOUNTER — INFUSION THERAPY VISIT (OUTPATIENT)
Dept: INFUSION THERAPY | Facility: CLINIC | Age: 62
End: 2022-10-21
Attending: INTERNAL MEDICINE
Payer: MEDICARE

## 2022-10-21 VITALS
TEMPERATURE: 98.8 F | SYSTOLIC BLOOD PRESSURE: 138 MMHG | DIASTOLIC BLOOD PRESSURE: 72 MMHG | HEART RATE: 84 BPM | RESPIRATION RATE: 16 BRPM | OXYGEN SATURATION: 97 %

## 2022-10-21 DIAGNOSIS — N12 PYELONEPHRITIS: ICD-10-CM

## 2022-10-21 DIAGNOSIS — D84.9 IMMUNOSUPPRESSION (H): Primary | ICD-10-CM

## 2022-10-21 PROCEDURE — 250N000009 HC RX 250: Performed by: INTERNAL MEDICINE

## 2022-10-21 PROCEDURE — 250N000011 HC RX IP 250 OP 636: Performed by: INTERNAL MEDICINE

## 2022-10-21 PROCEDURE — 96374 THER/PROPH/DIAG INJ IV PUSH: CPT

## 2022-10-21 RX ORDER — MEPERIDINE HYDROCHLORIDE 25 MG/ML
25 INJECTION INTRAMUSCULAR; INTRAVENOUS; SUBCUTANEOUS EVERY 30 MIN PRN
Status: CANCELLED | OUTPATIENT
Start: 2022-10-22

## 2022-10-21 RX ORDER — DIPHENHYDRAMINE HYDROCHLORIDE 50 MG/ML
50 INJECTION INTRAMUSCULAR; INTRAVENOUS
Status: CANCELLED
Start: 2022-10-22

## 2022-10-21 RX ORDER — HEPARIN SODIUM (PORCINE) LOCK FLUSH IV SOLN 100 UNIT/ML 100 UNIT/ML
5 SOLUTION INTRAVENOUS
Status: CANCELLED | OUTPATIENT
Start: 2022-10-22

## 2022-10-21 RX ORDER — HEPARIN SODIUM,PORCINE 10 UNIT/ML
5 VIAL (ML) INTRAVENOUS
Status: CANCELLED | OUTPATIENT
Start: 2022-10-22

## 2022-10-21 RX ORDER — ALBUTEROL SULFATE 0.83 MG/ML
2.5 SOLUTION RESPIRATORY (INHALATION)
Status: CANCELLED | OUTPATIENT
Start: 2022-10-22

## 2022-10-21 RX ORDER — HEPARIN SODIUM,PORCINE 10 UNIT/ML
5 VIAL (ML) INTRAVENOUS
Status: DISCONTINUED | OUTPATIENT
Start: 2022-10-21 | End: 2022-10-21 | Stop reason: HOSPADM

## 2022-10-21 RX ADMIN — ERTAPENEM SODIUM 1 G: 1 INJECTION, POWDER, LYOPHILIZED, FOR SOLUTION INTRAMUSCULAR; INTRAVENOUS at 10:08

## 2022-10-21 RX ADMIN — Medication 5 ML: at 10:09

## 2022-10-21 NOTE — LETTER
10/21/2022         RE: Belen Sharma  [address] [place] [address]        Dear Colleague,    Thank you for referring your patient, Belen Sharma, to the [facility] TREATMENT [facility]. Please see a copy of my visit note below.    Nursing Note  Belen Sharma [facility] for:   Chief Complaint   Patient presents with     Infusion     IVP invanz     During [facility]'s [facility] appointment, orders from Dr. Christopher were completed.  Frequency: daily, completes 10/23/22,orders are in for PICC line pull    Progress note:  Patient identification verified by name and date of birth.  Assessment completed.  Vitals recorded in Doc Flowsheets.  Patient was provided with education regarding medication/procedure and possible side effects.  Patient verbalized understanding.     present during visit today: Not Applicable.    Treatment Conditions: Non-applicable.  Premedications: were not ordered.  Drug Waste Record: No  Infusion length and rate:  IVP over 5 minutes  Labs: were not ordered for this appointment.  Vascular access: PICC accessed today.    Is the next appt scheduled? yes  Asymptomatic COVID test completed? no    Post Infusion Assessment:  Patient tolerated infusion without incident.  Blood return noted pre and post infusion.  Site patent and intact, free from redness, edema or discomfort.  No evidence of extravasations.  Access discontinued per protocol.     Discharge Plan:   Follow up plan of care with: ongoing infusions at [facility].  Discharge instructions were reviewed with patient.  Patient/representative verbalized understanding of discharge instructions and all questions answered.  Patient discharged from [facility] in stable condition.    Mita Perdomo RN    Administrations This Visit     ertapenem (INVanz) 1 g in 10 mL SWFI for IVP      Admin Date  10/21/2022 Action  Given Dose  1 g Route  Intravenous Administered By  Mita Perdomo RN          heparin lock flush 10 UNIT/ML injection 5 mL     Admin Date  10/21/2022 Action  Given Dose  5 mL Route  Intracatheter Administered By  Mita Perdomo RN          sodium chloride (PF) 0.9% PF flush 3-20 mL     Admin Date  10/21/2022 Action  Given Dose  20 mL Route  Intracatheter Administered By  Mita Perdomo RN                /72   Pulse 84   Temp 98.8  F (37.1  C) (Oral)   Resp 16   SpO2 97%         Again, thank you for allowing me to participate in the care of your patient.        Sincerely,        No name on file

## 2022-10-21 NOTE — LETTER
Date:October 21, 2022      Provider requested that no letter be sent. Do not send.       [facility]

## 2022-10-21 NOTE — PROGRESS NOTES
Nursing Note  Belen Sharma [facility] for:   Chief Complaint   Patient presents with     Infusion     IVP invanz     During [facility]'s [facility] appointment, orders from Dr. Christopher were completed.  Frequency: daily, completes 10/23/22,orders are in for PICC line pull    Progress note:  Patient identification verified by name and date of birth.  Assessment completed.  Vitals recorded in Doc Flowsheets.  Patient was provided with education regarding medication/procedure and possible side effects.  Patient verbalized understanding.     present during visit today: Not Applicable.    Treatment Conditions: Non-applicable.  Premedications: were not ordered.  Drug Waste Record: No  Infusion length and rate:  IVP over 5 minutes  Labs: were not ordered for this appointment.  Vascular access: PICC accessed today.    Is the next appt scheduled? yes  Asymptomatic COVID test completed? no    Post Infusion Assessment:  Patient tolerated infusion without incident.  Blood return noted pre and post infusion.  Site patent and intact, free from redness, edema or discomfort.  No evidence of extravasations.  Access discontinued per protocol.     Discharge Plan:   Follow up plan of care with: ongoing infusions at [facility].  Discharge instructions were reviewed with patient.  Patient/representative verbalized understanding of discharge instructions and all questions answered.  Patient discharged [facility] in stable condition.    Mita Perdomo RN    Administrations This Visit     ertapenem (INVanz) 1 g in 10 mL SWFI for IVP     Admin Date  10/21/2022 Action  Given Dose  1 g Route  Intravenous Administered By  Mita Perdomo RN          heparin lock flush 10 UNIT/ML injection 5 mL     Admin Date  10/21/2022 Action  Given Dose  5 mL Route  Intracatheter Administered By  Conor  Mita REYES, RN          sodium chloride (PF) 0.9% PF flush 3-20 mL     Admin Date  10/21/2022 Action  Given Dose  20 mL Route  Intracatheter Administered By  Mita Perdomo RN                /72   Pulse 84   Temp 98.8  F (37.1  C) (Oral)   Resp 16   SpO2 97%

## 2022-10-22 ENCOUNTER — INFUSION THERAPY VISIT (OUTPATIENT)
Dept: INFUSION THERAPY | Facility: CLINIC | Age: 62
End: 2022-10-22
Attending: INTERNAL MEDICINE
Payer: MEDICARE

## 2022-10-22 VITALS
RESPIRATION RATE: 16 BRPM | HEART RATE: 94 BPM | TEMPERATURE: 98.6 F | DIASTOLIC BLOOD PRESSURE: 84 MMHG | OXYGEN SATURATION: 98 % | SYSTOLIC BLOOD PRESSURE: 140 MMHG

## 2022-10-22 DIAGNOSIS — D84.9 IMMUNOSUPPRESSION (H): ICD-10-CM

## 2022-10-22 DIAGNOSIS — N12 PYELONEPHRITIS: Primary | ICD-10-CM

## 2022-10-22 PROCEDURE — 96374 THER/PROPH/DIAG INJ IV PUSH: CPT

## 2022-10-22 PROCEDURE — 250N000009 HC RX 250: Performed by: INTERNAL MEDICINE

## 2022-10-22 PROCEDURE — 250N000011 HC RX IP 250 OP 636: Performed by: INTERNAL MEDICINE

## 2022-10-22 RX ORDER — DIPHENHYDRAMINE HYDROCHLORIDE 50 MG/ML
50 INJECTION INTRAMUSCULAR; INTRAVENOUS
Status: CANCELLED
Start: 2022-10-23

## 2022-10-22 RX ORDER — HEPARIN SODIUM,PORCINE 10 UNIT/ML
5 VIAL (ML) INTRAVENOUS
Status: DISCONTINUED | OUTPATIENT
Start: 2022-10-22 | End: 2022-10-22 | Stop reason: HOSPADM

## 2022-10-22 RX ORDER — ALBUTEROL SULFATE 0.83 MG/ML
2.5 SOLUTION RESPIRATORY (INHALATION)
Status: CANCELLED | OUTPATIENT
Start: 2022-10-23

## 2022-10-22 RX ORDER — MEPERIDINE HYDROCHLORIDE 25 MG/ML
25 INJECTION INTRAMUSCULAR; INTRAVENOUS; SUBCUTANEOUS EVERY 30 MIN PRN
Status: CANCELLED | OUTPATIENT
Start: 2022-10-23

## 2022-10-22 RX ORDER — HEPARIN SODIUM (PORCINE) LOCK FLUSH IV SOLN 100 UNIT/ML 100 UNIT/ML
5 SOLUTION INTRAVENOUS
Status: CANCELLED | OUTPATIENT
Start: 2022-10-23

## 2022-10-22 RX ORDER — HEPARIN SODIUM,PORCINE 10 UNIT/ML
5 VIAL (ML) INTRAVENOUS
Status: CANCELLED | OUTPATIENT
Start: 2022-10-23

## 2022-10-22 RX ADMIN — Medication 5 ML: at 11:19

## 2022-10-22 RX ADMIN — ERTAPENEM SODIUM 1 G: 1 INJECTION, POWDER, LYOPHILIZED, FOR SOLUTION INTRAMUSCULAR; INTRAVENOUS at 11:06

## 2022-10-22 NOTE — LETTER
Date:October 23, 2022      Provider requested that no letter be sent. Do not send.       [facility]

## 2022-10-22 NOTE — PROGRESS NOTES
Nursing Note  Belen Sharma [facility] for:   Chief Complaint   Patient presents with     Infusion     ertapenem (INVanz)      During [facility]'s [facility] appointment, orders from Dr. Christopher were completed.  Frequency: daily, completes 10/23/22,orders are in for PICC line pull    Progress note:  Patient identification verified by name and date of birth.  Assessment completed.  Vitals recorded in Doc Flowsheets.  Patient was provided with education regarding medication/procedure and possible side effects.  Patient verbalized understanding.     present during visit today: Not Applicable.    Treatment Conditions: Non-applicable.  Premedications: were not ordered.  Drug Waste Record: No  Infusion length and rate:  IVP over 5 minutes  Labs: were not ordered for this appointment.  Vascular access: PICC accessed today.    Is the next appt scheduled? yes  Asymptomatic COVID test completed? no    Post Infusion Assessment:  Patient tolerated infusion without incident.  Blood return noted pre and post infusion.  Site patent and intact, free from redness, edema or discomfort.  No evidence of extravasations.  Access discontinued per protocol.     Discharge Plan:   Follow up plan of care with: ongoing infusions at [facility].  Discharge instructions were reviewed with patient.  Patient/representative verbalized understanding of discharge instructions and all questions answered.  Patient discharged from [facility] in stable condition.    Nicole Farias RN    Administrations This Visit     ertapenem (INVanz) 1 g in 10 mL SWFI for IVP     Admin Date  10/22/2022 Action  Given Dose  1 g Route  Intravenous Administered By  Nicole Farias RN          heparin lock flush 10 UNIT/ML injection 5 mL     Admin Date  10/22/2022 Action  Given Dose  5 mL Route  Intracatheter Administered  By  Nicole Farias RN                BP (!) 140/84   Pulse 94   Temp 98.6  F (37  C) (Oral)   Resp 16   SpO2 98%

## 2022-10-22 NOTE — PATIENT INSTRUCTIONS
Dear Belen Sharma    Thank you for choosing [facility] ([facility]) for your infusion.  The following information is a summary of our appointment as well as important reminders.          We look forward in seeing you on your next appointment here at [facility] ([facility]).  Please don t hesitate to call us at [phone] to reschedule any of your appointments or to speak with one of the [facility] registered nurses.  It was a pleasure taking care of you today.    Sincerely,    [facility]  [facility]  [address] [place] [address]  [address]  Phone:  [phone]

## 2022-10-22 NOTE — LETTER
10/22/2022         RE: Belen Sharma  [address] [place] [address]        Dear Colleague,    Thank you for referring your patient, Belen Sharma, to the [facility] TREATMENT [facility]. Please see a copy of my visit note below.    Nursing Note  Belen Sharma [facility] for:   Chief Complaint   Patient presents with     Infusion     ertapenem (INVanz)      During [facility]'s [facility] appointment, orders from Dr. Christopher were completed.  Frequency: daily, completes 10/23/22,orders are in for PICC line pull    Progress note:  Patient identification verified by name and date of birth.  Assessment completed.  Vitals recorded in Doc Flowsheets.  Patient was provided with education regarding medication/procedure and possible side effects.  Patient verbalized understanding.     present during visit today: Not Applicable.    Treatment Conditions: Non-applicable.  Premedications: were not ordered.  Drug Waste Record: No  Infusion length and rate:  IVP over 5 minutes  Labs: were not ordered for this appointment.  Vascular access: PICC accessed today.    Is the next appt scheduled? yes  Asymptomatic COVID test completed? no    Post Infusion Assessment:  Patient tolerated infusion without incident.  Blood return noted pre and post infusion.  Site patent and intact, free from redness, edema or discomfort.  No evidence of extravasations.  Access discontinued per protocol.     Discharge Plan:   Follow up plan of care with: ongoing infusions at [facility].  Discharge instructions were reviewed with patient.  Patient/representative verbalized understanding of discharge instructions and all questions answered.  Patient discharged from [facility] in stable condition.    Nicole Farias RN    Administrations This Visit     ertapenem (INVanz) 1 g in 10 mL SWFI  Attempted to call patient to schedule.    Patient will be calling back to schedule diabetes follow-up appointment.    Please schedule appointment upon returned call.    Thank you,  Henny Guthrie   for IVP     Admin Date  10/22/2022 Action  Given Dose  1 g Route  Intravenous Administered By  Nicole Farias RN          heparin lock flush 10 UNIT/ML injection 5 mL     Admin Date  10/22/2022 Action  Given Dose  5 mL Route  Intracatheter Administered By  Nicole Farias RN                BP (!) 140/84   Pulse 94   Temp 98.6  F (37  C) (Oral)   Resp 16   SpO2 98%           Again, thank you for allowing me to participate in the care of your patient.        Sincerely,        No name on file

## 2022-10-23 ENCOUNTER — INFUSION THERAPY VISIT (OUTPATIENT)
Dept: INFUSION THERAPY | Facility: CLINIC | Age: 62
End: 2022-10-23
Attending: INTERNAL MEDICINE
Payer: MEDICARE

## 2022-10-23 VITALS
RESPIRATION RATE: 16 BRPM | TEMPERATURE: 98.3 F | HEART RATE: 87 BPM | OXYGEN SATURATION: 99 % | SYSTOLIC BLOOD PRESSURE: 90 MMHG | DIASTOLIC BLOOD PRESSURE: 54 MMHG

## 2022-10-23 DIAGNOSIS — N12 PYELONEPHRITIS: ICD-10-CM

## 2022-10-23 DIAGNOSIS — D84.9 IMMUNOSUPPRESSION (H): Primary | ICD-10-CM

## 2022-10-23 PROCEDURE — 96374 THER/PROPH/DIAG INJ IV PUSH: CPT

## 2022-10-23 PROCEDURE — 250N000011 HC RX IP 250 OP 636: Performed by: INTERNAL MEDICINE

## 2022-10-23 PROCEDURE — 250N000009 HC RX 250: Performed by: INTERNAL MEDICINE

## 2022-10-23 RX ORDER — MEPERIDINE HYDROCHLORIDE 25 MG/ML
25 INJECTION INTRAMUSCULAR; INTRAVENOUS; SUBCUTANEOUS EVERY 30 MIN PRN
Status: CANCELLED | OUTPATIENT
Start: 2022-10-23

## 2022-10-23 RX ORDER — HEPARIN SODIUM (PORCINE) LOCK FLUSH IV SOLN 100 UNIT/ML 100 UNIT/ML
5 SOLUTION INTRAVENOUS
Status: CANCELLED | OUTPATIENT
Start: 2022-10-23

## 2022-10-23 RX ORDER — HEPARIN SODIUM,PORCINE 10 UNIT/ML
5 VIAL (ML) INTRAVENOUS
Status: CANCELLED | OUTPATIENT
Start: 2022-10-23

## 2022-10-23 RX ORDER — ALBUTEROL SULFATE 0.83 MG/ML
2.5 SOLUTION RESPIRATORY (INHALATION)
Status: CANCELLED | OUTPATIENT
Start: 2022-10-23

## 2022-10-23 RX ORDER — DIPHENHYDRAMINE HYDROCHLORIDE 50 MG/ML
50 INJECTION INTRAMUSCULAR; INTRAVENOUS
Status: CANCELLED
Start: 2022-10-23

## 2022-10-23 RX ADMIN — ERTAPENEM SODIUM 1 G: 1 INJECTION, POWDER, LYOPHILIZED, FOR SOLUTION INTRAMUSCULAR; INTRAVENOUS at 08:00

## 2022-10-23 NOTE — PROGRESS NOTES
Nursing Note  Belen Sharma [facility] for:   Chief Complaint   Patient presents with     Infusion     ertapenem (INVanz)        During [facility]'s [facility] appointment, orders from Dr. Christopher were completed.  Frequency: daily, completes 10/23/22,orders are in for PICC line pull- orders to discharge from Dr. Gonzales in therapy plan.    Progress note:  Patient identification verified by name and date of birth.  Assessment completed.  Vitals recorded in Doc Flowsheets.  Patient was provided with education regarding medication/procedure and possible side effects.  Patient verbalized understanding.     present during visit today: Not Applicable.    Treatment Conditions: Non-applicable.  Premedications: were not ordered.  Drug Waste Record: No  Infusion length and rate:  IVP over 5 minutes  Labs: were not ordered for this appointment.  Vascular access: PICC accessed today and pulled 39.5 cm.     Is the next appt scheduled? yes  Asymptomatic COVID test completed? no    Post Infusion Assessment:  Patient tolerated infusion without incident.  Blood return noted pre and post infusion.  Site patent and intact, free from redness, edema or discomfort.  No evidence of extravasations.  Access discontinued per protocol.     Discharge Plan:   Follow up plan of care with: ongoing infusions [facility].  Discharge instructions were reviewed with patient.  Patient/representative verbalized understanding of discharge instructions and all questions answered.  Patient discharged [facility] in stable condition    Nicole Farias RN    Administrations This Visit     ertapenem (INVanz) 1 g in 10 mL SWFI for IVP     Admin Date  10/23/2022 Action  Given Dose  1 g Route  Intravenous Administered By  Nicole Farias RN                BP 90/54   Pulse 87   Temp 98.3  F (36.8  C) (Oral)    Resp 16   SpO2 99%

## 2022-10-23 NOTE — LETTER
10/23/2022         RE: Belen Sharma  [address] [place] [address]        Dear Colleague,    Thank you for referring your patient, Belen Sharma, to the [facility] TREATMENT [facility]. Please see a copy of my visit note below.    Nursing Note  Belen Sharma presents today to [facility] for:   Chief Complaint   Patient presents with     Infusion     ertapenem (INVanz)        During today's [facility] appointment, orders from Dr. Christopher were completed.  Frequency: daily, completes 10/23/22,orders are in for PICC line pull- orders to discharge from Dr. Gonzales in therapy plan.    Progress note:  Patient identification verified by name and date of birth.  Assessment completed.  Vitals recorded in Doc Flowsheets.  Patient was provided with education regarding medication/procedure and possible side effects.  Patient verbalized understanding.     present during visit today: Not Applicable.    Treatment Conditions: Non-applicable.  Premedications: were not ordered.  Drug Waste Record: No  Infusion length and rate:  IVP over 5 minutes  Labs: were not ordered for this appointment.  Vascular access: PICC accessed today and pulled 39.5 cm.     Is the next appt scheduled? yes  Asymptomatic COVID test completed? no    Post Infusion Assessment:  Patient tolerated infusion without incident.  Blood return noted pre and post infusion.  Site patent and intact, free from redness, edema or discomfort.  No evidence of extravasations.  Access discontinued per protocol.     Discharge Plan:   Follow up plan of care with: ongoing infusions at [facility].  Discharge instructions were reviewed with patient.  Patient/representative verbalized understanding of discharge instructions and all questions answered.  Patient discharged from [facility] in stable condition    Nicole Farias,  RN    Administrations This Visit     ertapenem (INVanz) 1 g in 10 mL SWFI for IVP     Admin Date  10/23/2022 Action  Given Dose  1 g Route  Intravenous Administered By  Nicole Farias RN                BP 90/54   Pulse 87   Temp 98.3  F (36.8  C) (Oral)   Resp 16   SpO2 99%         Again, thank you for allowing me to participate in the care of your patient.        Sincerely,        No name on file

## 2022-10-23 NOTE — PATIENT INSTRUCTIONS
Dear Belen Sharma    Thank you for choosing [facility] ([facility]) for your infusion.  The following information is a summary of our appointment as well as important reminders.      Leave dressing on PICC line site for 48 hours.    We look forward in seeing you on your next appointment here at [facility] ([facility]).  Please don t hesitate to call us at [phone] to reschedule any of your appointments or to speak with one of the [facility] registered nurses.  It was a pleasure taking care of you today.    Sincerely,    [facility]  [facility]  [address] [place] [address]  [address]  Phone:  [phone]

## 2022-10-31 ENCOUNTER — OFFICE VISIT (OUTPATIENT)
Dept: OPHTHALMOLOGY | Facility: CLINIC | Age: 62
End: 2022-10-31
Attending: OPHTHALMOLOGY
Payer: MEDICARE

## 2022-10-31 DIAGNOSIS — H25.813 COMBINED FORMS OF AGE-RELATED CATARACT OF BOTH EYES: Primary | ICD-10-CM

## 2022-10-31 DIAGNOSIS — Z94.0 KIDNEY REPLACED BY TRANSPLANT: Primary | ICD-10-CM

## 2022-10-31 PROCEDURE — 92025 CPTRIZED CORNEAL TOPOGRAPHY: CPT | Performed by: OPHTHALMOLOGY

## 2022-10-31 PROCEDURE — 76519 ECHO EXAM OF EYE: CPT | Performed by: OPHTHALMOLOGY

## 2022-10-31 PROCEDURE — 99214 OFFICE O/P EST MOD 30 MIN: CPT | Mod: GC | Performed by: OPHTHALMOLOGY

## 2022-10-31 PROCEDURE — G0463 HOSPITAL OUTPT CLINIC VISIT: HCPCS | Mod: 25

## 2022-10-31 ASSESSMENT — VISUAL ACUITY
OS_PH_CC+: -2
METHOD: SNELLEN - LINEAR
OD_PH_CC: 20/30
OD_PH_CC+: +2
OD_CC: 20/40
OD_CC+: +2
OS_CC+: +1
CORRECTION_TYPE: GLASSES
OS_CC: 20/40
OS_PH_CC: 20/25

## 2022-10-31 ASSESSMENT — CONF VISUAL FIELD
OS_SUPERIOR_NASAL_RESTRICTION: 0
OS_SUPERIOR_TEMPORAL_RESTRICTION: 0
OD_INFERIOR_NASAL_RESTRICTION: 0
OD_NORMAL: 1
OD_SUPERIOR_NASAL_RESTRICTION: 0
METHOD: COUNTING FINGERS
OS_NORMAL: 1
OD_SUPERIOR_TEMPORAL_RESTRICTION: 0
OS_INFERIOR_NASAL_RESTRICTION: 0
OS_INFERIOR_TEMPORAL_RESTRICTION: 0
OD_INFERIOR_TEMPORAL_RESTRICTION: 0

## 2022-10-31 ASSESSMENT — REFRACTION_WEARINGRX
OD_AXIS: 060
OS_SPHERE: -5.25
OS_CYLINDER: +1.00
OS_AXIS: 090
OS_ADD: +2.50
OD_SPHERE: -5.75
OD_CYLINDER: +0.75
OD_ADD: +2.50

## 2022-10-31 ASSESSMENT — SLIT LAMP EXAM - LIDS
COMMENTS: NORMAL, MAKEUP
COMMENTS: NORMAL, MAKEUP

## 2022-10-31 ASSESSMENT — REFRACTION_MANIFEST
OD_CYLINDER: +1.00
OD_AXIS: 060
OS_SPHERE: -5.75
OD_SPHERE: -6.25
OS_AXIS: 097
OS_CYLINDER: +1.50

## 2022-10-31 ASSESSMENT — CUP TO DISC RATIO
OS_RATIO: 0.40
OD_RATIO: 0.40

## 2022-10-31 ASSESSMENT — TONOMETRY
OS_IOP_MMHG: 14
OD_IOP_MMHG: 14
IOP_METHOD: TONOPEN

## 2022-10-31 NOTE — PROGRESS NOTES
Chief complaint   Progressive blurred vision  Referring Provider: Self-referred    HPI   Belen Sharma 62 year old adult PMHx of liver-kidney recipient (03/03/2011, re-transplanted 2016) noticed progressive vision decrease since 2019. Cannot be corrected any more with glasses to help her drive and read. Noticing glare, starburts.     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 Both, very large, native kidneys reamain.     PONV (postoperative nausea and vomiting)     Need to start with ice chips and apple juice, no soda     S/P kidney transplant     [facility] 10/9/2010 - kidney failure 2/2 [facility]. Explanted 2012. Re-transplant after de-sensitization 2016     S/P liver transplant (H)     10/9/2010 - [facility], ischemic biopathy. Re-transplant 3/3/2011     S/P splenectomy      Spider veins 2019     Thyroid disease     Hyperthyroid treated with single dose iodine       PSH     Past Surgical History:   Procedure Laterality Date     bunectomy  08/01/2018    right foot     bunionectomy  01/2019    left     CHOLECYSTECTOMY       CONIZATION N/A 09/30/2020    Procedure: CONE BIOPSY, CERVIX;  Surgeon: Daysi Perez MD;  Location: [facility]     FAILED PICC - RIGHT ARM Right 12/19/2020    Has a LUE AV fistula.     H STATISTIC PICC LINE INSERTION >5YR, FAILED Bilateral 12/19/2020    Left fistula, Right failed x 2 Occlussion     HERNIA REPAIR, INCISIONAL  03/2013     IR DIALYSIS PTA CENTRAL SEG  12/19/2020     IR PICC PLACEMENT > 5 YRS OF  AGE  2020     IR PICC PLACEMENT > 5 YRS OF AGE  10/5/2022     SPLENECTOMY      Splenectomy     TRANSPLANT KIDNEY RECIPIENT  DONOR  2016    re-transplant after [facility]; 6 months de-sensitization prior AND 6 months after transplant     TRANSPLANT LIVER RECIPIENT  DONOR  2011     TRANSPLANT LIVER, KIDNEY RECIPIENT  DONOR, COMBINED  10/09/2010    [facility] - re-Tx liver 3/3/2011; Kidney (left iliac) lost to [facility]/fibrosis - explant     VASCULAR SURGERY      Vascular access left UE. Not used for 4 years since 2nd kidney tx - [facility] [place]       Meds     Current Outpatient Medications   Medication     acetaminophen (TYLENOL) 325 MG tablet     aspirin (ASA) 81 MG chewable tablet     biotin 1000 MCG TABS tablet     ciclopirox (PENLAC) 8 % external solution     diphenhydrAMINE (BENADRYL) 25 MG tablet     ertapenem (INVANZ) 1 GM vial     estradiol (ESTRACE) 0.1 MG/GM vaginal cream     guaiFENesin (MUCINEX) 600 MG 12 hr tablet     Multiple Vitamins-Minerals (WOMENS DAILY FORMULA PO)     mycophenolic acid (GENERIC EQUIVALENT) 180 MG EC tablet     nitroGLYcerin (NITROSTAT) 0.4 MG sublingual tablet     predniSONE (DELTASONE) 5 MG tablet     Probiotic Product (PROBIOTIC PO)     sodium bicarbonate 650 MG tablet     sulfamethoxazole-trimethoprim (BACTRIM) 400-80 MG tablet     tacrolimus (GENERIC EQUIVALENT) 0.5 MG capsule     tacrolimus (GENERIC EQUIVALENT) 1 MG capsule     temazepam (RESTORIL) 15 MG capsule     No current facility-administered medications for this visit.       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 Clearly told patient that cataract surgery is NOT needed if managing with current vision.  Patient wishes to proceed ahead with surgery. Explained benefits alternatives and risks including but not limited to loss of vision, severe infection, retinal detachment, need for more surgery, visual disturbances etc...  Informed patient that reaching uncorrected vision aim (without glasses) after cataract surgery is not certain. Made patient aware they may need glasses, laser vision correction or in worst case scenario, IOL exchange.        -Aim: distance each eye  Lens: TORIC LENS  - dominant eye  -Previous refractive surgery status:-  -Corneal astigmatism>2    -  right before left; right eye worse   - Optimize ocular surface; recommend regular PFATs 4-6x/day and warm compresses BID    # Immunosuppression  - No evidence of retinopathies or active retinal disease  - Old CR scar peripheral retina left eye  - Monitor      Follow up: POD#1 after surgery    My privilege to be part of your care,  Manuel Prather MD, MSc  Ophthalmology PGY-3 resident physician  Pager: [phone]      Attending Physician Attestation:  Complete documentation of historical and exam elements from today's encounter can be found in the full encounter summary report (not reduplicated in this progress note).  I personally obtained the chief complaint(s) and history of present illness.  I confirmed and edited as necessary the review of systems, past medical/surgical history, family history, social history, and examination findings as documented by others; and I examined the patient myself.  I personally reviewed the relevant tests, images, and reports as documented above.  I formulated and edited as necessary the assessment and plan and discussed the findings and management plan with the patient and family. - Sebastian Robertson MD

## 2022-10-31 NOTE — NURSING NOTE
Chief Complaints and History of Present Illnesses   Patient presents with     Consult For     Cataract surgery, referred by Dr Loza     Chief Complaint(s) and History of Present Illness(es)     Consult For            Laterality: both eyes    Context: watching TV    Course: gradually worsening    Associated symptoms: eye pain (intermittent in the right eye), tearing and floaters (longstanding in both eyes).  Negative for glare and haloes    Treatments tried: no treatments    Pain scale: 0/10 (None currently)    Comments: Cataract surgery, referred by Dr Loza          Comments    She states that for the past year her vision has seemed decreased, especially in her right eye.  She struggles to read signs when driving and the scroll on her TV.    Mita John, COT 12:47 PM  October 31, 2022

## 2022-11-01 ENCOUNTER — TELEPHONE (OUTPATIENT)
Dept: OPHTHALMOLOGY | Facility: CLINIC | Age: 62
End: 2022-11-01

## 2022-11-01 ENCOUNTER — VIRTUAL VISIT (OUTPATIENT)
Dept: INFECTIOUS DISEASES | Facility: CLINIC | Age: 62
End: 2022-11-01
Attending: INTERNAL MEDICINE
Payer: MEDICARE

## 2022-11-01 DIAGNOSIS — Z94.4 LIVER REPLACED BY TRANSPLANT (H): ICD-10-CM

## 2022-11-01 DIAGNOSIS — Z94.0 KIDNEY TRANSPLANT RECIPIENT: ICD-10-CM

## 2022-11-01 DIAGNOSIS — N39.0 RECURRENT UTI: Primary | ICD-10-CM

## 2022-11-01 DIAGNOSIS — N13.5: ICD-10-CM

## 2022-11-01 PROCEDURE — 99214 OFFICE O/P EST MOD 30 MIN: CPT | Mod: 95 | Performed by: INTERNAL MEDICINE

## 2022-11-01 PROCEDURE — G0463 HOSPITAL OUTPT CLINIC VISIT: HCPCS | Mod: PN,RTG | Performed by: INTERNAL MEDICINE

## 2022-11-01 NOTE — LETTER
11/1/2022       RE: Belen Sharma  [address] [place] [address]     Dear Colleague,    Thank you for referring your patient, Belen Sharma, to the [facility] DISE[facility] [place] at [facility]. Please see a copy of my visit note below.    [facility]    Transplant Infectious Diseases Outpatient Progress note      Patient:  Belen Sharma, Date of birth 1960, Medical record number [id number]  Date of Visit:  11/01/2022         Recommendations:   1. UA and Ucx as needed (standing order).  2. Follow up with OB/GYN and urology.     RTC: after appointment with OB/GYN and urology.         Summary of Presentation:   Transplants:  2/2/2016 (Kidney), 3/3/2011 (Liver), 10/9/2010 (Kidney / Liver), Postoperative day:  4261 (Liver), 2464 (Kidney)     This patient is a 62 year old adult with history of congenital hepatic fibrosis and PCKD s/p liver and kidney transplant in [place] in 2010, they both failed and required liver re-transplant in 2011 and kidney re-transplant in 2016.   Currently on TAC/MMF/prednisone.   With recurrent UTI.          Active Problems and Infectious Diseases Issues:   1. Recurrent UTI.   With different pathogens including ESBL E coli, K pneumonia, E faecalis and sometimes with negative urine and blood cx.   Last UTI was in 10/2022 with ESBL E coli treated with a 3-week course of ertapenem ending 10/23/2022.   Recent CT a/p without evidence of structural or anatomical abnormality.   Would check in the future for functional abnormality such as vesicoureteral reflux or inability to fully empty the bladder by urodynamics.   Would also check if more estrogen is indicated.   The patient has multiple risk factors for recurrent UTI, almost all of them can not be reversed, including: female gender, immunocompromised, kidney transplant recipient, the need to manipulate the  tract daily to change pessary.   In  addition the patient is not consistent using topical estrogen which may also contribute to UTI.         Old Problems and Infectious Diseases Issues:   1. Recurrent UTI with E coli, E faecalis and K pneumonia.   2. CDI.      Other Infectious Disease issues include:  - QTc: 384 as of 6/6/2022. .   - PCP prophylaxis: bactrim.  - Serostatus: not available.   - Immunization status: This patient received the fourth dose (first booster) of the COVID-19 vaccine on 2/8/2022. received evusheld on 5/16/2022. Otherwise due for the seasonal influenza vaccine, and the bivalent COVID-19 vaccine.       Attestation:  Total duration of visit including chart review, reviewing labs and imaging, interviewing and examining the patient, documentation, and sending communication to the patient and to the primary treating team, all at the same day of this encounter, is: 30 minutes.   Catrachita Gonzales MD    Contact information available via [facility] Paging/Directory     11/01/2022         Interim History:   No dysuria, no fever.         HPI:       Transplants:  2/2/2016 (Kidney), 3/3/2011 (Liver), 10/9/2010 (Kidney / Liver); Postoperative day:  4261 (Liver), 2464 (Kidney)    A 62 year old adult with history of congenital hepatic fibrosis and PCKD s/p liver and kidney transplant in [place] in 2010, they both failed, first the liver apparently due to anatomic abnormalities and required liver re-transplant within few months in 2011, then the kidney failed and needed HD through LUE AVF for 4 years before she received kidney re-transplant in [place] in 2016.      The patient has history of recurrent UTI. In 2022, the symptoms of UTI with dysuria, vaginal itching, increase in frequency increased and Ucx on multiple occasions grew K pneumonia, E faecalis, and ESBL E coli.      The patient was treated in 8/2022 for UTI with fosfomycin, Ucx at that time were without growth but the patient was symptomatic with abnormal UA.   Again was treated for UTI in 9/2022 with  a single dose of fosfomycin but symptoms prompted admission with fever and urinary symptoms; UA was abnormal but Ucx were negative. She was treated with ceftriaxone then cefdinir with resolution of symptoms.   She was evaluated by [facility] (Dr. Reyes) who ordered CT a/p with contrast which showed inflamed transplanted ureter but not evidence of nephrolithiasis, abscesses or other correctable abnormalities. Dr. Reyes also ordered EOT (9/30/20220 UA and Ucx which were abnormal with Ucx growing ESBL E coli. The patient developed fever, within 24 hr of stopping cefdinir and was admitted to the hospital and treated with ertapenem IV for a total of 3 weeks.      Denying new meds or new -related behavior. Denied association of UTI and sexual activities.   She was prescribed estrogen local therapy but not always applies it.            Review of Systems:     As mentioned in the interim history otherwise negative by reviewing constitutional symptoms, central and peripheral neurological systems, respiratory system, cardiac system, GI system,  system, musculoskeletal, skin, allergy, and lymphatics.                  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 Both, very large, native kidneys reamain.     PONV (postoperative nausea and vomiting)     Need to start with ice chips and apple juice, no soda     S/P kidney transplant     SLK 10/9/2010 - kidney failure 2/2 [facility]. Explanted 2012. Re-transplant after de-sensitization 2016     S/P liver transplant (H)     10/9/2010 - [facility], ischemic biopathy. Re-transplant 3/3/2011     S/P  splenectomy      Spider veins      Thyroid disease     Hyperthyroid treated with single dose iodine     Urinary tract infection 2022             Past Surgical History:     Past Surgical History:   Procedure Laterality Date     bunectomy  2018    right foot     bunionectomy  2019    left     CHOLECYSTECTOMY       CONIZATION N/A 2020    Procedure: CONE BIOPSY, CERVIX;  Surgeon: Daysi Perez MD;  Location: [facility] OR     FAILED PICC - RIGHT ARM Right 2020    Has a LUE AV fistula.     H STATISTIC PICC LINE INSERTION >5YR, FAILED Bilateral 2020    Left fistula, Right failed x 2 Occlussion     HERNIA REPAIR, INCISIONAL  2013     IR DIALYSIS PTA CENTRAL SEG  2020     IR PICC PLACEMENT > 5 YRS OF AGE  2020     IR PICC PLACEMENT > 5 YRS OF AGE  10/5/2022     SPLENECTOMY      Splenectomy     TRANSPLANT KIDNEY RECIPIENT  DONOR      re-transplant after [facility]; 6 months de-sensitization prior AND 6 months after transplant     TRANSPLANT LIVER RECIPIENT  DONOR  2011     TRANSPLANT LIVER, KIDNEY RECIPIENT  DONOR, COMBINED  10/09/2010    [facility]AT - re-Tx liver 3/3/2011; Kidney (left iliac) lost to [facility]/fibrosis - explant     VASCULAR SURGERY      Vascular access left UE. Not used for 4 years since 2nd kidney tx - [place]               Social History:     Social History     Tobacco Use     Smoking status: Never     Smokeless tobacco: Never   Substance Use Topics     Alcohol use: Not Currently             Family History:   I have reviewed this patient's family history          Immunizations:     Immunization History   Administered Date(s) Administered     COVID-19,PF,Pfizer (12+ Yrs) 2021, 2021, 2021     COVID-19,PF,Pfizer 12+ Yrs ( and After) 2022     DT (PEDS <7y) 2001     FLU 6-35 months 11/15/2002, 2003, 10/26/2004, 10/24/2005, 2006, 2011     W9h3-03 Novel Flu 2009     HepB,  Unspecified 06/28/2001     Influenza Quad, Recombinant, pf(RIV4) (Flublok) 10/18/2021     Influenza Vaccine IM > 6 months Valent IIV4 (Alfuria,Fluzone) 09/12/2022     Influenza Vaccine IM Ages 6-35 Months 4 Valent (PF) 11/28/2007, 10/07/2009, 09/20/2011     Influenza,INJ,MDCK,PF,Quad >6mo(Flucelvax-RMG) 10/15/2020     Meningococcal (Menactra ) 01/25/2013     Pneumococcal 20 valent Conjugate (Prevnar 20) 09/12/2022     Pneumococcal 23 valent 09/27/2002, 01/25/2013     TDAP Vaccine (Boostrix) 01/25/2013     Zoster vaccine recombinant adjuvanted (SHINGRIX) 10/15/2020, 01/08/2021             Allergies:     Allergies   Allergen Reactions     Ibuprofen      Due to liver transplant             Medications:     Current Outpatient Medications   Medication Sig     acetaminophen (TYLENOL) 325 MG tablet Take 1 tablet (325 mg) by mouth every 4 hours as needed for mild pain or fever     aspirin (ASA) 81 MG chewable tablet Take 81 mg by mouth every evening Stopped 7 days preop     biotin 1000 MCG TABS tablet Take 3,000 mcg by mouth every evening     ciclopirox (PENLAC) 8 % external solution Apply to adjacent skin and affected nails daily.  Remove with alcohol every 7 days, then repeat.     diphenhydrAMINE (BENADRYL) 25 MG tablet Take 25 mg by mouth every 8 hours as needed for allergies     ertapenem (INVANZ) 1 GM vial Inject 1 g into the vein every 24 hours     estradiol (ESTRACE) 0.1 MG/GM vaginal cream Place 1 g vaginally twice a week     guaiFENesin (MUCINEX) 600 MG 12 hr tablet Take 1 tablet by mouth 2 times daily as needed     Multiple Vitamins-Minerals (WOMENS DAILY FORMULA PO) Take 1 tablet by mouth daily     mycophenolic acid (GENERIC EQUIVALENT) 180 MG EC tablet Take 3 tablets (540 mg) by mouth 2 times daily     nitroGLYcerin (NITROSTAT) 0.4 MG sublingual tablet Place 1 tablet (0.4 mg) under the tongue every 5 minutes as needed for chest pain For chest pain place 1 tablet under the tongue every 5 minutes for 3 doses. If  symptoms persist 5 minutes after 1st dose call 911.     predniSONE (DELTASONE) 5 MG tablet Take 1 tablet (5 mg) by mouth daily     Probiotic Product (PROBIOTIC PO) Take 2 tablets by mouth every morning ([facility] Women's Vaginal Probiotic)     sodium bicarbonate 650 MG tablet Take 1 tablet (650 mg) by mouth 2 times daily     sulfamethoxazole-trimethoprim (BACTRIM) 400-80 MG tablet Take 1 tablet by mouth three times a week on Monday, Wednesday, Friday     tacrolimus (GENERIC EQUIVALENT) 0.5 MG capsule Take 1 capsule (0.5 mg) by mouth 2 times daily Total dose = 1.5 mg BID     tacrolimus (GENERIC EQUIVALENT) 1 MG capsule Take 1 capsule (1 mg) by mouth 2 times daily Total dose = 1.5 mg BID     temazepam (RESTORIL) 15 MG capsule Take 1 capsule (15 mg) by mouth as needed for sleep (twice a year)     No current facility-administered medications for this visit.                Physical Exam:   No vitals are available during this virtual visit.   Constitutional: awake, alert, cooperative, no apparent distress and appears at stated age, well nourished.             Laboratory Data:     No results found for: ACD4    Inflammatory Markers    Recent Labs   Lab Test 10/05/22  0648 10/04/22  0615 09/14/22  2220 11/04/21  1457 12/28/20  1515 12/21/20  1545   SED  --   --  8 14 8 10   CRP 20.60* 39.70* 21.60* 39.3* 13.0* 18.0*       Immune Globulin Studies    No lab results found.    Metabolic Studies    Recent Labs   Lab Test 10/17/22  1010 10/10/22  1017 10/05/22  0648 10/04/22  0615 10/03/22  0935 10/02/22  0628 09/15/22  0607 09/14/22  2230 09/30/20  0721 09/23/20  1653    138 136 134* 137 135*   < >  --    < >  --    POTASSIUM 4.3 4.2 4.2 4.0 4.0 3.9   < >  --    < >  --    CHLORIDE 105 104 104 105 107 103   < >  --    < >  --    CO2 23 24 23 23 20* 23   < >  --    < >  --    ANIONGAP 9 10 9 6* 10 9   < >  --    < >  --    BUN 17.3 19.8 13.7 10.8 9.8 17.8   < >  --    < >  --    CR 0.60 0.62 0.60 0.57 0.66 0.78   < >  --     < >  --    GFRESTIMATED >90 >90 >90 >90 >90 85   < >  --    < >  --    * 97 91 97 99 92   < >  --    < >  --    EDSON 9.9 10.3* 9.7 9.5 9.2 9.5   < >  --    < >  --    PHOS  --   --   --   --   --   --   --   --   --  3.4   LACT  --   --   --   --   --  0.8  --  0.9   < >  --     < > = values in this interval not displayed.       Hepatic Studies    Recent Labs   Lab Test 10/17/22  1010 10/10/22  1017 10/05/22  0648 10/02/22  0628 09/26/22  0813 09/16/22  1015   BILITOTAL 0.5 0.6 0.4 0.7 0.6 0.3   ALKPHOS 66 72 66 69 69 57   PROTTOTAL 6.4 6.8 6.2* 6.6 6.5 5.7*   ALBUMIN 3.6 3.9 3.4* 3.8 3.9 3.2*   AST 21 19 15 23 18 16   ALT 11 10 <5* 8* 13 8*       Hematology Studies     Recent Labs   Lab Test 10/17/22  1010 10/10/22  1017 10/05/22  0648 10/04/22  0615 10/03/22  0935 10/02/22  0628 03/11/21  1007 12/28/20  1515 12/28/20  0855 12/21/20  1545 12/18/20  0611 12/16/20  2236   WBC 6.7 14.4* 7.3 8.1 11.0 12.3*   < > 9.2   < > 6.2   < > 11.4*   ANEU  --   --   --   --   --   --   --  6.4  --  3.9  --  9.3*   ALYM  --   --   --   --   --   --   --  2.1  --  1.7  --  1.0   PABLO  --   --   --   --   --   --   --  0.6  --  0.6  --  1.0   AEOS  --   --   --   --   --   --   --  0.0  --  0.0  --  0.0   HGB 14.0 15.1 14.6 14.2 13.4 15.1   < > 16.6*   < > 16.2*   < > 16.9*   HCT 44.1 46.6 46.1 43.8 42.3 46.3   < > 50.8*   < > 49.2*   < > 54.5*    287 271 248 241 287   < > 308   < > 261   < > 260    < > = values in this interval not displayed.       Clotting Studies  No lab results found.    Urine Studies    Recent Labs   Lab Test 10/02/22  0632 09/30/22  1324 09/14/22  2218 08/28/22  2104 08/23/22  1257   URINEPH 6.5 7.0 6.0 6.0 6.0   NITRITE Positive* Negative Negative Negative Negative   LEUKEST Large* Moderate* Large* Large* Moderate*   WBCU >182* 25-50* >182* >182* >100*         Microbiology:  Last 6 Culture results with specimen source  Culture Micro   Date Value Ref Range Status   06/04/2021 (A)  Final    10,000  to 50,000 colonies/mL  Enterococcus faecalis     12/17/2020 No growth  Final   12/16/2020 (A)  Final    Cultured on the 1st day of incubation:  Escherichia coli ESBL  ESBL (extended beta lactamase) producing organisms require contact precautions.     12/16/2020   Final    Critical Value/Significant Value, preliminary result only, called to and read back by  RALPH BOLAÑOS RN 12.17.2020 1056 BC     12/16/2020   Final    (Note)  POSITIVE for E.COLI by Verigene multiplex nucleic acid test. Final  identification and antimicrobial susceptibility testing will be  verified by standard methods. Verigene test will not distinguish  E.coli from Shigella species including S.dysenteriae, S.flexneri,  S.boydii, and S.sonnei. Specimens containing Shigella species or  E.coli will be reported as Positive for E.coli.    POSITIVE for CTX-M Class A Extended Spectrum beta-lactamase (ESBL)  resistance marker by Verigene multiplex nucleic acid test. CTX-M  confers resistance to penicillins, cephalosporins and variable  resistance to beta-lactam beta-lactamase inhibitor combinations  (clavulanic acid and tazobactam). Best empiric antibiotic choice is  meropenem. Specific susceptibility testing will be performed.    Specimen tested with Verigene multiplex, gram-negative blood culture  nucleic acid test for the following targets: Acinetobacter sp.,  Citrobacter sp., Enterobacter sp., Proteus sp., E. coli, K.  pneumoniae/oxytoca, P. aeruginosa, and the following resistance  markers: CTXM, KPC, NDM, VIM, IMP and OXA.    Critical Value/Significant Value called to and read back by RALPH BOLAÑOS RN 12/17/20 1312 EH.       12/16/2020 No growth  Final   12/16/2020 (A)  Preliminary    >100,000 colonies/mL  Escherichia coli ESBL  ESBL (extended beta lactamase) producing organisms require contact precautions.     12/16/2020   Preliminary    Susceptibility testing requested by  Dr. Gonzales, Pager [phone]. Fosfomycin requested. @ 1637. 12.18.20. BS.        Specimen Description   Date Value Ref Range Status   06/04/2021 Midstream Urine  Final   12/17/2020 Blood  Final   12/16/2020 Blood Right Arm  Final   12/16/2020 Blood Right Arm  Final   12/16/2020 Unspecified Urine  Final   12/03/2020 Midstream Urine  Final        Last check of C difficile  No results found for: CDBPCT      Virology:  CMV viral loads    CMV viral loads    CMV Quant IU/mL   Date Value Ref Range Status   12/18/2020 CMV DNA Not Detected CMVND^CMV DNA Not Detected [IU]/mL Final     Comment:     Mutations within the highly conserved regions of the viral genome covered by   the RHONDA AmpliPrep/RHONDA TaqMan CMV Test primers and/or probes have been   identified and may result in under-quantitation of or failure to detect the   virus.  Supplemental testing methods should be used for testing when this is   suspected.  The RHONDA AmpliPrep/RHONDA TaqMan CMV Test is an FDA-approved in vitro nucleic   acid amplification test for the quantitation of cytomegalovirus DNA in human   plasma (EDTA plasma) using the RHONDA AmpliPrep Instrument for automated viral   nucleic acid extraction and the RHONDA TaqMan Analyzer or RHONDA TaqMan for   automated Real Time amplification and detection of the viral nucleic acid   target.  Titer results are reported in International Units/mL (IU/mL using 1st WHO   International standard for Human Cytomegalovirus for Nucleic Acid   Amplification based assays. The conversion factor between CMV DNA copis/mL (as   defined by the Roche RHONDA TaqMan CMV test) and International Units is the   CMV DNA concentration in IU/mL x 1.1 copies/IU = CMV DNA in copies/mL.  This assay has received FDA approval for the testing of human plasma only. The   Infectious Disease Diagnostic Laboratory at the [facility], [place], has validated the performance characteristics of the   Roche CMV assay for plasma, bronchial alveolar lavage/wash and urine.       CMV viral loads    Recent  Labs   Lab Test 12/18/20  0611   CMVQNT CMV DNA Not Detected   CSPEC Plasma   CMVLOG Not Calculated       CMV viral loads    CMV Quant IU/mL   Date Value Ref Range Status   12/18/2020 CMV DNA Not Detected CMVND^CMV DNA Not Detected [IU]/mL Final     Comment:     Mutations within the highly conserved regions of the viral genome covered by   the RHONDA AmpliPrep/RHONDA TaqMan CMV Test primers and/or probes have been   identified and may result in under-quantitation of or failure to detect the   virus.  Supplemental testing methods should be used for testing when this is   suspected.  The RHONDA AmpliPrep/RHONDA TaqMan CMV Test is an FDA-approved in vitro nucleic   acid amplification test for the quantitation of cytomegalovirus DNA in human   plasma (EDTA plasma) using the [employer]iPrep Instrument for automated viral   nucleic acid extraction and the [employer] TaqMan Analyzer or [employer]ot[employer] Technology for   automated Real Time amplification and detection of the viral nucleic acid   target.  Titer results are reported in International Units/mL (IU/mL using 1st WHO   International standard for Human Cytomegalovirus for Nucleic Acid   Amplification based assays. The conversion factor between CMV DNA copis/mL (as   defined by the Roche RHONDA TaqMan CMV test) and International Units is the   CMV DNA concentration in IU/mL x 1.1 copies/IU = CMV DNA in copies/mL.  This assay has received FDA approval for the testing of human plasma only. The   Infectious Disease Diagnostic Laboratory at the [facility], [place], has validated the performance characteristics of the   Roche CMV assay for plasma, bronchial alveolar lavage/wash and urine.     09/23/2020 CMV DNA Not Detected CMVND^CMV DNA Not Detected [IU]/mL Final     Comment:     Mutations within the highly conserved regions of the viral genome covered by   the RHONDA AmpliPrep/RHONDA TaqMan CMV Test primers and/or probes have been   identified and may result in  under-quantitation of or failure to detect the   virus.  Supplemental testing methods should be used for testing when this is   suspected.  The RHONDA AmpliPrep/RHONDA TaqMan CMV Test is an FDA-approved in vitro nucleic   acid amplification test for the quantitation of cytomegalovirus DNA in human   plasma (EDTA plasma) using the RHONDA AmpliPrep Instrument for automated viral   nucleic acid extraction and the RHONDA TaqMan Analyzer or RHONDA TaqMan for   automated Real Time amplification and detection of the viral nucleic acid   target.  Titer results are reported in International Units/mL (IU/mL using 1st WHO   International standard for Human Cytomegalovirus for Nucleic Acid   Amplification based assays. The conversion factor between CMV DNA copis/mL (as   defined by the Roche RHONDA TaqMan CMV test) and International Units is the   CMV DNA concentration in IU/mL x 1.1 copies/IU = CMV DNA in copies/mL.  This assay has received FDA approval for the testing of human plasma only. The   Infectious Disease Diagnostic Laboratory at the [facility], [place], has validated the performance characteristics of the   Roche CMV assay for plasma, bronchial alveolar lavage/wash and urine.       Log IU/mL of CMVQNT   Date Value Ref Range Status   12/18/2020 Not Calculated <2.1 [Log_IU]/mL Final   09/23/2020 Not Calculated <2.1 [Log_IU]/mL Final       CMV resistance testing  No lab results found.  No results found for: CMVCID, CMVFOS, CMVGAN     EBV viral loads   No lab results found.  No results found for: EBVDN, EBRES, EBVDN, EBVSP, EBVPC, EBVPCR    Human Herpes Virus 6 viral loads    No results found for: H6RES No results found for: H6SPEC    CMV Antibody IgG   Date Value Ref Range Status   01/04/2021 >8.0 (H) 0.0 - 0.8 AI Final     Comment:     Positive  Antibody index (AI) values reflect qualitative changes in antibody   concentration that cannot be directly associated with clinical condition or    disease state.       No results found for: EBIG2, EBIGM, EBVIGG, EBIGG, EBVAGN, MY5466, TOXG      Imaging:  CT a/p W 9/29/2022  IMPRESSION:  1. Urothelial thickening and mild ectasia of the distal right renal  transplant ureter extending to the ureterovesical anastomosis,  slightly increased compared to previous CT on 12/17/2020. Findings may  represent acute or chronic sequela of inflammation/infection. No  hydronephrosis or evidence of obstruction.  2. Simple right adnexal cyst is not significantly changed compared to  previous without internal complexity on comparison ultrasound, likely  benign. No dedicated imaging follow-up recommended.  3. Stable postoperative changes of liver transplant.  4. Stable enlarged polycystic kidneys.      Belen is a 62 year old who is being evaluated via a billable video visit.      How would you like to obtain your AVS? MyChart  If the video visit is dropped, the invitation should be resent by: Text to cell phone: [phone]  Will anyone else be joining your video visit? No        Video-Visit Details    Video Start Time: 5:33 PM    Type of service:  Video Visit    Video End Time:5:43 PM    Originating Location (pt. Location): Home        Distant Location (provider location):  On-site    Platform used for Video Visit: Maddison

## 2022-11-01 NOTE — PROGRESS NOTES
Belen is a 62 year old who is being evaluated via a billable video visit.      How would you like to obtain your AVS? BeneChillhart  If the video visit is dropped, the invitation should be resent by: Text to cell phone: [phone]  Will anyone else be joining your video visit? No        Video-Visit Details    Video Start Time: 5:33 PM    Type of service:  Video Visit    Video End Time:5:43 PM    Originating Location (pt. Location): Home        Distant Location (provider location):  On-site    Platform used for Video Visit: Maddison

## 2022-11-01 NOTE — PATIENT INSTRUCTIONS
"Mrs. Sharma,   It was nice to see you \"virtually\" and I hope you have a great dinner with friends and/or family.     I placed a standing order for urinalysis and urine culture as needed, to avoid hospital admission. Please have these tests done when you have symptoms of UTI.   Please ask OB/GYN if more frequent and/or higher dose of topical estrogen is indicated.   Please follow up with urology as indicated in your chart.   Please call [phone] to schedule an appointment with me in 6 weeks, in-person or virtual.    Thank you,   Catrachita Gonzales MD    "

## 2022-11-01 NOTE — PROGRESS NOTES
[facility]    Transplant Infectious Diseases Outpatient Progress note      Patient:  Belen Sharma, Date of birth 1960, Medical record number [id number]  Date of Visit:  11/01/2022         Recommendations:   1. UA and Ucx as needed (standing order).  2. Follow up with OB/GYN and urology.     RTC: after appointment with OB/GYN and urology.         Summary of Presentation:   Transplants:  2/2/2016 (Kidney), 3/3/2011 (Liver), 10/9/2010 (Kidney / Liver), Postoperative day:  4261 (Liver), 2464 (Kidney)     This patient is a 62 year old adult with history of congenital hepatic fibrosis and PCKD s/p liver and kidney transplant in [place] in 2010, they both failed and required liver re-transplant in 2011 and kidney re-transplant in 2016.   Currently on TAC/MMF/prednisone.   With recurrent UTI.          Active Problems and Infectious Diseases Issues:   1. Recurrent UTI.   With different pathogens including ESBL E coli, K pneumonia, E faecalis and sometimes with negative urine and blood cx.   Last UTI was in 10/2022 with ESBL E coli treated with a 3-week course of ertapenem ending 10/23/2022.   Recent CT a/p without evidence of structural or anatomical abnormality.   Would check in the future for functional abnormality such as vesicoureteral reflux or inability to fully empty the bladder by urodynamics.   Would also check if more estrogen is indicated.   The patient has multiple risk factors for recurrent UTI, almost all of them can not be reversed, including: female gender, immunocompromised, kidney transplant recipient, the need to manipulate the  tract daily to change pessary.   In addition the patient is not consistent using topical estrogen which may also contribute to UTI.         Old Problems and Infectious Diseases Issues:   1. Recurrent UTI with E coli, E faecalis and K pneumonia.   2. CDI.      Other Infectious Disease issues include:  - QTc: 384 as of 6/6/2022. .   - PCP prophylaxis:  bactrim.  - Serostatus: not available.   - Immunization status: This patient received the fourth dose (first booster) of the COVID-19 vaccine on 2/8/2022. received evusheld on 5/16/2022. Otherwise due for the seasonal influenza vaccine, and the bivalent COVID-19 vaccine.       Attestation:  Total duration of visit including chart review, reviewing labs and imaging, interviewing and examining the patient, documentation, and sending communication to the patient and to the primary treating team, all at the same day of this encounter, is: 30 minutes.   Catrachita Gonzales MD    Contact information available via [facility] Paging/Directory     11/01/2022         Interim History:   No dysuria, no fever.         HPI:       Transplants:  2/2/2016 (Kidney), 3/3/2011 (Liver), 10/9/2010 (Kidney / Liver); Postoperative day:  4261 (Liver), 2464 (Kidney)    A 62 year old adult with history of congenital hepatic fibrosis and PCKD s/p liver and kidney transplant in [place] in 2010, they both failed, first the liver apparently due to anatomic abnormalities and required liver re-transplant within few months in 2011, then the kidney failed and needed HD through LUE AVF for 4 years before she received kidney re-transplant in [place] in 2016.      The patient has history of recurrent UTI. In 2022, the symptoms of UTI with dysuria, vaginal itching, increase in frequency increased and Ucx on multiple occasions grew K pneumonia, E faecalis, and ESBL E coli.      The patient was treated in 8/2022 for UTI with fosfomycin, Ucx at that time were without growth but the patient was symptomatic with abnormal UA.   Again was treated for UTI in 9/2022 with a single dose of fosfomycin but symptoms prompted admission with fever and urinary symptoms; UA was abnormal but Ucx were negative. She was treated with ceftriaxone then cefdinir with resolution of symptoms.   She was evaluated by [facility] (Dr. Reyes) who ordered CT a/p with contrast which showed inflamed transplanted  ureter but not evidence of nephrolithiasis, abscesses or other correctable abnormalities. Dr. Reyes also ordered EOT (9/30/20220 UA and Ucx which were abnormal with Ucx growing ESBL E coli. The patient developed fever, within 24 hr of stopping cefdinir and was admitted to the hospital and treated with ertapenem IV for a total of 3 weeks.      Denying new meds or new -related behavior. Denied association of UTI and sexual activities.   She was prescribed estrogen local therapy but not always applies it.            Review of Systems:     As mentioned in the interim history otherwise negative by reviewing constitutional symptoms, central and peripheral neurological systems, respiratory system, cardiac system, GI system,  system, musculoskeletal, skin, allergy, and lymphatics.                  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 Both, very large, native kidneys reamain.     PONV (postoperative nausea and vomiting)     Need to start with ice chips and apple juice, no soda     S/P kidney transplant     SLK 10/9/2010 - kidney failure 2/2 [facility]. Explanted 2012. Re-transplant after de-sensitization 2016     S/P liver transplant (H)     10/9/2010 - [facility], ischemic biopathy. Re-transplant 3/3/2011     S/P splenectomy      Spider veins 2019     Thyroid disease     Hyperthyroid treated with single dose iodine     Urinary tract infection 09/2022             Past Surgical History:     Past Surgical History:   Procedure Laterality Date     bunectomy  08/01/2018    right foot     bunionectomy  01/2019    left      CHOLECYSTECTOMY       CONIZATION N/A 2020    Procedure: CONE BIOPSY, CERVIX;  Surgeon: Daysi Perez MD;  Location: [facility]     FAILED PICC - RIGHT ARM Right 2020    Has a LUE AV fistula.     H STATISTIC PICC LINE INSERTION >5YR, FAILED Bilateral 2020    Left fistula, Right failed x 2 Occlussion     HERNIA REPAIR, INCISIONAL  2013     IR DIALYSIS PTA CENTRAL SEG  2020     IR PICC PLACEMENT > 5 YRS OF AGE  2020     IR PICC PLACEMENT > 5 YRS OF AGE  10/5/2022     SPLENECTOMY      Splenectomy     TRANSPLANT KIDNEY RECIPIENT  DONOR      re-transplant after [facility]; 6 months de-sensitization prior AND 6 months after transplant     TRANSPLANT LIVER RECIPIENT  DONOR  2011     TRANSPLANT LIVER, KIDNEY RECIPIENT  DONOR, COMBINED  10/09/2010    [facility]AT - re-Tx liver 3/3/2011; Kidney (left iliac) lost to [facility]/fibrosis - explant     VASCULAR SURGERY      Vascular access left UE. Not used for 4 years since 2nd kidney tx 2016 [place]               Social History:     Social History     Tobacco Use     Smoking status: Never     Smokeless tobacco: Never   Substance Use Topics     Alcohol use: Not Currently             Family History:   I have reviewed this patient's family history          Immunizations:     Immunization History   Administered Date(s) Administered     COVID-19,PF,Pfizer (12+ Yrs) 2021, 2021, 2021     COVID-19,PF,Pfizer 12+ Yrs ( and After) 2022     DT (PEDS <7y) 2001     FLU 6-35 months 11/15/2002, 2003, 10/26/2004, 10/24/2005, 2006, 2011     X2w4-75 Novel Flu 2009     HepB, Unspecified 2001     Influenza Quad, Recombinant, pf(RIV4) (Flublok) 10/18/2021     Influenza Vaccine IM > 6 months Valent IIV4 (Alfuria,Fluzone) 2022     Influenza Vaccine IM Ages 6-35 Months 4 Valent (PF) 2007, 10/07/2009, 2011     Influenza,INJ,MDCK,PF,Quad >6mo(Flucelvax-RMG) 10/15/2020      Meningococcal (Menactra ) 01/25/2013     Pneumococcal 20 valent Conjugate (Prevnar 20) 09/12/2022     Pneumococcal 23 valent 09/27/2002, 01/25/2013     TDAP Vaccine (Boostrix) 01/25/2013     Zoster vaccine recombinant adjuvanted (SHINGRIX) 10/15/2020, 01/08/2021             Allergies:     Allergies   Allergen Reactions     Ibuprofen      Due to liver transplant             Medications:     Current Outpatient Medications   Medication Sig     acetaminophen (TYLENOL) 325 MG tablet Take 1 tablet (325 mg) by mouth every 4 hours as needed for mild pain or fever     aspirin (ASA) 81 MG chewable tablet Take 81 mg by mouth every evening Stopped 7 days preop     biotin 1000 MCG TABS tablet Take 3,000 mcg by mouth every evening     ciclopirox (PENLAC) 8 % external solution Apply to adjacent skin and affected nails daily.  Remove with alcohol every 7 days, then repeat.     diphenhydrAMINE (BENADRYL) 25 MG tablet Take 25 mg by mouth every 8 hours as needed for allergies     ertapenem (INVANZ) 1 GM vial Inject 1 g into the vein every 24 hours     estradiol (ESTRACE) 0.1 MG/GM vaginal cream Place 1 g vaginally twice a week     guaiFENesin (MUCINEX) 600 MG 12 hr tablet Take 1 tablet by mouth 2 times daily as needed     Multiple Vitamins-Minerals (WOMENS DAILY FORMULA PO) Take 1 tablet by mouth daily     mycophenolic acid (GENERIC EQUIVALENT) 180 MG EC tablet Take 3 tablets (540 mg) by mouth 2 times daily     nitroGLYcerin (NITROSTAT) 0.4 MG sublingual tablet Place 1 tablet (0.4 mg) under the tongue every 5 minutes as needed for chest pain For chest pain place 1 tablet under the tongue every 5 minutes for 3 doses. If symptoms persist 5 minutes after 1st dose call 911.     predniSONE (DELTASONE) 5 MG tablet Take 1 tablet (5 mg) by mouth daily     Probiotic Product (PROBIOTIC PO) Take 2 tablets by mouth every morning ([facility] Women's Vaginal Probiotic)     sodium bicarbonate 650 MG tablet Take 1 tablet (650 mg) by mouth 2 times  daily     sulfamethoxazole-trimethoprim (BACTRIM) 400-80 MG tablet Take 1 tablet by mouth three times a week on Monday, Wednesday, Friday     tacrolimus (GENERIC EQUIVALENT) 0.5 MG capsule Take 1 capsule (0.5 mg) by mouth 2 times daily Total dose = 1.5 mg BID     tacrolimus (GENERIC EQUIVALENT) 1 MG capsule Take 1 capsule (1 mg) by mouth 2 times daily Total dose = 1.5 mg BID     temazepam (RESTORIL) 15 MG capsule Take 1 capsule (15 mg) by mouth as needed for sleep (twice a year)     No current facility-administered medications for this visit.                Physical Exam:   No vitals are available during this virtual visit.   Constitutional: awake, alert, cooperative, no apparent distress and appears at stated age, well nourished.             Laboratory Data:     No results found for: ACD4    Inflammatory Markers    Recent Labs   Lab Test 10/05/22  0648 10/04/22  0615 09/14/22  2220 11/04/21  1457 12/28/20  1515 12/21/20  1545   SED  --   --  8 14 8 10   CRP 20.60* 39.70* 21.60* 39.3* 13.0* 18.0*       Immune Globulin Studies    No lab results found.    Metabolic Studies    Recent Labs   Lab Test 10/17/22  1010 10/10/22  1017 10/05/22  0648 10/04/22  0615 10/03/22  0935 10/02/22  0628 09/15/22  0607 09/14/22  2230 09/30/20  0721 09/23/20  1653    138 136 134* 137 135*   < >  --    < >  --    POTASSIUM 4.3 4.2 4.2 4.0 4.0 3.9   < >  --    < >  --    CHLORIDE 105 104 104 105 107 103   < >  --    < >  --    CO2 23 24 23 23 20* 23   < >  --    < >  --    ANIONGAP 9 10 9 6* 10 9   < >  --    < >  --    BUN 17.3 19.8 13.7 10.8 9.8 17.8   < >  --    < >  --    CR 0.60 0.62 0.60 0.57 0.66 0.78   < >  --    < >  --    GFRESTIMATED >90 >90 >90 >90 >90 85   < >  --    < >  --    * 97 91 97 99 92   < >  --    < >  --    EDSON 9.9 10.3* 9.7 9.5 9.2 9.5   < >  --    < >  --    PHOS  --   --   --   --   --   --   --   --   --  3.4   LACT  --   --   --   --   --  0.8  --  0.9   < >  --     < > = values in this interval  not displayed.       Hepatic Studies    Recent Labs   Lab Test 10/17/22  1010 10/10/22  1017 10/05/22  0648 10/02/22  0628 09/26/22  0813 09/16/22  1015   BILITOTAL 0.5 0.6 0.4 0.7 0.6 0.3   ALKPHOS 66 72 66 69 69 57   PROTTOTAL 6.4 6.8 6.2* 6.6 6.5 5.7*   ALBUMIN 3.6 3.9 3.4* 3.8 3.9 3.2*   AST 21 19 15 23 18 16   ALT 11 10 <5* 8* 13 8*       Hematology Studies     Recent Labs   Lab Test 10/17/22  1010 10/10/22  1017 10/05/22  0648 10/04/22  0615 10/03/22  0935 10/02/22  0628 03/11/21  1007 12/28/20  1515 12/28/20  0855 12/21/20  1545 12/18/20  0611 12/16/20  2236   WBC 6.7 14.4* 7.3 8.1 11.0 12.3*   < > 9.2   < > 6.2   < > 11.4*   ANEU  --   --   --   --   --   --   --  6.4  --  3.9  --  9.3*   ALYM  --   --   --   --   --   --   --  2.1  --  1.7  --  1.0   PABLO  --   --   --   --   --   --   --  0.6  --  0.6  --  1.0   AEOS  --   --   --   --   --   --   --  0.0  --  0.0  --  0.0   HGB 14.0 15.1 14.6 14.2 13.4 15.1   < > 16.6*   < > 16.2*   < > 16.9*   HCT 44.1 46.6 46.1 43.8 42.3 46.3   < > 50.8*   < > 49.2*   < > 54.5*    287 271 248 241 287   < > 308   < > 261   < > 260    < > = values in this interval not displayed.       Clotting Studies  No lab results found.    Urine Studies    Recent Labs   Lab Test 10/02/22  0632 09/30/22  1324 09/14/22  2218 08/28/22  2104 08/23/22  1257   URINEPH 6.5 7.0 6.0 6.0 6.0   NITRITE Positive* Negative Negative Negative Negative   LEUKEST Large* Moderate* Large* Large* Moderate*   WBCU >182* 25-50* >182* >182* >100*         Microbiology:  Last 6 Culture results with specimen source  Culture Micro   Date Value Ref Range Status   06/04/2021 (A)  Final    10,000 to 50,000 colonies/mL  Enterococcus faecalis     12/17/2020 No growth  Final   12/16/2020 (A)  Final    Cultured on the 1st day of incubation:  Escherichia coli ESBL  ESBL (extended beta lactamase) producing organisms require contact precautions.     12/16/2020   Final    Critical Value/Significant Value,  preliminary result only, called to and read back by  RALPH BOLAÑOS RN 12.17.2020 1056 BC     12/16/2020   Final    (Note)  POSITIVE for E.COLI by Verigene multiplex nucleic acid test. Final  identification and antimicrobial susceptibility testing will be  verified by standard methods. Verigene test will not distinguish  E.coli from Shigella species including S.dysenteriae, S.flexneri,  S.boydii, and S.sonnei. Specimens containing Shigella species or  E.coli will be reported as Positive for E.coli.    POSITIVE for CTX-M Class A Extended Spectrum beta-lactamase (ESBL)  resistance marker by Verigene multiplex nucleic acid test. CTX-M  confers resistance to penicillins, cephalosporins and variable  resistance to beta-lactam beta-lactamase inhibitor combinations  (clavulanic acid and tazobactam). Best empiric antibiotic choice is  meropenem. Specific susceptibility testing will be performed.    Specimen tested with Verigene multiplex, gram-negative blood culture  nucleic acid test for the following targets: Acinetobacter sp.,  Citrobacter sp., Enterobacter sp., Proteus sp., E. coli, K.  pneumoniae/oxytoca, P. aeruginosa, and the following resistance  markers: CTXM, KPC, NDM, VIM, IMP and OXA.    Critical Value/Significant Value called to and read back by RALPH BOLAÑOS RN 12/17/20 1312 EH.       12/16/2020 No growth  Final   12/16/2020 (A)  Preliminary    >100,000 colonies/mL  Escherichia coli ESBL  ESBL (extended beta lactamase) producing organisms require contact precautions.     12/16/2020   Preliminary    Susceptibility testing requested by  Dr. Gonzales, Pager [phone]. Fosfomycin requested. @ 1637. 12.18.20. BS.       Specimen Description   Date Value Ref Range Status   06/04/2021 Midstream Urine  Final   12/17/2020 Blood  Final   12/16/2020 Blood Right Arm  Final   12/16/2020 Blood Right Arm  Final   12/16/2020 Unspecified Urine  Final   12/03/2020 Midstream Urine  Final        Last check of C difficile  No results  found for: CDBPCT      Virology:  CMV viral loads    CMV viral loads    CMV Quant IU/mL   Date Value Ref Range Status   12/18/2020 CMV DNA Not Detected CMVND^CMV DNA Not Detected [IU]/mL Final     Comment:     Mutations within the highly conserved regions of the viral genome covered by   the RHONDA AmpliPrep/RHONDA TaqMan CMV Test primers and/or probes have been   identified and may result in under-quantitation of or failure to detect the   virus.  Supplemental testing methods should be used for testing when this is   suspected.  The RHONDA AmpliPrep/RHONDA TaqMan CMV Test is an FDA-approved in vitro nucleic   acid amplification test for the quantitation of cytomegalovirus DNA in human   plasma (EDTA plasma) using the RHONDA AmpliPrep Instrument for automated viral   nucleic acid extraction and the RHONDA TaqMan Analyzer or [employer] TaqMan for   automated Real Time amplification and detection of the viral nucleic acid   target.  Titer results are reported in International Units/mL (IU/mL using 1st WHO   International standard for Human Cytomegalovirus for Nucleic Acid   Amplification based assays. The conversion factor between CMV DNA copis/mL (as   defined by the Roche RHONDA TaqMan CMV test) and International Units is the   CMV DNA concentration in IU/mL x 1.1 copies/IU = CMV DNA in copies/mL.  This assay has received FDA approval for the testing of human plasma only. The   Infectious Disease Diagnostic Laboratory at the [facility], [place], has validated the performance characteristics of the   Roche CMV assay for plasma, bronchial alveolar lavage/wash and urine.       CMV viral loads    Recent Labs   Lab Test 12/18/20  0611   CMVQNT CMV DNA Not Detected   CSPEC Plasma   CMVLOG Not Calculated       CMV viral loads    CMV Quant IU/mL   Date Value Ref Range Status   12/18/2020 CMV DNA Not Detected CMVND^CMV DNA Not Detected [IU]/mL Final     Comment:     Mutations within the highly conserved  regions of the viral genome covered by   the RHONDA AmpliPrep/RHONDA TaqMan CMV Test primers and/or probes have been   identified and may result in under-quantitation of or failure to detect the   virus.  Supplemental testing methods should be used for testing when this is   suspected.  The RHONDA AmpliPrep/RHONDA TaqMan CMV Test is an FDA-approved in vitro nucleic   acid amplification test for the quantitation of cytomegalovirus DNA in human   plasma (EDTA plasma) using the RHONDA AmpliPrep Instrument for automated viral   nucleic acid extraction and the [employer] TaqMan Analyzer or [employer] for   automated Real Time amplification and detection of the viral nucleic acid   target.  Titer results are reported in International Units/mL (IU/mL using 1st WHO   International standard for Human Cytomegalovirus for Nucleic Acid   Amplification based assays. The conversion factor between CMV DNA copis/mL (as   defined by the Roche RHONDA TaqMan CMV test) and International Units is the   CMV DNA concentration in IU/mL x 1.1 copies/IU = CMV DNA in copies/mL.  This assay has received FDA approval for the testing of human plasma only. The   Infectious Disease Diagnostic Laboratory at the [facility], [place], has validated the performance characteristics of the   Roche CMV assay for plasma, bronchial alveolar lavage/wash and urine.     09/23/2020 CMV DNA Not Detected CMVND^CMV DNA Not Detected [IU]/mL Final     Comment:     Mutations within the highly conserved regions of the viral genome covered by   the RHONDA AmpliPrep/RHONDA TaqMan CMV Test primers and/or probes have been   identified and may result in under-quantitation of or failure to detect the   virus.  Supplemental testing methods should be used for testing when this is   suspected.  The RHONDA AmpliPrep/RHONDA TaqMan CMV Test is an FDA-approved in vitro nucleic   acid amplification test for the quantitation of cytomegalovirus DNA in human   plasma  (EDTA plasma) using the RHONDA AmpliPrep Instrument for automated viral   nucleic acid extraction and the RHONDA TaqMan Analyzer or RHONDA TaqMan for   automated Real Time amplification and detection of the viral nucleic acid   target.  Titer results are reported in International Units/mL (IU/mL using 1st WHO   International standard for Human Cytomegalovirus for Nucleic Acid   Amplification based assays. The conversion factor between CMV DNA copis/mL (as   defined by the Roche RHONDA TaqMan CMV test) and International Units is the   CMV DNA concentration in IU/mL x 1.1 copies/IU = CMV DNA in copies/mL.  This assay has received FDA approval for the testing of human plasma only. The   Infectious Disease Diagnostic Laboratory at the [facility], [place], has validated the performance characteristics of the   Roche CMV assay for plasma, bronchial alveolar lavage/wash and urine.       Log IU/mL of CMVQNT   Date Value Ref Range Status   12/18/2020 Not Calculated <2.1 [Log_IU]/mL Final   09/23/2020 Not Calculated <2.1 [Log_IU]/mL Final       CMV resistance testing  No lab results found.  No results found for: CMVCID, CMVFOS, CMVGAN     EBV viral loads   No lab results found.  No results found for: EBVDN, EBRES, EBVDN, EBVSP, EBVPC, EBVPCR    Human Herpes Virus 6 viral loads    No results found for: H6RES No results found for: H6SPEC    CMV Antibody IgG   Date Value Ref Range Status   01/04/2021 >8.0 (H) 0.0 - 0.8 AI Final     Comment:     Positive  Antibody index (AI) values reflect qualitative changes in antibody   concentration that cannot be directly associated with clinical condition or   disease state.       No results found for: EBIG2, EBIGM, EBVIGG, EBIGG, EBVAGN, OX9035, TOXG      Imaging:  CT a/p W 9/29/2022  IMPRESSION:  1. Urothelial thickening and mild ectasia of the distal right renal  transplant ureter extending to the ureterovesical anastomosis,  slightly increased compared to  previous CT on 12/17/2020. Findings may  represent acute or chronic sequela of inflammation/infection. No  hydronephrosis or evidence of obstruction.  2. Simple right adnexal cyst is not significantly changed compared to  previous without internal complexity on comparison ultrasound, likely  benign. No dedicated imaging follow-up recommended.  3. Stable postoperative changes of liver transplant.  4. Stable enlarged polycystic kidneys.

## 2022-11-03 ENCOUNTER — TELEPHONE (OUTPATIENT)
Dept: OPHTHALMOLOGY | Facility: CLINIC | Age: 62
End: 2022-11-03

## 2022-11-04 ENCOUNTER — IMMUNIZATION (OUTPATIENT)
Dept: NURSING | Facility: CLINIC | Age: 62
End: 2022-11-04
Payer: MEDICARE

## 2022-11-04 PROCEDURE — 0124A COVID-19,PF,PFIZER BOOSTER BIVALENT: CPT

## 2022-11-04 PROCEDURE — 91312 COVID-19,PF,PFIZER BOOSTER BIVALENT: CPT

## 2022-11-10 ENCOUNTER — LAB (OUTPATIENT)
Dept: LAB | Facility: CLINIC | Age: 62
End: 2022-11-10
Payer: MEDICARE

## 2022-11-10 DIAGNOSIS — Z94.0 KIDNEY REPLACED BY TRANSPLANT: ICD-10-CM

## 2022-11-10 LAB
ALBUMIN MFR UR ELPH: <6 MG/DL
ANION GAP SERPL CALCULATED.3IONS-SCNC: 12 MMOL/L (ref 7–15)
BUN SERPL-MCNC: 12.3 MG/DL (ref 8–23)
CALCIUM SERPL-MCNC: 9.8 MG/DL (ref 8.8–10.2)
CHLORIDE SERPL-SCNC: 104 MMOL/L (ref 98–107)
CREAT SERPL-MCNC: 0.75 MG/DL (ref 0.51–1.17)
CREAT UR-MCNC: 45.2 MG/DL
DEPRECATED HCO3 PLAS-SCNC: 22 MMOL/L (ref 22–29)
ERYTHROCYTE [DISTWIDTH] IN BLOOD BY AUTOMATED COUNT: 14.3 % (ref 10–15)
GFR SERPL CREATININE-BSD FRML MDRD: 90 ML/MIN/1.73M2
GLUCOSE SERPL-MCNC: 77 MG/DL (ref 70–99)
HCT VFR BLD AUTO: 44.5 % (ref 35–53)
HGB BLD-MCNC: 14.6 G/DL (ref 11.7–17.7)
MCH RBC QN AUTO: 29.8 PG (ref 26.5–33)
MCHC RBC AUTO-ENTMCNC: 32.8 G/DL (ref 31.5–36.5)
MCV RBC AUTO: 91 FL (ref 78–100)
PLATELET # BLD AUTO: 232 10E3/UL (ref 150–450)
POTASSIUM SERPL-SCNC: 3.8 MMOL/L (ref 3.4–5.3)
PROT/CREAT 24H UR: NORMAL MG/G{CREAT}
RBC # BLD AUTO: 4.9 10E6/UL (ref 3.8–5.9)
SODIUM SERPL-SCNC: 138 MMOL/L (ref 136–145)
TACROLIMUS BLD-MCNC: 6.9 UG/L (ref 5–15)
TME LAST DOSE: NORMAL H
TME LAST DOSE: NORMAL H
WBC # BLD AUTO: 5.5 10E3/UL (ref 4–11)

## 2022-11-10 PROCEDURE — 84156 ASSAY OF PROTEIN URINE: CPT

## 2022-11-10 PROCEDURE — 80048 BASIC METABOLIC PNL TOTAL CA: CPT

## 2022-11-10 PROCEDURE — 36415 COLL VENOUS BLD VENIPUNCTURE: CPT

## 2022-11-10 PROCEDURE — 85027 COMPLETE CBC AUTOMATED: CPT

## 2022-11-10 PROCEDURE — 80197 ASSAY OF TACROLIMUS: CPT

## 2022-11-14 ENCOUNTER — VIRTUAL VISIT (OUTPATIENT)
Dept: NEPHROLOGY | Facility: CLINIC | Age: 62
End: 2022-11-14
Attending: INTERNAL MEDICINE
Payer: MEDICARE

## 2022-11-14 ENCOUNTER — TELEPHONE (OUTPATIENT)
Dept: OPHTHALMOLOGY | Facility: CLINIC | Age: 62
End: 2022-11-14

## 2022-11-14 VITALS — WEIGHT: 142 LBS | BODY MASS INDEX: 20.37 KG/M2

## 2022-11-14 DIAGNOSIS — N39.0 RECURRENT UTI: ICD-10-CM

## 2022-11-14 DIAGNOSIS — D84.9 IMMUNOSUPPRESSION (H): ICD-10-CM

## 2022-11-14 DIAGNOSIS — Z48.298 AFTERCARE FOLLOWING ORGAN TRANSPLANT: ICD-10-CM

## 2022-11-14 DIAGNOSIS — Z94.0 KIDNEY REPLACED BY TRANSPLANT: Primary | ICD-10-CM

## 2022-11-14 DIAGNOSIS — D75.1 POST-TRANSPLANT ERYTHROCYTOSIS: ICD-10-CM

## 2022-11-14 PROBLEM — H25.813 COMBINED FORMS OF AGE-RELATED CATARACT OF BOTH EYES: Status: ACTIVE | Noted: 2022-11-14

## 2022-11-14 PROCEDURE — 99214 OFFICE O/P EST MOD 30 MIN: CPT | Mod: 95 | Performed by: INTERNAL MEDICINE

## 2022-11-14 PROCEDURE — G0463 HOSPITAL OUTPT CLINIC VISIT: HCPCS | Mod: PN,RTG | Performed by: INTERNAL MEDICINE

## 2022-11-14 RX ORDER — SODIUM BICARBONATE 650 MG/1
650 TABLET ORAL DAILY
Qty: 60 TABLET | Refills: 3 | Status: SHIPPED | OUTPATIENT
Start: 2022-11-14 | End: 2022-11-16

## 2022-11-14 RX ORDER — MYCOPHENOLIC ACID 180 MG/1
540 TABLET, DELAYED RELEASE ORAL 2 TIMES DAILY
Qty: 540 TABLET | Refills: 3 | Status: SHIPPED | OUTPATIENT
Start: 2022-11-14 | End: 2023-02-12

## 2022-11-14 RX ORDER — SODIUM BICARBONATE 650 MG/1
650 TABLET ORAL DAILY
Qty: 60 TABLET | Refills: 0 | COMMUNITY
Start: 2022-11-14 | End: 2022-11-14

## 2022-11-14 ASSESSMENT — PAIN SCALES - GENERAL: PAINLEVEL: MILD PAIN (3)

## 2022-11-14 NOTE — PROGRESS NOTES
HPI:    Overall stable. No further urinary complaints. She is followed carefully in Infectious disease and Urology. She has liver and renal transplant for polycystic renal disease. Strong family h/o for this with her father and brother. No other HEENT, cardiopulmonary, abdominal, , GYN, neurological, systemic, psychiatric, lymphatic, endocrine, vascular disease.     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 Both, very large, native kidneys reamain.     PONV (postoperative nausea and vomiting)     Need to start with ice chips and apple juice, no soda     S/P kidney transplant     SLK 10/9/2010 - kidney failure 2/2 [facility]. Explanted 2012. Re-transplant after de-sensitization 2016     S/P liver transplant (H)     10/9/2010 - [facility], ischemic biopathy. Re-transplant 3/3/2011     S/P splenectomy      Spider veins 2019     Thyroid disease     Hyperthyroid treated with single dose iodine     Urinary tract infection 09/2022     Past Surgical History:   Procedure Laterality Date     bunectomy  08/01/2018    right foot     bunionectomy  01/2019    left     CHOLECYSTECTOMY       CONIZATION N/A 09/30/2020    Procedure: CONE BIOPSY, CERVIX;  Surgeon: Daysi Perez MD;  Location: [facility]     FAILED PICC - RIGHT ARM Right 12/19/2020    Has a LUE AV fistula.     H STATISTIC PICC LINE INSERTION >5YR, FAILED Bilateral 12/19/2020    Left fistula, Right failed x 2 Occlussion     HERNIA REPAIR, INCISIONAL  03/2013     IR DIALYSIS PTA CENTRAL SEG  12/19/2020     IR PICC PLACEMENT > 5 YRS OF AGE  12/19/2020     IR PICC PLACEMENT > 5 YRS OF AGE  10/5/2022      SPLENECTOMY      Splenectomy     TRANSPLANT KIDNEY RECIPIENT  DONOR  2016    re-transplant after [facility]; 6 months de-sensitization prior AND 6 months after transplant     TRANSPLANT LIVER RECIPIENT  DONOR  2011     TRANSPLANT LIVER, KIDNEY RECIPIENT  DONOR, COMBINED  10/09/2010    [facility] - re-Tx liver 3/3/2011; Kidney (left iliac) lost to [facility]/fibrosis - explant     VASCULAR SURGERY      Vascular access left UE. Not used for 4 years since 2nd kidney tx -- [place]     PE:    Vitals noted, gen, nad, cooperative, alert, neck supple nl rom, lungs with good air movement, RRR, S1, S2, no MRG, abdomen, no acute findings. Grossly normal neurological exam.     A/P:     1. Immunizations; COVID Pfizer vaccine x 5 (Bi-valent 2022), Pneumococcal 23 done 2013. Tdap 2013. She has completed the Zoster Shingrix vaccine series.  Evusheld infusion 2022. Influenza vaccine 2022  2. Cardiology; seen by Dr. Mohamud on  2022. Last visit 2021 for microvascular angina and non-obstructive CAD. Next visit 2022.   3. Mammogram scheduled next for 2023. Most recent 2022.  4. GI follow up with Dr. Leventhal 2022 for liver transplant for polycystic renal disease and congenital hepatic fibrosis.   5. GYN appt. With Dr. Perez 10/11/2022, Last visit 5/3/2022. She has GYN/Urology appt. With Dr. Francis 2023. She was last seen 2022  6. Nephrology appt. With Dr. Angulo 2022 for renal transplant. Creatinine 0.75 on 11/10/2022.   7. Dermatology visit with Dr. Benitez 2022  8. Chronic urinary issues. She was seen Urology 10/7/2022 by Ms. Brewer. She has Urodynamic testing with Ms. Michael 2022 and cystoscopy with Dr. Ferrara on the same day 2022.   9. Infectious disease note from Dr. Gonzales 2022 and she has follow up 2022     30 minutes spent on the date of the encounter doing chart review, history and exam, documentation and further  activities as noted above exclusive of procedures and other billable interpretations

## 2022-11-14 NOTE — LETTER
11/14/2022       RE: Belen Sharma  [address] [place] [address]     Dear Colleague,    Thank you for referring your patient, Belen Sharma, to the [facility] [place] at . Please see a copy of my visit note below.        TRANSPLANT NEPHROLOGY CHRONIC POST TRANSPLANT VISIT    Assessment & Plan   # DDKT: Stable   - Baseline Creatinine:  ~ 0.6-0.8   - Proteinuria: Minimal (0.2-0.5 grams)   - Date DSA Last Checked: Not Known      Latest DSA: Not checked recently due to time from transplant   - BK Viremia: No   - Kidney Tx Biopsy: No    # Liver Tx:    -Good allograft function. Follows with transplant hepatology.     # Immunosuppression: Tacrolimus immediate release (goal 4-6), Mycophenolic acid (dose 540 mg every 12 hours) and Prednisone (dose 5 mg daily)   - Continue with intensive monitoring of immunosuppression for efficacy and toxicity.   - Changes: Not at this time    # Infection Prophylaxis:   - PJP: Sulfa/TMP (Bactrim) MWF    # Recurrent UTI :    -4 infections in the last year. Follows with txp ID. Last UTI was ESBL E.Coli in 10/2022.    -Scheduled for urodynamic testing on 12/8/22    # PKD:   -Seen by txp surgery. Risks outweigh benefits for native nephrectomy    #Orthostatic hypotension: Controlled;  Goal BP: < 130/80   - Changes: Not at this time. Recommend increasing sodium intake for low BPs    # Post-Transplant Erythrocytosis: Hgb: Trend down;  On ACEI/ARB: No   Imaging: Not at this time    # Mineral Bone Disorder:   - Secondary renal hyperparathyroidism; PTH level: Normal (18-80 pg/ml)        On treatment: None  - Vitamin D; level: High        On supplement: No  - Calcium; level: High normal        On supplement: No  - Phosphorus; level: Normal        On supplement: No     # Electrolytes:   - Potassium; level: Normal        On supplement: No  - Magnesium; level: Not checked recently        On supplement: No  -  Bicarbonate; level: Normal        On supplement: Yes    # HSIL:    -per cone biopsy on 9/30/20. No evidence of dysplasia. Had colposcopy 9/2021 with +HPV. Colpo negative. Last PAP 4/12/22 negative but with positive HPV   -Had colposcopy 5/3/22 and 10/11/22 with plan for pap/colpo in 6 months    # Microvascular angina:   -Follows with cardiology, Dr. Mohamud.    -She refuses statin   -Uses SL angina a few times per year    # Cystocele:              -Has pessary. Follows with OB GYN    # Skin Cancer Risk:    - Discussed sun protection and recommend regular follow up with Dermatology.    # Medical Compliance: Yes    # COVID-19 Virus Review: Discussed COVID-19 virus and the potential medical risks.  Reviewed preventative health recommendations, including wearing a mask where appropriate.  Recommended COVID vaccination should be up to date with either an initial vaccination or booster shot when appropriate.  Asked the patient to inform the transplant center if they are exposed or diagnosed with this virus.    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    Return visit: Return in about 6 months (around 5/14/2023).    Saran Angulo MD    Chief Complaint   Ms. Sharma is a 62 year old here for routine follow up, kidney transplant and immunosuppression management.    History of Present Illness   Belen was admitted 9/14-9/16/22 due to sepsis 2/2 UTI. She was treated with ceftriaxone and then cefdinir as cultures were negative. Admitted again 10/2-10/5/22 for sepsis 2/2 ESBL UTI.  She was treated with 3 weeks of IV ertapenem, referred to urology for urodynamic studies.     She currently feels well. Denies dysuria. She denies increase in SOB or chest pain, fever, chills, LE edema. She notes that her BP is low.     Home BP: occasionally low in 90s systolic    Problem List   Patient Active Problem List   Diagnosis     S/P splenectomy     Congenital hepatic fibrosis     Polycystic kidney disease     Immunosuppression (H)     Liver replaced by transplant (H)     Endometriosis     HPV (human papilloma virus) infection     JANNETTE III with severe dysplasia     Acquired bilateral hammer toes     Candidiasis of vagina     Cervical high risk human papillomavirus (HPV) DNA test positive     Chicken pox     Cystitis     Depression     Depressive psychosis (H)     Exercise counseling     Dermatophytosis of nail     Hallux valgus, acquired, bilateral     Incisional hernia following transplant     Knee pain     Malignant neoplasm of breast (H)     Microvascular angina (H)     Postmenopausal state     Secondary hyperparathyroidism (H)     Scoliosis deformity of spine     Sinusitis     Swelling of first metatarsophalangeal (MTP) joint     Uterine prolapse     Xerosis of skin     Kidney replaced by transplant     Lumbar pain     ESBL (extended spectrum beta-lactamase) producing bacteria infection     Osteopenia of multiple sites     Pyelonephritis     Sepsis, due to unspecified organism, unspecified whether acute organ dysfunction present (H)       Allergies   Allergies   Allergen Reactions     Ibuprofen      Due to liver transplant       Medications   Current Outpatient Medications   Medication Sig     acetaminophen (TYLENOL) 325 MG tablet Take 1 tablet (325 mg) by mouth every 4 hours as needed for mild pain or fever     aspirin (ASA) 81 MG chewable tablet Take 81 mg by mouth every evening Stopped 7 days preop     biotin 1000 MCG TABS tablet Take 3,000 mcg by mouth every evening     ciclopirox (PENLAC) 8 %  external solution Apply to adjacent skin and affected nails daily.  Remove with alcohol every 7 days, then repeat.     diphenhydrAMINE (BENADRYL) 25 MG tablet Take 25 mg by mouth every 8 hours as needed for allergies     estradiol (ESTRACE) 0.1 MG/GM vaginal cream Place 1 g vaginally twice a week     guaiFENesin (MUCINEX) 600 MG 12 hr tablet Take 1 tablet by mouth 2 times daily as needed     Multiple Vitamins-Minerals (WOMENS DAILY FORMULA PO) Take 1 tablet by mouth daily     mycophenolic acid (GENERIC EQUIVALENT) 180 MG EC tablet Take 3 tablets (540 mg) by mouth 2 times daily for 90 days     nitroGLYcerin (NITROSTAT) 0.4 MG sublingual tablet Place 1 tablet (0.4 mg) under the tongue every 5 minutes as needed for chest pain For chest pain place 1 tablet under the tongue every 5 minutes for 3 doses. If symptoms persist 5 minutes after 1st dose call 911.     predniSONE (DELTASONE) 5 MG tablet Take 1 tablet (5 mg) by mouth daily     Probiotic Product (PROBIOTIC PO) Take 2 tablets by mouth every morning ([facility] Women's Vaginal Probiotic)     sodium bicarbonate 650 MG tablet Take 1 tablet (650 mg) by mouth daily for 90 days     sulfamethoxazole-trimethoprim (BACTRIM) 400-80 MG tablet Take 1 tablet by mouth three times a week on Monday, Wednesday, Friday     tacrolimus (GENERIC EQUIVALENT) 0.5 MG capsule Take 1 capsule (0.5 mg) by mouth 2 times daily Total dose = 1.5 mg BID     tacrolimus (GENERIC EQUIVALENT) 1 MG capsule Take 1 capsule (1 mg) by mouth 2 times daily Total dose = 1.5 mg BID     temazepam (RESTORIL) 15 MG capsule Take 1 capsule (15 mg) by mouth as needed for sleep (twice a year)     No current facility-administered medications for this visit.     Medications Discontinued During This Encounter   Medication Reason     ertapenem (INVANZ) 1 GM vial      mycophenolic acid (GENERIC EQUIVALENT) 180 MG EC tablet Reorder     sodium bicarbonate 650 MG tablet      sodium bicarbonate 650 MG tablet Reorder        Physical Exam   Vital Signs: Wt 64.4 kg (142 lb)   BMI 20.37 kg/m      GENERAL APPEARANCE: alert and no distress  HENT: no obvious abnormalities on appearance  RESP: breathing appears unremarkable with normal rate, no audible wheezing or cough and no apparent shortness of breath with conversation  MS: extremities normal - no gross deformities noted  SKIN: no apparent rash and normal skin tone  NEURO: speech is clear with no obvious neurological deficits  PSYCH: mentation appears normal and affect normal      Data     Renal Latest Ref Rng & Units 11/10/2022 10/17/2022 10/10/2022   Na 136 - 145 mmol/L 138 137 138   K 3.4 - 5.3 mmol/L 3.8 4.3 4.2   Cl 98 - 107 mmol/L 104 105 104   CO2 22 - 29 mmol/L 22 23 24   BUN 8.0 - 23.0 mg/dL 12.3 17.3 19.8   Cr 0.51 - 1.17 mg/dL 0.75 0.60 0.62   Glucose 70 - 99 mg/dL 77 101(H) 97   Ca  8.8 - 10.2 mg/dL 9.8 9.9 10.3(H)     Bone Health Latest Ref Rng & Units 2/10/2022 9/23/2020   Phos 2.5 - 4.5 mg/dL - 3.4   PTHi 10 - 86 pg/mL 66 45   Vit D Def 30 - 80 ug/L 57 -     Heme Latest Ref Rng & Units 11/10/2022 10/17/2022 10/10/2022   WBC 4.0 - 11.0 10e3/uL 5.5 6.7 14.4(H)   Hgb 11.7 - 17.7 g/dL 14.6 14.0 15.1   Plt 150 - 450 10e3/uL 232 265 287   ABSOLUTE NEUTROPHIL 1.6 - 8.3 10e9/L - - -   ABSOLUTE LYMPHOCYTES 0.8 - 5.3 10e9/L - - -   ABSOLUTE MONOCYTES 0.0 - 1.3 10e9/L - - -   ABSOLUTE EOSINOPHILS 0.0 - 0.7 10e9/L - - -   ABSOLUTE BASOPHILS 0.0 - 0.2 10e9/L - - -   ABS IMMATURE GRANULOCYTES 0 - 0.4 10e9/L - - -   ABSOLUTE NUCLEATED RBC - - - -     Liver Latest Ref Rng & Units 10/17/2022 10/10/2022 10/5/2022   AP 35 - 129 U/L 66 72 66   TBili <=1.2 mg/dL 0.5 0.6 0.4   DBili 0.00 - 0.30 mg/dL - - -   ALT 10 - 50 U/L 11 10 <5(L)   AST 10 - 50 U/L 21 19 15   Tot Protein 6.4 - 8.3 g/dL 6.4 6.8 6.2(L)   Albumin 3.5 - 5.2 g/dL 3.6 3.9 3.4(L)           [facility] Txp Virology Latest Ref Rng & Units 1/4/2021 12/18/2020 9/23/2020   CVM DNA Quant - - Plasma Plasma   CMV QUANT IU/ML CMVND:CMV DNA  Not Detected [IU]/mL - CMV DNA Not Detected CMV DNA Not Detected   LOG IU/ML OF CMVQNT <2.1 [Log:IU]/mL - Not Calculated Not Calculated   BK Spec - - - Plasma   BK Res BKNEG:BK Virus DNA Not Detected copies/mL - - BK Virus DNA Not Detected   BK Log <2.7 Log copies/mL - - Not Calculated   EBV CAPSID ANTIBODY IGG 0.0 - 0.8 AI >8.0(H) - -        Recent Labs   Lab Test 08/29/22  0756 09/12/22  0806 09/26/22  0813 10/02/22  0628 11/10/22  0803   DOSTAC 8/28/2022  --  9/25/2022  --  11/9/2022   TACROL 5.3   < > 4.3* 4.0* 6.9    < > = values in this interval not displayed.     Recent Labs   Lab Test 12/18/20  0611   DOSMPA 12/17 2051   MPACID 1.32   MPAG 23.7*       Again, thank you for allowing me to participate in the care of your patient.      Sincerely,    Saran Angulo MD

## 2022-11-14 NOTE — TELEPHONE ENCOUNTER
Called patient to schedule surgery with Dr. Robertson    Date of Surgery: 2/9,3/9    Location of surgery: [facility]    Pre-Op H&P: PCP    Pre/Post Imaging:  No    Discussed COVID-19 Testing: Yes    Post-Op Appt Date: 2/10,3/3,3/10,3/31    Surgery Packet Mailed: yes      Additional comments: Spoke with patient she wished surgeries to be farther apart. She will review packet and call with any questions           Anna C. Schoenecker on 11/14/2022 at 11:47 AM

## 2022-11-14 NOTE — PATIENT INSTRUCTIONS
Patient Recommendations:  - No new recommendations at this time.    Transplant Patient Information  Your Post Transplant Coordinator is: Saba Epperson  For non urgent items, we encourage you to contact your coordinator/care team online via navigaya  You and your care team can also contact your transplant coordinator Monday - Friday, 8am - 5pm at [phone] (Option 2 to reach the coordinator or Option 4 to schedule an appointment).  After hours for urgent matters, please call [facility] at [phone].

## 2022-11-14 NOTE — PROGRESS NOTES
Belen is a 62 year old who is being evaluated via a billable video visit.      How would you like to obtain your AVS? MyChart  If the video visit is dropped, the invitation should be resent by: Send to e-mail at: [email or web address]  Will anyone else be joining your video visit? No        Video-Visit Details    Video Start Time: 1:30 PM    Type of service:  Video Visit    Video End Time:1:47 PM    Originating Location (pt. Location): Home    Distant Location (provider location):  On-site    Platform used for Video Visit: [facility]      TRANSPLANT NEPHROLOGY CHRONIC POST TRANSPLANT VISIT    Assessment & Plan   # DDKT: Stable   - Baseline Creatinine:  ~ 0.6-0.8   - Proteinuria: Minimal (0.2-0.5 grams)   - Date DSA Last Checked: Not Known      Latest DSA: Not checked recently due to time from transplant   - BK Viremia: No   - Kidney Tx Biopsy: No    # Liver Tx:    -Good allograft function. Follows with transplant hepatology.     # Immunosuppression: Tacrolimus immediate release (goal 4-6), Mycophenolic acid (dose 540 mg every 12 hours) and Prednisone (dose 5 mg daily)   - Continue with intensive monitoring of immunosuppression for efficacy and toxicity.   - Changes: Not at this time    # Infection Prophylaxis:   - PJP: Sulfa/TMP (Bactrim) MWF    # Recurrent UTI :    -4 infections in the last year. Follows with txp ID. Last UTI was ESBL E.Coli in 10/2022.    -Scheduled for urodynamic testing on 12/8/22    # PKD:   -Seen by txp surgery. Risks outweigh benefits for native nephrectomy    #Orthostatic hypotension: Controlled;  Goal BP: < 130/80   - Changes: Not at this time. Recommend increasing sodium intake for low BPs    # Post-Transplant Erythrocytosis: Hgb: Trend down;  On ACEI/ARB: No   Imaging: Not at this time    # Mineral Bone Disorder:   - Secondary renal hyperparathyroidism; PTH level: Normal (18-80 pg/ml)        On treatment: None  - Vitamin D; level: High        On supplement: No  - Calcium; level: High normal         On supplement: No  - Phosphorus; level: Normal        On supplement: No     # Electrolytes:   - Potassium; level: Normal        On supplement: No  - Magnesium; level: Not checked recently        On supplement: No  - Bicarbonate; level: Normal        On supplement: Yes    # HSIL:    -per cone biopsy on 9/30/20. No evidence of dysplasia. Had colposcopy 9/2021 with +HPV. Colpo negative. Last PAP 4/12/22 negative but with positive HPV   -Had colposcopy 5/3/22 and 10/11/22 with plan for pap/colpo in 6 months    # Microvascular angina:   -Follows with cardiology, Dr. Mohamud.    -She refuses statin   -Uses SL angina a few times per year    # Cystocele:              -Has pessary. Follows with OB GYN    # Skin Cancer Risk:    - Discussed sun protection and recommend regular follow up with Dermatology.    # Medical Compliance: Yes    # COVID-19 Virus Review: Discussed COVID-19 virus and the potential medical risks.  Reviewed preventative health recommendations, including wearing a mask where appropriate.  Recommended COVID vaccination should be up to date with either an initial vaccination or booster shot when appropriate.  Asked the patient to inform the transplant center if they are exposed or diagnosed with this virus.    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    Return visit: Return in about 6 months (around 5/14/2023).    Saran Angulo MD    Chief Complaint   Ms. Sharma is a 62 year old here for routine follow up, kidney transplant and immunosuppression  management.    History of Present Illness   Belen was admitted 9/14-9/16/22 due to sepsis 2/2 UTI. She was treated with ceftriaxone and then cefdinir as cultures were negative. Admitted again 10/2-10/5/22 for sepsis 2/2 ESBL UTI. She was treated with 3 weeks of IV ertapenem, referred to urology for urodynamic studies.     She currently feels well. Denies dysuria. She denies increase in SOB or chest pain, fever, chills, LE edema. She notes that her BP is low.     Home BP: occasionally low in 90s systolic    Problem List   Patient Active Problem List   Diagnosis     S/P splenectomy     Congenital hepatic fibrosis     Polycystic kidney disease     Immunosuppression (H)     Liver replaced by transplant (H)     Endometriosis     HPV (human papilloma virus) infection     JANNETTE III with severe dysplasia     Acquired bilateral hammer toes     Candidiasis of vagina     Cervical high risk human papillomavirus (HPV) DNA test positive     Chicken pox     Cystitis     Depression     Depressive psychosis (H)     Exercise counseling     Dermatophytosis of nail     Hallux valgus, acquired, bilateral     Incisional hernia following transplant     Knee pain     Malignant neoplasm of breast (H)     Microvascular angina (H)     Postmenopausal state     Secondary hyperparathyroidism (H)     Scoliosis deformity of spine     Sinusitis     Swelling of first metatarsophalangeal (MTP) joint     Uterine prolapse     Xerosis of skin     Kidney replaced by transplant     Lumbar pain     ESBL (extended spectrum beta-lactamase) producing bacteria infection     Osteopenia of multiple sites     Pyelonephritis     Sepsis, due to unspecified organism, unspecified whether acute organ dysfunction present (H)       Allergies   Allergies   Allergen Reactions     Ibuprofen      Due to liver transplant       Medications   Current Outpatient Medications   Medication Sig     acetaminophen (TYLENOL) 325 MG tablet Take 1 tablet (325 mg) by mouth every 4 hours as  needed for mild pain or fever     aspirin (ASA) 81 MG chewable tablet Take 81 mg by mouth every evening Stopped 7 days preop     biotin 1000 MCG TABS tablet Take 3,000 mcg by mouth every evening     ciclopirox (PENLAC) 8 % external solution Apply to adjacent skin and affected nails daily.  Remove with alcohol every 7 days, then repeat.     diphenhydrAMINE (BENADRYL) 25 MG tablet Take 25 mg by mouth every 8 hours as needed for allergies     estradiol (ESTRACE) 0.1 MG/GM vaginal cream Place 1 g vaginally twice a week     guaiFENesin (MUCINEX) 600 MG 12 hr tablet Take 1 tablet by mouth 2 times daily as needed     Multiple Vitamins-Minerals (WOMENS DAILY FORMULA PO) Take 1 tablet by mouth daily     mycophenolic acid (GENERIC EQUIVALENT) 180 MG EC tablet Take 3 tablets (540 mg) by mouth 2 times daily for 90 days     nitroGLYcerin (NITROSTAT) 0.4 MG sublingual tablet Place 1 tablet (0.4 mg) under the tongue every 5 minutes as needed for chest pain For chest pain place 1 tablet under the tongue every 5 minutes for 3 doses. If symptoms persist 5 minutes after 1st dose call 911.     predniSONE (DELTASONE) 5 MG tablet Take 1 tablet (5 mg) by mouth daily     Probiotic Product (PROBIOTIC PO) Take 2 tablets by mouth every morning ([facility] Women's Vaginal Probiotic)     sodium bicarbonate 650 MG tablet Take 1 tablet (650 mg) by mouth daily for 90 days     sulfamethoxazole-trimethoprim (BACTRIM) 400-80 MG tablet Take 1 tablet by mouth three times a week on Monday, Wednesday, Friday     tacrolimus (GENERIC EQUIVALENT) 0.5 MG capsule Take 1 capsule (0.5 mg) by mouth 2 times daily Total dose = 1.5 mg BID     tacrolimus (GENERIC EQUIVALENT) 1 MG capsule Take 1 capsule (1 mg) by mouth 2 times daily Total dose = 1.5 mg BID     temazepam (RESTORIL) 15 MG capsule Take 1 capsule (15 mg) by mouth as needed for sleep (twice a year)     No current facility-administered medications for this visit.     Medications Discontinued During This  Encounter   Medication Reason     ertapenem (INVANZ) 1 GM vial      mycophenolic acid (GENERIC EQUIVALENT) 180 MG EC tablet Reorder     sodium bicarbonate 650 MG tablet      sodium bicarbonate 650 MG tablet Reorder       Physical Exam   Vital Signs: Wt 64.4 kg (142 lb)   BMI 20.37 kg/m      GENERAL APPEARANCE: alert and no distress  HENT: no obvious abnormalities on appearance  RESP: breathing appears unremarkable with normal rate, no audible wheezing or cough and no apparent shortness of breath with conversation  MS: extremities normal - no gross deformities noted  SKIN: no apparent rash and normal skin tone  NEURO: speech is clear with no obvious neurological deficits  PSYCH: mentation appears normal and affect normal      Data     Renal Latest Ref Rng & Units 11/10/2022 10/17/2022 10/10/2022   Na 136 - 145 mmol/L 138 137 138   K 3.4 - 5.3 mmol/L 3.8 4.3 4.2   Cl 98 - 107 mmol/L 104 105 104   CO2 22 - 29 mmol/L 22 23 24   BUN 8.0 - 23.0 mg/dL 12.3 17.3 19.8   Cr 0.51 - 1.17 mg/dL 0.75 0.60 0.62   Glucose 70 - 99 mg/dL 77 101(H) 97   Ca  8.8 - 10.2 mg/dL 9.8 9.9 10.3(H)     Bone Health Latest Ref Rng & Units 2/10/2022 9/23/2020   Phos 2.5 - 4.5 mg/dL - 3.4   PTHi 10 - 86 pg/mL 66 45   Vit D Def 30 - 80 ug/L 57 -     Heme Latest Ref Rng & Units 11/10/2022 10/17/2022 10/10/2022   WBC 4.0 - 11.0 10e3/uL 5.5 6.7 14.4(H)   Hgb 11.7 - 17.7 g/dL 14.6 14.0 15.1   Plt 150 - 450 10e3/uL 232 265 287   ABSOLUTE NEUTROPHIL 1.6 - 8.3 10e9/L - - -   ABSOLUTE LYMPHOCYTES 0.8 - 5.3 10e9/L - - -   ABSOLUTE MONOCYTES 0.0 - 1.3 10e9/L - - -   ABSOLUTE EOSINOPHILS 0.0 - 0.7 10e9/L - - -   ABSOLUTE BASOPHILS 0.0 - 0.2 10e9/L - - -   ABS IMMATURE GRANULOCYTES 0 - 0.4 10e9/L - - -   ABSOLUTE NUCLEATED RBC - - - -     Liver Latest Ref Rng & Units 10/17/2022 10/10/2022 10/5/2022   AP 35 - 129 U/L 66 72 66   TBili <=1.2 mg/dL 0.5 0.6 0.4   DBili 0.00 - 0.30 mg/dL - - -   ALT 10 - 50 U/L 11 10 <5(L)   AST 10 - 50 U/L 21 19 15   Tot Protein  6.4 - 8.3 g/dL 6.4 6.8 6.2(L)   Albumin 3.5 - 5.2 g/dL 3.6 3.9 3.4(L)           [facility] Txp Virology Latest Ref Rng & Units 1/4/2021 12/18/2020 9/23/2020   CVM DNA Quant - - Plasma Plasma   CMV QUANT IU/ML CMVND:CMV DNA Not Detected [IU]/mL - CMV DNA Not Detected CMV DNA Not Detected   LOG IU/ML OF CMVQNT <2.1 [Log:IU]/mL - Not Calculated Not Calculated   BK Spec - - - Plasma   BK Res BKNEG:BK Virus DNA Not Detected copies/mL - - BK Virus DNA Not Detected   BK Log <2.7 Log copies/mL - - Not Calculated   EBV CAPSID ANTIBODY IGG 0.0 - 0.8 AI >8.0(H) - -        Recent Labs   Lab Test 08/29/22  0756 09/12/22  0806 09/26/22  0813 10/02/22  0628 11/10/22  0803   DOSTAC 8/28/2022  --  9/25/2022  --  11/9/2022   TACROL 5.3   < > 4.3* 4.0* 6.9    < > = values in this interval not displayed.     Recent Labs   Lab Test 12/18/20  0611   DOSMPA 12/17 2051   MPACID 1.32   MPAG 23.7*

## 2022-11-15 ENCOUNTER — OFFICE VISIT (OUTPATIENT)
Dept: INTERNAL MEDICINE | Facility: CLINIC | Age: 62
End: 2022-11-15
Payer: MEDICARE

## 2022-11-15 ENCOUNTER — TELEPHONE (OUTPATIENT)
Dept: NEPHROLOGY | Facility: CLINIC | Age: 62
End: 2022-11-15

## 2022-11-15 VITALS
DIASTOLIC BLOOD PRESSURE: 61 MMHG | HEART RATE: 82 BPM | WEIGHT: 146.1 LBS | OXYGEN SATURATION: 96 % | HEIGHT: 70 IN | SYSTOLIC BLOOD PRESSURE: 99 MMHG | BODY MASS INDEX: 20.92 KG/M2

## 2022-11-15 DIAGNOSIS — Q61.3 POLYCYSTIC RENAL DISEASE: Primary | ICD-10-CM

## 2022-11-15 PROCEDURE — 99214 OFFICE O/P EST MOD 30 MIN: CPT | Performed by: INTERNAL MEDICINE

## 2022-11-15 NOTE — TELEPHONE ENCOUNTER
[facility] Call Center    Phone Message    May a detailed message be left on voicemail: yes     Reason for Call: Medication Question or concern regarding medication   Prescription Clarification  Name of Medication: sodium bicarbonate 650 MG tablet [19715] (Order [id number])  Prescribing Provider: Saran Angulo MD   Pharmacy: [id number] [place], [place] - [address] AT [facility]   What on the order needs clarification? Patient states the [facility] but [facility] will not fill the order. Patient is requesting order be sent to [facility] in [place].          Action Taken: Message routed to:  [facility] ([facility] Nephrology    Travel Screening: Not Applicable

## 2022-11-15 NOTE — NURSING NOTE
Belen Sharma is a 62 year old adult patient that presents today in clinic for the following:    Chief Complaint   Patient presents with     Follow Up     Follow up on UTIs; pt states lightheadedness and hypotension     The patient's allergies and medications were reviewed as noted. A set of vitals were recorded as noted without incident. The patient does not have any other questions for the provider.    Miriam Ness, EMT at 12:06 PM on 11/15/2022

## 2022-11-16 DIAGNOSIS — E87.20 METABOLIC ACIDOSIS: Primary | ICD-10-CM

## 2022-11-16 RX ORDER — SODIUM BICARBONATE 650 MG/1
650 TABLET ORAL DAILY
Qty: 60 TABLET | Refills: 3 | Status: SHIPPED | OUTPATIENT
Start: 2022-11-16 | End: 2023-05-15

## 2022-11-18 ENCOUNTER — TELEPHONE (OUTPATIENT)
Dept: NEPHROLOGY | Facility: CLINIC | Age: 62
End: 2022-11-18

## 2022-11-18 DIAGNOSIS — N95.2 VAGINAL ATROPHY: ICD-10-CM

## 2022-11-23 RX ORDER — ESTRADIOL 0.1 MG/G
1 CREAM VAGINAL
Qty: 42.5 G | Refills: 2 | Status: SHIPPED | OUTPATIENT
Start: 2022-11-24 | End: 2023-05-04

## 2022-11-23 NOTE — TELEPHONE ENCOUNTER
resending Rx e prescribe sign: per pharm previous Rx no sig-,  estradiol (ESTRACE) 0.1 MG/GM vaginal cream   42.5 g 3 8/4/2022  No  Sig - Route: Place 1 g vaginally twice a week - Vaginal  Prescribing Provider's NPI: [id number]  Zach Francis

## 2022-11-25 ENCOUNTER — PRE VISIT (OUTPATIENT)
Dept: UROLOGY | Facility: CLINIC | Age: 62
End: 2022-11-25

## 2022-11-29 ENCOUNTER — TELEPHONE (OUTPATIENT)
Dept: UROLOGY | Facility: CLINIC | Age: 62
End: 2022-11-29

## 2022-11-29 DIAGNOSIS — N39.0 RECURRENT UTI: Primary | ICD-10-CM

## 2022-11-29 NOTE — TELEPHONE ENCOUNTER
Spoke with patient today.  Patient is scheduled for Urodynamics testing on 12/8 and has a history of recurrent UTI's.  Per Sunita Michael PA-C, patient will need to complete a UA/UC prior to her Urodynamics testing to rule out UTI.  Orders placed.  Patient stated she will do the sample at [facility] by the end of this week.  Will watch for results.    Mita Le, CMA

## 2022-12-02 ENCOUNTER — LAB (OUTPATIENT)
Dept: LAB | Facility: CLINIC | Age: 62
End: 2022-12-02
Payer: MEDICARE

## 2022-12-02 DIAGNOSIS — N39.0 RECURRENT UTI: ICD-10-CM

## 2022-12-02 LAB
ALBUMIN UR-MCNC: NEGATIVE MG/DL
APPEARANCE UR: CLEAR
BILIRUB UR QL STRIP: NEGATIVE
COLOR UR AUTO: YELLOW
GLUCOSE UR STRIP-MCNC: NEGATIVE MG/DL
HGB UR QL STRIP: NEGATIVE
KETONES UR STRIP-MCNC: NEGATIVE MG/DL
LEUKOCYTE ESTERASE UR QL STRIP: NEGATIVE
NITRATE UR QL: NEGATIVE
PH UR STRIP: 7 [PH] (ref 5–8)
RBC #/AREA URNS AUTO: NORMAL /HPF
SP GR UR STRIP: 1.01 (ref 1–1.03)
UROBILINOGEN UR STRIP-ACNC: 0.2 E.U./DL
WBC #/AREA URNS AUTO: NORMAL /HPF

## 2022-12-02 PROCEDURE — 81001 URINALYSIS AUTO W/SCOPE: CPT

## 2022-12-02 PROCEDURE — 87086 URINE CULTURE/COLONY COUNT: CPT

## 2022-12-04 LAB — BACTERIA UR CULT: NORMAL

## 2022-12-04 NOTE — PROGRESS NOTES
Belen is a 62 year old who is being evaluated via a billable video visit.      Pt states in [place] for visit.     How would you like to obtain your AVS? MyChart  If the video visit is dropped, the invitation should be resent by: Text to cell phone: [phone]  Will anyone else be joining your video visit? J CARLOS Talamantes/CORNEL        -----------    I had the pleasure of participating in coordination of clinical cardiovascular care for patient, Belen Sharma, via  [employer]'s virtual visit protocol during the COVID-19 Pandemic.    History:  Ms. Sharma is a very pleasant 62 year old woman with microvascular angina and nonobstructive coronary disease.  She is a kidney and pancreas transplant recipient.  She is doing well without any problems with organ rejection.  She is on tacrolimus, Myfortic and prednisone for her antirejection medications.    Kidney transplant x 2, 2010, 2014  Liver transplant x 2, 2010, 2011    She has had recurrent UTIs. She has had BP readings that have been low. She denies prolonged episodes of chest pain, orthopnea or PND or syncope.     No recent hospitalization for HF/MI/stroke      Current Outpatient Medications   Medication Sig Dispense Refill     acetaminophen (TYLENOL) 325 MG tablet Take 1 tablet (325 mg) by mouth every 4 hours as needed for mild pain or fever 120 tablet 1     aspirin (ASA) 81 MG chewable tablet Take 81 mg by mouth every evening Stopped 7 days preop       biotin 1000 MCG TABS tablet Take 3,000 mcg by mouth every evening       ciclopirox (PENLAC) 8 % external solution Apply to adjacent skin and affected nails daily.  Remove with alcohol every 7 days, then repeat. 6 mL 3     diphenhydrAMINE (BENADRYL) 25 MG tablet Take 25 mg by mouth every 8 hours as needed for allergies       estradiol (ESTRACE) 0.1 MG/GM vaginal cream Place 1 g vaginally twice a week 42.5 g 2     guaiFENesin (MUCINEX) 600 MG 12 hr tablet Take 1 tablet by mouth 2 times daily as needed       Multiple  Vitamins-Minerals (WOMENS DAILY FORMULA PO) Take 1 tablet by mouth daily       mycophenolic acid (GENERIC EQUIVALENT) 180 MG EC tablet Take 3 tablets (540 mg) by mouth 2 times daily for 90 days 540 tablet 3     nitroGLYcerin (NITROSTAT) 0.4 MG sublingual tablet Place 1 tablet (0.4 mg) under the tongue every 5 minutes as needed for chest pain For chest pain place 1 tablet under the tongue every 5 minutes for 3 doses. If symptoms persist 5 minutes after 1st dose call 911. 30 tablet 1     predniSONE (DELTASONE) 5 MG tablet Take 1 tablet (5 mg) by mouth daily 90 tablet 3     Probiotic Product (PROBIOTIC PO) Take 2 tablets by mouth every morning ([facility] Women's Vaginal Probiotic)       sodium bicarbonate 650 MG tablet Take 1 tablet (650 mg) by mouth daily 60 tablet 3     sulfamethoxazole-trimethoprim (BACTRIM) 400-80 MG tablet Take 1 tablet by mouth three times a week on Monday, Wednesday, Friday       tacrolimus (GENERIC EQUIVALENT) 0.5 MG capsule Take 1 capsule (0.5 mg) by mouth 2 times daily Total dose = 1.5 mg  capsule 3     tacrolimus (GENERIC EQUIVALENT) 1 MG capsule Take 1 capsule (1 mg) by mouth 2 times daily Total dose = 1.5 mg  capsule 3     temazepam (RESTORIL) 15 MG capsule Take 1 capsule (15 mg) by mouth as needed for sleep (twice a year) 15 capsule 0       Allergies - reviewed     Allergies   Allergen Reactions     Ibuprofen      Due to liver transplant     Past history -reviewed  Past Medical History:   Diagnosis Date     Adolescent scoliosis     protruding     BK viremia 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 Both, very large, native kidneys reamain.     PONV (postoperative nausea and vomiting)     Need  to start with ice chips and apple juice, no soda     S/P kidney transplant     SLK 10/9/2010 - kidney failure 2/2 [facility]. Explanted 2012. Re-transplant after de-sensitization 2016     S/P liver transplant (H)     10/9/2010 - HAT, ischemic biopathy. Re-transplant 3/3/2011     S/P splenectomy      Spider veins 2019     Thyroid disease     Hyperthyroid treated with single dose iodine     Urinary tract infection 09/2022       Social history - reviewed  Social History     Tobacco Use     Smoking status: Never     Smokeless tobacco: Never   Substance Use Topics     Alcohol use: Not Currently     Drug use: Not Currently       Family history -reviewed  Family History   Problem Relation Age of Onset     Depression Mother      Anxiety Disorder Mother      Depression Father      Anxiety Disorder Father      Uterine Cancer Maternal Grandmother      Polycystic Kidney Diease Brother      Polycystic Kidney Diease Brother      Polycystic Kidney Diease Brother      Polycystic Kidney Diease Brother      Cervical Cancer Maternal Aunt      Glaucoma No family hx of      Macular Degeneration No family hx of      ROS: non contributory on the 10-point review of system    Exam:   In general, the patient is in no apparent distress.    BP last recorded- 99/73 mmHg   Breathing is unlabored.   HEENT: NC/AT.  PERRLA.  EOMI.  Sclerae white, not injected.    Neck: No jugular venous distension.    Extremities: No edema.   Neurologic: Alert and oriented to person/place/time, normal speech and affect  Skin: No rash on exposed skin.     Data:  Recent cardiac investigations - reviewed    EKG 6/6/2022 -sinus rhythm normal axis and intervals    Echocardiogram August 31, 2020  Normal biventricular size and function  No significant valvular abnormalities  Normal IVC  No pericardial effusion.    [facility]   CT coronary angiogram 1-3-2019  CALCIUM SCORE : 19. This placed the patient in the 70th percentile rank, meaning that approximately 30% of females  at ages from 56-60 will have a higher calcium score that the patient.  CORONARY CT ANGIOGRAPHY: The examination is diagnostic in quality. There is right heart dominance.   LEFT MAIN CORONARY ARTERY: Widely patent  LEFT ANTERIOR DESCENDING; Mild focal calcified plaque in the proxima-mid LAD without significant stenosis. The remainder of the LAD is widely patent.  FIRST DIAGONAL: Widely patent  SECOND DIAGONAL: Widely patent  PROXIMAL LEFT CIRCUMFLEX: Widely patent  MID CIRCUMFLEX: Widely patent  DISTAL CIRCUMFLEX: Widely patent    Labs - reviewed  Lab Test 11/10/22  0803 10/17/22  1010    137   POTASSIUM 3.8 4.3   CHLORIDE 104 105   CO2 22 23   ANIONGAP 12 9   GLC 77 101*   BUN 12.3 17.3   CR 0.75 0.60   EDSON 9.8 9.9   GFRESTIMATED 90 >90   AST  --  21   ALT  --  11       CBC:   Recent Labs   Lab Test 11/10/22  0803 10/17/22  1010   WBC 5.5 6.7   RBC 4.90 4.86   HGB 14.6 14.0   HCT 44.5 44.1   MCV 91 91   MCH 29.8 28.8   MCHC 32.8 31.7   RDW 14.3 14.0    265       Lipid Panel:  Recent Labs   Lab Test 08/29/22  0810 06/06/22  0828   CHOL 181 201*   HDL 44 52   * 124   TRIG 123 126       Thyroid:   TSH   Date Value Ref Range Status   11/04/2021 0.86 0.40 - 4.00 mU/L Final       Assessment and Plan:  62 year old woman with    Non obstructive CAD  Microvascular angina, stable  Dyslipidemia  Post kidney and liver transplantation  Recurrent UTIs    Belen Sharma reports doing well. She has non obstructive CAD in the proximal LAD for which she takes aspirin and NTG as needed for stable angina. Her cardiac function is normal. Her renal and liver function tests are normal.    We reviewed her diet and lipids in detail today. Ideally I would like to see her LDL less than 70 given the mild atherosclerosis based on her CT in 2019.  She has tried to address this with diet and regular exercise. She is agreeable to starting low dose statin therapy to achieve that goal.  I dmitri dvised her to liberalize daily salt  intake and increase fluid intake to address the low BP readings.     Recommendations:     Healthy diet - low fat and low carb   Atorvastatin 10 mg daily  Lipid panel, AST and ALT  in 4 months  Follow up 1 year       Video Visit Details:    Type of service:  Video Visit    Video Start Time: 10.35 am  Video End Time: 11.05 am    Originating Location (pt. Location): Home    Distant Location (provider location):  [facility]     Platform used for Video Visit: [employer]     In addition to visit time documented above, I spent an additional 15 minutes on data review and documentation.    Christy Mohamud MD, MS  Professor of Medicine  Cardiovascular division        The 10-year ASCVD risk score (Rock CHAN, et al., 2019) is: 2.6%    Values used to calculate the score:      Age: 62 years      Sex: Female      Is Non- : No      Diabetic: No      Tobacco smoker: No      Systolic Blood Pressure: 99 mmHg      Is BP treated: No      HDL Cholesterol: 44 mg/dL      Total Cholesterol: 181 mg/dL

## 2022-12-05 ENCOUNTER — LAB (OUTPATIENT)
Dept: CARDIOLOGY | Facility: CLINIC | Age: 62
End: 2022-12-05
Payer: MEDICARE

## 2022-12-05 ENCOUNTER — VIRTUAL VISIT (OUTPATIENT)
Dept: CARDIOLOGY | Facility: CLINIC | Age: 62
End: 2022-12-05
Attending: INTERNAL MEDICINE
Payer: MEDICARE

## 2022-12-05 DIAGNOSIS — E78.5 DYSLIPIDEMIA: Primary | ICD-10-CM

## 2022-12-05 DIAGNOSIS — I25.118 ATHEROSCLEROSIS OF NATIVE CORONARY ARTERY OF NATIVE HEART WITH STABLE ANGINA PECTORIS (H): ICD-10-CM

## 2022-12-05 DIAGNOSIS — Z94.4 S/P LIVER TRANSPLANT (H): ICD-10-CM

## 2022-12-05 DIAGNOSIS — Z94.0 S/P KIDNEY TRANSPLANT: ICD-10-CM

## 2022-12-05 DIAGNOSIS — I20.89 MICROVASCULAR ANGINA (H): ICD-10-CM

## 2022-12-05 LAB
CHOLEST SERPL-MCNC: 200 MG/DL
FASTING STATUS PATIENT QL REPORTED: YES
HDLC SERPL-MCNC: 57 MG/DL
LDLC SERPL CALC-MCNC: 120 MG/DL
TRIGL SERPL-MCNC: 115 MG/DL

## 2022-12-05 PROCEDURE — 99214 OFFICE O/P EST MOD 30 MIN: CPT | Mod: 95 | Performed by: INTERNAL MEDICINE

## 2022-12-05 PROCEDURE — 80061 LIPID PANEL: CPT

## 2022-12-05 PROCEDURE — 36415 COLL VENOUS BLD VENIPUNCTURE: CPT

## 2022-12-05 RX ORDER — ATORVASTATIN CALCIUM 10 MG/1
10 TABLET, FILM COATED ORAL DAILY
Qty: 90 TABLET | Refills: 1 | Status: SHIPPED | OUTPATIENT
Start: 2022-12-05 | End: 2023-02-09

## 2022-12-05 RX ORDER — ATORVASTATIN CALCIUM 10 MG/1
10 TABLET, FILM COATED ORAL DAILY
Qty: 90 TABLET | Refills: 3 | Status: SHIPPED | OUTPATIENT
Start: 2022-12-05 | End: 2023-06-23

## 2022-12-05 NOTE — LETTER
12/5/2022      RE: Belen Sharma  [address] [place] [address]       Dear Colleague,    Thank you for the opportunity to participate in the care of your patient, Belen Sharma, at the [facility] [place] at [facility]. Please see a copy of my visit note below.      -----------    I had the pleasure of participating in coordination of clinical cardiovascular care for patient, Belen Sharma, via [facility]'s virtual visit protocol during the COVID-19 Pandemic.    History:  Ms. Sharma is a very pleasant 62 year old woman with microvascular angina and nonobstructive coronary disease.  She is a kidney and pancreas transplant recipient.  She is doing well without any problems with organ rejection.  She is on tacrolimus, Myfortic and prednisone for her antirejection medications.    Kidney transplant x 2, 2010, 2014  Liver transplant x 2, 2010, 2011    She has had recurrent UTIs. She has had BP readings that have been low. She denies prolonged episodes of chest pain, orthopnea or PND or syncope.     No recent hospitalization for HF/MI/stroke      Current Outpatient Medications   Medication Sig Dispense Refill     acetaminophen (TYLENOL) 325 MG tablet Take 1 tablet (325 mg) by mouth every 4 hours as needed for mild pain or fever 120 tablet 1     aspirin (ASA) 81 MG chewable tablet Take 81 mg by mouth every evening Stopped 7 days preop       biotin 1000 MCG TABS tablet Take 3,000 mcg by mouth every evening       ciclopirox (PENLAC) 8 % external solution Apply to adjacent skin and affected nails daily.  Remove with alcohol every 7 days, then repeat. 6 mL 3     diphenhydrAMINE (BENADRYL) 25 MG tablet Take 25 mg by mouth every 8 hours as needed for allergies       estradiol (ESTRACE) 0.1 MG/GM vaginal cream Place 1 g vaginally twice a week 42.5 g 2     guaiFENesin (MUCINEX) 600 MG 12 hr tablet Take 1 tablet by mouth 2 times daily as needed       Multiple  Vitamins-Minerals (WOMENS DAILY FORMULA PO) Take 1 tablet by mouth daily       mycophenolic acid (GENERIC EQUIVALENT) 180 MG EC tablet Take 3 tablets (540 mg) by mouth 2 times daily for 90 days 540 tablet 3     nitroGLYcerin (NITROSTAT) 0.4 MG sublingual tablet Place 1 tablet (0.4 mg) under the tongue every 5 minutes as needed for chest pain For chest pain place 1 tablet under the tongue every 5 minutes for 3 doses. If symptoms persist 5 minutes after 1st dose call 911. 30 tablet 1     predniSONE (DELTASONE) 5 MG tablet Take 1 tablet (5 mg) by mouth daily 90 tablet 3     Probiotic Product (PROBIOTIC PO) Take 2 tablets by mouth every morning ([facility] Women's Vaginal Probiotic)       sodium bicarbonate 650 MG tablet Take 1 tablet (650 mg) by mouth daily 60 tablet 3     sulfamethoxazole-trimethoprim (BACTRIM) 400-80 MG tablet Take 1 tablet by mouth three times a week on Monday, Wednesday, Friday       tacrolimus (GENERIC EQUIVALENT) 0.5 MG capsule Take 1 capsule (0.5 mg) by mouth 2 times daily Total dose = 1.5 mg  capsule 3     tacrolimus (GENERIC EQUIVALENT) 1 MG capsule Take 1 capsule (1 mg) by mouth 2 times daily Total dose = 1.5 mg  capsule 3     temazepam (RESTORIL) 15 MG capsule Take 1 capsule (15 mg) by mouth as needed for sleep (twice a year) 15 capsule 0       Allergies - reviewed     Allergies   Allergen Reactions     Ibuprofen      Due to liver transplant     Past history -reviewed  Past Medical History:   Diagnosis Date     Adolescent scoliosis     protruding     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 Both, very large, native kidneys reamain.     PONV (postoperative nausea and vomiting)     Need  to start with ice chips and apple juice, no soda     S/P kidney transplant     SLK 10/9/2010 - kidney failure 2/2 [facility]. Explanted 2012. Re-transplant after de-sensitization 2016     S/P liver transplant (H)     10/9/2010 - HAT, ischemic biopathy. Re-transplant 3/3/2011     S/P splenectomy      Spider veins 2019     Thyroid disease     Hyperthyroid treated with single dose iodine     Urinary tract infection 09/2022       Social history - reviewed  Social History     Tobacco Use     Smoking status: Never     Smokeless tobacco: Never   Substance Use Topics     Alcohol use: Not Currently     Drug use: Not Currently       Family history -reviewed  Family History   Problem Relation Age of Onset     Depression Mother      Anxiety Disorder Mother      Depression Father      Anxiety Disorder Father      Uterine Cancer Maternal Grandmother      Polycystic Kidney Diease Brother      Polycystic Kidney Diease Brother      Polycystic Kidney Diease Brother      Polycystic Kidney Diease Brother      Cervical Cancer Maternal Aunt      Glaucoma No family hx of      Macular Degeneration No family hx of      ROS: non contributory on the 10-point review of system    Exam:   In general, the patient is in no apparent distress.    BP last recorded- 99/73 mmHg   Breathing is unlabored.   HEENT: NC/AT.  PERRLA.  EOMI.  Sclerae white, not injected.    Neck: No jugular venous distension.    Extremities: No edema.   Neurologic: Alert and oriented to person/place/time, normal speech and affect  Skin: No rash on exposed skin.     Data:  Recent cardiac investigations - reviewed    EKG 6/6/2022 -sinus rhythm normal axis and intervals    Echocardiogram August 31, 2020  Normal biventricular size and function  No significant valvular abnormalities  Normal IVC  No pericardial effusion.    [facility]   CT coronary angiogram 1-3-2019  CALCIUM SCORE : 19. This placed the patient in the 70th percentile rank, meaning that approximately 30% of females  at ages from 56-60 will have a higher calcium score that the patient.  CORONARY CT ANGIOGRAPHY: The examination is diagnostic in quality. There is right heart dominance.   LEFT MAIN CORONARY ARTERY: Widely patent  LEFT ANTERIOR DESCENDING; Mild focal calcified plaque in the proxima-mid LAD without significant stenosis. The remainder of the LAD is widely patent.  FIRST DIAGONAL: Widely patent  SECOND DIAGONAL: Widely patent  PROXIMAL LEFT CIRCUMFLEX: Widely patent  MID CIRCUMFLEX: Widely patent  DISTAL CIRCUMFLEX: Widely patent    Labs - reviewed  Lab Test 11/10/22  0803 10/17/22  1010    137   POTASSIUM 3.8 4.3   CHLORIDE 104 105   CO2 22 23   ANIONGAP 12 9   GLC 77 101*   BUN 12.3 17.3   CR 0.75 0.60   EDSON 9.8 9.9   GFRESTIMATED 90 >90   AST  --  21   ALT  --  11       CBC:   Recent Labs   Lab Test 11/10/22  0803 10/17/22  1010   WBC 5.5 6.7   RBC 4.90 4.86   HGB 14.6 14.0   HCT 44.5 44.1   MCV 91 91   MCH 29.8 28.8   MCHC 32.8 31.7   RDW 14.3 14.0    265       Lipid Panel:  Recent Labs   Lab Test 08/29/22  0810 06/06/22  0828   CHOL 181 201*   HDL 44 52   * 124   TRIG 123 126       Thyroid:   TSH   Date Value Ref Range Status   11/04/2021 0.86 0.40 - 4.00 mU/L Final       Assessment and Plan:  62 year old woman with    Non obstructive CAD  Microvascular angina, stable  Dyslipidemia  Post kidney and liver transplantation  Recurrent UTIs    Belen Sharma reports doing well. She has non obstructive CAD in the proximal LAD for which she takes aspirin and NTG as needed for stable angina. Her cardiac function is normal. Her renal and liver function tests are normal.    We reviewed her diet and lipids in detail today. Ideally I would like to see her LDL less than 70 given the mild atherosclerosis based on her CT in 2019.  She has tried to address this with diet and regular exercise. She is agreeable to starting low dose statin therapy to achieve that goal.  I dmitri dvised her to liberalize daily salt  intake and increase fluid intake to address the low BP readings.     Recommendations:     Healthy diet - low fat and low carb   Atorvastatin 10 mg daily  Lipid panel, AST and ALT  in 4 months  Follow up 1 year       Video Visit Details:    Type of service:  Video Visit    Video Start Time: 10.35 am  Video End Time: 11.05 am    Originating Location (pt. Location): Home    Distant Location (provider location):  [facility]     Platform used for Video Visit: [employer]     In addition to visit time documented above, I spent an additional 15 minutes on data review and documentation.    Christy Mohamud MD, MS  Professor of Medicine  Cardiovascular division        The 10-year ASCVD risk score (Rock CHAN, et al., 2019) is: 2.6%    Values used to calculate the score:      Age: 62 years      Sex: Female      Is Non- : No      Diabetic: No      Tobacco smoker: No      Systolic Blood Pressure: 99 mmHg      Is BP treated: No      HDL Cholesterol: 44 mg/dL      Total Cholesterol: 181 mg/dL      Please do not hesitate to contact me if you have any questions/concerns.     Sincerely,     Christy Mohamud MD

## 2022-12-05 NOTE — NURSING NOTE
Chief Complaint   Patient presents with     Follow Up     6 month, no other vitals to report today     Patient declined individual allergy and medication review by support staff because patient denies any changes since echeck-in completion and states all information entered during echeck-in remains accurate.     Alcon Ahn, J CARLOS/CMA

## 2022-12-05 NOTE — PATIENT INSTRUCTIONS
"Cardiology Providers you saw during your visit:  Dr. Mohamud    Medication changes: START Atorvastatin (Lipitor) 10 mg once daily in evening.     Follow up:   Recheck cholesterol labs in about 4 months. We will contact you with results   Follow up with Dr. Mohamud in 1 year with fast labs prior to appointment.       Follow the American Heart Association Diet and Lifestyle recommendations:  Limit saturated fat, trans fat, sodium, red meat, sweets and sugar-sweetened beverages. If you choose to eat red meat, compare labels and select the leanest cuts available.  Aim for at least 150 minutes of moderate physical activity each week.    If you have any questions, contact  Mino Guthrie RN. We are encouraging the use of [employer] to communicate with your HealthCare Provider     To contact the [facility], please call, [phone]  To schedule an appointment or to leave a message for your Care Team Press #1  If you are a physician calling for another physician Press #2  For Billing Press #3  For Medical Records Press #4\"    "

## 2022-12-05 NOTE — NURSING NOTE
Start Atorvastatin 10 mg daily. Recheck lipids, ast, alt in 4 months.     Follow up with Dr. Mohamud in 1 year with labs prior.     Mino Guthrie, RN   Cardiology Nurse Coordinator

## 2022-12-06 ENCOUNTER — LAB (OUTPATIENT)
Dept: LAB | Facility: CLINIC | Age: 62
End: 2022-12-06
Payer: MEDICARE

## 2022-12-06 ENCOUNTER — NURSE TRIAGE (OUTPATIENT)
Dept: NURSING | Facility: CLINIC | Age: 62
End: 2022-12-06

## 2022-12-06 DIAGNOSIS — N39.0 RECURRENT UTI: ICD-10-CM

## 2022-12-06 LAB
ALBUMIN UR-MCNC: 30 MG/DL
APPEARANCE UR: CLEAR
BACTERIA #/AREA URNS HPF: ABNORMAL /HPF
BILIRUB UR QL STRIP: NEGATIVE
CAOX CRY #/AREA URNS HPF: ABNORMAL /HPF
COLOR UR AUTO: YELLOW
GLUCOSE UR STRIP-MCNC: NEGATIVE MG/DL
HGB UR QL STRIP: ABNORMAL
KETONES UR STRIP-MCNC: NEGATIVE MG/DL
LEUKOCYTE ESTERASE UR QL STRIP: ABNORMAL
NITRATE UR QL: POSITIVE
PH UR STRIP: 6 [PH] (ref 5–8)
RBC #/AREA URNS AUTO: ABNORMAL /HPF
SP GR UR STRIP: 1.01 (ref 1–1.03)
UROBILINOGEN UR STRIP-ACNC: 0.2 E.U./DL
WBC #/AREA URNS AUTO: ABNORMAL /HPF

## 2022-12-06 PROCEDURE — 87181 SC STD AGAR DILUTION PER AGT: CPT

## 2022-12-06 PROCEDURE — 87186 SC STD MICRODIL/AGAR DIL: CPT

## 2022-12-06 PROCEDURE — 81001 URINALYSIS AUTO W/SCOPE: CPT

## 2022-12-06 PROCEDURE — 87086 URINE CULTURE/COLONY COUNT: CPT

## 2022-12-06 NOTE — TELEPHONE ENCOUNTER
Call from patient who needs a lab only appointment for UA/UC. She thinks she might have a UTI.    Transplant patient who needs an appointment today.    Transferred to scheduling. Patient was scheduled for 1:30 pm lab appt.      Hanna Rodriguez RN, BSN  Triage Nurse Advisor    Reason for Disposition    Information only question and nurse able to answer    Protocols used: INFORMATION ONLY CALL - NO TRIAGE-A-[place]

## 2022-12-07 ENCOUNTER — PRE VISIT (OUTPATIENT)
Dept: UROLOGY | Facility: CLINIC | Age: 62
End: 2022-12-07

## 2022-12-07 DIAGNOSIS — N39.0 RECURRENT UTI: Primary | ICD-10-CM

## 2022-12-07 RX ORDER — GRANULES FOR ORAL 3 G/1
3 POWDER ORAL EVERY OTHER DAY
Qty: 3 PACKET | Refills: 0 | Status: SHIPPED | OUTPATIENT
Start: 2022-12-07 | End: 2022-12-12

## 2022-12-07 NOTE — TELEPHONE ENCOUNTER
Reason for visit: Cystoscopy and review UDS    Relevant information: microscopic hematuria, rectocele, history of pessary     Records/imaging/labs/orders: all records available    Pt called: no need for a call    At Rooming: ask symptoms, collect a urine/dip    Elvi Mayer CMA  12/7/2022  9:05 AM

## 2022-12-07 NOTE — PROGRESS NOTES
The patient has polyuria and itching in the private area. No fever. She believes is experiencing UTI.   I prescribed fosfomycin 3 gm every other day for a total of 3 doses.   The patient is scheduled for urodynamics and cystoscopy 12/8/22.   I offered that I call Dr. Ferrara since with the initiation of fosfomycin plus/minus a single dose of ertapenem on call to OR for cystoscopy we should be able to proceed with the procedure.   The patient prefers to cancel the appointment for the urodynamics.     Catrachita Gonzales MD

## 2022-12-09 LAB — BACTERIA UR CULT: ABNORMAL

## 2022-12-13 ENCOUNTER — VIRTUAL VISIT (OUTPATIENT)
Dept: INFECTIOUS DISEASES | Facility: CLINIC | Age: 62
End: 2022-12-13
Attending: INTERNAL MEDICINE
Payer: MEDICARE

## 2022-12-13 DIAGNOSIS — N39.0 RECURRENT UTI: Primary | ICD-10-CM

## 2022-12-13 DIAGNOSIS — K52.1 DRUG-INDUCED DIARRHEA: ICD-10-CM

## 2022-12-13 DIAGNOSIS — Z94.0 KIDNEY TRANSPLANT RECIPIENT: ICD-10-CM

## 2022-12-13 DIAGNOSIS — Z94.4 LIVER REPLACED BY TRANSPLANT (H): ICD-10-CM

## 2022-12-13 PROCEDURE — G0463 HOSPITAL OUTPT CLINIC VISIT: HCPCS | Mod: PN,GT | Performed by: INTERNAL MEDICINE

## 2022-12-13 PROCEDURE — 99214 OFFICE O/P EST MOD 30 MIN: CPT | Mod: 95 | Performed by: INTERNAL MEDICINE

## 2022-12-13 RX ORDER — NITROFURANTOIN 25; 75 MG/1; MG/1
100 CAPSULE ORAL 2 TIMES DAILY
Qty: 6 CAPSULE | Refills: 0 | Status: SHIPPED | OUTPATIENT
Start: 2022-12-13 | End: 2023-02-27

## 2022-12-13 NOTE — PATIENT INSTRUCTIONS
Mrs. Sharma  As we discussed during the visit' I will mail you a prescription for macrobid twice a day for 3 days.   Please only fill in the prescription when you have symptoms of UTI and after you submit a urine sample for urinalysis and urine cultures.   If your diarrhea is worse, please let me know.     Please call [phone] to schedule an appointment with me in 3 month, virtual or in-person.     Catrachita Gonzales MD

## 2022-12-13 NOTE — PROGRESS NOTES
Belen Sharma  is being evaluated via a billable video visit.      Patient denies any changes since echeck-in regarding medication and allergies and states all information entered during echeck-in remains accurate.    How would you like to obtain your AVS? Stratasan  For the video visit, send the invitation by: Text to cell phone: [phone]  Will anyone else be joining your video visit? No      Video-Visit Details    Type of service:  Video Visit    Video Start Time (time video started): 2:45 PM    Video End Time (time video stopped): 3:00 PM    Originating Location (pt. Location): Home    Distant Location (provider location):  On-site    Mode of Communication:  Video Conference via Marvin Coello      [facility]    Transplant Infectious Diseases Outpatient Progress note      Patient:  Belen Sharma, Date of birth 1960, Medical record number [id number]  Date of Visit:  12/13/2022         Recommendations:   1. A prescription of macrobid will be sent to the patient to be filled only if symptomatic and after submitting a UA and Ucx.     RTC: 3 months.          Summary of Presentation:   Transplants:  2/2/2016 (Kidney), 3/3/2011 (Liver), 10/9/2010 (Kidney / Liver), Postoperative day:  4303 (Liver), 2506 (Kidney)     This patient is a 62 year old adult with history of congenital hepatic fibrosis and PCKD s/p liver and kidney transplant in [place] in 2010, they both failed and required liver re-transplant in 2011 and kidney re-transplant in 2016.   Currently on TAC/MMF/prednisone.   With recurrent UTI.          Active Problems and Infectious Diseases Issues:   1. Recurrent UTI.   In the past was with different pathogens including ESBL E coli, K pneumonia, E faecalis and sometimes with negative urine and blood cx.   Last 2-3 UTIs were with ESBL E coli treated with a 3-week course of ertapenem ending 10/23/2022 with a recurrence two months later 12/5/2022 treated with three doses  of every other day fosfomycin PO.   Recent CT a/p without evidence of structural or anatomical abnormality.   Has an appointment with urology for cystoscopy and urodynamics given ureteral prolapse.   Increased the estrogen topical application to daily.    The patient has multiple risk factors for recurrent UTI, almost all of them can not be reversed, including: female gender, immunocompromised, kidney transplant recipient, the need to manipulate the  tract daily to change pessary.   In addition the patient is not consistent using topical estrogen which may also contribute to UTI.     2. Drug-induced diarrhea.   Will likely resolve after the discontinuation of fosfomycin.   Only soft stool and only 3 times a day. No fever, no vomiting, no abdominal pain.         Old Problems and Infectious Diseases Issues:   1. Recurrent UTI with E coli, E faecalis and K pneumonia.   2. CDI.   3. Splenectomy.      Other Infectious Disease issues include:  - QTc: 384 as of 6/6/2022. .   - PCP prophylaxis: bactrim.  - Serostatus: not available.   - Immunization status: This patient received the fifth dose (second booster) of the COVID-19 vaccine on 11/4/2022.       Attestation:  Total duration of visit including chart review, reviewing labs and imaging, interviewing and examining the patient, documentation, and sending communication to the patient and to the primary treating team, all at the same day of this encounter, is: 30 minutes.   Catrachita Gonzales MD    Contact information available via [facility] Paging/Directory     12/13/2022         Interim History:   During the first week of 12/2022 she developed dysuria, no fever. She was started on fosfomycin 12/7/2022. The dysuria resolved.   Fosfomycin resulted in soft stool. No fever, no vomiting, no abdominal pain.   Increased estrogen application to daily.      Denying new meds or new -related behavior. Denied association of UTI and sexual activities.   She was prescribed estrogen local  therapy but not always applies it.          HPI:       Transplants:  2/2/2016 (Kidney), 3/3/2011 (Liver), 10/9/2010 (Kidney / Liver); Postoperative day:  4303 (Liver), 2506 (Kidney)    A 62 year old adult with history of congenital hepatic fibrosis and PCKD s/p liver and kidney transplant in [place] in 2010, they both failed, first the liver apparently due to anatomic abnormalities and required liver re-transplant within few months in 2011, then the kidney failed and needed HD through LUE AVF for 4 years before she received kidney re-transplant in [place] in 2016.      The patient has history of recurrent UTI. In 2022, the symptoms of UTI with dysuria, vaginal itching, increase in frequency increased and Ucx on multiple occasions grew K pneumonia, E faecalis, and ESBL E coli.      The patient was treated in 8/2022 for UTI with fosfomycin, Ucx at that time were without growth but the patient was symptomatic with abnormal UA.   Again was treated for UTI in 9/2022 with a single dose of fosfomycin but symptoms prompted admission with fever and urinary symptoms; UA was abnormal but Ucx were negative. She was treated with ceftriaxone then cefdinir with resolution of symptoms.   She was evaluated by [facility] (Dr. Reyes) who ordered CT a/p with contrast which showed inflamed transplanted ureter but not evidence of nephrolithiasis, abscesses or other correctable abnormalities. Dr. Reyes also ordered EOT (9/30/20220 UA and Ucx which were abnormal with Ucx growing ESBL E coli. The patient developed fever, within 24 hr of stopping cefdinir and was admitted to the hospital and treated with ertapenem IV for a total of 3 weeks.          Review of Systems:     As mentioned in the interim history otherwise negative by reviewing constitutional symptoms, central and peripheral neurological systems, respiratory system, cardiac system, GI system,  system, musculoskeletal, skin, allergy, and lymphatics.                  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 Both, very large, native kidneys reamain.     PONV (postoperative nausea and vomiting)     Need to start with ice chips and apple juice, no soda     S/P kidney transplant     SLK 10/9/2010 - kidney failure 2/2 [facility]. Explanted . Re-transplant after de-sensitization 2016     S/P liver transplant (H)     10/9/2010 - [facility], ischemic biopathy. Re-transplant 3/3/2011     S/P splenectomy      Spider veins      Thyroid disease     Hyperthyroid treated with single dose iodine     Urinary tract infection 2022             Past Surgical History:     Past Surgical History:   Procedure Laterality Date     bunectomy  2018    right foot     bunionectomy  2019    left     CHOLECYSTECTOMY       CONIZATION N/A 2020    Procedure: CONE BIOPSY, CERVIX;  Surgeon: Daysi Perez MD;  Location: [facility]     FAILED PICC - RIGHT ARM Right 2020    Has a LUE AV fistula.     H STATISTIC PICC LINE INSERTION >5YR, FAILED Bilateral 2020    Left fistula, Right failed x 2 Occlussion     HERNIA REPAIR, INCISIONAL  2013     IR DIALYSIS PTA CENTRAL SEG  2020     IR PICC PLACEMENT > 5 YRS OF AGE  2020     IR PICC PLACEMENT > 5 YRS OF AGE  10/5/2022     SPLENECTOMY      Splenectomy     TRANSPLANT KIDNEY RECIPIENT  DONOR      re-transplant after [facility]; 6 months de-sensitization prior AND 6 months after transplant     TRANSPLANT LIVER RECIPIENT  DONOR  2011     TRANSPLANT LIVER, KIDNEY RECIPIENT  DONOR, COMBINED  10/09/2010    [facility] - re-Tx liver  3/3/2011; Kidney (left iliac) lost to [facility]/fibrosis - explant     VASCULAR SURGERY      Vascular access left UE. Not used for 4 years since 2nd kidney tx 2-4-2016 [place]               Social History:     Social History     Tobacco Use     Smoking status: Never     Smokeless tobacco: Never   Substance Use Topics     Alcohol use: Not Currently             Family History:   I have reviewed this patient's family history          Immunizations:     Immunization History   Administered Date(s) Administered     COVID-19 Vaccine 12+ (Pfizer 2022) 02/08/2022     COVID-19 Vaccine 12+ (Pfizer) 03/29/2021, 04/19/2021, 09/02/2021     COVID-19 Vaccine Bivalent Booster 12+ (Pfizer) 11/04/2022     DT (PEDS <7y) 03/19/2001     FLU 6-35 months 11/15/2002, 11/18/2003, 10/26/2004, 10/24/2005, 11/16/2006, 09/20/2011     Q1e5-08 Novel Flu 11/19/2009     HepB, Unspecified 06/28/2001     Influenza Vaccine 50-64 or 18-64 w/egg allergy (Flublok) 10/18/2021     Influenza Vaccine >6 months (Alfuria,Fluzone) 09/12/2022     Influenza Vaccine IM Ages 6-35 Months 4 Valent (PF) 11/28/2007, 10/07/2009, 09/20/2011     Influenza,INJ,MDCK,PF,Quad >6mo(Flucelvax) 10/15/2020     Meningococcal ACWY (Menactra ) 01/25/2013     Pneumococcal 20 valent Conjugate (Prevnar 20) 09/12/2022     Pneumococcal 23 valent 09/27/2002, 01/25/2013     TDAP Vaccine (Boostrix) 01/25/2013     Zoster vaccine recombinant adjuvanted (SHINGRIX) 10/15/2020, 01/08/2021             Allergies:     Allergies   Allergen Reactions     Ibuprofen      Due to liver transplant             Medications:     Current Outpatient Medications   Medication Sig     acetaminophen (TYLENOL) 325 MG tablet Take 1 tablet (325 mg) by mouth every 4 hours as needed for mild pain or fever     aspirin (ASA) 81 MG chewable tablet Take 81 mg by mouth every evening Stopped 7 days preop     atorvastatin (LIPITOR) 10 MG tablet Take 1 tablet (10 mg) by mouth daily     atorvastatin (LIPITOR) 10 MG tablet Take 1  tablet (10 mg) by mouth daily     biotin 1000 MCG TABS tablet Take 3,000 mcg by mouth every evening     ciclopirox (PENLAC) 8 % external solution Apply to adjacent skin and affected nails daily.  Remove with alcohol every 7 days, then repeat.     diphenhydrAMINE (BENADRYL) 25 MG tablet Take 25 mg by mouth every 8 hours as needed for allergies     estradiol (ESTRACE) 0.1 MG/GM vaginal cream Place 1 g vaginally twice a week     guaiFENesin (MUCINEX) 600 MG 12 hr tablet Take 1 tablet by mouth 2 times daily as needed     Multiple Vitamins-Minerals (WOMENS DAILY FORMULA PO) Take 1 tablet by mouth daily     mycophenolic acid (GENERIC EQUIVALENT) 180 MG EC tablet Take 3 tablets (540 mg) by mouth 2 times daily for 90 days     nitroGLYcerin (NITROSTAT) 0.4 MG sublingual tablet Place 1 tablet (0.4 mg) under the tongue every 5 minutes as needed for chest pain For chest pain place 1 tablet under the tongue every 5 minutes for 3 doses. If symptoms persist 5 minutes after 1st dose call 911.     predniSONE (DELTASONE) 5 MG tablet Take 1 tablet (5 mg) by mouth daily     Probiotic Product (PROBIOTIC PO) Take 2 tablets by mouth every morning ([facility] Women's Vaginal Probiotic)     sodium bicarbonate 650 MG tablet Take 1 tablet (650 mg) by mouth daily     sulfamethoxazole-trimethoprim (BACTRIM) 400-80 MG tablet Take 1 tablet by mouth three times a week on Monday, Wednesday, Friday     tacrolimus (GENERIC EQUIVALENT) 0.5 MG capsule Take 1 capsule (0.5 mg) by mouth 2 times daily Total dose = 1.5 mg BID     tacrolimus (GENERIC EQUIVALENT) 1 MG capsule Take 1 capsule (1 mg) by mouth 2 times daily Total dose = 1.5 mg BID     temazepam (RESTORIL) 15 MG capsule Take 1 capsule (15 mg) by mouth as needed for sleep (twice a year)     No current facility-administered medications for this visit.                Physical Exam:   No vitals are available during this virtual visit.   Constitutional: awake, alert, cooperative, no apparent distress  and appears at stated age, well nourished.             Laboratory Data:     No results found for: ACD4    Inflammatory Markers    Recent Labs   Lab Test 10/05/22  0648 10/04/22  0615 09/14/22  2220 11/04/21  1457 12/28/20  1515 12/21/20  1545   SED  --   --  8 14 8 10   CRP 20.60* 39.70* 21.60* 39.3* 13.0* 18.0*       Immune Globulin Studies    No lab results found.    Metabolic Studies    Recent Labs   Lab Test 11/10/22  0803 10/17/22  1010 10/10/22  1017 10/05/22  0648 10/04/22  0615 10/03/22  0935 10/02/22  0628 09/15/22  0607 09/14/22 2230 09/30/20  0721 09/23/20  1653    137 138 136 134* 137 135*   < >  --    < >  --    POTASSIUM 3.8 4.3 4.2 4.2 4.0 4.0 3.9   < >  --    < >  --    CHLORIDE 104 105 104 104 105 107 103   < >  --    < >  --    CO2 22 23 24 23 23 20* 23   < >  --    < >  --    ANIONGAP 12 9 10 9 6* 10 9   < >  --    < >  --    BUN 12.3 17.3 19.8 13.7 10.8 9.8 17.8   < >  --    < >  --    CR 0.75 0.60 0.62 0.60 0.57 0.66 0.78   < >  --    < >  --    GFRESTIMATED 90 >90 >90 >90 >90 >90 85   < >  --    < >  --    GLC 77 101* 97 91 97 99 92   < >  --    < >  --    EDSON 9.8 9.9 10.3* 9.7 9.5 9.2 9.5   < >  --    < >  --    PHOS  --   --   --   --   --   --   --   --   --   --  3.4   LACT  --   --   --   --   --   --  0.8  --  0.9   < >  --     < > = values in this interval not displayed.       Hepatic Studies    Recent Labs   Lab Test 10/17/22  1010 10/10/22  1017 10/05/22  0648 10/02/22  0628 09/26/22  0813 09/16/22  1015   BILITOTAL 0.5 0.6 0.4 0.7 0.6 0.3   ALKPHOS 66 72 66 69 69 57   PROTTOTAL 6.4 6.8 6.2* 6.6 6.5 5.7*   ALBUMIN 3.6 3.9 3.4* 3.8 3.9 3.2*   AST 21 19 15 23 18 16   ALT 11 10 <5* 8* 13 8*       Hematology Studies     Recent Labs   Lab Test 11/10/22  0803 10/17/22  1010 10/10/22  1017 10/05/22  0648 10/04/22  0615 10/03/22  0935 03/11/21  1007 12/28/20  1515 12/28/20  0855 12/21/20  1545 12/18/20  0611 12/16/20  2236   WBC 5.5 6.7 14.4* 7.3 8.1 11.0   < > 9.2   < > 6.2   < > 11.4*    ANEU  --   --   --   --   --   --   --  6.4  --  3.9  --  9.3*   ALYM  --   --   --   --   --   --   --  2.1  --  1.7  --  1.0   PABLO  --   --   --   --   --   --   --  0.6  --  0.6  --  1.0   AEOS  --   --   --   --   --   --   --  0.0  --  0.0  --  0.0   HGB 14.6 14.0 15.1 14.6 14.2 13.4   < > 16.6*   < > 16.2*   < > 16.9*   HCT 44.5 44.1 46.6 46.1 43.8 42.3   < > 50.8*   < > 49.2*   < > 54.5*    265 287 271 248 241   < > 308   < > 261   < > 260    < > = values in this interval not displayed.       Clotting Studies  No lab results found.    Urine Studies    Recent Labs   Lab Test 12/06/22  1345 12/02/22  1117 10/02/22  0632 09/30/22  1324 09/14/22  2218   URINEPH 6.0 7.0 6.5 7.0 6.0   NITRITE Positive* Negative Positive* Negative Negative   LEUKEST Moderate* Negative Large* Moderate* Large*   WBCU * 0-5 >182* 25-50* >182*         Microbiology:  Last 6 Culture results with specimen source  Culture Micro   Date Value Ref Range Status   06/04/2021 (A)  Final    10,000 to 50,000 colonies/mL  Enterococcus faecalis     12/17/2020 No growth  Final   12/16/2020 (A)  Final    Cultured on the 1st day of incubation:  Escherichia coli ESBL  ESBL (extended beta lactamase) producing organisms require contact precautions.     12/16/2020   Final    Critical Value/Significant Value, preliminary result only, called to and read back by  RALPH BOLAÑOS RN 12.17.2020 1056 BC     12/16/2020   Final    (Note)  POSITIVE for E.COLI by Verigene multiplex nucleic acid test. Final  identification and antimicrobial susceptibility testing will be  verified by standard methods. Verigene test will not distinguish  E.coli from Shigella species including S.dysenteriae, S.flexneri,  S.boydii, and S.sonnei. Specimens containing Shigella species or  E.coli will be reported as Positive for E.coli.    POSITIVE for CTX-M Class A Extended Spectrum beta-lactamase (ESBL)  resistance marker by [employer] multiplex nucleic acid test.  CTX-M  confers resistance to penicillins, cephalosporins and variable  resistance to beta-lactam beta-lactamase inhibitor combinations  (clavulanic acid and tazobactam). Best empiric antibiotic choice is  meropenem. Specific susceptibility testing will be performed.    Specimen tested with [employer] multiplex, gram-negative blood culture  nucleic acid test for the following targets: Acinetobacter sp.,  Citrobacter sp., Enterobacter sp., Proteus sp., E. coli, K.  pneumoniae/oxytoca, P. aeruginosa, and the following resistance  markers: CTXM, KPC, NDM, VIM, IMP and OXA.    Critical Value/Significant Value called to and read back by RALPH BOLAÑOS RN 12/17/20 1312 EH.       12/16/2020 No growth  Final   12/16/2020 (A)  Preliminary    >100,000 colonies/mL  Escherichia coli ESBL  ESBL (extended beta lactamase) producing organisms require contact precautions.     12/16/2020   Preliminary    Susceptibility testing requested by  Dr. Gonzales, Pager [phone]. Fosfomycin requested. @ 1637. 12.18.20. BS.       Specimen Description   Date Value Ref Range Status   06/04/2021 Midstream Urine  Final   12/17/2020 Blood  Final   12/16/2020 Blood Right Arm  Final   12/16/2020 Blood Right Arm  Final   12/16/2020 Unspecified Urine  Final   12/03/2020 Midstream Urine  Final        Last check of C difficile  No results found for: CDBPCT      Virology:  CMV viral loads    CMV viral loads    CMV Quant IU/mL   Date Value Ref Range Status   12/18/2020 CMV DNA Not Detected CMVND^CMV DNA Not Detected [IU]/mL Final     Comment:     Mutations within the highly conserved regions of the viral genome covered by   the RHONDA AmpliPrep/RHONDA TaqMan CMV Test primers and/or probes have been   identified and may result in under-quantitation of or failure to detect the   virus.  Supplemental testing methods should be used for testing when this is   suspected.  The RHONDA AmpliPrep/RHONDA TaqMan CMV Test is an FDA-approved in vitro nucleic   acid amplification  test for the quantitation of cytomegalovirus DNA in human   plasma (EDTA plasma) using the RHONDA AmpliPrep Instrument for automated viral   nucleic acid extraction and the [employer] TaqMan Analyzer or [employer] for   automated Real Time amplification and detection of the viral nucleic acid   target.  Titer results are reported in International Units/mL (IU/mL using 1st WHO   International standard for Human Cytomegalovirus for Nucleic Acid   Amplification based assays. The conversion factor between CMV DNA copis/mL (as   defined by the Roche RHONDA TaqMan CMV test) and International Units is the   CMV DNA concentration in IU/mL x 1.1 copies/IU = CMV DNA in copies/mL.  This assay has received FDA approval for the testing of human plasma only. The   Infectious Disease Diagnostic Laboratory at the [facility], [place], has validated the performance characteristics of the   Roche CMV assay for plasma, bronchial alveolar lavage/wash and urine.       CMV viral loads    Recent Labs   Lab Test 12/18/20  0611   CMVQNT CMV DNA Not Detected   CSPEC Plasma   CMVLOG Not Calculated       CMV viral loads    CMV Quant IU/mL   Date Value Ref Range Status   12/18/2020 CMV DNA Not Detected CMVND^CMV DNA Not Detected [IU]/mL Final     Comment:     Mutations within the highly conserved regions of the viral genome covered by   the RHONDA AmpliPrep/RHONDA TaqMan CMV Test primers and/or probes have been   identified and may result in under-quantitation of or failure to detect the   virus.  Supplemental testing methods should be used for testing when this is   suspected.  The RHONDA AmpliPrep/RHONDA TaqMan CMV Test is an FDA-approved in vitro nucleic   acid amplification test for the quantitation of cytomegalovirus DNA in human   plasma (EDTA plasma) using the RHONDA AmpliPrep Instrument for automated viral   nucleic acid extraction and the [employer] TaqMan Analyzer or [employer] for   automated Real Time amplification  and detection of the viral nucleic acid   target.  Titer results are reported in International Units/mL (IU/mL using 1st WHO   International standard for Human Cytomegalovirus for Nucleic Acid   Amplification based assays. The conversion factor between CMV DNA copis/mL (as   defined by the Roche RHONDA TaqMan CMV test) and International Units is the   CMV DNA concentration in IU/mL x 1.1 copies/IU = CMV DNA in copies/mL.  This assay has received FDA approval for the testing of human plasma only. The   Infectious Disease Diagnostic Laboratory at the [facility], [place], has validated the performance characteristics of the   Roche CMV assay for plasma, bronchial alveolar lavage/wash and urine.     09/23/2020 CMV DNA Not Detected CMVND^CMV DNA Not Detected [IU]/mL Final     Comment:     Mutations within the highly conserved regions of the viral genome covered by   the RHONDA AmpliPrep/RHONDA TaqMan CMV Test primers and/or probes have been   identified and may result in under-quantitation of or failure to detect the   virus.  Supplemental testing methods should be used for testing when this is   suspected.  The RHONDA AmpliPrep/RHONDA TaqMan CMV Test is an FDA-approved in vitro nucleic   acid amplification test for the quantitation of cytomegalovirus DNA in human   plasma (EDTA plasma) using the RHONDA AmpliPrep Instrument for automated viral   nucleic acid extraction and the RHONDA TaqMan Analyzer or "[employer] TaqMan for   automated Real Time amplification and detection of the viral nucleic acid   target.  Titer results are reported in International Units/mL (IU/mL using 1st WHO   International standard for Human Cytomegalovirus for Nucleic Acid   Amplification based assays. The conversion factor between CMV DNA copis/mL (as   defined by the Roche RHONDA TaqMan CMV test) and International Units is the   CMV DNA concentration in IU/mL x 1.1 copies/IU = CMV DNA in copies/mL.  This assay has received  FDA approval for the testing of human plasma only. The   Infectious Disease Diagnostic Laboratory at the [facility], [place], has validated the performance characteristics of the   Roche CMV assay for plasma, bronchial alveolar lavage/wash and urine.       Log IU/mL of CMVQNT   Date Value Ref Range Status   12/18/2020 Not Calculated <2.1 [Log_IU]/mL Final   09/23/2020 Not Calculated <2.1 [Log_IU]/mL Final       CMV resistance testing  No lab results found.  No results found for: CMVCID, CMVFOS, CMVGAN     EBV viral loads   No lab results found.  No results found for: EBVDN, EBRES, EBVDN, EBVSP, EBVPC, EBVPCR    Human Herpes Virus 6 viral loads    No results found for: H6RES No results found for: H6SPEC    CMV Antibody IgG   Date Value Ref Range Status   01/04/2021 >8.0 (H) 0.0 - 0.8 AI Final     Comment:     Positive  Antibody index (AI) values reflect qualitative changes in antibody   concentration that cannot be directly associated with clinical condition or   disease state.       No results found for: EBIG2, EBIGM, EBVIGG, EBIGG, EBVAGN, WL5270, TOXG      Imaging:  CT a/p W 9/29/2022  IMPRESSION:  1. Urothelial thickening and mild ectasia of the distal right renal  transplant ureter extending to the ureterovesical anastomosis,  slightly increased compared to previous CT on 12/17/2020. Findings may  represent acute or chronic sequela of inflammation/infection. No  hydronephrosis or evidence of obstruction.  2. Simple right adnexal cyst is not significantly changed compared to  previous without internal complexity on comparison ultrasound, likely  benign. No dedicated imaging follow-up recommended.  3. Stable postoperative changes of liver transplant.  4. Stable enlarged polycystic kidneys.

## 2022-12-13 NOTE — LETTER
12/13/2022       RE: Belen Sharma  [address] [place] [address]     Dear Colleague,    Thank you for referring your patient, Belen Sharma, to the [facility] INFECT[facility] DISEASE CLIN[facility] [place] at [facility]. Please see a copy of my visit note below.    Belen Sharma  is being evaluated via a billable video visit.      Patient denies any changes since echeck-in regarding medication and allergies and states all information entered during echeck-in remains accurate.    How would you like to obtain your AVS? Private.Me  For the video visit, send the invitation by: Text to cell phone: [phone]  Will anyone else be joining your video visit? No      Video-Visit Details    Type of service:  Video Visit    Video Start Time (time video started): 2:45 PM    Video End Time (time video stopped): 3:00 PM    Originating Location (pt. Location): Home    Distant Location (provider location):  On-site    Mode of Communication:  Video Conference via Marvin Coello      [facility]    Transplant Infectious Diseases Outpatient Progress note      Patient:  Belen Sharma, Date of birth 1960, Medical record number [id number]  Date of Visit:  12/13/2022         Recommendations:   1. A prescription of macrobid will be sent to the patient to be filled only if symptomatic and after submitting a UA and Ucx.     RTC: 3 months.          Summary of Presentation:   Transplants:  2/2/2016 (Kidney), 3/3/2011 (Liver), 10/9/2010 (Kidney / Liver), Postoperative day:  4303 (Liver), 2506 (Kidney)     This patient is a 62 year old adult with history of congenital hepatic fibrosis and PCKD s/p liver and kidney transplant in [place] in 2010, they both failed and required liver re-transplant in 2011 and kidney re-transplant in 2016.   Currently on TAC/MMF/prednisone.   With recurrent UTI.          Active Problems and Infectious Diseases Issues:   1.  Recurrent UTI.   In the past was with different pathogens including ESBL E coli, K pneumonia, E faecalis and sometimes with negative urine and blood cx.   Last 2-3 UTIs were with ESBL E coli treated with a 3-week course of ertapenem ending 10/23/2022 with a recurrence two months later 12/5/2022 treated with three doses of every other day fosfomycin PO.   Recent CT a/p without evidence of structural or anatomical abnormality.   Has an appointment with urology for cystoscopy and urodynamics given ureteral prolapse.   Increased the estrogen topical application to daily.    The patient has multiple risk factors for recurrent UTI, almost all of them can not be reversed, including: female gender, immunocompromised, kidney transplant recipient, the need to manipulate the  tract daily to change pessary.   In addition the patient is not consistent using topical estrogen which may also contribute to UTI.     2. Drug-induced diarrhea.   Will likely resolve after the discontinuation of fosfomycin.   Only soft stool and only 3 times a day. No fever, no vomiting, no abdominal pain.         Old Problems and Infectious Diseases Issues:   1. Recurrent UTI with E coli, E faecalis and K pneumonia.   2. CDI.   3. Splenectomy.      Other Infectious Disease issues include:  - QTc: 384 as of 6/6/2022. .   - PCP prophylaxis: bactrim.  - Serostatus: not available.   - Immunization status: This patient received the fifth dose (second booster) of the COVID-19 vaccine on 11/4/2022.       Attestation:  Total duration of visit including chart review, reviewing labs and imaging, interviewing and examining the patient, documentation, and sending communication to the patient and to the primary treating team, all at the same day of this encounter, is: 30 minutes.   Catrachita Gonzales MD    Contact information available via [facility] Paging/Directory     12/13/2022         Interim History:   During the first week of 12/2022 she developed dysuria, no  fever. She was started on fosfomycin 12/7/2022. The dysuria resolved.   Fosfomycin resulted in soft stool. No fever, no vomiting, no abdominal pain.   Increased estrogen application to daily.      Denying new meds or new -related behavior. Denied association of UTI and sexual activities.   She was prescribed estrogen local therapy but not always applies it.          HPI:       Transplants:  2/2/2016 (Kidney), 3/3/2011 (Liver), 10/9/2010 (Kidney / Liver); Postoperative day:  4303 (Liver), 2506 (Kidney)    A 62 year old adult with history of congenital hepatic fibrosis and PCKD s/p liver and kidney transplant in [place] in 2010, they both failed, first the liver apparently due to anatomic abnormalities and required liver re-transplant within few months in 2011, then the kidney failed and needed HD through LUE AVF for 4 years before she received kidney re-transplant in [place] in 2016.      The patient has history of recurrent UTI. In 2022, the symptoms of UTI with dysuria, vaginal itching, increase in frequency increased and Ucx on multiple occasions grew K pneumonia, E faecalis, and ESBL E coli.      The patient was treated in 8/2022 for UTI with fosfomycin, Ucx at that time were without growth but the patient was symptomatic with abnormal UA.   Again was treated for UTI in 9/2022 with a single dose of fosfomycin but symptoms prompted admission with fever and urinary symptoms; UA was abnormal but Ucx were negative. She was treated with ceftriaxone then cefdinir with resolution of symptoms.   She was evaluated by [facility] (Dr. Reyes) who ordered CT a/p with contrast which showed inflamed transplanted ureter but not evidence of nephrolithiasis, abscesses or other correctable abnormalities. Dr. Reyes also ordered EOT (9/30/20220 UA and Ucx which were abnormal with Ucx growing ESBL E coli. The patient developed fever, within 24 hr of stopping cefdinir and was admitted to the hospital and treated with ertapenem IV for a total of 3  weeks.          Review of Systems:     As mentioned in the interim history otherwise negative by reviewing constitutional symptoms, central and peripheral neurological systems, respiratory system, cardiac system, GI system,  system, musculoskeletal, skin, allergy, and lymphatics.                  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 Both, very large, native kidneys reamain.     PONV (postoperative nausea and vomiting)     Need to start with ice chips and apple juice, no soda     S/P kidney transplant     SLK 10/9/2010 - kidney failure 2/2 [facility]. Explanted 2012. Re-transplant after de-sensitization 2016     S/P liver transplant (H)     10/9/2010 - [facility], ischemic biopathy. Re-transplant 3/3/2011     S/P splenectomy      Spider veins 2019     Thyroid disease     Hyperthyroid treated with single dose iodine     Urinary tract infection 09/2022             Past Surgical History:     Past Surgical History:   Procedure Laterality Date     bunectomy  08/01/2018    right foot     bunionectomy  01/2019    left     CHOLECYSTECTOMY       CONIZATION N/A 09/30/2020    Procedure: CONE BIOPSY, CERVIX;  Surgeon: Daysi Perez MD;  Location: [facility] OR     FAILED PICC - RIGHT ARM Right 12/19/2020    Has a LUE AV fistula.     H STATISTIC PICC LINE INSERTION >5YR, FAILED Bilateral 12/19/2020    Left fistula, Right failed x 2 Occlussion     HERNIA REPAIR, INCISIONAL  03/2013     IR DIALYSIS PTA CENTRAL SEG  12/19/2020     IR PICC PLACEMENT > 5 YRS OF AGE  12/19/2020     IR PICC PLACEMENT > 5 YRS OF AGE  10/5/2022     SPLENECTOMY       Splenectomy     TRANSPLANT KIDNEY RECIPIENT  DONOR  2016    re-transplant after [facility]; 6 months de-sensitization prior AND 6 months after transplant     TRANSPLANT LIVER RECIPIENT  DONOR  2011     TRANSPLANT LIVER, KIDNEY RECIPIENT  DONOR, COMBINED  10/09/2010    [facility] - re-Tx liver 3/3/2011; Kidney (left iliac) lost to [facility]/fibrosis - explant     VASCULAR SURGERY      Vascular access left UE. Not used for 4 years since 2nd kidney tx -- [place]               Social History:     Social History     Tobacco Use     Smoking status: Never     Smokeless tobacco: Never   Substance Use Topics     Alcohol use: Not Currently             Family History:   I have reviewed this patient's family history          Immunizations:     Immunization History   Administered Date(s) Administered     COVID-19 Vaccine 12+ (Pfizer ) 2022     COVID-19 Vaccine 12+ (Pfizer) 2021, 2021, 2021     COVID-19 Vaccine Bivalent Booster 12+ (Pfizer) 2022     DT (PEDS <7y) 2001     FLU 6-35 months 11/15/2002, 2003, 10/26/2004, 10/24/2005, 2006, 2011     Q5m3-43 Novel Flu 2009     HepB, Unspecified 2001     Influenza Vaccine 50-64 or 18-64 w/egg allergy (Flublok) 10/18/2021     Influenza Vaccine >6 months (Alfuria,Fluzone) 2022     Influenza Vaccine IM Ages 6-35 Months 4 Valent (PF) 2007, 10/07/2009, 2011     Influenza,INJ,MDCK,PF,Quad >6mo(Flucelvax) 10/15/2020     Meningococcal ACWY (Menactra ) 2013     Pneumococcal 20 valent Conjugate (Prevnar 20) 2022     Pneumococcal 23 valent 2002, 2013     TDAP Vaccine (Boostrix) 2013     Zoster vaccine recombinant adjuvanted (SHINGRIX) 10/15/2020, 2021             Allergies:     Allergies   Allergen Reactions     Ibuprofen      Due to liver transplant             Medications:     Current Outpatient Medications   Medication Sig     acetaminophen (TYLENOL)  325 MG tablet Take 1 tablet (325 mg) by mouth every 4 hours as needed for mild pain or fever     aspirin (ASA) 81 MG chewable tablet Take 81 mg by mouth every evening Stopped 7 days preop     atorvastatin (LIPITOR) 10 MG tablet Take 1 tablet (10 mg) by mouth daily     atorvastatin (LIPITOR) 10 MG tablet Take 1 tablet (10 mg) by mouth daily     biotin 1000 MCG TABS tablet Take 3,000 mcg by mouth every evening     ciclopirox (PENLAC) 8 % external solution Apply to adjacent skin and affected nails daily.  Remove with alcohol every 7 days, then repeat.     diphenhydrAMINE (BENADRYL) 25 MG tablet Take 25 mg by mouth every 8 hours as needed for allergies     estradiol (ESTRACE) 0.1 MG/GM vaginal cream Place 1 g vaginally twice a week     guaiFENesin (MUCINEX) 600 MG 12 hr tablet Take 1 tablet by mouth 2 times daily as needed     Multiple Vitamins-Minerals (WOMENS DAILY FORMULA PO) Take 1 tablet by mouth daily     mycophenolic acid (GENERIC EQUIVALENT) 180 MG EC tablet Take 3 tablets (540 mg) by mouth 2 times daily for 90 days     nitroGLYcerin (NITROSTAT) 0.4 MG sublingual tablet Place 1 tablet (0.4 mg) under the tongue every 5 minutes as needed for chest pain For chest pain place 1 tablet under the tongue every 5 minutes for 3 doses. If symptoms persist 5 minutes after 1st dose call 911.     predniSONE (DELTASONE) 5 MG tablet Take 1 tablet (5 mg) by mouth daily     Probiotic Product (PROBIOTIC PO) Take 2 tablets by mouth every morning ([facility] Women's Vaginal Probiotic)     sodium bicarbonate 650 MG tablet Take 1 tablet (650 mg) by mouth daily     sulfamethoxazole-trimethoprim (BACTRIM) 400-80 MG tablet Take 1 tablet by mouth three times a week on Monday, Wednesday, Friday     tacrolimus (GENERIC EQUIVALENT) 0.5 MG capsule Take 1 capsule (0.5 mg) by mouth 2 times daily Total dose = 1.5 mg BID     tacrolimus (GENERIC EQUIVALENT) 1 MG capsule Take 1 capsule (1 mg) by mouth 2 times daily Total dose = 1.5 mg BID      temazepam (RESTORIL) 15 MG capsule Take 1 capsule (15 mg) by mouth as needed for sleep (twice a year)     No current facility-administered medications for this visit.                Physical Exam:   No vitals are available during this virtual visit.   Constitutional: awake, alert, cooperative, no apparent distress and appears at stated age, well nourished.             Laboratory Data:     No results found for: ACD4    Inflammatory Markers    Recent Labs   Lab Test 10/05/22  0648 10/04/22  0615 09/14/22  2220 11/04/21  1457 12/28/20  1515 12/21/20  1545   SED  --   --  8 14 8 10   CRP 20.60* 39.70* 21.60* 39.3* 13.0* 18.0*       Immune Globulin Studies    No lab results found.    Metabolic Studies    Recent Labs   Lab Test 11/10/22  0803 10/17/22  1010 10/10/22  1017 10/05/22  0648 10/04/22  0615 10/03/22  0935 10/02/22  0628 09/15/22  0607 09/14/22  2230 09/30/20  0721 09/23/20  1653    137 138 136 134* 137 135*   < >  --    < >  --    POTASSIUM 3.8 4.3 4.2 4.2 4.0 4.0 3.9   < >  --    < >  --    CHLORIDE 104 105 104 104 105 107 103   < >  --    < >  --    CO2 22 23 24 23 23 20* 23   < >  --    < >  --    ANIONGAP 12 9 10 9 6* 10 9   < >  --    < >  --    BUN 12.3 17.3 19.8 13.7 10.8 9.8 17.8   < >  --    < >  --    CR 0.75 0.60 0.62 0.60 0.57 0.66 0.78   < >  --    < >  --    GFRESTIMATED 90 >90 >90 >90 >90 >90 85   < >  --    < >  --    GLC 77 101* 97 91 97 99 92   < >  --    < >  --    EDSON 9.8 9.9 10.3* 9.7 9.5 9.2 9.5   < >  --    < >  --    PHOS  --   --   --   --   --   --   --   --   --   --  3.4   LACT  --   --   --   --   --   --  0.8  --  0.9   < >  --     < > = values in this interval not displayed.       Hepatic Studies    Recent Labs   Lab Test 10/17/22  1010 10/10/22  1017 10/05/22  0648 10/02/22  0628 09/26/22  0813 09/16/22  1015   BILITOTAL 0.5 0.6 0.4 0.7 0.6 0.3   ALKPHOS 66 72 66 69 69 57   PROTTOTAL 6.4 6.8 6.2* 6.6 6.5 5.7*   ALBUMIN 3.6 3.9 3.4* 3.8 3.9 3.2*   AST 21 19 15 23 18 16   ALT  11 10 <5* 8* 13 8*       Hematology Studies     Recent Labs   Lab Test 11/10/22  0803 10/17/22  1010 10/10/22  1017 10/05/22  0648 10/04/22  0615 10/03/22  0935 03/11/21  1007 12/28/20  1515 12/28/20  0855 12/21/20  1545 12/18/20  0611 12/16/20  2236   WBC 5.5 6.7 14.4* 7.3 8.1 11.0   < > 9.2   < > 6.2   < > 11.4*   ANEU  --   --   --   --   --   --   --  6.4  --  3.9  --  9.3*   ALYM  --   --   --   --   --   --   --  2.1  --  1.7  --  1.0   PABLO  --   --   --   --   --   --   --  0.6  --  0.6  --  1.0   AEOS  --   --   --   --   --   --   --  0.0  --  0.0  --  0.0   HGB 14.6 14.0 15.1 14.6 14.2 13.4   < > 16.6*   < > 16.2*   < > 16.9*   HCT 44.5 44.1 46.6 46.1 43.8 42.3   < > 50.8*   < > 49.2*   < > 54.5*    265 287 271 248 241   < > 308   < > 261   < > 260    < > = values in this interval not displayed.       Clotting Studies  No lab results found.    Urine Studies    Recent Labs   Lab Test 12/06/22  1345 12/02/22  1117 10/02/22  0632 09/30/22  1324 09/14/22  2218   URINEPH 6.0 7.0 6.5 7.0 6.0   NITRITE Positive* Negative Positive* Negative Negative   LEUKEST Moderate* Negative Large* Moderate* Large*   WBCU * 0-5 >182* 25-50* >182*         Microbiology:  Last 6 Culture results with specimen source  Culture Micro   Date Value Ref Range Status   06/04/2021 (A)  Final    10,000 to 50,000 colonies/mL  Enterococcus faecalis     12/17/2020 No growth  Final   12/16/2020 (A)  Final    Cultured on the 1st day of incubation:  Escherichia coli ESBL  ESBL (extended beta lactamase) producing organisms require contact precautions.     12/16/2020   Final    Critical Value/Significant Value, preliminary result only, called to and read back by  RALPH BOLAÑOS RN 12.17.2020 1056 BC     12/16/2020   Final    (Note)  POSITIVE for E.COLI by Verigene multiplex nucleic acid test. Final  identification and antimicrobial susceptibility testing will be  verified by standard methods. Verigene test will not distinguish  E.coli  from Shigella species including S.dysenteriae, S.flexneri,  S.boydii, and S.sonnei. Specimens containing Shigella species or  E.coli will be reported as Positive for E.coli.    POSITIVE for CTX-M Class A Extended Spectrum beta-lactamase (ESBL)  resistance marker by [employer] multiplex nucleic acid test. CTX-M  confers resistance to penicillins, cephalosporins and variable  resistance to beta-lactam beta-lactamase inhibitor combinations  (clavulanic acid and tazobactam). Best empiric antibiotic choice is  meropenem. Specific susceptibility testing will be performed.    Specimen tested with Verigene multiplex, gram-negative blood culture  nucleic acid test for the following targets: Acinetobacter sp.,  Citrobacter sp., Enterobacter sp., Proteus sp., E. coli, K.  pneumoniae/oxytoca, P. aeruginosa, and the following resistance  markers: CTXM, KPC, NDM, VIM, IMP and OXA.    Critical Value/Significant Value called to and read back by RALPH BOLAÑOS RN 12/17/20 1312 EH.       12/16/2020 No growth  Final   12/16/2020 (A)  Preliminary    >100,000 colonies/mL  Escherichia coli ESBL  ESBL (extended beta lactamase) producing organisms require contact precautions.     12/16/2020   Preliminary    Susceptibility testing requested by  Dr. Gonzales, Pager [phone]. Fosfomycin requested. @ 1637. 12.18.20. BS.       Specimen Description   Date Value Ref Range Status   06/04/2021 Midstream Urine  Final   12/17/2020 Blood  Final   12/16/2020 Blood Right Arm  Final   12/16/2020 Blood Right Arm  Final   12/16/2020 Unspecified Urine  Final   12/03/2020 Midstream Urine  Final        Last check of C difficile  No results found for: CDBPCT      Virology:  CMV viral loads    CMV viral loads    CMV Quant IU/mL   Date Value Ref Range Status   12/18/2020 CMV DNA Not Detected CMVND^CMV DNA Not Detected [IU]/mL Final     Comment:     Mutations within the highly conserved regions of the viral genome covered by   the RHONDA AmpliPrep/RHONDA TaqMan CMV Test  primers and/or probes have been   identified and may result in under-quantitation of or failure to detect the   virus.  Supplemental testing methods should be used for testing when this is   suspected.  The RHONDA AmpliPrep/RHONDA TaqMan CMV Test is an FDA-approved in vitro nucleic   acid amplification test for the quantitation of cytomegalovirus DNA in human   plasma (EDTA plasma) using the RHONDA AmpliPrep Instrument for automated viral   nucleic acid extraction and the RHONDA TaqMan Analyzer or [employer] TaqMan for   automated Real Time amplification and detection of the viral nucleic acid   target.  Titer results are reported in International Units/mL (IU/mL using 1st WHO   International standard for Human Cytomegalovirus for Nucleic Acid   Amplification based assays. The conversion factor between CMV DNA copis/mL (as   defined by the Roche RHONDA TaqMan CMV test) and International Units is the   CMV DNA concentration in IU/mL x 1.1 copies/IU = CMV DNA in copies/mL.  This assay has received FDA approval for the testing of human plasma only. The   Infectious Disease Diagnostic Laboratory at the [facility], [place], has validated the performance characteristics of the   Roche CMV assay for plasma, bronchial alveolar lavage/wash and urine.       CMV viral loads    Recent Labs   Lab Test 12/18/20  0611   CMVQNT CMV DNA Not Detected   CSPEC Plasma   CMVLOG Not Calculated       CMV viral loads    CMV Quant IU/mL   Date Value Ref Range Status   12/18/2020 CMV DNA Not Detected CMVND^CMV DNA Not Detected [IU]/mL Final     Comment:     Mutations within the highly conserved regions of the viral genome covered by   the RHONDA AmpliPrep/RHONDA TaqMan CMV Test primers and/or probes have been   identified and may result in under-quantitation of or failure to detect the   virus.  Supplemental testing methods should be used for testing when this is   suspected.  The RHONDA AmpliPrep/RHONDA TaqMan CMV Test is an  FDA-approved in vitro nucleic   acid amplification test for the quantitation of cytomegalovirus DNA in human   plasma (EDTA plasma) using the RHONDA AmpliPrep Instrument for automated viral   nucleic acid extraction and the RHONDA TaqMan Analyzer or [employer] TaqMan for   automated Real Time amplification and detection of the viral nucleic acid   target.  Titer results are reported in International Units/mL (IU/mL using 1st WHO   International standard for Human Cytomegalovirus for Nucleic Acid   Amplification based assays. The conversion factor between CMV DNA copis/mL (as   defined by the Roche RHONDA TaqMan CMV test) and International Units is the   CMV DNA concentration in IU/mL x 1.1 copies/IU = CMV DNA in copies/mL.  This assay has received FDA approval for the testing of human plasma only. The   Infectious Disease Diagnostic Laboratory at the [facility], [place], has validated the performance characteristics of the   Roche CMV assay for plasma, bronchial alveolar lavage/wash and urine.     09/23/2020 CMV DNA Not Detected CMVND^CMV DNA Not Detected [IU]/mL Final     Comment:     Mutations within the highly conserved regions of the viral genome covered by   the RHONDA AmpliPrep/RHONDA TaqMan CMV Test primers and/or probes have been   identified and may result in under-quantitation of or failure to detect the   virus.  Supplemental testing methods should be used for testing when this is   suspected.  The RHONDA AmpliPrep/RHONDA TaqMan CMV Test is an FDA-approved in vitro nucleic   acid amplification test for the quantitation of cytomegalovirus DNA in human   plasma (EDTA plasma) using the RHONDA AmpliPrep Instrument for automated viral   nucleic acid extraction and the RHONDA TaqMan Analyzer or RHONDA TaqMan for   automated Real Time amplification and detection of the viral nucleic acid   target.  Titer results are reported in International Units/mL (IU/mL using 1st WHO   International standard  for Human Cytomegalovirus for Nucleic Acid   Amplification based assays. The conversion factor between CMV DNA copis/mL (as   defined by the Roche RHONDA TaqMan CMV test) and International Units is the   CMV DNA concentration in IU/mL x 1.1 copies/IU = CMV DNA in copies/mL.  This assay has received FDA approval for the testing of human plasma only. The   Infectious Disease Diagnostic Laboratory at the [facility], [place], has validated the performance characteristics of the   Roche CMV assay for plasma, bronchial alveolar lavage/wash and urine.       Log IU/mL of CMVQNT   Date Value Ref Range Status   12/18/2020 Not Calculated <2.1 [Log_IU]/mL Final   09/23/2020 Not Calculated <2.1 [Log_IU]/mL Final       CMV resistance testing  No lab results found.  No results found for: CMVCID, CMVFOS, CMVGAN     EBV viral loads   No lab results found.  No results found for: EBVDN, EBRES, EBVDN, EBVSP, EBVPC, EBVPCR    Human Herpes Virus 6 viral loads    No results found for: H6RES No results found for: H6SPEC    CMV Antibody IgG   Date Value Ref Range Status   01/04/2021 >8.0 (H) 0.0 - 0.8 AI Final     Comment:     Positive  Antibody index (AI) values reflect qualitative changes in antibody   concentration that cannot be directly associated with clinical condition or   disease state.       No results found for: EBIG2, EBIGM, EBVIGG, EBIGG, EBVAGN, DQ0978, TOXG      Imaging:  CT a/p W 9/29/2022  IMPRESSION:  1. Urothelial thickening and mild ectasia of the distal right renal  transplant ureter extending to the ureterovesical anastomosis,  slightly increased compared to previous CT on 12/17/2020. Findings may  represent acute or chronic sequela of inflammation/infection. No  hydronephrosis or evidence of obstruction.  2. Simple right adnexal cyst is not significantly changed compared to  previous without internal complexity on comparison ultrasound, likely  benign. No dedicated imaging follow-up  recommended.  3. Stable postoperative changes of liver transplant.  4. Stable enlarged polycystic kidneys.    Again, thank you for allowing me to participate in the care of your patient.      Sincerely,    Catrachita Gonzales MD

## 2022-12-30 DIAGNOSIS — I20.89 MICROVASCULAR ANGINA (H): ICD-10-CM

## 2022-12-30 RX ORDER — NITROGLYCERIN 0.4 MG/1
0.4 TABLET SUBLINGUAL EVERY 5 MIN PRN
Qty: 30 TABLET | Refills: 1 | Status: SHIPPED | OUTPATIENT
Start: 2022-12-30

## 2023-01-10 ENCOUNTER — LAB (OUTPATIENT)
Dept: LAB | Facility: CLINIC | Age: 63
End: 2023-01-10
Payer: MEDICARE

## 2023-01-10 DIAGNOSIS — Z13.220 LIPID SCREENING: ICD-10-CM

## 2023-01-10 DIAGNOSIS — Z94.4 LIVER REPLACED BY TRANSPLANT (H): ICD-10-CM

## 2023-01-10 LAB
ALBUMIN SERPL BCG-MCNC: 4 G/DL (ref 3.5–5.2)
ALP SERPL-CCNC: 62 U/L (ref 35–129)
ALT SERPL W P-5'-P-CCNC: 12 U/L (ref 10–50)
ANION GAP SERPL CALCULATED.3IONS-SCNC: 9 MMOL/L (ref 7–15)
AST SERPL W P-5'-P-CCNC: 23 U/L (ref 10–50)
BILIRUB DIRECT SERPL-MCNC: 0.28 MG/DL (ref 0–0.3)
BILIRUB SERPL-MCNC: 1.1 MG/DL
BUN SERPL-MCNC: 12.3 MG/DL (ref 8–23)
CALCIUM SERPL-MCNC: 10 MG/DL (ref 8.8–10.2)
CHLORIDE SERPL-SCNC: 108 MMOL/L (ref 98–107)
CREAT SERPL-MCNC: 0.79 MG/DL (ref 0.51–1.17)
DEPRECATED HCO3 PLAS-SCNC: 24 MMOL/L (ref 22–29)
ERYTHROCYTE [DISTWIDTH] IN BLOOD BY AUTOMATED COUNT: 15 % (ref 10–15)
GFR SERPL CREATININE-BSD FRML MDRD: 84 ML/MIN/1.73M2
GLUCOSE SERPL-MCNC: 87 MG/DL (ref 70–99)
HCT VFR BLD AUTO: 48.4 % (ref 35–53)
HGB BLD-MCNC: 15.7 G/DL (ref 11.7–17.7)
MCH RBC QN AUTO: 30 PG (ref 26.5–33)
MCHC RBC AUTO-ENTMCNC: 32.4 G/DL (ref 31.5–36.5)
MCV RBC AUTO: 92 FL (ref 78–100)
PLATELET # BLD AUTO: 205 10E3/UL (ref 150–450)
POTASSIUM SERPL-SCNC: 4 MMOL/L (ref 3.4–5.3)
PROT SERPL-MCNC: 6.8 G/DL (ref 6.4–8.3)
RBC # BLD AUTO: 5.24 10E6/UL (ref 3.8–5.9)
SODIUM SERPL-SCNC: 141 MMOL/L (ref 136–145)
TACROLIMUS BLD-MCNC: 5.6 UG/L (ref 5–15)
TME LAST DOSE: NORMAL H
TME LAST DOSE: NORMAL H
WBC # BLD AUTO: 5.5 10E3/UL (ref 4–11)

## 2023-01-10 PROCEDURE — 80053 COMPREHEN METABOLIC PANEL: CPT

## 2023-01-10 PROCEDURE — 36415 COLL VENOUS BLD VENIPUNCTURE: CPT

## 2023-01-10 PROCEDURE — 82248 BILIRUBIN DIRECT: CPT

## 2023-01-10 PROCEDURE — 80197 ASSAY OF TACROLIMUS: CPT

## 2023-01-10 PROCEDURE — 85027 COMPLETE CBC AUTOMATED: CPT

## 2023-01-24 ENCOUNTER — TELEPHONE (OUTPATIENT)
Dept: UROLOGY | Facility: CLINIC | Age: 63
End: 2023-01-24
Payer: MEDICARE

## 2023-01-24 DIAGNOSIS — N39.0 RECURRENT UTI: Primary | ICD-10-CM

## 2023-01-24 ASSESSMENT — ENCOUNTER SYMPTOMS
LIGHT-HEADEDNESS: 0
VOMITING: 0
ORTHOPNEA: 0
BOWEL INCONTINENCE: 0
EYE REDNESS: 0
PALPITATIONS: 0
BLOOD IN STOOL: 0
EYE PAIN: 0
ABDOMINAL PAIN: 0
DIFFICULTY URINATING: 0
EXERCISE INTOLERANCE: 0
MUSCLE WEAKNESS: 0
ARTHRALGIAS: 1
CONSTIPATION: 0
HEARTBURN: 0
BLOATING: 0
SWOLLEN GLANDS: 0
NAUSEA: 0
FLANK PAIN: 0
DIARRHEA: 0
LEG PAIN: 1
EYE IRRITATION: 0
BACK PAIN: 1
JOINT SWELLING: 0
SLEEP DISTURBANCES DUE TO BREATHING: 0
DYSURIA: 0
HYPOTENSION: 0
NECK PAIN: 0
HYPERTENSION: 0
MUSCLE CRAMPS: 0
STIFFNESS: 0
MYALGIAS: 0
BRUISES/BLEEDS EASILY: 0
JAUNDICE: 0
EYE WATERING: 0
SYNCOPE: 0
RECTAL PAIN: 0
DOUBLE VISION: 0
HEMATURIA: 0

## 2023-01-24 NOTE — TELEPHONE ENCOUNTER
Spoke with patient today.  Patient is scheduled for Urodynamics testing on 2/1 and has a history of recurrent UTI's.  Per Sunita Michael PA-C, patient will need to complete a UA/UC prior to her Urodynamics testing to rule out UTI.  Orders placed.  Patient stated she will do the sample at [facility] on Friday.  Will watch for results.    Mita Le, CMA

## 2023-01-26 ENCOUNTER — LAB (OUTPATIENT)
Dept: LAB | Facility: CLINIC | Age: 63
End: 2023-01-26
Payer: MEDICARE

## 2023-01-26 ENCOUNTER — OFFICE VISIT (OUTPATIENT)
Dept: INTERNAL MEDICINE | Facility: CLINIC | Age: 63
End: 2023-01-26
Payer: MEDICARE

## 2023-01-26 VITALS
BODY MASS INDEX: 20.76 KG/M2 | SYSTOLIC BLOOD PRESSURE: 113 MMHG | HEART RATE: 79 BPM | HEIGHT: 70 IN | OXYGEN SATURATION: 97 % | WEIGHT: 145 LBS | DIASTOLIC BLOOD PRESSURE: 69 MMHG | TEMPERATURE: 98.2 F

## 2023-01-26 DIAGNOSIS — Z01.818 PRE-OP EXAM: Primary | ICD-10-CM

## 2023-01-26 DIAGNOSIS — I25.118 CORONARY ARTERY DISEASE OF NATIVE ARTERY OF NATIVE HEART WITH STABLE ANGINA PECTORIS (H): ICD-10-CM

## 2023-01-26 DIAGNOSIS — M79.671 PAIN IN BOTH FEET: ICD-10-CM

## 2023-01-26 DIAGNOSIS — M79.672 PAIN IN BOTH FEET: ICD-10-CM

## 2023-01-26 DIAGNOSIS — I50.89 OTHER HEART FAILURE (H): ICD-10-CM

## 2023-01-26 DIAGNOSIS — N39.0 RECURRENT UTI: ICD-10-CM

## 2023-01-26 DIAGNOSIS — Z01.818 PRE-OP EXAM: ICD-10-CM

## 2023-01-26 DIAGNOSIS — I20.89 STABLE ANGINA (H): ICD-10-CM

## 2023-01-26 DIAGNOSIS — I20.89 MICROVASCULAR ANGINA (H): ICD-10-CM

## 2023-01-26 LAB
ATRIAL RATE - MUSE: 68 BPM
DIASTOLIC BLOOD PRESSURE - MUSE: NORMAL MMHG
INTERPRETATION ECG - MUSE: NORMAL
NT-PROBNP SERPL-MCNC: 121 PG/ML (ref 0–900)
P AXIS - MUSE: 35 DEGREES
PR INTERVAL - MUSE: 140 MS
QRS DURATION - MUSE: 92 MS
QT - MUSE: 404 MS
QTC - MUSE: 429 MS
R AXIS - MUSE: 56 DEGREES
SYSTOLIC BLOOD PRESSURE - MUSE: NORMAL MMHG
T AXIS - MUSE: 29 DEGREES
VENTRICULAR RATE- MUSE: 68 BPM

## 2023-01-26 PROCEDURE — 99213 OFFICE O/P EST LOW 20 MIN: CPT | Mod: 25 | Performed by: STUDENT IN AN ORGANIZED HEALTH CARE EDUCATION/TRAINING PROGRAM

## 2023-01-26 PROCEDURE — 83880 ASSAY OF NATRIURETIC PEPTIDE: CPT | Performed by: PATHOLOGY

## 2023-01-26 PROCEDURE — 93005 ELECTROCARDIOGRAM TRACING: CPT | Performed by: STUDENT IN AN ORGANIZED HEALTH CARE EDUCATION/TRAINING PROGRAM

## 2023-01-26 PROCEDURE — 36415 COLL VENOUS BLD VENIPUNCTURE: CPT | Performed by: PATHOLOGY

## 2023-01-26 NOTE — PROGRESS NOTES
"I, Shun Bronson MD discussed with the resident during the visit, and agree with the resident's findings and plan of care as documented in the resident's note. I was immediately available to the patient should the need have arisen.  /69 (BP Location: Right arm, Patient Position: Sitting, Cuff Size: Adult Regular)   Pulse 79   Temp 98.2  F (36.8  C) (Oral)   Ht 1.772 m (5' 9.75\")   Wt 65.8 kg (145 lb)   SpO2 97%   BMI 20.95 kg/m    I personally reviewed vital signs and past record.  Key findings: preop for cat surgery, has known microvasc angina on meds. No apparent contraindications to upcoming low-risk surgery. Should have meds available during and after procedure.  Unrelated: ortho referral for reasons Dr. ANDERSON stated.    "

## 2023-01-26 NOTE — PROGRESS NOTES
Surgeon (please enter first and last name): Sebastian Robertson  Location of Surgery: [facility] OR  Date of Surgery: 2/9/23 and 3/9/23  Procedure: Eye Phacoemulsification, cataract, with intraocular lens implant  History of reaction to anesthesia? No    Taylor Cabrera, EMT at 8:56 AM on 1/26/2023.    Answers for HPI/ROS submitted by the patient on 1/24/2023  General Symptoms: No  Skin Symptoms: No  HENT Symptoms: No  EYE SYMPTOMS: Yes  HEART SYMPTOMS: Yes  LUNG SYMPTOMS: No  INTESTINAL SYMPTOMS: Yes  URINARY SYMPTOMS: Yes  GYNECOLOGIC SYMPTOMS: No  REPRODUCTIVE SYMPTOMS: No  BREAST SYMPTOMS: No  SKELETAL SYMPTOMS: Yes  BLOOD SYMPTOMS: Yes  NERVOUS SYSTEM SYMPTOMS: No  MENTAL HEALTH SYMPTOMS: No  Eye pain: No  Vision loss: Yes  Dry eyes: Yes  Watery eyes: No  Eye bulging: No  Double vision: No  Flashing of lights: No  Spots: No  Floaters: Yes  Redness: No  Crossed eyes: No  Tunnel Vision: No  Yellowing of eyes: No  Eye irritation: No  Chest pain or pressure: No  Fast or irregular heartbeat: No  Pain in legs with walking: Yes  Trouble breathing while lying down: No  Fingers or toes appear blue: No  High blood pressure: No  Low blood pressure: No  Fainting: No  Murmurs: No  Pacemaker: No  Varicose veins: Yes  Wake up at night with shortness of breath: No  Light-headedness: No  Exercise intolerance: No  Heart burn or indigestion: No  Nausea: No  Vomiting: No  Abdominal pain: No  Bloating: No  Constipation: No  Diarrhea: No  Blood in stool: No  Black stools: No  Rectal or Anal pain: No  Fecal incontinence: No  Yellowing of skin or eyes: No  Vomit with blood: No  Change in stools: No  Trouble holding urine or incontinence: No  Pain or burning: No  Trouble starting or stopping: No  Increased frequency of urination: No  Blood in urine: No  Decreased frequency of urination: No  Frequent nighttime urination: No  Flank pain: No  Difficulty emptying bladder: No  Back pain: Yes  Muscle aches: No  Neck pain: No  Swollen joints:  No  Joint pain: Yes  Bone pain: No  Muscle cramps: No  Muscle weakness: No  Joint stiffness: No  Bone fracture: No  Edema or swelling: No  Anemia: No  Swollen glands: No  Easy bleeding or bruising: No

## 2023-01-26 NOTE — NURSING NOTE
"Belen Sharma is a 62 year old adult patient that presents today in clinic for the following:    Chief Complaint   Patient presents with     Pre-Op Exam     Pre-op exam.     The patient's allergies and medications were reviewed as noted. A set of vitals were recorded as noted without incident: /69 (BP Location: Right arm, Patient Position: Sitting, Cuff Size: Adult Regular)   Pulse 79   Temp 98.2  F (36.8  C) (Oral)   Ht 1.772 m (5' 9.75\")   Wt 65.8 kg (145 lb)   SpO2 97%   BMI 20.95 kg/m  . The patient does not have any other questions for the provider.    Edvin Robin, EMT at 9:18 AM on 1/26/2023.  Primary care clinic: [phone]  "

## 2023-01-26 NOTE — LETTER
1/26/2023       RE: Belen Sharma  [address] [place] [address]     Dear Colleague,    Thank you for referring your patient, Belen Sharma, to the [facility] INTERNAL MEDICINE [place] at [facility]. Please see a copy of my visit note below.    PRE-OP EVALUATION:  [facility]  Internal Medicine     HPI:      Belen Sharma 62 year old adult who presents today at the request of Sebastian Robertson MD for preoperative cardiopulmonary risk assessment for the following intended procedure: RIGHT EYE PHACOEMULSIFICATION, CATARACT, WITH INTRAOCULAR LENS IMPLANT.    Type of anesthesia anticipated:  MAC with Local    Pertinent history:  Per Ophthalmology note on 10/31/22. Patient reported progressive vision decrease since 2019. Cannot be corrected any more with glasses to help her drive and read. Noticing glare, starburts. Seen by Ophthalmology and diagnosed with combined age-related cataracts, plan for surgical intervention for right eye first, then left given worse vision in right eye. See note for full details.    Kidney transplant x 2, 2010, 2014  Liver transplant x 2, 2010, 2011    Cardiac risks: Microvascular angina and non-obstructive CAD in proximal LAD. On ASA and NTG for stable angina. Last used NTG ~4-6 months ago.  Pulmonary risks: None  Exercise tolerance: >= 4 METS  Personal history of bleeding tendencies/disorders:None  Family history of bleeding tendencies/disorders: None  Personal history of adverse anesthesia reactions: None  Family history of adverse anesthesia reactions: None  Personal history of unanticipated surgical complications: None                                                                                                                                                         .     MEDICAL HISTORY:     Patient Active Problem List   Diagnosis     S/P splenectomy     Congenital hepatic fibrosis     Polycystic kidney disease      Immunosuppression (H)     Liver replaced by transplant (H)     Endometriosis     HPV (human papilloma virus) infection     JANNETTE III with severe dysplasia     Acquired bilateral hammer toes     Candidiasis of vagina     Cervical high risk human papillomavirus (HPV) DNA test positive     Chicken pox     Cystitis     Depression     Depressive psychosis (H)     Exercise counseling     Dermatophytosis of nail     Hallux valgus, acquired, bilateral     Incisional hernia following transplant     Knee pain     Malignant neoplasm of breast (H)     Microvascular angina (H)     Postmenopausal state     Secondary hyperparathyroidism (H)     Scoliosis deformity of spine     Sinusitis     Swelling of first metatarsophalangeal (MTP) joint     Uterine prolapse     Xerosis of skin     Kidney replaced by transplant     Recurrent UTI     Lumbar pain     ESBL (extended spectrum beta-lactamase) producing bacteria infection     Osteopenia of multiple sites     Pyelonephritis     Sepsis, due to unspecified organism, unspecified whether acute organ dysfunction present (H)     Combined forms of age-related cataract of both eyes     Aftercare following organ transplant       Past Surgical History:   Procedure Laterality Date     bunectomy  2018    right foot     bunionectomy  2019    left     CHOLECYSTECTOMY       CONIZATION N/A 2020    Procedure: CONE BIOPSY, CERVIX;  Surgeon: Daysi Perez MD;  Location: [facility] OR     FAILED PICC - RIGHT ARM Right 2020    Has a LUE AV fistula.     H STATISTIC PICC LINE INSERTION >5YR, FAILED Bilateral 2020    Left fistula, Right failed x 2 Occlussion     HERNIA REPAIR, INCISIONAL  2013     IR DIALYSIS PTA CENTRAL SEG  2020     IR PICC PLACEMENT > 5 YRS OF AGE  2020     IR PICC PLACEMENT > 5 YRS OF AGE  10/5/2022     SPLENECTOMY      Splenectomy     TRANSPLANT KIDNEY RECIPIENT  DONOR      re-transplant after [facility]; 6 months de-sensitization prior AND  6 months after transplant     TRANSPLANT LIVER RECIPIENT  DONOR  2011     TRANSPLANT LIVER, KIDNEY RECIPIENT  DONOR, COMBINED  10/09/2010    [facility] - re-Tx liver 3/3/2011; Kidney (left iliac) lost to [facility]/fibrosis - explant     VASCULAR SURGERY      Vascular access left UE. Not used for 4 years since 2nd kidney tx 2016 [place]       Family History   Problem Relation Age of Onset     Depression Mother      Anxiety Disorder Mother      Depression Father      Anxiety Disorder Father      Uterine Cancer Maternal Grandmother      Polycystic Kidney Diease Brother      Polycystic Kidney Diease Brother      Polycystic Kidney Diease Brother      Polycystic Kidney Diease Brother      Cervical Cancer Maternal Aunt      Glaucoma No family hx of      Macular Degeneration No family hx of        Social History     Tobacco Use     Smoking status: Never     Smokeless tobacco: Never   Substance Use Topics     Alcohol use: Not Currently     Drug use: Not Currently       Current Outpatient Medications   Medication Sig Dispense Refill     acetaminophen (TYLENOL) 325 MG tablet Take 1 tablet (325 mg) by mouth every 4 hours as needed for mild pain or fever 120 tablet 1     aspirin (ASA) 81 MG chewable tablet Take 81 mg by mouth every evening Stopped 7 days preop       atorvastatin (LIPITOR) 10 MG tablet Take 1 tablet (10 mg) by mouth daily 90 tablet 3     atorvastatin (LIPITOR) 10 MG tablet Take 1 tablet (10 mg) by mouth daily 90 tablet 1     biotin 1000 MCG TABS tablet Take 3,000 mcg by mouth every evening       ciclopirox (PENLAC) 8 % external solution Apply to adjacent skin and affected nails daily.  Remove with alcohol every 7 days, then repeat. 6 mL 3     diphenhydrAMINE (BENADRYL) 25 MG tablet Take 25 mg by mouth every 8 hours as needed for allergies       estradiol (ESTRACE) 0.1 MG/GM vaginal cream Place 1 g vaginally twice a week 42.5 g 2     guaiFENesin (MUCINEX) 600 MG 12 hr tablet Take 1 tablet by mouth  "2 times daily as needed       Multiple Vitamins-Minerals (WOMENS DAILY FORMULA PO) Take 1 tablet by mouth daily       mycophenolic acid (GENERIC EQUIVALENT) 180 MG EC tablet Take 3 tablets (540 mg) by mouth 2 times daily for 90 days 540 tablet 3     nitroGLYcerin (NITROSTAT) 0.4 MG sublingual tablet Place 1 tablet (0.4 mg) under the tongue every 5 minutes as needed for chest pain For chest pain place 1 tablet under the tongue every 5 minutes for 3 doses. If symptoms persist 5 minutes after 1st dose call 911. 30 tablet 1     predniSONE (DELTASONE) 5 MG tablet Take 1 tablet (5 mg) by mouth daily 90 tablet 3     Probiotic Product (PROBIOTIC PO) Take 2 tablets by mouth every morning ([facility] Women's Vaginal Probiotic)       sodium bicarbonate 650 MG tablet Take 1 tablet (650 mg) by mouth daily 60 tablet 3     sulfamethoxazole-trimethoprim (BACTRIM) 400-80 MG tablet Take 1 tablet by mouth three times a week on Monday, Wednesday, Friday       tacrolimus (GENERIC EQUIVALENT) 0.5 MG capsule Take 1 capsule (0.5 mg) by mouth 2 times daily Total dose = 1.5 mg  capsule 3     tacrolimus (GENERIC EQUIVALENT) 1 MG capsule Take 1 capsule (1 mg) by mouth 2 times daily Total dose = 1.5 mg  capsule 3     temazepam (RESTORIL) 15 MG capsule Take 1 capsule (15 mg) by mouth as needed for sleep (twice a year) 15 capsule 0       Allergies   Allergen Reactions     Ibuprofen      Due to liver transplant       Latex Allergy: NO      REVIEW OF SYSTEMS:     Pertinent questions are noted below.  Answers are \"NO\" unless otherwise noted:    1.  - Do you have a history of heart attack, stroke, stent, bypass or surgery on an artery in the head, neck, heart or legs?  2.  - Do you ever have any pain or discomfort in your chest? Yes  3. - Do you have a history of  Heart Failure?  4. - Are you troubled by shortness of breath when: walking on the level, up a slight hill or at night?  5. - Do you currently have a cold, bronchitis or other " "respiratory infection?  6. - Do you have a cough, shortness of breath or wheezing?  7. - Do you sometimes get pains in the calves of your legs when you walk?  8. - Do you or anyone in your family have previous history of blood clots? Yes, brother had DVT/PE in 07/2021  9. - Do you or does anyone in your family have a serious bleeding problem such as prolonged bleeding following surgeries or cuts?  10. - Have you ever had problems with anemia or been told to take iron pills?  11. - Have you had any abnormal blood loss such as black, tarry or bloody stools, or abnormal vaginal bleeding?  12. - Have you ever had a blood transfusion? Yes  13. - Have you or any of your relatives ever had problems with anesthesia?  14. - Do you have sleep apnea, excessive snoring or daytime drowsiness?   15. - Do you have any prosthetic heart valves?  16. - Do you have prosthetic joints?  17. - Is there any chance that you may be pregnant? No    EXAM:   /69 (BP Location: Right arm, Patient Position: Sitting, Cuff Size: Adult Regular)   Pulse 79   Temp 98.2  F (36.8  C) (Oral)   Ht 1.772 m (5' 9.75\")   Wt 65.8 kg (145 lb)   SpO2 97%   BMI 20.95 kg/m      General:  A&Ox3, NAD  Head: atraumatic  Eyes:  Pupils 2-3 mm, sclera white, EOM's full, conjunctiva moist, no periorbital swelling    Neck:  Trachea midline, Full AROM, supple  Lungs:  Clear to auscultation throughout, no wheezes, rhonchi or rales. Normal respiratory effort and no intercostal retractions.  C/V:  Regular rate and rhythm, no murmurs, rubs or gallops.  No JVD.   Abdomen:  Not distended.  No tenderness, no hepatosplenomegaly or masses.    Neuro: Alert and oriented, face symmetric. Able to get on/off exam table without assistance.  Strength grossly intact. Gait steady.   Skin:   Normal temperature., turgor and texture. No rashes, suspicious lesions, or jaundice on exposed skin surfaces.   Extremities:  No peripheral edema, no digital cyanosis  Psych:  Alert and " oriented. Appropriate affect.  Not psychomotor slowed.  No signs of anxiety or agitation.      DIAGNOSTICS:   The following, pertinent test results were reviewed:    EKG:NSR w/ low voltage QRS. Appears similar to prior EKGs w/ exception of low  Voltage QRS  Dr. Bronson (staff) reviewed the EKG and agrees with the reading as documented by the resident.      IMPRESSION:     The proposed surgical procedure is considered to be low risk.  Patient has an intermediate cardiovascular risk (RCRI 1 point given h/o microvascular angina and non-obstructive CAD) and a low pulmonary risk profile (ARISCAT score of 3 points)        The patient has the following additional risks for perioperative complications:  ~6.0 % 30-day risk of death, MI, or cardiac arrest given cardiac history.    Diagnoses for the visit today:  Belen was seen today for pre-op exam.    Diagnoses and all orders for this visit:    Pre-op exam  -     EKG 12-lead complete w/read - Clinics  -     BNP-N terminal pro; Future    Pain in both feet  -     Orthopedic  Referral; Future    Microvascular angina (H)  -     EKG 12-lead complete w/read - Clinics    Coronary artery disease of native artery of native heart with stable angina pectoris (H)    Stable angina (H)    Other heart failure (H)  -     BNP-N terminal pro; Future         RECOMMENDATIONS:     Belen Sharma may proceed with proposed procedure, with the following diagnositic tests and/or specialist consultation(s):  Recommend checking NT-proBNP at this visit; if >= 300 ng/L, patient should be monitored w/ serial EKGs and Troponins in the PACU      Instructions for home medications bozena-operatively discussed with patient verbally and in writing  Continue ASA (PMID 15282034, PMID [id number])  Continue other medications as prescribed  Recommend taking medications along for procedure     Routine follow up with the patient's primary care provider is advised    Patient staffed with Dr. Hoffmann  Audie.    Matias Phoenix MD  Internal Medicine, PGY3    Surgeon (please enter first and last name): Sebastian Gonzalesabhinav  Location of Surgery: [facility] OR  Date of Surgery: 2/9/23 and 3/9/23  Procedure: Eye Phacoemulsification, cataract, with intraocular lens implant  History of reaction to anesthesia? No    Taylor Cabrera, EMT at 8:56 AM on 1/26/2023.    Answers for HPI/ROS submitted by the patient on 1/24/2023  General Symptoms: No  Skin Symptoms: No  HENT Symptoms: No  EYE SYMPTOMS: Yes  HEART SYMPTOMS: Yes  LUNG SYMPTOMS: No  INTESTINAL SYMPTOMS: Yes  URINARY SYMPTOMS: Yes  GYNECOLOGIC SYMPTOMS: No  REPRODUCTIVE SYMPTOMS: No  BREAST SYMPTOMS: No  SKELETAL SYMPTOMS: Yes  BLOOD SYMPTOMS: Yes  NERVOUS SYSTEM SYMPTOMS: No  MENTAL HEALTH SYMPTOMS: No  Eye pain: No  Vision loss: Yes  Dry eyes: Yes  Watery eyes: No  Eye bulging: No  Double vision: No  Flashing of lights: No  Spots: No  Floaters: Yes  Redness: No  Crossed eyes: No  Tunnel Vision: No  Yellowing of eyes: No  Eye irritation: No  Chest pain or pressure: No  Fast or irregular heartbeat: No  Pain in legs with walking: Yes  Trouble breathing while lying down: No  Fingers or toes appear blue: No  High blood pressure: No  Low blood pressure: No  Fainting: No  Murmurs: No  Pacemaker: No  Varicose veins: Yes  Wake up at night with shortness of breath: No  Light-headedness: No  Exercise intolerance: No  Heart burn or indigestion: No  Nausea: No  Vomiting: No  Abdominal pain: No  Bloating: No  Constipation: No  Diarrhea: No  Blood in stool: No  Black stools: No  Rectal or Anal pain: No  Fecal incontinence: No  Yellowing of skin or eyes: No  Vomit with blood: No  Change in stools: No  Trouble holding urine or incontinence: No  Pain or burning: No  Trouble starting or stopping: No  Increased frequency of urination: No  Blood in urine: No  Decreased frequency of urination: No  Frequent nighttime urination: No  Flank pain: No  Difficulty emptying bladder: No  Back pain:  "Yes  Muscle aches: No  Neck pain: No  Swollen joints: No  Joint pain: Yes  Bone pain: No  Muscle cramps: No  Muscle weakness: No  Joint stiffness: No  Bone fracture: No  Edema or swelling: No  Anemia: No  Swollen glands: No  Easy bleeding or bruising: No        IShun MD discussed with the resident during the visit, and agree with the resident's findings and plan of care as documented in the resident's note. I was immediately available to the patient should the need have arisen.  /69 (BP Location: Right arm, Patient Position: Sitting, Cuff Size: Adult Regular)   Pulse 79   Temp 98.2  F (36.8  C) (Oral)   Ht 1.772 m (5' 9.75\")   Wt 65.8 kg (145 lb)   SpO2 97%   BMI 20.95 kg/m    I personally reviewed vital signs and past record.  Key findings: preop for cat surgery, has known microvasc angina on meds. No apparent contraindications to upcoming low-risk surgery. Should have meds available during and after procedure.  Unrelated: ortho referral for reasons Dr. ANDERSON stated.  Matias Phoenix MD    "

## 2023-01-26 NOTE — H&P (VIEW-ONLY)
PRE-OP EVALUATION:  [facility]  Internal Medicine     HPI:      Belen Sharma 62 year old adult who presents today at the request of Sebastian Robertson MD for preoperative cardiopulmonary risk assessment for the following intended procedure: RIGHT EYE PHACOEMULSIFICATION, CATARACT, WITH INTRAOCULAR LENS IMPLANT.    Type of anesthesia anticipated:  MAC with Local    Pertinent history:  Per Ophthalmology note on 10/31/22. Patient reported progressive vision decrease since 2019. Cannot be corrected any more with glasses to help her drive and read. Noticing glare, starburts. Seen by Ophthalmology and diagnosed with combined age-related cataracts, plan for surgical intervention for right eye first, then left given worse vision in right eye. See note for full details.    Kidney transplant x 2, 2010, 2014  Liver transplant x 2, 2010, 2011    Cardiac risks: Microvascular angina and non-obstructive CAD in proximal LAD. On ASA and NTG for stable angina. Last used NTG ~4-6 months ago.  Pulmonary risks: None  Exercise tolerance: >= 4 METS  Personal history of bleeding tendencies/disorders:None  Family history of bleeding tendencies/disorders: None  Personal history of adverse anesthesia reactions: None  Family history of adverse anesthesia reactions: None  Personal history of unanticipated surgical complications: None                                                                                                                                                         .     MEDICAL HISTORY:     Patient Active Problem List   Diagnosis     S/P splenectomy     Congenital hepatic fibrosis     Polycystic kidney disease     Immunosuppression (H)     Liver replaced by transplant (H)     Endometriosis     HPV (human papilloma virus) infection     JANNETTE III with severe dysplasia     Acquired bilateral hammer toes     Candidiasis of vagina     Cervical high risk human papillomavirus (HPV) DNA test positive     Chicken pox     Cystitis      Depression     Depressive psychosis (H)     Exercise counseling     Dermatophytosis of nail     Hallux valgus, acquired, bilateral     Incisional hernia following transplant     Knee pain     Malignant neoplasm of breast (H)     Microvascular angina (H)     Postmenopausal state     Secondary hyperparathyroidism (H)     Scoliosis deformity of spine     Sinusitis     Swelling of first metatarsophalangeal (MTP) joint     Uterine prolapse     Xerosis of skin     Kidney replaced by transplant     Recurrent UTI     Lumbar pain     ESBL (extended spectrum beta-lactamase) producing bacteria infection     Osteopenia of multiple sites     Pyelonephritis     Sepsis, due to unspecified organism, unspecified whether acute organ dysfunction present (H)     Combined forms of age-related cataract of both eyes     Aftercare following organ transplant       Past Surgical History:   Procedure Laterality Date     bunectomy  2018    right foot     bunionectomy  2019    left     CHOLECYSTECTOMY       CONIZATION N/A 2020    Procedure: CONE BIOPSY, CERVIX;  Surgeon: Daysi Perez MD;  Location: [facility]     FAILED PICC - RIGHT ARM Right 2020    Has a LUE AV fistula.     H STATISTIC PICC LINE INSERTION >5YR, FAILED Bilateral 2020    Left fistula, Right failed x 2 Occlussion     HERNIA REPAIR, INCISIONAL  2013     IR DIALYSIS PTA CENTRAL SEG  2020     IR PICC PLACEMENT > 5 YRS OF AGE  2020     IR PICC PLACEMENT > 5 YRS OF AGE  10/5/2022     SPLENECTOMY      Splenectomy     TRANSPLANT KIDNEY RECIPIENT  DONOR      re-transplant after [facility]; 6 months de-sensitization prior AND 6 months after transplant     TRANSPLANT LIVER RECIPIENT  DONOR  2011     TRANSPLANT LIVER, KIDNEY RECIPIENT  DONOR, COMBINED  10/09/2010    [facility] - re-Tx liver 3/3/2011; Kidney (left iliac) lost to [facility]/fibrosis - explant     VASCULAR SURGERY      Vascular access left UE. Not used for 4  years since 2nd kidney tx 2-4-2016  [place]       Family History   Problem Relation Age of Onset     Depression Mother      Anxiety Disorder Mother      Depression Father      Anxiety Disorder Father      Uterine Cancer Maternal Grandmother      Polycystic Kidney Diease Brother      Polycystic Kidney Diease Brother      Polycystic Kidney Diease Brother      Polycystic Kidney Diease Brother      Cervical Cancer Maternal Aunt      Glaucoma No family hx of      Macular Degeneration No family hx of        Social History     Tobacco Use     Smoking status: Never     Smokeless tobacco: Never   Substance Use Topics     Alcohol use: Not Currently     Drug use: Not Currently       Current Outpatient Medications   Medication Sig Dispense Refill     acetaminophen (TYLENOL) 325 MG tablet Take 1 tablet (325 mg) by mouth every 4 hours as needed for mild pain or fever 120 tablet 1     aspirin (ASA) 81 MG chewable tablet Take 81 mg by mouth every evening Stopped 7 days preop       atorvastatin (LIPITOR) 10 MG tablet Take 1 tablet (10 mg) by mouth daily 90 tablet 3     atorvastatin (LIPITOR) 10 MG tablet Take 1 tablet (10 mg) by mouth daily 90 tablet 1     biotin 1000 MCG TABS tablet Take 3,000 mcg by mouth every evening       ciclopirox (PENLAC) 8 % external solution Apply to adjacent skin and affected nails daily.  Remove with alcohol every 7 days, then repeat. 6 mL 3     diphenhydrAMINE (BENADRYL) 25 MG tablet Take 25 mg by mouth every 8 hours as needed for allergies       estradiol (ESTRACE) 0.1 MG/GM vaginal cream Place 1 g vaginally twice a week 42.5 g 2     guaiFENesin (MUCINEX) 600 MG 12 hr tablet Take 1 tablet by mouth 2 times daily as needed       Multiple Vitamins-Minerals (WOMENS DAILY FORMULA PO) Take 1 tablet by mouth daily       mycophenolic acid (GENERIC EQUIVALENT) 180 MG EC tablet Take 3 tablets (540 mg) by mouth 2 times daily for 90 days 540 tablet 3     nitroGLYcerin (NITROSTAT) 0.4 MG sublingual tablet Place  "1 tablet (0.4 mg) under the tongue every 5 minutes as needed for chest pain For chest pain place 1 tablet under the tongue every 5 minutes for 3 doses. If symptoms persist 5 minutes after 1st dose call 911. 30 tablet 1     predniSONE (DELTASONE) 5 MG tablet Take 1 tablet (5 mg) by mouth daily 90 tablet 3     Probiotic Product (PROBIOTIC PO) Take 2 tablets by mouth every morning ([facility] Women's Vaginal Probiotic)       sodium bicarbonate 650 MG tablet Take 1 tablet (650 mg) by mouth daily 60 tablet 3     sulfamethoxazole-trimethoprim (BACTRIM) 400-80 MG tablet Take 1 tablet by mouth three times a week on Monday, Wednesday, Friday       tacrolimus (GENERIC EQUIVALENT) 0.5 MG capsule Take 1 capsule (0.5 mg) by mouth 2 times daily Total dose = 1.5 mg  capsule 3     tacrolimus (GENERIC EQUIVALENT) 1 MG capsule Take 1 capsule (1 mg) by mouth 2 times daily Total dose = 1.5 mg  capsule 3     temazepam (RESTORIL) 15 MG capsule Take 1 capsule (15 mg) by mouth as needed for sleep (twice a year) 15 capsule 0       Allergies   Allergen Reactions     Ibuprofen      Due to liver transplant       Latex Allergy: NO      REVIEW OF SYSTEMS:     Pertinent questions are noted below.  Answers are \"NO\" unless otherwise noted:    1.  - Do you have a history of heart attack, stroke, stent, bypass or surgery on an artery in the head, neck, heart or legs?  2.  - Do you ever have any pain or discomfort in your chest? Yes  3. - Do you have a history of  Heart Failure?  4. - Are you troubled by shortness of breath when: walking on the level, up a slight hill or at night?  5. - Do you currently have a cold, bronchitis or other respiratory infection?  6. - Do you have a cough, shortness of breath or wheezing?  7. - Do you sometimes get pains in the calves of your legs when you walk?  8. - Do you or anyone in your family have previous history of blood clots? Yes, brother had DVT/PE in 07/2021  9. - Do you or does anyone in your " "family have a serious bleeding problem such as prolonged bleeding following surgeries or cuts?  10. - Have you ever had problems with anemia or been told to take iron pills?  11. - Have you had any abnormal blood loss such as black, tarry or bloody stools, or abnormal vaginal bleeding?  12. - Have you ever had a blood transfusion? Yes  13. - Have you or any of your relatives ever had problems with anesthesia?  14. - Do you have sleep apnea, excessive snoring or daytime drowsiness?   15. - Do you have any prosthetic heart valves?  16. - Do you have prosthetic joints?  17. - Is there any chance that you may be pregnant? No    EXAM:   /69 (BP Location: Right arm, Patient Position: Sitting, Cuff Size: Adult Regular)   Pulse 79   Temp 98.2  F (36.8  C) (Oral)   Ht 1.772 m (5' 9.75\")   Wt 65.8 kg (145 lb)   SpO2 97%   BMI 20.95 kg/m      General:  A&Ox3, NAD  Head: atraumatic  Eyes:  Pupils 2-3 mm, sclera white, EOM's full, conjunctiva moist, no periorbital swelling    Neck:  Trachea midline, Full AROM, supple  Lungs:  Clear to auscultation throughout, no wheezes, rhonchi or rales. Normal respiratory effort and no intercostal retractions.  C/V:  Regular rate and rhythm, no murmurs, rubs or gallops.  No JVD.   Abdomen:  Not distended.  No tenderness, no hepatosplenomegaly or masses.    Neuro: Alert and oriented, face symmetric. Able to get on/off exam table without assistance.  Strength grossly intact. Gait steady.   Skin:   Normal temperature., turgor and texture. No rashes, suspicious lesions, or jaundice on exposed skin surfaces.   Extremities:  No peripheral edema, no digital cyanosis  Psych:  Alert and oriented. Appropriate affect.  Not psychomotor slowed.  No signs of anxiety or agitation.      DIAGNOSTICS:   The following, pertinent test results were reviewed:    EKG:NSR w/ low voltage QRS. Appears similar to prior EKGs w/ exception of low  Voltage QRS  Dr. Bronson (staff) reviewed the EKG and agrees " with the reading as documented by the resident.      IMPRESSION:     The proposed surgical procedure is considered to be low risk.  Patient has an intermediate cardiovascular risk (RCRI 1 point given h/o microvascular angina and non-obstructive CAD) and a low pulmonary risk profile (ARISCAT score of 3 points)        The patient has the following additional risks for perioperative complications:  ~6.0 % 30-day risk of death, MI, or cardiac arrest given cardiac history.    Diagnoses for the visit today:  Belen was seen today for pre-op exam.    Diagnoses and all orders for this visit:    Pre-op exam  -     EKG 12-lead complete w/read - Clinics  -     BNP-N terminal pro; Future    Pain in both feet  -     Orthopedic  Referral; Future    Microvascular angina (H)  -     EKG 12-lead complete w/read - Clinics    Coronary artery disease of native artery of native heart with stable angina pectoris (H)    Stable angina (H)    Other heart failure (H)  -     BNP-N terminal pro; Future         RECOMMENDATIONS:     Belen Sharma may proceed with proposed procedure, with the following diagnositic tests and/or specialist consultation(s):  Recommend checking NT-proBNP at this visit; if >= 300 ng/L, patient should be monitored w/ serial EKGs and Troponins in the PACU      Instructions for home medications bozena-operatively discussed with patient verbally and in writing  Continue ASA (PMID [id number], PMID [id number])  Continue other medications as prescribed  Recommend taking medications along for procedure     Routine follow up with the patient's primary care provider is advised    Patient staffed with Dr. Shun Bronson.    Matias Phoenix MD  Internal Medicine, PGY3

## 2023-01-26 NOTE — PROGRESS NOTES
PRE-OP EVALUATION:  [facility]  Internal Medicine     HPI:      Belen Sharma 62 year old adult who presents today at the request of Sebastian Robertson MD for preoperative cardiopulmonary risk assessment for the following intended procedure: RIGHT EYE PHACOEMULSIFICATION, CATARACT, WITH INTRAOCULAR LENS IMPLANT.    Type of anesthesia anticipated:  MAC with Local    Pertinent history:  Per Ophthalmology note on 10/31/22. Patient reported progressive vision decrease since 2019. Cannot be corrected any more with glasses to help her drive and read. Noticing glare, starburts. Seen by Ophthalmology and diagnosed with combined age-related cataracts, plan for surgical intervention for right eye first, then left given worse vision in right eye. See note for full details.    Kidney transplant x 2, 2010, 2014  Liver transplant x 2, 2010, 2011    Cardiac risks: Microvascular angina and non-obstructive CAD in proximal LAD. On ASA and NTG for stable angina. Last used NTG ~4-6 months ago.  Pulmonary risks: None  Exercise tolerance: >= 4 METS  Personal history of bleeding tendencies/disorders:None  Family history of bleeding tendencies/disorders: None  Personal history of adverse anesthesia reactions: None  Family history of adverse anesthesia reactions: None  Personal history of unanticipated surgical complications: None                                                                                                                                                         .     MEDICAL HISTORY:     Patient Active Problem List   Diagnosis     S/P splenectomy     Congenital hepatic fibrosis     Polycystic kidney disease     Immunosuppression (H)     Liver replaced by transplant (H)     Endometriosis     HPV (human papilloma virus) infection     JANNETTE III with severe dysplasia     Acquired bilateral hammer toes     Candidiasis of vagina     Cervical high risk human papillomavirus (HPV) DNA test positive     Chicken pox     Cystitis      Depression     Depressive psychosis (H)     Exercise counseling     Dermatophytosis of nail     Hallux valgus, acquired, bilateral     Incisional hernia following transplant     Knee pain     Malignant neoplasm of breast (H)     Microvascular angina (H)     Postmenopausal state     Secondary hyperparathyroidism (H)     Scoliosis deformity of spine     Sinusitis     Swelling of first metatarsophalangeal (MTP) joint     Uterine prolapse     Xerosis of skin     Kidney replaced by transplant     Recurrent UTI     Lumbar pain     ESBL (extended spectrum beta-lactamase) producing bacteria infection     Osteopenia of multiple sites     Pyelonephritis     Sepsis, due to unspecified organism, unspecified whether acute organ dysfunction present (H)     Combined forms of age-related cataract of both eyes     Aftercare following organ transplant       Past Surgical History:   Procedure Laterality Date     bunectomy  2018    right foot     bunionectomy  2019    left     CHOLECYSTECTOMY       CONIZATION N/A 2020    Procedure: CONE BIOPSY, CERVIX;  Surgeon: Daysi Perez MD;  Location: [facility]     FAILED PICC - RIGHT ARM Right 2020    Has a LUE AV fistula.     H STATISTIC PICC LINE INSERTION >5YR, FAILED Bilateral 2020    Left fistula, Right failed x 2 Occlussion     HERNIA REPAIR, INCISIONAL  2013     IR DIALYSIS PTA CENTRAL SEG  2020     IR PICC PLACEMENT > 5 YRS OF AGE  2020     IR PICC PLACEMENT > 5 YRS OF AGE  10/5/2022     SPLENECTOMY      Splenectomy     TRANSPLANT KIDNEY RECIPIENT  DONOR      re-transplant after [facility]; 6 months de-sensitization prior AND 6 months after transplant     TRANSPLANT LIVER RECIPIENT  DONOR  2011     TRANSPLANT LIVER, KIDNEY RECIPIENT  DONOR, COMBINED  10/09/2010    HAT - re-Tx liver 3/3/2011; Kidney (left iliac) lost to [facility]/fibrosis - explant     VASCULAR SURGERY      Vascular access left UE. Not used for 4  years since 2nd kidney tx 2-4-2016  [place]       Family History   Problem Relation Age of Onset     Depression Mother      Anxiety Disorder Mother      Depression Father      Anxiety Disorder Father      Uterine Cancer Maternal Grandmother      Polycystic Kidney Diease Brother      Polycystic Kidney Diease Brother      Polycystic Kidney Diease Brother      Polycystic Kidney Diease Brother      Cervical Cancer Maternal Aunt      Glaucoma No family hx of      Macular Degeneration No family hx of        Social History     Tobacco Use     Smoking status: Never     Smokeless tobacco: Never   Substance Use Topics     Alcohol use: Not Currently     Drug use: Not Currently       Current Outpatient Medications   Medication Sig Dispense Refill     acetaminophen (TYLENOL) 325 MG tablet Take 1 tablet (325 mg) by mouth every 4 hours as needed for mild pain or fever 120 tablet 1     aspirin (ASA) 81 MG chewable tablet Take 81 mg by mouth every evening Stopped 7 days preop       atorvastatin (LIPITOR) 10 MG tablet Take 1 tablet (10 mg) by mouth daily 90 tablet 3     atorvastatin (LIPITOR) 10 MG tablet Take 1 tablet (10 mg) by mouth daily 90 tablet 1     biotin 1000 MCG TABS tablet Take 3,000 mcg by mouth every evening       ciclopirox (PENLAC) 8 % external solution Apply to adjacent skin and affected nails daily.  Remove with alcohol every 7 days, then repeat. 6 mL 3     diphenhydrAMINE (BENADRYL) 25 MG tablet Take 25 mg by mouth every 8 hours as needed for allergies       estradiol (ESTRACE) 0.1 MG/GM vaginal cream Place 1 g vaginally twice a week 42.5 g 2     guaiFENesin (MUCINEX) 600 MG 12 hr tablet Take 1 tablet by mouth 2 times daily as needed       Multiple Vitamins-Minerals (WOMENS DAILY FORMULA PO) Take 1 tablet by mouth daily       mycophenolic acid (GENERIC EQUIVALENT) 180 MG EC tablet Take 3 tablets (540 mg) by mouth 2 times daily for 90 days 540 tablet 3     nitroGLYcerin (NITROSTAT) 0.4 MG sublingual tablet Place  "1 tablet (0.4 mg) under the tongue every 5 minutes as needed for chest pain For chest pain place 1 tablet under the tongue every 5 minutes for 3 doses. If symptoms persist 5 minutes after 1st dose call 911. 30 tablet 1     predniSONE (DELTASONE) 5 MG tablet Take 1 tablet (5 mg) by mouth daily 90 tablet 3     Probiotic Product (PROBIOTIC PO) Take 2 tablets by mouth every morning ([facility] Women's Vaginal Probiotic)       sodium bicarbonate 650 MG tablet Take 1 tablet (650 mg) by mouth daily 60 tablet 3     sulfamethoxazole-trimethoprim (BACTRIM) 400-80 MG tablet Take 1 tablet by mouth three times a week on Monday, Wednesday, Friday       tacrolimus (GENERIC EQUIVALENT) 0.5 MG capsule Take 1 capsule (0.5 mg) by mouth 2 times daily Total dose = 1.5 mg  capsule 3     tacrolimus (GENERIC EQUIVALENT) 1 MG capsule Take 1 capsule (1 mg) by mouth 2 times daily Total dose = 1.5 mg  capsule 3     temazepam (RESTORIL) 15 MG capsule Take 1 capsule (15 mg) by mouth as needed for sleep (twice a year) 15 capsule 0       Allergies   Allergen Reactions     Ibuprofen      Due to liver transplant       Latex Allergy: NO      REVIEW OF SYSTEMS:     Pertinent questions are noted below.  Answers are \"NO\" unless otherwise noted:    1.  - Do you have a history of heart attack, stroke, stent, bypass or surgery on an artery in the head, neck, heart or legs?  2.  - Do you ever have any pain or discomfort in your chest? Yes  3. - Do you have a history of  Heart Failure?  4. - Are you troubled by shortness of breath when: walking on the level, up a slight hill or at night?  5. - Do you currently have a cold, bronchitis or other respiratory infection?  6. - Do you have a cough, shortness of breath or wheezing?  7. - Do you sometimes get pains in the calves of your legs when you walk?  8. - Do you or anyone in your family have previous history of blood clots? Yes, brother had DVT/PE in 07/2021  9. - Do you or does anyone in your " "family have a serious bleeding problem such as prolonged bleeding following surgeries or cuts?  10. - Have you ever had problems with anemia or been told to take iron pills?  11. - Have you had any abnormal blood loss such as black, tarry or bloody stools, or abnormal vaginal bleeding?  12. - Have you ever had a blood transfusion? Yes  13. - Have you or any of your relatives ever had problems with anesthesia?  14. - Do you have sleep apnea, excessive snoring or daytime drowsiness?   15. - Do you have any prosthetic heart valves?  16. - Do you have prosthetic joints?  17. - Is there any chance that you may be pregnant? No    EXAM:   /69 (BP Location: Right arm, Patient Position: Sitting, Cuff Size: Adult Regular)   Pulse 79   Temp 98.2  F (36.8  C) (Oral)   Ht 1.772 m (5' 9.75\")   Wt 65.8 kg (145 lb)   SpO2 97%   BMI 20.95 kg/m      General:  A&Ox3, NAD  Head: atraumatic  Eyes:  Pupils 2-3 mm, sclera white, EOM's full, conjunctiva moist, no periorbital swelling    Neck:  Trachea midline, Full AROM, supple  Lungs:  Clear to auscultation throughout, no wheezes, rhonchi or rales. Normal respiratory effort and no intercostal retractions.  C/V:  Regular rate and rhythm, no murmurs, rubs or gallops.  No JVD.   Abdomen:  Not distended.  No tenderness, no hepatosplenomegaly or masses.    Neuro: Alert and oriented, face symmetric. Able to get on/off exam table without assistance.  Strength grossly intact. Gait steady.   Skin:   Normal temperature., turgor and texture. No rashes, suspicious lesions, or jaundice on exposed skin surfaces.   Extremities:  No peripheral edema, no digital cyanosis  Psych:  Alert and oriented. Appropriate affect.  Not psychomotor slowed.  No signs of anxiety or agitation.      DIAGNOSTICS:   The following, pertinent test results were reviewed:    EKG:NSR w/ low voltage QRS. Appears similar to prior EKGs w/ exception of low  Voltage QRS  Dr. Bronson (staff) reviewed the EKG and agrees " with the reading as documented by the resident.      IMPRESSION:     The proposed surgical procedure is considered to be low risk.  Patient has an intermediate cardiovascular risk (RCRI 1 point given h/o microvascular angina and non-obstructive CAD) and a low pulmonary risk profile (ARISCAT score of 3 points)        The patient has the following additional risks for perioperative complications:  ~6.0 % 30-day risk of death, MI, or cardiac arrest given cardiac history.    Diagnoses for the visit today:  Belen was seen today for pre-op exam.    Diagnoses and all orders for this visit:    Pre-op exam  -     EKG 12-lead complete w/read - Clinics  -     BNP-N terminal pro; Future    Pain in both feet  -     Orthopedic  Referral; Future    Microvascular angina (H)  -     EKG 12-lead complete w/read - Clinics    Coronary artery disease of native artery of native heart with stable angina pectoris (H)    Stable angina (H)    Other heart failure (H)  -     BNP-N terminal pro; Future         RECOMMENDATIONS:     Belen Sharma may proceed with proposed procedure, with the following diagnositic tests and/or specialist consultation(s):  Recommend checking NT-proBNP at this visit; if >= 300 ng/L, patient should be monitored w/ serial EKGs and Troponins in the PACU      Instructions for home medications bozena-operatively discussed with patient verbally and in writing  Continue ASA (PMID [id number], PMID [id number])  Continue other medications as prescribed  Recommend taking medications along for procedure     Routine follow up with the patient's primary care provider is advised    Patient staffed with Dr. Shun Bronson.    Matias Phoenix MD  Internal Medicine, PGY3

## 2023-01-27 ENCOUNTER — LAB (OUTPATIENT)
Dept: LAB | Facility: CLINIC | Age: 63
End: 2023-01-27
Payer: MEDICARE

## 2023-01-27 DIAGNOSIS — N39.0 RECURRENT UTI: ICD-10-CM

## 2023-01-27 LAB
ALBUMIN UR-MCNC: NEGATIVE MG/DL
APPEARANCE UR: CLEAR
BACTERIA #/AREA URNS HPF: ABNORMAL /HPF
BILIRUB UR QL STRIP: NEGATIVE
COLOR UR AUTO: YELLOW
GLUCOSE UR STRIP-MCNC: NEGATIVE MG/DL
HGB UR QL STRIP: ABNORMAL
KETONES UR STRIP-MCNC: NEGATIVE MG/DL
LEUKOCYTE ESTERASE UR QL STRIP: ABNORMAL
NITRATE UR QL: NEGATIVE
PH UR STRIP: 6.5 [PH] (ref 5–8)
RBC #/AREA URNS AUTO: ABNORMAL /HPF
SP GR UR STRIP: 1.01 (ref 1–1.03)
SQUAMOUS #/AREA URNS AUTO: ABNORMAL /LPF
UROBILINOGEN UR STRIP-ACNC: 0.2 E.U./DL
WBC #/AREA URNS AUTO: ABNORMAL /HPF

## 2023-01-27 PROCEDURE — 87186 SC STD MICRODIL/AGAR DIL: CPT

## 2023-01-27 PROCEDURE — 81001 URINALYSIS AUTO W/SCOPE: CPT

## 2023-01-27 PROCEDURE — 87086 URINE CULTURE/COLONY COUNT: CPT

## 2023-01-27 PROCEDURE — 87088 URINE BACTERIA CULTURE: CPT

## 2023-01-30 ENCOUNTER — DOCUMENTATION ONLY (OUTPATIENT)
Dept: INFECTIOUS DISEASES | Facility: CLINIC | Age: 63
End: 2023-01-30
Payer: MEDICARE

## 2023-01-30 LAB — BACTERIA UR CULT: ABNORMAL

## 2023-01-30 NOTE — PROGRESS NOTES
The UA and Ucx were done for pre-operative reasons.   The patient has no signs or symptoms to suggest UTI.   The Staph epidermidis is a skin bacteria not known to cause UTI especiually in a patient who's asymptomatic and has no alvarez catheter.   No treatment is indicated and from ID perspective, would proceed with the planned procedures.     Catrachita Gonzales MD

## 2023-01-31 ENCOUNTER — ANCILLARY PROCEDURE (OUTPATIENT)
Dept: GENERAL RADIOLOGY | Facility: CLINIC | Age: 63
End: 2023-01-31
Attending: PODIATRIST
Payer: MEDICARE

## 2023-01-31 ENCOUNTER — TELEPHONE (OUTPATIENT)
Dept: UROLOGY | Facility: CLINIC | Age: 63
End: 2023-01-31

## 2023-01-31 ENCOUNTER — OFFICE VISIT (OUTPATIENT)
Dept: PODIATRY | Facility: CLINIC | Age: 63
End: 2023-01-31
Payer: MEDICARE

## 2023-01-31 ENCOUNTER — MYC MEDICAL ADVICE (OUTPATIENT)
Dept: INTERNAL MEDICINE | Facility: CLINIC | Age: 63
End: 2023-01-31

## 2023-01-31 VITALS — DIASTOLIC BLOOD PRESSURE: 50 MMHG | HEART RATE: 82 BPM | SYSTOLIC BLOOD PRESSURE: 110 MMHG | RESPIRATION RATE: 16 BRPM

## 2023-01-31 DIAGNOSIS — M79.672 PAIN IN BOTH FEET: ICD-10-CM

## 2023-01-31 DIAGNOSIS — M79.671 PAIN IN BOTH FEET: ICD-10-CM

## 2023-01-31 DIAGNOSIS — M20.41 HAMMERTOE, BILATERAL: Primary | ICD-10-CM

## 2023-01-31 DIAGNOSIS — M20.42 HAMMERTOE, BILATERAL: Primary | ICD-10-CM

## 2023-01-31 PROCEDURE — 73630 X-RAY EXAM OF FOOT: CPT | Mod: LT | Performed by: PODIATRIST

## 2023-01-31 PROCEDURE — 73630 X-RAY EXAM OF FOOT: CPT | Mod: RT | Performed by: PODIATRIST

## 2023-01-31 PROCEDURE — 99203 OFFICE O/P NEW LOW 30 MIN: CPT | Performed by: PODIATRIST

## 2023-01-31 ASSESSMENT — PAIN SCALES - GENERAL: PAINLEVEL: WORST PAIN (10)

## 2023-01-31 NOTE — TELEPHONE ENCOUNTER
Call center called with pt on the line asking to speak with an RN about upcoming UDS and cysto. Writer noticed UC is positive. Informing Gricelda.

## 2023-01-31 NOTE — LETTER
1/31/2023         RE: Belen Sharma  [address] [place] [address]        Dear Colleague,    Thank you for referring your patient, Belen Sharma, to the [facility]. Please see a copy of my visit note below.          FOOT AND ANKLE SURGERY/PODIATRY CONSULT NOTE         ASSESSMENT:   Hammertoe bilateral feet  Joint Contracture MPJ's bilateral feet       TREATMENT:  -I reviewed the patient's x-rays which indicate fusion of the 1st MPJ bilateral with plate/screw fixation. There is also screw fixation along distal 2nd and 3rd metatarsals bilateral.     -I discussed with the patient that on exam, there is abduction of digits 2,3 on both feet, R>L, no pain noted today.     -Based on the above, I am not convinced that her toe pain is contributing to her knee and hip discomfort. Discussed that I would not recommend Weil procedure for repair of joint contractures.     -I have referred her to Dr. Bush for a second opinion.     -Also referred for CREST pad for offloading of her toes per patient request.     -Patient's questions invited and answered. She was encouraged to call my office with any further questions or concerns.     Nasir Crow DPM  [facility]/Foot & Ankle Surgery      HPI: I was asked to see Belen Sharma today for contracted toes on both feet. The patient states that she has bilateral knee and hip pain which has developed over the past several months. She attributes this type of pain to abduction of her toes on both feet. She underwent 1st MPJ fusion the right foot in 2018 and left foot in 2019 along with distal metatarsal osteotomies of 2nd, 3rd metatarsals. Denies pain in her feet including when walking.       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 Both, very large, native kidneys reamain.     PONV (postoperative nausea and vomiting)     Need to start with ice chips and apple juice, no soda     S/P kidney transplant     SLK 10/9/2010 - kidney failure 2/2 [facility]. Explanted 2012. Re-transplant after de-sensitization 2016     S/P liver transplant (H)     10/9/2010 - HAT, ischemic biopathy. Re-transplant 3/3/2011     S/P splenectomy      Spider veins 2019     Thyroid disease     Hyperthyroid treated with single dose iodine     Urinary tract infection 09/2022         Social History     Socioeconomic History     Marital status:      Spouse name: Not on file     Number of children: Not on file     Years of education: Not on file     Highest education level: Not on file   Occupational History     Not on file   Tobacco Use     Smoking status: Never     Smokeless tobacco: Never   Substance and Sexual Activity     Alcohol use: Not Currently     Drug use: Not Currently     Sexual activity: Not Currently   Other Topics Concern     Parent/sibling w/ CABG, MI or angioplasty before 65F 55M? Not Asked   Social History Narrative     Not on file     Social Determinants of Health     Financial Resource Strain: Not on file   Food Insecurity: Not on file   Transportation Needs: Not on file   Physical Activity: Not on file   Stress: Not on file   Social Connections: Not on file   Intimate Partner Violence: Not on file   Housing Stability: Not on file            Allergies   Allergen Reactions     Ibuprofen      Due to liver transplant         MEDICATIONS:   Current Outpatient Medications   Medication     acetaminophen (TYLENOL) 325 MG tablet     aspirin (ASA) 81 MG chewable tablet     atorvastatin (LIPITOR) 10 MG tablet     atorvastatin (LIPITOR) 10 MG tablet     biotin 1000 MCG TABS tablet      ciclopirox (PENLAC) 8 % external solution     diphenhydrAMINE (BENADRYL) 25 MG tablet     estradiol (ESTRACE) 0.1 MG/GM vaginal cream     guaiFENesin (MUCINEX) 600 MG 12 hr tablet     Multiple Vitamins-Minerals (WOMENS DAILY FORMULA PO)     mycophenolic acid (GENERIC EQUIVALENT) 180 MG EC tablet     nitroGLYcerin (NITROSTAT) 0.4 MG sublingual tablet     predniSONE (DELTASONE) 5 MG tablet     Probiotic Product (PROBIOTIC PO)     sodium bicarbonate 650 MG tablet     sulfamethoxazole-trimethoprim (BACTRIM) 400-80 MG tablet     tacrolimus (GENERIC EQUIVALENT) 0.5 MG capsule     tacrolimus (GENERIC EQUIVALENT) 1 MG capsule     temazepam (RESTORIL) 15 MG capsule     No current facility-administered medications for this visit.        Family History   Problem Relation Age of Onset     Depression Mother      Anxiety Disorder Mother      Depression Father      Anxiety Disorder Father      Uterine Cancer Maternal Grandmother      Polycystic Kidney Diease Brother      Polycystic Kidney Diease Brother      Polycystic Kidney Diease Brother      Polycystic Kidney Diease Brother      Cervical Cancer Maternal Aunt      Glaucoma No family hx of      Macular Degeneration No family hx of           Review of Systems - 10 point Review of Systems is negative except for foot pain which is noted in HPI.    OBJECTIVE:  Appearance: alert, well appearing, and in no distress.    VITAL SIGNS: /50   Pulse 82   Resp 16       General appearance: Patient is alert and fully cooperative with history & exam.  No sign of distress is noted during the visit.     Psychiatric: Affect is pleasant & appropriate.  Patient appears motivated to improve health.     Respiratory: Breathing is regular & unlabored while sitting.     HEENT: Hearing is intact to spoken word.  Speech is clear.  No gross evidence of visual impairment that would impact ambulation.      Vascular: Dorsalis pedis palpable. There is pedal hair growth bilateral.  CFT < 3 sec from  anterior tibial surface to distal digits bilateral. There is no appreciable edema noted.  Dermatologic: Turgor and texture are within normal limits. No coloration or temperature changes. No primary or secondary lesions noted.  Neurologic: All epicritic and proprioceptive sensations are grossly intact bilateral.  Musculoskeletal: Abduction of 2nd and 3rd digits at respective MPJ's, R>L. Mild contracture of digits bilateral. Rectus 1st MPJ bilateral.             Again, thank you for allowing me to participate in the care of your patient.        Sincerely,        Nasir Crow DPM

## 2023-01-31 NOTE — PATIENT INSTRUCTIONS
Please call [facility] and schedule an appointment to see Dr. King Bush @[phone]    Crest Pads  relieve forefoot pain and pressure on the tips of the toes.    You can purchase a Crest Pad at most medical supply stores or online.

## 2023-01-31 NOTE — PROGRESS NOTES
FOOT AND ANKLE SURGERY/PODIATRY CONSULT NOTE         ASSESSMENT:   Hammertoe bilateral feet  Joint Contracture MPJ's bilateral feet       TREATMENT:  -I reviewed the patient's x-rays which indicate fusion of the 1st MPJ bilateral with plate/screw fixation. There is also screw fixation along distal 2nd and 3rd metatarsals bilateral.     -I discussed with the patient that on exam, there is abduction of digits 2,3 on both feet, R>L, no pain noted today.     -Based on the above, I am not convinced that her toe pain is contributing to her knee and hip discomfort. Discussed that I would not recommend Weil procedure for repair of joint contractures.     -I have referred her to Dr. Bush for a second opinion.     -Also referred for CREST pad for offloading of her toes per patient request.     -Patient's questions invited and answered. She was encouraged to call my office with any further questions or concerns.     Nasir Crow DPM  [facility] Podiatry/Foot & Ankle Surgery      HPI: I was asked to see Belen Sharma today for contracted toes on both feet. The patient states that she has bilateral knee and hip pain which has developed over the past several months. She attributes this type of pain to abduction of her toes on both feet. She underwent 1st MPJ fusion the right foot in 2018 and left foot in 2019 along with distal metatarsal osteotomies of 2nd, 3rd metatarsals. Denies pain in her feet including when walking.       Past Medical History:   Diagnosis Date     Adolescent scoliosis     protruding     BK viremia [id number]     CMV pneumonia (H) 2010     Congenital hepatic fibrosis      Endometriosis      High grade squamous intraepithelial lesion of cervix 09/11/2020     History of angina      HPV (human papilloma virus) infection      Immunosuppression (H)      Polycystic kidney disease     Polycystic kidneys, tx kidney on the right. Both, very large, native kidneys reamain.     PONV (postoperative nausea  and vomiting)     Need to start with ice chips and apple juice, no soda     S/P kidney transplant     SLK 10/9/2010 - kidney failure 2/2 [facility]. Explanted 2012. Re-transplant after de-sensitization 2016     S/P liver transplant (H)     10/9/2010 - HAT, ischemic biopathy. Re-transplant 3/3/2011     S/P splenectomy      Spider veins 2019     Thyroid disease     Hyperthyroid treated with single dose iodine     Urinary tract infection 09/2022         Social History     Socioeconomic History     Marital status:      Spouse name: Not on file     Number of children: Not on file     Years of education: Not on file     Highest education level: Not on file   Occupational History     Not on file   Tobacco Use     Smoking status: Never     Smokeless tobacco: Never   Substance and Sexual Activity     Alcohol use: Not Currently     Drug use: Not Currently     Sexual activity: Not Currently   Other Topics Concern     Parent/sibling w/ CABG, MI or angioplasty before 65F 55M? Not Asked   Social History Narrative     Not on file     Social Determinants of Health     Financial Resource Strain: Not on file   Food Insecurity: Not on file   Transportation Needs: Not on file   Physical Activity: Not on file   Stress: Not on file   Social Connections: Not on file   Intimate Partner Violence: Not on file   Housing Stability: Not on file            Allergies   Allergen Reactions     Ibuprofen      Due to liver transplant         MEDICATIONS:   Current Outpatient Medications   Medication     acetaminophen (TYLENOL) 325 MG tablet     aspirin (ASA) 81 MG chewable tablet     atorvastatin (LIPITOR) 10 MG tablet     atorvastatin (LIPITOR) 10 MG tablet     biotin 1000 MCG TABS tablet     ciclopirox (PENLAC) 8 % external solution     diphenhydrAMINE (BENADRYL) 25 MG tablet     estradiol (ESTRACE) 0.1 MG/GM vaginal cream     guaiFENesin (MUCINEX) 600 MG 12 hr tablet     Multiple Vitamins-Minerals (WOMENS DAILY FORMULA PO)     mycophenolic acid  (GENERIC EQUIVALENT) 180 MG EC tablet     nitroGLYcerin (NITROSTAT) 0.4 MG sublingual tablet     predniSONE (DELTASONE) 5 MG tablet     Probiotic Product (PROBIOTIC PO)     sodium bicarbonate 650 MG tablet     sulfamethoxazole-trimethoprim (BACTRIM) 400-80 MG tablet     tacrolimus (GENERIC EQUIVALENT) 0.5 MG capsule     tacrolimus (GENERIC EQUIVALENT) 1 MG capsule     temazepam (RESTORIL) 15 MG capsule     No current facility-administered medications for this visit.        Family History   Problem Relation Age of Onset     Depression Mother      Anxiety Disorder Mother      Depression Father      Anxiety Disorder Father      Uterine Cancer Maternal Grandmother      Polycystic Kidney Diease Brother      Polycystic Kidney Diease Brother      Polycystic Kidney Diease Brother      Polycystic Kidney Diease Brother      Cervical Cancer Maternal Aunt      Glaucoma No family hx of      Macular Degeneration No family hx of           Review of Systems - 10 point Review of Systems is negative except for foot pain which is noted in HPI.    OBJECTIVE:  Appearance: alert, well appearing, and in no distress.    VITAL SIGNS: /50   Pulse 82   Resp 16       General appearance: Patient is alert and fully cooperative with history & exam.  No sign of distress is noted during the visit.     Psychiatric: Affect is pleasant & appropriate.  Patient appears motivated to improve health.     Respiratory: Breathing is regular & unlabored while sitting.     HEENT: Hearing is intact to spoken word.  Speech is clear.  No gross evidence of visual impairment that would impact ambulation.      Vascular: Dorsalis pedis palpable. There is pedal hair growth bilateral.  CFT < 3 sec from anterior tibial surface to distal digits bilateral. There is no appreciable edema noted.  Dermatologic: Turgor and texture are within normal limits. No coloration or temperature changes. No primary or secondary lesions noted.  Neurologic: All epicritic and  proprioceptive sensations are grossly intact bilateral.  Musculoskeletal: Abduction of 2nd and 3rd digits at respective MPJ's, R>L. Mild contracture of digits bilateral. Rectus 1st MPJ bilateral.

## 2023-02-02 ENCOUNTER — ALLIED HEALTH/NURSE VISIT (OUTPATIENT)
Dept: UROLOGY | Facility: CLINIC | Age: 63
End: 2023-02-02
Payer: MEDICARE

## 2023-02-02 ENCOUNTER — ANCILLARY PROCEDURE (OUTPATIENT)
Dept: RADIOLOGY | Facility: AMBULATORY SURGERY CENTER | Age: 63
End: 2023-02-02
Attending: PHYSICIAN ASSISTANT
Payer: MEDICARE

## 2023-02-02 ENCOUNTER — OFFICE VISIT (OUTPATIENT)
Dept: UROLOGY | Facility: CLINIC | Age: 63
End: 2023-02-02
Payer: MEDICARE

## 2023-02-02 VITALS
BODY MASS INDEX: 20.76 KG/M2 | HEIGHT: 70 IN | SYSTOLIC BLOOD PRESSURE: 95 MMHG | DIASTOLIC BLOOD PRESSURE: 57 MMHG | WEIGHT: 145 LBS | HEART RATE: 77 BPM

## 2023-02-02 DIAGNOSIS — N39.0 RECURRENT UTI: Primary | ICD-10-CM

## 2023-02-02 DIAGNOSIS — R39.89 URINARY PROBLEM: ICD-10-CM

## 2023-02-02 DIAGNOSIS — Z94.0 KIDNEY REPLACED BY TRANSPLANT: ICD-10-CM

## 2023-02-02 DIAGNOSIS — M62.89 PELVIC FLOOR DYSFUNCTION: ICD-10-CM

## 2023-02-02 DIAGNOSIS — N81.4 UTERINE PROLAPSE: ICD-10-CM

## 2023-02-02 DIAGNOSIS — D84.9 IMMUNOSUPPRESSION (H): ICD-10-CM

## 2023-02-02 DIAGNOSIS — N81.11 CYSTOCELE, MIDLINE: ICD-10-CM

## 2023-02-02 DIAGNOSIS — Z94.4 LIVER REPLACED BY TRANSPLANT (H): ICD-10-CM

## 2023-02-02 PROCEDURE — 52000 CYSTOURETHROSCOPY: CPT | Performed by: UROLOGY

## 2023-02-02 PROCEDURE — 51728 CYSTOMETROGRAM W/VP: CPT | Performed by: PHYSICIAN ASSISTANT

## 2023-02-02 PROCEDURE — 51600 INJECTION FOR BLADDER X-RAY: CPT | Mod: 51 | Performed by: PHYSICIAN ASSISTANT

## 2023-02-02 PROCEDURE — 51784 ANAL/URINARY MUSCLE STUDY: CPT | Mod: 51 | Performed by: PHYSICIAN ASSISTANT

## 2023-02-02 PROCEDURE — 51741 ELECTRO-UROFLOWMETRY FIRST: CPT | Mod: 51 | Performed by: PHYSICIAN ASSISTANT

## 2023-02-02 PROCEDURE — 99213 OFFICE O/P EST LOW 20 MIN: CPT | Mod: 25 | Performed by: UROLOGY

## 2023-02-02 PROCEDURE — 51797 INTRAABDOMINAL PRESSURE TEST: CPT | Performed by: PHYSICIAN ASSISTANT

## 2023-02-02 PROCEDURE — 74430 CONTRAST X-RAY BLADDER: CPT | Performed by: PHYSICIAN ASSISTANT

## 2023-02-02 PROCEDURE — 99207 PR NO CHARGE INJECTABLE MED/DRUG: CPT | Performed by: PHYSICIAN ASSISTANT

## 2023-02-02 RX ORDER — SULFAMETHOXAZOLE/TRIMETHOPRIM 800-160 MG
1 TABLET ORAL ONCE
Status: COMPLETED | OUTPATIENT
Start: 2023-02-02 | End: 2023-02-02

## 2023-02-02 RX ORDER — IOPAMIDOL 510 MG/ML
125 INJECTION, SOLUTION INTRAVASCULAR ONCE
Status: COMPLETED | OUTPATIENT
Start: 2023-02-02 | End: 2023-02-02

## 2023-02-02 RX ADMIN — IOPAMIDOL 125 ML: 510 INJECTION, SOLUTION INTRAVASCULAR at 10:15

## 2023-02-02 RX ADMIN — SULFAMETHOXAZOLE AND TRIMETHOPRIM 1 TABLET: 800; 160 TABLET ORAL at 10:15

## 2023-02-02 ASSESSMENT — PAIN SCALES - GENERAL: PAINLEVEL: NO PAIN (0)

## 2023-02-02 NOTE — PATIENT INSTRUCTIONS
"Increase the vaginal estrogen dose to large blueberry size amount    Websites with free information:    [employer] patient website: www.voicesforpfd.org    Total Control Program: www.totalcontrolprogram.com    Supplements to prevent UTI to consider  -Probiotics  -Cranberry (for these products let them know a doctor is recommending them)   Ellura: [email or web address].[email or web address]   Theracran HP by [employer] [facility] [id number]  -d-mannose 2gm daily  -Vitamin C 500-1000mg twice a day    Please see one of the dedicated pelvic floor physical therapist. If you have not heard from the scheduling office within 2 business days, please call [phone] for [email or web address]    Please return to see me in 3 months, sooner if needed    It was a pleasure meeting with you today.  Thank you for allowing me and my team the privilege of caring for you today.  YOU are the reason we are here, and I truly hope we provided you with the excellent service you deserve.  Please let us know if there is anything else we can do for you so that we can be sure you are leaving completely satisfied with your care experience.    AFTER YOUR CYSTOSCOPY        You have just completed a cystoscopy, or \"cysto\", which allowed your physician to learn more about your bladder (or to remove a stent placed after surgery). We suggest that you continue to avoid caffeine, fruit juice, and alcohol for the next 24 hours, however, you are encouraged to return to your normal activities.         A few things that are considered normal after your cystoscopy:     * Small amount of bleeding (or spotting) that clears within the next 24 hours     * Slight burning sensation with urination     * Sensation to of needing to avoid more frequently     * The feeling of \"air\" in your urine     * Mild discomfort that is relieved with Tylenol        Please contact our office promptly if you:     * Develop a fever above 101 degrees     * Are unable to urinate     * Develop bright red " blood that does not stop     * Severe pain or swelling         Please contact our office with any concerns or questions @[phone]

## 2023-02-02 NOTE — PROGRESS NOTES
No chief complaint on file.      not currently breastfeeding. There is no height or weight on file to calculate BMI.    Patient Active Problem List   Diagnosis     S/P splenectomy     Congenital hepatic fibrosis     Polycystic kidney disease     Immunosuppression (H)     Liver replaced by transplant (H)     Endometriosis     HPV (human papilloma virus) infection     JANNETTE III with severe dysplasia     Acquired bilateral hammer toes     Candidiasis of vagina     Cervical high risk human papillomavirus (HPV) DNA test positive     Chicken pox     Cystitis     Depression     Depressive psychosis (H)     Exercise counseling     Dermatophytosis of nail     Hallux valgus, acquired, bilateral     Incisional hernia following transplant     Knee pain     Malignant neoplasm of breast (H)     Microvascular angina (H)     Postmenopausal state     Secondary hyperparathyroidism (H)     Scoliosis deformity of spine     Sinusitis     Swelling of first metatarsophalangeal (MTP) joint     Uterine prolapse     Xerosis of skin     Kidney replaced by transplant     Recurrent UTI     Lumbar pain     ESBL (extended spectrum beta-lactamase) producing bacteria infection     Osteopenia of multiple sites     Pyelonephritis     Sepsis, due to unspecified organism, unspecified whether acute organ dysfunction present (H)     Combined forms of age-related cataract of both eyes     Aftercare following organ transplant     Hammertoe, bilateral       Allergies   Allergen Reactions     Ibuprofen      Due to liver transplant       Current Outpatient Medications   Medication Sig Dispense Refill     acetaminophen (TYLENOL) 325 MG tablet Take 1 tablet (325 mg) by mouth every 4 hours as needed for mild pain or fever 120 tablet 1     aspirin (ASA) 81 MG chewable tablet Take 81 mg by mouth every evening Stopped 7 days preop       atorvastatin (LIPITOR) 10 MG tablet Take 1 tablet (10 mg) by mouth daily 90 tablet 3     atorvastatin (LIPITOR) 10 MG tablet Take 1  tablet (10 mg) by mouth daily 90 tablet 1     biotin 1000 MCG TABS tablet Take 3,000 mcg by mouth every evening       ciclopirox (PENLAC) 8 % external solution Apply to adjacent skin and affected nails daily.  Remove with alcohol every 7 days, then repeat. 6 mL 3     diphenhydrAMINE (BENADRYL) 25 MG tablet Take 25 mg by mouth every 8 hours as needed for allergies       estradiol (ESTRACE) 0.1 MG/GM vaginal cream Place 1 g vaginally twice a week 42.5 g 2     guaiFENesin (MUCINEX) 600 MG 12 hr tablet Take 1 tablet by mouth 2 times daily as needed       Multiple Vitamins-Minerals (WOMENS DAILY FORMULA PO) Take 1 tablet by mouth daily       mycophenolic acid (GENERIC EQUIVALENT) 180 MG EC tablet Take 3 tablets (540 mg) by mouth 2 times daily for 90 days 540 tablet 3     nitroGLYcerin (NITROSTAT) 0.4 MG sublingual tablet Place 1 tablet (0.4 mg) under the tongue every 5 minutes as needed for chest pain For chest pain place 1 tablet under the tongue every 5 minutes for 3 doses. If symptoms persist 5 minutes after 1st dose call 911. 30 tablet 1     predniSONE (DELTASONE) 5 MG tablet Take 1 tablet (5 mg) by mouth daily 90 tablet 3     Probiotic Product (PROBIOTIC PO) Take 2 tablets by mouth every morning ([facility] Women's Vaginal Probiotic)       sodium bicarbonate 650 MG tablet Take 1 tablet (650 mg) by mouth daily 60 tablet 3     sulfamethoxazole-trimethoprim (BACTRIM) 400-80 MG tablet Take 1 tablet by mouth three times a week on Monday, Wednesday, Friday       tacrolimus (GENERIC EQUIVALENT) 0.5 MG capsule Take 1 capsule (0.5 mg) by mouth 2 times daily Total dose = 1.5 mg  capsule 3     tacrolimus (GENERIC EQUIVALENT) 1 MG capsule Take 1 capsule (1 mg) by mouth 2 times daily Total dose = 1.5 mg  capsule 3     temazepam (RESTORIL) 15 MG capsule Take 1 capsule (15 mg) by mouth as needed for sleep (twice a year) (Patient not taking: Reported on 1/31/2023) 15 capsule 0       Social History     Tobacco Use      Smoking status: Never     Smokeless tobacco: Never   Substance Use Topics     Alcohol use: Not Currently     Drug use: Not Currently       Invasive Procedure Safety Checklist:    Procedure: Cystoscopy    Action: Complete sections and checkboxes as appropriate.    Pre-procedure:  1. Patient ID Verified with 2 identifiers (Dee and  or MRN) : YES    2. Procedure and site verified with patient/designee (when able) : YES    3. Accurate consent documentation in medical record : YES    4. H&P (or appropriate assessment) documented in medical record : N/A  H&P must be up to 30 days prior to procedure an updated within 24 hours of                 Procedure as applicable.     5. Relevant diagnostic and radiology test results appropriately labeled and displayed as applicable : YES    6. Blood products, implants, devices, and/or special equipment available for the procedure as applicable : YES    7. Procedure site(s) marked with provider initials [Exclusions: none] : NO    8. Marking not required. Reason : Yes  Procedure does not require site marking    Time Out:     Time-Out performed immediately prior to starting procedure, including verbal and active participation of all team members addressing: YES    1. Correct patient identity.  2. Confirmed that the correct side and site are marked.  3. An accurate procedure to be done.  4. Agreement on the procedure to be done.  5. Correct patient position.  6. Relevant images and results are properly labeled and appropriately displayed.  7. The need to administer antibiotics or fluids for irrigation purposes during the procedure as applicable.  8. Safety precautions based on patient history or medication use.    During Procedure: Verification of correct person, site, and procedure occurs any time the responsibility for care of the patient is transferred to another member of the care team.      Susan Aviles  2023

## 2023-02-02 NOTE — PROGRESS NOTES
PREPROCEDURE DIAGNOSES:    1. Recurrent UTI  2. PCKD s/p DDKT x 2  3. Pelvic organ prolapse, managed with pessary    POSTPROCEDURE DIAGNOSES:  -Maximum cystometric capacity 580 mL with delayed/diminished filling sensations (FS: not reported, FD: 473 mL, SD: 550 mL).  -Good bladder compliance without detrusor overactivity or stress incontinence.  -Maximum detrusor contraction during voiding reaches 37 cm H2O.She voids 577 mL with Qmax 29 ml/s, bell-shape flow curve, mildly increased EMG activity, no evidence for bladder outlet obstruction (BOOI -48), and near complete bladder emptying (PVR 3 mL).  -Fluoroscopy reveals a mildly trabeculated bladder wall without diverticuli or VUR. The bladder neck is closed during filling; voiding images unavailable as patient unable to void under fluoroscopy.     PROCEDURE:    1. Sterile urethral catheterization for measurement of residual urine volume.  2. Complex filling cystometrogram with measurement of bladder and rectal pressures.  3. Complex voiding cystometrogram with measurement of bladder and rectal pressures.  4. Electromyography of the pelvic floor during urodynamics.  5. Fluoroscopic imaging of the bladder during urodynamics, at least 3 views.    6. Interpretation of urodynamics and flouroscopic imaging.      INDICATIONS FOR PROCEDURE:    Belen Sharma is a pleasant 62 year old adult with PMH of PCKD x 2, congenital hepatic fibrosis s/p liver transplant, pelvic organ prolapse managed with pessary, and recurrent UTIs. Baseline video urodynamic assessment is requested today by Rocio Brewer CNP to better characterize Belen Sharma's voiding dysfunction.      VOIDING DIARY:  Patient did not complete.     DESCRIPTION OF PROCEDURE:  Risks, benefits, and alternatives to urodynamics were discussed with the patient and Belen Sharma wished to proceed.  Urodynamics are planned to better assess the primary etiology for   Zachary's urologic dysfunction.  The patient does not currently take  anticholinergic or beta 3 agonist medications for her bladder.  After informed consent was obtained, the patient was taken to the procedure room where uroflowmetry was performed. Findings below.     PRE-STUDY UROFLOWMETRY:  Not performed as patient had no urge to void.  Residual by catheter: 160 mL.  Pretest urine dipstick was not performed per clinician discretion. Patient had a UA/UC on 1/27/23 showing mild pyuria and >100K Staph epidermidis. Patient currently asymptomatic without any signs/symptoms of UTI. Extensive conversation was had with patient and her Infectious Disease physician, Dr. Gonzales, yesterday regarding need for antibiotic prophylaxis around today's urologic procedures. Dr. Gonzales felt antibiotics were not warranted. Patient comfortable with this plan as well. Urine dipstick analysis not repeated today given that she remains asymptomatic.     Next a 7F double-lumen urodynamics catheter was inserted into the bladder under sterile technique via urethra.  A 7F abdominal manometry catheter was placed in the rectum.  EMG pads were placed on both sides of the anal verge.  The bladder was filled with 125 mL of Isovue-250 at 50 mL/minute and serial pressures were recorded.  With coughing there was an appropriate rise in vesical and abdominal pressures with no change in detrusor pressure, confirming good study catheter placement.    DURING THE FILLING PHASE:  First sensation: not reported.  First Desire: 473 mL.  Strong Desire: 550 mL.  Maximum Capacity: 577 mL.    Uninhibited detrusor contractions: none.  Compliance: good. PDet essentially 0 cm H2O throughout filling.  Continence: no DOI or CAMI.  EMG: concordant during filling.    DURING THE VOIDING PHASE:  Maximum detrusor contraction with void: 37 cm of H2O pressure.  Voided volume: 577 mL.  Maximum flow rate: 29 mL/sec.  Average flow rate: 9.7 mL/sec.  Postvoid Residual: 3 mL.  EMG activity: mildly increased.  Character of voiding curve: bell-shape with  few intermittent peaks.  BOOI: -48.0 (suggesting no obstruction - see key below)  [obstructed (ROWAN index [BOOI] ? 40); equivocal (no definite   obstruction; BOOI 20-40); and no obstruction (BOOI ? 20)]    FLUOROSCOPIC IMAGING OF THE BLADDER DURING URODYNAMICS:  Please note, image numbers on UDS tracings correlate with iSite series numbers on PACS images. Fluoroscopy during today's procedure demonstrated a mildly trabeculated bladder wall without diverticulae or cellules.  No vesicoureteral reflux was observed.  The bladder neck was closed during filling; voiding images unavailable as patient transferred to [facility] to void.  After voiding to completion, all catheters were removed.      ASSESSMENT/PLAN:    Belen Sharma is a pleasant 62 year old adult with recurrent UTI who demonstrated the following findings today on urodynamic evaluation:    -Maximum cystometric capacity 580 mL with delayed/diminished filling sensations (FS: not reported, FD: 473 mL, SD: 550 mL).  -Good bladder compliance without detrusor overactivity or stress incontinence.  -Maximum detrusor contraction during voiding reaches 37 cm H2O.She voids 577 mL with Qmax 29 ml/s, bell-shape flow curve, mildly increased EMG activity, no evidence for bladder outlet obstruction (BOOI -48), and near complete bladder emptying (PVR 3 mL).  -Fluoroscopy reveals a mildly trabeculated bladder wall without diverticuli or VUR. The bladder neck is closed during filling; voiding images unavailable as patient unable to void under fluoroscopy.     The patient will follow up as scheduled with Dr. Ferrara for cystoscopy and to further discuss today's study results and make plans for how best to proceed.      - A single Bactrim antibiotic was provided for UTI prophylaxis following completion of today's study per department protocol.  The risk of UTI with VUDS is low at ~2.5-3%.      Thank you for allowing me to participate in the care of   Belen Sharma and please don't hesitate to  contact me with any questions or concerns.      Sunita Michael PA-C  Urology Physician Assistant

## 2023-02-02 NOTE — LETTER
2/2/2023       RE: Belen Sharma  [address] [place] [address]     Dear Colleague,    Thank you for referring your patient, Belen Sharma, to the [facility] [place] at [facility]. Please see a copy of my visit note below.    No chief complaint on file.      not currently breastfeeding. There is no height or weight on file to calculate BMI.    Patient Active Problem List   Diagnosis     S/P splenectomy     Congenital hepatic fibrosis     Polycystic kidney disease     Immunosuppression (H)     Liver replaced by transplant (H)     Endometriosis     HPV (human papilloma virus) infection     JANNETTE III with severe dysplasia     Acquired bilateral hammer toes     Candidiasis of vagina     Cervical high risk human papillomavirus (HPV) DNA test positive     Chicken pox     Cystitis     Depression     Depressive psychosis (H)     Exercise counseling     Dermatophytosis of nail     Hallux valgus, acquired, bilateral     Incisional hernia following transplant     Knee pain     Malignant neoplasm of breast (H)     Microvascular angina (H)     Postmenopausal state     Secondary hyperparathyroidism (H)     Scoliosis deformity of spine     Sinusitis     Swelling of first metatarsophalangeal (MTP) joint     Uterine prolapse     Xerosis of skin     Kidney replaced by transplant     Recurrent UTI     Lumbar pain     ESBL (extended spectrum beta-lactamase) producing bacteria infection     Osteopenia of multiple sites     Pyelonephritis     Sepsis, due to unspecified organism, unspecified whether acute organ dysfunction present (H)     Combined forms of age-related cataract of both eyes     Aftercare following organ transplant     Hammertoe, bilateral       Allergies   Allergen Reactions     Ibuprofen      Due to liver transplant       Current Outpatient Medications   Medication Sig Dispense Refill     acetaminophen (TYLENOL) 325 MG tablet Take 1 tablet (325 mg) by  mouth every 4 hours as needed for mild pain or fever 120 tablet 1     aspirin (ASA) 81 MG chewable tablet Take 81 mg by mouth every evening Stopped 7 days preop       atorvastatin (LIPITOR) 10 MG tablet Take 1 tablet (10 mg) by mouth daily 90 tablet 3     atorvastatin (LIPITOR) 10 MG tablet Take 1 tablet (10 mg) by mouth daily 90 tablet 1     biotin 1000 MCG TABS tablet Take 3,000 mcg by mouth every evening       ciclopirox (PENLAC) 8 % external solution Apply to adjacent skin and affected nails daily.  Remove with alcohol every 7 days, then repeat. 6 mL 3     diphenhydrAMINE (BENADRYL) 25 MG tablet Take 25 mg by mouth every 8 hours as needed for allergies       estradiol (ESTRACE) 0.1 MG/GM vaginal cream Place 1 g vaginally twice a week 42.5 g 2     guaiFENesin (MUCINEX) 600 MG 12 hr tablet Take 1 tablet by mouth 2 times daily as needed       Multiple Vitamins-Minerals (WOMENS DAILY FORMULA PO) Take 1 tablet by mouth daily       mycophenolic acid (GENERIC EQUIVALENT) 180 MG EC tablet Take 3 tablets (540 mg) by mouth 2 times daily for 90 days 540 tablet 3     nitroGLYcerin (NITROSTAT) 0.4 MG sublingual tablet Place 1 tablet (0.4 mg) under the tongue every 5 minutes as needed for chest pain For chest pain place 1 tablet under the tongue every 5 minutes for 3 doses. If symptoms persist 5 minutes after 1st dose call 911. 30 tablet 1     predniSONE (DELTASONE) 5 MG tablet Take 1 tablet (5 mg) by mouth daily 90 tablet 3     Probiotic Product (PROBIOTIC PO) Take 2 tablets by mouth every morning ([facility] Women's Vaginal Probiotic)       sodium bicarbonate 650 MG tablet Take 1 tablet (650 mg) by mouth daily 60 tablet 3     sulfamethoxazole-trimethoprim (BACTRIM) 400-80 MG tablet Take 1 tablet by mouth three times a week on Monday, Wednesday, Friday       tacrolimus (GENERIC EQUIVALENT) 0.5 MG capsule Take 1 capsule (0.5 mg) by mouth 2 times daily Total dose = 1.5 mg  capsule 3     tacrolimus (GENERIC EQUIVALENT) 1  MG capsule Take 1 capsule (1 mg) by mouth 2 times daily Total dose = 1.5 mg  capsule 3     temazepam (RESTORIL) 15 MG capsule Take 1 capsule (15 mg) by mouth as needed for sleep (twice a year) (Patient not taking: Reported on 2023) 15 capsule 0       Social History     Tobacco Use     Smoking status: Never     Smokeless tobacco: Never   Substance Use Topics     Alcohol use: Not Currently     Drug use: Not Currently       Invasive Procedure Safety Checklist:    Procedure: Cystoscopy    Action: Complete sections and checkboxes as appropriate.    Pre-procedure:  1. Patient ID Verified with 2 identifiers (Dee and  or MRN) : YES    2. Procedure and site verified with patient/designee (when able) : YES    3. Accurate consent documentation in medical record : YES    4. H&P (or appropriate assessment) documented in medical record : N/A  H&P must be up to 30 days prior to procedure an updated within 24 hours of                 Procedure as applicable.     5. Relevant diagnostic and radiology test results appropriately labeled and displayed as applicable : YES    6. Blood products, implants, devices, and/or special equipment available for the procedure as applicable : YES    7. Procedure site(s) marked with provider initials [Exclusions: none] : NO    8. Marking not required. Reason : Yes  Procedure does not require site marking    Time Out:     Time-Out performed immediately prior to starting procedure, including verbal and active participation of all team members addressing: YES    1. Correct patient identity.  2. Confirmed that the correct side and site are marked.  3. An accurate procedure to be done.  4. Agreement on the procedure to be done.  5. Correct patient position.  6. Relevant images and results are properly labeled and appropriately displayed.  7. The need to administer antibiotics or fluids for irrigation purposes during the procedure as applicable.  8. Safety precautions based on patient  history or medication use.    During Procedure: Verification of correct person, site, and procedure occurs any time the responsibility for care of the patient is transferred to another member of the care team.      Susan Aviles  2/2/2023 February 2, 2023    Return visit    Patient returns today for follow up for her UTIs in the setting of being immunosuppressed secondary to liver transplant and kidney transplant x 2.  She also has seen Dr Francis and has her uterine prolapse managed with a pessary.  She doesn't love the pessary but it works well enough and she understands she is at very high surgical risk for any reconstructive prolapse surgery. She denies any changes in her health since last visit.    There were no vitals taken for this visit.  She is comfortable, in no distress, non-labored breathing.  Abdomen is soft, non-tender, non-distended.  Normal external female genitalia.  Negative CST.  Pelvic exam is remarkable for pessary in place.  She is unable localize her pelvic floor musculature    Cystoscopy Note: After informed consent was obtained patient was prepped and draped in the standard fashion.  The flexible cystoscope was inserted into a normal appearing urethral meatus.  The urothelium was carefully examined and there was some irregularity in the trigone on her left side but there was not obvious tumors, masses, stones, foreign bodies, or other urothelial abnormalities noted.  Bilateral ureteral orifices were noted in the normal orthotopic position.  Her transplant UO was not easily seen but brisk clear efflux was noted.  The cystoscope was retroflexed and the bladder neck was unremarkable.  The urethra was carefully examined upon removing the cystoscope and was unremarkable.  Patient tolerated the procedure without complications noted.      VUDS was reviewed.  On my interpretation she has a normal capacity of 580 mL with normal compliance without DO/DOI/USI.  She does not have any VUR on  fluoroscopy    A/P: 62 year old F with history of liver transplant, kidney transplant x2, uterovaginal prolapse managed with a pessary, recurrent UTI and pelvic floor dysfunction    Increase dosage of her vaginal estrogen cream    We discussed how her pelvic floor symptoms are related to the physical exam findings and her pelvic floor myofascial dysfunction.  We discussed how the recommended treatment is dedicated pelvic floor therapy.  We discussed how the pelvic floor physical therapy works and patient is agreeable.  Referral was placed.      Continue to follow with ID for UTI    RTC 3 months to reassess, sooner if needed    20 minutes were spent today on the date of the encounter in reviewing the EMR, direct patient care, coordination of care and documentation in addition to the cystoscopy procedure    Lizzy Ferrara MD MPH  (she/her/hers)   of Urology  [facility]  Patient Care Team:  Vincent De La Garza MD as PCP - General (Internal Medicine)  Christy Mohamud MD as MD (Cardiology)  Елена Dinh RN as Registered Nurse  Christy Mohamud MD as Assigned Heart and Vascular Provider  Leventhal, Thomas Michael, MD as Assigned Gastroenterology Provider  Vincent De La Garza MD as Assigned PCP  Saran Angulo MD as Assigned Nephrology Provider  Daysi Perez MD as MD (OB/Gyn)  Shannon Loza OD (Optometry)  Sapna Marcano MD (Inactive) as MD (Ophthalmology)  Anat Benitez MD as MD (Dermatology)  Pepe Verduzco DPM as Assigned Musculoskeletal Provider  Ida Guthrie RN as Specialty Care Coordinator  Rocio Brewer CNP as Nurse Practitioner (Urology)  Sebastian Robertson MD as Physician (Ophthalmology)  Daysi Perez MD as Assigned OBGYN Provider  Sebastian Robertson MD as Assigned Surgical Provider  Catrachita Goznales MD as Assigned Infectious Disease Provider  SELF, REFERRED

## 2023-02-02 NOTE — NURSING NOTE
"  Chief Complaint   Patient presents with     Urodynamics Study     Recurrent UTI       Blood pressure 95/57, pulse 77, height 1.772 m (5' 9.75\"), weight 65.8 kg (145 lb), not currently breastfeeding. Body mass index is 20.95 kg/m .    Patient Active Problem List   Diagnosis     S/P splenectomy     Congenital hepatic fibrosis     Polycystic kidney disease     Immunosuppression (H)     Liver replaced by transplant (H)     Endometriosis     HPV (human papilloma virus) infection     JANNETTE III with severe dysplasia     Acquired bilateral hammer toes     Candidiasis of vagina     Cervical high risk human papillomavirus (HPV) DNA test positive     Chicken pox     Cystitis     Depression     Depressive psychosis (H)     Exercise counseling     Dermatophytosis of nail     Hallux valgus, acquired, bilateral     Incisional hernia following transplant     Knee pain     Malignant neoplasm of breast (H)     Microvascular angina (H)     Postmenopausal state     Secondary hyperparathyroidism (H)     Scoliosis deformity of spine     Sinusitis     Swelling of first metatarsophalangeal (MTP) joint     Uterine prolapse     Xerosis of skin     Kidney replaced by transplant     Recurrent UTI     Lumbar pain     ESBL (extended spectrum beta-lactamase) producing bacteria infection     Osteopenia of multiple sites     Pyelonephritis     Sepsis, due to unspecified organism, unspecified whether acute organ dysfunction present (H)     Combined forms of age-related cataract of both eyes     Aftercare following organ transplant     Hammertoe, bilateral       Allergies   Allergen Reactions     Ibuprofen      Due to liver transplant       Current Outpatient Medications   Medication Sig Dispense Refill     acetaminophen (TYLENOL) 325 MG tablet Take 1 tablet (325 mg) by mouth every 4 hours as needed for mild pain or fever 120 tablet 1     aspirin (ASA) 81 MG chewable tablet Take 81 mg by mouth every evening Stopped 7 days preop       atorvastatin " (LIPITOR) 10 MG tablet Take 1 tablet (10 mg) by mouth daily 90 tablet 3     atorvastatin (LIPITOR) 10 MG tablet Take 1 tablet (10 mg) by mouth daily 90 tablet 1     biotin 1000 MCG TABS tablet Take 3,000 mcg by mouth every evening       ciclopirox (PENLAC) 8 % external solution Apply to adjacent skin and affected nails daily.  Remove with alcohol every 7 days, then repeat. 6 mL 3     diphenhydrAMINE (BENADRYL) 25 MG tablet Take 25 mg by mouth every 8 hours as needed for allergies       estradiol (ESTRACE) 0.1 MG/GM vaginal cream Place 1 g vaginally twice a week 42.5 g 2     guaiFENesin (MUCINEX) 600 MG 12 hr tablet Take 1 tablet by mouth 2 times daily as needed       Multiple Vitamins-Minerals (WOMENS DAILY FORMULA PO) Take 1 tablet by mouth daily       mycophenolic acid (GENERIC EQUIVALENT) 180 MG EC tablet Take 3 tablets (540 mg) by mouth 2 times daily for 90 days 540 tablet 3     nitroGLYcerin (NITROSTAT) 0.4 MG sublingual tablet Place 1 tablet (0.4 mg) under the tongue every 5 minutes as needed for chest pain For chest pain place 1 tablet under the tongue every 5 minutes for 3 doses. If symptoms persist 5 minutes after 1st dose call 911. 30 tablet 1     predniSONE (DELTASONE) 5 MG tablet Take 1 tablet (5 mg) by mouth daily 90 tablet 3     Probiotic Product (PROBIOTIC PO) Take 2 tablets by mouth every morning ([facility] Women's Vaginal Probiotic)       sodium bicarbonate 650 MG tablet Take 1 tablet (650 mg) by mouth daily 60 tablet 3     sulfamethoxazole-trimethoprim (BACTRIM) 400-80 MG tablet Take 1 tablet by mouth three times a week on Monday, Wednesday, Friday       tacrolimus (GENERIC EQUIVALENT) 0.5 MG capsule Take 1 capsule (0.5 mg) by mouth 2 times daily Total dose = 1.5 mg  capsule 3     tacrolimus (GENERIC EQUIVALENT) 1 MG capsule Take 1 capsule (1 mg) by mouth 2 times daily Total dose = 1.5 mg  capsule 3     temazepam (RESTORIL) 15 MG capsule Take 1 capsule (15 mg) by mouth as needed for  sleep (twice a year) (Patient not taking: Reported on 2023) 15 capsule 0       Social History     Tobacco Use     Smoking status: Never     Smokeless tobacco: Never   Substance Use Topics     Alcohol use: Not Currently     Drug use: Not Currently       Invasive Procedure Safety Checklist:    Procedure: Urodynamics    Action: Complete sections and checkboxes as appropriate.  Pre-procedure:  1. Patient ID Verified with 2 identifiers (Dee and  or MRN) : YES    2. Procedure and site verified with patient/designee (when able) : YES    3. Accurate consent documentation in medical record : YES    4. H&P (or appropriate assessment) documented in medical record : N/A  H&P must be up to 30 days prior to procedure an updated within 24 hours of Procedure as applicable.     5. Relevant diagnostic and radiology test results appropriately labeled and displayed as applicable : YES    6. Blood products, implants, devices, and/or special equipment available for the procedure as applicable : YES    7. Procedure site(s) marked with provider initials [Exclusions: none] : NO    8. Marking not required. Reason : Yes  Procedure does not require site marking    Time Out:     Time-Out performed immediately prior to starting procedure, including verbal and active participation of all team members addressing: YES    1. Correct patient identity.  2. Confirmed that the correct side and site are marked.  3. An accurate procedure to be done.  4. Agreement on the procedure to be done.  5. Correct patient position.  6. Relevant images and results are properly labeled and appropriately displayed.  7. The need to administer antibiotics or fluids for irrigation purposes during the procedure as applicable.  8. Safety precautions based on patient history or medication use.    During Procedure: Verification of correct person, site, and procedure occurs any time the responsibility for care of the patient is transferred to another member of the  care team.    The following medication was given: Sulfamethoxazole / Trimethoprim    MEDICATION:  Bactrim  ROUTE: PO  SITE: Orally  DOSE: 800/160mg  LOT #: [id number]  : [employer]  EXPIRATION DATE: 052024  NDC#: 6900-7392-73   Was there drug waste? No      The following medication was given:     MEDICATION: Isovue-370  ROUTE: Provider Administered  SITE: Provider Administered via catheter  DOSE: 125 mL  LOT #: [id number]  : Elijah  EXPIRATION DATE: 5/31/2025  NDC#: 8513-9924-78  Was there drug waste? No        Mita Le, [facility]  2/2/2023  8:58 AM

## 2023-02-02 NOTE — PROGRESS NOTES
February 2, 2023    Return visit    Patient returns today for follow up for her UTIs in the setting of being immunosuppressed secondary to liver transplant and kidney transplant x 2.  She also has seen Dr Francis and has her uterine prolapse managed with a pessary.  She doesn't love the pessary but it works well enough and she understands she is at very high surgical risk for any reconstructive prolapse surgery. She denies any changes in her health since last visit.    There were no vitals taken for this visit.  She is comfortable, in no distress, non-labored breathing.  Abdomen is soft, non-tender, non-distended.  Normal external female genitalia.  Negative CST.  Pelvic exam is remarkable for pessary in place.  She is unable localize her pelvic floor musculature    Cystoscopy Note: After informed consent was obtained patient was prepped and draped in the standard fashion.  The flexible cystoscope was inserted into a normal appearing urethral meatus.  The urothelium was carefully examined and there was some irregularity in the trigone on her left side but there was not obvious tumors, masses, stones, foreign bodies, or other urothelial abnormalities noted.  Bilateral ureteral orifices were noted in the normal orthotopic position.  Her transplant UO was not easily seen but brisk clear efflux was noted.  The cystoscope was retroflexed and the bladder neck was unremarkable.  The urethra was carefully examined upon removing the cystoscope and was unremarkable.  Patient tolerated the procedure without complications noted.      VUDS was reviewed.  On my interpretation she has a normal capacity of 580 mL with normal compliance without DO/DOI/USI.  She does not have any VUR on fluoroscopy    A/P: 62 year old F with history of liver transplant, kidney transplant x2, uterovaginal prolapse managed with a pessary, recurrent UTI and pelvic floor dysfunction    Increase dosage of her vaginal estrogen cream    We discussed how her  pelvic floor symptoms are related to the physical exam findings and her pelvic floor myofascial dysfunction.  We discussed how the recommended treatment is dedicated pelvic floor therapy.  We discussed how the pelvic floor physical therapy works and patient is agreeable.  Referral was placed.      Continue to follow with ID for UTI    RTC 3 months to reassess, sooner if needed    20 minutes were spent today on the date of the encounter in reviewing the EMR, direct patient care, coordination of care and documentation in addition to the cystoscopy procedure    Lizzy Ferrara MD MPH  (she/her/hers)   of Urology  [facility]  Patient Care Team:  Vincent De La Garza MD as PCP - General (Internal Medicine)  Christy Mohamud MD as MD (Cardiology)  Елена Dinh RN as Registered Nurse  Christy Mohamud MD as Assigned Heart and Vascular Provider  Leventhal, Thomas Michael, MD as Assigned Gastroenterology Provider  Vincent De La Garza MD as Assigned PCP  Saran Angulo MD as Assigned Nephrology Provider  Daysi Perez MD as MD (OB/Gyn)  Shannon Loza, LEANDRA (Optometry)  Sapna Marcano MD (Inactive) as MD (Ophthalmology)  Anat Benitez MD as MD (Dermatology)  Pepe Verduzco DPM as Assigned Musculoskeletal Provider  Ida Guthrie RN as Specialty Care Coordinator  Rocio Brewer CNP as Nurse Practitioner (Urology)  Sebastian Robertson MD as Physician (Ophthalmology)  Daysi Perez MD as Assigned OBGYN Provider  Sebastian Robertson MD as Assigned Surgical Provider  Catrachita Gonzales MD as Assigned Infectious Disease Provider  SELF, REFERRED

## 2023-02-03 ENCOUNTER — TELEPHONE (OUTPATIENT)
Dept: UROLOGY | Facility: CLINIC | Age: 63
End: 2023-02-03
Payer: MEDICARE

## 2023-02-03 NOTE — TELEPHONE ENCOUNTER
Spoke to pt. Pt had office visit yesterday for cysto. She started having loose stools this morning. She has been eating yogurt and scrambled eggs and noted improvement. She also states that her head feels funny. Denies fever, urgency, frequency, flank pain, or hematuria. She had taken Bactrim yesterday for procedure. Discussed how antibiotics can disrupt GI. Pt verbalized understanding. Advised to drink water and limit sugar and caffeine. If no improvement, to contact her primary. Pt verbalized understanding.     Yelitza Simeon, MSN RN

## 2023-02-03 NOTE — TELEPHONE ENCOUNTER
[facility] Call Center    Phone Message    May a detailed message be left on voicemail: yes     Reason for Call: Symptoms or Concerns     If patient has red-flag symptoms, warm transfer to triage line    Current symptom or concern: All over not feeling well, dirreah and thinks UTI    Symptoms have been present for: 12 hour(s)    Has patient previously been seen for this? No    By Dr. Ferrara    Date: 2/2/23    Are there any new or worsening symptoms? Yes: new      Action Taken: Message routed to:  [facility] [place] [facility]    Travel Screening: Not Applicable

## 2023-02-08 ENCOUNTER — ANESTHESIA EVENT (OUTPATIENT)
Dept: SURGERY | Facility: AMBULATORY SURGERY CENTER | Age: 63
End: 2023-02-08
Payer: MEDICARE

## 2023-02-08 RX ORDER — OXYCODONE HYDROCHLORIDE 5 MG/1
5 TABLET ORAL EVERY 4 HOURS PRN
Status: CANCELLED | OUTPATIENT
Start: 2023-02-08

## 2023-02-08 RX ORDER — HYDROMORPHONE HYDROCHLORIDE 1 MG/ML
0.2 INJECTION, SOLUTION INTRAMUSCULAR; INTRAVENOUS; SUBCUTANEOUS EVERY 5 MIN PRN
Status: CANCELLED | OUTPATIENT
Start: 2023-02-08

## 2023-02-08 RX ORDER — FENTANYL CITRATE 50 UG/ML
25 INJECTION, SOLUTION INTRAMUSCULAR; INTRAVENOUS EVERY 5 MIN PRN
Status: CANCELLED | OUTPATIENT
Start: 2023-02-08

## 2023-02-08 RX ORDER — HYDROMORPHONE HYDROCHLORIDE 1 MG/ML
0.4 INJECTION, SOLUTION INTRAMUSCULAR; INTRAVENOUS; SUBCUTANEOUS EVERY 5 MIN PRN
Status: CANCELLED | OUTPATIENT
Start: 2023-02-08

## 2023-02-08 RX ORDER — FENTANYL CITRATE 50 UG/ML
50 INJECTION, SOLUTION INTRAMUSCULAR; INTRAVENOUS EVERY 5 MIN PRN
Status: CANCELLED | OUTPATIENT
Start: 2023-02-08

## 2023-02-08 RX ORDER — ONDANSETRON 4 MG/1
4 TABLET, ORALLY DISINTEGRATING ORAL EVERY 30 MIN PRN
Status: CANCELLED | OUTPATIENT
Start: 2023-02-08

## 2023-02-08 RX ORDER — LABETALOL HYDROCHLORIDE 5 MG/ML
10 INJECTION, SOLUTION INTRAVENOUS
Status: CANCELLED | OUTPATIENT
Start: 2023-02-08

## 2023-02-08 RX ORDER — ONDANSETRON 2 MG/ML
4 INJECTION INTRAMUSCULAR; INTRAVENOUS EVERY 30 MIN PRN
Status: CANCELLED | OUTPATIENT
Start: 2023-02-08

## 2023-02-09 ENCOUNTER — HOSPITAL ENCOUNTER (OUTPATIENT)
Facility: AMBULATORY SURGERY CENTER | Age: 63
Discharge: HOME | End: 2023-02-09
Attending: OPHTHALMOLOGY
Payer: MEDICARE

## 2023-02-09 ENCOUNTER — ANESTHESIA (OUTPATIENT)
Dept: SURGERY | Facility: AMBULATORY SURGERY CENTER | Age: 63
End: 2023-02-09
Payer: MEDICARE

## 2023-02-09 VITALS
WEIGHT: 145 LBS | OXYGEN SATURATION: 95 % | DIASTOLIC BLOOD PRESSURE: 46 MMHG | HEART RATE: 66 BPM | BODY MASS INDEX: 20.76 KG/M2 | TEMPERATURE: 97 F | RESPIRATION RATE: 16 BRPM | HEIGHT: 70 IN | SYSTOLIC BLOOD PRESSURE: 84 MMHG

## 2023-02-09 DIAGNOSIS — H25.813 COMBINED FORMS OF AGE-RELATED CATARACT OF BOTH EYES: ICD-10-CM

## 2023-02-09 PROCEDURE — 66984 XCAPSL CTRC RMVL W/O ECP: CPT | Mod: RT

## 2023-02-09 PROCEDURE — V2599 CONTACT LENS/ES OTHER TYPE: HCPCS | Mod: DBP,RT

## 2023-02-09 PROCEDURE — 96999 UNLISTED SPEC DERM SVC/PX: CPT | Mod: RT | Performed by: OPHTHALMOLOGY

## 2023-02-09 PROCEDURE — 66984 XCAPSL CTRC RMVL W/O ECP: CPT | Mod: RT | Performed by: OPHTHALMOLOGY

## 2023-02-09 DEVICE — CLAREON TORIC UVA
Type: IMPLANTABLE DEVICE | Site: EYE | Status: FUNCTIONAL
Brand: CLAREON

## 2023-02-09 RX ORDER — LIDOCAINE HYDROCHLORIDE 10 MG/ML
INJECTION, SOLUTION EPIDURAL; INFILTRATION; INTRACAUDAL; PERINEURAL PRN
Status: DISCONTINUED | OUTPATIENT
Start: 2023-02-09 | End: 2023-02-09 | Stop reason: HOSPADM

## 2023-02-09 RX ORDER — MOXIFLOXACIN IN NACL,ISO-OS/PF 0.3MG/0.3
SYRINGE (ML) INTRAOCULAR PRN
Status: DISCONTINUED | OUTPATIENT
Start: 2023-02-09 | End: 2023-02-09 | Stop reason: HOSPADM

## 2023-02-09 RX ORDER — TRIAMCINOLONE ACETONIDE 40 MG/ML
INJECTION, SUSPENSION INTRA-ARTICULAR; INTRAMUSCULAR PRN
Status: DISCONTINUED | OUTPATIENT
Start: 2023-02-09 | End: 2023-02-09 | Stop reason: HOSPADM

## 2023-02-09 RX ORDER — ACETAMINOPHEN 325 MG/1
975 TABLET ORAL ONCE
Status: COMPLETED | OUTPATIENT
Start: 2023-02-09 | End: 2023-02-09

## 2023-02-09 RX ORDER — PROPARACAINE HYDROCHLORIDE 5 MG/ML
1 SOLUTION/ DROPS OPHTHALMIC ONCE
Status: COMPLETED | OUTPATIENT
Start: 2023-02-09 | End: 2023-02-09

## 2023-02-09 RX ORDER — CYCLOPENTOLAT/TROPIC/PHENYLEPH 1%-1%-2.5%
1 DROPS (EA) OPHTHALMIC (EYE)
Status: COMPLETED | OUTPATIENT
Start: 2023-02-09 | End: 2023-02-09

## 2023-02-09 RX ORDER — LIDOCAINE 40 MG/G
CREAM TOPICAL
Status: DISCONTINUED | OUTPATIENT
Start: 2023-02-09 | End: 2023-02-10 | Stop reason: HOSPADM

## 2023-02-09 RX ORDER — BALANCED SALT SOLUTION 6.4; .75; .48; .3; 3.9; 1.7 MG/ML; MG/ML; MG/ML; MG/ML; MG/ML; MG/ML
SOLUTION OPHTHALMIC PRN
Status: DISCONTINUED | OUTPATIENT
Start: 2023-02-09 | End: 2023-02-09 | Stop reason: HOSPADM

## 2023-02-09 RX ORDER — FENTANYL CITRATE 50 UG/ML
INJECTION, SOLUTION INTRAMUSCULAR; INTRAVENOUS PRN
Status: DISCONTINUED | OUTPATIENT
Start: 2023-02-09 | End: 2023-02-09

## 2023-02-09 RX ORDER — SODIUM CHLORIDE, SODIUM LACTATE, POTASSIUM CHLORIDE, CALCIUM CHLORIDE 600; 310; 30; 20 MG/100ML; MG/100ML; MG/100ML; MG/100ML
INJECTION, SOLUTION INTRAVENOUS CONTINUOUS
Status: DISCONTINUED | OUTPATIENT
Start: 2023-02-09 | End: 2023-02-10 | Stop reason: HOSPADM

## 2023-02-09 RX ADMIN — Medication 1 DROP: at 11:12

## 2023-02-09 RX ADMIN — Medication 1 DROP: at 11:22

## 2023-02-09 RX ADMIN — FENTANYL CITRATE 50 MCG: 50 INJECTION, SOLUTION INTRAMUSCULAR; INTRAVENOUS at 12:19

## 2023-02-09 RX ADMIN — ACETAMINOPHEN 975 MG: 325 TABLET ORAL at 11:04

## 2023-02-09 RX ADMIN — PROPARACAINE HYDROCHLORIDE 1 DROP: 5 SOLUTION/ DROPS OPHTHALMIC at 11:05

## 2023-02-09 RX ADMIN — SODIUM CHLORIDE, SODIUM LACTATE, POTASSIUM CHLORIDE, CALCIUM CHLORIDE: 600; 310; 30; 20 INJECTION, SOLUTION INTRAVENOUS at 12:16

## 2023-02-09 RX ADMIN — Medication 1 DROP: at 11:05

## 2023-02-09 NOTE — ANESTHESIA POSTPROCEDURE EVALUATION
Patient: Belen Sharma    Procedure: Procedure(s):  RIGHT EYE PHACOEMULSIFICATION, CATARACT, WITH INTRAOCULAR LENS IMPLANT       Anesthesia Type:  MAC    Note:  Disposition: Outpatient   Postop Pain Control: Uneventful            Sign Out: Well controlled pain   PONV: No   Neuro/Psych: Uneventful            Sign Out: Acceptable/Baseline neuro status   Airway/Respiratory: Uneventful            Sign Out: Acceptable/Baseline resp. status   CV/Hemodynamics: Uneventful            Sign Out: Acceptable CV status   Other NRE: NONE   DID A NON-ROUTINE EVENT OCCUR? No           Last vitals:  Vitals Value Taken Time   BP 84/46 02/09/23 1315   Temp 36.1  C (97  F) 02/09/23 1315   Pulse 66 02/09/23 1315   Resp 16 02/09/23 1315   SpO2 95 % 02/09/23 1315       Electronically Signed By: Tex Perera MD  February 9, 2023  1:44 PM

## 2023-02-09 NOTE — BRIEF OP NOTE
[facility]    Brief Operative Note    Pre-operative diagnosis: Combined forms of age-related cataract of both eyes [H25.813]  Post-operative diagnosis Same as pre-operative diagnosis    Procedure: Procedure(s):  RIGHT EYE PHACOEMULSIFICATION, CATARACT, WITH INTRAOCULAR LENS IMPLANT  Surgeon: Surgeon(s) and Role:     * Sebastian Robertson MD - Primary   Neo Ruiz MD - Assistant  Anesthesia: MAC with Local   Estimated Blood Loss: Minimal    Drains: None  Specimens: * No specimens in log *  Findings:   as expected.  Complications: None.  Implants:   Implant Name Type Inv. Item Serial No.  Lot No. LRB No. Used Action   Clareon Toric Aspheric UV Absorbing IOL   [id number] ADELINE  Right 1 Implanted

## 2023-02-09 NOTE — ANESTHESIA CARE TRANSFER NOTE
Patient: Belen Sharma    Procedure: Procedure(s):  RIGHT EYE PHACOEMULSIFICATION, CATARACT, WITH INTRAOCULAR LENS IMPLANT       Diagnosis: Combined forms of age-related cataract of both eyes [H25.813]  Diagnosis Additional Information: No value filed.    Anesthesia Type:   MAC     Note:    Oropharynx: oropharynx clear of all foreign objects and spontaneously breathing  Level of Consciousness: awake  Oxygen Supplementation: room air    Independent Airway: airway patency satisfactory and stable  Dentition: dentition unchanged  Vital Signs Stable: post-procedure vital signs reviewed and stable  Report to RN Given: handoff report given  Patient transferred to: Phase II    Handoff Report: Identifed the Patient, Identified the Reponsible Provider, Reviewed the pertinent medical history, Discussed the surgical course, Reviewed Intra-OP anesthesia mangement and issues during anesthesia, Set expectations for post-procedure period and Allowed opportunity for questions and acknowledgement of understanding      Vitals:  Vitals Value Taken Time   BP 88/46 02/09/23 1244   Temp 36.3  C (97.4  F) 02/09/23 1244   Pulse 66 02/09/23 1244   Resp 16 02/09/23 1244   SpO2 95 % 02/09/23 1244       Electronically Signed By: ANA Canela CRNA  February 9, 2023  12:45 PM

## 2023-02-09 NOTE — OP NOTE
Operative  Report    Patient Name: Belen Sharma    MRN: [id number]    Date of Surgery: 2/9/2023    Surgeon: rafia venegas M.D    Assistant surgeon: Neo Ruiz MD    Preoperative Diagnosis: Visually significant cataract, right eye    Postoperative Diagnosis: Same    Procedure: Phacoemulsification with intraocular lens implant, right eye    Implant: AIM 0.0 D  Implant Name Type Inv. Item Serial No.  Lot No. LRB No. Used Action   Clareon Toric Aspheric UV Absorbing IOL   [id number] ADELINE  Right 1 Implanted       Anesthesia Type: MAC with topical    Estimated Blood Loss: Trace    Complications: None    INDICATIONS FOR PROCEDURE: Patient has a history of visually significant cataract affecting the patient's activity of daily living. After a discussion with the patient, the patient has elected to have the listed procedure(s) to maximize visual potential. All of the appropriate consent forms have been signed and all of the patient's questions have been answered.    DETAILS OF THE PROCEDURE: Patient was identified in the pre operative area and given pre operative eye drops. A sterile marking pen was used to bonifacio the appropriate axis for toric lens placement. The patient was then brought into the operating room and intravenous sedation was begun after a time out was performed. The patient was prepped and draped in the usual sterile ophthalmic fashion.     A lid speculum was placed and the operating microscope was brought into position. The appropriate axis was marked using a sterile marking pen. A paracentesis was made and viscoelastic was injected into the anterior chamber. A clear corneal incision was made using a keratome blade. A cystotome needle and Utrata forceps were used to create an anterior continuous curvilinear capsulorhexis. Then BSS on a blunt tipped cannula was used for hydrodissection and hydrodelineation. Using the phacoemulsification unit,  the nucleus was then removed from the eye. Using irrigation and aspiration, the remaining cortical material was removed from the eye. The capsular bag was infused with viscoelastic material. The intraocular lens was then placed into the capsular bag and secured into position. The remaining viscoelastic material was then removed from the eye via irrigation and aspiration. The toric lens was rotated to the appropriate axis (79 degrees). Miochol was injected to contrict the pupil. BSS on a blunt tipped cannula was used to hydrate all of the wounds. At the end of the case, the wounds were noted to be watertight, the pupil was noted to be round, the lens appeared to be in good position, and the pressure was palpated to be physiologic. Intracameral moxifloxacin and A subconjunctival injection of Kenalog was administered.     Post operative ointment was applied and the eye was patched and shielded. Patient tolerated procedure and was brought to the recover area in good condition. Patient will follow in the eye clinic in one day.       Dr. Robertson performed the entire procedure.     Neo Ruiz MD  Ophthalmology, PGY-3

## 2023-02-09 NOTE — ANESTHESIA PREPROCEDURE EVALUATION
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 Both, very large, native kidneys reamain.     PONV (postoperative nausea and vomiting)     Need to start with ice chips and apple juice, no soda     S/P kidney transplant     SLK 10/9/2010 - kidney failure 2/2 [facility]. Explanted . Re-transplant after de-sensitization      S/P liver transplant (H)     10/9/2010 - [facility], ischemic biopathy. Re-transplant 3/3/2011     S/P splenectomy      Spider veins      Thyroid disease     Hyperthyroid treated with single dose iodine     Urinary tract infection 2022      Past Surgical History:   Procedure Laterality Date     bunectomy  2018    right foot     bunionectomy  2019    left     CHOLECYSTECTOMY       CONIZATION N/A 2020    Procedure: CONE BIOPSY, CERVIX;  Surgeon: Daysi Perez MD;  Location: [facility] OR     FAILED PICC - RIGHT ARM Right 2020    Has a LUE AV fistula.     H STATISTIC PICC LINE INSERTION >5YR, FAILED Bilateral 2020    Left fistula, Right failed x 2 Occlussion     HERNIA REPAIR, INCISIONAL  2013     IR DIALYSIS PTA CENTRAL SEG  2020     IR PICC PLACEMENT > 5 YRS OF AGE  2020     IR PICC PLACEMENT > 5 YRS OF AGE  10/5/2022     SPLENECTOMY      Splenectomy     TRANSPLANT KIDNEY RECIPIENT  DONOR      re-transplant after [facility]; 6 months de-sensitization prior AND 6 months after  transplant     TRANSPLANT LIVER RECIPIENT  DONOR  2011     TRANSPLANT LIVER, KIDNEY RECIPIENT  DONOR, COMBINED  10/09/2010    H[facility] - re-Tx liver 3/3/2011; Kidney (left iliac) lost to [facility]/fibrosis - explant     VASCULAR SURGERY      Vascular access left UE. Not used for 4 years since 2nd kidney tx - [place]      Allergies   Allergen Reactions     Ibuprofen      Due to liver transplant      Social History     Tobacco Use     Smoking status: Never     Smokeless tobacco: Never   Substance Use Topics     Alcohol use: Not Currently      Wt Readings from Last 1 Encounters:   23 65.8 kg (145 lb)        Anesthesia Evaluation   Pt has had prior anesthetic.     History of anesthetic complications  - PONV.      ROS/MED HX  ENT/Pulmonary:       Neurologic:       Cardiovascular:       METS/Exercise Tolerance:     Hematologic:       Musculoskeletal:       GI/Hepatic: Comment: S/p liver transplant    (+) liver disease,     Renal/Genitourinary: Comment: S/p kidney transplant    (+) renal disease,     Endo:     (+) thyroid problem, hypothyroidism,     Psychiatric/Substance Use:       Infectious Disease:       Malignancy:       Other:            Physical Exam    Airway  airway exam normal      Mallampati: II   TM distance: > 3 FB   Neck ROM: full   Mouth opening: > 3 cm    Respiratory Devices and Support         Dental       (+) Completely normal teeth      Cardiovascular          Rhythm and rate: regular and normal     Pulmonary   pulmonary exam normal        breath sounds clear to auscultation           OUTSIDE LABS:  CBC:   Lab Results   Component Value Date    WBC 5.5 01/10/2023    WBC 5.5 11/10/2022    HGB 15.7 01/10/2023    HGB 14.6 11/10/2022    HCT 48.4 01/10/2023    HCT 44.5 11/10/2022     01/10/2023     11/10/2022     BMP:   Lab Results   Component Value Date     01/10/2023     11/10/2022    POTASSIUM 4.0 01/10/2023    POTASSIUM 3.8 11/10/2022    CHLORIDE 108  (H) 01/10/2023    CHLORIDE 104 11/10/2022    CO2 24 01/10/2023    CO2 22 11/10/2022    BUN 12.3 01/10/2023    BUN 12.3 11/10/2022    CR 0.79 01/10/2023    CR 0.75 11/10/2022    GLC 87 01/10/2023    GLC 77 11/10/2022     COAGS: No results found for: PTT, INR, FIBR  POC:   Lab Results   Component Value Date     (H) 09/30/2020     HEPATIC:   Lab Results   Component Value Date    ALBUMIN 4.0 01/10/2023    PROTTOTAL 6.8 01/10/2023    ALT 12 01/10/2023    AST 23 01/10/2023    ALKPHOS 62 01/10/2023    BILITOTAL 1.1 01/10/2023     OTHER:   Lab Results   Component Value Date    PH 7.44 (H) 09/14/2022    LACT 0.8 10/02/2022    EDSON 10.0 01/10/2023    PHOS 3.4 09/23/2020    TSH 0.86 11/04/2021    CRP 39.3 (H) 11/04/2021    SED 8 09/14/2022       Anesthesia Plan    ASA Status:  3   NPO Status:  NPO Appropriate    Anesthesia Type: MAC.     - Reason for MAC: immobility needed, straight local not clinically adequate   Induction: Intravenous.           Consents    Anesthesia Plan(s) and associated risks, benefits, and realistic alternatives discussed. Questions answered and patient/representative(s) expressed understanding.    - Discussed:     - Discussed with:  Patient      - Extended Intubation/Ventilatory Support Discussed: No.      - Patient is DNR/DNI Status: No    Use of blood products discussed: No .     Postoperative Care    Pain management: IV analgesics, Oral pain medications, Multi-modal analgesia.   PONV prophylaxis: Ondansetron (or other 5HT-3)     Comments:                Tex Perera MD

## 2023-02-09 NOTE — DISCHARGE INSTRUCTIONS
Post-Operative Instructions     FIRST 24 HOURS AFTER SURGERY:  You are allowed to Tylenol (acetaminophen) 650mg every four hours as needed for pain unless you have liver disease or are allergic to Tylenol..  Continue taking your regular medications and eye drops in the non-operative eye.  Do not remove the metal or plastic shield unless otherwise instructed by your surgeon.  Do not operate a car, motorcycle, or machinery for 24 hours after surgery.  Call ED immediately if you have SEVERE PAIN unrelieved with Tylenol. And  ask for the resident on call.     MEDICATION INSTRUCTIONS:  -You will not have treatment eye drops prescription as medications were injected into your eye.  -If you feel your eyes are dry and itchy, you can use regular lubricating drops for one month after the surgery. These eye drops can be found over the counter Brand names example: Systane, Refresh. Please choose preservative free drops. To be used four times a day and as needed for 1 month  -Instilling the eye drops directly into the eye.    -If you had previously any glaucoma eye drops, You can remove the cover and instill the drops as prescribed before and after the procedure      GENERAL INSTRUCTIONS:  Hygiene of the operated eye  Wash your hands thoroughly before caring for the eye.  If the lids are sticky or itchy in the morning, debris, or matter can be gently wiped away with a cotton ball moistened with tap water. DO NOT press on the lids or eyeball.     FIVE MINUTES APART. If ointment is prescribed, it should be applied last.  If you have been taking medications in the non-operated eye, continue as they were prescribed.  If you take medications by mouth for a medical problem, continue as they were prescribed (unless otherwise instructed by physician)    EYE PROTECTION  From the time of surgery until the time of your post-operative day 1 appointment, wear the eye shield at all times. Then, wear it whenever sleeping, for 1  week.  Protective sunglasses may be worn as needed for your comfort, and are suggested for outdoor activities.    ACTIVITIES  Avoid vigorous exertion and heavy lifting for the first week after surgery  You may take a bath or shower, but avoid getting water directly into your eye for one week after surgery, usually by keeping your eyes closed during the bath.  You should discuss driving and traveling with your surgeon. You may ride in a car and fly in an airplane unless otherwise instructed.  Avoid swimming or using a hot tube/sauna/pool/lake for 2 weeks after the surgery.      WHAT TO EXPECT:  Mild irritation and discomfort are normal.    Call the doctor if you experience any of the following:  Severe eye pain  Nausea  Vomiting  Severe headache    [facility]  Home Care Following Anesthesia  For 24 hours after surgery:  Get plenty of rest.  A responsible adult must stay with you for at least 24 hours after you leave the surgery center.  Do not drive or use heavy equipment.  If you have weakness or tingling, don't drive or use heavy equipment until this feeling goes away.   Do not drink alcohol.   Avoid strenuous or risky activities.  Ask for help when climbing stairs.  You may feel lightheaded.  IF so, sit for a few minutes before standing.  Have someone help you get up.   If you have nausea (feel sick to your stomach): Drink only clear liquids such as apple juice, ginger ale, broth or 7-Up.  Rest may also help.  Be sure to drink enough fluids.  Move to a regular diet as you feel able.   You may have a slight fever.  Call the doctor if your fever is over 100 F (37.7 C) (taken under the tongue) or lasts longer than 24 hours.  You may have a dry mouth, a sore throat, muscle aches or trouble sleeping. These should go away after 24 hours.  Do not make important or legal decisions.   It is recommended to avoid smoking.               Tips for taking pain medications  To get the best  pain relief possible, remember these points:  Take pain medications as directed, before pain becomes severe.  Pain medication can upset your stomach: taking it with food may help.  Constipation is a common side effect of pain medication. Drink plenty of  fluids.  Eat foods high in fiber. Take a stool softener if recommended by your doctor or pharmacist.  Do not drink alcohol, drive or operate machinery while taking pain medications.  Ask about other ways to control pain, such as with heat, ice or relaxation.    Tylenol/Acetaminophen Consumption  To help encourage the safe use of acetaminophen, the makers of TYLENOL  have lowered the maximum daily dose for single-ingredient Extra Strength TYLENOL  (acetaminophen) products sold in the [place] from 8 pills per day (4,000 mg) to 6 pills per day (3,000 mg). The dosing interval has also changed from 2 pills every 4-6 hours to 2 pills every 6 hours.  If you feel your pain relief is insufficient, you may take Tylenol/Acetaminophen in addition to your narcotic pain medication.   Be careful not to exceed 3,000 mg of Tylenol/Acetaminophen in a 24 hour period from all sources.  If you are taking extra strength Tylenol/acetaminophen (500 mg), the maximum dose is 6 tablets in 24 hours.  If you are taking regular strength acetaminophen (325 mg), the maximum dose is 9 tablets in 24 hours.    Call a doctor for any of the following:  Signs of infection (fever, growing tenderness at the surgery site, a large amount of drainage or bleeding, severe pain, foul-smelling drainage, redness, swelling).  It has been over 8 to 10 hours since surgery and you are still not able to urinate (pass water).  Headache for over 24 hours.  Numbness, tingling or weakness the day after surgery (if you had spinal anesthesia).  Signs of Covid-19 infection (temperature over 100 degrees, shortness of breath, cough, loss of taste/smell, generalized body aches, persistent headache, chills, sore throat,  nausea/vomiting/diarrhea)  Your doctor is:  Dr. Sebastian Robertson, Ophthalmology: [phone]                    Or dial [phone] and ask for the resident on call for:  Ophthalmology  For emergency care, call the:  [facility]:  [phone] (TTY for hearing impaired: [phone])

## 2023-02-10 ENCOUNTER — OFFICE VISIT (OUTPATIENT)
Dept: OPHTHALMOLOGY | Facility: CLINIC | Age: 63
End: 2023-02-10
Attending: OPHTHALMOLOGY
Payer: MEDICARE

## 2023-02-10 DIAGNOSIS — Z96.1 PSEUDOPHAKIA, RIGHT EYE: Primary | ICD-10-CM

## 2023-02-10 PROCEDURE — 99024 POSTOP FOLLOW-UP VISIT: CPT | Mod: GC | Performed by: OPHTHALMOLOGY

## 2023-02-10 PROCEDURE — G0463 HOSPITAL OUTPT CLINIC VISIT: HCPCS

## 2023-02-10 ASSESSMENT — VISUAL ACUITY
OS_CC: 20/40
OS_CC+: -1
OD_SC: 20/25
OD_SC+: -2
CORRECTION_TYPE: CONTACTS
METHOD: SNELLEN - LINEAR

## 2023-02-10 ASSESSMENT — SLIT LAMP EXAM - LIDS: COMMENTS: NORMAL, MAKEUP

## 2023-02-10 ASSESSMENT — TONOMETRY
IOP_METHOD: TONOPEN
OD_IOP_MMHG: 21
OS_IOP_MMHG: CL

## 2023-02-10 NOTE — NURSING NOTE
Chief Complaints and History of Present Illnesses   Patient presents with     Post Op (Ophthalmology) Right Eye     Post Cataract extraction with toric IOL placement in the right eye on 02/09/2023        Chief Complaint(s) and History of Present Illness(es)     Post Op (Ophthalmology) Right Eye            Laterality: right eye    Course: gradually improving    Associated symptoms: eye pain, redness, tearing and headache    Pain scale: 5/10    Comments: Post Cataract extraction with toric IOL placement in the right eye on 02/09/2023             Comments    She returns to clinic for a one day post operative right eye exam.  She tells me that she is having some eye pain (better today than last night) and a headache this morning.  Her vision does seem improved with the surgery.    Mita John, COT 8:32 AM  February 10, 2023

## 2023-02-10 NOTE — PROGRESS NOTES
Chief complaint   Progressive blurred vision  Referring Provider: Self-referred    HPI   Belen Sharma 62 year old adult PMHx of liver-kidney recipient (03/03/2011, re-transplanted 2016) noticed progressive vision decrease since 2019. Cannot be corrected any more with glasses to help her drive and read. Noticing glare, starburts.     Interval History: Reports some discomfort in the eye this morning. Notes that the vision is improved and colors are much more vibrant. No flashes, floaters, curtains.     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 Both, very large, native kidneys reamain.     PONV (postoperative nausea and vomiting)     Need to start with ice chips and apple juice, no soda     S/P kidney transplant     SLK 10/9/2010 - kidney failure 2/2 [facility]. Explanted 2012. Re-transplant after de-sensitization 2016     S/P liver transplant (H)     10/9/2010 - [facility], ischemic biopathy. Re-transplant 3/3/2011     S/P splenectomy      Spider veins 2019     Thyroid disease     Hyperthyroid treated with single dose iodine     Urinary tract infection 09/2022       PSH     Past Surgical History:   Procedure Laterality Date     bunectomy  08/01/2018    right foot     bunionectomy  01/2019    left     CHOLECYSTECTOMY       CONIZATION N/A 09/30/2020    Procedure: CONE BIOPSY, CERVIX;  Surgeon: Daysi Perez MD;  [facility]     FAILED PICC - RIGHT ARM Right 12/19/2020    Has a LUE AV fistula.     H STATISTIC PICC  LINE INSERTION >5YR, FAILED Bilateral 2020    Left fistula, Right failed x 2 Occlussion     HERNIA REPAIR, INCISIONAL  2013     IR DIALYSIS PTA CENTRAL SEG  2020     IR PICC PLACEMENT > 5 YRS OF AGE  2020     IR PICC PLACEMENT > 5 YRS OF AGE  10/5/2022     SPLENECTOMY      Splenectomy     TRANSPLANT KIDNEY RECIPIENT  DONOR  2016    re-transplant after [facility]; 6 months de-sensitization prior AND 6 months after transplant     TRANSPLANT LIVER RECIPIENT  DONOR  2011     TRANSPLANT LIVER, KIDNEY RECIPIENT  DONOR, COMBINED  10/09/2010    HAT - re-Tx liver 3/3/2011; Kidney (left iliac) lost to [facility]/fibrosis - explant     VASCULAR SURGERY      Vascular access left UE. Not used for 4 years since 2nd kidney tx - [facility] [place]       Meds     Current Outpatient Medications   Medication     acetaminophen (TYLENOL) 325 MG tablet     aspirin (ASA) 81 MG chewable tablet     atorvastatin (LIPITOR) 10 MG tablet     biotin 1000 MCG TABS tablet     ciclopirox (PENLAC) 8 % external solution     diphenhydrAMINE (BENADRYL) 25 MG tablet     estradiol (ESTRACE) 0.1 MG/GM vaginal cream     guaiFENesin (MUCINEX) 600 MG 12 hr tablet     Multiple Vitamins-Minerals (WOMENS DAILY FORMULA PO)     mycophenolic acid (GENERIC EQUIVALENT) 180 MG EC tablet     nitroGLYcerin (NITROSTAT) 0.4 MG sublingual tablet     predniSONE (DELTASONE) 5 MG tablet     Probiotic Product (PROBIOTIC PO)     sodium bicarbonate 650 MG tablet     sulfamethoxazole-trimethoprim (BACTRIM) 400-80 MG tablet     tacrolimus (GENERIC EQUIVALENT) 0.5 MG capsule     tacrolimus (GENERIC EQUIVALENT) 1 MG capsule     temazepam (RESTORIL) 15 MG capsule     No current facility-administered medications for this visit.       Imaging   Adequate for IOL selection    Drops Currently Taking   None    Assessment/Plan   #Pseudophakia, right eye  - Very happy with vision, doing well  - Intracameral moxifloxacin and subconjunctival kenalog at the  time of surgery, no drops  - Shield at night x1 week  - No eye rubbing, no water in the eye  - RD/endophthalmitis precautions discussed    # combined age-related cataract, left eye  Prior keratorefractive: No  BCVA: 20/30 and 20/25  Visually significant:YES  Glare: Positive   Reports impacts ADL     Plan  Patient is having difficulty with daily activities due to visual impairment. Clearly told patient that cataract surgery is NOT needed if managing with current vision. Patient wishes to proceed ahead with surgery. Explained benefits alternatives and risks including but not limited to loss of vision, severe infection, retinal detachment, need for more surgery, visual disturbances etc...  Informed patient that reaching uncorrected vision aim (without glasses) after cataract surgery is not certain. Made patient aware they may need glasses, laser vision correction or in worst case scenario, IOL exchange.    -Aim: distance each eye  Lens: TORIC LENS  - dominant eye  -Previous refractive surgery status:-  -Corneal astigmatism>2    - Optimize ocular surface; recommend regular PFATs 4-6x/day and warm compresses BID    # Immunosuppression  - No evidence of retinopathies or active retinal disease  - Old CR scar peripheral retina left eye  - Monitor    Follow up: POW3 as scheduled, sooner ESTEFANIA Mackey MD  Fellow, Cornea, External Disease and Refractive Surgery  Department of Ophthalmology  [facility]    Attending Physician Attestation:  Complete documentation of historical and exam elements from today's encounter can be found in the full encounter summary report (not reduplicated in this progress note).  I personally obtained the chief complaint(s) and history of present illness.  I confirmed and edited as necessary the review of systems, past medical/surgical history, family history, social history, and examination findings as documented by others; and I examined the patient myself.  I personally  reviewed the relevant tests, images, and reports as documented above.  I formulated and edited as necessary the assessment and plan and discussed the findings and management plan with the patient and family. - Sebastian Robertson MD

## 2023-02-15 ENCOUNTER — OFFICE VISIT (OUTPATIENT)
Dept: UROLOGY | Facility: CLINIC | Age: 63
End: 2023-02-15
Attending: OBSTETRICS & GYNECOLOGY
Payer: MEDICARE

## 2023-02-15 VITALS
HEART RATE: 83 BPM | WEIGHT: 146 LBS | DIASTOLIC BLOOD PRESSURE: 54 MMHG | SYSTOLIC BLOOD PRESSURE: 88 MMHG | HEIGHT: 70 IN | BODY MASS INDEX: 20.9 KG/M2

## 2023-02-15 DIAGNOSIS — N81.4 UTERINE PROLAPSE: ICD-10-CM

## 2023-02-15 DIAGNOSIS — N81.6 RECTOCELE: ICD-10-CM

## 2023-02-15 DIAGNOSIS — N81.11 CYSTOCELE, MIDLINE: Primary | ICD-10-CM

## 2023-02-15 PROCEDURE — 99213 OFFICE O/P EST LOW 20 MIN: CPT | Performed by: OBSTETRICS & GYNECOLOGY

## 2023-02-15 PROCEDURE — G0463 HOSPITAL OUTPT CLINIC VISIT: HCPCS | Performed by: OBSTETRICS & GYNECOLOGY

## 2023-02-15 ASSESSMENT — PAIN SCALES - GENERAL: PAINLEVEL: NO PAIN (0)

## 2023-02-15 NOTE — LETTER
"2/15/2023       RE: Belen Sharma  [address] [place] [address]     Dear Colleague,    Thank you for referring your patient, Belen Sharma, to the [facility] [place] at [facility]. Please see a copy of my visit note below.    CC: prolapse    Pt here for pessary check. Pt inserting and removing pessary daily. Pt happy with pessary. Denies bleeding, spotting or discharge.    BP (!) 88/54   Pulse 83   Ht 1.772 m (5' 9.76\")   Wt 66.2 kg (146 lb)   BMI 21.09 kg/m   No LMP recorded. Patient is postmenopausal. Body mass index is 21.09 kg/m .  Ms. Sharma is alert, comfortable in no acute distress, non-labored breathing.   Vagina: normal without evidence of erosion or bleeding    A/P: Belen Sharma is a 62 year old F with prolapse    Continue pessary. F/U in 1 year    I spent a total of 20 minutes with  Ms. Sharma  on the date of the encounter in chart review, face to face patient visit, review of tests, documentation and/or discussion with other providers about the issues documented above.    Zach Francis MD  Professor, OB/GYN  Urogynecologist  CC  Patient Care Team:  Vincent De La Garza MD as PCP - General (Internal Medicine)  Christy Mohamud MD as MD (Cardiology)  Елена Dinh RN as Registered Nurse  Christy Mohamud MD as Assigned Heart and Vascular Provider  Leventhal, Thomas Michael, MD as Assigned Gastroenterology Provider  Vincent De La Garza MD as Assigned PCP  Saran Angulo MD as Assigned Nephrology Provider  Daysi Perez MD as MD (OB/Gyn)  Shannon Loza OD (Optometry)  Sapna Marcano MD (Inactive) as MD (Ophthalmology)  Anat Benitez MD as MD (Dermatology)  Pepe Verduzco DPM as Assigned Musculoskeletal Provider  Ida Guthrie RN as Specialty Care Coordinator  Rocio Brewer CNP as Nurse Practitioner (Urology)  Sebastian Robertson MD as Physician (Ophthalmology)  Daysi Perez MD " as Assigned OBGYN Provider  Sebastian Robertson MD as Assigned Surgical Provider  Catrachita Gonzales MD as Assigned Infectious Disease Provider

## 2023-02-15 NOTE — PROGRESS NOTES
"CC: prolapse    Pt here for pessary check. Pt inserting and removing pessary daily. Pt happy with pessary. Denies bleeding, spotting or discharge.    BP (!) 88/54   Pulse 83   Ht 1.772 m (5' 9.76\")   Wt 66.2 kg (146 lb)   BMI 21.09 kg/m   No LMP recorded. Patient is postmenopausal. Body mass index is 21.09 kg/m .  Ms. Sharma is alert, comfortable in no acute distress, non-labored breathing.   Vagina: normal without evidence of erosion or bleeding    A/P: Belen Sharma is a 62 year old F with prolapse    Continue pessary. F/U in 1 year    I spent a total of 20 minutes with  Ms. Sharma  on the date of the encounter in chart review, face to face patient visit, review of tests, documentation and/or discussion with other providers about the issues documented above.    Zach Francis MD  Professor, OB/GYN  Urogynecologist  CC  Patient Care Team:  Vincent De La Garza MD as PCP - General (Internal Medicine)  Christy Mohamud MD as MD (Cardiology)  Елена Dinh RN as Registered Nurse  Christy Mohamud MD as Assigned Heart and Vascular Provider  Leventhal, Thomas Michael, MD as Assigned Gastroenterology Provider  Vincent De La Garza MD as Assigned PCP  Saran Angulo MD as Assigned Nephrology Provider  Daysi Perez MD as MD (OB/Gyn)  Shannon Loza, LEANDRA (Optometry)  Sapna Marcano MD (Inactive) as MD (Ophthalmology)  Anat Benitez MD as MD (Dermatology)  Pepe Verduzco DPM as Assigned Musculoskeletal Provider  Ida Guthrie RN as Specialty Care Coordinator  Rocio Brewer CNP as Nurse Practitioner (Urology)  Sebastian Robertson MD as Physician (Ophthalmology)  Daysi Perez MD as Assigned OBGYN Provider  Sebastian Robertson MD as Assigned Surgical Provider  Catrachita Gonzales MD as Assigned Infectious Disease Provider      "

## 2023-02-20 DIAGNOSIS — Z94.4 LIVER REPLACED BY TRANSPLANT (H): Primary | ICD-10-CM

## 2023-02-22 ENCOUNTER — LAB (OUTPATIENT)
Dept: LAB | Facility: CLINIC | Age: 63
End: 2023-02-22
Payer: MEDICARE

## 2023-02-22 DIAGNOSIS — Z94.4 LIVER REPLACED BY TRANSPLANT (H): ICD-10-CM

## 2023-02-22 DIAGNOSIS — N39.0 RECURRENT UTI: ICD-10-CM

## 2023-02-22 LAB
ALBUMIN UR-MCNC: 30 MG/DL
APPEARANCE UR: ABNORMAL
BACTERIA #/AREA URNS HPF: ABNORMAL /HPF
BILIRUB UR QL STRIP: NEGATIVE
COLOR UR AUTO: YELLOW
GLUCOSE UR STRIP-MCNC: NEGATIVE MG/DL
HGB UR QL STRIP: ABNORMAL
KETONES UR STRIP-MCNC: NEGATIVE MG/DL
LEUKOCYTE ESTERASE UR QL STRIP: ABNORMAL
NITRATE UR QL: POSITIVE
PH UR STRIP: 7 [PH] (ref 5–8)
RBC #/AREA URNS AUTO: ABNORMAL /HPF
SP GR UR STRIP: 1.01 (ref 1–1.03)
SQUAMOUS #/AREA URNS AUTO: ABNORMAL /LPF
UROBILINOGEN UR STRIP-ACNC: 1 E.U./DL
WBC #/AREA URNS AUTO: >100 /HPF

## 2023-02-22 PROCEDURE — 81001 URINALYSIS AUTO W/SCOPE: CPT

## 2023-02-22 PROCEDURE — 87086 URINE CULTURE/COLONY COUNT: CPT

## 2023-02-22 PROCEDURE — 87186 SC STD MICRODIL/AGAR DIL: CPT

## 2023-02-23 ENCOUNTER — TELEPHONE (OUTPATIENT)
Dept: TRANSPLANT | Facility: CLINIC | Age: 63
End: 2023-02-23
Payer: MEDICARE

## 2023-02-23 LAB — BACTERIA UR CULT: ABNORMAL

## 2023-02-23 NOTE — TELEPHONE ENCOUNTER
Called Belen to verify who ordered recent UA which indicates a UTI. Belen said she has standing orders from Dr. Gonzales and is currently experiencing symptom. Our transplant annual order had been used but I will forward the results to Dr. Gonzales for his review. Dr. Gonzales had already provided Belen with a RX for Nitrofurantoin/Monohydrate 100 mg bid which Belen has started.

## 2023-02-27 ENCOUNTER — TELEPHONE (OUTPATIENT)
Dept: TRANSPLANT | Facility: CLINIC | Age: 63
End: 2023-02-27
Payer: MEDICARE

## 2023-02-27 DIAGNOSIS — N39.0 RECURRENT UTI: ICD-10-CM

## 2023-02-27 RX ORDER — NITROFURANTOIN 25; 75 MG/1; MG/1
100 CAPSULE ORAL 2 TIMES DAILY
Qty: 6 CAPSULE | Refills: 0 | Status: SHIPPED | OUTPATIENT
Start: 2023-02-27 | End: 2023-03-15

## 2023-02-27 RX ORDER — NITROFURANTOIN 25; 75 MG/1; MG/1
CAPSULE ORAL
Qty: 6 CAPSULE | Refills: 0 | Status: SHIPPED | OUTPATIENT
Start: 2023-02-27 | End: 2023-03-15

## 2023-02-28 NOTE — TELEPHONE ENCOUNTER
Pt missed 8 am meds. She went ahead and took at 1630 when she remembered and now she is wondering what to do about her 2000 meds. Writer told her to skip this evening and resume at 8 am as regularly scheduled. Ok to take lipitor this evening. Pt verbalized understanding and she agrees with the plan.     Pt also inquiring about some upcoming appointments that she would like to discuss with primary coordinator.

## 2023-03-03 ENCOUNTER — OFFICE VISIT (OUTPATIENT)
Dept: OPHTHALMOLOGY | Facility: CLINIC | Age: 63
End: 2023-03-03
Attending: OPHTHALMOLOGY
Payer: MEDICARE

## 2023-03-03 DIAGNOSIS — Z96.1 PSEUDOPHAKIA, RIGHT EYE: Primary | ICD-10-CM

## 2023-03-03 PROCEDURE — 99024 POSTOP FOLLOW-UP VISIT: CPT | Mod: GC | Performed by: OPHTHALMOLOGY

## 2023-03-03 PROCEDURE — G0463 HOSPITAL OUTPT CLINIC VISIT: HCPCS | Mod: 25

## 2023-03-03 ASSESSMENT — SLIT LAMP EXAM - LIDS: COMMENTS: NORMAL, MAKEUP

## 2023-03-03 ASSESSMENT — CUP TO DISC RATIO
OS_RATIO: 0.40
OD_RATIO: 0.40

## 2023-03-03 ASSESSMENT — REFRACTION_MANIFEST
OD_SPHERE: -0.25
OD_ADD: +2.50
OD_AXIS: 095
OD_CYLINDER: +0.50

## 2023-03-03 ASSESSMENT — REFRACTION_WEARINGRX
OS_SPHERE: -5.25
OD_AXIS: 060
OD_ADD: +2.50
OD_CYLINDER: +0.75
OS_ADD: +2.50
OS_AXIS: 090
OS_CYLINDER: +1.00
OD_SPHERE: -5.75

## 2023-03-03 ASSESSMENT — TONOMETRY
OS_IOP_MMHG: 13
IOP_METHOD: TONOPEN
OD_IOP_MMHG: 15

## 2023-03-03 ASSESSMENT — VISUAL ACUITY
OD_SC: 20/20
OD_SC+: -2
OS_SC: 20/25
METHOD: SNELLEN - LINEAR
CORRECTION_TYPE: CONTACTS
OS_SC+: -2

## 2023-03-03 ASSESSMENT — CONF VISUAL FIELD
OD_NORMAL: 1
OD_INFERIOR_TEMPORAL_RESTRICTION: 0
OS_INFERIOR_TEMPORAL_RESTRICTION: 0
OD_SUPERIOR_TEMPORAL_RESTRICTION: 0
OD_INFERIOR_NASAL_RESTRICTION: 0
OS_SUPERIOR_NASAL_RESTRICTION: 0
METHOD: COUNTING FINGERS
OS_SUPERIOR_TEMPORAL_RESTRICTION: 0
OS_NORMAL: 1
OD_SUPERIOR_NASAL_RESTRICTION: 0
OS_INFERIOR_NASAL_RESTRICTION: 0

## 2023-03-03 NOTE — NURSING NOTE
Chief Complaints and History of Present Illnesses   Patient presents with     Pseudophakia Follow Up     3 week follow up RE     Chief Complaint(s) and History of Present Illness(es)     Pseudophakia Follow Up            Comments: 3 week follow up RE          Comments    Pt states vision has improved since last visit. No eye pain today. Pt having dryness, relief with Artificial tears.  Still seeing floaters in RE, no changes from last visit. No redness. No discharge.    POONAM Haas March 3, 2023 8:25 AM

## 2023-03-03 NOTE — PROGRESS NOTES
Chief complaint   Post op OD  Referring Provider: Self-referred    HPI   Belen Sharma 62 year old adult PMHx of liver-kidney recipient (03/03/2011, re-transplanted 2016) noticed progressive vision decrease since 2019. Cannot be corrected any more with glasses to help her drive and read. Noticing glare, starburts.     Interval History: Reports that her vision is improving, noting that lights seem much brighter in her right eye compared to her left. No flashes, floaters, curtains. No pain.     Past ocular history   Prior eye surgery/laser/Trauma: phaci IOL right eye 2022  CTL wearer:No  Glasses : Bifocals  Family Hx of eye disease: No     PMH     Past Medical History:   Diagnosis Date     Adolescent scoliosis     protruding     BK viremia [id number]     CMV pneumonia (H) 2010     Congenital hepatic fibrosis      Endometriosis      High grade squamous intraepithelial lesion of cervix 09/11/2020     History of angina      HPV (human papilloma virus) infection      Immunosuppression (H)      Polycystic kidney disease     Polycystic kidneys, tx kidney on the right. Both, very large, native kidneys reamain.     PONV (postoperative nausea and vomiting)     Need to start with ice chips and apple juice, no soda     S/P kidney transplant     SLK 10/9/2010 - kidney failure 2/2 [facility]. Explanted 2012. Re-transplant after de-sensitization 2016     S/P liver transplant (H)     10/9/2010 - HAT, ischemic biopathy. Re-transplant 3/3/2011     S/P splenectomy      Spider veins 2019     Thyroid disease     Hyperthyroid treated with single dose iodine     Urinary tract infection 09/2022       PSH     Past Surgical History:   Procedure Laterality Date     bunectomy  08/01/2018    right foot     bunionectomy  01/2019    left     CHOLECYSTECTOMY       CONIZATION N/A 09/30/2020    Procedure: CONE BIOPSY, CERVIX;  Surgeon: Daysi Perez MD;  [facility]     FAILED PICC - RIGHT ARM Right 12/19/2020    Has a LUE AV fistula.     H  STATISTIC PICC LINE INSERTION >5YR, FAILED Bilateral 2020    Left fistula, Right failed x 2 Occlussion     HERNIA REPAIR, INCISIONAL  2013     IR DIALYSIS PTA CENTRAL SEG  2020     IR PICC PLACEMENT > 5 YRS OF AGE  2020     IR PICC PLACEMENT > 5 YRS OF AGE  10/5/2022     PHACOEMULSIFICATION CLEAR CORNEA WITH STANDARD INTRAOCULAR LENS IMPLANT Right 2023    Procedure: RIGHT EYE PHACOEMULSIFICATION, CATARACT, WITH INTRAOCULAR LENS IMPLANT;  Surgeon: Sebastian Robertson MD;  Location: [facility] OR     SPLENECTOMY      Splenectomy     TRANSPLANT KIDNEY RECIPIENT  DONOR  2016    re-transplant after [facility]; 6 months de-sensitization prior AND 6 months after transplant     TRANSPLANT LIVER RECIPIENT  DONOR  2011     TRANSPLANT LIVER, KIDNEY RECIPIENT  DONOR, COMBINED  10/09/2010    HAT - re-Tx liver 3/3/2011; Kidney (left iliac) lost to [facility]/fibrosis - explant     VASCULAR SURGERY      Vascular access left UE. Not used for 4 years since 2nd kidney tx - [facility] [place]       Meds     Current Outpatient Medications   Medication     acetaminophen (TYLENOL) 325 MG tablet     aspirin (ASA) 81 MG chewable tablet     atorvastatin (LIPITOR) 10 MG tablet     biotin 1000 MCG TABS tablet     ciclopirox (PENLAC) 8 % external solution     diphenhydrAMINE (BENADRYL) 25 MG tablet     estradiol (ESTRACE) 0.1 MG/GM vaginal cream     guaiFENesin (MUCINEX) 600 MG 12 hr tablet     Multiple Vitamins-Minerals (WOMENS DAILY FORMULA PO)     nitroFURantoin macrocrystal-monohydrate (MACROBID) 100 MG capsule     nitroFURantoin macrocrystal-monohydrate (MACROBID) 100 MG capsule     nitroGLYcerin (NITROSTAT) 0.4 MG sublingual tablet     predniSONE (DELTASONE) 5 MG tablet     Probiotic Product (PROBIOTIC PO)     sodium bicarbonate 650 MG tablet     sulfamethoxazole-trimethoprim (BACTRIM) 400-80 MG tablet     tacrolimus (GENERIC EQUIVALENT) 0.5 MG capsule     tacrolimus (GENERIC EQUIVALENT) 1 MG capsule      temazepam (RESTORIL) 15 MG capsule     No current facility-administered medications for this visit.       Imaging   Adequate for IOL selection    Drops Currently Taking   None    Assessment/Plan   #Pseudophakia, right eye  - Happy with vision, doing well  - DFE without acute pathology  - Lens well centered, BCVA 20/20        # combined age-related cataract, left eye  Prior keratorefractive: No  BCVA: 20/30 and 20/25  Visually significant:YES  Glare: Positive   Reports impacts ADL     Plan  Patient is having difficulty with daily activities due to visual impairment. Clearly told patient that cataract surgery is NOT needed if managing with current vision. Patient wishes to proceed ahead with surgery. Explained benefits alternatives and risks including but not limited to loss of vision, severe infection, retinal detachment, need for more surgery, visual disturbances etc...  Informed patient that reaching uncorrected vision aim (without glasses) after cataract surgery is not certain. Made patient aware they may need glasses, laser vision correction or in worst case scenario, IOL exchange.    -Aim: distance each eye  Lens: TORIC LENS  - dominant eye: OS  -Previous refractive surgery status:-  -Corneal astigmatism>2    - Optimize ocular surface; recommend regular PFATs 4-6x/day and warm compresses BID    # Immunosuppression  - No evidence of retinopathies or active retinal disease  - Old CR scar peripheral retina left eye  - Monitor    Follow up: POD1 s/p CE/IOL left eye, sooner PRN    Jay Jay Mackey MD  Fellow, Cornea, External Disease and Refractive Surgery  Department of Ophthalmology  [facility]      Attending Physician Attestation:  Complete documentation of historical and exam elements from today's encounter can be found in the full encounter summary report (not reduplicated in this progress note).  I personally obtained the chief complaint(s) and history of present illness.  I confirmed and edited as  necessary the review of systems, past medical/surgical history, family history, social history, and examination findings as documented by others; and I examined the patient myself.  I personally reviewed the relevant tests, images, and reports as documented above.  I formulated and edited as necessary the assessment and plan and discussed the findings and management plan with the patient and family. - Sebastian Robertson MD

## 2023-03-08 ENCOUNTER — ANESTHESIA EVENT (OUTPATIENT)
Dept: SURGERY | Facility: AMBULATORY SURGERY CENTER | Age: 63
End: 2023-03-08
Payer: MEDICARE

## 2023-03-08 ENCOUNTER — TELEPHONE (OUTPATIENT)
Dept: OPHTHALMOLOGY | Facility: CLINIC | Age: 63
End: 2023-03-08
Payer: MEDICARE

## 2023-03-08 NOTE — TELEPHONE ENCOUNTER
Returned Belen's call to confirm surgery start and arrival time tomorrow, 03/09. Arrival 5:45 AM, Surgery start 7:15 AM. Nothing else was needed.

## 2023-03-09 ENCOUNTER — HOSPITAL ENCOUNTER (OUTPATIENT)
Facility: AMBULATORY SURGERY CENTER | Age: 63
Discharge: HOME | End: 2023-03-09
Attending: OPHTHALMOLOGY
Payer: MEDICARE

## 2023-03-09 ENCOUNTER — ANESTHESIA (OUTPATIENT)
Dept: SURGERY | Facility: AMBULATORY SURGERY CENTER | Age: 63
End: 2023-03-09
Payer: MEDICARE

## 2023-03-09 VITALS
HEART RATE: 54 BPM | RESPIRATION RATE: 14 BRPM | HEIGHT: 70 IN | BODY MASS INDEX: 20.9 KG/M2 | SYSTOLIC BLOOD PRESSURE: 100 MMHG | OXYGEN SATURATION: 98 % | TEMPERATURE: 97.3 F | DIASTOLIC BLOOD PRESSURE: 51 MMHG | WEIGHT: 146 LBS

## 2023-03-09 PROCEDURE — V2599 CONTACT LENS/ES OTHER TYPE: HCPCS | Mod: DBP,LT

## 2023-03-09 PROCEDURE — 66984 XCAPSL CTRC RMVL W/O ECP: CPT | Mod: 79 | Performed by: OPHTHALMOLOGY

## 2023-03-09 PROCEDURE — 66984 XCAPSL CTRC RMVL W/O ECP: CPT | Mod: LT

## 2023-03-09 PROCEDURE — 96999 UNLISTED SPEC DERM SVC/PX: CPT | Mod: LT | Performed by: OPHTHALMOLOGY

## 2023-03-09 DEVICE — IMPLANTABLE DEVICE: Type: IMPLANTABLE DEVICE | Site: EYE | Status: FUNCTIONAL

## 2023-03-09 RX ORDER — LABETALOL HYDROCHLORIDE 5 MG/ML
10 INJECTION, SOLUTION INTRAVENOUS
Status: DISCONTINUED | OUTPATIENT
Start: 2023-03-09 | End: 2023-03-10 | Stop reason: HOSPADM

## 2023-03-09 RX ORDER — PROPARACAINE HYDROCHLORIDE 5 MG/ML
1 SOLUTION/ DROPS OPHTHALMIC ONCE
Status: COMPLETED | OUTPATIENT
Start: 2023-03-09 | End: 2023-03-09

## 2023-03-09 RX ORDER — FENTANYL CITRATE 50 UG/ML
INJECTION, SOLUTION INTRAMUSCULAR; INTRAVENOUS PRN
Status: DISCONTINUED | OUTPATIENT
Start: 2023-03-09 | End: 2023-03-09

## 2023-03-09 RX ORDER — SODIUM CHLORIDE, SODIUM LACTATE, POTASSIUM CHLORIDE, CALCIUM CHLORIDE 600; 310; 30; 20 MG/100ML; MG/100ML; MG/100ML; MG/100ML
INJECTION, SOLUTION INTRAVENOUS CONTINUOUS
Status: DISCONTINUED | OUTPATIENT
Start: 2023-03-09 | End: 2023-03-10 | Stop reason: HOSPADM

## 2023-03-09 RX ORDER — FENTANYL CITRATE 50 UG/ML
50 INJECTION, SOLUTION INTRAMUSCULAR; INTRAVENOUS EVERY 5 MIN PRN
Status: DISCONTINUED | OUTPATIENT
Start: 2023-03-09 | End: 2023-03-10 | Stop reason: HOSPADM

## 2023-03-09 RX ORDER — OXYCODONE HYDROCHLORIDE 5 MG/1
10 TABLET ORAL
Status: DISCONTINUED | OUTPATIENT
Start: 2023-03-09 | End: 2023-03-10 | Stop reason: HOSPADM

## 2023-03-09 RX ORDER — FENTANYL CITRATE 50 UG/ML
25 INJECTION, SOLUTION INTRAMUSCULAR; INTRAVENOUS EVERY 5 MIN PRN
Status: DISCONTINUED | OUTPATIENT
Start: 2023-03-09 | End: 2023-03-10 | Stop reason: HOSPADM

## 2023-03-09 RX ORDER — LIDOCAINE HYDROCHLORIDE 10 MG/ML
INJECTION, SOLUTION EPIDURAL; INFILTRATION; INTRACAUDAL; PERINEURAL PRN
Status: DISCONTINUED | OUTPATIENT
Start: 2023-03-09 | End: 2023-03-09 | Stop reason: HOSPADM

## 2023-03-09 RX ORDER — HYDROMORPHONE HYDROCHLORIDE 1 MG/ML
0.2 INJECTION, SOLUTION INTRAMUSCULAR; INTRAVENOUS; SUBCUTANEOUS EVERY 5 MIN PRN
Status: DISCONTINUED | OUTPATIENT
Start: 2023-03-09 | End: 2023-03-10 | Stop reason: HOSPADM

## 2023-03-09 RX ORDER — MOXIFLOXACIN IN NACL,ISO-OS/PF 0.3MG/0.3
SYRINGE (ML) INTRAOCULAR PRN
Status: DISCONTINUED | OUTPATIENT
Start: 2023-03-09 | End: 2023-03-09 | Stop reason: HOSPADM

## 2023-03-09 RX ORDER — ACETAMINOPHEN 325 MG/1
975 TABLET ORAL ONCE
Status: COMPLETED | OUTPATIENT
Start: 2023-03-09 | End: 2023-03-09

## 2023-03-09 RX ORDER — BALANCED SALT SOLUTION 6.4; .75; .48; .3; 3.9; 1.7 MG/ML; MG/ML; MG/ML; MG/ML; MG/ML; MG/ML
SOLUTION OPHTHALMIC PRN
Status: DISCONTINUED | OUTPATIENT
Start: 2023-03-09 | End: 2023-03-09 | Stop reason: HOSPADM

## 2023-03-09 RX ORDER — CYCLOPENTOLAT/TROPIC/PHENYLEPH 1%-1%-2.5%
1 DROPS (EA) OPHTHALMIC (EYE)
Status: COMPLETED | OUTPATIENT
Start: 2023-03-09 | End: 2023-03-09

## 2023-03-09 RX ORDER — ONDANSETRON 4 MG/1
4 TABLET, ORALLY DISINTEGRATING ORAL EVERY 30 MIN PRN
Status: DISCONTINUED | OUTPATIENT
Start: 2023-03-09 | End: 2023-03-10 | Stop reason: HOSPADM

## 2023-03-09 RX ORDER — LIDOCAINE 40 MG/G
CREAM TOPICAL
Status: DISCONTINUED | OUTPATIENT
Start: 2023-03-09 | End: 2023-03-10 | Stop reason: HOSPADM

## 2023-03-09 RX ORDER — ONDANSETRON 2 MG/ML
4 INJECTION INTRAMUSCULAR; INTRAVENOUS EVERY 30 MIN PRN
Status: DISCONTINUED | OUTPATIENT
Start: 2023-03-09 | End: 2023-03-10 | Stop reason: HOSPADM

## 2023-03-09 RX ORDER — TRIAMCINOLONE ACETONIDE 40 MG/ML
INJECTION, SUSPENSION INTRA-ARTICULAR; INTRAMUSCULAR PRN
Status: DISCONTINUED | OUTPATIENT
Start: 2023-03-09 | End: 2023-03-09 | Stop reason: HOSPADM

## 2023-03-09 RX ORDER — OXYCODONE HYDROCHLORIDE 5 MG/1
5 TABLET ORAL
Status: DISCONTINUED | OUTPATIENT
Start: 2023-03-09 | End: 2023-03-10 | Stop reason: HOSPADM

## 2023-03-09 RX ORDER — HYDROMORPHONE HYDROCHLORIDE 1 MG/ML
0.4 INJECTION, SOLUTION INTRAMUSCULAR; INTRAVENOUS; SUBCUTANEOUS EVERY 5 MIN PRN
Status: DISCONTINUED | OUTPATIENT
Start: 2023-03-09 | End: 2023-03-10 | Stop reason: HOSPADM

## 2023-03-09 RX ADMIN — Medication 1 DROP: at 06:28

## 2023-03-09 RX ADMIN — Medication 1 DROP: at 06:34

## 2023-03-09 RX ADMIN — Medication 1 DROP: at 06:38

## 2023-03-09 RX ADMIN — PROPARACAINE HYDROCHLORIDE 1 DROP: 5 SOLUTION/ DROPS OPHTHALMIC at 06:27

## 2023-03-09 RX ADMIN — FENTANYL CITRATE 25 MCG: 50 INJECTION, SOLUTION INTRAMUSCULAR; INTRAVENOUS at 07:33

## 2023-03-09 RX ADMIN — SODIUM CHLORIDE, SODIUM LACTATE, POTASSIUM CHLORIDE, CALCIUM CHLORIDE: 600; 310; 30; 20 INJECTION, SOLUTION INTRAVENOUS at 06:38

## 2023-03-09 RX ADMIN — ACETAMINOPHEN 975 MG: 325 TABLET ORAL at 06:36

## 2023-03-09 NOTE — ANESTHESIA CARE TRANSFER NOTE
Patient: Belen Sharma    Procedure: Procedure(s):  LEFT EYE PHACOEMULSIFICATION, CATARACT, WITH STANDARD INTRAOCULAR LENS IMPLANT INSERTION       Diagnosis: Combined forms of age-related cataract of both eyes [H25.813]  Diagnosis Additional Information: No value filed.    Anesthesia Type:   MAC     Note:    Oropharynx: oropharynx clear of all foreign objects and spontaneously breathing  Level of Consciousness: awake  Oxygen Supplementation: room air    Independent Airway: airway patency satisfactory and stable  Dentition: dentition unchanged  Vital Signs Stable: post-procedure vital signs reviewed and stable  Report to RN Given: handoff report given  Patient transferred to: Phase II    Handoff Report: Identifed the Patient, Identified the Reponsible Provider, Reviewed the pertinent medical history, Discussed the surgical course, Reviewed Intra-OP anesthesia mangement and issues during anesthesia, Set expectations for post-procedure period and Allowed opportunity for questions and acknowledgement of understanding      Vitals:  Vitals Value Taken Time   BP 87/45 03/09/23 0755   Temp 36.4  C (97.6  F) 03/09/23 0753   Pulse     Resp 16 03/09/23 0753   SpO2 95 % 03/09/23 0753       Electronically Signed By: ANA Waters CRNA  March 9, 2023  7:56 AM

## 2023-03-09 NOTE — DISCHARGE INSTRUCTIONS
Post-Operative Instructions     FIRST 24 HOURS AFTER SURGERY:  You are allowed to Tylenol (acetaminophen) 650mg every four hours as needed for pain unless you have liver disease or are allergic to Tylenol..  Continue taking your regular medications and eye drops in the non-operative eye.  Do not remove the metal or plastic shield unless otherwise instructed by your surgeon.  Do not operate a car, motorcycle, or machinery for 24 hours after surgery.  Call ED immediately if you have SEVERE PAIN unrelieved with Tylenol. And  ask for the resident on call.     MEDICATION INSTRUCTIONS:  -You will not have treatment eye drops prescription as medications were injected into your eye.  -If you feel your eyes are dry and itchy, you can use regular lubricating drops for one month after the surgery. These eye drops can be found over the counter Brand names example: Systane, Refresh. Please choose preservative free drops. To be used four times a day and as needed for 1 month  -Instilling the eye drops directly into the eye.    -If you had previously any glaucoma eye drops, You can remove the cover and instill the drops as prescribed before and after the procedure      GENERAL INSTRUCTIONS:  Hygiene of the operated eye  Wash your hands thoroughly before caring for the eye.  If the lids are sticky or itchy in the morning, debris, or matter can be gently wiped away with a cotton ball moistened with tap water. DO NOT press on the lids or eyeball.     FIVE MINUTES APART. If ointment is prescribed, it should be applied last.  If you have been taking medications in the non-operated eye, continue as they were prescribed.  If you take medications by mouth for a medical problem, continue as they were prescribed (unless otherwise instructed by physician)    EYE PROTECTION  From the time of surgery until the time of your post-operative day 1 appointment, wear the eye shield at all times. Then, wear it whenever sleeping, for 1  week.  Protective sunglasses may be worn as needed for your comfort, and are suggested for outdoor activities.    ACTIVITIES  Avoid vigorous exertion and heavy lifting for the first week after surgery  You may take a bath or shower, but avoid getting water directly into your eye for one week after surgery, usually by keeping your eyes closed during the bath.  You should discuss driving and traveling with your surgeon. You may ride in a car and fly in an airplane unless otherwise instructed.  Avoid swimming or using a hot tube/sauna/pool/lake for 2 weeks after the surgery.      WHAT TO EXPECT:  Mild irritation and discomfort are normal.    Call the doctor if you experience any of the following:  Severe eye pain  Nausea  Vomiting  Severe headache   [facility]  Home Care Following Anesthesia  For 24 hours after surgery:  Get plenty of rest.  A responsible adult must stay with you for at least 24 hours after you leave the surgery center.  Do not drive or use heavy equipment.  If you have weakness or tingling, don't drive or use heavy equipment until this feeling goes away.   Do not drink alcohol.   Avoid strenuous or risky activities.  Ask for help when climbing stairs.  You may feel lightheaded.  IF so, sit for a few minutes before standing.  Have someone help you get up.   If you have nausea (feel sick to your stomach): Drink only clear liquids such as apple juice, ginger ale, broth or 7-Up.  Rest may also help.  Be sure to drink enough fluids.  Move to a regular diet as you feel able.   You may have a slight fever.  Call the doctor if your fever is over 100 F (37.7 C) (taken under the tongue) or lasts longer than 24 hours.  You may have a dry mouth, a sore throat, muscle aches or trouble sleeping. These should go away after 24 hours.  Do not make important or legal decisions.   It is recommended to avoid smoking.               Tips for taking pain medications  To get the best  pain relief possible, remember these points:  Take pain medications as directed, before pain becomes severe.  Pain medication can upset your stomach: taking it with food may help.  Constipation is a common side effect of pain medication. Drink plenty of  fluids.  Eat foods high in fiber. Take a stool softener if recommended by your doctor or pharmacist.  Do not drink alcohol, drive or operate machinery while taking pain medications.  Ask about other ways to control pain, such as with heat, ice or relaxation.    Tylenol/Acetaminophen Consumption  To help encourage the safe use of acetaminophen, the makers of TYLENOL  have lowered the maximum daily dose for single-ingredient Extra Strength TYLENOL  (acetaminophen) products sold in the [place] from 8 pills per day (4,000 mg) to 6 pills per day (3,000 mg). The dosing interval has also changed from 2 pills every 4-6 hours to 2 pills every 6 hours.  If you feel your pain relief is insufficient, you may take Tylenol/Acetaminophen in addition to your narcotic pain medication.   Be careful not to exceed 3,000 mg of Tylenol/Acetaminophen in a 24 hour period from all sources.  If you are taking extra strength Tylenol/acetaminophen (500 mg), the maximum dose is 6 tablets in 24 hours.  If you are taking regular strength acetaminophen (325 mg), the maximum dose is 9 tablets in 24 hours.    Call a doctor for any of the following:  Signs of infection (fever, growing tenderness at the surgery site, a large amount of drainage or bleeding, severe pain, foul-smelling drainage, redness, swelling).  It has been over 8 to 10 hours since surgery and you are still not able to urinate (pass water).  Headache for over 24 hours.  Numbness, tingling or weakness the day after surgery (if you had spinal anesthesia).  Signs of Covid-19 infection (temperature over 100 degrees, shortness of breath, cough, loss of taste/smell, generalized body aches, persistent headache, chills, sore throat,  nausea/vomiting/diarrhea)  Your doctor is:  Dr. Sebastian Robertson, Ophthalmology: [phone]                    Or dial [phone] and ask for the resident on call for:  Ophthalmology  For emergency care, call the:  [facility]:  [phone] (TTY for hearing impaired: [phone])

## 2023-03-09 NOTE — OP NOTE
Operative  Report    Patient Name: Belen Sharma    MRN: [id number]    Date of Surgery: 3/9/2023    Surgeon: rafia venegas M.D    Assistant surgeon: maral dillon MD    Preoperative Diagnosis: Visually significant cataract, left eye    Postoperative Diagnosis: Same    Procedure: Phacoemulsification with intraocular lens implant, left eye    Implant: AIM 0D  Implant Name Type Inv. Item Serial No.  Lot No. LRB No. Used Action   Clareon Toric Aspheric UV absoring IOL [id number] ++14.0D Lens/Eye Implant  [id number] ADELINE  Left 1 Implanted       Anesthesia Type: mac/topical    Estimated Blood Loss: Trace    Complications: None    INDICATIONS FOR PROCEDURE: Patient has a history of visually significant cataract affecting the patient's activity of daily living. After a discussion with the patient, the patient has elected to have the listed procedure(s) to maximize visual potential. All of the appropriate consent forms have been signed and all of the patient's questions have been answered.    DETAILS OF THE PROCEDURE: Patient was identified in the pre operative area and given pre operative eye drops. Topical tetracaine was instilled and the eye was marked at the 180 degree meridian. The patient was then brought into the operating room and intravenous sedation was begun after a time out was performed. The patient was prepped and draped in the usual sterile ophthalmic fashion.     A lid speculum was placed and the operating microscope was brought into position. A toric marker was used to bonifacio the 107 degree axis of the eye. A paracentesis was made and lidocain and viscoelastic was injected into the anterior chamber. A clear corneal incision was made using a keratome blade. A cystotome needle and Utrata forceps were used to create an anterior continuous curvilinear capsulorhexis. Then BSS on a blunt tipped cannula was used for hydrodissection and hydrodelineation. Using  the phacoemulsification unit, the nucleus was then removed from the eye. Using irrigation and aspiration, the remaining cortical material was removed from the eye. The capsular bag was infused with viscoelastic material. The intraocular lens was then placed into the capsular bag and secured into position. It was rotated to the pre-determined axis. The remaining viscoelastic material was then removed from the eye via irrigation and aspiration. BSS on a blunt tipped cannula was used to hydrate all of the wounds. At the end of the case, the wounds were noted to be watertight, the pupil was noted to be round, the lens appeared to be in good position, and the pressure was palpated to be physiologic. Intracameral moxifloxacin and A subconjunctival injection of Kenalog were administered.     Post operative ointment was applied and the eye was patched and shielded. Patient tolerated procedure and was brought to the recover area in good condition. Patient will follow in the eye clinic in one day.     Sebastian Robertson MD

## 2023-03-09 NOTE — H&P
I reviewed the history and physical from Dr. Matias Phoenix on 1/26/23. I have examined the patient. There have been no changes to clinical status since this time. Plan to proceed with left eye cataract extraction with intraocular lens implantation as scheduled.    Dr. Sebastian Robertson MD

## 2023-03-10 ENCOUNTER — PATIENT OUTREACH (OUTPATIENT)
Dept: OBGYN | Facility: CLINIC | Age: 63
End: 2023-03-10
Payer: MEDICARE

## 2023-03-10 ENCOUNTER — OFFICE VISIT (OUTPATIENT)
Dept: OPHTHALMOLOGY | Facility: CLINIC | Age: 63
End: 2023-03-10
Attending: OPHTHALMOLOGY
Payer: MEDICARE

## 2023-03-10 DIAGNOSIS — Z96.1 PSEUDOPHAKIA OF BOTH EYES: Primary | ICD-10-CM

## 2023-03-10 PROBLEM — D06.9 CIN III WITH SEVERE DYSPLASIA: Status: ACTIVE | Noted: 2020-09-21

## 2023-03-10 PROCEDURE — 99024 POSTOP FOLLOW-UP VISIT: CPT | Mod: GC | Performed by: OPHTHALMOLOGY

## 2023-03-10 PROCEDURE — G0463 HOSPITAL OUTPT CLINIC VISIT: HCPCS | Performed by: OPHTHALMOLOGY

## 2023-03-10 ASSESSMENT — CONF VISUAL FIELD
OS_INFERIOR_NASAL_RESTRICTION: 0
OD_SUPERIOR_NASAL_RESTRICTION: 0
OD_SUPERIOR_TEMPORAL_RESTRICTION: 0
OS_SUPERIOR_TEMPORAL_RESTRICTION: 0
OD_INFERIOR_TEMPORAL_RESTRICTION: 0
METHOD: COUNTING FINGERS
OD_NORMAL: 1
OS_INFERIOR_TEMPORAL_RESTRICTION: 0
OD_INFERIOR_NASAL_RESTRICTION: 0
OS_SUPERIOR_NASAL_RESTRICTION: 0
OS_NORMAL: 1

## 2023-03-10 ASSESSMENT — TONOMETRY
OD_IOP_MMHG: 18
IOP_METHOD: ICARE
OS_IOP_MMHG: 19

## 2023-03-10 ASSESSMENT — VISUAL ACUITY
OD_SC: 20/20
METHOD: SNELLEN - LINEAR
OS_SC: 20/20

## 2023-03-10 NOTE — PROGRESS NOTES
Chief complaint   Post op OD  Referring Provider: Self-referred    HPI   Belen Sharma 62 year old adult PMHx of liver-kidney recipient (03/03/2011, re-transplanted 2016) noticed progressive vision decrease since 2019. Cannot be corrected any more with glasses to help her drive and read. Noticing glare, starburts.     Interval History: POD1 s/p CE/IOL left eye (3/9/23). Feels well without pain. Feels that vision is very clear. No flashes, floaters, curtains.     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 Both, very large, native kidneys reamain.     PONV (postoperative nausea and vomiting)     Need to start with ice chips and apple juice, no soda     S/P kidney transplant     SLK 10/9/2010 - kidney failure 2/2 [facility]. Explanted 2012. Re-transplant after de-sensitization 2016     S/P liver transplant (H)     10/9/2010 - [facility], ischemic biopathy. Re-transplant 3/3/2011     S/P splenectomy      Spider veins 2019     Thyroid disease     Hyperthyroid treated with single dose iodine     Urinary tract infection 09/2022       PSH     Past Surgical History:   Procedure Laterality Date     bunectomy  08/01/2018    right foot     bunionectomy  01/2019    left     CHOLECYSTECTOMY       CONIZATION N/A 09/30/2020    Procedure: CONE BIOPSY, CERVIX;  Surgeon: Daysi Perez MD;  Location: [facility]     FAILED PICC - RIGHT ARM Right 12/19/2020    Has a LUE AV fistula.     H STATISTIC  PICC LINE INSERTION >5YR, FAILED Bilateral 2020    Left fistula, Right failed x 2 Occlussion     HERNIA REPAIR, INCISIONAL  2013     IR DIALYSIS PTA CENTRAL SEG  2020     IR PICC PLACEMENT > 5 YRS OF AGE  2020     IR PICC PLACEMENT > 5 YRS OF AGE  10/5/2022     PHACOEMULSIFICATION CLEAR CORNEA WITH STANDARD INTRAOCULAR LENS IMPLANT Right 2023    Procedure: RIGHT EYE PHACOEMULSIFICATION, CATARACT, WITH INTRAOCULAR LENS IMPLANT;  Surgeon: Sebastian Robertson MD;  Location: [facility] OR     PHACOEMULSIFICATION WITH STANDARD INTRAOCULAR LENS IMPLANT Left 3/9/2023    Procedure: LEFT EYE PHACOEMULSIFICATION, CATARACT, WITH STANDARD INTRAOCULAR LENS IMPLANT INSERTION;  Surgeon: Sebastian Robertson MD;  Location: [facility] OR     SPLENECTOMY      Splenectomy     TRANSPLANT KIDNEY RECIPIENT  DONOR      re-transplant after [facility]; 6 months de-sensitization prior AND 6 months after transplant     TRANSPLANT LIVER RECIPIENT  DONOR  2011     TRANSPLANT LIVER, KIDNEY RECIPIENT  DONOR, COMBINED  10/09/2010    HAT - re-Tx liver 3/3/2011; Kidney (left iliac) lost to [facility]/fibrosis - explant     VASCULAR SURGERY      Vascular access left UE. Not used for 4 years since 2nd kidney tx -- [facility] [place]       Meds     Current Outpatient Medications   Medication     acetaminophen (TYLENOL) 325 MG tablet     aspirin (ASA) 81 MG chewable tablet     atorvastatin (LIPITOR) 10 MG tablet     biotin 1000 MCG TABS tablet     ciclopirox (PENLAC) 8 % external solution     diphenhydrAMINE (BENADRYL) 25 MG tablet     estradiol (ESTRACE) 0.1 MG/GM vaginal cream     guaiFENesin (MUCINEX) 600 MG 12 hr tablet     Multiple Vitamins-Minerals (WOMENS DAILY FORMULA PO)     nitroFURantoin macrocrystal-monohydrate (MACROBID) 100 MG capsule     nitroFURantoin macrocrystal-monohydrate (MACROBID) 100 MG capsule     nitroGLYcerin (NITROSTAT) 0.4 MG sublingual tablet     predniSONE (DELTASONE) 5 MG tablet     Probiotic  Product (PROBIOTIC PO)     sodium bicarbonate 650 MG tablet     sulfamethoxazole-trimethoprim (BACTRIM) 400-80 MG tablet     tacrolimus (GENERIC EQUIVALENT) 0.5 MG capsule     tacrolimus (GENERIC EQUIVALENT) 1 MG capsule     temazepam (RESTORIL) 15 MG capsule     No current facility-administered medications for this visit.       Imaging   -    Drops Currently Taking   None    Assessment/Plan   #Pseudophakia, both eyes  - Distance target in both eyes  - S/P CE/IOL right eye 2022, now POD1 s/p CE/IOL left eye, doing very well  - Intracameral moxifloxacin and subconjunctival kenalog given at time of surgery; monitoring off drops  - RD/endophthalmitis precautions discussed  - No water in the eye; no heavy lifting/bending/straining; no eye rubbing  - Eye shield at night  - POW3 visit with VTD/MRx    # Immunosuppression  - No evidence of retinopathies or active retinal disease  - Old CR scar peripheral retina left eye  - Monitor    Follow up: POW3 s/p CE/IOL left eye, sooner PRN    Jay Jay Mackey MD  Fellow, Cornea, External Disease and Refractive Surgery  Department of Ophthalmology  [facility]    Attending Physician Attestation:  Complete documentation of historical and exam elements from today's encounter can be found in the full encounter summary report (not reduplicated in this progress note).  I personally obtained the chief complaint(s) and history of present illness.  I confirmed and edited as necessary the review of systems, past medical/surgical history, family history, social history, and examination findings as documented by others; and I examined the patient myself.  I personally reviewed the relevant tests, images, and reports as documented above.  I formulated and edited as necessary the assessment and plan and discussed the findings and management plan with the patient and family. - Sebastian Robertson MD

## 2023-03-10 NOTE — TELEPHONE ENCOUNTER
Patient due for Pap and HPV and Colposcopy.    Reminder done today via [employer].

## 2023-03-10 NOTE — NURSING NOTE
Chief Complaints and History of Present Illnesses   Patient presents with     Post Op (Ophthalmology) Left Eye     S/p PHACO IOL 3/9/23 Dr. Robertson      Chief Complaint(s) and History of Present Illness(es)     Post Op (Ophthalmology) Left Eye            Laterality: left eye    Onset: 1 day ago    Associated symptoms: dryness (RE), eye pain, tearing (LE) and floaters.  Negative for flashes    Treatments tried: no treatments    Pain scale: 4/10    Comments: S/p PHACO IOL 3/9/23 Dr. Robertson           Comments    Pt slept well last night.  She has been trying to rest her eye,     DAVID CORADO COA March 10, 2023 8:22 AM

## 2023-03-12 NOTE — ANESTHESIA POSTPROCEDURE EVALUATION
Patient: Belen Sharma    Procedure: Procedure(s):  LEFT EYE PHACOEMULSIFICATION, CATARACT, WITH STANDARD INTRAOCULAR LENS IMPLANT INSERTION       Anesthesia Type:  MAC    Note:  Disposition: Outpatient   Postop Pain Control: Uneventful            Sign Out: Well controlled pain   PONV: No   Neuro/Psych: Uneventful            Sign Out: Acceptable/Baseline neuro status   Airway/Respiratory: Uneventful            Sign Out: Acceptable/Baseline resp. status   CV/Hemodynamics: Uneventful            Sign Out: Acceptable CV status; No obvious hypovolemia; No obvious fluid overload   Other NRE: NONE   DID A NON-ROUTINE EVENT OCCUR? No           Last vitals:  Vitals Value Taken Time   /51 03/09/23 0815   Temp 36.3  C (97.3  F) 03/09/23 0815   Pulse 54 03/09/23 0815   Resp 14 03/09/23 0815   SpO2 98 % 03/09/23 0815       Electronically Signed By: Steve Hooks MD  March 12, 2023  6:05 AM

## 2023-03-12 NOTE — ANESTHESIA PREPROCEDURE EVALUATION
Anesthesia Pre-Procedure Evaluation    Patient: Belen Sharma   MRN: [id number] : 1960        Procedure : Procedure(s):  LEFT EYE PHACOEMULSIFICATION, CATARACT, WITH STANDARD INTRAOCULAR LENS IMPLANT INSERTION          Past Medical History:   Diagnosis Date     Adolescent scoliosis     protruding     BK viremia [id number]     CMV pneumonia (H)      Congenital hepatic fibrosis      Endometriosis      High grade squamous intraepithelial lesion of cervix 2020     History of angina      HPV (human papilloma virus) infection      Immunosuppression (H)      Polycystic kidney disease     Polycystic kidneys, tx kidney on the right. Both, very large, native kidneys reamain.     PONV (postoperative nausea and vomiting)     Need to start with ice chips and apple juice, no soda     S/P kidney transplant     SLK 10/9/2010 - kidney failure  [facility]. Explanted . Re-transplant after de-sensitization      S/P liver transplant (H)     10/9/2010 - [facility]AT, ischemic biopathy. Re-transplant 3/3/2011     S/P splenectomy      Spider veins      Thyroid disease     Hyperthyroid treated with single dose iodine     Urinary tract infection 2022      Past Surgical History:   Procedure Laterality Date     bunectomy  2018    right foot     bunionectomy  2019    left     CHOLECYSTECTOMY       CONIZATION N/A 2020    Procedure: CONE BIOPSY, CERVIX;  Surgeon: Daysi Perez MD;  [facility]: [facility]     FAILED PICC - RIGHT ARM Right 2020    Has a LUE AV fistula.     H STATISTIC PICC LINE INSERTION >5YR, FAILED Bilateral 2020    Left fistula, Right failed x 2 Occlussion     HERNIA REPAIR, INCISIONAL  2013     IR DIALYSIS PTA CENTRAL SEG  2020     IR PICC PLACEMENT > 5 YRS OF AGE  2020     IR PICC PLACEMENT > 5 YRS OF AGE  10/5/2022     PHACOEMULSIFICATION CLEAR CORNEA WITH STANDARD INTRAOCULAR LENS IMPLANT Right 2023    Procedure: RIGHT EYE PHACOEMULSIFICATION, CATARACT, WITH  INTRAOCULAR LENS IMPLANT;  Surgeon: Sebastian Robertson MD;  Location: [facility] OR     PHACOEMULSIFICATION WITH STANDARD INTRAOCULAR LENS IMPLANT Left 3/9/2023    Procedure: LEFT EYE PHACOEMULSIFICATION, CATARACT, WITH STANDARD INTRAOCULAR LENS IMPLANT INSERTION;  Surgeon: Sebastian Robertson MD;  Location: [facility] OR     SPLENECTOMY      Splenectomy     TRANSPLANT KIDNEY RECIPIENT  DONOR      re-transplant after [facility]; 6 months de-sensitization prior AND 6 months after transplant     TRANSPLANT LIVER RECIPIENT  DONOR  2011     TRANSPLANT LIVER, KIDNEY RECIPIENT  DONOR, COMBINED  10/09/2010    HAT - re-Tx liver 3/3/2011; Kidney (left iliac) lost to [facility]/fibrosis - explant     VASCULAR SURGERY      Vascular access left UE. Not used for 4 years since 2nd kidney tx 2016 [place]      Allergies   Allergen Reactions     Ibuprofen      Due to liver transplant      Social History     Tobacco Use     Smoking status: Never     Smokeless tobacco: Never   Substance Use Topics     Alcohol use: Not Currently      Wt Readings from Last 1 Encounters:   23 66.2 kg (146 lb)        Anesthesia Evaluation   Pt has had prior anesthetic.     History of anesthetic complications  - PONV.      ROS/MED HX  ENT/Pulmonary:       Neurologic:       Cardiovascular:       METS/Exercise Tolerance:     Hematologic:       Musculoskeletal:       GI/Hepatic: Comment: S/p liver transplant    (+) liver disease,     Renal/Genitourinary: Comment: S/p kidney transplant    (+) renal disease,     Endo:     (+) thyroid problem, hypothyroidism,     Psychiatric/Substance Use:       Infectious Disease:       Malignancy:       Other:            Physical Exam    Airway  airway exam normal           Respiratory Devices and Support         Dental       (+) Modest Abnormalities - crowns, retainers, 1 or 2 missing teeth      Cardiovascular   cardiovascular exam normal          Pulmonary   pulmonary exam normal                OUTSIDE  LABS:  CBC:   Lab Results   Component Value Date    WBC 5.5 01/10/2023    WBC 5.5 11/10/2022    HGB 15.7 01/10/2023    HGB 14.6 11/10/2022    HCT 48.4 01/10/2023    HCT 44.5 11/10/2022     01/10/2023     11/10/2022     BMP:   Lab Results   Component Value Date     01/10/2023     11/10/2022    POTASSIUM 4.0 01/10/2023    POTASSIUM 3.8 11/10/2022    CHLORIDE 108 (H) 01/10/2023    CHLORIDE 104 11/10/2022    CO2 24 01/10/2023    CO2 22 11/10/2022    BUN 12.3 01/10/2023    BUN 12.3 11/10/2022    CR 0.79 01/10/2023    CR 0.75 11/10/2022    GLC 87 01/10/2023    GLC 77 11/10/2022     COAGS: No results found for: PTT, INR, FIBR  POC:   Lab Results   Component Value Date     (H) 09/30/2020     HEPATIC:   Lab Results   Component Value Date    ALBUMIN 4.0 01/10/2023    PROTTOTAL 6.8 01/10/2023    ALT 12 01/10/2023    AST 23 01/10/2023    ALKPHOS 62 01/10/2023    BILITOTAL 1.1 01/10/2023     OTHER:   Lab Results   Component Value Date    PH 7.44 (H) 09/14/2022    LACT 0.8 10/02/2022    EDSON 10.0 01/10/2023    PHOS 3.4 09/23/2020    TSH 0.86 11/04/2021    CRP 39.3 (H) 11/04/2021    SED 8 09/14/2022       Anesthesia Plan    ASA Status:  3   NPO Status:  NPO Appropriate    Anesthesia Type: MAC.     - Reason for MAC: straight local not clinically adequate   Induction: Intravenous.   Maintenance: TIVA.        Consents    Anesthesia Plan(s) and associated risks, benefits, and realistic alternatives discussed. Questions answered and patient/representative(s) expressed understanding.    - Discussed:     - Discussed with:  Patient         Postoperative Care    Pain management: Multi-modal analgesia.   PONV prophylaxis: Ondansetron (or other 5HT-3)     Comments:                Steve Hooks MD

## 2023-03-13 ENCOUNTER — TELEPHONE (OUTPATIENT)
Dept: OBGYN | Facility: CLINIC | Age: 63
End: 2023-03-13
Payer: MEDICARE

## 2023-03-13 DIAGNOSIS — N95.2 VAGINAL ATROPHY: ICD-10-CM

## 2023-03-13 RX ORDER — ESTRADIOL 0.1 MG/G
1 CREAM VAGINAL
Qty: 42.5 G | Refills: 2 | Status: CANCELLED | OUTPATIENT
Start: 2023-03-13

## 2023-03-13 NOTE — TELEPHONE ENCOUNTER
Called patient per  request. Patient Reports new spotting today. Patient denies other symptoms. Spotting is dark brown. Pessary was removed and had more more spotting. Spotting has now stopped, but continues to wear a pad in order to track spotting. Has been increasing her estrace dose to daily d/t worsening dryness and will need a new and updated Rx for this.     Plans to leave pessary out for a few hours before reinserting. Bleeding precautions given and patient verbalized understanding. Patient concerned about infection so review s/s of infection and when to call clinic or present to ED. Patient verbalized understanding.     Routed to Dr. Francis for additional advice.

## 2023-03-14 ENCOUNTER — MYC MEDICAL ADVICE (OUTPATIENT)
Dept: OBGYN | Facility: CLINIC | Age: 63
End: 2023-03-14

## 2023-03-14 ENCOUNTER — VIRTUAL VISIT (OUTPATIENT)
Dept: INFECTIOUS DISEASES | Facility: CLINIC | Age: 63
End: 2023-03-14
Attending: INTERNAL MEDICINE
Payer: MEDICARE

## 2023-03-14 DIAGNOSIS — Z94.0 KIDNEY TRANSPLANT RECIPIENT: ICD-10-CM

## 2023-03-14 DIAGNOSIS — N39.0 RECURRENT UTI: Primary | ICD-10-CM

## 2023-03-14 PROCEDURE — 99214 OFFICE O/P EST MOD 30 MIN: CPT | Mod: 24 | Performed by: INTERNAL MEDICINE

## 2023-03-14 PROCEDURE — G0463 HOSPITAL OUTPT CLINIC VISIT: HCPCS | Mod: PN,GT | Performed by: INTERNAL MEDICINE

## 2023-03-14 NOTE — TELEPHONE ENCOUNTER
Patient called triage line regarding concerns below. Patient is having some more spotting today. Has kept pessary out since yesterday. Is concerned about symptoms. Appointment made for tomorrow with Dr. Francis.

## 2023-03-14 NOTE — PATIENT INSTRUCTIONS
Mrs. Sharma,   As we discussed make sure you received the tetanus vaccine within the last 10 years. If you did not, then you are due for a vaccine called Tdap (T stands for tetanus).     Please call [phone] to schedule an appointment with me in 6 months in-person or virtual.    Thank you,   Catrachita Gonzales MD

## 2023-03-14 NOTE — PROGRESS NOTES
Video-Visit Details    Type of service:  Video Visit    Video Start Time (time video started): 2:42 PM.     Video End Time (time video stopped): 2:53 PM.     Originating Location (pt. Location): Home    Distant Location (provider location):  On-site    Mode of Communication:  Video Conference via Marvin CASTAÑEDA      [facility]    Transplant Infectious Diseases Outpatient Progress note      Patient:  Belen Sharma, Date of birth 1960, Medical record number [id number]  Date of Visit:  03/14/2023         Recommendations:   1. A prescription of macrobid was sent to the patient to be filled only if symptomatic and after submitting a UA and Ucx.     RTC: 6 months.          Summary of Presentation:   Transplants:  2/2/2016 (Kidney), 3/3/2011 (Liver), 10/9/2010 (Kidney / Liver), Postoperative day:  4394 (Liver), 2597 (Kidney)     This patient is a 62 year old adult with history of congenital hepatic fibrosis and PCKD s/p liver and kidney transplant in [place] in 2010, they both failed and required liver re-transplant in 2011 and kidney re-transplant in 2016.   Currently on TAC/MMF/prednisone.   With recurrent UTI.          Active Problems and Infectious Diseases Issues:   1. Recurrent UTI.   In the past was with different pathogens including ESBL E coli, K pneumonia, E faecalis and sometimes with negative urine and blood cx.   Since 9/2022 the UTIs have been with ESBL E coli.   The patient currently has standing orders for UA/Ucx when symptomatic and a standing order of macrobid at her local pharmacy to be started after UA/Ucx are delivered.     CT a/p 9/22/22 without evidence of structural or anatomical abnormality.   Cystoscopy and urodynamic studies were done on 2/2/23, both were unremarkable.     The patient has multiple risk factors for recurrent UTI, almost all of them can not be reversed, including: female gender, immunocompromised, kidney transplant recipient, the need to  manipulate the  tract daily to change pessary.   In addition the patient is not consistent using topical estrogen which may also contribute to UTI.         Old Problems and Infectious Diseases Issues:   1. Recurrent UTI with E coli, E faecalis and K pneumonia.   2. CDI.   3. Splenectomy.      Other Infectious Disease issues include:  - QTc: 384 as of 6/6/2022. .   - PCP prophylaxis: bactrim.  - Serostatus: not available.   - Immunization status: This patient received the fifth dose (second booster, bivalent) of the COVID-19 vaccine on 11/4/2022. The patient will check if she got Tdap out of state in [place], if she did not, then she is due for Tdap.       Attestation:  Total duration of visit including chart review, reviewing labs and imaging, interviewing and examining the patient, documentation, and sending communication to the patient and to the primary treating team, all at the same day of this encounter, is: 30 minutes.   Catrachita Gonzales MD    Contact information available via [facility] Paging/Directory     03/14/2023         Interim History:   Since I last saw the patient she developed UTI symptoms, delivered UA and Ucx which were abnormal with the growth of ESBL E coli treated with a 3-day course of macrobid with complete symptoms resolution.   She is c/o bleeding in the private area. She has an appointment with Dr. Chaudhari GYN.   Also, since I last saw the patient she underwent urodynamics and cystoscopy, both were unremarkable.         HPI:       Transplants:  2/2/2016 (Kidney), 3/3/2011 (Liver), 10/9/2010 (Kidney / Liver); Postoperative day:  4394 (Liver), 2597 (Kidney)    A 62 year old adult with history of congenital hepatic fibrosis and PCKD s/p liver and kidney transplant in [place] in 2010, they both failed, first the liver apparently due to anatomic abnormalities and required liver re-transplant within few months in 2011, then the kidney failed and needed HD through LUE AVF for 4 years before she received  kidney re-transplant in [place] in 2016.      The patient has history of recurrent UTI. In 2022, the symptoms of UTI with dysuria, vaginal itching, increase in frequency increased and Ucx on multiple occasions grew K pneumonia, E faecalis, and ESBL E coli.      The patient was treated in 8/2022 for UTI with fosfomycin, Ucx at that time were without growth but the patient was symptomatic with abnormal UA.   Again was treated for UTI in 9/2022 with a single dose of fosfomycin but symptoms prompted admission with fever and urinary symptoms; UA was abnormal but Ucx were negative. She was treated with ceftriaxone then cefdinir with resolution of symptoms.   She was evaluated by [facility] (Dr. Reyes) who ordered CT a/p with contrast which showed inflamed transplanted ureter but not evidence of nephrolithiasis, abscesses or other correctable abnormalities. Dr. Reyes also ordered EOT (9/30/20220 UA and Ucx which were abnormal with Ucx growing ESBL E coli. The patient developed fever, within 24 hr of stopping cefdinir and was admitted to the hospital and treated with ertapenem IV for a total of 3 weeks.          Review of Systems:     As mentioned in the interim history otherwise negative by reviewing constitutional symptoms, central and peripheral neurological systems, respiratory system, cardiac system, GI system,  system, musculoskeletal, skin, allergy, and lymphatics.                  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 Both, very large, native kidneys reamain.     PONV (postoperative nausea and vomiting)     Need to  start with ice chips and apple juice, no soda     S/P kidney transplant     SLK 10/9/2010 - kidney failure 2/2 [facility]. Explanted . Re-transplant after de-sensitization      S/P liver transplant (H)     10/9/2010 - [facility], ischemic biopathy. Re-transplant 3/3/2011     S/P splenectomy      Spider veins      Thyroid disease     Hyperthyroid treated with single dose iodine     Urinary tract infection 2022             Past Surgical History:     Past Surgical History:   Procedure Laterality Date     bunectomy  2018    right foot     bunionectomy  2019    left     CHOLECYSTECTOMY       CONIZATION N/A 2020    Procedure: CONE BIOPSY, CERVIX;  Surgeon: Daysi Perez MD;  Location: [facility] OR     FAILED PICC - RIGHT ARM Right 2020    Has a LUE AV fistula.     H STATISTIC PICC LINE INSERTION >5YR, FAILED Bilateral 2020    Left fistula, Right failed x 2 Occlussion     HERNIA REPAIR, INCISIONAL  2013     IR DIALYSIS PTA CENTRAL SEG  2020     IR PICC PLACEMENT > 5 YRS OF AGE  2020     IR PICC PLACEMENT > 5 YRS OF AGE  10/5/2022     PHACOEMULSIFICATION CLEAR CORNEA WITH STANDARD INTRAOCULAR LENS IMPLANT Right 2023    Procedure: RIGHT EYE PHACOEMULSIFICATION, CATARACT, WITH INTRAOCULAR LENS IMPLANT;  Surgeon: Sebastian Robertson MD;  Location: [facility] OR     PHACOEMULSIFICATION WITH STANDARD INTRAOCULAR LENS IMPLANT Left 3/9/2023    Procedure: LEFT EYE PHACOEMULSIFICATION, CATARACT, WITH STANDARD INTRAOCULAR LENS IMPLANT INSERTION;  Surgeon: Sebastian Robertson MD;  Location: [facility] OR     SPLENECTOMY      Splenectomy     TRANSPLANT KIDNEY RECIPIENT  DONOR      re-transplant after [facility]; 6 months de-sensitization prior AND 6 months after transplant     TRANSPLANT LIVER RECIPIENT  DONOR  2011     TRANSPLANT LIVER, KIDNEY RECIPIENT  DONOR, COMBINED  10/09/2010    [facility] - re-Tx liver 3/3/2011; Kidney (left iliac) lost to [facility]/fibrosis - explant     VASCULAR  SURGERY      Vascular access left UE. Not used for 4 years since 2nd kidney tx 2-4-2016 [place]               Social History:     Social History     Tobacco Use     Smoking status: Never     Smokeless tobacco: Never   Substance Use Topics     Alcohol use: Not Currently             Family History:   I have reviewed this patient's family history          Immunizations:     Immunization History   Administered Date(s) Administered     COVID-19 Vaccine 12+ (Pfizer 2022) 02/08/2022     COVID-19 Vaccine 12+ (Pfizer) 03/29/2021, 04/19/2021, 09/02/2021     COVID-19 Vaccine Bivalent Booster 12+ (Pfizer) 11/04/2022     DT (PEDS <7y) 03/19/2001     FLU 6-35 months 11/15/2002, 11/18/2003, 10/26/2004, 10/24/2005, 11/16/2006, 09/20/2011     G7v3-30 Novel Flu 11/19/2009     HepB, Unspecified 06/28/2001     Influenza Vaccine 50-64 or 18-64 w/egg allergy (Flublok) 10/18/2021     Influenza Vaccine >6 months (Alfuria,Fluzone) 09/12/2022     Influenza Vaccine IM Ages 6-35 Months 4 Valent (PF) 11/28/2007, 10/07/2009, 09/20/2011     Influenza,INJ,MDCK,PF,Quad >6mo(Flucelvax) 10/15/2020     Meningococcal ACWY (Menactra ) 01/25/2013     Pneumococcal 20 valent Conjugate (Prevnar 20) 09/12/2022     Pneumococcal 23 valent 09/27/2002, 01/25/2013     TDAP Vaccine (Boostrix) 01/25/2013     Zoster vaccine recombinant adjuvanted (SHINGRIX) 10/15/2020, 01/08/2021             Allergies:     Allergies   Allergen Reactions     Ibuprofen      Due to liver transplant             Medications:     Current Outpatient Medications   Medication Sig     acetaminophen (TYLENOL) 325 MG tablet Take 1 tablet (325 mg) by mouth every 4 hours as needed for mild pain or fever     aspirin (ASA) 81 MG chewable tablet Take 81 mg by mouth every evening Stopped 7 days preop     atorvastatin (LIPITOR) 10 MG tablet Take 1 tablet (10 mg) by mouth daily     biotin 1000 MCG TABS tablet Take 3,000 mcg by mouth every evening     ciclopirox (PENLAC) 8 % external solution Apply  to adjacent skin and affected nails daily.  Remove with alcohol every 7 days, then repeat.     diphenhydrAMINE (BENADRYL) 25 MG tablet Take 25 mg by mouth every 8 hours as needed for allergies     estradiol (ESTRACE) 0.1 MG/GM vaginal cream Place 1 g vaginally twice a week     guaiFENesin (MUCINEX) 600 MG 12 hr tablet Take 1 tablet by mouth 2 times daily as needed     Multiple Vitamins-Minerals (WOMENS DAILY FORMULA PO) Take 1 tablet by mouth daily     nitroFURantoin macrocrystal-monohydrate (MACROBID) 100 MG capsule Take 1 capsule (100 mg) by mouth 2 times daily     nitroFURantoin macrocrystal-monohydrate (MACROBID) 100 MG capsule 100 mg PO bid PRN for UTI.     nitroGLYcerin (NITROSTAT) 0.4 MG sublingual tablet Place 1 tablet (0.4 mg) under the tongue every 5 minutes as needed for chest pain For chest pain place 1 tablet under the tongue every 5 minutes for 3 doses. If symptoms persist 5 minutes after 1st dose call 911.     predniSONE (DELTASONE) 5 MG tablet Take 1 tablet (5 mg) by mouth daily     Probiotic Product (PROBIOTIC PO) Take 2 tablets by mouth every morning ([facility] Women's Vaginal Probiotic)     sodium bicarbonate 650 MG tablet Take 1 tablet (650 mg) by mouth daily     sulfamethoxazole-trimethoprim (BACTRIM) 400-80 MG tablet Take 1 tablet by mouth three times a week on Monday, Wednesday, Friday     tacrolimus (GENERIC EQUIVALENT) 0.5 MG capsule Take 1 capsule (0.5 mg) by mouth 2 times daily Total dose = 1.5 mg BID     tacrolimus (GENERIC EQUIVALENT) 1 MG capsule Take 1 capsule (1 mg) by mouth 2 times daily Total dose = 1.5 mg BID     temazepam (RESTORIL) 15 MG capsule Take 1 capsule (15 mg) by mouth as needed for sleep (twice a year)     No current facility-administered medications for this visit.                Physical Exam:   No vitals are available during this virtual visit.   Constitutional: awake, alert, cooperative, no apparent distress and appears at stated age, well nourished.              Laboratory Data:     No results found for: ACD4    Inflammatory Markers    Recent Labs   Lab Test 09/14/22  2220 11/04/21  1457 12/28/20  1515 12/21/20  1545   SED 8 14 8 10   CRP  --  39.3* 13.0* 18.0*       Immune Globulin Studies    No lab results found.    Metabolic Studies    Recent Labs   Lab Test 01/10/23  0810 11/10/22  0803 10/17/22  1010 10/10/22  1017 10/05/22  0648 10/04/22  0615 10/03/22  0935 10/02/22  0628 09/15/22  0607 09/14/22  2230 09/30/20  0721 09/23/20  1653    138 137 138 136 134*   < > 135*   < >  --    < >  --    POTASSIUM 4.0 3.8 4.3 4.2 4.2 4.0   < > 3.9   < >  --    < >  --    CHLORIDE 108* 104 105 104 104 105   < > 103   < >  --    < >  --    CO2 24 22 23 24 23 23   < > 23   < >  --    < >  --    ANIONGAP 9 12 9 10 9 6*   < > 9   < >  --    < >  --    BUN 12.3 12.3 17.3 19.8 13.7 10.8   < > 17.8   < >  --    < >  --    CR 0.79 0.75 0.60 0.62 0.60 0.57   < > 0.78   < >  --    < >  --    GFRESTIMATED 84 90 >90 >90 >90 >90   < > 85   < >  --    < >  --    GLC 87 77 101* 97 91 97   < > 92   < >  --    < >  --    EDSON 10.0 9.8 9.9 10.3* 9.7 9.5   < > 9.5   < >  --    < >  --    PHOS  --   --   --   --   --   --   --   --   --   --   --  3.4   LACT  --   --   --   --   --   --   --  0.8  --  0.9   < >  --     < > = values in this interval not displayed.       Hepatic Studies    Recent Labs   Lab Test 01/10/23  0810 10/17/22  1010 10/10/22  1017 10/05/22  0648 10/02/22  0628 09/26/22  0813   BILITOTAL 1.1 0.5 0.6 0.4 0.7 0.6   ALKPHOS 62 66 72 66 69 69   PROTTOTAL 6.8 6.4 6.8 6.2* 6.6 6.5   ALBUMIN 4.0 3.6 3.9 3.4* 3.8 3.9   AST 23 21 19 15 23 18   ALT 12 11 10 <5* 8* 13       Hematology Studies     Recent Labs   Lab Test 01/10/23  0810 11/10/22  0803 10/17/22  1010 10/10/22  1017 10/05/22  0648 10/04/22  0615 03/11/21  1007 12/28/20  1515 12/28/20  0855 12/21/20  1545 12/18/20  0611 12/16/20  2236   WBC 5.5 5.5 6.7 14.4* 7.3 8.1   < > 9.2   < > 6.2   < > 11.4*   ANEU  --   --   --   --    --   --   --  6.4  --  3.9  --  9.3*   ALYM  --   --   --   --   --   --   --  2.1  --  1.7  --  1.0   PABLO  --   --   --   --   --   --   --  0.6  --  0.6  --  1.0   AEOS  --   --   --   --   --   --   --  0.0  --  0.0  --  0.0   HGB 15.7 14.6 14.0 15.1 14.6 14.2   < > 16.6*   < > 16.2*   < > 16.9*   HCT 48.4 44.5 44.1 46.6 46.1 43.8   < > 50.8*   < > 49.2*   < > 54.5*    232 265 287 271 248   < > 308   < > 261   < > 260    < > = values in this interval not displayed.       Clotting Studies  No lab results found.    Urine Studies    Recent Labs   Lab Test 02/22/23  1434 01/27/23  1150 12/06/22  1345 12/02/22  1117 10/02/22  0632   URINEPH 7.0 6.5 6.0 7.0 6.5   NITRITE Positive* Negative Positive* Negative Positive*   LEUKEST Small* Small* Moderate* Negative Large*   WBCU >100* 10-25* * 0-5 >182*         Microbiology:  Last 6 Culture results with specimen source  Culture Micro   Date Value Ref Range Status   06/04/2021 (A)  Final    10,000 to 50,000 colonies/mL  Enterococcus faecalis     12/17/2020 No growth  Final   12/16/2020 (A)  Final    Cultured on the 1st day of incubation:  Escherichia coli ESBL  ESBL (extended beta lactamase) producing organisms require contact precautions.     12/16/2020   Final    Critical Value/Significant Value, preliminary result only, called to and read back by  RALPH BOLAÑOS RN 12.17.2020 1056 BC     12/16/2020   Final    (Note)  POSITIVE for E.COLI by Verigene multiplex nucleic acid test. Final  identification and antimicrobial susceptibility testing will be  verified by standard methods. Verigene test will not distinguish  E.coli from Shigella species including S.dysenteriae, S.flexneri,  S.boydii, and S.sonnei. Specimens containing Shigella species or  E.coli will be reported as Positive for E.coli.    POSITIVE for CTX-M Class A Extended Spectrum beta-lactamase (ESBL)  resistance marker by [employer] multiplex nucleic acid test. CTX-M  confers resistance to penicillins,  cephalosporins and variable  resistance to beta-lactam beta-lactamase inhibitor combinations  (clavulanic acid and tazobactam). Best empiric antibiotic choice is  meropenem. Specific susceptibility testing will be performed.    Specimen tested with KUBOOigene multiplex, gram-negative blood culture  nucleic acid test for the following targets: Acinetobacter sp.,  Citrobacter sp., Enterobacter sp., Proteus sp., E. coli, K.  pneumoniae/oxytoca, P. aeruginosa, and the following resistance  markers: CTXM, KPC, NDM, VIM, IMP and OXA.    Critical Value/Significant Value called to and read back by RALPH BOLAÑOS RN 12/17/20 1312 EH.       12/16/2020 No growth  Final   12/16/2020 (A)  Preliminary    >100,000 colonies/mL  Escherichia coli ESBL  ESBL (extended beta lactamase) producing organisms require contact precautions.     12/16/2020   Preliminary    Susceptibility testing requested by  Dr. Gonzales, Pager [phone]. Fosfomycin requested. @ 1637. 12.18.20. BS.       Specimen Description   Date Value Ref Range Status   06/04/2021 Midstream Urine  Final   12/17/2020 Blood  Final   12/16/2020 Blood Right Arm  Final   12/16/2020 Blood Right Arm  Final   12/16/2020 Unspecified Urine  Final   12/03/2020 Midstream Urine  Final        Last check of C difficile  No results found for: CDBPCT      Virology:  CMV viral loads    CMV viral loads    CMV Quant IU/mL   Date Value Ref Range Status   12/18/2020 CMV DNA Not Detected CMVND^CMV DNA Not Detected [IU]/mL Final     Comment:     Mutations within the highly conserved regions of the viral genome covered by   the RHONDA AmpliPrep/RHONDA TaqMan CMV Test primers and/or probes have been   identified and may result in under-quantitation of or failure to detect the   virus.  Supplemental testing methods should be used for testing when this is   suspected.  The RHONDA AmpliPrep/RHONDA TaqMan CMV Test is an FDA-approved in vitro nucleic   acid amplification test for the quantitation of cytomegalovirus  DNA in human   plasma (EDTA plasma) using the RHONDA AmpliPrep Instrument for automated viral   nucleic acid extraction and the [employer] TaqMan Analyzer or [employer] for   automated Real Time amplification and detection of the viral nucleic acid   target.  Titer results are reported in International Units/mL (IU/mL using 1st WHO   International standard for Human Cytomegalovirus for Nucleic Acid   Amplification based assays. The conversion factor between CMV DNA copis/mL (as   defined by the Roche RHONDA TaqMan CMV test) and International Units is the   CMV DNA concentration in IU/mL x 1.1 copies/IU = CMV DNA in copies/mL.  This assay has received FDA approval for the testing of human plasma only. The   Infectious Disease Diagnostic Laboratory at the [facility], [place], has validated the performance characteristics of the   Roche CMV assay for plasma, bronchial alveolar lavage/wash and urine.       CMV viral loads    Recent Labs   Lab Test 12/18/20  0611   CMVQNT CMV DNA Not Detected   CSPEC Plasma   CMVLOG Not Calculated       CMV viral loads    CMV Quant IU/mL   Date Value Ref Range Status   12/18/2020 CMV DNA Not Detected CMVND^CMV DNA Not Detected [IU]/mL Final     Comment:     Mutations within the highly conserved regions of the viral genome covered by   the RHONDA AmpliPrep/RHONDA TaqMan CMV Test primers and/or probes have been   identified and may result in under-quantitation of or failure to detect the   virus.  Supplemental testing methods should be used for testing when this is   suspected.  The RHONDA AmpliPrep/RHONDA TaqMan CMV Test is an FDA-approved in vitro nucleic   acid amplification test for the quantitation of cytomegalovirus DNA in human   plasma (EDTA plasma) using the RHONDA AmpliPrep Instrument for automated viral   nucleic acid extraction and the [employer] TaqMan Analyzer or [employer] for   automated Real Time amplification and detection of the viral nucleic acid    target.  Titer results are reported in International Units/mL (IU/mL using 1st WHO   International standard for Human Cytomegalovirus for Nucleic Acid   Amplification based assays. The conversion factor between CMV DNA copis/mL (as   defined by the Roche RHONDA TaqMan CMV test) and International Units is the   CMV DNA concentration in IU/mL x 1.1 copies/IU = CMV DNA in copies/mL.  This assay has received FDA approval for the testing of human plasma only. The   Infectious Disease Diagnostic Laboratory at the [facility], [place], has validated the performance characteristics of the   Roche CMV assay for plasma, bronchial alveolar lavage/wash and urine.     09/23/2020 CMV DNA Not Detected CMVND^CMV DNA Not Detected [IU]/mL Final     Comment:     Mutations within the highly conserved regions of the viral genome covered by   the RHONDA AmpliPrep/RHONDA TaqMan CMV Test primers and/or probes have been   identified and may result in under-quantitation of or failure to detect the   virus.  Supplemental testing methods should be used for testing when this is   suspected.  The RHONDA AmpliPrep/RHONDA TaqMan CMV Test is an FDA-approved in vitro nucleic   acid amplification test for the quantitation of cytomegalovirus DNA in human   plasma (EDTA plasma) using the RHONDA AmpliPrep Instrument for automated viral   nucleic acid extraction and the RHONDA TaqMan Analyzer or [employer] TaqMan for   automated Real Time amplification and detection of the viral nucleic acid   target.  Titer results are reported in International Units/mL (IU/mL using 1st WHO   International standard for Human Cytomegalovirus for Nucleic Acid   Amplification based assays. The conversion factor between CMV DNA copis/mL (as   defined by the Roche RHONDA TaqMan CMV test) and International Units is the   CMV DNA concentration in IU/mL x 1.1 copies/IU = CMV DNA in copies/mL.  This assay has received FDA approval for the testing of human  plasma only. The   Infectious Disease Diagnostic Laboratory at the [facility], [place], has validated the performance characteristics of the   Roche CMV assay for plasma, bronchial alveolar lavage/wash and urine.       Log IU/mL of CMVQNT   Date Value Ref Range Status   12/18/2020 Not Calculated <2.1 [Log_IU]/mL Final   09/23/2020 Not Calculated <2.1 [Log_IU]/mL Final       CMV resistance testing  No lab results found.  No results found for: CMVCID, CMVFOS, CMVGAN     EBV viral loads   No lab results found.  No results found for: EBVDN, EBRES, EBVDN, EBVSP, EBVPC, EBVPCR    Human Herpes Virus 6 viral loads    No results found for: H6RES No results found for: H6SPEC    CMV Antibody IgG   Date Value Ref Range Status   01/04/2021 >8.0 (H) 0.0 - 0.8 AI Final     Comment:     Positive  Antibody index (AI) values reflect qualitative changes in antibody   concentration that cannot be directly associated with clinical condition or   disease state.       No results found for: EBIG2, EBIGM, EBVIGG, EBIGG, EBVAGN, GO5222, TOXG      Imaging:  CT a/p W 9/29/2022  IMPRESSION:  1. Urothelial thickening and mild ectasia of the distal right renal  transplant ureter extending to the ureterovesical anastomosis,  slightly increased compared to previous CT on 12/17/2020. Findings may  represent acute or chronic sequela of inflammation/infection. No  hydronephrosis or evidence of obstruction.  2. Simple right adnexal cyst is not significantly changed compared to  previous without internal complexity on comparison ultrasound, likely  benign. No dedicated imaging follow-up recommended.  3. Stable postoperative changes of liver transplant.  4. Stable enlarged polycystic kidneys.

## 2023-03-14 NOTE — PLAN OF CARE
Physical Therapy - Orders received, chart reviewed, discussed with RN, pt. Pt mobilizing at IND baseline, denies any mobility concerns. Inpatient physical therapy not indicated. Will complete consult and defer evaluation, please reconsult as appropriate if patient has decline in functional mobility requiring further skilled inpatient PT needs. Defer Discharge recommendations to medical team.   thanks

## 2023-03-14 NOTE — NURSING NOTE
Is the patient currently in the state of [place]? YES    Visit mode:VIDEO    If the visit is dropped, the patient can be reconnected by: *Did not have time to ask*    Will anyone else be joining the visit? *Did not have time to ask*      How would you like to obtain your AVS? MyChart    Are changes needed to the allergy or medication list? NO - pt said all is the same as last checked and on MyChart.    Reason for visit: Unknown. Provider entered call midway through check-in.    Please verify vitals, nicotine/tobacco status, any questionnaires in addition to missing info above.    LIZZIE CASTAÑEDA

## 2023-03-14 NOTE — LETTER
3/14/2023       RE: Belen Sharma  [address] [place] [address]     Dear Colleague,    Thank you for referring your patient, Belen Sharma, to the [facility] INFECTIOUS DISEASE CLINIC [place] at [facility]. Please see a copy of my visit note below.    Video-Visit Details    Type of service:  Video Visit    Video Start Time (time video started): 2:42 PM.     Video End Time (time video stopped): 2:53 PM.     Originating Location (pt. Location): Home    Distant Location (provider location):  On-site    Mode of Communication:  Video Conference via iBuildApp    LIZZIE COREAS [facility]    Transplant Infectious Diseases Outpatient Progress note      Patient:  Belen Sharma, Date of birth 1960, Medical record number [id number]  Date of Visit:  03/14/2023         Recommendations:   1. A prescription of macrobid was sent to the patient to be filled only if symptomatic and after submitting a UA and Ucx.     RTC: 6 months.          Summary of Presentation:   Transplants:  2/2/2016 (Kidney), 3/3/2011 (Liver), 10/9/2010 (Kidney / Liver), Postoperative day:  4394 (Liver), 2597 (Kidney)     This patient is a 62 year old adult with history of congenital hepatic fibrosis and PCKD s/p liver and kidney transplant in [place] in 2010, they both failed and required liver re-transplant in 2011 and kidney re-transplant in 2016.   Currently on TAC/MMF/prednisone.   With recurrent UTI.          Active Problems and Infectious Diseases Issues:   1. Recurrent UTI.   In the past was with different pathogens including ESBL E coli, K pneumonia, E faecalis and sometimes with negative urine and blood cx.   Since 9/2022 the UTIs have been with ESBL E coli.   The patient currently has standing orders for UA/Ucx when symptomatic and a standing order of macrobid at her local pharmacy to be started after UA/Ucx are delivered.     CT a/p 9/22/22 without  evidence of structural or anatomical abnormality.   Cystoscopy and urodynamic studies were done on 2/2/23, both were unremarkable.     The patient has multiple risk factors for recurrent UTI, almost all of them can not be reversed, including: female gender, immunocompromised, kidney transplant recipient, the need to manipulate the  tract daily to change pessary.   In addition the patient is not consistent using topical estrogen which may also contribute to UTI.         Old Problems and Infectious Diseases Issues:   1. Recurrent UTI with E coli, E faecalis and K pneumonia.   2. CDI.   3. Splenectomy.      Other Infectious Disease issues include:  - QTc: 384 as of 6/6/2022. .   - PCP prophylaxis: bactrim.  - Serostatus: not available.   - Immunization status: This patient received the fifth dose (second booster, bivalent) of the COVID-19 vaccine on 11/4/2022. The patient will check if she got Tdap out of state in 2018, if she did not, then she is due for Tdap.       Attestation:  Total duration of visit including chart review, reviewing labs and imaging, interviewing and examining the patient, documentation, and sending communication to the patient and to the primary treating team, all at the same day of this encounter, is: 30 minutes.   Catrachita Gonzales MD    Contact information available via [facility] Paging/Directory     03/14/2023         Interim History:   Since I last saw the patient she developed UTI symptoms, delivered UA and Ucx which were abnormal with the growth of ESBL E coli treated with a 3-day course of macrobid with complete symptoms resolution.   She is c/o bleeding in the private area. She has an appointment with Dr. Chaudhari GYN.   Also, since I last saw the patient she underwent urodynamics and cystoscopy, both were unremarkable.         HPI:       Transplants:  2/2/2016 (Kidney), 3/3/2011 (Liver), 10/9/2010 (Kidney / Liver); Postoperative day:  4394 (Liver), 2597 (Kidney)    A 62 year old adult with  history of congenital hepatic fibrosis and PCKD s/p liver and kidney transplant in [place] in 2010, they both failed, first the liver apparently due to anatomic abnormalities and required liver re-transplant within few months in 2011, then the kidney failed and needed HD through LUE AVF for 4 years before she received kidney re-transplant in [place] in 2016.      The patient has history of recurrent UTI. In 2022, the symptoms of UTI with dysuria, vaginal itching, increase in frequency increased and Ucx on multiple occasions grew K pneumonia, E faecalis, and ESBL E coli.      The patient was treated in 8/2022 for UTI with fosfomycin, Ucx at that time were without growth but the patient was symptomatic with abnormal UA.   Again was treated for UTI in 9/2022 with a single dose of fosfomycin but symptoms prompted admission with fever and urinary symptoms; UA was abnormal but Ucx were negative. She was treated with ceftriaxone then cefdinir with resolution of symptoms.   She was evaluated by [facility] (Dr. Reyes) who ordered CT a/p with contrast which showed inflamed transplanted ureter but not evidence of nephrolithiasis, abscesses or other correctable abnormalities. Dr. Reyes also ordered EOT (9/30/20220 UA and Ucx which were abnormal with Ucx growing ESBL E coli. The patient developed fever, within 24 hr of stopping cefdinir and was admitted to the hospital and treated with ertapenem IV for a total of 3 weeks.          Review of Systems:     As mentioned in the interim history otherwise negative by reviewing constitutional symptoms, central and peripheral neurological systems, respiratory system, cardiac system, GI system,  system, musculoskeletal, skin, allergy, and lymphatics.                  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 Both, very large, native kidneys reamain.     PONV (postoperative nausea and vomiting)     Need to start with ice chips and apple juice, no soda     S/P kidney transplant     SLK 10/9/2010 - kidney failure 2/2 [facility]. Explanted . Re-transplant after de-sensitization      S/P liver transplant (H)     10/9/2010 - [facility], ischemic biopathy. Re-transplant 3/3/2011     S/P splenectomy      Spider veins      Thyroid disease     Hyperthyroid treated with single dose iodine     Urinary tract infection 2022             Past Surgical History:     Past Surgical History:   Procedure Laterality Date     bunectomy  2018    right foot     bunionectomy  2019    left     CHOLECYSTECTOMY       CONIZATION N/A 2020    Procedure: CONE BIOPSY, CERVIX;  Surgeon: Daysi Perez MD;  Location: [facility] OR     FAILED PICC - RIGHT ARM Right 2020    Has a LUE AV fistula.     H STATISTIC PICC LINE INSERTION >5YR, FAILED Bilateral 2020    Left fistula, Right failed x 2 Occlussion     HERNIA REPAIR, INCISIONAL  2013     IR DIALYSIS PTA CENTRAL SEG  2020     IR PICC PLACEMENT > 5 YRS OF AGE  2020     IR PICC PLACEMENT > 5 YRS OF AGE  10/5/2022     PHACOEMULSIFICATION CLEAR CORNEA WITH STANDARD INTRAOCULAR LENS IMPLANT Right 2023    Procedure: RIGHT EYE PHACOEMULSIFICATION, CATARACT, WITH INTRAOCULAR LENS IMPLANT;  Surgeon: Sebastian Robertson MD;  Location: [facility] OR     PHACOEMULSIFICATION WITH STANDARD INTRAOCULAR LENS IMPLANT Left 3/9/2023    Procedure: LEFT EYE PHACOEMULSIFICATION, CATARACT, WITH STANDARD INTRAOCULAR LENS IMPLANT INSERTION;  Surgeon: Sebastian Robertson MD;  Location: [facility] OR     SPLENECTOMY      Splenectomy     TRANSPLANT KIDNEY RECIPIENT  DONOR       re-transplant after [facility]; 6 months de-sensitization prior AND 6 months after transplant     TRANSPLANT LIVER RECIPIENT  DONOR  2011     TRANSPLANT LIVER, KIDNEY RECIPIENT  DONOR, COMBINED  10/09/2010    [facility] - re-Tx liver 3/3/2011; Kidney (left iliac) lost to [facility]/fibrosis - explant     VASCULAR SURGERY      Vascular access left UE. Not used for 4 years since 2nd kidney tx - [employer] [place]               Social History:     Social History     Tobacco Use     Smoking status: Never     Smokeless tobacco: Never   Substance Use Topics     Alcohol use: Not Currently             Family History:   I have reviewed this patient's family history          Immunizations:     Immunization History   Administered Date(s) Administered     COVID-19 Vaccine 12+ (Pfizer ) 2022     COVID-19 Vaccine 12+ (Pfizer) 2021, 2021, 2021     COVID-19 Vaccine Bivalent Booster 12+ (Pfizer) 2022     DT (PEDS <7y) 2001     FLU 6-35 months 11/15/2002, 2003, 10/26/2004, 10/24/2005, 2006, 2011     [id number] Novel Flu 2009     HepB, Unspecified 2001     Influenza Vaccine 50-64 or 18-64 w/egg allergy (Flublok) 10/18/2021     Influenza Vaccine >6 months (Alfuria,Fluzone) 2022     Influenza Vaccine IM Ages 6-35 Months 4 Valent (PF) 2007, 10/07/2009, 2011     Influenza,INJ,MDCK,PF,Quad >6mo(Flucelvax) 10/15/2020     Meningococcal ACWY (Menactra ) 2013     Pneumococcal 20 valent Conjugate (Prevnar 20) 2022     Pneumococcal 23 valent 2002, 2013     TDAP Vaccine (Boostrix) 2013     Zoster vaccine recombinant adjuvanted (SHINGRIX) 10/15/2020, 2021             Allergies:     Allergies   Allergen Reactions     Ibuprofen      Due to liver transplant             Medications:     Current Outpatient Medications   Medication Sig     acetaminophen (TYLENOL) 325 MG tablet Take 1 tablet (325 mg) by mouth every 4 hours as needed  for mild pain or fever     aspirin (ASA) 81 MG chewable tablet Take 81 mg by mouth every evening Stopped 7 days preop     atorvastatin (LIPITOR) 10 MG tablet Take 1 tablet (10 mg) by mouth daily     biotin 1000 MCG TABS tablet Take 3,000 mcg by mouth every evening     ciclopirox (PENLAC) 8 % external solution Apply to adjacent skin and affected nails daily.  Remove with alcohol every 7 days, then repeat.     diphenhydrAMINE (BENADRYL) 25 MG tablet Take 25 mg by mouth every 8 hours as needed for allergies     estradiol (ESTRACE) 0.1 MG/GM vaginal cream Place 1 g vaginally twice a week     guaiFENesin (MUCINEX) 600 MG 12 hr tablet Take 1 tablet by mouth 2 times daily as needed     Multiple Vitamins-Minerals (WOMENS DAILY FORMULA PO) Take 1 tablet by mouth daily     nitroFURantoin macrocrystal-monohydrate (MACROBID) 100 MG capsule Take 1 capsule (100 mg) by mouth 2 times daily     nitroFURantoin macrocrystal-monohydrate (MACROBID) 100 MG capsule 100 mg PO bid PRN for UTI.     nitroGLYcerin (NITROSTAT) 0.4 MG sublingual tablet Place 1 tablet (0.4 mg) under the tongue every 5 minutes as needed for chest pain For chest pain place 1 tablet under the tongue every 5 minutes for 3 doses. If symptoms persist 5 minutes after 1st dose call 911.     predniSONE (DELTASONE) 5 MG tablet Take 1 tablet (5 mg) by mouth daily     Probiotic Product (PROBIOTIC PO) Take 2 tablets by mouth every morning ([facility] Women's Vaginal Probiotic)     sodium bicarbonate 650 MG tablet Take 1 tablet (650 mg) by mouth daily     sulfamethoxazole-trimethoprim (BACTRIM) 400-80 MG tablet Take 1 tablet by mouth three times a week on Monday, Wednesday, Friday     tacrolimus (GENERIC EQUIVALENT) 0.5 MG capsule Take 1 capsule (0.5 mg) by mouth 2 times daily Total dose = 1.5 mg BID     tacrolimus (GENERIC EQUIVALENT) 1 MG capsule Take 1 capsule (1 mg) by mouth 2 times daily Total dose = 1.5 mg BID     temazepam (RESTORIL) 15 MG capsule Take 1 capsule (15 mg)  by mouth as needed for sleep (twice a year)     No current facility-administered medications for this visit.                Physical Exam:   No vitals are available during this virtual visit.   Constitutional: awake, alert, cooperative, no apparent distress and appears at stated age, well nourished.             Laboratory Data:     No results found for: ACD4    Inflammatory Markers    Recent Labs   Lab Test 09/14/22  2220 11/04/21  1457 12/28/20  1515 12/21/20  1545   SED 8 14 8 10   CRP  --  39.3* 13.0* 18.0*       Immune Globulin Studies    No lab results found.    Metabolic Studies    Recent Labs   Lab Test 01/10/23  0810 11/10/22  0803 10/17/22  1010 10/10/22  1017 10/05/22  0648 10/04/22  0615 10/03/22  0935 10/02/22  0628 09/15/22  0607 09/14/22  2230 09/30/20  0721 09/23/20  1653    138 137 138 136 134*   < > 135*   < >  --    < >  --    POTASSIUM 4.0 3.8 4.3 4.2 4.2 4.0   < > 3.9   < >  --    < >  --    CHLORIDE 108* 104 105 104 104 105   < > 103   < >  --    < >  --    CO2 24 22 23 24 23 23   < > 23   < >  --    < >  --    ANIONGAP 9 12 9 10 9 6*   < > 9   < >  --    < >  --    BUN 12.3 12.3 17.3 19.8 13.7 10.8   < > 17.8   < >  --    < >  --    CR 0.79 0.75 0.60 0.62 0.60 0.57   < > 0.78   < >  --    < >  --    GFRESTIMATED 84 90 >90 >90 >90 >90   < > 85   < >  --    < >  --    GLC 87 77 101* 97 91 97   < > 92   < >  --    < >  --    EDSON 10.0 9.8 9.9 10.3* 9.7 9.5   < > 9.5   < >  --    < >  --    PHOS  --   --   --   --   --   --   --   --   --   --   --  3.4   LACT  --   --   --   --   --   --   --  0.8  --  0.9   < >  --     < > = values in this interval not displayed.       Hepatic Studies    Recent Labs   Lab Test 01/10/23  0810 10/17/22  1010 10/10/22  1017 10/05/22  0648 10/02/22  0628 09/26/22  0813   BILITOTAL 1.1 0.5 0.6 0.4 0.7 0.6   ALKPHOS 62 66 72 66 69 69   PROTTOTAL 6.8 6.4 6.8 6.2* 6.6 6.5   ALBUMIN 4.0 3.6 3.9 3.4* 3.8 3.9   AST 23 21 19 15 23 18   ALT 12 11 10 <5* 8* 13        Hematology Studies     Recent Labs   Lab Test 01/10/23  0810 11/10/22  0803 10/17/22  1010 10/10/22  1017 10/05/22  0648 10/04/22  0615 03/11/21  1007 12/28/20  1515 12/28/20  0855 12/21/20  1545 12/18/20  0611 12/16/20  2236   WBC 5.5 5.5 6.7 14.4* 7.3 8.1   < > 9.2   < > 6.2   < > 11.4*   ANEU  --   --   --   --   --   --   --  6.4  --  3.9  --  9.3*   ALYM  --   --   --   --   --   --   --  2.1  --  1.7  --  1.0   PABLO  --   --   --   --   --   --   --  0.6  --  0.6  --  1.0   AEOS  --   --   --   --   --   --   --  0.0  --  0.0  --  0.0   HGB 15.7 14.6 14.0 15.1 14.6 14.2   < > 16.6*   < > 16.2*   < > 16.9*   HCT 48.4 44.5 44.1 46.6 46.1 43.8   < > 50.8*   < > 49.2*   < > 54.5*    232 265 287 271 248   < > 308   < > 261   < > 260    < > = values in this interval not displayed.       Clotting Studies  No lab results found.    Urine Studies    Recent Labs   Lab Test 02/22/23  1434 01/27/23  1150 12/06/22  1345 12/02/22  1117 10/02/22  0632   URINEPH 7.0 6.5 6.0 7.0 6.5   NITRITE Positive* Negative Positive* Negative Positive*   LEUKEST Small* Small* Moderate* Negative Large*   WBCU >100* 10-25* * 0-5 >182*         Microbiology:  Last 6 Culture results with specimen source  Culture Micro   Date Value Ref Range Status   06/04/2021 (A)  Final    10,000 to 50,000 colonies/mL  Enterococcus faecalis     12/17/2020 No growth  Final   12/16/2020 (A)  Final    Cultured on the 1st day of incubation:  Escherichia coli ESBL  ESBL (extended beta lactamase) producing organisms require contact precautions.     12/16/2020   Final    Critical Value/Significant Value, preliminary result only, called to and read back by  RALPH BOLAÑOS RN 12.17.2020 1056 BC     12/16/2020   Final    (Note)  POSITIVE for E.COLI by Verigene multiplex nucleic acid test. Final  identification and antimicrobial susceptibility testing will be  verified by standard methods. Verigene test will not distinguish  E.coli from Shigella species  including S.dysenteriae, S.flexneri,  S.boydii, and S.sonnei. Specimens containing Shigella species or  E.coli will be reported as Positive for E.coli.    POSITIVE for CTX-M Class A Extended Spectrum beta-lactamase (ESBL)  resistance marker by TAPPigene multiplex nucleic acid test. CTX-M  confers resistance to penicillins, cephalosporins and variable  resistance to beta-lactam beta-lactamase inhibitor combinations  (clavulanic acid and tazobactam). Best empiric antibiotic choice is  meropenem. Specific susceptibility testing will be performed.    Specimen tested with Verigene multiplex, gram-negative blood culture  nucleic acid test for the following targets: Acinetobacter sp.,  Citrobacter sp., Enterobacter sp., Proteus sp., E. coli, K.  pneumoniae/oxytoca, P. aeruginosa, and the following resistance  markers: CTXM, KPC, NDM, VIM, IMP and OXA.    Critical Value/Significant Value called to and read back by RALPH BOLAÑOS RN 12/17/20 1312 EH.       12/16/2020 No growth  Final   12/16/2020 (A)  Preliminary    >100,000 colonies/mL  Escherichia coli ESBL  ESBL (extended beta lactamase) producing organisms require contact precautions.     12/16/2020   Preliminary    Susceptibility testing requested by  Dr. Gonzales, Pager [phone]. Fosfomycin requested. @ 1637. 12.18.20. BS.       Specimen Description   Date Value Ref Range Status   06/04/2021 Midstream Urine  Final   12/17/2020 Blood  Final   12/16/2020 Blood Right Arm  Final   12/16/2020 Blood Right Arm  Final   12/16/2020 Unspecified Urine  Final   12/03/2020 Midstream Urine  Final        Last check of C difficile  No results found for: CDBPCT      Virology:  CMV viral loads    CMV viral loads    CMV Quant IU/mL   Date Value Ref Range Status   12/18/2020 CMV DNA Not Detected CMVND^CMV DNA Not Detected [IU]/mL Final     Comment:     Mutations within the highly conserved regions of the viral genome covered by   the RHONDA AmpliPrep/RHONDA TaqMan CMV Test primers and/or probes  have been   identified and may result in under-quantitation of or failure to detect the   virus.  Supplemental testing methods should be used for testing when this is   suspected.  The RHONDA AmpliPrep/RHONDA TaqMan CMV Test is an FDA-approved in vitro nucleic   acid amplification test for the quantitation of cytomegalovirus DNA in human   plasma (EDTA plasma) using the RHONDA AmpliPrep Instrument for automated viral   nucleic acid extraction and the RHONDA TaqMan Analyzer or PNMsoft TaqMan for   automated Real Time amplification and detection of the viral nucleic acid   target.  Titer results are reported in International Units/mL (IU/mL using 1st WHO   International standard for Human Cytomegalovirus for Nucleic Acid   Amplification based assays. The conversion factor between CMV DNA copis/mL (as   defined by the Roche RHONDA TaqMan CMV test) and International Units is the   CMV DNA concentration in IU/mL x 1.1 copies/IU = CMV DNA in copies/mL.  This assay has received FDA approval for the testing of human plasma only. The   Infectious Disease Diagnostic Laboratory at the [facility], [place], has validated the performance characteristics of the   Roche CMV assay for plasma, bronchial alveolar lavage/wash and urine.       CMV viral loads    Recent Labs   Lab Test 12/18/20  0611   CMVQNT CMV DNA Not Detected   CSPEC Plasma   CMVLOG Not Calculated       CMV viral loads    CMV Quant IU/mL   Date Value Ref Range Status   12/18/2020 CMV DNA Not Detected CMVND^CMV DNA Not Detected [IU]/mL Final     Comment:     Mutations within the highly conserved regions of the viral genome covered by   the RHONDA AmpliPrep/RHONDA TaqMan CMV Test primers and/or probes have been   identified and may result in under-quantitation of or failure to detect the   virus.  Supplemental testing methods should be used for testing when this is   suspected.  The RHONDA AmpliPrep/RHONDA TaqMan CMV Test is an FDA-approved in vitro  nucleic   acid amplification test for the quantitation of cytomegalovirus DNA in human   plasma (EDTA plasma) using the RHONDA AmpliPrep Instrument for automated viral   nucleic acid extraction and the [employer] TaqMan Analyzer or [employer] Software TaqMan for   automated Real Time amplification and detection of the viral nucleic acid   target.  Titer results are reported in International Units/mL (IU/mL using 1st WHO   International standard for Human Cytomegalovirus for Nucleic Acid   Amplification based assays. The conversion factor between CMV DNA copis/mL (as   defined by the Roche RHONDA TaqMan CMV test) and International Units is the   CMV DNA concentration in IU/mL x 1.1 copies/IU = CMV DNA in copies/mL.  This assay has received FDA approval for the testing of human plasma only. The   Infectious Disease Diagnostic Laboratory at the [facility], [place], has validated the performance characteristics of the   Roche CMV assay for plasma, bronchial alveolar lavage/wash and urine.     09/23/2020 CMV DNA Not Detected CMVND^CMV DNA Not Detected [IU]/mL Final     Comment:     Mutations within the highly conserved regions of the viral genome covered by   the RHONDA AmpliPrep/RHONDA TaqMan CMV Test primers and/or probes have been   identified and may result in under-quantitation of or failure to detect the   virus.  Supplemental testing methods should be used for testing when this is   suspected.  The RHONDA AmpliPrep/RHONDA TaqMan CMV Test is an FDA-approved in vitro nucleic   acid amplification test for the quantitation of cytomegalovirus DNA in human   plasma (EDTA plasma) using the RHONDA AmpliPrep Instrument for automated viral   nucleic acid extraction and the RHONDA TaqMan Analyzer or RHONDA TaqMan for   automated Real Time amplification and detection of the viral nucleic acid   target.  Titer results are reported in International Units/mL (IU/mL using 1st WHO   International standard for Human  Cytomegalovirus for Nucleic Acid   Amplification based assays. The conversion factor between CMV DNA copis/mL (as   defined by the Roche RHONDA TaqMan CMV test) and International Units is the   CMV DNA concentration in IU/mL x 1.1 copies/IU = CMV DNA in copies/mL.  This assay has received FDA approval for the testing of human plasma only. The   Infectious Disease Diagnostic Laboratory at the [facility], [place], has validated the performance characteristics of the   Roche CMV assay for plasma, bronchial alveolar lavage/wash and urine.       Log IU/mL of CMVQNT   Date Value Ref Range Status   12/18/2020 Not Calculated <2.1 [Log_IU]/mL Final   09/23/2020 Not Calculated <2.1 [Log_IU]/mL Final       CMV resistance testing  No lab results found.  No results found for: CMVCID, CMVFOS, CMVGAN     EBV viral loads   No lab results found.  No results found for: EBVDN, EBRES, EBVDN, EBVSP, EBVPC, EBVPCR    Human Herpes Virus 6 viral loads    No results found for: H6RES No results found for: H6SPEC    CMV Antibody IgG   Date Value Ref Range Status   01/04/2021 >8.0 (H) 0.0 - 0.8 AI Final     Comment:     Positive  Antibody index (AI) values reflect qualitative changes in antibody   concentration that cannot be directly associated with clinical condition or   disease state.       No results found for: EBIG2, EBIGM, EBVIGG, EBIGG, EBVAGN, EF2607, TOXG      Imaging:  CT a/p W 9/29/2022  IMPRESSION:  1. Urothelial thickening and mild ectasia of the distal right renal  transplant ureter extending to the ureterovesical anastomosis,  slightly increased compared to previous CT on 12/17/2020. Findings may  represent acute or chronic sequela of inflammation/infection. No  hydronephrosis or evidence of obstruction.  2. Simple right adnexal cyst is not significantly changed compared to  previous without internal complexity on comparison ultrasound, likely  benign. No dedicated imaging follow-up  recommended.  3. Stable postoperative changes of liver transplant.  4. Stable enlarged polycystic kidneys.      Catrachita Gonzales MD

## 2023-03-15 ENCOUNTER — TELEPHONE (OUTPATIENT)
Dept: INFECTIOUS DISEASES | Facility: CLINIC | Age: 63
End: 2023-03-15
Payer: MEDICARE

## 2023-03-15 ENCOUNTER — OFFICE VISIT (OUTPATIENT)
Dept: UROLOGY | Facility: CLINIC | Age: 63
End: 2023-03-15
Attending: OBSTETRICS & GYNECOLOGY
Payer: MEDICARE

## 2023-03-15 ENCOUNTER — TELEPHONE (OUTPATIENT)
Dept: OBGYN | Facility: CLINIC | Age: 63
End: 2023-03-15
Payer: MEDICARE

## 2023-03-15 VITALS
BODY MASS INDEX: 20.76 KG/M2 | WEIGHT: 145 LBS | HEART RATE: 89 BPM | DIASTOLIC BLOOD PRESSURE: 54 MMHG | HEIGHT: 70 IN | SYSTOLIC BLOOD PRESSURE: 90 MMHG

## 2023-03-15 DIAGNOSIS — N81.6 RECTOCELE: ICD-10-CM

## 2023-03-15 DIAGNOSIS — N81.4 UTERINE PROLAPSE: ICD-10-CM

## 2023-03-15 DIAGNOSIS — N95.0 POST-MENOPAUSAL BLEEDING: Primary | ICD-10-CM

## 2023-03-15 DIAGNOSIS — N81.11 CYSTOCELE, MIDLINE: ICD-10-CM

## 2023-03-15 DIAGNOSIS — Z16.12 ESBL (EXTENDED SPECTRUM BETA-LACTAMASE) PRODUCING BACTERIA INFECTION: ICD-10-CM

## 2023-03-15 DIAGNOSIS — N95.2 VAGINAL ATROPHY: ICD-10-CM

## 2023-03-15 DIAGNOSIS — A49.9 ESBL (EXTENDED SPECTRUM BETA-LACTAMASE) PRODUCING BACTERIA INFECTION: ICD-10-CM

## 2023-03-15 DIAGNOSIS — D84.9 IMMUNOSUPPRESSION (H): ICD-10-CM

## 2023-03-15 DIAGNOSIS — Z94.4 LIVER REPLACED BY TRANSPLANT (H): ICD-10-CM

## 2023-03-15 PROCEDURE — G0463 HOSPITAL OUTPT CLINIC VISIT: HCPCS | Performed by: OBSTETRICS & GYNECOLOGY

## 2023-03-15 PROCEDURE — 99215 OFFICE O/P EST HI 40 MIN: CPT | Performed by: OBSTETRICS & GYNECOLOGY

## 2023-03-15 ASSESSMENT — PATIENT HEALTH QUESTIONNAIRE - PHQ9
5. POOR APPETITE OR OVEREATING: NOT AT ALL
SUM OF ALL RESPONSES TO PHQ QUESTIONS 1-9: 0

## 2023-03-15 ASSESSMENT — ANXIETY QUESTIONNAIRES
7. FEELING AFRAID AS IF SOMETHING AWFUL MIGHT HAPPEN: NOT AT ALL
2. NOT BEING ABLE TO STOP OR CONTROL WORRYING: NOT AT ALL
1. FEELING NERVOUS, ANXIOUS, OR ON EDGE: NOT AT ALL
GAD7 TOTAL SCORE: 0
IF YOU CHECKED OFF ANY PROBLEMS ON THIS QUESTIONNAIRE, HOW DIFFICULT HAVE THESE PROBLEMS MADE IT FOR YOU TO DO YOUR WORK, TAKE CARE OF THINGS AT HOME, OR GET ALONG WITH OTHER PEOPLE: NOT DIFFICULT AT ALL
6. BECOMING EASILY ANNOYED OR IRRITABLE: NOT AT ALL
3. WORRYING TOO MUCH ABOUT DIFFERENT THINGS: NOT AT ALL
5. BEING SO RESTLESS THAT IT IS HARD TO SIT STILL: NOT AT ALL
GAD7 TOTAL SCORE: 0

## 2023-03-15 NOTE — LETTER
"3/15/2023       RE: Belen Sharma  [address] [place] [address]     Dear Colleague,    Thank you for referring your patient, Belen Sharma, to the [facility] [place] at [facility]. Please see a copy of my visit note below.    March 15, 2023    Return visit    Patient returns today for f/u due to new onset of vaginal bleeding for the past 3 days. The bleeding started 3 days ago after starting estrace cream. Belen removed the pessary and has not used it since that time, but the bleeding has persisted. No other h/o vaginal bleeding    BP 90/54   Pulse 89   Ht 1.772 m (5' 9.75\")   Wt 65.8 kg (145 lb)   BMI 20.95 kg/m    She is comfortable, in no distress, non-labored breathing.  Abdomen is soft, non-tender, non-distended.  Normal external female genitalia.  Speculum and bimanual exam are remarkable for old blood in vault, appears to originate from the cervix. Attempted to obtain an EMB. Unable to localize the cervix and insert the pipelle    A/P: 63 year old F with postmenopausal bleeding    Will get TVUS to evaluate the endometrial stripe. Further recommendations based on those results    I spent a total of 40 minutes with  Ms. Sharma  on the date of the encounter in chart review, face to face patient visit, review of tests, documentation and/or discussion with other providers about the issues documented above.    Zach Francis MD  Professor, OB/GYN  Urogynecologist    CC  Patient Care Team:  Vincent De La Garza MD as PCP - General (Internal Medicine)  Christy Mohamud MD as MD (Cardiology)  Елена Dinh RN as Registered Nurse  Christy Mohamud MD as Assigned Heart and Vascular Provider  Leventhal, Thomas Michael, MD as Assigned Gastroenterology Provider  Vincent De La Garza MD as Assigned PCP  Saran Angulo MD as Assigned Nephrology Provider  Daysi Perez MD as MD (OB/Gyn)  Shannon Loza OD (Optometry)  Minkus, " MD Sapna (Inactive) as MD (Ophthalmology)  Anat Benitez MD as MD (Dermatology)  Pepe Verduzco DPM as Assigned Musculoskeletal Provider  Ida Guthrie RN as Specialty Care Coordinator  Rocio Brewer CNP as Nurse Practitioner (Urology)  Sebastian Robertson MD as Physician (Ophthalmology)  Daysi Perez MD as Assigned OBGYN Provider  Catrachita Gonzales MD as Assigned Infectious Disease Provider  Sebastian Robertson MD as Assigned Surgical Provider

## 2023-03-15 NOTE — TELEPHONE ENCOUNTER
Called and informed patient, per nataly Tapia, that Dr. Francis wants her to wait to start the estrace cream until we have the results of her ultrasound tomorrow.

## 2023-03-15 NOTE — PROGRESS NOTES
"March 15, 2023    Return visit    Patient returns today for f/u due to new onset of vaginal bleeding for the past 3 days. The bleeding started 3 days ago after starting estrace cream. Belen removed the pessary and has not used it since that time, but the bleeding has persisted. No other h/o vaginal bleeding    BP 90/54   Pulse 89   Ht 1.772 m (5' 9.75\")   Wt 65.8 kg (145 lb)   BMI 20.95 kg/m    She is comfortable, in no distress, non-labored breathing.  Abdomen is soft, non-tender, non-distended.  Normal external female genitalia.  Speculum and bimanual exam are remarkable for old blood in vault, appears to originate from the cervix. Attempted to obtain an EMB. Unable to localize the cervix and insert the pipelle    A/P: 63 year old F with postmenopausal bleeding    Will get TVUS to evaluate the endometrial stripe. Further recommendations based on those results    I spent a total of 40 minutes with  Ms. Sharma  on the date of the encounter in chart review, face to face patient visit, review of tests, documentation and/or discussion with other providers about the issues documented above.    Zach Francis MD  Professor, OB/GYN  Urogynecologist    CC  Patient Care Team:  Vincent De La Garza MD as PCP - General (Internal Medicine)  Christy Mohamud MD as MD (Cardiology)  Елена Dinh RN as Registered Nurse  Christy Mohamud MD as Assigned Heart and Vascular Provider  Leventhal, Thomas Michael, MD as Assigned Gastroenterology Provider  Vincent De La Garza MD as Assigned PCP  Saran Angulo MD as Assigned Nephrology Provider  Daysi Perez MD as MD (OB/Gyn)  Shannon Loza OD (Optometry)  Sapna Marcano MD (Inactive) as MD (Ophthalmology)  Anat Benitez MD as MD (Dermatology)  Pepe Verduzco DPM as Assigned Musculoskeletal Provider  Ida Guthrie RN as Specialty Care Coordinator  Rocio Brewer CNP as Nurse Practitioner (Urology)  Sebastian Robertson MD as Physician " (Ophthalmology)  Daysi Perez MD as Assigned OBGYN Provider  Catrachita Gonzales MD as Assigned Infectious Disease Provider  Sebastian Robertson MD as Assigned Surgical Provider

## 2023-03-15 NOTE — TELEPHONE ENCOUNTER
EP called 3/15 to sched 6 month follow up with Dr. Gonzales per checkout notes from 3/14. Appt is set for 9/12 via video. Patient stated that she will be in [place] for this appt.

## 2023-03-15 NOTE — NURSING NOTE
Chief Complaint   Patient presents with     Establish Care     C/O bleeding and spotting with pessary, worsen vaginal dryness   Mariajose Tapia LPN

## 2023-03-16 ENCOUNTER — HOSPITAL ENCOUNTER (OUTPATIENT)
Dept: ULTRASOUND IMAGING | Facility: CLINIC | Age: 63
Discharge: HOME | End: 2023-03-16
Attending: OBSTETRICS & GYNECOLOGY
Payer: MEDICARE

## 2023-03-16 ENCOUNTER — TELEPHONE (OUTPATIENT)
Dept: UROLOGY | Facility: CLINIC | Age: 63
End: 2023-03-16

## 2023-03-16 ENCOUNTER — LAB (OUTPATIENT)
Dept: LAB | Facility: CLINIC | Age: 63
End: 2023-03-16
Payer: MEDICARE

## 2023-03-16 ENCOUNTER — LAB (OUTPATIENT)
Dept: LAB | Facility: CLINIC | Age: 63
End: 2023-03-16
Attending: OBSTETRICS & GYNECOLOGY
Payer: MEDICARE

## 2023-03-16 DIAGNOSIS — N39.0 RECURRENT UTI: ICD-10-CM

## 2023-03-16 DIAGNOSIS — N95.0 POST-MENOPAUSAL BLEEDING: ICD-10-CM

## 2023-03-16 LAB
ALBUMIN UR-MCNC: 100 MG/DL
APPEARANCE UR: ABNORMAL
BACTERIA #/AREA URNS HPF: ABNORMAL /HPF
BILIRUB UR QL STRIP: NEGATIVE
COLOR UR AUTO: YELLOW
GLUCOSE UR STRIP-MCNC: NEGATIVE MG/DL
HGB UR QL STRIP: ABNORMAL
KETONES UR STRIP-MCNC: NEGATIVE MG/DL
LEUKOCYTE ESTERASE UR QL STRIP: ABNORMAL
MUCOUS THREADS #/AREA URNS LPF: PRESENT /LPF
NITRATE UR QL: POSITIVE
PH UR STRIP: 7 [PH] (ref 5–7)
RBC URINE: 18 /HPF
SP GR UR STRIP: 1.01 (ref 1–1.03)
SQUAMOUS EPITHELIAL: 1 /HPF
UROBILINOGEN UR STRIP-MCNC: <2 MG/DL
WBC CLUMPS #/AREA URNS HPF: PRESENT /HPF
WBC URINE: >182 /HPF

## 2023-03-16 PROCEDURE — 87086 URINE CULTURE/COLONY COUNT: CPT

## 2023-03-16 PROCEDURE — 76856 US EXAM PELVIC COMPLETE: CPT

## 2023-03-16 PROCEDURE — 81001 URINALYSIS AUTO W/SCOPE: CPT

## 2023-03-16 NOTE — TELEPHONE ENCOUNTER
Called Belen to report her US results. Her TVUS showed a 3mm endometrial stripe and is reassuring. Belen also dropped off a UA C&S. Her UA is difficult to interpret due to her vaginal bleeding. Will await the results of her UC.  Zach Francis MD

## 2023-03-17 DIAGNOSIS — N39.0 RECURRENT UTI: ICD-10-CM

## 2023-03-17 LAB — BACTERIA UR CULT: ABNORMAL

## 2023-03-17 RX ORDER — NITROFURANTOIN 25; 75 MG/1; MG/1
100 CAPSULE ORAL 2 TIMES DAILY
Qty: 6 CAPSULE | Refills: 0 | Status: SHIPPED | OUTPATIENT
Start: 2023-03-17 | End: 2023-03-20

## 2023-03-17 NOTE — TELEPHONE ENCOUNTER
Patient had an appt at Urology and rooming staff cancelled prn nitrofurantioin rx. Pharmacy requesting refill of medication.    Will send replacement rx for patient.       Jose Medina RN  Infectious Disease on 3/17/2023 at 10:47 AM

## 2023-03-20 DIAGNOSIS — N39.0 RECURRENT UTI: ICD-10-CM

## 2023-03-20 RX ORDER — NITROFURANTOIN 25; 75 MG/1; MG/1
100 CAPSULE ORAL 2 TIMES DAILY
Qty: 6 CAPSULE | Refills: 99 | Status: SHIPPED | OUTPATIENT
Start: 2023-03-20 | End: 2024-04-03

## 2023-03-30 ENCOUNTER — HOSPITAL ENCOUNTER (OUTPATIENT)
Dept: PHYSICAL THERAPY | Facility: REHABILITATION | Age: 63
Discharge: HOME | End: 2023-03-30
Attending: UROLOGY
Payer: MEDICARE

## 2023-03-30 DIAGNOSIS — R29.898 HIP TIGHTNESS: ICD-10-CM

## 2023-03-30 DIAGNOSIS — R29.898 WEAKNESS OF BOTH HIPS: Primary | ICD-10-CM

## 2023-03-30 DIAGNOSIS — N81.4 UTERINE PROLAPSE: ICD-10-CM

## 2023-03-30 DIAGNOSIS — M62.89 PELVIC FLOOR DYSFUNCTION: ICD-10-CM

## 2023-03-30 PROCEDURE — 97535 SELF CARE MNGMENT TRAINING: CPT | Mod: GP

## 2023-03-30 PROCEDURE — 97161 PT EVAL LOW COMPLEX 20 MIN: CPT | Mod: GP

## 2023-03-31 ENCOUNTER — OFFICE VISIT (OUTPATIENT)
Dept: OPHTHALMOLOGY | Facility: CLINIC | Age: 63
End: 2023-03-31
Attending: OPHTHALMOLOGY
Payer: MEDICARE

## 2023-03-31 DIAGNOSIS — Z98.890 STATUS POST EYE SURGERY: Primary | ICD-10-CM

## 2023-03-31 DIAGNOSIS — Z96.1 PSEUDOPHAKIA OF BOTH EYES: ICD-10-CM

## 2023-03-31 PROCEDURE — 92015 DETERMINE REFRACTIVE STATE: CPT | Mod: GY

## 2023-03-31 PROCEDURE — 99024 POSTOP FOLLOW-UP VISIT: CPT | Performed by: OPHTHALMOLOGY

## 2023-03-31 PROCEDURE — G0463 HOSPITAL OUTPT CLINIC VISIT: HCPCS | Performed by: OPHTHALMOLOGY

## 2023-03-31 ASSESSMENT — CONF VISUAL FIELD
OS_NORMAL: 1
OD_NORMAL: 1
OD_INFERIOR_NASAL_RESTRICTION: 0
OS_INFERIOR_NASAL_RESTRICTION: 0
OD_INFERIOR_TEMPORAL_RESTRICTION: 0
OS_INFERIOR_TEMPORAL_RESTRICTION: 0
METHOD: COUNTING FINGERS
OD_SUPERIOR_NASAL_RESTRICTION: 0
OD_SUPERIOR_TEMPORAL_RESTRICTION: 0
OS_SUPERIOR_TEMPORAL_RESTRICTION: 0
OS_SUPERIOR_NASAL_RESTRICTION: 0

## 2023-03-31 ASSESSMENT — REFRACTION_MANIFEST
OD_ADD: +2.50
OS_AXIS: 125
OS_CYLINDER: +1.25
OD_CYLINDER: +0.75
OD_SPHERE: -0.50
OS_SPHERE: -1.00
OD_AXIS: 100
OS_ADD: +2.50

## 2023-03-31 ASSESSMENT — VISUAL ACUITY
METHOD: SNELLEN - LINEAR
OD_SC: 20/20
OS_SC: 20/25
OS_SC+: +2

## 2023-03-31 ASSESSMENT — REFRACTION_WEARINGRX
OD_AXIS: 060
OS_AXIS: 090
OD_CYLINDER: +0.75
OD_SPHERE: -5.75
OS_ADD: +2.50
OS_SPHERE: -5.25
OS_CYLINDER: +1.00
OD_ADD: +2.50

## 2023-03-31 ASSESSMENT — TONOMETRY
OD_IOP_MMHG: 13
IOP_METHOD: TONOPEN
OS_IOP_MMHG: 22

## 2023-03-31 ASSESSMENT — SLIT LAMP EXAM - LIDS
COMMENTS: NORMAL
COMMENTS: NORMAL

## 2023-03-31 ASSESSMENT — CUP TO DISC RATIO
OS_RATIO: 0.40
OD_RATIO: 0.40

## 2023-03-31 NOTE — NURSING NOTE
Chief Complaints and History of Present Illnesses   Patient presents with     Pseudophakia Follow Up     Chief Complaint(s) and History of Present Illness(es)     Pseudophakia Follow Up            Laterality: both eyes          Comments    Pt states vision has improved since last visit. No eye pain today. Dryness in both eyes, relief with artificial tears.  No new flashes or floaters.    POONAM Haas March 31, 2023 8:46 AM

## 2023-03-31 NOTE — PROGRESS NOTES
[facility]    OUTPATIENT PHYSICAL THERAPY ORTHOPEDIC EVALUATION  PLAN OF TREATMENT FOR OUTPATIENT REHABILITATION  (COMPLETE FOR INITIAL CLAIMS ONLY)  Patient's Last Name, First Name, M.I.  YOB: 1960  Belen Sharma    Provider s Name:  [facility]   Medical Record No.  [id number]   Start of Care Date:  03/30/23   Onset Date:  02/02/23 (onset date: 2 years ago, order date 2/2/23)   Type:     _X__PT   ___OT   ___SLP Medical Diagnosis:  uterine prolapse, pelvic floor dysfunction, hip tightness, hip weakness     PT Diagnosis:  hip weakness and decreased flexibility, pelvic floor dysfunction   Visits from SOC:  1      _________________________________________________________________________________  Plan of Treatment/Functional Goals:  joint mobilization, manual therapy, neuromuscular re-education, ROM, strengthening, stretching     Biofeedback, Hot packs, Cryotherapy     Goals  Goal Identifier: HEP  Goal Description: pt will demonstrate independence and report compliance with HEP to faciliate improved independent symptom mgmt.  Target Date: 06/08/23    Goal Identifier: activity tolerance  Goal Description: pt will demonstrate improved activity tolerance via report of ability to participate in greater than or equal to 30 minutes of low impact exercise at least 3 days per week to promote improved QOL.  Target Date: 06/08/23    Goal Identifier: pressure management  Goal Description: pt will report compliance with intraabdominal pressure management strategies and demonstrate appropriate breath coordination and control 100% while in the clinic to reduce risk for worsening prolapse  Target Date: 06/08/23                                                           Therapy Frequency:  1 time/week  Predicted Duration of Therapy Intervention:  10 weeks    Елена Rincon, PT                  I CERTIFY THE NEED FOR THESE SERVICES FURNISHED UNDER        THIS PLAN OF TREATMENT AND WHILE UNDER MY CARE     (Physician co-signature of this document indicates review and certification of the therapy plan).                     Certification Date From:  03/30/23   Certification Date To:  06/08/23    Referring Provider:  Lizzy Ferrara MD    Initial Assessment        See Epic Evaluation Start of Care Date: 03/30/23 03/30/23 0900   General Information   Type of Visit Initial OP Ortho PT Evaluation   Start of Care Date 03/30/23   Referring Physician Lizzy Ferrara MD   Patient/Family Goals Statement better health for being physically active   Date of Order 02/02/23   Certification Required? Yes   Medical Diagnosis uterine prolapse, pelvic floor dysfunction, hip tightness, hip weakness   Surgical/Medical history reviewed Yes   General Information Comments immunocompromised, hx of recurrent UTI's   Body Part(s)   Body Part(s) Lumbar Spine/SI;Pelvic Floor Dysfunction   Presentation and Etiology   Pertinent history of current problem (include personal factors and/or comorbidities that impact the POC) Pt presents physical therapy for uterine prolapse. 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 PMH also including chronic right back (significant scoliosis), knee and foot pain, osteopenia, endometriosis, splenectomy 2012, hernia and recurrent UTI's (2 in the last year). Hesitant for internal exam as she feels last UTI was caused by this. Has been using a pessary, but feels it doesnt fit correctly as it frequently falls out with increased activity. However, pessary does improve ability to fully void when in place. Endorses vaginal dryness. Drinks ~ 2 quarts of water per day, 1 cup of coffee and urinating 5-8 times per day. Denies stress incontinence. Pt endorses limited activity tolerance. Pt  moved from [place] a few years ago to be closer to her children, is  and lives with her , son and mother-in-law. Goal for therapy are to increase activity, strength and manage prolapse.   Impairments A. Pain;D. Decreased ROM;E. Decreased flexibility;F. Decreased strength and endurance;P. Bowel or bladder problems   Functional Limitations perform desired leisure / sports activities   Symptom Location perineum   How/Where did it occur From insidious onset   Onset date of current episode/exacerbation 02/02/23  (onset date: 2 years ago, order date 2/2/23)   Chronicity Chronic   Pain rating (0-10 point scale) Other   Pain rating comment current: 3/10 right knee, foot and back   Prior Level of Function   Prior Level of Function-Mobility independent with all mobility, decreased activity tolerance due to comorbidities   Current Level of Function   Patient role/employment history E. Unemployed   Fall Risk Screen   Fall screen completed by PT   Have you fallen 2 or more times in the past year? No   Have you fallen and had an injury in the past year? No   Is patient a fall risk? No   Abuse Screen (yes response referral indicated)   Feels Unsafe at Home or Work/School no   Feels Threatened by Someone no   Does Anyone Try to Keep You From Having Contact with Others or Doing Things Outside Your Home? no   Physical Signs of Abuse Present no   Lumbar Spine/SI Objective Findings   Balance/Proprioception (Single Leg Stance) pelvic instability noted, pain throughout right knee and ankle in single leg stance   Hamstring Flexibility mod loss jama   Hip Flexor Flexibility mod loss jama   Flexion ROM finger tips to mid shin   Extension ROM mod loss   Right Side Bending ROM mod loss   Left Side Bending ROM min loss   Pelvic Screen ASLR: pelvic instability, rectus bulge   Hip Screen PROM: limited hip IR, ER and extension jama   Transversus Abdominus Strength ([place] Leg Lowering-deg) difficulty recruiting   Hip Abduction Strength  3+/5 jama   Hip Adduction Strength 3/5 jama   Lumbar/Hip/Knee/Foot Strength Comments hip ER: 3/5 jama, hip IR: 3/5 jama   Segmental Mobility thoracic and lumbar hypomobility   Observation breathing: decreased lateral rib and abdominal excursion, chest breather   Integumentary abdominal myofascial mobility: scar tissue throughout abdomen, restrictions in all quadrants, nonpainful   Posture significant scoliosis with left curvature, right rib hump, leg length discrepancy   Planned Therapy Interventions   Planned Therapy Interventions joint mobilization;manual therapy;neuromuscular re-education;ROM;strengthening;stretching   Planned Modality Interventions   Planned Modality Interventions Biofeedback;Hot packs;Cryotherapy   Clinical Impression   Criteria for Skilled Therapeutic Interventions Met yes, treatment indicated   PT Diagnosis hip weakness and decreased flexibility, pelvic floor dysfunction   Influenced by the following impairments PMH including organ transplants, scoliosis   Functional limitations due to impairments tolerate increased activity, manage prolapse   Clinical Presentation Stable/Uncomplicated   Clinical Decision Making (Complexity) Low complexity   Therapy Frequency 1 time/week   Predicted Duration of Therapy Intervention (days/wks) 10 weeks   Risk & Benefits of therapy have been explained Yes   Patient, Family & other staff in agreement with plan of care Yes   Pelvic Health Informed Consent Statement Discussed with patient/guardian reason for referral regarding pelvic health needs and external/internal pelvic floor muscle examination.  Opportunity provided to ask questions and verbal consent for assessment and intervention was given.  (pt declining internal assessment today due to hx of recurrent UTI's)   Clinical Impression Comments Pt presents to physical therapy with uterine prolapse with associated decreased hip strength, decreased hip muscle length, decreased hip mobility, and decreased activity  tolerance with fear of worsening degree of prolapse. Pt would benefit from skilled physical therapy services to faciliate increased ability to tolerate increased activity levels to promote improved QOL.   ORTHO GOALS   PT Ortho Eval Goals 1;2;3   Ortho Goal 1   Goal Identifier HEP   Goal Description pt will demonstrate independence and report compliance with HEP to faciliate improved independent symptom mgmt.   Target Date 06/08/23   Ortho Goal 2   Goal Identifier activity tolerance   Goal Description pt will demonstrate improved activity tolerance via report of ability to participate in greater than or equal to 30 minutes of low impact exercise at least 3 days per week to promote improved QOL.   Target Date 06/08/23   Ortho Goal 3   Goal Identifier pressure management   Goal Description pt will report compliance with intraabdominal pressure management strategies and demonstrate appropriate breath coordination and control 100% while in the clinic to reduce risk for worsening prolapse   Target Date 06/08/23   Total Evaluation Time   PT Eval, Low Complexity Minutes (19048) 45   Therapy Certification   Certification date from 03/30/23   Certification date to 06/08/23   Medical Diagnosis uterine prolapse, pelvic floor dysfunction, hip tightness, hip weakness     Елена Rincon, PT

## 2023-03-31 NOTE — PROGRESS NOTES
Chief complaint   Post op OD  Referring Provider: Self-referred    HPI   Belen Sharma 63 year old adult PMHx of liver-kidney recipient (03/03/2011, re-transplanted 2016) noticed progressive vision decrease since 2019. Cannot be corrected any more with glasses to help her drive and read. Noticing glare, starburts.     Interval History: POW3 s/p CE/IOL left eye (3/9/23). Pt states vision has improved since last visit. No eye pain today. Dryness in both eyes, relief with artificial tears. No new flashes. Chronic floaters.      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 Both, very large, native kidneys reamain.     PONV (postoperative nausea and vomiting)     Need to start with ice chips and apple juice, no soda     S/P kidney transplant     SLK 10/9/2010 - kidney failure 2/2 [facility]. Explanted 2012. Re-transplant after de-sensitization 2016     S/P liver transplant (H)     10/9/2010 - [facility], ischemic biopathy. Re-transplant 3/3/2011     S/P splenectomy      Spider veins 2019     Thyroid disease     Hyperthyroid treated with single dose iodine     Urinary tract infection 09/2022       PSH     Past Surgical History:   Procedure Laterality Date     bunectomy  08/01/2018    right foot     bunionectomy  01/2019    left     CHOLECYSTECTOMY       CONIZATION N/A 09/30/2020    Procedure: CONE BIOPSY, CERVIX;  Surgeon: Daysi Perez MD;  Location: [facility] OR     FAILED PICC - RIGHT  ARM Right 2020    Has a LUE AV fistula.     H STATISTIC PICC LINE INSERTION >5YR, FAILED Bilateral 2020    Left fistula, Right failed x 2 Occlussion     HERNIA REPAIR, INCISIONAL  2013     IR DIALYSIS PTA CENTRAL SEG  2020     IR PICC PLACEMENT > 5 YRS OF AGE  2020     IR PICC PLACEMENT > 5 YRS OF AGE  10/5/2022     PHACOEMULSIFICATION CLEAR CORNEA WITH STANDARD INTRAOCULAR LENS IMPLANT Right 2023    Procedure: RIGHT EYE PHACOEMULSIFICATION, CATARACT, WITH INTRAOCULAR LENS IMPLANT;  Surgeon: Sebastian Robertson MD;  Location: [facility] OR     PHACOEMULSIFICATION WITH STANDARD INTRAOCULAR LENS IMPLANT Left 3/9/2023    Procedure: LEFT EYE PHACOEMULSIFICATION, CATARACT, WITH STANDARD INTRAOCULAR LENS IMPLANT INSERTION;  Surgeon: Sebastian Robertson MD;  Location: [facility] OR     SPLENECTOMY      Splenectomy     TRANSPLANT KIDNEY RECIPIENT  DONOR      re-transplant after [facility]; 6 months de-sensitization prior AND 6 months after transplant     TRANSPLANT LIVER RECIPIENT  DONOR  2011     TRANSPLANT LIVER, KIDNEY RECIPIENT  DONOR, COMBINED  10/09/2010    [facility] - re-Tx liver 3/3/2011; Kidney (left iliac) lost to [facility]/fibrosis - explant     VASCULAR SURGERY      Vascular access left UE. Not used for 4 years since 2nd kidney tx -- [facility] [place]       Meds     Current Outpatient Medications   Medication     acetaminophen (TYLENOL) 325 MG tablet     aspirin (ASA) 81 MG chewable tablet     atorvastatin (LIPITOR) 10 MG tablet     biotin 1000 MCG TABS tablet     ciclopirox (PENLAC) 8 % external solution     diphenhydrAMINE (BENADRYL) 25 MG tablet     estradiol (ESTRACE) 0.1 MG/GM vaginal cream     guaiFENesin (MUCINEX) 600 MG 12 hr tablet     Multiple Vitamins-Minerals (WOMENS DAILY FORMULA PO)     nitroFURantoin macrocrystal-monohydrate (MACROBID) 100 MG capsule     nitroGLYcerin (NITROSTAT) 0.4 MG sublingual tablet     predniSONE (DELTASONE) 5 MG tablet     Probiotic Product  (PROBIOTIC PO)     sodium bicarbonate 650 MG tablet     sulfamethoxazole-trimethoprim (BACTRIM) 400-80 MG tablet     tacrolimus (GENERIC EQUIVALENT) 0.5 MG capsule     temazepam (RESTORIL) 15 MG capsule     No current facility-administered medications for this visit.       Imaging   -    Drops Currently Taking   None    Assessment/Plan   #Pseudophakia, both eyes  - Distance target in both eyes  - S/P CE/IOL right eye 2/9/2023, now POW3 s/p CE/IOL left eye 3/9/23, doing very well  No complications of sx    # Immunosuppression  - No evidence of retinopathies or active retinal disease  - Old CR scar peripheral retina left eye  - Monitor    Follow up:1 year, sooner ESTEFANIA Mackey MD  Fellow, Cornea, External Disease and Refractive Surgery  Department of Ophthalmology  [facility]    Attending Physician Attestation:  Complete documentation of historical and exam elements from today's encounter can be found in the full encounter summary report (not reduplicated in this progress note).  I personally obtained the chief complaint(s) and history of present illness.  I confirmed and edited as necessary the review of systems, past medical/surgical history, family history, social history, and examination findings as documented by others; and I examined the patient myself.  I personally reviewed the relevant tests, images, and reports as documented above.  I formulated and edited as necessary the assessment and plan and discussed the findings and management plan with the patient and family. - Sebastian Robertson MD

## 2023-04-02 ENCOUNTER — MYC MEDICAL ADVICE (OUTPATIENT)
Dept: OBGYN | Facility: CLINIC | Age: 63
End: 2023-04-02

## 2023-04-02 ENCOUNTER — NURSE TRIAGE (OUTPATIENT)
Dept: NURSING | Facility: CLINIC | Age: 63
End: 2023-04-02
Payer: MEDICARE

## 2023-04-02 ENCOUNTER — OFFICE VISIT (OUTPATIENT)
Dept: FAMILY MEDICINE | Facility: CLINIC | Age: 63
End: 2023-04-02
Payer: MEDICARE

## 2023-04-02 VITALS
OXYGEN SATURATION: 98 % | DIASTOLIC BLOOD PRESSURE: 66 MMHG | RESPIRATION RATE: 16 BRPM | SYSTOLIC BLOOD PRESSURE: 106 MMHG | TEMPERATURE: 98.1 F | HEART RATE: 88 BPM

## 2023-04-02 DIAGNOSIS — N39.0 RECURRENT UTI: Primary | ICD-10-CM

## 2023-04-02 LAB
ALBUMIN UR-MCNC: 100 MG/DL
APPEARANCE UR: ABNORMAL
BACTERIA #/AREA URNS HPF: ABNORMAL /HPF
BILIRUB UR QL STRIP: NEGATIVE
COLOR UR AUTO: YELLOW
GLUCOSE UR STRIP-MCNC: NEGATIVE MG/DL
HGB UR QL STRIP: ABNORMAL
KETONES UR STRIP-MCNC: NEGATIVE MG/DL
LEUKOCYTE ESTERASE UR QL STRIP: ABNORMAL
NITRATE UR QL: POSITIVE
PH UR STRIP: 6 [PH] (ref 5–8)
RBC #/AREA URNS AUTO: ABNORMAL /HPF
SP GR UR STRIP: 1.02 (ref 1–1.03)
SQUAMOUS #/AREA URNS AUTO: ABNORMAL /LPF
UROBILINOGEN UR STRIP-ACNC: 0.2 E.U./DL
WBC #/AREA URNS AUTO: >100 /HPF
WBC CLUMPS #/AREA URNS HPF: PRESENT /HPF

## 2023-04-02 PROCEDURE — 87186 SC STD MICRODIL/AGAR DIL: CPT | Performed by: PHYSICIAN ASSISTANT

## 2023-04-02 PROCEDURE — 81001 URINALYSIS AUTO W/SCOPE: CPT | Performed by: PHYSICIAN ASSISTANT

## 2023-04-02 PROCEDURE — 87086 URINE CULTURE/COLONY COUNT: CPT | Performed by: PHYSICIAN ASSISTANT

## 2023-04-02 PROCEDURE — 99213 OFFICE O/P EST LOW 20 MIN: CPT | Performed by: PHYSICIAN ASSISTANT

## 2023-04-02 RX ORDER — NITROFURANTOIN 25; 75 MG/1; MG/1
100 CAPSULE ORAL 2 TIMES DAILY
Qty: 10 CAPSULE | Refills: 0 | Status: SHIPPED | OUTPATIENT
Start: 2023-04-02 | End: 2023-04-07

## 2023-04-02 NOTE — PROGRESS NOTES
Assessment & Plan:      Problem List Items Addressed This Visit        Urinary    Recurrent UTI - Primary    Relevant Medications    nitroFURantoin macrocrystal-monohydrate (MACROBID) 100 MG capsule    Other Relevant Orders    UA macro with reflex to Microscopic and Culture - Clinc Collect (Completed)    Urine Microscopic Exam (Completed)    Urine Culture     Medical Decision Making  Patient presents with 1 to 2 days of dysuria and increased urinary frequency.  Urinalysis is consistent with acute cystitis.  Patient will be treated with oral antibiotics for recurrent cystitis.  Drink plenty of clear fluids.  Discussed treatment and symptomatic care.  Allergies and medication interactions reviewed.  Discussed signs of worsening symptoms and when to follow-up with PCP if no symptom improvement.     Subjective:      Belen Sharma is a 63 year old adult here for evaluation of dysuria and increased urinary frequency.  Onset of symptoms was 1 to 2 days ago.  Patient has history of recurrent UTIs.  She otherwise denies fevers, nausea, vomiting, new back pains, and abdominal pains.     The following portions of the patient's history were reviewed and updated as appropriate: allergies, current medications, and problem list.     Review of Systems  Pertinent items are noted in HPI.    Allergies  Allergies   Allergen Reactions     Ibuprofen      Due to liver transplant       Family History   Problem Relation Age of Onset     Depression Mother      Anxiety Disorder Mother      Depression Father      Anxiety Disorder Father      Uterine Cancer Maternal Grandmother      Polycystic Kidney Diease Brother      Polycystic Kidney Diease Brother      Polycystic Kidney Diease Brother      Polycystic Kidney Diease Brother      Cervical Cancer Maternal Aunt      Glaucoma No family hx of      Macular Degeneration No family hx of        Social History     Tobacco Use     Smoking status: Never     Smokeless tobacco: Never   Vaping Use      Vaping status: Not on file   Substance Use Topics     Alcohol use: Not Currently        Objective:      /66   Pulse 88   Temp 98.1  F (36.7  C) (Oral)   Resp 16   SpO2 98%   General appearance - alert, well appearing, and in no distress and non-toxic     Lab & Imaging Results    Results for orders placed or performed in visit on 04/02/23   UA macro with reflex to Microscopic and Culture - Clinc Collect     Status: Abnormal    Specimen: Urine, NOS   Result Value Ref Range    Color Urine Yellow Colorless, Straw, Light Yellow, Yellow    Appearance Urine Cloudy (A) Clear    Glucose Urine Negative Negative mg/dL    Bilirubin Urine Negative Negative    Ketones Urine Negative Negative mg/dL    Specific Gravity Urine 1.020 1.005 - 1.030    Blood Urine Moderate (A) Negative    pH Urine 6.0 5.0 - 8.0    Protein Albumin Urine 100 (A) Negative mg/dL    Urobilinogen Urine 0.2 0.2, 1.0 E.U./dL    Nitrite Urine Positive (A) Negative    Leukocyte Esterase Urine Large (A) Negative   Urine Microscopic Exam     Status: Abnormal   Result Value Ref Range    Bacteria Urine Many (A) None Seen /HPF    RBC Urine 0-2 0-2 /HPF /HPF    WBC Urine >100 (A) 0-5 /HPF /HPF    Squamous Epithelials Urine Moderate (A) None Seen /LPF    WBC Clumps Urine Present (A) None Seen /HPF    Narrative    Unspun micro exam       I personally reviewed these results and discussed findings with the patient.    The use of Dragon/[employer] dictation services was used to construct the content of this note; any grammatical errors are non-intentional. Please contact the author directly if you are in need of any clarification.

## 2023-04-02 NOTE — TELEPHONE ENCOUNTER
Belen calling with urinary symptoms of urgency, frequency and mild irritation with voiding. Afebrile. Hx of kidney and liver transplant. Care advice to see PCP within 24 hour. Pt would like treatment today so urgent care information given for [facility].    Indu Davila RN on 4/2/2023 at 3:54 PM      Reason for Disposition    Urinating more frequently than usual (i.e., frequency)    Additional Information    Negative: Shock suspected (e.g., cold/pale/clammy skin, too weak to stand, low BP, rapid pulse)    Negative: Sounds like a life-threatening emergency to the triager    Negative: [1] Unable to urinate (or only a few drops) > 4 hours AND [2] bladder feels very full (e.g., palpable bladder or strong urge to urinate)    Negative: [1] Decreased urination and [2] drinking very little AND [2] dehydration suspected (e.g., dark urine, no urine > 12 hours, very dry mouth, very lightheaded)    Negative: Patient sounds very sick or weak to the triager    Negative: Fever > 100.4 F (38.0 C)    Negative: Side (flank) or lower back pain present    Negative: [1] Can't control passage of urine (i.e., urinary incontinence) AND [2] new-onset (< 2 weeks) or worsening    Protocols used: URINARY SYMPTOMS-A-AH

## 2023-04-04 LAB
BACTERIA UR CULT: ABNORMAL
BACTERIA UR CULT: ABNORMAL

## 2023-04-06 ENCOUNTER — HOSPITAL ENCOUNTER (OUTPATIENT)
Dept: PHYSICAL THERAPY | Facility: REHABILITATION | Age: 63
Discharge: HOME | End: 2023-04-06
Attending: UROLOGY
Payer: MEDICARE

## 2023-04-06 DIAGNOSIS — R29.898 HIP TIGHTNESS: ICD-10-CM

## 2023-04-06 DIAGNOSIS — M54.50 LUMBAR PAIN: ICD-10-CM

## 2023-04-06 DIAGNOSIS — R29.898 WEAKNESS OF BOTH HIPS: ICD-10-CM

## 2023-04-06 DIAGNOSIS — N81.4 UTERINE PROLAPSE: Primary | ICD-10-CM

## 2023-04-06 DIAGNOSIS — M62.89 PELVIC FLOOR DYSFUNCTION: ICD-10-CM

## 2023-04-06 PROCEDURE — 97110 THERAPEUTIC EXERCISES: CPT | Mod: GP

## 2023-04-06 PROCEDURE — 97112 NEUROMUSCULAR REEDUCATION: CPT | Mod: GP

## 2023-04-06 PROCEDURE — 97140 MANUAL THERAPY 1/> REGIONS: CPT | Mod: GP

## 2023-04-14 ENCOUNTER — LAB (OUTPATIENT)
Dept: LAB | Facility: CLINIC | Age: 63
End: 2023-04-14
Payer: MEDICARE

## 2023-04-14 DIAGNOSIS — Z94.4 LIVER REPLACED BY TRANSPLANT (H): ICD-10-CM

## 2023-04-14 DIAGNOSIS — E78.5 DYSLIPIDEMIA: ICD-10-CM

## 2023-04-14 LAB
ALBUMIN SERPL BCG-MCNC: 4 G/DL (ref 3.5–5.2)
ALP SERPL-CCNC: 59 U/L (ref 35–129)
ALT SERPL W P-5'-P-CCNC: 16 U/L (ref 10–50)
ANION GAP SERPL CALCULATED.3IONS-SCNC: 11 MMOL/L (ref 7–15)
AST SERPL W P-5'-P-CCNC: 22 U/L (ref 10–50)
BILIRUB DIRECT SERPL-MCNC: <0.2 MG/DL (ref 0–0.3)
BILIRUB SERPL-MCNC: 0.7 MG/DL
BUN SERPL-MCNC: 16 MG/DL (ref 8–23)
CALCIUM SERPL-MCNC: 10.1 MG/DL (ref 8.8–10.2)
CHLORIDE SERPL-SCNC: 105 MMOL/L (ref 98–107)
CHOLEST SERPL-MCNC: 163 MG/DL
CREAT SERPL-MCNC: 0.82 MG/DL (ref 0.51–1.17)
DEPRECATED HCO3 PLAS-SCNC: 24 MMOL/L (ref 22–29)
ERYTHROCYTE [DISTWIDTH] IN BLOOD BY AUTOMATED COUNT: 14.1 % (ref 10–15)
GFR SERPL CREATININE-BSD FRML MDRD: 80 ML/MIN/1.73M2
GLUCOSE SERPL-MCNC: 90 MG/DL (ref 70–99)
HCT VFR BLD AUTO: 47.5 % (ref 35–53)
HDLC SERPL-MCNC: 62 MG/DL
HGB BLD-MCNC: 15.5 G/DL (ref 11.7–17.7)
LDLC SERPL CALC-MCNC: 84 MG/DL
MCH RBC QN AUTO: 30.1 PG (ref 26.5–33)
MCHC RBC AUTO-ENTMCNC: 32.6 G/DL (ref 31.5–36.5)
MCV RBC AUTO: 92 FL (ref 78–100)
NONHDLC SERPL-MCNC: 101 MG/DL
PLATELET # BLD AUTO: 206 10E3/UL (ref 150–450)
POTASSIUM SERPL-SCNC: 3.9 MMOL/L (ref 3.4–5.3)
PROT SERPL-MCNC: 6.7 G/DL (ref 6.4–8.3)
RBC # BLD AUTO: 5.15 10E6/UL (ref 3.8–5.9)
SODIUM SERPL-SCNC: 140 MMOL/L (ref 136–145)
TRIGL SERPL-MCNC: 83 MG/DL
WBC # BLD AUTO: 6 10E3/UL (ref 4–11)

## 2023-04-14 PROCEDURE — 36415 COLL VENOUS BLD VENIPUNCTURE: CPT

## 2023-04-14 PROCEDURE — 80061 LIPID PANEL: CPT

## 2023-04-14 PROCEDURE — 80197 ASSAY OF TACROLIMUS: CPT

## 2023-04-14 PROCEDURE — 80053 COMPREHEN METABOLIC PANEL: CPT

## 2023-04-14 PROCEDURE — 82248 BILIRUBIN DIRECT: CPT

## 2023-04-14 PROCEDURE — 85027 COMPLETE CBC AUTOMATED: CPT

## 2023-04-15 LAB
TACROLIMUS BLD-MCNC: 6.8 UG/L (ref 5–15)
TME LAST DOSE: NORMAL H
TME LAST DOSE: NORMAL H

## 2023-04-17 ENCOUNTER — TELEPHONE (OUTPATIENT)
Dept: UROLOGY | Facility: CLINIC | Age: 63
End: 2023-04-17
Payer: MEDICARE

## 2023-04-17 DIAGNOSIS — Z94.4 LIVER REPLACED BY TRANSPLANT (H): Primary | ICD-10-CM

## 2023-04-17 DIAGNOSIS — D84.9 IMMUNOSUPPRESSION (H): ICD-10-CM

## 2023-04-17 DIAGNOSIS — N81.4 UTERINE PROLAPSE: ICD-10-CM

## 2023-04-17 DIAGNOSIS — R29.898 WEAKNESS OF HIP, UNSPECIFIED LATERALITY: ICD-10-CM

## 2023-04-17 DIAGNOSIS — R29.898 HIP TIGHTNESS: ICD-10-CM

## 2023-04-20 ENCOUNTER — HOSPITAL ENCOUNTER (OUTPATIENT)
Dept: PHYSICAL THERAPY | Facility: REHABILITATION | Age: 63
Discharge: HOME | End: 2023-04-20
Attending: UROLOGY
Payer: MEDICARE

## 2023-04-20 DIAGNOSIS — N81.4 UTERINE PROLAPSE: ICD-10-CM

## 2023-04-20 DIAGNOSIS — R29.898 HIP TIGHTNESS: ICD-10-CM

## 2023-04-20 DIAGNOSIS — M62.89 PELVIC FLOOR DYSFUNCTION: Primary | ICD-10-CM

## 2023-04-20 DIAGNOSIS — M54.50 LUMBAR PAIN: ICD-10-CM

## 2023-04-20 DIAGNOSIS — R29.898 WEAKNESS OF BOTH HIPS: ICD-10-CM

## 2023-04-20 DIAGNOSIS — Z94.4 LIVER REPLACED BY TRANSPLANT (H): ICD-10-CM

## 2023-04-20 DIAGNOSIS — R29.898 WEAKNESS OF HIP, UNSPECIFIED LATERALITY: ICD-10-CM

## 2023-04-20 DIAGNOSIS — D84.9 IMMUNOSUPPRESSION (H): ICD-10-CM

## 2023-04-20 PROCEDURE — 97140 MANUAL THERAPY 1/> REGIONS: CPT | Mod: GP

## 2023-04-20 PROCEDURE — 97110 THERAPEUTIC EXERCISES: CPT | Mod: GP

## 2023-04-25 ENCOUNTER — OFFICE VISIT (OUTPATIENT)
Dept: OBGYN | Facility: CLINIC | Age: 63
End: 2023-04-25
Attending: OBSTETRICS & GYNECOLOGY
Payer: MEDICARE

## 2023-04-25 VITALS
WEIGHT: 146.5 LBS | SYSTOLIC BLOOD PRESSURE: 118 MMHG | HEIGHT: 70 IN | BODY MASS INDEX: 20.97 KG/M2 | HEART RATE: 81 BPM | DIASTOLIC BLOOD PRESSURE: 63 MMHG

## 2023-04-25 DIAGNOSIS — R87.810 CERVICAL HIGH RISK HUMAN PAPILLOMAVIRUS (HPV) DNA TEST POSITIVE: Primary | ICD-10-CM

## 2023-04-25 PROCEDURE — 88175 CYTOPATH C/V AUTO FLUID REDO: CPT | Performed by: OBSTETRICS & GYNECOLOGY

## 2023-04-25 PROCEDURE — 57454 BX/CURETT OF CERVIX W/SCOPE: CPT | Performed by: OBSTETRICS & GYNECOLOGY

## 2023-04-25 PROCEDURE — 87624 HPV HI-RISK TYP POOLED RSLT: CPT | Performed by: OBSTETRICS & GYNECOLOGY

## 2023-04-25 PROCEDURE — 88305 TISSUE EXAM BY PATHOLOGIST: CPT | Mod: TC | Performed by: OBSTETRICS & GYNECOLOGY

## 2023-04-25 PROCEDURE — 88305 TISSUE EXAM BY PATHOLOGIST: CPT | Mod: 26 | Performed by: PATHOLOGY

## 2023-04-25 NOTE — PATIENT INSTRUCTIONS
Thank you for trusting us with your care!     If you need to contact us for questions about:  Symptoms, Scheduling & Medical Questions; Non-urgent (2-3 day response) Bella message, Urgent (needing response today) [phone] (if after 3:30pm next day response)   Prescriptions: Please call your Pharmacy   Billing: [facility] [phone] or [facility]:[phone]

## 2023-04-25 NOTE — PROGRESS NOTES
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 Patient has history of CIN3 s/p CKC. She understands current recommendations but prefers to proceed with C1klikp pap/colpo as HPV is persistent positive.     Had episode of vaginal bleeding in March. Underwent exam where bleeding appeared to be uterine/vs endocervix. Pelvic US had endometrial thickness of 3mm. Stopped her vaginal estrogen at that time. Had UC tell her that vaginal estrogen put her at risk of blood clots. Of note she has recurrent UTI and estrogen was for both vaginal use as well as UTI prevention. Continues to use pessary and is doing pelvic floor PT.       Last   Lab Results   Component Value Date    HPV16 Negative 10/11/2022     Last   Lab Results   Component Value Date    HPV18 Negative 10/11/2022     Last   Lab Results   Component Value Date    HRHPV Positive 10/11/2022       Dates/results of previous cervical pathology:   10/22- Normal epithelium with TZ present  - Reactive changes, negative  - Negative  - CKC- CIN3 with negative ecto and endocervical margins, ECC negative      Dates of previous cervical pathology treatment:  - CKC    History   Smoking Status     Never   Smokeless Tobacco     Never       Allergies as of 2023 - Reviewed 2023   Allergen Reaction Noted     Ibuprofen  2020        Colposcopy Procedure:    Consent:  Details of the colposcopic procedure were reviewed. Risks, benefits of treatment, and alternate forms of evaluation were discussed.  Patient's questions were elicited and answered.   Written consent was obtained and scanned into medical record.     Verification of Procedure  Just before the procedure begins, through verbal and active participation of team members, I verified:   Initials   Patient Name [facility]   Patient  [facility]   Procedure to be performed [facility]       OBJECTIVE: /63   Pulse  "81   Ht 1.772 m (5' 9.75\")   Wt 66.5 kg (146 lb 8 oz)   BMI 21.17 kg/m      Pelvic Exam:  EG/BUS: Normal genital architecture without lesions, erythema or abnormal secretions. Bartholin's, Urethra, [place]'s glands are normal.   Vagina: atrophic, thin, dry with creamy, white and odorless secretions  Cervix: pinpoint os  Rectum:anus normal    PROCEDURE:    Diagnostic pap collected with HPV    Acetic acid and/or Lugol's solution applied to cervix.  Colposcopic exam of the cervix and apex of the vagina was conducted in the usual fashion.     Findings:  SCJ was not seen entirely and the exam unsatisfactory.    There were no visible lesions    Biopsies were obtained at 2 o'clock and placed in labeled Formalin Jar.    ECC: was obtained and placed in labeled Formalin Jar.      Assessment: Normal exam without visible pathology    Plan:     Biopsies sent to pathology.  Will contact patient with results and recommended follow-up plan.  Likely repeat pap smear and colpo in 6 months per patient request.     Reviewed safety of vaginal estrogen in setting of bleeding. Was likely due to uterine atrophy with normal US. She will consider.     Daysi Perez MD      "

## 2023-04-25 NOTE — LETTER
2023       RE: Belen Sharma  [address] [place] [address]     Dear Colleague,    Thank you for referring your patient, Belen Sharma, to the [facility] [place] at [facility]. Please see a copy of my visit note below.      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 Patient has history of CIN3 s/p CKC. She understands current recommendations but prefers to proceed with Y2yfkxf pap/colpo as HPV is persistent positive.     Had episode of vaginal bleeding in March. Underwent exam where bleeding appeared to be uterine/vs endocervix. Pelvic US had endometrial thickness of 3mm. Stopped her vaginal estrogen at that time. Had UC tell her that vaginal estrogen put her at risk of blood clots. Of note she has recurrent UTI and estrogen was for both vaginal use as well as UTI prevention. Continues to use pessary and is doing pelvic floor PT.       Last   Lab Results   Component Value Date    HPV16 Negative 10/11/2022     Last   Lab Results   Component Value Date    HPV18 Negative 10/11/2022     Last   Lab Results   Component Value Date    HRHPV Positive 10/11/2022       Dates/results of previous cervical pathology:   10/22- Normal epithelium with TZ present  - Reactive changes, negative  - Negative  - CKC- CIN3 with negative ecto and endocervical margins, ECC negative      Dates of previous cervical pathology treatment:  - CKC    History   Smoking Status    Never   Smokeless Tobacco    Never       Allergies as of 2023 - Reviewed 2023   Allergen Reaction Noted    Ibuprofen  2020        Colposcopy Procedure:    Consent:  Details of the colposcopic procedure were reviewed. Risks, benefits of treatment, and alternate forms of evaluation were discussed.  Patient's questions were elicited and answered.   Written  "consent was obtained and scanned into medical record.     Verification of Procedure  Just before the procedure begins, through verbal and active participation of team members, I verified:   Initials   Patient Name [facility]   Patient  [facility]   Procedure to be performed [facility]       OBJECTIVE: /63   Pulse 81   Ht 1.772 m (5' 9.75\")   Wt 66.5 kg (146 lb 8 oz)   BMI 21.17 kg/m      Pelvic Exam:  EG/BUS: Normal genital architecture without lesions, erythema or abnormal secretions. Bartholin's, Urethra, [place]'s glands are normal.   Vagina: atrophic, thin, dry with creamy, white and odorless secretions  Cervix: pinpoint os  Rectum:anus normal    PROCEDURE:    Diagnostic pap collected with HPV    Acetic acid and/or Lugol's solution applied to cervix.  Colposcopic exam of the cervix and apex of the vagina was conducted in the usual fashion.     Findings:  SCJ was not seen entirely and the exam unsatisfactory.    There were no visible lesions    Biopsies were obtained at 2 o'clock and placed in labeled Formalin Jar.    ECC: was obtained and placed in labeled Formalin Jar.      Assessment: Normal exam without visible pathology    Plan:     Biopsies sent to pathology.  Will contact patient with results and recommended follow-up plan.  Likely repeat pap smear and colpo in 6 months per patient request.     Reviewed safety of vaginal estrogen in setting of bleeding. Was likely due to uterine atrophy with normal US. She will consider.     Daysi Perez MD      "

## 2023-04-30 ENCOUNTER — OFFICE VISIT (OUTPATIENT)
Dept: FAMILY MEDICINE | Facility: CLINIC | Age: 63
End: 2023-04-30
Payer: MEDICARE

## 2023-04-30 VITALS
WEIGHT: 143 LBS | DIASTOLIC BLOOD PRESSURE: 62 MMHG | BODY MASS INDEX: 20.67 KG/M2 | HEART RATE: 85 BPM | SYSTOLIC BLOOD PRESSURE: 102 MMHG | OXYGEN SATURATION: 98 %

## 2023-04-30 DIAGNOSIS — A49.9 ESBL (EXTENDED SPECTRUM BETA-LACTAMASE) PRODUCING BACTERIA INFECTION: ICD-10-CM

## 2023-04-30 DIAGNOSIS — N30.00 ACUTE CYSTITIS WITHOUT HEMATURIA: Primary | ICD-10-CM

## 2023-04-30 DIAGNOSIS — Z94.4 S/P LIVER TRANSPLANT (H): ICD-10-CM

## 2023-04-30 DIAGNOSIS — R39.89 URINARY PROBLEM: ICD-10-CM

## 2023-04-30 DIAGNOSIS — Z94.0 KIDNEY REPLACED BY TRANSPLANT: ICD-10-CM

## 2023-04-30 DIAGNOSIS — D84.9 IMMUNOSUPPRESSION (H): ICD-10-CM

## 2023-04-30 DIAGNOSIS — Z16.12 ESBL (EXTENDED SPECTRUM BETA-LACTAMASE) PRODUCING BACTERIA INFECTION: ICD-10-CM

## 2023-04-30 DIAGNOSIS — Z90.81 S/P SPLENECTOMY: ICD-10-CM

## 2023-04-30 DIAGNOSIS — Z94.0 KIDNEY TRANSPLANTED: Primary | ICD-10-CM

## 2023-04-30 LAB
ALBUMIN UR-MCNC: 100 MG/DL
APPEARANCE UR: ABNORMAL
BACTERIA #/AREA URNS HPF: ABNORMAL /HPF
BILIRUB UR QL STRIP: NEGATIVE
COLOR UR AUTO: YELLOW
GLUCOSE UR STRIP-MCNC: NEGATIVE MG/DL
HGB UR QL STRIP: ABNORMAL
KETONES UR STRIP-MCNC: NEGATIVE MG/DL
LEUKOCYTE ESTERASE UR QL STRIP: ABNORMAL
NITRATE UR QL: NEGATIVE
PH UR STRIP: 7 [PH] (ref 5–8)
RBC #/AREA URNS AUTO: ABNORMAL /HPF
SP GR UR STRIP: 1.01 (ref 1–1.03)
SQUAMOUS #/AREA URNS AUTO: ABNORMAL /LPF
UROBILINOGEN UR STRIP-ACNC: 0.2 E.U./DL
WBC #/AREA URNS AUTO: ABNORMAL /HPF
WBC CLUMPS #/AREA URNS HPF: PRESENT /HPF

## 2023-04-30 PROCEDURE — 81001 URINALYSIS AUTO W/SCOPE: CPT

## 2023-04-30 PROCEDURE — 87086 URINE CULTURE/COLONY COUNT: CPT | Performed by: PHYSICIAN ASSISTANT

## 2023-04-30 PROCEDURE — 87186 SC STD MICRODIL/AGAR DIL: CPT | Performed by: PHYSICIAN ASSISTANT

## 2023-04-30 PROCEDURE — 99213 OFFICE O/P EST LOW 20 MIN: CPT | Performed by: PHYSICIAN ASSISTANT

## 2023-04-30 RX ORDER — NITROFURANTOIN 25; 75 MG/1; MG/1
100 CAPSULE ORAL 2 TIMES DAILY
Qty: 14 CAPSULE | Refills: 0 | Status: SHIPPED | OUTPATIENT
Start: 2023-04-30 | End: 2023-05-07

## 2023-04-30 NOTE — PROGRESS NOTES
Patient presents with:  Urinary Problem: Urinary symptoms.       Clinical Decision Making:  Patient has a history of both kidney and liver transplant and is on tacrolimus and has immunocompromise status.  She has had recurrent urinary tract infections.  Recently the last urinary tract infection showed that she had resistance to multiple antibiotics but was susceptible to nitrofurantoin.  Culture result was reviewed from 4/4/2023.  Patient is placed on nitrofurantoin based on those culture results. Expected course of resolution and indication for return was gone over and questions were answered to patient/parent's satisfaction before discharge.          ICD-10-CM    1. Acute cystitis without hematuria  N30.00 nitroFURantoin macrocrystal-monohydrate (MACROBID) 100 MG capsule      2. Urinary problem  R39.89 UA Macroscopic with reflex to Microscopic and Culture     Urine Microscopic Exam     Urine Culture      3. Immunosuppression (H)  D84.9       4. S/P splenectomy  Z90.81       5. Kidney replaced by transplant  Z94.0       6. ESBL (extended spectrum beta-lactamase) producing bacteria infection  A49.9     Z16.12           Patient Instructions   Increased fluids and rest.  Discussed signs and symptoms of ascending urinary tract infection symptoms to include pyelonephritis. Instructed to turn to clinic if there are increased fever chills night sweats fatigue abdominal pain or flank pain  Antibiotic as written.   Follow up with primary care provider if you do not get resolution with the course of treatment.  Return to walk-in care if complication or new symptoms arise in the interim.        Urinary Tract Infections in Women    Urinary tract infections (UTIs) are most often caused by bacteria (germs). These bacteria enter the urinary tract. The bacteria may come from outside the body. Or they may travel from the skin outside the rectum or vagina into the urethra. Female anatomy makes it easier for bacteria from the bowel  to enter a woman s urinary tract, which is the most common source of UTI. This means women develop UTIs more often than men. Pain in or around the urinary tract is a common UTI symptom. But the only way to know for sure if you have a UTI for the health care provider to test your urine. The two tests that may be done are the urinalysis and urine culture.  Types of UTIs    Cystitis: A bladder infection (cystitis) is the most common UTI in women. You may have urgent or frequent urination. You may also have pain, burning when you urinate, and bloody urine.    Urethritis: This is an inflamed urethra, which is the tube that carries urine from the bladder to outside the body. You may have lower stomach or back pain. You may also have urgent or frequent urination.    Pyelonephritis: This is a kidney infection. If not treated, it can be serious and damage your kidneys. In severe cases, you may be hospitalized. You may have a fever and lower back pain.  Medications to treat a UTI  Most UTIs are treated with antibiotics. These kill the bacteria. The length of time you need to take them depends on the type of infection. It may be as short as 3 days. If you have repeated UTIs, a low-dose antibiotic may be needed for several months. Take antibiotics exactly as directed. Don t stop taking them until all of the medication is gone. If you stop taking the antibiotic too soon, the infection may not go away, and you may develop a resistance to the antibiotic. This can make it much harder to treat.  Lifestyle changes to treat and prevent UTIs  The lifestyle changes below will help get rid of your UTI. They may also help prevent future UTIs.    Drink plenty of fluids. This includes water, juice, or other caffeine-free drinks. Fluids help flush bacteria out of your body.    Empty your bladder. Always empty your bladder when you feel the urge to urinate. And always urinate before going to sleep. Urine that stays in your bladder can lead to  infection. Try to urinate before and after sex as well.    Practice good personal hygiene. Wipe yourself from front to back after using the toilet. This helps keep bacteria from getting into the urethra.    Use condoms during sex. These help prevent UTIs caused by sexually transmitted bacteria. Also, avoid using spermicides during sex. These can increase the risk of UTIs. Choose other forms of birth control instead. For women who tend to get UTIs after sex, a low-dose of a preventive antibiotic may be used. Be sure to discuss this option with your health care provider.    Follow up with your health care provider as directed. He or she may test to make sure the infection has cleared. If necessary, additional treatment may be started.  Date Last Reviewed: 9/8/2014 2000-2016 [employer]. [address] [place] [address]. All rights reserved. This information is not intended as a substitute for professional medical care. Always follow your healthcare professional's instructions.                        HPI:  Belen Sharma is a 63 year old adult who has a past medical history for immunocompromise status with status post kidney and liver transplant on tacrolimus therapy who presents today for a two day history of irritative voiding symptoms to include urinary hesitancy urgency frequency and dysuria.  Patient denies gross hematuria.  Denies fever chills night sweats fatigue or other red flag symptoms to include back or flank pain and no vaginal discharge.  Patient has recently been treated for urinary tract infection beginning part of month on April 4, 2023.      Problem List:  2023-04: Weakness of hip, unspecified laterality  2023-01: Hammertoe, bilateral  2022-11: Combined forms of age-related cataract of both eyes  2022-11: Aftercare following organ transplant  2022-10: Sepsis, due to unspecified organism, unspecified whether   acute organ dysfunction present (H)  2022-09: Pyelonephritis  2022-04:  Osteopenia of multiple sites  2021-09: Lumbar pain  2020-12: ESBL (extended spectrum beta-lactamase) producing bacteria   infection  2020-12: Kidney replaced by transplant  2020-12: Recurrent UTI  2020-12: Immunosuppressed status (H)  2020-09: JANNETTE III with severe dysplasia  2020-09: Candidiasis of vagina  2020-09: Cervical high risk human papillomavirus (HPV) DNA test   positive  2020-09: Cystitis  2020-09: Exercise counseling  2020-09: Reason for consultation  2020-09: Encounter for counseling  2020-09: Encounter for screening for malignant neoplasm of breast  2020-09: Encounter for medication refill  2020-09: Kidney transplant rejection  2020-09: Knee pain  2020-09: Malignant neoplasm of breast (H)  2020-09: Patient on waiting list for organ transplant  2020-09: Scoliosis deformity of spine  2020-09: Secondary physiologic amenorrhea  2020-09: Sinusitis  2020-09: Swelling of first metatarsophalangeal (MTP) joint  2020-09: High grade squamous intraepithelial lesion of cervix  2020-06: Polycythemia  2020-06: Abnormal urine  2020-06: Encounter for aftercare following kidney transplant  2020-05: Incisional hernia following transplant  2020-05: Uterine prolapse  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  2019-11: Electrolyte abnormality  2019-11: Prophylactic antibiotic  2019-11: Secondary hyperparathyroidism (H)  2019-11: Renal hypertension  2018-01: Microvascular angina (H)  2017-07: Acquired bilateral hammer toes  2017-07: Dermatophytosis of nail  2017-07: Hallux valgus, acquired, bilateral  2016-12: History of liver transplant (H)  2016-12: History of kidney transplant  2008-06: Thrombocytopenia (H)  2008-01: Chronic renal insufficiency  2007-12: End stage renal disease (H)  2007-12: Hypertension  2005-01: Xerosis of skin  2003-08: Congenital polycystic kidney  2003-04: Depression  2002-08: Anemia  2002-08: Hyperthyroidism  2002-08: Postmenopausal state  2001-09: Depressive psychosis (H)  1973-10: Scarlet fever  1968-04:  Chicken pox  S/P splenectomy  Congenital hepatic fibrosis  S/P kidney transplant  Polycystic kidney disease  Immunosuppression (H)  Liver replaced by transplant (H)  Endometriosis  HPV (human papilloma virus) infection      Past Medical History:   Diagnosis Date     Adolescent scoliosis     protruding     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 Both, very large, native kidneys reamain.     PONV (postoperative nausea and vomiting)     Need to start with ice chips and apple juice, no soda     S/P kidney transplant     [facility] 10/9/2010 - kidney failure 2/2 [facility]. Explanted 2012. Re-transplant after de-sensitization 2016     S/P liver transplant (H)     10/9/2010 - HAT, ischemic biopathy. Re-transplant 3/3/2011     S/P splenectomy      Spider veins 2019     Thyroid disease     Hyperthyroid treated with single dose iodine     Urinary tract infection 09/2022       Social History     Tobacco Use     Smoking status: Never     Smokeless tobacco: Never   Vaping Use     Vaping status: Not on file   Substance Use Topics     Alcohol use: Not Currently       Review of Systems  As above in HPI otherwise negative.    Vitals:    04/30/23 1251   BP: 102/62   Pulse: 85   SpO2: 98%   Weight: 64.9 kg (143 lb)       General: Patient is resting comfortably no acute distress is afebrile  HEENT: Head is normocephalic atraumatic   Abddomen: Nontender, nondistended no rebound or guarding no masses with suprapubic tenderness to palpation but no induration  Skin: Without rash non-diaphoretic    Physical Exam      Labs:  Results for orders placed or performed in visit on 04/30/23   UA Macroscopic with reflex to Microscopic and Culture     Status: Abnormal     Specimen: Urine, Clean Catch   Result Value Ref Range    Color Urine Yellow Colorless, Straw, Light Yellow, Yellow    Appearance Urine Cloudy (A) Clear    Glucose Urine Negative Negative mg/dL    Bilirubin Urine Negative Negative    Ketones Urine Negative Negative mg/dL    Specific Gravity Urine 1.015 1.005 - 1.030    Blood Urine Small (A) Negative    pH Urine 7.0 5.0 - 8.0    Protein Albumin Urine 100 (A) Negative mg/dL    Urobilinogen Urine 0.2 0.2, 1.0 E.U./dL    Nitrite Urine Negative Negative    Leukocyte Esterase Urine Large (A) Negative   Urine Microscopic Exam     Status: Abnormal   Result Value Ref Range    Bacteria Urine Many (A) None Seen /HPF    RBC Urine 2-5 (A) 0-2 /HPF /HPF    WBC Urine  (A) 0-5 /HPF /HPF    Squamous Epithelials Urine Few (A) None Seen /LPF    WBC Clumps Urine Present (A) None Seen /HPF       At the end of the encounter, I discussed results, diagnosis, medications. Discussed red flags for immediate return to clinic/ER, as well as indications for follow up if no improvement. Patient understood and agreed to plan. Patient was stable for discharge.

## 2023-04-30 NOTE — PATIENT INSTRUCTIONS
Increased fluids and rest.  Discussed signs and symptoms of ascending urinary tract infection symptoms to include pyelonephritis. Instructed to turn to clinic if there are increased fever chills night sweats fatigue abdominal pain or flank pain  Antibiotic as written.   Follow up with primary care provider if you do not get resolution with the course of treatment.  Return to walk-in care if complication or new symptoms arise in the interim.        Urinary Tract Infections in Women    Urinary tract infections (UTIs) are most often caused by bacteria (germs). These bacteria enter the urinary tract. The bacteria may come from outside the body. Or they may travel from the skin outside the rectum or vagina into the urethra. Female anatomy makes it easier for bacteria from the bowel to enter a woman s urinary tract, which is the most common source of UTI. This means women develop UTIs more often than men. Pain in or around the urinary tract is a common UTI symptom. But the only way to know for sure if you have a UTI for the health care provider to test your urine. The two tests that may be done are the urinalysis and urine culture.  Types of UTIs  Cystitis: A bladder infection (cystitis) is the most common UTI in women. You may have urgent or frequent urination. You may also have pain, burning when you urinate, and bloody urine.  Urethritis: This is an inflamed urethra, which is the tube that carries urine from the bladder to outside the body. You may have lower stomach or back pain. You may also have urgent or frequent urination.  Pyelonephritis: This is a kidney infection. If not treated, it can be serious and damage your kidneys. In severe cases, you may be hospitalized. You may have a fever and lower back pain.  Medications to treat a UTI  Most UTIs are treated with antibiotics. These kill the bacteria. The length of time you need to take them depends on the type of infection. It may be as short as 3 days. If you have  repeated UTIs, a low-dose antibiotic may be needed for several months. Take antibiotics exactly as directed. Don t stop taking them until all of the medication is gone. If you stop taking the antibiotic too soon, the infection may not go away, and you may develop a resistance to the antibiotic. This can make it much harder to treat.  Lifestyle changes to treat and prevent UTIs  The lifestyle changes below will help get rid of your UTI. They may also help prevent future UTIs.  Drink plenty of fluids. This includes water, juice, or other caffeine-free drinks. Fluids help flush bacteria out of your body.  Empty your bladder. Always empty your bladder when you feel the urge to urinate. And always urinate before going to sleep. Urine that stays in your bladder can lead to infection. Try to urinate before and after sex as well.  Practice good personal hygiene. Wipe yourself from front to back after using the toilet. This helps keep bacteria from getting into the urethra.  Use condoms during sex. These help prevent UTIs caused by sexually transmitted bacteria. Also, avoid using spermicides during sex. These can increase the risk of UTIs. Choose other forms of birth control instead. For women who tend to get UTIs after sex, a low-dose of a preventive antibiotic may be used. Be sure to discuss this option with your health care provider.  Follow up with your health care provider as directed. He or she may test to make sure the infection has cleared. If necessary, additional treatment may be started.  Date Last Reviewed: 9/8/2014 2000-2016 [employer]. [address] [place] [address], [place] [address]. All rights reserved. This information is not intended as a substitute for professional medical care. Always follow your healthcare professional's instructions.

## 2023-05-01 LAB
BACTERIA UR CULT: ABNORMAL
HUMAN PAPILLOMA VIRUS 16 DNA: NEGATIVE
HUMAN PAPILLOMA VIRUS 18 DNA: NEGATIVE
HUMAN PAPILLOMA VIRUS FINAL DIAGNOSIS: NORMAL
HUMAN PAPILLOMA VIRUS OTHER HR: NEGATIVE

## 2023-05-01 RX ORDER — SULFAMETHOXAZOLE AND TRIMETHOPRIM 400; 80 MG/1; MG/1
1 TABLET ORAL
Qty: 40 TABLET | Refills: 3 | Status: SHIPPED | OUTPATIENT
Start: 2023-05-01 | End: 2023-05-15

## 2023-05-01 RX ORDER — TACROLIMUS 0.5 MG/1
0.5 CAPSULE ORAL 2 TIMES DAILY
Qty: 180 CAPSULE | Refills: 3 | Status: SHIPPED | OUTPATIENT
Start: 2023-05-01 | End: 2023-05-09

## 2023-05-01 RX ORDER — PREDNISONE 5 MG/1
5 TABLET ORAL DAILY
Qty: 90 TABLET | Refills: 3 | Status: SHIPPED | OUTPATIENT
Start: 2023-05-01 | End: 2024-02-28

## 2023-05-03 ENCOUNTER — PATIENT OUTREACH (OUTPATIENT)
Dept: OBGYN | Facility: CLINIC | Age: 63
End: 2023-05-03
Payer: MEDICARE

## 2023-05-03 ENCOUNTER — TELEPHONE (OUTPATIENT)
Dept: DERMATOLOGY | Facility: CLINIC | Age: 63
End: 2023-05-03
Payer: MEDICARE

## 2023-05-03 NOTE — TELEPHONE ENCOUNTER
4/25/23 COLP and ECC- negative for dysplasia. NIL pap with Neg HPV. Plan colp and cotest in 6 months per OV note.

## 2023-05-03 NOTE — TELEPHONE ENCOUNTER
Lvm, 1st attempt informed patient that his appointment with Dr. Benitez for Friday August 11th will need to be rescheduled. Dr. Benitez will not be in clinic that date. Please call [phone] to reschedule.

## 2023-05-04 ENCOUNTER — OFFICE VISIT (OUTPATIENT)
Dept: UROLOGY | Facility: CLINIC | Age: 63
End: 2023-05-04
Payer: MEDICARE

## 2023-05-04 VITALS — WEIGHT: 142 LBS | HEIGHT: 70 IN | BODY MASS INDEX: 20.33 KG/M2

## 2023-05-04 DIAGNOSIS — D84.9 IMMUNOSUPPRESSION (H): ICD-10-CM

## 2023-05-04 DIAGNOSIS — Z94.4 LIVER REPLACED BY TRANSPLANT (H): ICD-10-CM

## 2023-05-04 DIAGNOSIS — N95.2 VAGINAL ATROPHY: ICD-10-CM

## 2023-05-04 DIAGNOSIS — N81.4 UTERINE PROLAPSE: Primary | ICD-10-CM

## 2023-05-04 PROCEDURE — 99213 OFFICE O/P EST LOW 20 MIN: CPT | Mod: GC | Performed by: UROLOGY

## 2023-05-04 RX ORDER — ESTRADIOL 0.1 MG/G
1 CREAM VAGINAL
Qty: 42.5 G | Refills: 11 | Status: SHIPPED | OUTPATIENT
Start: 2023-05-05 | End: 2024-06-11

## 2023-05-04 ASSESSMENT — PAIN SCALES - GENERAL: PAINLEVEL: NO PAIN (0)

## 2023-05-04 NOTE — LETTER
5/4/2023       RE: Belen Sharma  [address] [place] [address]     Dear Colleague,    Thank you for referring your patient, Belen Sharma, to the [facility] [place] at [facility]. Please see a copy of my visit note below.      CHIEF COMPLAINT   It was my pleasure to see Belen Sharma who is a 63 year old adult for follow-up of  Recurrent UTI and vaginal prolapse .      HPI   Belen Sharma is a very pleasant 63 year old adult who presents with a history of Tee and vaginal prolapse. At her last visit the patient endorsed a history of vaginal prolapse, and recurrent UTI. She has a significant surgical history and thus would be high risk for further prolapse interventions. She agrees and wants to avoid surgery. Given this she is managed with a pessary for which she saw Dr. Francis. She additionally has been linked in with ID for her Tee w/o a modifiable etiology.     In the interim the patient has seen OB/GYN who have performed a cervical biopsy and evaluated her after she began to have vaginal bleeding after starting her vaginal estrogen (work-up was negative). After the biopsy she had a UTI that was managed with abx. Since that time she has stopped topical estrogen as well, but Dr. Perez discussed that it would be safe to restart the estrogen-reviewed note from 4/25/2023    She has also been doing PFPT, which she finds very helpful and wants to continue this.  Елена MCKEON's note from 3/30/2023 reviewed    PHYSICAL EXAM  Patient is a 63 year old  adult   Vitals: not currently breastfeeding.  General Appearance Adult: There is no height or weight on file to calculate BMI.  Alert, no acute distress, oriented  HENT: throat/mouth:normal, good dentition  Lungs: no respiratory distress, or pursed lip breathing  Heart: No obvious jugular venous distension present  Abdomen: soft, nontender, no organomegaly or masses  Musculoskeltal: extremities normal, no  peripheral edema  Skin: no suspicious lesions or rashes  Neuro: Alert, oriented, speech and mentation normal  Psych: affect and mood normal  Gait: Normal    All pertinent imaging reviewed:      ASSESSMENT and PLAN  62 yo M with Tee and uterine prolapse. Overall she is doing well. Has started PFPT and thinks it helps significantly. Has a pessary in place, which she states work as well as expected. She is not interested in surgical interventions. She follows with ID who is now recommending macrobid for 3 days around planned procedures for UTI ppx. In regards to her vaginal estrogen, discussed that she should restart this as it may help prevent UTIs. Discussed with the patient about follow-up and she would like to transfer her pessary care to Dr. Ferrara, so plan to follow up in a year.    - Follow-up in 1 year     Jose R Sanchez MD   Urology  [facility]    Addendum:    The patient was seen and evaluated with the resident.  The plan was formulated in conjunction with me and I agree with the note with changes made as necessary.    10 minutes were spent today on the date of the encounter in reviewing the EMR including OBGYN and PT notes, direct patient care, coordination of care, and documentation.    Lizzy Ferrara MD MPH  (she/her/hers)   of Urology  [facility]      CC  Patient Care Team:  Vincent De La Garza MD as PCP - General (Internal Medicine)  Christy Mohamud MD as MD (Cardiology)  Елена Dinh, ANNALISA as Registered Nurse  Christy Mohamud MD as Assigned Heart and Vascular Provider  Leventhal, Thomas Michael, MD as Assigned Gastroenterology Provider  Vincent De La Garza MD as Assigned PCP  Saran Angulo MD as Assigned Nephrology Provider  Daysi Perez MD as MD (OB/Gyn)  Shannon Loza OD (Optometry)  Sapna Marcano MD (Inactive) as MD (Ophthalmology)  Anat Benitez MD as MD (Dermatology)

## 2023-05-04 NOTE — PROGRESS NOTES
CHIEF COMPLAINT   It was my pleasure to see Belen Sharma who is a 63 year old adult for follow-up of  Recurrent UTI and vaginal prolapse .      HPI   Belen Sharma is a very pleasant 63 year old adult who presents with a history of Tee and vaginal prolapse. At her last visit the patient endorsed a history of vaginal prolapse, and recurrent UTI. She has a significant surgical history and thus would be high risk for further prolapse interventions. She agrees and wants to avoid surgery. Given this she is managed with a pessary for which she saw Dr. Francis. She additionally has been linked in with ID for her Tee w/o a modifiable etiology.     In the interim the patient has seen OB/GYN who have performed a cervical biopsy and evaluated her after she began to have vaginal bleeding after starting her vaginal estrogen (work-up was negative). After the biopsy she had a UTI that was managed with abx. Since that time she has stopped topical estrogen as well, but Dr. Perez discussed that it would be safe to restart the estrogen-reviewed note from 4/25/2023    She has also been doing PFPT, which she finds very helpful and wants to continue this.  Елена MCKEON's note from 3/30/2023 reviewed    PHYSICAL EXAM  Patient is a 63 year old  adult   Vitals: not currently breastfeeding.  General Appearance Adult: There is no height or weight on file to calculate BMI.  Alert, no acute distress, oriented  HENT: throat/mouth:normal, good dentition  Lungs: no respiratory distress, or pursed lip breathing  Heart: No obvious jugular venous distension present  Abdomen: soft, nontender, no organomegaly or masses  Musculoskeltal: extremities normal, no peripheral edema  Skin: no suspicious lesions or rashes  Neuro: Alert, oriented, speech and mentation normal  Psych: affect and mood normal  Gait: Normal    All pertinent imaging reviewed:      ASSESSMENT and PLAN  62 yo M with Tee and uterine prolapse. Overall she is doing well. Has started PFPT and  thinks it helps significantly. Has a pessary in place, which she states work as well as expected. She is not interested in surgical interventions. She follows with ID who is now recommending macrobid for 3 days around planned procedures for UTI ppx. In regards to her vaginal estrogen, discussed that she should restart this as it may help prevent UTIs. Discussed with the patient about follow-up and she would like to transfer her pessary care to Dr. Ferrara, so plan to follow up in a year.    - Follow-up in 1 year     Jose R Sanchez MD   Urology  [facility]    Addendum:    The patient was seen and evaluated with the resident.  The plan was formulated in conjunction with me and I agree with the note with changes made as necessary.    10 minutes were spent today on the date of the encounter in reviewing the EMR including OBGYN and PT notes, direct patient care, coordination of care, and documentation.    Lizzy Ferrara MD MPH  (she/her/hers)   of Urology  [facility]      CC  Patient Care Team:  Vincent De La Garza MD as PCP - General (Internal Medicine)  Christy Mohamud MD as MD (Cardiology)  Елена Dinh RN as Registered Nurse  Christy Mohamud MD as Assigned Heart and Vascular Provider  Leventhal, Thomas Michael, MD as Assigned Gastroenterology Provider  Vincent De La Garza MD as Assigned PCP  Saran Angulo MD as Assigned Nephrology Provider  Daysi Perez MD as MD (OB/Gyn)  Shannon Loza OD (Optometry)  Sapna Marcano MD (Inactive) as MD (Ophthalmology)  Anat Benitez MD as MD (Dermatology)  Ida Guthrie RN as Specialty Care Coordinator  Rocio Brewer CNP as Nurse Practitioner (Urology)  Sebastian Robertson MD as Physician (Ophthalmology)  Catrachita Gonzales MD as Assigned Infectious Disease Provider  Daysi Perez MD as Assigned OBGYN Provider  Sebastian Robertson MD as Assigned Surgical Provider  SELF, REFERRED

## 2023-05-04 NOTE — PATIENT INSTRUCTIONS
Websites with free information:    [employer] patient website: www.voicesforpfd.org    Total Control Program: www.totalcontrolprogram.com    Continue pelvic floor physical therapy    Restart the estrogen cream    Return in one year, sooner if needed    It was a pleasure meeting with you today.  Thank you for allowing me and my team the privilege of caring for you today.  YOU are the reason we are here, and I truly hope we provided you with the excellent service you deserve.  Please let us know if there is anything else we can do for you so that we can be sure you are leaving completely satisfied with your care experience.

## 2023-05-04 NOTE — NURSING NOTE
"Chief Complaint   Patient presents with     Follow Up       Height 1.772 m (5' 9.75\"), weight 64.4 kg (142 lb), not currently breastfeeding. Body mass index is 20.52 kg/m .    Patient Active Problem List   Diagnosis     S/P splenectomy     Congenital hepatic fibrosis     Polycystic kidney disease     Immunosuppression (H)     Liver replaced by transplant (H)     Endometriosis     HPV (human papilloma virus) infection     JANNETTE III with severe dysplasia     Acquired bilateral hammer toes     Candidiasis of vagina     Cervical high risk human papillomavirus (HPV) DNA test positive     Chicken pox     Cystitis     Depression     Depressive psychosis (H)     Exercise counseling     Dermatophytosis of nail     Hallux valgus, acquired, bilateral     Incisional hernia following transplant     Knee pain     Malignant neoplasm of breast (H)     Microvascular angina (H)     Postmenopausal state     Secondary hyperparathyroidism (H)     Scoliosis deformity of spine     Sinusitis     Swelling of first metatarsophalangeal (MTP) joint     Uterine prolapse     Xerosis of skin     Kidney replaced by transplant     Recurrent UTI     Lumbar pain     ESBL (extended spectrum beta-lactamase) producing bacteria infection     Osteopenia of multiple sites     Pyelonephritis     Sepsis, due to unspecified organism, unspecified whether acute organ dysfunction present (H)     Combined forms of age-related cataract of both eyes     Aftercare following organ transplant     Hammertoe, bilateral     Weakness of hip, unspecified laterality       Allergies   Allergen Reactions     Ibuprofen      Due to liver transplant       Current Outpatient Medications   Medication Sig Dispense Refill     acetaminophen (TYLENOL) 325 MG tablet Take 1 tablet (325 mg) by mouth every 4 hours as needed for mild pain or fever 120 tablet 1     aspirin (ASA) 81 MG chewable tablet Take 81 mg by mouth every evening Stopped 7 days preop       atorvastatin (LIPITOR) 10 MG " tablet Take 1 tablet (10 mg) by mouth daily 90 tablet 3     biotin 1000 MCG TABS tablet Take 3,000 mcg by mouth every evening       ciclopirox (PENLAC) 8 % external solution Apply to adjacent skin and affected nails daily.  Remove with alcohol every 7 days, then repeat. 6 mL 3     diphenhydrAMINE (BENADRYL) 25 MG tablet Take 25 mg by mouth every 8 hours as needed for allergies       estradiol (ESTRACE) 0.1 MG/GM vaginal cream Place 1 g vaginally twice a week (Patient taking differently: Place vaginally twice a week) 42.5 g 2     guaiFENesin (MUCINEX) 600 MG 12 hr tablet Take 1 tablet by mouth 2 times daily as needed       Multiple Vitamins-Minerals (WOMENS DAILY FORMULA PO) Take 1 tablet by mouth daily       nitroFURantoin macrocrystal-monohydrate (MACROBID) 100 MG capsule Take 1 capsule (100 mg) by mouth 2 times daily for 7 days 14 capsule 0     nitroFURantoin macrocrystal-monohydrate (MACROBID) 100 MG capsule Take 1 capsule (100 mg) by mouth 2 times daily PRN UTI 6 capsule 99     nitroGLYcerin (NITROSTAT) 0.4 MG sublingual tablet Place 1 tablet (0.4 mg) under the tongue every 5 minutes as needed for chest pain For chest pain place 1 tablet under the tongue every 5 minutes for 3 doses. If symptoms persist 5 minutes after 1st dose call 911. 30 tablet 1     predniSONE (DELTASONE) 5 MG tablet Take 1 tablet (5 mg) by mouth daily 90 tablet 3     Probiotic Product (PROBIOTIC PO) Take 2 tablets by mouth every morning ([facility] Women's Vaginal Probiotic)       sodium bicarbonate 650 MG tablet Take 1 tablet (650 mg) by mouth daily 60 tablet 3     sulfamethoxazole-trimethoprim (BACTRIM) 400-80 MG tablet Take 1 tablet by mouth three times a week on Monday, Wednesday, Friday 40 tablet 3     tacrolimus (GENERIC EQUIVALENT) 0.5 MG capsule Take 1 capsule (0.5 mg) by mouth 2 times daily Total dose = 1.5 mg  capsule 3     temazepam (RESTORIL) 15 MG capsule Take 1 capsule (15 mg) by mouth as needed for sleep (twice a year)  15 capsule 0       Social History     Tobacco Use     Smoking status: Never     Smokeless tobacco: Never   Substance Use Topics     Alcohol use: Not Currently     Drug use: Not Currently       Deandra Michael  5/4/2023  12:43 PM

## 2023-05-05 ENCOUNTER — TELEPHONE (OUTPATIENT)
Dept: DERMATOLOGY | Facility: CLINIC | Age: 63
End: 2023-05-05
Payer: MEDICARE

## 2023-05-08 ENCOUNTER — HEALTH MAINTENANCE LETTER (OUTPATIENT)
Age: 63
End: 2023-05-08

## 2023-05-08 ENCOUNTER — LAB (OUTPATIENT)
Dept: LAB | Facility: CLINIC | Age: 63
End: 2023-05-08
Payer: MEDICARE

## 2023-05-08 DIAGNOSIS — Z94.4 LIVER REPLACED BY TRANSPLANT (H): ICD-10-CM

## 2023-05-08 LAB
ALBUMIN SERPL BCG-MCNC: 4 G/DL (ref 3.5–5.2)
ALP SERPL-CCNC: 54 U/L (ref 35–129)
ALT SERPL W P-5'-P-CCNC: 14 U/L (ref 10–50)
ANION GAP SERPL CALCULATED.3IONS-SCNC: 8 MMOL/L (ref 7–15)
AST SERPL W P-5'-P-CCNC: 23 U/L (ref 10–50)
BILIRUB DIRECT SERPL-MCNC: 0.22 MG/DL (ref 0–0.3)
BILIRUB SERPL-MCNC: 1 MG/DL
BUN SERPL-MCNC: 14.1 MG/DL (ref 8–23)
CALCIUM SERPL-MCNC: 10 MG/DL (ref 8.8–10.2)
CHLORIDE SERPL-SCNC: 105 MMOL/L (ref 98–107)
CREAT SERPL-MCNC: 0.76 MG/DL (ref 0.51–1.17)
DEPRECATED HCO3 PLAS-SCNC: 27 MMOL/L (ref 22–29)
ERYTHROCYTE [DISTWIDTH] IN BLOOD BY AUTOMATED COUNT: 14 % (ref 10–15)
GFR SERPL CREATININE-BSD FRML MDRD: 88 ML/MIN/1.73M2
GLUCOSE SERPL-MCNC: 90 MG/DL (ref 70–99)
HCT VFR BLD AUTO: 47 % (ref 35–53)
HGB BLD-MCNC: 15.3 G/DL (ref 11.7–17.7)
MCH RBC QN AUTO: 30.2 PG (ref 26.5–33)
MCHC RBC AUTO-ENTMCNC: 32.6 G/DL (ref 31.5–36.5)
MCV RBC AUTO: 93 FL (ref 78–100)
PLATELET # BLD AUTO: 201 10E3/UL (ref 150–450)
POTASSIUM SERPL-SCNC: 3.8 MMOL/L (ref 3.4–5.3)
PROT SERPL-MCNC: 6.6 G/DL (ref 6.4–8.3)
RBC # BLD AUTO: 5.06 10E6/UL (ref 3.8–5.9)
SODIUM SERPL-SCNC: 140 MMOL/L (ref 136–145)
TACROLIMUS BLD-MCNC: 8.4 UG/L (ref 5–15)
TME LAST DOSE: NORMAL H
TME LAST DOSE: NORMAL H
WBC # BLD AUTO: 6.4 10E3/UL (ref 4–11)

## 2023-05-08 PROCEDURE — 80197 ASSAY OF TACROLIMUS: CPT

## 2023-05-08 PROCEDURE — 80053 COMPREHEN METABOLIC PANEL: CPT

## 2023-05-08 PROCEDURE — 85027 COMPLETE CBC AUTOMATED: CPT

## 2023-05-08 PROCEDURE — 82248 BILIRUBIN DIRECT: CPT

## 2023-05-08 PROCEDURE — 36415 COLL VENOUS BLD VENIPUNCTURE: CPT

## 2023-05-09 ENCOUNTER — TELEPHONE (OUTPATIENT)
Dept: TRANSPLANT | Facility: CLINIC | Age: 63
End: 2023-05-09
Payer: MEDICARE

## 2023-05-09 DIAGNOSIS — Z94.4 S/P LIVER TRANSPLANT (H): ICD-10-CM

## 2023-05-09 DIAGNOSIS — Z94.4 LIVER REPLACED BY TRANSPLANT (H): Primary | ICD-10-CM

## 2023-05-09 DIAGNOSIS — Z94.0 KIDNEY TRANSPLANTED: ICD-10-CM

## 2023-05-09 RX ORDER — TACROLIMUS 1 MG/1
1 CAPSULE ORAL EVERY 12 HOURS
Qty: 180 CAPSULE | Refills: 3 | Status: SHIPPED | OUTPATIENT
Start: 2023-05-09 | End: 2023-05-17

## 2023-05-09 NOTE — TELEPHONE ENCOUNTER
ISSUE:   Tacrolimus IR level 8.4 on 5/8/2023, goal 4-6, dose 1.5 mg BID.    PLAN:   Please call patient and confirm this was an accurate 12-hour trough. Verify Tacrolimus IR dose 1.5 mg BID. Confirm no new medications or illness. Confirm no missed doses. If accurate trough and accurate dose, decrease Tacrolimus IR dose to 1 mg BID and repeat TAC level in a week to re assess level.      Елена Dinh RN      OUTCOME:   Spoke with patient, they confirm accurate trough level and current dose 1.5 mg BID. Patient confirmed dose change to 1 mg BID and to repeat tacro in 1  weeks. Orders sent to preferred pharmacy for dose change and lab for repeat labs. Patient voiced understanding of plan.    Jeimy Lindquist LPN

## 2023-05-15 ENCOUNTER — VIRTUAL VISIT (OUTPATIENT)
Dept: NEPHROLOGY | Facility: CLINIC | Age: 63
End: 2023-05-15
Attending: INTERNAL MEDICINE
Payer: MEDICARE

## 2023-05-15 VITALS — WEIGHT: 140 LBS | BODY MASS INDEX: 20.23 KG/M2

## 2023-05-15 DIAGNOSIS — D84.9 IMMUNOSUPPRESSED STATUS (H): ICD-10-CM

## 2023-05-15 DIAGNOSIS — Z94.0 KIDNEY REPLACED BY TRANSPLANT: Primary | ICD-10-CM

## 2023-05-15 DIAGNOSIS — Z48.298 AFTERCARE FOLLOWING ORGAN TRANSPLANT: ICD-10-CM

## 2023-05-15 DIAGNOSIS — Z94.4 LIVER REPLACED BY TRANSPLANT (H): ICD-10-CM

## 2023-05-15 DIAGNOSIS — N39.0 RECURRENT UTI: ICD-10-CM

## 2023-05-15 PROBLEM — M25.569 KNEE PAIN: Status: RESOLVED | Noted: 2020-09-21 | Resolved: 2023-05-15

## 2023-05-15 PROBLEM — R29.898: Status: RESOLVED | Noted: 2023-04-17 | Resolved: 2023-05-15

## 2023-05-15 PROBLEM — N30.90 CYSTITIS: Status: RESOLVED | Noted: 2020-09-21 | Resolved: 2023-05-15

## 2023-05-15 PROBLEM — A49.9 ESBL (EXTENDED SPECTRUM BETA-LACTAMASE) PRODUCING BACTERIA INFECTION: Status: RESOLVED | Noted: 2020-12-19 | Resolved: 2023-05-15

## 2023-05-15 PROBLEM — B37.31 CANDIDIASIS OF VAGINA: Status: RESOLVED | Noted: 2020-09-21 | Resolved: 2023-05-15

## 2023-05-15 PROBLEM — N12 PYELONEPHRITIS: Status: RESOLVED | Noted: 2022-09-15 | Resolved: 2023-05-15

## 2023-05-15 PROBLEM — Z16.12 ESBL (EXTENDED SPECTRUM BETA-LACTAMASE) PRODUCING BACTERIA INFECTION: Status: RESOLVED | Noted: 2020-12-19 | Resolved: 2023-05-15

## 2023-05-15 PROBLEM — Z71.82 EXERCISE COUNSELING: Status: RESOLVED | Noted: 2020-09-21 | Resolved: 2023-05-15

## 2023-05-15 PROBLEM — R87.810 CERVICAL HIGH RISK HUMAN PAPILLOMAVIRUS (HPV) DNA TEST POSITIVE: Status: RESOLVED | Noted: 2020-09-21 | Resolved: 2023-05-15

## 2023-05-15 PROBLEM — A41.9 SEPSIS, DUE TO UNSPECIFIED ORGANISM, UNSPECIFIED WHETHER ACUTE ORGAN DYSFUNCTION PRESENT (H): Status: RESOLVED | Noted: 2022-10-02 | Resolved: 2023-05-15

## 2023-05-15 PROBLEM — M54.50 LUMBAR PAIN: Status: RESOLVED | Noted: 2021-09-22 | Resolved: 2023-05-15

## 2023-05-15 PROCEDURE — 99214 OFFICE O/P EST MOD 30 MIN: CPT | Mod: 24 | Performed by: INTERNAL MEDICINE

## 2023-05-15 RX ORDER — MYCOPHENOLIC ACID 180 MG/1
TABLET, DELAYED RELEASE ORAL
COMMUNITY
Start: 2023-04-29 | End: 2023-05-15

## 2023-05-15 RX ORDER — MYCOPHENOLIC ACID 180 MG/1
540 TABLET, DELAYED RELEASE ORAL 2 TIMES DAILY
Qty: 540 TABLET | Refills: 3 | Status: SHIPPED | OUTPATIENT
Start: 2023-05-15 | End: 2023-08-13

## 2023-05-15 RX ORDER — SULFAMETHOXAZOLE AND TRIMETHOPRIM 400; 80 MG/1; MG/1
1 TABLET ORAL
Qty: 36 TABLET | Refills: 3 | Status: SHIPPED | OUTPATIENT
Start: 2023-05-15 | End: 2023-08-13

## 2023-05-15 ASSESSMENT — PAIN SCALES - GENERAL: PAINLEVEL: NO PAIN (0)

## 2023-05-15 NOTE — NURSING NOTE
Is the patient currently in the state of [place]? YES    Visit mode:VIDEO    If the visit is dropped, the patient can be reconnected by: VIDEO VISIT: Send to e-mail at: [email or web address]    Will anyone else be joining the visit? NO      How would you like to obtain your AVS? MyChart    Are changes needed to the allergy or medication list? NO    Reason for visit: Video Visit    Ernst Kohler

## 2023-05-15 NOTE — LETTER
5/15/2023       RE: Belen Sharma  [address] [place] [address]     Dear Colleague,    Thank you for referring your patient, Belen Sharma, to the [facility]. Please see a copy of my visit note below.    Virtual Visit Details    Type of service:  Video Visit   Video Start Time: 3:54 PM  Video End Time:4:12 PM    Originating Location (pt. Location): Home  Distant Location (provider location):  Off-site  Platform used for Video Visit: [facility]    TRANSPLANT NEPHROLOGY CHRONIC POST TRANSPLANT VISIT     Assessment & Plan   # DDKT: Stable   - Baseline Creatinine:  ~ 0.6-0.8   - Proteinuria: Minimal (0.2-0.5 grams)   - Date DSA Last Checked: Not Known      Latest DSA: Not checked recently due to time from transplant   - BK Viremia: No   - Kidney Tx Biopsy: No    -Labs every 2 months    # Liver Tx:    -Good allograft function. Follows with transplant hepatology.     # Immunosuppression: Tacrolimus immediate release (goal 4-6), Mycophenolic acid (dose 540 mg every 12 hours) and Prednisone (dose 5 mg daily)   - Continue with intensive monitoring of immunosuppression for efficacy and toxicity.   - Changes: Not at this time     # Infection Prophylaxis:   - PJP: Sulfa/TMP (Bactrim) MWF    # Recurrent UTI :    -Follows with txp ID and Dr. Ferrara with urogyn   -Has an Rx for Macrobid to be filled only if symptomatic and after submitting U/A and UCx    # PKD:   -Seen by txp surgery. Risks outweigh benefits for native nephrectomy    #Orthostatic hypotension: Controlled;  Goal BP: < 130/80   - Changes: Not at this time.     # Post-Transplant Erythrocytosis: Hgb: Stable;  On ACEI/ARB: No   Imaging: Not at this time    # Mineral Bone Disorder:   - Secondary renal hyperparathyroidism; PTH level: Normal (18-80 pg/ml)        On treatment: None  - Vitamin D; level: High        On supplement: No  - Calcium; level: High normal        On  supplement: No  - Phosphorus; level: Normal        On supplement: No     # Electrolytes:   - Potassium; level: Normal        On supplement: No  - Magnesium; level: Not checked recently        On supplement: No  - Bicarbonate; level: Normal        On supplement: Yes, stop    # HSIL:    -per cone biopsy on 9/30/20. No evidence of dysplasia. Most recent coloposcopy 4/25/23 with negative biopsies. Plan for repeat colposcopy or Pap in ~10/2023    # Microvascular angina:   -Follows with cardiology, Dr. Mohamud.    -She refuses statin   -Uses SL angina a few times per year    # Cystocele:              -Has pessary. Follows with OB GYN    # Skin Cancer Risk:    - Discussed sun protection and recommend regular follow up with Dermatology.    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    Return visit: Return in about 1 year (around 5/15/2024).    Saran Angulo MD    Chief Complaint   Ms. Sharma is a 63 year old here for routine follow up, kidney transplant and immunosuppression management.    History of Present Illness   Ms. Sharma is feeling well except for the pessary. She is trying to do pelvic floor PT. She ideally doesn't want to be on a chronic antibiotic to prevent recurrent UTIs. Not getting dizzy with standing.     The patient overall feels well. Belen Sharma denies any recent hospitalizations. Belen Sharma denies nausea, vomiting, diarrhea, fever, chills, shortness of breath, chest pain, LE edema, unintentional weight loss, nights sweats.     She is frustrated with her recurrent  UTIs.       Home BP: occasionally low in 90s systolic    Problem List   Patient Active Problem List   Diagnosis    S/P splenectomy    Congenital hepatic fibrosis    Polycystic kidney disease    Immunosuppression (H)    Liver replaced by transplant (H)    Endometriosis    HPV (human papilloma virus) infection    JANNETTE III with severe dysplasia    Acquired bilateral hammer toes    Candidiasis of vagina    Cervical high risk human papillomavirus (HPV) DNA test positive    Chicken pox    Cystitis    Depression    Depressive psychosis (H)    Exercise counseling    Dermatophytosis of nail    Hallux valgus, acquired, bilateral    Incisional hernia following transplant    Knee pain    Malignant neoplasm of breast (H)    Microvascular angina (H)    Postmenopausal state    Secondary hyperparathyroidism (H)    Scoliosis deformity of spine    Sinusitis    Swelling of first metatarsophalangeal (MTP) joint    Uterine prolapse    Xerosis of skin    Kidney replaced by transplant    Recurrent UTI    Lumbar pain    ESBL (extended spectrum beta-lactamase) producing bacteria infection    Osteopenia of multiple sites    Pyelonephritis    Sepsis, due to unspecified organism, unspecified whether acute organ dysfunction present (H)    Combined forms of age-related cataract of both eyes    Aftercare following organ transplant    Hammertoe, bilateral    Weakness of hip, unspecified laterality       Allergies   Allergies   Allergen Reactions    Ibuprofen      Due to liver transplant       Medications   Current Outpatient Medications   Medication Sig    acetaminophen (TYLENOL) 325 MG tablet Take 1 tablet (325 mg) by mouth every 4 hours as needed for mild pain or fever    aspirin (ASA) 81 MG chewable tablet Take 81 mg by mouth every evening Stopped 7 days preop    atorvastatin (LIPITOR) 10 MG tablet Take 1 tablet (10 mg) by mouth daily    biotin 1000 MCG TABS tablet Take 3,000 mcg by mouth every evening    ciclopirox (PENLAC) 8 % external  solution Apply to adjacent skin and affected nails daily.  Remove with alcohol every 7 days, then repeat.    diphenhydrAMINE (BENADRYL) 25 MG tablet Take 25 mg by mouth every 8 hours as needed for allergies    estradiol (ESTRACE) 0.1 MG/GM vaginal cream Place 1 g vaginally three times a week    guaiFENesin (MUCINEX) 600 MG 12 hr tablet Take 1 tablet by mouth 2 times daily as needed    Multiple Vitamins-Minerals (WOMENS DAILY FORMULA PO) Take 1 tablet by mouth daily    nitroFURantoin macrocrystal-monohydrate (MACROBID) 100 MG capsule Take 1 capsule (100 mg) by mouth 2 times daily PRN UTI    nitroGLYcerin (NITROSTAT) 0.4 MG sublingual tablet Place 1 tablet (0.4 mg) under the tongue every 5 minutes as needed for chest pain For chest pain place 1 tablet under the tongue every 5 minutes for 3 doses. If symptoms persist 5 minutes after 1st dose call 911.    predniSONE (DELTASONE) 5 MG tablet Take 1 tablet (5 mg) by mouth daily    Probiotic Product (PROBIOTIC PO) Take 2 tablets by mouth every morning ([facility] Women's Vaginal Probiotic)    sodium bicarbonate 650 MG tablet Take 1 tablet (650 mg) by mouth daily    sulfamethoxazole-trimethoprim (BACTRIM) 400-80 MG tablet Take 1 tablet by mouth three times a week on Monday, Wednesday, Friday    tacrolimus (GENERIC EQUIVALENT) 1 MG capsule Take 1 capsule (1 mg) by mouth every 12 hours    temazepam (RESTORIL) 15 MG capsule Take 1 capsule (15 mg) by mouth as needed for sleep (twice a year)     No current facility-administered medications for this visit.     Medications Discontinued During This Encounter   Medication Reason    mycophenolic acid (GENERIC EQUIVALENT) 180 MG EC tablet     sulfamethoxazole-trimethoprim (BACTRIM) 400-80 MG tablet Reorder (No AVS / No eCancel)    sodium bicarbonate 650 MG tablet        Physical Exam   Vital Signs: Wt 63.5 kg (140 lb)   BMI 20.23 kg/m      GENERAL APPEARANCE: alert and no distress  HENT: no obvious abnormalities on appearance  RESP:  breathing appears unremarkable with normal rate, no audible wheezing or cough and no apparent shortness of breath with conversation  MS: extremities normal - no gross deformities noted  SKIN: no apparent rash and normal skin tone  NEURO: speech is clear with no obvious neurological deficits  PSYCH: mentation appears normal and affect normal      Data         Latest Ref Rng & Units 5/8/2023     8:05 AM 4/14/2023     8:06 AM 1/10/2023     8:10 AM   Renal   Sodium 136 - 145 mmol/L 140   140   141     K 3.4 - 5.3 mmol/L 3.8   3.9   4.0     Cl 98 - 107 mmol/L 105   105   108     Cl (external) 98 - 107 mmol/L 105   105   108     CO2 22 - 29 mmol/L 27   24   24     Urea Nitrogen 8.0 - 23.0 mg/dL 14.1   16.0   12.3     Creatinine 0.51 - 1.17 mg/dL 0.76   0.82   0.79     Glucose 70 - 99 mg/dL 90   90   87     Calcium 8.8 - 10.2 mg/dL 10.0   10.1   10.0           Latest Ref Rng & Units 2/10/2022     8:02 AM 9/23/2020     4:53 PM   Bone Health   Phosphorus 2.5 - 4.5 mg/dL  3.4     Parathyroid Hormone Intact 10 - 86 pg/mL 66   45     Vit D Def 30 - 80 ug/L 57            Latest Ref Rng & Units 5/8/2023     8:05 AM 4/14/2023     8:06 AM 1/10/2023     8:10 AM   Heme   WBC 4.0 - 11.0 10e3/uL 6.4   6.0   5.5     Hgb 11.7 - 17.7 g/dL 15.3   15.5   15.7     Plt 150 - 450 10e3/uL 201   206   205           Latest Ref Rng & Units 5/8/2023     8:05 AM 4/14/2023     8:06 AM 1/10/2023     8:10 AM   Liver   AP 35 - 129 U/L 54   59   62     TBili <=1.2 mg/dL 1.0   0.7   1.1     Bilirubin Direct 0.00 - 0.30 mg/dL 0.22   <0.20   0.28     ALT 10 - 50 U/L 14   16   12     AST 10 - 50 U/L 23   22   23     Tot Protein 6.4 - 8.3 g/dL 6.6   6.7   6.8     Albumin 3.5 - 5.2 g/dL 4.0   4.0   4.0                 Latest Ref Rng & Units 1/4/2021     9:32 AM 12/18/2020     6:11 AM 9/23/2020     4:53 PM   [facility] Txp Virology   CVM DNA Quant   Plasma   Plasma     CMV QUANT IU/ML CMVND^CMV DNA Not Detected [IU]/mL  CMV DNA Not Detected   CMV DNA Not Detected      LOG IU/ML OF CMVQNT <2.1 [Log_IU]/mL  Not Calculated   Not Calculated     BK Spec    Plasma     BK Res BKNEG^BK Virus DNA Not Detected copies/mL   BK Virus DNA Not Detected     BK Log <2.7 Log copies/mL   Not Calculated     EBV CAPSID ANTIBODY IGG 0.0 - 0.8 AI >8.0            Recent Labs   Lab Test 01/10/23  0810 04/14/23  0806 05/08/23  0805   DOSTAC 1/9/2023 4/13/2023 5/7/2023   TACROL 5.6 6.8 8.4     Recent Labs   Lab Test 12/18/20  0611   DOSMPA 12/17 2051   MPACID 1.32   MPAG 23.7*     Saran Angulo MD

## 2023-05-15 NOTE — PATIENT INSTRUCTIONS
Patient Recommendations:  - Stop sodium bicarbonate  - Labs every 2 months    Transplant Patient Information  Your Post Transplant Coordinator is: Saba Epperson  For non urgent items, we encourage you to contact your coordinator/care team online via [employer]  You and your care team can also contact your transplant coordinator Monday - Friday, 8am - 5pm at [phone] (Option 2 to reach the coordinator or Option 4 to schedule an appointment).  After hours for urgent matters, please call [facility] at [phone].

## 2023-05-15 NOTE — PROGRESS NOTES
Virtual Visit Details    Type of service:  Video Visit   Video Start Time: 3:54 PM  Video End Time:4:12 PM    Originating Location (pt. Location): Home  Distant Location (provider location):  Off-site  Platform used for Video Visit: [facility]    TRANSPLANT NEPHROLOGY CHRONIC POST TRANSPLANT VISIT     Assessment & Plan   # DDKT: Stable   - Baseline Creatinine:  ~ 0.6-0.8   - Proteinuria: Minimal (0.2-0.5 grams)   - Date DSA Last Checked: Not Known      Latest DSA: Not checked recently due to time from transplant   - BK Viremia: No   - Kidney Tx Biopsy: No    -Labs every 2 months    # Liver Tx:    -Good allograft function. Follows with transplant hepatology.     # Immunosuppression: Tacrolimus immediate release (goal 4-6), Mycophenolic acid (dose 540 mg every 12 hours) and Prednisone (dose 5 mg daily)   - Continue with intensive monitoring of immunosuppression for efficacy and toxicity.   - Changes: Not at this time     # Infection Prophylaxis:   - PJP: Sulfa/TMP (Bactrim) MWF    # Recurrent UTI :    -Follows with txp ID and Dr. Ferrara with urogyn   -Has an Rx for Macrobid to be filled only if symptomatic and after submitting U/A and UCx    # PKD:   -Seen by txp surgery. Risks outweigh benefits for native nephrectomy    #Orthostatic hypotension: Controlled;  Goal BP: < 130/80   - Changes: Not at this time.     # Post-Transplant Erythrocytosis: Hgb: Stable;  On ACEI/ARB: No   Imaging: Not at this time    # Mineral Bone Disorder:   - Secondary renal hyperparathyroidism; PTH level: Normal (18-80 pg/ml)        On treatment: None  - Vitamin D; level: High        On supplement: No  - Calcium; level: High normal        On supplement: No  - Phosphorus; level: Normal        On supplement: No     # Electrolytes:   - Potassium; level: Normal        On supplement: No  - Magnesium; level: Not checked recently        On supplement: No  - Bicarbonate; level: Normal        On supplement: Yes, stop    # HSIL:    -per cone biopsy on 9/30/20.  No evidence of dysplasia. Most recent coloposcopy 4/25/23 with negative biopsies. Plan for repeat colposcopy or Pap in ~10/2023    # Microvascular angina:   -Follows with cardiology, Dr. Mohamud.    -She refuses statin   -Uses SL angina a few times per year    # Cystocele:              -Has pessary. Follows with OB GYN    # Skin Cancer Risk:    - Discussed sun protection and recommend regular follow up with Dermatology.    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    Return visit: Return in about 1 year (around 5/15/2024).    Saran Angulo MD    Chief Complaint   Ms. Sharma is a 63 year old here for routine follow up, kidney transplant and immunosuppression management.    History of Present Illness   Ms. Sharma is feeling well except for the pessary. She is trying to do pelvic floor PT. She ideally doesn't want to be on a chronic antibiotic to prevent recurrent UTIs. Not getting dizzy with standing.     The patient overall feels well. Belen Sharma denies any recent hospitalizations. Belen Sharma denies nausea, vomiting, diarrhea, fever, chills, shortness of breath, chest pain, LE edema, unintentional weight loss, nights sweats.     She is frustrated with her recurrent UTIs.       Home BP: occasionally low in 90s systolic    Problem List   Patient Active Problem List   Diagnosis     S/P splenectomy     Congenital hepatic fibrosis     Polycystic kidney disease     Immunosuppression (H)     Liver replaced by transplant (H)     Endometriosis     HPV (human papilloma virus) infection      JANNETTE III with severe dysplasia     Acquired bilateral hammer toes     Candidiasis of vagina     Cervical high risk human papillomavirus (HPV) DNA test positive     Chicken pox     Cystitis     Depression     Depressive psychosis (H)     Exercise counseling     Dermatophytosis of nail     Hallux valgus, acquired, bilateral     Incisional hernia following transplant     Knee pain     Malignant neoplasm of breast (H)     Microvascular angina (H)     Postmenopausal state     Secondary hyperparathyroidism (H)     Scoliosis deformity of spine     Sinusitis     Swelling of first metatarsophalangeal (MTP) joint     Uterine prolapse     Xerosis of skin     Kidney replaced by transplant     Recurrent UTI     Lumbar pain     ESBL (extended spectrum beta-lactamase) producing bacteria infection     Osteopenia of multiple sites     Pyelonephritis     Sepsis, due to unspecified organism, unspecified whether acute organ dysfunction present (H)     Combined forms of age-related cataract of both eyes     Aftercare following organ transplant     Hammertoe, bilateral     Weakness of hip, unspecified laterality       Allergies   Allergies   Allergen Reactions     Ibuprofen      Due to liver transplant       Medications   Current Outpatient Medications   Medication Sig     acetaminophen (TYLENOL) 325 MG tablet Take 1 tablet (325 mg) by mouth every 4 hours as needed for mild pain or fever     aspirin (ASA) 81 MG chewable tablet Take 81 mg by mouth every evening Stopped 7 days preop     atorvastatin (LIPITOR) 10 MG tablet Take 1 tablet (10 mg) by mouth daily     biotin 1000 MCG TABS tablet Take 3,000 mcg by mouth every evening     ciclopirox (PENLAC) 8 % external solution Apply to adjacent skin and affected nails daily.  Remove with alcohol every 7 days, then repeat.     diphenhydrAMINE (BENADRYL) 25 MG tablet Take 25 mg by mouth every 8 hours as needed for allergies     estradiol (ESTRACE) 0.1 MG/GM vaginal cream Place 1 g vaginally  three times a week     guaiFENesin (MUCINEX) 600 MG 12 hr tablet Take 1 tablet by mouth 2 times daily as needed     Multiple Vitamins-Minerals (WOMENS DAILY FORMULA PO) Take 1 tablet by mouth daily     nitroFURantoin macrocrystal-monohydrate (MACROBID) 100 MG capsule Take 1 capsule (100 mg) by mouth 2 times daily PRN UTI     nitroGLYcerin (NITROSTAT) 0.4 MG sublingual tablet Place 1 tablet (0.4 mg) under the tongue every 5 minutes as needed for chest pain For chest pain place 1 tablet under the tongue every 5 minutes for 3 doses. If symptoms persist 5 minutes after 1st dose call 911.     predniSONE (DELTASONE) 5 MG tablet Take 1 tablet (5 mg) by mouth daily     Probiotic Product (PROBIOTIC PO) Take 2 tablets by mouth every morning ([employer] Women's Vaginal Probiotic)     sodium bicarbonate 650 MG tablet Take 1 tablet (650 mg) by mouth daily     sulfamethoxazole-trimethoprim (BACTRIM) 400-80 MG tablet Take 1 tablet by mouth three times a week on Monday, Wednesday, Friday     tacrolimus (GENERIC EQUIVALENT) 1 MG capsule Take 1 capsule (1 mg) by mouth every 12 hours     temazepam (RESTORIL) 15 MG capsule Take 1 capsule (15 mg) by mouth as needed for sleep (twice a year)     No current facility-administered medications for this visit.     Medications Discontinued During This Encounter   Medication Reason     mycophenolic acid (GENERIC EQUIVALENT) 180 MG EC tablet      sulfamethoxazole-trimethoprim (BACTRIM) 400-80 MG tablet Reorder (No AVS / No eCancel)     sodium bicarbonate 650 MG tablet        Physical Exam   Vital Signs: Wt 63.5 kg (140 lb)   BMI 20.23 kg/m      GENERAL APPEARANCE: alert and no distress  HENT: no obvious abnormalities on appearance  RESP: breathing appears unremarkable with normal rate, no audible wheezing or cough and no apparent shortness of breath with conversation  MS: extremities normal - no gross deformities noted  SKIN: no apparent rash and normal skin tone  NEURO: speech is clear with no  obvious neurological deficits  PSYCH: mentation appears normal and affect normal      Data         Latest Ref Rng & Units 5/8/2023     8:05 AM 4/14/2023     8:06 AM 1/10/2023     8:10 AM   Renal   Sodium 136 - 145 mmol/L 140   140   141     K 3.4 - 5.3 mmol/L 3.8   3.9   4.0     Cl 98 - 107 mmol/L 105   105   108     Cl (external) 98 - 107 mmol/L 105   105   108     CO2 22 - 29 mmol/L 27   24   24     Urea Nitrogen 8.0 - 23.0 mg/dL 14.1   16.0   12.3     Creatinine 0.51 - 1.17 mg/dL 0.76   0.82   0.79     Glucose 70 - 99 mg/dL 90   90   87     Calcium 8.8 - 10.2 mg/dL 10.0   10.1   10.0           Latest Ref Rng & Units 2/10/2022     8:02 AM 9/23/2020     4:53 PM   Bone Health   Phosphorus 2.5 - 4.5 mg/dL  3.4     Parathyroid Hormone Intact 10 - 86 pg/mL 66   45     Vit D Def 30 - 80 ug/L 57            Latest Ref Rng & Units 5/8/2023     8:05 AM 4/14/2023     8:06 AM 1/10/2023     8:10 AM   Heme   WBC 4.0 - 11.0 10e3/uL 6.4   6.0   5.5     Hgb 11.7 - 17.7 g/dL 15.3   15.5   15.7     Plt 150 - 450 10e3/uL 201   206   205           Latest Ref Rng & Units 5/8/2023     8:05 AM 4/14/2023     8:06 AM 1/10/2023     8:10 AM   Liver   AP 35 - 129 U/L 54   59   62     TBili <=1.2 mg/dL 1.0   0.7   1.1     Bilirubin Direct 0.00 - 0.30 mg/dL 0.22   <0.20   0.28     ALT 10 - 50 U/L 14   16   12     AST 10 - 50 U/L 23   22   23     Tot Protein 6.4 - 8.3 g/dL 6.6   6.7   6.8     Albumin 3.5 - 5.2 g/dL 4.0   4.0   4.0                 Latest Ref Rng & Units 1/4/2021     9:32 AM 12/18/2020     6:11 AM 9/23/2020     4:53 PM   [facility] Txp Virology   CVM DNA Quant   Plasma   Plasma     CMV QUANT IU/ML CMVND^CMV DNA Not Detected [IU]/mL  CMV DNA Not Detected   CMV DNA Not Detected     LOG IU/ML OF CMVQNT <2.1 [Log_IU]/mL  Not Calculated   Not Calculated     BK Spec    Plasma     BK Res BKNEG^BK Virus DNA Not Detected copies/mL   BK Virus DNA Not Detected     BK Log <2.7 Log copies/mL   Not Calculated     EBV CAPSID ANTIBODY IGG 0.0 - 0.8 AI  >8.0            Recent Labs   Lab Test 01/10/23  0810 04/14/23  0806 05/08/23  0805   DOSTAC 1/9/2023 4/13/2023 5/7/2023   TACROL 5.6 6.8 8.4     Recent Labs   Lab Test 12/18/20  0611   DOSMPA 12/17 2051   MPACID 1.32   MPAG 23.7*

## 2023-05-16 ENCOUNTER — LAB (OUTPATIENT)
Dept: LAB | Facility: CLINIC | Age: 63
End: 2023-05-16
Payer: MEDICARE

## 2023-05-16 DIAGNOSIS — N39.0 RECURRENT UTI: ICD-10-CM

## 2023-05-16 DIAGNOSIS — Z94.4 LIVER REPLACED BY TRANSPLANT (H): ICD-10-CM

## 2023-05-16 LAB
ALBUMIN UR-MCNC: NEGATIVE MG/DL
APPEARANCE UR: ABNORMAL
BACTERIA #/AREA URNS HPF: ABNORMAL /HPF
BILIRUB UR QL STRIP: NEGATIVE
COLOR UR AUTO: YELLOW
GLUCOSE UR STRIP-MCNC: NEGATIVE MG/DL
HGB UR QL STRIP: ABNORMAL
KETONES UR STRIP-MCNC: NEGATIVE MG/DL
LEUKOCYTE ESTERASE UR QL STRIP: ABNORMAL
NITRATE UR QL: NEGATIVE
PH UR STRIP: 7 [PH] (ref 5–8)
RBC #/AREA URNS AUTO: ABNORMAL /HPF
SP GR UR STRIP: 1.01 (ref 1–1.03)
SQUAMOUS #/AREA URNS AUTO: ABNORMAL /LPF
TACROLIMUS BLD-MCNC: 3.4 UG/L (ref 5–15)
TME LAST DOSE: ABNORMAL H
TME LAST DOSE: ABNORMAL H
UROBILINOGEN UR STRIP-ACNC: 0.2 E.U./DL
WBC #/AREA URNS AUTO: ABNORMAL /HPF

## 2023-05-16 PROCEDURE — 81001 URINALYSIS AUTO W/SCOPE: CPT

## 2023-05-16 PROCEDURE — 80197 ASSAY OF TACROLIMUS: CPT

## 2023-05-16 PROCEDURE — 87086 URINE CULTURE/COLONY COUNT: CPT

## 2023-05-16 PROCEDURE — 36415 COLL VENOUS BLD VENIPUNCTURE: CPT

## 2023-05-17 ENCOUNTER — TELEPHONE (OUTPATIENT)
Dept: TRANSPLANT | Facility: CLINIC | Age: 63
End: 2023-05-17
Payer: MEDICARE

## 2023-05-17 DIAGNOSIS — Z94.4 S/P LIVER TRANSPLANT (H): ICD-10-CM

## 2023-05-17 DIAGNOSIS — Z94.0 KIDNEY TRANSPLANTED: ICD-10-CM

## 2023-05-17 DIAGNOSIS — Z94.4 LIVER REPLACED BY TRANSPLANT (H): Primary | ICD-10-CM

## 2023-05-17 RX ORDER — TACROLIMUS 0.5 MG/1
0.5 CAPSULE ORAL EVERY MORNING
Qty: 90 CAPSULE | Refills: 3 | Status: SHIPPED | OUTPATIENT
Start: 2023-05-17 | End: 2024-01-16

## 2023-05-17 RX ORDER — TACROLIMUS 1 MG/1
1 CAPSULE ORAL EVERY 12 HOURS
Qty: 180 CAPSULE | Refills: 3 | Status: ON HOLD | OUTPATIENT
Start: 2023-05-17 | End: 2024-05-07

## 2023-05-17 NOTE — TELEPHONE ENCOUNTER
ISSUE:   Tacrolimus IR level 3.4 on 5/15/23, goal 4-6 , dose 1 mg BID.    PLAN:   Please call patient and confirm this was an accurate 12-hour trough. Verify Tacrolimus IR dose 1mg BIDConfirm no new medications or illness. Confirm no missed doses. If accurate trough and accurate dose, increase Tacrolimus IR dose to 1.5 mg in the am and 1 mg in the pm and repeat labs in 1 weeks    Tanika Thornton RN on 5/17/2023 at 2:25 PM      OUTCOME:   Spoke with patient, they confirm accurate trough level and current dose 1 mg BID. Patient confirmed dose change to 1.5 mg am,  1 mg pm  and to repeat labs in 1 weeks. Orders sent to preferred pharmacy for dose change and lab for repeat labs. Patient voiced understanding of plan.     Jeimy Lindquist LPN

## 2023-05-18 LAB — BACTERIA UR CULT: NORMAL

## 2023-05-31 ENCOUNTER — THERAPY VISIT (OUTPATIENT)
Dept: PHYSICAL THERAPY | Facility: REHABILITATION | Age: 63
End: 2023-05-31
Payer: MEDICARE

## 2023-05-31 DIAGNOSIS — R29.898 WEAKNESS OF HIP, UNSPECIFIED LATERALITY: ICD-10-CM

## 2023-05-31 DIAGNOSIS — M62.89 PELVIC FLOOR DYSFUNCTION: ICD-10-CM

## 2023-05-31 DIAGNOSIS — N81.4 UTERINE PROLAPSE: Primary | ICD-10-CM

## 2023-05-31 DIAGNOSIS — R29.898 HIP TIGHTNESS: ICD-10-CM

## 2023-05-31 PROCEDURE — 97110 THERAPEUTIC EXERCISES: CPT | Mod: GP

## 2023-06-01 PROBLEM — M62.89 PELVIC FLOOR DYSFUNCTION: Status: ACTIVE | Noted: 2023-06-01

## 2023-06-01 PROBLEM — R29.898 HIP TIGHTNESS: Status: ACTIVE | Noted: 2023-06-01

## 2023-06-05 ENCOUNTER — LAB (OUTPATIENT)
Dept: LAB | Facility: CLINIC | Age: 63
End: 2023-06-05
Payer: MEDICARE

## 2023-06-05 DIAGNOSIS — Z94.4 LIVER REPLACED BY TRANSPLANT (H): ICD-10-CM

## 2023-06-05 LAB
TACROLIMUS BLD-MCNC: 5.9 UG/L (ref 5–15)
TME LAST DOSE: NORMAL H
TME LAST DOSE: NORMAL H

## 2023-06-05 PROCEDURE — 80197 ASSAY OF TACROLIMUS: CPT

## 2023-06-05 PROCEDURE — 36415 COLL VENOUS BLD VENIPUNCTURE: CPT

## 2023-06-07 ENCOUNTER — THERAPY VISIT (OUTPATIENT)
Dept: PHYSICAL THERAPY | Facility: REHABILITATION | Age: 63
End: 2023-06-07
Payer: MEDICARE

## 2023-06-07 DIAGNOSIS — M62.89 PELVIC FLOOR DYSFUNCTION: ICD-10-CM

## 2023-06-07 DIAGNOSIS — R29.898 HIP TIGHTNESS: Primary | ICD-10-CM

## 2023-06-07 DIAGNOSIS — R29.898 WEAKNESS OF HIP, UNSPECIFIED LATERALITY: ICD-10-CM

## 2023-06-07 DIAGNOSIS — N81.4 UTERINE PROLAPSE: ICD-10-CM

## 2023-06-07 PROCEDURE — 97535 SELF CARE MNGMENT TRAINING: CPT | Mod: GP

## 2023-06-07 PROCEDURE — 97110 THERAPEUTIC EXERCISES: CPT | Mod: GP

## 2023-06-07 PROCEDURE — 97140 MANUAL THERAPY 1/> REGIONS: CPT | Mod: GP

## 2023-06-23 ENCOUNTER — MYC MEDICAL ADVICE (OUTPATIENT)
Dept: CARDIOLOGY | Facility: CLINIC | Age: 63
End: 2023-06-23
Payer: MEDICARE

## 2023-06-23 DIAGNOSIS — E78.5 DYSLIPIDEMIA: Primary | ICD-10-CM

## 2023-06-23 RX ORDER — ATORVASTATIN CALCIUM 10 MG/1
10 TABLET, FILM COATED ORAL DAILY
Qty: 90 TABLET | Refills: 3 | Status: SHIPPED | OUTPATIENT
Start: 2023-06-23 | End: 2024-06-11

## 2023-07-27 ENCOUNTER — ANCILLARY PROCEDURE (OUTPATIENT)
Dept: MAMMOGRAPHY | Facility: CLINIC | Age: 63
End: 2023-07-27
Payer: MEDICARE

## 2023-07-27 DIAGNOSIS — Z12.31 VISIT FOR SCREENING MAMMOGRAM: ICD-10-CM

## 2023-07-27 PROCEDURE — 77063 BREAST TOMOSYNTHESIS BI: CPT | Mod: GC | Performed by: RADIOLOGY

## 2023-07-27 PROCEDURE — 77067 SCR MAMMO BI INCL CAD: CPT | Mod: GC | Performed by: RADIOLOGY

## 2023-08-02 ENCOUNTER — TELEPHONE (OUTPATIENT)
Dept: INFECTIOUS DISEASES | Facility: CLINIC | Age: 63
End: 2023-08-02
Payer: MEDICARE

## 2023-08-02 NOTE — TELEPHONE ENCOUNTER
EP called 8/2 to resched 9/12 follow up with Dr. Gonzales; provider not avail. Appt resched to 9/19.

## 2023-08-15 ENCOUNTER — LAB (OUTPATIENT)
Dept: LAB | Facility: CLINIC | Age: 63
End: 2023-08-15
Attending: INTERNAL MEDICINE
Payer: MEDICARE

## 2023-08-15 ENCOUNTER — THERAPY VISIT (OUTPATIENT)
Dept: PHYSICAL THERAPY | Facility: REHABILITATION | Age: 63
End: 2023-08-15
Payer: MEDICARE

## 2023-08-15 DIAGNOSIS — N81.4 UTERINE PROLAPSE: ICD-10-CM

## 2023-08-15 DIAGNOSIS — M62.89 PELVIC FLOOR DYSFUNCTION: ICD-10-CM

## 2023-08-15 DIAGNOSIS — R29.898 HIP TIGHTNESS: Primary | ICD-10-CM

## 2023-08-15 DIAGNOSIS — Z94.4 LIVER REPLACED BY TRANSPLANT (H): ICD-10-CM

## 2023-08-15 DIAGNOSIS — R29.898 WEAKNESS OF HIP, UNSPECIFIED LATERALITY: ICD-10-CM

## 2023-08-15 LAB
ALBUMIN SERPL BCG-MCNC: 3.9 G/DL (ref 3.5–5.2)
ALP SERPL-CCNC: 57 U/L (ref 35–129)
ALT SERPL W P-5'-P-CCNC: 12 U/L (ref 0–70)
ANION GAP SERPL CALCULATED.3IONS-SCNC: 7 MMOL/L (ref 7–15)
AST SERPL W P-5'-P-CCNC: 20 U/L (ref 0–45)
BILIRUB DIRECT SERPL-MCNC: <0.2 MG/DL (ref 0–0.3)
BILIRUB SERPL-MCNC: 0.6 MG/DL
BUN SERPL-MCNC: 18.9 MG/DL (ref 8–23)
CALCIUM SERPL-MCNC: 9.7 MG/DL (ref 8.8–10.2)
CHLORIDE SERPL-SCNC: 106 MMOL/L (ref 98–107)
CREAT SERPL-MCNC: 0.8 MG/DL (ref 0.51–1.17)
DEPRECATED HCO3 PLAS-SCNC: 26 MMOL/L (ref 22–29)
ERYTHROCYTE [DISTWIDTH] IN BLOOD BY AUTOMATED COUNT: 14.2 % (ref 10–15)
GFR SERPL CREATININE-BSD FRML MDRD: 82 ML/MIN/1.73M2
GLUCOSE SERPL-MCNC: 78 MG/DL (ref 70–99)
HCT VFR BLD AUTO: 47.5 % (ref 35–53)
HGB BLD-MCNC: 15.4 G/DL (ref 11.7–17.7)
MCH RBC QN AUTO: 30.6 PG (ref 26.5–33)
MCHC RBC AUTO-ENTMCNC: 32.4 G/DL (ref 31.5–36.5)
MCV RBC AUTO: 94 FL (ref 78–100)
PLATELET # BLD AUTO: 205 10E3/UL (ref 150–450)
POTASSIUM SERPL-SCNC: 3.7 MMOL/L (ref 3.4–5.3)
PROT SERPL-MCNC: 6.5 G/DL (ref 6.4–8.3)
RBC # BLD AUTO: 5.03 10E6/UL (ref 3.8–5.9)
SODIUM SERPL-SCNC: 139 MMOL/L (ref 136–145)
TACROLIMUS BLD-MCNC: 4.7 UG/L (ref 5–15)
TME LAST DOSE: ABNORMAL H
TME LAST DOSE: ABNORMAL H
WBC # BLD AUTO: 6.3 10E3/UL (ref 4–11)

## 2023-08-15 PROCEDURE — 80197 ASSAY OF TACROLIMUS: CPT

## 2023-08-15 PROCEDURE — 97110 THERAPEUTIC EXERCISES: CPT | Mod: GP

## 2023-08-15 PROCEDURE — 80053 COMPREHEN METABOLIC PANEL: CPT

## 2023-08-15 PROCEDURE — 36415 COLL VENOUS BLD VENIPUNCTURE: CPT

## 2023-08-15 PROCEDURE — 85027 COMPLETE CBC AUTOMATED: CPT

## 2023-08-15 PROCEDURE — 82248 BILIRUBIN DIRECT: CPT

## 2023-08-15 NOTE — PROGRESS NOTES
PLAN  Extended POC by 12 weeks with frequency of every other week, as pt is progressing but at a slower rate than expected. Barriers to progress include clinic access (2 months since last visit).     Beginning/End Dates of Progress Note Reporting Period:    to 08/15/2023    Referring Provider:  Lizzy Ferrara       08/15/23 0500   Appointment Info   Signing clinician's name / credentials Елена Rincon PT, DPT   Total/Authorized Visits 16   Visits Used 5   Medical Diagnosis uterine prolapse, pelvic floor dysfunction, hip tightness, hip weakness   PT Tx Diagnosis hip weakness and decreased flexibility, pelvic floor dysfunction   Other pertinent information declines internal pelvic floor work   Progress Note/Certification   Onset of illness/injury or Date of Surgery 02/02/23   Therapy Frequency 1x/every 2 weeks   Predicted Duration 10 weeks; extended POC by 12 weeks   Progress Note Due Date 11/07/23   GOALS   PT Goals 2;3   PT Goal 1   Goal Identifier HEP   Goal Description pt will demonstrate independence and report compliance with HEP to faciliate improved independent symptom mgmt.   Rationale to maximize safety and independence with performance of ADLs and functional tasks   Goal Progress in progress   Target Date 11/07/23   PT Goal 2   Goal Identifier activity tolerance   Goal Description pt will demonstrate improved activity tolerance via report of ability to participate in greater than or equal to 30 minutes of low impact exercise at least 3 days per week to promote improved QOL.   Rationale to maximize safety and independence with performance of ADLs and functional tasks;to maximize safety and independence within the home;to maximize safety and independence within the community   Goal Progress biking at gym 2 x/week, will be performing strengthening HEP 3 x/week   Target Date 11/07/23   PT Goal 3   Goal Identifier pressure management   Goal Description pt will report compliance with intraabdominal  "pressure management strategies and demonstrate appropriate breath coordination and control 100% while in the clinic to reduce risk for worsening prolapse   Rationale to maximize safety and independence with self cares;to maximize safety and independence with performance of ADLs and functional tasks   Goal Progress in progress, still requiring cues   Target Date 11/07/23   Subjective Report   Subjective Report Going to the gym ~ 2x/week. Biking up to 30 minutes. Has been working on her breathing. Still having some right knee and hip pain that she thinks is related to her scoliosis and right foot.   Objective Measures   Objective Measures Objective Measure 1   Objective Measure 1   Objective Measure right foot assessment   Details valgus toe drift, limited toe flexion and extension AROM and PROM great toe and digits 2-5.   Treatment Interventions (PT)   Interventions Therapeutic Procedure/Exercise;Manual Therapy;Self Care/Home Management   Therapeutic Procedure/Exercise   Therapeutic Procedures: strength, endurance, ROM, flexibillity minutes (64610) 55   Therapeutic Procedures Ther Proc 2   Ther Proc 1 Sidelying hip ABD   Ther Proc 1 - Details x 10 reps jama   Ther Proc 6 piriformis pretzel stretch   Ther Proc 6 - Details 2 x 30\" jama   PTRx Ther Proc 1 Quadratus Lumborum Stretch   PTRx Ther Proc 1 - Details 2 x 30\" jama   PTRx Ther Proc 2 Piriformis Stretch Above 90 Degress Supine   PTRx Ther Proc 2 - Details did not perform   PTRx Ther Proc 3 Quadruped Tranversus Abdominis Activation   PTRx Ther Proc 3 - Details cues to improve breath coordination, x 8 reps   PTRx Ther Proc 4 Clamshell Feet together   PTRx Ther Proc 4 - Details progressed with resistance with L2 TB x 10 reps jama   PTRx Ther Proc 5 Reverse Clamshell   PTRx Ther Proc 5 - Details progressed with resistance with L2 TB x 10 reps jama   Skilled Intervention therapeutic exercise to promote improved muscle length, strength and activity tolerance   Patient " Response/Progress tolerated well   Ther Proc 7 TA sets with marching   Ther Proc 7 - Details significant cues to coordinate properly with breathing, x 8 reps jama   Ther Proc 8 kegels   Ther Proc 8 - Details pt in hooklying. cues for breath coordination, x 8 reps   Ther Proc 2 weight shifts, staggered stance   Ther Proc 2 - Details right foot behind-weight shifts to work on toe DF   Self Care/home Management   ADL/Home Mgmt Training (65529) 5   Self Care 1 exercise schedule   Self Care 1 - Details encouraged continued gym attendance, maintaining 2x/week with goal of performing stretches while at the gym and after the bike and to perform strengthening 3x/week at home   Skilled Intervention pt and therapist collaboration on appropriate schedule to ease back into exercising to increase activity tolerance. cues for improving gait.   Patient Response/Progress receptive   Self Care Self Care 2   Education   Learner/Method Patient   Plan   Home program ptrx   Comments   Comments Pt returns to physical therapy after ~2 months. Pt reports slight improvement in prolapse symptoms, however, continues to demonstrate difficulty with proper breathing techniques and pressure management, as well as strength deficits throughout the hips and pelvic floor.  Pt would benefit from ongoing skilled physical therapy to continue to progress toward goals and improved QOL.   Total Session Time   Timed Code Treatment Minutes 60   Total Treatment Time (sum of timed and untimed services) 60   Medicare Claim Information   Medical Diagnosis uterine prolapse, pelvic floor dysfunction, hip tightness, hip weakness   PT Diagnosis hip weakness and decreased flexibility, pelvic floor dysfunction   Start of Care Date 03/30/23   Onset date of current episode/exacerbation 02/02/23  (onset date: 2 years ago, order date 2/2/23)   Certification date from 03/30/23   Ortho Goal 1   Goal Identifier HEP   Goal Description pt will demonstrate independence and report  compliance with HEP to faciliate improved independent symptom mgmt.   Target Date 06/08/23   Goal Progress in progress   Ortho Goal 2   Goal Identifier activity tolerance   Goal Description pt will demonstrate improved activity tolerance via report of ability to participate in greater than or equal to 30 minutes of low impact exercise at least 3 days per week to promote improved QOL.   Target Date 06/08/23   Goal Progress in progress   Ortho Goal 3   Goal Identifier pressure management   Goal Description pt will report compliance with intraabdominal pressure management strategies and demonstrate appropriate breath coordination and control 100% while in the clinic to reduce risk for worsening prolapse   Target Date 06/08/23   Goal Progress in progress   Елена Rincon, PT

## 2023-08-31 DIAGNOSIS — B35.1 ONYCHOMYCOSIS: ICD-10-CM

## 2023-09-06 RX ORDER — CICLOPIROX 80 MG/ML
SOLUTION TOPICAL
Qty: 6.6 ML | Refills: 0 | Status: SHIPPED | OUTPATIENT
Start: 2023-09-06 | End: 2024-09-18

## 2023-09-06 NOTE — TELEPHONE ENCOUNTER
ciclopirox (PENLAC) 8 % external solution      Last Written Prescription Date:  4/6/2022  Last Fill Quantity: 6 ml,   # refills: 3  Last Office Visit : 4/6/2022 [facility]  Future Office visit:  9/29/2023    Routing refill request to provider for review/approval because:  Medication not on the Dermatology refill protocol.

## 2023-09-07 ENCOUNTER — TELEPHONE (OUTPATIENT)
Dept: NEPHROLOGY | Facility: CLINIC | Age: 63
End: 2023-09-07
Payer: MEDICARE

## 2023-09-19 ENCOUNTER — VIRTUAL VISIT (OUTPATIENT)
Dept: INFECTIOUS DISEASES | Facility: CLINIC | Age: 63
End: 2023-09-19
Attending: INTERNAL MEDICINE
Payer: MEDICARE

## 2023-09-19 DIAGNOSIS — Z94.4 LIVER REPLACED BY TRANSPLANT (H): ICD-10-CM

## 2023-09-19 DIAGNOSIS — N39.0 RECURRENT UTI: Primary | ICD-10-CM

## 2023-09-19 DIAGNOSIS — Z23 NEED FOR VACCINATION: ICD-10-CM

## 2023-09-19 DIAGNOSIS — Z94.0 KIDNEY TRANSPLANT RECIPIENT: ICD-10-CM

## 2023-09-19 PROCEDURE — 99213 OFFICE O/P EST LOW 20 MIN: CPT | Mod: VID | Performed by: INTERNAL MEDICINE

## 2023-09-19 NOTE — LETTER
9/19/2023       RE: Belen Sharma  [address] [place] [address]     Dear Colleague,    Thank you for referring your patient, Belen Sharma, to the [facility] INFECTIOUS DISEASE CLINIC [place] at [facility]. Please see a copy of my visit note below.    [facility]    Transplant Infectious Diseases Outpatient Progress note      Patient:  Belen Sharma, Date of birth 1960, Medical record number [id number]  Date of Visit:  09/19/2023    Virtual Visit Details    Type of service:  Video Visit   Video Start Time: 5:16 PM  Video End Time:5:30 PM    Originating Location (pt. Location): Home  Distant Location (provider location):  On-site  Platform used for Video Visit: Maddison         Recommendations:   1. A prescription of macrobid is on file to be filled only if symptomatic and after submitting a UA and Ucx.   2. RSV vaccine when available.   3. COVID-19 vaccine when available.   4. Seasonal influenza vaccine.   - if would like to space out vaccines, I would start with the influenza then RSV then COVID vaccines.     RTC: 6 months.          Summary of Presentation:   Transplants:  2/2/2016 (Kidney), 3/3/2011 (Liver), 10/9/2010 (Kidney / Liver), Postoperative day:  4583 (Liver), 2786 (Kidney)     This patient is a 62 year old adult with history of congenital hepatic fibrosis and PCKD s/p liver and kidney transplant in [place] in 2010, they both failed and required liver re-transplant in 2011 and kidney re-transplant in 2016.   Currently on TAC/MMF/prednisone.   With recurrent UTI.          Active Problems and Infectious Diseases Issues:   1. Recurrent UTI.   In the past was with different pathogens including ESBL E coli, K pneumonia, E faecalis and sometimes with negative urine and blood cx.   Since 9/2022 the UTIs have been with ESBL E coli.   The patient currently has standing orders for UA/Ucx when symptomatic and a standing order of  macrobid at her local pharmacy to be started after UA/Ucx are delivered. However, has not had UTI for the past 6 months.     CT a/p 9/22/22 without evidence of structural or anatomical abnormality.   Cystoscopy and urodynamic studies were done on 2/2/23, both were unremarkable.     The patient has multiple risk factors for recurrent UTI, almost all of them can not be reversed, including: female gender, immunocompromised, kidney transplant recipient, the need to manipulate the  tract daily to change pessary.   In addition the patient is not consistent using topical estrogen which may also contribute to UTI.         Old Problems and Infectious Diseases Issues:   1. Recurrent UTI with E coli, E faecalis and K pneumonia.   2. CDI.   3. Splenectomy.      Other Infectious Disease issues include:  - QTc: 384 as of 6/6/2022. .   - PCP prophylaxis: bactrim.  - Serostatus: not available.   - Immunization status: This patient received the fifth dose (second booster, bivalent) of the COVID-19 vaccine on 11/4/2022. The patient will check if she got [place]dap [place] of state in [place], if she did not, then she is due for Tdap.       Attestation:  Total duration of visit including chart review, reviewing labs and imaging, interviewing and examining the patient, documentation, and sending communication to the patient and to the primary treating team, all at the same day of this encounter, is: 30 minutes.   Catrachita Gonzales MD    Contact information available via [facility] Paging/Directory     09/19/2023         Interim History:   No UTI since before 3/2023.   Had questions about vaccines, they were all answered.          Review of Systems:     As mentioned in the interim history otherwise negative by reviewing constitutional symptoms, central and peripheral neurological systems, respiratory system, cardiac system, GI system,  system, musculoskeletal, skin, allergy, and lymphatics.                 Immunizations:     Immunization History    Administered Date(s) Administered    COVID-19 Bivalent 12+ (Pfizer) 11/04/2022    COVID-19 MONOVALENT 12+ (Pfizer) 03/29/2021, 04/19/2021, 09/02/2021    COVID-19 Monovalent 12+ (Pfizer 2022) 02/08/2022    DT (PEDS <7y) 03/19/2001    FLU 6-35 months 11/15/2002, 11/18/2003, 10/26/2004, 10/24/2005, 11/16/2006, 09/20/2011    [id number] Novel Flu 11/19/2009    HepB, Unspecified 06/28/2001    Influenza Vaccine 18-64 (Flublok) 10/18/2021    Influenza Vaccine >6 months (Alfuria,Fluzone) 09/12/2022    Influenza Vaccine IM Ages 6-35 Months 4 Valent (PF) 11/28/2007, 10/07/2009, 09/20/2011    Influenza,INJ,MDCK,PF,Quad >6mo(Flucelvax) 10/15/2020    Meningococcal ACWY (Menactra ) 01/25/2013    Pneumococcal 20 valent Conjugate (Prevnar 20) 09/12/2022    Pneumococcal 23 valent 09/27/2002, 01/25/2013    TDAP Vaccine (Boostrix) 01/25/2013, 06/01/2018    Zoster recombinant adjuvanted (SHINGRIX) 10/15/2020, 01/08/2021             Allergies:     Allergies   Allergen Reactions    Ibuprofen      Due to liver transplant             Medications:     Current Outpatient Medications   Medication Sig    acetaminophen (TYLENOL) 325 MG tablet Take 1 tablet (325 mg) by mouth every 4 hours as needed for mild pain or fever    aspirin (ASA) 81 MG chewable tablet Take 81 mg by mouth every evening Stopped 7 days preop    atorvastatin (LIPITOR) 10 MG tablet Take 1 tablet (10 mg) by mouth daily    biotin 1000 MCG TABS tablet Take 3,000 mcg by mouth every evening    ciclopirox (PENLAC) 8 % external solution Apply to adjacent skin and affected nails daily.  Remove with alcohol every 7 days, then repeat.    diphenhydrAMINE (BENADRYL) 25 MG tablet Take 25 mg by mouth every 8 hours as needed for allergies    estradiol (ESTRACE) 0.1 MG/GM vaginal cream Place 1 g vaginally three times a week    guaiFENesin (MUCINEX) 600 MG 12 hr tablet Take 1 tablet by mouth 2 times daily as needed    Multiple Vitamins-Minerals (WOMENS DAILY FORMULA PO) Take 1 tablet by mouth  daily    nitroFURantoin macrocrystal-monohydrate (MACROBID) 100 MG capsule Take 1 capsule (100 mg) by mouth 2 times daily PRN UTI    nitroGLYcerin (NITROSTAT) 0.4 MG sublingual tablet Place 1 tablet (0.4 mg) under the tongue every 5 minutes as needed for chest pain For chest pain place 1 tablet under the tongue every 5 minutes for 3 doses. If symptoms persist 5 minutes after 1st dose call 911.    predniSONE (DELTASONE) 5 MG tablet Take 1 tablet (5 mg) by mouth daily    Probiotic Product (PROBIOTIC PO) Take 2 tablets by mouth every morning (DailyCred Women's Vaginal Probiotic)    tacrolimus (GENERIC EQUIVALENT) 0.5 MG capsule Take 1 capsule (0.5 mg) by mouth every morning    tacrolimus (GENERIC EQUIVALENT) 1 MG capsule Take 1 capsule (1 mg) by mouth every 12 hours    temazepam (RESTORIL) 15 MG capsule Take 1 capsule (15 mg) by mouth as needed for sleep (twice a year)     No current facility-administered medications for this visit.                Physical Exam:   No vitals are available during this virtual visit.   Constitutional: awake, alert, cooperative, no apparent distress and appears at stated age, well nourished.             Laboratory Data:     No results found for: ACD4    Inflammatory Markers    Recent Labs   Lab Test 09/14/22  2220 11/04/21  1457 12/28/20  1515 12/21/20  1545   SED 8 14 8 10   CRP  --  39.3* 13.0* 18.0*       Immune Globulin Studies    No lab results found.    Metabolic Studies    Recent Labs   Lab Test 08/15/23  0812 05/08/23  0805 04/14/23  0806 01/10/23  0810 11/10/22  0803 10/17/22  1010 10/03/22  0935 10/02/22  0628 09/15/22  0607 09/14/22  2230 09/30/20  0721 09/23/20  1653    140 140 141 138 137   < > 135*   < >  --    < >  --    POTASSIUM 3.7 3.8 3.9 4.0 3.8 4.3   < > 3.9   < >  --    < >  --    CHLORIDE 106 105 105 108* 104 105   < > 103   < >  --    < >  --    CO2 26 27 24 24 22 23   < > 23   < >  --    < >  --    ANIONGAP 7 8 11 9 12 9   < > 9   < >  --    < >  --    BUN  18.9 14.1 16.0 12.3 12.3 17.3   < > 17.8   < >  --    < >  --    CR 0.80 0.76 0.82 0.79 0.75 0.60   < > 0.78   < >  --    < >  --    GFRESTIMATED 82 88 80 84 90 >90   < > 85   < >  --    < >  --    GLC 78 90 90 87 77 101*   < > 92   < >  --    < >  --    EDSON 9.7 10.0 10.1 10.0 9.8 9.9   < > 9.5   < >  --    < >  --    PHOS  --   --   --   --   --   --   --   --   --   --   --  3.4   LACT  --   --   --   --   --   --   --  0.8  --  0.9   < >  --     < > = values in this interval not displayed.       Hepatic Studies    Recent Labs   Lab Test 08/15/23  0812 05/08/23  0805 04/14/23  0806 01/10/23  0810 10/17/22  1010 10/10/22  1017   BILITOTAL 0.6 1.0 0.7 1.1 0.5 0.6   ALKPHOS 57 54 59 62 66 72   PROTTOTAL 6.5 6.6 6.7 6.8 6.4 6.8   ALBUMIN 3.9 4.0 4.0 4.0 3.6 3.9   AST 20 23 22 23 21 19   ALT 12 14 16 12 11 10       Hematology Studies     Recent Labs   Lab Test 08/15/23  0812 05/08/23  0805 04/14/23  0806 01/10/23  0810 11/10/22  0803 10/17/22  1010 03/11/21  1007 12/28/20  1515 12/28/20  0855 12/21/20  1545 12/18/20  0611 12/16/20  2236   WBC 6.3 6.4 6.0 5.5 5.5 6.7   < > 9.2   < > 6.2   < > 11.4*   ANEU  --   --   --   --   --   --   --  6.4  --  3.9  --  9.3*   ALYM  --   --   --   --   --   --   --  2.1  --  1.7  --  1.0   PABLO  --   --   --   --   --   --   --  0.6  --  0.6  --  1.0   AEOS  --   --   --   --   --   --   --  0.0  --  0.0  --  0.0   HGB 15.4 15.3 15.5 15.7 14.6 14.0   < > 16.6*   < > 16.2*   < > 16.9*   HCT 47.5 47.0 47.5 48.4 44.5 44.1   < > 50.8*   < > 49.2*   < > 54.5*    201 206 205 232 265   < > 308   < > 261   < > 260    < > = values in this interval not displayed.       Clotting Studies  No lab results found.    Urine Studies    Recent Labs   Lab Test 05/16/23  0805 04/30/23  1235 04/02/23  1621 03/16/23  1240 02/22/23  1434   URINEPH 7.0 7.0 6.0 7.0 7.0   NITRITE Negative Negative Positive* Positive* Positive*   LEUKEST Large* Large* Large* 500 Crescencio/uL* Small*   WBCU 5-10* * >100*  >182* >100*         Microbiology:  Last 6 Culture results with specimen source  Culture Micro   Date Value Ref Range Status   06/04/2021 (A)  Final    10,000 to 50,000 colonies/mL  Enterococcus faecalis     12/17/2020 No growth  Final   12/16/2020 (A)  Final    Cultured on the 1st day of incubation:  Escherichia coli ESBL  ESBL (extended beta lactamase) producing organisms require contact precautions.     12/16/2020   Final    Critical Value/Significant Value, preliminary result only, called to and read back by  RALPH BOLAÑOS RN 12.17.2020 1056 BC     12/16/2020   Final    (Note)  POSITIVE for E.COLI by Verigene multiplex nucleic acid test. Final  identification and antimicrobial susceptibility testing will be  verified by standard methods. Verigene test will not distinguish  E.coli from Shigella species including S.dysenteriae, S.flexneri,  S.boydii, and S.sonnei. Specimens containing Shigella species or  E.coli will be reported as Positive for E.coli.    POSITIVE for CTX-M Class A Extended Spectrum beta-lactamase (ESBL)  resistance marker by Verigene multiplex nucleic acid test. CTX-M  confers resistance to penicillins, cephalosporins and variable  resistance to beta-lactam beta-lactamase inhibitor combinations  (clavulanic acid and tazobactam). Best empiric antibiotic choice is  meropenem. Specific susceptibility testing will be performed.    Specimen tested with Verigene multiplex, gram-negative blood culture  nucleic acid test for the following targets: Acinetobacter sp.,  Citrobacter sp., Enterobacter sp., Proteus sp., E. coli, K.  pneumoniae/oxytoca, P. aeruginosa, and the following resistance  markers: CTXM, KPC, NDM, VIM, IMP and OXA.    Critical Value/Significant Value called to and read back by RALHP BOLAÑOS RN 12/17/20 1312 EH.       12/16/2020 No growth  Final   12/16/2020 (A)  Preliminary    >100,000 colonies/mL  Escherichia coli ESBL  ESBL (extended beta lactamase) producing organisms require contact  precautions.     12/16/2020   Preliminary    Susceptibility testing requested by  Dr. Gonzales, Pager [phone]. Fosfomycin requested. @ 1637. 12.18.20. BS.       Specimen Description   Date Value Ref Range Status   06/04/2021 Midstream Urine  Final   12/17/2020 Blood  Final   12/16/2020 Blood Right Arm  Final   12/16/2020 Blood Right Arm  Final   12/16/2020 Unspecified Urine  Final   12/03/2020 Midstream Urine  Final        Last check of C difficile  No results found for: CDBPCT      Virology:  CMV viral loads    CMV viral loads    CMV Quant IU/mL   Date Value Ref Range Status   12/18/2020 CMV DNA Not Detected CMVND^CMV DNA Not Detected [IU]/mL Final     Comment:     Mutations within the highly conserved regions of the viral genome covered by   the RHONDA AmpliPrep/RHONDA TaqMan CMV Test primers and/or probes have been   identified and may result in under-quantitation of or failure to detect the   virus.  Supplemental testing methods should be used for testing when this is   suspected.  The RHONDA AmpliPrep/RHONDA TaqMan CMV Test is an FDA-approved in vitro nucleic   acid amplification test for the quantitation of cytomegalovirus DNA in human   plasma (EDTA plasma) using the RHONDA AmpliPrep Instrument for automated viral   nucleic acid extraction and the RHONDA TaqMan Analyzer or Txt4 TaqMan for   automated Real Time amplification and detection of the viral nucleic acid   target.  Titer results are reported in International Units/mL (IU/mL using 1st WHO   International standard for Human Cytomegalovirus for Nucleic Acid   Amplification based assays. The conversion factor between CMV DNA copis/mL (as   defined by the Roche RHONDA TaqMan CMV test) and International Units is the   CMV DNA concentration in IU/mL x 1.1 copies/IU = CMV DNA in copies/mL.  This assay has received FDA approval for the testing of human plasma only. The   Infectious Disease Diagnostic Laboratory at the [facility],  [place], has validated the performance characteristics of the   Roche CMV assay for plasma, bronchial alveolar lavage/wash and urine.       CMV viral loads    Recent Labs   Lab Test 12/18/20  0611   CMVQNT CMV DNA Not Detected   CSPEC Plasma   CMVLOG Not Calculated       CMV viral loads    CMV Quant IU/mL   Date Value Ref Range Status   12/18/2020 CMV DNA Not Detected CMVND^CMV DNA Not Detected [IU]/mL Final     Comment:     Mutations within the highly conserved regions of the viral genome covered by   the RHONDA AmpliPrep/RHONDA TaqMan CMV Test primers and/or probes have been   identified and may result in under-quantitation of or failure to detect the   virus.  Supplemental testing methods should be used for testing when this is   suspected.  The RHONDA AmpliPrep/RHONDA TaqMan CMV Test is an FDA-approved in vitro nucleic   acid amplification test for the quantitation of cytomegalovirus DNA in human   plasma (EDTA plasma) using the RHONDA AmpliPrep Instrument for automated viral   nucleic acid extraction and the [employer] List TaqMan Analyzer or Angie's List TaqMan for   automated Real Time amplification and detection of the viral nucleic acid   target.  Titer results are reported in International Units/mL (IU/mL using 1st WHO   International standard for Human Cytomegalovirus for Nucleic Acid   Amplification based assays. The conversion factor between CMV DNA copis/mL (as   defined by the Roche RHONDA TaqMan CMV test) and International Units is the   CMV DNA concentration in IU/mL x 1.1 copies/IU = CMV DNA in copies/mL.  This assay has received FDA approval for the testing of human plasma only. The   Infectious Disease Diagnostic Laboratory at the [facility], [place], has validated the performance characteristics of the   Roche CMV assay for plasma, bronchial alveolar lavage/wash and urine.     09/23/2020 CMV DNA Not Detected CMVND^CMV DNA Not Detected [IU]/mL Final     Comment:     Mutations within the  highly conserved regions of the viral genome covered by   the RHONDA AmpliPrep/RHONDA TaqMan CMV Test primers and/or probes have been   identified and may result in under-quantitation of or failure to detect the   virus.  Supplemental testing methods should be used for testing when this is   suspected.  The RHONDA AmpliPrep/RHONDA TaqMan CMV Test is an FDA-approved in vitro nucleic   acid amplification test for the quantitation of cytomegalovirus DNA in human   plasma (EDTA plasma) using the RHONDA AmpliPrep Instrument for automated viral   nucleic acid extraction and the RHONDA TaqMan Analyzer or Class Messenger TaqMan for   automated Real Time amplification and detection of the viral nucleic acid   target.  Titer results are reported in International Units/mL (IU/mL using 1st WHO   International standard for Human Cytomegalovirus for Nucleic Acid   Amplification based assays. The conversion factor between CMV DNA copis/mL (as   defined by the Roche RHONDA TaqMan CMV test) and International Units is the   CMV DNA concentration in IU/mL x 1.1 copies/IU = CMV DNA in copies/mL.  This assay has received FDA approval for the testing of human plasma only. The   Infectious Disease Diagnostic Laboratory at the [facility], [place], has validated the performance characteristics of the   Roche CMV assay for plasma, bronchial alveolar lavage/wash and urine.       Log IU/mL of CMVQNT   Date Value Ref Range Status   12/18/2020 Not Calculated <2.1 [Log_IU]/mL Final   09/23/2020 Not Calculated <2.1 [Log_IU]/mL Final       CMV resistance testing  No lab results found.  No results found for: CMVCID, CMVFOS, CMVGAN     EBV viral loads   No lab results found.  No results found for: EBVDN, EBRES, EBVDN, EBVSP, EBVPC, EBVPCR    Human Herpes Virus 6 viral loads    No results found for: H6RES No results found for: H6SPEC    CMV Antibody IgG   Date Value Ref Range Status   01/04/2021 >8.0 (H) 0.0 - 0.8 AI Final     Comment:      Positive  Antibody index (AI) values reflect qualitative changes in antibody   concentration that cannot be directly associated with clinical condition or   disease state.       No results found for: EBIG2, EBIGM, EBVIGG, EBIGG, EBVAGN, TI9484, TOXG      Imaging:  CT a/p W 9/29/2022  IMPRESSION:  1. Urothelial thickening and mild ectasia of the distal right renal  transplant ureter extending to the ureterovesical anastomosis,  slightly increased compared to previous CT on 12/17/2020. Findings may  represent acute or chronic sequela of inflammation/infection. No  hydronephrosis or evidence of obstruction.  2. Simple right adnexal cyst is not significantly changed compared to  previous without internal complexity on comparison ultrasound, likely  benign. No dedicated imaging follow-up recommended.  3. Stable postoperative changes of liver transplant.  4. Stable enlarged polycystic kidneys.    Sincerely,    Catrachita Gonzales MD

## 2023-09-19 NOTE — PROGRESS NOTES
[facility]    Transplant Infectious Diseases Outpatient Progress note      Patient:  Belen Sharma, Date of birth 1960, Medical record number [id number]  Date of Visit:  09/19/2023    Virtual Visit Details    Type of service:  Video Visit   Video Start Time: 5:16 PM  Video End Time:5:30 PM    Originating Location (pt. Location): Home  Distant Location (provider location):  On-site  Platform used for Video Visit: Maddison         Recommendations:   1. A prescription of macrobid is on file to be filled only if symptomatic and after submitting a UA and Ucx.   2. RSV vaccine when available.   3. COVID-19 vaccine when available.   4. Seasonal influenza vaccine.   - if would like to space out vaccines, I would start with the influenza then RSV then COVID vaccines.     RTC: 6 months.          Summary of Presentation:   Transplants:  2/2/2016 (Kidney), 3/3/2011 (Liver), 10/9/2010 (Kidney / Liver), Postoperative day:  4583 (Liver), 2786 (Kidney)     This patient is a 62 year old adult with history of congenital hepatic fibrosis and PCKD s/p liver and kidney transplant in [place] in 2010, they both failed and required liver re-transplant in 2011 and kidney re-transplant in 2016.   Currently on TAC/MMF/prednisone.   With recurrent UTI.          Active Problems and Infectious Diseases Issues:   1. Recurrent UTI.   In the past was with different pathogens including ESBL E coli, K pneumonia, E faecalis and sometimes with negative urine and blood cx.   Since 9/2022 the UTIs have been with ESBL E coli.   The patient currently has standing orders for UA/Ucx when symptomatic and a standing order of macrobid at her local pharmacy to be started after UA/Ucx are delivered. However, has not had UTI for the past 6 months.     CT a/p 9/22/22 without evidence of structural or anatomical abnormality.   Cystoscopy and urodynamic studies were done on 2/2/23, both were unremarkable.     The patient has multiple risk  factors for recurrent UTI, almost all of them can not be reversed, including: female gender, immunocompromised, kidney transplant recipient, the need to manipulate the  tract daily to change pessary.   In addition the patient is not consistent using topical estrogen which may also contribute to UTI.         Old Problems and Infectious Diseases Issues:   1. Recurrent UTI with E coli, E faecalis and K pneumonia.   2. CDI.   3. Splenectomy.      Other Infectious Disease issues include:  - QTc: 384 as of 6/6/2022. .   - PCP prophylaxis: bactrim.  - Serostatus: not available.   - Immunization status: This patient received the fifth dose (second booster, bivalent) of the COVID-19 vaccine on 11/4/2022. The patient will check if she got [place]dap out of state in [place], if she did not, then she is due for Tdap.       Attestation:  Total duration of visit including chart review, reviewing labs and imaging, interviewing and examining the patient, documentation, and sending communication to the patient and to the primary treating team, all at the same day of this encounter, is: 30 minutes.   Catrachita Gonzales MD    Contact information available via [facility] Paging/Directory     09/19/2023         Interim History:   No UTI since before 3/2023.   Had questions about vaccines, they were all answered.          Review of Systems:     As mentioned in the interim history otherwise negative by reviewing constitutional symptoms, central and peripheral neurological systems, respiratory system, cardiac system, GI system,  system, musculoskeletal, skin, allergy, and lymphatics.                 Immunizations:     Immunization History   Administered Date(s) Administered    COVID-19 Bivalent 12+ (Pfizer) 11/04/2022    COVID-19 MONOVALENT 12+ (Pfizer) 03/29/2021, 04/19/2021, 09/02/2021    COVID-19 Monovalent 12+ (Pfizer 2022) 02/08/2022    DT (PEDS <7y) 03/19/2001    FLU 6-35 months 11/15/2002, 11/18/2003, 10/26/2004, 10/24/2005, 11/16/2006,  09/20/2011    M2h5-77 Novel Flu 11/19/2009    HepB, Unspecified 06/28/2001    Influenza Vaccine 18-64 (Flublok) 10/18/2021    Influenza Vaccine >6 months (Alfuria,Fluzone) 09/12/2022    Influenza Vaccine IM Ages 6-35 Months 4 Valent (PF) 11/28/2007, 10/07/2009, 09/20/2011    Influenza,INJ,MDCK,PF,Quad >6mo(Flucelvax) 10/15/2020    Meningococcal ACWY (Menactra ) 01/25/2013    Pneumococcal 20 valent Conjugate (Prevnar 20) 09/12/2022    Pneumococcal 23 valent 09/27/2002, 01/25/2013    TDAP Vaccine (Boostrix) 01/25/2013, 06/01/2018    Zoster recombinant adjuvanted (SHINGRIX) 10/15/2020, 01/08/2021             Allergies:     Allergies   Allergen Reactions    Ibuprofen      Due to liver transplant             Medications:     Current Outpatient Medications   Medication Sig    acetaminophen (TYLENOL) 325 MG tablet Take 1 tablet (325 mg) by mouth every 4 hours as needed for mild pain or fever    aspirin (ASA) 81 MG chewable tablet Take 81 mg by mouth every evening Stopped 7 days preop    atorvastatin (LIPITOR) 10 MG tablet Take 1 tablet (10 mg) by mouth daily    biotin 1000 MCG TABS tablet Take 3,000 mcg by mouth every evening    ciclopirox (PENLAC) 8 % external solution Apply to adjacent skin and affected nails daily.  Remove with alcohol every 7 days, then repeat.    diphenhydrAMINE (BENADRYL) 25 MG tablet Take 25 mg by mouth every 8 hours as needed for allergies    estradiol (ESTRACE) 0.1 MG/GM vaginal cream Place 1 g vaginally three times a week    guaiFENesin (MUCINEX) 600 MG 12 hr tablet Take 1 tablet by mouth 2 times daily as needed    Multiple Vitamins-Minerals (WOMENS DAILY FORMULA PO) Take 1 tablet by mouth daily    nitroFURantoin macrocrystal-monohydrate (MACROBID) 100 MG capsule Take 1 capsule (100 mg) by mouth 2 times daily PRN UTI    nitroGLYcerin (NITROSTAT) 0.4 MG sublingual tablet Place 1 tablet (0.4 mg) under the tongue every 5 minutes as needed for chest pain For chest pain place 1 tablet under the tongue  every 5 minutes for 3 doses. If symptoms persist 5 minutes after 1st dose call 911.    predniSONE (DELTASONE) 5 MG tablet Take 1 tablet (5 mg) by mouth daily    Probiotic Product (PROBIOTIC PO) Take 2 tablets by mouth every morning ([facility] Women's Vaginal Probiotic)    tacrolimus (GENERIC EQUIVALENT) 0.5 MG capsule Take 1 capsule (0.5 mg) by mouth every morning    tacrolimus (GENERIC EQUIVALENT) 1 MG capsule Take 1 capsule (1 mg) by mouth every 12 hours    temazepam (RESTORIL) 15 MG capsule Take 1 capsule (15 mg) by mouth as needed for sleep (twice a year)     No current facility-administered medications for this visit.                Physical Exam:   No vitals are available during this virtual visit.   Constitutional: awake, alert, cooperative, no apparent distress and appears at stated age, well nourished.             Laboratory Data:     No results found for: ACD4    Inflammatory Markers    Recent Labs   Lab Test 09/14/22  2220 11/04/21  1457 12/28/20  1515 12/21/20  1545   SED 8 14 8 10   CRP  --  39.3* 13.0* 18.0*       Immune Globulin Studies    No lab results found.    Metabolic Studies    Recent Labs   Lab Test 08/15/23  0812 05/08/23  0805 04/14/23  0806 01/10/23  0810 11/10/22  0803 10/17/22  1010 10/03/22  0935 10/02/22  0628 09/15/22  0607 09/14/22  2230 09/30/20  0721 09/23/20  1653    140 140 141 138 137   < > 135*   < >  --    < >  --    POTASSIUM 3.7 3.8 3.9 4.0 3.8 4.3   < > 3.9   < >  --    < >  --    CHLORIDE 106 105 105 108* 104 105   < > 103   < >  --    < >  --    CO2 26 27 24 24 22 23   < > 23   < >  --    < >  --    ANIONGAP 7 8 11 9 12 9   < > 9   < >  --    < >  --    BUN 18.9 14.1 16.0 12.3 12.3 17.3   < > 17.8   < >  --    < >  --    CR 0.80 0.76 0.82 0.79 0.75 0.60   < > 0.78   < >  --    < >  --    GFRESTIMATED 82 88 80 84 90 >90   < > 85   < >  --    < >  --    GLC 78 90 90 87 77 101*   < > 92   < >  --    < >  --    EDSON 9.7 10.0 10.1 10.0 9.8 9.9   < > 9.5   < >  --    < >   --    PHOS  --   --   --   --   --   --   --   --   --   --   --  3.4   LACT  --   --   --   --   --   --   --  0.8  --  0.9   < >  --     < > = values in this interval not displayed.       Hepatic Studies    Recent Labs   Lab Test 08/15/23  0812 05/08/23  0805 04/14/23  0806 01/10/23  0810 10/17/22  1010 10/10/22  1017   BILITOTAL 0.6 1.0 0.7 1.1 0.5 0.6   ALKPHOS 57 54 59 62 66 72   PROTTOTAL 6.5 6.6 6.7 6.8 6.4 6.8   ALBUMIN 3.9 4.0 4.0 4.0 3.6 3.9   AST 20 23 22 23 21 19   ALT 12 14 16 12 11 10       Hematology Studies     Recent Labs   Lab Test 08/15/23  0812 05/08/23  0805 04/14/23  0806 01/10/23  0810 11/10/22  0803 10/17/22  1010 03/11/21  1007 12/28/20  1515 12/28/20  0855 12/21/20  1545 12/18/20  0611 12/16/20  2236   WBC 6.3 6.4 6.0 5.5 5.5 6.7   < > 9.2   < > 6.2   < > 11.4*   ANEU  --   --   --   --   --   --   --  6.4  --  3.9  --  9.3*   ALYM  --   --   --   --   --   --   --  2.1  --  1.7  --  1.0   PABLO  --   --   --   --   --   --   --  0.6  --  0.6  --  1.0   AEOS  --   --   --   --   --   --   --  0.0  --  0.0  --  0.0   HGB 15.4 15.3 15.5 15.7 14.6 14.0   < > 16.6*   < > 16.2*   < > 16.9*   HCT 47.5 47.0 47.5 48.4 44.5 44.1   < > 50.8*   < > 49.2*   < > 54.5*    201 206 205 232 265   < > 308   < > 261   < > 260    < > = values in this interval not displayed.       Clotting Studies  No lab results found.    Urine Studies    Recent Labs   Lab Test 05/16/23  0805 04/30/23  1235 04/02/23  1621 03/16/23  1240 02/22/23  1434   URINEPH 7.0 7.0 6.0 7.0 7.0   NITRITE Negative Negative Positive* Positive* Positive*   LEUKEST Large* Large* Large* 500 Crescencio/uL* Small*   WBCU 5-10* * >100* >182* >100*         Microbiology:  Last 6 Culture results with specimen source  Culture Micro   Date Value Ref Range Status   06/04/2021 (A)  Final    10,000 to 50,000 colonies/mL  Enterococcus faecalis     12/17/2020 No growth  Final   12/16/2020 (A)  Final    Cultured on the 1st day of incubation:  Escherichia  coli ESBL  ESBL (extended beta lactamase) producing organisms require contact precautions.     12/16/2020   Final    Critical Value/Significant Value, preliminary result only, called to and read back by  RALPH BOLAÑOS RN 12.17.2020 1056 BC     12/16/2020   Final    (Note)  POSITIVE for E.COLI by Verigene multiplex nucleic acid test. Final  identification and antimicrobial susceptibility testing will be  verified by standard methods. Verigene test will not distinguish  E.coli from Shigella species including S.dysenteriae, S.flexneri,  S.boydii, and S.sonnei. Specimens containing Shigella species or  E.coli will be reported as Positive for E.coli.    POSITIVE for CTX-M Class A Extended Spectrum beta-lactamase (ESBL)  resistance marker by Verigene multiplex nucleic acid test. CTX-M  confers resistance to penicillins, cephalosporins and variable  resistance to beta-lactam beta-lactamase inhibitor combinations  (clavulanic acid and tazobactam). Best empiric antibiotic choice is  meropenem. Specific susceptibility testing will be performed.    Specimen tested with Verigene multiplex, gram-negative blood culture  nucleic acid test for the following targets: Acinetobacter sp.,  Citrobacter sp., Enterobacter sp., Proteus sp., E. coli, K.  pneumoniae/oxytoca, P. aeruginosa, and the following resistance  markers: CTXM, KPC, NDM, VIM, IMP and OXA.    Critical Value/Significant Value called to and read back by RALPH BOLAÑOS RN 12/17/20 1312 EH.       12/16/2020 No growth  Final   12/16/2020 (A)  Preliminary    >100,000 colonies/mL  Escherichia coli ESBL  ESBL (extended beta lactamase) producing organisms require contact precautions.     12/16/2020   Preliminary    Susceptibility testing requested by  Dr. Gonzales, Pager [phone]. Fosfomycin requested. @ 1637. 12.18.20. BS.       Specimen Description   Date Value Ref Range Status   06/04/2021 Midstream Urine  Final   12/17/2020 Blood  Final   12/16/2020 Blood Right Arm  Final    12/16/2020 Blood Right Arm  Final   12/16/2020 Unspecified Urine  Final   12/03/2020 Midstream Urine  Final        Last check of C difficile  No results found for: CDBPCT      Virology:  CMV viral loads    CMV viral loads    CMV Quant IU/mL   Date Value Ref Range Status   12/18/2020 CMV DNA Not Detected CMVND^CMV DNA Not Detected [IU]/mL Final     Comment:     Mutations within the highly conserved regions of the viral genome covered by   the RHONDA AmpliPrep/RHONDA TaqMan CMV Test primers and/or probes have been   identified and may result in under-quantitation of or failure to detect the   virus.  Supplemental testing methods should be used for testing when this is   suspected.  The RHONDA AmpliPrep/RHONDA TaqMan CMV Test is an FDA-approved in vitro nucleic   acid amplification test for the quantitation of cytomegalovirus DNA in human   plasma (EDTA plasma) using the RHONDA AmpliPrep Instrument for automated viral   nucleic acid extraction and the [employer] TaqMan Analyzer or [employer] TaqMan for   automated Real Time amplification and detection of the viral nucleic acid   target.  Titer results are reported in International Units/mL (IU/mL using 1st WHO   International standard for Human Cytomegalovirus for Nucleic Acid   Amplification based assays. The conversion factor between CMV DNA copis/mL (as   defined by the Roche RHONDA TaqMan CMV test) and International Units is the   CMV DNA concentration in IU/mL x 1.1 copies/IU = CMV DNA in copies/mL.  This assay has received FDA approval for the testing of human plasma only. The   Infectious Disease Diagnostic Laboratory at the [facility], [place], has validated the performance characteristics of the   Roche CMV assay for plasma, bronchial alveolar lavage/wash and urine.       CMV viral loads    Recent Labs   Lab Test 12/18/20  0611   CMVQNT CMV DNA Not Detected   CSPEC Plasma   CMVLOG Not Calculated       CMV viral loads    CMV Quant IU/mL   Date  Value Ref Range Status   12/18/2020 CMV DNA Not Detected CMVND^CMV DNA Not Detected [IU]/mL Final     Comment:     Mutations within the highly conserved regions of the viral genome covered by   the RHONDA AmpliPrep/RHONDA TaqMan CMV Test primers and/or probes have been   identified and may result in under-quantitation of or failure to detect the   virus.  Supplemental testing methods should be used for testing when this is   suspected.  The RHONDA AmpliPrep/RHONDA TaqMan CMV Test is an FDA-approved in vitro nucleic   acid amplification test for the quantitation of cytomegalovirus DNA in human   plasma (EDTA plasma) using the RHONDA AmpliPrep Instrument for automated viral   nucleic acid extraction and the [employer]uster TaqMan Analyzer or Kluster TaqMan for   automated Real Time amplification and detection of the viral nucleic acid   target.  Titer results are reported in International Units/mL (IU/mL using 1st WHO   International standard for Human Cytomegalovirus for Nucleic Acid   Amplification based assays. The conversion factor between CMV DNA copis/mL (as   defined by the Roche RHONDA TaqMan CMV test) and International Units is the   CMV DNA concentration in IU/mL x 1.1 copies/IU = CMV DNA in copies/mL.  This assay has received FDA approval for the testing of human plasma only. The   Infectious Disease Diagnostic Laboratory at the [facility], [place], has validated the performance characteristics of the   Roche CMV assay for plasma, bronchial alveolar lavage/wash and urine.     09/23/2020 CMV DNA Not Detected CMVND^CMV DNA Not Detected [IU]/mL Final     Comment:     Mutations within the highly conserved regions of the viral genome covered by   the RHONDA AmpliPrep/RHONDA TaqMan CMV Test primers and/or probes have been   identified and may result in under-quantitation of or failure to detect the   virus.  Supplemental testing methods should be used for testing when this is   suspected.  The RHONDA  AmpliPrep/RHONDA TaqMan CMV Test is an FDA-approved in vitro nucleic   acid amplification test for the quantitation of cytomegalovirus DNA in human   plasma (EDTA plasma) using the RHONDA AmpliPrep Instrument for automated viral   nucleic acid extraction and the RHONDA TaqMan Analyzer or RHONDA TaqMan for   automated Real Time amplification and detection of the viral nucleic acid   target.  Titer results are reported in International Units/mL (IU/mL using 1st WHO   International standard for Human Cytomegalovirus for Nucleic Acid   Amplification based assays. The conversion factor between CMV DNA copis/mL (as   defined by the Roche RHONDA TaqMan CMV test) and International Units is the   CMV DNA concentration in IU/mL x 1.1 copies/IU = CMV DNA in copies/mL.  This assay has received FDA approval for the testing of human plasma only. The   Infectious Disease Diagnostic Laboratory at the [facility], [place], has validated the performance characteristics of the   Roche CMV assay for plasma, bronchial alveolar lavage/wash and urine.       Log IU/mL of CMVQNT   Date Value Ref Range Status   12/18/2020 Not Calculated <2.1 [Log_IU]/mL Final   09/23/2020 Not Calculated <2.1 [Log_IU]/mL Final       CMV resistance testing  No lab results found.  No results found for: CMVCID, CMVFOS, CMVGAN     EBV viral loads   No lab results found.  No results found for: EBVDN, EBRES, EBVDN, EBVSP, EBVPC, EBVPCR    Human Herpes Virus 6 viral loads    No results found for: H6RES No results found for: H6SPEC    CMV Antibody IgG   Date Value Ref Range Status   01/04/2021 >8.0 (H) 0.0 - 0.8 AI Final     Comment:     Positive  Antibody index (AI) values reflect qualitative changes in antibody   concentration that cannot be directly associated with clinical condition or   disease state.       No results found for: EBIG2, EBIGM, EBVIGG, EBIGG, EBVAGN, VW7403, TOXG      Imaging:  CT a/p W 9/29/2022  IMPRESSION:  1.  Urothelial thickening and mild ectasia of the distal right renal  transplant ureter extending to the ureterovesical anastomosis,  slightly increased compared to previous CT on 12/17/2020. Findings may  represent acute or chronic sequela of inflammation/infection. No  hydronephrosis or evidence of obstruction.  2. Simple right adnexal cyst is not significantly changed compared to  previous without internal complexity on comparison ultrasound, likely  benign. No dedicated imaging follow-up recommended.  3. Stable postoperative changes of liver transplant.  4. Stable enlarged polycystic kidneys.

## 2023-09-19 NOTE — PATIENT INSTRUCTIONS
Mrs. Sharma,   As we discussed, you may now get the influenza vaccine, the RSV vaccine, and the COVID-19 vaccine once it's available. If you'd like to space out the vaccines, I would start with the influenza then the RSV then COVID-19.     Please call [phone] to schedule an appointment with me in 6 months for a virtual or in-person visit.    Thank you,   Catrachita Gonzales MD

## 2023-09-19 NOTE — NURSING NOTE
Is the patient currently in the state of [place]? YES    Visit mode:VIDEO    If the visit is dropped, the patient can be reconnected by: VIDEO VISIT: Text to cell phone:   Telephone Information:   Mobile [phone]       Will anyone else be joining the visit? NO  (If patient encounters technical issues they should call [phone]    How would you like to obtain your AVS? MyChart    Are changes needed to the allergy or medication list? Pt stated no changes to allergies and Pt stated no med changes    Reason for visit: JAS CASTAÑEDA

## 2023-09-20 ENCOUNTER — TELEPHONE (OUTPATIENT)
Dept: INTERNAL MEDICINE | Facility: CLINIC | Age: 63
End: 2023-09-20
Payer: MEDICARE

## 2023-09-20 DIAGNOSIS — Z23 NEED FOR PROPHYLACTIC VACCINATION AGAINST HEPATITIS A AND HEPATITIS B IN ADULT: ICD-10-CM

## 2023-09-20 NOTE — TELEPHONE ENCOUNTER
General Call    Contacts         Type Contact Phone/Fax    09/20/2023 01:08 PM CDT Phone (Incoming) Belen Sharma (Self) [phone] (M)          Reason for Call:  Patient is coming in for a Flu shot 09/21/23 - she is requesting for Hep A and Hep B vaccine, can those orders be put in so she can get them tomorrow? Please advise.

## 2023-09-21 ENCOUNTER — IMMUNIZATION (OUTPATIENT)
Dept: FAMILY MEDICINE | Facility: CLINIC | Age: 63
End: 2023-09-21
Payer: MEDICARE

## 2023-09-21 PROCEDURE — G0008 ADMIN INFLUENZA VIRUS VAC: HCPCS

## 2023-09-21 PROCEDURE — 90682 RIV4 VACC RECOMBINANT DNA IM: CPT

## 2023-09-21 NOTE — TELEPHONE ENCOUNTER
Twinrix (hep A/B combo) order placed per provider.      Jose Eduardo Berg CMA ([facility]) at 7:42 AM on 9/21/2023

## 2023-09-27 ENCOUNTER — TELEPHONE (OUTPATIENT)
Dept: INFECTIOUS DISEASES | Facility: CLINIC | Age: 63
End: 2023-09-27
Payer: MEDICARE

## 2023-09-27 NOTE — TELEPHONE ENCOUNTER
WALTER MCDANIEL 9/27 to sched a 6 month (around 3/19/24) follow up with Dr. Gonzales per checkout notes from 9/19.

## 2023-09-29 ENCOUNTER — OFFICE VISIT (OUTPATIENT)
Dept: DERMATOLOGY | Facility: CLINIC | Age: 63
End: 2023-09-29
Payer: MEDICARE

## 2023-09-29 DIAGNOSIS — D22.9 MULTIPLE BENIGN NEVI: ICD-10-CM

## 2023-09-29 DIAGNOSIS — I87.2 VENOUS STASIS DERMATITIS OF BOTH LOWER EXTREMITIES: ICD-10-CM

## 2023-09-29 DIAGNOSIS — L81.4 LENTIGINES: ICD-10-CM

## 2023-09-29 DIAGNOSIS — L82.1 SEBORRHEIC KERATOSIS: ICD-10-CM

## 2023-09-29 DIAGNOSIS — B35.1 ONYCHOMYCOSIS: Primary | ICD-10-CM

## 2023-09-29 DIAGNOSIS — D18.01 CHERRY ANGIOMA: ICD-10-CM

## 2023-09-29 PROCEDURE — 99214 OFFICE O/P EST MOD 30 MIN: CPT | Performed by: DERMATOLOGY

## 2023-09-29 ASSESSMENT — PAIN SCALES - GENERAL: PAINLEVEL: NO PAIN (0)

## 2023-09-29 NOTE — NURSING NOTE
Dermatology Rooming Note    Belen Sharma's goals for this visit include:   Chief Complaint   Patient presents with    Skin Check     Here today for a skin check. No concerns.      Elena Gallardo RN

## 2023-09-29 NOTE — LETTER
9/29/2023       RE: Belen Sharma  [address] [place] [address]     Dear Colleague,    Thank you for referring your patient, Belen Sharma, to the [facility] [place] at [facility]. Please see a copy of my visit note below.    [facility] Dermatology Note  Encounter Date: Sep 29, 2023  Office Visit     Dermatology Problem List:  1. PMH kidney and liver transplant 2010, liver transplant 2011, kidney transplant 2016  2. Onychomycosis  - prior rx: ciclopirox  - Skin Medicinals - Ciclopirox 8% / Itraconazole 3% / Fluconazole 3% / Terbinafine HCl 1% / Ibuprofen 2% DMSO Suspension placed 9/29/2023  3. Verrucoid Keratosis, right lateral thigh, s/p shave bx 4/6/2022   4. Stasis Dermatitis     ____________________________________________    Assessment & Plan:     # Stasis Dermatitis   - Recommended compression stockings   - Patient will schedule follow up with her cardiologist    # Onychomycosis  - Failed ciclopirox  - Not interested in PO antifungals  - Will try skin medicinals -  Ciclopirox 8% / Itraconazole 3% / Fluconazole 3% / Terbinafine HCl 1% / Ibuprofen 2% DMSO Suspension    # Benign lesions - SKs, cherry angiomas, lentigenes.  - No treatment required    # Multiple benign nevi.   - Monitor for ABCDEs of melanoma   - Continue sun protection - recommend SPF 30 or higher with frequent application   - Return sooner if noticing changing or symptomatic lesions    # History of organ transplant. Patient aware of higher risk for cutaneous malignancy given history of transplant. Monitor for changing or symptomatic lesions. Continue frequent skin checks and photoprotection.      Procedures Performed:   None.    Follow-up: 1 year(s) in-person, or earlier for new or changing lesions    Staff and Scribe:     Scribe Disclosure:   Cortney FLORES, am serving as a scribe; to document services personally performed by Dr. Coppola  Emmanuel - -based on data collection and the provider's statements to me.     Provider Disclosure:   The documentation recorded by the scribe accurately reflects the services I personally performed and the decisions made by me.    Anat Benitez MD    Department of Dermatology  [facility]: Phone: [phone], Fax: [phone]  10/7/2023     ____________________________________________    CC: Skin Check (Here today for a skin check. No concerns. )    HPI:    Belen Sharma is a(n) 63 year old adult who presents today as a return patient for FBSE. She has no specific spots concerns at this time, but does note concern of discoloration of lower extremities. She has also been using ciclopirox for her nails with little to no improvement.     Patient is otherwise feeling well, without additional skin concerns.     Labs Reviewed:  N/A    Physical Exam:  Vitals: There were no vitals taken for this visit.  SKIN: Total skin excluding the undergarment areas was performed. The exam included the head/face, neck, both arms, chest, back, abdomen, both legs, digits and/or nails.   - Brawny pigmentation to bilateral shins.   - Right third, fourth, and fifth toenails with thickening and white discoloration.  - There are dome shaped bright red papules on the trunk and extremities .   - Multiple regular brown pigmented macules and papules are identified on the trunk and extremities. .   - Scattered brown macules on sun exposed areas.  - Waxy stuck on papules and plaques on trunk and extremities.   - No other lesions of concern on areas examined.     Medications:  Current Outpatient Medications   Medication    acetaminophen (TYLENOL) 325 MG tablet    aspirin (ASA) 81 MG chewable tablet    atorvastatin (LIPITOR) 10 MG tablet    biotin 1000 MCG TABS tablet    ciclopirox (PENLAC) 8 % external solution    diphenhydrAMINE (BENADRYL) 25 MG tablet     estradiol (ESTRACE) 0.1 MG/GM vaginal cream    guaiFENesin (MUCINEX) 600 MG 12 hr tablet    Multiple Vitamins-Minerals (WOMENS DAILY FORMULA PO)    nitroFURantoin macrocrystal-monohydrate (MACROBID) 100 MG capsule    nitroGLYcerin (NITROSTAT) 0.4 MG sublingual tablet    predniSONE (DELTASONE) 5 MG tablet    Probiotic Product (PROBIOTIC PO)    tacrolimus (GENERIC EQUIVALENT) 0.5 MG capsule    tacrolimus (GENERIC EQUIVALENT) 1 MG capsule    temazepam (RESTORIL) 15 MG capsule     No current facility-administered medications for this visit.      Past Medical History:   Patient Active Problem List   Diagnosis    S/P splenectomy    Polycystic kidney disease    Immunosuppressed status (H)    Liver replaced by transplant (H)    Endometriosis    HPV (human papilloma virus) infection    JANNETTE III with severe dysplasia    Acquired bilateral hammer toes    Depression    Depressive psychosis (H)    Dermatophytosis of nail    Hallux valgus, acquired, bilateral    Incisional hernia following transplant    Malignant neoplasm of breast (H)    Microvascular angina (H)    Postmenopausal state    Secondary hyperparathyroidism (H)    Scoliosis deformity of spine    Sinusitis    Swelling of first metatarsophalangeal (MTP) joint    Uterine prolapse    Kidney replaced by transplant    Recurrent UTI    Osteopenia of multiple sites    Combined forms of age-related cataract of both eyes    Aftercare following organ transplant    Hammertoe, bilateral    Weakness of hip, unspecified laterality    Hip tightness    Pelvic floor dysfunction     Past Medical History:   Diagnosis Date    Adolescent scoliosis     protruding    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  Both, very large, native kidneys reamain.    PONV (postoperative nausea and vomiting)     Need to start with ice chips and apple juice, no soda    S/P kidney transplant     SLK 10/9/2010 - kidney failure 2/2 [facility]. Explanted 2012. Re-transplant after de-sensitization 2016    S/P liver transplant (H)     10/9/2010 - HAT, ischemic biopathy. Re-transplant 3/3/2011    S/P splenectomy     Spider veins 2019    Thyroid disease     Hyperthyroid treated with single dose iodine    Urinary tract infection 09/2022       CC No referring provider defined for this encounter. on close of this encounter.

## 2023-09-29 NOTE — PROGRESS NOTES
[facility] Dermatology Note  Encounter Date: Sep 29, 2023  Office Visit     Dermatology Problem List:  1. [facility] kidney and liver transplant 2010, liver transplant 2011, kidney transplant 2016  2. Onychomycosis  - prior rx: ciclopirox  - Skin Medicinals - Ciclopirox 8% / Itraconazole 3% / Fluconazole 3% / Terbinafine HCl 1% / Ibuprofen 2% DMSO Suspension placed 9/29/2023  3. Verrucoid Keratosis, right lateral thigh, s/p shave bx 4/6/2022   4. Stasis Dermatitis     ____________________________________________    Assessment & Plan:     # Stasis Dermatitis   - Recommended compression stockings   - Patient will schedule follow up with her cardiologist    # Onychomycosis  - Failed ciclopirox  - Not interested in PO antifungals  - Will try skin medicinals -  Ciclopirox 8% / Itraconazole 3% / Fluconazole 3% / Terbinafine HCl 1% / Ibuprofen 2% DMSO Suspension    # Benign lesions - SKs, cherry angiomas, lentigenes.  - No treatment required    # Multiple benign nevi.   - Monitor for ABCDEs of melanoma   - Continue sun protection - recommend SPF 30 or higher with frequent application   - Return sooner if noticing changing or symptomatic lesions    # History of organ transplant. Patient aware of higher risk for cutaneous malignancy given history of transplant. Monitor for changing or symptomatic lesions. Continue frequent skin checks and photoprotection.      Procedures Performed:   None.    Follow-up: 1 year(s) in-person, or earlier for new or changing lesions    Staff and Scribe:     Scribe Disclosure:   I, Cortney Le, am serving as a scribe; to document services personally performed by Dr. Anat Benitez - -based on data collection and the provider's statements to me.     Provider Disclosure:   The documentation recorded by the scribe accurately reflects the services I personally performed and the decisions made by me.    Anat Benitez MD    Department of Dermatology  [facility]: Phone: [phone], Fax: [phone]  10/7/2023     ____________________________________________    CC: Skin Check (Here today for a skin check. No concerns. )    HPI:    Belen Sharma is a(n) 63 year old adult who presents today as a return patient for FBSE. She has no specific spots concerns at this time, but does note concern of discoloration of lower extremities. She has also been using ciclopirox for her nails with little to no improvement.     Patient is otherwise feeling well, without additional skin concerns.     Labs Reviewed:  N/A    Physical Exam:  Vitals: There were no vitals taken for this visit.  SKIN: Total skin excluding the undergarment areas was performed. The exam included the head/face, neck, both arms, chest, back, abdomen, both legs, digits and/or nails.   - Brawny pigmentation to bilateral shins.   - Right third, fourth, and fifth toenails with thickening and white discoloration.  - There are dome shaped bright red papules on the trunk and extremities .   - Multiple regular brown pigmented macules and papules are identified on the trunk and extremities. .   - Scattered brown macules on sun exposed areas.  - Waxy stuck on papules and plaques on trunk and extremities.   - No other lesions of concern on areas examined.     Medications:  Current Outpatient Medications   Medication    acetaminophen (TYLENOL) 325 MG tablet    aspirin (ASA) 81 MG chewable tablet    atorvastatin (LIPITOR) 10 MG tablet    biotin 1000 MCG TABS tablet    ciclopirox (PENLAC) 8 % external solution    diphenhydrAMINE (BENADRYL) 25 MG tablet    estradiol (ESTRACE) 0.1 MG/GM vaginal cream    guaiFENesin (MUCINEX) 600 MG 12 hr tablet    Multiple Vitamins-Minerals (WOMENS DAILY FORMULA PO)    nitroFURantoin macrocrystal-monohydrate (MACROBID) 100 MG capsule    nitroGLYcerin (NITROSTAT) 0.4 MG sublingual tablet    predniSONE (DELTASONE) 5 MG tablet     Probiotic Product (PROBIOTIC PO)    tacrolimus (GENERIC EQUIVALENT) 0.5 MG capsule    tacrolimus (GENERIC EQUIVALENT) 1 MG capsule    temazepam (RESTORIL) 15 MG capsule     No current facility-administered medications for this visit.      Past Medical History:   Patient Active Problem List   Diagnosis    S/P splenectomy    Polycystic kidney disease    Immunosuppressed status (H)    Liver replaced by transplant (H)    Endometriosis    HPV (human papilloma virus) infection    JANNETTE III with severe dysplasia    Acquired bilateral hammer toes    Depression    Depressive psychosis (H)    Dermatophytosis of nail    Hallux valgus, acquired, bilateral    Incisional hernia following transplant    Malignant neoplasm of breast (H)    Microvascular angina (H)    Postmenopausal state    Secondary hyperparathyroidism (H)    Scoliosis deformity of spine    Sinusitis    Swelling of first metatarsophalangeal (MTP) joint    Uterine prolapse    Kidney replaced by transplant    Recurrent UTI    Osteopenia of multiple sites    Combined forms of age-related cataract of both eyes    Aftercare following organ transplant    Hammertoe, bilateral    Weakness of hip, unspecified laterality    Hip tightness    Pelvic floor dysfunction     Past Medical History:   Diagnosis Date    Adolescent scoliosis     protruding    BK viremia [id number]    CMV pneumonia (H) 2010    Congenital hepatic fibrosis     Endometriosis     High grade squamous intraepithelial lesion of cervix 09/11/2020    History of angina     HPV (human papilloma virus) infection     Immunosuppression (H)     Polycystic kidney disease     Polycystic kidneys, tx kidney on the right. Both, very large, native kidneys reamain.    PONV (postoperative nausea and vomiting)     Need to start with ice chips and apple juice, no soda    S/P kidney transplant     SLK 10/9/2010 - kidney failure 2/2 [facility]. Explanted 2012. Re-transplant after de-sensitization 2016    S/P liver transplant (H)      10/9/2010 - HAT, ischemic biopathy. Re-transplant 3/3/2011    S/P splenectomy     Spider veins 2019    Thyroid disease     Hyperthyroid treated with single dose iodine    Urinary tract infection 09/2022       CC No referring provider defined for this encounter. on close of this encounter.   0

## 2023-09-29 NOTE — PATIENT INSTRUCTIONS

## 2023-10-03 ENCOUNTER — TELEPHONE (OUTPATIENT)
Dept: TRANSPLANT | Facility: CLINIC | Age: 63
End: 2023-10-03
Payer: MEDICARE

## 2023-10-03 NOTE — TELEPHONE ENCOUNTER
Belen called to clarify vaccine questions:    Wanted to clarify that she can receive the Covid and Hepatitis A and B vaccinations.  Gave her our current recommendations from the transplant office that she is eligible to receive these vaccines as a transplant recipient and should follow up with her PCP for appropriate dosing schedule.   Patient verbalized understanding.

## 2023-10-03 NOTE — TELEPHONE ENCOUNTER
Health Call Center    Phone Message    May a detailed message be left on voicemail: no     Reason for Call: Other: Patient scheduled an annual return visit with Leventhal for 11/16/2023 (virtual).  She has labs scheduled on 10/16/2023 - is this too far out?  Please let schedulers know if the lab should be completed closer to 11/16/23 appointment.  Also, can you place request for the 11/16/23 appointment for us to attach?  Thanks!     Action Taken: Message routed to:  [facility] IQRA SANTIAGO    Travel Screening: Not Applicable

## 2023-10-06 ENCOUNTER — ALLIED HEALTH/NURSE VISIT (OUTPATIENT)
Dept: INTERNAL MEDICINE | Facility: CLINIC | Age: 63
End: 2023-10-06
Payer: MEDICARE

## 2023-10-06 DIAGNOSIS — Z23 NEED FOR VACCINATION: Primary | ICD-10-CM

## 2023-10-06 PROCEDURE — 90480 ADMN SARSCOV2 VAC 1/ONLY CMP: CPT

## 2023-10-06 PROCEDURE — 90471 IMMUNIZATION ADMIN: CPT

## 2023-10-06 PROCEDURE — 99207 PR NO CHARGE NURSE ONLY: CPT

## 2023-10-06 NOTE — PROGRESS NOTES
Belen Sharma received the COVID vaccine today in clinic at the request of the patient. The immunization was given under the supervision of Dr. De La Garza if assistance was needed. The patient does not report a history of adverse reactions associated with vaccine administration. The immunization site was cleaned with an alcohol prep wipe. The immunization was given without incident--see immunization list for administration details. No swelling or redness was observed at the site of injection after the immunization was given. The patient was advised to remain in fourth floor lobby of the [facility] for fifteen minutes after the injection in case of an adverse reaction.       ANGELITO Rizo at 10:50 AM on 10/6/2023.

## 2023-10-13 ENCOUNTER — TELEPHONE (OUTPATIENT)
Dept: CARDIOLOGY | Facility: CLINIC | Age: 63
End: 2023-10-13
Payer: MEDICARE

## 2023-10-13 DIAGNOSIS — I20.89 MICROVASCULAR ANGINA (H): Primary | ICD-10-CM

## 2023-10-13 NOTE — TELEPHONE ENCOUNTER
Left Voicemail (1st Attempt) and Sent Mychart (1st Attempt) for the patient to call back and schedule the following:    Appointment type: Return Cardiology - Virtual  Provider: Dr. Mohamud  Return date: 12/04/23  Specialty phone number: [phone] opt 1  Additional appointment(s) needed: N/A  Additonal Notes: Change to earlier same day due to provider change in scheduling

## 2023-10-15 NOTE — TELEPHONE ENCOUNTER
Patient Contacted to schedule the following:    Appointment type: Return Cardiology - Virtual  Provider: Dr. Mohamud  Return date: 12/04/23  Specialty phone number: [phone] opt 1  Additional appointment(s) needed: N/A  Additonal Notes: Change to earlier same day due to provider change in scheduling     Spoke with patient, rescheduled to 12/4 at 9:30am. Patient asked if there will be an EKG ordered prior to check her condition. Writer did not find orders.     Sp Nick on 10/15/2023 at 5:09 PM

## 2023-10-16 ENCOUNTER — LAB (OUTPATIENT)
Dept: LAB | Facility: CLINIC | Age: 63
End: 2023-10-16
Attending: INTERNAL MEDICINE
Payer: MEDICARE

## 2023-10-16 DIAGNOSIS — Z94.4 LIVER REPLACED BY TRANSPLANT (H): ICD-10-CM

## 2023-10-16 LAB
ALBUMIN SERPL BCG-MCNC: 3.9 G/DL (ref 3.5–5.2)
ALP SERPL-CCNC: 66 U/L (ref 35–129)
ALT SERPL W P-5'-P-CCNC: 14 U/L (ref 0–70)
ANION GAP SERPL CALCULATED.3IONS-SCNC: 7 MMOL/L (ref 7–15)
AST SERPL W P-5'-P-CCNC: 19 U/L (ref 0–45)
BILIRUB DIRECT SERPL-MCNC: 0.23 MG/DL (ref 0–0.3)
BILIRUB SERPL-MCNC: 0.9 MG/DL
BUN SERPL-MCNC: 14.2 MG/DL (ref 8–23)
CALCIUM SERPL-MCNC: 10 MG/DL (ref 8.8–10.2)
CHLORIDE SERPL-SCNC: 106 MMOL/L (ref 98–107)
CREAT SERPL-MCNC: 0.86 MG/DL (ref 0.51–1.17)
DEPRECATED HCO3 PLAS-SCNC: 27 MMOL/L (ref 22–29)
EGFRCR SERPLBLD CKD-EPI 2021: 75 ML/MIN/1.73M2
ERYTHROCYTE [DISTWIDTH] IN BLOOD BY AUTOMATED COUNT: 14.4 % (ref 10–15)
GLUCOSE SERPL-MCNC: 92 MG/DL (ref 70–99)
HCT VFR BLD AUTO: 48.8 % (ref 35–53)
HGB BLD-MCNC: 15.9 G/DL (ref 11.7–17.7)
MCH RBC QN AUTO: 30.1 PG (ref 26.5–33)
MCHC RBC AUTO-ENTMCNC: 32.6 G/DL (ref 31.5–36.5)
MCV RBC AUTO: 92 FL (ref 78–100)
PLATELET # BLD AUTO: 249 10E3/UL (ref 150–450)
POTASSIUM SERPL-SCNC: 3.9 MMOL/L (ref 3.4–5.3)
PROT SERPL-MCNC: 6.8 G/DL (ref 6.4–8.3)
RBC # BLD AUTO: 5.29 10E6/UL (ref 3.8–5.9)
SODIUM SERPL-SCNC: 140 MMOL/L (ref 135–145)
TACROLIMUS BLD-MCNC: 4.9 UG/L (ref 5–15)
TME LAST DOSE: ABNORMAL H
TME LAST DOSE: ABNORMAL H
WBC # BLD AUTO: 7.4 10E3/UL (ref 4–11)

## 2023-10-16 PROCEDURE — 85027 COMPLETE CBC AUTOMATED: CPT

## 2023-10-16 PROCEDURE — 36415 COLL VENOUS BLD VENIPUNCTURE: CPT

## 2023-10-16 PROCEDURE — 82248 BILIRUBIN DIRECT: CPT

## 2023-10-16 PROCEDURE — 80197 ASSAY OF TACROLIMUS: CPT

## 2023-10-16 PROCEDURE — 80053 COMPREHEN METABOLIC PANEL: CPT

## 2023-10-25 ENCOUNTER — THERAPY VISIT (OUTPATIENT)
Dept: PHYSICAL THERAPY | Facility: REHABILITATION | Age: 63
End: 2023-10-25
Payer: MEDICARE

## 2023-10-25 DIAGNOSIS — N81.4 UTERINE PROLAPSE: ICD-10-CM

## 2023-10-25 DIAGNOSIS — M62.89 PELVIC FLOOR DYSFUNCTION: ICD-10-CM

## 2023-10-25 DIAGNOSIS — R29.898 WEAKNESS OF HIP, UNSPECIFIED LATERALITY: ICD-10-CM

## 2023-10-25 DIAGNOSIS — R29.898 HIP TIGHTNESS: Primary | ICD-10-CM

## 2023-10-25 PROCEDURE — 97112 NEUROMUSCULAR REEDUCATION: CPT | Mod: GP

## 2023-10-25 PROCEDURE — 97110 THERAPEUTIC EXERCISES: CPT | Mod: GP

## 2023-10-26 ENCOUNTER — OFFICE VISIT (OUTPATIENT)
Dept: OBGYN | Facility: CLINIC | Age: 63
End: 2023-10-26
Attending: OBSTETRICS & GYNECOLOGY
Payer: MEDICARE

## 2023-10-26 VITALS
DIASTOLIC BLOOD PRESSURE: 59 MMHG | SYSTOLIC BLOOD PRESSURE: 95 MMHG | BODY MASS INDEX: 20.52 KG/M2 | HEART RATE: 80 BPM | WEIGHT: 142 LBS

## 2023-10-26 DIAGNOSIS — Z12.4 SCREENING FOR MALIGNANT NEOPLASM OF CERVIX: Primary | ICD-10-CM

## 2023-10-26 DIAGNOSIS — Z86.19 HISTORY OF HPV INFECTION: ICD-10-CM

## 2023-10-26 PROCEDURE — 87624 HPV HI-RISK TYP POOLED RSLT: CPT | Performed by: OBSTETRICS & GYNECOLOGY

## 2023-10-26 PROCEDURE — 57452 EXAM OF CERVIX W/SCOPE: CPT | Performed by: OBSTETRICS & GYNECOLOGY

## 2023-10-26 PROCEDURE — G0145 SCR C/V CYTO,THINLAYER,RESCR: HCPCS | Performed by: OBSTETRICS & GYNECOLOGY

## 2023-10-26 ASSESSMENT — PAIN SCALES - GENERAL: PAINLEVEL: NO PAIN (0)

## 2023-10-26 NOTE — PROGRESS NOTES
"COLPOSCOPY REPORT      Age: 63 year old  No LMP recorded. Patient is postmenopausal.  Indication for Colposcopy:  History of CIN3, persistent HPV and immune suppression  Other history: JANNETTE 3 s/p CKC in 2020  Date of Last Pap:  4/2023      Time Out - \"Pause for the Cause\"  Just before the procedure begins, through verbal and active participation of team members, verify:    Initials   Patient Name    [facility]   Patient Date of Birth [facility]   Procedure to be performed  [facility]   Site, laterality, level or multiples, noting patient position   [facility]   Relevant images or diagnostics available/displayed   [facility]   Implants, special equipment or special requirements        [facility]     Consent:  Risks, benefits of treatment, and no treatment were discussed.  Patient's questions were elicited and answered. , Written consent signed and scanned into medical record., and Patient received and verbalized understanding of discharge instructions    Procedure:  acetic acid applied  Findings:  SCJ seen entirely: No unsatisfactory  Gross appearance: Normal  Describe Findings:   Trans zone: Normal    Colposcopic Impression:  Normal     Plan: Repeat pap/HPV today, normal exam  If both negative, reviewed spacing exams which she seems comfortable with- plan for repeat pap/HPV in 1 year.   Daysi Perez MD    "

## 2023-10-26 NOTE — LETTER
"10/26/2023       RE: Belen Sharma  [address] [place] [address]     Dear Colleague,    Thank you for referring your patient, Belen Sharma, to the [facility] [place] at [facility]. Please see a copy of my visit note below.    COLPOSCOPY REPORT      Age: 63 year old  No LMP recorded. Patient is postmenopausal.  Indication for Colposcopy:  History of CIN3, persistent HPV and immune suppression  Other history: JANNETTE 3 s/p CKC in 2020  Date of Last Pap:  4/2023      Time Out - \"Pause for the Cause\"  Just before the procedure begins, through verbal and active participation of team members, verify:    Initials   Patient Name    [facility]   Patient Date of Birth [facility]   Procedure to be performed  [facility]   Site, laterality, level or multiples, noting patient position   [facility]   Relevant images or diagnostics available/displayed   [facility]   Implants, special equipment or special requirements        [facility]     Consent:  Risks, benefits of treatment, and no treatment were discussed.  Patient's questions were elicited and answered. , Written consent signed and scanned into medical record., and Patient received and verbalized understanding of discharge instructions    Procedure:  acetic acid applied  Findings:  SCJ seen entirely: No unsatisfactory  Gross appearance: Normal  Describe Findings:   Trans zone: Normal    Colposcopic Impression:  Normal     Plan: Repeat pap/HPV today, normal exam  If both negative, reviewed spacing exams which she seems comfortable with- plan for repeat pap/HPV in 1 year.   Daysi Perez MD    "

## 2023-11-02 ENCOUNTER — PATIENT OUTREACH (OUTPATIENT)
Dept: OBGYN | Facility: CLINIC | Age: 63
End: 2023-11-02
Payer: MEDICARE

## 2023-11-02 LAB
HUMAN PAPILLOMA VIRUS 16 DNA: NEGATIVE
HUMAN PAPILLOMA VIRUS 18 DNA: NEGATIVE
HUMAN PAPILLOMA VIRUS FINAL DIAGNOSIS: NORMAL
HUMAN PAPILLOMA VIRUS OTHER HR: NEGATIVE

## 2023-11-09 ENCOUNTER — LAB (OUTPATIENT)
Dept: LAB | Facility: CLINIC | Age: 63
End: 2023-11-09
Payer: MEDICARE

## 2023-11-09 DIAGNOSIS — Z94.4 LIVER REPLACED BY TRANSPLANT (H): ICD-10-CM

## 2023-11-09 LAB
ALBUMIN SERPL BCG-MCNC: 3.9 G/DL (ref 3.5–5.2)
ALP SERPL-CCNC: 57 U/L (ref 35–129)
ALT SERPL W P-5'-P-CCNC: 15 U/L (ref 0–70)
ANION GAP SERPL CALCULATED.3IONS-SCNC: 8 MMOL/L (ref 7–15)
AST SERPL W P-5'-P-CCNC: 20 U/L (ref 0–45)
BILIRUB DIRECT SERPL-MCNC: <0.2 MG/DL (ref 0–0.3)
BILIRUB SERPL-MCNC: 0.7 MG/DL
BUN SERPL-MCNC: 16 MG/DL (ref 8–23)
CALCIUM SERPL-MCNC: 9.9 MG/DL (ref 8.8–10.2)
CHLORIDE SERPL-SCNC: 106 MMOL/L (ref 98–107)
CREAT SERPL-MCNC: 0.83 MG/DL (ref 0.51–1.17)
DEPRECATED HCO3 PLAS-SCNC: 26 MMOL/L (ref 22–29)
EGFRCR SERPLBLD CKD-EPI 2021: 79 ML/MIN/1.73M2
ERYTHROCYTE [DISTWIDTH] IN BLOOD BY AUTOMATED COUNT: 14.6 % (ref 10–15)
GLUCOSE SERPL-MCNC: 85 MG/DL (ref 70–99)
HCT VFR BLD AUTO: 48.4 % (ref 35–53)
HGB BLD-MCNC: 15.8 G/DL (ref 11.7–17.7)
MCH RBC QN AUTO: 30.2 PG (ref 26.5–33)
MCHC RBC AUTO-ENTMCNC: 32.6 G/DL (ref 31.5–36.5)
MCV RBC AUTO: 93 FL (ref 78–100)
PLATELET # BLD AUTO: 222 10E3/UL (ref 150–450)
POTASSIUM SERPL-SCNC: 4.1 MMOL/L (ref 3.4–5.3)
PROT SERPL-MCNC: 6.7 G/DL (ref 6.4–8.3)
RBC # BLD AUTO: 5.23 10E6/UL (ref 3.8–5.9)
SODIUM SERPL-SCNC: 140 MMOL/L (ref 135–145)
TACROLIMUS BLD-MCNC: 5 UG/L (ref 5–15)
TME LAST DOSE: NORMAL H
TME LAST DOSE: NORMAL H
WBC # BLD AUTO: 5.9 10E3/UL (ref 4–11)

## 2023-11-09 PROCEDURE — 80053 COMPREHEN METABOLIC PANEL: CPT

## 2023-11-09 PROCEDURE — 80197 ASSAY OF TACROLIMUS: CPT

## 2023-11-09 PROCEDURE — 85027 COMPLETE CBC AUTOMATED: CPT

## 2023-11-09 PROCEDURE — 36415 COLL VENOUS BLD VENIPUNCTURE: CPT

## 2023-11-09 PROCEDURE — 82248 BILIRUBIN DIRECT: CPT

## 2023-11-14 ENCOUNTER — MYC MEDICAL ADVICE (OUTPATIENT)
Dept: INTERNAL MEDICINE | Facility: CLINIC | Age: 63
End: 2023-11-14
Payer: MEDICARE

## 2023-11-16 ENCOUNTER — TELEPHONE (OUTPATIENT)
Dept: TRANSPLANT | Facility: CLINIC | Age: 63
End: 2023-11-16

## 2023-11-16 ENCOUNTER — VIRTUAL VISIT (OUTPATIENT)
Dept: GASTROENTEROLOGY | Facility: CLINIC | Age: 63
End: 2023-11-16
Attending: INTERNAL MEDICINE
Payer: MEDICARE

## 2023-11-16 VITALS
SYSTOLIC BLOOD PRESSURE: 101 MMHG | HEIGHT: 70 IN | HEART RATE: 80 BPM | WEIGHT: 141 LBS | BODY MASS INDEX: 20.19 KG/M2 | DIASTOLIC BLOOD PRESSURE: 62 MMHG

## 2023-11-16 DIAGNOSIS — M85.89 OSTEOPENIA OF MULTIPLE SITES: ICD-10-CM

## 2023-11-16 DIAGNOSIS — D84.9 IMMUNOSUPPRESSION (H): ICD-10-CM

## 2023-11-16 DIAGNOSIS — Z48.298 AFTERCARE FOLLOWING ORGAN TRANSPLANT: ICD-10-CM

## 2023-11-16 DIAGNOSIS — Z94.4 LIVER REPLACED BY TRANSPLANT (H): ICD-10-CM

## 2023-11-16 DIAGNOSIS — Z94.4 LIVER TRANSPLANTED (H): Primary | ICD-10-CM

## 2023-11-16 DIAGNOSIS — Z12.11 COLON CANCER SCREENING: Primary | ICD-10-CM

## 2023-11-16 DIAGNOSIS — Z94.0 KIDNEY REPLACED BY TRANSPLANT: ICD-10-CM

## 2023-11-16 PROCEDURE — 99214 OFFICE O/P EST MOD 30 MIN: CPT | Mod: VID | Performed by: INTERNAL MEDICINE

## 2023-11-16 ASSESSMENT — PAIN SCALES - GENERAL: PAINLEVEL: NO PAIN (0)

## 2023-11-16 NOTE — TELEPHONE ENCOUNTER
Spoke with patient to order supplement of vitamin D and calcium supplement  as requested by Dr. Leventhal.  Prescription order sent to [facility] per request of patient.

## 2023-11-16 NOTE — NURSING NOTE
Is the patient currently in the state of [place]? YES    Visit mode:VIDEO    If the visit is dropped, the patient can be reconnected by: VIDEO VISIT: Send to e-mail at: [email or web address]    Will anyone else be joining the visit? NO  (If patient encounters technical issues they should call [phone])    How would you like to obtain your AVS? MyChart    Are changes needed to the allergy or medication list? N/A    Reason for visit: RECHECK    Carina CASTAÑEDA

## 2023-11-16 NOTE — LETTER
2023         RE: Belen Sharma  [address] [place] [address]        Dear Colleague,    Thank you for referring your patient, Belen Sharma, to the [facility] [place]. Please see a copy of my visit note below.    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  Her kidney transplant was removed in 2012.  She underwent a splenectomy in 2013.  Ultimately she underwent retransplant with  donor kidney in .     Since last seen she has been doing quite well.     Has severe scoliosis and is working closely with PT on exercises to strengthen core and large muscle groups.  Goes to the gym twice weekly. Does have concerns she is losing muscle mass     We noted that last DXA scan was  and demonstrated osteopenia.  She had been on a vitamin D supplement, but this was discontinued.     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 Course complicated by multiple severe infections (including CMV pneumonia). 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 Both, very large, native kidneys reamain.    PONV (postoperative nausea and vomiting)     Need to start with ice chips and apple juice, no soda    S/P kidney transplant     SLK 10/9/2010 - kidney failure 2/2 [facility]. Explanted 2012. Re-transplant after de-sensitization 2016    S/P liver transplant (H)     10/9/2010 - HAT, ischemic biopathy. Re-transplant 3/3/2011    S/P splenectomy     Spider veins 2019    Thyroid disease     Hyperthyroid treated with single dose iodine    Urinary tract infection 09/2022     Past Surgical History:  Past Surgical History:   Procedure Laterality Date    bunectomy  08/01/2018    right foot    bunionectomy  01/2019    left    CHOLECYSTECTOMY      CONIZATION N/A 09/30/2020    Procedure: CONE BIOPSY, CERVIX;  Surgeon: Daysi Perez MD;  [facility]    FAILED PICC - RIGHT ARM Right 12/19/2020    Has a LUE AV fistula.    H STATISTIC PICC LINE INSERTION >5YR, FAILED Bilateral 12/19/2020    Left fistula, Right failed x 2 Occlussion    HERNIA REPAIR, INCISIONAL  03/2013    IR DIALYSIS PTA CENTRAL SEG  12/19/2020    IR PICC PLACEMENT > 5 YRS OF AGE  12/19/2020    IR PICC PLACEMENT > 5 YRS OF AGE  10/5/2022    PHACOEMULSIFICATION CLEAR CORNEA WITH STANDARD INTRAOCULAR LENS IMPLANT Right 2/9/2023    Procedure: RIGHT EYE PHACOEMULSIFICATION, CATARACT, WITH INTRAOCULAR LENS IMPLANT;  Surgeon: Sebastian Robertson MD;  Location: [facility] OR    PHACOEMULSIFICATION WITH STANDARD  INTRAOCULAR LENS IMPLANT Left 3/9/2023    Procedure: LEFT EYE PHACOEMULSIFICATION, CATARACT, WITH STANDARD INTRAOCULAR LENS IMPLANT INSERTION;  Surgeon: Sebastian Robertson MD;  Location: [facility] OR    SPLENECTOMY      Splenectomy    TRANSPLANT KIDNEY RECIPIENT  DONOR  2016    re-transplant after [facility]; 6 months de-sensitization prior AND 6 months after transplant    TRANSPLANT LIVER RECIPIENT  DONOR  2011    TRANSPLANT LIVER, KIDNEY RECIPIENT  DONOR, COMBINED  10/09/2010    HAT - re-Tx liver 3/3/2011; Kidney (left iliac) lost to [facility]/fibrosis - explant    VASCULAR SURGERY      Vascular access left UE. Not used for 4 years since 2nd kidney tx 2016 [place]     Past Social History:  Social History     Tobacco Use    Smoking status: Never    Smokeless tobacco: Never   Vaping Use    Vaping Use: Never used   Substance Use Topics    Alcohol use: Not Currently    Drug use: Not Currently      Family History:  Family History   Problem Relation Age of Onset    Depression Mother     Anxiety Disorder Mother     Depression Father     Anxiety Disorder Father     Uterine Cancer Maternal Grandmother     Polycystic Kidney Diease Brother     Polycystic Kidney Diease Brother     Polycystic Kidney Diease Brother     Polycystic Kidney Diease Brother     Cervical Cancer Maternal Aunt     Glaucoma No family hx of     Macular Degeneration No family hx of      Review of Systems  A complete 10 point review of systems was asked and answered in the negative unless specifically commented upon in the HPI    Current Outpatient Medications   Medication    acetaminophen (TYLENOL) 325 MG tablet    aspirin (ASA) 81 MG chewable tablet    atorvastatin (LIPITOR) 10 MG tablet    biotin 1000 MCG TABS tablet    ciclopirox (PENLAC) 8 % external solution    COMPOUNDED NON-CONTROLLED SUBSTANCE (CMPD RX) - PHARMACY TO MIX COMPOUNDED MEDICATION    diphenhydrAMINE (BENADRYL) 25 MG tablet    estradiol (ESTRACE) 0.1 MG/GM vaginal  "cream    guaiFENesin (MUCINEX) 600 MG 12 hr tablet    Multiple Vitamins-Minerals (WOMENS DAILY FORMULA PO)    nitroFURantoin macrocrystal-monohydrate (MACROBID) 100 MG capsule    nitroGLYcerin (NITROSTAT) 0.4 MG sublingual tablet    predniSONE (DELTASONE) 5 MG tablet    Probiotic Product (PROBIOTIC PO)    tacrolimus (GENERIC EQUIVALENT) 0.5 MG capsule    tacrolimus (GENERIC EQUIVALENT) 1 MG capsule    temazepam (RESTORIL) 15 MG capsule     No current facility-administered medications for this visit.     Objective:         Vitals:    11/16/23 0748   BP: 101/62   Pulse: 80   Weight: 64 kg (141 lb)   Height: 1.772 m (5' 9.75\")     Body mass index is 20.38 kg/m .     /62   Pulse 80   Ht 1.772 m (5' 9.75\")   Wt 64 kg (141 lb)   BMI 20.38 kg/m      Constitutional: no acute distress  Eyes: anicteric  Respiratory: Normal respiratory excursion   Skin: no jaundice  Neuro: AAO x 3    Labs:   BMP  Recent Labs   Lab Test 11/09/23  0750 10/16/23  0814 08/15/23  0812 05/08/23  0805    140 139 140   POTASSIUM 4.1 3.9 3.7 3.8   CHLORIDE 106 106 106 105   EDSON 9.9 10.0 9.7 10.0   CO2 26 27 26 27   BUN 16.0 14.2 18.9 14.1   CR 0.83 0.86 0.80 0.76   GLC 85 92 78 90     CBC  Recent Labs   Lab Test 11/09/23  0750 10/16/23  0814 08/15/23  0812 05/08/23  0805   WBC 5.9 7.4 6.3 6.4   RBC 5.23 5.29 5.03 5.06   HGB 15.8 15.9 15.4 15.3   HCT 48.4 48.8 47.5 47.0   MCV 93 92 94 93   MCH 30.2 30.1 30.6 30.2   MCHC 32.6 32.6 32.4 32.6   RDW 14.6 14.4 14.2 14.0    249 205 201     Liver Enzymes   Recent Labs   Lab Test 06/29/22  0803   PROTTOTAL 6.5   ALBUMIN 4.0   BILITOTAL 0.8   ALKPHOS 58   AST 20   ALT 14      Imaging:   DEXA 4/2022: The most negative and valid T-score of -1.9 at the level of the left femoral neck, left total hip and wrist corresponds with low bone density according to WHO criteria for postmenopausal females and men age 50 and over. The risk of osteoporotic fracture increases approximately 2-fold for each " 1.0 SD decrease in T-score    Wrist DEXA 4/2022: Wrist DXA is reported along with axial DXA from the same date.  The T-score of the 33% radius is -1.9.      Mammogram 7/2022: ACR BI-RADS Category 1: Negative    Assessment and Plan:    S/P Liver Transplantation:   - s/p SLK 10/2010.  Complicated by HAT with resultant ischemic biliopathy leading to graft failure  - Re-liver transplant 3/3/2011  - No allograft dysfunction: no history of rejection nor biliary strictures  - Follow labs per routine    Immunosuppression:   - Given she had a kidney after liver transplant, her immunosuppression will be directed by the kidney transplant service.  - The bare minimum she would need from a liver transplant perspective is equivalent of tacrolimus BID with a goal trough of ~ 3  - Will plan to check immunosuppression trough levels per protocol to assess for adequate target dosing and will assess CBC and kidney function for toxicity of medications    CAD:   - follows with cardiology    Kidney [facility]:   - s/p DDKT x2, last in 2016  - follows with transplant nephrology    Osteopenia:  - Will restart on calcium and vitamin D supplement\  - Repeat DXA in early 2025    Colon cancer screening  - Last exam 2019; will order for 2025    Nutrition/Weight:    It is very common for patients to put on significant weight after liver transplantation, as they become conditioned to eating as much as possible to maintain a healthy weight pre-transplant.  There is a very significant risk of developing fatty liver and non-alcoholic steatohepatitis in post-transplant patients, even in those who were not transplanted for TIWARI cirrhosis.  - Please work on eating a diet that is rice in fresh fruits and vegetables, moderate amounts of lean protein and dairy, and modest amounts of carbohydrates and fats.  - An exercise plan/regimen is an essential part of remaining healthy in the post-transplant setting and we recommend 30-35 minutes of exercise at least 4  days a week    Routine Health Care:  - You need to establish and maintain care with a primary care physician to manage: hypertension, high cholesterol, abnormal blood sugars, foot care - as these are all potential complications of your health after liver transplantation and will need a local physician to manage these issues.  - Recommend establishing care with a mental health provider; consult placed  - All patients with liver diseases (even post transplant) are at an increased risk for osteoporosis.  We strongly recommend screening for Vitamin D deficiency at least twice yearly with aggressive supplementation/replacement as indicated.    -  DEXA 2022 with evidence of low bone density; recommend vitamin D and calcium supplementation; recommend repeat DEXA in 2 year  - Recommend yearly skin exams with dermatology, and if skin cancers are found, recommend twice yearly exams  - Recommend you stay up to date for cancer screening: mammograms/PAP for women and prostate cancer screening for men, and colon cancer screening for all.   * Repeat colon cancer screening due 2024  - Recommend dental care every 6 months  - Practice good hygiene with washing of hands and maintaining a clean living space as this will decrease your chances of developing an infection in the post-transplantation setting  - Eat a health diet: rich in fresh fruits and vegetables, lean proteins, and minimize carbohydrate and fat intake.    Thank you very much for the opportunity to participate in the care of this patient.  If you have any further questions, please don't hesitate to contact our office.    Return to Clinic: 12 months    Thomas M. Leventhal, M.D.  Associate Professor of Medicine  Advanced/Transplant Hepatology & Critical Care Medicine  [facility]

## 2023-11-16 NOTE — PROGRESS NOTES
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  Her kidney transplant was removed in 2012.  She underwent a splenectomy in 2013.  Ultimately she underwent retransplant with  donor kidney in .     Since last seen she has been doing quite well.     Has severe scoliosis and is working closely with PT on exercises to strengthen core and large muscle groups.  Goes to the gym twice weekly. Does have concerns she is losing muscle mass     We noted that last DXA scan was  and demonstrated osteopenia.  She had been on a vitamin D supplement, but this was discontinued.     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 Course complicated by multiple severe infections (including CMV pneumonia). Re-listed for transplant    - DDLT (#2 liver): 3/3/2011  - Technique: orthotopic, duct to duct  - Complications: none    - DDRT (#2 kidney): 2016    Past Medical History:  Past Medical History:   Diagnosis Date    Adolescent scoliosis     protruding    BK viremia [id number]    CMV pneumonia (H) 2010    Congenital hepatic  fibrosis     Endometriosis     High grade squamous intraepithelial lesion of cervix 2020    History of angina     HPV (human papilloma virus) infection     Immunosuppression (H24)     Polycystic kidney disease     Polycystic kidneys, tx kidney on the right. Both, very large, native kidneys reamain.    PONV (postoperative nausea and vomiting)     Need to start with ice chips and apple juice, no soda    S/P kidney transplant     SLK 10/9/2010 - kidney failure 2/2 [facility]. Explanted . Re-transplant after de-sensitization     S/P liver transplant (H)     10/9/2010 - HAT, ischemic biopathy. Re-transplant 3/3/2011    S/P splenectomy     Spider veins     Thyroid disease     Hyperthyroid treated with single dose iodine    Urinary tract infection 2022     Past Surgical History:  Past Surgical History:   Procedure Laterality Date    bunectomy  2018    right foot    bunionectomy  2019    left    CHOLECYSTECTOMY      CONIZATION N/A 2020    Procedure: CONE BIOPSY, CERVIX;  Surgeon: Daysi Perez MD;  [facility]: [facility]    FAILED PICC - RIGHT ARM Right 2020    Has a LUE AV fistula.    H STATISTIC PICC LINE INSERTION >5YR, FAILED Bilateral 2020    Left fistula, Right failed x 2 Occlussion    HERNIA REPAIR, INCISIONAL  2013    IR DIALYSIS PTA CENTRAL SEG  2020    IR PICC PLACEMENT > 5 YRS OF AGE  2020    IR PICC PLACEMENT > 5 YRS OF AGE  10/5/2022    PHACOEMULSIFICATION CLEAR CORNEA WITH STANDARD INTRAOCULAR LENS IMPLANT Right 2023    Procedure: RIGHT EYE PHACOEMULSIFICATION, CATARACT, WITH INTRAOCULAR LENS IMPLANT;  Surgeon: Sebastian Robertson MD;  Location: [facility] OR    PHACOEMULSIFICATION WITH STANDARD INTRAOCULAR LENS IMPLANT Left 3/9/2023    Procedure: LEFT EYE PHACOEMULSIFICATION, CATARACT, WITH STANDARD INTRAOCULAR LENS IMPLANT INSERTION;  Surgeon: Sebastian Robertson MD;  Location: [facility] OR    SPLENECTOMY      Splenectomy    TRANSPLANT KIDNEY RECIPIENT   DONOR  2016    re-transplant after [facility]; 6 months de-sensitization prior AND 6 months after transplant    TRANSPLANT LIVER RECIPIENT  DONOR  2011    TRANSPLANT LIVER, KIDNEY RECIPIENT  DONOR, COMBINED  10/09/2010    HAT - re-Tx liver 3/3/2011; Kidney (left iliac) lost to [facility]/fibrosis - explant    VASCULAR SURGERY      Vascular access left UE. Not used for 4 years since 2nd kidney tx 2016 [place]     Past Social History:  Social History     Tobacco Use    Smoking status: Never    Smokeless tobacco: Never   Vaping Use    Vaping Use: Never used   Substance Use Topics    Alcohol use: Not Currently    Drug use: Not Currently      Family History:  Family History   Problem Relation Age of Onset    Depression Mother     Anxiety Disorder Mother     Depression Father     Anxiety Disorder Father     Uterine Cancer Maternal Grandmother     Polycystic Kidney Diease Brother     Polycystic Kidney Diease Brother     Polycystic Kidney Diease Brother     Polycystic Kidney Diease Brother     Cervical Cancer Maternal Aunt     Glaucoma No family hx of     Macular Degeneration No family hx of      Review of Systems  A complete 10 point review of systems was asked and answered in the negative unless specifically commented upon in the HPI    Current Outpatient Medications   Medication    acetaminophen (TYLENOL) 325 MG tablet    aspirin (ASA) 81 MG chewable tablet    atorvastatin (LIPITOR) 10 MG tablet    biotin 1000 MCG TABS tablet    ciclopirox (PENLAC) 8 % external solution    COMPOUNDED NON-CONTROLLED SUBSTANCE (CMPD RX) - PHARMACY TO MIX COMPOUNDED MEDICATION    diphenhydrAMINE (BENADRYL) 25 MG tablet    estradiol (ESTRACE) 0.1 MG/GM vaginal cream    guaiFENesin (MUCINEX) 600 MG 12 hr tablet    Multiple Vitamins-Minerals (WOMENS DAILY FORMULA PO)    nitroFURantoin macrocrystal-monohydrate (MACROBID) 100 MG capsule    nitroGLYcerin (NITROSTAT) 0.4 MG sublingual tablet    predniSONE (DELTASONE) 5 MG tablet     "Probiotic Product (PROBIOTIC PO)    tacrolimus (GENERIC EQUIVALENT) 0.5 MG capsule    tacrolimus (GENERIC EQUIVALENT) 1 MG capsule    temazepam (RESTORIL) 15 MG capsule     No current facility-administered medications for this visit.     Objective:         Vitals:    11/16/23 0748   BP: 101/62   Pulse: 80   Weight: 64 kg (141 lb)   Height: 1.772 m (5' 9.75\")     Body mass index is 20.38 kg/m .     /62   Pulse 80   Ht 1.772 m (5' 9.75\")   Wt 64 kg (141 lb)   BMI 20.38 kg/m      Constitutional: no acute distress  Eyes: anicteric  Respiratory: Normal respiratory excursion   Skin: no jaundice  Neuro: AAO x 3    Labs:   BMP  Recent Labs   Lab Test 11/09/23  0750 10/16/23  0814 08/15/23  0812 05/08/23  0805    140 139 140   POTASSIUM 4.1 3.9 3.7 3.8   CHLORIDE 106 106 106 105   EDSON 9.9 10.0 9.7 10.0   CO2 26 27 26 27   BUN 16.0 14.2 18.9 14.1   CR 0.83 0.86 0.80 0.76   GLC 85 92 78 90     CBC  Recent Labs   Lab Test 11/09/23  0750 10/16/23  0814 08/15/23  0812 05/08/23  0805   WBC 5.9 7.4 6.3 6.4   RBC 5.23 5.29 5.03 5.06   HGB 15.8 15.9 15.4 15.3   HCT 48.4 48.8 47.5 47.0   MCV 93 92 94 93   MCH 30.2 30.1 30.6 30.2   MCHC 32.6 32.6 32.4 32.6   RDW 14.6 14.4 14.2 14.0    249 205 201     Liver Enzymes   Recent Labs   Lab Test 06/29/22  0803   PROTTOTAL 6.5   ALBUMIN 4.0   BILITOTAL 0.8   ALKPHOS 58   AST 20   ALT 14      Imaging:   DEXA 4/2022: The most negative and valid T-score of -1.9 at the level of the left femoral neck, left total hip and wrist corresponds with low bone density according to WHO criteria for postmenopausal females and men age 50 and over. The risk of osteoporotic fracture increases approximately 2-fold for each 1.0 SD decrease in T-score    Wrist DEXA 4/2022: Wrist DXA is reported along with axial DXA from the same date.  The T-score of the 33% radius is -1.9.      Mammogram 7/2022: ACR BI-RADS Category 1: Negative    Assessment and Plan:    S/P Liver Transplantation:   - s/p " BELIA 10/2010.  Complicated by HAT with resultant ischemic biliopathy leading to graft failure  - Re-liver transplant 3/3/2011  - No allograft dysfunction: no history of rejection nor biliary strictures  - Follow labs per routine    Immunosuppression:   - Given she had a kidney after liver transplant, her immunosuppression will be directed by the kidney transplant service.  - The bare minimum she would need from a liver transplant perspective is equivalent of tacrolimus BID with a goal trough of ~ 3  - Will plan to check immunosuppression trough levels per protocol to assess for adequate target dosing and will assess CBC and kidney function for toxicity of medications    CAD:   - follows with cardiology    Kidney Health:   - s/p DDKT x2, last in 2016  - follows with transplant nephrology    Osteopenia:  - Will restart on calcium and vitamin D supplement\  - Repeat DXA in early 2025    Colon cancer screening  - Last exam 2019; will order for 2025    Nutrition/Weight:    It is very common for patients to put on significant weight after liver transplantation, as they become conditioned to eating as much as possible to maintain a healthy weight pre-transplant.  There is a very significant risk of developing fatty liver and non-alcoholic steatohepatitis in post-transplant patients, even in those who were not transplanted for TIWARI cirrhosis.  - Please work on eating a diet that is rice in fresh fruits and vegetables, moderate amounts of lean protein and dairy, and modest amounts of carbohydrates and fats.  - An exercise plan/regimen is an essential part of remaining healthy in the post-transplant setting and we recommend 30-35 minutes of exercise at least 4 days a week    Routine Health Care:  - You need to establish and maintain care with a primary care physician to manage: hypertension, high cholesterol, abnormal blood sugars, foot care - as these are all potential complications of your health after liver transplantation  and will need a local physician to manage these issues.  - Recommend establishing care with a mental health provider; consult placed  - All patients with liver diseases (even post transplant) are at an increased risk for osteoporosis.  We strongly recommend screening for Vitamin D deficiency at least twice yearly with aggressive supplementation/replacement as indicated.    -  DEXA 2022 with evidence of low bone density; recommend vitamin D and calcium supplementation; recommend repeat DEXA in 2 year  - Recommend yearly skin exams with dermatology, and if skin cancers are found, recommend twice yearly exams  - Recommend you stay up to date for cancer screening: mammograms/PAP for women and prostate cancer screening for men, and colon cancer screening for all.   * Repeat colon cancer screening due 2024  - Recommend dental care every 6 months  - Practice good hygiene with washing of hands and maintaining a clean living space as this will decrease your chances of developing an infection in the post-transplantation setting  - Eat a health diet: rich in fresh fruits and vegetables, lean proteins, and minimize carbohydrate and fat intake.    Thank you very much for the opportunity to participate in the care of this patient.  If you have any further questions, please don't hesitate to contact our office.    Return to Clinic: 12 months    Thomas M. Leventhal, M.D.  Associate Professor of Medicine  Advanced/Transplant Hepatology & Critical Care Medicine  [facility]

## 2023-11-16 NOTE — PATIENT INSTRUCTIONS
- We will get you started on a calcium and vitamin D supplement for the osteopenia    - Will plan on a colonoscopy around May 2023    We recommend you go to the website: [email or web address].[email or web address] to review valuable information about your liver disease, good dietary choices, exercise options    Nutrition  - It is very important to eat the right types and amounts of foods.  We recommend a diet low in carbohydrates/sugars and high in fresh fruits/vegetables, with the right amount of protein.  We typically recommend 1 gm/kg/day of protein, or 70-75 grams of protein every day.  - In regard to protein intake, you can focus on: All meats, cooked fish and seafood, vegetable-based protein meat products, tofu, eggs, legumes, dairy products (including milk and yogurt and cheese), unsalted nuts, etc...    - Bedtime snacks are especially important (preferrably something with some protein)    - Patients with malnutrition and/or loss of muscular mass can improve their nutrition and muscular mass by drinking two cans of dietary supplements daily, particularly at bedtime.  These would include: Ensure, Boost, [place] Instant Milk, Glucerna, or powdered whey protein (or similar supplements)

## 2023-11-27 ENCOUNTER — LAB (OUTPATIENT)
Dept: LAB | Facility: CLINIC | Age: 63
End: 2023-11-27
Payer: MEDICARE

## 2023-11-27 ENCOUNTER — HOSPITAL ENCOUNTER (OUTPATIENT)
Dept: CARDIOLOGY | Facility: CLINIC | Age: 63
Discharge: HOME | End: 2023-11-27
Attending: INTERNAL MEDICINE | Admitting: INTERNAL MEDICINE
Payer: MEDICARE

## 2023-11-27 DIAGNOSIS — I20.89 MICROVASCULAR ANGINA (H): ICD-10-CM

## 2023-11-27 DIAGNOSIS — Z94.4 LIVER REPLACED BY TRANSPLANT (H): ICD-10-CM

## 2023-11-27 LAB
ALBUMIN SERPL BCG-MCNC: 4 G/DL (ref 3.5–5.2)
ALP SERPL-CCNC: 60 U/L (ref 40–150)
ALT SERPL W P-5'-P-CCNC: 16 U/L (ref 0–70)
ANION GAP SERPL CALCULATED.3IONS-SCNC: 8 MMOL/L (ref 7–15)
AST SERPL W P-5'-P-CCNC: 20 U/L (ref 0–45)
ATRIAL RATE - MUSE: 80 BPM
BILIRUB DIRECT SERPL-MCNC: <0.2 MG/DL (ref 0–0.3)
BILIRUB SERPL-MCNC: 0.5 MG/DL
BUN SERPL-MCNC: 15.5 MG/DL (ref 8–23)
CALCIUM SERPL-MCNC: 9.6 MG/DL (ref 8.8–10.2)
CHLORIDE SERPL-SCNC: 106 MMOL/L (ref 98–107)
CREAT SERPL-MCNC: 0.75 MG/DL (ref 0.51–1.17)
DEPRECATED HCO3 PLAS-SCNC: 26 MMOL/L (ref 22–29)
DIASTOLIC BLOOD PRESSURE - MUSE: NORMAL MMHG
EGFRCR SERPLBLD CKD-EPI 2021: 89 ML/MIN/1.73M2
ERYTHROCYTE [DISTWIDTH] IN BLOOD BY AUTOMATED COUNT: 14.7 % (ref 10–15)
GLUCOSE SERPL-MCNC: 89 MG/DL (ref 70–99)
HCT VFR BLD AUTO: 49.3 % (ref 35–53)
HGB BLD-MCNC: 15.9 G/DL (ref 11.7–17.7)
INTERPRETATION ECG - MUSE: NORMAL
MCH RBC QN AUTO: 30.3 PG (ref 26.5–33)
MCHC RBC AUTO-ENTMCNC: 32.3 G/DL (ref 31.5–36.5)
MCV RBC AUTO: 94 FL (ref 78–100)
P AXIS - MUSE: 74 DEGREES
PLATELET # BLD AUTO: 217 10E3/UL (ref 150–450)
POTASSIUM SERPL-SCNC: 3.8 MMOL/L (ref 3.4–5.3)
PR INTERVAL - MUSE: 122 MS
PROT SERPL-MCNC: 6.8 G/DL (ref 6.4–8.3)
QRS DURATION - MUSE: 92 MS
QT - MUSE: 354 MS
QTC - MUSE: 408 MS
R AXIS - MUSE: 60 DEGREES
RBC # BLD AUTO: 5.24 10E6/UL (ref 3.8–5.9)
SODIUM SERPL-SCNC: 140 MMOL/L (ref 135–145)
SYSTOLIC BLOOD PRESSURE - MUSE: NORMAL MMHG
T AXIS - MUSE: 43 DEGREES
TACROLIMUS BLD-MCNC: 4.2 UG/L (ref 5–15)
TME LAST DOSE: ABNORMAL H
TME LAST DOSE: ABNORMAL H
VENTRICULAR RATE- MUSE: 80 BPM
WBC # BLD AUTO: 8.1 10E3/UL (ref 4–11)

## 2023-11-27 PROCEDURE — 93005 ELECTROCARDIOGRAM TRACING: CPT

## 2023-11-27 PROCEDURE — 36415 COLL VENOUS BLD VENIPUNCTURE: CPT

## 2023-11-27 PROCEDURE — 93010 ELECTROCARDIOGRAM REPORT: CPT | Mod: HOP | Performed by: INTERNAL MEDICINE

## 2023-11-27 PROCEDURE — 82248 BILIRUBIN DIRECT: CPT

## 2023-11-27 PROCEDURE — 80053 COMPREHEN METABOLIC PANEL: CPT

## 2023-11-27 PROCEDURE — 80197 ASSAY OF TACROLIMUS: CPT

## 2023-11-27 PROCEDURE — 85027 COMPLETE CBC AUTOMATED: CPT

## 2023-11-29 DIAGNOSIS — N81.4 UTERINE PROLAPSE: Primary | ICD-10-CM

## 2023-11-30 ENCOUNTER — TELEPHONE (OUTPATIENT)
Dept: GASTROENTEROLOGY | Facility: CLINIC | Age: 63
End: 2023-11-30
Payer: MEDICARE

## 2023-11-30 DIAGNOSIS — Z12.11 COLON CANCER SCREENING: Primary | ICD-10-CM

## 2023-11-30 NOTE — TELEPHONE ENCOUNTER
"Endoscopy Scheduling Screen    Have you had a positive Covid test in the last 14 days?  No    Are you active on MyChart?   Yes    What insurance is in the chart?  Other:  MEDICARE/INTTRA    Ordering/Referring Provider:  LEVENTHAL, THOMAS MICHAEL   (If ordering provider performs procedure, schedule with ordering provider unless otherwise instructed. )    BMI: Estimated body mass index is 20.38 kg/m  as calculated from the following:    Height as of 11/16/23: 1.772 m (5' 9.75\").    Weight as of 11/16/23: 64 kg (141 lb).     Sedation Ordered  moderate sedation.   If patient BMI > 50 do not schedule in [facility].    If patient BMI > 45 do not schedule at [facility].    Are you taking methadone or Suboxone?  No    Are you taking any prescription medications for pain 3 or more times per week?   No    Do you have a history of malignant hyperthermia or adverse reaction to anesthesia?  No    (Females) Are you currently pregnant?   No     Have you been diagnosed or told you have pulmonary hypertension?   No    Do you have an LVAD?  No    Have you been told you have moderate to severe sleep apnea?  No    Have you been told you have COPD, asthma, or any other lung disease?  No    Do you have any heart conditions?  Yes     In the past 6 months, have you had any hospitalizations for heart related issues including cardiomyopathy, heart attack, or stent placement?  No    Do you have any implantable devices in your body (pacemaker, ICD)?  No    Do you take nitroglycerine?  Yes, less than 1 time per week    Have you ever had an organ transplant?   Yes - No [facility]    Have you ever had or are you awaiting a heart or lung transplant?   No    Have you had a stroke or transient ischemic attack (TIA aka \"mini stroke\" in the last 6 months?   No    Have you been diagnosed with or been told you have cirrhosis of the liver?   No    Are you currently on dialysis?   No    Do you need assistance transferring?   No    BMI: Estimated body mass index is 20.38 " "kg/m  as calculated from the following:    Height as of 11/16/23: 1.772 m (5' 9.75\").    Weight as of 11/16/23: 64 kg (141 lb).     Is patients BMI > 40 and scheduling location [facility]?  No    Do you take an injectable medication for weight loss or diabetes (excluding insulin)?  No    Do you take the medication Naltrexone?  No    Do you take blood thinners?  No       Prep   Are you currently on dialysis or do you have chronic kidney disease?  Yes (Golytely Prep)    Do you have a diagnosis of diabetes?  No    Do you have a diagnosis of cystic fibrosis (CF)?  No    On a regular basis do you go 3 -5 days between bowel movements?  No    BMI > 40?  No    Preferred Pharmacy:        [id number] [place], [place] - [address] [facility]  [address] [place] [address]  Phone: [phone] Fax: [phone]    [facility]  [address]  [facility] [address]  Phone: [phone] Fax: [phone]    [place], [place] - [address]  [address]  [facility] [address]  Phone: [phone] Fax: [phone] Alternate Fax: [phone], [phone]    [facility] - [place], [place] - [address] [address]  [address] [address]  Phone: [phone] Fax: [phone]      Final Scheduling Details   Colonoscopy prep sent?  Standard Golytely    Procedure scheduled  Colonoscopy    Surgeon:  leventhal     Date of procedure:  3/20     Pre-OP / PAC:   No - Not required for this site.    Location  [facility] - [facility] - Per order.    Sedation   Moderate Sedation - Per order.      Patient Reminders:   You will receive a call from a Nurse to review instructions and health history.  This assessment must be completed prior to your procedure.  Failure to complete the Nurse assessment may result in the procedure being cancelled.      On the day of your procedure, please designate an " adult(s) who can drive you home stay with you for the next 24 hours. The medicines used in the exam will make you sleepy. You will not be able to drive.      You cannot take public transportation, ride share services, or non-medical taxi service without a responsible caregiver.  Medical transport services are allowed with the requirement that a responsible caregiver will receive you at your destination.  We require that drivers and caregivers are confirmed prior to your procedure.

## 2023-12-04 ENCOUNTER — VIRTUAL VISIT (OUTPATIENT)
Dept: CARDIOLOGY | Facility: CLINIC | Age: 63
End: 2023-12-04
Attending: INTERNAL MEDICINE
Payer: MEDICARE

## 2023-12-04 VITALS
DIASTOLIC BLOOD PRESSURE: 61 MMHG | WEIGHT: 143 LBS | HEART RATE: 73 BPM | BODY MASS INDEX: 20.47 KG/M2 | HEIGHT: 70 IN | SYSTOLIC BLOOD PRESSURE: 117 MMHG

## 2023-12-04 DIAGNOSIS — Z94.4 S/P LIVER TRANSPLANT (H): ICD-10-CM

## 2023-12-04 DIAGNOSIS — E78.5 DYSLIPIDEMIA: ICD-10-CM

## 2023-12-04 DIAGNOSIS — I20.89 MICROVASCULAR ANGINA (H): Primary | ICD-10-CM

## 2023-12-04 DIAGNOSIS — Z94.0 S/P KIDNEY TRANSPLANT: ICD-10-CM

## 2023-12-04 PROCEDURE — 99214 OFFICE O/P EST MOD 30 MIN: CPT | Mod: 95 | Performed by: INTERNAL MEDICINE

## 2023-12-04 NOTE — PROGRESS NOTES
I had the pleasure of participating in coordination of clinical cardiovascular care for patient, Belen Sharma, via [facility]'s virtual visit protocol during the COVID-19 Pandemic.    History:  Ms. Sharma is a very pleasant 63 year old woman with microvascular angina and nonobstructive coronary disease.  She is a kidney and liver transplant recipient.  She is doing well without any problems with organ rejection.  She is on tacrolimus, Myfortic (not on her med list) and prednisone for her antirejection medications.    Kidney transplant x 2, 2010, 2014  Liver transplant x 2, 2010, 2011    She is uptodate on her immunizations. She denies prolonged episodes of chest pain, orthopnea or PND or syncope. She has neuropathy and is working with a therapist. She goes to the gym 2 x week.     No recent hospitalization for HF/MI/stroke      Current Outpatient Medications   Medication Sig Dispense Refill    acetaminophen (TYLENOL) 325 MG tablet Take 1 tablet (325 mg) by mouth every 4 hours as needed for mild pain or fever 120 tablet 1    aspirin (ASA) 81 MG chewable tablet Take 81 mg by mouth every evening Stopped 7 days preop      atorvastatin (LIPITOR) 10 MG tablet Take 1 tablet (10 mg) by mouth daily 90 tablet 3    biotin 1000 MCG TABS tablet Take 3,000 mcg by mouth every evening      calcium carbonate-vitamin D (OSCAL) 500-5 MG-MCG tablet Take 1 tablet by mouth 2 times daily for 360 days 180 tablet 3    ciclopirox (PENLAC) 8 % external solution Apply to adjacent skin and affected nails daily.  Remove with alcohol every 7 days, then repeat. 6.6 mL 0    COMPOUNDED NON-CONTROLLED SUBSTANCE (CMPD RX) - PHARMACY TO MIX COMPOUNDED MEDICATION Apply nightly to toenails. SM 61 -  Ciclopirox 8% / Itraconazole 3% / Fluconazole 3% / Terbinafine HCl 1% / Ibuprofen 2% DMSO Suspension 15 mL 3    diphenhydrAMINE (BENADRYL) 25 MG tablet Take 25 mg by mouth every 8 hours as needed for allergies      estradiol (ESTRACE) 0.1 MG/GM vaginal cream Place  1 g vaginally three times a week 42.5 g 11    guaiFENesin (MUCINEX) 600 MG 12 hr tablet Take 1 tablet by mouth 2 times daily as needed      Multiple Vitamins-Minerals (WOMENS DAILY FORMULA PO) Take 1 tablet by mouth daily      nitroFURantoin macrocrystal-monohydrate (MACROBID) 100 MG capsule Take 1 capsule (100 mg) by mouth 2 times daily PRN UTI 6 capsule 99    nitroGLYcerin (NITROSTAT) 0.4 MG sublingual tablet Place 1 tablet (0.4 mg) under the tongue every 5 minutes as needed for chest pain For chest pain place 1 tablet under the tongue every 5 minutes for 3 doses. If symptoms persist 5 minutes after 1st dose call 911. 30 tablet 1    predniSONE (DELTASONE) 5 MG tablet Take 1 tablet (5 mg) by mouth daily 90 tablet 3    Probiotic Product (PROBIOTIC PO) Take 2 tablets by mouth every morning ([employer] Women's Vaginal Probiotic)      tacrolimus (GENERIC EQUIVALENT) 0.5 MG capsule Take 1 capsule (0.5 mg) by mouth every morning 90 capsule 3    tacrolimus (GENERIC EQUIVALENT) 1 MG capsule Take 1 capsule (1 mg) by mouth every 12 hours 180 capsule 3    temazepam (RESTORIL) 15 MG capsule Take 1 capsule (15 mg) by mouth as needed for sleep (twice a year) 15 capsule 0       Allergies - reviewed     Allergies   Allergen Reactions    Ibuprofen      Due to liver transplant     Past history -reviewed  Past Medical History:   Diagnosis Date    Adolescent scoliosis     protruding    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 Both, very large, native kidneys reamain.    PONV (postoperative nausea and vomiting)     Need to start with ice chips and apple juice, no soda    S/P kidney transplant     SLK 10/9/2010 - kidney failure 2/2 [facility]. Explanted  2012. Re-transplant after de-sensitization 2016    S/P liver transplant (H)     10/9/2010 - HAT, ischemic biopathy. Re-transplant 3/3/2011    S/P splenectomy     Spider veins 2019    Thyroid disease     Hyperthyroid treated with single dose iodine    Urinary tract infection 09/2022       FAMILY HISTORY:  No premature coronary artery disease, aortic dissection or sudden cardiac death.      SOCIAL HISTORY:  Non smoker  No alcohol intake  No drug use    ROS: non contributory on the 10-point review of system    Exam: limited due to the nature of this visit   In general, the patient is in no acute distress.    Breathing is unlabored.   Neck: No jugular venous distension.    No edema per patient    Data:  Recent cardiac investigations - reviewed    EKG 11/2023 -sinus rhythm at 80 normal axis and intervals    Echocardiogram August 31, 2020  Normal biventricular size and function  No significant valvular abnormalities  Normal IVC  No pericardial effusion.    [facility]   CT coronary angiogram 1-3-2019  CALCIUM SCORE : 19. This placed the patient in the 70th percentile rank, meaning that approximately 30% of females at ages from 56-60 will have a higher calcium score that the patient.  CORONARY CT ANGIOGRAPHY: The examination is diagnostic in quality. There is right heart dominance.   LEFT MAIN CORONARY ARTERY: Widely patent  LEFT ANTERIOR DESCENDING; Mild focal calcified plaque in the proxima-mid LAD without significant stenosis. The remainder of the LAD is widely patent.  FIRST DIAGONAL: Widely patent  SECOND DIAGONAL: Widely patent  PROXIMAL LEFT CIRCUMFLEX: Widely patent  MID CIRCUMFLEX: Widely patent  DISTAL CIRCUMFLEX: Widely patent      I have independently reviewed this patient's relevant laboratory and cardiac data :  Lipids:   Recent Labs   Lab Test 04/14/23  0806 12/05/22  0809   CHOL 163 200*   HDL 62 57   LDL 84 120   TRIG 83 115      Recent Labs   Lab Test 11/27/23  0759 11/09/23  0750   AST 20 20      Recent Labs   Lab Test 11/27/23  0759 11/09/23  0750   ALT 16 15     Electrolytes:   Recent Labs   Lab Test 11/27/23  0759   CR 0.75     Hemoglobin:   Lab Results   Component Value Date    HGB 15.9 11/27/2023       Assessment and Plan:  63 year old woman with    Non obstructive CAD  Microvascular angina, stable  Dyslipidemia  Post kidney and liver transplantation    Belen Sharma reports doing well. She has non obstructive CAD in the proximal LAD for which she takes aspirin and NTG as needed for stable angina. Her cardiac function is normal. Her renal and liver function tests are normal.    She is on low dose statin therapy and is also addressing her lipids with diet and regular exercise. WE will monitor her lipids annually.     Recommendations:     Healthy diet - low fat and low carb   Atorvastatin 10 mg daily  Follow up 1 year      Video Visit Details:     Type of service:  Video Visit  Video Start Time: 9.34 am  Video End Time: 9.50 am  Patient location: Home  Provider location:  Off-site   Platform used for Video Visit: [facility]   In addition to visit time documented above, I spent an additional 15 minutes on data review and documentation.    Christy Mohamud MD, MS  Professor of Medicine  Cardiovascular Medicine

## 2023-12-04 NOTE — LETTER
12/4/2023      RE: Belen Sharma  [address] [place] [address]       Dear Colleague,    Thank you for the opportunity to participate in the care of your patient, Belen Sharma, at the [facility] [place] at [facility]. Please see a copy of my visit note below.    I had the pleasure of participating in coordination of clinical cardiovascular care for patient, Belen Sharma, via [facility]'s virtual visit protocol during the COVID-19 Pandemic.    History:  Ms. Sharma is a very pleasant 63 year old woman with microvascular angina and nonobstructive coronary disease.  She is a kidney and liver transplant recipient.  She is doing well without any problems with organ rejection.  She is on tacrolimus, Myfortic (not on her med list) and prednisone for her antirejection medications.    Kidney transplant x 2, 2010, 2014  Liver transplant x 2, 2010, 2011    She is uptodate on her immunizations. She denies prolonged episodes of chest pain, orthopnea or PND or syncope. She has neuropathy and is working with a therapist. She goes to the gym 2 x week.     No recent hospitalization for HF/MI/stroke      Current Outpatient Medications   Medication Sig Dispense Refill    acetaminophen (TYLENOL) 325 MG tablet Take 1 tablet (325 mg) by mouth every 4 hours as needed for mild pain or fever 120 tablet 1    aspirin (ASA) 81 MG chewable tablet Take 81 mg by mouth every evening Stopped 7 days preop      atorvastatin (LIPITOR) 10 MG tablet Take 1 tablet (10 mg) by mouth daily 90 tablet 3    biotin 1000 MCG TABS tablet Take 3,000 mcg by mouth every evening      calcium carbonate-vitamin D (OSCAL) 500-5 MG-MCG tablet Take 1 tablet by mouth 2 times daily for 360 days 180 tablet 3    ciclopirox (PENLAC) 8 % external solution Apply to adjacent skin and affected nails daily.  Remove with alcohol every 7 days, then repeat. 6.6 mL 0    COMPOUNDED NON-CONTROLLED SUBSTANCE (CMPD RX) - PHARMACY  TO MIX COMPOUNDED MEDICATION Apply nightly to toenails. SM 61 -  Ciclopirox 8% / Itraconazole 3% / Fluconazole 3% / Terbinafine HCl 1% / Ibuprofen 2% DMSO Suspension 15 mL 3    diphenhydrAMINE (BENADRYL) 25 MG tablet Take 25 mg by mouth every 8 hours as needed for allergies      estradiol (ESTRACE) 0.1 MG/GM vaginal cream Place 1 g vaginally three times a week 42.5 g 11    guaiFENesin (MUCINEX) 600 MG 12 hr tablet Take 1 tablet by mouth 2 times daily as needed      Multiple Vitamins-Minerals (WOMENS DAILY FORMULA PO) Take 1 tablet by mouth daily      nitroFURantoin macrocrystal-monohydrate (MACROBID) 100 MG capsule Take 1 capsule (100 mg) by mouth 2 times daily PRN UTI 6 capsule 99    nitroGLYcerin (NITROSTAT) 0.4 MG sublingual tablet Place 1 tablet (0.4 mg) under the tongue every 5 minutes as needed for chest pain For chest pain place 1 tablet under the tongue every 5 minutes for 3 doses. If symptoms persist 5 minutes after 1st dose call 911. 30 tablet 1    predniSONE (DELTASONE) 5 MG tablet Take 1 tablet (5 mg) by mouth daily 90 tablet 3    Probiotic Product (PROBIOTIC PO) Take 2 tablets by mouth every morning ([facility] Women's Vaginal Probiotic)      tacrolimus (GENERIC EQUIVALENT) 0.5 MG capsule Take 1 capsule (0.5 mg) by mouth every morning 90 capsule 3    tacrolimus (GENERIC EQUIVALENT) 1 MG capsule Take 1 capsule (1 mg) by mouth every 12 hours 180 capsule 3    temazepam (RESTORIL) 15 MG capsule Take 1 capsule (15 mg) by mouth as needed for sleep (twice a year) 15 capsule 0       Allergies - reviewed     Allergies   Allergen Reactions    Ibuprofen      Due to liver transplant     Past history -reviewed  Past Medical History:   Diagnosis Date    Adolescent scoliosis     protruding    BK viremia [id number]    CMV pneumonia (H) 2010    Congenital hepatic fibrosis     Endometriosis     High grade squamous intraepithelial lesion of cervix 09/11/2020    History of angina     HPV (human papilloma virus) infection      Immunosuppression (H24)     Polycystic kidney disease     Polycystic kidneys, tx kidney on the right. Both, very large, native kidneys reamain.    PONV (postoperative nausea and vomiting)     Need to start with ice chips and apple juice, no soda    S/P kidney transplant     SLK 10/9/2010 - kidney failure 2/2 [facility]. Explanted 2012. Re-transplant after de-sensitization 2016    S/P liver transplant (H)     10/9/2010 - HAT, ischemic biopathy. Re-transplant 3/3/2011    S/P splenectomy     Spider veins 2019    Thyroid disease     Hyperthyroid treated with single dose iodine    Urinary tract infection 09/2022       FAMILY HISTORY:  No premature coronary artery disease, aortic dissection or sudden cardiac death.      SOCIAL HISTORY:  Non smoker  No alcohol intake  No drug use    ROS: non contributory on the 10-point review of system    Exam: limited due to the nature of this visit   In general, the patient is in no acute distress.    Breathing is unlabored.   Neck: No jugular venous distension.    No edema per patient    Data:  Recent cardiac investigations - reviewed    EKG 11/2023 -sinus rhythm at 80 normal axis and intervals    Echocardiogram August 31, 2020  Normal biventricular size and function  No significant valvular abnormalities  Normal IVC  No pericardial effusion.    [facility]   CT coronary angiogram 1-3-2019  CALCIUM SCORE : 19. This placed the patient in the 70th percentile rank, meaning that approximately 30% of females at ages from 56-60 will have a higher calcium score that the patient.  CORONARY CT ANGIOGRAPHY: The examination is diagnostic in quality. There is right heart dominance.   LEFT MAIN CORONARY ARTERY: Widely patent  LEFT ANTERIOR DESCENDING; Mild focal calcified plaque in the proxima-mid LAD without significant stenosis. The remainder of the LAD is widely patent.  FIRST DIAGONAL: Widely patent  SECOND DIAGONAL: Widely patent  PROXIMAL LEFT CIRCUMFLEX: Widely patent  MID CIRCUMFLEX: Widely  patent  DISTAL CIRCUMFLEX: Widely patent      I have independently reviewed this patient's relevant laboratory and cardiac data :  Lipids:   Recent Labs   Lab Test 04/14/23  0806 12/05/22  0809   CHOL 163 200*   HDL 62 57   LDL 84 120   TRIG 83 115      Recent Labs   Lab Test 11/27/23  0759 11/09/23  0750   AST 20 20     Recent Labs   Lab Test 11/27/23  0759 11/09/23  0750   ALT 16 15     Electrolytes:   Recent Labs   Lab Test 11/27/23  0759   CR 0.75     Hemoglobin:   Lab Results   Component Value Date    HGB 15.9 11/27/2023       Assessment and Plan:  63 year old woman with    Non obstructive CAD  Microvascular angina, stable  Dyslipidemia  Post kidney and liver transplantation    Belen Sharma reports doing well. She has non obstructive CAD in the proximal LAD for which she takes aspirin and NTG as needed for stable angina. Her cardiac function is normal. Her renal and liver function tests are normal.    She is on low dose statin therapy and is also addressing her lipids with diet and regular exercise. WE will monitor her lipids annually.     Recommendations:     Healthy diet - low fat and low carb   Atorvastatin 10 mg daily  Follow up 1 year      Video Visit Details:     Type of service:  Video Visit  Video Start Time: 9.34 am  Video End Time: 9.50 am  Patient location: Home  Provider location:  Off-site   Platform used for Video Visit: [facility]   In addition to visit time documented above, I spent an additional 15 minutes on data review and documentation.    Christy Mohamud MD, MS  Professor of Medicine  Cardiovascular Medicine

## 2023-12-04 NOTE — NURSING NOTE
Patient confirms medications and allergies are accurate via patients echeck in completion, and or denies any changes since last reviewed/verified.     Is the patient currently in the state of [place]? YES    Visit mode:VIDEO    If the visit is dropped, the patient can be reconnected by: VIDEO VISIT: Text to cell phone:   Telephone Information:   Mobile [phone]       Will anyone else be joining the visit? NO  (If patient encounters technical issues they should call [phone]    How would you like to obtain your AVS? MyChart    Are changes needed to the allergy or medication list? No    Reason for visit: RECHECK    Indu CASTAÑEDA

## 2023-12-04 NOTE — PATIENT INSTRUCTIONS
You were seen in the Cardiology Clinic today by: Dr. Mohamud    Plan:   Medication Changes:   None.     Follow up Visit:  With Dr. Mohamud in 1 year with fasting labs prior.     Further Instructions:  Follow the American Heart Association Diet and Lifestyle recommendations: Limit saturated fat, trans fat, sodium, red meat, sweets and sugar-sweetened beverages. If you choose to eat red meat, compare labels and select the leanest cuts available. Aim for at least 150 minutes of moderate physical activity or 75 minutes of vigorous physical activity - or an equal combination of both - each week.    If you have further questions, please utilize [employer] to contact us.     Your Care Team:    Cardiology   Telephone Number     Nurse Line  Mino Guthrie RN    [phone]     For scheduling appointments:  [phone]           On-call cardiologist for after hours or on weekends:   [phone], option #4, and ask to speak to the on-call cardiologist.     Cardiovascular Clinic:   [address] [place] [address]. [place] [address]      As always, Thank you for trusting us with your health care needs!

## 2023-12-15 ENCOUNTER — TELEPHONE (OUTPATIENT)
Dept: INFECTIOUS DISEASES | Facility: CLINIC | Age: 63
End: 2023-12-15
Payer: MEDICARE

## 2023-12-18 ENCOUNTER — APPOINTMENT (OUTPATIENT)
Dept: LAB | Facility: CLINIC | Age: 63
End: 2023-12-18
Payer: MEDICARE

## 2023-12-18 ENCOUNTER — LAB (OUTPATIENT)
Dept: LAB | Facility: CLINIC | Age: 63
End: 2023-12-18
Attending: INTERNAL MEDICINE
Payer: MEDICARE

## 2023-12-18 ENCOUNTER — TELEPHONE (OUTPATIENT)
Dept: UROLOGY | Facility: CLINIC | Age: 63
End: 2023-12-18

## 2023-12-18 DIAGNOSIS — Z94.4 LIVER REPLACED BY TRANSPLANT (H): ICD-10-CM

## 2023-12-18 DIAGNOSIS — N81.4 UTERINE PROLAPSE: Primary | ICD-10-CM

## 2023-12-18 LAB
ALBUMIN SERPL BCG-MCNC: 3.8 G/DL (ref 3.5–5.2)
ALP SERPL-CCNC: 55 U/L (ref 40–150)
ALT SERPL W P-5'-P-CCNC: 13 U/L (ref 0–70)
ANION GAP SERPL CALCULATED.3IONS-SCNC: 6 MMOL/L (ref 7–15)
AST SERPL W P-5'-P-CCNC: 19 U/L (ref 0–45)
BILIRUB DIRECT SERPL-MCNC: <0.2 MG/DL (ref 0–0.3)
BILIRUB SERPL-MCNC: 0.5 MG/DL
BUN SERPL-MCNC: 11.8 MG/DL (ref 8–23)
CALCIUM SERPL-MCNC: 9.4 MG/DL (ref 8.8–10.2)
CHLORIDE SERPL-SCNC: 105 MMOL/L (ref 98–107)
CHOLEST SERPL-MCNC: 139 MG/DL
CREAT SERPL-MCNC: 0.76 MG/DL (ref 0.51–1.17)
DEPRECATED HCO3 PLAS-SCNC: 27 MMOL/L (ref 22–29)
EGFRCR SERPLBLD CKD-EPI 2021: 88 ML/MIN/1.73M2
ERYTHROCYTE [DISTWIDTH] IN BLOOD BY AUTOMATED COUNT: 14.5 % (ref 10–15)
FASTING STATUS PATIENT QL REPORTED: NORMAL
GLUCOSE SERPL-MCNC: 87 MG/DL (ref 70–99)
HCT VFR BLD AUTO: 48.6 % (ref 35–53)
HDLC SERPL-MCNC: 48 MG/DL
HGB BLD-MCNC: 15.6 G/DL (ref 11.7–17.7)
LDLC SERPL CALC-MCNC: 66 MG/DL
MCH RBC QN AUTO: 30.4 PG (ref 26.5–33)
MCHC RBC AUTO-ENTMCNC: 32.1 G/DL (ref 31.5–36.5)
MCV RBC AUTO: 95 FL (ref 78–100)
NONHDLC SERPL-MCNC: 91 MG/DL
PLATELET # BLD AUTO: 193 10E3/UL (ref 150–450)
POTASSIUM SERPL-SCNC: 4 MMOL/L (ref 3.4–5.3)
PROT SERPL-MCNC: 6.4 G/DL (ref 6.4–8.3)
RBC # BLD AUTO: 5.14 10E6/UL (ref 3.8–5.9)
SODIUM SERPL-SCNC: 138 MMOL/L (ref 135–145)
TACROLIMUS BLD-MCNC: 5.3 UG/L (ref 5–15)
TME LAST DOSE: NORMAL H
TME LAST DOSE: NORMAL H
TRIGL SERPL-MCNC: 124 MG/DL
WBC # BLD AUTO: 6 10E3/UL (ref 4–11)

## 2023-12-18 PROCEDURE — 84156 ASSAY OF PROTEIN URINE: CPT

## 2023-12-18 PROCEDURE — 80053 COMPREHEN METABOLIC PANEL: CPT

## 2023-12-18 PROCEDURE — 36415 COLL VENOUS BLD VENIPUNCTURE: CPT

## 2023-12-18 PROCEDURE — 80061 LIPID PANEL: CPT

## 2023-12-18 PROCEDURE — 80197 ASSAY OF TACROLIMUS: CPT

## 2023-12-18 PROCEDURE — 85027 COMPLETE CBC AUTOMATED: CPT

## 2023-12-18 PROCEDURE — 82248 BILIRUBIN DIRECT: CPT

## 2023-12-18 NOTE — TELEPHONE ENCOUNTER
[facility] Call Center    Phone Message    May a detailed message be left on voicemail: no     Reason for Call: Other: Patient called to see if she could speak with Dr Mobley nurse about getting her pessary replaced. Patient states it is broken and she has been waiting over a month. Patient is currently seeing Dr Ferrara but would like to switch back for to Dr Francis.Please call patient as soon as possible.     Action Taken: Other: P [facility]rology    Travel Screening: Not Applicable

## 2023-12-18 NOTE — TELEPHONE ENCOUNTER
Spoke with Jessica about getting this patient a pessary. We have the patient's size in stock (#9 per 8/3/22 note) -- ordered and let patient know she could come in to grab it. Placed pessary in manila envelope and placed in front-desk file cabinet. She will be by on 12/19 to pick it up.

## 2023-12-19 LAB
ALBUMIN MFR UR ELPH: 141 MG/DL
CREAT UR-MCNC: 89.3 MG/DL
PROT/CREAT 24H UR: 1.58 MG/MG CR (ref 0–0.2)

## 2023-12-24 NOTE — PROGRESS NOTES
HPI:    Ms. Sharma comes in for follow up today. Overall doing quite well. She tries to eat healthy and get some exercise (twice a week at the gym). No infectious complaints. She denies any dizziness, orthostatic sxs. She was able to drive her today w/o problems. No other HEENT, cardiopulmonary, abdominal, , GYN, neurological, systemic, psychiatric, lymphatic complaints.     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 Both, very large, native kidneys reamain.    PONV (postoperative nausea and vomiting)     Need to start with ice chips and apple juice, no soda    S/P kidney transplant     SLK 10/9/2010 - kidney failure 2/2 [facility]. Explanted 2012. Re-transplant after de-sensitization 2016    S/P liver transplant (H)     10/9/2010 - [facility], ischemic biopathy. Re-transplant 3/3/2011    S/P splenectomy     Spider veins 2019    Thyroid disease     Hyperthyroid treated with single dose iodine    Urinary tract infection 09/2022     Past Surgical History:   Procedure Laterality Date    bunectomy  08/01/2018    right foot    bunionectomy  01/2019    left    CHOLECYSTECTOMY      CONIZATION N/A 09/30/2020    Procedure: CONE BIOPSY, CERVIX;  Surgeon: Daysi Perez MD;  Location: [facility]    FAILED PICC - RIGHT ARM Right 12/19/2020    Has a LUE AV fistula.    H STATISTIC PICC LINE INSERTION >5YR, FAILED Bilateral 12/19/2020    Left fistula, Right failed x 2 Occlussion    HERNIA REPAIR, INCISIONAL  03/2013    IR DIALYSIS PTA CENTRAL SEG  12/19/2020    IR PICC PLACEMENT > 5 YRS OF AGE  12/19/2020    IR PICC PLACEMENT > 5 YRS OF AGE  10/5/2022    PHACOEMULSIFICATION  CLEAR CORNEA WITH STANDARD INTRAOCULAR LENS IMPLANT Right 2023    Procedure: RIGHT EYE PHACOEMULSIFICATION, CATARACT, WITH INTRAOCULAR LENS IMPLANT;  Surgeon: Sebastian Robertson MD;  Location: [facility] OR    PHACOEMULSIFICATION WITH STANDARD INTRAOCULAR LENS IMPLANT Left 3/9/2023    Procedure: LEFT EYE PHACOEMULSIFICATION, CATARACT, WITH STANDARD INTRAOCULAR LENS IMPLANT INSERTION;  Surgeon: Sebastian Robertson MD;  Location: [facility] OR    SPLENECTOMY      Splenectomy    TRANSPLANT KIDNEY RECIPIENT  DONOR      re-transplant after [facility]; 6 months de-sensitization prior AND 6 months after transplant    TRANSPLANT LIVER RECIPIENT  DONOR  2011    TRANSPLANT LIVER, KIDNEY RECIPIENT  DONOR, COMBINED  10/09/2010    [facility] - re-Tx liver 3/3/2011; Kidney (left iliac) lost to [facility]/fibrosis - explant    VASCULAR SURGERY      Vascular access left UE. Not used for 4 years since 2nd kidney tx 2016 [place]     PE:    Vitals noted, gen, nad, cooperative, alert, neck supple nl rom, lungs with good air movement, RRR, S1, S2, no MRG, abdomen, no acute findings. Grossly normal neurological exam. No B LE swelling     A/P:     1. Immunizations; COVID Pfizer vaccine x 6 (Bi-valent 2022 and last 10/6/2023), Pneumococcal 23 done 2013. Tdap 2013. She has completed the Zoster Shingrix vaccine series. Influenza vaccine 2023. RSV vaccine 2023. Pneumococcal 20 vaccine done 2022  2. Cardiology; seen by Dr. Mohamud on  2023 for microvascular angina and non-obstructive CAD.   3. Mammogram 2023.  4. GI follow up with Dr. Leventhal 2023 for liver transplant for polycystic renal disease and congenital hepatic fibrosis.   5. GYN appt. With Dr. Perez 10/26/2023  6. Nephrology appt. With Dr. Angulo Renal transplant note 5/15/2023 and next appt. 2024. Creatinine 0.76 on 2023.   7. Dermatology visit with Dr. Benitez 2023 and next 2024  8. Chronic urinary  issues. She was seen Urology by Dr. Francis 3/15/2023. She has a Urology follow up with Dr. Ferrara 5/9/2024 and pelvic damaris appt. On 1/4/2024   9. Infectious disease note from Dr. Gonzales last seen 9/19/2023 and next visit 4/9/2024 for recurrent UTI's  10. Ophthalmology note from 3/31/2023  11. Colonoscopy; scheduled for 3/20/2024.   12. Lipids; 12/5/2022; TG's 115,  and HDL 57. Repeat 12/18/2023; LDL 66, HDL 48 and TG's 124. She is on Atorvastatin.   13. She states splenectomy 3/6/2013. She had Pneumococcal 20 vaccine 9/12/2022. She is due for Meningococcal  and she states she will get this at [facility]. Hib B? Will sent a note for [facility] pharmacy Vincent Fish to see if he give recommendations.      30 minutes spent on the date of the encounter doing chart review, history and exam, documentation and further activities as noted above exclusive of procedures and other billable interpretations

## 2023-12-26 ENCOUNTER — TELEPHONE (OUTPATIENT)
Dept: TRANSPLANT | Facility: CLINIC | Age: 63
End: 2023-12-26

## 2023-12-26 ENCOUNTER — TELEPHONE (OUTPATIENT)
Dept: INTERNAL MEDICINE | Facility: CLINIC | Age: 63
End: 2023-12-26

## 2023-12-26 ENCOUNTER — OFFICE VISIT (OUTPATIENT)
Dept: INTERNAL MEDICINE | Facility: CLINIC | Age: 63
End: 2023-12-26
Payer: MEDICARE

## 2023-12-26 VITALS
SYSTOLIC BLOOD PRESSURE: 84 MMHG | BODY MASS INDEX: 20.7 KG/M2 | DIASTOLIC BLOOD PRESSURE: 45 MMHG | OXYGEN SATURATION: 94 % | WEIGHT: 144.6 LBS | HEART RATE: 80 BPM | HEIGHT: 70 IN

## 2023-12-26 DIAGNOSIS — Z94.4 LIVER REPLACED BY TRANSPLANT (H): Primary | ICD-10-CM

## 2023-12-26 DIAGNOSIS — Z94.4 LIVER TRANSPLANTED (H): ICD-10-CM

## 2023-12-26 DIAGNOSIS — Z48.298 AFTERCARE FOLLOWING ORGAN TRANSPLANT: ICD-10-CM

## 2023-12-26 DIAGNOSIS — Z92.29 HISTORY OF VACCINATION: Primary | ICD-10-CM

## 2023-12-26 DIAGNOSIS — Z94.4 S/P LIVER TRANSPLANT (H): ICD-10-CM

## 2023-12-26 PROCEDURE — 99214 OFFICE O/P EST MOD 30 MIN: CPT | Performed by: INTERNAL MEDICINE

## 2023-12-26 NOTE — PROGRESS NOTES
Belen is a 63 year old that presents in clinic today for the following:     Chief Complaint   Patient presents with    RECHECK     Creatine level concerns, follow up, hepatitis A and B vaccines           12/26/2023     7:15 AM   Additional Questions   Roomed by KTE       Screenings from encounters over the past 10 days    No data recorded       Charles Smith CMA at 7:19 AM on 12/26/2023

## 2023-12-26 NOTE — TELEPHONE ENCOUNTER
Dear Vincent;    Ms. Sharma has a liver an renal transplant and splenectomy 2013. She had pneumococcal 20 done 9/12/2022. Could not find HIB. Meningococcal 2013. Anyway, if you get a chance, please review her records for vaccine recommendations    Thanks, YARELIS De La Garza

## 2024-01-12 NOTE — TELEPHONE ENCOUNTER
"HIB- 1 vaccine    Per CDC for MenB:    \"Anatomical or functional asplenia (including sickle cell disease), persistent complement component deficiency, complement inhibitor (e.g., eculizumab, ravulizumab) use, or microbiologists routinely exposed to Neisseria meningitidis: 2-dose primary series MenB-4C (Bexsero) at least 1 month apart or 3-dose primary series MenB-FHbp (Trumenba) at 0, 1-2, 6 months (if dose 2 was administered at least 6 months after dose 1, dose 3 not needed; if dose 3 is administered earlier than 4 months after dose 2, a fourth dose should be administered at least 4 months after dose 3); MenB-4C and MenB-FHbp are not interchangeable (use same product for all doses in series); 1 dose MenB booster 1 year after primary series and revaccinate every 2-3 years if risk remains\"    Twinrix:3-dose series HepA-HepB (Twinrix at 0,1, 6 months [minimum intervals: dose 1 to dose 2: 4 weeks / dose 2 to dose 3: 5 months])    So plan: Get dose two of twinrix, 1st Bexsero and single HIB.    2nd dose of Bexsero in one month    3rd and final dose of twinrix in April (6 months from first dose in November)    Booster of bexsero in 1 year and every 2-3 years.    Gian Martines RN on 1/12/2024 at 4:06 PM    "

## 2024-01-16 ENCOUNTER — MYC REFILL (OUTPATIENT)
Dept: TRANSPLANT | Facility: CLINIC | Age: 64
End: 2024-01-16
Payer: MEDICARE

## 2024-01-16 DIAGNOSIS — Z94.4 LIVER REPLACED BY TRANSPLANT (H): ICD-10-CM

## 2024-01-16 DIAGNOSIS — Z94.4 S/P LIVER TRANSPLANT (H): ICD-10-CM

## 2024-01-16 DIAGNOSIS — Z94.0 KIDNEY TRANSPLANTED: ICD-10-CM

## 2024-01-17 RX ORDER — TACROLIMUS 0.5 MG/1
0.5 CAPSULE ORAL EVERY MORNING
Qty: 90 CAPSULE | Refills: 3 | Status: ON HOLD | OUTPATIENT
Start: 2024-01-17 | End: 2024-05-07

## 2024-01-19 DIAGNOSIS — Z94.4 LIVER TRANSPLANTED (H): ICD-10-CM

## 2024-01-28 ENCOUNTER — MYC MEDICAL ADVICE (OUTPATIENT)
Dept: INTERNAL MEDICINE | Facility: CLINIC | Age: 64
End: 2024-01-28
Payer: MEDICARE

## 2024-01-31 ENCOUNTER — ALLIED HEALTH/NURSE VISIT (OUTPATIENT)
Dept: INTERNAL MEDICINE | Facility: CLINIC | Age: 64
End: 2024-01-31
Payer: MEDICARE

## 2024-01-31 DIAGNOSIS — Z23 NEED FOR VACCINATION: Primary | ICD-10-CM

## 2024-01-31 PROCEDURE — 90480 ADMN SARSCOV2 VAC 1/ONLY CMP: CPT

## 2024-01-31 PROCEDURE — 99207 PR NO CHARGE NURSE ONLY: CPT

## 2024-01-31 PROCEDURE — 91320 SARSCV2 VAC 30MCG TRS-SUC IM: CPT

## 2024-01-31 NOTE — PROGRESS NOTES
Belen Sharma received the Covid vaccine today in clinic at the request of Dr De La Garza. The immunization was given under the supervision of Dr De La Garza if assistance was needed. The patient does not report a history of adverse reactions associated with vaccine administration. The immunization site was cleaned with an alcohol prep wipe. The immunization was given without incident--see immunization list for administration details. No swelling or redness was observed at the site of injection after the immunization was given. The patient was advised to remain in fourth floor [facility] for fifteen minutes after the injection in case of an adverse reaction.     Simone Tamez, EMT 10:40 AM on 1/31/2024

## 2024-02-01 NOTE — PROGRESS NOTES
DISCHARGE  Reason for Discharge: Patient has failed to schedule further appointments.    Equipment Issued: .    Discharge Plan: Patient to continue home program.    Referring Provider:  Lizzy Ferrara         10/25/23 0500   Appointment Info   Signing clinician's name / credentials Елена Rincon PT, DPT   Total/Authorized Visits 16   Visits Used 6   Medical Diagnosis uterine prolapse, pelvic floor dysfunction, hip tightness, hip weakness   PT Tx Diagnosis hip weakness and decreased flexibility, pelvic floor dysfunction   Other pertinent information declines internal pelvic floor work   Progress Note/Certification   Onset of illness/injury or Date of Surgery 02/02/23   Therapy Frequency 1x/every 2 weeks   Predicted Duration 10 weeks; extended POC by 12 weeks   Progress Note Due Date 11/07/23   GOALS   PT Goals 2;3   PT Goal 1   Goal Identifier HEP   Goal Description pt will demonstrate independence and report compliance with HEP to faciliate improved independent symptom mgmt.   Rationale to maximize safety and independence with performance of ADLs and functional tasks   Goal Progress in progress   Target Date 11/07/23   PT Goal 2   Goal Identifier activity tolerance   Goal Description pt will demonstrate improved activity tolerance via report of ability to participate in greater than or equal to 30 minutes of low impact exercise at least 3 days per week to promote improved QOL.   Rationale to maximize safety and independence with performance of ADLs and functional tasks;to maximize safety and independence within the home;to maximize safety and independence within the community   Goal Progress biking at gym 2 x/week, will be performing strengthening HEP 3 x/week   Target Date 11/07/23   PT Goal 3   Goal Identifier pressure management   Goal Description pt will report compliance with intraabdominal pressure management strategies and demonstrate appropriate breath coordination and control 100% while in the  "clinic to reduce risk for worsening prolapse   Rationale to maximize safety and independence with self cares;to maximize safety and independence with performance of ADLs and functional tasks   Goal Progress in progress, still requiring cues   Target Date 11/07/23   Subjective Report   Subjective Report Hasn't been performing HEP over the last month due to her changes in her home life. wanting to get back to it as noticing more knee and back pain as a result of less active lifestyle. has decreased abdominal binder wear-only wearing when more active.   Objective Measures   Objective Measures Objective Measure 1   Treatment Interventions (PT)   Interventions Therapeutic Procedure/Exercise;Neuromuscular Re-education   Therapeutic Procedure/Exercise   Therapeutic Procedures: strength, endurance, ROM, flexibillity minutes (59137) 46   Therapeutic Procedures Ther Proc 2   Ther Proc 8 return to exercise   Ther Proc 8 - Details overview of how to to ease back into activity. perform HEP 3x/week and return to gym 2x/week. can start with 10 min on the bike and increase 2 min each week   PTRx Ther Proc 1 Quadratus Lumborum Stretch   PTRx Ther Proc 1 - Details x 30\" jama   PTRx Ther Proc 2 Piriformis Stretch Above 90 Degress Supine   PTRx Ther Proc 2 - Details x 30\" jama   PTRx Ther Proc 3 Quadruped Tranversus Abdominis Activation   PTRx Ther Proc 3 - Details x 8 reps, tactile and verbal cues throughout   PTRx Ther Proc 4 Reverse Clamshell   PTRx Ther Proc 4 - Details x 10 reps jama   PTRx Ther Proc 5 Supine Abdominal Exercise #3 (Marching)   PTRx Ther Proc 5 - Details x 8 reps each side, cues for breathing throughout   Skilled Intervention therapeutic exercise to promote improved muscle length, strength and activity tolerance   Patient Response/Progress tolerated well   PTRx Ther Proc 6 Pretzel Stretch   PTRx Ther Proc 6 - Details x 30\" , cues for set up   PTRx Ther Proc 7 Sidelying Hip Abduction   PTRx Ther Proc 7 - Details x 15 " reps jama, cues to prevent trunk rotation   PTRx Ther Proc 8 Clamshell with Theraband   PTRx Ther Proc 8 - Details with L2 TB x 12 reps jama, cues to prevent trunk rotation   PTRx Ther Proc 9 Supine Pelvic Floor Muscle Strengthening   PTRx Ther Proc 9 - Details tactile and verbal cues for appropriate activation and verbal cues for breathing throughout, x ~ 15 reps   Neuromuscular Re-education   Neuromuscular re-ed of mvmt, balance, coord, kinesthetic sense, posture, proprioception minutes (23935) 8   Skilled Intervention taping to improve muscle activity   Patient Response/Progress feels improved ease of wade TA   Neuro Re-ed 1 kinesiotaping for TA   Neuro Re-ed 1 - Details pt in supine: Kinesiotaping for TA facilitation. 3 I-strips applied parallel to TA fibers on each side, meeting at linea alba, 25% stretch on tape. tested small piece of tape on skin at the start of the session to ensure pt tolerating removal due to report of thinner skin.instructed to keep on for 3-5 days and remove if irritation occurs   Education   Learner/Method Patient   Plan   Home program ptrx   Comments   Comments Pt returns to physical therapy after ~2 months since last visit. Hasn't been performing HEP as she lost her papers and has been going through changes at home. Eager and motivated to return to exercising as noticing worsening of symptoms since less active. HEP reinitiated today with good tolerance, and pt has started to wean more from abdominal binder. Pt is appropriate to continued skilled physical therapy.   Total Session Time   Timed Code Treatment Minutes 54   Total Treatment Time (sum of timed and untimed services) 54   Medicare Claim Information   Medical Diagnosis uterine prolapse, pelvic floor dysfunction, hip tightness, hip weakness   PT Diagnosis hip weakness and decreased flexibility, pelvic floor dysfunction   Start of Care Date 03/30/23   Onset date of current episode/exacerbation 02/02/23  (onset date: 2 years  ago, order date 2/2/23)   Certification date from 03/30/23   Ortho Goal 1   Goal Identifier HEP   Goal Description pt will demonstrate independence and report compliance with HEP to faciliate improved independent symptom mgmt.   Target Date 06/08/23   Goal Progress in progress   Ortho Goal 2   Goal Identifier activity tolerance   Goal Description pt will demonstrate improved activity tolerance via report of ability to participate in greater than or equal to 30 minutes of low impact exercise at least 3 days per week to promote improved QOL.   Target Date 06/08/23   Goal Progress in progress   Ortho Goal 3   Goal Identifier pressure management   Goal Description pt will report compliance with intraabdominal pressure management strategies and demonstrate appropriate breath coordination and control 100% while in the clinic to reduce risk for worsening prolapse   Target Date 06/08/23   Goal Progress in progress

## 2024-02-05 ENCOUNTER — LAB (OUTPATIENT)
Dept: LAB | Facility: CLINIC | Age: 64
End: 2024-02-05
Payer: MEDICARE

## 2024-02-05 DIAGNOSIS — N39.0 RECURRENT UTI: ICD-10-CM

## 2024-02-05 DIAGNOSIS — N39.0 RECURRENT UTI: Primary | ICD-10-CM

## 2024-02-05 LAB
ALBUMIN UR-MCNC: NEGATIVE MG/DL
APPEARANCE UR: ABNORMAL
BACTERIA #/AREA URNS HPF: ABNORMAL /HPF
BILIRUB UR QL STRIP: NEGATIVE
COLOR UR AUTO: YELLOW
GLUCOSE UR STRIP-MCNC: NEGATIVE MG/DL
HGB UR QL STRIP: ABNORMAL
KETONES UR STRIP-MCNC: NEGATIVE MG/DL
LEUKOCYTE ESTERASE UR QL STRIP: ABNORMAL
NITRATE UR QL: NEGATIVE
PH UR STRIP: 6.5 [PH] (ref 5–8)
RBC #/AREA URNS AUTO: ABNORMAL /HPF
SP GR UR STRIP: 1.01 (ref 1–1.03)
SQUAMOUS #/AREA URNS AUTO: ABNORMAL /LPF
UROBILINOGEN UR STRIP-ACNC: 0.2 E.U./DL
WBC #/AREA URNS AUTO: ABNORMAL /HPF

## 2024-02-05 PROCEDURE — 87186 SC STD MICRODIL/AGAR DIL: CPT

## 2024-02-05 PROCEDURE — 87086 URINE CULTURE/COLONY COUNT: CPT

## 2024-02-05 PROCEDURE — 81001 URINALYSIS AUTO W/SCOPE: CPT

## 2024-02-06 LAB — BACTERIA UR CULT: ABNORMAL

## 2024-02-12 DIAGNOSIS — Z94.4 LIVER REPLACED BY TRANSPLANT (H): ICD-10-CM

## 2024-02-12 DIAGNOSIS — Z94.0 KIDNEY REPLACED BY TRANSPLANT: Primary | ICD-10-CM

## 2024-02-12 NOTE — PROGRESS NOTES
"Patient sent MyC message stating:    \"I'll need a signed physician's order for a Protein/ Creatinine ratio (urine test) for tomorrow at 8am.\"    Protein random urine ordered. Message to kidney coordinator as fyi.  "

## 2024-02-13 ENCOUNTER — LAB (OUTPATIENT)
Dept: LAB | Facility: CLINIC | Age: 64
End: 2024-02-13
Payer: MEDICARE

## 2024-02-13 DIAGNOSIS — Z94.0 KIDNEY REPLACED BY TRANSPLANT: ICD-10-CM

## 2024-02-13 DIAGNOSIS — Z94.4 LIVER REPLACED BY TRANSPLANT (H): ICD-10-CM

## 2024-02-13 LAB
ALBUMIN MFR UR ELPH: 6 MG/DL
ALBUMIN SERPL BCG-MCNC: 3.9 G/DL (ref 3.5–5.2)
ALP SERPL-CCNC: 56 U/L (ref 40–150)
ALT SERPL W P-5'-P-CCNC: 14 U/L (ref 0–70)
ANION GAP SERPL CALCULATED.3IONS-SCNC: 8 MMOL/L (ref 7–15)
AST SERPL W P-5'-P-CCNC: 21 U/L (ref 0–45)
BILIRUB DIRECT SERPL-MCNC: <0.2 MG/DL (ref 0–0.3)
BILIRUB SERPL-MCNC: 0.6 MG/DL
BUN SERPL-MCNC: 13.6 MG/DL (ref 8–23)
CALCIUM SERPL-MCNC: 10 MG/DL (ref 8.8–10.2)
CHLORIDE SERPL-SCNC: 103 MMOL/L (ref 98–107)
CREAT SERPL-MCNC: 0.81 MG/DL (ref 0.51–1.17)
CREAT UR-MCNC: 17 MG/DL
DEPRECATED HCO3 PLAS-SCNC: 26 MMOL/L (ref 22–29)
EGFRCR SERPLBLD CKD-EPI 2021: 81 ML/MIN/1.73M2
ERYTHROCYTE [DISTWIDTH] IN BLOOD BY AUTOMATED COUNT: 13.4 % (ref 10–15)
GLUCOSE SERPL-MCNC: 87 MG/DL (ref 70–99)
HCT VFR BLD AUTO: 48.7 % (ref 35–53)
HGB BLD-MCNC: 15.9 G/DL (ref 11.7–17.7)
MCH RBC QN AUTO: 29.7 PG (ref 26.5–33)
MCHC RBC AUTO-ENTMCNC: 32.6 G/DL (ref 31.5–36.5)
MCV RBC AUTO: 91 FL (ref 78–100)
PLATELET # BLD AUTO: 258 10E3/UL (ref 150–450)
POTASSIUM SERPL-SCNC: 4 MMOL/L (ref 3.4–5.3)
PROT SERPL-MCNC: 6.7 G/DL (ref 6.4–8.3)
PROT/CREAT 24H UR: 0.35 MG/MG CR (ref 0–0.2)
RBC # BLD AUTO: 5.36 10E6/UL (ref 3.8–5.9)
SODIUM SERPL-SCNC: 137 MMOL/L (ref 135–145)
TACROLIMUS BLD-MCNC: 4.9 UG/L (ref 5–15)
TME LAST DOSE: ABNORMAL H
TME LAST DOSE: ABNORMAL H
WBC # BLD AUTO: 6.8 10E3/UL (ref 4–11)

## 2024-02-13 PROCEDURE — 85027 COMPLETE CBC AUTOMATED: CPT

## 2024-02-13 PROCEDURE — 82248 BILIRUBIN DIRECT: CPT

## 2024-02-13 PROCEDURE — 36415 COLL VENOUS BLD VENIPUNCTURE: CPT

## 2024-02-13 PROCEDURE — 80053 COMPREHEN METABOLIC PANEL: CPT

## 2024-02-13 PROCEDURE — 84156 ASSAY OF PROTEIN URINE: CPT

## 2024-02-13 PROCEDURE — 80197 ASSAY OF TACROLIMUS: CPT

## 2024-02-16 DIAGNOSIS — R80.9 PROTEINURIA: ICD-10-CM

## 2024-02-16 DIAGNOSIS — Z94.0 KIDNEY TRANSPLANT RECIPIENT: Primary | ICD-10-CM

## 2024-02-16 DIAGNOSIS — D84.9 IMMUNOSUPPRESSION (H): ICD-10-CM

## 2024-02-22 ENCOUNTER — PRE VISIT (OUTPATIENT)
Dept: UROLOGY | Facility: CLINIC | Age: 64
End: 2024-02-22
Payer: MEDICARE

## 2024-02-28 DIAGNOSIS — Z94.4 S/P LIVER TRANSPLANT (H): ICD-10-CM

## 2024-02-28 DIAGNOSIS — Z94.0 KIDNEY TRANSPLANTED: Primary | ICD-10-CM

## 2024-02-28 RX ORDER — SULFAMETHOXAZOLE AND TRIMETHOPRIM 400; 80 MG/1; MG/1
1 TABLET ORAL DAILY
Qty: 90 TABLET | Refills: 3 | Status: ON HOLD | OUTPATIENT
Start: 2024-02-28 | End: 2024-05-07

## 2024-02-28 RX ORDER — PREDNISONE 5 MG/1
5 TABLET ORAL DAILY
Qty: 90 TABLET | Refills: 3 | Status: SHIPPED | OUTPATIENT
Start: 2024-02-28

## 2024-02-29 ENCOUNTER — TELEPHONE (OUTPATIENT)
Dept: GASTROENTEROLOGY | Facility: CLINIC | Age: 64
End: 2024-02-29
Payer: MEDICARE

## 2024-03-07 RX ORDER — BISACODYL 5 MG/1
TABLET, DELAYED RELEASE ORAL
Qty: 4 TABLET | Refills: 0 | Status: SHIPPED | OUTPATIENT
Start: 2024-03-07 | End: 2024-05-01

## 2024-03-07 NOTE — TELEPHONE ENCOUNTER
Standard Golytely Bowel Prep. Instructions were sent via [employer]. Bowel prep was sent 3/7/2024 to      [id number] - [place], [place] - [address] AT [facility] [address] [facility]

## 2024-03-08 ENCOUNTER — TELEPHONE (OUTPATIENT)
Dept: GASTROENTEROLOGY | Facility: CLINIC | Age: 64
End: 2024-03-08

## 2024-03-08 NOTE — TELEPHONE ENCOUNTER
Pre visit planning completed.      Procedure details:    Patient scheduled for Colonoscopy  on 3/20/24.     Arrival time: 0900. Procedure time 1000    Pre op exam needed? N/A    Facility location: [facility]; [address] [place] [address], [address] Floor, [place] [address]    Sedation type: Conscious sedation     Indication for procedure: Screening      Chart review:     Electronic implanted devices? No    Recent diagnosis of diverticulitis within the last 6 weeks? No    Diabetic? No    Diabetic medication HOLDING recommendations: (if applicable)  Oral diabetic medications: No  Diabetic injectables: No  Insulin: No      Medication review:    Anticoagulants? No    NSAIDS? No    Other medication HOLDING recommendations:  N/A      Prep for procedure:     Bowel prep recommendation: Standard Golytely    Due to:  CKD noted.     Prep instructions sent via [employer]     Sophie Sousa RN     KS    3/7/24  3:22 PM  Note  Standard Golytely Bowel Prep. Instructions were sent via [employer]. Bowel prep was sent 3/7/2024 to       [id number] [place], [place] - [address] AT [facility] OF [address] RANJIT Villegas RN  Endoscopy Procedure Pre Assessment RN  [phone] option 4

## 2024-03-08 NOTE — TELEPHONE ENCOUNTER
Pre assessment completed for upcoming procedure.   (Please see previous telephone encounter notes for complete details)    Patient  returned call.       Procedure details:    Arrival time and facility location reviewed.    Pre op exam needed? N/A    Designated  policy reviewed. Instructed to have someone stay 6  hours post procedure.       Medication review:    Medications reviewed. Please see supporting documentation below. Holding recommendations discussed (if applicable).   N/A      Prep for procedure:     Procedure prep instructions reviewed.        Any additional information needed:  N/A      Patient  verbalized understanding and had no questions or concerns at this time.      Alanis Lund RN  Endoscopy Procedure Pre Assessment RN  [phone] option 4

## 2024-03-08 NOTE — TELEPHONE ENCOUNTER
Attempted to contact patient in order to complete pre assessment questions.     Patient scheduled for Colonoscopy  on 3/20/24.     Patient answered but was not a good time. She will return call to [phone] option 4    Missed call communication sent via [employer].      Alanis Villegas RN  Endoscopy Procedure Pre Assessment RN

## 2024-03-20 ENCOUNTER — ANESTHESIA EVENT (OUTPATIENT)
Dept: SURGERY | Facility: AMBULATORY SURGERY CENTER | Age: 64
End: 2024-03-20
Payer: MEDICARE

## 2024-03-20 ENCOUNTER — HOSPITAL ENCOUNTER (OUTPATIENT)
Facility: AMBULATORY SURGERY CENTER | Age: 64
Discharge: HOME | End: 2024-03-20
Attending: INTERNAL MEDICINE | Admitting: INTERNAL MEDICINE
Payer: MEDICARE

## 2024-03-20 ENCOUNTER — ANESTHESIA (OUTPATIENT)
Dept: SURGERY | Facility: AMBULATORY SURGERY CENTER | Age: 64
End: 2024-03-20
Payer: MEDICARE

## 2024-03-20 VITALS
OXYGEN SATURATION: 99 % | TEMPERATURE: 98 F | WEIGHT: 140 LBS | DIASTOLIC BLOOD PRESSURE: 68 MMHG | HEIGHT: 70 IN | RESPIRATION RATE: 14 BRPM | SYSTOLIC BLOOD PRESSURE: 129 MMHG | BODY MASS INDEX: 20.04 KG/M2 | HEART RATE: 69 BPM

## 2024-03-20 VITALS — HEART RATE: 71 BPM

## 2024-03-20 DIAGNOSIS — Z48.298 AFTERCARE FOLLOWING ORGAN TRANSPLANT: Primary | ICD-10-CM

## 2024-03-20 LAB — COLONOSCOPY: NORMAL

## 2024-03-20 PROCEDURE — 88305 TISSUE EXAM BY PATHOLOGIST: CPT | Mod: 26 | Performed by: PATHOLOGY

## 2024-03-20 PROCEDURE — 88305 TISSUE EXAM BY PATHOLOGIST: CPT | Mod: TC | Performed by: INTERNAL MEDICINE

## 2024-03-20 PROCEDURE — 45380 COLONOSCOPY AND BIOPSY: CPT | Performed by: NURSE ANESTHETIST, CERTIFIED REGISTERED

## 2024-03-20 PROCEDURE — 45385 COLONOSCOPY W/LESION REMOVAL: CPT | Mod: PT | Performed by: INTERNAL MEDICINE

## 2024-03-20 PROCEDURE — 45380 COLONOSCOPY AND BIOPSY: CPT | Mod: 59,51,PT | Performed by: INTERNAL MEDICINE

## 2024-03-20 RX ORDER — FLUMAZENIL 0.1 MG/ML
0.2 INJECTION, SOLUTION INTRAVENOUS
Status: CANCELLED | OUTPATIENT
Start: 2024-03-20 | End: 2024-03-20

## 2024-03-20 RX ORDER — PROPOFOL 10 MG/ML
INJECTION, EMULSION INTRAVENOUS CONTINUOUS PRN
Status: DISCONTINUED | OUTPATIENT
Start: 2024-03-20 | End: 2024-03-20

## 2024-03-20 RX ORDER — ONDANSETRON 2 MG/ML
4 INJECTION INTRAMUSCULAR; INTRAVENOUS ONCE
Status: COMPLETED | OUTPATIENT
Start: 2024-03-20 | End: 2024-03-20

## 2024-03-20 RX ORDER — LIDOCAINE HYDROCHLORIDE 20 MG/ML
INJECTION, SOLUTION INFILTRATION; PERINEURAL PRN
Status: DISCONTINUED | OUTPATIENT
Start: 2024-03-20 | End: 2024-03-20

## 2024-03-20 RX ORDER — NALOXONE HYDROCHLORIDE 0.4 MG/ML
0.2 INJECTION, SOLUTION INTRAMUSCULAR; INTRAVENOUS; SUBCUTANEOUS
Status: CANCELLED | OUTPATIENT
Start: 2024-03-20

## 2024-03-20 RX ORDER — NALOXONE HYDROCHLORIDE 0.4 MG/ML
0.4 INJECTION, SOLUTION INTRAMUSCULAR; INTRAVENOUS; SUBCUTANEOUS
Status: CANCELLED | OUTPATIENT
Start: 2024-03-20

## 2024-03-20 RX ORDER — SODIUM CHLORIDE, SODIUM LACTATE, POTASSIUM CHLORIDE, CALCIUM CHLORIDE 600; 310; 30; 20 MG/100ML; MG/100ML; MG/100ML; MG/100ML
INJECTION, SOLUTION INTRAVENOUS CONTINUOUS
Status: DISCONTINUED | OUTPATIENT
Start: 2024-03-20 | End: 2024-03-21 | Stop reason: HOSPADM

## 2024-03-20 RX ORDER — ONDANSETRON 4 MG/1
4 TABLET, ORALLY DISINTEGRATING ORAL EVERY 6 HOURS PRN
Status: CANCELLED | OUTPATIENT
Start: 2024-03-20

## 2024-03-20 RX ORDER — ONDANSETRON 2 MG/ML
4 INJECTION INTRAMUSCULAR; INTRAVENOUS EVERY 6 HOURS PRN
Status: CANCELLED | OUTPATIENT
Start: 2024-03-20

## 2024-03-20 RX ORDER — ONDANSETRON 2 MG/ML
4 INJECTION INTRAMUSCULAR; INTRAVENOUS
Status: COMPLETED | OUTPATIENT
Start: 2024-03-20 | End: 2024-03-20

## 2024-03-20 RX ORDER — ONDANSETRON 2 MG/ML
4 INJECTION INTRAMUSCULAR; INTRAVENOUS EVERY 6 HOURS PRN
Status: DISCONTINUED | OUTPATIENT
Start: 2024-03-20 | End: 2024-03-20

## 2024-03-20 RX ORDER — LIDOCAINE 40 MG/G
CREAM TOPICAL
Status: DISCONTINUED | OUTPATIENT
Start: 2024-03-20 | End: 2024-03-21 | Stop reason: HOSPADM

## 2024-03-20 RX ORDER — PROPOFOL 10 MG/ML
INJECTION, EMULSION INTRAVENOUS PRN
Status: DISCONTINUED | OUTPATIENT
Start: 2024-03-20 | End: 2024-03-20

## 2024-03-20 RX ORDER — PROCHLORPERAZINE MALEATE 10 MG
10 TABLET ORAL EVERY 6 HOURS PRN
Status: CANCELLED | OUTPATIENT
Start: 2024-03-20

## 2024-03-20 RX ADMIN — PROPOFOL 60 MG: 10 INJECTION, EMULSION INTRAVENOUS at 10:00

## 2024-03-20 RX ADMIN — SODIUM CHLORIDE, SODIUM LACTATE, POTASSIUM CHLORIDE, CALCIUM CHLORIDE: 600; 310; 30; 20 INJECTION, SOLUTION INTRAVENOUS at 09:38

## 2024-03-20 RX ADMIN — ONDANSETRON 4 MG: 2 INJECTION INTRAMUSCULAR; INTRAVENOUS at 09:39

## 2024-03-20 RX ADMIN — LIDOCAINE HYDROCHLORIDE 60 MG: 20 INJECTION, SOLUTION INFILTRATION; PERINEURAL at 10:00

## 2024-03-20 RX ADMIN — PROPOFOL 150 MCG/KG/MIN: 10 INJECTION, EMULSION INTRAVENOUS at 10:00

## 2024-03-20 RX ADMIN — ONDANSETRON 4 MG: 2 INJECTION INTRAMUSCULAR; INTRAVENOUS at 11:12

## 2024-03-20 NOTE — H&P
Belen I Lavalette  [id number]  adult  64 year old      Reason for procedure/surgery: surveillance for polyps    Patient Active Problem List   Diagnosis    S/P splenectomy    Polycystic kidney disease    Immunosuppression (H24)    Liver replaced by transplant (H)    Endometriosis    HPV (human papilloma virus) infection    JANNETTE III with severe dysplasia    Acquired bilateral hammer toes    Depression    Depressive psychosis (H)    Dermatophytosis of nail    Hallux valgus, acquired, bilateral    Incisional hernia following transplant    Malignant neoplasm of breast (H)    Microvascular angina    Postmenopausal state    Secondary hyperparathyroidism (H24)    Scoliosis deformity of spine    Sinusitis    Swelling of first metatarsophalangeal (MTP) joint    Uterine prolapse    Kidney replaced by transplant    Recurrent UTI    Osteopenia of multiple sites    Combined forms of age-related cataract of both eyes    Aftercare following organ transplant    Hammertoe, bilateral    Weakness of hip, unspecified laterality    Hip tightness    Pelvic floor dysfunction       Past Surgical History:    Past Surgical History:   Procedure Laterality Date    bunectomy  08/01/2018    right foot    bunionectomy  01/2019    left    CHOLECYSTECTOMY      CONIZATION N/A 09/30/2020    Procedure: CONE BIOPSY, CERVIX;  Surgeon: Daysi Perez MD;  Location: [facility] OR    FAILED PICC - RIGHT ARM Right 12/19/2020    Has a LUE AV fistula.    H STATISTIC PICC LINE INSERTION >5YR, FAILED Bilateral 12/19/2020    Left fistula, Right failed x 2 Occlussion    HERNIA REPAIR, INCISIONAL  03/2013    IR DIALYSIS PTA CENTRAL SEG  12/19/2020    IR PICC PLACEMENT > 5 YRS OF AGE  12/19/2020    IR PICC PLACEMENT > 5 YRS OF AGE  10/5/2022    PHACOEMULSIFICATION CLEAR CORNEA WITH STANDARD INTRAOCULAR LENS IMPLANT Right 2/9/2023    Procedure: RIGHT EYE PHACOEMULSIFICATION, CATARACT, WITH INTRAOCULAR LENS IMPLANT;  Surgeon: Sebastian Robertson MD;  Location: [facility] OR     PHACOEMULSIFICATION WITH STANDARD INTRAOCULAR LENS IMPLANT Left 3/9/2023    Procedure: LEFT EYE PHACOEMULSIFICATION, CATARACT, WITH STANDARD INTRAOCULAR LENS IMPLANT INSERTION;  Surgeon: Sebastian Robertson MD;  Location: [facility] OR    SPLENECTOMY      Splenectomy    TRANSPLANT KIDNEY RECIPIENT  DONOR  2016    re-transplant after [facility]; 6 months de-sensitization prior AND 6 months after transplant    TRANSPLANT LIVER RECIPIENT  DONOR  2011    TRANSPLANT LIVER, KIDNEY RECIPIENT  DONOR, COMBINED  10/09/2010    HAT - re-Tx liver 3/3/2011; Kidney (left iliac) lost to [facility]/fibrosis - explant    VASCULAR SURGERY      Vascular access left UE. 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 Both, very large, native kidneys reamain.    PONV (postoperative nausea and vomiting)     Need to start with ice chips and apple juice, no soda    S/P kidney transplant     SLK 10/9/2010 - kidney failure 2/2 [facility]. Explanted . Re-transplant after de-sensitization 2016    S/P liver transplant (H)     10/9/2010 - HAT, ischemic biopathy. Re-transplant 3/3/2011    S/P splenectomy     Spider veins 2019    Thyroid disease     Hyperthyroid treated with single dose iodine    Urinary tract infection 2022       Social History:   Social History     Tobacco Use    Smoking status: Never    Smokeless tobacco: Never   Substance Use Topics    Alcohol use: Not Currently       Family History:   Family History   Problem Relation Age of Onset    Depression Mother     Anxiety  Disorder Mother     Depression Father     Anxiety Disorder Father     Uterine Cancer Maternal Grandmother     Polycystic Kidney Diease Brother     Polycystic Kidney Diease Brother     Polycystic Kidney Diease Brother     Polycystic Kidney Diease Brother     Cervical Cancer Maternal Aunt     Glaucoma No family hx of     Macular Degeneration No family hx of        Allergies:   Allergies   Allergen Reactions    Ibuprofen      Due to liver transplant       Active Medications:   Current Outpatient Medications   Medication Sig Dispense Refill    acetaminophen (TYLENOL) 325 MG tablet Take 1 tablet (325 mg) by mouth every 4 hours as needed for mild pain or fever 120 tablet 1    atorvastatin (LIPITOR) 10 MG tablet Take 1 tablet (10 mg) by mouth daily 90 tablet 3    biotin 1000 MCG TABS tablet Take 3,000 mcg by mouth every evening      bisacodyl (DULCOLAX) 5 MG EC tablet Take 2 tablets at 3 pm the day before your procedure. If your procedure is before 11 am, take 2 additional tablets at 11 pm. If your procedure is after 11 am, take 2 additional tablets at 6 am. For additional instructions refer to your colonoscopy prep instructions. 4 tablet 0    calcium carbonate-vitamin D (OSCAL) 500-5 MG-MCG tablet Take 1 tablet by mouth 2 times daily 180 tablet 3    ciclopirox (PENLAC) 8 % external solution Apply to adjacent skin and affected nails daily.  Remove with alcohol every 7 days, then repeat. 6.6 mL 0    COMPOUNDED NON-CONTROLLED SUBSTANCE (CMPD RX) - PHARMACY TO MIX COMPOUNDED MEDICATION Apply nightly to toenails. SM 61 -  Ciclopirox 8% / Itraconazole 3% / Fluconazole 3% / Terbinafine HCl 1% / Ibuprofen 2% DMSO Suspension 15 mL 3    diphenhydrAMINE (BENADRYL) 25 MG tablet Take 25 mg by mouth every 8 hours as needed for allergies      guaiFENesin (MUCINEX) 600 MG 12 hr tablet Take 1 tablet by mouth 2 times daily as needed      nitroFURantoin macrocrystal-monohydrate (MACROBID) 100 MG capsule Take 1 capsule (100 mg) by mouth 2  times daily PRN UTI 6 capsule 99    nitroGLYcerin (NITROSTAT) 0.4 MG sublingual tablet Place 1 tablet (0.4 mg) under the tongue every 5 minutes as needed for chest pain For chest pain place 1 tablet under the tongue every 5 minutes for 3 doses. If symptoms persist 5 minutes after 1st dose call 911. 30 tablet 1    polyethylene glycol (GOLYTELY) 236 g suspension The night before the exam at 6 pm drink an 8-ounce glass every 15 minutes until the jug is half empty. If you arrive before 11 AM: Drink the other half of the Golytely jug at 11 PM night before procedure. If you arrive after 11 AM: Drink the other half of the Golytely jug at 6 AM day of procedure. For additional instructions refer to your colonoscopy prep instructions. 4000 mL 0    predniSONE (DELTASONE) 5 MG tablet Take 1 tablet (5 mg) by mouth daily 90 tablet 3    Probiotic Product (PROBIOTIC PO) Take 2 tablets by mouth every morning (RenewLife Women's Vaginal Probiotic)      sulfamethoxazole-trimethoprim (BACTRIM) 400-80 MG tablet Take 1 tablet by mouth daily 90 tablet 3    tacrolimus (GENERIC EQUIVALENT) 1 MG capsule Take 1 capsule (1 mg) by mouth every 12 hours 180 capsule 3    tacrolimus (GENERIC) 0.5 MG capsule Take 1 capsule (0.5 mg) by mouth every morning 90 capsule 3    temazepam (RESTORIL) 15 MG capsule Take 1 capsule (15 mg) by mouth as needed for sleep (twice a year) 15 capsule 0    aspirin (ASA) 81 MG chewable tablet Take 81 mg by mouth every evening Stopped 7 days preop      estradiol (ESTRACE) 0.1 MG/GM vaginal cream Place 1 g vaginally three times a week 42.5 g 11    Multiple Vitamins-Minerals (WOMENS DAILY FORMULA PO) Take 1 tablet by mouth daily         Systemic Review:   CONSTITUTIONAL: NEGATIVE for fever, chills, change in weight  ENT/MOUTH: NEGATIVE for ear, mouth and throat problems  RESP: NEGATIVE for significant cough or SOB  CV: NEGATIVE for chest pain, palpitations or peripheral edema    Physical Examination:   Vital Signs: BP 95/57  "(BP Location: Right arm)   Pulse 83   Temp 97.4  F (36.3  C) (Temporal)   Resp 18   Ht 1.778 m (5' 10\")   Wt 63.5 kg (140 lb)   SpO2 97%   BMI 20.09 kg/m    GENERAL: healthy, alert and no distress  NECK: no adenopathy, no asymmetry, masses, or scars  RESP: Normal respiratory excursion   CV: regular rates and rhythm, peripheral pulses strong, and no peripheral edema  ABDOMEN: soft, nontender and bowel sounds normal  MS: no gross musculoskeletal defects noted, no edema    Plan: Appropriate to proceed as scheduled.      Thomas M. Leventhal, MD  3/20/2024    PCP:  Vincent De La Garza    "

## 2024-03-20 NOTE — ANESTHESIA CARE TRANSFER NOTE
Patient: Belen Sharma    Procedure: Procedure(s):  COLONOSCOPY, WITH POLYPECTOMY       Diagnosis: Colon cancer screening [Z12.11]  Diagnosis Additional Information: No value filed.    Anesthesia Type:   No value filed.     Note:    Oropharynx: oropharynx clear of all foreign objects and spontaneously breathing  Level of Consciousness: drowsy  Oxygen Supplementation: room air    Independent Airway: airway patency satisfactory and stable  Dentition: dentition unchanged  Vital Signs Stable: post-procedure vital signs reviewed and stable  Report to RN Given: handoff report given  Patient transferred to: Phase II  Comments: Vital signs per nursing documentation.       Handoff Report: Identifed the Patient, Identified the Reponsible Provider, Reviewed the pertinent medical history, Discussed the surgical course, Reviewed Intra-OP anesthesia mangement and issues during anesthesia, Set expectations for post-procedure period and Allowed opportunity for questions and acknowledgement of understanding      Vitals:  Vitals Value Taken Time   BP     Temp     Pulse 79    Resp 13    SpO2 98%        Electronically Signed By: ANA Salgado CRNA  March 20, 2024  10:34 AM

## 2024-03-20 NOTE — ANESTHESIA PREPROCEDURE EVALUATION
Anesthesia Pre-Procedure Evaluation    Patient: Belen Sharma   MRN: [id number] : 1960        Procedure : Procedure(s):  Colonoscopy          Past Medical History:   Diagnosis Date    Adolescent scoliosis     protruding    BK viremia [id number]    CMV pneumonia (H)     Congenital hepatic fibrosis     Endometriosis     High grade squamous intraepithelial lesion of cervix 2020    History of angina     HPV (human papilloma virus) infection     Immunosuppression (H24)     Polycystic kidney disease     Polycystic kidneys, tx kidney on the right. Both, very large, native kidneys reamain.    PONV (postoperative nausea and vomiting)     Need to start with ice chips and apple juice, no soda    S/P kidney transplant     SLK 10/9/2010 - kidney failure 2/2 [facility]. Explanted . Re-transplant after de-sensitization     S/P liver transplant (H)     10/9/2010 - HAT, ischemic biopathy. Re-transplant 3/3/2011    S/P splenectomy     Spider veins     Thyroid disease     Hyperthyroid treated with single dose iodine    Urinary tract infection 2022      Past Surgical History:   Procedure Laterality Date    bunectomy  2018    right foot    bunionectomy  2019    left    CHOLECYSTECTOMY      CONIZATION N/A 2020    Procedure: CONE BIOPSY, CERVIX;  Surgeon: Daysi Perez MD;  Location: [facility] OR    FAILED PICC - RIGHT ARM Right 2020    Has a LUE AV fistula.    H STATISTIC PICC LINE INSERTION >5YR, FAILED Bilateral 2020    Left fistula, Right failed x 2 Occlussion    HERNIA REPAIR, INCISIONAL  2013    IR DIALYSIS PTA CENTRAL SEG  2020    IR PICC PLACEMENT > 5 YRS OF AGE  2020    IR PICC PLACEMENT > 5 YRS OF AGE  10/5/2022    PHACOEMULSIFICATION CLEAR CORNEA WITH STANDARD INTRAOCULAR LENS IMPLANT Right 2023    Procedure: RIGHT EYE PHACOEMULSIFICATION, CATARACT, WITH INTRAOCULAR LENS IMPLANT;  Surgeon: Sebastian Robertson MD;  Location: [facility] OR    PHACOEMULSIFICATION WITH  STANDARD INTRAOCULAR LENS IMPLANT Left 3/9/2023    Procedure: LEFT EYE PHACOEMULSIFICATION, CATARACT, WITH STANDARD INTRAOCULAR LENS IMPLANT INSERTION;  Surgeon: Sebastian Robertson MD;  Location: [facility] OR    SPLENECTOMY      Splenectomy    TRANSPLANT KIDNEY RECIPIENT  DONOR  2016    re-transplant after [facility]; 6 months de-sensitization prior AND 6 months after transplant    TRANSPLANT LIVER RECIPIENT  DONOR  2011    TRANSPLANT LIVER, KIDNEY RECIPIENT  DONOR, COMBINED  10/09/2010    HAT - re-Tx liver 3/3/2011; Kidney (left iliac) lost to [facility]/fibrosis - explant    VASCULAR SURGERY      Vascular access left UE. Not used for 4 years since 2nd kidney tx - [place]      Allergies   Allergen Reactions    Ibuprofen      Due to liver transplant      Social History     Tobacco Use    Smoking status: Never    Smokeless tobacco: Never   Substance Use Topics    Alcohol use: Not Currently      Wt Readings from Last 1 Encounters:   24 63.5 kg (140 lb)        Anesthesia Evaluation   Pt has had prior anesthetic.     History of anesthetic complications  - PONV.      ROS/MED HX  ENT/Pulmonary:  - neg pulmonary ROS     Neurologic:  - neg neurologic ROS     Cardiovascular:  - neg cardiovascular ROS     METS/Exercise Tolerance: >4 METS    Hematologic:  - neg hematologic  ROS     Musculoskeletal:  - neg musculoskeletal ROS     GI/Hepatic: Comment: S/p liver transplant    (+)             liver disease,       Renal/Genitourinary: Comment: S/p kidney transplant    (+) renal disease,             Endo:     (+)          thyroid problem,            Psychiatric/Substance Use:  - neg psychiatric ROS     Infectious Disease:  - neg infectious disease ROS     Malignancy:  - neg malignancy ROS     Other:  - neg other ROS          Physical Exam    Airway        Mallampati: I   TM distance: > 3 FB    Mouth opening: > 3 cm    Respiratory Devices and Support         Dental       (+) Minor Abnormalities - some  "fillings, tiny chips      Cardiovascular          Rhythm and rate: normal     Pulmonary                   OUTSIDE LABS:  CBC:   Lab Results   Component Value Date    WBC 6.8 02/13/2024    WBC 6.0 12/18/2023    HGB 15.9 02/13/2024    HGB 15.6 12/18/2023    HCT 48.7 02/13/2024    HCT 48.6 12/18/2023     02/13/2024     12/18/2023     BMP:   Lab Results   Component Value Date     02/13/2024     12/18/2023    POTASSIUM 4.0 02/13/2024    POTASSIUM 4.0 12/18/2023    CHLORIDE 103 02/13/2024    CHLORIDE 105 12/18/2023    CO2 26 02/13/2024    CO2 27 12/18/2023    BUN 13.6 02/13/2024    BUN 11.8 12/18/2023    CR 0.81 02/13/2024    CR 0.76 12/18/2023    GLC 87 02/13/2024    GLC 87 12/18/2023     COAGS: No results found for: \"PTT\", \"INR\", \"FIBR\"  POC:   Lab Results   Component Value Date     (H) 09/30/2020     HEPATIC:   Lab Results   Component Value Date    ALBUMIN 3.9 02/13/2024    PROTTOTAL 6.7 02/13/2024    ALT 14 02/13/2024    AST 21 02/13/2024    ALKPHOS 56 02/13/2024    BILITOTAL 0.6 02/13/2024     OTHER:   Lab Results   Component Value Date    PH 7.44 (H) 09/14/2022    LACT 0.8 10/02/2022    EDSON 10.0 02/13/2024    PHOS 3.4 09/23/2020    TSH 0.86 11/04/2021    CRP 39.3 (H) 11/04/2021    SED 8 09/14/2022       Anesthesia Plan    ASA Status:  3    NPO Status:  NPO Appropriate    Anesthesia Type: MAC.   Induction: Intravenous.   Maintenance: TIVA.        Consents    Anesthesia Plan(s) and associated risks, benefits, and realistic alternatives discussed. Questions answered and patient/representative(s) expressed understanding.     - Discussed: Risks, Benefits and Alternatives for BOTH SEDATION and the PROCEDURE were discussed     - Discussed with:       - Extended Intubation/Ventilatory Support Discussed: No.      - Patient is DNR/DNI Status: No     Use of blood products discussed: No .     Postoperative Care    Pain management: IV analgesics.   PONV prophylaxis: Ondansetron (or other 5HT-3) "     Comments:               Speedy Farias MD    I have reviewed the pertinent notes and labs in the chart from the past 30 days and (re)examined the patient.  Any updates or changes from those notes are reflected in this note.             # Drug Induced Platelet Defect: home medication list includes an antiplatelet medication

## 2024-03-20 NOTE — ANESTHESIA POSTPROCEDURE EVALUATION
Patient: Belen Sharma    Procedure: Procedure(s):  COLONOSCOPY, WITH POLYPECTOMY       Anesthesia Type:  No value filed.    Note:  Disposition: Outpatient   Postop Pain Control: Uneventful            Sign Out: Well controlled pain   PONV: No   Neuro/Psych: Uneventful            Sign Out: Acceptable/Baseline neuro status   Airway/Respiratory: Uneventful            Sign Out: Acceptable/Baseline resp. status   CV/Hemodynamics: Uneventful            Sign Out: Acceptable CV status; No obvious hypovolemia; No obvious fluid overload   Other NRE: NONE   DID A NON-ROUTINE EVENT OCCUR? No           Last vitals:  Vitals Value Taken Time   /55 03/20/24 1057   Temp 36.7  C (98  F) 03/20/24 1057   Pulse 68 03/20/24 1057   Resp 14 03/20/24 1057   SpO2 96 % 03/20/24 1057       Electronically Signed By: Speedy Farias MD  March 20, 2024  11:01 AM

## 2024-04-03 ENCOUNTER — LAB (OUTPATIENT)
Dept: LAB | Facility: CLINIC | Age: 64
End: 2024-04-03
Payer: MEDICARE

## 2024-04-03 ENCOUNTER — PATIENT OUTREACH (OUTPATIENT)
Dept: GASTROENTEROLOGY | Facility: CLINIC | Age: 64
End: 2024-04-03
Payer: MEDICARE

## 2024-04-03 DIAGNOSIS — N39.0 RECURRENT UTI: ICD-10-CM

## 2024-04-03 LAB
ALBUMIN UR-MCNC: ABNORMAL MG/DL
APPEARANCE UR: CLEAR
BILIRUB UR QL STRIP: NEGATIVE
COLOR UR AUTO: YELLOW
GLUCOSE UR STRIP-MCNC: NEGATIVE MG/DL
HGB UR QL STRIP: ABNORMAL
KETONES UR STRIP-MCNC: NEGATIVE MG/DL
LEUKOCYTE ESTERASE UR QL STRIP: ABNORMAL
NITRATE UR QL: NEGATIVE
PH UR STRIP: 7 [PH] (ref 5–8)
RBC #/AREA URNS AUTO: ABNORMAL /HPF
SP GR UR STRIP: 1.01 (ref 1–1.03)
UROBILINOGEN UR STRIP-ACNC: 0.2 E.U./DL
WBC #/AREA URNS AUTO: ABNORMAL /HPF

## 2024-04-03 PROCEDURE — 81001 URINALYSIS AUTO W/SCOPE: CPT

## 2024-04-03 PROCEDURE — 87086 URINE CULTURE/COLONY COUNT: CPT

## 2024-04-03 RX ORDER — NITROFURANTOIN 25; 75 MG/1; MG/1
100 CAPSULE ORAL 2 TIMES DAILY
Qty: 6 CAPSULE | Refills: 99 | Status: SHIPPED | OUTPATIENT
Start: 2024-04-03

## 2024-04-05 LAB — BACTERIA UR CULT: NORMAL

## 2024-04-09 ENCOUNTER — VIRTUAL VISIT (OUTPATIENT)
Dept: INFECTIOUS DISEASES | Facility: CLINIC | Age: 64
End: 2024-04-09
Attending: INTERNAL MEDICINE
Payer: MEDICARE

## 2024-04-09 VITALS — BODY MASS INDEX: 20.47 KG/M2 | WEIGHT: 143 LBS | HEIGHT: 70 IN

## 2024-04-09 DIAGNOSIS — Z71.84 COUNSELING ABOUT TRAVEL: ICD-10-CM

## 2024-04-09 DIAGNOSIS — Z94.0 KIDNEY TRANSPLANT RECIPIENT: ICD-10-CM

## 2024-04-09 DIAGNOSIS — N39.0 RECURRENT UTI: Primary | ICD-10-CM

## 2024-04-09 DIAGNOSIS — Z94.4 LIVER REPLACED BY TRANSPLANT (H): ICD-10-CM

## 2024-04-09 PROCEDURE — 99214 OFFICE O/P EST MOD 30 MIN: CPT | Mod: 95 | Performed by: INTERNAL MEDICINE

## 2024-04-09 ASSESSMENT — PAIN SCALES - GENERAL: PAINLEVEL: MILD PAIN (3)

## 2024-04-09 NOTE — PROGRESS NOTES
Virtual Visit Details    Type of service:  Video Visit   Video Start Time:  4:58 PM  Video End Time: 5:17 PM    Originating Location (pt. Location): Home    Distant Location (provider location):  On-site  Platform used for Video Visit: [facility]    Transplant Infectious Diseases Outpatient Progress note      Patient:  Belen Sharma, Date of birth 1960, Medical record number [id number]  Date of Visit:  04/09/2024           Recommendations:   1. Standing prescription for macrobid is on file to be filled only if symptomatic and after submitting a UA and Ucx.     RTC: 6 months.          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   Currently on TAC/MMF/prednisone.   With recurrent UTI.          Active Problems and Infectious Diseases Issues:   1. Recurrent UTI.   In the past was with different pathogens including ESBL E coli, K pneumonia, E faecalis and sometimes with negative urine and blood cx.   Since 9/2022 the UTIs have been with ESBL E coli.   The patient currently has standing orders for UA/Ucx when symptomatic and a standing order of macrobid at her local pharmacy to be started after UA/Ucx are delivered.     CT a/p 9/22/22 without evidence of structural or anatomical abnormality.   Cystoscopy and urodynamic studies were done on 2/2/23, both were unremarkable.     The patient has multiple risk factors for recurrent UTI, almost all of them can not be reversed, including: female gender, immunocompromised, kidney transplant recipient, the need to manipulate the  tract daily to change pessary.   In addition the patient is not  consistent using topical estrogen which may also contribute to UTI.     2. Questions about travel to [place] and vaccines.   All questions were answered.   Going to [place] for less than 2 weeks. No indications for Cocci testing. [place] to swim in ocean, wear shoes on the beach.         Old Problems and Infectious Diseases Issues:   1. Recurrent UTI with E coli, E faecalis and K pneumonia.   2. CDI.   3. Splenectomy.      Other Infectious Disease issues include:  - QTc: 384 as of 6/6/2022. .   - PCP prophylaxis: bactrim.  - Serostatus: not available.   - Immunization status: up-to-date.       Attestation:  Total duration of visit including chart review, reviewing labs and imaging, interviewing and examining the patient, documentation, and sending communication to the patient and to the primary treating team, all at the same day of this encounter, is: 34 minutes.   Catrachita Gonzales MD    Contact information available via [facility] Paging/Directory     04/09/2024         Interim History:   Experienced polyuria, no fever a week ago, however UA and Ucx were normal and symptoms resolved spontaneously.   Had questions about travel to [place] and vaccines, all were answered.          Review of Systems:     As mentioned in the interim history otherwise negative by reviewing constitutional symptoms, central and peripheral neurological systems, respiratory system, cardiac system, GI system,  system, musculoskeletal, skin, allergy, and lymphatics.                 Immunizations:     Immunization History   Administered Date(s) Administered    COVID-19 12+ (2023-24) (Pfizer) 10/06/2023, 01/31/2024    COVID-19 Bivalent 12+ (Pfizer) 11/04/2022    COVID-19 MONOVALENT 12+ (Pfizer) 03/29/2021, 04/19/2021, 09/02/2021    COVID-19 Monovalent 12+ (Pfizer 2022) 02/08/2022    DT (PEDS <7y) 03/19/2001    E8a3-89 Novel Flu 11/19/2009    HepB, Unspecified 06/28/2001    Influenza Vaccine 18-64 (Flublok) 10/18/2021, 09/21/2023    Influenza Vaccine >6  months,quad, PF 09/12/2022    Influenza Vaccine IM Ages 6-35 Months 4 Valent (PF) 11/28/2007, 10/07/2009, 09/20/2011    Influenza, seasonal, injectable, PF 11/15/2002, 11/18/2003, 10/26/2004, 10/24/2005, 11/16/2006, 09/20/2011    Influenza,INJ,MDCK,PF,Quad >6mo(Flucelvax) 10/15/2020    MENINGOCOCCAL ACWY (MENQUADFI ) 01/17/2024    Meningococcal ACWY (Menactra ) 01/25/2013    Pneumococcal 20 valent Conjugate (Prevnar 20) 09/12/2022    Pneumococcal 23 valent 09/27/2002, 01/25/2013    RSV Vaccine (Abrysvo) 11/14/2023    TDAP Vaccine (Boostrix) 01/25/2013, 06/01/2018    Twinrix A/B 11/27/2023, 01/17/2024    Zoster recombinant adjuvanted (SHINGRIX) 10/15/2020, 01/08/2021             Allergies:     Allergies   Allergen Reactions    Ibuprofen      Due to liver transplant             Medications:     Current Outpatient Medications   Medication Sig Dispense Refill    acetaminophen (TYLENOL) 325 MG tablet Take 1 tablet (325 mg) by mouth every 4 hours as needed for mild pain or fever 120 tablet 1    aspirin (ASA) 81 MG chewable tablet Take 81 mg by mouth every evening Stopped 7 days preop      atorvastatin (LIPITOR) 10 MG tablet Take 1 tablet (10 mg) by mouth daily 90 tablet 3    biotin 1000 MCG TABS tablet Take 3,000 mcg by mouth every evening      bisacodyl (DULCOLAX) 5 MG EC tablet Take 2 tablets at 3 pm the day before your procedure. If your procedure is before 11 am, take 2 additional tablets at 11 pm. If your procedure is after 11 am, take 2 additional tablets at 6 am. For additional instructions refer to your colonoscopy prep instructions. 4 tablet 0    calcium carbonate-vitamin D (OSCAL) 500-5 MG-MCG tablet Take 1 tablet by mouth 2 times daily 180 tablet 3    ciclopirox (PENLAC) 8 % external solution Apply to adjacent skin and affected nails daily.  Remove with alcohol every 7 days, then repeat. 6.6 mL 0    COMPOUNDED NON-CONTROLLED SUBSTANCE (CMPD RX) - PHARMACY TO MIX COMPOUNDED MEDICATION Apply nightly to toenails.  SM 61 -  Ciclopirox 8% / Itraconazole 3% / Fluconazole 3% / Terbinafine HCl 1% / Ibuprofen 2% DMSO Suspension 15 mL 3    diphenhydrAMINE (BENADRYL) 25 MG tablet Take 25 mg by mouth every 8 hours as needed for allergies      estradiol (ESTRACE) 0.1 MG/GM vaginal cream Place 1 g vaginally three times a week 42.5 g 11    guaiFENesin (MUCINEX) 600 MG 12 hr tablet Take 1 tablet by mouth 2 times daily as needed      Multiple Vitamins-Minerals (WOMENS DAILY FORMULA PO) Take 1 tablet by mouth daily      nitroFURantoin macrocrystal-monohydrate (MACROBID) 100 MG capsule Take 1 capsule (100 mg) by mouth 2 times daily PRN UTI 6 capsule 99    nitroGLYcerin (NITROSTAT) 0.4 MG sublingual tablet Place 1 tablet (0.4 mg) under the tongue every 5 minutes as needed for chest pain For chest pain place 1 tablet under the tongue every 5 minutes for 3 doses. If symptoms persist 5 minutes after 1st dose call 911. 30 tablet 1    polyethylene glycol (GOLYTELY) 236 g suspension The night before the exam at 6 pm drink an 8-ounce glass every 15 minutes until the jug is half empty. If you arrive before 11 AM: Drink the other half of the Golytely jug at 11 PM night before procedure. If you arrive after 11 AM: Drink the other half of the Golytely jug at 6 AM day of procedure. For additional instructions refer to your colonoscopy prep instructions. 4000 mL 0    predniSONE (DELTASONE) 5 MG tablet Take 1 tablet (5 mg) by mouth daily 90 tablet 3    Probiotic Product (PROBIOTIC PO) Take 2 tablets by mouth every morning ([facility] Women's Vaginal Probiotic)      sulfamethoxazole-trimethoprim (BACTRIM) 400-80 MG tablet Take 1 tablet by mouth daily 90 tablet 3    tacrolimus (GENERIC EQUIVALENT) 1 MG capsule Take 1 capsule (1 mg) by mouth every 12 hours 180 capsule 3    tacrolimus (GENERIC) 0.5 MG capsule Take 1 capsule (0.5 mg) by mouth every morning 90 capsule 3    temazepam (RESTORIL) 15 MG capsule Take 1 capsule (15 mg) by mouth as needed for sleep  "(twice a year) 15 capsule 0     No current facility-administered medications for this visit.                Physical Exam:   No vitals are available during this virtual visit.   Constitutional: awake, alert, cooperative, no apparent distress and appears at stated age, well nourished.             Laboratory Data:     No results found for: \"ACD4\"    Inflammatory Markers    Recent Labs   Lab Test 09/14/22  2220 11/04/21  1457 12/28/20  1515 12/21/20  1545   SED 8 14 8 10   CRP  --  39.3* 13.0* 18.0*       Immune Globulin Studies    No lab results found.    Metabolic Studies    Recent Labs   Lab Test 02/13/24  0810 12/18/23  0806 11/27/23  0759 11/09/23  0750 10/16/23  0814 08/15/23  0812 10/03/22  0935 10/02/22  0628 09/15/22  0607 09/14/22  2230 09/30/20  0721 09/23/20  1653    138 140 140 140 139   < > 135*   < >  --    < >  --    POTASSIUM 4.0 4.0 3.8 4.1 3.9 3.7   < > 3.9   < >  --    < >  --    CHLORIDE 103 105 106 106 106 106   < > 103   < >  --    < >  --    CO2 26 27 26 26 27 26   < > 23   < >  --    < >  --    ANIONGAP 8 6* 8 8 7 7   < > 9   < >  --    < >  --    BUN 13.6 11.8 15.5 16.0 14.2 18.9   < > 17.8   < >  --    < >  --    CR 0.81 0.76 0.75 0.83 0.86 0.80   < > 0.78   < >  --    < >  --    GFRESTIMATED 81 88 89 79 75 82   < > 85   < >  --    < >  --    GLC 87 87 89 85 92 78   < > 92   < >  --    < >  --    EDSON 10.0 9.4 9.6 9.9 10.0 9.7   < > 9.5   < >  --    < >  --    PHOS  --   --   --   --   --   --   --   --   --   --   --  3.4   LACT  --   --   --   --   --   --   --  0.8  --  0.9   < >  --     < > = values in this interval not displayed.       Hepatic Studies    Recent Labs   Lab Test 02/13/24  0810 12/18/23  0806 11/27/23  0759 11/09/23  0750 10/16/23  0814 08/15/23  0812   BILITOTAL 0.6 0.5 0.5 0.7 0.9 0.6   ALKPHOS 56 55 60 57 66 57   PROTTOTAL 6.7 6.4 6.8 6.7 6.8 6.5   ALBUMIN 3.9 3.8 4.0 3.9 3.9 3.9   AST 21 19 20 20 19 20   ALT 14 13 16 15 14 12       Hematology Studies     Recent " Labs   Lab Test 02/13/24  0810 12/18/23  0806 11/27/23  0759 11/09/23  0750 10/16/23  0814 08/15/23  0812 03/11/21  1007 12/28/20  1515 12/28/20  0855 12/21/20  1545 12/18/20  0611 12/16/20  2236   WBC 6.8 6.0 8.1 5.9 7.4 6.3   < > 9.2   < > 6.2   < > 11.4*   ANEU  --   --   --   --   --   --   --  6.4  --  3.9  --  9.3*   ALYM  --   --   --   --   --   --   --  2.1  --  1.7  --  1.0   PABLO  --   --   --   --   --   --   --  0.6  --  0.6  --  1.0   AEOS  --   --   --   --   --   --   --  0.0  --  0.0  --  0.0   HGB 15.9 15.6 15.9 15.8 15.9 15.4   < > 16.6*   < > 16.2*   < > 16.9*   HCT 48.7 48.6 49.3 48.4 48.8 47.5   < > 50.8*   < > 49.2*   < > 54.5*    193 217 222 249 205   < > 308   < > 261   < > 260    < > = values in this interval not displayed.       Clotting Studies  No lab results found.    Urine Studies    Recent Labs   Lab Test 04/03/24  1302 02/05/24  1328 05/16/23  0805 04/30/23  1235 04/02/23  1621   URINEPH 7.0 6.5 7.0 7.0 6.0   NITRITE Negative Negative Negative Negative Positive*   LEUKEST Large* Large* Large* Large* Large*   WBCU 0-5 25-50* 5-10* * >100*         Microbiology:  Last 6 Culture results with specimen source  Culture Micro   Date Value Ref Range Status   06/04/2021 (A)  Final    10,000 to 50,000 colonies/mL  Enterococcus faecalis     12/17/2020 No growth  Final   12/16/2020 (A)  Final    Cultured on the 1st day of incubation:  Escherichia coli ESBL  ESBL (extended beta lactamase) producing organisms require contact precautions.     12/16/2020   Final    Critical Value/Significant Value, preliminary result only, called to and read back by  RALPH BOLAÑOS RN 12.17.2020 1056 BC     12/16/2020   Final    (Note)  POSITIVE for E.COLI by Verigene multiplex nucleic acid test. Final  identification and antimicrobial susceptibility testing will be  verified by standard methods. Verigene test will not distinguish  E.coli from Shigella species including S.dysenteriae,  "S.flexneri,  S.boydii, and S.sonnei. Specimens containing Shigella species or  E.coli will be reported as Positive for E.coli.    POSITIVE for CTX-M Class A Extended Spectrum beta-lactamase (ESBL)  resistance marker by Livescribeigene multiplex nucleic acid test. CTX-M  confers resistance to penicillins, cephalosporins and variable  resistance to beta-lactam beta-lactamase inhibitor combinations  (clavulanic acid and tazobactam). Best empiric antibiotic choice is  meropenem. Specific susceptibility testing will be performed.    Specimen tested with Verigene multiplex, gram-negative blood culture  nucleic acid test for the following targets: Acinetobacter sp.,  Citrobacter sp., Enterobacter sp., Proteus sp., E. coli, K.  pneumoniae/oxytoca, P. aeruginosa, and the following resistance  markers: CTXM, KPC, NDM, VIM, IMP and OXA.    Critical Value/Significant Value called to and read back by RALPH BOLAÑOS RN 12/17/20 1312 EH.       12/16/2020 No growth  Final   12/16/2020 (A)  Preliminary    >100,000 colonies/mL  Escherichia coli ESBL  ESBL (extended beta lactamase) producing organisms require contact precautions.     12/16/2020   Preliminary    Susceptibility testing requested by  Dr. Gonzales, Pager [phone]. Fosfomycin requested. @ 1637. 12.18.20. BS.       Specimen Description   Date Value Ref Range Status   06/04/2021 Midstream Urine  Final   12/17/2020 Blood  Final   12/16/2020 Blood Right Arm  Final   12/16/2020 Blood Right Arm  Final   12/16/2020 Unspecified Urine  Final   12/03/2020 Midstream Urine  Final        Last check of C difficile  No results found for: \"CDBPCT\"      Virology:  CMV viral loads    CMV viral loads    CMV Quant IU/mL   Date Value Ref Range Status   12/18/2020 CMV DNA Not Detected CMVND^CMV DNA Not Detected [IU]/mL Final     Comment:     Mutations within the highly conserved regions of the viral genome covered by   the RHONDA AmpliPrep/RHONDA TaqMan CMV Test primers and/or probes have been   identified " and may result in under-quantitation of or failure to detect the   virus.  Supplemental testing methods should be used for testing when this is   suspected.  The RHONDA AmpliPrep/RHONDA TaqMan CMV Test is an FDA-approved in vitro nucleic   acid amplification test for the quantitation of cytomegalovirus DNA in human   plasma (EDTA plasma) using the RHONDA AmpliPrep Instrument for automated viral   nucleic acid extraction and the RHONDA TaqMan Analyzer or [employer] TaqMan for   automated Real Time amplification and detection of the viral nucleic acid   target.  Titer results are reported in International Units/mL (IU/mL using 1st WHO   International standard for Human Cytomegalovirus for Nucleic Acid   Amplification based assays. The conversion factor between CMV DNA copis/mL (as   defined by the Roche RHONDA TaqMan CMV test) and International Units is the   CMV DNA concentration in IU/mL x 1.1 copies/IU = CMV DNA in copies/mL.  This assay has received FDA approval for the testing of human plasma only. The   Infectious Disease Diagnostic Laboratory at the [facility], [place], has validated the performance characteristics of the   Roche CMV assay for plasma, bronchial alveolar lavage/wash and urine.       CMV viral loads    Recent Labs   Lab Test 12/18/20  0611   CMVQNT CMV DNA Not Detected   CSPEC Plasma   CMVLOG Not Calculated       CMV viral loads    CMV Quant IU/mL   Date Value Ref Range Status   12/18/2020 CMV DNA Not Detected CMVND^CMV DNA Not Detected [IU]/mL Final     Comment:     Mutations within the highly conserved regions of the viral genome covered by   the RHONDA AmpliPrep/RHONDA TaqMan CMV Test primers and/or probes have been   identified and may result in under-quantitation of or failure to detect the   virus.  Supplemental testing methods should be used for testing when this is   suspected.  The RHONDA AmpliPrep/RHONDA TaqMan CMV Test is an FDA-approved in vitro nucleic   acid  amplification test for the quantitation of cytomegalovirus DNA in human   plasma (EDTA plasma) using the RHONDA AmpliPrep Instrument for automated viral   nucleic acid extraction and the [employer] TaqMan Analyzer or [employer] TaqMan for   automated Real Time amplification and detection of the viral nucleic acid   target.  Titer results are reported in International Units/mL (IU/mL using 1st WHO   International standard for Human Cytomegalovirus for Nucleic Acid   Amplification based assays. The conversion factor between CMV DNA copis/mL (as   defined by the Roche RHONDA TaqMan CMV test) and International Units is the   CMV DNA concentration in IU/mL x 1.1 copies/IU = CMV DNA in copies/mL.  This assay has received FDA approval for the testing of human plasma only. The   Infectious Disease Diagnostic Laboratory at the [facility], [place], has validated the performance characteristics of the   Roche CMV assay for plasma, bronchial alveolar lavage/wash and urine.     09/23/2020 CMV DNA Not Detected CMVND^CMV DNA Not Detected [IU]/mL Final     Comment:     Mutations within the highly conserved regions of the viral genome covered by   the RHONDA AmpliPrep/RHONDA TaqMan CMV Test primers and/or probes have been   identified and may result in under-quantitation of or failure to detect the   virus.  Supplemental testing methods should be used for testing when this is   suspected.  The RHONDA AmpliPrep/RHONDA TaqMan CMV Test is an FDA-approved in vitro nucleic   acid amplification test for the quantitation of cytomegalovirus DNA in human   plasma (EDTA plasma) using the RHONDA AmpliPrep Instrument for automated viral   nucleic acid extraction and the [employer] TaqMan Analyzer or RHONDA TaqMan for   automated Real Time amplification and detection of the viral nucleic acid   target.  Titer results are reported in International Units/mL (IU/mL using 1st WHO   International standard for Human Cytomegalovirus for Nucleic  "Acid   Amplification based assays. The conversion factor between CMV DNA copis/mL (as   defined by the Roche RHONDA TaqMan CMV test) and International Units is the   CMV DNA concentration in IU/mL x 1.1 copies/IU = CMV DNA in copies/mL.  This assay has received FDA approval for the testing of human plasma only. The   Infectious Disease Diagnostic Laboratory at the [facility], [place], has validated the performance characteristics of the   Roche CMV assay for plasma, bronchial alveolar lavage/wash and urine.       Log IU/mL of CMVQNT   Date Value Ref Range Status   12/18/2020 Not Calculated <2.1 [Log_IU]/mL Final   09/23/2020 Not Calculated <2.1 [Log_IU]/mL Final       CMV resistance testing  No lab results found.  No results found for: \"CMVCID\", \"CMVFOS\", \"CMVGAN\"     EBV viral loads   No lab results found.  No results found for: \"EBVDN\", \"EBRES\", \"EBVSP\", \"EBVPC\", \"EBVPCR\"    Human Herpes Virus 6 viral loads    No results found for: \"H6RES\" No results found for: \"H6SPEC\"    CMV Antibody IgG   Date Value Ref Range Status   01/04/2021 >8.0 (H) 0.0 - 0.8 AI Final     Comment:     Positive  Antibody index (AI) values reflect qualitative changes in antibody   concentration that cannot be directly associated with clinical condition or   disease state.       No results found for: \"EBIG2\", \"EBIGM\", \"EBVIGG\", \"EBIGG\", \"EBVAGN\", \"HG5292\", \"TOXG\"      Imaging:  CT a/p W 9/29/2022  IMPRESSION:  1. Urothelial thickening and mild ectasia of the distal right renal  transplant ureter extending to the ureterovesical anastomosis,  slightly increased compared to previous CT on 12/17/2020. Findings may  represent acute or chronic sequela of inflammation/infection. No  hydronephrosis or evidence of obstruction.  2. Simple right adnexal cyst is not significantly changed compared to  previous without internal complexity on comparison ultrasound, likely  benign. No dedicated imaging follow-up recommended.  3. " Stable postoperative changes of liver transplant.  4. Stable enlarged polycystic kidneys.

## 2024-04-09 NOTE — NURSING NOTE
No other vitals to report today      Is the patient currently in the state of [place]? YES    Visit mode:VIDEO    If the visit is dropped, the patient can be reconnected by: VIDEO VISIT: Send to e-mail at: [email or web address]    Will anyone else be joining the visit? NO  (If patient encounters technical issues they should call [phone])    How would you like to obtain your AVS? MyChart    Are changes needed to the allergy or medication list? No    Patient denies any changes since echeck-in completion and states all information entered during echeck-in remains accurate.    Are refills needed on medications prescribed by this physician? NO    Reason for visit: TAMMY HairF

## 2024-04-09 NOTE — LETTER
4/9/2024       RE: Belen Sharma  [address] [place] [address]     Dear Colleague,    Thank you for referring your patient, Belen Sharma, to the [facility] INFECTIOUS DISEASE CLINIC [place] at [facility]. Please see a copy of my visit note below.    Virtual Visit Details    Type of service:  Video Visit   Video Start Time:  4:58 PM  Video End Time: 5:17 PM    Originating Location (pt. Location): Home    Distant Location (provider location):  On-site  Platform used for Video Visit: [facility]    Transplant Infectious Diseases Outpatient Progress note      Patient:  Belen Sharma, Date of birth 1960, Medical record number [id number]  Date of Visit:  04/09/2024           Recommendations:   1. Standing prescription for macrobid is on file to be filled only if symptomatic and after submitting a UA and Ucx.     RTC: 6 months.          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   Currently on TAC/MMF/prednisone.   With recurrent UTI.          Active Problems and Infectious Diseases Issues:   1. Recurrent UTI.   In the past was with different pathogens including ESBL E coli, K pneumonia, E faecalis and sometimes with negative urine and blood cx.   Since 9/2022 the UTIs have been with ESBL E coli.   The patient currently has standing orders for UA/Ucx when symptomatic and a standing order of macrobid at her local pharmacy to be started after UA/Ucx are delivered.     CT a/p 9/22/22 without evidence of structural or anatomical abnormality.   Cystoscopy and urodynamic studies were done on  2/2/23, both were unremarkable.     The patient has multiple risk factors for recurrent UTI, almost all of them can not be reversed, including: female gender, immunocompromised, kidney transplant recipient, the need to manipulate the  tract daily to change pessary.   In addition the patient is not consistent using topical estrogen which may also contribute to UTI.     2. Questions about travel to [place] and vaccines.   All questions were answered.   Going to [place] for less than 2 weeks. No indications for Cocci testing. [place] to swim in ocean, wear shoes on the beach.         Old Problems and Infectious Diseases Issues:   1. Recurrent UTI with E coli, E faecalis and K pneumonia.   2. CDI.   3. Splenectomy.      Other Infectious Disease issues include:  - QTc: 384 as of 6/6/2022. .   - PCP prophylaxis: bactrim.  - Serostatus: not available.   - Immunization status: up-to-date.       Attestation:  Total duration of visit including chart review, reviewing labs and imaging, interviewing and examining the patient, documentation, and sending communication to the patient and to the primary treating team, all at the same day of this encounter, is: 34 minutes.   Catrachita Gonzales MD    Contact information available via [facility] Paging/Directory     04/09/2024         Interim History:   Experienced polyuria, no fever a week ago, however UA and Ucx were normal and symptoms resolved spontaneously.   Had questions about travel to [place] and vaccines, all were answered.          Review of Systems:     As mentioned in the interim history otherwise negative by reviewing constitutional symptoms, central and peripheral neurological systems, respiratory system, cardiac system, GI system,  system, musculoskeletal, skin, allergy, and lymphatics.                 Immunizations:     Immunization History   Administered Date(s) Administered    COVID-19 12+ (2023-24) (Pfizer) 10/06/2023, 01/31/2024    COVID-19 Bivalent 12+ (Pfizer) 11/04/2022     COVID-19 MONOVALENT 12+ (Pfizer) 03/29/2021, 04/19/2021, 09/02/2021    COVID-19 Monovalent 12+ (Pfizer 2022) 02/08/2022    DT (PEDS <7y) 03/19/2001    V0t1-18 Novel Flu 11/19/2009    HepB, Unspecified 06/28/2001    Influenza Vaccine 18-64 (Flublok) 10/18/2021, 09/21/2023    Influenza Vaccine >6 months,quad, PF 09/12/2022    Influenza Vaccine IM Ages 6-35 Months 4 Valent (PF) 11/28/2007, 10/07/2009, 09/20/2011    Influenza, seasonal, injectable, PF 11/15/2002, 11/18/2003, 10/26/2004, 10/24/2005, 11/16/2006, 09/20/2011    Influenza,INJ,MDCK,PF,Quad >6mo(Flucelvax) 10/15/2020    MENINGOCOCCAL ACWY (MENQUADFI ) 01/17/2024    Meningococcal ACWY (Menactra ) 01/25/2013    Pneumococcal 20 valent Conjugate (Prevnar 20) 09/12/2022    Pneumococcal 23 valent 09/27/2002, 01/25/2013    RSV Vaccine (Abrysvo) 11/14/2023    TDAP Vaccine (Boostrix) 01/25/2013, 06/01/2018    Twinrix A/B 11/27/2023, 01/17/2024    Zoster recombinant adjuvanted (SHINGRIX) 10/15/2020, 01/08/2021             Allergies:     Allergies   Allergen Reactions    Ibuprofen      Due to liver transplant             Medications:     Current Outpatient Medications   Medication Sig Dispense Refill    acetaminophen (TYLENOL) 325 MG tablet Take 1 tablet (325 mg) by mouth every 4 hours as needed for mild pain or fever 120 tablet 1    aspirin (ASA) 81 MG chewable tablet Take 81 mg by mouth every evening Stopped 7 days preop      atorvastatin (LIPITOR) 10 MG tablet Take 1 tablet (10 mg) by mouth daily 90 tablet 3    biotin 1000 MCG TABS tablet Take 3,000 mcg by mouth every evening      bisacodyl (DULCOLAX) 5 MG EC tablet Take 2 tablets at 3 pm the day before your procedure. If your procedure is before 11 am, take 2 additional tablets at 11 pm. If your procedure is after 11 am, take 2 additional tablets at 6 am. For additional instructions refer to your colonoscopy prep instructions. 4 tablet 0    calcium carbonate-vitamin D (OSCAL) 500-5 MG-MCG tablet Take 1 tablet by  mouth 2 times daily 180 tablet 3    ciclopirox (PENLAC) 8 % external solution Apply to adjacent skin and affected nails daily.  Remove with alcohol every 7 days, then repeat. 6.6 mL 0    COMPOUNDED NON-CONTROLLED SUBSTANCE (CMPD RX) - PHARMACY TO MIX COMPOUNDED MEDICATION Apply nightly to toenails. SM 61 -  Ciclopirox 8% / Itraconazole 3% / Fluconazole 3% / Terbinafine HCl 1% / Ibuprofen 2% DMSO Suspension 15 mL 3    diphenhydrAMINE (BENADRYL) 25 MG tablet Take 25 mg by mouth every 8 hours as needed for allergies      estradiol (ESTRACE) 0.1 MG/GM vaginal cream Place 1 g vaginally three times a week 42.5 g 11    guaiFENesin (MUCINEX) 600 MG 12 hr tablet Take 1 tablet by mouth 2 times daily as needed      Multiple Vitamins-Minerals (WOMENS DAILY FORMULA PO) Take 1 tablet by mouth daily      nitroFURantoin macrocrystal-monohydrate (MACROBID) 100 MG capsule Take 1 capsule (100 mg) by mouth 2 times daily PRN UTI 6 capsule 99    nitroGLYcerin (NITROSTAT) 0.4 MG sublingual tablet Place 1 tablet (0.4 mg) under the tongue every 5 minutes as needed for chest pain For chest pain place 1 tablet under the tongue every 5 minutes for 3 doses. If symptoms persist 5 minutes after 1st dose call 911. 30 tablet 1    polyethylene glycol (GOLYTELY) 236 g suspension The night before the exam at 6 pm drink an 8-ounce glass every 15 minutes until the jug is half empty. If you arrive before 11 AM: Drink the other half of the Golytely jug at 11 PM night before procedure. If you arrive after 11 AM: Drink the other half of the Golytely jug at 6 AM day of procedure. For additional instructions refer to your colonoscopy prep instructions. 4000 mL 0    predniSONE (DELTASONE) 5 MG tablet Take 1 tablet (5 mg) by mouth daily 90 tablet 3    Probiotic Product (PROBIOTIC PO) Take 2 tablets by mouth every morning ([facility] Women's Vaginal Probiotic)      sulfamethoxazole-trimethoprim (BACTRIM) 400-80 MG tablet Take 1 tablet by mouth daily 90 tablet 3  "   tacrolimus (GENERIC EQUIVALENT) 1 MG capsule Take 1 capsule (1 mg) by mouth every 12 hours 180 capsule 3    tacrolimus (GENERIC) 0.5 MG capsule Take 1 capsule (0.5 mg) by mouth every morning 90 capsule 3    temazepam (RESTORIL) 15 MG capsule Take 1 capsule (15 mg) by mouth as needed for sleep (twice a year) 15 capsule 0     No current facility-administered medications for this visit.                Physical Exam:   No vitals are available during this virtual visit.   Constitutional: awake, alert, cooperative, no apparent distress and appears at stated age, well nourished.             Laboratory Data:     No results found for: \"ACD4\"    Inflammatory Markers    Recent Labs   Lab Test 09/14/22  2220 11/04/21  1457 12/28/20  1515 12/21/20  1545   SED 8 14 8 10   CRP  --  39.3* 13.0* 18.0*       Immune Globulin Studies    No lab results found.    Metabolic Studies    Recent Labs   Lab Test 02/13/24  0810 12/18/23  0806 11/27/23  0759 11/09/23  0750 10/16/23  0814 08/15/23  0812 10/03/22  0935 10/02/22  0628 09/15/22  0607 09/14/22  2230 09/30/20  0721 09/23/20  1653    138 140 140 140 139   < > 135*   < >  --    < >  --    POTASSIUM 4.0 4.0 3.8 4.1 3.9 3.7   < > 3.9   < >  --    < >  --    CHLORIDE 103 105 106 106 106 106   < > 103   < >  --    < >  --    CO2 26 27 26 26 27 26   < > 23   < >  --    < >  --    ANIONGAP 8 6* 8 8 7 7   < > 9   < >  --    < >  --    BUN 13.6 11.8 15.5 16.0 14.2 18.9   < > 17.8   < >  --    < >  --    CR 0.81 0.76 0.75 0.83 0.86 0.80   < > 0.78   < >  --    < >  --    GFRESTIMATED 81 88 89 79 75 82   < > 85   < >  --    < >  --    GLC 87 87 89 85 92 78   < > 92   < >  --    < >  --    EDSON 10.0 9.4 9.6 9.9 10.0 9.7   < > 9.5   < >  --    < >  --    PHOS  --   --   --   --   --   --   --   --   --   --   --  3.4   LACT  --   --   --   --   --   --   --  0.8  --  0.9   < >  --     < > = values in this interval not displayed.       Hepatic Studies    Recent Labs   Lab Test 02/13/24  0810 " 12/18/23  0806 11/27/23 0759 11/09/23 0750 10/16/23  0814 08/15/23  0812   BILITOTAL 0.6 0.5 0.5 0.7 0.9 0.6   ALKPHOS 56 55 60 57 66 57   PROTTOTAL 6.7 6.4 6.8 6.7 6.8 6.5   ALBUMIN 3.9 3.8 4.0 3.9 3.9 3.9   AST 21 19 20 20 19 20   ALT 14 13 16 15 14 12       Hematology Studies     Recent Labs   Lab Test 02/13/24  0810 12/18/23  0806 11/27/23 0759 11/09/23 0750 10/16/23  0814 08/15/23  0812 03/11/21  1007 12/28/20  1515 12/28/20  0855 12/21/20  1545 12/18/20  0611 12/16/20  2236   WBC 6.8 6.0 8.1 5.9 7.4 6.3   < > 9.2   < > 6.2   < > 11.4*   ANEU  --   --   --   --   --   --   --  6.4  --  3.9  --  9.3*   ALYM  --   --   --   --   --   --   --  2.1  --  1.7  --  1.0   PABLO  --   --   --   --   --   --   --  0.6  --  0.6  --  1.0   AEOS  --   --   --   --   --   --   --  0.0  --  0.0  --  0.0   HGB 15.9 15.6 15.9 15.8 15.9 15.4   < > 16.6*   < > 16.2*   < > 16.9*   HCT 48.7 48.6 49.3 48.4 48.8 47.5   < > 50.8*   < > 49.2*   < > 54.5*    193 217 222 249 205   < > 308   < > 261   < > 260    < > = values in this interval not displayed.       Clotting Studies  No lab results found.    Urine Studies    Recent Labs   Lab Test 04/03/24  1302 02/05/24  1328 05/16/23  0805 04/30/23  1235 04/02/23  1621   URINEPH 7.0 6.5 7.0 7.0 6.0   NITRITE Negative Negative Negative Negative Positive*   LEUKEST Large* Large* Large* Large* Large*   WBCU 0-5 25-50* 5-10* * >100*         Microbiology:  Last 6 Culture results with specimen source  Culture Micro   Date Value Ref Range Status   06/04/2021 (A)  Final    10,000 to 50,000 colonies/mL  Enterococcus faecalis     12/17/2020 No growth  Final   12/16/2020 (A)  Final    Cultured on the 1st day of incubation:  Escherichia coli ESBL  ESBL (extended beta lactamase) producing organisms require contact precautions.     12/16/2020   Final    Critical Value/Significant Value, preliminary result only, called to and read back by  RALPH BOLAÑOS RN 12.17.2020 1056 BC     12/16/2020   " Final    (Note)  POSITIVE for E.COLI by Verigene multiplex nucleic acid test. Final  identification and antimicrobial susceptibility testing will be  verified by standard methods. Verigene test will not distinguish  E.coli from Shigella species including S.dysenteriae, S.flexneri,  S.boydii, and S.sonnei. Specimens containing Shigella species or  E.coli will be reported as Positive for E.coli.    POSITIVE for CTX-M Class A Extended Spectrum beta-lactamase (ESBL)  resistance marker by Verigene multiplex nucleic acid test. CTX-M  confers resistance to penicillins, cephalosporins and variable  resistance to beta-lactam beta-lactamase inhibitor combinations  (clavulanic acid and tazobactam). Best empiric antibiotic choice is  meropenem. Specific susceptibility testing will be performed.    Specimen tested with Verigene multiplex, gram-negative blood culture  nucleic acid test for the following targets: Acinetobacter sp.,  Citrobacter sp., Enterobacter sp., Proteus sp., E. coli, K.  pneumoniae/oxytoca, P. aeruginosa, and the following resistance  markers: CTXM, KPC, NDM, VIM, IMP and OXA.    Critical Value/Significant Value called to and read back by RALPH BOLAÑOS RN 12/17/20 1312 EH.       12/16/2020 No growth  Final   12/16/2020 (A)  Preliminary    >100,000 colonies/mL  Escherichia coli ESBL  ESBL (extended beta lactamase) producing organisms require contact precautions.     12/16/2020   Preliminary    Susceptibility testing requested by  Dr. Gonzales, Pager [phone]. Fosfomycin requested. @ 1637. 12.18.20. BS.       Specimen Description   Date Value Ref Range Status   06/04/2021 Midstream Urine  Final   12/17/2020 Blood  Final   12/16/2020 Blood Right Arm  Final   12/16/2020 Blood Right Arm  Final   12/16/2020 Unspecified Urine  Final   12/03/2020 Midstream Urine  Final        Last check of C difficile  No results found for: \"CDBPCT\"      Virology:  CMV viral loads    CMV viral loads    CMV Quant IU/mL   Date Value Ref " Range Status   12/18/2020 CMV DNA Not Detected CMVND^CMV DNA Not Detected [IU]/mL Final     Comment:     Mutations within the highly conserved regions of the viral genome covered by   the RHONDA AmpliPrep/RHONDA TaqMan CMV Test primers and/or probes have been   identified and may result in under-quantitation of or failure to detect the   virus.  Supplemental testing methods should be used for testing when this is   suspected.  The RHONDA AmpliPrep/RHONDA TaqMan CMV Test is an FDA-approved in vitro nucleic   acid amplification test for the quantitation of cytomegalovirus DNA in human   plasma (EDTA plasma) using the RHONDA AmpliPrep Instrument for automated viral   nucleic acid extraction and the [employer] TaqMan Analyzer or [employer] TaqMan for   automated Real Time amplification and detection of the viral nucleic acid   target.  Titer results are reported in International Units/mL (IU/mL using 1st WHO   International standard for Human Cytomegalovirus for Nucleic Acid   Amplification based assays. The conversion factor between CMV DNA copis/mL (as   defined by the Roche RHONDA TaqMan CMV test) and International Units is the   CMV DNA concentration in IU/mL x 1.1 copies/IU = CMV DNA in copies/mL.  This assay has received FDA approval for the testing of human plasma only. The   Infectious Disease Diagnostic Laboratory at the [facility], [place], has validated the performance characteristics of the   Roche CMV assay for plasma, bronchial alveolar lavage/wash and urine.       CMV viral loads    Recent Labs   Lab Test 12/18/20  0611   CMVQNT CMV DNA Not Detected   CSPEC Plasma   CMVLOG Not Calculated       CMV viral loads    CMV Quant IU/mL   Date Value Ref Range Status   12/18/2020 CMV DNA Not Detected CMVND^CMV DNA Not Detected [IU]/mL Final     Comment:     Mutations within the highly conserved regions of the viral genome covered by   the RHONDA AmpliPrep/RHONDA TaqMan CMV Test primers and/or probes  have been   identified and may result in under-quantitation of or failure to detect the   virus.  Supplemental testing methods should be used for testing when this is   suspected.  The RHONDA AmpliPrep/RHONDA TaqMan CMV Test is an FDA-approved in vitro nucleic   acid amplification test for the quantitation of cytomegalovirus DNA in human   plasma (EDTA plasma) using the RHONDA AmpliPrep Instrument for automated viral   nucleic acid extraction and the [employer] TaqMan Analyzer or Terrajoule for   automated Real Time amplification and detection of the viral nucleic acid   target.  Titer results are reported in International Units/mL (IU/mL using 1st WHO   International standard for Human Cytomegalovirus for Nucleic Acid   Amplification based assays. The conversion factor between CMV DNA copis/mL (as   defined by the Roche RHONDA TaqMan CMV test) and International Units is the   CMV DNA concentration in IU/mL x 1.1 copies/IU = CMV DNA in copies/mL.  This assay has received FDA approval for the testing of human plasma only. The   Infectious Disease Diagnostic Laboratory at the [facility], [place], has validated the performance characteristics of the   Roche CMV assay for plasma, bronchial alveolar lavage/wash and urine.     09/23/2020 CMV DNA Not Detected CMVND^CMV DNA Not Detected [IU]/mL Final     Comment:     Mutations within the highly conserved regions of the viral genome covered by   the RHONDA AmpliPrep/RHNODA TaqMan CMV Test primers and/or probes have been   identified and may result in under-quantitation of or failure to detect the   virus.  Supplemental testing methods should be used for testing when this is   suspected.  The RHONDA AmpliPrep/RHONDA TaqMan CMV Test is an FDA-approved in vitro nucleic   acid amplification test for the quantitation of cytomegalovirus DNA in human   plasma (EDTA plasma) using the RHONDA AmpliPrep Instrument for automated viral   nucleic acid extraction and the  "RHONDA TaqMan Analyzer or RHONDA TaqMan for   automated Real Time amplification and detection of the viral nucleic acid   target.  Titer results are reported in International Units/mL (IU/mL using 1st WHO   International standard for Human Cytomegalovirus for Nucleic Acid   Amplification based assays. The conversion factor between CMV DNA copis/mL (as   defined by the Roche RHONDA TaqMan CMV test) and International Units is the   CMV DNA concentration in IU/mL x 1.1 copies/IU = CMV DNA in copies/mL.  This assay has received FDA approval for the testing of human plasma only. The   Infectious Disease Diagnostic Laboratory at the [facility], [place], has validated the performance characteristics of the   Roche CMV assay for plasma, bronchial alveolar lavage/wash and urine.       Log IU/mL of CMVQNT   Date Value Ref Range Status   12/18/2020 Not Calculated <2.1 [Log_IU]/mL Final   09/23/2020 Not Calculated <2.1 [Log_IU]/mL Final       CMV resistance testing  No lab results found.  No results found for: \"CMVCID\", \"CMVFOS\", \"CMVGAN\"     EBV viral loads   No lab results found.  No results found for: \"EBVDN\", \"EBRES\", \"EBVSP\", \"EBVPC\", \"EBVPCR\"    Human Herpes Virus 6 viral loads    No results found for: \"H6RES\" No results found for: \"H6SPEC\"    CMV Antibody IgG   Date Value Ref Range Status   01/04/2021 >8.0 (H) 0.0 - 0.8 AI Final     Comment:     Positive  Antibody index (AI) values reflect qualitative changes in antibody   concentration that cannot be directly associated with clinical condition or   disease state.       No results found for: \"EBIG2\", \"EBIGM\", \"EBVIGG\", \"EBIGG\", \"EBVAGN\", \"VB3917\", \"TOXG\"      Imaging:  CT a/p W 9/29/2022  IMPRESSION:  1. Urothelial thickening and mild ectasia of the distal right renal  transplant ureter extending to the ureterovesical anastomosis,  slightly increased compared to previous CT on 12/17/2020. Findings may  represent acute or chronic sequela of " inflammation/infection. No  hydronephrosis or evidence of obstruction.  2. Simple right adnexal cyst is not significantly changed compared to  previous without internal complexity on comparison ultrasound, likely  benign. No dedicated imaging follow-up recommended.  3. Stable postoperative changes of liver transplant.  4. Stable enlarged polycystic kidneys.      Catrachita Gonzales MD

## 2024-04-15 ENCOUNTER — LAB (OUTPATIENT)
Dept: LAB | Facility: CLINIC | Age: 64
End: 2024-04-15
Payer: MEDICARE

## 2024-04-15 DIAGNOSIS — R80.9 PROTEINURIA: ICD-10-CM

## 2024-04-15 DIAGNOSIS — D84.9 IMMUNOSUPPRESSION (H): ICD-10-CM

## 2024-04-15 DIAGNOSIS — Z48.298 AFTERCARE FOLLOWING ORGAN TRANSPLANT: ICD-10-CM

## 2024-04-15 DIAGNOSIS — Z94.0 KIDNEY TRANSPLANT RECIPIENT: ICD-10-CM

## 2024-04-15 DIAGNOSIS — Z94.4 LIVER TRANSPLANTED (H): ICD-10-CM

## 2024-04-15 DIAGNOSIS — Z94.4 LIVER REPLACED BY TRANSPLANT (H): ICD-10-CM

## 2024-04-15 DIAGNOSIS — Z94.4 S/P LIVER TRANSPLANT (H): ICD-10-CM

## 2024-04-15 LAB
ERYTHROCYTE [DISTWIDTH] IN BLOOD BY AUTOMATED COUNT: 14.6 % (ref 10–15)
HCT VFR BLD AUTO: 47.9 % (ref 35–53)
HGB BLD-MCNC: 15.5 G/DL (ref 11.7–17.7)
MCH RBC QN AUTO: 30.2 PG (ref 26.5–33)
MCHC RBC AUTO-ENTMCNC: 32.4 G/DL (ref 31.5–36.5)
MCV RBC AUTO: 93 FL (ref 78–100)
PLATELET # BLD AUTO: 206 10E3/UL (ref 150–450)
RBC # BLD AUTO: 5.14 10E6/UL (ref 3.8–5.9)
TACROLIMUS BLD-MCNC: 5.1 UG/L (ref 5–15)
TME LAST DOSE: NORMAL H
TME LAST DOSE: NORMAL H
WBC # BLD AUTO: 6.5 10E3/UL (ref 4–11)

## 2024-04-15 PROCEDURE — 85027 COMPLETE CBC AUTOMATED: CPT

## 2024-04-15 PROCEDURE — 36415 COLL VENOUS BLD VENIPUNCTURE: CPT

## 2024-04-15 PROCEDURE — 80197 ASSAY OF TACROLIMUS: CPT

## 2024-04-15 PROCEDURE — 80053 COMPREHEN METABOLIC PANEL: CPT

## 2024-04-15 PROCEDURE — 82248 BILIRUBIN DIRECT: CPT

## 2024-04-15 PROCEDURE — 84156 ASSAY OF PROTEIN URINE: CPT

## 2024-04-16 LAB
ALBUMIN MFR UR ELPH: 63.9 MG/DL
ALBUMIN SERPL BCG-MCNC: 4 G/DL (ref 3.5–5.2)
ALP SERPL-CCNC: 57 U/L (ref 40–150)
ALT SERPL W P-5'-P-CCNC: 13 U/L (ref 0–70)
ANION GAP SERPL CALCULATED.3IONS-SCNC: 11 MMOL/L (ref 7–15)
AST SERPL W P-5'-P-CCNC: 21 U/L (ref 0–45)
BILIRUB DIRECT SERPL-MCNC: <0.2 MG/DL (ref 0–0.3)
BILIRUB SERPL-MCNC: 0.7 MG/DL
BUN SERPL-MCNC: 13.7 MG/DL (ref 8–23)
CALCIUM SERPL-MCNC: 9.6 MG/DL (ref 8.8–10.2)
CHLORIDE SERPL-SCNC: 104 MMOL/L (ref 98–107)
CREAT SERPL-MCNC: 0.77 MG/DL (ref 0.51–1.17)
CREAT UR-MCNC: 78.8 MG/DL
DEPRECATED HCO3 PLAS-SCNC: 25 MMOL/L (ref 22–29)
EGFRCR SERPLBLD CKD-EPI 2021: 86 ML/MIN/1.73M2
GLUCOSE SERPL-MCNC: 86 MG/DL (ref 70–99)
POTASSIUM SERPL-SCNC: 3.7 MMOL/L (ref 3.4–5.3)
PROT SERPL-MCNC: 6.8 G/DL (ref 6.4–8.3)
PROT/CREAT 24H UR: 0.81 MG/MG CR (ref 0–0.2)
SODIUM SERPL-SCNC: 140 MMOL/L (ref 135–145)

## 2024-04-18 ENCOUNTER — LAB (OUTPATIENT)
Dept: LAB | Facility: CLINIC | Age: 64
End: 2024-04-18
Payer: MEDICARE

## 2024-04-18 ENCOUNTER — TELEPHONE (OUTPATIENT)
Dept: TRANSPLANT | Facility: CLINIC | Age: 64
End: 2024-04-18

## 2024-04-18 DIAGNOSIS — R80.9 PROTEINURIA: ICD-10-CM

## 2024-04-18 DIAGNOSIS — D84.9 IMMUNOSUPPRESSED STATUS (H): ICD-10-CM

## 2024-04-18 DIAGNOSIS — Z48.22 ENCOUNTER FOR AFTERCARE FOLLOWING KIDNEY TRANSPLANT: Primary | ICD-10-CM

## 2024-04-18 DIAGNOSIS — Z94.0 KIDNEY TRANSPLANT RECIPIENT: ICD-10-CM

## 2024-04-18 DIAGNOSIS — N39.0 RECURRENT UTI: Primary | ICD-10-CM

## 2024-04-18 DIAGNOSIS — Z48.22 ENCOUNTER FOR AFTERCARE FOLLOWING KIDNEY TRANSPLANT: ICD-10-CM

## 2024-04-18 DIAGNOSIS — N39.0 RECURRENT UTI: ICD-10-CM

## 2024-04-18 DIAGNOSIS — D84.9 IMMUNOSUPPRESSION (H): ICD-10-CM

## 2024-04-18 LAB
ALBUMIN UR-MCNC: 100 MG/DL
APPEARANCE UR: ABNORMAL
BACTERIA #/AREA URNS HPF: ABNORMAL /HPF
BILIRUB UR QL STRIP: NEGATIVE
COLOR UR AUTO: YELLOW
ERYTHROCYTE [DISTWIDTH] IN BLOOD BY AUTOMATED COUNT: 14.7 % (ref 10–15)
GLUCOSE UR STRIP-MCNC: NEGATIVE MG/DL
HCT VFR BLD AUTO: 47 % (ref 35–53)
HGB BLD-MCNC: 15.4 G/DL (ref 11.7–17.7)
HGB UR QL STRIP: ABNORMAL
KETONES UR STRIP-MCNC: NEGATIVE MG/DL
LEUKOCYTE ESTERASE UR QL STRIP: ABNORMAL
MCH RBC QN AUTO: 30.3 PG (ref 26.5–33)
MCHC RBC AUTO-ENTMCNC: 32.8 G/DL (ref 31.5–36.5)
MCV RBC AUTO: 92 FL (ref 78–100)
NITRATE UR QL: POSITIVE
PH UR STRIP: 7 [PH] (ref 5–8)
PLATELET # BLD AUTO: 196 10E3/UL (ref 150–450)
RBC # BLD AUTO: 5.09 10E6/UL (ref 3.8–5.9)
RBC #/AREA URNS AUTO: ABNORMAL /HPF
SP GR UR STRIP: 1.01 (ref 1–1.03)
SQUAMOUS #/AREA URNS AUTO: ABNORMAL /LPF
UROBILINOGEN UR STRIP-ACNC: 0.2 E.U./DL
WBC # BLD AUTO: 12.5 10E3/UL (ref 4–11)
WBC #/AREA URNS AUTO: ABNORMAL /HPF
WBC CLUMPS #/AREA URNS HPF: PRESENT /HPF

## 2024-04-18 PROCEDURE — 85027 COMPLETE CBC AUTOMATED: CPT

## 2024-04-18 PROCEDURE — 87186 SC STD MICRODIL/AGAR DIL: CPT

## 2024-04-18 PROCEDURE — 87040 BLOOD CULTURE FOR BACTERIA: CPT

## 2024-04-18 PROCEDURE — 36415 COLL VENOUS BLD VENIPUNCTURE: CPT

## 2024-04-18 PROCEDURE — 87086 URINE CULTURE/COLONY COUNT: CPT

## 2024-04-18 PROCEDURE — 80048 BASIC METABOLIC PNL TOTAL CA: CPT

## 2024-04-18 PROCEDURE — 81001 URINALYSIS AUTO W/SCOPE: CPT

## 2024-04-18 NOTE — TELEPHONE ENCOUNTER
Patient Call: General  Route to LPN    Reason for call: Pt had dental cleaning yesterday  had a lot of bleeding - last night had a fever and H/A  during night still having same symptoms  Was the first time they did not give antibiotic before procedure- Pt upset and frustrated     Call back needed? Yes    Return Call Needed  Same as documented in contacts section  When to return call?: Same day: Route High Priority

## 2024-04-18 NOTE — TELEPHONE ENCOUNTER
Patient called regarding her lab results stated if she has a UTI that Catrachita Fink MD has prescribe and anti biotic and the patient did  the medication. Caller would like to go over the labs.

## 2024-04-18 NOTE — Clinical Note
Hi! Just a heads up I talked to Belen, she is very concerned about an infection after dental cleaning yesterday, the first she has had without a prophylactic antibiotic. I put in a BMP, CBC with platelets and a UA/UC (her urine protein was elevated this week). Loraine

## 2024-04-18 NOTE — TELEPHONE ENCOUNTER
"ISSUE  Patient calling with symptoms after dental cleaning yesterday. This is the first time she has not had a prophylactic antibiotic prior to dental procedure. She is complaining of headache, temp 99.1, and dizziness. She reports that she had \"a lot\" of bleeding yesterday during cleaning. Most recent vitals: 118/57 , 138/67 HR 93.     Recent UPCr elevated. Patient denies symptoms of UTI.    OUTCOME  Encouraged patient to push hydration. She is very concerned about an infection. Lab orders entered- BMP, CBC and UA/UC. Patient will go to lab today, and if symptoms worsen, encouraged evaluation in urgent care. She will call if temp increases.       "

## 2024-04-18 NOTE — TELEPHONE ENCOUNTER
Spoke with Belen, who is following the direction of Dr. Gonzales regarding follow up for lab work.

## 2024-04-19 LAB
ANION GAP SERPL CALCULATED.3IONS-SCNC: 9 MMOL/L (ref 7–15)
BACTERIA UR CULT: ABNORMAL
BUN SERPL-MCNC: 14.7 MG/DL (ref 8–23)
CALCIUM SERPL-MCNC: 9.9 MG/DL (ref 8.8–10.2)
CHLORIDE SERPL-SCNC: 101 MMOL/L (ref 98–107)
CREAT SERPL-MCNC: 0.85 MG/DL (ref 0.51–0.95)
DEPRECATED HCO3 PLAS-SCNC: 25 MMOL/L (ref 22–29)
EGFRCR SERPLBLD CKD-EPI 2021: 76 ML/MIN/1.73M2
GLUCOSE SERPL-MCNC: 101 MG/DL (ref 70–99)
POTASSIUM SERPL-SCNC: 4.3 MMOL/L (ref 3.4–5.3)
SODIUM SERPL-SCNC: 135 MMOL/L (ref 135–145)

## 2024-04-23 LAB — BACTERIA BLD CULT: NO GROWTH

## 2024-05-01 ENCOUNTER — HOSPITAL ENCOUNTER (INPATIENT)
Facility: CLINIC | Age: 64
LOS: 6 days | Discharge: HOME | DRG: 872 | End: 2024-05-07
Attending: EMERGENCY MEDICINE | Admitting: STUDENT IN AN ORGANIZED HEALTH CARE EDUCATION/TRAINING PROGRAM
Payer: MEDICARE

## 2024-05-01 ENCOUNTER — APPOINTMENT (OUTPATIENT)
Dept: GENERAL RADIOLOGY | Facility: CLINIC | Age: 64
DRG: 872 | End: 2024-05-01
Attending: EMERGENCY MEDICINE
Payer: MEDICARE

## 2024-05-01 ENCOUNTER — APPOINTMENT (OUTPATIENT)
Dept: ULTRASOUND IMAGING | Facility: CLINIC | Age: 64
DRG: 872 | End: 2024-05-01
Payer: MEDICARE

## 2024-05-01 DIAGNOSIS — N10 PYELONEPHRITIS, ACUTE: ICD-10-CM

## 2024-05-01 DIAGNOSIS — Z94.0 KIDNEY TRANSPLANTED: ICD-10-CM

## 2024-05-01 DIAGNOSIS — A41.9 SEPSIS, DUE TO UNSPECIFIED ORGANISM, UNSPECIFIED WHETHER ACUTE ORGAN DYSFUNCTION PRESENT (H): Primary | ICD-10-CM

## 2024-05-01 DIAGNOSIS — Z94.0 KIDNEY REPLACED BY TRANSPLANT: ICD-10-CM

## 2024-05-01 DIAGNOSIS — Z94.4 LIVER REPLACED BY TRANSPLANT (H): ICD-10-CM

## 2024-05-01 DIAGNOSIS — Z87.440 HISTORY OF URINARY TRACT INFECTION: ICD-10-CM

## 2024-05-01 DIAGNOSIS — N39.0 RECURRENT UTI: ICD-10-CM

## 2024-05-01 DIAGNOSIS — E83.39 HYPOPHOSPHATEMIA: ICD-10-CM

## 2024-05-01 DIAGNOSIS — E83.42 HYPOMAGNESEMIA: ICD-10-CM

## 2024-05-01 DIAGNOSIS — Z94.4 S/P LIVER TRANSPLANT (H): ICD-10-CM

## 2024-05-01 DIAGNOSIS — D84.9 IMMUNOSUPPRESSION (H): ICD-10-CM

## 2024-05-01 PROBLEM — Z22.358 ESBL E. COLI CARRIER: Status: ACTIVE | Noted: 2024-05-01

## 2024-05-01 PROBLEM — Z79.2 ADMINISTRATION OF LONG-TERM PROPHYLACTIC ANTIBIOTICS: Status: ACTIVE | Noted: 2019-11-02

## 2024-05-01 LAB
ALBUMIN SERPL BCG-MCNC: 3.8 G/DL (ref 3.5–5.2)
ALBUMIN UR-MCNC: 300 MG/DL
ALP SERPL-CCNC: 57 U/L (ref 40–150)
ALT SERPL W P-5'-P-CCNC: 12 U/L (ref 0–50)
ANION GAP SERPL CALCULATED.3IONS-SCNC: 12 MMOL/L (ref 7–15)
APPEARANCE UR: ABNORMAL
AST SERPL W P-5'-P-CCNC: 20 U/L (ref 0–45)
BASE EXCESS BLDV CALC-SCNC: 0.6 MMOL/L (ref -3–3)
BASOPHILS # BLD AUTO: ABNORMAL 10*3/UL
BASOPHILS # BLD MANUAL: 0 10E3/UL (ref 0–0.2)
BASOPHILS NFR BLD AUTO: ABNORMAL %
BASOPHILS NFR BLD MANUAL: 0 %
BILIRUB DIRECT SERPL-MCNC: 0.33 MG/DL (ref 0–0.3)
BILIRUB SERPL-MCNC: 1.4 MG/DL
BILIRUB SERPL-MCNC: 1.4 MG/DL
BILIRUB UR QL STRIP: NEGATIVE
BUN SERPL-MCNC: 18 MG/DL (ref 8–23)
CALCIUM SERPL-MCNC: 9.5 MG/DL (ref 8.8–10.2)
CHLORIDE SERPL-SCNC: 101 MMOL/L (ref 98–107)
COLOR UR AUTO: ABNORMAL
CREAT SERPL-MCNC: 0.89 MG/DL (ref 0.51–0.95)
CRP SERPL-MCNC: 63.2 MG/L
DEPRECATED HCO3 PLAS-SCNC: 17 MMOL/L (ref 22–29)
EGFRCR SERPLBLD CKD-EPI 2021: 72 ML/MIN/1.73M2
EOSINOPHIL # BLD AUTO: ABNORMAL 10*3/UL
EOSINOPHIL # BLD MANUAL: 0 10E3/UL (ref 0–0.7)
EOSINOPHIL NFR BLD AUTO: ABNORMAL %
EOSINOPHIL NFR BLD MANUAL: 0 %
ERYTHROCYTE [DISTWIDTH] IN BLOOD BY AUTOMATED COUNT: 14.7 % (ref 10–15)
GLUCOSE SERPL-MCNC: 104 MG/DL (ref 70–99)
GLUCOSE UR STRIP-MCNC: NEGATIVE MG/DL
HCO3 BLDV-SCNC: 24 MMOL/L (ref 21–28)
HCT VFR BLD AUTO: 46.5 % (ref 35–47)
HGB BLD-MCNC: 15.8 G/DL (ref 11.7–15.7)
HGB UR QL STRIP: ABNORMAL
HOLD SPECIMEN: NORMAL
IMM GRANULOCYTES # BLD: ABNORMAL 10*3/UL
IMM GRANULOCYTES NFR BLD: ABNORMAL %
KETONES UR STRIP-MCNC: NEGATIVE MG/DL
LACTATE SERPL-SCNC: 1.3 MMOL/L (ref 0.7–2)
LEUKOCYTE ESTERASE UR QL STRIP: ABNORMAL
LYMPHOCYTES # BLD AUTO: ABNORMAL 10*3/UL
LYMPHOCYTES # BLD MANUAL: 0.5 10E3/UL (ref 0.8–5.3)
LYMPHOCYTES NFR BLD AUTO: ABNORMAL %
LYMPHOCYTES NFR BLD MANUAL: 3 %
MAGNESIUM SERPL-MCNC: 1.5 MG/DL (ref 1.7–2.3)
MAGNESIUM SERPL-MCNC: 1.9 MG/DL (ref 1.7–2.3)
MCH RBC QN AUTO: 30.7 PG (ref 26.5–33)
MCHC RBC AUTO-ENTMCNC: 34 G/DL (ref 31.5–36.5)
MCV RBC AUTO: 90 FL (ref 78–100)
MONOCYTES # BLD AUTO: ABNORMAL 10*3/UL
MONOCYTES # BLD MANUAL: 0.2 10E3/UL (ref 0–1.3)
MONOCYTES NFR BLD AUTO: ABNORMAL %
MONOCYTES NFR BLD MANUAL: 1 %
MUCOUS THREADS #/AREA URNS LPF: PRESENT /LPF
NEUTROPHILS # BLD AUTO: ABNORMAL 10*3/UL
NEUTROPHILS # BLD MANUAL: 17.6 10E3/UL (ref 1.6–8.3)
NEUTROPHILS NFR BLD AUTO: ABNORMAL %
NEUTROPHILS NFR BLD MANUAL: 96 %
NITRATE UR QL: NEGATIVE
NRBC # BLD AUTO: 0 10E3/UL
NRBC BLD AUTO-RTO: 0 /100
O2/TOTAL GAS SETTING VFR VENT: 20 %
OXYHGB MFR BLDV: 65 % (ref 70–75)
PCO2 BLDV: 34 MM HG (ref 40–50)
PH BLDV: 7.46 [PH] (ref 7.32–7.43)
PH UR STRIP: 7 [PH] (ref 5–7)
PHOSPHATE SERPL-MCNC: 2.2 MG/DL (ref 2.5–4.5)
PLAT MORPH BLD: ABNORMAL
PLATELET # BLD AUTO: 194 10E3/UL (ref 150–450)
PO2 BLDV: 33 MM HG (ref 25–47)
POTASSIUM SERPL-SCNC: 3.9 MMOL/L (ref 3.4–5.3)
PROCALCITONIN SERPL IA-MCNC: 1.92 NG/ML
PROT SERPL-MCNC: 6.5 G/DL (ref 6.4–8.3)
RBC # BLD AUTO: 5.15 10E6/UL (ref 3.8–5.2)
RBC MORPH BLD: ABNORMAL
RBC URINE: 54 /HPF
SAO2 % BLDV: 66.2 % (ref 70–75)
SODIUM SERPL-SCNC: 130 MMOL/L (ref 135–145)
SP GR UR STRIP: 1.01 (ref 1–1.03)
SQUAMOUS EPITHELIAL: 13 /HPF
TARGETS BLD QL SMEAR: SLIGHT
UROBILINOGEN UR STRIP-MCNC: NORMAL MG/DL
WBC # BLD AUTO: 18.3 10E3/UL (ref 4–11)
WBC URINE: >182 /HPF

## 2024-05-01 PROCEDURE — 86140 C-REACTIVE PROTEIN: CPT | Performed by: INTERNAL MEDICINE

## 2024-05-01 PROCEDURE — 120N000011 HC R&B TRANSPLANT UMMC

## 2024-05-01 PROCEDURE — 36415 COLL VENOUS BLD VENIPUNCTURE: CPT

## 2024-05-01 PROCEDURE — 85007 BL SMEAR W/DIFF WBC COUNT: CPT | Performed by: EMERGENCY MEDICINE

## 2024-05-01 PROCEDURE — 96365 THER/PROPH/DIAG IV INF INIT: CPT

## 2024-05-01 PROCEDURE — 76776 US EXAM K TRANSPL W/DOPPLER: CPT | Mod: 26 | Performed by: RADIOLOGY

## 2024-05-01 PROCEDURE — 82248 BILIRUBIN DIRECT: CPT

## 2024-05-01 PROCEDURE — 250N000011 HC RX IP 250 OP 636

## 2024-05-01 PROCEDURE — 82784 ASSAY IGA/IGD/IGG/IGM EACH: CPT | Performed by: INTERNAL MEDICINE

## 2024-05-01 PROCEDURE — 82785 ASSAY OF IGE: CPT | Performed by: INTERNAL MEDICINE

## 2024-05-01 PROCEDURE — 80053 COMPREHEN METABOLIC PANEL: CPT | Performed by: EMERGENCY MEDICINE

## 2024-05-01 PROCEDURE — 250N000011 HC RX IP 250 OP 636: Mod: JZ | Performed by: EMERGENCY MEDICINE

## 2024-05-01 PROCEDURE — 71045 X-RAY EXAM CHEST 1 VIEW: CPT | Mod: 26 | Performed by: RADIOLOGY

## 2024-05-01 PROCEDURE — 258N000003 HC RX IP 258 OP 636

## 2024-05-01 PROCEDURE — 82805 BLOOD GASES W/O2 SATURATION: CPT | Performed by: EMERGENCY MEDICINE

## 2024-05-01 PROCEDURE — 87086 URINE CULTURE/COLONY COUNT: CPT | Performed by: EMERGENCY MEDICINE

## 2024-05-01 PROCEDURE — 81001 URINALYSIS AUTO W/SCOPE: CPT | Performed by: EMERGENCY MEDICINE

## 2024-05-01 PROCEDURE — 87040 BLOOD CULTURE FOR BACTERIA: CPT | Performed by: EMERGENCY MEDICINE

## 2024-05-01 PROCEDURE — 96361 HYDRATE IV INFUSION ADD-ON: CPT

## 2024-05-01 PROCEDURE — 71045 X-RAY EXAM CHEST 1 VIEW: CPT

## 2024-05-01 PROCEDURE — 83735 ASSAY OF MAGNESIUM: CPT

## 2024-05-01 PROCEDURE — 83735 ASSAY OF MAGNESIUM: CPT | Performed by: EMERGENCY MEDICINE

## 2024-05-01 PROCEDURE — 83605 ASSAY OF LACTIC ACID: CPT | Performed by: EMERGENCY MEDICINE

## 2024-05-01 PROCEDURE — 87186 SC STD MICRODIL/AGAR DIL: CPT | Performed by: EMERGENCY MEDICINE

## 2024-05-01 PROCEDURE — 99223 1ST HOSP IP/OBS HIGH 75: CPT | Mod: AI

## 2024-05-01 PROCEDURE — 99223 1ST HOSP IP/OBS HIGH 75: CPT | Mod: AI | Performed by: STUDENT IN AN ORGANIZED HEALTH CARE EDUCATION/TRAINING PROGRAM

## 2024-05-01 PROCEDURE — 99291 CRITICAL CARE FIRST HOUR: CPT | Performed by: EMERGENCY MEDICINE

## 2024-05-01 PROCEDURE — 84145 PROCALCITONIN (PCT): CPT | Performed by: EMERGENCY MEDICINE

## 2024-05-01 PROCEDURE — 99223 1ST HOSP IP/OBS HIGH 75: CPT | Performed by: INTERNAL MEDICINE

## 2024-05-01 PROCEDURE — 99291 CRITICAL CARE FIRST HOUR: CPT | Mod: 25

## 2024-05-01 PROCEDURE — 250N000009 HC RX 250

## 2024-05-01 PROCEDURE — 76776 US EXAM K TRANSPL W/DOPPLER: CPT

## 2024-05-01 PROCEDURE — 258N000003 HC RX IP 258 OP 636: Performed by: EMERGENCY MEDICINE

## 2024-05-01 PROCEDURE — 84100 ASSAY OF PHOSPHORUS: CPT | Performed by: EMERGENCY MEDICINE

## 2024-05-01 PROCEDURE — 36415 COLL VENOUS BLD VENIPUNCTURE: CPT | Performed by: EMERGENCY MEDICINE

## 2024-05-01 PROCEDURE — 250N000012 HC RX MED GY IP 250 OP 636 PS 637

## 2024-05-01 PROCEDURE — 250N000013 HC RX MED GY IP 250 OP 250 PS 637

## 2024-05-01 PROCEDURE — 85048 AUTOMATED LEUKOCYTE COUNT: CPT | Performed by: EMERGENCY MEDICINE

## 2024-05-01 RX ORDER — MAGNESIUM SULFATE HEPTAHYDRATE 40 MG/ML
4 INJECTION, SOLUTION INTRAVENOUS ONCE
Status: COMPLETED | OUTPATIENT
Start: 2024-05-01 | End: 2024-05-01

## 2024-05-01 RX ORDER — ERTAPENEM 1 G/1
1 INJECTION, POWDER, LYOPHILIZED, FOR SOLUTION INTRAMUSCULAR; INTRAVENOUS EVERY 24 HOURS
Status: DISCONTINUED | OUTPATIENT
Start: 2024-05-01 | End: 2024-05-05

## 2024-05-01 RX ORDER — ACETAMINOPHEN 650 MG/1
650 SUPPOSITORY RECTAL EVERY 4 HOURS PRN
Status: DISCONTINUED | OUTPATIENT
Start: 2024-05-01 | End: 2024-05-07 | Stop reason: HOSPADM

## 2024-05-01 RX ORDER — AMOXICILLIN 250 MG
2 CAPSULE ORAL 2 TIMES DAILY PRN
Status: DISCONTINUED | OUTPATIENT
Start: 2024-05-01 | End: 2024-05-07 | Stop reason: HOSPADM

## 2024-05-01 RX ORDER — ONDANSETRON 2 MG/ML
4 INJECTION INTRAMUSCULAR; INTRAVENOUS EVERY 6 HOURS PRN
Status: DISCONTINUED | OUTPATIENT
Start: 2024-05-01 | End: 2024-05-07 | Stop reason: HOSPADM

## 2024-05-01 RX ORDER — SULFAMETHOXAZOLE AND TRIMETHOPRIM 400; 80 MG/1; MG/1
1 TABLET ORAL
Status: DISCONTINUED | OUTPATIENT
Start: 2024-05-03 | End: 2024-05-07 | Stop reason: HOSPADM

## 2024-05-01 RX ORDER — LIDOCAINE 40 MG/G
CREAM TOPICAL
Status: DISCONTINUED | OUTPATIENT
Start: 2024-05-01 | End: 2024-05-07 | Stop reason: HOSPADM

## 2024-05-01 RX ORDER — TACROLIMUS 0.5 MG/1
0.5 CAPSULE ORAL EVERY MORNING
Status: DISCONTINUED | OUTPATIENT
Start: 2024-05-02 | End: 2024-05-01

## 2024-05-01 RX ORDER — METHYLPREDNISOLONE SODIUM SUCCINATE 40 MG/ML
4 INJECTION, POWDER, LYOPHILIZED, FOR SOLUTION INTRAMUSCULAR; INTRAVENOUS EVERY MORNING
Status: DISCONTINUED | OUTPATIENT
Start: 2024-05-02 | End: 2024-05-02

## 2024-05-01 RX ORDER — PROCHLORPERAZINE MALEATE 5 MG
10 TABLET ORAL EVERY 6 HOURS PRN
Status: DISCONTINUED | OUTPATIENT
Start: 2024-05-01 | End: 2024-05-07 | Stop reason: HOSPADM

## 2024-05-01 RX ORDER — ONDANSETRON 4 MG/1
4 TABLET, ORALLY DISINTEGRATING ORAL EVERY 6 HOURS PRN
Status: DISCONTINUED | OUTPATIENT
Start: 2024-05-01 | End: 2024-05-07 | Stop reason: HOSPADM

## 2024-05-01 RX ORDER — MYCOPHENOLIC ACID 180 MG/1
540 TABLET, DELAYED RELEASE ORAL 2 TIMES DAILY
COMMUNITY

## 2024-05-01 RX ORDER — CALCIUM CARBONATE 500 MG/1
1000 TABLET, CHEWABLE ORAL 4 TIMES DAILY PRN
Status: DISCONTINUED | OUTPATIENT
Start: 2024-05-01 | End: 2024-05-07 | Stop reason: HOSPADM

## 2024-05-01 RX ORDER — PREDNISONE 5 MG/1
5 TABLET ORAL DAILY
Status: DISCONTINUED | OUTPATIENT
Start: 2024-05-02 | End: 2024-05-01

## 2024-05-01 RX ORDER — ACETAMINOPHEN 325 MG/1
975 TABLET ORAL EVERY 6 HOURS PRN
Status: DISCONTINUED | OUTPATIENT
Start: 2024-05-01 | End: 2024-05-07 | Stop reason: HOSPADM

## 2024-05-01 RX ORDER — PROCHLORPERAZINE 25 MG
25 SUPPOSITORY, RECTAL RECTAL EVERY 12 HOURS PRN
Status: DISCONTINUED | OUTPATIENT
Start: 2024-05-01 | End: 2024-05-07 | Stop reason: HOSPADM

## 2024-05-01 RX ORDER — SODIUM CHLORIDE, SODIUM LACTATE, POTASSIUM CHLORIDE, CALCIUM CHLORIDE 600; 310; 30; 20 MG/100ML; MG/100ML; MG/100ML; MG/100ML
INJECTION, SOLUTION INTRAVENOUS CONTINUOUS
Status: DISCONTINUED | OUTPATIENT
Start: 2024-05-01 | End: 2024-05-02

## 2024-05-01 RX ORDER — ASPIRIN 81 MG/1
81 TABLET, CHEWABLE ORAL EVERY EVENING
Status: DISCONTINUED | OUTPATIENT
Start: 2024-05-01 | End: 2024-05-07 | Stop reason: HOSPADM

## 2024-05-01 RX ORDER — TACROLIMUS 1 MG/1
1 CAPSULE ORAL
Status: DISCONTINUED | OUTPATIENT
Start: 2024-05-01 | End: 2024-05-01

## 2024-05-01 RX ORDER — AMOXICILLIN 250 MG
1 CAPSULE ORAL 2 TIMES DAILY PRN
Status: DISCONTINUED | OUTPATIENT
Start: 2024-05-01 | End: 2024-05-07 | Stop reason: HOSPADM

## 2024-05-01 RX ADMIN — SODIUM PHOSPHATE, MONOBASIC, MONOHYDRATE AND SODIUM PHOSPHATE, DIBASIC, ANHYDROUS 9 MMOL: 142; 276 INJECTION, SOLUTION INTRAVENOUS at 12:43

## 2024-05-01 RX ADMIN — TACROLIMUS 1 MG: 5 CAPSULE ORAL at 17:16

## 2024-05-01 RX ADMIN — PROCHLORPERAZINE EDISYLATE 10 MG: 5 INJECTION INTRAMUSCULAR; INTRAVENOUS at 11:24

## 2024-05-01 RX ADMIN — ERTAPENEM SODIUM 1 G: 1 INJECTION, POWDER, LYOPHILIZED, FOR SOLUTION INTRAMUSCULAR; INTRAVENOUS at 09:35

## 2024-05-01 RX ADMIN — ACETAMINOPHEN 975 MG: 325 TABLET, FILM COATED ORAL at 11:52

## 2024-05-01 RX ADMIN — SODIUM CHLORIDE 1000 ML: 9 INJECTION, SOLUTION INTRAVENOUS at 08:54

## 2024-05-01 RX ADMIN — MYCOPHENOLATE MOFETIL 500 MG: 500 INJECTION, POWDER, LYOPHILIZED, FOR SOLUTION INTRAVENOUS at 20:27

## 2024-05-01 RX ADMIN — SODIUM CHLORIDE 1000 ML: 9 INJECTION, SOLUTION INTRAVENOUS at 10:00

## 2024-05-01 RX ADMIN — MAGNESIUM SULFATE HEPTAHYDRATE 4 G: 40 INJECTION, SOLUTION INTRAVENOUS at 10:57

## 2024-05-01 RX ADMIN — ONDANSETRON 4 MG: 2 INJECTION INTRAMUSCULAR; INTRAVENOUS at 11:06

## 2024-05-01 RX ADMIN — SODIUM CHLORIDE, POTASSIUM CHLORIDE, SODIUM LACTATE AND CALCIUM CHLORIDE: 600; 310; 30; 20 INJECTION, SOLUTION INTRAVENOUS at 23:52

## 2024-05-01 RX ADMIN — ASPIRIN 81 MG CHEWABLE TABLET 81 MG: 81 TABLET CHEWABLE at 20:27

## 2024-05-01 RX ADMIN — SODIUM BICARBONATE: 84 INJECTION, SOLUTION INTRAVENOUS at 14:53

## 2024-05-01 ASSESSMENT — ACTIVITIES OF DAILY LIVING (ADL)
ADLS_ACUITY_SCORE: 35
ADLS_ACUITY_SCORE: 41
ADLS_ACUITY_SCORE: 35
ADLS_ACUITY_SCORE: 41
ADLS_ACUITY_SCORE: 35

## 2024-05-01 NOTE — CONSULTS
[facility]  Transplant Nephrology Consult  Date of Admission:  5/1/2024  Today's Date: 05/01/2024  Requesting physician: Emerita Lewis*    Recommendations:  - Change tacrolimus to suspension due to nausea (ordered).  - Reduce mycophenolic acid 540 mg BID to  mg IV BID (ordered).    - Change prednisone 5 mg daily to methylprednisolone 4 mg IV daily (ordered).  - Give sodium bicarbonate 75 mEq in 500 ml D5W IV (ordered).     - Check tacrolimus level with morning labs (ordered).  - After IV bicarb, start maintenance LR at 75.  - Check transplant renal ultrasound.  - Antibiotics per Transplant Infectious Disease.      Assessment & Plan   # DDKT: Stable serum creatinine.   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  Patient underwent a second liver transplant 3/3/11.  She appears to be doing well with normal LFTs.  Followed by Hepatology.     # Immunosuppression Prior to Admission: Tacrolimus immediate release (goal 4-6), Mycophenolic acid (dose 540 mg every 12 hours), and Prednisone (dose 5 mg daily)   - Present Immunosuppression: Sublingual Tacrolimus immediate release (goal 4-6), Mycophenolate mofetil (dose 500 mg IV BID), and Methylprednisolone (dose 4 mg daily)   - Patient is in an immunosuppressed state and will continue to monitor for efficacy and toxicity of immunosuppression medications.   - Changes: Yes - Tacrolimus changed to suspension due to nausea.  Reduce mycophenolic acid 540 mg BID to  mg IV BID while having nausea.  Change prednisone 5 mg daily to  methylprednisolone 4 mg IV daily      # Infection Prophylaxis:   - PJP: Sulfa/TMP (Bactrim) MWF    # Hx of Recurrent UTI  # ESBL UTI: presented 05/01/24 with one day of N/V, fever, body aches, HA, weakness and lightheadedness.  UA shows large leukocyte esterase and > 182 WBCs.  UC 4/18 positive for ESBL E. Coli.  UC 4/18 positive for ESBL E. Coli.    - ertapenem 05/01/24 - present   - Reduce mycophenolic acid 540 mg BID to  mg IV BID  - Check transplant renal ultrasound.  - Antibiotics per Transplant Infectious Disease.       5/1/2024   Urine Culture In process    5/1/2024   Blood Culture Peripheral Blood In process    5/1/2024   Blood Culture Peripheral Blood In process       # PKD:   -Seen by txp surgery. Risks outweigh benefits for native nephrectomy    #Orthostatic hypotension: Controlled;  Goal BP: < 130/80   - Changes: Not at this time.     # Post-Transplant Erythrocytosis: Hgb: Stable, high;  On ACEI/ARB: No   Imaging: Not at this time    # Mineral Bone Disorder:   - Secondary renal hyperparathyroidism; PTH level: Not checked recently        On treatment: None  - Vitamin D; level: Not checked recently         On supplement: No  - Calcium; level: Normal            On supplement: Yes, Oscal 500-5 mg-mcg PO BID  - Phosphorus; level: Low (trend for now)         On supplement: No     # Electrolytes:   - Potassium; level: Normal         On supplement: No  - Magnesium; level: Low   On supplement: No, agree with replacing with magnesium sulfate  - Bicarbonate; level: Low         On supplement: No. Give sodium bicarbonate 75 mEq in 500 ml D5W IV  - Sodium; level: Low    # Hyponatremia: likely secondary to nausea and vomiting.    # HSIL:    -per cone biopsy on 9/30/20. No evidence of dysplasia. Most recent coloposcopy 4/25/23 with negative biopsies. Plan for repeat colposcopy or Pap in ~10/2023    # Microvascular angina:   -Follows with cardiology, Dr. Mohamud.    -She refuses statin   -Uses SL angina a few times  per year    # Cystocele:              -Has pessary. 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    Seen and discussed with Dr. Hampton.    Stanton Boone, APRN CNP  Pager: [phone]        Physician Attestation     I saw and evaluated Belen Sharma as part of a shared APRN/PA visit.     I personally reviewed the vital signs, medications, labs, and imaging.    I personally provided a substantive portion of care for this patient and I approve the care plan as written by the ALLY.  I was involved with Medical Decision Making including: Please see A&P for additional details of medical decision making.  MANAGEMENT DISCUSSED with the following over the past 24 hours:  DDKTx and DDLTx with hx of recurrent UTI, ESBL presenting with recurrent UTI. Continue IVF:IV sodium bicarbonate followed by LR, empiric bx per ID. Switch and reduce MPA-v  mg po bid, and continue tacrolimus (goal 4-6)     Getachew Hampton MD  Date of Service (when I saw the patient): 05/01/24    REASON FOR CONSULT   History of Kidney Transplant, UTI    History of Present Illness   Belen Sharma is a 64 year old female with history of polycystic kidney disease (PKD) and hepatic fibrosis s/p simultaneous liver and kidney transplant (2010), repeat liver transplant (2011), and repeat kidney transpalnt (2016).  Other major history includes microvascular angina and frequent UTIs. She presents today with one day of N/V, fever, body aches, HA, weakness and lightheadedness.  UA shows large leukocyte esterase and > 182 WBCs.  UC 4/18 positive for ESBL E. Coli.  The serum creatinine today of 0.89 is  near baseline of ~ 0.6-0.8.          SBP 80s - 140s today. Received 2L IV fluid.  Afebrile so far while inpatient. No leg swelling. Continues to have severe nausea.      Review of Systems    The 10 point Review of Systems is negative other than noted in the HPI or here.     Past Medical History    I have reviewed this patient's medical history and updated it with pertinent information if needed.   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 Both, very large, native kidneys reamain.    PONV (postoperative nausea and vomiting)     Need to start with ice chips and apple juice, no soda    S/P kidney transplant     SLK 10/9/2010 - kidney failure 2/2 [facility]. Explanted 2012. Re-transplant after de-sensitization 2016    S/P liver transplant (H)     10/9/2010 - [facility], ischemic biopathy. Re-transplant 3/3/2011    S/P splenectomy     Spider veins 2019    Thyroid disease     Hyperthyroid treated with single dose iodine    Urinary tract infection 09/2022       Past Surgical History   I have reviewed this patient's surgical history and updated it with pertinent information if needed.  Past Surgical History:   Procedure Laterality Date    bunectomy  08/01/2018    right foot    bunionectomy  01/2019    left    CHOLECYSTECTOMY      COLONOSCOPY N/A 3/20/2024    Procedure: COLONOSCOPY, WITH POLYPECTOMY;  Surgeon: Leventhal, Thomas Michael, MD;  Location: [facility] OR    CONIZATION N/A 09/30/2020    Procedure: CONE BIOPSY, CERVIX;  Surgeon: Daysi Perez MD;  Location: [facility] OR    FAILED PICC - RIGHT ARM Right 12/19/2020    Has a LUE AV fistula.    H STATISTIC PICC LINE  INSERTION >5YR, FAILED Bilateral 2020    Left fistula, Right failed x 2 Occlussion    HERNIA REPAIR, INCISIONAL  2013    IR DIALYSIS PTA CENTRAL SEG  2020    IR PICC PLACEMENT > 5 YRS OF AGE  2020    IR PICC PLACEMENT > 5 YRS OF AGE  10/5/2022    PHACOEMULSIFICATION CLEAR CORNEA WITH STANDARD INTRAOCULAR LENS IMPLANT Right 2023    Procedure: RIGHT EYE PHACOEMULSIFICATION, CATARACT, WITH INTRAOCULAR LENS IMPLANT;  Surgeon: Sebastian Robertson MD;  Location: [facility] OR    PHACOEMULSIFICATION WITH STANDARD INTRAOCULAR LENS IMPLANT Left 3/9/2023    Procedure: LEFT EYE PHACOEMULSIFICATION, CATARACT, WITH STANDARD INTRAOCULAR LENS IMPLANT INSERTION;  Surgeon: Sebastian Robertson MD;  Location: [facility] OR    SPLENECTOMY      Splenectomy    TRANSPLANT KIDNEY RECIPIENT  DONOR      re-transplant after [facility]; 6 months de-sensitization prior AND 6 months after transplant    TRANSPLANT LIVER RECIPIENT  DONOR  2011    TRANSPLANT LIVER, KIDNEY RECIPIENT  DONOR, COMBINED  10/09/2010    HAT - re-Tx liver 3/3/2011; Kidney (left iliac) lost to [facility]/fibrosis - explant    VASCULAR SURGERY      Vascular access left UE. Not used for 4 years since 2nd kidney tx 2016 [place]       Family History   I have reviewed this patient's family history and updated it with pertinent information if needed.   Family History   Problem Relation Age of Onset    Depression Mother     Anxiety Disorder Mother     Depression Father     Anxiety Disorder Father     Uterine Cancer Maternal Grandmother     Polycystic Kidney Diease Brother     Polycystic Kidney Diease Brother     Polycystic Kidney Diease Brother     Polycystic Kidney Diease Brother     Cervical Cancer Maternal Aunt     Glaucoma No family hx of     Macular Degeneration No family hx of        Social History   I have reviewed this patient's social history and updated it with pertinent information if needed. Belen Sharma  reports that she has never  "smoked. She has never used smokeless tobacco. She reports that she does not currently use alcohol. She reports that she does not currently use drugs.    Allergies   Allergies   Allergen Reactions    Ibuprofen      Due to liver transplant     Prior to Admission Medications   Current Facility-Administered Medications   Medication Dose Route Frequency Provider Last Rate Last Admin    aspirin (ASA) chewable tablet 81 mg  81 mg Oral QPM Roly Pantoja MD        calcium carbonate-vitamin D (OSCAL) 500-5 MG-MCG per tablet 1 tablet  1 tablet Oral BID Roly Pantoja MD        ertapenem (INVanz) 1 g vial to attach to  mL bag  1 g Intravenous Q24H Roly Pantoja MD   Stopped at 24 1005    magnesium sulfate 4 g in 100 mL sterile water intermittent infusion  4 g Intravenous Once Cristina Jacobo MD 50 mL/hr at 24 1057 4 g at 24 1057    [START ON 2024] predniSONE (DELTASONE) tablet 5 mg  5 mg Oral Daily Roly Pantoja MD        sodium chloride (PF) 0.9% PF flush 3 mL  3 mL Intracatheter Q8H Roly Pantoja MD        sodium phosphate 9 mmol in sodium chloride 0.9 % 250 mL intermittent infusion  9 mmol Intravenous Once Roly Pantoja MD   9 mmol at 24 1243    [START ON 2024] tacrolimus (GENERIC EQUIVALENT) capsule 0.5 mg  0.5 mg Oral QAM Roly Pantoja MD         Current Facility-Administered Medications   Medication Dose Route Frequency Provider Last Rate Last Admin       Physical Exam   Temp  Av  F (36.7  C)  Min: 97.5  F (36.4  C)  Max: 98.4  F (36.9  C)      Pulse  Av.7  Min: 92  Max: 119 Resp  Av  Min: 22  Max: 22  SpO2  Av.4 %  Min: 93 %  Max: 100 %     BP (!) 142/63   Pulse 115   Temp 98.4  F (36.9  C) (Oral)   Resp 22   Ht 1.778 m (5' 10\")   Wt 72 kg (158 lb 11.7 oz)   SpO2 93%   BMI 22.78 kg/m      Admit Weight: 72 kg (158 lb 11.7 oz)     GENERAL APPEARANCE: moderate distress  RESP: lungs clear to auscultation - no rales, rhonchi or wheezes  CV: regular " rhythm, normal rate, no rub, no murmur  EDEMA: no LE edema bilaterally  ABDOMEN: soft, nondistended, nontender, bowel sounds normal  MS: extremities normal - no gross deformities noted, no evidence of inflammation in joints, no muscle tenderness  SKIN: no rash  PSYCH: fatigued  DIALYSIS ACCESS:  LUE AV fistula +bruit/+thrill    Data   CMP  Recent Labs   Lab 05/01/24  0845   *   POTASSIUM 3.9   CHLORIDE 101   CO2 17*   ANIONGAP 12   *   BUN 18.0   CR 0.89   GFRESTIMATED 72   EDSON 9.5   MAG 1.5*   PHOS 2.2*   PROTTOTAL 6.5   ALBUMIN 3.8   BILITOTAL 1.4*   ALKPHOS 57   AST 20   ALT 12     CBC  Recent Labs   Lab 05/01/24  0845   HGB 15.8*   WBC 18.3*   RBC 5.15   HCT 46.5   MCV 90   MCH 30.7   MCHC 34.0   RDW 14.7        INRNo lab results found in last 7 days.  ABG  Recent Labs   Lab 05/01/24  0855   O2PER 20      Urine Studies  Recent Labs   Lab Test 05/01/24  0852 04/18/24  1112 04/03/24  1302 02/05/24  1328 05/16/23  0805   COLOR Orange* Yellow Yellow Yellow Yellow   APPEARANCE Cloudy* Cloudy* Clear Slightly Cloudy* Slightly Cloudy*   URINEGLC Negative Negative Negative Negative Negative   URINEBILI Negative Negative Negative Negative Negative   URINEKETONE Negative Negative Negative Negative Negative   SG 1.013 1.015 1.010 1.010 1.010   UBLD Moderate* Small* Small* Trace* Small*   URINEPH 7.0 7.0 7.0 6.5 7.0   PROTEIN 300* 100* Trace* Negative Negative   UROBILINOGEN  --  0.2 0.2 0.2 0.2   NITRITE Negative Positive* Negative Negative Negative   LEUKEST Large* Large* Large* Large* Large*   RBCU 54* 0-2 2-5* 0-2 None Seen   WBCU >182* * 0-5 25-50* 5-10*     Recent Labs   Lab Test 06/04/21  1130 09/10/20  1037   UTPG 0.34* 0.16     PTH  Recent Labs   Lab Test 02/10/22  0802 09/23/20  1653   PTHI 66 45     Iron Studies  No lab results found.    IMAGING:  All imaging studies reviewed by me.

## 2024-05-01 NOTE — H&P
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 with multiple infections resulting in failure as well as antibody medicated rejection of kidney transplant), followed by re-transplant of liver in 2011 and re-transplant of kidney in 2016) maintained on tacrolimus/MMF/Prednisone, splenectomy during 2013, severe scoliosis, osteopenia, CAD, and history of recurrent UTI's that is admitted with an urinary tract infection and sepsis    Sepsis 2/2 Urinary Tract Infection  Hx of Recurrent UTI's  Hx of ESBL  Cystocele s/p pessary  Hypomagnesemia  Hypophosphatemia  Hyponatremia, likely hypovolemic  Patient presented with 2 days of increased urinary frequency followed by fevers, chills, rigors, and generalized weakness. Home fever with Tmax of 102.2, but no objective fever upon presentation after patient administered tylenol at home. Patient with fevers, tachycardia, U/A significant with orange/cloudy urine, large LE, and WBC's >182, and leukocytosis of 18.3 in the setting of immunocompromised state s/p kidney and liver transplant x2 on MMF, tacrolimus, and prednisone. The patient denies any other sick contacts, cough, pain, or missed doses of medications. Likely that patient developed a recurrent UTI leading to sepsis. The ED fluid resusciatated the patient with 2L of NS and blood pressures have been stable as well as started patient on IV Ertapenem due to patient's history of ESBL. CXR completed on admission without abnormalities.    Reviewing patient's chart, CT A/P during 9/22/22 without evidence of structural or anatomical abnormalities as well as cystoscopy and urodynamic  "studies performed during 2023 that were unremarkable for cause of recurrent UTI's. Multiple factors that patient has for recurrent UTI's includes female gender, immunocompromised status, kidney transplant recipient x2, pessary manipulation daily per patient preference, and inconsistent use of topical estrogen. The patient currently has an outpatient plan of standing orders for UA/UC when experiencing symptoms and a standing order for macrobid at local pharmacy. Patient follows with transplant ID and Dr. Ferrara with uro-gynecology. The patient was last seen by transplant ID (Dr. Gonzales) on 4/9/24. Patient did have U/A completed on 4/18/24 as below showing E. Coli ESBL and was given 3-day course of Nitrofurantoin that was completed on 4/21/24. Patient then had recurrent symptoms on 4/29/24.     Previous Urine Cultures:  4/18/24: E. Coli ESBL  4/3/24: Mixed Nu  2/5/24: E. Coli ESBL  5/16/23: Mixed Nu  In the past growth on urine culture was different pathogens including ESBL E coli, K pneumonia, E faecalis, but since 9/2022 the UTIs have been with ESBL E coli.      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  Tx: DDKT and Liver Tx  Transplant: 2/2/2016 (Kidney), 3/3/2011 (Liver), 10/9/2010 (Kidney / Liver)  Significant changes in immunosuppression: None  Significant transplant-related complications: CMV Viremia and Recurrent UTIs  DDKT: Stable              - Baseline Creatinine:  ~ 0.6-0.8              - Proteinuria: Minimal (0.2-0.5 grams)              " "- Date DSA Last Checked: Not Known      Latest DSA: Not checked recently due to time from transplant              - BK Viremia: No              - Kidney Tx Biopsy: No               -Labs every 2 months  Immunosuppression: Tacrolimus immediate release (goal 4-6), Mycophenolic acid (dose 540 mg every 12 hours) and Prednisone (dose 5 mg daily)              - Continue with intensive monitoring of immunosuppression for efficacy and toxicity.              - Changes: Not at this time   Infection Prophylaxis:   - PJP: Sulfa/TMP (Bactrim) MWF  Mineral Bone Disorder:   - Secondary renal hyperparathyroidism; PTH level: Normal (18-80 pg/ml)        On treatment: None  - Vitamin D; level: High        On supplement: No  - Calcium; level: High normal        On supplement: No  - Phosphorus; level: Normal        On supplement: No   Electrolytes:   - Potassium; level: Normal        On supplement: No  - Magnesium; level: Not checked recently        On supplement: No  - Bicarbonate; level: Normal        On supplement: No\"    Plan to consult transplant nephrology for ongoing inpatient management.   - Continue PTA Tacrolimus 0.5 mg daily   -> Tacrolimus level to be completed in the AM   - Continue PTA  mg q12h   - Continue PTA Prednisone 5 mg daily   - Treatment for UTI as above, will discuss with team if further imaging required    Liver Transplant History  S/p SLK 10/2010.  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 Re-liver transplant 3/3/2011. No allograft dysfunction: no history of rejection nor biliary strictures. No acute concerns at this time   - Repeat CMP in the AM to trend liver function    Osteopenia  - Continue PTA calcium and vitamin D supplements  - Repeat DXA in early 2025     Non-obstructive CAD  Microvascular angina, stable  Dyslipidemia  Follows with Dr. Mohamud, last seen 12/4/2023 for microvascular angina and non-obstructive CAD. Patient continues to not take daily statin " that was previously prescribed.    - Continue PTA ASA 81 mg daily     Diet: Combination Diet Regular Diet Adult    DVT Prophylaxis: Pneumatic Compression Devices  [place] Catheter: Not present  Fluids: S/P 2 L of NS in ED  Lines: None     Cardiac Monitoring: None  Code Status: Full Code      Disposition Plan      Expected Discharge Date: 05/03/2024                The patient's care was discussed with the Dr. Lewis.    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     Speaking with patient at bedside, she notes that starting about 2 days prior she had increased urinary frequency. This continued to yesterday when she developed fevers overnight (reviewing records per home thermometer, Tmax of 102.6) resulting in generalized weakness, vomit x1, and rigors. She states that years ago this occurred and was due to a UTI and ultimately required hospitalization for IV ABX. She otherwise denies any chest pains, SOB, nausea, diarrhea, constipation, abdominal  pain, pre-syncope, new rashes, or new edema. She is currently very symptomatic from rigors 2/2 to fever and declined further questioning.     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 Both, very large, native kidneys reamain.    PONV (postoperative nausea and vomiting)     Need to start with ice chips and apple juice, no soda    S/P kidney transplant     SLK 10/9/2010 - kidney failure 2/2 [facility]. Explanted 2012. Re-transplant after de-sensitization 2016    S/P liver transplant (H)     10/9/2010 - HAT, ischemic biopathy. Re-transplant 3/3/2011    S/P splenectomy     Spider veins 2019    Thyroid disease     Hyperthyroid treated with single dose iodine    Urinary tract infection 09/2022       Past Surgical History   Past Surgical History:   Procedure Laterality Date    bunectomy  08/01/2018    right foot    bunionectomy  01/2019    left    CHOLECYSTECTOMY      COLONOSCOPY N/A 3/20/2024    Procedure: COLONOSCOPY, WITH POLYPECTOMY;  Surgeon: Leventhal, Thomas Michael, MD;  Location: [facility] OR    CONIZATION N/A 09/30/2020    Procedure: CONE BIOPSY, CERVIX;  Surgeon: Daysi Perez MD;  Location: [facility] OR    FAILED PICC - RIGHT ARM Right 12/19/2020    Has a LUE AV fistula.    H STATISTIC PICC LINE INSERTION >5YR, FAILED Bilateral 12/19/2020    Left fistula, Right failed x 2 Occlussion    HERNIA REPAIR, INCISIONAL  03/2013    IR DIALYSIS PTA CENTRAL  SEG  2020    IR PICC PLACEMENT > 5 YRS OF AGE  2020    IR PICC PLACEMENT > 5 YRS OF AGE  10/5/2022    PHACOEMULSIFICATION CLEAR CORNEA WITH STANDARD INTRAOCULAR LENS IMPLANT Right 2023    Procedure: RIGHT EYE PHACOEMULSIFICATION, CATARACT, WITH INTRAOCULAR LENS IMPLANT;  Surgeon: Sebastian Robertson MD;  Location: [facility] OR    PHACOEMULSIFICATION WITH STANDARD INTRAOCULAR LENS IMPLANT Left 3/9/2023    Procedure: LEFT EYE PHACOEMULSIFICATION, CATARACT, WITH STANDARD INTRAOCULAR LENS IMPLANT INSERTION;  Surgeon: Sebastian Robertson MD;  Location: [facility] OR    SPLENECTOMY      Splenectomy    TRANSPLANT KIDNEY RECIPIENT  DONOR      re-transplant after [facility]; 6 months de-sensitization prior AND 6 months after transplant    TRANSPLANT LIVER RECIPIENT  DONOR  2011    TRANSPLANT LIVER, KIDNEY RECIPIENT  DONOR, COMBINED  10/09/2010    HAT - re-Tx liver 3/3/2011; Kidney (left iliac) lost to [facility]/fibrosis - explant    VASCULAR SURGERY      Vascular access left UE. Not used for 4 years since 2nd kidney tx 2016  [place]       Prior to Admission Medications   Prior to Admission Medications   Prescriptions Last Dose Informant Patient Reported? Taking?   COMPOUNDED NON-CONTROLLED SUBSTANCE (CMPD RX) - PHARMACY TO MIX COMPOUNDED MEDICATION   No No   Sig: Apply nightly to toenails. SM 61 -  Ciclopirox 8% / Itraconazole 3% / Fluconazole 3% / Terbinafine HCl 1% / Ibuprofen 2% DMSO Suspension   Multiple Vitamins-Minerals (WOMENS DAILY FORMULA PO)   Yes No   Sig: Take 1 tablet by mouth daily   Probiotic Product (PROBIOTIC PO)   Yes No   Sig: Take 2 tablets by mouth every morning ([facility] Women's Vaginal Probiotic)   acetaminophen (TYLENOL) 325 MG tablet   No No   Sig: Take 1 tablet (325 mg) by mouth every 4 hours as needed for mild pain or fever   aspirin (ASA) 81 MG chewable tablet   Yes No   Sig: Take 81 mg by mouth every evening Stopped 7 days preop   atorvastatin (LIPITOR) 10 MG tablet    No No   Sig: Take 1 tablet (10 mg) by mouth daily   biotin 1000 MCG TABS tablet   Yes No   Sig: Take 3,000 mcg by mouth every evening   bisacodyl (DULCOLAX) 5 MG EC tablet   No No   Sig: Take 2 tablets at 3 pm the day before your procedure. If your procedure is before 11 am, take 2 additional tablets at 11 pm. If your procedure is after 11 am, take 2 additional tablets at 6 am. For additional instructions refer to your colonoscopy prep instructions.   calcium carbonate-vitamin D (OSCAL) 500-5 MG-MCG tablet   No No   Sig: Take 1 tablet by mouth 2 times daily   ciclopirox (PENLAC) 8 % external solution   No No   Sig: Apply to adjacent skin and affected nails daily.  Remove with alcohol every 7 days, then repeat.   diphenhydrAMINE (BENADRYL) 25 MG tablet   Yes No   Sig: Take 25 mg by mouth every 8 hours as needed for allergies   estradiol (ESTRACE) 0.1 MG/GM vaginal cream   No No   Sig: Place 1 g vaginally three times a week   guaiFENesin (MUCINEX) 600 MG 12 hr tablet   Yes No   Sig: Take 1 tablet by mouth 2 times daily as needed   nitroFURantoin macrocrystal-monohydrate (MACROBID) 100 MG capsule   No No   Sig: Take 1 capsule (100 mg) by mouth 2 times daily PRN UTI   nitroGLYcerin (NITROSTAT) 0.4 MG sublingual tablet   No No   Sig: Place 1 tablet (0.4 mg) under the tongue every 5 minutes as needed for chest pain For chest pain place 1 tablet under the tongue every 5 minutes for 3 doses. If symptoms persist 5 minutes after 1st dose call 911.   polyethylene glycol (GOLYTELY) 236 g suspension   No No   Sig: The night before the exam at 6 pm drink an 8-ounce glass every 15 minutes until the jug is half empty. If you arrive before 11 AM: Drink the other half of the Golytely jug at 11 PM night before procedure. If you arrive after 11 AM: Drink the other half of the Golytely jug at 6 AM day of procedure. For additional instructions refer to your colonoscopy prep instructions.   predniSONE (DELTASONE) 5 MG tablet   No No    Sig: Take 1 tablet (5 mg) by mouth daily   sulfamethoxazole-trimethoprim (BACTRIM) 400-80 MG tablet   No No   Sig: Take 1 tablet by mouth daily   tacrolimus (GENERIC EQUIVALENT) 1 MG capsule   No No   Sig: Take 1 capsule (1 mg) by mouth every 12 hours   tacrolimus (GENERIC) 0.5 MG capsule   No No   Sig: Take 1 capsule (0.5 mg) by mouth every morning   temazepam (RESTORIL) 15 MG capsule   No No   Sig: Take 1 capsule (15 mg) by mouth as needed for sleep (twice a year)      Facility-Administered Medications: None        Physical Exam   Vital Signs: Temp: 97.5  F (36.4  C) Temp src: Oral BP: 115/63 Pulse: 95     SpO2: 100 % O2 Device: None (Room air)    Weight: 0 lbs 0 oz  General Appearance: Patient with rigors and shivers  Respiratory: CTAB, no wheezing present  Cardiovascular: Normal rate, regular rhythm, no murmurs appreciated  GI: Soft, non-distended, no pain with palpation  Skin: No skin rashes or open wounds  Extremities: No bilateral lower extremity edema  Neuro: AAOx3, moving all 4 extremities without focal deficit    Data     I have personally reviewed the following data over the past 24 hrs:    18.3 (H)  \   15.8 (H)   / 194     130 (L) 101 18.0 /  104 (H)   3.9 17 (L) 0.89 \     ALT: 12 AST: 20 AP: 57 TBILI: 1.4 (H)   ALB: 3.8 TOT PROTEIN: 6.5 LIPASE: N/A     Procal: 1.92 (H) CRP: N/A Lactic Acid: 1.3         Imaging results reviewed over the past 24 hrs:   No results found for this or any previous visit (from the past 24 hour(s)).

## 2024-05-01 NOTE — CONSULTS
[facility]  Transplant Infectious Disease Consult Note - New Patient     Patient:  Belen Sharma, Date of birth 1960, Medical record number [id number]  Date of Visit:  05/01/2024  Consult requested by Dr. Emerita Lewis for evaluation of recurrent UTI         Assessment and Recommendations:   Recommendations:  - In order to determine if one of her native polycystic kidneys has a nidus of E coli infection, will have to discuss with radiology pros & cons of PET CT versrus Indium tagged WBC scan.   - Continue ertapenem for UTI coverage.  - Continue bactrim for Pneumocystis prophylaxis.   - Await pending BCx, UCx, renal ultrasound, inflammatory markers, immune globulins.    Thank you very much for this consultation. Transplant Infectious Disease will continue to follow with you.    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   Currently on TAC/MMF/prednisone. She has recurrent UTIs.   Infectious Disease issues include:  - Elevated WBC with recent fever at home. UCx with E coli 4/18/2024 & 2/5/2024. She has been on macrobid, but if the infection was already up into her renal parenchyma, she may need an antibiotic that has good bloodstream levels in addition to concentrating in the urine, such as the ertapenem that has been started. Does this infection represent undertreatment by macrobid, vs a continued focus of infection such as an infected cyst in one of her remaining native polycystic kidneys? In order to determine if one of her native polycystic kidneys has a nidus of E coli infection, will have to discuss with radiology pros & cons of PET CT versrus Indium tagged WBC scan.   - Recurrent UTI. In the past was with different pathogens including ESBL E coli, K pneumonia, E faecalis and  sometimes with negative urine and blood cx. Since 9/2022 the UTIs have been with ESBL E coli. CT a/p 9/22/22 without evidence of structural or anatomical abnormality. Cystoscopy and urodynamic studies were done on 2/2/23, both were unremarkable.   - Upcoming travel to [place]. Going to [place] for less than 2 weeks. No indications for Cocci testing. [place] to swim in ocean, wear shoes on the beach.   - Hx of C diff.  - Hx of + high risk HPV testing in [place], 5/15/2020, s/p colposcopies  - Splenectomy status, with a large splenule remaining   - QTc interval: 408 msec on 11/27/2023 EKG  - Bacterial coverage: ertapenem  - Pneumocystis prophylaxis: bactrim  - Serostatus & viral prophylaxis: CMV+, EBV+; no prophy  - Fungal prophylaxis: none  - Immunization status: up to date with seasonal vaccines (flu, covid) & RSV  - Gamma globulin status: unknown, pending  - Isolation status: Good hand hygiene. She is in contact isolation for hx of ESBL bacteria.   - Code status: Full Code    Ying Michaels MD   Pager [phone]         History of Infectious Disease Illness:   Belen Sharma is a 64 year old adult with history of congenital hepatic fibrosis and PCKD s/p liver and kidney transplant in [place] in 2010, they both failed and required liver re-transplant in 2011 and kidney re-transplant in 2016.   Currently on TAC/MMF/prednisone. On 4/17/2024 she had bleeding during a dental visit, mostly around the lower left wisdom tooth cleaning and scraping. She has recurrent UTIs. She has multiple risk factors for recurrent UTI, almost all of them can not be reversed, including: female gender, immunocompromised, kidney transplant recipient, the need to manipulate the  tract daily to change pessary. In addition she is not consistent using topical estrogen which may also contribute to UTI. She has standing orders for UA/Ucx when symptomatic and a standing order of macrobid. 4/18/2024 UCx results showed positive urine cultures for the  same bacteria (E coli) that was susceptible to the nitrofurantoin that she was on. Blood cultures are still without any growth. She has been on medications treating an UTI has been vomiting and has a fever for 2 days.     Transplants:  2/2/2016 (Kidney), 3/3/2011 (Liver), 10/9/2010 (Kidney / Liver), Postoperative day:  4808 (Liver), 3011 (Kidney).  Coordinator Tanika Daniels    Review of Systems:  CONSTITUTIONAL:  No fevers here in the ER, but she did have fever to 102F. She is having chills.  EYES: negative for icterus or acute vision changes  ENT:  negative for acute hearing loss, tinnitus, sore throat  RESPIRATORY:  negative for cough, sputum or dyspnea  CARDIOVASCULAR:  negative for chest pain, palpitations  GASTROINTESTINAL:  + nausea, one episode of vomiting (1/4 cup) and dry heaves, no diarrhea or constipation  GENITOURINARY:  negative for dysuria, but she did have frequency. 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 Both, very large, native kidneys reamain.    PONV (postoperative nausea and vomiting)     Need to start with ice chips and apple juice, no soda    S/P kidney transplant     SLK 10/9/2010 - kidney failure 2/2 [facility]. Explanted 2012. Re-transplant after de-sensitization 2016    S/P liver transplant (H)     10/9/2010 - [facility], ischemic biopathy. Re-transplant 3/3/2011    S/P splenectomy     Spider veins 2019    Thyroid disease     Hyperthyroid treated with single dose iodine    Urinary tract  infection 2022       Past Surgical History:   Procedure Laterality Date    bunectomy  2018    right foot    bunionectomy  2019    left    CHOLECYSTECTOMY      COLONOSCOPY N/A 3/20/2024    Procedure: COLONOSCOPY, WITH POLYPECTOMY;  Surgeon: Leventhal, Thomas Michael, MD;  Location: [facility] OR    CONIZATION N/A 2020    Procedure: CONE BIOPSY, CERVIX;  Surgeon: Daysi Perez MD;  Location: [facility]    FAILED PICC - RIGHT ARM Right 2020    Has a LUE AV fistula.    H STATISTIC PICC LINE INSERTION >5YR, FAILED Bilateral 2020    Left fistula, Right failed x 2 Occlussion    HERNIA REPAIR, INCISIONAL  2013    IR DIALYSIS PTA CENTRAL SEG  2020    IR PICC PLACEMENT > 5 YRS OF AGE  2020    IR PICC PLACEMENT > 5 YRS OF AGE  10/5/2022    PHACOEMULSIFICATION CLEAR CORNEA WITH STANDARD INTRAOCULAR LENS IMPLANT Right 2023    Procedure: RIGHT EYE PHACOEMULSIFICATION, CATARACT, WITH INTRAOCULAR LENS IMPLANT;  Surgeon: Sebastian Robertson MD;  Location: [facility] OR    PHACOEMULSIFICATION WITH STANDARD INTRAOCULAR LENS IMPLANT Left 3/9/2023    Procedure: LEFT EYE PHACOEMULSIFICATION, CATARACT, WITH STANDARD INTRAOCULAR LENS IMPLANT INSERTION;  Surgeon: Sebastian Robertson MD;  Location: [facility] OR    SPLENECTOMY      Splenectomy    TRANSPLANT KIDNEY RECIPIENT  DONOR      re-transplant after [facility]; 6 months de-sensitization prior AND 6 months after transplant    TRANSPLANT LIVER RECIPIENT  DONOR  2011    TRANSPLANT LIVER, KIDNEY RECIPIENT  DONOR, COMBINED  10/09/2010    [facility] - re-Tx liver 3/3/2011; Kidney (left iliac) lost to [facility]/fibrosis - explant    VASCULAR SURGERY      Vascular access left UE. Not used for 4 years since 2nd kidney tx 2016 [place]       Family History   Problem Relation Age of Onset    Depression Mother     Anxiety Disorder Mother     Depression Father     Anxiety Disorder Father     Uterine Cancer Maternal Grandmother     Polycystic  Kidney Diease Brother     Polycystic Kidney Diease Brother     Polycystic Kidney Diease Brother     Polycystic Kidney Diease Brother     Cervical Cancer Maternal Aunt     Glaucoma No family hx of     Macular Degeneration No family hx of        Social History     Social History Narrative     works 2nd shift for the [employer]. One son lives with them as well and is helpful. Has owned a dog but not a cat. She is not employed.      Social History     Tobacco Use    Smoking status: Never    Smokeless tobacco: Never   Vaping Use    Vaping status: Never Used   Substance Use Topics    Alcohol use: Not Currently    Drug use: Not Currently       Immunization History   Administered Date(s) Administered    COVID-19 12+ (2023-24) (Pfizer) 10/06/2023, 01/31/2024    COVID-19 Bivalent 12+ (Pfizer) 11/04/2022    COVID-19 MONOVALENT 12+ (Pfizer) 03/29/2021, 04/19/2021, 09/02/2021    COVID-19 Monovalent 12+ (Pfizer 2022) 02/08/2022    DT (PEDS <7y) 03/19/2001    I8v3-35 Novel Flu 11/19/2009    HepB, Unspecified 06/28/2001    Influenza Vaccine 18-64 (Flublok) 10/18/2021, 09/21/2023    Influenza Vaccine >6 months,quad, PF 09/12/2022    Influenza Vaccine IM Ages 6-35 Months 4 Valent (PF) 11/28/2007, 10/07/2009, 09/20/2011    Influenza, seasonal, injectable, PF 11/15/2002, 11/18/2003, 10/26/2004, 10/24/2005, 11/16/2006, 09/20/2011    Influenza,INJ,MDCK,PF,Quad >6mo(Flucelvax) 10/15/2020    MENINGOCOCCAL ACWY (MENQUADFI ) 01/17/2024    Meningococcal ACWY (Menactra ) 01/25/2013    Pneumococcal 20 valent Conjugate (Prevnar 20) 09/12/2022    Pneumococcal 23 valent 09/27/2002, 01/25/2013    RSV Vaccine (Abrysvo) 11/14/2023    TDAP Vaccine (Boostrix) 01/25/2013, 06/01/2018    Twinrix A/B 11/27/2023, 01/17/2024    Zoster recombinant adjuvanted (SHINGRIX) 10/15/2020, 01/08/2021       Patient Active Problem List   Diagnosis    S/P splenectomy    Polycystic kidney disease    Immunosuppression (H24)    Liver replaced by transplant (H)     Endometriosis    HPV (human papilloma virus) infection    JANNETTE III with severe dysplasia    Acquired bilateral hammer toes    Depression    Depressive psychosis (H)    Dermatophytosis of nail    Hallux valgus, acquired, bilateral    Incisional hernia following transplant    Malignant neoplasm of breast (H)    Microvascular angina (H24)    Postmenopausal state    Secondary hyperparathyroidism (H24)    Scoliosis deformity of spine    Sinusitis    Swelling of first metatarsophalangeal (MTP) joint    Uterine prolapse    Kidney replaced by transplant    Recurrent UTI    Osteopenia of multiple sites    Combined forms of age-related cataract of both eyes    Aftercare following organ transplant    Hammertoe, bilateral    Weakness of hip, unspecified laterality    Hip tightness    Pelvic floor dysfunction    Sepsis, due to unspecified organism, unspecified whether acute organ dysfunction present (H)            Current Medications & Allergies:     Current Facility-Administered Medications   Medication Dose Route Frequency Provider Last Rate Last Admin    aspirin (ASA) chewable tablet 81 mg  81 mg Oral QPM Roly Pantoja MD        calcium carbonate-vitamin D (OSCAL) 500-5 MG-MCG per tablet 1 tablet  1 tablet Oral BID Roly Pantoja MD        ertapenem (INVanz) 1 g vial to attach to  mL bag  1 g Intravenous Q24H Roly Pantoja MD   Stopped at 05/01/24 1005    [START ON 5/2/2024] predniSONE (DELTASONE) tablet 5 mg  5 mg Oral Daily Roly Pantoja MD        sodium bicarbonate 75 mEq in D5W 500 mL infusion   Intravenous Once Roly Pantoja MD        sodium chloride (PF) 0.9% PF flush 3 mL  3 mL Intracatheter Q8H Roly Pantoja MD        sodium phosphate 9 mmol in sodium chloride 0.9 % 250 mL intermittent infusion  9 mmol Intravenous Once Roly Pantoja MD   9 mmol at 05/01/24 1243    [START ON 5/3/2024] sulfamethoxazole-trimethoprim (BACTRIM) 400-80 MG per tablet 1 tablet  1 tablet Oral Once per day on Monday Wednesday Friday  "Roly Pantoja MD        [START ON 5/2/2024] tacrolimus (GENERIC EQUIVALENT) capsule 0.5 mg  0.5 mg Oral QAM Roly Pantoja MD           Infusions/Drips:    Current Facility-Administered Medications   Medication Dose Route Frequency Provider Last Rate Last Admin       Allergies   Allergen Reactions    Ibuprofen      Due to liver transplant            Physical Exam:   Patient Vitals for the past 24 hrs:   BP Temp Temp src Pulse Resp SpO2 Height Weight   05/01/24 1300 112/49 -- -- 111 -- 90 % -- --   05/01/24 1230 (!) 149/68 -- -- 120 -- 94 % -- --   05/01/24 1200 (!) 142/63 -- -- 115 -- 93 % -- --   05/01/24 1100 (!) 114/97 98.4  F (36.9  C) Oral 115 22 97 % -- --   05/01/24 1000 105/50 -- -- 92 -- 97 % 1.778 m (5' 10\") 72 kg (158 lb 11.7 oz)   05/01/24 0934 115/63 -- -- 95 -- 100 % -- --   05/01/24 0900 (!) 80/61 -- -- 94 -- -- -- --   05/01/24 0845 96/49 -- -- 96 -- -- -- --   05/01/24 0834 (!) 87/52 -- -- -- -- -- -- --   05/01/24 0832 97/55 -- -- -- -- -- -- --   05/01/24 0831 -- 97.5  F (36.4  C) Oral 119 -- 95 % -- --     Ranges for vital signs:  Temp:  [97.5  F (36.4  C)-98.4  F (36.9  C)] 98.4  F (36.9  C)  Pulse:  [] 111  Resp:  [22] 22  BP: ()/(49-97) 112/49  SpO2:  [90 %-100 %] 90 %  Vitals:    05/01/24 1000   Weight: 72 kg (158 lb 11.7 oz)       Physical Examination:  GENERAL:  well-developed, well-nourished woman, resting in bed appearing listless, in no acute resp distress.  HEAD:  Head is normocephalic, atraumatic   EYES:  Eyes have anicteric sclerae   ENT:  Oropharynx is dry without exudates or ulcers. Tongue is midline  NECK:  Supple.   LUNGS:  Clear to auscultation bilateral.   CARDIOVASCULAR:  Regular rate and rhythm with murmur that could be a radiating bruit from fistula  ABDOMEN:  Normal bowel sounds, soft, nontender over the RLQ transplanted kidney. Has no CVAT at either native kidney site.   SKIN:  No acute rashes. PIV x 2 in place without any surrounding erythema or exudate. L AV " fistula not otherwise examined.  NEUROLOGIC:  Grossly nonfocal. Active x4 extremities         Laboratory Data:     Inflammatory & Autoimmune Markers    Recent Labs   Lab Test 10/05/22  0648 10/04/22  0615 09/14/22  2220 09/14/22  2220 11/04/21  1457 12/28/20  1515   SED  --   --   --  8 14 8   CRP  --   --   --   --  39.3* 13.0*   CRPI 20.60* 39.70*   < > 21.60*  --   --     < > = values in this interval not displayed.       Metabolic Studies       Recent Labs   Lab Test 05/01/24  0845 04/18/24  1008 10/03/22  0935 10/02/22  0628   * 135   < > 135*   POTASSIUM 3.9 4.3   < > 3.9   CHLORIDE 101 101   < > 103   CO2 17* 25   < > 23   ANIONGAP 12 9   < > 9   BUN 18.0 14.7   < > 17.8   CR 0.89 0.85   < > 0.78   GFRESTIMATED 72 76   < > 85   * 101*   < > 92   EDSON 9.5 9.9   < > 9.5   PHOS 2.2*  --   --   --    MAG 1.5*  --   --   --    LACT 1.3  --   --  0.8   PCAL 1.92*  --    < >  --     < > = values in this interval not displayed.       Hepatic Studies    Recent Labs   Lab Test 05/01/24  0845 04/15/24  0808   BILITOTAL 1.4*  1.4* 0.7   DBIL 0.33* <0.20   ALKPHOS 57 57   PROTTOTAL 6.5 6.8   ALBUMIN 3.8 4.0   AST 20 21   ALT 12 13       Pancreatitis testing    Recent Labs   Lab Test 12/18/23  0806   TRIG 124       Hematology Studies   Recent Labs   Lab Test 05/01/24  0845 04/18/24  1008 04/15/24  0808 02/13/24  0810 10/10/22  1017 10/05/22  0648 10/03/22  0935 10/02/22  0628 03/11/21  1007 12/28/20  1515   WBC 18.3* 12.5* 6.5 6.8   < > 7.3   < > 12.3*   < > 9.2   ANEU 17.6*  --   --   --   --   --   --   --   --  6.4   ANEUTAUTO  --   --   --   --   --  3.7  --  9.1*   < >  --    ALYM 0.5*  --   --   --   --   --   --   --   --  2.1   ALYMPAUTO  --   --   --   --   --  2.3  --  1.7   < >  --    PABLO 0.2  --   --   --   --   --   --   --   --  0.6   AMONOAUTO  --   --   --   --   --  1.1  --  1.4*   < >  --    AEOS 0.0  --   --   --   --   --   --   --   --  0.0   AEOSAUTO  --   --   --   --   --  0.2  --  0.0    < >  --    ABSBASO  --   --   --   --   --  0.0  --  0.0   < >  --    HGB 15.8* 15.4 15.5 15.9   < > 14.6   < > 15.1   < > 16.6*   HCT 46.5 47.0 47.9 48.7   < > 46.1   < > 46.3   < > 50.8*    196 206 258   < > 271   < > 287   < > 308    < > = values in this interval not displayed.       Blood Gas Testing    Recent Labs   Lab Test 05/01/24  0855 09/14/22  2230 02/10/22  0802   PH  --  7.44* 7.32*   PHV 7.46*  --   --    PCO2V 34*  --   --    PO2V 33  --   --    HCO3V 24 22  --    O2PER 20  --   --         Thyroid Studies     Recent Labs   Lab Test 11/04/21  1457   TSH 0.86       Urine Studies     Recent Labs   Lab Test 05/01/24  0852 04/18/24  1112 04/03/24  1302 02/05/24  1328 05/16/23  0805 04/30/23  1235 01/04/21  0932 12/16/20  2236   URINEPH 7.0 7.0 7.0 6.5 7.0 7.0   < > 7.5*   NITRITE Negative Positive* Negative Negative Negative Negative   < > Negative   LEUKEST Large* Large* Large* Large* Large* Large*   < > Large*   WBCU >182* * 0-5 25-50* 5-10* *   < > 466*   WBC CLUMPS  --  Present*  --   --   --  Present*   < >  --    UWBCLM  --   --   --   --   --   --   --  Present*    < > = values in this interval not displayed.       Medication levels    Recent Labs   Lab Test 04/15/24  0808 12/19/20  0707 12/18/20  0611   TACROL 5.1   < >  --    MPACID  --   --  1.32   MPAG  --   --  23.7*    < > = values in this interval not displayed.       Microbiology:  Last Culture results   Culture   Date Value Ref Range Status   04/18/2024 No Growth  Final   04/18/2024 >100,000 CFU/mL Escherichia coli ESBL (A)  Final   04/03/2024 <10,000 CFU/mL Mixture of Urogenital Keisha  Final   02/05/2024 >100,000 CFU/mL Escherichia coli ESBL (A)  Final   05/16/2023 >100,000 CFU/mL Mixture of urogenital keisha  Final   04/30/2023 >100,000 CFU/mL Escherichia coli ESBL (A)  Final   04/02/2023 >100,000 CFU/mL Escherichia coli ESBL (A)  Final   04/02/2023 <10,000 CFU/mL Urogenital keisha  Final   03/16/2023 >100,000 CFU/mL  Escherichia coli ESBL (A)  Final   02/22/2023 >100,000 CFU/mL Escherichia coli ESBL (A)  Final   01/27/2023 >100,000 CFU/mL Staphylococcus epidermidis (A)  Final   12/06/2022 >100,000 CFU/mL Escherichia coli ESBL (A)  Final   12/02/2022 <10,000 CFU/mL Urogenital keisha  Final   10/02/2022 >100,000 CFU/mL Escherichia coli (A)  Final     Comment:     Susceptibilities done on previous cultures   10/02/2022 No Growth  Final   10/02/2022 No Growth  Final   09/30/2022 >100,000 CFU/mL Escherichia coli ESBL (A)  Final   09/15/2022 No Growth  Final   09/14/2022 No Growth  Final   09/14/2022 <10,000 CFU/mL Urogenital keisha  Final     Culture Micro   Date Value Ref Range Status   06/04/2021 (A)  Final    10,000 to 50,000 colonies/mL  Enterococcus faecalis     12/17/2020 No growth  Final   12/16/2020 (A)  Final    Cultured on the 1st day of incubation:  Escherichia coli ESBL  ESBL (extended beta lactamase) producing organisms require contact precautions.     12/16/2020   Final    Critical Value/Significant Value, preliminary result only, called to and read back by  RALPH BOLAÑOS RN 12.17.2020 1056 BC     12/16/2020   Final    (Note)  POSITIVE for E.COLI by Verigene multiplex nucleic acid test. Final  identification and antimicrobial susceptibility testing will be  verified by standard methods. Verigene test will not distinguish  E.coli from Shigella species including S.dysenteriae, S.flexneri,  S.boydii, and S.sonnei. Specimens containing Shigella species or  E.coli will be reported as Positive for E.coli.    POSITIVE for CTX-M Class A Extended Spectrum beta-lactamase (ESBL)  resistance marker by Verigene multiplex nucleic acid test. CTX-M  confers resistance to penicillins, cephalosporins and variable  resistance to beta-lactam beta-lactamase inhibitor combinations  (clavulanic acid and tazobactam). Best empiric antibiotic choice is  meropenem. Specific susceptibility testing will be performed.    Specimen tested with Verigene  multiplex, gram-negative blood culture  nucleic acid test for the following targets: Acinetobacter sp.,  Citrobacter sp., Enterobacter sp., Proteus sp., E. coli, K.  pneumoniae/oxytoca, P. aeruginosa, and the following resistance  markers: CTXM, KPC, NDM, VIM, IMP and OXA.    Critical Value/Significant Value called to and read back by RALPH BOLAÑOS RN 12/17/20 1312 EH.       12/16/2020 No growth  Final   12/16/2020 (A)  Preliminary    >100,000 colonies/mL  Escherichia coli ESBL  ESBL (extended beta lactamase) producing organisms require contact precautions.     12/16/2020   Preliminary    Susceptibility testing requested by  Dr. Gonzales, Pager [phone]. Fosfomycin requested. @ 1637. 12.18.20. BS.      12/03/2020   Final    <10,000 colonies/mL  mixed urogenital keisha  Susceptibility testing not routinely done             Virology:  Coronavirus-19 testing    Recent Labs   Lab Test 10/02/22  0723 09/15/22  0154 11/04/21  1409 12/16/20  2237 09/26/20  1010   [id number] Negative Negative Negative NEGATIVE Not Detected   [id number]  --   --   --   --  Nasopharyngeal       Respiratory virus (non-coronavirus-19) testing    Recent Labs   Lab Test 11/04/21  1408 12/16/20  2237   IFLUA Not Detected  --    INFZA  --  Negative   FLUAH1 Not Detected  --    [id number] Not Detected  --    FLUAH3 Not Detected  --    IFLUB Not Detected  --    INFZB  --  Negative   PIV1 Not Detected  --    PIV2 Not Detected  --    PIV3 Not Detected  --    PIV4 Not Detected  --    RSVA Not Detected  --    RSVB Not Detected  --    HMPV Not Detected  --    RHINEV Not Detected  --    ADENOV Not Detected  --    CORONA Not Detected  --        CMV viral loads    Recent Labs   Lab Test 12/18/20  0611 09/23/20  1653   CMVQNT CMV DNA Not Detected CMV DNA Not Detected   CSPEC Plasma Plasma   CMVLOG Not Calculated Not Calculated       BK Virus Result   Date Value Ref Range Status   09/23/2020 BK Virus DNA Not Detected BKNEG^BK Virus DNA Not Detected copies/mL Final        CMV Antibody IgG   Date Value Ref Range Status   01/04/2021 >8.0 (H) 0.0 - 0.8 AI Final     Comment:     Positive  Antibody index (AI) values reflect qualitative changes in antibody   concentration that cannot be directly associated with clinical condition or   disease state.       EBV Capsid Antibody IgG   Date Value Ref Range Status   01/04/2021 >8.0 (H) 0.0 - 0.8 AI Final     Comment:     Positive, suggests recent or past exposure  Antibody index (AI) values reflect qualitative changes in antibody   concentration that cannot be directly associated with clinical condition or   disease state.         Imaging:  Recent Results (from the past 48 hour(s))   XR Chest Port 1 View    Narrative    Portable chest    INDICATION: Fever comment immunosuppressed, cough    COMPARISON: 10/2/2022    FINDINGS: Heart size normal. Atherosclerotic calcification of the  aortic knob. Lungs and pulmonary vasculature appear normal as do the  bony structures.      Impression    IMPRESSION: Aortic atherosclerosis    MANISHA CAAL MD         SYSTEM ID:  [id number]

## 2024-05-01 NOTE — ED TRIAGE NOTES
Pt says I think I might have a UTI. The sxs started last night: vomiting at 7 am after drinking water, fever 102.2 and 102.6, body aches, chills last night, headache, weakness, dizziness, lightheadedness. Pt denies chest pain. Pt says she has a little shortness of breath.       Pt has a hx of kidney and liver tx.UTI one week ago and was given antibiotics for that. Pt has a dialysis fistula in the L ARM

## 2024-05-02 LAB
ALBUMIN SERPL BCG-MCNC: 2.9 G/DL (ref 3.5–5.2)
ALP SERPL-CCNC: 61 U/L (ref 40–150)
ALT SERPL W P-5'-P-CCNC: 11 U/L (ref 0–50)
ANION GAP SERPL CALCULATED.3IONS-SCNC: 10 MMOL/L (ref 7–15)
AST SERPL W P-5'-P-CCNC: 24 U/L (ref 0–45)
ATRIAL RATE - MUSE: 110 BPM
BASOPHILS # BLD AUTO: 0.1 10E3/UL (ref 0–0.2)
BASOPHILS NFR BLD AUTO: 0 %
BILIRUB DIRECT SERPL-MCNC: 0.27 MG/DL (ref 0–0.3)
BILIRUB SERPL-MCNC: 0.8 MG/DL
BILIRUB SERPL-MCNC: 0.8 MG/DL
BUN SERPL-MCNC: 15.5 MG/DL (ref 8–23)
CALCIUM SERPL-MCNC: 8.8 MG/DL (ref 8.8–10.2)
CHLORIDE SERPL-SCNC: 106 MMOL/L (ref 98–107)
CREAT SERPL-MCNC: 0.96 MG/DL (ref 0.51–0.95)
DEPRECATED HCO3 PLAS-SCNC: 20 MMOL/L (ref 22–29)
DIASTOLIC BLOOD PRESSURE - MUSE: NORMAL MMHG
EGFRCR SERPLBLD CKD-EPI 2021: 66 ML/MIN/1.73M2
EOSINOPHIL # BLD AUTO: 0.1 10E3/UL (ref 0–0.7)
EOSINOPHIL NFR BLD AUTO: 0 %
ERYTHROCYTE [DISTWIDTH] IN BLOOD BY AUTOMATED COUNT: 15.1 % (ref 10–15)
ERYTHROCYTE [SEDIMENTATION RATE] IN BLOOD BY WESTERGREN METHOD: 14 MM/HR (ref 0–30)
GLUCOSE SERPL-MCNC: 88 MG/DL (ref 70–99)
HCT VFR BLD AUTO: 43.8 % (ref 35–47)
HGB BLD-MCNC: 14.5 G/DL (ref 11.7–15.7)
IGA SERPL-MCNC: 197 MG/DL (ref 84–499)
IGG SERPL-MCNC: 1025 MG/DL (ref 610–1616)
IGM SERPL-MCNC: 52 MG/DL (ref 35–242)
IMM GRANULOCYTES # BLD: 0.6 10E3/UL
IMM GRANULOCYTES NFR BLD: 3 %
INTERPRETATION ECG - MUSE: NORMAL
LYMPHOCYTES # BLD AUTO: 1 10E3/UL (ref 0.8–5.3)
LYMPHOCYTES NFR BLD AUTO: 5 %
MAGNESIUM SERPL-MCNC: 2.7 MG/DL (ref 1.7–2.3)
MCH RBC QN AUTO: 30.8 PG (ref 26.5–33)
MCHC RBC AUTO-ENTMCNC: 33.1 G/DL (ref 31.5–36.5)
MCV RBC AUTO: 93 FL (ref 78–100)
MONOCYTES # BLD AUTO: 0.5 10E3/UL (ref 0–1.3)
MONOCYTES NFR BLD AUTO: 3 %
NEUTROPHILS # BLD AUTO: 18.4 10E3/UL (ref 1.6–8.3)
NEUTROPHILS NFR BLD AUTO: 89 %
NRBC # BLD AUTO: 0 10E3/UL
NRBC BLD AUTO-RTO: 0 /100
P AXIS - MUSE: 61 DEGREES
PHOSPHATE SERPL-MCNC: 2.5 MG/DL (ref 2.5–4.5)
PLATELET # BLD AUTO: 154 10E3/UL (ref 150–450)
POTASSIUM SERPL-SCNC: 4.1 MMOL/L (ref 3.4–5.3)
PR INTERVAL - MUSE: 130 MS
PROT SERPL-MCNC: 5.3 G/DL (ref 6.4–8.3)
QRS DURATION - MUSE: 90 MS
QT - MUSE: 346 MS
QTC - MUSE: 468 MS
R AXIS - MUSE: 55 DEGREES
RBC # BLD AUTO: 4.71 10E6/UL (ref 3.8–5.2)
SODIUM SERPL-SCNC: 136 MMOL/L (ref 135–145)
SYSTOLIC BLOOD PRESSURE - MUSE: NORMAL MMHG
T AXIS - MUSE: 1 DEGREES
TACROLIMUS BLD-MCNC: 5.5 UG/L (ref 5–15)
TME LAST DOSE: NORMAL H
TME LAST DOSE: NORMAL H
VENTRICULAR RATE- MUSE: 110 BPM
WBC # BLD AUTO: 20.6 10E3/UL (ref 4–11)

## 2024-05-02 PROCEDURE — 80197 ASSAY OF TACROLIMUS: CPT

## 2024-05-02 PROCEDURE — 250N000011 HC RX IP 250 OP 636: Mod: JZ

## 2024-05-02 PROCEDURE — 83735 ASSAY OF MAGNESIUM: CPT

## 2024-05-02 PROCEDURE — 250N000012 HC RX MED GY IP 250 OP 636 PS 637

## 2024-05-02 PROCEDURE — 82248 BILIRUBIN DIRECT: CPT

## 2024-05-02 PROCEDURE — 250N000013 HC RX MED GY IP 250 OP 250 PS 637

## 2024-05-02 PROCEDURE — 36415 COLL VENOUS BLD VENIPUNCTURE: CPT

## 2024-05-02 PROCEDURE — 258N000003 HC RX IP 258 OP 636

## 2024-05-02 PROCEDURE — 99233 SBSQ HOSP IP/OBS HIGH 50: CPT | Mod: GC | Performed by: STUDENT IN AN ORGANIZED HEALTH CARE EDUCATION/TRAINING PROGRAM

## 2024-05-02 PROCEDURE — 250N000011 HC RX IP 250 OP 636

## 2024-05-02 PROCEDURE — 84100 ASSAY OF PHOSPHORUS: CPT

## 2024-05-02 PROCEDURE — 250N000013 HC RX MED GY IP 250 OP 250 PS 637: Performed by: STUDENT IN AN ORGANIZED HEALTH CARE EDUCATION/TRAINING PROGRAM

## 2024-05-02 PROCEDURE — 99233 SBSQ HOSP IP/OBS HIGH 50: CPT | Mod: GC | Performed by: INTERNAL MEDICINE

## 2024-05-02 PROCEDURE — 120N000011 HC R&B TRANSPLANT UMMC

## 2024-05-02 PROCEDURE — 80053 COMPREHEN METABOLIC PANEL: CPT

## 2024-05-02 PROCEDURE — 85004 AUTOMATED DIFF WBC COUNT: CPT

## 2024-05-02 PROCEDURE — 85652 RBC SED RATE AUTOMATED: CPT | Performed by: INTERNAL MEDICINE

## 2024-05-02 PROCEDURE — 99233 SBSQ HOSP IP/OBS HIGH 50: CPT | Performed by: INTERNAL MEDICINE

## 2024-05-02 RX ORDER — TACROLIMUS 1 MG/1
1 CAPSULE ORAL EVERY EVENING
Status: DISCONTINUED | OUTPATIENT
Start: 2024-05-02 | End: 2024-05-07 | Stop reason: HOSPADM

## 2024-05-02 RX ORDER — PREDNISONE 5 MG/1
5 TABLET ORAL DAILY
Status: DISCONTINUED | OUTPATIENT
Start: 2024-05-02 | End: 2024-05-02

## 2024-05-02 RX ORDER — PREDNISONE 5 MG/1
5 TABLET ORAL DAILY
Status: DISCONTINUED | OUTPATIENT
Start: 2024-05-03 | End: 2024-05-07 | Stop reason: HOSPADM

## 2024-05-02 RX ADMIN — Medication 1 TABLET: at 18:48

## 2024-05-02 RX ADMIN — ERTAPENEM SODIUM 1 G: 1 INJECTION, POWDER, LYOPHILIZED, FOR SOLUTION INTRAMUSCULAR; INTRAVENOUS at 09:08

## 2024-05-02 RX ADMIN — MYCOPHENOLATE MOFETIL 500 MG: 500 INJECTION, POWDER, LYOPHILIZED, FOR SOLUTION INTRAVENOUS at 08:40

## 2024-05-02 RX ADMIN — ACETAMINOPHEN 975 MG: 325 TABLET, FILM COATED ORAL at 09:15

## 2024-05-02 RX ADMIN — POTASSIUM & SODIUM PHOSPHATES POWDER PACK 280-160-250 MG 1 PACKET: 280-160-250 PACK at 12:21

## 2024-05-02 RX ADMIN — ASPIRIN 81 MG CHEWABLE TABLET 81 MG: 81 TABLET CHEWABLE at 19:58

## 2024-05-02 RX ADMIN — TACROLIMUS 1 MG: 1 CAPSULE ORAL at 18:48

## 2024-05-02 RX ADMIN — ACETAMINOPHEN 975 MG: 325 TABLET, FILM COATED ORAL at 00:00

## 2024-05-02 RX ADMIN — MYCOPHENOLIC ACID 540 MG: 360 TABLET, DELAYED RELEASE ORAL at 18:48

## 2024-05-02 RX ADMIN — Medication 1 TABLET: at 12:21

## 2024-05-02 RX ADMIN — TACROLIMUS 1.5 MG: 5 CAPSULE ORAL at 08:36

## 2024-05-02 RX ADMIN — METHYLPREDNISOLONE SODIUM SUCCINATE 4 MG: 40 INJECTION, POWDER, FOR SOLUTION INTRAMUSCULAR; INTRAVENOUS at 08:36

## 2024-05-02 RX ADMIN — POTASSIUM & SODIUM PHOSPHATES POWDER PACK 280-160-250 MG 1 PACKET: 280-160-250 PACK at 08:37

## 2024-05-02 ASSESSMENT — ACTIVITIES OF DAILY LIVING (ADL)
ADLS_ACUITY_SCORE: 26

## 2024-05-02 NOTE — PROGRESS NOTES
[facility]  Transplant Infectious Disease Progress Note      Patient:  Belen Sharma, Date of birth 1960, Medical record number [id number]  Date of Visit:  05/02/2024         Assessment and Recommendations:   Recommendations:  - In speaking with the radiologist in the nuclear medicine reading room this morning, prefer to use Indium tagged WBC scan to determine if one of her native polycystic kidneys has a nidus of E coli infection. Consider ordering.   - I've called the micro lab to workup all organisms on the 5/1/2024 with a mixture urogenital keisha.   - Continue ertapenem for UTI coverage.  - Continue bactrim for Pneumocystis prophylaxis.   - Await pending 5/1/2024 BCx, UCx.    Transplant Infectious Disease will continue to follow with you.    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   Currently on TAC/MMF/prednisone. She has recurrent UTIs.   Infectious Disease issues include:  - Elevated WBC with recent fever at home. UCx with E coli 4/18/2024 & 2/5/2024. 5/1/2024 UA with ++ pyuria, as well as some hematuria. 5/1/2024 BCx neg. By starting on IV ertapenem, her fever is under control, but her WBC (20.6K) is about the same as yesterday (18.3K). On exam, she does not have tenderness over the RLQ transplanted kidney, and she also does not have CVA tenderness over her native polycystic kidneys. In speaking with the radiologist in the nuclear medicine reading room this morning, prefer to use Indium tagged WBC scan to determine if one of her native polycystic kidneys has a nidus of E coli infection. Consider ordering.   - Recurrent UTI. In the past was with different pathogens including ESBL E coli, K pneumonia, E faecalis and sometimes with negative urine and blood cx. Since 9/2022 the UTIs  Psychiatry Initial Visit (PhD/LCSW)  Diagnostic Interview - CPT 11649    Date: 10/27/2020    Site: [facility]    Referral source:   Ben Marie MD    Clinical status of patient: Outpatient    Leticia Grimes, a 75 y.o. female, for initial evaluation visit.  Met with patient.    Chief complaint/reason for encounter: sleep, cognition and memory gap for a specific time period    History of present illness:   75 year old female patient seen for medication management by Tanvir Peters MD.  Patient presented with her daughter, Lidia.  Reporting July 27th something happened at her house, and she has a memory gap;  She awoke to find herself in [facility].  Has a memory gap for three days.  Daughter indicated family has pieced together some knowledge of what they think happened, involving the patient's grandson.  Suspecting patient's selective amnesia may be protecting her from having to face some distressing event.  She reportedly was suspected of having some cardiac event on that occasion, after which she awoke in the hospital, but that is not confirmed.   Daughter confirmed there is some active legal investigation by authorities concerning the grandson, which has family more concerned about potentially interfering in some way with the patient's memory.  Patient reported also having issues with insomnia.  Endorsed feeling anxious and fearful at nighttime, afraid she may have fearsome dreams that suddenly reveal some horrible truth of events.  She said that has not happened, but she feels an anxious concern about it happening.   Newly a patient with Dr. Peters.  Reported having no psychiatry history prior to that.  No known family history of mental health issues.  Patient denied depression, mood swings, suicidal or homicidal ideation, or psychosis.  Denied any substance abuse.  Identified therapeutic goals include reducing her anxiety, thereby also reducing insomnia, and enhancing coping  "skills.  No reported family of origin mental health history.      Pain: noncontributory    Symptoms:   · Mood: denied  · Anxiety: decreased memory and excessive anxiety/worry  · Substance abuse: denied  · Cognitive functioning: mild impairment; specific gap in memory, "twenty minutes" on 7/27/20.  · Health behaviors: noncontributory    Psychiatric history: currently under psychiatric care    Medical history: GERD; vitamin D insufficiency    Family history of psychiatric illness: none    Social history (marriage, employment, etc.):  Grew up in [place], [place].  Third of four siblings.  Raised by both biological parents.  Not a very happy childhood; misdiagnosed as having a serious heart problem, causing her to be home schooled, lacking socializing opportunities, mother being overly protective.  Told by parents she could not go to college, due to health problems.  She only learned she was healthy in a screening late in high school.  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  Obtained a bachelor degree in English and social studies, a masters in counseling.  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  Later went to work for different schools.  Talked about her , who worked for [employer].  He ws injured and had back surgery in 2014, some illness and side effects from that, eventually lost ability to walk.  In 2016, she started noticing mental decline in her , which has progressed.   has been mostly house-bound this past year, and his memory now is poor.      Substance use:   Alcohol: none   Drugs: none   Tobacco: none   Caffeine: not reported    Current medications and drug reactions (include OTC, herbal): see medication list      Strengths and liabilities: Strength: Patient accepts guidance/feedback, Strength: Patient is motivated for change., Strength: Patient has positive support network.    Current " have been with ESBL E coli. CT a/p 9/22/22 without evidence of structural or anatomical abnormality. One risk factor is uterine prolapse managed with a pessary. 2/2/2023 Cystoscopy and urodynamic studies were done on 2/2/23, both were unremarkable. Bilateral ureteral orifices were noted in the normal orthotopic position. Her transplant UO was not easily seen but brisk clear efflux was noted. VUDS with normal bladder capacity of 580 mL, with normal compliance, without DO/DOI/USI. She did not have VUReflux on fluoroscopy.  - Upcoming travel to [place]. Going to [place] for less than 2 weeks. No indications for Cocci testing. [place] to swim in ocean, wear shoes on the beach.   - Hx of C diff.  - Hx of + high risk HPV testing in [place], 5/15/2020, s/p colposcopies  - Splenectomy status, with a large splenule remaining   - QTc interval: 408 msec on 11/27/2023 EKG  - Bacterial coverage: ertapenem  - Pneumocystis prophylaxis: bactrim  - Serostatus & viral prophylaxis: CMV+, EBV+; no prophy  - Fungal prophylaxis: none  - Immunization status: up to date with seasonal vaccines (flu, covid) & RSV  - Gamma globulin status: replete  - Isolation status: Good hand hygiene. She is in contact isolation for hx of ESBL bacteria.   - Code status: Full Code    Ying Michaels MD   Pager [phone]         Interim History:   Since Belen was last seen by ID on 5/1/2024, she feels a little better. The high fever that she had at home prior to coming to the ER has not recurred. The emesis and dry heaves have not recurred. She is less weak; she has been receiving aggressive hydration, which she appreciates. Transplanted kidney is not tender to palpation, and renal ultrasound had normal thickness to the kidney parenchyma, no hydro, no surrounding fluid. UCx neg to date in setting of high grade pyuria.     Transplants:  2/2/2016 (Kidney), 3/3/2011 (Liver), 10/9/2010 (Kidney / Liver), Postoperative day:  4809 (Liver), 3012 (Kidney).   Coordinator Tanika Daniels    Review of Systems:  CONSTITUTIONAL:  No fever  EYES: negative for icterus or acute vision changes  ENT:  negative for acute hearing loss, tinnitus, sore throat  RESPIRATORY:  negative for cough, sputum or dyspnea  CARDIOVASCULAR:  negative for chest pain, palpitations  GASTROINTESTINAL:  no diarrhea or constipation  GENITOURINARY:  negative for dysuria, but she does have frequency (perhaps slightly better than yesterday). No hematuria  HEME:  No easy bruising or bleeding  INTEGUMENT:  negative for rash or pruritus  NEURO:  Minor headache          Current Medications & Allergies:     Current Facility-Administered Medications   Medication Dose Route Frequency Provider Last Rate Last Admin    aspirin (ASA) chewable tablet 81 mg  81 mg Oral QPM Roly Pantoja MD   81 mg at 05/01/24 2027    calcium carbonate-vitamin D (OSCAL) 500-5 MG-MCG per tablet 1 tablet  1 tablet Oral BID Roly Pantoja MD        ertapenem (INVanz) 1 g vial to attach to  mL bag  1 g Intravenous Q24H Roly Pantoja MD 0 mL/hr at 05/01/24 1005 1 g at 05/02/24 0908    methylPREDNISolone sodium succinate (SOLU-MEDROL) injection 4 mg  4 mg Intravenous QAM Roly Pantoja MD   4 mg at 05/02/24 0836    mycophenolate mofetil (CELLCEPT) 500 mg in D5W intermittent infusion  500 mg Intravenous BID Roly Pantoja MD 55 mL/hr at 05/02/24 0840 500 mg at 05/02/24 0840    potassium & sodium phosphates (NEUTRA-PHOS) Packet 1 packet  1 packet Oral or Feeding Tube Q4H Emerita Lewis MD   1 packet at 05/02/24 0837    sodium chloride (PF) 0.9% PF flush 3 mL  3 mL Intracatheter Q8H Roly Pantoja MD   3 mL at 05/01/24 1833    [START ON 5/3/2024] sulfamethoxazole-trimethoprim (BACTRIM) 400-80 MG per tablet 1 tablet  1 tablet Oral Once per day on Monday Wednesday Friday Roly Pantoja MD        tacrolimus (GENERIC) suspension 1 mg  1 mg Oral QPM Roly Pantoja MD   1 mg at 05/01/24 1716    tacrolimus (GENERIC)  Evaluation:     Mental Status Exam:  General Appearance:  unremarkable, age appropriate, normal weight, casually dressed, neatly groomed   Speech: normal tone, normal rate, normal pitch, normal volume      Level of Cooperation: cooperative      Thought Processes: goal-directed   Mood: steady      Thought Content: normal, no suicidality, no homicidality, delusions, or paranoia   Affect: congruent and appropriate   Orientation: Oriented x3   Memory: recent memory intact; remote memory intact, except for gap reported in history.   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 Insomnia, unspecified type  G47.00 780.52   3. Mild cognitive impairment  G31.84 331.83       Plan:individual psychotherapy and medication management by physician    Return to Clinic: as scheduled, 11/24/20    Length of Service (minutes): 45     "suspension 1.5 mg  1.5 mg Oral QAM Roly Pantoja MD   1.5 mg at 05/02/24 0836       Infusions/Drips:    Current Facility-Administered Medications   Medication Dose Route Frequency Provider Last Rate Last Admin    lactated ringers infusion   Intravenous Continuous Roly Pantoja MD 75 mL/hr at 05/02/24 0854 Rate Verify at 05/02/24 0854       Allergies   Allergen Reactions    Ibuprofen      Due to liver transplant            Physical Exam:   Patient Vitals for the past 24 hrs:   BP Temp Temp src Pulse Resp SpO2 Height Weight   05/02/24 0531 91/48 -- -- -- -- -- -- --   05/02/24 0520 118/65 99.5  F (37.5  C) Oral 82 16 98 % -- --   05/02/24 0209 106/56 98.6  F (37  C) Oral 80 16 94 % -- --   05/01/24 2222 -- 99.9  F (37.7  C) Oral 88 16 93 % -- --   05/01/24 1900 -- 98.4  F (36.9  C) Oral -- -- -- -- --   05/01/24 1800 92/51 99.6  F (37.6  C) Oral 85 -- 96 % -- --   05/01/24 1730 95/51 -- -- 90 -- 96 % -- --   05/01/24 1700 97/50 -- -- 91 -- 97 % -- --   05/01/24 1630 95/52 -- -- 100 -- 94 % -- --   05/01/24 1615 99/52 -- -- 100 -- 93 % -- --   05/01/24 1600 99/52 -- -- 99 -- 95 % -- --   05/01/24 1545 99/51 -- -- 100 -- 94 % -- --   05/01/24 1530 97/51 -- -- 100 -- 93 % -- --   05/01/24 1523 119/61 -- -- 106 -- 95 % -- --   05/01/24 1515 91/53 -- -- 103 -- 94 % -- --   05/01/24 1502 97/46 -- -- 104 -- 93 % -- --   05/01/24 1501 95/47 -- -- 105 -- 94 % -- --   05/01/24 1500 94/48 98.1  F (36.7  C) Oral 105 -- 95 % -- --   05/01/24 1350 112/47 -- -- 111 -- 93 % -- --   05/01/24 1300 112/49 -- -- 111 -- 90 % -- --   05/01/24 1230 (!) 149/68 -- -- 120 -- 94 % -- --   05/01/24 1200 (!) 142/63 -- -- 115 -- 93 % -- --   05/01/24 1100 (!) 114/97 98.4  F (36.9  C) Oral 115 22 97 % -- --   05/01/24 1000 105/50 -- -- 92 -- 97 % 1.778 m (5' 10\") 72 kg (158 lb 11.7 oz)     Ranges for vital signs:  Temp:  [98.1  F (36.7  C)-99.9  F (37.7  C)] 99.5  F (37.5  C)  Pulse:  [] 82  Resp:  [16-22] 16  BP: ()/(46-97) " 91/48  SpO2:  [90 %-98 %] 98 %  Vitals:    05/01/24 1000   Weight: 72 kg (158 lb 11.7 oz)       Physical Examination:  GENERAL:  well-developed, well-nourished woman, resting in bed, in no acute resp distress. She looks better than yesterday.   HEAD:  Head is normocephalic, atraumatic   EYES:  Eyes have anicteric sclerae   ENT:  Oropharynx is dry without exudates or ulcers. Tongue is midline  NECK:  Supple.   LUNGS:  Clear to auscultation bilateral.   CARDIOVASCULAR:  Regular rate and rhythm   ABDOMEN:  Normal bowel sounds, soft, nontender over the RLQ transplanted kidney. Has no CVAT at either native kidney site.   SKIN:  No acute rashes. PIV x 2 in place without any surrounding erythema or exudate. L AV fistula not otherwise examined.  NEUROLOGIC:  Grossly nonfocal. Active x4 extremities         Laboratory Data:     Inflammatory & Autoimmune Markers    Recent Labs   Lab Test 05/02/24  0538 05/01/24  0845 10/05/22  0648 10/04/22  0615 09/14/22  2220 11/04/21  1457 12/28/20  1515   SED 14  --   --   --  8 14 8   CRP  --   --   --   --   --  39.3* 13.0*   CRPI  --  63.20* 20.60*   < > 21.60*  --   --     < > = values in this interval not displayed.       Immune Globulin Studies    Recent Labs   Lab Test 05/01/24  0846   IGG 1,025   IGM 52          Metabolic Studies       Recent Labs   Lab Test 05/02/24  0538 05/01/24  1828 05/01/24  0845 10/03/22  0935 10/02/22  0628     --  130*   < > 135*   POTASSIUM 4.1  --  3.9   < > 3.9   CHLORIDE 106  --  101   < > 103   CO2 20*  --  17*   < > 23   ANIONGAP 10  --  12   < > 9   BUN 15.5  --  18.0   < > 17.8   CR 0.96*  --  0.89   < > 0.78   GFRESTIMATED 66  --  72   < > 85   GLC 88  --  104*   < > 92   EDSON 8.8  --  9.5   < > 9.5   PHOS 2.5  --  2.2*   < >  --    MAG 2.7*   < > 1.5*  --   --    LACT  --   --  1.3  --  0.8   PCAL  --   --  1.92*   < >  --     < > = values in this interval not displayed.       Hepatic Studies    Recent Labs   Lab Test 05/02/24  0586  05/01/24  0845   BILITOTAL 0.8  0.8 1.4*  1.4*   DBIL 0.27 0.33*   ALKPHOS 61 57   PROTTOTAL 5.3* 6.5   ALBUMIN 2.9* 3.8   AST 24 20   ALT 11 12       Pancreatitis testing    Recent Labs   Lab Test 12/18/23  0806   TRIG 124       Hematology Studies   Recent Labs   Lab Test 05/02/24  0538 05/01/24  0845 04/18/24  1008 04/15/24  0808 10/10/22  1017 10/05/22  0648 03/11/21  1007 12/28/20  1515   WBC 20.6* 18.3* 12.5* 6.5   < > 7.3   < > 9.2   ANEU  --  17.6*  --   --   --   --   --  6.4   ANEUTAUTO 18.4*  --   --   --   --  3.7   < >  --    ALYM  --  0.5*  --   --   --   --   --  2.1   ALYMPAUTO 1.0  --   --   --   --  2.3   < >  --    PABLO  --  0.2  --   --   --   --   --  0.6   AMONOAUTO 0.5  --   --   --   --  1.1   < >  --    AEOS  --  0.0  --   --   --   --   --  0.0   AEOSAUTO 0.1  --   --   --   --  0.2   < >  --    ABSBASO 0.1  --   --   --   --  0.0   < >  --    HGB 14.5 15.8* 15.4 15.5   < > 14.6   < > 16.6*   HCT 43.8 46.5 47.0 47.9   < > 46.1   < > 50.8*    194 196 206   < > 271   < > 308    < > = values in this interval not displayed.       Blood Gas Testing    Recent Labs   Lab Test 05/01/24  0855 09/14/22 2230 02/10/22  0802   PH  --  7.44* 7.32*   PHV 7.46*  --   --    PCO2V 34*  --   --    PO2V 33  --   --    HCO3V 24 22  --    O2PER 20  --   --         Thyroid Studies     Recent Labs   Lab Test 11/04/21  1457   TSH 0.86       Urine Studies     Recent Labs   Lab Test 05/01/24  0852 04/18/24  1112 04/03/24  1302 02/05/24  1328 05/16/23  0805 04/30/23  1235 01/04/21  0932 12/16/20  2236   URINEPH 7.0 7.0 7.0 6.5 7.0 7.0   < > 7.5*   NITRITE Negative Positive* Negative Negative Negative Negative   < > Negative   LEUKEST Large* Large* Large* Large* Large* Large*   < > Large*   WBCU >182* * 0-5 25-50* 5-10* *   < > 466*   WBC CLUMPS  --  Present*  --   --   --  Present*   < >  --    UWBCLM  --   --   --   --   --   --   --  Present*    < > = values in this interval not displayed.        Medication levels    Recent Labs   Lab Test 04/15/24  0808 12/19/20  0707 12/18/20  0611   TACROL 5.1   < >  --    MPACID  --   --  1.32   MPAG  --   --  23.7*    < > = values in this interval not displayed.       Microbiology:  Last Culture results   Culture   Date Value Ref Range Status   05/01/2024 No growth after 12 hours  Preliminary   05/01/2024 >100,000 CFU/mL Mixture of Urogenital Keisha  Final   05/01/2024 No growth after 12 hours  Preliminary   04/18/2024 No Growth  Final   04/18/2024 >100,000 CFU/mL Escherichia coli ESBL (A)  Final   04/03/2024 <10,000 CFU/mL Mixture of Urogenital Keisha  Final   02/05/2024 >100,000 CFU/mL Escherichia coli ESBL (A)  Final   05/16/2023 >100,000 CFU/mL Mixture of urogenital keisha  Final   04/30/2023 >100,000 CFU/mL Escherichia coli ESBL (A)  Final   04/02/2023 >100,000 CFU/mL Escherichia coli ESBL (A)  Final   04/02/2023 <10,000 CFU/mL Urogenital keisha  Final   03/16/2023 >100,000 CFU/mL Escherichia coli ESBL (A)  Final   02/22/2023 >100,000 CFU/mL Escherichia coli ESBL (A)  Final   01/27/2023 >100,000 CFU/mL Staphylococcus epidermidis (A)  Final   12/06/2022 >100,000 CFU/mL Escherichia coli ESBL (A)  Final   12/02/2022 <10,000 CFU/mL Urogenital keisha  Final   10/02/2022 >100,000 CFU/mL Escherichia coli (A)  Final     Comment:     Susceptibilities done on previous cultures   10/02/2022 No Growth  Final   10/02/2022 No Growth  Final   09/30/2022 >100,000 CFU/mL Escherichia coli ESBL (A)  Final     Culture Micro   Date Value Ref Range Status   06/04/2021 (A)  Final    10,000 to 50,000 colonies/mL  Enterococcus faecalis     12/17/2020 No growth  Final   12/16/2020 (A)  Final    Cultured on the 1st day of incubation:  Escherichia coli ESBL  ESBL (extended beta lactamase) producing organisms require contact precautions.     12/16/2020   Final    Critical Value/Significant Value, preliminary result only, called to and read back by  RALPH BOLAÑOS RN 12.17.2020 1056 BC      12/16/2020   Final    (Note)  POSITIVE for E.COLI by Verigene multiplex nucleic acid test. Final  identification and antimicrobial susceptibility testing will be  verified by standard methods. Verigene test will not distinguish  E.coli from Shigella species including S.dysenteriae, S.flexneri,  S.boydii, and S.sonnei. Specimens containing Shigella species or  E.coli will be reported as Positive for E.coli.    POSITIVE for CTX-M Class A Extended Spectrum beta-lactamase (ESBL)  resistance marker by Verigene multiplex nucleic acid test. CTX-M  confers resistance to penicillins, cephalosporins and variable  resistance to beta-lactam beta-lactamase inhibitor combinations  (clavulanic acid and tazobactam). Best empiric antibiotic choice is  meropenem. Specific susceptibility testing will be performed.    Specimen tested with Verigene multiplex, gram-negative blood culture  nucleic acid test for the following targets: Acinetobacter sp.,  Citrobacter sp., Enterobacter sp., Proteus sp., E. coli, K.  pneumoniae/oxytoca, P. aeruginosa, and the following resistance  markers: CTXM, KPC, NDM, VIM, IMP and OXA.    Critical Value/Significant Value called to and read back by RALPH BOLAÑOS RN 12/17/20 1312 EH.       12/16/2020 No growth  Final   12/16/2020 (A)  Preliminary    >100,000 colonies/mL  Escherichia coli ESBL  ESBL (extended beta lactamase) producing organisms require contact precautions.     12/16/2020   Preliminary    Susceptibility testing requested by  Dr. Gonzales, Pager [phone]. Fosfomycin requested. @ 1637. 12.18.20. BS.      12/03/2020   Final    <10,000 colonies/mL  mixed urogenital keisha  Susceptibility testing not routinely done             Virology:  Coronavirus-19 testing    Recent Labs   Lab Test 10/02/22  0723 09/15/22  0154 11/04/21  1409 12/16/20  2237 09/26/20  1010   [id number] Negative Negative Negative NEGATIVE Not Detected   [id number]  --   --   --   --  Nasopharyngeal       Respiratory virus  (non-coronavirus-19) testing    Recent Labs   Lab Test 11/04/21  1408 12/16/20  2237   IFLUA Not Detected  --    INFZA  --  Negative   FLUAH1 Not Detected  --    [id number] Not Detected  --    FLUAH3 Not Detected  --    IFLUB Not Detected  --    INFZB  --  Negative   PIV1 Not Detected  --    PIV2 Not Detected  --    PIV3 Not Detected  --    PIV4 Not Detected  --    RSVA Not Detected  --    RSVB Not Detected  --    HMPV Not Detected  --    RHINEV Not Detected  --    ADENOV Not Detected  --    CORONA Not Detected  --        Viral loads    Recent Labs   Lab Test 12/18/20  0611 09/23/20  1653   CMVQNT CMV DNA Not Detected CMV DNA Not Detected   CMVLOG Not Calculated Not Calculated   CSPEC Plasma Plasma       BK Virus Result   Date Value Ref Range Status   09/23/2020 BK Virus DNA Not Detected BKNEG^BK Virus DNA Not Detected copies/mL Final       Imaging:  Recent Results (from the past 24 hour(s))   XR Chest Port 1 View    Narrative    Portable chest    INDICATION: Fever comment immunosuppressed, cough    COMPARISON: 10/2/2022    FINDINGS: Heart size normal. Atherosclerotic calcification of the  aortic knob. Lungs and pulmonary vasculature appear normal as do the  bony structures.      Impression    IMPRESSION: Aortic atherosclerosis    MANISHA CAAL MD         SYSTEM ID:  [id number]   US Renal Transplant with Doppler    Narrative    EXAMINATION: US RENAL TRANSPLANT,  5/1/2024 2:46 PM     COMPARISON: Transplant renal ultrasound 9/10/2021    HISTORY: Prior renal transplant, completed initially in 2010 with  subsequent retraction and re-transplanted in 2016. Currently admitted  with compensated UTI and sepsis. Evaluate for pyelonephritis.     TECHNIQUE:  Grey-scale, color Doppler and spectral flow analysis.    FINDINGS:  The transplant kidney is located in the right lower quadrant, and  measures 14.3 cm. Parenchyma is of normal thickness and echogenicity.  No focal lesions. No hydronephrosis. No perinephric fluid  collection.    Layering debris within the bladder.    Renal artery flow:   295 cm/sec peak systolic at hilum.  281 cm/sec peak systolic at anastomosis.  Arcuate artery resistive indices (upper to lower): 0.65, 0.74, 0.59    Renal Vein Flow:  40 cm/sec at hilum.   35 cm/sec at anastomosis.    Iliac artery flow:  183 cm/sec peak systolic above anastomosis.  99 cm/sec peak systolic below anastomosis.    Iliac vein flow:  Patent above and below the anastomosis.      Impression    IMPRESSION:   1. Normal doppler evaluation of the transplant kidney.  2. No hydronephrosis or perinephric fluid collection surrounding the  transplant kidney.  3. Normal grayscale appearance of the transplant kidney.    I have personally reviewed the examination and initial interpretation  and I agree with the findings.    KAT ALBARRAN MD         SYSTEM ID:  [id number]

## 2024-05-02 NOTE — PROGRESS NOTES
Pt alert and oriented x4. Denies pain. VSS. No SOB, on room air. Pt R PIV infusing Sodium bicarb and other R PIV infusing mycophenolate both tolerating well. Pt still feeling weak, need SBA to A1 on transfer for safety. Voiding well using external catheter. Will continue with plan of care.

## 2024-05-02 NOTE — PLAN OF CARE
"BP 92/47   Pulse 90   Temp 99.1  F (37.3  C) (Oral)   Resp 16   Ht 1.778 m (5' 10\")   Wt 72 kg (158 lb 11.7 oz)   SpO2 95%   BMI 22.78 kg/m       0700 - 1330  Patient alert and oriented. VS stable. Patient on room air. Patient complains of headache. PRN Tylenol given (See MAR). Patient denies nausea. Urine Output - voiding without difficulty. Bowel Function - No BM reported. Nutrition - Regular diet. PIV - LR @ 75 ml/hr. PIV - saline locked. Activity - SBA. Plan of Care - Will continue with plan of care. Plan for NM Inflammatory WBC Whole Body on Monday, May 6, 2024.    "

## 2024-05-02 NOTE — PROGRESS NOTES
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 resulting in failure as well as antibody medicated rejection of kidney transplant), followed by re-transplant of liver in 2011 and re-transplant of kidney in 2016) maintained on tacrolimus/MMF/Prednisone, splenectomy during 2013, severe scoliosis, osteopenia, CAD, and history of recurrent UTI's that is admitted with an urinary tract infection and sepsis    Changes Today:   - Continue IV Ertapenem until urine culture results   - Continue to monitor blood cultures   - Transplant ID consulted, appreciate recommendations   -> Tagged WBC scan ordered   - Transitioning patient's immunosuppression to PO from IV due to resolved nausea     Sepsis 2/2 Urinary Tract Infection  Hx of Recurrent UTI's  Hx of ESBL  Cystocele s/p pessary  Hypomagnesemia  Hypophosphatemia  Hyponatremia, likely hypovolemic  Patient presented with 2 days of increased urinary frequency followed by fevers, chills, rigors, and generalized weakness. Home fever with Tmax of 102.2, but no objective fever upon presentation after patient administered tylenol at home. Patient with fevers, tachycardia, U/A significant with orange/cloudy urine, large LE, and WBC's >182, and leukocytosis of 18.3 in the setting of immunocompromised state s/p kidney and liver transplant x2 on MMF, tacrolimus, and prednisone. The patient denies any other sick contacts, cough, pain, or missed doses of medications. Likely that patient developed a recurrent UTI leading to sepsis. The ED fluid resusciatated the patient with 2L of NS and blood pressures have been stable as  "well as started patient on IV Ertapenem due to patient's history of ESBL. CXR completed on admission without abnormalities.     Reviewing patient's chart, CT A/P during 9/22/22 without evidence of structural or anatomical abnormalities as well as cystoscopy and urodynamic studies performed during 2023 that were unremarkable for cause of recurrent UTI's. Multiple factors that patient has for recurrent UTI's includes female gender, immunocompromised status, kidney transplant recipient x2, pessary manipulation daily per patient preference, and inconsistent use of topical estrogen. The patient currently has an outpatient plan of standing orders for UA/UC when experiencing symptoms and a standing order for macrobid at local pharmacy. Patient follows with transplant ID and Dr. Ferrara with uro-gynecology. The patient was last seen by transplant ID (Dr. Gonzales) on 4/9/24. Patient did have U/A completed on 4/18/24 as below showing E. Coli ESBL and was given 3-day course of Nitrofurantoin that was completed on 4/21/24. Patient then had recurrent symptoms on 4/29/24.      Previous Urine Cultures:  4/18/24: E. Coli ESBL  4/3/24: Mixed Nu  2/5/24: E. Coli ESBL  5/16/23: Mixed Nu  In the past growth on urine culture was different pathogens including ESBL E coli, K pneumonia, E faecalis, but since 9/2022 the UTIs have been with ESBL E coli.      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  Tx: DDKT and Liver Tx  Transplant: 2/2/2016 (Kidney), 3/3/2011 (Liver), 10/9/2010 (Kidney / Liver)  Significant changes in immunosuppression: " "None  Significant transplant-related complications: CMV Viremia and Recurrent UTIs  DDKT: Stable              - Baseline Creatinine:  ~ 0.6-0.8              - Proteinuria: Minimal (0.2-0.5 grams)              - Date DSA Last Checked: Not Known      Latest DSA: Not checked recently due to time from transplant              - BK Viremia: No              - Kidney Tx Biopsy: No               -Labs every 2 months  Immunosuppression: Tacrolimus immediate release (goal 4-6), Mycophenolic acid (dose 540 mg every 12 hours) and Prednisone (dose 5 mg daily)              - Continue with intensive monitoring of immunosuppression for efficacy and toxicity.              - Changes: Not at this time   Infection Prophylaxis:   - PJP: Sulfa/TMP (Bactrim) MWF  Mineral Bone Disorder:   - Secondary renal hyperparathyroidism; PTH level: Normal (18-80 pg/ml)        On treatment: None  - Vitamin D; level: High        On supplement: No  - Calcium; level: High normal        On supplement: No  - Phosphorus; level: Normal        On supplement: No   Electrolytes:   - Potassium; level: Normal        On supplement: No  - Magnesium; level: Not checked recently        On supplement: No  - Bicarbonate; level: Normal        On supplement: No\"     Plan to consult transplant nephrology for ongoing inpatient management.   - Continue PTA Tacrolimus 0.5 mg daily              -> Tacrolimus level to be completed in the AM   - Continue PTA  mg q12h   - Continue PTA Prednisone 5 mg daily   - Treatment for UTI as above, will discuss with team if further imaging required     Liver Transplant History  S/p SLK 10/2010.  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 Re-liver transplant 3/3/2011. No allograft dysfunction: no history of rejection nor biliary strictures. No acute concerns at this time   - Repeat CMP in the AM to trend liver function     Osteopenia  - Continue PTA calcium and vitamin D supplements  - Repeat DXA " in early 2025     Non-obstructive CAD  Microvascular angina, stable  Dyslipidemia  Follows with Dr. Mohamud, last seen 12/4/2023 for microvascular angina and non-obstructive CAD. Patient continues to not take daily statin that was previously prescribed.    - Continue PTA ASA 81 mg daily    Diet: Combination Diet Regular Diet Adult    DVT Prophylaxis: Pneumatic Compression Devices  [place] Catheter: Not present  Code Status: Full Code      Disposition Plan      Expected Discharge Date: 05/03/2024,  3:00 PM      Discharge Comments: Pending urine cultures today and WBC tagged scan. Possible discharge home tomorrow        The patient's care was discussed with Dr. Lewis.    Roly Pantoja MD  [facility] [address] [place] [address]  Securely message with [employer] (more info)  Text page via [facility] Paging/Directory   See signed in provider for up to date coverage information  ______________________________________________________________________    Interval History   No acute events overnight. Patient is feeling much better this morning. No further episodes of vomiting, rigors, fevers, chills, or nausea. Able to eat breakfast and lunch without issue. No further questions or concerns at this time.     Physical Exam   Vital Signs: Temp: 99.1  F (37.3  C) Temp src: Oral BP: 92/47 Pulse: 90   Resp: 16 SpO2: 95 % O2 Device: None (Room air)    Weight: 158 lbs 11.7 oz  General Appearance:  Patient alert and calm  Respiratory: CTAB, no wheezing present  Cardiovascular: Normal rate, regular rhythm, no murmurs appreciated  GI: Soft, non-distended, no pain with palpation  Skin: No skin rashes or open wounds  Extremities: No bilateral lower extremity edema  Neuro: AAOx3, moving all 4 extremities without focal deficit    Data     I have personally reviewed the following data over the past 24 hrs:    20.6 (H)  \   14.5   / 154     136 106 15.5 /  88   4.1 20 (L) 0.96 (H) \     ALT: 11 AST:  24 AP: 61 TBILI: 0.8; 0.8   ALB: 2.9 (L) TOT PROTEIN: 5.3 (L) LIPASE: N/A     Procal: N/A CRP: N/A Lactic Acid: N/A         Imaging results reviewed over the past 24 hrs:   Recent Results (from the past 24 hour(s))   US Renal Transplant with Doppler    Narrative    EXAMINATION: US RENAL TRANSPLANT,  5/1/2024 2:46 PM     COMPARISON: Transplant renal ultrasound 9/10/2021    HISTORY: Prior renal transplant, completed initially in 2010 with  subsequent retraction and re-transplanted in 2016. Currently admitted  with compensated UTI and sepsis. Evaluate for pyelonephritis.     TECHNIQUE:  Grey-scale, color Doppler and spectral flow analysis.    FINDINGS:  The transplant kidney is located in the right lower quadrant, and  measures 14.3 cm. Parenchyma is of normal thickness and echogenicity.  No focal lesions. No hydronephrosis. No perinephric fluid collection.    Layering debris within the bladder.    Renal artery flow:   295 cm/sec peak systolic at hilum.  281 cm/sec peak systolic at anastomosis.  Arcuate artery resistive indices (upper to lower): 0.65, 0.74, 0.59    Renal Vein Flow:  40 cm/sec at hilum.   35 cm/sec at anastomosis.    Iliac artery flow:  183 cm/sec peak systolic above anastomosis.  99 cm/sec peak systolic below anastomosis.    Iliac vein flow:  Patent above and below the anastomosis.      Impression    IMPRESSION:   1. Normal doppler evaluation of the transplant kidney.  2. No hydronephrosis or perinephric fluid collection surrounding the  transplant kidney.  3. Normal grayscale appearance of the transplant kidney.    I have personally reviewed the examination and initial interpretation  and I agree with the findings.    KAT ALBARRAN MD         SYSTEM ID:  [id number]

## 2024-05-02 NOTE — PROGRESS NOTES
[facility]   Transplant Nephrology Progress Note  Date of Admission:  5/1/2024  Today's Date: 05/02/2024      Recommendations:  - Change tacrolimus from suspension to capsule   - Change  MMF IV to mycophenolic acid 360 mg BID   - Change methylprednisolone 4 mg IV to  prednisone 5 mg po daily  - Antibiotics per Transplant Infectious Disease.      Assessment & Plan     # DDKT: Stable serum creatinine.   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  Patient underwent a second liver transplant 3/3/11.  She appears to be doing well with normal LFTs.  Followed by Hepatology.     # Immunosuppression Prior to Admission: Tacrolimus immediate release (goal 4-6), Mycophenolic acid (dose 540 mg every 12 hours), and Prednisone (dose 5 mg daily)   - Present Immunosuppression: Sublingual Tacrolimus immediate release (goal 4-6), Mycophenolate mofetil (dose 500 mg IV BID), and Methylprednisolone (dose 4 mg daily)   - Patient is in an immunosuppressed state and will continue to monitor for efficacy and toxicity of immunosuppression medications.   - Changes: Yes - Tacrolimus changed to suspension due to nausea.  Reduce mycophenolic acid 540 mg BID to  mg IV BID while having nausea.  Change prednisone 5 mg daily to methylprednisolone 4 mg IV daily      # Infection Prophylaxis:   - PJP: Sulfa/TMP (Bactrim) MWF    # Hx of Recurrent UTI  # ESBL UTI: presented 05/01/24 with one day of N/V, fever, body aches, HA, weakness and lightheadedness.  UA shows large leukocyte esterase and > 182 WBCs.  UC 4/18 positive for ESBL  E. Coli.  UC 4/18 positive for ESBL E. Coli.    - ertapenem 05/01/24 - present   - Reduce mycophenolic acid 360 mg BID   - Antibiotics per Transplant Infectious Disease.       5/1/2024   Urine Culture In process    5/1/2024   Blood Culture Peripheral Blood In process    5/1/2024   Blood Culture Peripheral Blood In process       # PKD:   -Seen by txp surgery. Risks outweigh benefits for native nephrectomy    #Orthostatic hypotension: Controlled;  Goal BP: < 130/80   - Changes: Not at this time.     # Post-Transplant Erythrocytosis: Hgb: Stable, high;  On ACEI/ARB: No   Imaging: Not at this time    # Mineral Bone Disorder:   - Secondary renal hyperparathyroidism; PTH level: Not checked recently        On treatment: None  - Vitamin D; level: Not checked recently         On supplement: No  - Calcium; level: Normal            On supplement: Yes, Oscal 500-5 mg-mcg PO BID  - Phosphorus; level: Low (trend for now)         On supplement: No     # Electrolytes:   - Potassium; level: Normal         On supplement: No  - Magnesium; level: Low   On supplement: No, agree with replacing with magnesium sulfate  - Bicarbonate; level: Low         On supplement: No. Give sodium bicarbonate 75 mEq in 500 ml D5W IV  - Sodium; level: Low    # Hyponatremia: likely secondary to nausea and vomiting.    # HSIL:    -per cone biopsy on 9/30/20. No evidence of dysplasia. Most recent coloposcopy 4/25/23 with negative biopsies. Plan for repeat colposcopy or Pap in ~10/2023    # Microvascular angina:   -Follows with cardiology, Dr. Mohamud.    -She refuses statin   -Uses SL angina a few times per year    # Cystocele:              -Has pessary. 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    Seen and discussed with Dr. Hampton.    Holland Mays MD  Pager: [phone]    This patient has been seen and evaluated by me, Getachew Hampton MD.  I have reviewed the note and agree with plan of care as documented by the fellow.    Getachew Hampton MD     Interval History   Ms. Awad creatinine is 0.96 (05/02 0538); Stable. Reported feeling better today. No fever, chills or sweats. No abdominal pain or flank pain. No nausea or vomiting.Has good amount of urine.  Vitals - stable with borderline BP.  Lab : Blood  cx - no growth so far,Urine Cx - Multiple morphotypes present with no predominant organism.  Growth consistent with probable contamination during collection     Review of Systems   4 point ROS was obtained and negative except as noted in the Interval History.    MEDICATIONS:  Current Facility-Administered Medications   Medication Dose Route Frequency Provider Last Rate Last Admin    aspirin (ASA) chewable tablet 81 mg  81 mg Oral QPM Roly Pantoja MD   81 mg at 05/01/24 2027    calcium carbonate-vitamin D (OSCAL) 500-5 MG-MCG per tablet 1 tablet  1 tablet Oral BID Roly Pantoja MD   1 tablet at 05/02/24 1221    ertapenem (INVanz) 1 g vial to attach to  mL bag  1 g Intravenous Q24H Roly Pantoja MD 0 mL/hr at 05/01/24 1005 1 g at 05/02/24 0908    mycophenolic acid (GENERIC EQUIVALENT) EC tablet 540 mg  540 mg Oral BID IS Roly Pantoja MD        [START ON 5/3/2024] predniSONE (DELTASONE) tablet 5 mg  5 mg Oral Daily Roly Pantoja MD        sodium chloride (PF) 0.9% PF flush 3 mL  3 mL Intracatheter Q8H Roly Pantoja MD   3 mL at 05/01/24 1833    [START ON 5/3/2024] sulfamethoxazole-trimethoprim (BACTRIM) 400-80 MG per tablet 1 tablet  1 tablet Oral Once per day on Monday Wednesday Friday Roly Pantoja MD        tacrolimus (GENERIC EQUIVALENT) capsule 1 mg  1 mg Oral QPM Roly Pantoja MD         "[START ON 5/3/2024] tacrolimus (GENERIC EQUIVALENT) capsule 1.5 mg  1.5 mg Oral Roly Torres MD         Current Facility-Administered Medications   Medication Dose Route Frequency Provider Last Rate Last Admin       Physical Exam   Temp  Av.8  F (37.1  C)  Min: 97.5  F (36.4  C)  Max: 99.9  F (37.7  C)      Pulse  Av.4  Min: 80  Max: 120 Resp  Av  Min: 16  Max: 22  SpO2  Av.8 %  Min: 90 %  Max: 100 %     BP 91/50   Pulse 88   Temp 98.6  F (37  C) (Oral)   Resp 16   Ht 1.778 m (5' 10\")   Wt 72 kg (158 lb 11.7 oz)   SpO2 96%   BMI 22.78 kg/m     Date 24 07 - 24 0659   Shift 6698-9843 5618-4157 0500-9796 24 Hour Total   INTAKE   P.O. 240   240   Shift Total(mL/kg) 240(3.33)   240(3.33)   OUTPUT   Urine 500   500   Shift Total(mL/kg) 500(6.94)   500(6.94)   Weight (kg) 72 72 72 72      Admit Weight: 72 kg (158 lb 11.7 oz)     GENERAL APPEARANCE: moderate distress  RESP: lungs clear to auscultation - no rales, rhonchi or wheezes  CV: regular rhythm, normal rate, no rub, no murmur  EDEMA: no LE edema bilaterally  ABDOMEN: soft, nondistended, nontender, bowel sounds normal  MS: extremities normal - no gross deformities noted, no evidence of inflammation in joints, no muscle tenderness  SKIN: no rash  PSYCH: fatigued  DIALYSIS ACCESS:  LUE AV fistula +bruit/+thrill    Data   All labs reviewed by me.  CMP  Recent Labs   Lab 24  0538 24  1828 24  0845     --  130*   POTASSIUM 4.1  --  3.9   CHLORIDE 106  --  101   CO2 20*  --  17*   ANIONGAP 10  --  12   GLC 88  --  104*   BUN 15.5  --  18.0   CR 0.96*  --  0.89   GFRESTIMATED 66  --  72   EDSON 8.8  --  9.5   MAG 2.7* 1.9 1.5*   PHOS 2.5  --  2.2*   PROTTOTAL 5.3*  --  6.5   ALBUMIN 2.9*  --  3.8   BILITOTAL 0.8  0.8  --  1.4*  1.4*   ALKPHOS 61  --  57   AST 24  --  20   ALT 11  --  12     CBC  Recent Labs   Lab 24  0538 24  0845   HGB 14.5 15.8*   WBC 20.6* 18.3*   RBC 4.71 5.15   HCT 43.8 " 46.5   MCV 93 90   MCH 30.8 30.7   MCHC 33.1 34.0   RDW 15.1* 14.7    194     INRNo lab results found in last 7 days.  ABG  Recent Labs   Lab 05/01/24  0855   O2PER 20      Urine Studies  Recent Labs   Lab Test 05/01/24  0852 04/18/24  1112 04/03/24  1302 02/05/24  1328 05/16/23  0805   COLOR Orange* Yellow Yellow Yellow Yellow   APPEARANCE Cloudy* Cloudy* Clear Slightly Cloudy* Slightly Cloudy*   URINEGLC Negative Negative Negative Negative Negative   URINEBILI Negative Negative Negative Negative Negative   URINEKETONE Negative Negative Negative Negative Negative   SG 1.013 1.015 1.010 1.010 1.010   UBLD Moderate* Small* Small* Trace* Small*   URINEPH 7.0 7.0 7.0 6.5 7.0   PROTEIN 300* 100* Trace* Negative Negative   UROBILINOGEN  --  0.2 0.2 0.2 0.2   NITRITE Negative Positive* Negative Negative Negative   LEUKEST Large* Large* Large* Large* Large*   RBCU 54* 0-2 2-5* 0-2 None Seen   WBCU >182* * 0-5 25-50* 5-10*     Recent Labs   Lab Test 06/04/21  1130 09/10/20  1037   UTPG 0.34* 0.16     PTH  Recent Labs   Lab Test 02/10/22  0802 09/23/20  1653   PTHI 66 45     Iron Studies  No lab results found.    IMAGING:  All imaging studies reviewed by me.

## 2024-05-02 NOTE — PLAN OF CARE
"BP 91/50   Pulse 88   Temp 98.6  F (37  C) (Oral)   Resp 16   Ht 1.778 m (5' 10\")   Wt 72 kg (158 lb 11.7 oz)   SpO2 96%   BMI 22.78 kg/m      Shift: 8906-2981  Isolation Status: Contact for ESBL  VS: Stable on room air, afebrile  Neuro: Aox4  Behaviors: calm, cooperative  BG: none  Labs: RN managed Mag and Phos. Phos replacement ordered and administered. Redraw labs are scheduled. Mag=2.7, Phos=2.5  Respiratory: WDL  Cardiac: WDL  Pain/Nausea: slight headache this morning but now resolved. Denies current pain and nausea.   PRN: None administered   Diet: Regular with poor appetite   IV Access: Right PIV x2  Infusion(s): none  Lines/Drains: Right PIV x2  GI/: Last BM 4/30, Voiding  Skin: Kidney and Liver transplant scars on abdomen  Mobility: SBA  Plan: Nuclear Medicine to perform Inflammatory WBC Whole Body on Monday, May 6, 2024.      "

## 2024-05-02 NOTE — PLAN OF CARE
"BP 91/48   Pulse 82   Temp 99.5  F (37.5  C) (Oral)   Resp 16   Ht 1.778 m (5' 10\")   Wt 72 kg (158 lb 11.7 oz)   SpO2 98%   BMI 22.78 kg/m   Tmax-99.5. BP soft. Pt states is is her baseline to have lower BP's. OVSS on RA. Patient c/o headache pain at the start of the shift and received Prn Tylenol with relief. Patient denies nausea. Urine Output - 350 ml clear yellow via purewick. Bowel Function - No BM this shift. Pt stated she has not had a BM since Monday. Nutrition - regular diet ordered. Pt had a popsicle. Activity - pt did not get OOB this shift.                           "

## 2024-05-03 ENCOUNTER — HOME INFUSION (PRE-WILLOW HOME INFUSION) (OUTPATIENT)
Dept: PHARMACY | Facility: CLINIC | Age: 64
End: 2024-05-03
Payer: MEDICARE

## 2024-05-03 LAB
ALBUMIN SERPL BCG-MCNC: 2.9 G/DL (ref 3.5–5.2)
ALP SERPL-CCNC: 62 U/L (ref 40–150)
ALT SERPL W P-5'-P-CCNC: 9 U/L (ref 0–50)
ANION GAP SERPL CALCULATED.3IONS-SCNC: 8 MMOL/L (ref 7–15)
AST SERPL W P-5'-P-CCNC: 14 U/L (ref 0–45)
BASOPHILS # BLD AUTO: 0 10E3/UL (ref 0–0.2)
BASOPHILS NFR BLD AUTO: 0 %
BILIRUB SERPL-MCNC: 0.6 MG/DL
BUN SERPL-MCNC: 15.7 MG/DL (ref 8–23)
CALCIUM SERPL-MCNC: 9.8 MG/DL (ref 8.8–10.2)
CHLORIDE SERPL-SCNC: 108 MMOL/L (ref 98–107)
CREAT SERPL-MCNC: 0.81 MG/DL (ref 0.51–0.95)
CRP SERPL-MCNC: 153 MG/L
DEPRECATED HCO3 PLAS-SCNC: 21 MMOL/L (ref 22–29)
EGFRCR SERPLBLD CKD-EPI 2021: 81 ML/MIN/1.73M2
EOSINOPHIL # BLD AUTO: 0.1 10E3/UL (ref 0–0.7)
EOSINOPHIL NFR BLD AUTO: 1 %
ERYTHROCYTE [DISTWIDTH] IN BLOOD BY AUTOMATED COUNT: 14.9 % (ref 10–15)
GLUCOSE SERPL-MCNC: 95 MG/DL (ref 70–99)
HCT VFR BLD AUTO: 41.1 % (ref 35–47)
HGB BLD-MCNC: 13.6 G/DL (ref 11.7–15.7)
IMM GRANULOCYTES # BLD: 0.2 10E3/UL
IMM GRANULOCYTES NFR BLD: 1 %
LYMPHOCYTES # BLD AUTO: 1.2 10E3/UL (ref 0.8–5.3)
LYMPHOCYTES NFR BLD AUTO: 7 %
MAGNESIUM SERPL-MCNC: 1.6 MG/DL (ref 1.7–2.3)
MCH RBC QN AUTO: 30.3 PG (ref 26.5–33)
MCHC RBC AUTO-ENTMCNC: 33.1 G/DL (ref 31.5–36.5)
MCV RBC AUTO: 92 FL (ref 78–100)
MONOCYTES # BLD AUTO: 0.7 10E3/UL (ref 0–1.3)
MONOCYTES NFR BLD AUTO: 5 %
NEUTROPHILS # BLD AUTO: 13.9 10E3/UL (ref 1.6–8.3)
NEUTROPHILS NFR BLD AUTO: 86 %
NRBC # BLD AUTO: 0 10E3/UL
NRBC BLD AUTO-RTO: 0 /100
PHOSPHATE SERPL-MCNC: 1.7 MG/DL (ref 2.5–4.5)
PHOSPHATE SERPL-MCNC: 2.3 MG/DL (ref 2.5–4.5)
PLATELET # BLD AUTO: 144 10E3/UL (ref 150–450)
POTASSIUM SERPL-SCNC: 4.1 MMOL/L (ref 3.4–5.3)
PROT SERPL-MCNC: 5.4 G/DL (ref 6.4–8.3)
RBC # BLD AUTO: 4.49 10E6/UL (ref 3.8–5.2)
SODIUM SERPL-SCNC: 137 MMOL/L (ref 135–145)
WBC # BLD AUTO: 16.1 10E3/UL (ref 4–11)

## 2024-05-03 PROCEDURE — 99233 SBSQ HOSP IP/OBS HIGH 50: CPT | Mod: GC | Performed by: INTERNAL MEDICINE

## 2024-05-03 PROCEDURE — 250N000012 HC RX MED GY IP 250 OP 636 PS 637

## 2024-05-03 PROCEDURE — 80053 COMPREHEN METABOLIC PANEL: CPT

## 2024-05-03 PROCEDURE — 999N000248 HC STATISTIC IV INSERT WITH US BY RN

## 2024-05-03 PROCEDURE — 120N000005 HC R&B MS OVERFLOW UMMC

## 2024-05-03 PROCEDURE — 36415 COLL VENOUS BLD VENIPUNCTURE: CPT

## 2024-05-03 PROCEDURE — 85049 AUTOMATED PLATELET COUNT: CPT

## 2024-05-03 PROCEDURE — 84100 ASSAY OF PHOSPHORUS: CPT | Performed by: STUDENT IN AN ORGANIZED HEALTH CARE EDUCATION/TRAINING PROGRAM

## 2024-05-03 PROCEDURE — 83735 ASSAY OF MAGNESIUM: CPT | Performed by: STUDENT IN AN ORGANIZED HEALTH CARE EDUCATION/TRAINING PROGRAM

## 2024-05-03 PROCEDURE — 99232 SBSQ HOSP IP/OBS MODERATE 35: CPT | Mod: GC | Performed by: STUDENT IN AN ORGANIZED HEALTH CARE EDUCATION/TRAINING PROGRAM

## 2024-05-03 PROCEDURE — 250N000012 HC RX MED GY IP 250 OP 636 PS 637: Performed by: INTERNAL MEDICINE

## 2024-05-03 PROCEDURE — 86140 C-REACTIVE PROTEIN: CPT | Performed by: INTERNAL MEDICINE

## 2024-05-03 PROCEDURE — 250N000013 HC RX MED GY IP 250 OP 250 PS 637

## 2024-05-03 PROCEDURE — 250N000011 HC RX IP 250 OP 636: Mod: JZ

## 2024-05-03 PROCEDURE — 84100 ASSAY OF PHOSPHORUS: CPT

## 2024-05-03 PROCEDURE — 99233 SBSQ HOSP IP/OBS HIGH 50: CPT | Performed by: INTERNAL MEDICINE

## 2024-05-03 RX ORDER — ATORVASTATIN CALCIUM 10 MG/1
10 TABLET, FILM COATED ORAL EVERY EVENING
Status: DISCONTINUED | OUTPATIENT
Start: 2024-05-03 | End: 2024-05-07 | Stop reason: HOSPADM

## 2024-05-03 RX ORDER — MAGNESIUM OXIDE 400 MG/1
400 TABLET ORAL EVERY 4 HOURS
Qty: 2 TABLET | Refills: 0 | Status: COMPLETED | OUTPATIENT
Start: 2024-05-03 | End: 2024-05-03

## 2024-05-03 RX ORDER — MYCOPHENOLIC ACID 360 MG/1
360 TABLET, DELAYED RELEASE ORAL
Status: DISCONTINUED | OUTPATIENT
Start: 2024-05-03 | End: 2024-05-07 | Stop reason: HOSPADM

## 2024-05-03 RX ADMIN — TACROLIMUS 1 MG: 1 CAPSULE ORAL at 17:51

## 2024-05-03 RX ADMIN — Medication 1 TABLET: at 17:51

## 2024-05-03 RX ADMIN — ACETAMINOPHEN 975 MG: 325 TABLET, FILM COATED ORAL at 07:05

## 2024-05-03 RX ADMIN — ASPIRIN 81 MG CHEWABLE TABLET 81 MG: 81 TABLET CHEWABLE at 20:19

## 2024-05-03 RX ADMIN — POTASSIUM & SODIUM PHOSPHATES POWDER PACK 280-160-250 MG 2 PACKET: 280-160-250 PACK at 12:52

## 2024-05-03 RX ADMIN — PREDNISONE 5 MG: 5 TABLET ORAL at 08:01

## 2024-05-03 RX ADMIN — MAGNESIUM OXIDE TAB 400 MG (241.3 MG ELEMENTAL MG) 400 MG: 400 (241.3 MG) TAB at 15:05

## 2024-05-03 RX ADMIN — POTASSIUM & SODIUM PHOSPHATES POWDER PACK 280-160-250 MG 2 PACKET: 280-160-250 PACK at 08:01

## 2024-05-03 RX ADMIN — Medication 1 TABLET: at 12:52

## 2024-05-03 RX ADMIN — POTASSIUM & SODIUM PHOSPHATES POWDER PACK 280-160-250 MG 2 PACKET: 280-160-250 PACK at 15:48

## 2024-05-03 RX ADMIN — MYCOPHENOLIC ACID 360 MG: 360 TABLET, DELAYED RELEASE ORAL at 17:51

## 2024-05-03 RX ADMIN — MYCOPHENOLIC ACID 540 MG: 360 TABLET, DELAYED RELEASE ORAL at 08:01

## 2024-05-03 RX ADMIN — TACROLIMUS 1.5 MG: 1 CAPSULE ORAL at 08:01

## 2024-05-03 RX ADMIN — MAGNESIUM OXIDE TAB 400 MG (241.3 MG ELEMENTAL MG) 400 MG: 400 (241.3 MG) TAB at 09:37

## 2024-05-03 RX ADMIN — ERTAPENEM SODIUM 1 G: 1 INJECTION, POWDER, LYOPHILIZED, FOR SOLUTION INTRAMUSCULAR; INTRAVENOUS at 10:24

## 2024-05-03 RX ADMIN — SULFAMETHOXAZOLE AND TRIMETHOPRIM 1 TABLET: 400; 80 TABLET ORAL at 08:01

## 2024-05-03 RX ADMIN — ATORVASTATIN CALCIUM 10 MG: 10 TABLET, FILM COATED ORAL at 20:19

## 2024-05-03 ASSESSMENT — ACTIVITIES OF DAILY LIVING (ADL)
ADLS_ACUITY_SCORE: 26

## 2024-05-03 NOTE — PROGRESS NOTES
"/55   Pulse 77   Temp 97.8  F (36.6  C) (Oral)   Resp 17   Ht 1.778 m (5' 10\")   Wt 72 kg (158 lb 11.7 oz)   SpO2 95%   BMI 22.78 kg/m      Shift: 4819-2088  Isolation Status: Contact, ESBL  VS: VSS on room air, afebrile  Neuro: Aox4  Behaviors: Calm and cooperative  BG: None  Labs: Creat 0.81, Mg 1.6, Phos 2.3, K 4.1  Respiratory: WDL  Cardiac: WDL  Pain/Nausea: Denies pain. Denies nausea.  PRN: None given  Diet: Regular  IV Access: R PIV  GI/: Voiding, LBM 5/3  Skin: WDL  Mobility: UAL  Plan: Continue with POC.  "

## 2024-05-03 NOTE — PROGRESS NOTES
Therapy: IV ABX  Insurance: Medicare, secondary  for Life   Ded: $150  Met: $0    Co-Insurance: 75/25  Max Out of Pocket: $3000  Met: $160    Pt's primary Medicare plan unfortunately does not cover IV ABX. However, pt's secondary  for life plan will pickup coverage. Pt's  plan has a $150 deductible (no accumulations) that'll cover 75/25 once fulfilled and $160/$3000 out of pocket. Pt will be covered at 100% once full satisfied.    [facility] cannot bill for nursing and an outside agency must be utilized for nursing coverage.     In reference to referral from [facility] on pt admitted 05/01/24 to check for IV ABX coverage.    Please contact Intake with any questions, [phone] or In Basket pool,  Home [facility]fusion ([phone]).

## 2024-05-03 NOTE — PROGRESS NOTES
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 coli   - Continue to monitor blood cultures   - Transplant ID consulted, appreciate recommendations   -> Tagged WBC scan ordered, scheduled for Monday 5/6/24     E. Coli Urinary Tract Infection  Hx of Recurrent UTI's  Hx of ESBL  Cystocele s/p pessary  Hypomagnesemia - resolving  Hypophosphatemia - resolving  Hyponatremia - resolved  Patient presented with 2 days of increased urinary frequency followed by fevers, chills, rigors, and generalized weakness. Likely that patient developed a recurrent UTI leading to sepsis. The patient was started on IV Ertapenem due to patient's history of ESBL. CXR completed on admission without abnormalities. Reviewing patient's chart, CT A/P during 9/22/22 without evidence of structural or anatomical abnormalities as well as cystoscopy and urodynamic studies performed during 2023 that were unremarkable for cause of recurrent UTI's. Multiple factors that patient has for recurrent UTI's includes female gender, immunocompromised status, kidney transplant recipient x2, pessary manipulation daily per patient preference, and  "inconsistent use of topical estrogen. Transplant infectious disease was consulted during admission with plan as below for treatment of UTI:   - Continue IV Ertapenem until urine culture results with sensitivities for found E. 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 Re-liver transplant 3/3/2011. No allograft dysfunction: no history of rejection nor biliary strictures. No acute concerns at this time   - Repeat CMP in the AM to trend liver function     Osteopenia  - Continue PTA calcium and vitamin D supplements  - Repeat DXA in early 2025     Non-obstructive CAD  Microvascular angina, stable  Dyslipidemia  Follows with Dr. Mohamud, last seen 12/4/2023 for microvascular angina and non-obstructive CAD. Patient continues to not take daily statin that was previously prescribed.    - Continue PTA ASA 81 mg daily    Diet: Combination Diet Regular Diet Adult    DVT Prophylaxis: Pneumatic Compression " Devices  [place] Catheter: Not present  Code Status: Full Code      Disposition Plan      Expected Discharge Date: 05/07/2024,  3:00 PM    Destination: home with family  Discharge Comments: Pending discussion with transplant ID and full results of urine culture to determine antibiotic therapy. Likely that patient will discharge this afternoon, if not would be tomorrow.        The patient's care was discussed with Dr. Lewis.    Roly Pantoja MD  Medicine Service, [facility] TEAM [address] [place] [address]  Securely message with Vocera (more info)  Text page via [facility] Paging/Directory   See signed in provider for up to date coverage information  ______________________________________________________________________    Interval History   No acute events overnight. Patient is feeling much better this morning. No further episodes of vomiting, rigors, fevers, chills, or nausea. Able to eat breakfast and lunch without issue. No further questions or concerns at this time.     Physical Exam   Vital Signs: Temp: 97.8  F (36.6  C) Temp src: Oral BP: 103/55 Pulse: 77   Resp: 17 SpO2: 95 % O2 Device: None (Room air)    Weight: 158 lbs 11.7 oz  General Appearance:  Patient alert and calm  Respiratory: CTAB, no wheezing present  Cardiovascular: Normal rate, regular rhythm, no murmurs appreciated  GI: Soft, non-distended, no pain with palpation  Skin: No skin rashes or open wounds  Extremities: No bilateral lower extremity edema  Neuro: AAOx3, moving all 4 extremities without focal deficit    Data     I have personally reviewed the following data over the past 24 hrs:    16.1 (H)  \   13.6   / 144 (L)     137 108 (H) 15.7 /  95   4.1 21 (L) 0.81 \     ALT: 9 AST: 14 AP: 62 TBILI: 0.6   ALB: 2.9 (L) TOT PROTEIN: 5.4 (L) LIPASE: N/A     Procal: N/A CRP: 153.00 (H) Lactic Acid: N/A         Imaging results reviewed over the past 24 hrs:   No results found for this or any previous visit (from the past 24  hour(s)).

## 2024-05-03 NOTE — PROGRESS NOTES
[facility]  Transplant Infectious Disease Progress Note      Patient:  Belen Sharma, Date of birth 1960, Medical record number [id number]  Date of Visit:  05/03/2024         Assessment and Recommendations:   Recommendations:  - Continue ertapenem for UTI coverage for now. Expect that if she is hospitalized through afternoon of 5/7/2024 (5/6/2024 Indium scan will take images at 24 hours), then she could be discharged with fosfomycin to complete a 14-d treatment course (although that depends on result of scan).  - Continue bactrim for Pneumocystis prophylaxis.   - Await pending 5/1/2024 BCx, E coli sensitivities on 5/1/2024 UCx, and 5/7/2024 99-in-czyvkyu of 5/6/2024 Indium WBC scan.    Transplant ID will continue to follow this patient with you, for the most part on weekdays. If there is no note over the weekend and you have questions, please call. Dr Main Nguyen (staff) is covering the service starting 5/4/2024.    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   Currently on TAC/MMF/prednisone. She has recurrent UTIs.   Infectious Disease issues include:  - Elevated WBC with recent fever at home. UCx with E coli 4/18/2024 & 2/5/2024. 5/1/2024 UA with ++ pyuria, as well as some hematuria. 5/1/2024 BCx neg. By starting on IV ertapenem, her fever is under control, but her WBC (20.6K) is about the same as yesterday (18.3K). On exam, she does not have tenderness over the RLQ transplanted kidney, and she also does not have CVA tenderness over her native polycystic kidneys. Indium tagged WBC scan scheduled for 5/6/2024 to determine if one of her native polycystic kidneys has a nidus of E coli infection.   - Recurrent UTI. In the past was with different pathogens including ESBL E coli, K  pneumonia, E faecalis and sometimes with negative urine and blood cx. Since 9/2022 the UTIs have been with ESBL E coli. CT a/p 9/22/22 without evidence of structural or anatomical abnormality. One risk factor is uterine prolapse managed with a pessary. 2/2/2023 Cystoscopy and urodynamic studies were done on 2/2/23, both were unremarkable. Bilateral ureteral orifices were noted in the normal orthotopic position. Her transplant UO was not easily seen but brisk clear efflux was noted. VUDS with normal bladder capacity of 580 mL, with normal compliance, without DO/DOI/USI. She did not have VUReflux on fluoroscopy.  - Hx of C diff.  - Hx of + high risk HPV testing in [place], 5/15/2020, s/p colposcopies  - Splenectomy status, with a large splenule remaining   - QTc interval: 408 msec on 11/27/2023 EKG  - Bacterial coverage: ertapenem  - Pneumocystis prophylaxis: bactrim  - Serostatus & viral prophylaxis: CMV+, EBV+; no prophy  - Fungal prophylaxis: none  - Immunization status: up to date with seasonal vaccines (flu, covid) & RSV  - Gamma globulin status: replete  - Isolation status: Good hand hygiene. She is in contact isolation for hx of ESBL bacteria.   - Code status: Full Code    Ying Michaels MD   Pager [phone]         Interim History:   Since Belen was last seen by ID on 5/2/2024, she feels better again. Phos replacement administered. Poor appetite. Nuclear Medicine to perform inflammatory WBC whole body can on Monday, 5/6/2024. Since this is a test that has a 24-hour reading, expect the earliest she could be discharged is Tuesday afternoon. Expectations of the nuclear medicine scan outlined in detail with Belen and her  at the bedside.     Transplants:  2/2/2016 (Kidney), 3/3/2011 (Liver), 10/9/2010 (Kidney / Liver), Postoperative day:  4810 (Liver), 3013 (Kidney).  Coordinator Tanika Daniels    Review of Systems:  CONSTITUTIONAL:  No fever  EYES: negative for icterus or acute vision  changes  ENT:  negative for acute hearing loss, tinnitus, sore throat  RESPIRATORY:  negative for cough, sputum or dyspnea  CARDIOVASCULAR:  negative for chest pain, palpitations  GASTROINTESTINAL:  no diarrhea or constipation  GENITOURINARY:  negative for dysuria.  HEME:  No easy bruising or bleeding  INTEGUMENT:  negative for rash or pruritus  NEURO:  Minor headache          Current Medications & Allergies:     Current Facility-Administered Medications   Medication Dose Route Frequency Provider Last Rate Last Admin    aspirin (ASA) chewable tablet 81 mg  81 mg Oral QPM Roly Pantoja MD   81 mg at 05/02/24 1958    calcium carbonate-vitamin D (OSCAL) 500-5 MG-MCG per tablet 1 tablet  1 tablet Oral BID Roly Pantoja MD   1 tablet at 05/03/24 1252    ertapenem (INVanz) 1 g vial to attach to  mL bag  1 g Intravenous Q24H Roly Pantoja MD 0 mL/hr at 05/01/24 1005 1 g at 05/03/24 1024    magnesium oxide (MAG-OX) tablet 400 mg  400 mg Oral Q4H Roly Pantoja MD   400 mg at 05/03/24 0937    mycophenolic acid (GENERIC EQUIVALENT) EC tablet 360 mg  360 mg Oral BID IS Getachew Ross MD        potassium & sodium phosphates (NEUTRA-PHOS) Packet 2 packet  2 packet Oral or Feeding Tube Q4H Roly Pantoja MD   2 packet at 05/03/24 1252    predniSONE (DELTASONE) tablet 5 mg  5 mg Oral Daily Roly Pantoja MD   5 mg at 05/03/24 0801    sodium chloride (PF) 0.9% PF flush 3 mL  3 mL Intracatheter Q8H Roly Pantoja MD   3 mL at 05/03/24 1035    sulfamethoxazole-trimethoprim (BACTRIM) 400-80 MG per tablet 1 tablet  1 tablet Oral Once per day on Monday Wednesday Friday Roly Pantoja MD   1 tablet at 05/03/24 0801    tacrolimus (GENERIC EQUIVALENT) capsule 1 mg  1 mg Oral QPM Roly Pantoja MD   1 mg at 05/02/24 1848    tacrolimus (GENERIC EQUIVALENT) capsule 1.5 mg  1.5 mg Oral QAM Roly Pantoja MD   1.5 mg at 05/03/24 0801       Infusions/Drips:    Current Facility-Administered Medications   Medication Dose Route Frequency  Provider Last Rate Last Admin       Allergies   Allergen Reactions    Ibuprofen      Due to liver transplant            Physical Exam:   Patient Vitals for the past 24 hrs:   BP Temp Temp src Pulse Resp SpO2   05/03/24 1353 93/47 97.8  F (36.6  C) Oral -- 17 94 %   05/03/24 0519 105/45 98.7  F (37.1  C) Oral 74 17 95 %   05/02/24 2202 100/41 98.9  F (37.2  C) Oral 77 16 98 %     Ranges for vital signs:  Temp:  [97.8  F (36.6  C)-98.9  F (37.2  C)] 97.8  F (36.6  C)  Pulse:  [74-77] 74  Resp:  [16-17] 17  BP: ()/(41-47) 93/47  SpO2:  [94 %-98 %] 94 %  Vitals:    05/01/24 1000   Weight: 72 kg (158 lb 11.7 oz)       Physical Examination:  GENERAL:  well-developed, well-nourished woman, resting in bed, in no acute resp distress. She again looks better than yesterday.   HEAD:  Head is normocephalic, atraumatic   EYES:  Eyes have anicteric sclerae   ENT:  Oropharynx is dry without exudates or ulcers. Tongue is midline  NECK:  Supple.   ABDOMEN:  nontender over the RLQ transplanted kidney. Has no CVAT at either native kidney site.   SKIN:  No acute rashes. PIV x 2 in place without any surrounding erythema or exudate. L AV fistula not otherwise examined.  NEUROLOGIC:  Grossly nonfocal. Active x4 extremities         Laboratory Data:     Inflammatory & Autoimmune Markers    Recent Labs   Lab Test 05/02/24  0538 05/01/24  0845 10/05/22  0648 10/04/22  0615 09/14/22  2220 11/04/21  1457 12/28/20  1515   SED 14  --   --   --  8 14 8   CRP  --   --   --   --   --  39.3* 13.0*   CRPI  --  63.20* 20.60*   < > 21.60*  --   --     < > = values in this interval not displayed.       Immune Globulin Studies    Recent Labs   Lab Test 05/01/24  0846   IGG 1,025   IGM 52          Metabolic Studies       Recent Labs   Lab Test 05/03/24  0621 05/02/24  0538 05/01/24  1828 05/01/24  0845 10/03/22  0935 10/02/22  0628    136  --  130*   < > 135*   POTASSIUM 4.1 4.1  --  3.9   < > 3.9   CHLORIDE 108* 106  --  101   < > 103    CO2 21* 20*  --  17*   < > 23   ANIONGAP 8 10  --  12   < > 9   BUN 15.7 15.5  --  18.0   < > 17.8   CR 0.81 0.96*  --  0.89   < > 0.78   GFRESTIMATED 81 66  --  72   < > 85   GLC 95 88  --  104*   < > 92   EDSON 9.8 8.8  --  9.5   < > 9.5   PHOS 1.7* 2.5  --  2.2*   < >  --    MAG 1.6* 2.7*   < > 1.5*  --   --    LACT  --   --   --  1.3  --  0.8   PCAL  --   --   --  1.92*   < >  --     < > = values in this interval not displayed.       Hepatic Studies    Recent Labs   Lab Test 05/03/24  0621 05/02/24  0538   BILITOTAL 0.6 0.8  0.8   DBIL  --  0.27   ALKPHOS 62 61   PROTTOTAL 5.4* 5.3*   ALBUMIN 2.9* 2.9*   AST 14 24   ALT 9 11       Pancreatitis testing    Recent Labs   Lab Test 12/18/23  0806   TRIG 124       Hematology Studies   Recent Labs   Lab Test 05/03/24  0621 05/02/24  0538 05/01/24  0845 04/18/24  1008 03/11/21  1007 12/28/20  1515   WBC 16.1* 20.6* 18.3* 12.5*   < > 9.2   ANEU  --   --  17.6*  --   --  6.4   ANEUTAUTO 13.9* 18.4*  --   --    < >  --    ALYM  --   --  0.5*  --   --  2.1   ALYMPAUTO 1.2 1.0  --   --    < >  --    PABLO  --   --  0.2  --   --  0.6   AMONOAUTO 0.7 0.5  --   --    < >  --    AEOS  --   --  0.0  --   --  0.0   AEOSAUTO 0.1 0.1  --   --    < >  --    ABSBASO 0.0 0.1  --   --    < >  --    HGB 13.6 14.5 15.8* 15.4   < > 16.6*   HCT 41.1 43.8 46.5 47.0   < > 50.8*   * 154 194 196   < > 308    < > = values in this interval not displayed.       Blood Gas Testing    Recent Labs   Lab Test 05/01/24  0855 09/14/22  2230 02/10/22  0802   PH  --  7.44* 7.32*   PHV 7.46*  --   --    PCO2V 34*  --   --    PO2V 33  --   --    HCO3V 24 22  --    O2PER 20  --   --         Thyroid Studies     Recent Labs   Lab Test 11/04/21  1457   TSH 0.86       Urine Studies     Recent Labs   Lab Test 05/01/24  0852 04/18/24  1112 04/03/24  1302 02/05/24  1328 05/16/23  0805 04/30/23  1235 01/04/21  0932 12/16/20  2236   URINEPH 7.0 7.0 7.0 6.5 7.0 7.0   < > 7.5*   NITRITE Negative Positive*  Negative Negative Negative Negative   < > Negative   LEUKEST Large* Large* Large* Large* Large* Large*   < > Large*   WBCU >182* * 0-5 25-50* 5-10* *   < > 466*   WBC CLUMPS  --  Present*  --   --   --  Present*   < >  --    UWBCLM  --   --   --   --   --   --   --  Present*    < > = values in this interval not displayed.       Medication levels    Recent Labs   Lab Test 05/02/24  0538 12/19/20  0707 12/18/20  0611   TACROL 5.5   < >  --    MPACID  --   --  1.32   MPAG  --   --  23.7*    < > = values in this interval not displayed.       Microbiology:  Last Culture results   Culture   Date Value Ref Range Status   05/01/2024 No growth after 2 days  Preliminary   05/01/2024 >100,000 CFU/mL Escherichia coli (A)  Corrected   05/01/2024 >100,000 CFU/mL Urogenital keisha  Corrected   05/01/2024 50,000-100,000 CFU/mL Urogenital keisha  Corrected   05/01/2024 No growth after 2 days  Preliminary   04/18/2024 No Growth  Final   04/18/2024 >100,000 CFU/mL Escherichia coli ESBL (A)  Final   04/03/2024 <10,000 CFU/mL Mixture of Urogenital Keisha  Final   02/05/2024 >100,000 CFU/mL Escherichia coli ESBL (A)  Final   05/16/2023 >100,000 CFU/mL Mixture of urogenital keisha  Final   04/30/2023 >100,000 CFU/mL Escherichia coli ESBL (A)  Final   04/02/2023 >100,000 CFU/mL Escherichia coli ESBL (A)  Final   04/02/2023 <10,000 CFU/mL Urogenital keisha  Final   03/16/2023 >100,000 CFU/mL Escherichia coli ESBL (A)  Final   02/22/2023 >100,000 CFU/mL Escherichia coli ESBL (A)  Final   01/27/2023 >100,000 CFU/mL Staphylococcus epidermidis (A)  Final   12/06/2022 >100,000 CFU/mL Escherichia coli ESBL (A)  Final   12/02/2022 <10,000 CFU/mL Urogenital keisha  Final   10/02/2022 >100,000 CFU/mL Escherichia coli (A)  Final     Comment:     Susceptibilities done on previous cultures   10/02/2022 No Growth  Final   10/02/2022 No Growth  Final     Culture Micro   Date Value Ref Range Status   06/04/2021 (A)  Final    10,000 to 50,000  colonies/mL  Enterococcus faecalis     12/17/2020 No growth  Final   12/16/2020 (A)  Final    Cultured on the 1st day of incubation:  Escherichia coli ESBL  ESBL (extended beta lactamase) producing organisms require contact precautions.     12/16/2020   Final    Critical Value/Significant Value, preliminary result only, called to and read back by  RALPH BOLAÑOS RN 12.17.2020 1056 BC     12/16/2020   Final    (Note)  POSITIVE for E.COLI by Verigene multiplex nucleic acid test. Final  identification and antimicrobial susceptibility testing will be  verified by standard methods. Verigene test will not distinguish  E.coli from Shigella species including S.dysenteriae, S.flexneri,  S.boydii, and S.sonnei. Specimens containing Shigella species or  E.coli will be reported as Positive for E.coli.    POSITIVE for CTX-M Class A Extended Spectrum beta-lactamase (ESBL)  resistance marker by Verigene multiplex nucleic acid test. CTX-M  confers resistance to penicillins, cephalosporins and variable  resistance to beta-lactam beta-lactamase inhibitor combinations  (clavulanic acid and tazobactam). Best empiric antibiotic choice is  meropenem. Specific susceptibility testing will be performed.    Specimen tested with Verigene multiplex, gram-negative blood culture  nucleic acid test for the following targets: Acinetobacter sp.,  Citrobacter sp., Enterobacter sp., Proteus sp., E. coli, K.  pneumoniae/oxytoca, P. aeruginosa, and the following resistance  markers: CTXM, KPC, NDM, VIM, IMP and OXA.    Critical Value/Significant Value called to and read back by RALPH BOLAÑOS RN 12/17/20 1312 EH.       12/16/2020 No growth  Final   12/16/2020 (A)  Preliminary    >100,000 colonies/mL  Escherichia coli ESBL  ESBL (extended beta lactamase) producing organisms require contact precautions.     12/16/2020   Preliminary    Susceptibility testing requested by  Dr. Gonzales, Pager [phone]. Fosfomycin requested. @ 1637. 12.18.20. BS.      12/03/2020    Final    <10,000 colonies/mL  mixed urogenital keisha  Susceptibility testing not routinely done             Virology:  Coronavirus-19 testing    Recent Labs   Lab Test 10/02/22  0723 09/15/22  0154 11/04/21  1409 12/16/20  2237 09/26/20  1010   [id number] Negative Negative Negative NEGATIVE Not Detected   [id number]  --   --   --   --  Nasopharyngeal       Respiratory virus (non-coronavirus-19) testing    Recent Labs   Lab Test 11/04/21  1408 12/16/20  2237   IFLUA Not Detected  --    INFZA  --  Negative   FLUAH1 Not Detected  --    FD3626 Not Detected  --    FLUAH3 Not Detected  --    IFLUB Not Detected  --    INFZB  --  Negative   PIV1 Not Detected  --    PIV2 Not Detected  --    PIV3 Not Detected  --    PIV4 Not Detected  --    RSVA Not Detected  --    RSVB Not Detected  --    HMPV Not Detected  --    RHINEV Not Detected  --    ADENOV Not Detected  --    CORONA Not Detected  --        Viral loads    Recent Labs   Lab Test 12/18/20  0611 09/23/20  1653   CMVQNT CMV DNA Not Detected CMV DNA Not Detected   CMVLOG Not Calculated Not Calculated   CSPEC Plasma Plasma       BK Virus Result   Date Value Ref Range Status   09/23/2020 BK Virus DNA Not Detected BKNEG^BK Virus DNA Not Detected copies/mL Final       Imaging:  No results found for this or any previous visit (from the past 24 hour(s)).

## 2024-05-03 NOTE — PLAN OF CARE
"/41 (BP Location: Right arm)   Pulse 77   Temp 98.9  F (37.2  C) (Oral)   Resp 16   Ht 1.778 m (5' 10\")   Wt 72 kg (158 lb 11.7 oz)   SpO2 98%   BMI 22.78 kg/m      Neuro: A&Ox4.   Cardiac: afebrile. OVSS.   Respiratory: Sating at 98% on RA.  GI/: denies n/v/diarrhea. Adequate urine output. No BM on this shift.   Diet/appetite: regular diet.  Activity:  SBA.  Pain: At acceptable level on current regimen. Denies pain.   Skin: No new deficits noted.  LDA's: 2 right PIV, both saline lock.   Isolation precaution: on contact for ESBL.     Plan: Continue with POC. Notify primary team with changes.   Problem: Adult Inpatient Plan of Care  Goal: Absence of Hospital-Acquired Illness or Injury  Intervention: Identify and Manage Fall Risk  Recent Flowsheet Documentation  Taken 5/3/2024 0044 by Leila Robison RN  Safety Promotion/Fall Prevention:   safety round/check completed   mobility aid in reach   clutter free environment maintained   assistive device/personal items within reach  Intervention: Prevent Skin Injury  Recent Flowsheet Documentation  Taken 5/3/2024 0044 by Leila Robison, RN  Body Position: position changed independently  Skin Protection: adhesive use limited  Device Skin Pressure Protection: absorbent pad utilized/changed  Intervention: Prevent Infection  Recent Flowsheet Documentation  Taken 5/3/2024 0044 by Leila Robison, RN  Infection Prevention:   single patient room provided   rest/sleep promoted   personal protective equipment utilized   hand hygiene promoted   cohorting utilized   equipment surfaces disinfected   Goal Outcome Evaluation:                        "

## 2024-05-03 NOTE — PROGRESS NOTES
[facility]   Transplant Nephrology Progress Note  Date of Admission:  5/1/2024  Today's Date: 05/03/2024      Recommendations:  - Continue tacrolimus 1.5 mg am and 1mg pm.  - Continue mycophenolic acid 360 mg BID for now, will resume home dose later next week.  - Continue prednisone 5 mg po daily  - Antibiotics per Transplant Infectious Disease.  - Indium tagged WBC scan to to see if native kidney is a source/nidus of recurrent UTI.      Assessment & Plan     # DDKT: Stable serum creatinine.   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  Patient underwent a second liver transplant 3/3/11.  She appears to be doing well with normal LFTs.  Followed by Hepatology.     # Immunosuppression Prior to Admission: Tacrolimus immediate release (goal 4-6), Mycophenolic acid (dose 540 mg every 12 hours), and Prednisone (dose 5 mg daily)   - Present Immunosuppression: Sublingual Tacrolimus immediate release (goal 4-6), Mycophenolate mofetil (dose 500 mg IV BID), and Methylprednisolone (dose 4 mg daily)   - Patient is in an immunosuppressed state and will continue to monitor for efficacy and toxicity of immunosuppression medications.   - Changes: Yes -    Reduce mycophenolic acid 360 mg po bid due to severe sepsis and recurrent UTI.      # Infection Prophylaxis:   - PJP: Sulfa/TMP (Bactrim) MWF    # Hx of Recurrent UTI  # ESBL UTI: presented 05/01/24 with one day of N/V, fever, body aches, HA, weakness and lightheadedness.  UA shows large leukocyte esterase and > 182 WBCs.  UC 4/18 positive for ESBL E. Coli.  UC  4/18 positive for ESBL E. Coli.    - ertapenem 05/01/24 - present   - Reduce mycophenolic acid 360 mg BID   - Antibiotics per Transplant Infectious Disease.       5/1/2024   Urine Culture In process    5/1/2024   Blood Culture Peripheral Blood In process    5/1/2024   Blood Culture Peripheral Blood In process       # PKD:   -Seen by txp surgery. Risks outweigh benefits for native nephrectomy    #Orthostatic hypotension: Controlled;  Goal BP: < 130/80   - Changes: Not at this time.     # Post-Transplant Erythrocytosis: Hgb: Stable, high;  On ACEI/ARB: No   Imaging: Not at this time    # Mineral Bone Disorder:   - Secondary renal hyperparathyroidism; PTH level: Not checked recently        On treatment: None  - Vitamin D; level: Not checked recently         On supplement: No  - Calcium; level: Normal            On supplement: Yes, Oscal 500-5 mg-mcg PO BID  - Phosphorus; level: Low (trend for now)         On supplement: No     # Electrolytes:   - Potassium; level: Normal         On supplement: No  - Magnesium; level: Low   On supplement: No, agree with replacing with magnesium sulfate  - Bicarbonate; level: Low         On supplement: No. Give sodium bicarbonate 75 mEq in 500 ml D5W IV  - Sodium; level: Low    # Hyponatremia: likely secondary to nausea and vomiting.    # HSIL:    -per cone biopsy on 9/30/20. No evidence of dysplasia. Most recent coloposcopy 4/25/23 with negative biopsies. Plan for repeat colposcopy or Pap in ~10/2023    # Microvascular angina:   -Follows with cardiology, Dr. Mohamud.    -She refuses statin   -Uses SL angina a few times per year    # Cystocele:              -Has pessary. 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    Seen and discussed with Dr. Hampton.    Holland Mays MD  Pager: [phone]      This patient has been seen and evaluated by me, Getachew Hampton MD.  I have reviewed the note and agree with plan of care as documented by the fellow.    Getachew Hampton MD       Interval History   Ms. Awad creatinine is 0.96 (05/02 0538); Stable. Reported feeling better today. No fever, chills or sweats. No abdominal pain or flank pain. No nausea or vomiting.Has good amount of urine.  Vitals - stable with borderline BP.  Lab : Blood  cx - no growth so far,Urine Cx - Multiple oragnism ,predominant E-Coli   >100,000 CFU/mL Escherichia coli Abnormal       >100,000 CFU/mL Urogenital keisha      50,000-100,000 CFU/mL Urogenital keisha     Review of Systems   4 point ROS was obtained and negative except as noted in the Interval History.    MEDICATIONS:  Current Facility-Administered Medications   Medication Dose Route Frequency Provider Last Rate Last Admin    aspirin (ASA) chewable tablet 81 mg  81 mg Oral QPM Roly Pantoja MD   81 mg at 05/02/24 1958    calcium carbonate-vitamin D (OSCAL) 500-5 MG-MCG per tablet 1 tablet  1 tablet Oral BID Roly Pantoja MD   1 tablet at 05/03/24 1252    ertapenem (INVanz) 1 g vial to attach to  mL bag  1 g Intravenous Q24H Roly Pantoja MD 0 mL/hr at 05/01/24 1005 1 g at 05/03/24 1024    magnesium oxide (MAG-OX) tablet 400 mg  400 mg Oral Q4H Roly Pantoja MD   400 mg at 05/03/24 0937    mycophenolic acid (GENERIC EQUIVALENT) EC tablet 360 mg  360 mg Oral BID IS Getachew Ross MD        potassium & sodium phosphates (NEUTRA-PHOS) Packet 2 packet  2 packet Oral or Feeding Tube Q4H Roly Pantoja MD   2 packet at 05/03/24 1252    predniSONE (DELTASONE) tablet 5 mg  5 mg Oral Daily Roly Pantoja MD   5 mg at 05/03/24 0801    sodium chloride (PF) 0.9% PF flush 3 mL  3 mL Intracatheter Q8H Roly Pantoja MD  "  3 mL at 24 1035    sulfamethoxazole-trimethoprim (BACTRIM) 400-80 MG per tablet 1 tablet  1 tablet Oral Once per day on  Roly Pantoja MD   1 tablet at 24 0801    tacrolimus (GENERIC EQUIVALENT) capsule 1 mg  1 mg Oral QPM Roly Pantoja MD   1 mg at 24 1848    tacrolimus (GENERIC EQUIVALENT) capsule 1.5 mg  1.5 mg Oral QAM Roly Pantoja MD   1.5 mg at 24 0801     Current Facility-Administered Medications   Medication Dose Route Frequency Provider Last Rate Last Admin       Physical Exam   Temp  Av.8  F (37.1  C)  Min: 97.5  F (36.4  C)  Max: 99.9  F (37.7  C)      Pulse  Av.4  Min: 80  Max: 120 Resp  Av  Min: 16  Max: 22  SpO2  Av.8 %  Min: 90 %  Max: 100 %     BP 93/47   Pulse 74   Temp 97.8  F (36.6  C) (Oral)   Resp 17   Ht 1.778 m (5' 10\")   Wt 72 kg (158 lb 11.7 oz)   SpO2 94%   BMI 22.78 kg/m     Date 24 0700 - 24 0659   Shift 5019-6465 3745-4520 5129-6744 24 Hour Total   INTAKE   P.O. 240   240   Shift Total(mL/kg) 240(3.33)   240(3.33)   OUTPUT   Urine 500   500   Shift Total(mL/kg) 500(6.94)   500(6.94)   Weight (kg) 72 72 72 72      Admit Weight: 72 kg (158 lb 11.7 oz)     GENERAL APPEARANCE: moderate distress  RESP: lungs clear to auscultation - no rales, rhonchi or wheezes  CV: regular rhythm, normal rate, no rub, no murmur  EDEMA: no LE edema bilaterally  ABDOMEN: soft, nondistended, nontender, bowel sounds normal  MS: extremities normal - no gross deformities noted, no evidence of inflammation in joints, no muscle tenderness  SKIN: no rash  PSYCH: fatigued  DIALYSIS ACCESS:  LUE AV fistula +bruit/+thrill    Data   All labs reviewed by me.  CMP  Recent Labs   Lab 24  0621 24  0538 24  1828 24  0845    136  --  130*   POTASSIUM 4.1 4.1  --  3.9   CHLORIDE 108* 106  --  101   CO2 21* 20*  --  17*   ANIONGAP 8 10  --  12   GLC 95 88  --  104*   BUN 15.7 15.5  --  18.0   CR 0.81 0.96*  -- "  0.89   GFRESTIMATED 81 66  --  72   EDSON 9.8 8.8  --  9.5   MAG 1.6* 2.7* 1.9 1.5*   PHOS 1.7* 2.5  --  2.2*   PROTTOTAL 5.4* 5.3*  --  6.5   ALBUMIN 2.9* 2.9*  --  3.8   BILITOTAL 0.6 0.8  0.8  --  1.4*  1.4*   ALKPHOS 62 61  --  57   AST 14 24  --  20   ALT 9 11  --  12     CBC  Recent Labs   Lab 05/03/24  0621 05/02/24  0538 05/01/24  0845   HGB 13.6 14.5 15.8*   WBC 16.1* 20.6* 18.3*   RBC 4.49 4.71 5.15   HCT 41.1 43.8 46.5   MCV 92 93 90   MCH 30.3 30.8 30.7   MCHC 33.1 33.1 34.0   RDW 14.9 15.1* 14.7   * 154 194     INRNo lab results found in last 7 days.  ABG  Recent Labs   Lab 05/01/24  0855   O2PER 20      Urine Studies  Recent Labs   Lab Test 05/01/24  0852 04/18/24  1112 04/03/24  1302 02/05/24  1328 05/16/23  0805   COLOR Orange* Yellow Yellow Yellow Yellow   APPEARANCE Cloudy* Cloudy* Clear Slightly Cloudy* Slightly Cloudy*   URINEGLC Negative Negative Negative Negative Negative   URINEBILI Negative Negative Negative Negative Negative   URINEKETONE Negative Negative Negative Negative Negative   SG 1.013 1.015 1.010 1.010 1.010   UBLD Moderate* Small* Small* Trace* Small*   URINEPH 7.0 7.0 7.0 6.5 7.0   PROTEIN 300* 100* Trace* Negative Negative   UROBILINOGEN  --  0.2 0.2 0.2 0.2   NITRITE Negative Positive* Negative Negative Negative   LEUKEST Large* Large* Large* Large* Large*   RBCU 54* 0-2 2-5* 0-2 None Seen   WBCU >182* * 0-5 25-50* 5-10*     Recent Labs   Lab Test 06/04/21  1130 09/10/20  1037   UTPG 0.34* 0.16     PTH  Recent Labs   Lab Test 02/10/22  0802 09/23/20  1653   PTHI 66 45     Iron Studies  No lab results found.    IMAGING:  All imaging studies reviewed by me.

## 2024-05-03 NOTE — PLAN OF CARE
Status: Full Code   VS: VSS  Neuros: A&OX4  Cardiac: WNL, soft BP  Respiratory: WNL pt RA  GI/: voiding without difficulty  Diet/Nausea: pt denies nausea, reg diet    Skin: Kidney and Liver transplant scars on abdomen   LDA: 2 PIV SL   Pain: Denies   Activity: SBA/ind  Plan:  Nuclear Medicine to perform Inflammatory WBC Whole Body on Monday, May 6, 2024.          Goal Outcome Evaluation:      Plan of Care Reviewed With: patient, child    Overall Patient Progress: no changeOverall Patient Progress: no change

## 2024-05-03 NOTE — PLAN OF CARE
"Goal Outcome Evaluation:  /55   Pulse 77   Temp 97.8  F (36.6  C) (Oral)   Resp 17   Ht 1.778 m (5' 10\")   Wt 72 kg (158 lb 11.7 oz)   SpO2 95%   BMI 22.78 kg/m      Shift: 4758-5922  Isolation Status: Contact - ESBL  VS: WDL on RA, afebrile  Neuro: Aox4  Behaviors: pleasant, cooperative with cares, able to make her needs known  BG: NA  Labs/Imaging: K 4.1, Cl 108, Co 21, Mg 1.6 (orally replaced per protocol), Ph 1.7 (orally replaced per protocol), Albumin 2.9, Protein 5.4, , WBC 16.1  Respiratory: Clear lung sounds, no shortness of breath reported  Cardiac: regular rhythm/rate  Pain/Nausea: Denied pain and nausea  PRN: NA  Diet: Regular diet, fair appetite  IV Access: RUE PIV x1  Infusion(s): Rocephin daily  Lines/Drains: NA  GI/: Voiding without difficulty, 1205 mL urine output, BM x1  Skin: Intact  Mobility: independent  Plan: Re-run Ph protocol following 1930 lab draw.  Re-run Mg protocol in morning.                        "

## 2024-05-04 LAB
ANION GAP SERPL CALCULATED.3IONS-SCNC: 9 MMOL/L (ref 7–15)
BACTERIA UR CULT: ABNORMAL
BUN SERPL-MCNC: 12 MG/DL (ref 8–23)
CALCIUM SERPL-MCNC: 10.2 MG/DL (ref 8.8–10.2)
CHLORIDE SERPL-SCNC: 105 MMOL/L (ref 98–107)
CREAT SERPL-MCNC: 0.82 MG/DL (ref 0.51–0.95)
DEPRECATED HCO3 PLAS-SCNC: 21 MMOL/L (ref 22–29)
EGFRCR SERPLBLD CKD-EPI 2021: 79 ML/MIN/1.73M2
ERYTHROCYTE [DISTWIDTH] IN BLOOD BY AUTOMATED COUNT: 15 % (ref 10–15)
GLUCOSE SERPL-MCNC: 96 MG/DL (ref 70–99)
HCT VFR BLD AUTO: 45.4 % (ref 35–47)
HGB BLD-MCNC: 14.7 G/DL (ref 11.7–15.7)
MAGNESIUM SERPL-MCNC: 1.4 MG/DL (ref 1.7–2.3)
MAGNESIUM SERPL-MCNC: 2.2 MG/DL (ref 1.7–2.3)
MCH RBC QN AUTO: 29.9 PG (ref 26.5–33)
MCHC RBC AUTO-ENTMCNC: 32.4 G/DL (ref 31.5–36.5)
MCV RBC AUTO: 92 FL (ref 78–100)
PHOSPHATE SERPL-MCNC: 2.8 MG/DL (ref 2.5–4.5)
PLATELET # BLD AUTO: 174 10E3/UL (ref 150–450)
POTASSIUM SERPL-SCNC: 4.4 MMOL/L (ref 3.4–5.3)
RBC # BLD AUTO: 4.92 10E6/UL (ref 3.8–5.2)
SODIUM SERPL-SCNC: 135 MMOL/L (ref 135–145)
WBC # BLD AUTO: 9.6 10E3/UL (ref 4–11)

## 2024-05-04 PROCEDURE — 250N000013 HC RX MED GY IP 250 OP 250 PS 637: Performed by: STUDENT IN AN ORGANIZED HEALTH CARE EDUCATION/TRAINING PROGRAM

## 2024-05-04 PROCEDURE — 99232 SBSQ HOSP IP/OBS MODERATE 35: CPT | Mod: GC | Performed by: STUDENT IN AN ORGANIZED HEALTH CARE EDUCATION/TRAINING PROGRAM

## 2024-05-04 PROCEDURE — 250N000011 HC RX IP 250 OP 636: Mod: JZ

## 2024-05-04 PROCEDURE — 83735 ASSAY OF MAGNESIUM: CPT

## 2024-05-04 PROCEDURE — 250N000013 HC RX MED GY IP 250 OP 250 PS 637

## 2024-05-04 PROCEDURE — 250N000012 HC RX MED GY IP 250 OP 636 PS 637: Performed by: INTERNAL MEDICINE

## 2024-05-04 PROCEDURE — 84100 ASSAY OF PHOSPHORUS: CPT | Performed by: STUDENT IN AN ORGANIZED HEALTH CARE EDUCATION/TRAINING PROGRAM

## 2024-05-04 PROCEDURE — 99232 SBSQ HOSP IP/OBS MODERATE 35: CPT | Performed by: NURSE PRACTITIONER

## 2024-05-04 PROCEDURE — 250N000012 HC RX MED GY IP 250 OP 636 PS 637

## 2024-05-04 PROCEDURE — 120N000005 HC R&B MS OVERFLOW UMMC

## 2024-05-04 PROCEDURE — 250N000011 HC RX IP 250 OP 636

## 2024-05-04 PROCEDURE — 36415 COLL VENOUS BLD VENIPUNCTURE: CPT

## 2024-05-04 PROCEDURE — 85014 HEMATOCRIT: CPT

## 2024-05-04 PROCEDURE — 80048 BASIC METABOLIC PNL TOTAL CA: CPT

## 2024-05-04 RX ORDER — MAGNESIUM SULFATE HEPTAHYDRATE 40 MG/ML
4 INJECTION, SOLUTION INTRAVENOUS ONCE
Status: COMPLETED | OUTPATIENT
Start: 2024-05-04 | End: 2024-05-04

## 2024-05-04 RX ORDER — LORATADINE 10 MG/1
10 TABLET ORAL DAILY PRN
Status: DISCONTINUED | OUTPATIENT
Start: 2024-05-04 | End: 2024-05-07 | Stop reason: HOSPADM

## 2024-05-04 RX ADMIN — ATORVASTATIN CALCIUM 10 MG: 10 TABLET, FILM COATED ORAL at 20:31

## 2024-05-04 RX ADMIN — ACETAMINOPHEN 975 MG: 325 TABLET, FILM COATED ORAL at 17:05

## 2024-05-04 RX ADMIN — LORATADINE 10 MG: 10 TABLET ORAL at 14:25

## 2024-05-04 RX ADMIN — POTASSIUM & SODIUM PHOSPHATES POWDER PACK 280-160-250 MG 1 PACKET: 280-160-250 PACK at 07:51

## 2024-05-04 RX ADMIN — ASPIRIN 81 MG CHEWABLE TABLET 81 MG: 81 TABLET CHEWABLE at 20:31

## 2024-05-04 RX ADMIN — Medication 1 TABLET: at 12:45

## 2024-05-04 RX ADMIN — PSYLLIUM HUSK 1 PACKET: 3.4 POWDER ORAL at 07:52

## 2024-05-04 RX ADMIN — MAGNESIUM SULFATE IN WATER 4 G: 40 INJECTION, SOLUTION INTRAVENOUS at 10:37

## 2024-05-04 RX ADMIN — POTASSIUM & SODIUM PHOSPHATES POWDER PACK 280-160-250 MG 1 PACKET: 280-160-250 PACK at 00:16

## 2024-05-04 RX ADMIN — TACROLIMUS 1 MG: 1 CAPSULE ORAL at 18:06

## 2024-05-04 RX ADMIN — TACROLIMUS 1.5 MG: 1 CAPSULE ORAL at 07:51

## 2024-05-04 RX ADMIN — PREDNISONE 5 MG: 5 TABLET ORAL at 07:51

## 2024-05-04 RX ADMIN — ERTAPENEM SODIUM 1 G: 1 INJECTION, POWDER, LYOPHILIZED, FOR SOLUTION INTRAMUSCULAR; INTRAVENOUS at 09:31

## 2024-05-04 RX ADMIN — MYCOPHENOLIC ACID 360 MG: 360 TABLET, DELAYED RELEASE ORAL at 07:51

## 2024-05-04 RX ADMIN — POTASSIUM & SODIUM PHOSPHATES POWDER PACK 280-160-250 MG 1 PACKET: 280-160-250 PACK at 04:16

## 2024-05-04 RX ADMIN — Medication 1 TABLET: at 18:06

## 2024-05-04 RX ADMIN — MYCOPHENOLIC ACID 360 MG: 360 TABLET, DELAYED RELEASE ORAL at 18:06

## 2024-05-04 ASSESSMENT — ACTIVITIES OF DAILY LIVING (ADL)
ADLS_ACUITY_SCORE: 20
ADLS_ACUITY_SCORE: 26
ADLS_ACUITY_SCORE: 20
ADLS_ACUITY_SCORE: 26
ADLS_ACUITY_SCORE: 20
ADLS_ACUITY_SCORE: 20

## 2024-05-04 NOTE — PLAN OF CARE
"Goal Outcome Evaluation:      Plan of Care Reviewed With: patient    Overall Patient Progress: no changeOverall Patient Progress: no change    /53 (BP Location: Right arm)   Pulse 73   Temp 99  F (37.2  C) (Oral)   Resp 16   Ht 1.778 m (5' 10\")   Wt 72 kg (158 lb 11.7 oz)   SpO2 95%   BMI 22.78 kg/m      Neuro: A&Ox4.   Cardiac: Afebrile, VSS.   Respiratory: RA   GI/: Voiding spontaneously. No BM this shift.   Diet/appetite: Tolerating diet. Denies nausea   Activity: Up independently in room  Pain: . Denies   Skin: No new deficits noted.  Lines: piv sl'd  Replacement: recheck of previous phos replacement - 2.3. replacement ordered. 2 of 3 doses admin. Recheck ordered for 11 am; after replacement completed.    Rested btwn cares. Able to make needs known. Continue to monitor. Notify MD of changes/concerns         "

## 2024-05-04 NOTE — PROGRESS NOTES
[facility]   Transplant Nephrology Progress Note  Date of Admission:  5/1/2024  Today's Date: 05/04/2024    Recommendations:  - Continue tacrolimus 1.5 mg am and 1mg pm.  - Decrease mycophenolic acid  to 360 mg BID for now   - Continue prednisone 5 mg po daily  - Antibiotics per Transplant Infectious Disease.  - Indium tagged WBC scan to to see if native kidney is a source/nidus of recurrent UTI.      Assessment & Plan     # DDKT: Stable serum creatinine.   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  Patient underwent a second liver transplant 3/3/11.  She appears to be doing well with normal LFTs.  Followed by Hepatology.     # Immunosuppression Prior to Admission: Tacrolimus immediate release (goal 4-6), Mycophenolic acid (dose 540 mg every 12 hours), and Prednisone (dose 5 mg daily)   - Present Immunosuppression: Sublingual Tacrolimus immediate release (goal 4-6), Mycophenolate mofetil (dose 500 mg IV BID), and Methylprednisolone (dose 4 mg daily)   - Patient is in an immunosuppressed state and will continue to monitor for efficacy and toxicity of immunosuppression medications.   - Changes: Yes -    Reduce mycophenolic acid 360 mg po bid due to severe sepsis and recurrent UTI.      # Infection Prophylaxis:   - PJP: Sulfa/TMP (Bactrim) MWF    # Hx of Recurrent UTI  # ESBL UTI: presented 05/01/24 with one day of N/V, fever, body aches, HA, weakness and lightheadedness.  UA shows large leukocyte esterase and > 182 WBCs.  UC 4/18 positive for ESBL E. Coli.  UC 4/18 positive for ESBL E. Coli.    -  ertapenem 05/01/24 - present   - Reduce mycophenolic acid 360 mg BID   - Antibiotics per Transplant Infectious Disease.      # PKD:   -Seen by txp surgery. Risks outweigh benefits for native nephrectomy    #Orthostatic hypotension: Controlled;  Goal BP: < 130/80   - Changes: Not at this time.     # Post-Transplant Erythrocytosis: Hgb: Stable, high;  On ACEI/ARB: No   Imaging: Not at this time    # Mineral Bone Disorder:   - Secondary renal hyperparathyroidism; PTH level: Not checked recently        On treatment: None  - Vitamin D; level: Not checked recently         On supplement: No  - Calcium; level: Normal            On supplement: Yes, Oscal 500-5 mg-mcg PO BID  - Phosphorus; level: Low (trend for now)         On supplement: No     # Electrolytes:   - Potassium; level: Normal         On supplement: No  - Magnesium; level: Low   On supplement: No, agree with replacing with magnesium sulfate  - Bicarbonate; level: Low normal         On supplement: No.     # HSIL:    -per cone biopsy on 9/30/20. No evidence of dysplasia. Most recent coloposcopy 4/25/23 with negative biopsies. Plan for repeat colposcopy or Pap in ~10/2023    # Microvascular angina:   -Follows with cardiology, Dr. Mohamud.    -She refuses statin   -Uses SL angina a few times per year    # Cystocele:              -Has pessary. 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    Discussed with Dr. Angulo .    Carl Renteria, APRN CNP  Pager: [phone]       Interval History   Ms. Jaras creatinine is 0.82 (05/04 0724); Stable. Reported feeling much better today. No fever, chills or  sweats. No abdominal pain or flank pain. No nausea or vomiting.Has good amount of urine.  Vitals - stable.       Review of Systems   4 point ROS was obtained and negative except as noted in the Interval History.    MEDICATIONS:  Current Facility-Administered Medications   Medication Dose Route Frequency Provider Last Rate Last Admin    aspirin (ASA) chewable tablet 81 mg  81 mg Oral QPM Roly Pantoja MD   81 mg at 05/03/24 2019    atorvastatin (LIPITOR) tablet 10 mg  10 mg Oral QPM Roly Pantoja MD   10 mg at 05/03/24 2019    calcium carbonate-vitamin D (OSCAL) 500-5 MG-MCG per tablet 1 tablet  1 tablet Oral BID Roly Pantoja MD   1 tablet at 05/03/24 1751    ertapenem (INVanz) 1 g vial to attach to  mL bag  1 g Intravenous Q24H Roly Pantoja MD 0 mL/hr at 05/01/24 1005 1 g at 05/04/24 0931    magnesium sulfate 4 g in 100 mL sterile water intermittent infusion  4 g Intravenous Once Roly Pantoja MD 50 mL/hr at 05/04/24 1037 4 g at 05/04/24 1037    mycophenolic acid (GENERIC EQUIVALENT) EC tablet 360 mg  360 mg Oral BID IS Getachew Ross MD   360 mg at 05/04/24 0751    predniSONE (DELTASONE) tablet 5 mg  5 mg Oral Daily Roly Pantoja MD   5 mg at 05/04/24 0751    psyllium (METAMUCIL/KONSYL) Packet 1 packet  1 packet Oral Daily Roly Pantoja MD   1 packet at 05/04/24 0752    sodium chloride (PF) 0.9% PF flush 3 mL  3 mL Intracatheter Q8H Roly Pantoja MD   3 mL at 05/04/24 0417    sulfamethoxazole-trimethoprim (BACTRIM) 400-80 MG per tablet 1 tablet  1 tablet Oral Once per day on Monday Wednesday Friday Roly Pantoja MD   1 tablet at 05/03/24 0801    tacrolimus (GENERIC EQUIVALENT) capsule 1 mg  1 mg Oral QPM Roly Pantoja MD   1 mg at 05/03/24 1751    tacrolimus (GENERIC EQUIVALENT) capsule 1.5 mg  1.5 mg Oral QAM Roly Pantoja MD   1.5 mg at 05/04/24 0751     Current Facility-Administered Medications   Medication Dose Route Frequency Provider Last Rate Last Admin       Physical Exam   Temp  Avg:  "98.8  F (37.1  C)  Min: 97.5  F (36.4  C)  Max: 99.9  F (37.7  C)      Pulse  Av.4  Min: 80  Max: 120 Resp  Av  Min: 16  Max: 22  SpO2  Av.8 %  Min: 90 %  Max: 100 %     /62 (BP Location: Right arm)   Pulse 80   Temp 99.4  F (37.4  C) (Oral)   Resp 16   Ht 1.778 m (5' 10\")   Wt 68.2 kg (150 lb 6.4 oz)   SpO2 96%   BMI 21.58 kg/m     Date 24 07 - 24 0659   Shift 4955-6345 2010-9853 3560-0046 24 Hour Total   INTAKE   P.O. 240   240   Shift Total(mL/kg) 240(3.33)   240(3.33)   OUTPUT   Urine 500   500   Shift Total(mL/kg) 500(6.94)   500(6.94)   Weight (kg) 72 72 72 72      Admit Weight: 72 kg (158 lb 11.7 oz)     GENERAL APPEARANCE: moderate distress  RESP: lungs clear to auscultation - no rales, rhonchi or wheezes  CV: regular rhythm, normal rate, no rub, no murmur  EDEMA: no LE edema bilaterally  ABDOMEN: soft, nondistended, nontender, bowel sounds normal  MS: extremities normal - no gross deformities noted, no evidence of inflammation in joints, no muscle tenderness  SKIN: no rash  PSYCH: fatigued  DIALYSIS ACCESS:  LUE AV fistula +bruit/+thrill    Data   All labs reviewed by me.  CMP  Recent Labs   Lab 24  0726 24  0621 24  0538 24  1828 24  0845     --  137 136  --  130*   POTASSIUM 4.4  --  4.1 4.1  --  3.9   CHLORIDE 105  --  108* 106  --  101   CO2 21*  --  21* 20*  --  17*   ANIONGAP 9  --  8 10  --  12   GLC 96  --  95 88  --  104*   BUN 12.0  --  15.7 15.5  --  18.0   CR 0.82  --  0.81 0.96*  --  0.89   GFRESTIMATED 79  --  81 66  --  72   EDSON 10.2  --  9.8 8.8  --  9.5   MAG 1.4*  --  1.6* 2.7* 1.9 1.5*   PHOS 2.8 2.3* 1.7* 2.5  --  2.2*   PROTTOTAL  --   --  5.4* 5.3*  --  6.5   ALBUMIN  --   --  2.9* 2.9*  --  3.8   BILITOTAL  --   --  0.6 0.8  0.8  --  1.4*  1.4*   ALKPHOS  --   --  62 61  --  57   AST  --   --  14 24  --  20   ALT  --   --  9 11  --  12     CBC  Recent Labs   Lab 24  0726 24  0621 " 05/02/24  0538 05/01/24  0845   HGB 14.7 13.6 14.5 15.8*   WBC 9.6 16.1* 20.6* 18.3*   RBC 4.92 4.49 4.71 5.15   HCT 45.4 41.1 43.8 46.5   MCV 92 92 93 90   MCH 29.9 30.3 30.8 30.7   MCHC 32.4 33.1 33.1 34.0   RDW 15.0 14.9 15.1* 14.7    144* 154 194     INRNo lab results found in last 7 days.  ABG  Recent Labs   Lab 05/01/24  0855   O2PER 20      Urine Studies  Recent Labs   Lab Test 05/01/24  0852 04/18/24  1112 04/03/24  1302 02/05/24  1328 05/16/23  0805   COLOR Orange* Yellow Yellow Yellow Yellow   APPEARANCE Cloudy* Cloudy* Clear Slightly Cloudy* Slightly Cloudy*   URINEGLC Negative Negative Negative Negative Negative   URINEBILI Negative Negative Negative Negative Negative   URINEKETONE Negative Negative Negative Negative Negative   SG 1.013 1.015 1.010 1.010 1.010   UBLD Moderate* Small* Small* Trace* Small*   URINEPH 7.0 7.0 7.0 6.5 7.0   PROTEIN 300* 100* Trace* Negative Negative   UROBILINOGEN  --  0.2 0.2 0.2 0.2   NITRITE Negative Positive* Negative Negative Negative   LEUKEST Large* Large* Large* Large* Large*   RBCU 54* 0-2 2-5* 0-2 None Seen   WBCU >182* * 0-5 25-50* 5-10*     Recent Labs   Lab Test 06/04/21  1130 09/10/20  1037   UTPG 0.34* 0.16     PTH  Recent Labs   Lab Test 02/10/22  0802 09/23/20  1653   PTHI 66 45     Iron Studies  No lab results found.    IMAGING:  All imaging studies reviewed by me.

## 2024-05-04 NOTE — PROGRESS NOTES
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 Coli, but not ESBL   - Continue to monitor blood cultures   - Transplant ID consulted, appreciate recommendations   -> Tagged WBC scan ordered, scheduled for Monday 5/6/24     E. Coli Urinary Tract Infection  Hx of Recurrent UTI's  Hx of ESBL  Cystocele s/p pessary  Hypomagnesemia - resolving  Hypophosphatemia - resolving  Hyponatremia - resolved  Patient presented with 2 days of increased urinary frequency followed by fevers, chills, rigors, and generalized weakness. Likely that patient developed a recurrent UTI leading to sepsis. The patient was started on IV Ertapenem due to patient's history of ESBL. CXR completed on admission without abnormalities. Reviewing patient's chart, CT A/P during 9/22/22 without evidence of structural or anatomical abnormalities as well as cystoscopy and urodynamic studies performed during 2023 that were unremarkable for cause of recurrent UTI's. Multiple factors that patient has for recurrent UTI's includes female gender, immunocompromised status, kidney transplant recipient x2, pessary manipulation daily  "per patient preference, and inconsistent use of topical estrogen. Transplant infectious disease was consulted during admission with plan as below for treatment of UTI:   - Continue IV Ertapenem until discussion with transplant ID   -> Urine Cultures with growth of E. 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 Re-liver transplant 3/3/2011. No allograft dysfunction: no history of rejection nor biliary strictures. No acute concerns at this time   - Repeat CMP in the AM to trend liver function     Osteopenia  - Continue PTA calcium and vitamin D supplements  - Repeat DXA in early 2025     Non-obstructive CAD  Microvascular angina, stable  Dyslipidemia  Follows with Dr. Mohamud, last seen 12/4/2023 for microvascular angina and non-obstructive CAD. Patient continues to not take daily statin that was previously prescribed.    - Continue PTA ASA 81 mg daily    Diet: Combination Diet " Regular Diet Adult    DVT Prophylaxis: Pneumatic Compression Devices  Trujillo Catheter: Not present  Code Status: Full Code      Disposition Plan      Expected Discharge Date: 05/07/2024,  3:00 PM    Destination: home with family  Discharge Comments: Pending discussion with transplant ID and full results of urine culture to determine antibiotic therapy. Likely that patient will discharge this afternoon, if not would be tomorrow.        The patient's care was discussed with Dr. Lewis.    Roly Pantoja MD  Medicine Service, [facility] 3  [facility]  Securely message with [employer] (more info)  Text page via [facility] Paging/Directory   See signed in provider for up to date coverage information  ______________________________________________________________________    Interval History   No acute events overnight. Patient is feeling much better this morning. No further episodes of vomiting, rigors, fevers, chills, or nausea. Able to eat breakfast and lunch without issue. No further questions or concerns at this time.     Physical Exam   Vital Signs: Temp: 99.4  F (37.4  C) Temp src: Oral BP: 115/62 Pulse: 80   Resp: 16 SpO2: 96 % O2 Device: None (Room air)    Weight: 150 lbs 6.4 oz  General Appearance:  Patient alert and calm  Respiratory: CTAB, no wheezing present  Cardiovascular: Normal rate, regular rhythm, no murmurs appreciated  GI: Soft, non-distended, no pain with palpation  Skin: No skin rashes or open wounds  Extremities: No bilateral lower extremity edema  Neuro: AAOx3, moving all 4 extremities without focal deficit    Data:  Recent Results (from the past 24 hour(s))   Phosphorus    Collection Time: 05/03/24  8:05 PM   Result Value Ref Range    Phosphorus 2.3 (L) 2.5 - 4.5 mg/dL   Magnesium    Collection Time: 05/04/24  7:26 AM   Result Value Ref Range    Magnesium 1.4 (L) 1.7 - 2.3 mg/dL   CBC with platelets    Collection Time: 05/04/24  7:26 AM   Result Value Ref  Range    WBC Count 9.6 4.0 - 11.0 10e3/uL    RBC Count 4.92 3.80 - 5.20 10e6/uL    Hemoglobin 14.7 11.7 - 15.7 g/dL    Hematocrit 45.4 35.0 - 47.0 %    MCV 92 78 - 100 fL    MCH 29.9 26.5 - 33.0 pg    MCHC 32.4 31.5 - 36.5 g/dL    RDW 15.0 10.0 - 15.0 %    Platelet Count 174 150 - 450 10e3/uL   Basic metabolic panel    Collection Time: 05/04/24  7:26 AM   Result Value Ref Range    Sodium 135 135 - 145 mmol/L    Potassium 4.4 3.4 - 5.3 mmol/L    Chloride 105 98 - 107 mmol/L    Carbon Dioxide (CO2) 21 (L) 22 - 29 mmol/L    Anion Gap 9 7 - 15 mmol/L    Urea Nitrogen 12.0 8.0 - 23.0 mg/dL    Creatinine 0.82 0.51 - 0.95 mg/dL    GFR Estimate 79 >60 mL/min/1.73m2    Calcium 10.2 8.8 - 10.2 mg/dL    Glucose 96 70 - 99 mg/dL   Phosphorus    Collection Time: 05/04/24  7:26 AM   Result Value Ref Range    Phosphorus 2.8 2.5 - 4.5 mg/dL

## 2024-05-04 NOTE — PLAN OF CARE
"Goal Outcome Evaluation:  /53 (BP Location: Right arm)   Pulse 82   Temp 97.7  F (36.5  C) (Oral)   Resp 16   Ht 1.778 m (5' 10\")   Wt 68.2 kg (150 lb 6.4 oz)   SpO2 97%   BMI 21.58 kg/m      Shift: [id number]  Isolation Status: Contact  VS: WDL on RA, afebrile  Neuro: Aox4  Behaviors: flat affect, cooperative with cares, able to make her needs known  BG: NA  Labs/Imaging: Mg 1.4 (replaced per protocol), CO2 21  Respiratory: Clear lung sounds, no shortness of breath reported.  Endorsed seasonal allergies  Cardiac: regular rhythm/rate  Pain/Nausea: Denied pain and nausea  PRN: Loratadine x1  Diet: Regular diet, good appetite  IV Access: PIV x1  Infusion(s): Mg replacement, ertapenem  Lines/Drains: NA  GI/: Copius light yellow urine output with 2100 mL output  Skin: Intact  Mobility: Independent  Plan: Continue antibiotic treatment, re-run electrolyte protocols in AM.                        "

## 2024-05-05 LAB
ANION GAP SERPL CALCULATED.3IONS-SCNC: 9 MMOL/L (ref 7–15)
BUN SERPL-MCNC: 11.5 MG/DL (ref 8–23)
CALCIUM SERPL-MCNC: 10 MG/DL (ref 8.8–10.2)
CHLORIDE SERPL-SCNC: 104 MMOL/L (ref 98–107)
CREAT SERPL-MCNC: 0.77 MG/DL (ref 0.51–0.95)
DEPRECATED HCO3 PLAS-SCNC: 22 MMOL/L (ref 22–29)
EGFRCR SERPLBLD CKD-EPI 2021: 86 ML/MIN/1.73M2
ERYTHROCYTE [DISTWIDTH] IN BLOOD BY AUTOMATED COUNT: 14.6 % (ref 10–15)
GLUCOSE SERPL-MCNC: 93 MG/DL (ref 70–99)
HCT VFR BLD AUTO: 44.5 % (ref 35–47)
HGB BLD-MCNC: 14.8 G/DL (ref 11.7–15.7)
MAGNESIUM SERPL-MCNC: 1.6 MG/DL (ref 1.7–2.3)
MCH RBC QN AUTO: 30.7 PG (ref 26.5–33)
MCHC RBC AUTO-ENTMCNC: 33.3 G/DL (ref 31.5–36.5)
MCV RBC AUTO: 92 FL (ref 78–100)
PHOSPHATE SERPL-MCNC: 3.2 MG/DL (ref 2.5–4.5)
PLATELET # BLD AUTO: 189 10E3/UL (ref 150–450)
POTASSIUM SERPL-SCNC: 4.3 MMOL/L (ref 3.4–5.3)
RBC # BLD AUTO: 4.82 10E6/UL (ref 3.8–5.2)
SODIUM SERPL-SCNC: 135 MMOL/L (ref 135–145)
WBC # BLD AUTO: 8.5 10E3/UL (ref 4–11)

## 2024-05-05 PROCEDURE — 99232 SBSQ HOSP IP/OBS MODERATE 35: CPT | Performed by: NURSE PRACTITIONER

## 2024-05-05 PROCEDURE — 250N000011 HC RX IP 250 OP 636: Mod: JZ

## 2024-05-05 PROCEDURE — 250N000012 HC RX MED GY IP 250 OP 636 PS 637

## 2024-05-05 PROCEDURE — 80048 BASIC METABOLIC PNL TOTAL CA: CPT

## 2024-05-05 PROCEDURE — 99207 PR NO CHARGE LOS: CPT | Performed by: STUDENT IN AN ORGANIZED HEALTH CARE EDUCATION/TRAINING PROGRAM

## 2024-05-05 PROCEDURE — 120N000005 HC R&B MS OVERFLOW UMMC

## 2024-05-05 PROCEDURE — 85027 COMPLETE CBC AUTOMATED: CPT

## 2024-05-05 PROCEDURE — 99232 SBSQ HOSP IP/OBS MODERATE 35: CPT | Mod: GC | Performed by: STUDENT IN AN ORGANIZED HEALTH CARE EDUCATION/TRAINING PROGRAM

## 2024-05-05 PROCEDURE — 250N000012 HC RX MED GY IP 250 OP 636 PS 637: Performed by: INTERNAL MEDICINE

## 2024-05-05 PROCEDURE — 250N000013 HC RX MED GY IP 250 OP 250 PS 637

## 2024-05-05 PROCEDURE — 36415 COLL VENOUS BLD VENIPUNCTURE: CPT

## 2024-05-05 PROCEDURE — 84100 ASSAY OF PHOSPHORUS: CPT

## 2024-05-05 PROCEDURE — 83735 ASSAY OF MAGNESIUM: CPT

## 2024-05-05 PROCEDURE — 250N000013 HC RX MED GY IP 250 OP 250 PS 637: Performed by: STUDENT IN AN ORGANIZED HEALTH CARE EDUCATION/TRAINING PROGRAM

## 2024-05-05 RX ORDER — CIPROFLOXACIN 500 MG/1
500 TABLET, FILM COATED ORAL EVERY 12 HOURS SCHEDULED
Status: DISCONTINUED | OUTPATIENT
Start: 2024-05-06 | End: 2024-05-07 | Stop reason: HOSPADM

## 2024-05-05 RX ORDER — MAGNESIUM OXIDE 400 MG/1
400 TABLET ORAL EVERY 4 HOURS
Status: COMPLETED | OUTPATIENT
Start: 2024-05-05 | End: 2024-05-05

## 2024-05-05 RX ORDER — MAGNESIUM OXIDE 400 MG/1
400 TABLET ORAL EVERY 4 HOURS
Status: ACTIVE | OUTPATIENT
Start: 2024-05-05 | End: 2024-05-06

## 2024-05-05 RX ADMIN — MAGNESIUM OXIDE TAB 400 MG (241.3 MG ELEMENTAL MG) 400 MG: 400 (241.3 MG) TAB at 11:50

## 2024-05-05 RX ADMIN — PREDNISONE 5 MG: 5 TABLET ORAL at 07:47

## 2024-05-05 RX ADMIN — ATORVASTATIN CALCIUM 10 MG: 10 TABLET, FILM COATED ORAL at 19:50

## 2024-05-05 RX ADMIN — Medication 1 TABLET: at 17:53

## 2024-05-05 RX ADMIN — ERTAPENEM SODIUM 1 G: 1 INJECTION, POWDER, LYOPHILIZED, FOR SOLUTION INTRAMUSCULAR; INTRAVENOUS at 08:47

## 2024-05-05 RX ADMIN — MYCOPHENOLIC ACID 360 MG: 360 TABLET, DELAYED RELEASE ORAL at 17:53

## 2024-05-05 RX ADMIN — TACROLIMUS 1 MG: 1 CAPSULE ORAL at 17:53

## 2024-05-05 RX ADMIN — MYCOPHENOLIC ACID 360 MG: 360 TABLET, DELAYED RELEASE ORAL at 07:46

## 2024-05-05 RX ADMIN — PSYLLIUM HUSK 1 PACKET: 3.4 POWDER ORAL at 07:47

## 2024-05-05 RX ADMIN — TACROLIMUS 1.5 MG: 1 CAPSULE ORAL at 07:46

## 2024-05-05 RX ADMIN — MAGNESIUM OXIDE TAB 400 MG (241.3 MG ELEMENTAL MG) 400 MG: 400 (241.3 MG) TAB at 07:47

## 2024-05-05 RX ADMIN — Medication 1 TABLET: at 11:50

## 2024-05-05 RX ADMIN — LORATADINE 10 MG: 10 TABLET ORAL at 11:50

## 2024-05-05 RX ADMIN — ACETAMINOPHEN 975 MG: 325 TABLET, FILM COATED ORAL at 02:22

## 2024-05-05 RX ADMIN — ASPIRIN 81 MG CHEWABLE TABLET 81 MG: 81 TABLET CHEWABLE at 19:50

## 2024-05-05 ASSESSMENT — ACTIVITIES OF DAILY LIVING (ADL)
ADLS_ACUITY_SCORE: 20

## 2024-05-05 NOTE — PROGRESS NOTES
Brief ID Note:  Discussed patient with [facility] 3 team  Fever curve improved, leukocytosis resolved  Urine Cx from 5/1 with >100k non-ESBL E.coli. 5/1 blood cultures negative    Ok to switch from Ertapenem to Ciprofloxacin 500mg PO q12h  Await tagged WBC scan results - scheduled for tomorrow  Continue Bactrim ppx    HEENA Bates  Staff Physician, Infectious Diseases  Pager [phone]

## 2024-05-05 NOTE — PLAN OF CARE
"Goal Outcome Evaluation:  /61 (BP Location: Right arm)   Pulse 82   Temp 97.6  F (36.4  C) (Oral)   Resp 18   Ht 1.778 m (5' 10\")   Wt 68.2 kg (150 lb 6.4 oz)   SpO2 97%   BMI 21.58 kg/m      Shift: [id number]  Isolation Status: Contact  VS: WDL on RA, afebrile  Neuro: Aox4  Behaviors: cooperative with cares, able to make his needs known  BG: NA  Labs/Imaging: Mg 1.6 (replaced per protocol), Ph 3.2 (no replacement necessary)  Respiratory: Clear lung sounds, no shortness of breath  Cardiac: Regular rhythm/rate  Pain/Nausea: Mild headache in AM  PRN: Loratadine for seasonal allergies  Diet: Regular diet, good appetite  IV Access: PIV x1  Infusion(s): Last dose of ertapenem  Lines/Drains: NA  GI/: Voiding well.  Formed BM  Skin: Intact  Mobility: Independent  Plan: Re-run Mg and Ph protocols in AM.  Likely discharge on Tuesday.                        "

## 2024-05-05 NOTE — PROGRESS NOTES
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     Changes Today:   - Discontinue IV Ertapenem, Start oral Ciprofloxacin 500g BID   - Remaining inpatient for tagged WBC scan ordered, scheduled for Monday 5/6/24     E. Coli Urinary Tract Infection  Hx of Recurrent UTI's  Hx of ESBL  Cystocele s/p pessary  Hypomagnesemia - resolving  Hypophosphatemia - resolving  Hyponatremia - resolved  Patient presented with 2 days of increased urinary frequency followed by fevers, chills, rigors, and generalized weakness. Likely that patient developed a recurrent UTI leading to sepsis. The patient was started on IV Ertapenem due to patient's history of ESBL. CXR completed on admission without abnormalities. Reviewing patient's chart, CT A/P during 9/22/22 without evidence of structural or anatomical abnormalities as well as cystoscopy and urodynamic studies performed during 2023 that were unremarkable for cause of recurrent UTI's. Multiple factors that patient has for recurrent UTI's includes female gender, immunocompromised status, kidney transplant recipient x2, pessary manipulation daily per patient preference, and inconsistent use of topical estrogen. Transplant infectious  "disease was consulted during admission with plan as below for treatment of UTI:   - Continue IV Ertapenem until discussion with transplant ID   -> Urine Cultures with growth of E. 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  Tx: DDKT and Liver Tx  Transplant: 2/2/2016 (Kidney), 3/3/2011 (Liver), 10/9/2010 (Kidney / Liver)  Significant changes in immunosuppression: None  Significant transplant-related complications: CMV Viremia and Recurrent UTIs     Transplant nephrology following for inpatient management:   - Continue PTA Tacrolimus 1.5 mg qAM and 1 mg qHS              -> Tacrolimus level to be completed in the AM   - Continue PTA  mg q12h   - Continue PTA Prednisone 5 mg daily   - Continue PTA Bactrim MWF   - Treatment for UTI as above  - Give Mag Oxide 400mg twice     Liver Transplant History  S/p SLK 10/2010.  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 Re-liver transplant 3/3/2011. No allograft dysfunction: no history of rejection nor biliary strictures. No acute concerns at this time.        Osteopenia  - Continue PTA calcium and vitamin D supplements  - Repeat DXA in early 2025     Non-obstructive CAD  Microvascular angina, stable  Dyslipidemia  Follows with Dr. Mohamud, last seen 12/4/2023 for microvascular angina and non-obstructive CAD. Patient continues to not take daily statin that was previously prescribed.    - Continue PTA ASA 81 mg daily    Diet: Combination Diet Regular Diet Adult    DVT Prophylaxis: Pneumatic Compression Devices  Trujillo Catheter: Not present  Code " Status: Full Code      Disposition Plan     Expected Discharge Date: 05/07/2024,  3:00 PM    Destination: home with family  Discharge Comments: Patient to stay through the weekend for WBC scan on Monday with read on Tuesday. Pending results of scan will determine patient's antibiotic therapy.        The patient's care was discussed with Dr. Lewis.    Cristina Jacobo MD  Medicine Service, [facility] TEAM [address] [place] [address]  Securely message with [employer] info)  Text page via [facility] Paging/Directory   See signed in provider for up to date coverage information  ______________________________________________________________________    Interval History   No acute events overnight. Belen is relieved to be feeling so much better than when she got here, is happy to stay through the scan Monday/Tuesday. No pain, SOB, nausea or chills today.     Physical Exam   Vital Signs: Temp: 98.1  F (36.7  C) Temp src: Oral BP: 108/56 Pulse: 78   Resp: 16 SpO2: 98 % O2 Device: None (Room air)    Weight: 150 lbs 6.4 oz  General Appearance:  Patient alert and calm  Respiratory: CTAB, no wheezing present  Cardiovascular: Normal rate, regular rhythm, no murmurs appreciated  GI: Soft, non-distended, no pain with palpation  Skin: No skin rashes or open wounds. Thin skin with varicose veins.   Extremities: No bilateral lower extremity edema  Neuro: AAOx3, moving all 4 extremities without focal deficit    Data:  Recent Results (from the past 24 hour(s))   Magnesium    Collection Time: 05/04/24  3:52 PM   Result Value Ref Range    Magnesium 2.2 1.7 - 2.3 mg/dL   Phosphorus    Collection Time: 05/05/24  5:51 AM   Result Value Ref Range    Phosphorus 3.2 2.5 - 4.5 mg/dL   Basic metabolic panel    Collection Time: 05/05/24  5:51 AM   Result Value Ref Range    Sodium 135 135 - 145 mmol/L    Potassium 4.3 3.4 - 5.3 mmol/L    Chloride 104 98 - 107 mmol/L    Carbon Dioxide (CO2) 22 22 - 29 mmol/L    Anion Gap  9 7 - 15 mmol/L    Urea Nitrogen 11.5 8.0 - 23.0 mg/dL    Creatinine 0.77 0.51 - 0.95 mg/dL    GFR Estimate 86 >60 mL/min/1.73m2    Calcium 10.0 8.8 - 10.2 mg/dL    Glucose 93 70 - 99 mg/dL   CBC with platelets    Collection Time: 05/05/24  5:51 AM   Result Value Ref Range    WBC Count 8.5 4.0 - 11.0 10e3/uL    RBC Count 4.82 3.80 - 5.20 10e6/uL    Hemoglobin 14.8 11.7 - 15.7 g/dL    Hematocrit 44.5 35.0 - 47.0 %    MCV 92 78 - 100 fL    MCH 30.7 26.5 - 33.0 pg    MCHC 33.3 31.5 - 36.5 g/dL    RDW 14.6 10.0 - 15.0 %    Platelet Count 189 150 - 450 10e3/uL   Magnesium    Collection Time: 05/05/24  5:51 AM   Result Value Ref Range    Magnesium 1.6 (L) 1.7 - 2.3 mg/dL

## 2024-05-05 NOTE — PLAN OF CARE
"/56 (BP Location: Right arm)   Pulse 78   Temp 98.1  F (36.7  C) (Oral)   Resp 16   Ht 1.778 m (5' 10\")   Wt 68.2 kg (150 lb 6.4 oz)   SpO2 98%   BMI 21.58 kg/m        2453-3211  Neuro:  Pt. alert & Ox4  Behavior:  Pt. calm & cooperative with cares. Slept well between cares.   Activity:  Pt. up ad marcela.  Vital:  AVSS on RA  LDAs: Rt. PIV SL.  BG: N/A  Cardiac: WNL  Respiratory: LS clear bilat.  GI/:  Pt. voiding without difficulty.  No stools this shift.  Skin: Intact  Pain/Nausea:  Pt. c/o headache and managed with prn Tylenol x1. Pt. denies nausea.   Diet: Regular  Labs/Imaging:  Morning labs pending.   Plan: Continue to follow POC and notify team with change in status.                          "

## 2024-05-05 NOTE — PROGRESS NOTES
"/53 (BP Location: Right arm)   Pulse 82   Temp 97.7  F (36.5  C) (Oral)   Resp 16   Ht 1.778 m (5' 10\")   Wt 68.2 kg (150 lb 6.4 oz)   SpO2 97%   BMI 21.58 kg/m      Shift: [id number]  Isolation Status: Contact, ESBL  VS: VSS on room air, afebrile  Neuro: Aox4  Behaviors: calm, cooperative  BG: None  Labs: Mg 2.2, Phos 2.8  Respiratory: WDL  Cardiac: WDL  Pain/Nausea: Reported headache (prn given). Denies nausea.  PRN: Prn tylenol @ 1700  Diet: regular  IV Access: R PIV, L AV fistula  GI/: Voiding, saving in hat  Skin: WDL  Mobility: UAL  Plan: Continue with plan of care.  "

## 2024-05-05 NOTE — PROGRESS NOTES
[facility]   Transplant Nephrology Progress Note  Date of Admission:  5/1/2024  Today's Date: 05/05/2024    Recommendations:  -  give Mag Oxide 400 mg twice.     Assessment & Plan     # DDKT: Stable serum creatinine.   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  Patient underwent a second liver transplant 3/3/11.  She appears to be doing well with normal LFTs.  Followed by Hepatology.     # Immunosuppression Prior to Admission: Tacrolimus immediate release (goal 4-6), Mycophenolic acid (dose 540 mg every 12 hours), and Prednisone (dose 5 mg daily)   - Present Immunosuppression: Sublingual Tacrolimus immediate release (goal 4-6), Mycophenolic acid (dose 360 mg every 12 hours), and Methylprednisolone (dose 5 mg daily)   - Patient is in an immunosuppressed state and will continue to monitor for efficacy and toxicity of immunosuppression medications.   - Changes: No continue reduced mycophenolic acid 360 mg po bid due to severe sepsis and recurrent UTI.      # Infection Prophylaxis:   - PJP: Sulfa/TMP (Bactrim) MWF    # Hx of Recurrent UTI  # ESBL UTI: presented 05/01/24 with one day of N/V, fever, body aches, HA, weakness and lightheadedness.  UA shows large leukocyte esterase and > 182 WBCs.  UC 4/18 positive for ESBL E. Coli.  UC 4/18 positive for ESBL E. Coli.    - ertapenem 05/01/24 - present   - Reduce mycophenolic acid 360 mg BID   - Antibiotics per Transplant Infectious Disease.      # PKD:   -Seen by txp surgery. Risks outweigh benefits for native nephrectomy    #Orthostatic hypotension:  Controlled;  Goal BP: < 130/80   - Changes: Not at this time.     # Post-Transplant Erythrocytosis: Hgb: Stable, high;  On ACEI/ARB: No   Imaging: Not at this time    # Mineral Bone Disorder:   - Secondary renal hyperparathyroidism; PTH level: Not checked recently        On treatment: None  - Vitamin D; level: Not checked recently         On supplement: No  - Calcium; level: Normal            On supplement: Yes, Oscal 500-5 mg-mcg PO BID  - Phosphorus; level: Low (trend for now)         On supplement: No     # Electrolytes:   - Potassium; level: Normal         On supplement: No  - Magnesium; level: Low   On supplement: No, give Mag Oxide 400 mg twice.   - Bicarbonate; level: Low normal         On supplement: No.     # HSIL:    -per cone biopsy on 9/30/20. No evidence of dysplasia. Most recent coloposcopy 4/25/23 with negative biopsies. Plan for repeat colposcopy or Pap in ~10/2023    # Microvascular angina:   -Follows with cardiology, Dr. Mohamud.    -She refuses statin   -Uses SL angina a few times per year    # Cystocele:              -Has pessary. 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    Discussed with Dr. Angulo .    Carl Renteria, APRN CNP  Pager: [phone]       Interval History   Ms. Awad creatinine is 0.77 (05/05 0551); Stable.No  fevers, chills or sweats. No abdominal pain or flank pain. No nausea or vomiting.Has good amount of urine.  Vitals - stable.       Review of Systems   4 point ROS was obtained and negative except as noted in the Interval History.    MEDICATIONS:  Current Facility-Administered Medications   Medication  "Dose Route Frequency Provider Last Rate Last Admin    aspirin (ASA) chewable tablet 81 mg  81 mg Oral QPM Roly Pantoja MD   81 mg at 24    atorvastatin (LIPITOR) tablet 10 mg  10 mg Oral QPM Roly Pantoja MD   10 mg at 24    calcium carbonate-vitamin D (OSCAL) 500-5 MG-MCG per tablet 1 tablet  1 tablet Oral BID Roly Pantoja MD   1 tablet at 24 1150    [START ON 2024] ciprofloxacin (CIPRO) tablet 500 mg  500 mg Oral Q12H [facility] () Cristina Jacobo MD        mycophenolic acid (GENERIC EQUIVALENT) EC tablet 360 mg  360 mg Oral BID IS Getachew Ross MD   360 mg at 24 0746    predniSONE (DELTASONE) tablet 5 mg  5 mg Oral Daily Roly Pantoja MD   5 mg at 24 0747    psyllium (METAMUCIL/KONSYL) Packet 1 packet  1 packet Oral Daily Roly Pantoja MD   1 packet at 24 0747    sodium chloride (PF) 0.9% PF flush 3 mL  3 mL Intracatheter Q8H Roly Pantoja MD   3 mL at 24 1214    sulfamethoxazole-trimethoprim (BACTRIM) 400-80 MG per tablet 1 tablet  1 tablet Oral Once per day on  Roly Pantoja MD   1 tablet at 24 0801    tacrolimus (GENERIC EQUIVALENT) capsule 1 mg  1 mg Oral QPM Roly Pantoja MD   1 mg at 24 1806    tacrolimus (GENERIC EQUIVALENT) capsule 1.5 mg  1.5 mg Oral QAM Roly Pantoja MD   1.5 mg at 24 0746     Current Facility-Administered Medications   Medication Dose Route Frequency Provider Last Rate Last Admin       Physical Exam   Temp  Av.8  F (37.1  C)  Min: 97.5  F (36.4  C)  Max: 99.9  F (37.7  C)      Pulse  Av.4  Min: 80  Max: 120 Resp  Av  Min: 16  Max: 22  SpO2  Av.8 %  Min: 90 %  Max: 100 %     /61 (BP Location: Right arm)   Pulse 82   Temp 97.6  F (36.4  C) (Oral)   Resp 18   Ht 1.778 m (5' 10\")   Wt 68.2 kg (150 lb 6.4 oz)   SpO2 97%   BMI 21.58 kg/m     Date 24 07 - 24 0659   Shift 0233-5547 1785-1048 9638-6308 24 Hour Total   INTAKE   P.O. 240   " 240   Shift Total(mL/kg) 240(3.33)   240(3.33)   OUTPUT   Urine 500   500   Shift Total(mL/kg) 500(6.94)   500(6.94)   Weight (kg) 72 72 72 72      Admit Weight: 72 kg (158 lb 11.7 oz)     GENERAL APPEARANCE: moderate distress  RESP: lungs clear to auscultation - no rales, rhonchi or wheezes  CV: regular rhythm, normal rate, no rub, no murmur  EDEMA: no LE edema bilaterally  ABDOMEN: soft, nondistended, nontender, bowel sounds normal  MS: extremities normal - no gross deformities noted, no evidence of inflammation in joints, no muscle tenderness  SKIN: no rash  PSYCH: fatigued  DIALYSIS ACCESS:  LUE AV fistula +bruit/+thrill    Data   All labs reviewed by me.  CMP  Recent Labs   Lab 05/05/24  0551 05/04/24  1552 05/04/24  0726 05/03/24 2005 05/03/24  0621 05/02/24  0538 05/01/24  1828 05/01/24  0845     --  135  --  137 136  --  130*   POTASSIUM 4.3  --  4.4  --  4.1 4.1  --  3.9   CHLORIDE 104  --  105  --  108* 106  --  101   CO2 22  --  21*  --  21* 20*  --  17*   ANIONGAP 9  --  9  --  8 10  --  12   GLC 93  --  96  --  95 88  --  104*   BUN 11.5  --  12.0  --  15.7 15.5  --  18.0   CR 0.77  --  0.82  --  0.81 0.96*  --  0.89   GFRESTIMATED 86  --  79  --  81 66  --  72   EDSON 10.0  --  10.2  --  9.8 8.8  --  9.5   MAG 1.6* 2.2 1.4*  --  1.6* 2.7*   < > 1.5*   PHOS 3.2  --  2.8 2.3* 1.7* 2.5  --  2.2*   PROTTOTAL  --   --   --   --  5.4* 5.3*  --  6.5   ALBUMIN  --   --   --   --  2.9* 2.9*  --  3.8   BILITOTAL  --   --   --   --  0.6 0.8  0.8  --  1.4*  1.4*   ALKPHOS  --   --   --   --  62 61  --  57   AST  --   --   --   --  14 24  --  20   ALT  --   --   --   --  9 11  --  12    < > = values in this interval not displayed.     CBC  Recent Labs   Lab 05/05/24  0551 05/04/24  0726 05/03/24  0621 05/02/24  0538   HGB 14.8 14.7 13.6 14.5   WBC 8.5 9.6 16.1* 20.6*   RBC 4.82 4.92 4.49 4.71   HCT 44.5 45.4 41.1 43.8   MCV 92 92 92 93   MCH 30.7 29.9 30.3 30.8   MCHC 33.3 32.4 33.1 33.1   RDW 14.6 15.0 14.9  15.1*    174 144* 154     INRNo lab results found in last 7 days.  ABG  Recent Labs   Lab 05/01/24  0855   O2PER 20      Urine Studies  Recent Labs   Lab Test 05/01/24  0852 04/18/24  1112 04/03/24  1302 02/05/24  1328 05/16/23  0805   COLOR Orange* Yellow Yellow Yellow Yellow   APPEARANCE Cloudy* Cloudy* Clear Slightly Cloudy* Slightly Cloudy*   URINEGLC Negative Negative Negative Negative Negative   URINEBILI Negative Negative Negative Negative Negative   URINEKETONE Negative Negative Negative Negative Negative   SG 1.013 1.015 1.010 1.010 1.010   UBLD Moderate* Small* Small* Trace* Small*   URINEPH 7.0 7.0 7.0 6.5 7.0   PROTEIN 300* 100* Trace* Negative Negative   UROBILINOGEN  --  0.2 0.2 0.2 0.2   NITRITE Negative Positive* Negative Negative Negative   LEUKEST Large* Large* Large* Large* Large*   RBCU 54* 0-2 2-5* 0-2 None Seen   WBCU >182* * 0-5 25-50* 5-10*     Recent Labs   Lab Test 06/04/21  1130 09/10/20  1037   UTPG 0.34* 0.16     PTH  Recent Labs   Lab Test 02/10/22  0802 09/23/20  1653   PTHI 66 45     Iron Studies  No lab results found.    IMAGING:  All imaging studies reviewed by me.

## 2024-05-06 ENCOUNTER — APPOINTMENT (OUTPATIENT)
Dept: NUCLEAR MEDICINE | Facility: CLINIC | Age: 64
DRG: 872 | End: 2024-05-06
Payer: MEDICARE

## 2024-05-06 LAB
ANION GAP SERPL CALCULATED.3IONS-SCNC: 16 MMOL/L (ref 7–15)
BACTERIA BLD CULT: NO GROWTH
BACTERIA BLD CULT: NO GROWTH
BUN SERPL-MCNC: 13.9 MG/DL (ref 8–23)
CALCIUM SERPL-MCNC: 10.6 MG/DL (ref 8.8–10.2)
CHLORIDE SERPL-SCNC: 105 MMOL/L (ref 98–107)
CREAT SERPL-MCNC: 0.75 MG/DL (ref 0.51–0.95)
DEPRECATED HCO3 PLAS-SCNC: 17 MMOL/L (ref 22–29)
EGFRCR SERPLBLD CKD-EPI 2021: 88 ML/MIN/1.73M2
GLUCOSE SERPL-MCNC: 95 MG/DL (ref 70–99)
HOLD SPECIMEN: NORMAL
MAGNESIUM SERPL-MCNC: 1.6 MG/DL (ref 1.7–2.3)
PHOSPHATE SERPL-MCNC: 3.1 MG/DL (ref 2.5–4.5)
POTASSIUM SERPL-SCNC: 4.6 MMOL/L (ref 3.4–5.3)
SODIUM SERPL-SCNC: 138 MMOL/L (ref 135–145)

## 2024-05-06 PROCEDURE — 78802 RP LOCLZJ TUM WHBDY 1 D IMG: CPT | Mod: MG

## 2024-05-06 PROCEDURE — 84100 ASSAY OF PHOSPHORUS: CPT | Performed by: STUDENT IN AN ORGANIZED HEALTH CARE EDUCATION/TRAINING PROGRAM

## 2024-05-06 PROCEDURE — 250N000012 HC RX MED GY IP 250 OP 636 PS 637: Performed by: INTERNAL MEDICINE

## 2024-05-06 PROCEDURE — 83735 ASSAY OF MAGNESIUM: CPT | Performed by: STUDENT IN AN ORGANIZED HEALTH CARE EDUCATION/TRAINING PROGRAM

## 2024-05-06 PROCEDURE — 343N000001 HC RX 343: Mod: JZ | Performed by: STUDENT IN AN ORGANIZED HEALTH CARE EDUCATION/TRAINING PROGRAM

## 2024-05-06 PROCEDURE — 250N000013 HC RX MED GY IP 250 OP 250 PS 637

## 2024-05-06 PROCEDURE — 250N000013 HC RX MED GY IP 250 OP 250 PS 637: Performed by: STUDENT IN AN ORGANIZED HEALTH CARE EDUCATION/TRAINING PROGRAM

## 2024-05-06 PROCEDURE — 120N000005 HC R&B MS OVERFLOW UMMC

## 2024-05-06 PROCEDURE — 78802 RP LOCLZJ TUM WHBDY 1 D IMG: CPT | Mod: 26 | Performed by: RADIOLOGY

## 2024-05-06 PROCEDURE — G1010 CDSM STANSON: HCPCS | Performed by: RADIOLOGY

## 2024-05-06 PROCEDURE — 36415 COLL VENOUS BLD VENIPUNCTURE: CPT | Performed by: STUDENT IN AN ORGANIZED HEALTH CARE EDUCATION/TRAINING PROGRAM

## 2024-05-06 PROCEDURE — 99232 SBSQ HOSP IP/OBS MODERATE 35: CPT | Mod: GC | Performed by: STUDENT IN AN ORGANIZED HEALTH CARE EDUCATION/TRAINING PROGRAM

## 2024-05-06 PROCEDURE — 82374 ASSAY BLOOD CARBON DIOXIDE: CPT

## 2024-05-06 PROCEDURE — 99233 SBSQ HOSP IP/OBS HIGH 50: CPT | Mod: FS | Performed by: INTERNAL MEDICINE

## 2024-05-06 PROCEDURE — 250N000012 HC RX MED GY IP 250 OP 636 PS 637

## 2024-05-06 PROCEDURE — 82565 ASSAY OF CREATININE: CPT

## 2024-05-06 PROCEDURE — 99233 SBSQ HOSP IP/OBS HIGH 50: CPT | Performed by: PHYSICIAN ASSISTANT

## 2024-05-06 PROCEDURE — A9547 IN111 OXYQUINOLINE: HCPCS | Mod: JZ | Performed by: STUDENT IN AN ORGANIZED HEALTH CARE EDUCATION/TRAINING PROGRAM

## 2024-05-06 RX ORDER — INDIUM IN-111 OXYQUINOLINE 1 UG/ML
200-900 SOLUTION INTRAVENOUS ONCE
Status: COMPLETED | OUTPATIENT
Start: 2024-05-06 | End: 2024-05-06

## 2024-05-06 RX ORDER — MAGNESIUM OXIDE 400 MG/1
400 TABLET ORAL EVERY 4 HOURS
Status: COMPLETED | OUTPATIENT
Start: 2024-05-06 | End: 2024-05-06

## 2024-05-06 RX ORDER — SODIUM BICARBONATE 650 MG/1
1300 TABLET ORAL 2 TIMES DAILY
Status: DISCONTINUED | OUTPATIENT
Start: 2024-05-06 | End: 2024-05-07 | Stop reason: HOSPADM

## 2024-05-06 RX ADMIN — MYCOPHENOLIC ACID 360 MG: 360 TABLET, DELAYED RELEASE ORAL at 08:07

## 2024-05-06 RX ADMIN — SULFAMETHOXAZOLE AND TRIMETHOPRIM 1 TABLET: 400; 80 TABLET ORAL at 08:07

## 2024-05-06 RX ADMIN — PSYLLIUM HUSK 1 PACKET: 3.4 POWDER ORAL at 08:08

## 2024-05-06 RX ADMIN — MAGNESIUM OXIDE TAB 400 MG (241.3 MG ELEMENTAL MG) 400 MG: 400 (241.3 MG) TAB at 11:49

## 2024-05-06 RX ADMIN — TACROLIMUS 1.5 MG: 1 CAPSULE ORAL at 08:07

## 2024-05-06 RX ADMIN — SODIUM BICARBONATE 1300 MG: 650 TABLET ORAL at 20:21

## 2024-05-06 RX ADMIN — ASPIRIN 81 MG CHEWABLE TABLET 81 MG: 81 TABLET CHEWABLE at 20:21

## 2024-05-06 RX ADMIN — CIPROFLOXACIN HYDROCHLORIDE 500 MG: 500 TABLET, FILM COATED ORAL at 16:02

## 2024-05-06 RX ADMIN — MYCOPHENOLIC ACID 360 MG: 360 TABLET, DELAYED RELEASE ORAL at 17:56

## 2024-05-06 RX ADMIN — MAGNESIUM OXIDE TAB 400 MG (241.3 MG ELEMENTAL MG) 400 MG: 400 (241.3 MG) TAB at 08:07

## 2024-05-06 RX ADMIN — TACROLIMUS 1 MG: 1 CAPSULE ORAL at 17:56

## 2024-05-06 RX ADMIN — PREDNISONE 5 MG: 5 TABLET ORAL at 08:07

## 2024-05-06 RX ADMIN — INDIUM IN-111 OXYQUINOLINE 542 MICROCURIE: 1 SOLUTION INTRAVENOUS at 11:12

## 2024-05-06 RX ADMIN — ATORVASTATIN CALCIUM 10 MG: 10 TABLET, FILM COATED ORAL at 20:21

## 2024-05-06 RX ADMIN — Medication 1 TABLET: at 11:49

## 2024-05-06 RX ADMIN — CIPROFLOXACIN HYDROCHLORIDE 500 MG: 500 TABLET, FILM COATED ORAL at 05:46

## 2024-05-06 ASSESSMENT — ACTIVITIES OF DAILY LIVING (ADL)
ADLS_ACUITY_SCORE: 20

## 2024-05-06 NOTE — PLAN OF CARE
"BP 98/52 (BP Location: Right arm)   Pulse 77   Temp 98  F (36.7  C) (Oral)   Resp 16   Ht 1.778 m (5' 10\")   Wt 65.5 kg (144 lb 8 oz)   SpO2 96%   BMI 20.73 kg/m       VS *** on ***. Patient *** pain ***. Patient *** nausea ***. BG ***. Urine Output - ***. Bowel Function - ***. Nutrition - ***. Drains - ***. Activity - ***. Education - ***. Plan of Care - ***.    "

## 2024-05-06 NOTE — PROGRESS NOTES
[facility]   Transplant Nephrology Progress Note  Date of Admission:  5/1/2024  Today's Date: 05/06/2024    Recommendations:  - Hold calcium carbonate (ordered).  - Give sodium bicarbonate 1300 mg PO BID (ordered).    Assessment & Plan   # DDKT: Stable serum creatinine.   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  Patient underwent a second liver transplant 3/3/11.  She appears to be doing well with normal LFTs.  Followed by Hepatology.     # Immunosuppression Prior to Admission: Tacrolimus immediate release (goal 4-6), Mycophenolic acid (dose 540 mg every 12 hours), and Prednisone (dose 5 mg daily)   - Present Immunosuppression: Tacrolimus immediate release (goal 4-6), Mycophenolic acid (dose 360 mg every 12 hours), and Prednisone (dose 5 mg daily)   - Patient is in an immunosuppressed state and will continue to monitor for efficacy and toxicity of immunosuppression medications.   - Changes: No, continue reduced mycophenolic acid 360 mg po bid due to severe sepsis and recurrent UTI.      # Infection Prophylaxis:   - PJP: Sulfa/TMP (Bactrim) MWF    # Hx of Recurrent UTI  # E. Coli UTI: presented 05/01/24 with one day of N/V, fever, body aches, HA, weakness and lightheadedness.  UA shows large leukocyte esterase and > 182 WBCs.  UC 4/18 positive for ESBL E. Coli.  UC 4/18 positive for ESBL E. Coli.    - ertapenem 05/01/24 - 5/5/24   - Reduce mycophenolic acid 360 mg BID   - Antibiotics per Transplant Infectious Disease.  - ciprofloxacin 05/06/24 - present    # PKD:   -Seen by txp surgery. Risks  outweigh benefits for native nephrectomy    #Orthostatic hypotension: Controlled;  Goal BP: < 130/80   - Changes: Not at this time.     # Post-Transplant Erythrocytosis: Hgb: Stable, high;  On ACEI/ARB: No   Imaging: Not at this time    # Mineral Bone Disorder:   - Secondary renal hyperparathyroidism; PTH level: Not checked recently        On treatment: None  - Vitamin D; level: Not checked recently         On supplement: No  - Calcium; level: High            On supplement:  Hold , Oscal 500-5 mg-mcg PO BID  - Phosphorus; level: Normal             On supplement: No     # Electrolytes:   - Potassium; level: Normal           On supplement: No  - Magnesium; level: Low  (trend for now)  On supplement: No  - Bicarbonate; level: Low           On supplement: No. Give sodium bicarbonate 1300 mg PO BID (ordered).     # HSIL:    -per cone biopsy on 9/30/20. No evidence of dysplasia. Most recent coloposcopy 4/25/23 with negative biopsies. Plan for repeat colposcopy or Pap in ~10/2023    # Microvascular angina:   -Follows with cardiology, Dr. Mohamud.    -She refuses statin   -Uses SL angina a few times per year    # Cystocele:              -Has pessary. 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    Discussed with Dr. Berta Boone, APRN CNP  Pager: [phone]      Interval History   Indium scan today.  Ms. Sharma's creatinine is 0.75 (05/06 0621); Stable from 0.77 yesterday.  Estimated Creatinine Clearance: 76.3 mL/min (based on SCr of 0.77 mg/dL).   I/O last 3 completed shifts:  In: 300 [P.O.:300]  Out: 3260 [Urine:3260]   Other significant  labs/tests/vitals: SBP 100s - 110s overnight. Afebrile  No acute events overnight.  No chest pain or shortness of breath.  No leg swelling.  No nausea or vomiting.  No diarrhea.  Pt reports she is drinking plenty of fluids.        Review of Systems   4 point ROS was obtained and negative except as noted in the Interval History.    MEDICATIONS:  Current Facility-Administered Medications   Medication Dose Route Frequency Provider Last Rate Last Admin    aspirin (ASA) chewable tablet 81 mg  81 mg Oral QPM Roly Pantoja MD   81 mg at 05/05/24 1950    atorvastatin (LIPITOR) tablet 10 mg  10 mg Oral QPM Roly Pantoja MD   10 mg at 05/05/24 1950    calcium carbonate-vitamin D (OSCAL) 500-5 MG-MCG per tablet 1 tablet  1 tablet Oral BID Roly Pantoja MD   1 tablet at 05/05/24 1753    ciprofloxacin (CIPRO) tablet 500 mg  500 mg Oral Q12H [facility] (08/20) Cristina Jacobo MD   500 mg at 05/06/24 0546    indium In-111 oxyquinoline (In-111 WBC) radioisotope injection 200-900 microcurie  200-900 microcurie Intravenous Once Emerita Lewis MD        magnesium oxide (MAG-OX) tablet 400 mg  400 mg Oral Q4H Emerita Lewis MD   400 mg at 05/06/24 0807    mycophenolic acid (GENERIC EQUIVALENT) EC tablet 360 mg  360 mg Oral BID IS Getachew Ross MD   360 mg at 05/06/24 0807    predniSONE (DELTASONE) tablet 5 mg  5 mg Oral Daily Roly Pantoja MD   5 mg at 05/06/24 0807    psyllium (METAMUCIL/KONSYL) Packet 1 packet  1 packet Oral Daily Roly Pantoja MD   1 packet at 05/06/24 0808    sodium chloride (PF) 0.9% PF flush 3 mL  3 mL Intracatheter Q8H Roly Pantoja MD   3 mL at 05/06/24 0542    sulfamethoxazole-trimethoprim (BACTRIM) 400-80 MG per tablet 1 tablet  1 tablet Oral Once per day on Monday Wednesday Friday Roly Pantoja MD   1 tablet at 05/06/24 0807    tacrolimus (GENERIC EQUIVALENT) capsule 1 mg  1 mg Oral QPM Roly Pantoja MD   1 mg at 05/05/24 1753    tacrolimus (GENERIC EQUIVALENT) capsule 1.5  "mg  1.5 mg Oral Roly Torres MD   1.5 mg at 24 0807     Current Facility-Administered Medications   Medication Dose Route Frequency Provider Last Rate Last Admin       Physical Exam   Temp  Av.8  F (37.1  C)  Min: 97.5  F (36.4  C)  Max: 99.9  F (37.7  C)      Pulse  Av.4  Min: 80  Max: 120 Resp  Av  Min: 16  Max: 22  SpO2  Av.8 %  Min: 90 %  Max: 100 %     /55 (BP Location: Right arm)   Pulse 80   Temp 98.6  F (37  C) (Oral)   Resp 16   Ht 1.778 m (5' 10\")   Wt 65.5 kg (144 lb 8 oz)   SpO2 96%   BMI 20.73 kg/m     Date 24 07 - 24 0659   Shift 6651-7575 5407-5554 9178-3604 24 Hour Total   INTAKE   P.O. 240   240   Shift Total(mL/kg) 240(3.33)   240(3.33)   OUTPUT   Urine 500   500   Shift Total(mL/kg) 500(6.94)   500(6.94)   Weight (kg) 72 72 72 72      Admit Weight: 72 kg (158 lb 11.7 oz)     GENERAL APPEARANCE: alert, no distress  RESP: lungs clear to auscultation - no rales, rhonchi or wheezes  CV: regular rhythm, normal rate, no rub, no murmur  EDEMA: no LE edema bilaterally  ABDOMEN: soft, nondistended, nontender, bowel sounds normal  MS: extremities normal - no gross deformities noted, no evidence of inflammation in joints, no muscle tenderness  DIALYSIS ACCESS:  LUE AV fistula +bruit/+thrill    Data   All labs reviewed by me.  CMP  Recent Labs   Lab 24  0621 24  0551 24  1552 24  0726 24  0621 24  0538 24  1828 24  0845   NA  --  135  --  135  --  137 136  --  130*   POTASSIUM  --  4.3  --  4.4  --  4.1 4.1  --  3.9   CHLORIDE  --  104  --  105  --  108* 106  --  101   CO2  --  22  --  21*  --  21* 20*  --  17*   ANIONGAP  --  9  --  9  --  8 10  --  12   GLC  --  93  --  96  --  95 88  --  104*   BUN  --  11.5  --  12.0  --  15.7 15.5  --  18.0   CR  --  0.77  --  0.82  --  0.81 0.96*  --  0.89   GFRESTIMATED  --  86  --  79  --  81 66  --  72   EDSON  --  10.0  --  10.2  --  9.8 8.8  --  " 9.5   MAG 1.6* 1.6* 2.2 1.4*  --  1.6* 2.7*   < > 1.5*   PHOS 3.1 3.2  --  2.8 2.3* 1.7* 2.5  --  2.2*   PROTTOTAL  --   --   --   --   --  5.4* 5.3*  --  6.5   ALBUMIN  --   --   --   --   --  2.9* 2.9*  --  3.8   BILITOTAL  --   --   --   --   --  0.6 0.8  0.8  --  1.4*  1.4*   ALKPHOS  --   --   --   --   --  62 61  --  57   AST  --   --   --   --   --  14 24  --  20   ALT  --   --   --   --   --  9 11  --  12    < > = values in this interval not displayed.     CBC  Recent Labs   Lab 05/05/24  0551 05/04/24  0726 05/03/24  0621 05/02/24  0538   HGB 14.8 14.7 13.6 14.5   WBC 8.5 9.6 16.1* 20.6*   RBC 4.82 4.92 4.49 4.71   HCT 44.5 45.4 41.1 43.8   MCV 92 92 92 93   MCH 30.7 29.9 30.3 30.8   MCHC 33.3 32.4 33.1 33.1   RDW 14.6 15.0 14.9 15.1*    174 144* 154     INRNo lab results found in last 7 days.  ABG  Recent Labs   Lab 05/01/24  0855   O2PER 20      Urine Studies  Recent Labs   Lab Test 05/01/24  0852 04/18/24  1112 04/03/24  1302 02/05/24  1328 05/16/23  0805   COLOR Orange* Yellow Yellow Yellow Yellow   APPEARANCE Cloudy* Cloudy* Clear Slightly Cloudy* Slightly Cloudy*   URINEGLC Negative Negative Negative Negative Negative   URINEBILI Negative Negative Negative Negative Negative   URINEKETONE Negative Negative Negative Negative Negative   SG 1.013 1.015 1.010 1.010 1.010   UBLD Moderate* Small* Small* Trace* Small*   URINEPH 7.0 7.0 7.0 6.5 7.0   PROTEIN 300* 100* Trace* Negative Negative   UROBILINOGEN  --  0.2 0.2 0.2 0.2   NITRITE Negative Positive* Negative Negative Negative   LEUKEST Large* Large* Large* Large* Large*   RBCU 54* 0-2 2-5* 0-2 None Seen   WBCU >182* * 0-5 25-50* 5-10*     Recent Labs   Lab Test 06/04/21  1130 09/10/20  1037   UTPG 0.34* 0.16     PTH  Recent Labs   Lab Test 02/10/22  0802 09/23/20  1653   PTHI 66 45     Iron Studies  No lab results found.    IMAGING:  All imaging studies reviewed by me.

## 2024-05-06 NOTE — PROGRESS NOTES
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 Coli Urinary Tract Infection  Hx of Recurrent UTI's  Hx of ESBL  Cystocele s/p pessary  Hypomagnesemia - resolving  Hypophosphatemia - resolving  Hyponatremia - resolved  Patient presented with 2 days of increased urinary frequency followed by fevers, chills, rigors, and generalized weakness. Likely that patient developed a recurrent UTI leading to sepsis. The patient was started on IV Ertapenem due to patient's history of ESBL. CXR completed on admission without abnormalities. Reviewing patient's chart, CT A/P during 9/22/22 without evidence of structural or anatomical abnormalities as well as cystoscopy and urodynamic studies performed during 2023 that were unremarkable for cause of recurrent UTI's. Multiple factors that patient has for recurrent UTI's includes female gender, immunocompromised status, kidney transplant recipient x2, pessary manipulation daily per patient preference, and inconsistent use of topical estrogen. Transplant infectious  "disease was consulted. IV Ertapenem was transitioned to PO Ciprofloxacin on 5/5/24 in the setting of urine culture with growth of E. Coli, but not ESBL.    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 Re-liver transplant 3/3/2011. No allograft dysfunction: no history of rejection nor biliary strictures. No acute concerns at this time   - Repeat CMP in the AM to trend liver function     Osteopenia  - Continue PTA calcium and vitamin D supplements  - Repeat DXA in early 2025     Non-obstructive CAD  Microvascular angina, stable  Dyslipidemia  Follows with Dr. Mohamud, last seen 12/4/2023 for microvascular angina and non-obstructive CAD. Patient continues to not take daily statin that was previously prescribed.    - Continue PTA ASA 81 mg daily    Diet: Combination Diet Regular Diet Adult    DVT Prophylaxis: Pneumatic Compression Devices  [place] Catheter: Not present  Code " Status: Full Code      Disposition Plan      Expected Discharge Date: 05/07/2024      Destination: home with family  Discharge Comments: WBC scan today with read on Tuesday. Pending results of scan will determine patient's antibiotic therapy.        The patient's care was discussed with Dr. Lewis.    Roly Pantoja MD  Medicine Service, MAROON TEAM [address] [place] [address]  Securely message with Vocera (more info)  Text page via [employer] Paging/Directory   See signed in provider for up to date coverage information  ______________________________________________________________________    Interval History   No acute events overnight. Patient is great this morning. No further episodes of vomiting, rigors, fevers, chills, or nausea. Able to eat breakfast and lunch without issue. No further questions or concerns at this time.     Physical Exam   Vital Signs: Temp: 98  F (36.7  C) Temp src: Oral BP: 99/48 Pulse: 80   Resp: 16 SpO2: 96 % O2 Device: None (Room air)    Weight: 144 lbs 8 oz  General Appearance:  Patient alert and calm  Respiratory: CTAB, no wheezing present  Cardiovascular: Normal rate, regular rhythm, no murmurs appreciated  GI: Soft, non-distended, no pain with palpation  Skin: No skin rashes or open wounds  Extremities: No bilateral lower extremity edema  Neuro: AAOx3, moving all 4 extremities without focal deficit    Data:  Recent Results (from the past 24 hour(s))   Phosphorus    Collection Time: 05/06/24  6:21 AM   Result Value Ref Range    Phosphorus 3.1 2.5 - 4.5 mg/dL   Magnesium    Collection Time: 05/06/24  6:21 AM   Result Value Ref Range    Magnesium 1.6 (L) 1.7 - 2.3 mg/dL   Extra Purple Top Tube    Collection Time: 05/06/24  6:21 AM   Result Value Ref Range    Hold Specimen [facility]

## 2024-05-06 NOTE — PROGRESS NOTES
SOLID ORGAN TRANSPLANT ID PROGRESS NOTE     Patient:  Belen Sharma   Date of birth 1960, Medical record number [id number]  Date of Visit:  05/06/2024  Date of Admission: 5/1/2024  Consult Requester:Emerita Lewis*          Assessment and Plan:   Recommendations:  Continue Ciprofloxacin 500 mg q12 hrs for ongoing treatment of E coli (non-ESBL) UTI.   Will plan for a 10-14 day course of treatment depending on tagged WBC findings and resolution of symptoms.   Tagged WBC scan pending, will follow up on results.   Continue Bactrim PPx for PJP.  Will request ID follow up in 6 weeks.     Transplant Infectious Diseases will continue to follow.     Assessment:  Belen Sharma is a 64 year old female with PMHx significant for congenital hepatic fibrosis and PCKD s/p liver and kidney transplant in [place] (2010), now failed and required second liver transpant (2011) and repeat kidney transplant (2016). She has had issues with recurrent UTIs and is hospitalized 2/2 fever and weakness.     #Urinary tract infection   #Leukocytosis, fever, tachycardia c/w SIRS   #H/o recurrent UTIs   No dysuria, but fever, weakness, headache, and body aches at home. Had a leukocytosis, tachycardia, and reported fever at home. UA appeared dirty with large LE, >182 WBC, but did have 13 squamous cells (representing contamination). Her UCx from 5/1 grew >100K CFU E coli (R Unasyn, ampicillin, Zosyn, and Bactrim) and >100K CFU urogenital keisha. No CVA tenderness or TTP over transplanted kidney per previous ID note exam.  Previous UCx 4/18 and 2/5 with ESBL E coli. Does have uterine prolapse with pessary placement which may contribute to increased risk of UTI. She is s/p urodynmaic studies and cystoscopy 2/2/23 which were unremarkable.     Previous ID Issues:  - Hx of C diff.  - Hx of + high risk HPV testing in [place], 5/15/2020, s/p colposcopies  - Splenectomy status, with a large splenule remaining     Other ID issues:  - QTc interval:  408  msec on 11/27/2023 EKG   - Bacterial prophylaxis:  none  - Pneumocystis prophylaxis:  bactrim   - Viral serostatus & prophylaxis:  CMV+, EBV+; no prophy   - Fungal prophylaxis:  none  - Immunization status:  up to date with seasonal vaccines (flu, covid) & RSV   - Gamma globulin status:  replete   - Isolation status: Good hand hygiene. She is in contact isolation for hx of ESBL bacteria.     -Immunosuppression: Tacrolimus/MMF/prednisone    Thank you for the consult. Transplant ID will continue to follow with you.     Nessa Calzada PA-C  Infectious Diseases  Contact via [employer] or [place] Paging/Directory    Medical Decision Making     50 MINUTES SPENT BY ME on the date of service doing chart review, history, exam, documentation & further activities per the note.            Interim History and Events:     Symptoms now resolved- no further frequency, body aches, headache. Afebrile and WBC normalized. Asks good questions and we discussed ways to try to prevent further UTIs.    Physical Examination:  Temp: 98.6  F (37  C) Temp src: Oral BP: 107/55 Pulse: 80   Resp: 16 SpO2: 96 % O2 Device: None (Room air)      Vitals:    05/01/24 1000 05/04/24 0632 05/06/24 0540   Weight: 72 kg (158 lb 11.7 oz) 68.2 kg (150 lb 6.4 oz) 65.5 kg (144 lb 8 oz)       Constitutional: Pleasant female seen sitting up in a chair, in NAD. Alert and interactive.   HEENT: NCAT, anicteric sclerae, conjunctiva clear. Moist mucous membranes.  Respiratory: Non-labored breathing on RA. No cough or SOB with talking.  GI:  Abdomen is non-distended.  Skin: Warm and dry. No rashes or lesions on exposed surfaces.  Musculoskeletal: Extremities grossly normal.   Neurologic: A &O x3, speech normal, answering questions appropriately. Moves all extremities spontaneously. Grossly non-focal.  Neuropsychiatric: Mentation and affect normal/appropriate.  VAD: PIV    Medications:  Current Facility-Administered Medications   Medication Dose Route Frequency Provider  "Last Rate Last Admin    aspirin (ASA) chewable tablet 81 mg  81 mg Oral QPM Roly Pantoja MD   81 mg at 05/05/24 1950    atorvastatin (LIPITOR) tablet 10 mg  10 mg Oral QPM Roly Pantoja MD   10 mg at 05/05/24 1950    calcium carbonate-vitamin D (OSCAL) 500-5 MG-MCG per tablet 1 tablet  1 tablet Oral BID Roly Pantoja MD   1 tablet at 05/05/24 1753    ciprofloxacin (CIPRO) tablet 500 mg  500 mg Oral Q12H [facility] (08/20) Cristina Jacobo MD   500 mg at 05/06/24 0546    indium In-111 oxyquinoline (In-111 WBC) radioisotope injection 200-900 microcurie  200-900 microcurie Intravenous Once Emerita Lewis MD        magnesium oxide (MAG-OX) tablet 400 mg  400 mg Oral Q4H Emerita Lewis MD   400 mg at 05/06/24 0807    mycophenolic acid (GENERIC EQUIVALENT) EC tablet 360 mg  360 mg Oral BID IS Getachew Ross MD   360 mg at 05/06/24 0807    predniSONE (DELTASONE) tablet 5 mg  5 mg Oral Daily Roly Pantoja MD   5 mg at 05/06/24 0807    psyllium (METAMUCIL/KONSYL) Packet 1 packet  1 packet Oral Daily Roly Pantoja MD   1 packet at 05/06/24 0808    sodium chloride (PF) 0.9% PF flush 3 mL  3 mL Intracatheter Q8H Roly Pantoja MD   3 mL at 05/06/24 0542    sulfamethoxazole-trimethoprim (BACTRIM) 400-80 MG per tablet 1 tablet  1 tablet Oral Once per day on Monday Wednesday Friday Roly Pantoja MD   1 tablet at 05/06/24 0807    tacrolimus (GENERIC EQUIVALENT) capsule 1 mg  1 mg Oral QPM Roly Pantoja MD   1 mg at 05/05/24 1753    tacrolimus (GENERIC EQUIVALENT) capsule 1.5 mg  1.5 mg Oral QAM Roly Pantoja MD   1.5 mg at 05/06/24 0807       Infusions/Drips:  Current Facility-Administered Medications   Medication Dose Route Frequency Provider Last Rate Last Admin       Laboratory Data:   No results found for: \"ACD4\"    Inflammatory Markers    Recent Labs   Lab Test 05/02/24  0538 09/14/22  2220 11/04/21  1457 12/28/20  1515 12/21/20  1545   SED 14 8 14 8 10   CRP  --   --  39.3* 13.0* 18.0* "       Metabolic Studies       Recent Labs   Lab Test 05/06/24 0621 05/05/24  0551 05/04/24  1552 05/04/24  0726 05/03/24 2005 05/03/24 0621 05/02/24 0538 05/01/24  1828 05/01/24 0845 04/18/24  1008 10/03/22  0935 10/02/22  0628   0000   NA  --  135  --  135  --  137 136  --  130* 135   < > 135*  --    POTASSIUM  --  4.3  --  4.4  --  4.1 4.1  --  3.9 4.3   < > 3.9  --    CHLORIDE  --  104  --  105  --  108* 106  --  101 101   < > 103  --    CO2  --  22  --  21*  --  21* 20*  --  17* 25   < > 23  --    ANIONGAP  --  9  --  9  --  8 10  --  12 9   < > 9  --    BUN  --  11.5  --  12.0  --  15.7 15.5  --  18.0 14.7   < > 17.8  --    CR  --  0.77  --  0.82  --  0.81 0.96*  --  0.89 0.85   < > 0.78  --    GFRESTIMATED  --  86  --  79  --  81 66  --  72 76   < > 85  --    GLC  --  93  --  96  --  95 88  --  104* 101*   < > 92  --    EDSON  --  10.0  --  10.2  --  9.8 8.8  --  9.5 9.9   < > 9.5  --    PHOS 3.1 3.2  --  2.8   < > 1.7* 2.5  --  2.2*  --   --   --    < >   MAG 1.6* 1.6*   < > 1.4*  --  1.6* 2.7*   < > 1.5*  --   --   --   --    LACT  --   --   --   --   --   --   --   --  1.3  --   --  0.8  --     < > = values in this interval not displayed.       Hepatic Studies    Recent Labs   Lab Test 05/03/24 0621 05/02/24 0538 05/01/24 0845 04/15/24  0808 02/13/24  0810 12/18/23  0806   BILITOTAL 0.6 0.8  0.8 1.4*  1.4* 0.7 0.6 0.5   ALKPHOS 62 61 57 57 56 55   ALBUMIN 2.9* 2.9* 3.8 4.0 3.9 3.8   AST 14 24 20 21 21 19   ALT 9 11 12 13 14 13       Pancreatitis testing    Recent Labs   Lab Test 12/18/23  0806 04/14/23  0806 12/05/22  0809 08/29/22  0810 06/06/22  0828 09/13/21  0840   TRIG 124 83 115 123 126 119       Hematology Studies      Recent Labs   Lab Test 05/05/24  0551 05/04/24  0726 05/03/24  0621 05/02/24  0538 05/01/24  0845 04/18/24  1008 03/11/21  1007 12/28/20  1515 12/28/20  0855 12/21/20  1545 12/18/20  0611 12/16/20  2236   WBC 8.5 9.6 16.1* 20.6* 18.3* 12.5*   < > 9.2   < > 6.2   < > 11.4*    ANEU  --   --   --   --  17.6*  --   --  6.4  --  3.9  --  9.3*   ALYM  --   --   --   --  0.5*  --   --  2.1  --  1.7  --  1.0   PABLO  --   --   --   --  0.2  --   --  0.6  --  0.6  --  1.0   AEOS  --   --   --   --  0.0  --   --  0.0  --  0.0  --  0.0   HGB 14.8 14.7 13.6 14.5 15.8* 15.4   < > 16.6*   < > 16.2*   < > 16.9*   HCT 44.5 45.4 41.1 43.8 46.5 47.0   < > 50.8*   < > 49.2*   < > 54.5*    174 144* 154 194 196   < > 308   < > 261   < > 260    < > = values in this interval not displayed.       Arterial Blood Gas Testing    Recent Labs   Lab Test 05/01/24  0855 09/14/22  2230 02/10/22  0802   PH  --  7.44* 7.32*   O2PER 20  --   --         Urine Studies     Recent Labs   Lab Test 05/01/24  0852 04/18/24  1112 04/03/24  1302 02/05/24  1328 05/16/23  0805   URINEPH 7.0 7.0 7.0 6.5 7.0   NITRITE Negative Positive* Negative Negative Negative   LEUKEST Large* Large* Large* Large* Large*   WBCU >182* * 0-5 25-50* 5-10*     Microbiology:  Culture   Date Value Ref Range Status   05/01/2024 No growth after 4 days  Preliminary   05/01/2024 >100,000 CFU/mL Escherichia coli (A)  Corrected   05/01/2024 >100,000 CFU/mL Urogenital keisha  Corrected   05/01/2024 50,000-100,000 CFU/mL Urogenital keisha  Corrected   05/01/2024 No growth after 4 days  Preliminary   04/18/2024 No Growth  Final   04/18/2024 >100,000 CFU/mL Escherichia coli ESBL (A)  Final   04/03/2024 <10,000 CFU/mL Mixture of Urogenital Keisha  Final   02/05/2024 >100,000 CFU/mL Escherichia coli ESBL (A)  Final   05/16/2023 >100,000 CFU/mL Mixture of urogenital keisha  Final   04/30/2023 >100,000 CFU/mL Escherichia coli ESBL (A)  Final   04/02/2023 >100,000 CFU/mL Escherichia coli ESBL (A)  Final   04/02/2023 <10,000 CFU/mL Urogenital keisha  Final   03/16/2023 >100,000 CFU/mL Escherichia coli ESBL (A)  Final   02/22/2023 >100,000 CFU/mL Escherichia coli ESBL (A)  Final   01/27/2023 >100,000 CFU/mL Staphylococcus epidermidis (A)  Final   12/06/2022  ">100,000 CFU/mL Escherichia coli ESBL (A)  Final   12/02/2022 <10,000 CFU/mL Urogenital keisha  Final   10/02/2022 >100,000 CFU/mL Escherichia coli (A)  Final     Comment:     Susceptibilities done on previous cultures   10/02/2022 No Growth  Final   10/02/2022 No Growth  Final   09/30/2022 >100,000 CFU/mL Escherichia coli ESBL (A)  Final   09/15/2022 No Growth  Final   09/14/2022 No Growth  Final   09/14/2022 <10,000 CFU/mL Urogenital keisha  Final   08/28/2022 <10,000 CFU/mL Urogenital keisha  Final   08/23/2022 10,000-50,000 CFU/mL Urogenital keisha  Final   11/04/2021 >100,000 CFU/mL Klebsiella pneumoniae (A)  Final   11/04/2021 No Growth  Final   11/04/2021 No Growth  Final   09/08/2021 No Growth  Final   08/20/2021 >100,000 CFU/mL Klebsiella pneumoniae (A)  Final       Last check of C difficile  No results found for: \"CDBPCT\"    Imaging:  Results for orders placed or performed during the hospital encounter of 05/01/24   XR Chest Port 1 View    Narrative    Portable chest    INDICATION: Fever comment immunosuppressed, cough    COMPARISON: 10/2/2022    FINDINGS: Heart size normal. Atherosclerotic calcification of the  aortic knob. Lungs and pulmonary vasculature appear normal as do the  bony structures.      Impression    IMPRESSION: Aortic atherosclerosis    MANISHA CAAL MD         SYSTEM ID:  [id number]   US Renal Transplant with Doppler    Narrative    EXAMINATION: US RENAL TRANSPLANT,  5/1/2024 2:46 PM     COMPARISON: Transplant renal ultrasound 9/10/2021    HISTORY: Prior renal transplant, completed initially in 2010 with  subsequent retraction and re-transplanted in 2016. Currently admitted  with compensated UTI and sepsis. Evaluate for pyelonephritis.     TECHNIQUE:  Grey-scale, color Doppler and spectral flow analysis.    FINDINGS:  The transplant kidney is located in the right lower quadrant, and  measures 14.3 cm. Parenchyma is of normal thickness and echogenicity.  No focal lesions. No " hydronephrosis. No perinephric fluid collection.    Layering debris within the bladder.    Renal artery flow:   295 cm/sec peak systolic at hilum.  281 cm/sec peak systolic at anastomosis.  Arcuate artery resistive indices (upper to lower): 0.65, 0.74, 0.59    Renal Vein Flow:  40 cm/sec at hilum.   35 cm/sec at anastomosis.    Iliac artery flow:  183 cm/sec peak systolic above anastomosis.  99 cm/sec peak systolic below anastomosis.    Iliac vein flow:  Patent above and below the anastomosis.      Impression    IMPRESSION:   1. Normal doppler evaluation of the transplant kidney.  2. No hydronephrosis or perinephric fluid collection surrounding the  transplant kidney.  3. Normal grayscale appearance of the transplant kidney.    I have personally reviewed the examination and initial interpretation  and I agree with the findings.    KAT ALBARRAN MD         SYSTEM ID:  [id number]

## 2024-05-06 NOTE — PLAN OF CARE
"/55 (BP Location: Right arm)   Pulse 80   Temp 98.6  F (37  C) (Oral)   Resp 16   Ht 1.778 m (5' 10\")   Wt 65.5 kg (144 lb 8 oz)   SpO2 96%   BMI 20.73 kg/m      [id number]  Neuro:  Pt. alert & Ox4  Behavior:  Pt. calm & cooperative with cares. Slept well between cares.   Activity:  Pt. up ad marcela.  Vital:  AVSS on RA  LDAs: Rt. PIV SL.  Left AV fistula.   BG: N/A  Cardiac: WNL  Respiratory: LS clear bilat.  GI/:  Pt. voiding without difficulty.  No stools this shift.  Last BM 5/5.  Skin: Intact  Pain/Nausea:  Pt. denies pain or nausea.   Diet: Regular  Labs/Imaging:  Morning labs pending.   Plan: Nuclear med appt.  this morning. Continue to follow POC and notify team with change in status.         " Bed in lowest position, wheels locked, appropriate side rails in place/Call bell, personal items and telephone in reach/Instruct patient to call for assistance before getting out of bed or chair/Non-slip footwear when patient is out of bed/[place] to call system/Physically safe environment - no spills, clutter or unnecessary equipment/Purposeful Proactive Rounding/Room/bathroom lighting operational, light cord in reach

## 2024-05-06 NOTE — PROGRESS NOTES
"/55 (BP Location: Right arm)   Pulse 79   Temp 98.6  F (37  C) (Oral)   Resp 16   Ht 1.778 m (5' 10\")   Wt 68.2 kg (150 lb 6.4 oz)   SpO2 96%   BMI 21.58 kg/m      Shift: [id number]  Isolation Status: Contact, ESBL  VS: VSS on room air, afebrile  Neuro: Aox4  Behaviors: calm, cooperative  BG: N/A  Labs: Mg 1.6, Phos 3.2 (Mg replaced orally, recheck in morning)  Respiratory: WDL  Cardiac: WDL  Pain/Nausea: Denies pain. Denies nausea.  PRN: N/A  Diet: Regular, good appetite  IV Access: L PIV  GI/: Voiding, saving in hat  Skin: WDL  Mobility: UAL  Plan: Continue with plan of care. Nuclear med appt in morning.  "

## 2024-05-06 NOTE — PLAN OF CARE
"BP 98/52 (BP Location: Right arm)   Pulse 77   Temp 98  F (36.7  C) (Oral)   Resp 16   Ht 1.778 m (5' 10\")   Wt 65.5 kg (144 lb 8 oz)   SpO2 96%   BMI 20.73 kg/m       Patient alert and oriented x 4. VS stable. Patient on room air. Patient denies pain. Patient denies nausea. Urine Output - voiding without difficulty. Bowel Function - BM during shift. Nutrition - Regular diet. PIV - saline locked. Activity - UAL. Plan of Care - Will continue with plan of care. Continue NM Inflammatory WBC Whole Body study tomorrow, May 7, 2024.   "

## 2024-05-07 ENCOUNTER — APPOINTMENT (OUTPATIENT)
Dept: NUCLEAR MEDICINE | Facility: CLINIC | Age: 64
DRG: 872 | End: 2024-05-07
Payer: MEDICARE

## 2024-05-07 VITALS
OXYGEN SATURATION: 97 % | DIASTOLIC BLOOD PRESSURE: 57 MMHG | HEIGHT: 70 IN | BODY MASS INDEX: 20.69 KG/M2 | HEART RATE: 83 BPM | WEIGHT: 144.5 LBS | TEMPERATURE: 98 F | RESPIRATION RATE: 16 BRPM | SYSTOLIC BLOOD PRESSURE: 100 MMHG

## 2024-05-07 LAB
ANION GAP SERPL CALCULATED.3IONS-SCNC: 9 MMOL/L (ref 7–15)
BUN SERPL-MCNC: 16.2 MG/DL (ref 8–23)
CALCIUM SERPL-MCNC: 9.9 MG/DL (ref 8.8–10.2)
CHLORIDE SERPL-SCNC: 106 MMOL/L (ref 98–107)
CREAT SERPL-MCNC: 0.76 MG/DL (ref 0.51–0.95)
DEPRECATED HCO3 PLAS-SCNC: 22 MMOL/L (ref 22–29)
EGFRCR SERPLBLD CKD-EPI 2021: 87 ML/MIN/1.73M2
ERYTHROCYTE [DISTWIDTH] IN BLOOD BY AUTOMATED COUNT: 14.5 % (ref 10–15)
GLUCOSE SERPL-MCNC: 96 MG/DL (ref 70–99)
HCT VFR BLD AUTO: 44.9 % (ref 35–47)
HGB BLD-MCNC: 14.6 G/DL (ref 11.7–15.7)
IGE SERPL-ACNC: 3 KU/L (ref 0–114)
MAGNESIUM SERPL-MCNC: 1.7 MG/DL (ref 1.7–2.3)
MCH RBC QN AUTO: 30.4 PG (ref 26.5–33)
MCHC RBC AUTO-ENTMCNC: 32.5 G/DL (ref 31.5–36.5)
MCV RBC AUTO: 93 FL (ref 78–100)
PHOSPHATE SERPL-MCNC: 2.7 MG/DL (ref 2.5–4.5)
PLATELET # BLD AUTO: 235 10E3/UL (ref 150–450)
POTASSIUM SERPL-SCNC: 4.2 MMOL/L (ref 3.4–5.3)
RBC # BLD AUTO: 4.81 10E6/UL (ref 3.8–5.2)
SODIUM SERPL-SCNC: 137 MMOL/L (ref 135–145)
WBC # BLD AUTO: 7.4 10E3/UL (ref 4–11)

## 2024-05-07 PROCEDURE — 36415 COLL VENOUS BLD VENIPUNCTURE: CPT

## 2024-05-07 PROCEDURE — 250N000012 HC RX MED GY IP 250 OP 636 PS 637: Performed by: INTERNAL MEDICINE

## 2024-05-07 PROCEDURE — 85018 HEMOGLOBIN: CPT

## 2024-05-07 PROCEDURE — 250N000013 HC RX MED GY IP 250 OP 250 PS 637

## 2024-05-07 PROCEDURE — 99239 HOSP IP/OBS DSCHRG MGMT >30: CPT | Mod: GC | Performed by: PEDIATRICS

## 2024-05-07 PROCEDURE — 84100 ASSAY OF PHOSPHORUS: CPT | Performed by: STUDENT IN AN ORGANIZED HEALTH CARE EDUCATION/TRAINING PROGRAM

## 2024-05-07 PROCEDURE — 82565 ASSAY OF CREATININE: CPT

## 2024-05-07 PROCEDURE — 99233 SBSQ HOSP IP/OBS HIGH 50: CPT | Mod: FS

## 2024-05-07 PROCEDURE — 99233 SBSQ HOSP IP/OBS HIGH 50: CPT | Performed by: PHYSICIAN ASSISTANT

## 2024-05-07 PROCEDURE — 250N000012 HC RX MED GY IP 250 OP 636 PS 637

## 2024-05-07 PROCEDURE — 83735 ASSAY OF MAGNESIUM: CPT | Performed by: STUDENT IN AN ORGANIZED HEALTH CARE EDUCATION/TRAINING PROGRAM

## 2024-05-07 RX ORDER — MAGNESIUM OXIDE 400 MG/1
400 TABLET ORAL EVERY 4 HOURS
Status: COMPLETED | OUTPATIENT
Start: 2024-05-07 | End: 2024-05-07

## 2024-05-07 RX ORDER — SODIUM BICARBONATE 650 MG/1
1300 TABLET ORAL 2 TIMES DAILY
Qty: 60 TABLET | Refills: 0 | Status: SHIPPED | OUTPATIENT
Start: 2024-05-07 | End: 2024-06-05

## 2024-05-07 RX ORDER — SULFAMETHOXAZOLE AND TRIMETHOPRIM 400; 80 MG/1; MG/1
1 TABLET ORAL
Qty: 38.57 TABLET | Refills: 0 | Status: ACTIVE | OUTPATIENT
Start: 2024-05-08 | End: 2024-06-07

## 2024-05-07 RX ORDER — CIPROFLOXACIN 500 MG/1
500 TABLET, FILM COATED ORAL EVERY 12 HOURS
Qty: 13 TABLET | Refills: 0 | Status: ACTIVE | OUTPATIENT
Start: 2024-05-07 | End: 2024-05-14

## 2024-05-07 RX ORDER — TACROLIMUS 0.5 MG/1
0.5 CAPSULE ORAL EVERY MORNING
Qty: 60 CAPSULE | Refills: 0 | Status: ACTIVE | OUTPATIENT
Start: 2024-05-07

## 2024-05-07 RX ORDER — TACROLIMUS 1 MG/1
1 CAPSULE ORAL EVERY 12 HOURS
Qty: 60 CAPSULE | Refills: 0 | Status: ACTIVE | OUTPATIENT
Start: 2024-05-07

## 2024-05-07 RX ADMIN — PSYLLIUM HUSK 1 PACKET: 3.4 POWDER ORAL at 07:41

## 2024-05-07 RX ADMIN — POTASSIUM & SODIUM PHOSPHATES POWDER PACK 280-160-250 MG 1 PACKET: 280-160-250 PACK at 13:01

## 2024-05-07 RX ADMIN — TACROLIMUS 1.5 MG: 1 CAPSULE ORAL at 07:41

## 2024-05-07 RX ADMIN — MAGNESIUM OXIDE TAB 400 MG (241.3 MG ELEMENTAL MG) 400 MG: 400 (241.3 MG) TAB at 09:47

## 2024-05-07 RX ADMIN — SODIUM BICARBONATE 1300 MG: 650 TABLET ORAL at 07:41

## 2024-05-07 RX ADMIN — PREDNISONE 5 MG: 5 TABLET ORAL at 07:41

## 2024-05-07 RX ADMIN — POTASSIUM & SODIUM PHOSPHATES POWDER PACK 280-160-250 MG 1 PACKET: 280-160-250 PACK at 09:47

## 2024-05-07 RX ADMIN — MAGNESIUM OXIDE TAB 400 MG (241.3 MG ELEMENTAL MG) 400 MG: 400 (241.3 MG) TAB at 13:01

## 2024-05-07 RX ADMIN — MYCOPHENOLIC ACID 360 MG: 360 TABLET, DELAYED RELEASE ORAL at 07:42

## 2024-05-07 RX ADMIN — CIPROFLOXACIN HYDROCHLORIDE 500 MG: 500 TABLET, FILM COATED ORAL at 07:42

## 2024-05-07 ASSESSMENT — ACTIVITIES OF DAILY LIVING (ADL)
ADLS_ACUITY_SCORE: 20

## 2024-05-07 NOTE — DISCHARGE SUMMARY
[facility]  Discharge Summary - Medicine & Pediatrics       Date of Admission:  5/1/2024  Date of Discharge:  5/7/2024  Discharging Provider: Dr. Barbara Eldridge  Discharge Service: Medicine Service, ARYAN TEAM 3    Discharge Diagnoses   Aguilar sensitive E. Coli Pyelonephritis of the Transplanted Kidney  Hx of Recurrent UTI's  Hx of ESBL  Cystocele s/p pessary  Hypomagnesemia - resolved  Hypophosphatemia - resolved  Hyponatremia - resolved  Tx: DDKT and Liver Tx  Transplant: 2/2/2016 (Kidney), 3/3/2011 (Liver), 10/9/2010 (Kidney / Liver)  Osteopenia  Non-obstructive CAD  Microvascular angina, stable  Dyslipidemia    Follow-ups Needed After Discharge   Follow-up Appointments     Adult [facility] Follow-up and recommended labs and tests      Transplant nephrology will contact you to schedule a repeat set of lab   work this upcoming week for monitoring  Transplant infectious disease will also schedule a follow-up visit in 6   weeks to monitor your progress after your current infection.    Appointments on [place] and/or [facility] (with [facility] or [facility]   provider or service). Call [phone] if you haven't heard regarding   these appointments within 7 days of discharge.          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 Coli pyelonephritis of the transplanted kidney.     Pan sensitive E. Coli Pyelonephritis of the Transplanted Kidney  Hx of Recurrent UTI's  Hx of ESBL  Cystocele s/p pessary  Hypomagnesemia - resolved  Hypophosphatemia - resolved  Hyponatremia - resolved  Patient presented with 2 days of increased urinary frequency followed by fevers, chills, rigors, and generalized weakness. Likely that patient developed a recurrent UTI leading to sepsis. The patient was started on IV Ertapenem due to patient's history of ESBL. Reviewing patient's chart, CT A/P during 9/22/22 without evidence of structural or anatomical abnormalities as well as cystoscopy and urodynamic studies performed during 2023 that were unremarkable for cause of recurrent UTI's. Multiple factors that patient has for recurrent UTI's includes female gender, immunocompromised status, kidney transplant recipient x2, pessary manipulation daily per patient preference, and inconsistent use of topical estrogen. Transplant infectious disease was consulted. IV Ertapenem was transitioned to PO Ciprofloxacin on 5/5/24 in the setting of urine culture with growth of E. Coli, but not ESBL. Tagged WBC scan was completed showing WBC accumulation in the RLQ transplant kidney consistent with pyelonephritis. After discussion with transplant ID, patient was instructed to finish out a 14-day course of antibiotic therapy with Ciprofloxacin through 5/14/24. The transplant team will set-up a follow-up appointment in 6 weeks for ongoing monitoring after completing current antibiotic regimen. Patient continued PJP PPX with bactrim as well upon discharge in the setting of transplant history as below.     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     Liver Transplant History  S/p SLK 10/2010.  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 Re-liver transplant 3/3/2011. No allograft dysfunction: no history of rejection nor biliary strictures. No acute concerns at this time. Continue regularly scheduled follow-up with liver transplant team.     Osteopenia  - Continued PTA calcium and vitamin D supplements  - Repeat DXA in early 2025     Non-obstructive CAD  Microvascular angina, stable  Dyslipidemia  Follows with Dr. Mohamud, last seen 12/4/2023 for microvascular angina and non-obstructive CAD. Patient continues to not take daily statin that was previously prescribed.    - Continued PTA ASA 81 mg daily     Consultations This Hospital Stay   INFECTIOUS DISEASE TRANSPLANT SOT ADULT IP CONSULT  NEPHROLOGY KIDNEY/PANCREAS TRANSPLANT ADULT IP CONSULT  NURSING TO CONSULT FOR VASCULAR ACCESS CARE IP CONSULT    Code Status   Full Code     The patient was discussed with Dr. Eldridge.    Roly Pantoja MD  [facility] Team 3 [facility] [address] [address]  Phone: [phone]  ______________________________________________________________________    Physical Exam   Vital Signs: Temp: 98  F (36.7  C) Temp src: Oral BP: 100/57 Pulse: 83   Resp: 16 SpO2: 97 % O2 Device: None (Room air)    Weight: 144 lbs 8 oz  General Appearance:  Patient alert and calm  Respiratory: CTAB, no wheezing present  Cardiovascular: Normal rate, regular rhythm, no murmurs appreciated  GI: Soft, non-distended, no  pain with palpation  Skin: No skin rashes or open wounds  Extremities: No bilateral lower extremity edema  Neuro: AAOx3, moving all 4 extremities without focal deficit      Primary Care Physician   Vincent De La Garza    Discharge Orders      Reason for your hospital stay    You were admitted with another urinary tract infection in the setting of your history of kidney transplant. We started you on IV antibiotics and completed urine cultures and imaging of your abdomen to determine the source of your infection. The imaging showed that you have pyelonephritis (kidney infection) of your transplant kidney. After reviewing this finding with the transplant infectious disease team, the decision was made to transition your IV antibiotics to oral antibiotics to complete a 14-day course of treatment (through 5/14/24).     Activity    Your activity upon discharge: activity as tolerated     Adult [facility] Follow-up and recommended labs and tests    Transplant nephrology will contact you to schedule a repeat set of lab work this upcoming week for monitoring  Transplant infectious disease will also schedule a follow-up visit in 6 weeks to monitor your progress after your current infection.    Appointments on [place] and/or [facility] (with [facility] or [facility] provider or service). Call [phone] if you haven't heard regarding these appointments within 7 days of discharge.     Diet    Follow this diet upon discharge: Orders Placed This Encounter      Combination Diet Regular Diet Adult       Significant Results and Procedures   Results for orders placed or performed during the hospital encounter of 05/01/24   XR Chest Port 1 View    Narrative    Portable chest    INDICATION: Fever comment immunosuppressed, cough    COMPARISON: 10/2/2022    FINDINGS: Heart size normal. Atherosclerotic calcification of the  aortic knob. Lungs and pulmonary vasculature appear normal as do the  bony structures.      Impression    IMPRESSION: Aortic  atherosclerosis    MANISHA CAAL MD         SYSTEM ID:  [id number]   US Renal Transplant with Doppler    Narrative    EXAMINATION: US RENAL TRANSPLANT,  5/1/2024 2:46 PM     COMPARISON: Transplant renal ultrasound 9/10/2021    HISTORY: Prior renal transplant, completed initially in 2010 with  subsequent retraction and re-transplanted in 2016. Currently admitted  with compensated UTI and sepsis. Evaluate for pyelonephritis.     TECHNIQUE:  Grey-scale, color Doppler and spectral flow analysis.    FINDINGS:  The transplant kidney is located in the right lower quadrant, and  measures 14.3 cm. Parenchyma is of normal thickness and echogenicity.  No focal lesions. No hydronephrosis. No perinephric fluid collection.    Layering debris within the bladder.    Renal artery flow:   295 cm/sec peak systolic at hilum.  281 cm/sec peak systolic at anastomosis.  Arcuate artery resistive indices (upper to lower): 0.65, 0.74, 0.59    Renal Vein Flow:  40 cm/sec at hilum.   35 cm/sec at anastomosis.    Iliac artery flow:  183 cm/sec peak systolic above anastomosis.  99 cm/sec peak systolic below anastomosis.    Iliac vein flow:  Patent above and below the anastomosis.      Impression    IMPRESSION:   1. Normal doppler evaluation of the transplant kidney.  2. No hydronephrosis or perinephric fluid collection surrounding the  transplant kidney.  3. Normal grayscale appearance of the transplant kidney.    I have personally reviewed the examination and initial interpretation  and I agree with the findings.    KAT ALBARRAN MD         SYSTEM ID:  [id number]   NM Inflammatory WBC Whole Body w     Narrative    Examination: Nuclear medicine abscess localization with In-111 tagged  white blood cells. Date = 5/6 and 5/7/2024 .    Indication: Patient with recurrent UTIs after double kidney  transplant. ID is recommended indium-111 white blood cell scan to see  if nidus of Escherichia coli present in any of her kidneys.  Technique:    The patient's white blood cells were labeled with 542 uCi of In-111  oxine. Whole-body anterior and posterior planar images were obtained  at 4 hours and 24 hours. At 24-hour SPECT-CT of the abdomen and pelvis  were obtained.    Findings:    Whole body planar imaging demonstrates abnormal uptake by the right  lower quadrant transplant. Otherwise normal distribution of the  radiotracer in the liver, small sized spleen and proximal marrow.  SPECT-CT also confirms that uptake in the right lower quadrant  corresponds to the transplant kidney.  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       Impression    Impression:    Indium-111 White blood cell accumulation in the right lower quadrant  transplant kidney suggesting the focus of infection.    BJ BOYER MD         SYSTEM ID:  [id number]       Discharge Medications   Current Discharge Medication List        START taking these medications    Details   ciprofloxacin (CIPRO) 500 MG tablet Take 1 tablet (500 mg) by mouth every 12 hours for 7 days  Qty: 13 tablet, Refills: 0    Associated Diagnoses: Pyelonephritis, acute      sodium bicarbonate 650 MG tablet Take 2 tablets (1,300 mg) by mouth 2 times daily  Qty: 60 tablet, Refills: 0    Associated Diagnoses: Kidney transplanted; Pyelonephritis, acute           CONTINUE these medications which have CHANGED    Details   sulfamethoxazole-trimethoprim (BACTRIM) 400-80 MG tablet Take 1 tablet by mouth Every Mon, Wed, Fri Morning  Qty: 38.57 tablet, Refills: 0    Associated Diagnoses: Kidney transplanted; S/P liver transplant (H)      !! tacrolimus (GENERIC EQUIVALENT) 0.5 MG capsule Take 1 capsule (0.5 mg) by mouth every morning Take with 1 mg capsule in the AM for a total dose of 1.5 mg  Qty: 60 capsule, Refills: 0    Associated Diagnoses: Liver replaced by transplant (H); Kidney transplanted; S/P liver transplant (H)      !! tacrolimus (GENERIC EQUIVALENT) 1 MG capsule Take 1 capsule (1 mg)  by mouth every 12 hours Takes with 0.5 mg capsule in the AM for a total dose of 1.5 mg AM. Takes 1 mg capsule in the PM.  Qty: 60 capsule, Refills: 0    Associated Diagnoses: Kidney transplanted; S/P liver transplant (H)       !! - Potential duplicate medications found. Please discuss with provider.        CONTINUE these medications which have NOT CHANGED    Details   acetaminophen (TYLENOL) 325 MG tablet Take 1 tablet (325 mg) by mouth every 4 hours as needed for mild pain or fever  Qty: 120 tablet, Refills: 1    Associated Diagnoses: S/P liver transplant (H)      aspirin (ASA) 81 MG chewable tablet Take 81 mg by mouth every evening      atorvastatin (LIPITOR) 10 MG tablet Take 1 tablet (10 mg) by mouth daily  Qty: 90 tablet, Refills: 3    Associated Diagnoses: Dyslipidemia      biotin 1000 MCG TABS tablet Take 3,000 mcg by mouth every evening      ciclopirox (PENLAC) 8 % external solution Apply to adjacent skin and affected nails daily.  Remove with alcohol every 7 days, then repeat.  Qty: 6.6 mL, Refills: 0    Associated Diagnoses: Onychomycosis      COMPOUNDED NON-CONTROLLED SUBSTANCE (CMPD RX) - PHARMACY TO MIX COMPOUNDED MEDICATION Apply nightly to toenails. SM 61 -  Ciclopirox 8% / Itraconazole 3% / Fluconazole 3% / Terbinafine HCl 1% / Ibuprofen 2% DMSO Suspension  Qty: 15 mL, Refills: 3    Comments: SM 61 -  Ciclopirox 8% / Itraconazole 3% / Fluconazole 3% / Terbinafine HCl 1% / Ibuprofen 2% DMSO Suspension  Associated Diagnoses: Onychomycosis      diphenhydrAMINE (BENADRYL) 25 MG tablet Take 25 mg by mouth every 8 hours as needed for allergies      estradiol (ESTRACE) 0.1 MG/GM vaginal cream Place 1 g vaginally three times a week  Qty: 42.5 g, Refills: 11    Associated Diagnoses: Vaginal atrophy      guaiFENesin (MUCINEX) 600 MG 12 hr tablet Take 1 tablet by mouth 2 times daily as needed for cough or congestion      Multiple Vitamins-Minerals (WOMENS DAILY FORMULA PO) Take 1 tablet by mouth daily       mycophenolic acid (GENERIC EQUIVALENT) 180 MG EC tablet Take 540 mg by mouth 2 times daily      nitroFURantoin macrocrystal-monohydrate (MACROBID) 100 MG capsule Take 1 capsule (100 mg) by mouth 2 times daily PRN UTI  Qty: 6 capsule, Refills: 99    Associated Diagnoses: Recurrent UTI      nitroGLYcerin (NITROSTAT) 0.4 MG sublingual tablet Place 1 tablet (0.4 mg) under the tongue every 5 minutes as needed for chest pain For chest pain place 1 tablet under the tongue every 5 minutes for 3 doses. If symptoms persist 5 minutes after 1st dose call 911.  Qty: 30 tablet, Refills: 1    Associated Diagnoses: Microvascular angina (H24)      predniSONE (DELTASONE) 5 MG tablet Take 1 tablet (5 mg) by mouth daily  Qty: 90 tablet, Refills: 3    Associated Diagnoses: Kidney transplanted; S/P liver transplant (H)      Probiotic Product (PROBIOTIC PO) Take 2 tablets by mouth every morning ([facility] Women's Vaginal Probiotic)           STOP taking these medications       calcium carbonate-vitamin D (OSCAL) 500-5 MG-MCG tablet Comments:   Reason for Stopping:             Allergies   Allergies   Allergen Reactions    Ibuprofen      Due to liver transplant

## 2024-05-07 NOTE — PROGRESS NOTES
[facility]   Transplant Nephrology Progress Note  Date of Admission:  5/1/2024  Today's Date: 05/07/2024    Recommendations:  - Increase mycophenolic acid to 540 mg PO BID again on discharge.  - Continue to hold Oscal 500-5 mg-mcg for now.  - I asked the transplant coordinator to check labs next week.  - ID follow up in 6 weeks    Assessment & Plan   # DDKT: Stable serum creatinine.   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  Patient underwent a second liver transplant 3/3/11.  She appears to be doing well with normal LFTs.  Followed by Hepatology.     # Immunosuppression Prior to Admission: Tacrolimus immediate release (goal 4-6), Mycophenolic acid (dose 540 mg every 12 hours), and Prednisone (dose 5 mg daily)   - Present Immunosuppression: Tacrolimus immediate release (goal 4-6), Mycophenolic acid (dose 360 mg every 12 hours), and Prednisone (dose 5 mg daily)   - Patient is in an immunosuppressed state and will continue to monitor for efficacy and toxicity of immunosuppression medications.   - Changes: No, continue reduced mycophenolic acid 360 mg po bid due to severe sepsis and recurrent UTI.      # Infection Prophylaxis:   - PJP: Sulfa/TMP (Bactrim) MWF    # Hx of Recurrent UTI  # E. Coli UTI: presented 05/01/24 with one day of N/V, fever, body aches, HA, weakness and lightheadedness.  UA shows large leukocyte esterase and > 182 WBCs.  UC 4/18 positive for ESBL E. Coli.  UC 4/18 positive for ESBL E. Coli.    - ertapenem 05/01/24 - 5/5/24   - Reduce mycophenolic acid 360 mg BID   - Antibiotics  per Transplant Infectious Disease.  - ciprofloxacin 05/06/24 - present to complete 14 d course    # PKD:   -Seen by txp surgery. Risks outweigh benefits for native nephrectomy    #Orthostatic hypotension: Controlled;  Goal BP: < 130/80   - Changes: Not at this time.     # Post-Transplant Erythrocytosis: Hgb: Stable, high;  On ACEI/ARB: No   Imaging: Not at this time    # Mineral Bone Disorder:   - Secondary renal hyperparathyroidism; PTH level: Not checked recently        On treatment: None  - Vitamin D; level: Not checked recently         On supplement: No  - Calcium; level: Normal            On supplement:  Hold , Oscal 500-5 mg-mcg PO BID  - Phosphorus; level: Normal             On supplement: No     # Electrolytes:   - Potassium; level: Normal           On supplement: No  - Magnesium; level: Normal      On supplement: No  - Bicarbonate; level: Normal          On supplement: Yes, sodium bicarbonate 1300 mg PO BID    # HSIL:    -per cone biopsy on 9/30/20. No evidence of dysplasia. Most recent coloposcopy 4/25/23 with negative biopsies. Plan for repeat colposcopy or Pap in ~10/2023    # Microvascular angina:   -Follows with cardiology, Dr. Mohamud.    -She refuses statin   -Uses SL angina a few times per year    # Cystocele:              -Has pessary. 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    Discussed with Dr. Berta Boone, APRN CNP  Pager: [phone]        Physician Attestation     I saw and evaluated Belen Sharma as part of a shared APRN/PA visit.     I personally reviewed the vital signs, medications, labs, and imaging.    I  personally provided a substantive portion of care for this patient and I approve the care plan as written by the ALLY.  I was involved with Medical Decision Making including: Please see A&P for additional details of medical decision making.  MANAGEMENT DISCUSSED with the following over the past 24 hours: increase MPA back to 540 mg po bid, continue current tacrolimus dose, ciprofloxacin x 14 d for transplant pyelonephritis.     Getachew Hampton MD  Date of Service (when I saw the patient): 05/08/24    Interval History   Ms. Awad creatinine is 0.76 (05/07 0550); Stable from 0.75 yesterday.  Estimated Creatinine Clearance: 77.3 mL/min (based on SCr of 0.76 mg/dL).   I/O last 3 completed shifts:  In: 400 [P.O.:400]  Out: 4400 [Urine:4400]   Other significant labs/tests/vitals: SBP 90s - 100s overnight. Afebrile   - 05/07/24 Tagged WBC scan: Indium-111 White blood cell accumulation in the RLQ transplant kidney suggesting the focus of infection.     No acute events overnight.  No chest pain or shortness of breath.  No leg swelling.  No nausea or vomiting.  No diarrhea.        Review of Systems   4 point ROS was obtained and negative except as noted in the Interval History.    MEDICATIONS:  Current Facility-Administered Medications   Medication Dose Route Frequency Provider Last Rate Last Admin    aspirin (ASA) chewable tablet 81 mg  81 mg Oral QPM Roly Pantoja MD   81 mg at 05/06/24 2021    atorvastatin (LIPITOR) tablet 10 mg  10 mg Oral QPM Roly Pantoja MD   10 mg at 05/06/24 2021    [Held by provider] calcium carbonate-vitamin D (OSCAL) 500-5 MG-MCG per tablet 1 tablet  1 tablet Oral BID Roly Pantoja MD   1 tablet at 05/06/24 1149    ciprofloxacin (CIPRO) tablet 500 mg  500 mg Oral Q12H [facility] (08/20) Cristina Jacobo MD   500 mg at 05/07/24 0742    magnesium oxide (MAG-OX) tablet 400 mg  400 mg Oral Q4H Cristina Jacobo MD   400 mg at 05/07/24 0947    mycophenolic acid (GENERIC EQUIVALENT) EC tablet 360 mg  360  "mg Oral BID IS Getachew Ross MD   360 mg at 24 0742    potassium & sodium phosphates (NEUTRA-PHOS) Packet 1 packet  1 packet Oral or Feeding Tube Q4H Cristina Jacobo MD   1 packet at 24 0947    predniSONE (DELTASONE) tablet 5 mg  5 mg Oral Daily Roly Pantoja MD   5 mg at 24 0741    psyllium (METAMUCIL/KONSYL) Packet 1 packet  1 packet Oral Daily Roly Pantoja MD   1 packet at 24 0741    sodium bicarbonate tablet 1,300 mg  1,300 mg Oral BID Stanton Boone APRN CNP   1,300 mg at 24 0741    sodium chloride (PF) 0.9% PF flush 3 mL  3 mL Intracatheter Q8H Roly Pantoja MD   3 mL at 24 202    sulfamethoxazole-trimethoprim (BACTRIM) 400-80 MG per tablet 1 tablet  1 tablet Oral Once per day on  Roly Pantoja MD   1 tablet at 24 0807    tacrolimus (GENERIC EQUIVALENT) capsule 1 mg  1 mg Oral QPM Roly Pantoja MD   1 mg at 24 1756    tacrolimus (GENERIC EQUIVALENT) capsule 1.5 mg  1.5 mg Oral QAM Roly Pantoja MD   1.5 mg at 24 0741     Current Facility-Administered Medications   Medication Dose Route Frequency Provider Last Rate Last Admin       Physical Exam   Temp  Av.8  F (37.1  C)  Min: 97.5  F (36.4  C)  Max: 99.9  F (37.7  C)      Pulse  Av.4  Min: 80  Max: 120 Resp  Av  Min: 16  Max: 22  SpO2  Av.8 %  Min: 90 %  Max: 100 %     /55 (BP Location: Right arm)   Pulse 77   Temp 97.8  F (36.6  C) (Oral)   Resp 16   Ht 1.778 m (5' 10\")   Wt 65.5 kg (144 lb 8 oz)   SpO2 95%   BMI 20.73 kg/m     Date 24 07 - 24 0659   Shift 5613-7353 9156-4007 2488-5080 24 Hour Total   INTAKE   P.O. 240   240   Shift Total(mL/kg) 240(3.33)   240(3.33)   OUTPUT   Urine 500   500   Shift Total(mL/kg) 500(6.94)   500(6.94)   Weight (kg) 72 72 72 72      Admit Weight: 72 kg (158 lb 11.7 oz)     GENERAL APPEARANCE: alert, no distress  RESP: lungs clear to auscultation - no rales, rhonchi or wheezes  CV: " regular rhythm, normal rate, no rub, no murmur  EDEMA: no LE edema bilaterally  ABDOMEN: soft, nondistended, nontender, bowel sounds normal  MS: extremities normal - no gross deformities noted, no evidence of inflammation in joints, no muscle tenderness  DIALYSIS ACCESS:  LUE AV fistula +bruit/+thrill    Data   All labs reviewed by me.  CMP  Recent Labs   Lab 05/07/24  0550 05/06/24  0621 05/05/24  0551 05/04/24  1552 05/04/24  0726 05/03/24 2005 05/03/24  0621 05/02/24  0538 05/01/24  1828 05/01/24  0845    138 135  --  135  --  137 136  --  130*   POTASSIUM 4.2 4.6 4.3  --  4.4  --  4.1 4.1  --  3.9   CHLORIDE 106 105 104  --  105  --  108* 106  --  101   CO2 22 17* 22  --  21*  --  21* 20*  --  17*   ANIONGAP 9 16* 9  --  9  --  8 10  --  12   GLC 96 95 93  --  96  --  95 88  --  104*   BUN 16.2 13.9 11.5  --  12.0  --  15.7 15.5  --  18.0   CR 0.76 0.75 0.77  --  0.82  --  0.81 0.96*  --  0.89   GFRESTIMATED 87 88 86  --  79  --  81 66  --  72   EDSON 9.9 10.6* 10.0  --  10.2  --  9.8 8.8  --  9.5   MAG 1.7 1.6* 1.6* 2.2 1.4*  --  1.6* 2.7*   < > 1.5*   PHOS 2.7 3.1 3.2  --  2.8   < > 1.7* 2.5  --  2.2*   PROTTOTAL  --   --   --   --   --   --  5.4* 5.3*  --  6.5   ALBUMIN  --   --   --   --   --   --  2.9* 2.9*  --  3.8   BILITOTAL  --   --   --   --   --   --  0.6 0.8  0.8  --  1.4*  1.4*   ALKPHOS  --   --   --   --   --   --  62 61  --  57   AST  --   --   --   --   --   --  14 24  --  20   ALT  --   --   --   --   --   --  9 11  --  12    < > = values in this interval not displayed.     CBC  Recent Labs   Lab 05/07/24  0550 05/05/24  0551 05/04/24  0726 05/03/24  0621   HGB 14.6 14.8 14.7 13.6   WBC 7.4 8.5 9.6 16.1*   RBC 4.81 4.82 4.92 4.49   HCT 44.9 44.5 45.4 41.1   MCV 93 92 92 92   MCH 30.4 30.7 29.9 30.3   MCHC 32.5 33.3 32.4 33.1   RDW 14.5 14.6 15.0 14.9    189 174 144*     INRNo lab results found in last 7 days.  ABG  Recent Labs   Lab 05/01/24  0855   O2PER 20      Urine  Studies  Recent Labs   Lab Test 05/01/24  0852 04/18/24  1112 04/03/24  1302 02/05/24  1328 05/16/23  0805   COLOR Orange* Yellow Yellow Yellow Yellow   APPEARANCE Cloudy* Cloudy* Clear Slightly Cloudy* Slightly Cloudy*   URINEGLC Negative Negative Negative Negative Negative   URINEBILI Negative Negative Negative Negative Negative   URINEKETONE Negative Negative Negative Negative Negative   SG 1.013 1.015 1.010 1.010 1.010   UBLD Moderate* Small* Small* Trace* Small*   URINEPH 7.0 7.0 7.0 6.5 7.0   PROTEIN 300* 100* Trace* Negative Negative   UROBILINOGEN  --  0.2 0.2 0.2 0.2   NITRITE Negative Positive* Negative Negative Negative   LEUKEST Large* Large* Large* Large* Large*   RBCU 54* 0-2 2-5* 0-2 None Seen   WBCU >182* * 0-5 25-50* 5-10*     Recent Labs   Lab Test 06/04/21  1130 09/10/20  1037   UTPG 0.34* 0.16     PTH  Recent Labs   Lab Test 02/10/22  0802 09/23/20  1653   PTHI 66 45     Iron Studies  No lab results found.    IMAGING:  All imaging studies reviewed by me.

## 2024-05-07 NOTE — PLAN OF CARE
Status: Full Code   VS: VSS  Neuros: A&OX4  Cardiac: WNL  Respiratory: WNL pt RA  GI/: voiding without difficulty  Diet/Nausea: pt denies nausea, reg diet   Skin: WNL    LDA: PIV SL   Pain: Denies pain  Activity: independent in room  Plan: continues plan of care. Pt on RN managed K+, Phos, and mg, recheck tomorrow morning. Probable discharge tomorrow. NM Inflammatory WBC Whole Body study tomorrow, May 7, 2024              Goal Outcome Evaluation:      Plan of Care Reviewed With: patient    Overall Patient Progress: improvingOverall Patient Progress: improving

## 2024-05-07 NOTE — PLAN OF CARE
"/55 (BP Location: Right arm)   Pulse 77   Temp 97.8  F (36.6  C) (Oral)   Resp 16   Ht 1.778 m (5' 10\")   Wt 65.5 kg (144 lb 8 oz)   SpO2 95%   BMI 20.73 kg/m      SHIFT: 4049-7194  ISOLATION: Contact - ESBL  VITALS: AVSS on RA  BG: NA  Recent Labs   Lab 05/07/24  0550 05/06/24  0621 05/05/24  0551 05/04/24  0726 05/03/24  0621 05/02/24  0538   GLC 96 95 93 96 95 88   NEURO: A&O x4.  Diet: Combination Diet Regular Diet Adult    PRN: No reported pain or nausea.  RESPIRATORY: WDL  CARDIAC: WDL  : Voiding independently and without difficutly.  GI: LBM: 05/06  TUBES: R PIV SL  MIVF/GTT/ABX: NA  ASSIST: UAL  SAFETY: NA  SKIN: WDL  LABS:   Recent Labs   Lab 05/07/24  0550 05/06/24  0621 05/05/24  0551 05/04/24  1552 05/04/24  0726 05/03/24 2005 05/03/24  0621 05/02/24  0538   POTASSIUM 4.2 4.6 4.3  --  4.4  --  4.1 4.1   PHOS 2.7 3.1 3.2  --  2.8   < > 1.7* 2.5   MAG 1.7 1.6* 1.6*   < > 1.4*  --  1.6* 2.7*   HGB 14.6  --  14.8  --  14.7  --  13.6 14.5   CR 0.76 0.75 0.77  --  0.82  --  0.81 0.96*   AST  --   --   --   --   --   --  14 24   ALT  --   --   --   --   --   --  9 11   ALKPHOS  --   --   --   --   --   --  62 61   BILITOTAL  --   --   --   --   --   --  0.6 0.8  0.8    < > = values in this interval not displayed.   PLAN: Per Roly Pantoja MD:     - Continue oral Ciprofloxacin 500 mg BID   - Remaining inpatient for tagged WBC scan ordered, scheduled for today              -> Possible discharge home tomorrow pending results of the scan  "

## 2024-05-07 NOTE — PROGRESS NOTES
SOLID ORGAN TRANSPLANT ID PROGRESS NOTE     Patient:  Belen Sharma   Date of birth 1960, Medical record number [id number]  Date of Visit:  05/07/2024  Date of Admission: 5/1/2024  Consult Requester:Emerita Lewis*          Assessment and Plan:   Recommendations:  Continue PO Ciprofloxacin 500 mg q12 hrs for ongoing treatment of E coli (non-ESBL) transplant pyelonephritis.   Will need a 2 week course of treatment through 5/14 then can discontinue.   Tagged WBC scan demonstrated uptake throughout transplanted kidney consistent with pyelonephritis. No foci of infection in native kidneys or signs of a focal abscess.   Continue Bactrim PPx for PJP.  ID follow up scheduled for 6/13 @ 8 AM with Dr. Nguyen.    Thank you for the consult. Transplant infectious diseases will sign off at this time. Do not hesitate to contact us with additional questions or change in patient's clinical status.      Assessment:  Belen Sharma is a 64 year old female with PMHx significant for congenital hepatic fibrosis and PCKD s/p liver and kidney transplant in [place] (2010), now failed and required second liver transpant (2011) and repeat kidney transplant (2016). She has had issues with recurrent UTIs and is hospitalized 2/2 fever and weakness.     #Urinary tract infection   #Leukocytosis, fever, tachycardia c/w SIRS   #H/o recurrent UTIs   No dysuria, but fever, weakness, headache, and body aches at home. Had a leukocytosis, tachycardia, and reported fever at home. UA appeared dirty with large LE, >182 WBC, but did have 13 squamous cells (representing contamination). Her UCx from 5/1 grew >100K CFU E coli (R Unasyn, ampicillin, Zosyn, and Bactrim) and >100K CFU urogenital keisha. No CVA tenderness or TTP over transplanted kidney per previous ID note exam.  Previous UCx 4/18 and 2/5 with ESBL E coli. Does have uterine prolapse with pessary placement which may contribute to increased risk of UTI. She is s/p urodynmaic studies and  cystoscopy 2/2/23 which were unremarkable. Tagged WBC scan 5/6 demonstrate increased uptake in transplanted kidney consistent with pyelonephritis (discussed with radiologist) and no e/o renal abscess or infection of native kidneys.     Previous ID Issues:  - Hx of C diff.  - Hx of + high risk HPV testing in [place], 5/15/2020, s/p colposcopies  - Splenectomy status, with a large splenule remaining     Other ID issues:  - QTc interval:  408 msec on 11/27/2023 EKG   - Bacterial prophylaxis:  none  - Pneumocystis prophylaxis:  bactrim   - Viral serostatus & prophylaxis:  CMV+, EBV+; no prophy   - Fungal prophylaxis:  none  - Immunization status:  up to date with seasonal vaccines (flu, covid) & RSV   - Gamma globulin status:  replete   - Isolation status: Good hand hygiene. She is in contact isolation for hx of ESBL bacteria.     -Immunosuppression: Tacrolimus/MMF/prednisone    Thank you for the consult. Transplant ID will sign off.    Nessa Calzada PA-C  Infectious Diseases  Contact via [employer] or [employer] Paging/Directory    Medical Decision Making     50 MINUTES SPENT BY ME on the date of service doing chart review, history, exam, documentation & further activities per the note.            Interim History and Events:     Denies any new symptoms. Feeling well. Discussed findings of NM scan and 14 day course of antibiotic treatment. She takes a probiotic and eats yogurt daily- so encouraged her to continue this. Discussed possible side effects of Cipro and when to call for concerns.     Physical Examination:  Temp: 98  F (36.7  C) Temp src: Oral BP: 100/57 Pulse: 83   Resp: 16 SpO2: 97 % O2 Device: None (Room air)      Vitals:    05/01/24 1000 05/04/24 0632 05/06/24 0540   Weight: 72 kg (158 lb 11.7 oz) 68.2 kg (150 lb 6.4 oz) 65.5 kg (144 lb 8 oz)       Constitutional: Pleasant female seen sitting up in a chair, in NAD. Alert and interactive.   HEENT: NCAT, anicteric sclerae, conjunctiva clear. Moist mucous  membranes.  Respiratory: Non-labored breathing on RA. No cough or SOB with talking.  GI:  Abdomen is non-distended.  Skin: Warm and dry. No rashes or lesions on exposed surfaces.  Musculoskeletal: Extremities grossly normal.   Neurologic: A &O x3, speech normal, answering questions appropriately. Moves all extremities spontaneously. Grossly non-focal.  Neuropsychiatric: Mentation and affect normal/appropriate.  VAD: PIV    Medications:  Current Facility-Administered Medications   Medication Dose Route Frequency Provider Last Rate Last Admin    aspirin (ASA) chewable tablet 81 mg  81 mg Oral QPM Roly Pantoja MD   81 mg at 05/06/24 2021    atorvastatin (LIPITOR) tablet 10 mg  10 mg Oral QPM Roly Pantoja MD   10 mg at 05/06/24 2021    ciprofloxacin (CIPRO) tablet 500 mg  500 mg Oral Q12H [facility] (08/20) Cristina Jacobo MD   500 mg at 05/07/24 0742    mycophenolic acid (GENERIC EQUIVALENT) EC tablet 360 mg  360 mg Oral BID IS Getachew Ross MD   360 mg at 05/07/24 0742    predniSONE (DELTASONE) tablet 5 mg  5 mg Oral Daily Roly Pantoja MD   5 mg at 05/07/24 0741    psyllium (METAMUCIL/KONSYL) Packet 1 packet  1 packet Oral Daily Roly Pantoja MD   1 packet at 05/07/24 0741    sodium bicarbonate tablet 1,300 mg  1,300 mg Oral BID Stanton Boone, ANA CNP   1,300 mg at 05/07/24 0741    sodium chloride (PF) 0.9% PF flush 3 mL  3 mL Intracatheter Q8H Roly Pantoja MD   3 mL at 05/06/24 2021    sulfamethoxazole-trimethoprim (BACTRIM) 400-80 MG per tablet 1 tablet  1 tablet Oral Once per day on Monday Wednesday Friday Roly Pantoja MD   1 tablet at 05/06/24 0807    tacrolimus (GENERIC EQUIVALENT) capsule 1 mg  1 mg Oral QPM Roly Pantoja MD   1 mg at 05/06/24 1756    tacrolimus (GENERIC EQUIVALENT) capsule 1.5 mg  1.5 mg Oral QAM Roly Pantoja MD   1.5 mg at 05/07/24 0741       Infusions/Drips:  Current Facility-Administered Medications   Medication Dose Route Frequency Provider Last Rate Last Admin  "      Laboratory Data:   No results found for: \"ACD4\"    Inflammatory Markers    Recent Labs   Lab Test 05/02/24  0538 09/14/22  2220 11/04/21  1457 12/28/20  1515 12/21/20  1545   SED 14 8 14 8 10   CRP  --   --  39.3* 13.0* 18.0*       Metabolic Studies       Recent Labs   Lab Test 05/07/24  0550 05/06/24  0621 05/05/24  0551 05/04/24  1552 05/04/24  0726 05/03/24 2005 05/03/24 0621 05/02/24 0538 05/01/24  1828 05/01/24  0845 10/03/22  0935 10/02/22  0628    138 135  --  135  --  137 136  --  130*   < > 135*   POTASSIUM 4.2 4.6 4.3  --  4.4  --  4.1 4.1  --  3.9   < > 3.9   CHLORIDE 106 105 104  --  105  --  108* 106  --  101   < > 103   CO2 22 17* 22  --  21*  --  21* 20*  --  17*   < > 23   ANIONGAP 9 16* 9  --  9  --  8 10  --  12   < > 9   BUN 16.2 13.9 11.5  --  12.0  --  15.7 15.5  --  18.0   < > 17.8   CR 0.76 0.75 0.77  --  0.82  --  0.81 0.96*  --  0.89   < > 0.78   GFRESTIMATED 87 88 86  --  79  --  81 66  --  72   < > 85   GLC 96 95 93  --  96  --  95 88  --  104*   < > 92   EDSON 9.9 10.6* 10.0  --  10.2  --  9.8 8.8  --  9.5   < > 9.5   PHOS 2.7 3.1 3.2  --  2.8   < > 1.7* 2.5  --  2.2*   < >  --    MAG 1.7 1.6* 1.6*   < > 1.4*  --  1.6* 2.7*   < > 1.5*  --   --    LACT  --   --   --   --   --   --   --   --   --  1.3  --  0.8    < > = values in this interval not displayed.       Hepatic Studies    Recent Labs   Lab Test 05/03/24  0621 05/02/24  0538 05/01/24  0845 04/15/24  0808 02/13/24  0810 12/18/23  0806   BILITOTAL 0.6 0.8  0.8 1.4*  1.4* 0.7 0.6 0.5   ALKPHOS 62 61 57 57 56 55   ALBUMIN 2.9* 2.9* 3.8 4.0 3.9 3.8   AST 14 24 20 21 21 19   ALT 9 11 12 13 14 13       Pancreatitis testing    Recent Labs   Lab Test 12/18/23  0806 04/14/23  0806 12/05/22  0809 08/29/22  0810 06/06/22  0828 09/13/21  0840   TRIG 124 83 115 123 126 119       Hematology Studies      Recent Labs   Lab Test 05/07/24  0550 05/05/24  0551 05/04/24  0726 05/03/24  0621 05/02/24  0538 05/01/24  0845 03/11/21  1007 " 12/28/20  1515 12/28/20  0855 12/21/20  1545 12/18/20  0611 12/16/20  2236   WBC 7.4 8.5 9.6 16.1* 20.6* 18.3*   < > 9.2   < > 6.2   < > 11.4*   ANEU  --   --   --   --   --  17.6*  --  6.4  --  3.9  --  9.3*   ALYM  --   --   --   --   --  0.5*  --  2.1  --  1.7  --  1.0   PABLO  --   --   --   --   --  0.2  --  0.6  --  0.6  --  1.0   AEOS  --   --   --   --   --  0.0  --  0.0  --  0.0  --  0.0   HGB 14.6 14.8 14.7 13.6 14.5 15.8*   < > 16.6*   < > 16.2*   < > 16.9*   HCT 44.9 44.5 45.4 41.1 43.8 46.5   < > 50.8*   < > 49.2*   < > 54.5*    189 174 144* 154 194   < > 308   < > 261   < > 260    < > = values in this interval not displayed.       Arterial Blood Gas Testing    Recent Labs   Lab Test 05/01/24  0855 09/14/22  2230 02/10/22  0802   PH  --  7.44* 7.32*   O2PER 20  --   --         Urine Studies     Recent Labs   Lab Test 05/01/24  0852 04/18/24  1112 04/03/24  1302 02/05/24  1328 05/16/23  0805   URINEPH 7.0 7.0 7.0 6.5 7.0   NITRITE Negative Positive* Negative Negative Negative   LEUKEST Large* Large* Large* Large* Large*   WBCU >182* * 0-5 25-50* 5-10*     Microbiology:  Culture   Date Value Ref Range Status   05/01/2024 No Growth  Final   05/01/2024 >100,000 CFU/mL Escherichia coli (A)  Corrected   05/01/2024 >100,000 CFU/mL Urogenital keisha  Corrected   05/01/2024 50,000-100,000 CFU/mL Urogenital keisha  Corrected   05/01/2024 No Growth  Final   04/18/2024 No Growth  Final   04/18/2024 >100,000 CFU/mL Escherichia coli ESBL (A)  Final   04/03/2024 <10,000 CFU/mL Mixture of Urogenital Keisha  Final   02/05/2024 >100,000 CFU/mL Escherichia coli ESBL (A)  Final   05/16/2023 >100,000 CFU/mL Mixture of urogenital keisha  Final   04/30/2023 >100,000 CFU/mL Escherichia coli ESBL (A)  Final   04/02/2023 >100,000 CFU/mL Escherichia coli ESBL (A)  Final   04/02/2023 <10,000 CFU/mL Urogenital keisha  Final   03/16/2023 >100,000 CFU/mL Escherichia coli ESBL (A)  Final   02/22/2023 >100,000 CFU/mL Escherichia  "coli ESBL (A)  Final   01/27/2023 >100,000 CFU/mL Staphylococcus epidermidis (A)  Final   12/06/2022 >100,000 CFU/mL Escherichia coli ESBL (A)  Final   12/02/2022 <10,000 CFU/mL Urogenital keisha  Final   10/02/2022 >100,000 CFU/mL Escherichia coli (A)  Final     Comment:     Susceptibilities done on previous cultures   10/02/2022 No Growth  Final   10/02/2022 No Growth  Final   09/30/2022 >100,000 CFU/mL Escherichia coli ESBL (A)  Final   09/15/2022 No Growth  Final   09/14/2022 No Growth  Final   09/14/2022 <10,000 CFU/mL Urogenital keisha  Final   08/28/2022 <10,000 CFU/mL Urogenital keisha  Final   08/23/2022 10,000-50,000 CFU/mL Urogenital keisha  Final   11/04/2021 >100,000 CFU/mL Klebsiella pneumoniae (A)  Final   11/04/2021 No Growth  Final   11/04/2021 No Growth  Final   09/08/2021 No Growth  Final   08/20/2021 >100,000 CFU/mL Klebsiella pneumoniae (A)  Final       Last check of C difficile  No results found for: \"CDBPCT\"    Imaging:  Results for orders placed or performed during the hospital encounter of 05/01/24   XR Chest Port 1 View    Narrative    Portable chest    INDICATION: Fever comment immunosuppressed, cough    COMPARISON: 10/2/2022    FINDINGS: Heart size normal. Atherosclerotic calcification of the  aortic knob. Lungs and pulmonary vasculature appear normal as do the  bony structures.      Impression    IMPRESSION: Aortic atherosclerosis    MANISHA CAAL MD         SYSTEM ID:  [id number]   US Renal Transplant with Doppler    Narrative    EXAMINATION: US RENAL TRANSPLANT,  5/1/2024 2:46 PM     COMPARISON: Transplant renal ultrasound 9/10/2021    HISTORY: Prior renal transplant, completed initially in 2010 with  subsequent retraction and re-transplanted in 2016. Currently admitted  with compensated UTI and sepsis. Evaluate for pyelonephritis.     TECHNIQUE:  Grey-scale, color Doppler and spectral flow analysis.    FINDINGS:  The transplant kidney is located in the right lower quadrant, " and  measures 14.3 cm. Parenchyma is of normal thickness and echogenicity.  No focal lesions. No hydronephrosis. No perinephric fluid collection.    Layering debris within the bladder.    Renal artery flow:   295 cm/sec peak systolic at hilum.  281 cm/sec peak systolic at anastomosis.  Arcuate artery resistive indices (upper to lower): 0.65, 0.74, 0.59    Renal Vein Flow:  40 cm/sec at hilum.   35 cm/sec at anastomosis.    Iliac artery flow:  183 cm/sec peak systolic above anastomosis.  99 cm/sec peak systolic below anastomosis.    Iliac vein flow:  Patent above and below the anastomosis.      Impression    IMPRESSION:   1. Normal doppler evaluation of the transplant kidney.  2. No hydronephrosis or perinephric fluid collection surrounding the  transplant kidney.  3. Normal grayscale appearance of the transplant kidney.    I have personally reviewed the examination and initial interpretation  and I agree with the findings.    KAT ALBARRAN MD         SYSTEM ID:  [id number]   5/6/24 Tagged WBC scan   Impression:  Indium-111 White blood cell accumulation in the right lower quadrant  transplant kidney suggesting the focus of infection.

## 2024-05-07 NOTE — PLAN OF CARE
DISCHARGE:  D: Patient with orders to discharge to home.   I: Discharge instructions, medications, & follow ups reviewed with patient. Copy of discharge summary given to patient.  PIV removed. All belongings packed & sent with patient. Medications filled & picked up at discharge pharmacy. Paperwork faxed.   A: Patient in stable condition. AVSS. Patient had no further questions regarding discharge instructions and medications. Patient transferred out by wheelchair & left with .   P: Plan for oral abx for 7 days

## 2024-05-08 ENCOUNTER — OFFICE VISIT (OUTPATIENT)
Dept: UROLOGY | Facility: CLINIC | Age: 64
End: 2024-05-08
Attending: OBSTETRICS & GYNECOLOGY
Payer: MEDICARE

## 2024-05-08 ENCOUNTER — APPOINTMENT (OUTPATIENT)
Dept: NUCLEAR MEDICINE | Facility: CLINIC | Age: 64
End: 2024-05-08
Payer: MEDICARE

## 2024-05-08 ENCOUNTER — TELEPHONE (OUTPATIENT)
Dept: TRANSPLANT | Facility: CLINIC | Age: 64
End: 2024-05-08
Payer: MEDICARE

## 2024-05-08 VITALS
DIASTOLIC BLOOD PRESSURE: 61 MMHG | WEIGHT: 147 LBS | HEART RATE: 85 BPM | SYSTOLIC BLOOD PRESSURE: 100 MMHG | BODY MASS INDEX: 21.09 KG/M2

## 2024-05-08 DIAGNOSIS — Z48.22 ENCOUNTER FOR AFTERCARE FOLLOWING KIDNEY TRANSPLANT: Primary | ICD-10-CM

## 2024-05-08 DIAGNOSIS — Z94.0 KIDNEY TRANSPLANT RECIPIENT: ICD-10-CM

## 2024-05-08 DIAGNOSIS — D84.9 IMMUNOSUPPRESSED STATUS (H): ICD-10-CM

## 2024-05-08 DIAGNOSIS — N81.11 CYSTOCELE, MIDLINE: ICD-10-CM

## 2024-05-08 DIAGNOSIS — N81.4 UTERINE PROLAPSE: Primary | ICD-10-CM

## 2024-05-08 DIAGNOSIS — N81.6 RECTOCELE: ICD-10-CM

## 2024-05-08 PROCEDURE — G0463 HOSPITAL OUTPT CLINIC VISIT: HCPCS | Performed by: OBSTETRICS & GYNECOLOGY

## 2024-05-08 PROCEDURE — 99213 OFFICE O/P EST LOW 20 MIN: CPT | Performed by: OBSTETRICS & GYNECOLOGY

## 2024-05-08 NOTE — TELEPHONE ENCOUNTER
----- Message from ANA Barboza CNP sent at 5/7/2024  3:18 PM CDT -----  Regarding: Discharge  Ranjit Baron,    Ms. Sharma should discharge today. She is doing well.  I held her calcium carbonate and started some sodium bicarbonate this admission.  serum creatinine has been stable.    Could you check outpatient labs next week?    ANA Barboza, CNP  Transplant Nephrology  Pager [phone]  Cell: [phone]

## 2024-05-08 NOTE — LETTER
5/8/2024       RE: Belen Sharma  [address] [place] [address]     Dear Colleague,    Thank you for referring your patient, Belen Sharma, to the [facility] [place] at [facility]. Please see a copy of my visit note below.    CC: prolapse    Pt here for pessary check. Pt inserting and removing pessary daily. Pt happy with pessary. No bleeding, spotting or discharge.    /61   Pulse 85   Wt 66.7 kg (147 lb)   BMI 21.09 kg/m   No LMP recorded. Patient is postmenopausal. Body mass index is 21.09 kg/m .  Ms. Sharma is alert, comfortable in no acute distress, non-labored breathing.   Vagina: normal without evidence of erosion    A/P: Belen Sharma is a 64 year old F with uterine prolapse, cystocele and rectocele    Continue pessary. F/U in 1 year or sooner PRN    I spent a total of 20 minutes with  Ms. Sharma  on the date of the encounter in chart review, face to face patient visit, review of tests, documentation and/or discussion with other providers about the issues documented above.    Zach Francis MD  Professor, OB/GYN  Urogynecologist  CC  Patient Care Team:  Vincent De La Garza MD as PCP - General (Internal Medicine)  Christy Mohamud MD as MD (Cardiology)  Елена Dinh RN as Registered Nurse  Christy Mohamud MD as Assigned Heart and Vascular Provider

## 2024-05-08 NOTE — PROGRESS NOTES
CC: prolapse    Pt here for pessary check. Pt inserting and removing pessary daily. Pt happy with pessary. No bleeding, spotting or discharge.    /61   Pulse 85   Wt 66.7 kg (147 lb)   BMI 21.09 kg/m   No LMP recorded. Patient is postmenopausal. Body mass index is 21.09 kg/m .  Ms. Sharma is alert, comfortable in no acute distress, non-labored breathing.   Vagina: normal without evidence of erosion    A/P: Belen Sharma is a 64 year old F with uterine prolapse, cystocele and rectocele    Continue pessary. F/U in 1 year or sooner PRN    I spent a total of 20 minutes with  Ms. Sharma  on the date of the encounter in chart review, face to face patient visit, review of tests, documentation and/or discussion with other providers about the issues documented above.    Zach Francis MD  Professor, OB/GYN  Urogynecologist  CC  Patient Care Team:  Vincent eD La Garza MD as PCP - General (Internal Medicine)  Christy Mohamud MD as MD (Cardiology)  Елена Dinh RN as Registered Nurse  Christy Mohamud MD as Assigned Heart and Vascular Provider  Leventhal, Thomas Michael, MD as Assigned Gastroenterology Provider  Vincent De La Garza MD as Assigned PCP  Saran Angulo MD as Assigned Nephrology Provider  Shannon Loza OD (Optometry)  Sapna Marcano MD as MD (Ophthalmology)  Anat Benitez MD as MD (Dermatology)  Ida Guthrie RN as Specialty Care Coordinator  Rocio Brewer CNP as Nurse Practitioner (Urology)  Sebastian Robertson MD as Physician (Ophthalmology)  Cartachita Gonzales MD as Assigned Infectious Disease Provider  Sebasitan Robertson MD as Assigned Surgical Provider  Daysi Perez MD as Assigned OBGYN Provider  SELF, REFERRED

## 2024-05-08 NOTE — TELEPHONE ENCOUNTER
Discussed plan with Belen. She has labs scheduled for 5/16 and a nephrology follow up appointment 5/23. She is feeling improved, just tired. She will call with any worsening symptoms.

## 2024-05-13 NOTE — TELEPHONE ENCOUNTER
Belen called to reschedule her appointment 5/23 as [place] of [place]. Transferred to scheduling.

## 2024-05-16 ENCOUNTER — LAB (OUTPATIENT)
Dept: LAB | Facility: CLINIC | Age: 64
End: 2024-05-16
Payer: MEDICARE

## 2024-05-16 DIAGNOSIS — Z48.22 ENCOUNTER FOR AFTERCARE FOLLOWING KIDNEY TRANSPLANT: ICD-10-CM

## 2024-05-16 DIAGNOSIS — D84.9 IMMUNOSUPPRESSED STATUS (H): ICD-10-CM

## 2024-05-16 DIAGNOSIS — Z94.0 KIDNEY TRANSPLANT RECIPIENT: ICD-10-CM

## 2024-05-16 LAB
ERYTHROCYTE [DISTWIDTH] IN BLOOD BY AUTOMATED COUNT: 14.4 % (ref 10–15)
HCT VFR BLD AUTO: 46.8 % (ref 35–47)
HGB BLD-MCNC: 15.1 G/DL (ref 11.7–15.7)
MCH RBC QN AUTO: 30.1 PG (ref 26.5–33)
MCHC RBC AUTO-ENTMCNC: 32.3 G/DL (ref 31.5–36.5)
MCV RBC AUTO: 93 FL (ref 78–100)
PLATELET # BLD AUTO: 175 10E3/UL (ref 150–450)
RBC # BLD AUTO: 5.02 10E6/UL (ref 3.8–5.2)
TACROLIMUS BLD-MCNC: 4.6 UG/L (ref 5–15)
TME LAST DOSE: ABNORMAL H
TME LAST DOSE: ABNORMAL H
WBC # BLD AUTO: 7.8 10E3/UL (ref 4–11)

## 2024-05-16 PROCEDURE — 80048 BASIC METABOLIC PNL TOTAL CA: CPT

## 2024-05-16 PROCEDURE — 80197 ASSAY OF TACROLIMUS: CPT

## 2024-05-16 PROCEDURE — 84156 ASSAY OF PROTEIN URINE: CPT

## 2024-05-16 PROCEDURE — 36415 COLL VENOUS BLD VENIPUNCTURE: CPT

## 2024-05-16 PROCEDURE — 85027 COMPLETE CBC AUTOMATED: CPT

## 2024-05-17 LAB
ALBUMIN MFR UR ELPH: <6 MG/DL
ANION GAP SERPL CALCULATED.3IONS-SCNC: 12 MMOL/L (ref 7–15)
BUN SERPL-MCNC: 14.8 MG/DL (ref 8–23)
CALCIUM SERPL-MCNC: 9.6 MG/DL (ref 8.8–10.2)
CHLORIDE SERPL-SCNC: 108 MMOL/L (ref 98–107)
CREAT SERPL-MCNC: 0.78 MG/DL (ref 0.51–0.95)
CREAT UR-MCNC: 15.1 MG/DL
DEPRECATED HCO3 PLAS-SCNC: 21 MMOL/L (ref 22–29)
EGFRCR SERPLBLD CKD-EPI 2021: 84 ML/MIN/1.73M2
GLUCOSE SERPL-MCNC: 86 MG/DL (ref 70–99)
POTASSIUM SERPL-SCNC: 4.1 MMOL/L (ref 3.4–5.3)
PROT/CREAT 24H UR: NORMAL MG/G{CREAT}
SODIUM SERPL-SCNC: 141 MMOL/L (ref 135–145)

## 2024-06-05 ENCOUNTER — DOCUMENTATION ONLY (OUTPATIENT)
Dept: TRANSPLANT | Facility: CLINIC | Age: 64
End: 2024-06-05
Payer: MEDICARE

## 2024-06-05 DIAGNOSIS — Z94.0 KIDNEY TRANSPLANT RECIPIENT: ICD-10-CM

## 2024-06-05 DIAGNOSIS — Z48.22 ENCOUNTER FOR AFTERCARE FOLLOWING KIDNEY TRANSPLANT: Primary | ICD-10-CM

## 2024-06-05 DIAGNOSIS — Z94.4 LIVER REPLACED BY TRANSPLANT (H): Primary | ICD-10-CM

## 2024-06-05 DIAGNOSIS — N10 PYELONEPHRITIS, ACUTE: ICD-10-CM

## 2024-06-05 DIAGNOSIS — D84.9 IMMUNOSUPPRESSED STATUS (H): ICD-10-CM

## 2024-06-05 DIAGNOSIS — Z94.0 KIDNEY TRANSPLANTED: ICD-10-CM

## 2024-06-05 DIAGNOSIS — Z13.220 LIPID SCREENING: ICD-10-CM

## 2024-06-05 RX ORDER — SODIUM BICARBONATE 650 MG/1
1300 TABLET ORAL 2 TIMES DAILY
Qty: 120 TABLET | Refills: 11 | Status: SHIPPED | OUTPATIENT
Start: 2024-06-05

## 2024-06-06 ENCOUNTER — LAB (OUTPATIENT)
Dept: LAB | Facility: CLINIC | Age: 64
End: 2024-06-06
Payer: MEDICARE

## 2024-06-06 DIAGNOSIS — Z94.0 KIDNEY TRANSPLANT RECIPIENT: ICD-10-CM

## 2024-06-06 DIAGNOSIS — D84.9 IMMUNOSUPPRESSED STATUS (H): ICD-10-CM

## 2024-06-06 DIAGNOSIS — Z94.4 LIVER REPLACED BY TRANSPLANT (H): ICD-10-CM

## 2024-06-06 DIAGNOSIS — Z48.22 ENCOUNTER FOR AFTERCARE FOLLOWING KIDNEY TRANSPLANT: ICD-10-CM

## 2024-06-06 LAB
ERYTHROCYTE [DISTWIDTH] IN BLOOD BY AUTOMATED COUNT: 14.1 % (ref 10–15)
HCT VFR BLD AUTO: 47.6 % (ref 35–47)
HGB BLD-MCNC: 15.4 G/DL (ref 11.7–15.7)
MCH RBC QN AUTO: 30.1 PG (ref 26.5–33)
MCHC RBC AUTO-ENTMCNC: 32.4 G/DL (ref 31.5–36.5)
MCV RBC AUTO: 93 FL (ref 78–100)
PLATELET # BLD AUTO: 215 10E3/UL (ref 150–450)
RBC # BLD AUTO: 5.12 10E6/UL (ref 3.8–5.2)
TACROLIMUS BLD-MCNC: 4.6 UG/L (ref 5–15)
TME LAST DOSE: ABNORMAL H
TME LAST DOSE: ABNORMAL H
WBC # BLD AUTO: 10.6 10E3/UL (ref 4–11)

## 2024-06-06 PROCEDURE — 36415 COLL VENOUS BLD VENIPUNCTURE: CPT

## 2024-06-06 PROCEDURE — 80197 ASSAY OF TACROLIMUS: CPT

## 2024-06-06 PROCEDURE — 86359 T CELLS TOTAL COUNT: CPT

## 2024-06-06 PROCEDURE — 86360 T CELL ABSOLUTE COUNT/RATIO: CPT

## 2024-06-06 PROCEDURE — 80053 COMPREHEN METABOLIC PANEL: CPT

## 2024-06-06 PROCEDURE — 82248 BILIRUBIN DIRECT: CPT

## 2024-06-06 PROCEDURE — 85027 COMPLETE CBC AUTOMATED: CPT

## 2024-06-06 PROCEDURE — 84100 ASSAY OF PHOSPHORUS: CPT

## 2024-06-06 PROCEDURE — 83735 ASSAY OF MAGNESIUM: CPT

## 2024-06-07 LAB
ALBUMIN SERPL BCG-MCNC: 3.9 G/DL (ref 3.5–5.2)
ALP SERPL-CCNC: 71 U/L (ref 40–150)
ALT SERPL W P-5'-P-CCNC: 14 U/L (ref 0–50)
ANION GAP SERPL CALCULATED.3IONS-SCNC: 12 MMOL/L (ref 7–15)
AST SERPL W P-5'-P-CCNC: 18 U/L (ref 0–45)
BILIRUB DIRECT SERPL-MCNC: <0.2 MG/DL (ref 0–0.3)
BILIRUB SERPL-MCNC: 0.7 MG/DL
BUN SERPL-MCNC: 17.2 MG/DL (ref 8–23)
CALCIUM SERPL-MCNC: 9.9 MG/DL (ref 8.8–10.2)
CD3 CELLS # BLD: 2181 CELLS/UL (ref 603–2990)
CD3 CELLS NFR BLD: 84 % (ref 49–84)
CD3+CD4+ CELLS # BLD: 712 CELLS/UL (ref 441–2156)
CD3+CD4+ CELLS NFR BLD: 27 % (ref 28–63)
CD3+CD4+ CELLS/CD3+CD8+ CLL BLD: 0.53 % (ref 1.4–2.6)
CD3+CD8+ CELLS # BLD: 1339 CELLS/UL (ref 125–1312)
CD3+CD8+ CELLS NFR BLD: 51 % (ref 10–40)
CHLORIDE SERPL-SCNC: 102 MMOL/L (ref 98–107)
CREAT SERPL-MCNC: 0.83 MG/DL (ref 0.51–0.95)
DEPRECATED HCO3 PLAS-SCNC: 24 MMOL/L (ref 22–29)
EGFRCR SERPLBLD CKD-EPI 2021: 78 ML/MIN/1.73M2
GLUCOSE SERPL-MCNC: 90 MG/DL (ref 70–99)
MAGNESIUM SERPL-MCNC: 1.7 MG/DL (ref 1.7–2.3)
PHOSPHATE SERPL-MCNC: 2.9 MG/DL (ref 2.5–4.5)
POTASSIUM SERPL-SCNC: 4.1 MMOL/L (ref 3.4–5.3)
PROT SERPL-MCNC: 7.1 G/DL (ref 6.4–8.3)
SODIUM SERPL-SCNC: 138 MMOL/L (ref 135–145)
T CELL COMMENT: ABNORMAL

## 2024-06-10 ENCOUNTER — MYC MEDICAL ADVICE (OUTPATIENT)
Dept: INTERNAL MEDICINE | Facility: CLINIC | Age: 64
End: 2024-06-10
Payer: MEDICARE

## 2024-06-10 DIAGNOSIS — Z12.31 VISIT FOR SCREENING MAMMOGRAM: Primary | ICD-10-CM

## 2024-06-11 DIAGNOSIS — N95.2 VAGINAL ATROPHY: ICD-10-CM

## 2024-06-11 RX ORDER — ESTRADIOL 0.1 MG/G
1 CREAM VAGINAL
Qty: 42.5 G | Refills: 11 | Status: SHIPPED | OUTPATIENT
Start: 2024-06-12

## 2024-06-12 NOTE — PROGRESS NOTES
Virtual Visit Details    Type of service:  Video Visit   Video Start Time: 8:17 AM  Video End Time:8:34 AM  Originating Location (pt. Location): Home    Distant Location (provider location):  On-site  Platform used for Video Visit: [facility] [address] [address]  Phone: [phone]  Fax: [phone]    Patient:  Belen Sharma, Date of birth 1960  Date of Visit:  06/13/2024  Referring Provider Mian Nguyen MD  Reason for visit: Recurrent UTI    Recommendations:  S/p 2 week course of PO Ciprofloxacin 500 mg q12 hrs for E coli (non-ESBL) transplant pyelonephritis.   Patient has a standing prescription for macrobid is on file to be filled only if symptomatic and after submitting a UA and Ucx   ID follow up with Dr Gonzales in 6 months (she sees him primarily)      Assessment:  Belen Sharma is a 64 year old female with PMHx significant for congenital hepatic fibrosis and PCKD s/p liver and kidney transplant in [place] (2010), now failed and required second liver transpant (2011) and repeat kidney transplant (2016). She has had issues with recurrent UTIs and is hospitalized 2/2 fever and weakness.      #Urinary tract infection - transplant pyelonephritis  #Leukocytosis, fever, tachycardia c/w SIRS   No dysuria, but presented with fever, weakness, headache, and body aches at home. UA with large LE, >182 WBC, but did have 13 squamous cells (representing contamination). Her UCx from 5/1 grew >100K CFU E coli (R Unasyn, ampicillin, Zosyn, and Bactrim) and >100K CFU urogenital keisha. Tagged WBC scan 5/6 demonstrate increased uptake in transplanted kidney consistent with pyelonephritis (discussed with radiologist) and no e/o renal abscess or infection of native kidneys. Transitioned to PO Cipro x 2 weeks for treatment of transplant pyelonephritis    #Questions about antibiotic prophylaxis for dental procedures:  Patient does not have  artificial heart valve nor history of endocarditis. Does not need targeted antibiotic prophylaxis prior to dental cleaning or procedures - answered her questions    #Recurrent UTIs;  In the past was with different pathogens including ESBL E coli, K pneumonia, E faecalis and sometimes with negative urine and blood cx.   Since 9/2022 the UTIs have been with ESBL E coli.   The patient currently has standing orders for UA/Ucx when symptomatic and a standing order of macrobid at her local pharmacy to be started after UA/Ucx are delivered.      CT a/p 9/22/22 without evidence of structural or anatomical abnormality.   Cystoscopy and urodynamic studies were done on 2/2/23, both were unremarkable.      The patient has multiple risk factors for recurrent UTI, almost all of them can not be reversed, including: female gender, immunocompromised, kidney transplant recipient, the need to manipulate the  tract daily to change pessary.   In addition the patient is not consistent using topical estrogen which may also contribute to UTI.      Previous ID Issues:  - Hx of C diff.  - Hx of + high risk HPV testing in [place], 5/15/2020, s/p colposcopies  - Splenectomy status, with a large splenule remaining      Other ID issues:  - QTc interval:  408 msec on 11/27/2023 EKG   - Bacterial prophylaxis:  none  - Pneumocystis prophylaxis:  bactrim   - Viral serostatus & prophylaxis:  CMV+, EBV+; no prophy   - Fungal prophylaxis:  none  - Immunization status:  up to date with seasonal vaccines (flu, covid) & RSV   - Gamma globulin status:  replete   - Isolation status: Good hand hygiene. She is in contact isolation for hx of ESBL bacteria.      -Immunosuppression: Tacrolimus/MMF/prednisone    30 minutes spent by me on the date of the encounter doing chart review, history and exam, documentation and further activities per the note. Video visit 17 mins         History of Present Illness   64 year old adult with history of congenital hepatic  fibrosis and PCKD s/p liver and kidney transplant in [place] in 2010, they both failed and required liver re-transplant in 2011 and kidney re-transplant in 2016.   Currently on TAC/MMF/prednisone. On 4/17/2024 she had bleeding during a dental visit, mostly around the lower left wisdom tooth cleaning and scraping. She has recurrent UTIs. She has multiple risk factors for recurrent UTI, almost all of them can not be reversed, including: female gender, immunocompromised, kidney transplant recipient, the need to manipulate the  tract daily to change pessary. In addition she is not consistent using topical estrogen which may also contribute to UTI. She has standing orders for UA/Ucx when symptomatic and a standing order of macrobid. 4/18/2024 UCx results showed positive urine cultures for the same bacteria (E coli) that was susceptible to the nitrofurantoin that she was on. Blood cultures are still without any growth. She has been on medications treating an UTI has been vomiting and has a fever for 2 days.      Transplants:  2/2/2016 (Kidney), 3/3/2011 (Liver), 10/9/2010 (Kidney / Liver), Postoperative day:  4808 (Liver), 3011 (Kidney).  Coordinator Tanika Daniels  Pertinent history obtain from: chart review and the patient    Specialty Problems          Infectious Diseases    Dermatophytosis of nail        HPV (human papilloma virus) infection        Recurrent UTI        ESBL E. coli carrier            Physical Exam     Vital signs:  There were no vitals taken for this visit. As visit conducted via [facility]    GENERAL: alert and no distress  EYES: Eyes grossly normal to inspection, PERRL and conjunctivae and sclerae normal  RESP: On room air, no use of accessory muscles  NEURO: Normal strength and tone, mentation intact and speech normal  PSYCH: mentation appears normal, affect normal/bright    Data   Laboratory data and imaging listed below was reviewed prior to this encounter.     Microbiology:    Culture    Date Value Ref Range Status   05/01/2024 No Growth  Final   05/01/2024 >100,000 CFU/mL Escherichia coli (A)  Corrected   05/01/2024 >100,000 CFU/mL Urogenital keisha  Corrected   05/01/2024 50,000-100,000 CFU/mL Urogenital keisha  Corrected   05/01/2024 No Growth  Final   04/18/2024 No Growth  Final   04/18/2024 >100,000 CFU/mL Escherichia coli ESBL (A)  Final   04/03/2024 <10,000 CFU/mL Mixture of Urogenital Keisha  Final   02/05/2024 >100,000 CFU/mL Escherichia coli ESBL (A)  Final   05/16/2023 >100,000 CFU/mL Mixture of urogenital keisha  Final   04/30/2023 >100,000 CFU/mL Escherichia coli ESBL (A)  Final   04/02/2023 >100,000 CFU/mL Escherichia coli ESBL (A)  Final   04/02/2023 <10,000 CFU/mL Urogenital keisha  Final   03/16/2023 >100,000 CFU/mL Escherichia coli ESBL (A)  Final   02/22/2023 >100,000 CFU/mL Escherichia coli ESBL (A)  Final   01/27/2023 >100,000 CFU/mL Staphylococcus epidermidis (A)  Final   12/06/2022 >100,000 CFU/mL Escherichia coli ESBL (A)  Final   12/02/2022 <10,000 CFU/mL Urogenital keisha  Final   10/02/2022 >100,000 CFU/mL Escherichia coli (A)  Final     Comment:     Susceptibilities done on previous cultures   10/02/2022 No Growth  Final   10/02/2022 No Growth  Final   09/30/2022 >100,000 CFU/mL Escherichia coli ESBL (A)  Final   09/15/2022 No Growth  Final   09/14/2022 No Growth  Final   09/14/2022 <10,000 CFU/mL Urogenital keisha  Final   08/28/2022 <10,000 CFU/mL Urogenital keisha  Final   08/23/2022 10,000-50,000 CFU/mL Urogenital keisha  Final   11/04/2021 >100,000 CFU/mL Klebsiella pneumoniae (A)  Final   11/04/2021 No Growth  Final   11/04/2021 No Growth  Final   09/08/2021 No Growth  Final   08/20/2021 >100,000 CFU/mL Klebsiella pneumoniae (A)  Final     Inflammatory Markers:   Recent Labs   Lab Test 05/02/24  0538 09/14/22  2220 11/04/21  1457 12/28/20  1515 12/21/20  1545   SED 14 8 14 8 10   CRP  --   --  39.3* 13.0* 18.0*     Urine Studies:    Recent Labs   Lab Test 05/01/24  0852  04/18/24  1112 04/03/24  1302 02/05/24  1328 05/16/23  0805   LEUKEST Large* Large* Large* Large* Large*   WBCU >182* * 0-5 25-50* 5-10*     Hematology Studies:    Recent Labs   Lab Test 06/06/24  0809 05/16/24  0812 05/07/24  0550 05/05/24  0551 05/04/24  0726 05/03/24  0621 05/02/24  0538 05/01/24  0845 03/11/21  1007 12/28/20  1515 12/28/20  0855 12/21/20  1545 12/18/20  0611 12/16/20  2236   WBC 10.6 7.8 7.4 8.5 9.6 16.1*   < > 18.3*   < > 9.2   < > 6.2   < > 11.4*   ANEU  --   --   --   --   --   --   --  17.6*  --  6.4  --  3.9  --  9.3*   AEOS  --   --   --   --   --   --   --  0.0  --  0.0  --  0.0  --  0.0   HGB 15.4 15.1 14.6 14.8 14.7 13.6   < > 15.8*   < > 16.6*   < > 16.2*   < > 16.9*   MCV 93 93 93 92 92 92   < > 90   < > 92   < > 93   < > 96    175 235 189 174 144*   < > 194   < > 308   < > 261   < > 260    < > = values in this interval not displayed.      Metabolic Studies:   Recent Labs   Lab Test 06/06/24  0809 05/16/24  0812 05/07/24  0550 05/06/24  0621 05/05/24  0551    141 137 138 135   POTASSIUM 4.1 4.1 4.2 4.6 4.3   CHLORIDE 102 108* 106 105 104   CO2 24 21* 22 17* 22   BUN 17.2 14.8 16.2 13.9 11.5   CR 0.83 0.78 0.76 0.75 0.77   GFRESTIMATED 78 84 87 88 86     Hepatic Studies:   Recent Labs   Lab Test 06/06/24  0809 05/03/24  0621 05/02/24  0538 05/01/24  0845 04/15/24  0808 02/13/24  0810   BILITOTAL 0.7 0.6 0.8  0.8 1.4*  1.4* 0.7 0.6   ALKPHOS 71 62 61 57 57 56   ALBUMIN 3.9 2.9* 2.9* 3.8 4.0 3.9   AST 18 14 24 20 21 21   ALT 14 9 11 12 13 14     Immune Globulin Studies:   Recent Labs   Lab Test 05/01/24  0846 05/01/24  0845   IGG 1,025  --    IGM 52  --    IGE  --  3     --

## 2024-06-13 ENCOUNTER — VIRTUAL VISIT (OUTPATIENT)
Dept: TRANSPLANT | Facility: CLINIC | Age: 64
End: 2024-06-13
Attending: STUDENT IN AN ORGANIZED HEALTH CARE EDUCATION/TRAINING PROGRAM
Payer: MEDICARE

## 2024-06-13 ENCOUNTER — VIRTUAL VISIT (OUTPATIENT)
Dept: INFECTIOUS DISEASES | Facility: CLINIC | Age: 64
End: 2024-06-13
Attending: STUDENT IN AN ORGANIZED HEALTH CARE EDUCATION/TRAINING PROGRAM
Payer: MEDICARE

## 2024-06-13 ENCOUNTER — TELEPHONE (OUTPATIENT)
Dept: CARDIOLOGY | Facility: CLINIC | Age: 64
End: 2024-06-13

## 2024-06-13 VITALS
TEMPERATURE: 97.1 F | HEIGHT: 70 IN | HEART RATE: 95 BPM | BODY MASS INDEX: 20.19 KG/M2 | DIASTOLIC BLOOD PRESSURE: 62 MMHG | WEIGHT: 141 LBS | SYSTOLIC BLOOD PRESSURE: 115 MMHG

## 2024-06-13 DIAGNOSIS — N39.0 RECURRENT UTI: ICD-10-CM

## 2024-06-13 DIAGNOSIS — D84.9 IMMUNOSUPPRESSED STATUS (H): ICD-10-CM

## 2024-06-13 DIAGNOSIS — Z48.298 AFTERCARE FOLLOWING ORGAN TRANSPLANT: ICD-10-CM

## 2024-06-13 DIAGNOSIS — Z16.24 MULTIPLE DRUG RESISTANT ORGANISM (MDRO) CULTURE POSITIVE: ICD-10-CM

## 2024-06-13 DIAGNOSIS — Z94.0 KIDNEY REPLACED BY TRANSPLANT: Primary | ICD-10-CM

## 2024-06-13 DIAGNOSIS — Z94.4 LIVER TRANSPLANT RECIPIENT (H): ICD-10-CM

## 2024-06-13 DIAGNOSIS — M85.80 OSTEOPENIA, UNSPECIFIED LOCATION: ICD-10-CM

## 2024-06-13 DIAGNOSIS — Z94.0 KIDNEY TRANSPLANT RECIPIENT: ICD-10-CM

## 2024-06-13 DIAGNOSIS — N12 E. COLI PYELONEPHRITIS: Primary | ICD-10-CM

## 2024-06-13 DIAGNOSIS — B96.20 E. COLI PYELONEPHRITIS: Primary | ICD-10-CM

## 2024-06-13 PROBLEM — R29.898 HIP TIGHTNESS: Status: RESOLVED | Noted: 2023-06-01 | Resolved: 2024-06-13

## 2024-06-13 PROBLEM — J32.9 SINUSITIS: Status: RESOLVED | Noted: 2020-09-21 | Resolved: 2024-06-13

## 2024-06-13 PROBLEM — A41.9 SEPSIS, DUE TO UNSPECIFIED ORGANISM, UNSPECIFIED WHETHER ACUTE ORGAN DYSFUNCTION PRESENT (H): Status: RESOLVED | Noted: 2024-05-01 | Resolved: 2024-06-13

## 2024-06-13 PROBLEM — Z79.2 ADMINISTRATION OF LONG-TERM PROPHYLACTIC ANTIBIOTICS: Status: RESOLVED | Noted: 2019-11-02 | Resolved: 2024-06-13

## 2024-06-13 PROCEDURE — 99214 OFFICE O/P EST MOD 30 MIN: CPT | Mod: 95 | Performed by: INTERNAL MEDICINE

## 2024-06-13 PROCEDURE — 99214 OFFICE O/P EST MOD 30 MIN: CPT | Mod: 95 | Performed by: STUDENT IN AN ORGANIZED HEALTH CARE EDUCATION/TRAINING PROGRAM

## 2024-06-13 PROCEDURE — G2211 COMPLEX E/M VISIT ADD ON: HCPCS | Mod: 95 | Performed by: INTERNAL MEDICINE

## 2024-06-13 ASSESSMENT — PAIN SCALES - GENERAL: PAINLEVEL: NO PAIN (0)

## 2024-06-13 NOTE — NURSING NOTE
Is the patient currently in the state of [place]? YES    Visit mode:VIDEO    If the visit is dropped, the patient can be reconnected by: VIDEO VISIT: Send to e-mail at: [email or web address]    Will anyone else be joining the visit? NO  (If patient encounters technical issues they should call [phone])    How would you like to obtain your AVS? MyChart    Are changes needed to the allergy or medication list? No    Are refills needed on medications prescribed by this physician? NO    Reason for visit: RECHECK    Ricardo CASTAÑEDA

## 2024-06-13 NOTE — PATIENT INSTRUCTIONS
Patient Recommendations:  - Restart calcium/vitamin D 1 tablet twice daily     Transplant Patient Information  Your Post Transplant Coordinator is: Loraine Faulkner   For non urgent items, we encourage you to contact your coordinator/care team online via [employer]  You and your care team can also contact your transplant coordinator Monday - Friday, 8am - 5pm at [phone] (Option 2 to reach the coordinator or Option 4 to schedule an appointment).  After hours for urgent matters, please call [facility] at [phone].

## 2024-06-13 NOTE — NURSING NOTE
Is the patient currently in the state of [place]? YES    Visit mode:VIDEO    If the visit is dropped, the patient can be reconnected by: VIDEO VISIT: Text to cell phone:   Telephone Information:   Mobile [phone]    and VIDEO VISIT: Send to e-mail at: [email or web address]    Will anyone else be joining the visit? NO  (If patient encounters technical issues they should call [phone])    How would you like to obtain your AVS? MyChart    Are changes needed to the allergy or medication list? No    Patient denies any changes since echeck-in completion and states all information entered during echeck-in remains accurate.  Pt states Bactrim was discontinued, already reflected on medication list     Are refills needed on medications prescribed by this physician? NO, not that aware of per pt    Reason for visit: JAS Ahn MA VVF

## 2024-06-13 NOTE — TELEPHONE ENCOUNTER
6/13/2024 6:01PM Divya Shaikh  Patient confirmed rescheduled appointment:  Date: 12/9/2024  Time: 9AM  Visit type: Return Cardiology (Video Visit)  Provider: Dr. Mohamud  Location: Video Visit; [facility], [address] [place] [address], [address]rd [place] [address]  Testing/imaging: labs prior 12/5/2024 at [address] [place] [address]   Additional notes: 6/13 Rescheduled Return Cardiology in-person visit w/ Dr. Mohamud 12/9 to Video Visit. Pt confirmed will be in the state of [place]. OC Shaikh 6/13/2024 6:01PM

## 2024-06-13 NOTE — LETTER
6/13/2024       RE: Belen Sharma  [address] [place] [address]     Dear Colleague,    Thank you for referring your patient, Belen Sharma, to the [facility] [place] at [facility]. Please see a copy of my visit note below.    Virtual Visit Details    Type of service:  Video Visit   Video Start Time: 8:17 AM  Video End Time:8:34 AM  Originating Location (pt. Location): Home    Distant Location (provider location):  On-site  Platform used for Video Visit: [facility] [address] [address]  Phone: [phone]  Fax: [phone]    Patient:  Belen Sharma, Date of birth 1960  Date of Visit:  06/13/2024  Referring Provider Mian Nguyen MD  Reason for visit: Recurrent UTI    Recommendations:  S/p 2 week course of PO Ciprofloxacin 500 mg q12 hrs for E coli (non-ESBL) transplant pyelonephritis.   Patient has a standing prescription for macrobid is on file to be filled only if symptomatic and after submitting a UA and Ucx   ID follow up with Dr Gonzales in 6 months (she sees him primarily)      Assessment:  Belen Sharma is a 64 year old female with PMHx significant for congenital hepatic fibrosis and PCKD s/p liver and kidney transplant in [place] (2010), now failed and required second liver transpant (2011) and repeat kidney transplant (2016). She has had issues with recurrent UTIs and is hospitalized 2/2 fever and weakness.      #Urinary tract infection - transplant pyelonephritis  #Leukocytosis, fever, tachycardia c/w SIRS   No dysuria, but presented with fever, weakness, headache, and body aches at home. UA with large LE, >182 WBC, but did have 13 squamous cells (representing contamination). Her UCx from 5/1 grew >100K CFU E coli (R Unasyn, ampicillin, Zosyn, and Bactrim) and >100K CFU urogenital keisha. Tagged WBC scan 5/6 demonstrate increased uptake  in transplanted kidney consistent with pyelonephritis (discussed with radiologist) and no e/o renal abscess or infection of native kidneys. Transitioned to PO Cipro x 2 weeks for treatment of transplant pyelonephritis    #Questions about antibiotic prophylaxis for dental procedures:  Patient does not have artificial heart valve nor history of endocarditis. Does not need targeted antibiotic prophylaxis prior to dental cleaning or procedures - answered her questions    #Recurrent UTIs;  In the past was with different pathogens including ESBL E coli, K pneumonia, E faecalis and sometimes with negative urine and blood cx.   Since 9/2022 the UTIs have been with ESBL E coli.   The patient currently has standing orders for UA/Ucx when symptomatic and a standing order of macrobid at her local pharmacy to be started after UA/Ucx are delivered.      CT a/p 9/22/22 without evidence of structural or anatomical abnormality.   Cystoscopy and urodynamic studies were done on 2/2/23, both were unremarkable.      The patient has multiple risk factors for recurrent UTI, almost all of them can not be reversed, including: female gender, immunocompromised, kidney transplant recipient, the need to manipulate the  tract daily to change pessary.   In addition the patient is not consistent using topical estrogen which may also contribute to UTI.      Previous ID Issues:  - Hx of C diff.  - Hx of + high risk HPV testing in [place], 5/15/2020, s/p colposcopies  - Splenectomy status, with a large splenule remaining      Other ID issues:  - QTc interval:  408 msec on 11/27/2023 EKG   - Bacterial prophylaxis:  none  - Pneumocystis prophylaxis:  bactrim   - Viral serostatus & prophylaxis:  CMV+, EBV+; no prophy   - Fungal prophylaxis:  none  - Immunization status:  up to date with seasonal vaccines (flu, covid) & RSV   - Gamma globulin status:  replete   - Isolation status: Good hand hygiene. She is in contact isolation for hx of ESBL bacteria.       -Immunosuppression: Tacrolimus/MMF/prednisone    30 minutes spent by me on the date of the encounter doing chart review, history and exam, documentation and further activities per the note. Video visit 17 mins         History of Present Illness  64 year old adult with history of congenital hepatic fibrosis and PCKD s/p liver and kidney transplant in [place] in 2010, they both failed and required liver re-transplant in 2011 and kidney re-transplant in 2016.   Currently on TAC/MMF/prednisone. On 4/17/2024 she had bleeding during a dental visit, mostly around the lower left wisdom tooth cleaning and scraping. She has recurrent UTIs. She has multiple risk factors for recurrent UTI, almost all of them can not be reversed, including: female gender, immunocompromised, kidney transplant recipient, the need to manipulate the  tract daily to change pessary. In addition she is not consistent using topical estrogen which may also contribute to UTI. She has standing orders for UA/Ucx when symptomatic and a standing order of macrobid. 4/18/2024 UCx results showed positive urine cultures for the same bacteria (E coli) that was susceptible to the nitrofurantoin that she was on. Blood cultures are still without any growth. She has been on medications treating an UTI has been vomiting and has a fever for 2 days.      Transplants:  2/2/2016 (Kidney), 3/3/2011 (Liver), 10/9/2010 (Kidney / Liver), Postoperative day:  4808 (Liver), 3011 (Kidney).  Coordinator Tanika Daniels  Pertinent history obtain from: chart review and the patient    Specialty Problems          Infectious Diseases    Dermatophytosis of nail        HPV (human papilloma virus) infection        Recurrent UTI        ESBL E. coli carrier            Physical Exam    Vital signs:  There were no vitals taken for this visit. As visit conducted via [employer]    GENERAL: alert and no distress  EYES: Eyes grossly normal to inspection, PERRL and conjunctivae and sclerae  normal  RESP: On room air, no use of accessory muscles  NEURO: Normal strength and tone, mentation intact and speech normal  PSYCH: mentation appears normal, affect normal/bright    Data  Laboratory data and imaging listed below was reviewed prior to this encounter.     Microbiology:    Culture   Date Value Ref Range Status   05/01/2024 No Growth  Final   05/01/2024 >100,000 CFU/mL Escherichia coli (A)  Corrected   05/01/2024 >100,000 CFU/mL Urogenital keisha  Corrected   05/01/2024 50,000-100,000 CFU/mL Urogenital keisha  Corrected   05/01/2024 No Growth  Final   04/18/2024 No Growth  Final   04/18/2024 >100,000 CFU/mL Escherichia coli ESBL (A)  Final   04/03/2024 <10,000 CFU/mL Mixture of Urogenital Keisha  Final   02/05/2024 >100,000 CFU/mL Escherichia coli ESBL (A)  Final   05/16/2023 >100,000 CFU/mL Mixture of urogenital keisha  Final   04/30/2023 >100,000 CFU/mL Escherichia coli ESBL (A)  Final   04/02/2023 >100,000 CFU/mL Escherichia coli ESBL (A)  Final   04/02/2023 <10,000 CFU/mL Urogenital keisha  Final   03/16/2023 >100,000 CFU/mL Escherichia coli ESBL (A)  Final   02/22/2023 >100,000 CFU/mL Escherichia coli ESBL (A)  Final   01/27/2023 >100,000 CFU/mL Staphylococcus epidermidis (A)  Final   12/06/2022 >100,000 CFU/mL Escherichia coli ESBL (A)  Final   12/02/2022 <10,000 CFU/mL Urogenital keisha  Final   10/02/2022 >100,000 CFU/mL Escherichia coli (A)  Final     Comment:     Susceptibilities done on previous cultures   10/02/2022 No Growth  Final   10/02/2022 No Growth  Final   09/30/2022 >100,000 CFU/mL Escherichia coli ESBL (A)  Final   09/15/2022 No Growth  Final   09/14/2022 No Growth  Final   09/14/2022 <10,000 CFU/mL Urogenital keisha  Final   08/28/2022 <10,000 CFU/mL Urogenital keisha  Final   08/23/2022 10,000-50,000 CFU/mL Urogenital keisha  Final   11/04/2021 >100,000 CFU/mL Klebsiella pneumoniae (A)  Final   11/04/2021 No Growth  Final   11/04/2021 No Growth  Final   09/08/2021 No Growth  Final    08/20/2021 >100,000 CFU/mL Klebsiella pneumoniae (A)  Final     Inflammatory Markers:   Recent Labs   Lab Test 05/02/24  0538 09/14/22  2220 11/04/21  1457 12/28/20  1515 12/21/20  1545   SED 14 8 14 8 10   CRP  --   --  39.3* 13.0* 18.0*     Urine Studies:    Recent Labs   Lab Test 05/01/24  0852 04/18/24  1112 04/03/24  1302 02/05/24  1328 05/16/23  0805   LEUKEST Large* Large* Large* Large* Large*   WBCU >182* * 0-5 25-50* 5-10*     Hematology Studies:    Recent Labs   Lab Test 06/06/24  0809 05/16/24  0812 05/07/24  0550 05/05/24  0551 05/04/24  0726 05/03/24  0621 05/02/24  0538 05/01/24  0845 03/11/21  1007 12/28/20  1515 12/28/20  0855 12/21/20  1545 12/18/20  0611 12/16/20  2236   WBC 10.6 7.8 7.4 8.5 9.6 16.1*   < > 18.3*   < > 9.2   < > 6.2   < > 11.4*   ANEU  --   --   --   --   --   --   --  17.6*  --  6.4  --  3.9  --  9.3*   AEOS  --   --   --   --   --   --   --  0.0  --  0.0  --  0.0  --  0.0   HGB 15.4 15.1 14.6 14.8 14.7 13.6   < > 15.8*   < > 16.6*   < > 16.2*   < > 16.9*   MCV 93 93 93 92 92 92   < > 90   < > 92   < > 93   < > 96    175 235 189 174 144*   < > 194   < > 308   < > 261   < > 260    < > = values in this interval not displayed.      Metabolic Studies:   Recent Labs   Lab Test 06/06/24  0809 05/16/24  0812 05/07/24  0550 05/06/24  0621 05/05/24  0551    141 137 138 135   POTASSIUM 4.1 4.1 4.2 4.6 4.3   CHLORIDE 102 108* 106 105 104   CO2 24 21* 22 17* 22   BUN 17.2 14.8 16.2 13.9 11.5   CR 0.83 0.78 0.76 0.75 0.77   GFRESTIMATED 78 84 87 88 86     Hepatic Studies:   Recent Labs   Lab Test 06/06/24  0809 05/03/24  0621 05/02/24  0538 05/01/24  0845 04/15/24  0808 02/13/24  0810   BILITOTAL 0.7 0.6 0.8  0.8 1.4*  1.4* 0.7 0.6   ALKPHOS 71 62 61 57 57 56   ALBUMIN 3.9 2.9* 2.9* 3.8 4.0 3.9   AST 18 14 24 20 21 21   ALT 14 9 11 12 13 14     Immune Globulin Studies:   Recent Labs   Lab Test 05/01/24  0846 05/01/24  0845   IGG 1,025  --    IGM 52  --    IGE  --  3      --            Mian Nguyen MD

## 2024-06-13 NOTE — PROGRESS NOTES
TRANSPLANT NEPHROLOGY CHRONIC POST TRANSPLANT VISIT    Virtual Visit Details    Type of service:  Video Visit   Video Start Time: 10:00 AM  Video End Time:10:17 AM    Originating Location (pt. Location): Home    Distant Location (provider location):  Off-site  Platform used for Video Visit: [facility]     Assessment & Plan   # DDKT: Stable   - Baseline Creatinine:  ~ 0.6-0.9   - Proteinuria: Normal (<0.2 grams)   - Date DSA Last Checked: Not Known      Latest DSA: Not checked recently due to time from transplant   - BK Viremia: No   - Kidney Tx Biopsy: No    -Labs every 2 months    # Liver Tx:    -Good allograft function. Follows with transplant hepatology.     # Immunosuppression: Tacrolimus immediate release (goal 4-6), Mycophenolic acid (dose 540 mg every 12 hours) and Prednisone (dose 5 mg daily)   - Continue with intensive monitoring of immunosuppression for efficacy and toxicity.   - Changes: Not at this time     # Infection Prophylaxis:   - PJP: None   Last CD4 Level: 712 (Jun/2024)    # Recurrent UTI :    -Follows with txp ID and Dr. Ferrara with urogyn   -Has an Rx for Macrobid to be filled only if symptomatic and after submitting U/A and UCx    # PKD:   -Seen by txp surgery. Risks outweigh benefits for native nephrectomy    #Orthostatic hypotension: Controlled;  Goal BP: < 130/80   - Changes: Not at this time.     # Post-Transplant Erythrocytosis: Hgb: Stable;  On ACEI/ARB: No   Imaging: Not at this time    # Mineral Bone Disorder:   - Secondary renal hyperparathyroidism; PTH level: Normal (18-80 pg/ml)        On treatment: None  - Vitamin D; level: High        On supplement: No, restart calcium/vitamin D 1 tab BID for osteoporosis prevention  - Calcium; level: High normal        On supplement: No, restart calcium/vitamin D 1 tab BID for osteoporosis prevention   - Phosphorus; level: Normal        On supplement: No     # Electrolytes:   - Potassium; level: Normal        On supplement: No  - Magnesium; level:  Normal        On supplement: No  - Bicarbonate; level: Normal        On supplement: Yes, bicarb 1300mg bid    # HSIL:    -per cone biopsy on 9/30/20. No evidence of dysplasia. Most recent coloposcopy 4/25/23 with negative biopsies.   -Pap 10/2023 negative with negative HPV. Plan cotest ~10/2024    # Microvascular angina:   -Follows with cardiology, Dr. Mohamud.    -Started on atorvastatin 10mg daily by Dr. Mohamud 12/2023   -Uses SL angina a few times per year    # Cystocele:              -Has pessary. Follows with OB GYN    # Skin Cancer Risk:    - Discussed sun protection and recommend regular follow up with Dermatology.    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    Return visit: Return in about 1 year (around 6/13/2025).    Saran Angulo MD    The longitudinal plan of care for kidney/liver transplant was addressed during this visit. Due to the added complexity in care, I will continue to support Belen Sharma in the subsequent management of this condition(s) and with the ongoing continuity of care of this condition(s).       Chief Complaint   Ms. Sharma is a 64 year old here for routine follow up, kidney transplant and immunosuppression management.    History of Present Illness   Ms. Sharma was admitted 5/1-5/7/24 for pyelonephritis w/ E.e coli with sepsis. She was transitioned to PO cipro to complete a course of 14d. Tagged WBC scan completed showed WBC accumulation in RLQ transplant consisten with  pyelo.    The patient overall feels well. She denies nausea, vomiting, diarrhea, fever, chills, shortness of breath, chest pain, LE edema, unintentional weight loss, nights sweats, dysuria, hematuria.         Home BP:  110 systolic    Problem List   Patient Active Problem List   Diagnosis    S/P splenectomy    Polycystic kidney disease    Immunosuppression (H24)    Liver replaced by transplant (H)    Endometriosis    HPV (human papilloma virus) infection    JANNETTE III with severe dysplasia    Acquired bilateral hammer toes    Depression    Depressive psychosis (H)    Dermatophytosis of nail    Hallux valgus, acquired, bilateral    Incisional hernia following transplant    Malignant neoplasm of breast (H)    Microvascular angina (H24)    Postmenopausal state    Administration of long-term prophylactic antibiotics    Secondary hyperparathyroidism (H24)    Scoliosis deformity of spine    Sinusitis    Swelling of first metatarsophalangeal (MTP) joint    Uterine prolapse    Kidney replaced by transplant    Recurrent UTI    Osteopenia of multiple sites    Combined forms of age-related cataract of both eyes    Aftercare following organ transplant    Hammertoe, bilateral    Weakness of hip, unspecified laterality    Hip tightness    Pelvic floor dysfunction    Sepsis, due to unspecified organism, unspecified whether acute organ dysfunction present (H)    ESBL E. coli carrier       Allergies   Allergies   Allergen Reactions    Ibuprofen      Due to liver transplant       Medications   Current Outpatient Medications   Medication Sig Dispense Refill    calcium carbonate-vitamin D (OSCAL) 500-5 MG-MCG tablet Take 1 tablet by mouth 2 times daily for 90 days 180 tablet 2    psyllium (METAMUCIL/KONSYL) 58.6 % powder Take 12 g (2 teaspoonful) by mouth daily      acetaminophen (TYLENOL) 325 MG tablet Take 1 tablet (325 mg) by mouth every 4 hours as needed for mild pain or fever 120 tablet 1    aspirin (ASA) 81 MG chewable tablet Take  81 mg by mouth every evening      atorvastatin (LIPITOR) 10 MG tablet Take 1 tablet (10 mg) by mouth at bedtime 90 tablet 2    biotin 1000 MCG TABS tablet Take 3,000 mcg by mouth every evening      ciclopirox (PENLAC) 8 % external solution Apply to adjacent skin and affected nails daily.  Remove with alcohol every 7 days, then repeat. 6.6 mL 0    COMPOUNDED NON-CONTROLLED SUBSTANCE (CMPD RX) - PHARMACY TO MIX COMPOUNDED MEDICATION Apply nightly to toenails. SM 61 -  Ciclopirox 8% / Itraconazole 3% / Fluconazole 3% / Terbinafine HCl 1% / Ibuprofen 2% DMSO Suspension 15 mL 3    diphenhydrAMINE (BENADRYL) 25 MG tablet Take 25 mg by mouth every 8 hours as needed for allergies      estradiol (ESTRACE) 0.1 MG/GM vaginal cream Place 1 g vaginally three times a week 42.5 g 11    guaiFENesin (MUCINEX) 600 MG 12 hr tablet Take 1 tablet by mouth 2 times daily as needed for cough or congestion      Multiple Vitamins-Minerals (WOMENS DAILY FORMULA PO) Take 1 tablet by mouth daily      mycophenolic acid (GENERIC EQUIVALENT) 180 MG EC tablet Take 540 mg by mouth 2 times daily      nitroFURantoin macrocrystal-monohydrate (MACROBID) 100 MG capsule Take 1 capsule (100 mg) by mouth 2 times daily PRN UTI 6 capsule 99    nitroGLYcerin (NITROSTAT) 0.4 MG sublingual tablet Place 1 tablet (0.4 mg) under the tongue every 5 minutes as needed for chest pain For chest pain place 1 tablet under the tongue every 5 minutes for 3 doses. If symptoms persist 5 minutes after 1st dose call 911. 30 tablet 1    predniSONE (DELTASONE) 5 MG tablet Take 1 tablet (5 mg) by mouth daily 90 tablet 3    Probiotic Product (PROBIOTIC PO) Take 2 tablets by mouth every morning ([facility] Women's Vaginal Probiotic)      sodium bicarbonate 650 MG tablet Take 2 tablets (1,300 mg) by mouth 2 times daily 120 tablet 11    tacrolimus (GENERIC EQUIVALENT) 0.5 MG capsule Take 1 capsule (0.5 mg) by mouth every morning Take with 1 mg capsule in the AM for a total dose of 1.5  mg 60 capsule 0    tacrolimus (GENERIC EQUIVALENT) 1 MG capsule Take 1 capsule (1 mg) by mouth every 12 hours Takes with 0.5 mg capsule in the AM for a total dose of 1.5 mg AM. Takes 1 mg capsule in the PM. 60 capsule 0     No current facility-administered medications for this visit.     Medications Discontinued During This Encounter   Medication Reason    ATORVASTATIN CALCIUM PO          Physical Exam   Vital Signs: There were no vitals taken for this visit.    GENERAL APPEARANCE: alert and no distress  HENT: no obvious abnormalities on appearance  RESP: breathing appears unremarkable with normal rate, no audible wheezing or cough and no apparent shortness of breath with conversation  MS: extremities normal - no gross deformities noted  SKIN: no apparent rash and normal skin tone  NEURO: speech is clear with no obvious neurological deficits  PSYCH: mentation appears normal and affect normal      Data         Latest Ref Rng & Units 6/6/2024     8:09 AM 5/16/2024     8:12 AM 5/7/2024     5:50 AM   Renal   Sodium 135 - 145 mmol/L 138  141  137    K 3.4 - 5.3 mmol/L 4.1  4.1  4.2    Cl 98 - 107 mmol/L 102  108  106    Cl (external) 98 - 107 mmol/L 102  108  106    CO2 22 - 29 mmol/L 24  21  22    Urea Nitrogen 8.0 - 23.0 mg/dL 17.2  14.8  16.2    Creatinine 0.51 - 0.95 mg/dL 0.83  0.78  0.76    Glucose 70 - 99 mg/dL 90  86  96    Calcium 8.8 - 10.2 mg/dL 9.9  9.6  9.9    Magnesium 1.7 - 2.3 mg/dL 1.7   1.7          Latest Ref Rng & Units 6/6/2024     8:09 AM 5/7/2024     5:50 AM 5/6/2024     6:21 AM   Bone Health   Phosphorus 2.5 - 4.5 mg/dL 2.9  2.7  3.1          Latest Ref Rng & Units 6/6/2024     8:09 AM 5/16/2024     8:12 AM 5/7/2024     5:50 AM   Heme   WBC 4.0 - 11.0 10e3/uL 10.6  7.8  7.4    Hgb 11.7 - 15.7 g/dL 15.4  15.1  14.6    Plt 150 - 450 10e3/uL 215  175  235          Latest Ref Rng & Units 6/6/2024     8:09 AM 5/3/2024     6:21 AM 5/2/2024     5:38 AM   Liver   AP 40 - 150 U/L 71  62  61    TBili <=1.2  mg/dL 0.7  0.6  0.8     0.8    Bilirubin Direct 0.00 - 0.30 mg/dL <0.20   0.27    ALT 0 - 50 U/L 14  9  11    AST 0 - 45 U/L 18  14  24    Tot Protein 6.4 - 8.3 g/dL 7.1  5.4  5.3    Albumin 3.5 - 5.2 g/dL 3.9  2.9  2.9                Latest Ref Rng & Units 1/4/2021     9:32 AM 12/18/2020     6:11 AM 9/23/2020     4:53 PM   [facility] Txp Virology   CVM DNA Quant   Plasma  Plasma    CMV QUANT IU/ML CMVND^CMV DNA Not Detected [IU]/mL  CMV DNA Not Detected  CMV DNA Not Detected    LOG IU/ML OF CMVQNT <2.1 [Log_IU]/mL  Not Calculated  Not Calculated    BK Spec    Plasma    BK Res BKNEG^BK Virus DNA Not Detected copies/mL   BK Virus DNA Not Detected    BK Log <2.7 Log copies/mL   Not Calculated    EBV CAPSID ANTIBODY IGG 0.0 - 0.8 AI >8.0        Failed to redirect to the Timeline version of the REVFS SmartLink.  Recent Labs   Lab Test 04/15/24  0808 05/02/24  0538 05/16/24  0812 06/06/24  0809   DOSTAC 4/14/2024  --  5/15/2024 6/5/2024   TACROL 5.1 5.5 4.6* 4.6*     Recent Labs   Lab Test 12/18/20  0611   DOSMPA 12/17 2051   MPACID 1.32   MPAG 23.7*

## 2024-06-13 NOTE — LETTER
6/13/2024      Belen Sharma  [address] [place] [address]      Dear Colleague,    Thank you for referring your patient, Belen Sharma, to the [facility]. Please see a copy of my visit note below.      TRANSPLANT NEPHROLOGY CHRONIC POST TRANSPLANT VISIT    Virtual Visit Details    Type of service:  Video Visit   Video Start Time: 10:00 AM  Video End Time:10:17 AM    Originating Location (pt. Location): Home    Distant Location (provider location):  Off-site  Platform used for Video Visit: [facility]     Assessment & Plan  # DDKT: Stable   - Baseline Creatinine:  ~ 0.6-0.9   - Proteinuria: Normal (<0.2 grams)   - Date DSA Last Checked: Not Known      Latest DSA: Not checked recently due to time from transplant   - BK Viremia: No   - Kidney Tx Biopsy: No    -Labs every 2 months    # Liver Tx:    -Good allograft function. Follows with transplant hepatology.     # Immunosuppression: Tacrolimus immediate release (goal 4-6), Mycophenolic acid (dose 540 mg every 12 hours) and Prednisone (dose 5 mg daily)   - Continue with intensive monitoring of immunosuppression for efficacy and toxicity.   - Changes: Not at this time     # Infection Prophylaxis:   - PJP: None   Last CD4 Level: 712 (Jun/2024)    # Recurrent UTI :    -Follows with txp ID and Dr. Ferrara with urogyn   -Has an Rx for Macrobid to be filled only if symptomatic and after submitting U/A and UCx    # PKD:   -Seen by txp surgery. Risks outweigh benefits for native nephrectomy    #Orthostatic hypotension: Controlled;  Goal BP: < 130/80   - Changes: Not at this time.     # Post-Transplant Erythrocytosis: Hgb: Stable;  On ACEI/ARB: No   Imaging: Not at this time    # Mineral Bone Disorder:   - Secondary renal hyperparathyroidism; PTH level: Normal (18-80 pg/ml)        On treatment: None  - Vitamin D; level: High        On supplement: No, restart calcium/vitamin D 1 tab BID for osteoporosis prevention  - Calcium; level: High normal        On  supplement: No, restart calcium/vitamin D 1 tab BID for osteoporosis prevention   - Phosphorus; level: Normal        On supplement: No     # Electrolytes:   - Potassium; level: Normal        On supplement: No  - Magnesium; level: Normal        On supplement: No  - Bicarbonate; level: Normal        On supplement: Yes, bicarb 1300mg bid    # HSIL:    -per cone biopsy on 9/30/20. No evidence of dysplasia. Most recent coloposcopy 4/25/23 with negative biopsies.   -Pap 10/2023 negative with negative HPV. Plan cotest ~10/2024    # Microvascular angina:   -Follows with cardiology, Dr. Mohamud.    -Started on atorvastatin 10mg daily by Dr. Mohamud 12/2023   -Uses SL angina a few times per year    # Cystocele:              -Has pessary. Follows with OB GYN    # Skin Cancer Risk:    - Discussed sun protection and recommend regular follow up with Dermatology.    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    Return visit: Return in about 1 year (around 6/13/2025).    Saran Angulo MD    The longitudinal plan of care for kidney/liver transplant was addressed during this visit. Due to the added complexity in care, I will continue to support Belen Sharma in the subsequent management of this condition(s) and with the ongoing continuity of care of this condition(s).       Chief Complaint  Ms. Sharma is a 64 year old here for routine follow up, kidney transplant and immunosuppression management.    History of Present  Illness  Ms. Sharma was admitted 5/1-5/7/24 for pyelonephritis w/ E.e coli with sepsis. She was transitioned to PO cipro to complete a course of 14d. Tagged WBC scan completed showed WBC accumulation in RLQ transplant consisten with pyelo.    The patient overall feels well. She denies nausea, vomiting, diarrhea, fever, chills, shortness of breath, chest pain, LE edema, unintentional weight loss, nights sweats, dysuria, hematuria.         Home BP:  110 systolic    Problem List  Patient Active Problem List   Diagnosis     S/P splenectomy     Polycystic kidney disease     Immunosuppression (H24)     Liver replaced by transplant (H)     Endometriosis     HPV (human papilloma virus) infection     JANNETTE III with severe dysplasia     Acquired bilateral hammer toes     Depression     Depressive psychosis (H)     Dermatophytosis of nail     Hallux valgus, acquired, bilateral     Incisional hernia following transplant     Malignant neoplasm of breast (H)     Microvascular angina (H24)     Postmenopausal state     Administration of long-term prophylactic antibiotics     Secondary hyperparathyroidism (H24)     Scoliosis deformity of spine     Sinusitis     Swelling of first metatarsophalangeal (MTP) joint     Uterine prolapse     Kidney replaced by transplant     Recurrent UTI     Osteopenia of multiple sites     Combined forms of age-related cataract of both eyes     Aftercare following organ transplant     Hammertoe, bilateral     Weakness of hip, unspecified laterality     Hip tightness     Pelvic floor dysfunction     Sepsis, due to unspecified organism, unspecified whether acute organ dysfunction present (H)     ESBL E. coli carrier       Allergies  Allergies   Allergen Reactions     Ibuprofen      Due to liver transplant       Medications  Current Outpatient Medications   Medication Sig Dispense Refill     calcium carbonate-vitamin D (OSCAL) 500-5 MG-MCG tablet Take 1 tablet by mouth 2 times daily for 90 days 180 tablet 2      psyllium (METAMUCIL/KONSYL) 58.6 % powder Take 12 g (2 teaspoonful) by mouth daily       acetaminophen (TYLENOL) 325 MG tablet Take 1 tablet (325 mg) by mouth every 4 hours as needed for mild pain or fever 120 tablet 1     aspirin (ASA) 81 MG chewable tablet Take 81 mg by mouth every evening       atorvastatin (LIPITOR) 10 MG tablet Take 1 tablet (10 mg) by mouth at bedtime 90 tablet 2     biotin 1000 MCG TABS tablet Take 3,000 mcg by mouth every evening       ciclopirox (PENLAC) 8 % external solution Apply to adjacent skin and affected nails daily.  Remove with alcohol every 7 days, then repeat. 6.6 mL 0     COMPOUNDED NON-CONTROLLED SUBSTANCE (CMPD RX) - PHARMACY TO MIX COMPOUNDED MEDICATION Apply nightly to toenails. SM 61 -  Ciclopirox 8% / Itraconazole 3% / Fluconazole 3% / Terbinafine HCl 1% / Ibuprofen 2% DMSO Suspension 15 mL 3     diphenhydrAMINE (BENADRYL) 25 MG tablet Take 25 mg by mouth every 8 hours as needed for allergies       estradiol (ESTRACE) 0.1 MG/GM vaginal cream Place 1 g vaginally three times a week 42.5 g 11     guaiFENesin (MUCINEX) 600 MG 12 hr tablet Take 1 tablet by mouth 2 times daily as needed for cough or congestion       Multiple Vitamins-Minerals (WOMENS DAILY FORMULA PO) Take 1 tablet by mouth daily       mycophenolic acid (GENERIC EQUIVALENT) 180 MG EC tablet Take 540 mg by mouth 2 times daily       nitroFURantoin macrocrystal-monohydrate (MACROBID) 100 MG capsule Take 1 capsule (100 mg) by mouth 2 times daily PRN UTI 6 capsule 99     nitroGLYcerin (NITROSTAT) 0.4 MG sublingual tablet Place 1 tablet (0.4 mg) under the tongue every 5 minutes as needed for chest pain For chest pain place 1 tablet under the tongue every 5 minutes for 3 doses. If symptoms persist 5 minutes after 1st dose call 911. 30 tablet 1     predniSONE (DELTASONE) 5 MG tablet Take 1 tablet (5 mg) by mouth daily 90 tablet 3     Probiotic Product (PROBIOTIC PO) Take 2 tablets by mouth every morning ([employer]  Women's Vaginal Probiotic)       sodium bicarbonate 650 MG tablet Take 2 tablets (1,300 mg) by mouth 2 times daily 120 tablet 11     tacrolimus (GENERIC EQUIVALENT) 0.5 MG capsule Take 1 capsule (0.5 mg) by mouth every morning Take with 1 mg capsule in the AM for a total dose of 1.5 mg 60 capsule 0     tacrolimus (GENERIC EQUIVALENT) 1 MG capsule Take 1 capsule (1 mg) by mouth every 12 hours Takes with 0.5 mg capsule in the AM for a total dose of 1.5 mg AM. Takes 1 mg capsule in the PM. 60 capsule 0     No current facility-administered medications for this visit.     Medications Discontinued During This Encounter   Medication Reason     ATORVASTATIN CALCIUM PO          Physical Exam  Vital Signs: There were no vitals taken for this visit.    GENERAL APPEARANCE: alert and no distress  HENT: no obvious abnormalities on appearance  RESP: breathing appears unremarkable with normal rate, no audible wheezing or cough and no apparent shortness of breath with conversation  MS: extremities normal - no gross deformities noted  SKIN: no apparent rash and normal skin tone  NEURO: speech is clear with no obvious neurological deficits  PSYCH: mentation appears normal and affect normal      Data        Latest Ref Rng & Units 6/6/2024     8:09 AM 5/16/2024     8:12 AM 5/7/2024     5:50 AM   Renal   Sodium 135 - 145 mmol/L 138  141  137    K 3.4 - 5.3 mmol/L 4.1  4.1  4.2    Cl 98 - 107 mmol/L 102  108  106    Cl (external) 98 - 107 mmol/L 102  108  106    CO2 22 - 29 mmol/L 24  21  22    Urea Nitrogen 8.0 - 23.0 mg/dL 17.2  14.8  16.2    Creatinine 0.51 - 0.95 mg/dL 0.83  0.78  0.76    Glucose 70 - 99 mg/dL 90  86  96    Calcium 8.8 - 10.2 mg/dL 9.9  9.6  9.9    Magnesium 1.7 - 2.3 mg/dL 1.7   1.7          Latest Ref Rng & Units 6/6/2024     8:09 AM 5/7/2024     5:50 AM 5/6/2024     6:21 AM   Bone Health   Phosphorus 2.5 - 4.5 mg/dL 2.9  2.7  3.1          Latest Ref Rng & Units 6/6/2024     8:09 AM 5/16/2024     8:12 AM 5/7/2024      5:50 AM   Heme   WBC 4.0 - 11.0 10e3/uL 10.6  7.8  7.4    Hgb 11.7 - 15.7 g/dL 15.4  15.1  14.6    Plt 150 - 450 10e3/uL 215  175  235          Latest Ref Rng & Units 6/6/2024     8:09 AM 5/3/2024     6:21 AM 5/2/2024     5:38 AM   Liver   AP 40 - 150 U/L 71  62  61    TBili <=1.2 mg/dL 0.7  0.6  0.8     0.8    Bilirubin Direct 0.00 - 0.30 mg/dL <0.20   0.27    ALT 0 - 50 U/L 14  9  11    AST 0 - 45 U/L 18  14  24    Tot Protein 6.4 - 8.3 g/dL 7.1  5.4  5.3    Albumin 3.5 - 5.2 g/dL 3.9  2.9  2.9                Latest Ref Rng & Units 1/4/2021     9:32 AM 12/18/2020     6:11 AM 9/23/2020     4:53 PM   [facility] Txp Virology   CVM DNA Quant   Plasma  Plasma    CMV QUANT IU/ML CMVND^CMV DNA Not Detected [IU]/mL  CMV DNA Not Detected  CMV DNA Not Detected    LOG IU/ML OF CMVQNT <2.1 [Log_IU]/mL  Not Calculated  Not Calculated    BK Spec    Plasma    BK Res BKNEG^BK Virus DNA Not Detected copies/mL   BK Virus DNA Not Detected    BK Log <2.7 Log copies/mL   Not Calculated    EBV CAPSID ANTIBODY IGG 0.0 - 0.8 AI >8.0        Failed to redirect to the Timeline version of the REVFS SmartLink.  Recent Labs   Lab Test 04/15/24  0808 05/02/24  0538 05/16/24  0812 06/06/24  0809   DOSTAC 4/14/2024  --  5/15/2024 6/5/2024   TACROL 5.1 5.5 4.6* 4.6*     Recent Labs   Lab Test 12/18/20  0611   DOSMPA 12/17 2051   MPACID 1.32   MPAG 23.7*         Again, thank you for allowing me to participate in the care of your patient.        Sincerely,        Saran Angulo MD

## 2024-06-26 ASSESSMENT — PATIENT HEALTH QUESTIONNAIRE - PHQ9
SUM OF ALL RESPONSES TO PHQ QUESTIONS 1-9: 0
SUM OF ALL RESPONSES TO PHQ QUESTIONS 1-9: 0
10. IF YOU CHECKED OFF ANY PROBLEMS, HOW DIFFICULT HAVE THESE PROBLEMS MADE IT FOR YOU TO DO YOUR WORK, TAKE CARE OF THINGS AT HOME, OR GET ALONG WITH OTHER PEOPLE: NOT DIFFICULT AT ALL

## 2024-06-27 ENCOUNTER — OFFICE VISIT (OUTPATIENT)
Dept: INTERNAL MEDICINE | Facility: CLINIC | Age: 64
End: 2024-06-27
Payer: MEDICARE

## 2024-06-27 ENCOUNTER — TELEPHONE (OUTPATIENT)
Dept: INTERNAL MEDICINE | Facility: CLINIC | Age: 64
End: 2024-06-27

## 2024-06-27 VITALS
WEIGHT: 143.7 LBS | DIASTOLIC BLOOD PRESSURE: 61 MMHG | OXYGEN SATURATION: 96 % | SYSTOLIC BLOOD PRESSURE: 96 MMHG | HEART RATE: 92 BPM | TEMPERATURE: 98.2 F | BODY MASS INDEX: 20.62 KG/M2

## 2024-06-27 DIAGNOSIS — R01.1 HEART MURMUR: Primary | ICD-10-CM

## 2024-06-27 PROCEDURE — 99214 OFFICE O/P EST MOD 30 MIN: CPT | Performed by: INTERNAL MEDICINE

## 2024-06-27 NOTE — PATIENT INSTRUCTIONS
You should receive a call to schedule your echocardiogram. However, the central imaging number is [phone] if you need it.

## 2024-06-27 NOTE — PROGRESS NOTES
Belen is a 64 year old, presenting for the following health issues:  Follow Up (Pt here for follow up; post kidney and liver transplant care )      6/27/2024     9:13 AM   Additional Questions   Roomed by Miriam PERALES     History of Present Illness       Reason for visit:  Post kidney&liver transplant care FU, 6 month wellness check    She eats 2-3 servings of fruits and vegetables daily.She consumes 0 sweetened beverage(s) daily.She exercises with enough effort to increase her heart rate 60 or more minutes per day.  She exercises with enough effort to increase her heart rate 3 or less days per week.   She is taking medications regularly.

## 2024-06-27 NOTE — PROGRESS NOTES
HPI:    Overall stable. She has some minor complaints of B LE venous skin changes. No B LE pain and minimal swelling. She tries to get some exercise. No systemic sxs. Today No fever or chills. She remains on her same medications. No other HEENT, cardiopulmonary, abdominal, , neurological systemic, psychiatric, lymphatic, endocrine, vascular complaints.     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 Both, very large, native kidneys reamain.    PONV (postoperative nausea and vomiting)     Need to start with ice chips and apple juice, no soda    S/P kidney transplant     SLK 10/9/2010 - kidney failure 2/2 [facility]. Explanted 2012. Re-transplant after de-sensitization 2016    S/P liver transplant (H)     10/9/2010 - HAT, ischemic biopathy. Re-transplant 3/3/2011    S/P splenectomy     Spider veins 2019    Thyroid disease     Hyperthyroid treated with single dose iodine    Urinary tract infection 09/2022     Past Surgical History:   Procedure Laterality Date    bunectomy  08/01/2018    right foot    bunionectomy  01/2019    left    CHOLECYSTECTOMY      COLONOSCOPY N/A 3/20/2024    Procedure: COLONOSCOPY, WITH POLYPECTOMY;  Surgeon: Leventhal, Thomas Michael, MD;  Location: [facility] OR    CONIZATION N/A 09/30/2020    Procedure: CONE BIOPSY, CERVIX;  Surgeon: Daysi Perez MD;  Location: [facility]    FAILED PICC - RIGHT ARM Right 12/19/2020    Has a LUE AV fistula.    H STATISTIC PICC LINE INSERTION >5YR, FAILED Bilateral 12/19/2020    Left fistula, Right failed x 2 Occlussion    HERNIA REPAIR, INCISIONAL  03/2013    IR DIALYSIS PTA CENTRAL SEG  12/19/2020     IR PICC PLACEMENT > 5 YRS OF AGE  2020    IR PICC PLACEMENT > 5 YRS OF AGE  10/5/2022    PHACOEMULSIFICATION CLEAR CORNEA WITH STANDARD INTRAOCULAR LENS IMPLANT Right 2023    Procedure: RIGHT EYE PHACOEMULSIFICATION, CATARACT, WITH INTRAOCULAR LENS IMPLANT;  Surgeon: Sebastian Robertson MD;  Location: [facility] OR    PHACOEMULSIFICATION WITH STANDARD INTRAOCULAR LENS IMPLANT Left 3/9/2023    Procedure: LEFT EYE PHACOEMULSIFICATION, CATARACT, WITH STANDARD INTRAOCULAR LENS IMPLANT INSERTION;  Surgeon: Sebastian Robertson MD;  Location: [facility] OR    SPLENECTOMY      Splenectomy    TRANSPLANT KIDNEY RECIPIENT  DONOR      re-transplant after [facility]; 6 months de-sensitization prior AND 6 months after transplant    TRANSPLANT LIVER RECIPIENT  DONOR  2011    TRANSPLANT LIVER, KIDNEY RECIPIENT  DONOR, COMBINED  10/09/2010    HAT - re-Tx liver 3/3/2011; Kidney (left iliac) lost to [facility]/fibrosis - explant    VASCULAR SURGERY      Vascular access left UE. Not used for 4 years since 2nd kidney tx 2016 [place]     PE:    Vitals noted, gen, nad, cooperative, alert, neck supple nl rom, lungs with good air movement, RRR, S1, S2, soft harsh murmur present? Abdomen, no acute findings. She has palpable B anterior tibialis pulses.     A/P:     1. Immunizations; COVID Pfizer vaccine x 7.  Pneumococcal 23 done 2013. Tdap 2018.  She has completed the Zoster Shingrix vaccine series.  RSV vaccine 2023. Pneumococcal 20 vaccine done 2022  2. Cardiology; seen by Dr. Mohamud on  2023 for microvascular angina and non-obstructive CAD. Next visit 2024   3. Mammogram 2023 and next 2024  4. GI follow up with Dr. Leventhal 2023 for liver transplant for polycystic renal disease and congenital hepatic fibrosis. Next visit 2024  5. GYN appt. With Dr. Perez 10/26/2023, next 2024   6. Nephrology appt. With Dr. Angulo Renal transplant note 2024.  Creatinine  0.83 on 6/6/2024   7. Dermatology visit with Dr. Benitez 9/29/2023 and next 9/18/2024  8. Chronic urinary issues. She was seen Urology by Dr. Francis 5/8/2024 .   9. Infectious disease note from Dr. Nguyen 6/13/2024. Last note from Dr. Gonzales 4/9/2024   10. Ophthalmology note from 3/31/2023  11. Colonoscopy; 3/20/2024.   12. Lipids; 12/5/2022; TG's 115,  and HDL 57. Repeat 12/18/2023; LDL 66, HDL 48 and TG's 124. She is on Atorvastatin.   13. She states splenectomy 3/6/2013. She had Pneumococcal 20 vaccine 9/12/2022.  Meningococcal vaccine done 1/17/2024. Will follow up on getting her the Hib B vaccine  14. Murmur? Ordered resting echo (last 8/31/2020).        30 minutes spent on the date of the encounter doing chart review, history and exam, documentation and further activities as noted above exclusive of procedures and other billable interpretations

## 2024-07-08 ENCOUNTER — ANCILLARY PROCEDURE (OUTPATIENT)
Dept: CARDIOLOGY | Facility: CLINIC | Age: 64
End: 2024-07-08
Attending: INTERNAL MEDICINE
Payer: MEDICARE

## 2024-07-08 DIAGNOSIS — R01.1 HEART MURMUR: ICD-10-CM

## 2024-07-08 LAB — LVEF ECHO: NORMAL

## 2024-07-08 PROCEDURE — 93306 TTE W/DOPPLER COMPLETE: CPT | Performed by: INTERNAL MEDICINE

## 2024-07-11 ENCOUNTER — TELEPHONE (OUTPATIENT)
Dept: INFECTIOUS DISEASES | Facility: CLINIC | Age: 64
End: 2024-07-11
Payer: MEDICARE

## 2024-07-11 NOTE — TELEPHONE ENCOUNTER
Patient Contacted for the patient to call back and schedule the following:    Appointment type: Return  Provider: Christian  Return date: 12/13/2024  Specialty phone number: [phone]  Additional appointment(s) needed: na  Additonal Notes: in person or video

## 2024-07-14 ENCOUNTER — HEALTH MAINTENANCE LETTER (OUTPATIENT)
Age: 64
End: 2024-07-14

## 2024-07-15 ENCOUNTER — LAB (OUTPATIENT)
Dept: LAB | Facility: CLINIC | Age: 64
End: 2024-07-15
Payer: MEDICARE

## 2024-07-15 DIAGNOSIS — Z94.4 LIVER REPLACED BY TRANSPLANT (H): ICD-10-CM

## 2024-07-15 LAB
ERYTHROCYTE [DISTWIDTH] IN BLOOD BY AUTOMATED COUNT: 14.6 % (ref 10–15)
HCT VFR BLD AUTO: 45.1 % (ref 35–47)
HGB BLD-MCNC: 14.5 G/DL (ref 11.7–15.7)
MCH RBC QN AUTO: 30.3 PG (ref 26.5–33)
MCHC RBC AUTO-ENTMCNC: 32.2 G/DL (ref 31.5–36.5)
MCV RBC AUTO: 94 FL (ref 78–100)
PLATELET # BLD AUTO: 220 10E3/UL (ref 150–450)
RBC # BLD AUTO: 4.78 10E6/UL (ref 3.8–5.2)
WBC # BLD AUTO: 7.2 10E3/UL (ref 4–11)

## 2024-07-15 PROCEDURE — 85027 COMPLETE CBC AUTOMATED: CPT

## 2024-07-15 PROCEDURE — 82248 BILIRUBIN DIRECT: CPT

## 2024-07-15 PROCEDURE — 80053 COMPREHEN METABOLIC PANEL: CPT

## 2024-07-15 PROCEDURE — 80197 ASSAY OF TACROLIMUS: CPT

## 2024-07-15 PROCEDURE — 36415 COLL VENOUS BLD VENIPUNCTURE: CPT

## 2024-07-16 LAB
ALBUMIN SERPL BCG-MCNC: 3.9 G/DL (ref 3.5–5.2)
ALP SERPL-CCNC: 57 U/L (ref 40–150)
ALT SERPL W P-5'-P-CCNC: 12 U/L (ref 0–50)
ANION GAP SERPL CALCULATED.3IONS-SCNC: 8 MMOL/L (ref 7–15)
AST SERPL W P-5'-P-CCNC: 19 U/L (ref 0–45)
BILIRUB DIRECT SERPL-MCNC: <0.2 MG/DL (ref 0–0.3)
BILIRUB SERPL-MCNC: 0.6 MG/DL
BUN SERPL-MCNC: 17.4 MG/DL (ref 8–23)
CALCIUM SERPL-MCNC: 9.6 MG/DL (ref 8.8–10.4)
CHLORIDE SERPL-SCNC: 106 MMOL/L (ref 98–107)
CREAT SERPL-MCNC: 0.87 MG/DL (ref 0.51–0.95)
EGFRCR SERPLBLD CKD-EPI 2021: 74 ML/MIN/1.73M2
GLUCOSE SERPL-MCNC: 82 MG/DL (ref 70–99)
HCO3 SERPL-SCNC: 26 MMOL/L (ref 22–29)
POTASSIUM SERPL-SCNC: 3.8 MMOL/L (ref 3.4–5.3)
PROT SERPL-MCNC: 6.8 G/DL (ref 6.4–8.3)
SODIUM SERPL-SCNC: 140 MMOL/L (ref 135–145)
TACROLIMUS BLD-MCNC: 5.5 UG/L (ref 5–15)
TME LAST DOSE: NORMAL H
TME LAST DOSE: NORMAL H

## 2024-07-29 ENCOUNTER — ANCILLARY PROCEDURE (OUTPATIENT)
Dept: MAMMOGRAPHY | Facility: CLINIC | Age: 64
End: 2024-07-29
Attending: INTERNAL MEDICINE
Payer: MEDICARE

## 2024-07-29 DIAGNOSIS — Z12.31 VISIT FOR SCREENING MAMMOGRAM: ICD-10-CM

## 2024-07-29 PROCEDURE — 77067 SCR MAMMO BI INCL CAD: CPT | Mod: GC

## 2024-07-29 PROCEDURE — 77063 BREAST TOMOSYNTHESIS BI: CPT | Mod: GC

## 2024-08-13 ENCOUNTER — MYC MEDICAL ADVICE (OUTPATIENT)
Dept: OBGYN | Facility: CLINIC | Age: 64
End: 2024-08-13
Payer: MEDICARE

## 2024-09-16 ENCOUNTER — LAB (OUTPATIENT)
Dept: LAB | Facility: CLINIC | Age: 64
End: 2024-09-16
Payer: MEDICARE

## 2024-09-16 DIAGNOSIS — Z94.4 LIVER REPLACED BY TRANSPLANT (H): ICD-10-CM

## 2024-09-16 LAB
ALBUMIN SERPL BCG-MCNC: 3.9 G/DL (ref 3.5–5.2)
ALP SERPL-CCNC: 58 U/L (ref 40–150)
ALT SERPL W P-5'-P-CCNC: 10 U/L (ref 0–50)
ANION GAP SERPL CALCULATED.3IONS-SCNC: 9 MMOL/L (ref 7–15)
AST SERPL W P-5'-P-CCNC: 18 U/L (ref 0–45)
BILIRUB DIRECT SERPL-MCNC: <0.2 MG/DL (ref 0–0.3)
BILIRUB SERPL-MCNC: 0.7 MG/DL
BUN SERPL-MCNC: 18.4 MG/DL (ref 8–23)
CALCIUM SERPL-MCNC: 9.9 MG/DL (ref 8.8–10.4)
CHLORIDE SERPL-SCNC: 106 MMOL/L (ref 98–107)
CREAT SERPL-MCNC: 0.9 MG/DL (ref 0.51–0.95)
EGFRCR SERPLBLD CKD-EPI 2021: 71 ML/MIN/1.73M2
ERYTHROCYTE [DISTWIDTH] IN BLOOD BY AUTOMATED COUNT: 14.2 % (ref 10–15)
GLUCOSE SERPL-MCNC: 81 MG/DL (ref 70–99)
HCO3 SERPL-SCNC: 26 MMOL/L (ref 22–29)
HCT VFR BLD AUTO: 49 % (ref 35–47)
HGB BLD-MCNC: 15.6 G/DL (ref 11.7–15.7)
MCH RBC QN AUTO: 29.8 PG (ref 26.5–33)
MCHC RBC AUTO-ENTMCNC: 31.8 G/DL (ref 31.5–36.5)
MCV RBC AUTO: 94 FL (ref 78–100)
PLATELET # BLD AUTO: 204 10E3/UL (ref 150–450)
POTASSIUM SERPL-SCNC: 3.8 MMOL/L (ref 3.4–5.3)
PROT SERPL-MCNC: 6.9 G/DL (ref 6.4–8.3)
RBC # BLD AUTO: 5.24 10E6/UL (ref 3.8–5.2)
SODIUM SERPL-SCNC: 141 MMOL/L (ref 135–145)
TACROLIMUS BLD-MCNC: 5.7 UG/L (ref 5–15)
TME LAST DOSE: NORMAL H
TME LAST DOSE: NORMAL H
WBC # BLD AUTO: 7.9 10E3/UL (ref 4–11)

## 2024-09-16 PROCEDURE — 36415 COLL VENOUS BLD VENIPUNCTURE: CPT

## 2024-09-16 PROCEDURE — 82248 BILIRUBIN DIRECT: CPT

## 2024-09-16 PROCEDURE — 85027 COMPLETE CBC AUTOMATED: CPT

## 2024-09-16 PROCEDURE — 80197 ASSAY OF TACROLIMUS: CPT

## 2024-09-16 PROCEDURE — 80053 COMPREHEN METABOLIC PANEL: CPT

## 2024-09-17 NOTE — PROGRESS NOTES
[facility] Dermatology Note  Encounter Date: Sep 18, 2024  Office Visit     Dermatology Problem List:  1. [facility] kidney and liver transplant 2010, liver transplant 2011, kidney transplant 2016  2. Onychomycosis  - prior rx: ciclopirox  - Skin Medicinals - Ciclopirox 8% / Itraconazole 3% / Fluconazole 3% / Terbinafine HCl 1% / Ibuprofen 2% DMSO Suspension placed 9/29/2023  3. Verrucoid Keratosis, right lateral thigh, s/p shave bx 4/6/2022   4. Stasis Dermatitis     ____________________________________________    Assessment & Plan:     # Inflamed skin tag, R axilla  - cryo today    # Hemosiderin deposition  # Stasis Dermatitis   - Recommended compression stockings, she has difficulty with compression stockings; refer to lymphedema clinic    # Onychomycosis  - Not interested in PO antifungals  - Prev sent skin medicinals -  Ciclopirox 8% / Itraconazole 3% / Fluconazole 3% / Terbinafine HCl 1% / Ibuprofen 2% DMSO Suspension but didn't try, will go back to ciclopirox  - resume ciclopirox     # Benign lesions - SKs, cherry angiomas, lentigenes, milia.  - No treatment required    # Multiple benign nevi.   - Monitor for ABCDEs of melanoma   - Continue sun protection - recommend SPF 30 or higher with frequent application   - Return sooner if noticing changing or symptomatic lesions    # History of organ transplant. Patient aware of higher risk for cutaneous malignancy given history of transplant. Monitor for changing or symptomatic lesions. Continue frequent skin checks and photoprotection.      Procedures Performed:   None.    Follow-up: 1 year(s) in-person, or earlier for new or changing lesions    Staff and Scribe:   Scribe Disclosure:   By signing my name below, I, Sabrina Mathias, attest that this documentation has been prepared under the direction and in the presence of Anat Benitez MD.  - Electronically Signed: Sabrina Mathias 09/17/24     Provider Disclosure:   The documentation recorded by the scribe  accurately reflects the services I personally performed and the decisions made by me.    Anat Benitez MD    Department of Dermatology  [facility]: Phone: [phone], Fax: [phone]  9/18/2024       ____________________________________________    CC: Skin Check (Here today for a skin check. No concerns. )    HPI:    Belen Sharma is a(n) 64 year old adult who presents today as a return patient for above. She was last seen in dermatology by me on 09/29/2023.    Some spots on face to double check, no symptoms      Patient is otherwise feeling well, without additional skin concerns.     Labs Reviewed:  N/A    Physical exam:  Vitals: There were no vitals taken for this visit.  GEN: This is a well developed, well-nourished female in no acute distress, in a pleasant mood.    SKIN: Full body skin exam excluding the genitals was performed including face, scalp, neck, ears, chest, back, bilateral arms, hands, bilateral legs, feet, and buttocks.   - white papules on face  - brawny pigmentation lower legs  - There are dome shaped bright red papules on the trunk and extremities .   - Multiple regular brown pigmented macules and papules are identified on the trunk and extremities. .   - Scattered brown macules on sun exposed areas.  - Waxy stuck on papules and plaques on trunk and extremities.   - yellowish discoloration and thickening of toenails  - fleshy pendunculated papules axillae  - No other lesions of concern on areas examined.       Medications:  Current Outpatient Medications   Medication Sig Dispense Refill    acetaminophen (TYLENOL) 325 MG tablet Take 1 tablet (325 mg) by mouth every 4 hours as needed for mild pain or fever 120 tablet 1    aspirin (ASA) 81 MG chewable tablet Take 81 mg by mouth every evening      atorvastatin (LIPITOR) 10 MG tablet Take 1 tablet (10 mg) by mouth at bedtime 90 tablet 2    biotin 1000  MCG TABS tablet Take 3,000 mcg by mouth every evening      ciclopirox (PENLAC) 8 % external solution Apply to adjacent skin and affected nails daily.  Remove with alcohol every 7 days, then repeat. 6.6 mL 0    COMPOUNDED NON-CONTROLLED SUBSTANCE (CMPD RX) - PHARMACY TO MIX COMPOUNDED MEDICATION Apply nightly to toenails. SM 61 -  Ciclopirox 8% / Itraconazole 3% / Fluconazole 3% / Terbinafine HCl 1% / Ibuprofen 2% DMSO Suspension 15 mL 3    diphenhydrAMINE (BENADRYL) 25 MG tablet Take 25 mg by mouth every 8 hours as needed for allergies      estradiol (ESTRACE) 0.1 MG/GM vaginal cream Place 1 g vaginally three times a week 42.5 g 11    guaiFENesin (MUCINEX) 600 MG 12 hr tablet Take 1 tablet by mouth 2 times daily as needed for cough or congestion      Multiple Vitamins-Minerals (WOMENS DAILY FORMULA PO) Take 1 tablet by mouth daily      mycophenolic acid (GENERIC EQUIVALENT) 180 MG EC tablet Take 540 mg by mouth 2 times daily      nitroFURantoin macrocrystal-monohydrate (MACROBID) 100 MG capsule Take 1 capsule (100 mg) by mouth 2 times daily PRN UTI 6 capsule 99    nitroGLYcerin (NITROSTAT) 0.4 MG sublingual tablet Place 1 tablet (0.4 mg) under the tongue every 5 minutes as needed for chest pain For chest pain place 1 tablet under the tongue every 5 minutes for 3 doses. If symptoms persist 5 minutes after 1st dose call 911. 30 tablet 1    predniSONE (DELTASONE) 5 MG tablet Take 1 tablet (5 mg) by mouth daily 90 tablet 3    Probiotic Product (PROBIOTIC PO) Take 2 tablets by mouth every morning ([facility] Women's Vaginal Probiotic)      psyllium (METAMUCIL/KONSYL) 58.6 % powder Take 12 g (2 teaspoonful) by mouth daily      sodium bicarbonate 650 MG tablet Take 2 tablets (1,300 mg) by mouth 2 times daily 120 tablet 11    tacrolimus (GENERIC EQUIVALENT) 0.5 MG capsule Take 1 capsule (0.5 mg) by mouth every morning Take with 1 mg capsule in the AM for a total dose of 1.5 mg 60 capsule 0    tacrolimus (GENERIC EQUIVALENT)  1 MG capsule Take 1 capsule (1 mg) by mouth every 12 hours Takes with 0.5 mg capsule in the AM for a total dose of 1.5 mg AM. Takes 1 mg capsule in the PM. 60 capsule 0     No current facility-administered medications for this visit.      Past Medical History:   Patient Active Problem List   Diagnosis    S/P splenectomy    Polycystic kidney disease    Immunosuppressed status (H24)    Liver replaced by transplant (H)    Endometriosis    HPV (human papilloma virus) infection    JANNETTE III with severe dysplasia    Acquired bilateral hammer toes    Depression    Depressive psychosis (H)    Dermatophytosis of nail    Hallux valgus, acquired, bilateral    Incisional hernia following transplant    Malignant neoplasm of breast (H)    Microvascular angina (H24)    Secondary hyperparathyroidism (H24)    Scoliosis deformity of spine    Swelling of first metatarsophalangeal (MTP) joint    Uterine prolapse    Kidney replaced by transplant    Recurrent UTI    Osteopenia of multiple sites    Combined forms of age-related cataract of both eyes    Aftercare following organ transplant    Hammertoe, bilateral    Weakness of hip, unspecified laterality    Pelvic floor dysfunction    ESBL E. coli carrier     Past Medical History:   Diagnosis Date    Adolescent scoliosis     protruding    BK viremia [id number]    CMV pneumonia (H) 2010    Congenital hepatic fibrosis     Endometriosis     High grade squamous intraepithelial lesion of cervix 09/11/2020    History of angina     HPV (human papilloma virus) infection     Immunosuppression (H24)     Polycystic kidney disease     Polycystic kidneys, tx kidney on the right. Both, very large, native kidneys reamain.    PONV (postoperative nausea and vomiting)     Need to start with ice chips and apple juice, no soda    S/P kidney transplant     SLK 10/9/2010 - kidney failure 2/2 [facility]. Explanted 2012. Re-transplant after de-sensitization 2016    S/P liver transplant (H)     10/9/2010 - HAT, ischemic  biopathy. Re-transplant 3/3/2011    S/P splenectomy     Spider veins 2019    Thyroid disease     Hyperthyroid treated with single dose iodine    Urinary tract infection 09/2022       CC No referring provider defined for this encounter. on close of this encounter.

## 2024-09-18 ENCOUNTER — OFFICE VISIT (OUTPATIENT)
Dept: DERMATOLOGY | Facility: CLINIC | Age: 64
End: 2024-09-18
Payer: MEDICARE

## 2024-09-18 DIAGNOSIS — L91.8 INFLAMED SKIN TAG: ICD-10-CM

## 2024-09-18 DIAGNOSIS — D84.9 IMMUNOSUPPRESSED STATUS (H): Primary | ICD-10-CM

## 2024-09-18 DIAGNOSIS — L81.4 LENTIGINES: ICD-10-CM

## 2024-09-18 DIAGNOSIS — Z94.4 LIVER REPLACED BY TRANSPLANT (H): ICD-10-CM

## 2024-09-18 DIAGNOSIS — D18.01 CHERRY ANGIOMA: ICD-10-CM

## 2024-09-18 DIAGNOSIS — L82.1 SEBORRHEIC KERATOSIS: ICD-10-CM

## 2024-09-18 DIAGNOSIS — I87.2 VENOUS STASIS DERMATITIS OF BOTH LOWER EXTREMITIES: ICD-10-CM

## 2024-09-18 DIAGNOSIS — D22.9 MULTIPLE BENIGN NEVI: ICD-10-CM

## 2024-09-18 DIAGNOSIS — L72.0 MILIA: ICD-10-CM

## 2024-09-18 DIAGNOSIS — B35.1 ONYCHOMYCOSIS: ICD-10-CM

## 2024-09-18 DIAGNOSIS — Z94.0 KIDNEY REPLACED BY TRANSPLANT: ICD-10-CM

## 2024-09-18 PROCEDURE — 17110 DESTRUCTION B9 LES UP TO 14: CPT | Performed by: DERMATOLOGY

## 2024-09-18 PROCEDURE — 99213 OFFICE O/P EST LOW 20 MIN: CPT | Mod: 25 | Performed by: DERMATOLOGY

## 2024-09-18 RX ORDER — CICLOPIROX 80 MG/ML
SOLUTION TOPICAL
Qty: 6.6 ML | Refills: 11 | Status: SHIPPED | OUTPATIENT
Start: 2024-09-18

## 2024-09-18 ASSESSMENT — PAIN SCALES - GENERAL: PAINLEVEL: NO PAIN (0)

## 2024-09-18 NOTE — LETTER
9/18/2024       RE: Belen Sharma  [address] [place] [address]     Dear Colleague,    Thank you for referring your patient, Belen Sharma, to the [facility] [place] at [facility]. Please see a copy of my visit note below.    [facility] Dermatology Note  Encounter Date: Sep 18, 2024  Office Visit     Dermatology Problem List:  1. [facility] kidney and liver transplant 2010, liver transplant 2011, kidney transplant 2016  2. Onychomycosis  - prior rx: ciclopirox  - Skin Medicinals - Ciclopirox 8% / Itraconazole 3% / Fluconazole 3% / Terbinafine HCl 1% / Ibuprofen 2% DMSO Suspension placed 9/29/2023  3. Verrucoid Keratosis, right lateral thigh, s/p shave bx 4/6/2022   4. Stasis Dermatitis     ____________________________________________    Assessment & Plan:     # Inflamed skin tag, R axilla  - cryo today    # Hemosiderin deposition  # Stasis Dermatitis   - Recommended compression stockings, she has difficulty with compression stockings; refer to lymphedema clinic    # Onychomycosis  - Not interested in PO antifungals  - Prev sent skin medicinals -  Ciclopirox 8% / Itraconazole 3% / Fluconazole 3% / Terbinafine HCl 1% / Ibuprofen 2% DMSO Suspension but didn't try, will go back to ciclopirox  - resume ciclopirox     # Benign lesions - SKs, cherry angiomas, lentigenes, milia.  - No treatment required    # Multiple benign nevi.   - Monitor for ABCDEs of melanoma   - Continue sun protection - recommend SPF 30 or higher with frequent application   - Return sooner if noticing changing or symptomatic lesions    # History of organ transplant. Patient aware of higher risk for cutaneous malignancy given history of transplant. Monitor for changing or symptomatic lesions. Continue frequent skin checks and photoprotection.      Procedures Performed:   None.    Follow-up: 1 year(s) in-person, or earlier for new or changing lesions    Staff  and Scribe:   Scribe Disclosure:   By signing my name below, I, Sabrina Mathias, attest that this documentation has been prepared under the direction and in the presence of Anat Benitez MD.  - Electronically Signed: Sabrina Mey 09/17/24     Provider Disclosure:   The documentation recorded by the scribe accurately reflects the services I personally performed and the decisions made by me.    Anat Benitez MD    Department of Dermatology  [facility]: Phone: [phone], Fax: [phone]  9/18/2024       ____________________________________________    CC: Skin Check (Here today for a skin check. No concerns. )    HPI:    Belen Sharma is a(n) 64 year old adult who presents today as a return patient for above. She was last seen in dermatology by me on 09/29/2023.    Some spots on face to double check, no symptoms      Patient is otherwise feeling well, without additional skin concerns.     Labs Reviewed:  N/A    Physical exam:  Vitals: There were no vitals taken for this visit.  GEN: This is a well developed, well-nourished female in no acute distress, in a pleasant mood.    SKIN: Full body skin exam excluding the genitals was performed including face, scalp, neck, ears, chest, back, bilateral arms, hands, bilateral legs, feet, and buttocks.   - white papules on face  - brawny pigmentation lower legs  - There are dome shaped bright red papules on the trunk and extremities .   - Multiple regular brown pigmented macules and papules are identified on the trunk and extremities. .   - Scattered brown macules on sun exposed areas.  - Waxy stuck on papules and plaques on trunk and extremities.   - yellowish discoloration and thickening of toenails  - fleshy pendunculated papules axillae  - No other lesions of concern on areas examined.       Medications:  Current Outpatient Medications   Medication Sig Dispense Refill      acetaminophen (TYLENOL) 325 MG tablet Take 1 tablet (325 mg) by mouth every 4 hours as needed for mild pain or fever 120 tablet 1     aspirin (ASA) 81 MG chewable tablet Take 81 mg by mouth every evening       atorvastatin (LIPITOR) 10 MG tablet Take 1 tablet (10 mg) by mouth at bedtime 90 tablet 2     biotin 1000 MCG TABS tablet Take 3,000 mcg by mouth every evening       ciclopirox (PENLAC) 8 % external solution Apply to adjacent skin and affected nails daily.  Remove with alcohol every 7 days, then repeat. 6.6 mL 0     COMPOUNDED NON-CONTROLLED SUBSTANCE (CMPD RX) - PHARMACY TO MIX COMPOUNDED MEDICATION Apply nightly to toenails. SM 61 -  Ciclopirox 8% / Itraconazole 3% / Fluconazole 3% / Terbinafine HCl 1% / Ibuprofen 2% DMSO Suspension 15 mL 3     diphenhydrAMINE (BENADRYL) 25 MG tablet Take 25 mg by mouth every 8 hours as needed for allergies       estradiol (ESTRACE) 0.1 MG/GM vaginal cream Place 1 g vaginally three times a week 42.5 g 11     guaiFENesin (MUCINEX) 600 MG 12 hr tablet Take 1 tablet by mouth 2 times daily as needed for cough or congestion       Multiple Vitamins-Minerals (WOMENS DAILY FORMULA PO) Take 1 tablet by mouth daily       mycophenolic acid (GENERIC EQUIVALENT) 180 MG EC tablet Take 540 mg by mouth 2 times daily       nitroFURantoin macrocrystal-monohydrate (MACROBID) 100 MG capsule Take 1 capsule (100 mg) by mouth 2 times daily PRN UTI 6 capsule 99     nitroGLYcerin (NITROSTAT) 0.4 MG sublingual tablet Place 1 tablet (0.4 mg) under the tongue every 5 minutes as needed for chest pain For chest pain place 1 tablet under the tongue every 5 minutes for 3 doses. If symptoms persist 5 minutes after 1st dose call 911. 30 tablet 1     predniSONE (DELTASONE) 5 MG tablet Take 1 tablet (5 mg) by mouth daily 90 tablet 3     Probiotic Product (PROBIOTIC PO) Take 2 tablets by mouth every morning ([facility] Women's Vaginal Probiotic)       psyllium (METAMUCIL/KONSYL) 58.6 % powder Take 12 g (2  teaspoonful) by mouth daily       sodium bicarbonate 650 MG tablet Take 2 tablets (1,300 mg) by mouth 2 times daily 120 tablet 11     tacrolimus (GENERIC EQUIVALENT) 0.5 MG capsule Take 1 capsule (0.5 mg) by mouth every morning Take with 1 mg capsule in the AM for a total dose of 1.5 mg 60 capsule 0     tacrolimus (GENERIC EQUIVALENT) 1 MG capsule Take 1 capsule (1 mg) by mouth every 12 hours Takes with 0.5 mg capsule in the AM for a total dose of 1.5 mg AM. Takes 1 mg capsule in the PM. 60 capsule 0     No current facility-administered medications for this visit.      Past Medical History:   Patient Active Problem List   Diagnosis     S/P splenectomy     Polycystic kidney disease     Immunosuppressed status (H24)     Liver replaced by transplant (H)     Endometriosis     HPV (human papilloma virus) infection     JANNETTE III with severe dysplasia     Acquired bilateral hammer toes     Depression     Depressive psychosis (H)     Dermatophytosis of nail     Hallux valgus, acquired, bilateral     Incisional hernia following transplant     Malignant neoplasm of breast (H)     Microvascular angina (H24)     Secondary hyperparathyroidism (H24)     Scoliosis deformity of spine     Swelling of first metatarsophalangeal (MTP) joint     Uterine prolapse     Kidney replaced by transplant     Recurrent UTI     Osteopenia of multiple sites     Combined forms of age-related cataract of both eyes     Aftercare following organ transplant     Hammertoe, bilateral     Weakness of hip, unspecified laterality     Pelvic floor dysfunction     ESBL E. coli carrier     Past Medical History:   Diagnosis Date     Adolescent scoliosis     protruding     BK viremia [id number]     CMV pneumonia (H) 2010     Congenital hepatic fibrosis      Endometriosis      High grade squamous intraepithelial lesion of cervix 09/11/2020     History of angina      HPV (human papilloma virus) infection      Immunosuppression (H24)      Polycystic kidney disease      Polycystic kidneys, tx kidney on the right. Both, very large, native kidneys reamain.     PONV (postoperative nausea and vomiting)     Need to start with ice chips and apple juice, no soda     S/P kidney transplant     SLK 10/9/2010 - kidney failure 2/2 [facility]. Explanted 2012. Re-transplant after de-sensitization 2016     S/P liver transplant (H)     10/9/2010 - HAT, ischemic biopathy. Re-transplant 3/3/2011     S/P splenectomy      Spider veins 2019     Thyroid disease     Hyperthyroid treated with single dose iodine     Urinary tract infection 09/2022       CC No referring provider defined for this encounter. on close of this encounter.      Again, thank you for allowing me to participate in the care of your patient.      Sincerely,    Anat Benitez MD

## 2024-09-21 NOTE — PROGRESS NOTES
HPI;    Overall doing well. She denies any new HEENT, cardiopulmonary, abdominal, , GYN, neurological, systemic, psychiatric, lymphatic, endocrine, vascular complaints. She has h/o of liver and renal transplant. She states she has possibly old vaccine records from [place] before her splenectomy.   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 Both, very large, native kidneys reamain.    PONV (postoperative nausea and vomiting)     Need to start with ice chips and apple juice, no soda    S/P kidney transplant     SLK 10/9/2010 - kidney failure 2/2 [facility]. Explanted 2012. Re-transplant after de-sensitization 2016    S/P liver transplant (H)     10/9/2010 - [facility], ischemic biopathy. Re-transplant 3/3/2011    S/P splenectomy     Spider veins 2019    Thyroid disease     Hyperthyroid treated with single dose iodine    Urinary tract infection 09/2022     Past Surgical History:   Procedure Laterality Date    bunectomy  08/01/2018    right foot    bunionectomy  01/2019    left    CHOLECYSTECTOMY      COLONOSCOPY N/A 3/20/2024    Procedure: COLONOSCOPY, WITH POLYPECTOMY;  Surgeon: Leventhal, Thomas Michael, MD;  Location: [facility] OR    CONIZATION N/A 09/30/2020    Procedure: CONE BIOPSY, CERVIX;  Surgeon: Daysi Perez MD;  Location: [facility] OR    FAILED PICC - RIGHT ARM Right 12/19/2020    Has a LUE AV fistula.    H STATISTIC PICC LINE INSERTION >5YR, FAILED Bilateral 12/19/2020    Left fistula, Right failed x 2 Occlussion    HERNIA REPAIR, INCISIONAL  03/2013    IR DIALYSIS PTA CENTRAL SEG  12/19/2020    IR PICC PLACEMENT > 5 YRS OF AGE  12/19/2020    IR PICC  PLACEMENT > 5 YRS OF AGE  10/5/2022    PHACOEMULSIFICATION CLEAR CORNEA WITH STANDARD INTRAOCULAR LENS IMPLANT Right 2023    Procedure: RIGHT EYE PHACOEMULSIFICATION, CATARACT, WITH INTRAOCULAR LENS IMPLANT;  Surgeon: Sebastian Robertson MD;  Location: [facility] OR    PHACOEMULSIFICATION WITH STANDARD INTRAOCULAR LENS IMPLANT Left 3/9/2023    Procedure: LEFT EYE PHACOEMULSIFICATION, CATARACT, WITH STANDARD INTRAOCULAR LENS IMPLANT INSERTION;  Surgeon: Sebastian Robertson MD;  Location: [facility] OR    SPLENECTOMY      Splenectomy    TRANSPLANT KIDNEY RECIPIENT  DONOR      re-transplant after [facility]; 6 months de-sensitization prior AND 6 months after transplant    TRANSPLANT LIVER RECIPIENT  DONOR  2011    TRANSPLANT LIVER, KIDNEY RECIPIENT  DONOR, COMBINED  10/09/2010    [facility] - re-Tx liver 3/3/2011; Kidney (left iliac) lost to [facility]/fibrosis - explant    VASCULAR SURGERY      Vascular access left UE. Not used for 4 years since 2nd kidney tx 2016 [place]     PE:    Vitals noted gen, nad, cooperative, alert, neck supple nl rom, no B carotid bruits, lungs with good air movement, RRR, S1, S2, no MRG, abdomen, no acute findings. Grossly normal neurological exam.     Procedure  Echocardiogram with two-dimensional, color and spectral Doppler performed. 2024  ______________________________________________________________________________  Interpretation Summary  Left ventricular size, wall motion and function are normal. The ejection  fraction is 60-65%.  Right ventricular function, chamber size, wall motion, and thickness are  normal.  Trace to mild mitral insufficiency is present.  The inferior vena cava was normal in size with preserved respiratory  variability. No pericardial effusion is present.     No significant changes noted.  ______________________________________________________________________________  Left Ventricle  Left ventricular size, wall motion and function are normal. The  ejection  fraction is 60-65%. Left ventricular diastolic function is indeterminate. No  regional wall motion abnormalities are seen.     Right Ventricle  Right ventricular function, chamber size, wall motion, and thickness are  normal.     Atria  Both atria appear normal.     Mitral Valve  The mitral valve is normal. Trace to mild mitral insufficiency is present.     Aortic Valve  Aortic valve is normal in structure and function. No aortic regurgitation is  present.     Tricuspid Valve  The tricuspid valve is normal. Trace tricuspid insufficiency is present. The  peak velocity of the tricuspid regurgitant jet is not obtainable.     Pulmonic Valve  The pulmonic valve is normal.     Vessels  The aorta root is normal. The inferior vena cava was normal in size with  preserved respiratory variability.     Pericardium  No pericardial effusion is present.     Compared to Previous Study  No significant changes noted.    A/P:     1. Immunizations; COVID Pfizer vaccine x 7.  Pneumococcal 23 done 1/25/2013. Tdap 6/1/2018.  She has completed the Zoster Shingrix vaccine series.  RSV vaccine 11/14/2023. Pneumococcal 20 vaccine done 9/12/2022. She will get both this year's influenza vaccine and COVID vaccine.   2. Cardiology; seen by Dr. Mohamud on  12/4/2023 for microvascular angina and non-obstructive CAD. Next visit 12/9/2024   3. Mammogram 7/29/2024  4. GI follow up with Dr. Leventhal 11/16/2023 for liver transplant for polycystic renal disease and congenital hepatic fibrosis. Next visit 11/14/2024  5. GYN appt. With Dr. Perez next 10/28/2024   6. Nephrology appt. With Dr. Angulo Renal transplant note 6/13/2024 and follow up with Dr. Jorge Luis Celis on 6/16/2025. Creatinine 0.90 on 9/16/2024.   7. Dermatology visit with Dr. Benitez 9/18/2024  8. Chronic urinary issues. She was seen Urology by Dr. Francis 5/8/2024 .   9. Infectious disease note from Dr. Nguyen 6/13/2024. Last note from Dr. Gonzales 4/9/2024; next visit  12/17/2024  10. Ophthalmology note from 3/31/2023  11. Colonoscopy; 3/20/2024.   12. Lipids; 12/5/2022; TG's 115,  and HDL 57. Repeat 12/18/2023; LDL 66, HDL 48 and TG's 124. She is on Atorvastatin.   13. She states splenectomy 3/6/2013. She had Pneumococcal 20 vaccine 9/12/2022.  Meningococcal vaccine done 1/17/2024. She states she went to a vaccine clinic in [place] before her splenectomy and got 3 vaccines most likely she got Hib B  14. Murmur? Echo done 7/8/2024   15. She might have had Grave's disease in the 90's and was possibly treated with I131? Last TSH was normal at 0.86 on 11/4/2021 and ordered future labs today. She is not on thyroid medication.         40 minutes spent on the date of the encounter doing chart review, history and exam, documentation and further activities as noted above exclusive of procedures and other billable interpretations

## 2024-09-23 ENCOUNTER — OFFICE VISIT (OUTPATIENT)
Dept: INTERNAL MEDICINE | Facility: CLINIC | Age: 64
End: 2024-09-23
Payer: MEDICARE

## 2024-09-23 VITALS
SYSTOLIC BLOOD PRESSURE: 113 MMHG | BODY MASS INDEX: 20.87 KG/M2 | HEIGHT: 70 IN | DIASTOLIC BLOOD PRESSURE: 67 MMHG | WEIGHT: 145.8 LBS | HEART RATE: 87 BPM | OXYGEN SATURATION: 96 %

## 2024-09-23 DIAGNOSIS — Z23 NEED FOR VACCINATION: ICD-10-CM

## 2024-09-23 DIAGNOSIS — R94.6 THYROID FUNCTION TEST ABNORMAL: Primary | ICD-10-CM

## 2024-09-23 PROCEDURE — 99215 OFFICE O/P EST HI 40 MIN: CPT | Performed by: INTERNAL MEDICINE

## 2024-09-23 RX ORDER — CALCIUM CARBONATE/VITAMIN D3 500MG-5MCG
1 TABLET ORAL
COMMUNITY
Start: 2024-06-27

## 2024-09-23 RX ORDER — LORATADINE 10 MG/1
10 TABLET ORAL DAILY
COMMUNITY

## 2024-09-23 NOTE — PROGRESS NOTES
Shahriar is a 64 year old that presents in clinic today for the following:     Chief Complaint   Patient presents with    Follow Up           9/23/2024     9:14 AM   Additional Questions   Roomed by Eneida RAIN   Accompanied by N/A     Screenings from encounters over the past 10 days    No data recorded       Eneida Dorsey at 9:25 AM on 9/23/2024

## 2024-10-01 ENCOUNTER — THERAPY VISIT (OUTPATIENT)
Dept: PHYSICAL THERAPY | Facility: REHABILITATION | Age: 64
End: 2024-10-01
Attending: DERMATOLOGY
Payer: MEDICARE

## 2024-10-01 DIAGNOSIS — I87.2 VENOUS STASIS DERMATITIS OF BOTH LOWER EXTREMITIES: ICD-10-CM

## 2024-10-01 PROCEDURE — 97161 PT EVAL LOW COMPLEX 20 MIN: CPT | Mod: GP

## 2024-10-01 PROCEDURE — 97535 SELF CARE MNGMENT TRAINING: CPT | Mod: GP

## 2024-10-21 DIAGNOSIS — Z94.0 KIDNEY TRANSPLANTED: Primary | ICD-10-CM

## 2024-10-21 RX ORDER — MYCOPHENOLIC ACID 180 MG/1
540 TABLET, DELAYED RELEASE ORAL 2 TIMES DAILY
Qty: 540 TABLET | Refills: 3 | Status: SHIPPED | OUTPATIENT
Start: 2024-10-21

## 2024-10-22 NOTE — PROGRESS NOTES
DISCHARGE  Reason for Discharge: Patient chooses to discontinue therapy.    Equipment Issued: NA    Discharge Plan: Patient to continue home program.    Referring Provider:  Anat Benitez        10/01/24 0500   Appointment Info   Signing clinician's name / credentials Nancy Young, PT, CLT   Visits Used 1   Medical Diagnosis Venous stasis dermatitis of both lower extremities   PT Tx Diagnosis lower extremity edema, weakness, pain   Quick Adds Certification   Progress Note/Certification   Start of Care Date 10/01/24   Onset of illness/injury or Date of Surgery 09/18/24  (order date)   Therapy Frequency eval only   Predicted Duration eval   Certification date from 10/01/24   Certification date to 12/29/24   Progress Note Due Date 12/02/24   Progress Note Completed Date 10/01/24   GOALS   PT Goals 2;3   PT Goal 1   Goal Description patient will verbalize understanding of self management of symptoms ongoing to reduce risk of complications from swelling   Rationale to maximize safety and independence with performance of ADLs and functional tasks   Goal Progress MET   Date Met 10/01/24   Subjective Report   Subjective Report see initial eval   Objective Measures   Objective Measures Objective Measure 1   Treatment Interventions (PT)   Interventions Self Care/Home Management   Self Care/home Management   ADL/Home Mgmt Training (56274) 20   Self Care 1 Extrensive time spent edu pt on lymphedema tx, explaining difference between lymphedema and venous stasis swelling, providing pt with resources on how to obtain compression wear.   Self Care 1 - Details Edu pt on following up with primary provider if she does not hear back from scheduling next week.   Skilled Intervention Edu pt on above information and answered all of pts questions   Patient Response/Progress pt verbalized understanding   Eval/Assessments   PT Eval, Low Complexity Minutes (53481) 16   Education   Learner/Method Patient;Demonstration   Plan   Updates  to plan of care Pt will try and obtain compression garments through online resource   Total Session Time   Timed Code Treatment Minutes 20   Total Treatment Time (sum of timed and untimed services) 36

## 2024-10-24 PROBLEM — Q61.3 CONGENITAL POLYCYSTIC KIDNEY: Status: ACTIVE | Noted: 2024-10-24

## 2024-10-24 PROBLEM — R87.612 LOW GRADE SQUAMOUS INTRAEPITHELIAL LESION (LGSIL) ON CERVICOVAGINAL CYTOLOGIC SMEAR: Status: ACTIVE | Noted: 2024-10-24

## 2024-10-24 PROBLEM — Z80.3 FAMILY HISTORY OF MALIGNANT NEOPLASM OF BREAST: Status: ACTIVE | Noted: 2024-10-24

## 2024-10-24 PROBLEM — R87.613 HIGH GRADE SQUAMOUS INTRAEPITHELIAL LESION ON CYTOLOGIC SMEAR OF CERVIX (HGSIL): Status: ACTIVE | Noted: 2024-10-24

## 2024-10-24 PROBLEM — M21.619 BUNION: Status: ACTIVE | Noted: 2024-10-24

## 2024-10-24 PROBLEM — R87.810 HIGH-RISK HUMAN PAPILLOMAVIRUS (HPV) DNA DETECTED IN CERVICAL SPECIMEN: Status: ACTIVE | Noted: 2024-10-24

## 2024-10-28 ENCOUNTER — OFFICE VISIT (OUTPATIENT)
Dept: OBGYN | Facility: CLINIC | Age: 64
End: 2024-10-28
Attending: OBSTETRICS & GYNECOLOGY
Payer: MEDICARE

## 2024-10-28 VITALS
SYSTOLIC BLOOD PRESSURE: 104 MMHG | DIASTOLIC BLOOD PRESSURE: 63 MMHG | WEIGHT: 145.3 LBS | BODY MASS INDEX: 20.8 KG/M2 | HEART RATE: 85 BPM | HEIGHT: 70 IN

## 2024-10-28 DIAGNOSIS — N83.201 RIGHT OVARIAN CYST: Primary | ICD-10-CM

## 2024-10-28 DIAGNOSIS — Z87.410 HISTORY OF CERVICAL DYSPLASIA: ICD-10-CM

## 2024-10-28 DIAGNOSIS — Z92.89 PERSONAL HISTORY OF OTHER MEDICAL TREATMENT: ICD-10-CM

## 2024-10-28 DIAGNOSIS — Z11.51 ENCOUNTER FOR SCREENING FOR HUMAN PAPILLOMAVIRUS (HPV): ICD-10-CM

## 2024-10-28 PROCEDURE — 88175 CYTOPATH C/V AUTO FLUID REDO: CPT | Performed by: OBSTETRICS & GYNECOLOGY

## 2024-10-28 PROCEDURE — 99213 OFFICE O/P EST LOW 20 MIN: CPT | Performed by: OBSTETRICS & GYNECOLOGY

## 2024-10-28 PROCEDURE — G0463 HOSPITAL OUTPT CLINIC VISIT: HCPCS | Mod: 25 | Performed by: OBSTETRICS & GYNECOLOGY

## 2024-10-28 PROCEDURE — 87624 HPV HI-RISK TYP POOLED RSLT: CPT | Performed by: OBSTETRICS & GYNECOLOGY

## 2024-10-28 NOTE — LETTER
"10/28/2024       RE: Belen Sharma  [address] [place] [address]     Dear Colleague,    Thank you for referring your patient, Belen Shrama, to the [facility] [place] at [facility]. Please see a copy of my visit note below.    [facility]    CC: Pap smear and HPV testing    S: 64 year old here for pap smear and HPV testing. Has history of CKC for JANNETTE 3 in 2020. Testing since that time has negative cytology, with most recently negative HPV. She is immunosuppressed from solid organ transplant. Continues to use pessary for prolapse, but has noted it is coming out more frequently. Worried about ovarian cancer- her aunt had ovarian cancer. Had pelvic US last year with simple ovarian cyst.     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 Reviewed indications for colposcopy and that she does not currently need one today with her last HPV testing negative. Due to her transplant status, will continue yearly screening, she would not be comfortable with spacing.   H/o right ovarian cyst- repeat US ordered today      Daysi Perez MD FACOG      Again, thank you for allowing me to " participate in the care of your patient.      Sincerely,    Daysi Perez MD

## 2024-10-28 NOTE — PROGRESS NOTES
"[facility] Visit    CC: Pap smear and HPV testing    S: 64 year old here for pap smear and HPV testing. Has history of CKC for JANNETTE 3 in 2020. Testing since that time has negative cytology, with most recently negative HPV. She is immunosuppressed from solid organ transplant. Continues to use pessary for prolapse, but has noted it is coming out more frequently. Worried about ovarian cancer- her aunt had ovarian cancer. Had pelvic US last year with simple ovarian cyst.     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 Reviewed indications for colposcopy and that she does not currently need one today with her last HPV testing negative. Due to her transplant status, will continue yearly screening, she would not be comfortable with spacing.   H/o right ovarian cyst- repeat US ordered today      Daysi Perez MD FACOG  "

## 2024-10-28 NOTE — PATIENT INSTRUCTIONS
Thank you for trusting us with your care!   Please be aware, if you are on Mychart, you may see your results prior to your providers review. If labs are abnormal, we will call or message you on [employer] with a follow up plan.    If you need to contact us for questions about:  Symptoms, Scheduling & Medical Questions; Non-urgent (2-3 day response) [employer] message, Urgent (needing response today) [phone] (if after 3:30pm next day response)   Prescriptions: Please call your Pharmacy   Billing: [place] [phone] or [facility]:[phone]

## 2024-10-30 LAB
HPV HR 12 DNA CVX QL NAA+PROBE: NEGATIVE
HPV16 DNA CVX QL NAA+PROBE: NEGATIVE
HPV18 DNA CVX QL NAA+PROBE: NEGATIVE
HUMAN PAPILLOMA VIRUS FINAL DIAGNOSIS: NORMAL

## 2024-11-01 ENCOUNTER — PATIENT OUTREACH (OUTPATIENT)
Dept: OBGYN | Facility: CLINIC | Age: 64
End: 2024-11-01
Payer: MEDICARE

## 2024-11-01 LAB
BKR AP ASSOCIATED HPV REPORT: NORMAL
BKR LAB AP GYN ADEQUACY: NORMAL
BKR LAB AP GYN INTERPRETATION: NORMAL
BKR LAB AP PREVIOUS ABNL DX: NORMAL
BKR LAB AP PREVIOUS ABNORMAL: NORMAL
PATH REPORT.COMMENTS IMP SPEC: NORMAL
PATH REPORT.COMMENTS IMP SPEC: NORMAL
PATH REPORT.RELEVANT HX SPEC: NORMAL

## 2024-11-01 NOTE — TELEPHONE ENCOUNTER
10/28/24 NIL pap, Neg HPV. Plan 1 yr co-test.  Due to her transplant status, will continue yearly screening - provider visit notes/plan

## 2024-11-04 ENCOUNTER — ANCILLARY PROCEDURE (OUTPATIENT)
Dept: ULTRASOUND IMAGING | Facility: CLINIC | Age: 64
End: 2024-11-04
Attending: OBSTETRICS & GYNECOLOGY
Payer: MEDICARE

## 2024-11-04 DIAGNOSIS — N83.201 RIGHT OVARIAN CYST: ICD-10-CM

## 2024-11-04 PROCEDURE — 76830 TRANSVAGINAL US NON-OB: CPT

## 2024-11-04 PROCEDURE — 76830 TRANSVAGINAL US NON-OB: CPT | Mod: 26 | Performed by: OBSTETRICS & GYNECOLOGY

## 2024-11-07 ENCOUNTER — LAB (OUTPATIENT)
Dept: LAB | Facility: CLINIC | Age: 64
End: 2024-11-07
Payer: MEDICARE

## 2024-11-07 DIAGNOSIS — E78.5 DYSLIPIDEMIA: ICD-10-CM

## 2024-11-07 DIAGNOSIS — Z94.4 S/P LIVER TRANSPLANT (H): ICD-10-CM

## 2024-11-07 DIAGNOSIS — Z94.4 LIVER REPLACED BY TRANSPLANT (H): ICD-10-CM

## 2024-11-07 DIAGNOSIS — R94.6 THYROID FUNCTION TEST ABNORMAL: ICD-10-CM

## 2024-11-07 DIAGNOSIS — Z94.0 KIDNEY TRANSPLANTED: ICD-10-CM

## 2024-11-07 DIAGNOSIS — Z13.220 LIPID SCREENING: ICD-10-CM

## 2024-11-07 LAB
ALBUMIN SERPL BCG-MCNC: 3.8 G/DL (ref 3.5–5.2)
ALP SERPL-CCNC: 55 U/L (ref 40–150)
ALT SERPL W P-5'-P-CCNC: 12 U/L (ref 0–50)
ANION GAP SERPL CALCULATED.3IONS-SCNC: 9 MMOL/L (ref 7–15)
AST SERPL W P-5'-P-CCNC: 20 U/L (ref 0–45)
BILIRUB DIRECT SERPL-MCNC: <0.2 MG/DL (ref 0–0.3)
BILIRUB SERPL-MCNC: 0.6 MG/DL
BUN SERPL-MCNC: 20.3 MG/DL (ref 8–23)
CALCIUM SERPL-MCNC: 9.5 MG/DL (ref 8.8–10.4)
CHLORIDE SERPL-SCNC: 105 MMOL/L (ref 98–107)
CHOLEST SERPL-MCNC: 140 MG/DL
CREAT SERPL-MCNC: 0.86 MG/DL (ref 0.51–0.95)
EGFRCR SERPLBLD CKD-EPI 2021: 75 ML/MIN/1.73M2
ERYTHROCYTE [DISTWIDTH] IN BLOOD BY AUTOMATED COUNT: 14.3 % (ref 10–15)
FASTING STATUS PATIENT QL REPORTED: NORMAL
FASTING STATUS PATIENT QL REPORTED: NORMAL
GLUCOSE SERPL-MCNC: 84 MG/DL (ref 70–99)
HCO3 SERPL-SCNC: 26 MMOL/L (ref 22–29)
HCT VFR BLD AUTO: 47.3 % (ref 35–47)
HDLC SERPL-MCNC: 51 MG/DL
HGB BLD-MCNC: 15.1 G/DL (ref 11.7–15.7)
LDLC SERPL CALC-MCNC: 70 MG/DL
MCH RBC QN AUTO: 29.5 PG (ref 26.5–33)
MCHC RBC AUTO-ENTMCNC: 31.9 G/DL (ref 31.5–36.5)
MCV RBC AUTO: 93 FL (ref 78–100)
NONHDLC SERPL-MCNC: 89 MG/DL
PLATELET # BLD AUTO: 201 10E3/UL (ref 150–450)
POTASSIUM SERPL-SCNC: 3.6 MMOL/L (ref 3.4–5.3)
PROT SERPL-MCNC: 6.6 G/DL (ref 6.4–8.3)
RBC # BLD AUTO: 5.11 10E6/UL (ref 3.8–5.2)
SODIUM SERPL-SCNC: 140 MMOL/L (ref 135–145)
TACROLIMUS BLD-MCNC: 4.9 UG/L (ref 5–15)
TME LAST DOSE: ABNORMAL H
TME LAST DOSE: ABNORMAL H
TRIGL SERPL-MCNC: 93 MG/DL
TSH SERPL DL<=0.005 MIU/L-ACNC: 2.49 UIU/ML (ref 0.3–4.2)
WBC # BLD AUTO: 7.2 10E3/UL (ref 4–11)

## 2024-11-07 PROCEDURE — 84443 ASSAY THYROID STIM HORMONE: CPT

## 2024-11-07 PROCEDURE — 36415 COLL VENOUS BLD VENIPUNCTURE: CPT

## 2024-11-07 PROCEDURE — 82248 BILIRUBIN DIRECT: CPT

## 2024-11-07 PROCEDURE — 85027 COMPLETE CBC AUTOMATED: CPT

## 2024-11-07 PROCEDURE — 80061 LIPID PANEL: CPT

## 2024-11-07 PROCEDURE — 80053 COMPREHEN METABOLIC PANEL: CPT

## 2024-11-07 PROCEDURE — 80197 ASSAY OF TACROLIMUS: CPT

## 2024-11-07 RX ORDER — ATORVASTATIN CALCIUM 10 MG/1
10 TABLET, FILM COATED ORAL AT BEDTIME
Qty: 90 TABLET | Refills: 0 | Status: SHIPPED | OUTPATIENT
Start: 2024-11-07

## 2024-11-07 RX ORDER — TACROLIMUS 1 MG/1
1 CAPSULE ORAL EVERY 12 HOURS
Qty: 180 CAPSULE | Refills: 3 | Status: SHIPPED | OUTPATIENT
Start: 2024-11-07

## 2024-11-07 RX ORDER — TACROLIMUS 0.5 MG/1
0.5 CAPSULE ORAL EVERY MORNING
Qty: 90 CAPSULE | Refills: 3 | Status: SHIPPED | OUTPATIENT
Start: 2024-11-07

## 2024-11-14 ENCOUNTER — MYC MEDICAL ADVICE (OUTPATIENT)
Dept: TRANSPLANT | Facility: CLINIC | Age: 64
End: 2024-11-14

## 2024-11-14 ENCOUNTER — VIRTUAL VISIT (OUTPATIENT)
Dept: GASTROENTEROLOGY | Facility: CLINIC | Age: 64
End: 2024-11-14
Attending: INTERNAL MEDICINE
Payer: MEDICARE

## 2024-11-14 DIAGNOSIS — E21.3 HYPERPARATHYROIDISM (H): ICD-10-CM

## 2024-11-14 DIAGNOSIS — Z94.4 LIVER REPLACED BY TRANSPLANT (H): ICD-10-CM

## 2024-11-14 DIAGNOSIS — Z94.4 LIVER REPLACED BY TRANSPLANT (H): Primary | ICD-10-CM

## 2024-11-14 DIAGNOSIS — Z94.0 KIDNEY REPLACED BY TRANSPLANT: ICD-10-CM

## 2024-11-14 DIAGNOSIS — Z48.298 AFTERCARE FOLLOWING ORGAN TRANSPLANT: ICD-10-CM

## 2024-11-14 DIAGNOSIS — D84.9 IMMUNOSUPPRESSED STATUS (H): ICD-10-CM

## 2024-11-14 DIAGNOSIS — M85.89 OSTEOPENIA OF MULTIPLE SITES: Primary | ICD-10-CM

## 2024-11-14 RX ORDER — CHOLECALCIFEROL (VITAMIN D3) 50 MCG
1 TABLET ORAL DAILY
Qty: 92 TABLET | Refills: 3 | Status: SHIPPED | OUTPATIENT
Start: 2024-11-14 | End: 2024-11-14

## 2024-11-14 RX ORDER — CHOLECALCIFEROL (VITAMIN D3) 50 MCG
1 TABLET ORAL DAILY
Qty: 92 TABLET | Refills: 3 | Status: SHIPPED | OUTPATIENT
Start: 2024-11-14

## 2024-11-14 NOTE — LETTER
2024      Belen Sharma  [address] [place] [address]      Dear Colleague,    Thank you for referring your patient, Belen Sharma, to the [facility] [place]. Please see a copy of my visit note below.    Virtual Visit Details    Type of service:  Video Visit   Video Start Time:  09  Video End Time:9:15 AM    Originating Location (pt. 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  Her kidney transplant was removed in 2012.  She underwent a splenectomy in 2013.  Ultimately she underwent retransplant with  donor kidney in .    Since last seen she has been doing fairly well.  She did have a weeklong admission this summer secondary to a urinary infection.  She has noted that she has had some ongoing issues with uterine prolapse and she continues to use a pessary with varying results.  She continues to use topical estrogens for vaginal dryness and discomfort: Notes that when she was using it on a daily basis she did have some issues with vaginal bleeding, and now uses 3 times per week.  She does follow closely with OB for this condition    She continues to remain physically active and goes to the gym several times a week and uses a bike.  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 Course complicated by multiple severe infections (including CMV pneumonia). 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 Both, very large, native kidneys reamain.     PONV (postoperative nausea and vomiting)     Need to start with ice chips and apple juice, no soda     S/P kidney transplant     SLK 10/9/2010 - kidney failure 2/2 [facility]. Explanted . Re-transplant after de-sensitization 2016     S/P liver transplant (H)     10/9/2010 - HAT, ischemic biopathy. Re-transplant 3/3/2011     S/P splenectomy      Spider veins      Thyroid disease     Hyperthyroid treated with single dose iodine     Urinary tract infection 2022     Past Surgical History:  Past Surgical History:   Procedure Laterality Date     bunectomy  2018    right foot     bunionectomy  2019    left     CHOLECYSTECTOMY       COLONOSCOPY N/A 3/20/2024    Procedure: COLONOSCOPY, WITH POLYPECTOMY;  Surgeon: Leventhal, Thomas Michael, MD;  [place] [facility] [place]      CONIZATION N/A 2020    Procedure: CONE BIOPSY, CERVIX;  Surgeon: Daysi Perez MD;  Location: [facility] OR     FAILED PICC - RIGHT ARM Right 2020    Has a LUE AV fistula.     H STATISTIC PICC LINE INSERTION >5YR, FAILED Bilateral 2020    Left fistula, Right failed x 2 Occlussion     HERNIA REPAIR, INCISIONAL  2013     IR DIALYSIS PTA CENTRAL SEG  2020     IR PICC PLACEMENT > 5 YRS OF AGE  2020     IR PICC PLACEMENT > 5 YRS OF AGE  10/5/2022     PHACOEMULSIFICATION CLEAR CORNEA WITH STANDARD INTRAOCULAR LENS IMPLANT Right 2023    Procedure: RIGHT EYE PHACOEMULSIFICATION, CATARACT, WITH INTRAOCULAR LENS IMPLANT;  Surgeon: Sebastian Robertson MD;  Location: [facility] OR     PHACOEMULSIFICATION WITH STANDARD INTRAOCULAR LENS IMPLANT Left 3/9/2023    Procedure: LEFT EYE PHACOEMULSIFICATION, CATARACT, WITH STANDARD INTRAOCULAR LENS IMPLANT INSERTION;  Surgeon: Sebastian Robertson MD;  Location: [facility] OR     SPLENECTOMY      Splenectomy     TRANSPLANT KIDNEY RECIPIENT  DONOR      re-transplant after [facility]; 6 months de-sensitization prior AND 6 months after transplant     TRANSPLANT LIVER RECIPIENT  DONOR  2011     TRANSPLANT LIVER, KIDNEY RECIPIENT  DONOR, COMBINED  10/09/2010    HAT - re-Tx liver 3/3/2011; Kidney (left iliac) lost to [facility]/fibrosis - explant     VASCULAR SURGERY      Vascular access left UE. Not used for 4 years since 2nd kidney tx 2016 [place]     Past Social History:  Social History     Tobacco Use     Smoking status: Never     Smokeless tobacco: Never   Vaping Use     Vaping status: Never Used   Substance Use Topics     Alcohol use: Not Currently     Drug use: Not Currently      Family History:  Family History   Problem Relation Age of Onset     Depression Mother      Anxiety Disorder Mother      Depression Father      Anxiety Disorder Father      Uterine Cancer Maternal Grandmother      Polycystic Kidney Diease Brother      Polycystic  Kidney Diease Brother      Polycystic Kidney Diease Brother      Polycystic Kidney Diease Brother      Cervical Cancer Maternal Aunt      Glaucoma No family hx of      Macular Degeneration No family hx of      Review of Systems  A complete 10 point review of systems was asked and answered in the negative unless specifically commented upon in the HPI    Current Outpatient Medications   Medication Sig Dispense Refill     acetaminophen (TYLENOL) 325 MG tablet Take 1 tablet (325 mg) by mouth every 4 hours as needed for mild pain or fever 120 tablet 1     aspirin (ASA) 81 MG chewable tablet Take 81 mg by mouth every evening       atorvastatin (LIPITOR) 10 MG tablet Take 1 tablet (10 mg) by mouth at bedtime. 90 tablet 0     biotin 1000 MCG TABS tablet Take 3,000 mcg by mouth every evening       ciclopirox (PENLAC) 8 % external solution Apply to adjacent skin and affected nails daily.  Remove with alcohol every 7 days, then repeat. 6.6 mL 11     COMPOUNDED NON-CONTROLLED SUBSTANCE (CMPD RX) - PHARMACY TO MIX COMPOUNDED MEDICATION Apply nightly to toenails. SM 61 -  Ciclopirox 8% / Itraconazole 3% / Fluconazole 3% / Terbinafine HCl 1% / Ibuprofen 2% DMSO Suspension 15 mL 3     diphenhydrAMINE (BENADRYL) 25 MG tablet Take 25 mg by mouth every 8 hours as needed for allergies       estradiol (ESTRACE) 0.1 MG/GM vaginal cream Place 1 g vaginally three times a week 42.5 g 11     guaiFENesin (MUCINEX) 600 MG 12 hr tablet Take 1 tablet by mouth 2 times daily as needed for cough or congestion       loratadine (CLARITIN) 10 MG tablet Take 10 mg by mouth daily.       Multiple Vitamins-Minerals (WOMENS DAILY FORMULA PO) Take 1 tablet by mouth daily       mycophenolic acid (GENERIC EQUIVALENT) 180 MG EC tablet Take 3 tablets (540 mg) by mouth 2 times daily. 540 tablet 3     nitroFURantoin macrocrystal-monohydrate (MACROBID) 100 MG capsule Take 1 capsule (100 mg) by mouth 2 times daily PRN UTI 6 capsule 99     nitroGLYcerin (NITROSTAT)  0.4 MG sublingual tablet Place 1 tablet (0.4 mg) under the tongue every 5 minutes as needed for chest pain For chest pain place 1 tablet under the tongue every 5 minutes for 3 doses. If symptoms persist 5 minutes after 1st dose call 911. 30 tablet 1     OYSCO 500 + D 500-5 MG-MCG TABS Take 1 tablet by mouth 2 times daily.       predniSONE (DELTASONE) 5 MG tablet Take 1 tablet (5 mg) by mouth daily 90 tablet 3     Probiotic Product (PROBIOTIC PO) Take 2 tablets by mouth every morning ([facility] Women's Vaginal Probiotic)       psyllium (METAMUCIL/KONSYL) 58.6 % powder Take 12 g (2 teaspoonful) by mouth daily       sodium bicarbonate 650 MG tablet Take 2 tablets (1,300 mg) by mouth 2 times daily 120 tablet 11     tacrolimus (GENERIC EQUIVALENT) 0.5 MG capsule Take 1 capsule (0.5 mg) by mouth every morning. a total dose of 1.5 mg am 1 mg pm. 90 capsule 3     tacrolimus (GENERIC EQUIVALENT) 1 MG capsule Take 1 capsule (1 mg) by mouth every 12 hours. total dose of 1.5 mg AM. Takes 1 mg capsule in the PM. 180 capsule 3     No current facility-administered medications for this visit.     Objective:         There were no vitals filed for this visit.    There is no height or weight on file to calculate BMI.     There were no vitals taken for this visit.    Constitutional: no acute distress  Eyes: anicteric  Respiratory: Normal respiratory excursion   Skin: no jaundice  Neuro: AAO x 3    Labs:   BMP  Recent Labs   Lab Test 11/07/24  0808 09/16/24  0807 07/15/24  0818 06/06/24  0809    141 140 138   POTASSIUM 3.6 3.8 3.8 4.1   CHLORIDE 105 106 106 102   EDSON 9.5 9.9 9.6 9.9   CO2 26 26 26 24   BUN 20.3 18.4 17.4 17.2   CR 0.86 0.90 0.87 0.83   GLC 84 81 82 90     CBC  Recent Labs   Lab Test 11/07/24  0808 09/16/24  0807 07/15/24  0818 06/06/24  0809   WBC 7.2 7.9 7.2 10.6   RBC 5.11 5.24* 4.78 5.12   HGB 15.1 15.6 14.5 15.4   HCT 47.3* 49.0* 45.1 47.6*   MCV 93 94 94 93   MCH 29.5 29.8 30.3 30.1   MCHC 31.9 31.8 32.2 32.4    RDW 14.3 14.2 14.6 14.1    204 220 215       Imaging:   DEXA 4/2022: The most negative and valid T-score of -1.9 at the level of the left femoral neck, left total hip and wrist corresponds with low bone density according to WHO criteria for postmenopausal females and men age 50 and over. The risk of osteoporotic fracture increases approximately 2-fold for each 1.0 SD decrease in T-score    Wrist DEXA 4/2022: Wrist DXA is reported along with axial DXA from the same date.  The T-score of the 33% radius is -1.9.      Mammogram 7/2022: ACR BI-RADS Category 1: Negative    Assessment and Plan:    S/P Liver Transplantation:   - s/p SLK 10/2010.  Complicated by HAT with resultant ischemic biliopathy leading to graft failure  - Re-liver transplant 3/3/2011  - No allograft dysfunction: no history of rejection nor biliary strictures  - Follow labs per routine    Immunosuppression:   - Given she had a kidney after liver transplant, her immunosuppression will be directed by the kidney transplant service.  - The bare minimum she would need from a liver transplant perspective is equivalent of tacrolimus BID with a goal trough of ~ 3  - Will plan to check immunosuppression trough levels per protocol to assess for adequate target dosing and will assess CBC and kidney function for toxicity of medications    CAD:   - follows with cardiology    Kidney Health:   - s/p DDKT x2, last in 2016  - follows with transplant nephrology    Osteopenia:  - Will restart on calcium   - Need to restart vitamin D supplement  - Repeat DXA in 4/ 2025    Colon cancer screening  - Colonoscopy 3/2024 without adenomas (history of adenomas prior)  - Will plan to repeat exam in 7-10 years    Nutrition/Weight:    It is very common for patients to put on significant weight after liver transplantation, as they become conditioned to eating as much as possible to maintain a healthy weight pre-transplant.  There is a very significant risk of developing  fatty liver and non-alcoholic steatohepatitis in post-transplant patients, even in those who were not transplanted for TIWARI cirrhosis.  - Please work on eating a diet that is rice in fresh fruits and vegetables, moderate amounts of lean protein and dairy, and modest amounts of carbohydrates and fats.  - An exercise plan/regimen is an essential part of remaining healthy in the post-transplant setting and we recommend 30-35 minutes of exercise at least 4 days a week    Routine Health Care:  - You need to establish and maintain care with a primary care physician to manage: hypertension, high cholesterol, abnormal blood sugars, foot care - as these are all potential complications of your health after liver transplantation and will need a local physician to manage these issues.  - Recommend establishing care with a mental health provider; consult placed  - All patients with liver diseases (even post transplant) are at an increased risk for osteoporosis.  We strongly recommend screening for Vitamin D deficiency at least twice yearly with aggressive supplementation/replacement as indicated.    -  DEXA 2022 with evidence of low bone density; recommend vitamin D and calcium supplementation; recommend repeat DEXA in 2 year  - Recommend yearly skin exams with dermatology, and if skin cancers are found, recommend twice yearly exams  - Recommend you stay up to date for cancer screening: mammograms/PAP for women and prostate cancer screening for men, and colon cancer screening for all.   * Repeat colon cancer screening due 2024  - Recommend dental care every 6 months  - Practice good hygiene with washing of hands and maintaining a clean living space as this will decrease your chances of developing an infection in the post-transplantation setting  - Eat a health diet: rich in fresh fruits and vegetables, lean proteins, and minimize carbohydrate and fat intake.    Thank you very much for the opportunity to participate in the care  of this patient.  If you have any further questions, please don't hesitate to contact our office.    Return to Clinic: 12 months    Thomas M. Leventhal, M.D.  Associate Professor of Medicine  Advanced/Transplant Hepatology & Critical Care Medicine  [facility]       The longitudinal plan of care for liver transplantation (and possible complications) was addressed during this visit. Due to the added complexity in care, I will continue to support this patient in the subsequent management of this condition(s) and with the ongoing continuity of care of this condition      Again, thank you for allowing me to participate in the care of your patient.        Sincerely,        Thomas M. Leventhal, MD

## 2024-11-14 NOTE — NURSING NOTE
Current patient location: [address] [place] [address] [address] [place] [address]    Is the patient currently in the state of [place]? YES    Visit mode:VIDEO    If the visit is dropped, the patient can be reconnected by:VIDEO VISIT: Send to e-mail at: [email or web address]    Will anyone else be joining the visit? NO  (If patient encounters technical issues they should call [phone]    Are changes needed to the allergy or medication list? No    Are refills needed on medications prescribed by this physician? NO    Rooming Documentation:  Questionnaire(s) completed    Reason for visit: RECHKELY CASTAÑEDA

## 2024-11-14 NOTE — PROGRESS NOTES
Virtual Visit Details    Type of service:  Video Visit   Video Start Time:  09  Video End Time:9:15 AM    Originating Location (pt. 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  Her kidney transplant was removed in 2012.  She underwent a splenectomy in 2013.  Ultimately she underwent retransplant with  donor kidney in .    Since last seen she has been doing fairly well.  She did have a weeklong admission this summer secondary to a urinary infection.  She has noted that she has had some ongoing issues with uterine prolapse and she continues to use a pessary with varying results.  She continues to use topical estrogens for vaginal dryness and discomfort: Notes that when she was using it on a daily basis she did have some issues with vaginal bleeding, and now uses 3 times per week.  She does follow closely with OB for this condition    She continues to remain physically active and goes to the gym several times a week and uses a bike.  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 Course complicated by multiple severe infections (including CMV pneumonia). 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 Both, very large, native kidneys reamain.    PONV (postoperative nausea and vomiting)     Need to start with ice chips and apple juice, no soda    S/P kidney transplant     SLK 10/9/2010 - kidney failure 2/2 [facility]. Explanted . Re-transplant after de-sensitization     S/P liver transplant (H)     10/9/2010 - HAT, ischemic biopathy. Re-transplant 3/3/2011    S/P splenectomy     Spider veins     Thyroid disease     Hyperthyroid treated with single dose iodine    Urinary tract infection 2022     Past Surgical History:  Past Surgical History:   Procedure Laterality Date    bunectomy  2018    right foot    bunionectomy  2019    left    CHOLECYSTECTOMY      COLONOSCOPY N/A 3/20/2024    Procedure: COLONOSCOPY, WITH POLYPECTOMY;  Surgeon: Leventhal, Thomas Michael, MD;  Location: [facility] OR    CONIZATION N/A 2020    Procedure: CONE BIOPSY, CERVIX;  Surgeon: Daysi Perez MD;  Location: [facility]    FAILED PICC - RIGHT ARM Right 2020    Has a LUE AV fistula.    H STATISTIC PICC LINE INSERTION >5YR, FAILED Bilateral 2020    Left  fistula, Right failed x 2 Occlussion    HERNIA REPAIR, INCISIONAL  2013    IR DIALYSIS PTA CENTRAL SEG  2020    IR PICC PLACEMENT > 5 YRS OF AGE  2020    IR PICC PLACEMENT > 5 YRS OF AGE  10/5/2022    PHACOEMULSIFICATION CLEAR CORNEA WITH STANDARD INTRAOCULAR LENS IMPLANT Right 2023    Procedure: RIGHT EYE PHACOEMULSIFICATION, CATARACT, WITH INTRAOCULAR LENS IMPLANT;  Surgeon: Sebastian Robertson MD;  Location: [facility] OR    PHACOEMULSIFICATION WITH STANDARD INTRAOCULAR LENS IMPLANT Left 3/9/2023    Procedure: LEFT EYE PHACOEMULSIFICATION, CATARACT, WITH STANDARD INTRAOCULAR LENS IMPLANT INSERTION;  Surgeon: Sebastian Robertson MD;  Location: [facility] OR    SPLENECTOMY      Splenectomy    TRANSPLANT KIDNEY RECIPIENT  DONOR      re-transplant after [facility]; 6 months de-sensitization prior AND 6 months after transplant    TRANSPLANT LIVER RECIPIENT  DONOR  2011    TRANSPLANT LIVER, KIDNEY RECIPIENT  DONOR, COMBINED  10/09/2010    HAT - re-Tx liver 3/3/2011; Kidney (left iliac) lost to [facility]/fibrosis - explant    VASCULAR SURGERY      Vascular access left UE. Not used for 4 years since 2nd kidney tx 2016 [facility]     Past Social History:  Social History     Tobacco Use    Smoking status: Never    Smokeless tobacco: Never   Vaping Use    Vaping status: Never Used   Substance Use Topics    Alcohol use: Not Currently    Drug use: Not Currently      Family History:  Family History   Problem Relation Age of Onset    Depression Mother     Anxiety Disorder Mother     Depression Father     Anxiety Disorder Father     Uterine Cancer Maternal Grandmother     Polycystic Kidney Diease Brother     Polycystic Kidney Diease Brother     Polycystic Kidney Diease Brother     Polycystic Kidney Diease Brother     Cervical Cancer Maternal Aunt     Glaucoma No family hx of     Macular Degeneration No family hx of      Review of Systems  A complete 10 point review of systems was asked and answered  in the negative unless specifically commented upon in the HPI    Current Outpatient Medications   Medication Sig Dispense Refill    acetaminophen (TYLENOL) 325 MG tablet Take 1 tablet (325 mg) by mouth every 4 hours as needed for mild pain or fever 120 tablet 1    aspirin (ASA) 81 MG chewable tablet Take 81 mg by mouth every evening      atorvastatin (LIPITOR) 10 MG tablet Take 1 tablet (10 mg) by mouth at bedtime. 90 tablet 0    biotin 1000 MCG TABS tablet Take 3,000 mcg by mouth every evening      ciclopirox (PENLAC) 8 % external solution Apply to adjacent skin and affected nails daily.  Remove with alcohol every 7 days, then repeat. 6.6 mL 11    COMPOUNDED NON-CONTROLLED SUBSTANCE (CMPD RX) - PHARMACY TO MIX COMPOUNDED MEDICATION Apply nightly to toenails. SM 61 -  Ciclopirox 8% / Itraconazole 3% / Fluconazole 3% / Terbinafine HCl 1% / Ibuprofen 2% DMSO Suspension 15 mL 3    diphenhydrAMINE (BENADRYL) 25 MG tablet Take 25 mg by mouth every 8 hours as needed for allergies      estradiol (ESTRACE) 0.1 MG/GM vaginal cream Place 1 g vaginally three times a week 42.5 g 11    guaiFENesin (MUCINEX) 600 MG 12 hr tablet Take 1 tablet by mouth 2 times daily as needed for cough or congestion      loratadine (CLARITIN) 10 MG tablet Take 10 mg by mouth daily.      Multiple Vitamins-Minerals (WOMENS DAILY FORMULA PO) Take 1 tablet by mouth daily      mycophenolic acid (GENERIC EQUIVALENT) 180 MG EC tablet Take 3 tablets (540 mg) by mouth 2 times daily. 540 tablet 3    nitroFURantoin macrocrystal-monohydrate (MACROBID) 100 MG capsule Take 1 capsule (100 mg) by mouth 2 times daily PRN UTI 6 capsule 99    nitroGLYcerin (NITROSTAT) 0.4 MG sublingual tablet Place 1 tablet (0.4 mg) under the tongue every 5 minutes as needed for chest pain For chest pain place 1 tablet under the tongue every 5 minutes for 3 doses. If symptoms persist 5 minutes after 1st dose call 911. 30 tablet 1    OYSCO 500 + D 500-5 MG-MCG TABS Take 1 tablet by  mouth 2 times daily.      predniSONE (DELTASONE) 5 MG tablet Take 1 tablet (5 mg) by mouth daily 90 tablet 3    Probiotic Product (PROBIOTIC PO) Take 2 tablets by mouth every morning ([facility] Women's Vaginal Probiotic)      psyllium (METAMUCIL/KONSYL) 58.6 % powder Take 12 g (2 teaspoonful) by mouth daily      sodium bicarbonate 650 MG tablet Take 2 tablets (1,300 mg) by mouth 2 times daily 120 tablet 11    tacrolimus (GENERIC EQUIVALENT) 0.5 MG capsule Take 1 capsule (0.5 mg) by mouth every morning. a total dose of 1.5 mg am 1 mg pm. 90 capsule 3    tacrolimus (GENERIC EQUIVALENT) 1 MG capsule Take 1 capsule (1 mg) by mouth every 12 hours. total dose of 1.5 mg AM. Takes 1 mg capsule in the PM. 180 capsule 3     No current facility-administered medications for this visit.     Objective:         There were no vitals filed for this visit.    There is no height or weight on file to calculate BMI.     There were no vitals taken for this visit.    Constitutional: no acute distress  Eyes: anicteric  Respiratory: Normal respiratory excursion   Skin: no jaundice  Neuro: AAO x 3    Labs:   BMP  Recent Labs   Lab Test 11/07/24  0808 09/16/24  0807 07/15/24  0818 06/06/24  0809    141 140 138   POTASSIUM 3.6 3.8 3.8 4.1   CHLORIDE 105 106 106 102   EDSON 9.5 9.9 9.6 9.9   CO2 26 26 26 24   BUN 20.3 18.4 17.4 17.2   CR 0.86 0.90 0.87 0.83   GLC 84 81 82 90     CBC  Recent Labs   Lab Test 11/07/24  0808 09/16/24  0807 07/15/24  0818 06/06/24  0809   WBC 7.2 7.9 7.2 10.6   RBC 5.11 5.24* 4.78 5.12   HGB 15.1 15.6 14.5 15.4   HCT 47.3* 49.0* 45.1 47.6*   MCV 93 94 94 93   MCH 29.5 29.8 30.3 30.1   MCHC 31.9 31.8 32.2 32.4   RDW 14.3 14.2 14.6 14.1    204 220 215       Imaging:   DEXA 4/2022: The most negative and valid T-score of -1.9 at the level of the left femoral neck, left total hip and wrist corresponds with low bone density according to WHO criteria for postmenopausal females and men age 50 and over. The risk  of osteoporotic fracture increases approximately 2-fold for each 1.0 SD decrease in T-score    Wrist DEXA 4/2022: Wrist DXA is reported along with axial DXA from the same date.  The T-score of the 33% radius is -1.9.      Mammogram 7/2022: ACR BI-RADS Category 1: Negative    Assessment and Plan:    S/P Liver Transplantation:   - s/p SLK 10/2010.  Complicated by HAT with resultant ischemic biliopathy leading to graft failure  - Re-liver transplant 3/3/2011  - No allograft dysfunction: no history of rejection nor biliary strictures  - Follow labs per routine    Immunosuppression:   - Given she had a kidney after liver transplant, her immunosuppression will be directed by the kidney transplant service.  - The bare minimum she would need from a liver transplant perspective is equivalent of tacrolimus BID with a goal trough of ~ 3  - Will plan to check immunosuppression trough levels per protocol to assess for adequate target dosing and will assess CBC and kidney function for toxicity of medications    CAD:   - follows with cardiology    Kidney [facility]:   - s/p DDKT x2, last in 2016  - follows with transplant nephrology    Osteopenia:  - Will restart on calcium   - Need to restart vitamin D supplement  - Repeat DXA in 4/ 2025    Colon cancer screening  - Colonoscopy 3/2024 without adenomas (history of adenomas prior)  - Will plan to repeat exam in 7-10 years    Nutrition/Weight:    It is very common for patients to put on significant weight after liver transplantation, as they become conditioned to eating as much as possible to maintain a healthy weight pre-transplant.  There is a very significant risk of developing fatty liver and non-alcoholic steatohepatitis in post-transplant patients, even in those who were not transplanted for TIWARI cirrhosis.  - Please work on eating a diet that is rice in fresh fruits and vegetables, moderate amounts of lean protein and dairy, and modest amounts of carbohydrates and fats.  - An  exercise plan/regimen is an essential part of remaining healthy in the post-transplant setting and we recommend 30-35 minutes of exercise at least 4 days a week    Routine Health Care:  - You need to establish and maintain care with a primary care physician to manage: hypertension, high cholesterol, abnormal blood sugars, foot care - as these are all potential complications of your health after liver transplantation and will need a local physician to manage these issues.  - Recommend establishing care with a mental health provider; consult placed  - All patients with liver diseases (even post transplant) are at an increased risk for osteoporosis.  We strongly recommend screening for Vitamin D deficiency at least twice yearly with aggressive supplementation/replacement as indicated.    -  DEXA 2022 with evidence of low bone density; recommend vitamin D and calcium supplementation; recommend repeat DEXA in 2 year  - Recommend yearly skin exams with dermatology, and if skin cancers are found, recommend twice yearly exams  - Recommend you stay up to date for cancer screening: mammograms/PAP for women and prostate cancer screening for men, and colon cancer screening for all.   * Repeat colon cancer screening due 2024  - Recommend dental care every 6 months  - Practice good hygiene with washing of hands and maintaining a clean living space as this will decrease your chances of developing an infection in the post-transplantation setting  - Eat a health diet: rich in fresh fruits and vegetables, lean proteins, and minimize carbohydrate and fat intake.    Thank you very much for the opportunity to participate in the care of this patient.  If you have any further questions, please don't hesitate to contact our office.    Return to Clinic: 12 months    Thomas M. Leventhal, M.D.  Associate Professor of Medicine  Advanced/Transplant Hepatology & Critical Care Medicine  [facility]       The longitudinal plan of  care for liver transplantation (and possible complications) was addressed during this visit. Due to the added complexity in care, I will continue to support this patient in the subsequent management of this condition(s) and with the ongoing continuity of care of this condition

## 2024-11-15 ENCOUNTER — MYC MEDICAL ADVICE (OUTPATIENT)
Dept: TRANSPLANT | Facility: CLINIC | Age: 64
End: 2024-11-15
Payer: MEDICARE

## 2024-11-15 DIAGNOSIS — Z94.0 KIDNEY TRANSPLANTED: ICD-10-CM

## 2024-11-15 DIAGNOSIS — Z94.4 S/P LIVER TRANSPLANT (H): ICD-10-CM

## 2024-11-15 RX ORDER — PREDNISONE 5 MG/1
5 TABLET ORAL DAILY
Qty: 90 TABLET | Refills: 3 | Status: SHIPPED | OUTPATIENT
Start: 2024-11-15

## 2024-11-19 NOTE — TELEPHONE ENCOUNTER
Returned Belen's call.     Belen has scheduled a virtual appt with Dr. De La Garza on 7/22/2020 and with nephrology 9/15/2020.     Her last set of transplant labs were in June and she will schedule an appt for September. Will send message to nephrology team regarding kidney transplant orders.    head injury

## 2024-12-05 ENCOUNTER — LAB (OUTPATIENT)
Dept: LAB | Facility: CLINIC | Age: 64
End: 2024-12-05
Payer: MEDICARE

## 2024-12-05 DIAGNOSIS — Z94.4 LIVER REPLACED BY TRANSPLANT (H): ICD-10-CM

## 2024-12-05 LAB
ALBUMIN SERPL BCG-MCNC: 3.9 G/DL (ref 3.5–5.2)
ALBUMIN UR-MCNC: 100 MG/DL
ALP SERPL-CCNC: 58 U/L (ref 40–150)
ALT SERPL W P-5'-P-CCNC: 14 U/L (ref 0–50)
ANION GAP SERPL CALCULATED.3IONS-SCNC: 5 MMOL/L (ref 7–15)
APPEARANCE UR: CLEAR
AST SERPL W P-5'-P-CCNC: 21 U/L (ref 0–45)
BACTERIA #/AREA URNS HPF: ABNORMAL /HPF
BILIRUB DIRECT SERPL-MCNC: 0.21 MG/DL (ref 0–0.3)
BILIRUB SERPL-MCNC: 0.8 MG/DL
BILIRUB UR QL STRIP: NEGATIVE
BUN SERPL-MCNC: 14.5 MG/DL (ref 8–23)
CALCIUM SERPL-MCNC: 9.5 MG/DL (ref 8.8–10.4)
CHLORIDE SERPL-SCNC: 106 MMOL/L (ref 98–107)
COLOR UR AUTO: YELLOW
CREAT SERPL-MCNC: 0.86 MG/DL (ref 0.51–0.95)
EGFRCR SERPLBLD CKD-EPI 2021: 75 ML/MIN/1.73M2
ERYTHROCYTE [DISTWIDTH] IN BLOOD BY AUTOMATED COUNT: 14.9 % (ref 10–15)
GLUCOSE SERPL-MCNC: 88 MG/DL (ref 70–99)
GLUCOSE UR STRIP-MCNC: NEGATIVE MG/DL
HCO3 SERPL-SCNC: 29 MMOL/L (ref 22–29)
HCT VFR BLD AUTO: 47.4 % (ref 35–47)
HGB BLD-MCNC: 15.3 G/DL (ref 11.7–15.7)
HGB UR QL STRIP: ABNORMAL
KETONES UR STRIP-MCNC: NEGATIVE MG/DL
LEUKOCYTE ESTERASE UR QL STRIP: ABNORMAL
MCH RBC QN AUTO: 29.7 PG (ref 26.5–33)
MCHC RBC AUTO-ENTMCNC: 32.3 G/DL (ref 31.5–36.5)
MCV RBC AUTO: 92 FL (ref 78–100)
NITRATE UR QL: POSITIVE
PH UR STRIP: 6 [PH] (ref 5–8)
PLATELET # BLD AUTO: 214 10E3/UL (ref 150–450)
POTASSIUM SERPL-SCNC: 3.9 MMOL/L (ref 3.4–5.3)
PROT SERPL-MCNC: 6.8 G/DL (ref 6.4–8.3)
RBC # BLD AUTO: 5.15 10E6/UL (ref 3.8–5.2)
RBC #/AREA URNS AUTO: ABNORMAL /HPF
SODIUM SERPL-SCNC: 140 MMOL/L (ref 135–145)
SP GR UR STRIP: 1.01 (ref 1–1.03)
SQUAMOUS #/AREA URNS AUTO: ABNORMAL /LPF
TACROLIMUS BLD-MCNC: 6 UG/L (ref 5–15)
TME LAST DOSE: NORMAL H
TME LAST DOSE: NORMAL H
UROBILINOGEN UR STRIP-ACNC: 0.2 E.U./DL
WBC # BLD AUTO: 7 10E3/UL (ref 4–11)
WBC #/AREA URNS AUTO: ABNORMAL /HPF

## 2024-12-09 ENCOUNTER — VIRTUAL VISIT (OUTPATIENT)
Dept: CARDIOLOGY | Facility: CLINIC | Age: 64
End: 2024-12-09
Attending: INTERNAL MEDICINE
Payer: MEDICARE

## 2024-12-09 ENCOUNTER — TELEPHONE (OUTPATIENT)
Dept: INFECTIOUS DISEASES | Facility: CLINIC | Age: 64
End: 2024-12-09

## 2024-12-09 VITALS
DIASTOLIC BLOOD PRESSURE: 50 MMHG | HEIGHT: 70 IN | SYSTOLIC BLOOD PRESSURE: 101 MMHG | WEIGHT: 143 LBS | BODY MASS INDEX: 20.47 KG/M2 | TEMPERATURE: 96.9 F | HEART RATE: 79 BPM

## 2024-12-09 DIAGNOSIS — I20.89 MICROVASCULAR ANGINA (H): Primary | ICD-10-CM

## 2024-12-09 DIAGNOSIS — E78.5 DYSLIPIDEMIA: ICD-10-CM

## 2024-12-09 DIAGNOSIS — Z94.4 S/P LIVER TRANSPLANT (H): ICD-10-CM

## 2024-12-09 DIAGNOSIS — Z94.0 S/P KIDNEY TRANSPLANT: ICD-10-CM

## 2024-12-09 PROCEDURE — 99214 OFFICE O/P EST MOD 30 MIN: CPT | Mod: 95 | Performed by: INTERNAL MEDICINE

## 2024-12-09 ASSESSMENT — PAIN SCALES - GENERAL: PAINLEVEL_OUTOF10: NO PAIN (0)

## 2024-12-09 NOTE — TELEPHONE ENCOUNTER
[facility] Call Center    Phone Message    May a detailed message be left on voicemail: yes     Reason for Call: Other: patient was, calling to check in and get a call back in regards to her my chart messages  this morning. Please call  back and advise.     Action Taken: Message routed to:  [facility] ([facility]): ID    Travel Screening: Not Applicable     Date of Service: 12/09/24

## 2024-12-09 NOTE — NURSING NOTE
Current patient location: [address] [place] [address] [address] [place] [address]    Is the patient currently in the state of [place]? YES    Visit mode:VIDEO    If the visit is dropped, the patient can be reconnected by:VIDEO VISIT: Text to cell phone:   Telephone Information:   Mobile [phone]    and VIDEO VISIT: Send to e-mail at: [email or web address]    Will anyone else be joining the visit? NO  (If patient encounters technical issues they should call [phone])    Are changes needed to the allergy or medication list? No    Patient denies any changes and states that all information remains accurate since last reviewed/verified. Pt states started Macrobid on sat for possible uti     Are refills needed on medications prescribed by this physician? NO, not that aware of    Rooming Documentation:  Questionnaire(s) completed    Reason for visit: JAS Ahn MA VVF

## 2024-12-09 NOTE — TELEPHONE ENCOUNTER
Spoke with patient, let her know her question has been sent to Dr. Gonzales, once we hear back from him we will let the patient know what to do going forward.  Patient expressed what should she do if she starts to feel worse, I gave her the main number to the clinic and told her to reach out with any further questions.  Rocio Palomo, CORNEL on 12/9/2024 at 3:43 PM

## 2024-12-09 NOTE — PATIENT INSTRUCTIONS
Plan:    See ID/ PCP for UTI  Recommend healthy diet with exercise.  Continue current medications.  Follow up in 1 year with lipid labs.      Your Care Team:  [facility]   Telephone Number     Shun Lang RN [phone]    After business hours: [phone], ask for cardiologist on-call   Non-procedure scheduling:    Hannah AREVALO   [phone]   Procedure scheduling:    Alejandra Soto   [phone]   Device Clinic (Pacemakers, ICDs, Loop Recorders)    During business hours: [phone]  After business hours:   [phone]- select option 4 and ask for job code [phone].       Cardiovascular Clinic:   [address] [place] [address]. [place] [address]      As always, thank you for trusting us with your health care needs!

## 2024-12-09 NOTE — PROGRESS NOTES
I had the pleasure of participating in coordination of clinical cardiovascular care for patient, Belen Sharma, via [employer]'s virtual visit protocol.    History:  Ms. Sharma is a very pleasant 64 year old woman with microvascular angina and nonobstructive coronary disease.  She is a kidney and liver transplant recipient.  She is on tacrolimus, mycophenolic acid and prednisone for her antirejection medications.    She developed UTI symptoms over the weekend and is taking nitrofurantoin.     Kidney transplant x 2, 2010, 2014  Liver transplant x 2, 2010, 2011    She denies prolonged episodes of chest pain, orthopnea or PND or syncope. She is trying to stay active and lifting some light weights. She has noticed skin discoloration around ankles and easy bruising. No other bleeding issues.     Last admission - May 2024 for E coli pyelonephritis  No recent hospitalization for HF/MI/stroke      Current Outpatient Medications   Medication Sig Dispense Refill    acetaminophen (TYLENOL) 325 MG tablet Take 1 tablet (325 mg) by mouth every 4 hours as needed for mild pain or fever 120 tablet 1    aspirin (ASA) 81 MG chewable tablet Take 81 mg by mouth every evening      atorvastatin (LIPITOR) 10 MG tablet Take 1 tablet (10 mg) by mouth at bedtime. 90 tablet 0    biotin 1000 MCG TABS tablet Take 3,000 mcg by mouth every evening      ciclopirox (PENLAC) 8 % external solution Apply to adjacent skin and affected nails daily.  Remove with alcohol every 7 days, then repeat. 6.6 mL 11    COMPOUNDED NON-CONTROLLED SUBSTANCE (CMPD RX) - PHARMACY TO MIX COMPOUNDED MEDICATION Apply nightly to toenails. SM 61 -  Ciclopirox 8% / Itraconazole 3% / Fluconazole 3% / Terbinafine HCl 1% / Ibuprofen 2% DMSO Suspension 15 mL 3    diphenhydrAMINE (BENADRYL) 25 MG tablet Take 25 mg by mouth every 8 hours as needed for allergies      estradiol (ESTRACE) 0.1 MG/GM vaginal cream Place 1 g vaginally three times a week 42.5 g 11    guaiFENesin (MUCINEX) 600 MG  12 hr tablet Take 1 tablet by mouth 2 times daily as needed for cough or congestion      loratadine (CLARITIN) 10 MG tablet Take 10 mg by mouth daily.      Multiple Vitamins-Minerals (WOMENS DAILY FORMULA PO) Take 1 tablet by mouth daily      mycophenolic acid (GENERIC EQUIVALENT) 180 MG EC tablet Take 3 tablets (540 mg) by mouth 2 times daily. 540 tablet 3    nitroFURantoin macrocrystal-monohydrate (MACROBID) 100 MG capsule Take 1 capsule (100 mg) by mouth 2 times daily PRN UTI 6 capsule 99    nitroGLYcerin (NITROSTAT) 0.4 MG sublingual tablet Place 1 tablet (0.4 mg) under the tongue every 5 minutes as needed for chest pain For chest pain place 1 tablet under the tongue every 5 minutes for 3 doses. If symptoms persist 5 minutes after 1st dose call 911. 30 tablet 1    OYSCO 500 + D 500-5 MG-MCG TABS Take 1 tablet by mouth 2 times daily.      predniSONE (DELTASONE) 5 MG tablet Take 1 tablet (5 mg) by mouth daily. 90 tablet 3    Probiotic Product (PROBIOTIC PO) Take 2 tablets by mouth every morning ([employer] Women's Vaginal Probiotic)      psyllium (METAMUCIL/KONSYL) 58.6 % powder Take 12 g (2 teaspoonful) by mouth daily      sodium bicarbonate 650 MG tablet Take 2 tablets (1,300 mg) by mouth 2 times daily 120 tablet 11    tacrolimus (GENERIC EQUIVALENT) 0.5 MG capsule Take 1 capsule (0.5 mg) by mouth every morning. a total dose of 1.5 mg am 1 mg pm. 90 capsule 3    tacrolimus (GENERIC EQUIVALENT) 1 MG capsule Take 1 capsule (1 mg) by mouth every 12 hours. total dose of 1.5 mg AM. Takes 1 mg capsule in the PM. 180 capsule 3    vitamin D3 (CHOLECALCIFEROL) 50 mcg (2000 units) tablet Take 1 tablet (50 mcg) by mouth daily. 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 Both, very large, native kidneys reamain.    PONV (postoperative nausea and vomiting)     Need to start with ice chips and apple juice, no soda    S/P kidney transplant     SLK 10/9/2010 - kidney failure 2/2 [facility]. Explanted 2012. Re-transplant after de-sensitization 2016    S/P liver transplant (H)     10/9/2010 - HAT, ischemic biopathy. Re-transplant 3/3/2011    S/P splenectomy     Spider veins 2019    Thyroid disease     Hyperthyroid treated with single dose iodine    Urinary tract infection 09/2022       FAMILY HISTORY:  No premature coronary artery disease, aortic dissection or sudden cardiac death.      SOCIAL HISTORY:  Non smoker  No alcohol intake  No drug use    ROS: non contributory on the 10-point review of system    Exam: limited due to the nature of this visit   In general, the patient is in no acute distress.    Breathing is unlabored.   Neck: No jugular venous distension.    No edema per patient    Data:  Recent cardiac investigations - reviewed    EKG 11/2023 -sinus rhythm at 80 normal axis and intervals    Echocardiogram August 31, 2020  Normal biventricular size and function  No significant valvular abnormalities  Normal IVC  No pericardial effusion.    [facility]   CT coronary angiogram 1-3-2019  CALCIUM SCORE : 19. This placed the patient in the 70th percentile rank, meaning that approximately 30% of females at ages from 56-60 will have a higher calcium score that the patient.  CORONARY CT ANGIOGRAPHY: The examination is diagnostic in quality. There is right heart dominance.   LEFT MAIN CORONARY ARTERY: Widely patent  LEFT ANTERIOR DESCENDING; Mild focal calcified plaque in the proxima-mid LAD without significant stenosis. The remainder of  "the LAD is widely patent.  FIRST DIAGONAL: Widely patent  SECOND DIAGONAL: Widely patent  PROXIMAL LEFT CIRCUMFLEX: Widely patent  MID CIRCUMFLEX: Widely patent  DISTAL CIRCUMFLEX: Widely patent    I have independently reviewed this patient's relevant laboratory and cardiac data :    Recent Labs   Lab Test 11/07/24  0808 12/18/23  0806   CHOL 140 139   HDL 51 48   LDL 70 66   TRIG 93 124      Recent Labs   Lab Test 12/05/24  0817 11/07/24  0808   AST 21 20     Recent Labs   Lab Test 12/05/24  0817 11/07/24  0808   ALT 14 12     Recent Labs   Lab Test 12/05/24  0817 11/07/24  0808    140   POTASSIUM 3.9 3.6   CHLORIDE 106 105   CO2 29 26   ANIONGAP 5* 9   BUN 14.5 20.3   CR 0.86 0.86     Recent Labs   Lab Test 12/05/24  0817 11/07/24  0808   WBC 7.0 7.2   RBC 5.15 5.11   HGB 15.3 15.1   MCV 92 93    201     No results for input(s): \"A1C\" in the last 34632 hours.  Recent Labs   Lab Test 11/07/24  0808 11/04/21  1457   TSH 2.49 0.86         Assessment and Plan:  64 year old woman with    Non obstructive CAD  Microvascular angina, stable  Dyslipidemia  Post kidney and liver transplantation    Belen Sharma reports doing well. She has non obstructive CAD in the proximal LAD for which she takes aspirin and NTG as needed for stable angina. She is on low dose statin therapy. Lipids are well controlled. Her cardiac function is normal. Her renal and liver function tests are normal.    The easy bruisability is likely from the aspirin and prednisone. No major bleeding, so will continue aspirin for now with caution.     Recommendations:   Follow up with ID/PCP for UTI  Healthy diet - low fat and low carb and daily low impact exercise for 30 minutes  Continue current medications  Follow up 1 year with annual lipids      Video Visit Details:     Type of service:  Video Visit  Video Start Time: 9.04 am  Video End Time: 9.18 am  Patient location: Home  Provider location:  Off-site   Platform used for Video Visit: [employer]"   In addition to visit time documented above, I spent an additional 15 minutes on data review and documentation.    Christy Mohamud MD, MS  Professor of Medicine  Cardiovascular Medicine

## 2024-12-09 NOTE — LETTER
12/9/2024      RE: Belen Sharma  [address] [place] [address]       Dear Colleague,    Thank you for the opportunity to participate in the care of your patient, Belen Sharma, at the [facility] [place] [facility]. Please see a copy of my visit note below.    I had the pleasure of participating in coordination of clinical cardiovascular care for patient, Belen Sharma, via [facility]'s virtual visit protocol.    History:  Ms. Sharma is a very pleasant 64 year old woman with microvascular angina and nonobstructive coronary disease.  She is a kidney and liver transplant recipient.  She is on tacrolimus, mycophenolic acid and prednisone for her antirejection medications.    She developed UTI symptoms over the weekend and is taking nitrofurantoin.     Kidney transplant x 2, 2010, 2014  Liver transplant x 2, 2010, 2011    She denies prolonged episodes of chest pain, orthopnea or PND or syncope. She is trying to stay active and lifting some light weights. She has noticed skin discoloration around ankles and easy bruising. No other bleeding issues.     Last admission - May 2024 for E coli pyelonephritis  No recent hospitalization for HF/MI/stroke      Current Outpatient Medications   Medication Sig Dispense Refill     acetaminophen (TYLENOL) 325 MG tablet Take 1 tablet (325 mg) by mouth every 4 hours as needed for mild pain or fever 120 tablet 1     aspirin (ASA) 81 MG chewable tablet Take 81 mg by mouth every evening       atorvastatin (LIPITOR) 10 MG tablet Take 1 tablet (10 mg) by mouth at bedtime. 90 tablet 0     biotin 1000 MCG TABS tablet Take 3,000 mcg by mouth every evening       ciclopirox (PENLAC) 8 % external solution Apply to adjacent skin and affected nails daily.  Remove with alcohol every 7 days, then repeat. 6.6 mL 11     COMPOUNDED NON-CONTROLLED SUBSTANCE (CMPD RX) - PHARMACY TO MIX COMPOUNDED MEDICATION Apply nightly to toenails. SM 61 -   Ciclopirox 8% / Itraconazole 3% / Fluconazole 3% / Terbinafine HCl 1% / Ibuprofen 2% DMSO Suspension 15 mL 3     diphenhydrAMINE (BENADRYL) 25 MG tablet Take 25 mg by mouth every 8 hours as needed for allergies       estradiol (ESTRACE) 0.1 MG/GM vaginal cream Place 1 g vaginally three times a week 42.5 g 11     guaiFENesin (MUCINEX) 600 MG 12 hr tablet Take 1 tablet by mouth 2 times daily as needed for cough or congestion       loratadine (CLARITIN) 10 MG tablet Take 10 mg by mouth daily.       Multiple Vitamins-Minerals (WOMENS DAILY FORMULA PO) Take 1 tablet by mouth daily       mycophenolic acid (GENERIC EQUIVALENT) 180 MG EC tablet Take 3 tablets (540 mg) by mouth 2 times daily. 540 tablet 3     nitroFURantoin macrocrystal-monohydrate (MACROBID) 100 MG capsule Take 1 capsule (100 mg) by mouth 2 times daily PRN UTI 6 capsule 99     nitroGLYcerin (NITROSTAT) 0.4 MG sublingual tablet Place 1 tablet (0.4 mg) under the tongue every 5 minutes as needed for chest pain For chest pain place 1 tablet under the tongue every 5 minutes for 3 doses. If symptoms persist 5 minutes after 1st dose call 911. 30 tablet 1     OYSCO 500 + D 500-5 MG-MCG TABS Take 1 tablet by mouth 2 times daily.       predniSONE (DELTASONE) 5 MG tablet Take 1 tablet (5 mg) by mouth daily. 90 tablet 3     Probiotic Product (PROBIOTIC PO) Take 2 tablets by mouth every morning ([facility] Women's Vaginal Probiotic)       psyllium (METAMUCIL/KONSYL) 58.6 % powder Take 12 g (2 teaspoonful) by mouth daily       sodium bicarbonate 650 MG tablet Take 2 tablets (1,300 mg) by mouth 2 times daily 120 tablet 11     tacrolimus (GENERIC EQUIVALENT) 0.5 MG capsule Take 1 capsule (0.5 mg) by mouth every morning. a total dose of 1.5 mg am 1 mg pm. 90 capsule 3     tacrolimus (GENERIC EQUIVALENT) 1 MG capsule Take 1 capsule (1 mg) by mouth every 12 hours. total dose of 1.5 mg AM. Takes 1 mg capsule in the PM. 180 capsule 3     vitamin D3 (CHOLECALCIFEROL) 50 mcg (2000  units) tablet Take 1 tablet (50 mcg) by mouth daily. 92 tablet 3       Allergies - reviewed     Allergies   Allergen Reactions     Ibuprofen      Due to liver transplant     Past history -reviewed  Past Medical History:   Diagnosis Date     Adolescent scoliosis     protruding     BK viremia [id number]     CMV pneumonia (H) 2010     Congenital hepatic fibrosis      Endometriosis      High grade squamous intraepithelial lesion of cervix 09/11/2020     History of angina      HPV (human papilloma virus) infection      Immunosuppression (H)      Polycystic kidney disease     Polycystic kidneys, tx kidney on the right. Both, very large, native kidneys reamain.     PONV (postoperative nausea and vomiting)     Need to start with ice chips and apple juice, no soda     S/P kidney transplant     SLK 10/9/2010 - kidney failure 2/2 [facility]. Explanted 2012. Re-transplant after de-sensitization 2016     S/P liver transplant (H)     10/9/2010 - HAT, ischemic biopathy. Re-transplant 3/3/2011     S/P splenectomy      Spider veins 2019     Thyroid disease     Hyperthyroid treated with single dose iodine     Urinary tract infection 09/2022       FAMILY HISTORY:  No premature coronary artery disease, aortic dissection or sudden cardiac death.      SOCIAL HISTORY:  Non smoker  No alcohol intake  No drug use    ROS: non contributory on the 10-point review of system    Exam: limited due to the nature of this visit   In general, the patient is in no acute distress.    Breathing is unlabored.   Neck: No jugular venous distension.    No edema per patient    Data:  Recent cardiac investigations - reviewed    EKG 11/2023 -sinus rhythm at 80 normal axis and intervals    Echocardiogram August 31, 2020  Normal biventricular size and function  No significant valvular abnormalities  Normal IVC  No pericardial effusion.    [facility]   CT coronary angiogram 1-3-2019  CALCIUM SCORE : 19. This placed the patient in the 70th percentile rank, meaning  "that approximately 30% of females at ages from 56-60 will have a higher calcium score that the patient.  CORONARY CT ANGIOGRAPHY: The examination is diagnostic in quality. There is right heart dominance.   LEFT MAIN CORONARY ARTERY: Widely patent  LEFT ANTERIOR DESCENDING; Mild focal calcified plaque in the proxima-mid LAD without significant stenosis. The remainder of the LAD is widely patent.  FIRST DIAGONAL: Widely patent  SECOND DIAGONAL: Widely patent  PROXIMAL LEFT CIRCUMFLEX: Widely patent  MID CIRCUMFLEX: Widely patent  DISTAL CIRCUMFLEX: Widely patent    I have independently reviewed this patient's relevant laboratory and cardiac data :    Recent Labs   Lab Test 11/07/24  0808 12/18/23  0806   CHOL 140 139   HDL 51 48   LDL 70 66   TRIG 93 124      Recent Labs   Lab Test 12/05/24  0817 11/07/24  0808   AST 21 20     Recent Labs   Lab Test 12/05/24  0817 11/07/24  0808   ALT 14 12     Recent Labs   Lab Test 12/05/24  0817 11/07/24  0808    140   POTASSIUM 3.9 3.6   CHLORIDE 106 105   CO2 29 26   ANIONGAP 5* 9   BUN 14.5 20.3   CR 0.86 0.86     Recent Labs   Lab Test 12/05/24  0817 11/07/24  0808   WBC 7.0 7.2   RBC 5.15 5.11   HGB 15.3 15.1   MCV 92 93    201     No results for input(s): \"A1C\" in the last 54850 hours.  Recent Labs   Lab Test 11/07/24  0808 11/04/21  1457   TSH 2.49 0.86         Assessment and Plan:  64 year old woman with    Non obstructive CAD  Microvascular angina, stable  Dyslipidemia  Post kidney and liver transplantation    Belen Sharma reports doing well. She has non obstructive CAD in the proximal LAD for which she takes aspirin and NTG as needed for stable angina. She is on low dose statin therapy. Lipids are well controlled. Her cardiac function is normal. Her renal and liver function tests are normal.    The easy bruisability is likely from the aspirin and prednisone. No major bleeding, so will continue aspirin for now with caution.     Recommendations:   Follow up with " ID/PCP for UTI  Healthy diet - low fat and low carb and daily low impact exercise for 30 minutes  Continue current medications  Follow up 1 year with annual lipids      Video Visit Details:     Type of service:  Video Visit  Video Start Time: 9.04 am  Video End Time: 9.18 am  Patient location: Home  Provider location:  Off-site   Platform used for Video Visit: [employer]   In addition to visit time documented above, I spent an additional 15 minutes on data review and documentation.    Christy Mohamud MD, MS  Professor of Medicine  Cardiovascular Medicine      Please do not hesitate to contact me if you have any questions/concerns.     Sincerely,     Christy Mohamud MD

## 2024-12-17 ENCOUNTER — VIRTUAL VISIT (OUTPATIENT)
Dept: INFECTIOUS DISEASES | Facility: CLINIC | Age: 64
End: 2024-12-17
Attending: STUDENT IN AN ORGANIZED HEALTH CARE EDUCATION/TRAINING PROGRAM
Payer: MEDICARE

## 2024-12-17 VITALS — HEIGHT: 70 IN | BODY MASS INDEX: 20.47 KG/M2 | WEIGHT: 143 LBS

## 2024-12-17 DIAGNOSIS — Z94.0 KIDNEY TRANSPLANT RECIPIENT: ICD-10-CM

## 2024-12-17 DIAGNOSIS — Z94.4 LIVER TRANSPLANT RECIPIENT (H): ICD-10-CM

## 2024-12-17 DIAGNOSIS — N39.0 RECURRENT UTI: Primary | ICD-10-CM

## 2024-12-17 PROCEDURE — 99213 OFFICE O/P EST LOW 20 MIN: CPT | Mod: 95 | Performed by: INTERNAL MEDICINE

## 2024-12-17 ASSESSMENT — PAIN SCALES - GENERAL: PAINLEVEL_OUTOF10: NO PAIN (0)

## 2024-12-17 NOTE — NURSING NOTE
Current patient location: [address] [place] [address] [address] [place] [address]    Is the patient currently in the state of [place]? YES    Visit mode:VIDEO    If the visit is dropped, the patient can be reconnected by:VIDEO VISIT: Text to cell phone:   Telephone Information:   Mobile [phone]    and VIDEO VISIT: Send to e-mail at: [email or web address]    Will anyone else be joining the visit? NO  (If patient encounters technical issues they should call [phone]    Are changes needed to the allergy or medication list? No    Patient denies any changes since echeck-in completion and states all information entered during echeck-in remains accurate.    Are refills needed on medications prescribed by this physician? NO    Rooming Documentation:  Questionnaire(s) completed    No other vitals to report today    Reason for visit: JAS Ahn MA VVF

## 2024-12-17 NOTE — LETTER
12/17/2024       RE: Belen Sharma  [address] [place] [address]     Dear Colleague,    Thank you for referring your patient, Belen Sharma, to the [facility] INFECTIOUS DISEASE CLINIC [facility]. Please see a copy of my visit note below.    Virtual Visit Details    Type of service:  Video Visit   Video Start Time: 2:48 PM  Video End Time:3:03 PM    Originating Location (pt. Location): Home    Distant Location (provider location):  On-site  Platform used for Video Visit: [facility]    Transplant Infectious Diseases Outpatient Progress note      Patient:  Belen Sharma, Date of birth 1960, Medical record number [id number]  Date of Visit:  12/17/2024           Recommendations:   1. Standing prescription for macrobid is on file to be filled only if symptomatic and after submitting a UA and Ucx.   2. Prevnar-20 at the age of 65.     RTC: 6 months.          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   Currently on TAC/MMF/prednisone.   With recurrent UTI.          Active Problems and Infectious Diseases Issues:   1. Recurrent UTI.   In the past was with different pathogens including ESBL E coli, K pneumonia, E faecalis and sometimes with negative urine and blood cx.   Since 9/2022 the UTIs have been with ESBL E coli.   The patient currently has standing orders for UA/Ucx when symptomatic and a standing order of macrobid at her local pharmacy to be started after UA/Ucx are delivered.     CT a/p 9/22/22 without evidence of structural or anatomical abnormality.   Cystoscopy  and urodynamic studies were done on 2/2/23, both were unremarkable.     The patient has multiple risk factors for recurrent UTI, almost all of them can not be reversed, including: female gender, immunocompromised, kidney transplant recipient, the need to manipulate the  tract daily to change pessary.   In addition the patient is not consistent using topical estrogen which may also contribute to UTI.         Old Problems and Infectious Diseases Issues:   1. Recurrent UTI with E coli, E faecalis and K pneumonia.   2. CDI.   3. Splenectomy.      Other Infectious Disease issues include:  - QTc: 384 as of 6/6/2022. .   - PCP prophylaxis: bactrim.  - Serostatus: not available.   - Immunization status: up-to-date. Prevnar-20 at the age of 65.       Attestation:  Total duration of visit including chart review, reviewing labs and imaging, interviewing and examining the patient, documentation, and sending communication to the patient and to the primary treating team, all at the same day of this encounter, is: 23 minutes.   Catrachita Gonzales MD    Contact information available via [facility] Paging/Directory     12/17/2024         Interim History:   Since I last saw the patient, UA was sent by hepatology to check for proteinuria but had pyuria. The patient was asymptomatic, but because she was notified that the UA was abnormal she started nitrofurantoin for 3 days. She remained asymptomatic.   No other complaints or events.          Review of Systems:     As mentioned in the interim history otherwise negative by reviewing constitutional symptoms, central and peripheral neurological systems, respiratory system, cardiac system, GI system,  system, musculoskeletal, skin, allergy, and lymphatics.                 Immunizations:     Immunization History   Administered Date(s) Administered     COVID-19 12+ (Pfizer) 10/06/2023, 01/31/2024, 09/28/2024     COVID-19 Bivalent 12+ (Pfizer) 11/04/2022     COVID-19 MONOVALENT 12+ (Pfizer)  03/29/2021, 04/19/2021, 09/02/2021     COVID-19 Monovalent 12+ (Pfizer 2022) 02/08/2022     DT (PEDS <7y) 03/19/2001     [id number] Novel Flu 11/19/2009     HepB, Unspecified 06/28/2001     Influenza (prior to 2024) 11/15/2002, 11/18/2003, 10/26/2004, 10/24/2005, 11/16/2006, 09/20/2011     Influenza Vaccine 18-64 (Flublok) 10/18/2021, 09/21/2023     Influenza Vaccine >6 months,quad, PF 09/12/2022     Influenza Vaccine IM Ages 6-35 Months 4 Valent (PF) 11/28/2007, 10/07/2009, 09/20/2011     Influenza, Split Virus, Trivalent, Pf (Fluzone\Fluarix) 09/20/2011, 09/28/2024     Influenza,INJ,MDCK,PF,Quad >6mo(Flucelvax) 10/15/2020     MENINGOCOCCAL ACWY (MENQUADFI ) 01/17/2024     Meningococcal ACWY (Menactra ) 01/25/2013     Pneumococcal 20 valent Conjugate (Prevnar 20) 09/12/2022     Pneumococcal 23 valent 09/27/2002, 01/25/2013     RSV Vaccine (Abrysvo) 11/14/2023     TDAP Vaccine (Boostrix) 01/25/2013, 06/01/2018     Twinrix A/B 11/27/2023, 01/17/2024, 07/23/2024     Zoster recombinant adjuvanted (SHINGRIX) 10/15/2020, 01/08/2021             Allergies:     Allergies   Allergen Reactions     Ibuprofen      Due to liver transplant             Medications:     Current Outpatient Medications   Medication Sig Dispense Refill     acetaminophen (TYLENOL) 325 MG tablet Take 1 tablet (325 mg) by mouth every 4 hours as needed for mild pain or fever 120 tablet 1     aspirin (ASA) 81 MG chewable tablet Take 81 mg by mouth every evening       atorvastatin (LIPITOR) 10 MG tablet Take 1 tablet (10 mg) by mouth at bedtime. 90 tablet 0     biotin 1000 MCG TABS tablet Take 3,000 mcg by mouth every evening       ciclopirox (PENLAC) 8 % external solution Apply to adjacent skin and affected nails daily.  Remove with alcohol every 7 days, then repeat. 6.6 mL 11     COMPOUNDED NON-CONTROLLED SUBSTANCE (CMPD RX) - PHARMACY TO MIX COMPOUNDED MEDICATION Apply nightly to toenails. SM 61 -  Ciclopirox 8% / Itraconazole 3% / Fluconazole 3% /  Terbinafine HCl 1% / Ibuprofen 2% DMSO Suspension 15 mL 3     diphenhydrAMINE (BENADRYL) 25 MG tablet Take 25 mg by mouth every 8 hours as needed for allergies       estradiol (ESTRACE) 0.1 MG/GM vaginal cream Place 1 g vaginally three times a week 42.5 g 11     guaiFENesin (MUCINEX) 600 MG 12 hr tablet Take 1 tablet by mouth 2 times daily as needed for cough or congestion       loratadine (CLARITIN) 10 MG tablet Take 10 mg by mouth daily.       Multiple Vitamins-Minerals (WOMENS DAILY FORMULA PO) Take 1 tablet by mouth daily       mycophenolic acid (GENERIC EQUIVALENT) 180 MG EC tablet Take 3 tablets (540 mg) by mouth 2 times daily. 540 tablet 3     nitroFURantoin macrocrystal-monohydrate (MACROBID) 100 MG capsule Take 1 capsule (100 mg) by mouth 2 times daily PRN UTI 6 capsule 99     nitroGLYcerin (NITROSTAT) 0.4 MG sublingual tablet Place 1 tablet (0.4 mg) under the tongue every 5 minutes as needed for chest pain For chest pain place 1 tablet under the tongue every 5 minutes for 3 doses. If symptoms persist 5 minutes after 1st dose call 911. 30 tablet 1     OYSCO 500 + D 500-5 MG-MCG TABS Take 1 tablet by mouth 2 times daily.       predniSONE (DELTASONE) 5 MG tablet Take 1 tablet (5 mg) by mouth daily. 90 tablet 3     Probiotic Product (PROBIOTIC PO) Take 2 tablets by mouth every morning ([facility] Women's Vaginal Probiotic)       psyllium (METAMUCIL/KONSYL) 58.6 % powder Take 12 g (2 teaspoonful) by mouth daily       sodium bicarbonate 650 MG tablet Take 2 tablets (1,300 mg) by mouth 2 times daily 120 tablet 11     tacrolimus (GENERIC EQUIVALENT) 0.5 MG capsule Take 1 capsule (0.5 mg) by mouth every morning. a total dose of 1.5 mg am 1 mg pm. 90 capsule 3     tacrolimus (GENERIC EQUIVALENT) 1 MG capsule Take 1 capsule (1 mg) by mouth every 12 hours. total dose of 1.5 mg AM. Takes 1 mg capsule in the PM. 180 capsule 3     vitamin D3 (CHOLECALCIFEROL) 50 mcg (2000 units) tablet Take 1 tablet (50 mcg) by mouth  daily. 92 tablet 3     No current facility-administered medications for this visit.                Physical Exam:   No vitals are available during this virtual visit.   Constitutional: awake, alert, cooperative, no apparent distress and appears at stated age, well nourished.             Laboratory Data:     Absolute CD4, [place] T Cells   Date Value Ref Range Status   06/06/2024 712 441 - 2,156 cells/uL Final       Inflammatory Markers    Recent Labs   Lab Test 05/02/24  0538 09/14/22  2220 11/04/21  1457 12/28/20  1515 12/21/20  1545   SED 14 8 14 8 10   CRP  --   --  39.3* 13.0* 18.0*       Immune Globulin Studies      Recent Labs   Lab Test 05/01/24  0846 05/01/24  0845   IGG 1,025  --    IGM 52  --    IGE  --  3     --        Metabolic Studies    Recent Labs   Lab Test 12/05/24  0817 11/07/24  0808 09/16/24  0807 07/15/24  0818 06/06/24  0809 05/16/24  0812 05/07/24  0550 05/01/24  1828 05/01/24  0845 10/03/22  0935 10/02/22  0628    140 141 140 138 141 137   < > 130*   < > 135*   POTASSIUM 3.9 3.6 3.8 3.8 4.1 4.1 4.2   < > 3.9   < > 3.9   CHLORIDE 106 105 106 106 102 108* 106   < > 101   < > 103   CO2 29 26 26 26 24 21* 22   < > 17*   < > 23   ANIONGAP 5* 9 9 8 12 12 9   < > 12   < > 9   BUN 14.5 20.3 18.4 17.4 17.2 14.8 16.2   < > 18.0   < > 17.8   CR 0.86 0.86 0.90 0.87 0.83 0.78 0.76   < > 0.89   < > 0.78   GFRESTIMATED 75 75 71 74 78 84 87   < > 72   < > 85   GLC 88 84 81 82 90 86 96   < > 104*   < > 92   EDSON 9.5 9.5 9.9 9.6 9.9 9.6 9.9   < > 9.5   < > 9.5   PHOS  --   --   --   --  2.9  --  2.7   < > 2.2*  --   --    MAG  --   --   --   --  1.7  --  1.7   < > 1.5*  --   --    LACT  --   --   --   --   --   --   --   --  1.3  --  0.8    < > = values in this interval not displayed.       Hepatic Studies    Recent Labs   Lab Test 12/05/24  0817 11/07/24  0808 09/16/24  0807 07/15/24  0818 06/06/24  0809 05/03/24  0621   BILITOTAL 0.8 0.6 0.7 0.6 0.7 0.6   ALKPHOS 58 55 58 57 71 62   PROTTOTAL 6.8  6.6 6.9 6.8 7.1 5.4*   ALBUMIN 3.9 3.8 3.9 3.9 3.9 2.9*   AST 21 20 18 19 18 14   ALT 14 12 10 12 14 9       Hematology Studies     Recent Labs   Lab Test 12/05/24  0817 11/07/24  0808 09/16/24  0807 07/15/24  0818 06/06/24  0809 05/16/24  0812 05/02/24  0538 05/01/24  0845 03/11/21  1007 12/28/20  1515 12/28/20  0855 12/21/20  1545 12/18/20  0611 12/16/20  2236   WBC 7.0 7.2 7.9 7.2 10.6 7.8   < > 18.3*   < > 9.2   < > 6.2   < > 11.4*   ANEU  --   --   --   --   --   --   --  17.6*  --  6.4  --  3.9  --  9.3*   ALYM  --   --   --   --   --   --   --  0.5*  --  2.1  --  1.7  --  1.0   PABLO  --   --   --   --   --   --   --  0.2  --  0.6  --  0.6  --  1.0   AEOS  --   --   --   --   --   --   --  0.0  --  0.0  --  0.0  --  0.0   HGB 15.3 15.1 15.6 14.5 15.4 15.1   < > 15.8*   < > 16.6*   < > 16.2*   < > 16.9*   HCT 47.4* 47.3* 49.0* 45.1 47.6* 46.8   < > 46.5   < > 50.8*   < > 49.2*   < > 54.5*    201 204 220 215 175   < > 194   < > 308   < > 261   < > 260    < > = values in this interval not displayed.       Clotting Studies  No lab results found.    Urine Studies    Recent Labs   Lab Test 12/05/24  1351 05/01/24  0852 04/18/24  1112 04/03/24  1302 02/05/24  1328   URINEPH 6.0 7.0 7.0 7.0 6.5   NITRITE Positive* Negative Positive* Negative Negative   LEUKEST Large* Large* Large* Large* Large*   WBCU 10-25* >182* * 0-5 25-50*         Microbiology:  Last 6 Culture results with specimen source  Culture Micro   Date Value Ref Range Status   06/04/2021 (A)  Final    10,000 to 50,000 colonies/mL  Enterococcus faecalis     12/17/2020 No growth  Final   12/16/2020 (A)  Final    Cultured on the 1st day of incubation:  Escherichia coli ESBL  ESBL (extended beta lactamase) producing organisms require contact precautions.     12/16/2020   Final    Critical Value/Significant Value, preliminary result only, called to and read back by  RALPH BOLAÑOS RN 12.17.2020 1056 BC     12/16/2020   Final    (Note)  POSITIVE for  "E.COLI by Verigene multiplex nucleic acid test. Final  identification and antimicrobial susceptibility testing will be  verified by standard methods. Verigene test will not distinguish  E.coli from Shigella species including S.dysenteriae, S.flexneri,  S.boydii, and S.sonnei. Specimens containing Shigella species or  E.coli will be reported as Positive for E.coli.    POSITIVE for CTX-M Class A Extended Spectrum beta-lactamase (ESBL)  resistance marker by Verigene multiplex nucleic acid test. CTX-M  confers resistance to penicillins, cephalosporins and variable  resistance to beta-lactam beta-lactamase inhibitor combinations  (clavulanic acid and tazobactam). Best empiric antibiotic choice is  meropenem. Specific susceptibility testing will be performed.    Specimen tested with Verigene multiplex, gram-negative blood culture  nucleic acid test for the following targets: Acinetobacter sp.,  Citrobacter sp., Enterobacter sp., Proteus sp., E. coli, K.  pneumoniae/oxytoca, P. aeruginosa, and the following resistance  markers: CTXM, KPC, NDM, VIM, IMP and OXA.    Critical Value/Significant Value called to and read back by RALPH BOLAÑOS RN 12/17/20 1312 .       12/16/2020 No growth  Final   12/16/2020 (A)  Preliminary    >100,000 colonies/mL  Escherichia coli ESBL  ESBL (extended beta lactamase) producing organisms require contact precautions.     12/16/2020   Preliminary    Susceptibility testing requested by  Dr. Gonzales, Pager [phone]. Fosfomycin requested. @ 1637. 12.18.20. BS.       Specimen Description   Date Value Ref Range Status   06/04/2021 Midstream Urine  Final   12/17/2020 Blood  Final   12/16/2020 Blood Right Arm  Final   12/16/2020 Blood Right Arm  Final   12/16/2020 Unspecified Urine  Final   12/03/2020 Midstream Urine  Final        Last check of C difficile  No results found for: \"CDBPCT\"      Virology:  CMV viral loads    CMV viral loads    CMV Quant IU/mL   Date Value Ref Range Status   12/18/2020 CMV " DNA Not Detected CMVND^CMV DNA Not Detected [IU]/mL Final     Comment:     Mutations within the highly conserved regions of the viral genome covered by   the RHONDA AmpliPrep/RHONDA TaqMan CMV Test primers and/or probes have been   identified and may result in under-quantitation of or failure to detect the   virus.  Supplemental testing methods should be used for testing when this is   suspected.  The RHONDA AmpliPrep/RHONDA TaqMan CMV Test is an FDA-approved in vitro nucleic   acid amplification test for the quantitation of cytomegalovirus DNA in human   plasma (EDTA plasma) using the Av[employer] BV AmpliPrep Instrument for automated viral   nucleic acid extraction and the Avangate BV TaqMan Analyzer or Avang[employer] BV TaqMan for   automated Real Time amplification and detection of the viral nucleic acid   target.  Titer results are reported in International Units/mL (IU/mL using 1st WHO   International standard for Human Cytomegalovirus for Nucleic Acid   Amplification based assays. The conversion factor between CMV DNA copis/mL (as   defined by the Roche RHONDA TaqMan CMV test) and International Units is the   CMV DNA concentration in IU/mL x 1.1 copies/IU = CMV DNA in copies/mL.  This assay has received FDA approval for the testing of human plasma only. The   Infectious Disease Diagnostic Laboratory at the [facility], [place], has validated the performance characteristics of the   Roche CMV assay for plasma, bronchial alveolar lavage/wash and urine.       CMV viral loads    Recent Labs   Lab Test 12/18/20  0611   CMVQNT CMV DNA Not Detected   CSPEC Plasma   CMVLOG Not Calculated       CMV viral loads    CMV Quant IU/mL   Date Value Ref Range Status   12/18/2020 CMV DNA Not Detected CMVND^CMV DNA Not Detected [IU]/mL Final     Comment:     Mutations within the highly conserved regions of the viral genome covered by   the RHONDA AmpliPrep/RHONDA TaqMan CMV Test primers and/or probes have been   identified and may  result in under-quantitation of or failure to detect the   virus.  Supplemental testing methods should be used for testing when this is   suspected.  The RHONDA AmpliPrep/RHONDA TaqMan CMV Test is an FDA-approved in vitro nucleic   acid amplification test for the quantitation of cytomegalovirus DNA in human   plasma (EDTA plasma) using the RHONDA BeststudyiPrep Instrument for automated viral   nucleic acid extraction and the Gigstarter Analyzer or Gigstarter for   automated Real Time amplification and detection of the viral nucleic acid   target.  Titer results are reported in International Units/mL (IU/mL using 1st WHO   International standard for Human Cytomegalovirus for Nucleic Acid   Amplification based assays. The conversion factor between CMV DNA copis/mL (as   defined by the Roche RHONDA TaqMan CMV test) and International Units is the   CMV DNA concentration in IU/mL x 1.1 copies/IU = CMV DNA in copies/mL.  This assay has received FDA approval for the testing of human plasma only. The   Infectious Disease Diagnostic Laboratory at the [facility], [place], has validated the performance characteristics of the   Roche CMV assay for plasma, bronchial alveolar lavage/wash and urine.     09/23/2020 CMV DNA Not Detected CMVND^CMV DNA Not Detected [IU]/mL Final     Comment:     Mutations within the highly conserved regions of the viral genome covered by   the RHONDA AmpliPrep/RHONDA TaqMan CMV Test primers and/or probes have been   identified and may result in under-quantitation of or failure to detect the   virus.  Supplemental testing methods should be used for testing when this is   suspected.  The RHONDA AmpliPrep/RHONDA TaqMan CMV Test is an FDA-approved in vitro nucleic   acid amplification test for the quantitation of cytomegalovirus DNA in human   plasma (EDTA plasma) using the RHONDA BeststudyiPrep Instrument for automated viral   nucleic acid extraction and the CM Sistemi TaqMan Analyzer or CM Sistemi  "TaqMan for   automated Real Time amplification and detection of the viral nucleic acid   target.  Titer results are reported in International Units/mL (IU/mL using 1st WHO   International standard for Human Cytomegalovirus for Nucleic Acid   Amplification based assays. The conversion factor between CMV DNA copis/mL (as   defined by the Roche RHONDA TaqMan CMV test) and International Units is the   CMV DNA concentration in IU/mL x 1.1 copies/IU = CMV DNA in copies/mL.  This assay has received FDA approval for the testing of human plasma only. The   Infectious Disease Diagnostic Laboratory at the [facility], [place], has validated the performance characteristics of the   Roche CMV assay for plasma, bronchial alveolar lavage/wash and urine.       Log IU/mL of CMVQNT   Date Value Ref Range Status   12/18/2020 Not Calculated <2.1 [Log_IU]/mL Final   09/23/2020 Not Calculated <2.1 [Log_IU]/mL Final       CMV resistance testing  No lab results found.  No results found for: \"CMVCID\", \"CMVFOS\", \"CMVGAN\"     EBV viral loads   No lab results found.  No results found for: \"EBVDN\", \"EBRES\", \"EBVSP\", \"EBVPC\", \"EBVPCR\"    Human Herpes Virus 6 viral loads    No results found for: \"H6RES\" No results found for: \"H6SPEC\"    CMV Antibody IgG   Date Value Ref Range Status   01/04/2021 >8.0 (H) 0.0 - 0.8 AI Final     Comment:     Positive  Antibody index (AI) values reflect qualitative changes in antibody   concentration that cannot be directly associated with clinical condition or   disease state.       No results found for: \"EBIG2\", \"EBIGM\", \"EBVIGG\", \"EBIGG\", \"EBVAGN\", \"GA9456\", \"TOXG\"      Imaging:  CT a/p W 9/29/2022  IMPRESSION:  1. Urothelial thickening and mild ectasia of the distal right renal  transplant ureter extending to the ureterovesical anastomosis,  slightly increased compared to previous CT on 12/17/2020. Findings may  represent acute or chronic sequela of inflammation/infection. " No  hydronephrosis or evidence of obstruction.  2. Simple right adnexal cyst is not significantly changed compared to  previous without internal complexity on comparison ultrasound, likely  benign. No dedicated imaging follow-up recommended.  3. Stable postoperative changes of liver transplant.  4. Stable enlarged polycystic kidneys.      Again, thank you for allowing me to participate in the care of your patient.      Sincerely,    Catrachita Gonzales MD

## 2024-12-17 NOTE — PROGRESS NOTES
Virtual Visit Details    Type of service:  Video Visit   Video Start Time: 2:48 PM  Video End Time:3:03 PM    Originating Location (pt. Location): Home    Distant Location (provider location):  On-site  Platform used for Video Visit: [facility]    Transplant Infectious Diseases Outpatient Progress note      Patient:  Belen Sharma, Date of birth 1960, Medical record number [id number]  Date of Visit:  12/17/2024           Recommendations:   1. Standing prescription for macrobid is on file to be filled only if symptomatic and after submitting a UA and Ucx.   2. Prevnar-20 at the age of 65.     RTC: 6 months.          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   Currently on TAC/MMF/prednisone.   With recurrent UTI.          Active Problems and Infectious Diseases Issues:   1. Recurrent UTI.   In the past was with different pathogens including ESBL E coli, K pneumonia, E faecalis and sometimes with negative urine and blood cx.   Since 9/2022 the UTIs have been with ESBL E coli.   The patient currently has standing orders for UA/Ucx when symptomatic and a standing order of macrobid at her local pharmacy to be started after UA/Ucx are delivered.     CT a/p 9/22/22 without evidence of structural or anatomical abnormality.   Cystoscopy and urodynamic studies were done on 2/2/23, both were unremarkable.     The patient has multiple risk factors for recurrent UTI, almost all of them can not be reversed, including: female gender, immunocompromised, kidney transplant recipient, the need to manipulate the  tract daily to change pessary.   In  addition the patient is not consistent using topical estrogen which may also contribute to UTI.         Old Problems and Infectious Diseases Issues:   1. Recurrent UTI with E coli, E faecalis and K pneumonia.   2. CDI.   3. Splenectomy.      Other Infectious Disease issues include:  - QTc: 384 as of 6/6/2022. .   - PCP prophylaxis: bactrim.  - Serostatus: not available.   - Immunization status: up-to-date. Prevnar-20 at the age of 65.       Attestation:  Total duration of visit including chart review, reviewing labs and imaging, interviewing and examining the patient, documentation, and sending communication to the patient and to the primary treating team, all at the same day of this encounter, is: 23 minutes.   Catrachita Gonzales MD    Contact information available via [facility] Paging/Directory     12/17/2024         Interim History:   Since I last saw the patient, UA was sent by hepatology to check for proteinuria but had pyuria. The patient was asymptomatic, but because she was notified that the UA was abnormal she started nitrofurantoin for 3 days. She remained asymptomatic.   No other complaints or events.          Review of Systems:     As mentioned in the interim history otherwise negative by reviewing constitutional symptoms, central and peripheral neurological systems, respiratory system, cardiac system, GI system,  system, musculoskeletal, skin, allergy, and lymphatics.                 Immunizations:     Immunization History   Administered Date(s) Administered    COVID-19 12+ (Pfizer) 10/06/2023, 01/31/2024, 09/28/2024    COVID-19 Bivalent 12+ (Pfizer) 11/04/2022    COVID-19 MONOVALENT 12+ (Pfizer) 03/29/2021, 04/19/2021, 09/02/2021    COVID-19 Monovalent 12+ (Pfizer 2022) 02/08/2022    DT (PEDS <7y) 03/19/2001    G6w3-42 Novel Flu 11/19/2009    HepB, Unspecified 06/28/2001    Influenza (prior to 2024) 11/15/2002, 11/18/2003, 10/26/2004, 10/24/2005, 11/16/2006, 09/20/2011    Influenza Vaccine 18-64 (Flublok)  10/18/2021, 09/21/2023    Influenza Vaccine >6 months,quad, PF 09/12/2022    Influenza Vaccine IM Ages 6-35 Months 4 Valent (PF) 11/28/2007, 10/07/2009, 09/20/2011    Influenza, Split Virus, Trivalent, Pf (Fluzone\Fluarix) 09/20/2011, 09/28/2024    Influenza,INJ,MDCK,PF,Quad >6mo(Flucelvax) 10/15/2020    MENINGOCOCCAL ACWY (MENQUADFI ) 01/17/2024    Meningococcal ACWY (Menactra ) 01/25/2013    Pneumococcal 20 valent Conjugate (Prevnar 20) 09/12/2022    Pneumococcal 23 valent 09/27/2002, 01/25/2013    RSV Vaccine (Abrysvo) 11/14/2023    TDAP Vaccine (Boostrix) 01/25/2013, 06/01/2018    Twinrix A/B 11/27/2023, 01/17/2024, 07/23/2024    Zoster recombinant adjuvanted (SHINGRIX) 10/15/2020, 01/08/2021             Allergies:     Allergies   Allergen Reactions    Ibuprofen      Due to liver transplant             Medications:     Current Outpatient Medications   Medication Sig Dispense Refill    acetaminophen (TYLENOL) 325 MG tablet Take 1 tablet (325 mg) by mouth every 4 hours as needed for mild pain or fever 120 tablet 1    aspirin (ASA) 81 MG chewable tablet Take 81 mg by mouth every evening      atorvastatin (LIPITOR) 10 MG tablet Take 1 tablet (10 mg) by mouth at bedtime. 90 tablet 0    biotin 1000 MCG TABS tablet Take 3,000 mcg by mouth every evening      ciclopirox (PENLAC) 8 % external solution Apply to adjacent skin and affected nails daily.  Remove with alcohol every 7 days, then repeat. 6.6 mL 11    COMPOUNDED NON-CONTROLLED SUBSTANCE (CMPD RX) - PHARMACY TO MIX COMPOUNDED MEDICATION Apply nightly to toenails. SM 61 -  Ciclopirox 8% / Itraconazole 3% / Fluconazole 3% / Terbinafine HCl 1% / Ibuprofen 2% DMSO Suspension 15 mL 3    diphenhydrAMINE (BENADRYL) 25 MG tablet Take 25 mg by mouth every 8 hours as needed for allergies      estradiol (ESTRACE) 0.1 MG/GM vaginal cream Place 1 g vaginally three times a week 42.5 g 11    guaiFENesin (MUCINEX) 600 MG 12 hr tablet Take 1 tablet by mouth 2 times daily as needed  for cough or congestion      loratadine (CLARITIN) 10 MG tablet Take 10 mg by mouth daily.      Multiple Vitamins-Minerals (WOMENS DAILY FORMULA PO) Take 1 tablet by mouth daily      mycophenolic acid (GENERIC EQUIVALENT) 180 MG EC tablet Take 3 tablets (540 mg) by mouth 2 times daily. 540 tablet 3    nitroFURantoin macrocrystal-monohydrate (MACROBID) 100 MG capsule Take 1 capsule (100 mg) by mouth 2 times daily PRN UTI 6 capsule 99    nitroGLYcerin (NITROSTAT) 0.4 MG sublingual tablet Place 1 tablet (0.4 mg) under the tongue every 5 minutes as needed for chest pain For chest pain place 1 tablet under the tongue every 5 minutes for 3 doses. If symptoms persist 5 minutes after 1st dose call 911. 30 tablet 1    OYSCO 500 + D 500-5 MG-MCG TABS Take 1 tablet by mouth 2 times daily.      predniSONE (DELTASONE) 5 MG tablet Take 1 tablet (5 mg) by mouth daily. 90 tablet 3    Probiotic Product (PROBIOTIC PO) Take 2 tablets by mouth every morning ([facility] Women's Vaginal Probiotic)      psyllium (METAMUCIL/KONSYL) 58.6 % powder Take 12 g (2 teaspoonful) by mouth daily      sodium bicarbonate 650 MG tablet Take 2 tablets (1,300 mg) by mouth 2 times daily 120 tablet 11    tacrolimus (GENERIC EQUIVALENT) 0.5 MG capsule Take 1 capsule (0.5 mg) by mouth every morning. a total dose of 1.5 mg am 1 mg pm. 90 capsule 3    tacrolimus (GENERIC EQUIVALENT) 1 MG capsule Take 1 capsule (1 mg) by mouth every 12 hours. total dose of 1.5 mg AM. Takes 1 mg capsule in the PM. 180 capsule 3    vitamin D3 (CHOLECALCIFEROL) 50 mcg (2000 units) tablet Take 1 tablet (50 mcg) by mouth daily. 92 tablet 3     No current facility-administered medications for this visit.                Physical Exam:   No vitals are available during this virtual visit.   Constitutional: awake, alert, cooperative, no apparent distress and appears at stated age, well nourished.             Laboratory Data:     Absolute CD4, Adams T Cells   Date Value Ref Range Status    06/06/2024 712 441 - 2,156 cells/uL Final       Inflammatory Markers    Recent Labs   Lab Test 05/02/24  0538 09/14/22  2220 11/04/21  1457 12/28/20  1515 12/21/20  1545   SED 14 8 14 8 10   CRP  --   --  39.3* 13.0* 18.0*       Immune Globulin Studies      Recent Labs   Lab Test 05/01/24  0846 05/01/24  0845   IGG 1,025  --    IGM 52  --    IGE  --  3     --        Metabolic Studies    Recent Labs   Lab Test 12/05/24  0817 11/07/24  0808 09/16/24  0807 07/15/24  0818 06/06/24  0809 05/16/24  0812 05/07/24  0550 05/01/24  1828 05/01/24  0845 10/03/22  0935 10/02/22  0628    140 141 140 138 141 137   < > 130*   < > 135*   POTASSIUM 3.9 3.6 3.8 3.8 4.1 4.1 4.2   < > 3.9   < > 3.9   CHLORIDE 106 105 106 106 102 108* 106   < > 101   < > 103   CO2 29 26 26 26 24 21* 22   < > 17*   < > 23   ANIONGAP 5* 9 9 8 12 12 9   < > 12   < > 9   BUN 14.5 20.3 18.4 17.4 17.2 14.8 16.2   < > 18.0   < > 17.8   CR 0.86 0.86 0.90 0.87 0.83 0.78 0.76   < > 0.89   < > 0.78   GFRESTIMATED 75 75 71 74 78 84 87   < > 72   < > 85   GLC 88 84 81 82 90 86 96   < > 104*   < > 92   EDSON 9.5 9.5 9.9 9.6 9.9 9.6 9.9   < > 9.5   < > 9.5   PHOS  --   --   --   --  2.9  --  2.7   < > 2.2*  --   --    MAG  --   --   --   --  1.7  --  1.7   < > 1.5*  --   --    LACT  --   --   --   --   --   --   --   --  1.3  --  0.8    < > = values in this interval not displayed.       Hepatic Studies    Recent Labs   Lab Test 12/05/24  0817 11/07/24  0808 09/16/24  0807 07/15/24  0818 06/06/24  0809 05/03/24  0621   BILITOTAL 0.8 0.6 0.7 0.6 0.7 0.6   ALKPHOS 58 55 58 57 71 62   PROTTOTAL 6.8 6.6 6.9 6.8 7.1 5.4*   ALBUMIN 3.9 3.8 3.9 3.9 3.9 2.9*   AST 21 20 18 19 18 14   ALT 14 12 10 12 14 9       Hematology Studies     Recent Labs   Lab Test 12/05/24  0817 11/07/24  0808 09/16/24  0807 07/15/24  0818 06/06/24  0809 05/16/24  0812 05/02/24  0538 05/01/24  0845 03/11/21  1007 12/28/20  1515 12/28/20  0855 12/21/20  1545 12/18/20  0611 12/16/20  2236    WBC 7.0 7.2 7.9 7.2 10.6 7.8   < > 18.3*   < > 9.2   < > 6.2   < > 11.4*   ANEU  --   --   --   --   --   --   --  17.6*  --  6.4  --  3.9  --  9.3*   ALYM  --   --   --   --   --   --   --  0.5*  --  2.1  --  1.7  --  1.0   PABLO  --   --   --   --   --   --   --  0.2  --  0.6  --  0.6  --  1.0   AEOS  --   --   --   --   --   --   --  0.0  --  0.0  --  0.0  --  0.0   HGB 15.3 15.1 15.6 14.5 15.4 15.1   < > 15.8*   < > 16.6*   < > 16.2*   < > 16.9*   HCT 47.4* 47.3* 49.0* 45.1 47.6* 46.8   < > 46.5   < > 50.8*   < > 49.2*   < > 54.5*    201 204 220 215 175   < > 194   < > 308   < > 261   < > 260    < > = values in this interval not displayed.       Clotting Studies  No lab results found.    Urine Studies    Recent Labs   Lab Test 12/05/24  1351 05/01/24  0852 04/18/24  1112 04/03/24  1302 02/05/24  1328   URINEPH 6.0 7.0 7.0 7.0 6.5   NITRITE Positive* Negative Positive* Negative Negative   LEUKEST Large* Large* Large* Large* Large*   WBCU 10-25* >182* * 0-5 25-50*         Microbiology:  Last 6 Culture results with specimen source  Culture Micro   Date Value Ref Range Status   06/04/2021 (A)  Final    10,000 to 50,000 colonies/mL  Enterococcus faecalis     12/17/2020 No growth  Final   12/16/2020 (A)  Final    Cultured on the 1st day of incubation:  Escherichia coli ESBL  ESBL (extended beta lactamase) producing organisms require contact precautions.     12/16/2020   Final    Critical Value/Significant Value, preliminary result only, called to and read back by  RALPH BOLAÑOS RN 12.17.2020 1056 BC     12/16/2020   Final    (Note)  POSITIVE for E.COLI by Verigene multiplex nucleic acid test. Final  identification and antimicrobial susceptibility testing will be  verified by standard methods. Verigene test will not distinguish  E.coli from Shigella species including S.dysenteriae, S.flexneri,  S.boydii, and S.sonnei. Specimens containing Shigella species or  E.coli will be reported as Positive for  "E.coli.    POSITIVE for CTX-M Class A Extended Spectrum beta-lactamase (ESBL)  resistance marker by Verigene multiplex nucleic acid test. CTX-M  confers resistance to penicillins, cephalosporins and variable  resistance to beta-lactam beta-lactamase inhibitor combinations  (clavulanic acid and tazobactam). Best empiric antibiotic choice is  meropenem. Specific susceptibility testing will be performed.    Specimen tested with Verigene multiplex, gram-negative blood culture  nucleic acid test for the following targets: Acinetobacter sp.,  Citrobacter sp., Enterobacter sp., Proteus sp., E. coli, K.  pneumoniae/oxytoca, P. aeruginosa, and the following resistance  markers: CTXM, KPC, NDM, VIM, IMP and OXA.    Critical Value/Significant Value called to and read back by RALPH BOLAÑOS RN 12/17/20 1312 EH.       12/16/2020 No growth  Final   12/16/2020 (A)  Preliminary    >100,000 colonies/mL  Escherichia coli ESBL  ESBL (extended beta lactamase) producing organisms require contact precautions.     12/16/2020   Preliminary    Susceptibility testing requested by  Dr. Gonzales, Pager [phone]. Fosfomycin requested. @ 1637. 12.18.20. BS.       Specimen Description   Date Value Ref Range Status   06/04/2021 Midstream Urine  Final   12/17/2020 Blood  Final   12/16/2020 Blood Right Arm  Final   12/16/2020 Blood Right Arm  Final   12/16/2020 Unspecified Urine  Final   12/03/2020 Midstream Urine  Final        Last check of C difficile  No results found for: \"CDBPCT\"      Virology:  CMV viral loads    CMV viral loads    CMV Quant IU/mL   Date Value Ref Range Status   12/18/2020 CMV DNA Not Detected CMVND^CMV DNA Not Detected [IU]/mL Final     Comment:     Mutations within the highly conserved regions of the viral genome covered by   the RHONDA AmpliPrep/RHONDA TaqMan CMV Test primers and/or probes have been   identified and may result in under-quantitation of or failure to detect the   virus.  Supplemental testing methods should be used " for testing when this is   suspected.  The RHONDA AmpliPrep/RHONDA TaqMan CMV Test is an FDA-approved in vitro nucleic   acid amplification test for the quantitation of cytomegalovirus DNA in human   plasma (EDTA plasma) using the VisualeadiPrep Instrument for automated viral   nucleic acid extraction and the Munax TaqMan Analyzer or Munax TaqMan for   automated Real Time amplification and detection of the viral nucleic acid   target.  Titer results are reported in International Units/mL (IU/mL using 1st WHO   International standard for Human Cytomegalovirus for Nucleic Acid   Amplification based assays. The conversion factor between CMV DNA copis/mL (as   defined by the Roche RHONDA TaqMan CMV test) and International Units is the   CMV DNA concentration in IU/mL x 1.1 copies/IU = CMV DNA in copies/mL.  This assay has received FDA approval for the testing of human plasma only. The   Infectious Disease Diagnostic Laboratory at the [facility], [place], has validated the performance characteristics of the   Roche CMV assay for plasma, bronchial alveolar lavage/wash and urine.       CMV viral loads    Recent Labs   Lab Test 12/18/20  0611   CMVQNT CMV DNA Not Detected   CSPEC Plasma   CMVLOG Not Calculated       CMV viral loads    CMV Quant IU/mL   Date Value Ref Range Status   12/18/2020 CMV DNA Not Detected CMVND^CMV DNA Not Detected [IU]/mL Final     Comment:     Mutations within the highly conserved regions of the viral genome covered by   the RHONDA AmpliPrep/RHONDA TaqMan CMV Test primers and/or probes have been   identified and may result in under-quantitation of or failure to detect the   virus.  Supplemental testing methods should be used for testing when this is   suspected.  The RHONDA AmpliPrep/RHONDA TaqMan CMV Test is an FDA-approved in vitro nucleic   acid amplification test for the quantitation of cytomegalovirus DNA in human   plasma (EDTA plasma) using the RHONDA AccupaliPrep  Instrument for automated viral   nucleic acid extraction and the RHONDA TaqMan Analyzer or EpiCrystals TaqMan for   automated Real Time amplification and detection of the viral nucleic acid   target.  Titer results are reported in International Units/mL (IU/mL using 1st WHO   International standard for Human Cytomegalovirus for Nucleic Acid   Amplification based assays. The conversion factor between CMV DNA copis/mL (as   defined by the Roche RHONDA TaqMan CMV test) and International Units is the   CMV DNA concentration in IU/mL x 1.1 copies/IU = CMV DNA in copies/mL.  This assay has received FDA approval for the testing of human plasma only. The   Infectious Disease Diagnostic Laboratory at the [facility], [place], has validated the performance characteristics of the   Roche CMV assay for plasma, bronchial alveolar lavage/wash and urine.     09/23/2020 CMV DNA Not Detected CMVND^CMV DNA Not Detected [IU]/mL Final     Comment:     Mutations within the highly conserved regions of the viral genome covered by   the RHONDA AmpliPrep/RHONDA TaqMan CMV Test primers and/or probes have been   identified and may result in under-quantitation of or failure to detect the   virus.  Supplemental testing methods should be used for testing when this is   suspected.  The RHONDA AmpliPrep/RHONDA TaqMan CMV Test is an FDA-approved in vitro nucleic   acid amplification test for the quantitation of cytomegalovirus DNA in human   plasma (EDTA plasma) using the RHONDA AmpliPrep Instrument for automated viral   nucleic acid extraction and the RHONDA TaqMan Analyzer or RHONDA TaqMan for   automated Real Time amplification and detection of the viral nucleic acid   target.  Titer results are reported in International Units/mL (IU/mL using 1st WHO   International standard for Human Cytomegalovirus for Nucleic Acid   Amplification based assays. The conversion factor between CMV DNA copis/mL (as   defined by the Roche RHONDA  "TaqMan CMV test) and International Units is the   CMV DNA concentration in IU/mL x 1.1 copies/IU = CMV DNA in copies/mL.  This assay has received FDA approval for the testing of human plasma only. The   Infectious Disease Diagnostic Laboratory at the [facility], [place], has validated the performance characteristics of the   Roche CMV assay for plasma, bronchial alveolar lavage/wash and urine.       Log IU/mL of CMVQNT   Date Value Ref Range Status   12/18/2020 Not Calculated <2.1 [Log_IU]/mL Final   09/23/2020 Not Calculated <2.1 [Log_IU]/mL Final       CMV resistance testing  No lab results found.  No results found for: \"CMVCID\", \"CMVFOS\", \"CMVGAN\"     EBV viral loads   No lab results found.  No results found for: \"EBVDN\", \"EBRES\", \"EBVSP\", \"EBVPC\", \"EBVPCR\"    Human Herpes Virus 6 viral loads    No results found for: \"H6RES\" No results found for: \"H6SPEC\"    CMV Antibody IgG   Date Value Ref Range Status   01/04/2021 >8.0 (H) 0.0 - 0.8 AI Final     Comment:     Positive  Antibody index (AI) values reflect qualitative changes in antibody   concentration that cannot be directly associated with clinical condition or   disease state.       No results found for: \"EBIG2\", \"EBIGM\", \"EBVIGG\", \"EBIGG\", \"EBVAGN\", \"CG2007\", \"TOXG\"      Imaging:  CT a/p W 9/29/2022  IMPRESSION:  1. Urothelial thickening and mild ectasia of the distal right renal  transplant ureter extending to the ureterovesical anastomosis,  slightly increased compared to previous CT on 12/17/2020. Findings may  represent acute or chronic sequela of inflammation/infection. No  hydronephrosis or evidence of obstruction.  2. Simple right adnexal cyst is not significantly changed compared to  previous without internal complexity on comparison ultrasound, likely  benign. No dedicated imaging follow-up recommended.  3. Stable postoperative changes of liver transplant.  4. Stable enlarged polycystic kidneys.  "

## 2024-12-17 NOTE — PATIENT INSTRUCTIONS
It was good to see you Mrs. Sharma,     As we discussed if with UTI symptoms, there are orders for urine testing, after you deliver the sample you may take the nitrofurantoin antibiotic as prescribed.     Please call [phone] to schedule an appointment with me in 6 months, in-person or virtual.     Thank you,   Elvi Pineda and Happy New Year!  Catrachita Gonzales MD

## 2025-02-13 ENCOUNTER — TELEPHONE (OUTPATIENT)
Dept: DERMATOLOGY | Facility: CLINIC | Age: 65
End: 2025-02-13
Payer: MEDICARE

## 2025-02-13 NOTE — TELEPHONE ENCOUNTER
2/13 Left Voicemail (1st Attempt) and Sent Mychart (1st Attempt) for the patient to call back and schedule the following:    Appointment type: Return Dermatology  Provider: Emmanuel  Return date: 9/18/25  Specialty phone number: [phone]  Additional appointment(s) needed: na  Additonal Notes: na

## 2025-02-14 ENCOUNTER — TELEPHONE (OUTPATIENT)
Dept: TRANSPLANT | Facility: CLINIC | Age: 65
End: 2025-02-14
Payer: MEDICARE

## 2025-02-14 NOTE — TELEPHONE ENCOUNTER
Patient Call: General  Route to LPN    Reason for call: Pt called to inform RNCC that as of this morning she has stopped taking Vitamin D supplements.    Call back needed? No

## 2025-03-17 ENCOUNTER — LAB (OUTPATIENT)
Dept: LAB | Facility: CLINIC | Age: 65
End: 2025-03-17
Payer: MEDICARE

## 2025-03-17 DIAGNOSIS — N39.0 RECURRENT UTI: ICD-10-CM

## 2025-03-17 LAB
ALBUMIN UR-MCNC: 30 MG/DL
APPEARANCE UR: ABNORMAL
BACTERIA #/AREA URNS HPF: ABNORMAL /HPF
BILIRUB UR QL STRIP: NEGATIVE
COLOR UR AUTO: YELLOW
GLUCOSE UR STRIP-MCNC: NEGATIVE MG/DL
HGB UR QL STRIP: ABNORMAL
KETONES UR STRIP-MCNC: NEGATIVE MG/DL
LEUKOCYTE ESTERASE UR QL STRIP: ABNORMAL
NITRATE UR QL: NEGATIVE
PH UR STRIP: 6.5 [PH] (ref 5–8)
RBC #/AREA URNS AUTO: ABNORMAL /HPF
SP GR UR STRIP: 1.01 (ref 1–1.03)
UROBILINOGEN UR STRIP-ACNC: 0.2 E.U./DL
WBC #/AREA URNS AUTO: ABNORMAL /HPF
WBC CLUMPS #/AREA URNS HPF: PRESENT /HPF

## 2025-03-17 PROCEDURE — 87086 URINE CULTURE/COLONY COUNT: CPT

## 2025-03-17 PROCEDURE — 81001 URINALYSIS AUTO W/SCOPE: CPT

## 2025-03-19 LAB
BACTERIA UR CULT: NORMAL
BACTERIA UR CULT: NORMAL

## 2025-03-22 SDOH — HEALTH STABILITY: PHYSICAL HEALTH: ON AVERAGE, HOW MANY DAYS PER WEEK DO YOU ENGAGE IN MODERATE TO STRENUOUS EXERCISE (LIKE A BRISK WALK)?: 5 DAYS

## 2025-03-22 SDOH — HEALTH STABILITY: PHYSICAL HEALTH: ON AVERAGE, HOW MANY MINUTES DO YOU ENGAGE IN EXERCISE AT THIS LEVEL?: 30 MIN

## 2025-03-22 ASSESSMENT — SOCIAL DETERMINANTS OF HEALTH (SDOH): HOW OFTEN DO YOU GET TOGETHER WITH FRIENDS OR RELATIVES?: MORE THAN THREE TIMES A WEEK

## 2025-03-23 ASSESSMENT — PATIENT HEALTH QUESTIONNAIRE - PHQ9
10. IF YOU CHECKED OFF ANY PROBLEMS, HOW DIFFICULT HAVE THESE PROBLEMS MADE IT FOR YOU TO DO YOUR WORK, TAKE CARE OF THINGS AT HOME, OR GET ALONG WITH OTHER PEOPLE: NOT DIFFICULT AT ALL
SUM OF ALL RESPONSES TO PHQ QUESTIONS 1-9: 0
SUM OF ALL RESPONSES TO PHQ QUESTIONS 1-9: 0

## 2025-03-23 NOTE — PROGRESS NOTES
HPI:    Ms. Sharma comes in for a physical today. She was recently treated with 3 days of Macrobid for an abnormal urine from 3/17/2025. UC was equivocal. She denies any urgency, frequency, back pain, bladder pain. She has mild temperature today but denies any other systemic sxs. She has about one month or R ear pain and fullness. She is also wondering about decreased hearing. She has discontinued her supplemental Vitamin D (level was 55). She remains on a OTC vitamin that has Vit D. No other HEENT, cardiopulmonary, abdominal , GYN, neurological, systemic, psychiatric, lymphatic, endocrine, vascular complaints.       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 Both, very large, native kidneys reamain.    PONV (postoperative nausea and vomiting)     Need to start with ice chips and apple juice, no soda    S/P kidney transplant     SLK 10/9/2010 - kidney failure 2/2 [facility]. Explanted 2012. Re-transplant after de-sensitization 2016    S/P liver transplant (H)     10/9/2010 - [facility], ischemic biopathy. Re-transplant 3/3/2011    S/P splenectomy     Spider veins 2019    Thyroid disease     Hyperthyroid treated with single dose iodine    Urinary tract infection 09/2022     Past Surgical History:   Procedure Laterality Date    bunectomy  08/01/2018    right foot    bunionectomy  01/2019    left    CHOLECYSTECTOMY      COLONOSCOPY N/A 3/20/2024    Procedure: COLONOSCOPY, WITH POLYPECTOMY;  Surgeon: Leventhal, Thomas Michael, MD;  Location: [facility] OR    CONIZATION N/A 09/30/2020    Procedure: CONE BIOPSY, CERVIX;  Surgeon: Daysi Perez MD;  [facility]: [facility]    FAILED  PICC - RIGHT ARM Right 2020    Has a LUE AV fistula.    H STATISTIC PICC LINE INSERTION >5YR, FAILED Bilateral 2020    Left fistula, Right failed x 2 Occlussion    HERNIA REPAIR, INCISIONAL  2013    IR DIALYSIS PTA CENTRAL SEG  2020    IR PICC PLACEMENT > 5 YRS OF AGE  2020    IR PICC PLACEMENT > 5 YRS OF AGE  10/5/2022    PHACOEMULSIFICATION CLEAR CORNEA WITH STANDARD INTRAOCULAR LENS IMPLANT Right 2023    Procedure: RIGHT EYE PHACOEMULSIFICATION, CATARACT, WITH INTRAOCULAR LENS IMPLANT;  Surgeon: Sebastian Robertson MD;  Location: [facility] OR    PHACOEMULSIFICATION WITH STANDARD INTRAOCULAR LENS IMPLANT Left 3/9/2023    Procedure: LEFT EYE PHACOEMULSIFICATION, CATARACT, WITH STANDARD INTRAOCULAR LENS IMPLANT INSERTION;  Surgeon: Sebastian Robertson MD;  Location: [facility] OR    SPLENECTOMY      Splenectomy    TRANSPLANT KIDNEY RECIPIENT  DONOR      re-transplant after [facility]; 6 months de-sensitization prior AND 6 months after transplant    TRANSPLANT LIVER RECIPIENT  DONOR  2011    TRANSPLANT LIVER, KIDNEY RECIPIENT  DONOR, COMBINED  10/09/2010    [facility] - re-Tx liver 3/3/2011; Kidney (left iliac) lost to [facility]/fibrosis - explant    VASCULAR SURGERY      Vascular access left UE. Not used for 4 years since 2nd kidney tx 2016 [place]     PE:    Vitals noted, gen, nad, cooperative, alert, neck supple, nl rom, R ear with a little wax, no erythema, lungs with good air movement, RRR S1, S2, no MRG, abdomen, no acute findings, Grossly normal neurological exam.     Recent Results (from the past 720 hours)   UA with Microscopic reflex to Culture    Collection Time: 25 10:14 AM    Specimen: Urinary Bladder; Urine   Result Value Ref Range    Color Urine Yellow Colorless, Straw, Light Yellow, Yellow    Appearance Urine Cloudy (A) Clear    Glucose Urine Negative Negative mg/dL    Bilirubin Urine Negative Negative    Ketones Urine Negative Negative mg/dL    Specific  Gravity Urine 1.015 1.005 - 1.030    Blood Urine Trace (A) Negative    pH Urine 6.5 5.0 - 8.0    Protein Albumin Urine 30 (A) Negative mg/dL    Urobilinogen Urine 0.2 0.2, 1.0 E.U./dL    Nitrite Urine Negative Negative    Leukocyte Esterase Urine Large (A) Negative   UA Microscopic with Reflex to Culture    Collection Time: 03/17/25 10:14 AM   Result Value Ref Range    Bacteria Urine Few (A) None Seen /HPF    RBC Urine 2-5 (A) 0-2 /HPF /HPF    WBC Urine  (A) 0-5 /HPF /HPF    WBC Clumps Urine Present (A) None Seen /HPF   Urine Culture    Collection Time: 03/17/25 10:14 AM    Specimen: Urinary Bladder; Urine   Result Value Ref Range    Culture 10,000-50,000 CFU/mL Urogenital keisha     Culture <1,000 CFU/mL Mixture of urogenital keisha        A/P:     1. Immunizations; COVID Pfizer vaccine x 8.  Pneumococcal 23 done 1/25/2013. Tdap 6/1/2018.  She has completed the Zoster Shingrix vaccine series.  RSV vaccine 11/14/2023. Pneumococcal 20 vaccine done 9/12/2022. Influenza vaccine done 9/28/2024.   2. Cardiology; seen by Dr. Mohamud on  12/9/2024 for microvascular angina and non-obstructive CAD.   3. Mammogram 7/29/2024. Ordered mammogram for 8/2025  4. GI follow up with Dr. Leventhal 11/14/2024 for liver transplant for polycystic renal disease and congenital hepatic fibrosis.   5. GYN appt. With Dr. Perez on 10/28/2024  6. Nephrology appt. With Dr. Angulo Renal transplant note 6/13/2024 and follow up with Dr. Jorge Luis Celis on 6/16/2025. Creatinine 0.84 on 2/7/2025.   7. Dermatology visit with Dr. Benitez 9/18/2024 and next 9/23/2025  8. Chronic urinary issues. She was seen Urology by Dr. Francis 5/8/2024 and next 6/4/2025   9. Infectious disease note from Dr. Nguyen 6/13/2024. Last note from Dr. Gonzales 12/17/2024 and next appt. 6/17/2025  10. Ophthalmology note from 3/31/2023  11. Colonoscopy; 3/20/2024.   12. Lipids; 12/5/2022; TG's 115,  and HDL 57. Repeat 12/18/2023; LDL 66, HDL 48 and TG's 124. She is on  Atorvastatin. Ordered future on 12/9/2024  13. She states splenectomy 3/6/2013. She had Pneumococcal 20 vaccine 9/12/2022.  Meningococcal vaccine done 1/17/2024. She states she went to a vaccine clinic in [place] before her splenectomy and got 3 vaccines most likely she got Hib B  14. Murmur? Echo done 7/8/2024   15. She might have had Grave's disease in the 90's and was possibly treated with I131? Last TSH was normal at 2.49 on 11/7/2024. She is not on thyroid medication.   16. Vitamin D level 55 on 2/7/2025. DEXA scan 4/15/2022 with most negative T score -1.9. Ordered future DEXA scan today 3/24/2025  17. R ear complaints. Audiogram and ENT referral.

## 2025-03-24 ENCOUNTER — OFFICE VISIT (OUTPATIENT)
Dept: INTERNAL MEDICINE | Facility: CLINIC | Age: 65
End: 2025-03-24
Payer: MEDICARE

## 2025-03-24 VITALS
OXYGEN SATURATION: 97 % | BODY MASS INDEX: 22.23 KG/M2 | HEIGHT: 68 IN | DIASTOLIC BLOOD PRESSURE: 58 MMHG | SYSTOLIC BLOOD PRESSURE: 98 MMHG | TEMPERATURE: 99.2 F | HEART RATE: 79 BPM | RESPIRATION RATE: 16 BRPM | WEIGHT: 146.7 LBS

## 2025-03-24 DIAGNOSIS — H91.90 DECREASED HEARING, UNSPECIFIED LATERALITY: ICD-10-CM

## 2025-03-24 DIAGNOSIS — H92.01 EAR PAIN, RIGHT: ICD-10-CM

## 2025-03-24 DIAGNOSIS — N95.2 VAGINAL ATROPHY: ICD-10-CM

## 2025-03-24 DIAGNOSIS — Z12.31 ENCOUNTER FOR SCREENING MAMMOGRAM FOR BREAST CANCER: ICD-10-CM

## 2025-03-24 DIAGNOSIS — E78.5 DYSLIPIDEMIA: ICD-10-CM

## 2025-03-24 DIAGNOSIS — M48.52XA COLLAPSED VERTEBRA, NOT ELSEWHERE CLASSIFIED, CERVICAL REGION, INITIAL ENCOUNTER FOR FRACTURE (H): ICD-10-CM

## 2025-03-24 DIAGNOSIS — Z91.89 AT RISK FOR LOSS OF BONE DENSITY: Primary | ICD-10-CM

## 2025-03-24 PROCEDURE — 99214 OFFICE O/P EST MOD 30 MIN: CPT | Performed by: INTERNAL MEDICINE

## 2025-03-24 PROCEDURE — 3078F DIAST BP <80 MM HG: CPT | Performed by: INTERNAL MEDICINE

## 2025-03-24 PROCEDURE — 3074F SYST BP LT 130 MM HG: CPT | Performed by: INTERNAL MEDICINE

## 2025-03-24 RX ORDER — ATORVASTATIN CALCIUM 10 MG/1
10 TABLET, FILM COATED ORAL AT BEDTIME
Qty: 90 TABLET | Refills: 0 | Status: SHIPPED | OUTPATIENT
Start: 2025-03-24

## 2025-03-24 RX ORDER — ESTRADIOL 0.1 MG/G
1 CREAM VAGINAL
Qty: 42.5 G | Refills: 11 | Status: SHIPPED | OUTPATIENT
Start: 2025-03-24

## 2025-03-24 NOTE — TELEPHONE ENCOUNTER
REFERRAL INFORMATION:  Referring By: Vincent De La Garza MD  Referring Clinic: [facility] internal med   Reason for Visit/Diagnosis: Ear pain right ear, ref'd by Vincent De La Garza MD, pt [place] app[place], [facility] [place]    FUTURE VISIT INFORMATION:  Appointment Date: 5/5/25  Appointment Time: 9 AM   NOTES STATUS DETAILS   OFFICE NOTE from referring provider Internal  3/24/25- ANTHONY reynolsd. Vincent De La Garza MD @ [facility] Internal Med

## 2025-04-08 ENCOUNTER — LAB (OUTPATIENT)
Dept: LAB | Facility: CLINIC | Age: 65
End: 2025-04-08
Payer: MEDICARE

## 2025-04-08 DIAGNOSIS — Z94.4 LIVER REPLACED BY TRANSPLANT (H): ICD-10-CM

## 2025-04-08 LAB
ALBUMIN SERPL BCG-MCNC: 4 G/DL (ref 3.5–5.2)
ALP SERPL-CCNC: 58 U/L (ref 40–150)
ALT SERPL W P-5'-P-CCNC: 14 U/L (ref 0–50)
ANION GAP SERPL CALCULATED.3IONS-SCNC: 11 MMOL/L (ref 7–15)
AST SERPL W P-5'-P-CCNC: 19 U/L (ref 0–45)
BILIRUB DIRECT SERPL-MCNC: 0.28 MG/DL (ref 0–0.3)
BILIRUB SERPL-MCNC: 0.6 MG/DL
BUN SERPL-MCNC: 17.9 MG/DL (ref 8–23)
CALCIUM SERPL-MCNC: 9.9 MG/DL (ref 8.8–10.4)
CHLORIDE SERPL-SCNC: 102 MMOL/L (ref 98–107)
CREAT SERPL-MCNC: 0.82 MG/DL (ref 0.51–0.95)
EGFRCR SERPLBLD CKD-EPI 2021: 79 ML/MIN/1.73M2
ERYTHROCYTE [DISTWIDTH] IN BLOOD BY AUTOMATED COUNT: 14.4 % (ref 10–15)
GLUCOSE SERPL-MCNC: 88 MG/DL (ref 70–99)
HCO3 SERPL-SCNC: 24 MMOL/L (ref 22–29)
HCT VFR BLD AUTO: 47.5 % (ref 35–47)
HGB BLD-MCNC: 15.4 G/DL (ref 11.7–15.7)
MCH RBC QN AUTO: 30.1 PG (ref 26.5–33)
MCHC RBC AUTO-ENTMCNC: 32.4 G/DL (ref 31.5–36.5)
MCV RBC AUTO: 93 FL (ref 78–100)
PLATELET # BLD AUTO: 203 10E3/UL (ref 150–450)
POTASSIUM SERPL-SCNC: 3.8 MMOL/L (ref 3.4–5.3)
PROT SERPL-MCNC: 6.8 G/DL (ref 6.4–8.3)
RBC # BLD AUTO: 5.11 10E6/UL (ref 3.8–5.2)
SODIUM SERPL-SCNC: 137 MMOL/L (ref 135–145)
TACROLIMUS BLD-MCNC: 6 UG/L (ref 5–15)
TME LAST DOSE: NORMAL H
TME LAST DOSE: NORMAL H
WBC # BLD AUTO: 6.4 10E3/UL (ref 4–11)

## 2025-04-08 PROCEDURE — 82248 BILIRUBIN DIRECT: CPT

## 2025-04-08 PROCEDURE — 80197 ASSAY OF TACROLIMUS: CPT

## 2025-04-08 PROCEDURE — 80053 COMPREHEN METABOLIC PANEL: CPT

## 2025-04-08 PROCEDURE — 85027 COMPLETE CBC AUTOMATED: CPT

## 2025-04-08 PROCEDURE — 36415 COLL VENOUS BLD VENIPUNCTURE: CPT

## 2025-04-11 ENCOUNTER — TELEPHONE (OUTPATIENT)
Dept: TRANSPLANT | Facility: CLINIC | Age: 65
End: 2025-04-11
Payer: MEDICARE

## 2025-04-11 NOTE — TELEPHONE ENCOUNTER
[facility] Call Center    Phone Message    May a detailed message be left on voicemail: yes     Reason for Call: Other: Patient stated she wants to schedule with . patient is currently scheduled with Jorge Luis Celis in June. Patient is wanting to schedule with  and wants her RNCC to reach out to her to talk with her about what options she has an what 's availability is like as she is having a hard time scheduling with him.     Action Taken: Message routed to:  Other: RNCC    Travel Screening: Not Applicable     Date of Service:

## 2025-04-21 NOTE — TELEPHONE ENCOUNTER
PCS spoke with Belen - discussed her desire for a senior transplant nephrologist. Discussed Dr. Hampton's seniority with [facility] and her overall experience.  Belen is familiar with seeing Dr. Hampton while hospitalized and will continue with her current appointment. No change needed.  She will reach out if she would like to explore other options in the future.

## 2025-05-01 ENCOUNTER — TELEPHONE (OUTPATIENT)
Dept: INFECTIOUS DISEASES | Facility: CLINIC | Age: 65
End: 2025-05-01
Payer: MEDICARE

## 2025-05-01 DIAGNOSIS — N39.0 RECURRENT UTI: ICD-10-CM

## 2025-05-01 DIAGNOSIS — I20.89 MICROVASCULAR ANGINA: ICD-10-CM

## 2025-05-01 RX ORDER — NITROFURANTOIN 25; 75 MG/1; MG/1
100 CAPSULE ORAL 2 TIMES DAILY
Qty: 6 CAPSULE | Refills: 99 | Status: SHIPPED | OUTPATIENT
Start: 2025-05-01

## 2025-05-01 RX ORDER — NITROGLYCERIN 0.4 MG/1
0.4 TABLET SUBLINGUAL EVERY 5 MIN PRN
Qty: 30 TABLET | Refills: 1 | Status: SHIPPED | OUTPATIENT
Start: 2025-05-01

## 2025-05-01 NOTE — TELEPHONE ENCOUNTER
[facility] Call Center    Phone Message    May a detailed message be left on voicemail: yes     Reason for Call: Other: Please update antibiotics (MACROBID) 100 MG rx at [facility]'s  she needs it ASAP as she is going out of town to [place] today      Action Taken: Other: ID    Travel Screening: Not Applicable

## 2025-05-01 NOTE — TELEPHONE ENCOUNTER
"Patient going out of town to [place] today and wants a refill of macrobid ASAP.    Medication Refill                                                     Refill request receive for:  Macrobid    Last refill: 4/3/24    Last Office Visit Visit date not found  Future appt scheduled? Yes: 6/17/25     POC for medication if indicated from last note including duration of treatment if applicable:     Standing prescription for macrobid is on file to be filled only if symptomatic and after submitting a UA and Ucx.         RECENT LABS/VITALS                                                        Lab Results   Component Value Date    AST 19 04/08/2025    AST 17 06/04/2021     Lab Results   Component Value Date    ALT 14 04/08/2025    ALT 22 06/04/2021     Creatinine   Date Value Ref Range Status   04/08/2025 0.82 0.51 - 0.95 mg/dL Final   06/04/2021 0.75 0.52 - 1.04 mg/dL Final   ]  Alkaline Phosphatase   Date/Time Value Ref Range Status   04/08/2025 08:10 AM 58 40 - 150 U/L Final   06/04/2021 10:18 AM 61 40 - 150 U/L Final     No results found for: \"LABAPCBCDIFF\"                    "

## 2025-05-01 NOTE — TELEPHONE ENCOUNTER
Pending Prescriptions:                       Disp   Refills    nitroFURantoin macrocrystal-monohydrate (*6 caps*99           Sig: Take 1 capsule (100 mg) by mouth 2 times daily.           PRN UTI    Anabelle Rothman, CMA

## 2025-05-01 NOTE — TELEPHONE ENCOUNTER
Last Written Prescription:  nitroGLYcerin (NITROSTAT) 0.4 MG sublingual tablet   30 tablet 1 12/30/2022     ----------------------  Last Visit Date: 12-9-2024  Future Visit Date: none  ----------------------    Refill decision: Medication refilled per  Medication Refill in Ambulatory Care  policy.    Requested Prescriptions   Pending Prescriptions Disp Refills    nitroGLYcerin (NITROSTAT) 0.4 MG sublingual tablet 30 tablet 1     Sig: Place 1 tablet (0.4 mg) under the tongue every 5 minutes as needed for chest pain. For chest pain place 1 tablet under the tongue every 5 minutes for 3 doses. If symptoms persist 5 minutes after 1st dose call 911.       Nitrates Failed - 5/1/2025 12:31 PM        Failed - Sublingual nitro order needs review     If refill exceeds 1 bottle per month, please forward request to provider.           Passed - Most recent blood pressure under 140/90 in past 12 months     BP Readings from Last 3 Encounters:   03/24/25 98/58   12/09/24 101/50   10/28/24 104/63       Systolic (Patient Reported): 115 (6/13/2024  9:37 AM)  Diastolic (Patient Reported): 62 (6/13/2024  9:37 AM)              Passed - Pt is not on erectile dysfunction medications        Passed - Medication is active on med list and the sig matches. RN to manually verify dose and sig if red X/fail.             Passed - Recent (12 mo) or future (90 days) visit within the authorizing provider's specialty     The patient must have completed an in-person or virtual visit within the past 12 months or has a future visit scheduled within the next 90 days with the authorizing provider s specialty.  Urgent care and e-visits do not qualify as an office visit for this protocol.          Passed - Medication indicated for associated diagnosis             Passed - Patient is age 18 or older              Yaima Li  Gastroenterology  [address] [place] [address]  Phone: [phone]  Fax: [phone]  Follow Up Time:

## 2025-05-01 NOTE — TELEPHONE ENCOUNTER
[facility] Call Center    Phone Message    May a detailed message be left on voicemail: yes     Reason for Call: Medication Refill Request    Has the patient contacted the pharmacy for the refill? Yes   Name of medication being requested: nitro  Provider who prescribed the medication: [place]  Pharmacy:      [id number] [place], [place] - [facility] [address] AT [facility]  Date medication is needed: asap,  May 2025, pt leaves for flight today at 2pm and is hoping to get this before than        Action Taken: Other: cardio    Travel Screening: Not Applicable     Date of Service:

## 2025-05-05 ENCOUNTER — OFFICE VISIT (OUTPATIENT)
Dept: AUDIOLOGY | Facility: CLINIC | Age: 65
End: 2025-05-05
Payer: MEDICARE

## 2025-05-05 ENCOUNTER — PRE VISIT (OUTPATIENT)
Dept: OTOLARYNGOLOGY | Facility: CLINIC | Age: 65
End: 2025-05-05

## 2025-05-05 DIAGNOSIS — Z91.89 AT RISK FOR LOSS OF BONE DENSITY: ICD-10-CM

## 2025-05-05 DIAGNOSIS — H93.8X3 EAR FULLNESS, BILATERAL: ICD-10-CM

## 2025-05-05 DIAGNOSIS — N95.2 VAGINAL ATROPHY: ICD-10-CM

## 2025-05-05 DIAGNOSIS — H91.90 DECREASED HEARING, UNSPECIFIED LATERALITY: ICD-10-CM

## 2025-05-05 DIAGNOSIS — M48.52XA COLLAPSED VERTEBRA, NOT ELSEWHERE CLASSIFIED, CERVICAL REGION, INITIAL ENCOUNTER FOR FRACTURE (H): ICD-10-CM

## 2025-05-05 DIAGNOSIS — H93.13 TINNITUS OF BOTH EARS: ICD-10-CM

## 2025-05-05 DIAGNOSIS — E78.5 DYSLIPIDEMIA: ICD-10-CM

## 2025-05-05 DIAGNOSIS — Z01.10 EXAMINATION OF EARS AND HEARING: ICD-10-CM

## 2025-05-05 DIAGNOSIS — Z12.31 ENCOUNTER FOR SCREENING MAMMOGRAM FOR BREAST CANCER: ICD-10-CM

## 2025-05-05 DIAGNOSIS — H92.03 EAR PAIN, BILATERAL: Primary | ICD-10-CM

## 2025-05-05 DIAGNOSIS — H92.01 EAR PAIN, RIGHT: ICD-10-CM

## 2025-05-05 NOTE — PROGRESS NOTES
AUDIOLOGY REPORT    SUMMARY: Audiology visit completed. See audiogram for results.      RECOMMENDATIONS: Follow-up with ENT.      Nano Dominguez  Audiologist  [place] License  #[id number]    
Intrauterine Growth Restriction

## 2025-05-27 DIAGNOSIS — N10 PYELONEPHRITIS, ACUTE: ICD-10-CM

## 2025-05-27 DIAGNOSIS — Z94.0 KIDNEY TRANSPLANTED: ICD-10-CM

## 2025-05-28 RX ORDER — SODIUM BICARBONATE 650 MG/1
1300 TABLET ORAL 2 TIMES DAILY
Qty: 120 TABLET | Refills: 11 | Status: SHIPPED | OUTPATIENT
Start: 2025-05-28

## 2025-06-04 ENCOUNTER — OFFICE VISIT (OUTPATIENT)
Dept: UROLOGY | Facility: CLINIC | Age: 65
End: 2025-06-04
Attending: OBSTETRICS & GYNECOLOGY
Payer: MEDICARE

## 2025-06-04 VITALS
DIASTOLIC BLOOD PRESSURE: 65 MMHG | HEART RATE: 80 BPM | SYSTOLIC BLOOD PRESSURE: 116 MMHG | WEIGHT: 147 LBS | BODY MASS INDEX: 22.38 KG/M2

## 2025-06-04 DIAGNOSIS — Z96.0 PRESENCE OF PESSARY: Primary | ICD-10-CM

## 2025-06-04 DIAGNOSIS — N81.11 CYSTOCELE, MIDLINE: ICD-10-CM

## 2025-06-04 DIAGNOSIS — N95.2 VAGINAL ATROPHY: ICD-10-CM

## 2025-06-04 DIAGNOSIS — N81.4 UTERINE PROLAPSE: ICD-10-CM

## 2025-06-04 DIAGNOSIS — N81.6 RECTOCELE: ICD-10-CM

## 2025-06-04 PROCEDURE — 3078F DIAST BP <80 MM HG: CPT | Performed by: OBSTETRICS & GYNECOLOGY

## 2025-06-04 PROCEDURE — G0463 HOSPITAL OUTPT CLINIC VISIT: HCPCS | Performed by: OBSTETRICS & GYNECOLOGY

## 2025-06-04 PROCEDURE — 3074F SYST BP LT 130 MM HG: CPT | Performed by: OBSTETRICS & GYNECOLOGY

## 2025-06-04 PROCEDURE — 99213 OFFICE O/P EST LOW 20 MIN: CPT | Performed by: OBSTETRICS & GYNECOLOGY

## 2025-06-04 RX ORDER — OXYQUINOLINE/BORIC ACID 0.025 %
1 JELLY WITH APPLICATOR (GRAM) VAGINAL DAILY
Qty: 113.4 G | Refills: 2 | Status: SHIPPED | OUTPATIENT
Start: 2025-06-04

## 2025-06-04 NOTE — PROGRESS NOTES
CC: prolapse    Pt here for pessary check. Pt inserting and removing pessary daily. Pt happy with pessary. No bleeding, spotting or discharge.    /65   Pulse 80   Wt 66.7 kg (147 lb)   BMI 22.38 kg/m   No LMP recorded. Patient is postmenopausal. Body mass index is 22.38 kg/m .  Belen is alert, comfortable in no acute distress, non-labored breathing.   Vagina: normal without bleeding or erosion    A/P: Belen Sharma is a 65 year old F with uterine prolapse    Continue pessary/ F/U in 1 year or sooner PRN    I spent a total of 20 minutes with  Ms. Sharma  on the date of the encounter in chart review, face to face patient visit, review of tests, documentation and/or discussion with other providers about the issues documented above.    Zach Francis MD  Professor, OB/GYN  Urogynecologist  CC  Patient Care Team:  Vincent De La Garza MD as PCP - General (Internal Medicine)  Christy Mohamud MD as MD (Cardiology)  Елена Dinh RN as Registered Nurse  Christy Mohamud MD as Assigned Heart and Vascular Provider  Leventhal, Thomas Michael, MD as Assigned Gastroenterology Provider  Vincent De La Garza MD as Assigned PCP  Saran Angulo MD as Assigned Nephrology Provider  Shannon Loza OD (Optometry)  Sapna Marcano MD as MD (Ophthalmology)  Anat Benitez MD as MD (Dermatology)  Ida Guthrie RN as Specialty Care Coordinator  Rocio Brewer CNP as Nurse Practitioner (Urology)  Sebastian Robertson MD as Physician (Ophthalmology)  Catrachita Gonzales MD as Assigned Infectious Disease Provider  Getachew Ross MD as MD (Nephrology)  Daysi Perez MD as Assigned OBGYN Provider  Anat Benitez MD as MD (Dermatology)  Anat Benitez MD as Assigned Dermatology Provider  Kamilah Youngblood AuD as Audiologist (Audiology)  Edel Navas NP as Nurse Practitioner (Otolaryngology)  Edel Navas NP as Assigned Surgical Provider  SELF, REFERRED

## 2025-06-04 NOTE — LETTER
6/4/2025       RE: Belen Sharma  [address] [place] [address]     Dear Colleague,    Thank you for referring your patient, Belen Sharma, to the [facility]. Please see a copy of my visit note below.    CC: prolapse    Pt here for pessary check. Pt inserting and removing pessary daily. Pt happy with pessary. No bleeding, spotting or discharge.    /65   Pulse 80   Wt 66.7 kg (147 lb)   BMI 22.38 kg/m   No LMP recorded. Patient is postmenopausal. Body mass index is 22.38 kg/m .  Belen is alert, comfortable in no acute distress, non-labored breathing.   Vagina: normal without bleeding or erosion    A/P: Belen Sharma is a 65 year old F with uterine prolapse    Continue pessary/ F/U in 1 year or sooner PRN    I spent a total of 20 minutes with  Ms. Sharma  on the date of the encounter in chart review, face to face patient visit, review of tests, documentation and/or discussion with other providers about the issues documented above.    Zahc Francis MD  Professor, OB/GYN  Urogynecologist  CC  Patient Care Team:  Vincent De La Garza MD as PCP - General (Internal Medicine)  Christy Mohamud MD as MD (Cardiology)  Елена Dinh RN as Registered Nurse  Christy Mohamud MD as Assigned Heart and Vascular Provider  Leventhal, Thomas Michael, MD as Assigned Gastroenterology Provider  Vincent De La Garza MD as Assigned PCP  Saran Angulo MD as Assigned Nephrology Provider  Shannon Loza OD (Optometry)  Sapna Marcano MD as MD (Ophthalmology)  Anat Benitez MD as MD (Dermatology)  Ida Guthrie RN as Specialty Care Coordinator  Rocio Brewer CNP as Nurse Practitioner (Urology)  Sebastian Robertson MD as Physician (Ophthalmology)  Catrachita Gonzales MD as Assigned Infectious Disease Provider  Getachew Ross MD as MD (Nephrology)  Daysi Perez MD as Assigned OBGYN Provider  Emmanuel  MD Anat as MD (Dermatology)  Anat Benitez MD as Assigned Dermatology Provider  Kamilah Youngblood AuD as Audiologist (Audiology)  Edel Navas NP as Nurse Practitioner (Otolaryngology)  Edel Navas NP as Assigned Surgical Provider  SELF, REFERRED      Chief Complaint   Patient presents with     Follow Up     Pessary check    Mariajose Tapia LPN     Again, thank you for allowing me to participate in the care of your patient.      Sincerely,    Zach Francis MD

## 2025-06-04 NOTE — PROGRESS NOTES
June 4, 2025    Return visit        Patient returns today ***. Since her last visit she has  *** .    /65   Pulse 80   Wt 66.7 kg (147 lb)   BMI 22.38 kg/m    She is comfortable, in no distress, non-labored breathing.  Abdomen is soft, non-tender, non-distended.  Normal external female genitalia.  Speculum and bimanual exam are remarkable for ***.      A/P: 65 year old F with ***    ***    I spent a total of *** minutes with  ***  on the date of the encounter in chart review, face to face patient visit, review of tests, documentation and/or discussion with other providers about the issues documented above.    [unfilled]      Patient Care Team:  Vincent De La Garza MD as PCP - General (Internal Medicine)  Christy Mohamud MD as MD (Cardiology)  Елена Dinh RN as Registered Nurse  Christy Mohamud MD as Assigned Heart and Vascular Provider  Leventhal, Thomas Michael, MD as Assigned Gastroenterology Provider  Vincent De La Garza MD as Assigned PCP  Saran Angulo MD as Assigned Nephrology Provider  Shannon Loza OD (Optometry)  Sapna Marcano MD as MD (Ophthalmology)  Anat Benitez MD as MD (Dermatology)  Ida Guthrie, RN as Specialty Care Coordinator  Rocio Brewer CNP as Nurse Practitioner (Urology)  Sebastian Robertson MD as Physician (Ophthalmology)  Catrachita Gonzales MD as Assigned Infectious Disease Provider  Getachew Ross MD as MD (Nephrology)  Daysi Perez MD as Assigned OBGYN Provider  Anta Benitez MD as MD (Dermatology)  Anat Benitez MD as Assigned Dermatology Provider  Kamilah Youngblood AuD as Audiologist (Audiology)  Edel Navas NP as Nurse Practitioner (Otolaryngology)  Edel Navas NP as Assigned Surgical Provider  SELF, REFERRED

## 2025-06-05 ENCOUNTER — TELEPHONE (OUTPATIENT)
Dept: TRANSPLANT | Facility: CLINIC | Age: 65
End: 2025-06-05
Payer: MEDICARE

## 2025-06-05 ENCOUNTER — TELEPHONE (OUTPATIENT)
Dept: UROLOGY | Facility: CLINIC | Age: 65
End: 2025-06-05
Payer: MEDICARE

## 2025-06-05 DIAGNOSIS — Z94.4 LIVER REPLACED BY TRANSPLANT (H): Primary | ICD-10-CM

## 2025-06-05 NOTE — TELEPHONE ENCOUNTER
"Yessy Nam, RN to Belen Sharma        6/5/25  4:11 PM  Hi Belen,      We will submit a prior authorization to your insurer to determine approval and, if not, this will also help determine other covered alternatives. We will update you as we know more!     Thank you,  ANNALISA Krishnamurthy  [phone]    This [employer] message has not been read.  Yessy Nam RN      6/5/25  4:09 PM  Note  Prior Authorization Retail Medication Request     Medication/Dose: Oxyquinolone sulfate (TRIMO-ARNOLD) 0.025 % GEL  Diagnosis and ICD code (if different than what is on RX):    New/renewal/insurance change PA/secondary ins. PA:  Previously Tried and Failed:    Rationale:       Insurance   Primary:   Insurance ID:       Secondary (if applicable):  Insurance ID:       Pharmacy Information (if different than what is on RX)  Name:    Phone:    Fax:     Clinic Information  Preferred routing pool for dept communication:          Belen Sharma to GLORIA s Rn-[facility] (supporting Zach Francis MD)        6/5/25  3:59 PM  June 5, 2025  4pm     Hi Dr. Francis  RE: TRIMO-ARNOLD VAGINAL GEL 4OZ-OTC , Canceled order     [facility] sent me this message about the new prescription for  Trimo-arnold Vag Jelly 113.4gm  1 qty  90-day supply:  \"We can't fill your TRIMO-ARNOLD VAGINAL GEL 4OZ-OTC prescription because your prescription plan does not cover this medicine. You can ask your doctor to prescribe an alternative medicine that your plan covers.\"  What other options do I have to use?     Thank you,  Belen Sharma  "

## 2025-06-05 NOTE — LETTER
OUTPATIENT LABORATORY TEST ORDER   Patient copy/reminder     Patient Name: Belen Sharma     YOB: 1960       [facility] MR# [if applicable]: [id number]   Date & Time: June 5, 2025    Expiration Date: 1 year after date issued         Diagnosis:      Liver Transplant (ICD-10 Z94.4)   Aftercare following organ transplant (ICD-10 Z48.288)   Long term use of medications (ICD-10 Z79.899)      We ask your assistance in obtaining the following laboratory tests, which are part of our routine surveillance program for Solid Organ Transplant patients.      Please fax each result to [phone], same day as resulted/available    Critical lab results page [phone]        Every 2-3 months  CBC with Platelets   Basic Metabolic Panel   Phosphorous  Magnesium  Hepatic panel   Tacrolimus drug level - 12 hour trough, please document time of last dose         Labs annually due September 2025  Fasting Lipid Panel  Urine protein/creatinine ratio  UA with reflex to micro    If you have any questions, please call [facility]- [phone] or [phone], Fax- [phone].      Thomas M. Leventhal, M.D.   of Medicine  Advanced & Transplant Hepatology  [facility]

## 2025-06-05 NOTE — TELEPHONE ENCOUNTER
Received a call from Belen to discuss labs:    Let Belen know that her lab appointment on 6/9 will be sufficient for her appointment on 6/16, will touch base with Dr. Jorge Luis Celis to see if she wants any additional lab work beyond the standard blood work per patient request.     No further questions or concerns at this time.

## 2025-06-09 ENCOUNTER — LAB (OUTPATIENT)
Dept: LAB | Facility: CLINIC | Age: 65
End: 2025-06-09
Payer: MEDICARE

## 2025-06-09 ENCOUNTER — RESULTS FOLLOW-UP (OUTPATIENT)
Dept: TRANSPLANT | Facility: CLINIC | Age: 65
End: 2025-06-09

## 2025-06-09 DIAGNOSIS — Z94.4 LIVER REPLACED BY TRANSPLANT (H): ICD-10-CM

## 2025-06-09 LAB
ALBUMIN SERPL BCG-MCNC: 3.9 G/DL (ref 3.5–5.2)
ALP SERPL-CCNC: 59 U/L (ref 40–150)
ALT SERPL W P-5'-P-CCNC: 11 U/L (ref 0–50)
ANION GAP SERPL CALCULATED.3IONS-SCNC: 8 MMOL/L (ref 7–15)
AST SERPL W P-5'-P-CCNC: 21 U/L (ref 0–45)
BILIRUB SERPL-MCNC: 0.6 MG/DL
BILIRUBIN DIRECT (ROCHE PRO & PURE): 0.27 MG/DL (ref 0–0.45)
BUN SERPL-MCNC: 16.4 MG/DL (ref 8–23)
CALCIUM SERPL-MCNC: 9.6 MG/DL (ref 8.8–10.4)
CHLORIDE SERPL-SCNC: 104 MMOL/L (ref 98–107)
CREAT SERPL-MCNC: 0.77 MG/DL (ref 0.51–0.95)
EGFRCR SERPLBLD CKD-EPI 2021: 85 ML/MIN/1.73M2
ERYTHROCYTE [DISTWIDTH] IN BLOOD BY AUTOMATED COUNT: 14.1 % (ref 10–15)
GLUCOSE SERPL-MCNC: 85 MG/DL (ref 70–99)
HCO3 SERPL-SCNC: 25 MMOL/L (ref 22–29)
HCT VFR BLD AUTO: 47.5 % (ref 35–47)
HGB BLD-MCNC: 15.4 G/DL (ref 11.7–15.7)
MAGNESIUM SERPL-MCNC: 1.8 MG/DL (ref 1.7–2.3)
MCH RBC QN AUTO: 30.6 PG (ref 26.5–33)
MCHC RBC AUTO-ENTMCNC: 32.4 G/DL (ref 31.5–36.5)
MCV RBC AUTO: 94 FL (ref 78–100)
PHOSPHATE SERPL-MCNC: 3.1 MG/DL (ref 2.5–4.5)
PLATELET # BLD AUTO: 196 10E3/UL (ref 150–450)
POTASSIUM SERPL-SCNC: 3.7 MMOL/L (ref 3.4–5.3)
PROT SERPL-MCNC: 6.7 G/DL (ref 6.4–8.3)
RBC # BLD AUTO: 5.04 10E6/UL (ref 3.8–5.2)
SODIUM SERPL-SCNC: 137 MMOL/L (ref 135–145)
TACROLIMUS BLD-MCNC: 5.2 UG/L (ref 5–15)
TME LAST DOSE: NORMAL H
TME LAST DOSE: NORMAL H
WBC # BLD AUTO: 8.5 10E3/UL (ref 4–11)

## 2025-06-09 PROCEDURE — 82248 BILIRUBIN DIRECT: CPT

## 2025-06-09 PROCEDURE — 80053 COMPREHEN METABOLIC PANEL: CPT

## 2025-06-09 PROCEDURE — 83735 ASSAY OF MAGNESIUM: CPT

## 2025-06-09 PROCEDURE — 80197 ASSAY OF TACROLIMUS: CPT

## 2025-06-09 PROCEDURE — 84100 ASSAY OF PHOSPHORUS: CPT

## 2025-06-09 PROCEDURE — 85027 COMPLETE CBC AUTOMATED: CPT

## 2025-06-09 PROCEDURE — 36415 COLL VENOUS BLD VENIPUNCTURE: CPT

## 2025-06-09 NOTE — TELEPHONE ENCOUNTER
PA Initiation    Medication: TRIMO-ARNOLD 0.025 % VA GEL  Insurance Company: [employer] - Phone [phone] Fax [phone]  Pharmacy Filling the Rx: [facility] - [address]  Filling Pharmacy Phone: [phone]  Filling Pharmacy Fax: [phone]  Start Date: 6/9/2025

## 2025-06-11 NOTE — TELEPHONE ENCOUNTER
Called Belen back-Trimosan is no longer available at the clinic.  Call disconnected, so left voicemail that she can use any waterbased lubricant like K-Y

## 2025-06-11 NOTE — TELEPHONE ENCOUNTER
PRIOR AUTHORIZATION DENIED    Medication: TRIMO-ARNOLD 0.025 % VA GEL    Insurance Company:  - Phone [phone] Fax [phone]    Denial Date: 6/11/2025    Denial Reason(s): Per insurance rep medication is excluded and plan will not allow a PA review to be done.

## 2025-06-11 NOTE — TELEPHONE ENCOUNTER
Called and advised patient that if she is willing to pay out of pocket, good rx will discount to about $35.      Also offered to leave some at  for .

## 2025-06-16 ENCOUNTER — OFFICE VISIT (OUTPATIENT)
Dept: TRANSPLANT | Facility: CLINIC | Age: 65
End: 2025-06-16
Attending: INTERNAL MEDICINE
Payer: MEDICARE

## 2025-06-16 VITALS
HEIGHT: 68 IN | BODY MASS INDEX: 22.29 KG/M2 | HEART RATE: 86 BPM | TEMPERATURE: 98.5 F | SYSTOLIC BLOOD PRESSURE: 97 MMHG | WEIGHT: 147.1 LBS | DIASTOLIC BLOOD PRESSURE: 56 MMHG | OXYGEN SATURATION: 95 %

## 2025-06-16 DIAGNOSIS — N18.1 CHRONIC KIDNEY DISEASE, STAGE 1: ICD-10-CM

## 2025-06-16 DIAGNOSIS — N39.0 RECURRENT UTI: ICD-10-CM

## 2025-06-16 DIAGNOSIS — D84.9 IMMUNOSUPPRESSION: ICD-10-CM

## 2025-06-16 DIAGNOSIS — Q61.3 PKD (POLYCYSTIC KIDNEY DISEASE): ICD-10-CM

## 2025-06-16 DIAGNOSIS — Z48.298 AFTERCARE FOLLOWING ORGAN TRANSPLANT: Primary | ICD-10-CM

## 2025-06-16 DIAGNOSIS — Q61.3 POLYCYSTIC KIDNEY DISEASE: ICD-10-CM

## 2025-06-16 DIAGNOSIS — Z94.0 KIDNEY REPLACED BY TRANSPLANT: ICD-10-CM

## 2025-06-16 PROCEDURE — G0463 HOSPITAL OUTPT CLINIC VISIT: HCPCS | Performed by: INTERNAL MEDICINE

## 2025-06-16 ASSESSMENT — PAIN SCALES - GENERAL: PAINLEVEL_OUTOF10: NO PAIN (0)

## 2025-06-16 NOTE — NURSING NOTE
"Chief Complaint   Patient presents with    RECHECK     Follow up. PT reports no new or worsening symptoms.        Vitals:    06/16/25 1237 06/16/25 1238 06/16/25 1239   BP: 90/51 97/56 95/55   BP Location: Right arm Right arm Right arm   Patient Position: Sitting Sitting Standing   Cuff Size: Adult Small Adult Small Adult Small   Pulse: 86     Temp: 98.5  F (36.9  C)     TempSrc: Oral     SpO2: 95%     Weight: 66.7 kg (147 lb 1.6 oz)     Height: 1.73 m (5' 8.11\")         BP Readings from Last 3 Encounters:   06/16/25 95/55   06/04/25 116/65   05/05/25 100/58       BP 95/55 (BP Location: Right arm, Patient Position: Standing, Cuff Size: Adult Small)   Pulse 86   Temp 98.5  F (36.9  C) (Oral)   Ht 1.73 m (5' 8.11\")   Wt 66.7 kg (147 lb 1.6 oz)   SpO2 95%   BMI 22.29 kg/m       Aleida Agee    "

## 2025-06-16 NOTE — LETTER
6/16/2025      Belen Sharma  [address] [place] [address]      Dear Colleague,    Thank you for referring your patient, Belen Sharma, to the [facility]. Please see a copy of my visit note below.    TRANSPLANT NEPHROLOGY VISIT       Assessment & Plan  # DDKT: Stable   - Baseline Creatinine:  ~ 0.6-0.9, 0.77 today   - Proteinuria: Mild (0.5-1.0 grams)   - Date DSA Last Checked: Not Known      Latest DSA: Not checked recently due to time from transplant   - BK Viremia: No   - Kidney Tx Biopsy: No    - Okay with labs every 3 months but defer to liver tx protocol if they prefer every 2   - Will refer to nutrition for recommendations for post-transplant care per patient request    # Liver Tx:    -Good allograft function. Follows with transplant hepatology.     # Immunosuppression: Tacrolimus immediate release (goal 4-6), Mycophenolic acid (dose 540 mg every 12 hours) and Prednisone (dose 5 mg daily)   - Continue with intensive monitoring of immunosuppression for efficacy and toxicity.   - Changes: Not at this time     # Infection Prophylaxis:   - PJP: None   Last CD4 Level: 712 (Jun/2024)    # Recurrent UTI :    - No UTI in the last ~6 months or so  -Follows with txp ID and Dr. Ferrara with urogyn   -Has an Rx for Macrobid to be filled only if symptomatic and after submitting U/A and UCx    # PKD:  -Seen by txp surgery. Risks outweigh benefits for native nephrectomy at that time but with her sensation of enlarging kidneys we will re-image with ultrasound and pending those results may reconsider another surgical referral    #Orthostatic hypotension: Controlled;  Goal BP: < 130/80   - Changes: Not at this time.     # Post-Transplant Erythrocytosis: Hgb: Stable;  On ACEI/ARB: No   Imaging: Not at this time    # Mineral Bone Disorder:   - Secondary renal hyperparathyroidism; PTH level: Normal (18-80 pg/ml)        On treatment: None  - Vitamin D; level: High        On supplement: No  - Calcium; level:  High normal        On supplement: No  - Phosphorus; level: Normal        On supplement: No     # Electrolytes:   - Potassium; level: Normal        On supplement: No  - Magnesium; level: Normal        On supplement: No  - Bicarbonate; level: Normal        On supplement: Yes, bicarb 1300mg bid    # HSIL:    -per cone biopsy on 9/30/20. No evidence of dysplasia. Most recent coloposcopy 4/25/23 with negative biopsies.   -Negative PAP/HPV 10/2024    # Microvascular angina:   -Follows with cardiology, Dr. Mohamud.    -Started on atorvastatin 10mg daily by Dr. Mohamud 12/2023   -Uses SL angina a few times per year    # Cystocele:              -Has pessary. Follows with OB GYN    # Skin Cancer Risk:    - Discussed sun protection and recommend regular follow up with Dermatology.    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    Return visit: Return in about 1 year (around 6/16/2026).    Prashant Mcrae MD    The longitudinal plan of care for kidney/liver transplant was addressed during this visit. Due to the added complexity in care, I will continue to support Belen Sharma in the subsequent management of this condition(s) and with the ongoing continuity of care of this condition(s).     Seen and discussed with Dr. Jorge Luis Celis, attending, who agrees with the above assessment and plan.      Chief Complaint  Ms. Sharma is a 65 year old here for routine follow up, kidney transplant and immunosuppression  management.    History of Present Illness  Feels quite well, no recent UTI. On occasion she does have orthostatic symptoms at home, she says this is especially true when she feels that her fluid intake is low. Not currently having this.    Does feel that her native kidneys are larger. Some abdominal protrusion and feeling of pressure. No pain. No blood in urine. No fevers, no chills.      Home BP: 110 systolic    Problem List  Patient Active Problem List   Diagnosis     S/P splenectomy     Polycystic kidney disease     Immunosuppressed status     Liver replaced by transplant (H)     Endometriosis     HPV (human papilloma virus) infection     JANNETTE III with severe dysplasia     Acquired bilateral hammer toes     Depression     Depressive psychosis (H)     Dermatophytosis of nail     Hallux valgus, acquired, bilateral     Incisional hernia following transplant     Malignant neoplasm of breast (H)     Microvascular angina     Secondary hyperparathyroidism     Scoliosis deformity of spine     Swelling of first metatarsophalangeal (MTP) joint     Uterine prolapse     Kidney replaced by transplant     Recurrent UTI     Osteopenia of multiple sites     Combined forms of age-related cataract of both eyes     Aftercare following organ transplant     Hammertoe, bilateral     Weakness of hip, unspecified laterality     Pelvic floor dysfunction     ESBL E. coli carrier     Family history of malignant neoplasm of breast     High-risk human papillomavirus (HPV) DNA detected in cervical specimen     Congenital polycystic kidney     High grade squamous intraepithelial lesion on cytologic smear of cervix (HGSIL)     Low grade squamous intraepithelial lesion (LGSIL) on cervicovaginal cytologic smear     Bunion       Allergies  Allergies   Allergen Reactions     Ibuprofen      Due to liver transplant       Medications  Current Outpatient Medications   Medication Sig Dispense Refill     acetaminophen (TYLENOL) 325 MG tablet Take 1 tablet  (325 mg) by mouth every 4 hours as needed for mild pain or fever 120 tablet 1     aspirin (ASA) 81 MG chewable tablet Take 81 mg by mouth every evening       atorvastatin (LIPITOR) 10 MG tablet Take 1 tablet (10 mg) by mouth at bedtime. 90 tablet 0     biotin 1000 MCG TABS tablet Take 3,000 mcg by mouth every evening       ciprofloxacin (CIPRO) 500 MG tablet        estradiol (ESTRACE) 0.1 MG/GM vaginal cream Place 1 g vaginally three times a week. 42.5 g 11     guaiFENesin (MUCINEX) 600 MG 12 hr tablet Take 1 tablet by mouth 2 times daily as needed for cough or congestion       loratadine (CLARITIN) 10 MG tablet Take 10 mg by mouth daily.       Multiple Vitamins-Minerals (WOMENS DAILY FORMULA PO) Take 1 tablet by mouth daily       mycophenolic acid (GENERIC EQUIVALENT) 180 MG EC tablet Take 3 tablets (540 mg) by mouth 2 times daily. 540 tablet 3     Oxyquinolone sulfate (TRIMO-ARNOLD) 0.025 % GEL Place 1 g vaginally daily. 113.4 g 2     predniSONE (DELTASONE) 5 MG tablet Take 1 tablet (5 mg) by mouth daily. 90 tablet 3     Probiotic Product (PROBIOTIC PO) Take 2 tablets by mouth every morning ([facility] Women's Vaginal Probiotic)       psyllium (METAMUCIL/KONSYL) 58.6 % powder Take 12 g (2 teaspoonful) by mouth daily       sodium bicarbonate 650 MG tablet Take 2 tablets (1,300 mg) by mouth 2 times daily. 120 tablet 11     tacrolimus (GENERIC EQUIVALENT) 0.5 MG capsule Take 1 capsule (0.5 mg) by mouth every morning. a total dose of 1.5 mg am 1 mg pm. 90 capsule 3     tacrolimus (GENERIC EQUIVALENT) 1 MG capsule Take 1 capsule (1 mg) by mouth every 12 hours. total dose of 1.5 mg AM. Takes 1 mg capsule in the PM. 180 capsule 3     ciclopirox (PENLAC) 8 % external solution Apply to adjacent skin and affected nails daily.  Remove with alcohol every 7 days, then repeat. (Patient not taking: Reported on 6/16/2025) 6.6 mL 11     COMPOUNDED NON-CONTROLLED SUBSTANCE (CMPD RX) - PHARMACY TO MIX COMPOUNDED MEDICATION Apply nightly  "to toenails. SM 61 -  Ciclopirox 8% / Itraconazole 3% / Fluconazole 3% / Terbinafine HCl 1% / Ibuprofen 2% DMSO Suspension (Patient not taking: Reported on 6/16/2025) 15 mL 3     diphenhydrAMINE (BENADRYL) 25 MG tablet Take 25 mg by mouth every 8 hours as needed for allergies (Patient not taking: Reported on 6/16/2025)       nitroFURantoin macrocrystal-monohydrate (MACROBID) 100 MG capsule Take 1 capsule (100 mg) by mouth 2 times daily. PRN UTI (Patient not taking: Reported on 6/16/2025) 6 capsule 99     nitroGLYcerin (NITROSTAT) 0.4 MG sublingual tablet Place 1 tablet (0.4 mg) under the tongue every 5 minutes as needed for chest pain. For chest pain place 1 tablet under the tongue every 5 minutes for 3 doses. If symptoms persist 5 minutes after 1st dose call 911. (Patient not taking: Reported on 6/16/2025) 30 tablet 1     OYSCO 500 + D 500-5 MG-MCG TABS Take 1 tablet by mouth 2 times daily. (Patient not taking: Reported on 6/16/2025)       No current facility-administered medications for this visit.     There are no discontinued medications.        Physical Exam  Vital Signs: BP 97/56 (BP Location: Right arm, Patient Position: Sitting, Cuff Size: Adult Small)   Pulse 86   Temp 98.5  F (36.9  C) (Oral)   Ht 1.73 m (5' 8.11\")   Wt 66.7 kg (147 lb 1.6 oz)   SpO2 95%   BMI 22.29 kg/m      GENERAL APPEARANCE: alert and no distress  HENT: no obvious abnormalities on appearance  RESP: breathing appears unremarkable with normal rate, no audible wheezing or cough and no apparent shortness of breath with conversation  MS: extremities normal - no gross deformities noted, fistula with palpable thrill  ABD: Protruding lower abdomen, non-tender  SKIN: no apparent rash and normal skin tone  NEURO: speech is clear with no obvious neurological deficits  PSYCH: mentation appears normal and affect normal      Data        Latest Ref Rng & Units 6/9/2025     8:20 AM 4/8/2025     8:10 AM 2/7/2025     8:13 AM   Renal   Sodium 135 - " 145 mmol/L 137  137  139    K 3.4 - 5.3 mmol/L 3.7  3.8  4.3    Cl 98 - 107 mmol/L 104  102  105    Cl (external) 98 - 107 mmol/L 104  102  105    CO2 22 - 29 mmol/L 25  24  23    Urea Nitrogen 8.0 - 23.0 mg/dL 16.4  17.9  14.2    Creatinine 0.51 - 0.95 mg/dL 0.77  0.82  0.84    Glucose 70 - 99 mg/dL 85  88  91    Calcium 8.8 - 10.4 mg/dL 9.6  9.9  9.6    Magnesium 1.7 - 2.3 mg/dL 1.8            Latest Ref Rng & Units 6/9/2025     8:20 AM 2/7/2025     8:13 AM 6/6/2024     8:09 AM   Bone Health   Phosphorus 2.5 - 4.5 mg/dL 3.1   2.9    Vit D Def 20 - 50 ng/mL  55           Latest Ref Rng & Units 6/9/2025     8:20 AM 4/8/2025     8:10 AM 2/7/2025     8:13 AM   Heme   WBC 4.0 - 11.0 10e3/uL 8.5  6.4  7.7    Hgb 11.7 - 15.7 g/dL 15.4  15.4  15.9    Plt 150 - 450 10e3/uL 196  203  188          Latest Ref Rng & Units 6/9/2025     8:20 AM 4/8/2025     8:10 AM 2/7/2025     8:13 AM   Liver   AP 40 - 150 U/L 59  58  61    TBili <=1.2 mg/dL 0.6  0.6  0.7    Bilirubin Direct 0.00 - 0.30 mg/dL  0.28  <0.20    ALT 0 - 50 U/L 11  14  13    AST 0 - 45 U/L 21  19  20    Tot Protein 6.4 - 8.3 g/dL 6.7  6.8  6.8    Albumin 3.5 - 5.2 g/dL 3.9  4.0  3.8                Latest Ref Rng & Units 1/4/2021     9:32 AM 12/18/2020     6:11 AM 9/23/2020     4:53 PM   [facility] Txp Virology   CVM DNA Quant   Plasma  Plasma    CMV QUANT IU/ML CMVND^CMV DNA Not Detected [IU]/mL  CMV DNA Not Detected  CMV DNA Not Detected    LOG IU/ML OF CMVQNT <2.1 [Log_IU]/mL  Not Calculated  Not Calculated    BK Spec    Plasma    BK Res BKNEG^BK Virus DNA Not Detected copies/mL   BK Virus DNA Not Detected    BK Log <2.7 Log copies/mL   Not Calculated    EBV CAPSID ANTIBODY IGG 0.0 - 0.8 AI >8.0        Failed to redirect to the Timeline version of the REVFS SmartLink.  Recent Labs   Lab Test 02/07/25  0813 04/08/25  0810 06/09/25  0820   DOSTAC 2/6/2025 4/7/2025 6/8/2025   TACROL 4.2* 6.0 5.2     Recent Labs   Lab Test 12/18/20  0611   DOSMPA 12/17 2051   [facility]ID 1.32    [facility]       Again, thank you for allowing me to participate in the care of your patient.        Sincerely,        Getachew Hampton MD    Electronically signed

## 2025-06-16 NOTE — PROGRESS NOTES
TRANSPLANT NEPHROLOGY VISIT       Assessment & Plan   # DDKT: Stable   - Baseline Creatinine:  ~ 0.6-0.9, 0.77 today   - Proteinuria: Mild (0.5-1.0 grams)   - Date DSA Last Checked: Not Known      Latest DSA: Not checked recently due to time from transplant   - BK Viremia: No   - Kidney Tx Biopsy: No    - Okay with labs every 3 months but defer to liver tx protocol if they prefer every 2   - Will refer to nutrition for recommendations for post-transplant care per patient request    # Liver Tx:    -Good allograft function. Follows with transplant hepatology.     # Immunosuppression: Tacrolimus immediate release (goal 4-6), Mycophenolic acid (dose 540 mg every 12 hours) and Prednisone (dose 5 mg daily)   - Continue with intensive monitoring of immunosuppression for efficacy and toxicity.   - Changes: Not at this time     # Infection Prophylaxis:   - PJP: None   Last CD4 Level: 712 (Jun/2024)    # Recurrent UTI :    - No UTI in the last ~6 months or so  -Follows with txp ID and Dr. Ferrara with urogyn   -Has an Rx for Macrobid to be filled only if symptomatic and after submitting U/A and UCx    # PKD:  -Seen by txp surgery. Risks outweigh benefits for native nephrectomy at that time but with her sensation of enlarging kidneys we will re-image with ultrasound and pending those results may reconsider another surgical referral    #Orthostatic hypotension: Controlled;  Goal BP: < 130/80   - Changes: Not at this time.     # Post-Transplant Erythrocytosis: Hgb: Stable;  On ACEI/ARB: No   Imaging: Not at this time    # Mineral Bone Disorder:   - Secondary renal hyperparathyroidism; PTH level: Normal (18-80 pg/ml)        On treatment: None  - Vitamin D; level: High        On supplement: No  - Calcium; level: High normal        On supplement: No  - Phosphorus; level: Normal        On supplement: No     # Electrolytes:   - Potassium; level: Normal        On supplement: No  - Magnesium; level: Normal        On supplement: No  -  Bicarbonate; level: Normal        On supplement: Yes, bicarb 1300mg bid    # HSIL:    -per cone biopsy on 9/30/20. No evidence of dysplasia. Most recent coloposcopy 4/25/23 with negative biopsies.   -Negative PAP/HPV 10/2024    # Microvascular angina:   -Follows with cardiology, Dr. Mohamud.    -Started on atorvastatin 10mg daily by Dr. Mohamud 12/2023   -Uses SL angina a few times per year    # Cystocele:              -Has pessary. Follows with OB GYN    # Skin Cancer Risk:    - Discussed sun protection and recommend regular follow up with Dermatology.    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    Return visit: Return in about 1 year (around 6/16/2026).    Prashant Mcrae MD    The longitudinal plan of care for kidney/liver transplant was addressed during this visit. Due to the added complexity in care, I will continue to support Belen Sharma in the subsequent management of this condition(s) and with the ongoing continuity of care of this condition(s).     Seen and discussed with Dr. Jorge Luis Celis, attending, who agrees with the above assessment and plan.      This patient has been seen and evaluated by me, Getachew Hampton MD.  I have reviewed the note and agree with plan of care as documented by the fellow.    Getachew Hampton MD    Chief Complaint   Ms. Sharma is a 65 year old here for routine follow up, kidney transplant and immunosuppression management.    History of Present Illness   Feels quite  well, no recent UTI. On occasion she does have orthostatic symptoms at home, she says this is especially true when she feels that her fluid intake is low. Not currently having this.    Does feel that her native kidneys are larger. Some abdominal protrusion and feeling of pressure. No pain. No blood in urine. No fevers, no chills.      Home BP: 110 systolic    Problem List   Patient Active Problem List   Diagnosis    S/P splenectomy    Polycystic kidney disease    Immunosuppressed status    Liver replaced by transplant (H)    Endometriosis    HPV (human papilloma virus) infection    JANNETTE III with severe dysplasia    Acquired bilateral hammer toes    Depression    Depressive psychosis (H)    Dermatophytosis of nail    Hallux valgus, acquired, bilateral    Incisional hernia following transplant    Malignant neoplasm of breast (H)    Microvascular angina    Secondary hyperparathyroidism    Scoliosis deformity of spine    Swelling of first metatarsophalangeal (MTP) joint    Uterine prolapse    Kidney replaced by transplant    Recurrent UTI    Osteopenia of multiple sites    Combined forms of age-related cataract of both eyes    Aftercare following organ transplant    Hammertoe, bilateral    Weakness of hip, unspecified laterality    Pelvic floor dysfunction    ESBL E. coli carrier    Family history of malignant neoplasm of breast    High-risk human papillomavirus (HPV) DNA detected in cervical specimen    Congenital polycystic kidney    High grade squamous intraepithelial lesion on cytologic smear of cervix (HGSIL)    Low grade squamous intraepithelial lesion (LGSIL) on cervicovaginal cytologic smear    Bunion       Allergies   Allergies   Allergen Reactions    Ibuprofen      Due to liver transplant       Medications   Current Outpatient Medications   Medication Sig Dispense Refill    acetaminophen (TYLENOL) 325 MG tablet Take 1 tablet (325 mg) by mouth every 4 hours as needed for mild pain or fever 120 tablet 1     aspirin (ASA) 81 MG chewable tablet Take 81 mg by mouth every evening      atorvastatin (LIPITOR) 10 MG tablet Take 1 tablet (10 mg) by mouth at bedtime. 90 tablet 0    biotin 1000 MCG TABS tablet Take 3,000 mcg by mouth every evening      ciprofloxacin (CIPRO) 500 MG tablet       estradiol (ESTRACE) 0.1 MG/GM vaginal cream Place 1 g vaginally three times a week. 42.5 g 11    guaiFENesin (MUCINEX) 600 MG 12 hr tablet Take 1 tablet by mouth 2 times daily as needed for cough or congestion      loratadine (CLARITIN) 10 MG tablet Take 10 mg by mouth daily.      Multiple Vitamins-Minerals (WOMENS DAILY FORMULA PO) Take 1 tablet by mouth daily      mycophenolic acid (GENERIC EQUIVALENT) 180 MG EC tablet Take 3 tablets (540 mg) by mouth 2 times daily. 540 tablet 3    Oxyquinolone sulfate (TRIMO-ARNOLD) 0.025 % GEL Place 1 g vaginally daily. 113.4 g 2    predniSONE (DELTASONE) 5 MG tablet Take 1 tablet (5 mg) by mouth daily. 90 tablet 3    Probiotic Product (PROBIOTIC PO) Take 2 tablets by mouth every morning ([facility] Women's Vaginal Probiotic)      psyllium (METAMUCIL/KONSYL) 58.6 % powder Take 12 g (2 teaspoonful) by mouth daily      sodium bicarbonate 650 MG tablet Take 2 tablets (1,300 mg) by mouth 2 times daily. 120 tablet 11    tacrolimus (GENERIC EQUIVALENT) 0.5 MG capsule Take 1 capsule (0.5 mg) by mouth every morning. a total dose of 1.5 mg am 1 mg pm. 90 capsule 3    tacrolimus (GENERIC EQUIVALENT) 1 MG capsule Take 1 capsule (1 mg) by mouth every 12 hours. total dose of 1.5 mg AM. Takes 1 mg capsule in the PM. 180 capsule 3    ciclopirox (PENLAC) 8 % external solution Apply to adjacent skin and affected nails daily.  Remove with alcohol every 7 days, then repeat. (Patient not taking: Reported on 6/16/2025) 6.6 mL 11    COMPOUNDED NON-CONTROLLED SUBSTANCE (CMPD RX) - PHARMACY TO MIX COMPOUNDED MEDICATION Apply nightly to toenails. SM 61 -  Ciclopirox 8% / Itraconazole 3% / Fluconazole 3% / Terbinafine HCl 1% /  "Ibuprofen 2% DMSO Suspension (Patient not taking: Reported on 6/16/2025) 15 mL 3    diphenhydrAMINE (BENADRYL) 25 MG tablet Take 25 mg by mouth every 8 hours as needed for allergies (Patient not taking: Reported on 6/16/2025)      nitroFURantoin macrocrystal-monohydrate (MACROBID) 100 MG capsule Take 1 capsule (100 mg) by mouth 2 times daily. PRN UTI (Patient not taking: Reported on 6/16/2025) 6 capsule 99    nitroGLYcerin (NITROSTAT) 0.4 MG sublingual tablet Place 1 tablet (0.4 mg) under the tongue every 5 minutes as needed for chest pain. For chest pain place 1 tablet under the tongue every 5 minutes for 3 doses. If symptoms persist 5 minutes after 1st dose call 911. (Patient not taking: Reported on 6/16/2025) 30 tablet 1    OYSCO 500 + D 500-5 MG-MCG TABS Take 1 tablet by mouth 2 times daily. (Patient not taking: Reported on 6/16/2025)       No current facility-administered medications for this visit.     There are no discontinued medications.        Physical Exam   Vital Signs: BP 97/56 (BP Location: Right arm, Patient Position: Sitting, Cuff Size: Adult Small)   Pulse 86   Temp 98.5  F (36.9  C) (Oral)   Ht 1.73 m (5' 8.11\")   Wt 66.7 kg (147 lb 1.6 oz)   SpO2 95%   BMI 22.29 kg/m      GENERAL APPEARANCE: alert and no distress  HENT: no obvious abnormalities on appearance  RESP: breathing appears unremarkable with normal rate, no audible wheezing or cough and no apparent shortness of breath with conversation  MS: extremities normal - no gross deformities noted, fistula with palpable thrill  ABD: Protruding lower abdomen, non-tender  SKIN: no apparent rash and normal skin tone  NEURO: speech is clear with no obvious neurological deficits  PSYCH: mentation appears normal and affect normal      Data         Latest Ref Rng & Units 6/9/2025     8:20 AM 4/8/2025     8:10 AM 2/7/2025     8:13 AM   Renal   Sodium 135 - 145 mmol/L 137  137  139    K 3.4 - 5.3 mmol/L 3.7  3.8  4.3    Cl 98 - 107 mmol/L 104  102  " 105    Cl (external) 98 - 107 mmol/L 104  102  105    CO2 22 - 29 mmol/L 25  24  23    Urea Nitrogen 8.0 - 23.0 mg/dL 16.4  17.9  14.2    Creatinine 0.51 - 0.95 mg/dL 0.77  0.82  0.84    Glucose 70 - 99 mg/dL 85  88  91    Calcium 8.8 - 10.4 mg/dL 9.6  9.9  9.6    Magnesium 1.7 - 2.3 mg/dL 1.8            Latest Ref Rng & Units 6/9/2025     8:20 AM 2/7/2025     8:13 AM 6/6/2024     8:09 AM   Bone Health   Phosphorus 2.5 - 4.5 mg/dL 3.1   2.9    Vit D Def 20 - 50 ng/mL  55           Latest Ref Rng & Units 6/9/2025     8:20 AM 4/8/2025     8:10 AM 2/7/2025     8:13 AM   Heme   WBC 4.0 - 11.0 10e3/uL 8.5  6.4  7.7    Hgb 11.7 - 15.7 g/dL 15.4  15.4  15.9    Plt 150 - 450 10e3/uL 196  203  188          Latest Ref Rng & Units 6/9/2025     8:20 AM 4/8/2025     8:10 AM 2/7/2025     8:13 AM   Liver   AP 40 - 150 U/L 59  58  61    TBili <=1.2 mg/dL 0.6  0.6  0.7    Bilirubin Direct 0.00 - 0.30 mg/dL  0.28  <0.20    ALT 0 - 50 U/L 11  14  13    AST 0 - 45 U/L 21  19  20    Tot Protein 6.4 - 8.3 g/dL 6.7  6.8  6.8    Albumin 3.5 - 5.2 g/dL 3.9  4.0  3.8                Latest Ref Rng & Units 1/4/2021     9:32 AM 12/18/2020     6:11 AM 9/23/2020     4:53 PM   [facility] Txp Virology   CVM DNA Quant   Plasma  Plasma    CMV QUANT IU/ML CMVND^CMV DNA Not Detected [IU]/mL  CMV DNA Not Detected  CMV DNA Not Detected    LOG IU/ML OF CMVQNT <2.1 [Log_IU]/mL  Not Calculated  Not Calculated    BK Spec    Plasma    BK Res BKNEG^BK Virus DNA Not Detected copies/mL   BK Virus DNA Not Detected    BK Log <2.7 Log copies/mL   Not Calculated    EBV CAPSID ANTIBODY IGG 0.0 - 0.8 AI >8.0        Failed to redirect to the Timeline version of the REVFS SmartLink.  Recent Labs   Lab Test 02/07/25  0813 04/08/25  0810 06/09/25  0820   DOSTAC 2/6/2025 4/7/2025 6/8/2025   TACROL 4.2* 6.0 5.2     Recent Labs   Lab Test 12/18/20  0611   DOSMPA 12/17 2051   MPACID 1.32   MPAG 23.7*

## 2025-06-17 ENCOUNTER — MYC MEDICAL ADVICE (OUTPATIENT)
Dept: TRANSPLANT | Facility: CLINIC | Age: 65
End: 2025-06-17

## 2025-06-17 ENCOUNTER — VIRTUAL VISIT (OUTPATIENT)
Dept: INFECTIOUS DISEASES | Facility: CLINIC | Age: 65
End: 2025-06-17
Attending: INTERNAL MEDICINE
Payer: MEDICARE

## 2025-06-17 ENCOUNTER — PATIENT OUTREACH (OUTPATIENT)
Dept: CARE COORDINATION | Facility: CLINIC | Age: 65
End: 2025-06-17
Payer: MEDICARE

## 2025-06-17 VITALS — BODY MASS INDEX: 20.62 KG/M2 | HEIGHT: 70 IN | WEIGHT: 144 LBS

## 2025-06-17 DIAGNOSIS — D84.9 IMMUNOSUPPRESSED STATUS: ICD-10-CM

## 2025-06-17 DIAGNOSIS — Z94.0 KIDNEY TRANSPLANT RECIPIENT: Primary | ICD-10-CM

## 2025-06-17 DIAGNOSIS — N39.0 RECURRENT UTI: Primary | ICD-10-CM

## 2025-06-17 DIAGNOSIS — Z94.0 KIDNEY TRANSPLANT RECIPIENT: ICD-10-CM

## 2025-06-17 DIAGNOSIS — Z23 NEED FOR VACCINATION: ICD-10-CM

## 2025-06-17 DIAGNOSIS — Z94.4 LIVER TRANSPLANT RECIPIENT (H): ICD-10-CM

## 2025-06-17 PROCEDURE — 98005 SYNCH AUDIO-VIDEO EST LOW 20: CPT | Performed by: INTERNAL MEDICINE

## 2025-06-17 PROCEDURE — 1126F AMNT PAIN NOTED NONE PRSNT: CPT | Performed by: INTERNAL MEDICINE

## 2025-06-17 RX ORDER — MYCOPHENOLIC ACID 180 MG/1
540 TABLET, DELAYED RELEASE ORAL 2 TIMES DAILY
Qty: 540 TABLET | Refills: 3 | Status: SHIPPED | OUTPATIENT
Start: 2025-06-17

## 2025-06-17 ASSESSMENT — PAIN SCALES - GENERAL: PAINLEVEL_OUTOF10: NO PAIN (0)

## 2025-06-17 NOTE — LETTER
6/17/2025       RE: Belen Sharma  [address] [place] [address]     Dear Colleague,    Thank you for referring your patient, Belen Sharma, to the [facility]FECTI[facility] DISE[facility]. Please see a copy of my visit note below.    Virtual Visit Details    Type of service:  Video Visit   Video Start Time: 3:19 PM  Video End Time:3:28 PM    Originating Location (pt. Location): Home    Distant Location (provider location):  On-site  Platform used for Video Visit: [facility]    Transplant Infectious Diseases Outpatient Progress note      Patient:  Belen Sharma, Date of birth 1960, Medical record number [id number]  Date of Visit:  06/17/2025           Recommendations:   1. Standing prescription for macrobid is on file to be filled only if symptomatic and after submitting a UA and Ucx.   2. Prevnar-20.   3. Seasonal influenza and updated COVID-19 mid to end 10/2025.     RTC: 6 months.          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   Currently on TAC/MMF/prednisone.   With recurrent UTI.          Active Problems and Infectious Diseases Issues:   1. Recurrent UTI.   In the past was with different pathogens including ESBL E coli, K pneumonia, E faecalis and sometimes with negative urine and blood cx.   Since 9/2022 the UTIs have been with ESBL E coli.   The patient currently has standing orders for UA/Ucx when symptomatic and a standing order of macrobid at her local pharmacy to be started after UA/Ucx are delivered. The patient is happy with this approach.    The UTI episodes have been less frequent likely to more consistency using topical estrogen.     CT a/p 9/22/22 without evidence of structural or anatomical abnormality.   Cystoscopy and urodynamic studies were done on 2/2/23, both were unremarkable.     The patient has multiple risk factors for recurrent UTI, almost all of them can not be reversed, including: female gender, immunocompromised, kidney transplant recipient, the need to manipulate the  tract daily to change pessary.         Old Problems and Infectious Diseases Issues:   1. Recurrent UTI with E coli, E faecalis and K pneumonia.   2. CDI.   3. Splenectomy.      Other Infectious Disease issues include:  - QTc: 384 as of 6/6/2022. .   - PCP prophylaxis: bactrim.  - Serostatus: not available.   - Immunization status: up-to-date. Prevnar-20. COVID-19 vaccine and seasonal influenza vaccine the fall of 2025.        Attestation:  Total duration of visit including chart review, reviewing labs and imaging, interviewing and examining the patient, documentation, and sending communication to the patient and to the primary treating team, all at the same day of this encounter, is: 22 minutes.   Catrachita Gonzales MD    Contact information available via [facility] Paging/Directory     06/17/2025         Interim History:   Less frequent UTI. No current symptoms of UTI.   No fever.   Traveling to [facility] in 7/2025.          Review of Systems:     As mentioned in the interim history otherwise negative by reviewing constitutional symptoms, central and peripheral neurological systems, respiratory system, cardiac system, GI system,  system, musculoskeletal, skin, allergy, and lymphatics.                 Immunizations:     Immunization History   Administered Date(s) Administered     COVID-19 12+ (Pfizer) 10/06/2023, 01/31/2024, 09/28/2024     COVID-19 Bivalent 12+ (Pfizer) 11/04/2022     COVID-19 MONOVALENT 12+ (Pfizer) 03/29/2021, 04/19/2021, 09/02/2021     COVID-19  Monovalent 12+ (Pfizer 2022) 02/08/2022     DT (PEDS <7y) 03/19/2001     [id number] Novel Flu 11/19/2009     HepB, Unspecified 06/28/2001     Hepatitis A/B (Twinrix) 11/27/2023, 01/17/2024, 07/23/2024     Influenza (prior to 2024) 11/15/2002, 11/18/2003, 10/26/2004, 10/24/2005, 11/16/2006, 09/20/2011     Influenza Vaccine 18-64 (Flublok) 10/18/2021, 09/21/2023     Influenza Vaccine >6 months,quad, PF 09/12/2022     Influenza Vaccine IM Ages 6-35 Months 4 Valent (PF) 11/28/2007, 10/07/2009, 09/20/2011     Influenza, Split Virus, Trivalent, Pf (Fluzone\Fluarix) 09/20/2011, 09/28/2024     Influenza,INJ,MDCK,PF,Quad >6mo(Flucelvax) 10/15/2020     Meningococcal ACWY (Menactra ) 01/25/2013     Meningococcal ACWY (Menquadfi ) 01/17/2024     Pneumococcal 20 valent Conjugate (Prevnar 20) 09/12/2022     Pneumococcal 23 valent 09/27/2002, 01/25/2013     RSV (Abrysvo) 11/14/2023     TDAP Vaccine (Boostrix) 01/25/2013, 06/01/2018     Zoster recombinant adjuvanted (Shingrix) 10/15/2020, 01/08/2021             Allergies:     Allergies   Allergen Reactions     Ibuprofen      Due to liver transplant             Medications:     Current Outpatient Medications   Medication Sig Dispense Refill     acetaminophen (TYLENOL) 325 MG tablet Take 1 tablet (325 mg) by mouth every 4 hours as needed for mild pain or fever 120 tablet 1     aspirin (ASA) 81 MG chewable tablet Take 81 mg by mouth every evening       atorvastatin (LIPITOR) 10 MG tablet Take 1 tablet (10 mg) by mouth at bedtime. 90 tablet 0     biotin 1000 MCG TABS tablet Take 3,000 mcg by mouth every evening       ciclopirox (PENLAC) 8 % external solution Apply to adjacent skin and affected nails daily.  Remove with alcohol every 7 days, then repeat. (Patient not taking: Reported on 6/16/2025) 6.6 mL 11     ciprofloxacin (CIPRO) 500 MG tablet        COMPOUNDED NON-CONTROLLED SUBSTANCE (CMPD RX) - PHARMACY TO MIX COMPOUNDED MEDICATION Apply nightly to toenails. SM 61 -  Ciclopirox 8% /  Itraconazole 3% / Fluconazole 3% / Terbinafine HCl 1% / Ibuprofen 2% DMSO Suspension (Patient not taking: Reported on 6/16/2025) 15 mL 3     diphenhydrAMINE (BENADRYL) 25 MG tablet Take 25 mg by mouth every 8 hours as needed for allergies (Patient not taking: Reported on 6/16/2025)       estradiol (ESTRACE) 0.1 MG/GM vaginal cream Place 1 g vaginally three times a week. 42.5 g 11     guaiFENesin (MUCINEX) 600 MG 12 hr tablet Take 1 tablet by mouth 2 times daily as needed for cough or congestion       loratadine (CLARITIN) 10 MG tablet Take 10 mg by mouth daily.       Multiple Vitamins-Minerals (WOMENS DAILY FORMULA PO) Take 1 tablet by mouth daily       mycophenolic acid (GENERIC EQUIVALENT) 180 MG EC tablet Take 3 tablets (540 mg) by mouth 2 times daily. 540 tablet 3     nitroFURantoin macrocrystal-monohydrate (MACROBID) 100 MG capsule Take 1 capsule (100 mg) by mouth 2 times daily. PRN UTI (Patient not taking: Reported on 6/16/2025) 6 capsule 99     nitroGLYcerin (NITROSTAT) 0.4 MG sublingual tablet Place 1 tablet (0.4 mg) under the tongue every 5 minutes as needed for chest pain. For chest pain place 1 tablet under the tongue every 5 minutes for 3 doses. If symptoms persist 5 minutes after 1st dose call 911. (Patient not taking: Reported on 6/16/2025) 30 tablet 1     Oxyquinolone sulfate (TRIMO-ARNOLD) 0.025 % GEL Place 1 g vaginally daily. 113.4 g 2     OYSCO 500 + D 500-5 MG-MCG TABS Take 1 tablet by mouth 2 times daily. (Patient not taking: Reported on 6/16/2025)       predniSONE (DELTASONE) 5 MG tablet Take 1 tablet (5 mg) by mouth daily. 90 tablet 3     Probiotic Product (PROBIOTIC PO) Take 2 tablets by mouth every morning ([facility] Women's Vaginal Probiotic)       psyllium (METAMUCIL/KONSYL) 58.6 % powder Take 12 g (2 teaspoonful) by mouth daily       sodium bicarbonate 650 MG tablet Take 2 tablets (1,300 mg) by mouth 2 times daily. 120 tablet 11     tacrolimus (GENERIC EQUIVALENT) 0.5 MG capsule Take 1  capsule (0.5 mg) by mouth every morning. a total dose of 1.5 mg am 1 mg pm. 90 capsule 3     tacrolimus (GENERIC EQUIVALENT) 1 MG capsule Take 1 capsule (1 mg) by mouth every 12 hours. total dose of 1.5 mg AM. Takes 1 mg capsule in the PM. 180 capsule 3     No current facility-administered medications for this visit.                Physical Exam:   No vitals are available during this virtual visit.   Constitutional: awake, alert, cooperative, no apparent distress and appears at stated age, well nourished.             Laboratory Data:     Absolute CD4, [place] T Cells   Date Value Ref Range Status   06/06/2024 712 441 - 2,156 cells/uL Final       Inflammatory Markers    Recent Labs   Lab Test 05/02/24  0538 09/14/22  2220 11/04/21  1457 12/28/20  1515 12/21/20  1545   SED 14 8 14 8 10   CRP  --   --  39.3* 13.0* 18.0*       Immune Globulin Studies      Recent Labs   Lab Test 05/01/24  0846 05/01/24  0845   IGG 1,025  --    IGM 52  --    IGE  --  3     --        Metabolic Studies    Recent Labs   Lab Test 06/09/25  0820 04/08/25  0810 02/07/25  0813 12/05/24  0817 11/07/24  0808 09/16/24  0807 07/15/24  0818 06/06/24  0809 05/01/24  1828 05/01/24  0845 10/03/22  0935 10/02/22  0628    137 139 140 140 141   < > 138   < > 130*   < > 135*   POTASSIUM 3.7 3.8 4.3 3.9 3.6 3.8   < > 4.1   < > 3.9   < > 3.9   CHLORIDE 104 102 105 106 105 106   < > 102   < > 101   < > 103   CO2 25 24 23 29 26 26   < > 24   < > 17*   < > 23   ANIONGAP 8 11 11 5* 9 9   < > 12   < > 12   < > 9   BUN 16.4 17.9 14.2 14.5 20.3 18.4   < > 17.2   < > 18.0   < > 17.8   CR 0.77 0.82 0.84 0.86 0.86 0.90   < > 0.83   < > 0.89   < > 0.78   GFRESTIMATED 85 79 77 75 75 71   < > 78   < > 72   < > 85   GLC 85 88 91 88 84 81   < > 90   < > 104*   < > 92   EDSON 9.6 9.9 9.6 9.5 9.5 9.9   < > 9.9   < > 9.5   < > 9.5   PHOS 3.1  --   --   --   --   --   --  2.9   < > 2.2*  --   --    MAG 1.8  --   --   --   --   --   --  1.7   < > 1.5*  --   --    LACT   --   --   --   --   --   --   --   --   --  1.3  --  0.8    < > = values in this interval not displayed.       Hepatic Studies    Recent Labs   Lab Test 06/09/25  0820 04/08/25  0810 02/07/25  0813 12/05/24  0817 11/07/24  0808 09/16/24  0807   BILITOTAL 0.6 0.6 0.7 0.8 0.6 0.7   ALKPHOS 59 58 61 58 55 58   PROTTOTAL 6.7 6.8 6.8 6.8 6.6 6.9   ALBUMIN 3.9 4.0 3.8 3.9 3.8 3.9   AST 21 19 20 21 20 18   ALT 11 14 13 14 12 10       Hematology Studies     Recent Labs   Lab Test 06/09/25  0820 04/08/25  0810 02/07/25  0813 12/05/24  0817 11/07/24  0808 09/16/24  0807 05/04/24  0726 05/03/24  0621 05/02/24  0538 05/01/24  0845 10/10/22  1017 10/05/22  0648 10/03/22  0935 10/02/22  0628 09/15/22  0607 09/14/22  2220   WBC 8.5 6.4 7.7 7.0 7.2 7.9   < > 16.1* 20.6* 18.3*   < > 7.3   < > 12.3*   < > 13.7*   ANEU  --   --   --   --   --   --   --  13.9* 18.4* 17.6*  --  3.7  --  9.1*  --  10.9*   ALYM  --   --   --   --   --   --   --  1.2 1.0 0.5*  --  2.3  --  1.7  --  1.3   PABLO  --   --   --   --   --   --   --  0.7 0.5 0.2  --  1.1  --  1.4*  --  1.4*   AEOS  --   --   --   --   --   --   --  0.1 0.1 0.0  --  0.2  --  0.0  --  0.0   HGB 15.4 15.4 15.9* 15.3 15.1 15.6   < > 13.6 14.5 15.8*   < > 14.6   < > 15.1   < > 16.0   HCT 47.5* 47.5* 49.0* 47.4* 47.3* 49.0*   < > 41.1 43.8 46.5   < > 46.1   < > 46.3   < > 48.4    203 188 214 201 204   < > 144* 154 194   < > 271   < > 287   < > 246    < > = values in this interval not displayed.       Clotting Studies  No lab results found.    Urine Studies    Recent Labs   Lab Test 03/17/25  1014 12/05/24  1351 05/01/24  0852 04/18/24  1112 04/03/24  1302   URINEPH 6.5 6.0 7.0 7.0 7.0   NITRITE Negative Positive* Negative Positive* Negative   LEUKEST Large* Large* Large* Large* Large*   WBCU * 10-25* >182* * 0-5         Microbiology:  Last 6 Culture results with specimen source  Culture Micro   Date Value Ref Range Status   06/04/2021 (A)  Final    10,000 to 50,000  colonies/mL  Enterococcus faecalis     12/17/2020 No growth  Final   12/16/2020 (A)  Final    Cultured on the 1st day of incubation:  Escherichia coli ESBL  ESBL (extended beta lactamase) producing organisms require contact precautions.     12/16/2020   Final    Critical Value/Significant Value, preliminary result only, called to and read back by  RALPH BOLAÑOS RN 12.17.2020 1056 BC     12/16/2020   Final    (Note)  POSITIVE for E.COLI by Verigene multiplex nucleic acid test. Final  identification and antimicrobial susceptibility testing will be  verified by standard methods. Verigene test will not distinguish  E.coli from Shigella species including S.dysenteriae, S.flexneri,  S.boydii, and S.sonnei. Specimens containing Shigella species or  E.coli will be reported as Positive for E.coli.    POSITIVE for CTX-M Class A Extended Spectrum beta-lactamase (ESBL)  resistance marker by Verigene multiplex nucleic acid test. CTX-M  confers resistance to penicillins, cephalosporins and variable  resistance to beta-lactam beta-lactamase inhibitor combinations  (clavulanic acid and tazobactam). Best empiric antibiotic choice is  meropenem. Specific susceptibility testing will be performed.    Specimen tested with Verigene multiplex, gram-negative blood culture  nucleic acid test for the following targets: Acinetobacter sp.,  Citrobacter sp., Enterobacter sp., Proteus sp., E. coli, K.  pneumoniae/oxytoca, P. aeruginosa, and the following resistance  markers: CTXM, KPC, NDM, VIM, IMP and OXA.    Critical Value/Significant Value called to and read back by RALPH BOLAÑOS RN 12/17/20 1312 EH.       12/16/2020 No growth  Final   12/16/2020 (A)  Preliminary    >100,000 colonies/mL  Escherichia coli ESBL  ESBL (extended beta lactamase) producing organisms require contact precautions.     12/16/2020   Preliminary    Susceptibility testing requested by  Dr. Gonzales, Pager [phone]. Fosfomycin requested. @ 1637. 12.18.20. BS.       Specimen  "Description   Date Value Ref Range Status   06/04/2021 Midstream Urine  Final   12/17/2020 Blood  Final   12/16/2020 Blood Right Arm  Final   12/16/2020 Blood Right Arm  Final   12/16/2020 Unspecified Urine  Final   12/03/2020 Midstream Urine  Final        Last check of C difficile  No results found for: \"CDBPCT\"      Virology:  CMV viral loads    CMV viral loads    CMV Quant IU/mL   Date Value Ref Range Status   12/18/2020 CMV DNA Not Detected CMVND^CMV DNA Not Detected [IU]/mL Final     Comment:     Mutations within the highly conserved regions of the viral genome covered by   the RHONDA AmpliPrep/RHONDA TaqMan CMV Test primers and/or probes have been   identified and may result in under-quantitation of or failure to detect the   virus.  Supplemental testing methods should be used for testing when this is   suspected.  The RHONDA AmpliPrep/RHONDA TaqMan CMV Test is an FDA-approved in vitro nucleic   acid amplification test for the quantitation of cytomegalovirus DNA in human   plasma (EDTA plasma) using the RHONDA AmpliPrep Instrument for automated viral   nucleic acid extraction and the RHONDA TaqMan Analyzer or [employer] TaqMan for   automated Real Time amplification and detection of the viral nucleic acid   target.  Titer results are reported in International Units/mL (IU/mL using 1st WHO   International standard for Human Cytomegalovirus for Nucleic Acid   Amplification based assays. The conversion factor between CMV DNA copis/mL (as   defined by the Roche RHONDA TaqMan CMV test) and International Units is the   CMV DNA concentration in IU/mL x 1.1 copies/IU = CMV DNA in copies/mL.  This assay has received FDA approval for the testing of human plasma only. The   Infectious Disease Diagnostic Laboratory at the [facility], [place], has validated the performance characteristics of the   Roche CMV assay for plasma, bronchial alveolar lavage/wash and urine.       CMV viral loads    Recent Labs "   Lab Test 12/18/20  0611   CMVQNT CMV DNA Not Detected   CSPEC Plasma   CMVLOG Not Calculated       CMV viral loads    CMV Quant IU/mL   Date Value Ref Range Status   12/18/2020 CMV DNA Not Detected CMVND^CMV DNA Not Detected [IU]/mL Final     Comment:     Mutations within the highly conserved regions of the viral genome covered by   the RHONDA AmpliPrep/RHONDA TaqMan CMV Test primers and/or probes have been   identified and may result in under-quantitation of or failure to detect the   virus.  Supplemental testing methods should be used for testing when this is   suspected.  The RHONDA AmpliPrep/RHONDA TaqMan CMV Test is an FDA-approved in vitro nucleic   acid amplification test for the quantitation of cytomegalovirus DNA in human   plasma (EDTA plasma) using the RHONDA TouchstormiPrep Instrument for automated viral   nucleic acid extraction and the [employer] TaqMan Analyzer or Prolify for   automated Real Time amplification and detection of the viral nucleic acid   target.  Titer results are reported in International Units/mL (IU/mL using 1st WHO   International standard for Human Cytomegalovirus for Nucleic Acid   Amplification based assays. The conversion factor between CMV DNA copis/mL (as   defined by the Roche RHONDA TaqMan CMV test) and International Units is the   CMV DNA concentration in IU/mL x 1.1 copies/IU = CMV DNA in copies/mL.  This assay has received FDA approval for the testing of human plasma only. The   Infectious Disease Diagnostic Laboratory at the [facility], [place], has validated the performance characteristics of the   Roche CMV assay for plasma, bronchial alveolar lavage/wash and urine.     09/23/2020 CMV DNA Not Detected CMVND^CMV DNA Not Detected [IU]/mL Final     Comment:     Mutations within the highly conserved regions of the viral genome covered by   the RHONDA AmpliPrep/RHONDA TaqMan CMV Test primers and/or probes have been   identified and may result in  "under-quantitation of or failure to detect the   virus.  Supplemental testing methods should be used for testing when this is   suspected.  The RHONDA AmpliPrep/RHONDA TaqMan CMV Test is an FDA-approved in vitro nucleic   acid amplification test for the quantitation of cytomegalovirus DNA in human   plasma (EDTA plasma) using the RHONDA AmpliPrep Instrument for automated viral   nucleic acid extraction and the RHONDA TaqMan Analyzer or RHONDA TaqMan for   automated Real Time amplification and detection of the viral nucleic acid   target.  Titer results are reported in International Units/mL (IU/mL using 1st WHO   International standard for Human Cytomegalovirus for Nucleic Acid   Amplification based assays. The conversion factor between CMV DNA copis/mL (as   defined by the Roche RHONDA TaqMan CMV test) and International Units is the   CMV DNA concentration in IU/mL x 1.1 copies/IU = CMV DNA in copies/mL.  This assay has received FDA approval for the testing of human plasma only. The   Infectious Disease Diagnostic Laboratory at the [facility], [place], has validated the performance characteristics of the   Roche CMV assay for plasma, bronchial alveolar lavage/wash and urine.       Log IU/mL of CMVQNT   Date Value Ref Range Status   12/18/2020 Not Calculated <2.1 [Log_IU]/mL Final   09/23/2020 Not Calculated <2.1 [Log_IU]/mL Final       CMV resistance testing  No lab results found.  No results found for: \"CMVCID\", \"CMVFOS\", \"CMVGAN\"     EBV viral loads   No lab results found.  No results found for: \"EBVDN\", \"EBRES\", \"EBVSP\", \"EBVPC\", \"EBVPCR\"    Human Herpes Virus 6 viral loads    No results found for: \"H6RES\" No results found for: \"H6SPEC\"    CMV Antibody IgG   Date Value Ref Range Status   01/04/2021 >8.0 (H) 0.0 - 0.8 AI Final     Comment:     Positive  Antibody index (AI) values reflect qualitative changes in antibody   concentration that cannot be directly associated with clinical " "condition or   disease state.       No results found for: \"EBIG2\", \"EBIGM\", \"EBVIGG\", \"EBIGG\", \"EBVAGN\", \"ZR9415\", \"TOXG\"      Imaging:  CT a/p W 9/29/2022  IMPRESSION:  1. Urothelial thickening and mild ectasia of the distal right renal  transplant ureter extending to the ureterovesical anastomosis,  slightly increased compared to previous CT on 12/17/2020. Findings may  represent acute or chronic sequela of inflammation/infection. No  hydronephrosis or evidence of obstruction.  2. Simple right adnexal cyst is not significantly changed compared to  previous without internal complexity on comparison ultrasound, likely  benign. No dedicated imaging follow-up recommended.  3. Stable postoperative changes of liver transplant.  4. Stable enlarged polycystic kidneys.    Again, thank you for allowing me to participate in the care of your patient.      Sincerely,    Catrachita Gonzales MD    "

## 2025-06-17 NOTE — NURSING NOTE
Current patient location: [address] [place] [address] [address] [place] [address]    Is the patient currently in the state of [place]? YES    Visit mode: VIDEO    If the visit is dropped, the patient can be reconnected by:VIDEO VISIT: Send to e-mail at: [email or web address]    Will anyone else be joining the visit? NO  (If patient encounters technical issues they should call [phone])    Are changes needed to the allergy or medication list? No    Are refills needed on medications prescribed by this physician? NO    Rooming Documentation:  Questionnaire(s) not done per department protocol    Reason for visit: JAS CASTAÑEDA

## 2025-06-17 NOTE — PATIENT INSTRUCTIONS
Mrs Sharma,   As we discussed:  1. Continue to provide a urine sample then fill your prescription of antibiotic when you have symptoms of UTI.   2. You can now take the last shot of the pneumonia vaccine called Prevnar-20.   3. Mid to end October (but no  later than the end of October), please take the seasonal influenza vaccine and the COVID-19 vaccine.   4. Please call [phone] to schedule an appointment with me in 6 months, in-person or virtual.     Thank you and enjoy your trip to [place] and [place].   Catrachita Gonzales MD

## 2025-06-17 NOTE — PROGRESS NOTES
Virtual Visit Details    Type of service:  Video Visit   Video Start Time: 3:19 PM  Video End Time:3:28 PM    Originating Location (pt. Location): Home    Distant Location (provider location):  On-site  Platform used for Video Visit: [facility]    Transplant Infectious Diseases Outpatient Progress note      Patient:  Belen Sharma, Date of birth 1960, Medical record number [id number]  Date of Visit:  06/17/2025           Recommendations:   1. Standing prescription for macrobid is on file to be filled only if symptomatic and after submitting a UA and Ucx.   2. Prevnar-20.   3. Seasonal influenza and updated COVID-19 mid to end 10/2025.     RTC: 6 months.          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   Currently on TAC/MMF/prednisone.   With recurrent UTI.          Active Problems and Infectious Diseases Issues:   1. Recurrent UTI.   In the past was with different pathogens including ESBL E coli, K pneumonia, E faecalis and sometimes with negative urine and blood cx.   Since 9/2022 the UTIs have been with ESBL E coli.   The patient currently has standing orders for UA/Ucx when symptomatic and a standing order of macrobid at her local pharmacy to be started after UA/Ucx are delivered. The patient is happy with this approach.   The UTI episodes have been less frequent likely to more consistency using topical estrogen.     CT a/p 9/22/22 without evidence of structural or anatomical abnormality.   Cystoscopy and urodynamic studies were done on 2/2/23, both were unremarkable.     The patient has multiple risk factors for recurrent UTI,  almost all of them can not be reversed, including: female gender, immunocompromised, kidney transplant recipient, the need to manipulate the  tract daily to change pessary.         Old Problems and Infectious Diseases Issues:   1. Recurrent UTI with E coli, E faecalis and K pneumonia.   2. CDI.   3. Splenectomy.      Other Infectious Disease issues include:  - QTc: 384 as of 6/6/2022. .   - PCP prophylaxis: bactrim.  - Serostatus: not available.   - Immunization status: up-to-date. Prevnar-20. COVID-19 vaccine and seasonal influenza vaccine the fall of 2025.        Attestation:  Total duration of visit including chart review, reviewing labs and imaging, interviewing and examining the patient, documentation, and sending communication to the patient and to the primary treating team, all at the same day of this encounter, is: 22 minutes.   Catrachita Gonzales MD    Contact information available via [facility] Paging/Directory     06/17/2025         Interim History:   Less frequent UTI. No current symptoms of UTI.   No fever.   Traveling to [facility] in 7/2025.          Review of Systems:     As mentioned in the interim history otherwise negative by reviewing constitutional symptoms, central and peripheral neurological systems, respiratory system, cardiac system, GI system,  system, musculoskeletal, skin, allergy, and lymphatics.                 Immunizations:     Immunization History   Administered Date(s) Administered    COVID-19 12+ (Pfizer) 10/06/2023, 01/31/2024, 09/28/2024    COVID-19 Bivalent 12+ (Pfizer) 11/04/2022    COVID-19 MONOVALENT 12+ (Pfizer) 03/29/2021, 04/19/2021, 09/02/2021    COVID-19 Monovalent 12+ (Pfizer 2022) 02/08/2022    DT (PEDS <7y) 03/19/2001    [id number] Novel Flu 11/19/2009    HepB, Unspecified 06/28/2001    Hepatitis A/B (Twinrix) 11/27/2023, 01/17/2024, 07/23/2024    Influenza (prior to 2024) 11/15/2002, 11/18/2003, 10/26/2004, 10/24/2005, 11/16/2006, 09/20/2011    Influenza Vaccine 18-64  (Flublok) 10/18/2021, 09/21/2023    Influenza Vaccine >6 months,quad, PF 09/12/2022    Influenza Vaccine IM Ages 6-35 Months 4 Valent (PF) 11/28/2007, 10/07/2009, 09/20/2011    Influenza, Split Virus, Trivalent, Pf (Fluzone\Fluarix) 09/20/2011, 09/28/2024    Influenza,INJ,MDCK,PF,Quad >6mo(Flucelvax) 10/15/2020    Meningococcal ACWY (Menactra ) 01/25/2013    Meningococcal ACWY (Menquadfi ) 01/17/2024    Pneumococcal 20 valent Conjugate (Prevnar 20) 09/12/2022    Pneumococcal 23 valent 09/27/2002, 01/25/2013    RSV (Abrysvo) 11/14/2023    TDAP Vaccine (Boostrix) 01/25/2013, 06/01/2018    Zoster recombinant adjuvanted (Shingrix) 10/15/2020, 01/08/2021             Allergies:     Allergies   Allergen Reactions    Ibuprofen      Due to liver transplant             Medications:     Current Outpatient Medications   Medication Sig Dispense Refill    acetaminophen (TYLENOL) 325 MG tablet Take 1 tablet (325 mg) by mouth every 4 hours as needed for mild pain or fever 120 tablet 1    aspirin (ASA) 81 MG chewable tablet Take 81 mg by mouth every evening      atorvastatin (LIPITOR) 10 MG tablet Take 1 tablet (10 mg) by mouth at bedtime. 90 tablet 0    biotin 1000 MCG TABS tablet Take 3,000 mcg by mouth every evening      ciclopirox (PENLAC) 8 % external solution Apply to adjacent skin and affected nails daily.  Remove with alcohol every 7 days, then repeat. (Patient not taking: Reported on 6/16/2025) 6.6 mL 11    ciprofloxacin (CIPRO) 500 MG tablet       COMPOUNDED NON-CONTROLLED SUBSTANCE (CMPD RX) - PHARMACY TO MIX COMPOUNDED MEDICATION Apply nightly to toenails. SM 61 -  Ciclopirox 8% / Itraconazole 3% / Fluconazole 3% / Terbinafine HCl 1% / Ibuprofen 2% DMSO Suspension (Patient not taking: Reported on 6/16/2025) 15 mL 3    diphenhydrAMINE (BENADRYL) 25 MG tablet Take 25 mg by mouth every 8 hours as needed for allergies (Patient not taking: Reported on 6/16/2025)      estradiol (ESTRACE) 0.1 MG/GM vaginal cream Place 1 g  vaginally three times a week. 42.5 g 11    guaiFENesin (MUCINEX) 600 MG 12 hr tablet Take 1 tablet by mouth 2 times daily as needed for cough or congestion      loratadine (CLARITIN) 10 MG tablet Take 10 mg by mouth daily.      Multiple Vitamins-Minerals (WOMENS DAILY FORMULA PO) Take 1 tablet by mouth daily      mycophenolic acid (GENERIC EQUIVALENT) 180 MG EC tablet Take 3 tablets (540 mg) by mouth 2 times daily. 540 tablet 3    nitroFURantoin macrocrystal-monohydrate (MACROBID) 100 MG capsule Take 1 capsule (100 mg) by mouth 2 times daily. PRN UTI (Patient not taking: Reported on 6/16/2025) 6 capsule 99    nitroGLYcerin (NITROSTAT) 0.4 MG sublingual tablet Place 1 tablet (0.4 mg) under the tongue every 5 minutes as needed for chest pain. For chest pain place 1 tablet under the tongue every 5 minutes for 3 doses. If symptoms persist 5 minutes after 1st dose call 911. (Patient not taking: Reported on 6/16/2025) 30 tablet 1    Oxyquinolone sulfate (TRIMO-ARNOLD) 0.025 % GEL Place 1 g vaginally daily. 113.4 g 2    OYSCO 500 + D 500-5 MG-MCG TABS Take 1 tablet by mouth 2 times daily. (Patient not taking: Reported on 6/16/2025)      predniSONE (DELTASONE) 5 MG tablet Take 1 tablet (5 mg) by mouth daily. 90 tablet 3    Probiotic Product (PROBIOTIC PO) Take 2 tablets by mouth every morning ([facility] Women's Vaginal Probiotic)      psyllium (METAMUCIL/KONSYL) 58.6 % powder Take 12 g (2 teaspoonful) by mouth daily      sodium bicarbonate 650 MG tablet Take 2 tablets (1,300 mg) by mouth 2 times daily. 120 tablet 11    tacrolimus (GENERIC EQUIVALENT) 0.5 MG capsule Take 1 capsule (0.5 mg) by mouth every morning. a total dose of 1.5 mg am 1 mg pm. 90 capsule 3    tacrolimus (GENERIC EQUIVALENT) 1 MG capsule Take 1 capsule (1 mg) by mouth every 12 hours. total dose of 1.5 mg AM. Takes 1 mg capsule in the PM. 180 capsule 3     No current facility-administered medications for this visit.                Physical Exam:   No vitals  are available during this virtual visit.   Constitutional: awake, alert, cooperative, no apparent distress and appears at stated age, well nourished.             Laboratory Data:     Absolute CD4, [place] T Cells   Date Value Ref Range Status   06/06/2024 712 441 - 2,156 cells/uL Final       Inflammatory Markers    Recent Labs   Lab Test 05/02/24  0538 09/14/22  2220 11/04/21  1457 12/28/20  1515 12/21/20  1545   SED 14 8 14 8 10   CRP  --   --  39.3* 13.0* 18.0*       Immune Globulin Studies      Recent Labs   Lab Test 05/01/24  0846 05/01/24  0845   IGG 1,025  --    IGM 52  --    IGE  --  3     --        Metabolic Studies    Recent Labs   Lab Test 06/09/25  0820 04/08/25  0810 02/07/25  0813 12/05/24  0817 11/07/24  0808 09/16/24  0807 07/15/24  0818 06/06/24  0809 05/01/24  1828 05/01/24  0845 10/03/22  0935 10/02/22  0628    137 139 140 140 141   < > 138   < > 130*   < > 135*   POTASSIUM 3.7 3.8 4.3 3.9 3.6 3.8   < > 4.1   < > 3.9   < > 3.9   CHLORIDE 104 102 105 106 105 106   < > 102   < > 101   < > 103   CO2 25 24 23 29 26 26   < > 24   < > 17*   < > 23   ANIONGAP 8 11 11 5* 9 9   < > 12   < > 12   < > 9   BUN 16.4 17.9 14.2 14.5 20.3 18.4   < > 17.2   < > 18.0   < > 17.8   CR 0.77 0.82 0.84 0.86 0.86 0.90   < > 0.83   < > 0.89   < > 0.78   GFRESTIMATED 85 79 77 75 75 71   < > 78   < > 72   < > 85   GLC 85 88 91 88 84 81   < > 90   < > 104*   < > 92   EDSON 9.6 9.9 9.6 9.5 9.5 9.9   < > 9.9   < > 9.5   < > 9.5   PHOS 3.1  --   --   --   --   --   --  2.9   < > 2.2*  --   --    MAG 1.8  --   --   --   --   --   --  1.7   < > 1.5*  --   --    LACT  --   --   --   --   --   --   --   --   --  1.3  --  0.8    < > = values in this interval not displayed.       Hepatic Studies    Recent Labs   Lab Test 06/09/25  0820 04/08/25  0810 02/07/25  0813 12/05/24  0817 11/07/24  0808 09/16/24  0807   BILITOTAL 0.6 0.6 0.7 0.8 0.6 0.7   ALKPHOS 59 58 61 58 55 58   PROTTOTAL 6.7 6.8 6.8 6.8 6.6 6.9   ALBUMIN 3.9 4.0  3.8 3.9 3.8 3.9   AST 21 19 20 21 20 18   ALT 11 14 13 14 12 10       Hematology Studies     Recent Labs   Lab Test 06/09/25  0820 04/08/25  0810 02/07/25  0813 12/05/24  0817 11/07/24  0808 09/16/24  0807 05/04/24  0726 05/03/24  0621 05/02/24  0538 05/01/24  0845 10/10/22  1017 10/05/22  0648 10/03/22  0935 10/02/22  0628 09/15/22  0607 09/14/22  2220   WBC 8.5 6.4 7.7 7.0 7.2 7.9   < > 16.1* 20.6* 18.3*   < > 7.3   < > 12.3*   < > 13.7*   ANEU  --   --   --   --   --   --   --  13.9* 18.4* 17.6*  --  3.7  --  9.1*  --  10.9*   ALYM  --   --   --   --   --   --   --  1.2 1.0 0.5*  --  2.3  --  1.7  --  1.3   PABLO  --   --   --   --   --   --   --  0.7 0.5 0.2  --  1.1  --  1.4*  --  1.4*   AEOS  --   --   --   --   --   --   --  0.1 0.1 0.0  --  0.2  --  0.0  --  0.0   HGB 15.4 15.4 15.9* 15.3 15.1 15.6   < > 13.6 14.5 15.8*   < > 14.6   < > 15.1   < > 16.0   HCT 47.5* 47.5* 49.0* 47.4* 47.3* 49.0*   < > 41.1 43.8 46.5   < > 46.1   < > 46.3   < > 48.4    203 188 214 201 204   < > 144* 154 194   < > 271   < > 287   < > 246    < > = values in this interval not displayed.       Clotting Studies  No lab results found.    Urine Studies    Recent Labs   Lab Test 03/17/25  1014 12/05/24  1351 05/01/24  0852 04/18/24  1112 04/03/24  1302   URINEPH 6.5 6.0 7.0 7.0 7.0   NITRITE Negative Positive* Negative Positive* Negative   LEUKEST Large* Large* Large* Large* Large*   WBCU * 10-25* >182* * 0-5         Microbiology:  Last 6 Culture results with specimen source  Culture Micro   Date Value Ref Range Status   06/04/2021 (A)  Final    10,000 to 50,000 colonies/mL  Enterococcus faecalis     12/17/2020 No growth  Final   12/16/2020 (A)  Final    Cultured on the 1st day of incubation:  Escherichia coli ESBL  ESBL (extended beta lactamase) producing organisms require contact precautions.     12/16/2020   Final    Critical Value/Significant Value, preliminary result only, called to and read back by  RALPH BOLAÑOS  "RN 12.17.2020 1056 BC     12/16/2020   Final    (Note)  POSITIVE for E.COLI by Verigene multiplex nucleic acid test. Final  identification and antimicrobial susceptibility testing will be  verified by standard methods. Verigene test will not distinguish  E.coli from Shigella species including S.dysenteriae, S.flexneri,  S.boydii, and S.sonnei. Specimens containing Shigella species or  E.coli will be reported as Positive for E.coli.    POSITIVE for CTX-M Class A Extended Spectrum beta-lactamase (ESBL)  resistance marker by Verigene multiplex nucleic acid test. CTX-M  confers resistance to penicillins, cephalosporins and variable  resistance to beta-lactam beta-lactamase inhibitor combinations  (clavulanic acid and tazobactam). Best empiric antibiotic choice is  meropenem. Specific susceptibility testing will be performed.    Specimen tested with Verigene multiplex, gram-negative blood culture  nucleic acid test for the following targets: Acinetobacter sp.,  Citrobacter sp., Enterobacter sp., Proteus sp., E. coli, K.  pneumoniae/oxytoca, P. aeruginosa, and the following resistance  markers: CTXM, KPC, NDM, VIM, IMP and OXA.    Critical Value/Significant Value called to and read back by RALPH BOLAÑOS RN 12/17/20 1312 EH.       12/16/2020 No growth  Final   12/16/2020 (A)  Preliminary    >100,000 colonies/mL  Escherichia coli ESBL  ESBL (extended beta lactamase) producing organisms require contact precautions.     12/16/2020   Preliminary    Susceptibility testing requested by  Dr. Gonzales, Pager [phone]. Fosfomycin requested. @ 1637. 12.18.20. BS.       Specimen Description   Date Value Ref Range Status   06/04/2021 Midstream Urine  Final   12/17/2020 Blood  Final   12/16/2020 Blood Right Arm  Final   12/16/2020 Blood Right Arm  Final   12/16/2020 Unspecified Urine  Final   12/03/2020 Midstream Urine  Final        Last check of C difficile  No results found for: \"CDBPCT\"      Virology:  CMV viral loads    CMV viral " loads    CMV Quant IU/mL   Date Value Ref Range Status   12/18/2020 CMV DNA Not Detected CMVND^CMV DNA Not Detected [IU]/mL Final     Comment:     Mutations within the highly conserved regions of the viral genome covered by   the RHONDA AmpliPrep/RHONDA TaqMan CMV Test primers and/or probes have been   identified and may result in under-quantitation of or failure to detect the   virus.  Supplemental testing methods should be used for testing when this is   suspected.  The RHONDA AmpliPrep/RHONDA TaqMan CMV Test is an FDA-approved in vitro nucleic   acid amplification test for the quantitation of cytomegalovirus DNA in human   plasma (EDTA plasma) using the RHONDA AmpliPrep Instrument for automated viral   nucleic acid extraction and the Social IQ (Social Influence Quotient) TaqMan Analyzer or Social IQ (Social Influence Quotient) TaqMan for   automated Real Time amplification and detection of the viral nucleic acid   target.  Titer results are reported in International Units/mL (IU/mL using 1st WHO   International standard for Human Cytomegalovirus for Nucleic Acid   Amplification based assays. The conversion factor between CMV DNA copis/mL (as   defined by the Roche RHONDA TaqMan CMV test) and International Units is the   CMV DNA concentration in IU/mL x 1.1 copies/IU = CMV DNA in copies/mL.  This assay has received FDA approval for the testing of human plasma only. The   Infectious Disease Diagnostic Laboratory at the [facility], [place], has validated the performance characteristics of the   Roche CMV assay for plasma, bronchial alveolar lavage/wash and urine.       CMV viral loads    Recent Labs   Lab Test 12/18/20  0611   CMVQNT CMV DNA Not Detected   CSPEC Plasma   CMVLOG Not Calculated       CMV viral loads    CMV Quant IU/mL   Date Value Ref Range Status   12/18/2020 CMV DNA Not Detected CMVND^CMV DNA Not Detected [IU]/mL Final     Comment:     Mutations within the highly conserved regions of the viral genome covered by   the RHONDA  AmpliPrep/RHONDA TaqMan CMV Test primers and/or probes have been   identified and may result in under-quantitation of or failure to detect the   virus.  Supplemental testing methods should be used for testing when this is   suspected.  The RHONDA AmpliPrep/RHONDA TaqMan CMV Test is an FDA-approved in vitro nucleic   acid amplification test for the quantitation of cytomegalovirus DNA in human   plasma (EDTA plasma) using the KickplayiPrep Instrument for automated viral   nucleic acid extraction and the [employer] Systems TaqMan Analyzer or Knimbus for   automated Real Time amplification and detection of the viral nucleic acid   target.  Titer results are reported in International Units/mL (IU/mL using 1st WHO   International standard for Human Cytomegalovirus for Nucleic Acid   Amplification based assays. The conversion factor between CMV DNA copis/mL (as   defined by the Roche RHONDA TaqMan CMV test) and International Units is the   CMV DNA concentration in IU/mL x 1.1 copies/IU = CMV DNA in copies/mL.  This assay has received FDA approval for the testing of human plasma only. The   Infectious Disease Diagnostic Laboratory at the [facility], [place], has validated the performance characteristics of the   Roche CMV assay for plasma, bronchial alveolar lavage/wash and urine.     09/23/2020 CMV DNA Not Detected CMVND^CMV DNA Not Detected [IU]/mL Final     Comment:     Mutations within the highly conserved regions of the viral genome covered by   the RHONDA AmpliPrep/RHONDA TaqMan CMV Test primers and/or probes have been   identified and may result in under-quantitation of or failure to detect the   virus.  Supplemental testing methods should be used for testing when this is   suspected.  The RHONDA AmpliPrep/RHONDA TaqMan CMV Test is an FDA-approved in vitro nucleic   acid amplification test for the quantitation of cytomegalovirus DNA in human   plasma (EDTA plasma) using the RHONDA E[employer] BioscienceiPrep Instrument  "for automated viral   nucleic acid extraction and the RHONDA TaqMan Analyzer or RHONDA TaqMan for   automated Real Time amplification and detection of the viral nucleic acid   target.  Titer results are reported in International Units/mL (IU/mL using 1st WHO   International standard for Human Cytomegalovirus for Nucleic Acid   Amplification based assays. The conversion factor between CMV DNA copis/mL (as   defined by the Roche RHONDA TaqMan CMV test) and International Units is the   CMV DNA concentration in IU/mL x 1.1 copies/IU = CMV DNA in copies/mL.  This assay has received FDA approval for the testing of human plasma only. The   Infectious Disease Diagnostic Laboratory at the [facility], [place], has validated the performance characteristics of the   Roche CMV assay for plasma, bronchial alveolar lavage/wash and urine.       Log IU/mL of CMVQNT   Date Value Ref Range Status   12/18/2020 Not Calculated <2.1 [Log_IU]/mL Final   09/23/2020 Not Calculated <2.1 [Log_IU]/mL Final       CMV resistance testing  No lab results found.  No results found for: \"CMVCID\", \"CMVFOS\", \"CMVGAN\"     EBV viral loads   No lab results found.  No results found for: \"EBVDN\", \"EBRES\", \"EBVSP\", \"EBVPC\", \"EBVPCR\"    Human Herpes Virus 6 viral loads    No results found for: \"H6RES\" No results found for: \"H6SPEC\"    CMV Antibody IgG   Date Value Ref Range Status   01/04/2021 >8.0 (H) 0.0 - 0.8 AI Final     Comment:     Positive  Antibody index (AI) values reflect qualitative changes in antibody   concentration that cannot be directly associated with clinical condition or   disease state.       No results found for: \"EBIG2\", \"EBIGM\", \"EBVIGG\", \"EBIGG\", \"EBVAGN\", \"PY2150\", \"TOXG\"      Imaging:  CT a/p W 9/29/2022  IMPRESSION:  1. Urothelial thickening and mild ectasia of the distal right renal  transplant ureter extending to the ureterovesical anastomosis,  slightly increased compared to previous CT on 12/17/2020. " Findings may  represent acute or chronic sequela of inflammation/infection. No  hydronephrosis or evidence of obstruction.  2. Simple right adnexal cyst is not significantly changed compared to  previous without internal complexity on comparison ultrasound, likely  benign. No dedicated imaging follow-up recommended.  3. Stable postoperative changes of liver transplant.  4. Stable enlarged polycystic kidneys.

## 2025-06-19 ENCOUNTER — PATIENT OUTREACH (OUTPATIENT)
Dept: CARE COORDINATION | Facility: CLINIC | Age: 65
End: 2025-06-19
Payer: MEDICARE

## 2025-07-24 DIAGNOSIS — E78.5 DYSLIPIDEMIA: Primary | ICD-10-CM

## 2025-07-24 RX ORDER — ATORVASTATIN CALCIUM 10 MG/1
10 TABLET, FILM COATED ORAL AT BEDTIME
Qty: 90 TABLET | Refills: 0 | Status: SHIPPED | OUTPATIENT
Start: 2025-07-24

## 2025-07-30 ENCOUNTER — ANCILLARY PROCEDURE (OUTPATIENT)
Dept: BONE DENSITY | Facility: CLINIC | Age: 65
End: 2025-07-30
Attending: INTERNAL MEDICINE
Payer: MEDICARE

## 2025-07-30 ENCOUNTER — ANCILLARY PROCEDURE (OUTPATIENT)
Dept: MAMMOGRAPHY | Facility: CLINIC | Age: 65
End: 2025-07-30
Attending: INTERNAL MEDICINE
Payer: MEDICARE

## 2025-07-30 DIAGNOSIS — E78.5 DYSLIPIDEMIA: ICD-10-CM

## 2025-07-30 DIAGNOSIS — N95.2 VAGINAL ATROPHY: ICD-10-CM

## 2025-07-30 DIAGNOSIS — Z12.31 ENCOUNTER FOR SCREENING MAMMOGRAM FOR BREAST CANCER: ICD-10-CM

## 2025-07-30 DIAGNOSIS — M48.52XA COLLAPSED VERTEBRA, NOT ELSEWHERE CLASSIFIED, CERVICAL REGION, INITIAL ENCOUNTER FOR FRACTURE (H): ICD-10-CM

## 2025-07-30 DIAGNOSIS — Z91.89 AT RISK FOR LOSS OF BONE DENSITY: ICD-10-CM

## 2025-07-30 PROCEDURE — 77067 SCR MAMMO BI INCL CAD: CPT | Mod: GC

## 2025-07-30 PROCEDURE — 77063 BREAST TOMOSYNTHESIS BI: CPT | Mod: GC

## 2025-07-30 PROCEDURE — 77080 DXA BONE DENSITY AXIAL: CPT | Performed by: INTERNAL MEDICINE

## 2025-08-04 ENCOUNTER — PATIENT OUTREACH (OUTPATIENT)
Dept: CARE COORDINATION | Facility: CLINIC | Age: 65
End: 2025-08-04
Payer: MEDICARE

## 2025-08-06 ENCOUNTER — PATIENT OUTREACH (OUTPATIENT)
Dept: CARE COORDINATION | Facility: CLINIC | Age: 65
End: 2025-08-06
Payer: MEDICARE

## 2025-08-11 ENCOUNTER — RESULTS FOLLOW-UP (OUTPATIENT)
Dept: TRANSPLANT | Facility: CLINIC | Age: 65
End: 2025-08-11

## 2025-08-11 ENCOUNTER — LAB (OUTPATIENT)
Dept: LAB | Facility: CLINIC | Age: 65
End: 2025-08-11
Payer: MEDICARE

## 2025-08-11 DIAGNOSIS — Z94.4 LIVER REPLACED BY TRANSPLANT (H): ICD-10-CM

## 2025-08-11 LAB
ALBUMIN SERPL BCG-MCNC: 3.9 G/DL (ref 3.5–5.2)
ALP SERPL-CCNC: 56 U/L (ref 40–150)
ALT SERPL W P-5'-P-CCNC: 14 U/L (ref 0–50)
ANION GAP SERPL CALCULATED.3IONS-SCNC: 8 MMOL/L (ref 7–15)
AST SERPL W P-5'-P-CCNC: 20 U/L (ref 0–45)
BILIRUB SERPL-MCNC: 0.7 MG/DL
BILIRUBIN DIRECT (ROCHE PRO & PURE): 0.28 MG/DL (ref 0–0.45)
BUN SERPL-MCNC: 14.9 MG/DL (ref 8–23)
CALCIUM SERPL-MCNC: 9.7 MG/DL (ref 8.8–10.4)
CHLORIDE SERPL-SCNC: 103 MMOL/L (ref 98–107)
CREAT SERPL-MCNC: 0.76 MG/DL (ref 0.51–0.95)
EGFRCR SERPLBLD CKD-EPI 2021: 86 ML/MIN/1.73M2
ERYTHROCYTE [DISTWIDTH] IN BLOOD BY AUTOMATED COUNT: 14.1 % (ref 10–15)
GLUCOSE SERPL-MCNC: 92 MG/DL (ref 70–99)
HCO3 SERPL-SCNC: 26 MMOL/L (ref 22–29)
HCT VFR BLD AUTO: 48.4 % (ref 35–47)
HGB BLD-MCNC: 15.8 G/DL (ref 11.7–15.7)
MCH RBC QN AUTO: 30.9 PG (ref 26.5–33)
MCHC RBC AUTO-ENTMCNC: 32.6 G/DL (ref 31.5–36.5)
MCV RBC AUTO: 95 FL (ref 78–100)
PLATELET # BLD AUTO: 188 10E3/UL (ref 150–450)
POTASSIUM SERPL-SCNC: 3.9 MMOL/L (ref 3.4–5.3)
PROT SERPL-MCNC: 6.5 G/DL (ref 6.4–8.3)
RBC # BLD AUTO: 5.12 10E6/UL (ref 3.8–5.2)
SODIUM SERPL-SCNC: 137 MMOL/L (ref 135–145)
TACROLIMUS BLD-MCNC: 5.5 UG/L (ref 5–15)
TME LAST DOSE: NORMAL H
TME LAST DOSE: NORMAL H
WBC # BLD AUTO: 6.2 10E3/UL (ref 4–11)

## 2025-08-11 PROCEDURE — 36415 COLL VENOUS BLD VENIPUNCTURE: CPT

## 2025-08-11 PROCEDURE — 80053 COMPREHEN METABOLIC PANEL: CPT

## 2025-08-11 PROCEDURE — 82248 BILIRUBIN DIRECT: CPT

## 2025-08-11 PROCEDURE — 85027 COMPLETE CBC AUTOMATED: CPT

## 2025-08-11 PROCEDURE — 80197 ASSAY OF TACROLIMUS: CPT

## 2025-08-25 ENCOUNTER — TELEPHONE (OUTPATIENT)
Dept: ENDOCRINOLOGY | Facility: CLINIC | Age: 65
End: 2025-08-25
Payer: MEDICARE

## 2025-08-25 ENCOUNTER — TELEPHONE (OUTPATIENT)
Dept: TRANSPLANT | Facility: CLINIC | Age: 65
End: 2025-08-25
Payer: MEDICARE

## 2025-08-25 DIAGNOSIS — Z91.89 AT RISK FOR DECREASED BONE DENSITY: ICD-10-CM

## 2025-09-02 ENCOUNTER — LAB (OUTPATIENT)
Dept: LAB | Facility: CLINIC | Age: 65
End: 2025-09-02
Payer: MEDICARE

## 2025-09-02 ENCOUNTER — TELEPHONE (OUTPATIENT)
Dept: TRANSPLANT | Facility: CLINIC | Age: 65
End: 2025-09-02

## 2025-09-02 DIAGNOSIS — Z94.0 KIDNEY TRANSPLANTED: ICD-10-CM

## 2025-09-02 DIAGNOSIS — D84.9 IMMUNOSUPPRESSED STATUS: Primary | ICD-10-CM

## 2025-09-02 DIAGNOSIS — R82.71 BACTERIURIA: ICD-10-CM

## 2025-09-02 DIAGNOSIS — Z94.4 LIVER TRANSPLANT STATUS (H): ICD-10-CM

## 2025-09-02 DIAGNOSIS — Z94.4 LIVER REPLACED BY TRANSPLANT (H): ICD-10-CM

## 2025-09-02 DIAGNOSIS — D84.9 IMMUNOSUPPRESSED STATUS: ICD-10-CM

## 2025-09-02 LAB
ALBUMIN MFR UR ELPH: 14.7 MG/DL
ALBUMIN UR-MCNC: NEGATIVE MG/DL
APPEARANCE UR: CLEAR
BACTERIA #/AREA URNS HPF: ABNORMAL /HPF
BILIRUB UR QL STRIP: NEGATIVE
CHOLEST SERPL-MCNC: 154 MG/DL
COLOR UR AUTO: YELLOW
CREAT UR-MCNC: 18.7 MG/DL
FASTING STATUS PATIENT QL REPORTED: YES
GLUCOSE UR STRIP-MCNC: NEGATIVE MG/DL
HDLC SERPL-MCNC: 50 MG/DL
HGB UR QL STRIP: ABNORMAL
KETONES UR STRIP-MCNC: NEGATIVE MG/DL
LDLC SERPL CALC-MCNC: 86 MG/DL
LEUKOCYTE ESTERASE UR QL STRIP: ABNORMAL
NITRATE UR QL: NEGATIVE
NONHDLC SERPL-MCNC: 104 MG/DL
PH UR STRIP: 7 [PH] (ref 5–8)
PROT/CREAT 24H UR: 0.79 MG/MG CR (ref 0–0.2)
RBC #/AREA URNS AUTO: ABNORMAL /HPF
SP GR UR STRIP: 1.01 (ref 1–1.03)
SQUAMOUS #/AREA URNS AUTO: ABNORMAL /LPF
TRIGL SERPL-MCNC: 92 MG/DL
UROBILINOGEN UR STRIP-ACNC: 0.2 E.U./DL
WBC #/AREA URNS AUTO: ABNORMAL /HPF
WBC CLUMPS #/AREA URNS HPF: PRESENT /HPF

## 2025-09-02 PROCEDURE — 80061 LIPID PANEL: CPT

## 2025-09-02 PROCEDURE — 87086 URINE CULTURE/COLONY COUNT: CPT | Mod: GZ

## 2025-09-02 PROCEDURE — 81001 URINALYSIS AUTO W/SCOPE: CPT

## 2025-09-02 PROCEDURE — 36415 COLL VENOUS BLD VENIPUNCTURE: CPT

## 2025-09-02 PROCEDURE — 84156 ASSAY OF PROTEIN URINE: CPT

## 2025-09-03 LAB — BACTERIA UR CULT: NO GROWTH

## (undated) DEVICE — SOL WATER IRRIG 500ML BOTTLE 2F7113

## (undated) DEVICE — EYE CANN IRR 25GA CYSTOTOME 581610

## (undated) DEVICE — SUCTION MANIFOLD DORNOCH ULTRA CART UL-CL500

## (undated) DEVICE — JELLY LUBRICATING SURGILUBE 2OZ TUBE 0281-0205-02

## (undated) DEVICE — LINEN TOWEL PACK X5 5464

## (undated) DEVICE — ENDO TRAP POLYP E-TRAP 00711099

## (undated) DEVICE — PREP POVIDONE IODINE SOLUTION 10% 4OZ BOTTLE 29906-004

## (undated) DEVICE — DRSG COTTON BALL 6PK LCB62

## (undated) DEVICE — KIT ENDO FIRST STEP DISINFECTANT 200ML W/POUCH EP-4

## (undated) DEVICE — LINEN GOWN X4 5410

## (undated) DEVICE — PREP POVIDONE-IODINE 7.5% SCRUB 4OZ BOTTLE MDS093945

## (undated) DEVICE — EYE KNIFE STILETTO VISITEC 1.1MM ANG 45DEG SIDEPORT 376620

## (undated) DEVICE — EYE CANN IRR 27GA ANTERIOR CHAMBER 581280

## (undated) DEVICE — EYE SHIELD PLASTIC

## (undated) DEVICE — NDL SPINAL 22GA 3.5" QUINCKE 405181

## (undated) DEVICE — DRAPE UNDER BUTTOCK 8483

## (undated) DEVICE — SU VICRYL 0 UR-5 27" J376H

## (undated) DEVICE — DRAPE GYN/UROLOGY FLUID POUCH TUR 29455

## (undated) DEVICE — GLOVE PROTEXIS MICRO 6.5  2D73PM65

## (undated) DEVICE — KIT ENDO TURNOVER/PROCEDURE CARRY-ON 101822

## (undated) DEVICE — ESU GROUND PAD UNIVERSAL W/O CORD

## (undated) DEVICE — SUCTION MANIFOLD NEPTUNE 2 SYS 1 PORT 702-025-000

## (undated) DEVICE — Device

## (undated) DEVICE — PACK CATARACT CUSTOM ASC SEY15CPUMC

## (undated) DEVICE — SPECIMEN CONTAINER 3OZ W/FORMALIN 59901

## (undated) DEVICE — DRSG TELFA 3X8" 1238

## (undated) DEVICE — PREP SKIN SCRUB TRAY 4461A

## (undated) DEVICE — EYE KNIFE SLIT XSTAR VISITEC 2.4MM 45DEG BEVEL UP 373724

## (undated) DEVICE — TUBING SUCTION MEDI-VAC 1/4"X20' N620A

## (undated) DEVICE — SOL NACL 0.9% IRRIG 1000ML BOTTLE 2F7124

## (undated) DEVICE — PANTIES MESH LG/XLG 2PK 706M2

## (undated) DEVICE — ENDO FORCEP SPIKED SERRATED SHAFT JUMBO 239CM G56998

## (undated) DEVICE — EYE PACK CUSTOM ANTERIOR 30DEG TIP CENTURION PPK6682-04

## (undated) DEVICE — SUCTION TIP YANKAUER W/O VENT K86

## (undated) DEVICE — PAD CHUX UNDERPAD 30X36" P3036C

## (undated) DEVICE — SWAB PROCTO 16" 2/PK 32-046

## (undated) DEVICE — STRAP KNEE/BODY 31143004

## (undated) DEVICE — GLOVE PROTEXIS BLUE W/NEU-THERA 6.5  2D73EB65

## (undated) DEVICE — ENDO SNARE EXACTO COLD 9MM LOOP 2.4MMX230CM 00711115

## (undated) DEVICE — GOWN IMPERVIOUS 2XL BLUE

## (undated) DEVICE — GLOVE BIOGEL PI ULTRATOUCH G SZ 8.0 42180

## (undated) DEVICE — EYE TIP IRRIGATION & ASPIRATION POLYMER 35D BENT 8065751511

## (undated) DEVICE — SURGICEL FIBRILLAR HEMOSTAT 1"X2" 1961

## (undated) DEVICE — BLADE KNIFE SURG 11 371111

## (undated) DEVICE — SNARE CAPIVATOR ROUND COLD SNR BX10 M00561101

## (undated) RX ORDER — FENTANYL CITRATE 50 UG/ML
INJECTION, SOLUTION INTRAMUSCULAR; INTRAVENOUS
Status: DISPENSED
Start: 2020-09-30

## (undated) RX ORDER — LIDOCAINE HYDROCHLORIDE 10 MG/ML
INJECTION, SOLUTION EPIDURAL; INFILTRATION; INTRACAUDAL; PERINEURAL
Status: DISPENSED
Start: 2022-10-05

## (undated) RX ORDER — ONDANSETRON 2 MG/ML
INJECTION INTRAMUSCULAR; INTRAVENOUS
Status: DISPENSED
Start: 2024-03-20

## (undated) RX ORDER — HYDROMORPHONE HYDROCHLORIDE 1 MG/ML
INJECTION, SOLUTION INTRAMUSCULAR; INTRAVENOUS; SUBCUTANEOUS
Status: DISPENSED
Start: 2020-09-30

## (undated) RX ORDER — LIDOCAINE HYDROCHLORIDE AND EPINEPHRINE 10; 10 MG/ML; UG/ML
INJECTION, SOLUTION INFILTRATION; PERINEURAL
Status: DISPENSED
Start: 2023-03-09

## (undated) RX ORDER — FENTANYL CITRATE 50 UG/ML
INJECTION, SOLUTION INTRAMUSCULAR; INTRAVENOUS
Status: DISPENSED
Start: 2023-03-09

## (undated) RX ORDER — HEPARIN SODIUM,PORCINE 10 UNIT/ML
VIAL (ML) INTRAVENOUS
Status: DISPENSED
Start: 2022-10-05

## (undated) RX ORDER — ONDANSETRON 2 MG/ML
INJECTION INTRAMUSCULAR; INTRAVENOUS
Status: DISPENSED
Start: 2020-09-30

## (undated) RX ORDER — ONDANSETRON 2 MG/ML
INJECTION INTRAMUSCULAR; INTRAVENOUS
Status: DISPENSED
Start: 2023-03-09

## (undated) RX ORDER — ACETAMINOPHEN 325 MG/1
TABLET ORAL
Status: DISPENSED
Start: 2023-03-09

## (undated) RX ORDER — SULFAMETHOXAZOLE/TRIMETHOPRIM 800-160 MG
TABLET ORAL
Status: DISPENSED
Start: 2023-02-02

## (undated) RX ORDER — PROPOFOL 10 MG/ML
INJECTION, EMULSION INTRAVENOUS
Status: DISPENSED
Start: 2023-03-09

## (undated) RX ORDER — HEPARIN SODIUM,PORCINE 10 UNIT/ML
VIAL (ML) INTRAVENOUS
Status: DISPENSED
Start: 2020-12-19

## (undated) RX ORDER — ACETAMINOPHEN 325 MG/1
TABLET ORAL
Status: DISPENSED
Start: 2023-02-09

## (undated) RX ORDER — FENTANYL CITRATE 50 UG/ML
INJECTION, SOLUTION INTRAMUSCULAR; INTRAVENOUS
Status: DISPENSED
Start: 2023-02-09

## (undated) RX ORDER — LIDOCAINE HYDROCHLORIDE AND EPINEPHRINE 10; 10 MG/ML; UG/ML
INJECTION, SOLUTION INFILTRATION; PERINEURAL
Status: DISPENSED
Start: 2020-09-30

## (undated) RX ORDER — LIDOCAINE HYDROCHLORIDE 10 MG/ML
INJECTION, SOLUTION EPIDURAL; INFILTRATION; INTRACAUDAL; PERINEURAL
Status: DISPENSED
Start: 2020-12-19

## (undated) RX ORDER — LIDOCAINE HYDROCHLORIDE 20 MG/ML
INJECTION, SOLUTION EPIDURAL; INFILTRATION; INTRACAUDAL; PERINEURAL
Status: DISPENSED
Start: 2020-09-30